# Patient Record
Sex: MALE | Race: WHITE | NOT HISPANIC OR LATINO | Employment: UNEMPLOYED | URBAN - METROPOLITAN AREA
[De-identification: names, ages, dates, MRNs, and addresses within clinical notes are randomized per-mention and may not be internally consistent; named-entity substitution may affect disease eponyms.]

---

## 2016-03-25 LAB — POTASSIUM (EXTERNAL): 4.8 MMOL/L (ref 3.5–5)

## 2017-05-23 ENCOUNTER — RECORDS - HEALTHEAST (OUTPATIENT)
Dept: LAB | Facility: CLINIC | Age: 47
End: 2017-05-23

## 2017-05-24 LAB — HBA1C MFR BLD: 6.9 % (ref 4.2–6.1)

## 2017-08-23 ENCOUNTER — RECORDS - HEALTHEAST (OUTPATIENT)
Dept: ADMINISTRATIVE | Facility: OTHER | Age: 47
End: 2017-08-23

## 2017-09-21 ENCOUNTER — AMBULATORY - HEALTHEAST (OUTPATIENT)
Dept: VASCULAR SURGERY | Facility: CLINIC | Age: 47
End: 2017-09-21

## 2017-09-21 DIAGNOSIS — L89.90 DECUBITAL ULCER: ICD-10-CM

## 2017-09-28 ENCOUNTER — OFFICE VISIT - HEALTHEAST (OUTPATIENT)
Dept: VASCULAR SURGERY | Facility: CLINIC | Age: 47
End: 2017-09-28

## 2017-09-28 ENCOUNTER — HOSPITAL ENCOUNTER (OUTPATIENT)
Dept: RADIOLOGY | Facility: HOSPITAL | Age: 47
Discharge: HOME | End: 2017-09-28
Attending: PODIATRIST

## 2017-09-28 ENCOUNTER — AMBULATORY - HEALTHEAST (OUTPATIENT)
Dept: LAB | Facility: HOSPITAL | Age: 47
End: 2017-09-28

## 2017-09-28 DIAGNOSIS — L97.519 DIABETIC ULCER OF RIGHT FOOT ASSOCIATED WITH TYPE 2 DIABETES MELLITUS (H): ICD-10-CM

## 2017-09-28 DIAGNOSIS — E11.621 DIABETIC ULCER OF RIGHT FOOT ASSOCIATED WITH TYPE 2 DIABETES MELLITUS (H): ICD-10-CM

## 2017-10-03 ENCOUNTER — AMBULATORY - HEALTHEAST (OUTPATIENT)
Dept: VASCULAR SURGERY | Facility: CLINIC | Age: 47
End: 2017-10-03

## 2017-10-03 DIAGNOSIS — M86.9 OSTEOMYELITIS OF RIGHT FOOT, UNSPECIFIED TYPE (H): ICD-10-CM

## 2017-10-12 ENCOUNTER — OFFICE VISIT - HEALTHEAST (OUTPATIENT)
Dept: VASCULAR SURGERY | Facility: CLINIC | Age: 47
End: 2017-10-12

## 2017-10-12 DIAGNOSIS — E11.621 DIABETIC ULCER OF RIGHT FOOT ASSOCIATED WITH TYPE 2 DIABETES MELLITUS, WITH OTHER ULCER SEVERITY, UNSPECIFIED PART OF FOOT (H): ICD-10-CM

## 2017-10-12 DIAGNOSIS — L97.518 DIABETIC ULCER OF RIGHT FOOT ASSOCIATED WITH TYPE 2 DIABETES MELLITUS, WITH OTHER ULCER SEVERITY, UNSPECIFIED PART OF FOOT (H): ICD-10-CM

## 2017-10-13 ENCOUNTER — COMMUNICATION - HEALTHEAST (OUTPATIENT)
Dept: VASCULAR SURGERY | Facility: CLINIC | Age: 47
End: 2017-10-13

## 2017-10-24 ENCOUNTER — HOSPITAL ENCOUNTER (OUTPATIENT)
Dept: MRI IMAGING | Facility: HOSPITAL | Age: 47
Discharge: HOME | End: 2017-10-24
Attending: PODIATRIST

## 2017-10-24 DIAGNOSIS — M86.9 OSTEOMYELITIS OF RIGHT FOOT, UNSPECIFIED TYPE (H): ICD-10-CM

## 2017-10-25 ENCOUNTER — RECORDS - HEALTHEAST (OUTPATIENT)
Dept: ADMINISTRATIVE | Facility: OTHER | Age: 47
End: 2017-10-25

## 2017-11-09 ENCOUNTER — OFFICE VISIT - HEALTHEAST (OUTPATIENT)
Dept: VASCULAR SURGERY | Facility: CLINIC | Age: 47
End: 2017-11-09

## 2017-11-09 DIAGNOSIS — S92.919A FRACTURE, TOE: ICD-10-CM

## 2017-12-19 ENCOUNTER — RECORDS - HEALTHEAST (OUTPATIENT)
Dept: LAB | Facility: CLINIC | Age: 47
End: 2017-12-19

## 2017-12-19 LAB — HBA1C MFR BLD: 8.9 % (ref 4.2–6.1)

## 2018-01-04 ENCOUNTER — RECORDS - HEALTHEAST (OUTPATIENT)
Dept: LAB | Facility: CLINIC | Age: 48
End: 2018-01-04

## 2018-01-04 LAB — MAGNESIUM SERPL-MCNC: 1.3 MG/DL (ref 1.8–2.6)

## 2018-02-19 ENCOUNTER — RECORDS - HEALTHEAST (OUTPATIENT)
Dept: LAB | Facility: CLINIC | Age: 48
End: 2018-02-19

## 2018-02-20 LAB — MAGNESIUM SERPL-MCNC: 1.5 MG/DL (ref 1.8–2.6)

## 2018-04-05 ENCOUNTER — RECORDS - HEALTHEAST (OUTPATIENT)
Dept: LAB | Facility: CLINIC | Age: 48
End: 2018-04-05

## 2018-04-05 LAB — HBA1C MFR BLD: 7.4 % (ref 4.2–6.1)

## 2018-04-11 LAB
CREATININE (EXTERNAL): 1.07 MG/DL (ref 0.72–1.25)
GFR ESTIMATED (EXTERNAL): >60 ML/MIN/1.73M2
GFR ESTIMATED (IF AFRICAN AMERICAN) (EXTERNAL): >60 ML/MIN/1.73M2
GLUCOSE (EXTERNAL): 269 MG/DL (ref 65–100)
POTASSIUM (EXTERNAL): 4.2 MMOL/L (ref 3.5–5)

## 2018-07-19 LAB
CREATININE (EXTERNAL): 1.25 MG/DL (ref 0.72–1.25)
GFR ESTIMATED (EXTERNAL): >60 ML/MIN/1.73M2
GFR ESTIMATED (IF AFRICAN AMERICAN) (EXTERNAL): >60 ML/MIN/1.73M2
GLUCOSE (EXTERNAL): 127 MG/DL (ref 65–100)
POTASSIUM (EXTERNAL): 5 MMOL/L (ref 3.5–5)

## 2018-08-07 ENCOUNTER — RECORDS - HEALTHEAST (OUTPATIENT)
Dept: LAB | Facility: CLINIC | Age: 48
End: 2018-08-07

## 2018-08-07 LAB
ERYTHROCYTE [DISTWIDTH] IN BLOOD BY AUTOMATED COUNT: 14.6 % (ref 11–14.5)
FERRITIN SERPL-MCNC: 221 NG/ML (ref 27–300)
HCT VFR BLD AUTO: 32.5 % (ref 40–54)
HGB BLD-MCNC: 10.7 G/DL (ref 14–18)
MCH RBC QN AUTO: 29 PG (ref 27–34)
MCHC RBC AUTO-ENTMCNC: 32.9 G/DL (ref 32–36)
MCV RBC AUTO: 88 FL (ref 80–100)
PLATELET # BLD AUTO: 155 THOU/UL (ref 140–440)
PMV BLD AUTO: 11 FL (ref 8.5–12.5)
RBC # BLD AUTO: 3.69 MILL/UL (ref 4.4–6.2)
WBC: 4.7 THOU/UL (ref 4–11)

## 2018-10-12 ENCOUNTER — RECORDS - HEALTHEAST (OUTPATIENT)
Dept: LAB | Facility: CLINIC | Age: 48
End: 2018-10-12

## 2018-10-12 LAB
ALBUMIN SERPL-MCNC: 3.3 G/DL (ref 3.5–5)
ALP SERPL-CCNC: 44 U/L (ref 45–120)
ALT SERPL W P-5'-P-CCNC: 58 U/L (ref 0–45)
ANION GAP SERPL CALCULATED.3IONS-SCNC: 10 MMOL/L (ref 5–18)
AST SERPL W P-5'-P-CCNC: 91 U/L (ref 0–40)
BILIRUB DIRECT SERPL-MCNC: 0.2 MG/DL
BILIRUB SERPL-MCNC: 0.5 MG/DL (ref 0–1)
BUN SERPL-MCNC: 11 MG/DL (ref 8–22)
CALCIUM SERPL-MCNC: 9.5 MG/DL (ref 8.5–10.5)
CHLORIDE BLD-SCNC: 104 MMOL/L (ref 98–107)
CO2 SERPL-SCNC: 21 MMOL/L (ref 22–31)
CREAT SERPL-MCNC: 0.98 MG/DL (ref 0.7–1.3)
ERYTHROCYTE [DISTWIDTH] IN BLOOD BY AUTOMATED COUNT: 15 % (ref 11–14.5)
GFR SERPL CREATININE-BSD FRML MDRD: >60 ML/MIN/1.73M2
GLUCOSE BLD-MCNC: 122 MG/DL (ref 70–125)
HBA1C MFR BLD: 5 % (ref 4.2–6.1)
HCT VFR BLD AUTO: 32.8 % (ref 40–54)
HGB BLD-MCNC: 10.2 G/DL (ref 14–18)
MAGNESIUM SERPL-MCNC: 1.6 MG/DL (ref 1.8–2.6)
MCH RBC QN AUTO: 28.9 PG (ref 27–34)
MCHC RBC AUTO-ENTMCNC: 31.1 G/DL (ref 32–36)
MCV RBC AUTO: 93 FL (ref 80–100)
PLATELET # BLD AUTO: 175 THOU/UL (ref 140–440)
PMV BLD AUTO: 11.6 FL (ref 8.5–12.5)
POTASSIUM BLD-SCNC: 4.4 MMOL/L (ref 3.5–5)
PROT SERPL-MCNC: 6.5 G/DL (ref 6–8)
RBC # BLD AUTO: 3.53 MILL/UL (ref 4.4–6.2)
SODIUM SERPL-SCNC: 135 MMOL/L (ref 136–145)
WBC: 3.9 THOU/UL (ref 4–11)

## 2018-11-12 ENCOUNTER — RECORDS - HEALTHEAST (OUTPATIENT)
Dept: LAB | Facility: CLINIC | Age: 48
End: 2018-11-12

## 2018-11-12 LAB
ALBUMIN SERPL-MCNC: 2.7 G/DL (ref 3.5–5)
ALP SERPL-CCNC: 55 U/L (ref 45–120)
ALT SERPL W P-5'-P-CCNC: 15 U/L (ref 0–45)
ANION GAP SERPL CALCULATED.3IONS-SCNC: 6 MMOL/L (ref 5–18)
AST SERPL W P-5'-P-CCNC: 29 U/L (ref 0–40)
BASOPHILS # BLD AUTO: 0 THOU/UL (ref 0–0.2)
BASOPHILS NFR BLD AUTO: 1 % (ref 0–2)
BILIRUB DIRECT SERPL-MCNC: 0.1 MG/DL
BILIRUB SERPL-MCNC: 0.3 MG/DL (ref 0–1)
BUN SERPL-MCNC: 5 MG/DL (ref 8–22)
CALCIUM SERPL-MCNC: 9.4 MG/DL (ref 8.5–10.5)
CHLORIDE BLD-SCNC: 99 MMOL/L (ref 98–107)
CO2 SERPL-SCNC: 27 MMOL/L (ref 22–31)
CREAT SERPL-MCNC: 0.91 MG/DL (ref 0.7–1.3)
EOSINOPHIL # BLD AUTO: 0.2 THOU/UL (ref 0–0.4)
EOSINOPHIL NFR BLD AUTO: 6 % (ref 0–6)
ERYTHROCYTE [DISTWIDTH] IN BLOOD BY AUTOMATED COUNT: 14.7 % (ref 11–14.5)
GFR SERPL CREATININE-BSD FRML MDRD: >60 ML/MIN/1.73M2
GLUCOSE BLD-MCNC: 167 MG/DL (ref 70–125)
HCT VFR BLD AUTO: 29.7 % (ref 40–54)
HGB BLD-MCNC: 9.1 G/DL (ref 14–18)
LYMPHOCYTES # BLD AUTO: 1 THOU/UL (ref 0.8–4.4)
LYMPHOCYTES NFR BLD AUTO: 27 % (ref 20–40)
MCH RBC QN AUTO: 29.3 PG (ref 27–34)
MCHC RBC AUTO-ENTMCNC: 30.6 G/DL (ref 32–36)
MCV RBC AUTO: 96 FL (ref 80–100)
MONOCYTES # BLD AUTO: 0.3 THOU/UL (ref 0–0.9)
MONOCYTES NFR BLD AUTO: 8 % (ref 2–10)
NEUTROPHILS # BLD AUTO: 2.1 THOU/UL (ref 2–7.7)
NEUTROPHILS NFR BLD AUTO: 58 % (ref 50–70)
PLATELET # BLD AUTO: 146 THOU/UL (ref 140–440)
PMV BLD AUTO: 10.9 FL (ref 8.5–12.5)
POTASSIUM BLD-SCNC: 5 MMOL/L (ref 3.5–5)
PROT SERPL-MCNC: 5.4 G/DL (ref 6–8)
RBC # BLD AUTO: 3.11 MILL/UL (ref 4.4–6.2)
SODIUM SERPL-SCNC: 132 MMOL/L (ref 136–145)
WBC: 3.8 THOU/UL (ref 4–11)

## 2018-11-21 ENCOUNTER — RECORDS - HEALTHEAST (OUTPATIENT)
Dept: LAB | Facility: CLINIC | Age: 48
End: 2018-11-21

## 2018-11-21 LAB
BASOPHILS # BLD AUTO: 0.1 THOU/UL (ref 0–0.2)
BASOPHILS NFR BLD AUTO: 1 % (ref 0–2)
EOSINOPHIL # BLD AUTO: 0.2 THOU/UL (ref 0–0.4)
EOSINOPHIL NFR BLD AUTO: 5 % (ref 0–6)
ERYTHROCYTE [DISTWIDTH] IN BLOOD BY AUTOMATED COUNT: 14.6 % (ref 11–14.5)
FERRITIN SERPL-MCNC: 233 NG/ML (ref 27–300)
HCT VFR BLD AUTO: 31.4 % (ref 40–54)
HGB BLD-MCNC: 9.9 G/DL (ref 14–18)
LYMPHOCYTES # BLD AUTO: 1 THOU/UL (ref 0.8–4.4)
LYMPHOCYTES NFR BLD AUTO: 27 % (ref 20–40)
MCH RBC QN AUTO: 29.6 PG (ref 27–34)
MCHC RBC AUTO-ENTMCNC: 31.5 G/DL (ref 32–36)
MCV RBC AUTO: 94 FL (ref 80–100)
MONOCYTES # BLD AUTO: 0.4 THOU/UL (ref 0–0.9)
MONOCYTES NFR BLD AUTO: 10 % (ref 2–10)
NEUTROPHILS # BLD AUTO: 2.1 THOU/UL (ref 2–7.7)
NEUTROPHILS NFR BLD AUTO: 56 % (ref 50–70)
PLATELET # BLD AUTO: 143 THOU/UL (ref 140–440)
PMV BLD AUTO: 11.1 FL (ref 8.5–12.5)
RBC # BLD AUTO: 3.35 MILL/UL (ref 4.4–6.2)
VIT B12 SERPL-MCNC: 825 PG/ML (ref 213–816)
WBC: 3.7 THOU/UL (ref 4–11)

## 2019-03-20 LAB
CREATININE (EXTERNAL): 1.12 MG/DL (ref 0.72–1.25)
GFR ESTIMATED (EXTERNAL): >60 ML/MIN/1.73M2
GFR ESTIMATED (IF AFRICAN AMERICAN) (EXTERNAL): >60 ML/MIN/1.73M2
GLUCOSE (EXTERNAL): 188 MG/DL (ref 65–100)
POTASSIUM (EXTERNAL): 4.4 MMOL/L (ref 3.5–5)

## 2019-04-27 ENCOUNTER — RECORDS - HEALTHEAST (OUTPATIENT)
Dept: LAB | Facility: CLINIC | Age: 49
End: 2019-04-27

## 2019-04-29 LAB
ALBUMIN SERPL-MCNC: 4 G/DL (ref 3.5–5)
ALP SERPL-CCNC: 103 U/L (ref 45–120)
ALT SERPL W P-5'-P-CCNC: 20 U/L (ref 0–45)
ANION GAP SERPL CALCULATED.3IONS-SCNC: 9 MMOL/L (ref 5–18)
AST SERPL W P-5'-P-CCNC: 21 U/L (ref 0–40)
BILIRUB SERPL-MCNC: 0.4 MG/DL (ref 0–1)
BUN SERPL-MCNC: 17 MG/DL (ref 8–22)
CALCIUM SERPL-MCNC: 10.1 MG/DL (ref 8.5–10.5)
CHLORIDE BLD-SCNC: 104 MMOL/L (ref 98–107)
CO2 SERPL-SCNC: 23 MMOL/L (ref 22–31)
CREAT SERPL-MCNC: 1.09 MG/DL (ref 0.7–1.3)
ERYTHROCYTE [DISTWIDTH] IN BLOOD BY AUTOMATED COUNT: 13.2 % (ref 11–14.5)
FERRITIN SERPL-MCNC: 91 NG/ML (ref 27–300)
GFR SERPL CREATININE-BSD FRML MDRD: >60 ML/MIN/1.73M2
GLUCOSE BLD-MCNC: 239 MG/DL (ref 70–125)
HBA1C MFR BLD: 9.2 % (ref 4.2–6.1)
HCT VFR BLD AUTO: 36.1 % (ref 40–54)
HGB BLD-MCNC: 11.5 G/DL (ref 14–18)
MAGNESIUM SERPL-MCNC: 1.2 MG/DL (ref 1.8–2.6)
MCH RBC QN AUTO: 27.3 PG (ref 27–34)
MCHC RBC AUTO-ENTMCNC: 31.9 G/DL (ref 32–36)
MCV RBC AUTO: 86 FL (ref 80–100)
PLATELET # BLD AUTO: 210 THOU/UL (ref 140–440)
PMV BLD AUTO: 10.1 FL (ref 8.5–12.5)
POTASSIUM BLD-SCNC: 4.2 MMOL/L (ref 3.5–5)
PROT SERPL-MCNC: 6.9 G/DL (ref 6–8)
RBC # BLD AUTO: 4.21 MILL/UL (ref 4.4–6.2)
SODIUM SERPL-SCNC: 136 MMOL/L (ref 136–145)
VIT B12 SERPL-MCNC: 840 PG/ML (ref 213–816)
WBC: 4.8 THOU/UL (ref 4–11)

## 2019-07-30 ENCOUNTER — RECORDS - HEALTHEAST (OUTPATIENT)
Dept: LAB | Facility: CLINIC | Age: 49
End: 2019-07-30

## 2019-07-31 LAB — HBA1C MFR BLD: 9.8 % (ref 4.2–6.1)

## 2019-11-15 ENCOUNTER — RECORDS - HEALTHEAST (OUTPATIENT)
Dept: LAB | Facility: CLINIC | Age: 49
End: 2019-11-15

## 2019-11-19 LAB — HBA1C MFR BLD: 5.9 % (ref 4.2–6.1)

## 2021-04-09 ENCOUNTER — AMBULATORY - HEALTHEAST (OUTPATIENT)
Dept: VASCULAR SURGERY | Facility: CLINIC | Age: 51
End: 2021-04-09

## 2021-04-09 DIAGNOSIS — S91.301A OPEN WOUND OF RIGHT FOOT: ICD-10-CM

## 2021-04-14 ENCOUNTER — OFFICE VISIT - HEALTHEAST (OUTPATIENT)
Dept: VASCULAR SURGERY | Facility: CLINIC | Age: 51
End: 2021-04-14

## 2021-04-14 ENCOUNTER — COMMUNICATION - HEALTHEAST (OUTPATIENT)
Dept: VASCULAR SURGERY | Facility: CLINIC | Age: 51
End: 2021-04-14

## 2021-04-14 DIAGNOSIS — E11.621 DIABETIC ULCER OF RIGHT MIDFOOT ASSOCIATED WITH TYPE 2 DIABETES MELLITUS, LIMITED TO BREAKDOWN OF SKIN (H): ICD-10-CM

## 2021-04-14 DIAGNOSIS — L97.411 DIABETIC ULCER OF RIGHT MIDFOOT ASSOCIATED WITH TYPE 2 DIABETES MELLITUS, LIMITED TO BREAKDOWN OF SKIN (H): ICD-10-CM

## 2021-04-14 DIAGNOSIS — M21.541 EQUINOVARUS ACQUIRED DEFORMITY, RIGHT: ICD-10-CM

## 2021-04-14 DIAGNOSIS — I73.9 PAD (PERIPHERAL ARTERY DISEASE) (H): ICD-10-CM

## 2021-05-05 ENCOUNTER — RECORDS - HEALTHEAST (OUTPATIENT)
Dept: VASCULAR ULTRASOUND | Facility: CLINIC | Age: 51
End: 2021-05-05

## 2021-05-05 ENCOUNTER — OFFICE VISIT - HEALTHEAST (OUTPATIENT)
Dept: VASCULAR SURGERY | Facility: CLINIC | Age: 51
End: 2021-05-05

## 2021-05-05 DIAGNOSIS — M21.541 ACQUIRED CLUBFOOT, RIGHT FOOT: ICD-10-CM

## 2021-05-05 DIAGNOSIS — L97.411 DIABETIC ULCER OF RIGHT MIDFOOT ASSOCIATED WITH TYPE 2 DIABETES MELLITUS, LIMITED TO BREAKDOWN OF SKIN (H): ICD-10-CM

## 2021-05-05 DIAGNOSIS — E11.621 TYPE 2 DIABETES MELLITUS WITH FOOT ULCER (CODE) (H): ICD-10-CM

## 2021-05-05 DIAGNOSIS — I73.9 PERIPHERAL VASCULAR DISEASE, UNSPECIFIED (H): ICD-10-CM

## 2021-05-05 DIAGNOSIS — L97.411 NON-PRESSURE CHRONIC ULCER OF RIGHT HEEL AND MIDFOOT LIMITED TO BREAKDOWN OF SKIN (H): ICD-10-CM

## 2021-05-05 DIAGNOSIS — E11.621 DIABETIC ULCER OF RIGHT MIDFOOT ASSOCIATED WITH TYPE 2 DIABETES MELLITUS, LIMITED TO BREAKDOWN OF SKIN (H): ICD-10-CM

## 2021-05-05 ASSESSMENT — MIFFLIN-ST. JEOR: SCORE: 1534.39

## 2021-05-26 ENCOUNTER — RECORDS - HEALTHEAST (OUTPATIENT)
Dept: ADMINISTRATIVE | Facility: CLINIC | Age: 51
End: 2021-05-26

## 2021-05-27 ENCOUNTER — RECORDS - HEALTHEAST (OUTPATIENT)
Dept: ADMINISTRATIVE | Facility: CLINIC | Age: 51
End: 2021-05-27

## 2021-05-27 VITALS
HEART RATE: 74 BPM | WEIGHT: 160 LBS | TEMPERATURE: 97.6 F | DIASTOLIC BLOOD PRESSURE: 64 MMHG | SYSTOLIC BLOOD PRESSURE: 122 MMHG | BODY MASS INDEX: 25.11 KG/M2 | HEIGHT: 67 IN | RESPIRATION RATE: 17 BRPM

## 2021-05-27 VITALS
TEMPERATURE: 96.1 F | RESPIRATION RATE: 17 BRPM | SYSTOLIC BLOOD PRESSURE: 122 MMHG | DIASTOLIC BLOOD PRESSURE: 70 MMHG | HEART RATE: 17 BPM

## 2021-05-28 ENCOUNTER — RECORDS - HEALTHEAST (OUTPATIENT)
Dept: ADMINISTRATIVE | Facility: CLINIC | Age: 51
End: 2021-05-28

## 2021-05-30 ENCOUNTER — RECORDS - HEALTHEAST (OUTPATIENT)
Dept: ADMINISTRATIVE | Facility: CLINIC | Age: 51
End: 2021-05-30

## 2021-06-09 ENCOUNTER — OFFICE VISIT - HEALTHEAST (OUTPATIENT)
Dept: VASCULAR SURGERY | Facility: CLINIC | Age: 51
End: 2021-06-09

## 2021-06-09 ENCOUNTER — RECORDS - HEALTHEAST (OUTPATIENT)
Dept: ADMINISTRATIVE | Facility: CLINIC | Age: 51
End: 2021-06-09

## 2021-06-09 DIAGNOSIS — E11.621 DIABETIC ULCER OF RIGHT MIDFOOT ASSOCIATED WITH TYPE 2 DIABETES MELLITUS, LIMITED TO BREAKDOWN OF SKIN (H): ICD-10-CM

## 2021-06-09 DIAGNOSIS — M21.541 EQUINOVARUS ACQUIRED DEFORMITY, RIGHT: ICD-10-CM

## 2021-06-09 DIAGNOSIS — L97.411 DIABETIC ULCER OF RIGHT MIDFOOT ASSOCIATED WITH TYPE 2 DIABETES MELLITUS, LIMITED TO BREAKDOWN OF SKIN (H): ICD-10-CM

## 2021-06-09 ASSESSMENT — MIFFLIN-ST. JEOR: SCORE: 1534.39

## 2021-06-11 ENCOUNTER — COMMUNICATION - HEALTHEAST (OUTPATIENT)
Dept: VASCULAR SURGERY | Facility: CLINIC | Age: 51
End: 2021-06-11

## 2021-06-13 NOTE — PROGRESS NOTES
FOOT AND ANKLE SURGERY/PODIATRY Progress Note        ASSESSMENT:   Ulceration right foot  Equinovarus right  DM      TREATMENT:  -The right foot ulceration is stable. The patient has had the sore for a very long time, 10 years. Because it is longstanding, I would like to evaluate for osteomyelitis and referred him for a right foot x-ray, and CRP. If either test is abnormal, will consider an MRI for further evaluation.   -The patient has a flexible, reducible equinovarus deformity on his right foot. +4/5 muscle strength to dorsiflexion and plantarflexion. We discussed treatment options today to include offloading of the ulcerative site. He will avoid use of the AFO at this time as this may be contributing to skin breakdown. I recommend he begin use of an egg crate boot to offload the lateral foot. We also discussed tendon transfer options such as a STATT vs tibialis posterior tendon transfer to create a rectus foot.   -Sharp, excisional debridement of the wound into subcutaneous tissue was performed using #15 blade for a total of 0.3 square centimeters. Debridement was done to reduce pressure, remove non-viable, devitalized tissue and promote wound healing. Patient tolerated this well. Collagen with a gauze dresssing was applied. He will apply endoform as directed. Will prior auth patient for Epifix.  -The patient will follow-up in two weeks.      Alex Kim, [facility]      HPI: Cm Lundberg was seen today at the request of his wound care nurse for a right foot ulceration. His brother is present for today's exam. The patient states he has had the sore for the past 10 years after suffering a CVA and subsequent foot deformity. He has treated the sore himself until home health nurse started in January, 2017. He has used an AFO for several years which he believes has caused lateral skin irritation. A new AFO was obtained from [facility] earlier this summer. He resides at an assisted living  facility. Former smoker, quit 2-3 years ago.     No past medical history on file.    Allergies   Allergen Reactions     Isosorbide Headache     Codeine Rash     Morphine Rash         Current Outpatient Prescriptions:      acetaminophen (TYLENOL) 500 MG tablet, Take 1,000 mg by mouth., Disp: , Rfl:      amitriptyline (ELAVIL) 10 MG tablet, Take 10 mg by mouth., Disp: , Rfl:      ascorbic acid, vitamin C, (VITAMIN C) 500 MG tablet, Take 1 tablet by mouth., Disp: , Rfl:      aspirin 81 mg chewable tablet, Chew 81 mg., Disp: , Rfl:      atorvastatin (LIPITOR) 80 MG tablet, Take 80 mg by mouth., Disp: , Rfl:      buPROPion (WELLBUTRIN XL) 300 MG 24 hr tablet, Take 300 mg by mouth., Disp: , Rfl:      calcium carbonate (OS-TO) 500 mg calcium (1,250 mg) chewable tablet, Chew 1 tablet., Disp: , Rfl:      cholecalciferol, vitamin D3, 1,000 unit capsule, Take 1,000 Units by mouth., Disp: , Rfl:      coenzyme Q10 100 mg capsule, Take 100 mg by mouth., Disp: , Rfl:      cyanocobalamin 1000 MCG tablet, Take 1,000 mcg by mouth., Disp: , Rfl:      DULoxetine (CYMBALTA) 30 MG capsule, Take 30 mg by mouth., Disp: , Rfl:      famotidine (PEPCID) 20 MG tablet, Take 20 mg by mouth., Disp: , Rfl:      fenofibrate (LIPOFEN) 50 mg cap, Take 4 capsules by mouth., Disp: , Rfl:      loperamide (IMODIUM A-D) 2 mg tablet, Take by mouth., Disp: , Rfl:      metFORMIN (GLUCOPHAGE) 1000 MG tablet, Take 1,000 mg by mouth., Disp: , Rfl:      metoprolol succinate (TOPROL-XL) 25 MG, Take 37.5 mg by mouth., Disp: , Rfl:      multivitamin (ONE A DAY) per tablet, Take 1 tablet by mouth., Disp: , Rfl:      nitroglycerin (NITROSTAT) 0.4 MG SL tablet, Place 0.4 mg under the tongue., Disp: , Rfl:      OMEGA-3 FATTY ACIDS-VITAMIN E ORAL, Take 2 capsules by mouth., Disp: , Rfl:      oxyCODONE (ROXICODONE) 5 MG immediate release tablet, Take 2.5 mg by mouth., Disp: , Rfl:      rivaroxaban 20 mg Tab, Take 20 mg by mouth., Disp: , Rfl:      traZODone (DESYREL)  100 MG tablet, Take 100 mg by mouth., Disp: , Rfl:     Past Surgical History:   Procedure Laterality Date     [place] CABG, VEIN, SINGLE      Description: CABG (CABG);  Recorded: 04/22/2009;       Social History     Social History Narrative       Family History   Problem Relation Age of Onset     Adopted: Yes       Review of Systems - Negative       OBJECTIVE:  Appearance: alert, well appearing, and in no distress.    Vitals:    09/28/17 1500   BP: 124/72   Pulse: 76   Resp: 16   Temp: 99.2  F (37.3  C)       Vascular: Dorsalis pedis palpableRight.  Dermatologic:   Wound 09/28/17 right lateral foot (Active)   Pre Size Length 0.6 9/28/2017  3:00 PM   Pre Size Width 0.5 9/28/2017  3:00 PM   Pre Total Sq cm 0.3 9/28/2017  3:00 PM   Granular base, does not probe to deep tissues. No erythema noted right foot.   Neurologic: Diminished to light touch Right.  Musculoskeletal: Limited ankle dorsiflexion right ankle with knee extended and flexed. Reducible equinovarus deformity right foot. +4/5 dorsiflexion and plantarflexion right.     Imaging: pending    Picture: None

## 2021-06-14 NOTE — PROGRESS NOTES
FOOT AND ANKLE SURGERY/PODIATRY Progress Note        ASSESSMENT:   Fracture 5th digit right foot   Equinovarus right  DM      TREATMENT:  -MRI indicates a non-displaced subacute fracture of the proximal phalanx 5th digit right foot. No osteomyelitis. I reviewed these findings with the patient and his brother today. He is having no pain with walking or on exam today.  -Based on this information I recommend he continue to ambulate in regular shoes. Monitor for discomfort or edema within the 5th digit. All questions invited and answered today.   -Cm Lundberg is discharged from the [facility] [place] at this time, but encouraged to return with concerns as they develop.      Alex Kim, [facility] [place]      HPI: Cm Lundberg was seen again today for a fracture 5th digit right foot. Doing well. He is walking on the right foot in regular shoes without pain. The patient did obtain an MRI as requested and would like to review the report today.       No past medical history on file.    Allergies   Allergen Reactions     Isosorbide Headache     Codeine Rash     Morphine Rash         Current Outpatient Prescriptions:      acetaminophen (TYLENOL) 500 MG tablet, Take 1,000 mg by mouth., Disp: , Rfl:      amitriptyline (ELAVIL) 10 MG tablet, Take 10 mg by mouth., Disp: , Rfl:      ascorbic acid, vitamin C, (VITAMIN C) 500 MG tablet, Take 1 tablet by mouth., Disp: , Rfl:      aspirin 81 mg chewable tablet, Chew 81 mg., Disp: , Rfl:      atorvastatin (LIPITOR) 80 MG tablet, Take 80 mg by mouth., Disp: , Rfl:      buPROPion (WELLBUTRIN XL) 300 MG 24 hr tablet, Take 300 mg by mouth., Disp: , Rfl:      calcium carbonate (OS-TO) 500 mg calcium (1,250 mg) chewable tablet, Chew 1 tablet., Disp: , Rfl:      cholecalciferol, vitamin D3, 1,000 unit capsule, Take 1,000 Units by mouth., Disp: , Rfl:      coenzyme Q10 100 mg capsule, Take 100 mg by mouth., Disp: , Rfl:      cyanocobalamin 1000 MCG  tablet, Take 1,000 mcg by mouth., Disp: , Rfl:      DULoxetine (CYMBALTA) 30 MG capsule, Take 30 mg by mouth., Disp: , Rfl:      famotidine (PEPCID) 20 MG tablet, Take 20 mg by mouth., Disp: , Rfl:      fenofibrate (LIPOFEN) 50 mg cap, Take 4 capsules by mouth., Disp: , Rfl:      loperamide (IMODIUM A-D) 2 mg tablet, Take by mouth., Disp: , Rfl:      metFORMIN (GLUCOPHAGE) 1000 MG tablet, Take 1,000 mg by mouth., Disp: , Rfl:      metoprolol succinate (TOPROL-XL) 25 MG, Take 37.5 mg by mouth., Disp: , Rfl:      multivitamin (ONE A DAY) per tablet, Take 1 tablet by mouth., Disp: , Rfl:      nitroglycerin (NITROSTAT) 0.4 MG SL tablet, Place 0.4 mg under the tongue., Disp: , Rfl:      OMEGA-3 FATTY ACIDS-VITAMIN E ORAL, Take 2 capsules by mouth., Disp: , Rfl:      oxyCODONE (ROXICODONE) 5 MG immediate release tablet, Take 2.5 mg by mouth., Disp: , Rfl:      rivaroxaban 20 mg Tab, Take 20 mg by mouth., Disp: , Rfl:      traZODone (DESYREL) 100 MG tablet, Take 100 mg by mouth., Disp: , Rfl:     Current Facility-Administered Medications:      lidocaine 2 % jelly (XYLOCAINE), , Topical, PRN, Alex Kim, DPM, 1 application at 10/12/17 1020    Review of Systems - Negative       OBJECTIVE:  Appearance: alert, well appearing, and in no distress.    Vitals:    11/09/17 1022   BP: 110/72   Pulse: 84   Resp: 16   Temp: 98.7  F (37.1  C)   SpO2: 97%       Vascular: Dorsalis pedis palpableRight.  Dermatologic:    Wound 09/28/17 right lateral foot (Active)   Pre Size Length 0.6 10/12/2017 10:00 AM   Pre Size Width 0.5 10/12/2017 10:00 AM   Pre Total Sq cm 0.3 10/12/2017 10:00 AM   Post Size Length 0 10/12/2017 10:00 AM   Post Size Width 0 10/12/2017 10:00 AM   Post Total Sq cm 0 10/12/2017 10:00 AM   Prodcut Used Collagen 10/12/2017 10:00 AM     Neurologic: Diminished to light touch Right.  Musculoskeletal: Contracted digits noted Right. No pain along 5th digit right foot.    Imaging: MRI RIGHT FOREFOOT, MIDFOOT  10/24/2017  8:08 PM     INDICATION: Concern for osteomyelitis, foot ulcer, history of diabetes.  TECHNIQUE: Routine. Postcontrast images were obtained after 6 mL of Gadavist.  COMPARISON: XR 9/28/2017     FINDINGS: No specific evidence for osteomyelitis, with attention to the fifth toe near the skin marker. While there is enhancement of the underlying bone and surrounding soft tissues, this is felt to be posttraumatic given the known subacute appearing   fracture involving the proximal phalanx of the fifth toe with associated edema and inflammation. There is no substantial displacement. There is flexion deformity involving all of the distal toes. No other fractures identified. There is mild to moderate   diffuse loss of muscle bulk with reactive intramuscular edema. Visualized flexor and extensor tendons are intact. Scattered degenerative changes are most pronounced at the first MTP articulation where there is mild to moderate degenerative change,   including chronic appearing fragmentation of the medial sesamoid.     IMPRESSION:   CONCLUSION:  1.  Edema and inflammation around the proximal phalanx of the fifth toe is most likely related to subacute fracture. No specific evidence for signs to suggest concomitant osteomyelitis.    Picture: none

## 2021-06-15 ENCOUNTER — TRANSCRIBE ORDERS (OUTPATIENT)
Dept: PODIATRY | Facility: CLINIC | Age: 51
End: 2021-06-15

## 2021-06-15 DIAGNOSIS — E11.621 DIABETIC ULCER OF RIGHT MIDFOOT ASSOCIATED WITH TYPE 2 DIABETES MELLITUS, LIMITED TO BREAKDOWN OF SKIN (H): Primary | ICD-10-CM

## 2021-06-15 DIAGNOSIS — M21.541 EQUINOVARUS ACQUIRED DEFORMITY, RIGHT: ICD-10-CM

## 2021-06-15 DIAGNOSIS — L97.411 DIABETIC ULCER OF RIGHT MIDFOOT ASSOCIATED WITH TYPE 2 DIABETES MELLITUS, LIMITED TO BREAKDOWN OF SKIN (H): Primary | ICD-10-CM

## 2021-06-16 NOTE — TELEPHONE ENCOUNTER
Shakira is calling back regarding Cm.  She is stating that the frequencies for the wound care that is currently ordered they are unable to due because of Medicare regulations she can be reached at [phone]

## 2021-06-16 NOTE — PATIENT INSTRUCTIONS - HE
"    Important Instructions       *Order offloading boot [egg-crate boot]                      How to care for your wound    Cleanse wound with saline sent to you with your supplies or a wound wash found at the pharmacy.      Pat dry the wound with 4\"x4\" gauze      Cut to fit the Medihoney and place in or on the wound.        Cover the wound with [id number] dry gauze.      Wrap the affected area with 4 inch roll gauze.      Secure dressings in place with paper tape.      Change dressing: every other day      Do not get your ulcer wet when you shower.  You can cover it with a cast protector. Cast protectors are available for purchase at [facility]. You may also purchase a cast protector at your local pharmacy.      Good nutrition is important for wound healing.  I recommend increasing your protein.  You can do this through your diet, nutritional supplements, and/or protein powder.    It is ok to continue current wound care treatment/products for the next 2-3 days until new wound care supplies are ordered and arrive. If longer than this please contact our office.    Please call the [facility] before substituting any product    Call our clinic nurse at [phone] if there is an increase in drainage, pain, redness, or the wound size increases.  This maybe a sign of infection and require attention prior to the next appointment        "

## 2021-06-16 NOTE — TELEPHONE ENCOUNTER
Writer left message for Shakira (home care nurse) to call back regarding patient. Patient reported today in clinic that Shakira wanted us to call her after seeing the patient.

## 2021-06-16 NOTE — TELEPHONE ENCOUNTER
Spoke to nurse and she states that Medicare won't allow medihoney dressing change every other day due to the fact that this is a simple dressing change. They were using it previously to consulting with Dr. Kim. She asked if he was debrided and writer answered yes. Nurse explained that patient and his assisted living staff both cannot do the dressing changes as well. Homecare is allowed to do 2x per week dressing changes. Writer approved that his dressing can be changed to 2x per week plus PRN visits for increased drainage or other concerns. Homecare is agreeable to this. Will update the order.

## 2021-06-17 ENCOUNTER — COMMUNICATION - HEALTHEAST (OUTPATIENT)
Dept: ADMINISTRATIVE | Facility: CLINIC | Age: 51
End: 2021-06-17

## 2021-06-17 NOTE — PATIENT INSTRUCTIONS - HE
"Diabetic ulcer of right midfoot associated with type 2 diabetes mellitus, limited to breakdown of skin (H) [E11.621, L98.274]   Order comments: How to care for your wound Cleanse wound with saline sent to you with your supplies or a wound wash found at the pharmacy. Pat dry the wound with 4\"x4\" gauze Cut to fit the Medihoney and place in or on the wound.   Cover the wound with [id number] dry gauze. Wrap the affected area with 4 inch roll gauze. Secure dressings in place with paper tape. Change dressin times per week plus PRN for increased drainage and/or concerns. Do not get your ulcer wet when you shower.  You can cover it with a cast protector. Cast protectors are available for purchase at [facility]. You may also purchase a cast protector at your local pharmacy. Good nutrition is important for wound healing.  I recommend increasing your protein.  You can do this through your diet, nutritional supplements, and/or protein powder. It is ok to continue current wound care treatment/products for the next 2-3 days until new wound care supplies are ordered and arrive. If longer than this please contact our office. Please call the [facility] before substituting any product Call our clinic nurse at [phone] if there is an increase in drainage, pain, redness, or the wound size increases.  This maybe a sign of infection and require attention prior to the next appointment       Please obtain a case protector from our home medical store.     Please call one of the [place] locations below to schedule an appointment. If you received a prescription please bring it with you to your appointment. Some locations are limited to what they carry.    Office Locations    [facility]  [address] [place] [address]  Home Medical Equipment, [address]   Phone: [phone]   Orthotics and Prosthetics, [address]   Phone: " [phone]

## 2021-06-25 NOTE — PROGRESS NOTES
Progress Notes by Alex Kim DPM at 10/12/2017 10:00 AM     Author: Alex Kim DPM Service: -- Author Type: Physician    Filed: 10/12/2017 12:52 PM Encounter Date: 10/12/2017 Status: Signed    : Alex Kim DPM (Physician)       FOOT AND ANKLE SURGERY/PODIATRY Progress Note        ASSESSMENT:   Resolved Ulceration right foot  Equinovarus right  DM      TREATMENT:  -The right foot ulceration has resolved. Recent right foot x-rays indicate a non-displaced fracture of the proximal phalanx 5th digit. Because the patient is unsure if he has had a sore at this location in the past, I have referred him for an MRI to evaluate for osteomyelitis.   -The patient will see Peter to have the AFO adjusted. In the interim, he will continue to use the egg crate boot for offloading and will follow-up after using the modified AFO x3-4 weeks. I will contact him with the MRI report if abnormal.       Alex Kim DPM  [facility] [place]      HPI: Cm Lundberg was seen again today for a right foot ulceration. The patient's brother is present for today's exam. He was able to obtain the CRP and x-rays of the right foot.      No past medical history on file.    Allergies   Allergen Reactions   ? Isosorbide Headache   ? Codeine Rash   ? Morphine Rash         Current Outpatient Prescriptions:   ?  acetaminophen (TYLENOL) 500 MG tablet, Take 1,000 mg by mouth., Disp: , Rfl:   ?  amitriptyline (ELAVIL) 10 MG tablet, Take 10 mg by mouth., Disp: , Rfl:   ?  ascorbic acid, vitamin C, (VITAMIN C) 500 MG tablet, Take 1 tablet by mouth., Disp: , Rfl:   ?  aspirin 81 mg chewable tablet, Chew 81 mg., Disp: , Rfl:   ?  atorvastatin (LIPITOR) 80 MG tablet, Take 80 mg by mouth., Disp: , Rfl:   ?  buPROPion (WELLBUTRIN XL) 300 MG 24 hr tablet, Take 300 mg by mouth., Disp: , Rfl:   ?  calcium carbonate (OS-TO) 500 mg calcium (1,250 mg) chewable tablet, Chew 1 tablet., Disp: , Rfl:   ?  cholecalciferol,  vitamin D3, 1,000 unit capsule, Take 1,000 Units by mouth., Disp: , Rfl:   ?  coenzyme Q10 100 mg capsule, Take 100 mg by mouth., Disp: , Rfl:   ?  cyanocobalamin 1000 MCG tablet, Take 1,000 mcg by mouth., Disp: , Rfl:   ?  DULoxetine (CYMBALTA) 30 MG capsule, Take 30 mg by mouth., Disp: , Rfl:   ?  famotidine (PEPCID) 20 MG tablet, Take 20 mg by mouth., Disp: , Rfl:   ?  fenofibrate (LIPOFEN) 50 mg cap, Take 4 capsules by mouth., Disp: , Rfl:   ?  loperamide (IMODIUM A-D) 2 mg tablet, Take by mouth., Disp: , Rfl:   ?  metFORMIN (GLUCOPHAGE) 1000 MG tablet, Take 1,000 mg by mouth., Disp: , Rfl:   ?  metoprolol succinate (TOPROL-XL) 25 MG, Take 37.5 mg by mouth., Disp: , Rfl:   ?  multivitamin (ONE A DAY) per tablet, Take 1 tablet by mouth., Disp: , Rfl:   ?  nitroglycerin (NITROSTAT) 0.4 MG SL tablet, Place 0.4 mg under the tongue., Disp: , Rfl:   ?  OMEGA-3 FATTY ACIDS-VITAMIN E ORAL, Take 2 capsules by mouth., Disp: , Rfl:   ?  oxyCODONE (ROXICODONE) 5 MG immediate release tablet, Take 2.5 mg by mouth., Disp: , Rfl:   ?  rivaroxaban 20 mg Tab, Take 20 mg by mouth., Disp: , Rfl:   ?  traZODone (DESYREL) 100 MG tablet, Take 100 mg by mouth., Disp: , Rfl:     Current Facility-Administered Medications:   ?  lidocaine 2 % jelly (XYLOCAINE), , Topical, PRN, Alex Kim DPM, 1 application at 10/12/17 1020    Review of Systems - Negative       OBJECTIVE:  Appearance: alert, well appearing, and in no distress.    Vitals:    10/12/17 1016   BP: 108/70   Pulse: 88   Resp: 16   Temp: 98.5  F (36.9  C)       Vascular: Dorsalis pedis palpableRight.  Dermatologic:    Wound 09/28/17 right lateral foot (Active)   Pre Size Length 0.6 10/12/2017 10:00 AM   Pre Size Width 0.5 10/12/2017 10:00 AM   Pre Total Sq cm 0.3 10/12/2017 10:00 AM   Post Size Length 0 10/12/2017 10:00 AM   Post Size Width 0 10/12/2017 10:00 AM   Post Total Sq cm 0 10/12/2017 10:00 AM   Prodcut Used Collagen 10/12/2017 10:00 AM     Neurologic: Diminished  to light touch Right.  Musculoskeletal: Contracted digits noted Right.    Imaging: None    Picture:

## 2021-06-25 NOTE — TELEPHONE ENCOUNTER
Pt was seen by Dr. Kim on 6/9. Pts consult was April 2021. HC nurse is concerned because pt started using medi honey last summer for his R foot ulceration which his PCP ordered. She stated that this has been used for almost a year with no change. The wound becomes calloused and doesn't heal the wound. She is wondering if Dr. Kim would like to try any other treatment. Next f/u 6/30/21. Please advise.

## 2021-06-26 NOTE — PATIENT INSTRUCTIONS - HE
Continue protein supplement drinks twice daily.    Limited weightbearing on right foot with non-weightbearing as much as possible.    Wear egg crate boot on right foot when non-weightbearing.  Please call to get egg-crate boot:  [facility] (Please call to make an appointment)    [facility]  [address], [address]  [place], [place].  Phone: [phone]        Continue medihoney on right lateral foot ulcer:  1. Remove old Medihoney packing.  2. Cleanse with normal saline, pat dry.  3. Apply Medihoney alginate into the wound. Try to avoid medihoney on the intact skin.   4. Cover with a gauze dressing and secure with Roll Gauze and paper tape.  5. Change every other day.      It is not ok to get your wound wet     Call the clinic for any questions regarding your wound or cares and if you experience signs or symptoms of infection including: fevers, chills, increased redness, increased drainage, foul odor, increased pain at [phone].

## 2021-06-26 NOTE — TELEPHONE ENCOUNTER
Caller: Mother Jewels    Provider: MD Alex Kim    Detailed reason for call: Mother and siblings are very concerned that Cm requested an electric wheelchair and an order was placed. His area of residence is very dangerous with no sidewalks and she states Cm would be inclined to take off further than he should. She fears he does not have the mental restraint to use it safely.    She claims he is very clever and will try to go behind their backs to meet this request. She prefers that he not know that she intervened.     Best phone number to contact: [phone]    Best time to contact: Any time    Ok to leave a detailed message: Yes

## 2021-06-30 NOTE — PROGRESS NOTES
Progress Notes by Barbi Abdi RN at 4/14/2021 10:00 AM     Author: Barbi Abdi RN Service: -- Author Type: Registered Nurse    Filed: 4/14/2021 11:41 AM Encounter Date: 4/14/2021 Status: Signed    : Barbi Abdi RN (Registered Nurse)       R lateral foot wound. He stated that it started about a year ago. He thinks its from rubbing from his foot brace. He is aware to make an appt to have it adjusted.         R lateral foot

## 2021-06-30 NOTE — PROGRESS NOTES
Progress Notes by Alex Kim DPM at 4/14/2021 10:00 AM     Author: Alex Kim DPM Service: -- Author Type: Physician    Filed: 4/14/2021 11:41 AM Encounter Date: 4/14/2021 Status: Signed    : Alex Kim DPM (Physician)       FOOT AND ANKLE SURGERY/PODIATRY CONSULT NOTE        ASSESSMENT:   Diabetic Ulceration right foot  Equinovarus deformity right   DM2        TREATMENT:  -Fibrotic and granular tissue lateral right foot ulceration, does not probe to deep tissues. Discussed that equinovarus deformity is likely increasing skin pressure along the 5th ray leading to skin breakdown. We will likely need to modify his AFO. May need to consider TTC fusion if ulcer re-occur.     -I discussed the principles of wound healing today including the importance of limited walking on the involved limb, good vascular perfusion, good glycemic control and the absence of infection.     -After discussion of risk factors and consent obtained 2% Lidocaine HCL jelly was applied, under clean conditions, the right and foot ulceration(s) were debrided using #15 blade scalpel.  Devitalized and nonviable tissue, along with any fibrin and slough, was removed to improve granulation tissue formation, stimulate wound healing, decrease overall bacteria load, disrupt biofilm formation and decrease edge senescence.  Total excisional debridement was 0.35 sq cm into the subcutaneous tissue with a depth of 0.2 cm.   Ulcers were improved afterwards and .  Measures were as noted on the flow sheet. Medi-honey with a gauze dressing was applied. He will continue to apply Medi-honey with a gauze dressing qoday.    -Recommend non-weight bearing right foot with wheelchair. Offloading lateral foot with egg crate boot.    -He will follow-up with me in 3 weeks.    Alex Kim DPM  [facility] [place]      HPI: Cm Lundberg was seen today for a lateral right foot ulceration. He is known to me having treated  him in the past for a toe fracture. He first noticed the sore on the lateral right foot about one year ago, and has been seeing a foot doctor for wound management. PMH significant for DM2.    No past medical history on file.    Past Surgical History:   Procedure Laterality Date   ? [place] CABG, VEIN, SINGLE      Description: CABG (CABG);  Recorded: 04/22/2009;       Allergies   Allergen Reactions   ? Isosorbide Headache   ? Codeine Rash   ? Morphine Rash         Current Outpatient Medications:   ?  acetaminophen (TYLENOL) 500 MG tablet, Take 1,000 mg by mouth., Disp: , Rfl:   ?  amitriptyline (ELAVIL) 10 MG tablet, Take 10 mg by mouth., Disp: , Rfl:   ?  ascorbic acid, vitamin C, (VITAMIN C) 500 MG tablet, Take 1 tablet by mouth., Disp: , Rfl:   ?  aspirin 81 mg chewable tablet, Chew 81 mg., Disp: , Rfl:   ?  atorvastatin (LIPITOR) 80 MG tablet, Take 80 mg by mouth., Disp: , Rfl:   ?  buPROPion (WELLBUTRIN XL) 300 MG 24 hr tablet, Take 300 mg by mouth., Disp: , Rfl:   ?  calcium carbonate (OS-TO) 500 mg calcium (1,250 mg) chewable tablet, Chew 1 tablet., Disp: , Rfl:   ?  cholecalciferol, vitamin D3, 1,000 unit capsule, Take 1,000 Units by mouth., Disp: , Rfl:   ?  coenzyme Q10 100 mg capsule, Take 100 mg by mouth., Disp: , Rfl:   ?  cyanocobalamin 1000 MCG tablet, Take 1,000 mcg by mouth., Disp: , Rfl:   ?  DULoxetine (CYMBALTA) 30 MG capsule, Take 30 mg by mouth., Disp: , Rfl:   ?  famotidine (PEPCID) 20 MG tablet, Take 20 mg by mouth., Disp: , Rfl:   ?  fenofibrate (LIPOFEN) 50 mg cap, Take 4 capsules by mouth., Disp: , Rfl:   ?  loperamide (IMODIUM A-D) 2 mg tablet, Take by mouth., Disp: , Rfl:   ?  metFORMIN (GLUCOPHAGE) 1000 MG tablet, Take 1,000 mg by mouth., Disp: , Rfl:   ?  metoprolol succinate (TOPROL-XL) 25 MG, Take 37.5 mg by mouth., Disp: , Rfl:   ?  multivitamin (ONE A DAY) per tablet, Take 1 tablet by mouth., Disp: , Rfl:   ?  nitroglycerin (NITROSTAT) 0.4 MG SL tablet, Place 0.4 mg under the tongue.,  Disp: , Rfl:   ?  OMEGA-3 FATTY ACIDS-VITAMIN E ORAL, Take 2 capsules by mouth., Disp: , Rfl:   ?  oxyCODONE (ROXICODONE) 5 MG immediate release tablet, Take 2.5 mg by mouth., Disp: , Rfl:   ?  rivaroxaban 20 mg Tab, Take 20 mg by mouth., Disp: , Rfl:   ?  traZODone (DESYREL) 100 MG tablet, Take 100 mg by mouth., Disp: , Rfl:     Current Facility-Administered Medications:   ?  lidocaine 2 % jelly (XYLOCAINE), , Topical, PRN, Alex Kim, DPSTACIE, 1 application at 10/12/17 1020    Past Surgical History:   Procedure Laterality Date   ? [place] CABG, VEIN, SINGLE      Description: CABG (CABG);  Recorded: 04/22/2009;       Social History     Social History Narrative   ? Not on file       Family History   Adopted: Yes       Review of Systems - 10 point Review of Systems is negative except for right foot ulcer which is noted in HPI.      OBJECTIVE:  Appearance: alert, well appearing, and in no distress.    Vitals:    04/14/21 1003   BP: 122/70   Pulse: (!) 17   Resp: 17   Temp: (!) 96.1  F (35.6  C)       BMI= There is no height or weight on file to calculate BMI.    General appearance: Patient is alert and fully cooperative with history & exam.  No sign of distress is noted during the visit.     Psychiatric: Affect is pleasant & appropriate.  Patient appears motivated to improve health.     Respiratory: Breathing is regular & unlabored while sitting.     HEENT: Hearing is intact to spoken word.  Speech is clear.  No gross evidence of visual impairment that would impact ambulation.        Vascular: Dorsalis pedis non-palpableRight.  Dermatologic:   Wound 09/28/17 right lateral foot (Active)       VASC Wound 04/14/21 (Active)   Pre Size Length 0.5 04/14/21 1000   Pre Size Width 0.7 04/14/21 1000   Pre Size Depth 0.2 04/14/21 1000   Pre Total Sq cm 0.35 04/14/21 1000   Description callous 04/14/21 1000   Fibrotic and granular tissue lateral right foot ulceration, does not probe to deep tissues. No erythema right foot.    Neurologic: Diminished to light touch Right.  Musculoskeletal: Rigid equinovarus deformity right foot. Contracted digits noted Right.          Picture:

## 2021-06-30 NOTE — PROGRESS NOTES
Progress Notes by Alex Kim DPM at 5/5/2021 10:20 AM     Author: Alex Kim DPM Service: -- Author Type: Physician    Filed: 5/5/2021 11:32 AM Encounter Date: 5/5/2021 Status: Signed    : Alex Kim DPM (Physician)       FOOT AND ANKLE SURGERY/PODIATRY Progress Note      ASSESSMENT:   Diabetic Ulceration right foot  Equinovarus deformity right   DM2      TREATMENT:  -The right foot ulceration is stable, no signs of infection. ABBY's indicate blood flow adequate for wound healing. Discussed importance of non-weight bearing and protein supplements.     -After discussion of risk factors and consent obtained 2% Lidocaine HCL jelly was applied, under clean conditions, the right and foot ulceration(s) were debrided using #15 blade scalpel.  Devitalized and nonviable tissue, along with any fibrin and slough, was removed to improve granulation tissue formation, stimulate wound healing, decrease overall bacteria load, disrupt biofilm formation and decrease edge senescence.  Total excisional debridement was 0.36 sq cm into the subcutaneous tissue with a depth of 0.2 cm.   Ulcers were improved afterwards and .  Measures were as noted on the flow sheet. Patient tolerated this well. Medi-honey with a gauze dressing was applied. He will continue to apply Medi-honey with a gauze dressing as directed.    -He will follow-up in 3 weeks.    Alex Kim DPM  [facility] [place]      HPI: Cm Lundberg was seen again today for a right foot ulcer. He has remained mostly non-weight bearing in a wheelchair and applying medihoney as directed. He admits applying some pressure to the right foot.       No past medical history on file.    Past Surgical History:   Procedure Laterality Date   ? IN CABG, VEIN, SINGLE      Description: CABG (CABG);  Recorded: 04/22/2009;       Allergies   Allergen Reactions   ? Isosorbide Headache   ? Codeine Rash   ? Morphine Rash         Current  Outpatient Medications:   ?  acetaminophen (TYLENOL) 500 MG tablet, Take 1,000 mg by mouth., Disp: , Rfl:   ?  AIMOVIG AUTOINJECTOR 140 mg/mL atIn, , Disp: , Rfl:   ?  amitriptyline (ELAVIL) 10 MG tablet, Take 10 mg by mouth., Disp: , Rfl:   ?  ARIPiprazole (ABILIFY) 2 MG tablet, , Disp: , Rfl:   ?  ascorbic acid, vitamin C, (VITAMIN C) 500 MG tablet, Take 1 tablet by mouth., Disp: , Rfl:   ?  aspirin 81 mg chewable tablet, Chew 81 mg., Disp: , Rfl:   ?  atorvastatin (LIPITOR) 80 MG tablet, Take 80 mg by mouth., Disp: , Rfl:   ?  buPROPion (WELLBUTRIN XL) 300 MG 24 hr tablet, Take 300 mg by mouth., Disp: , Rfl:   ?  calcium carbonate (OS-TO) 500 mg calcium (1,250 mg) chewable tablet, Chew 1 tablet., Disp: , Rfl:   ?  cholecalciferol, vitamin D3, 1,000 unit capsule, Take 1,000 Units by mouth., Disp: , Rfl:   ?  coenzyme Q10 100 mg capsule, Take 100 mg by mouth., Disp: , Rfl:   ?  cyanocobalamin 1000 MCG tablet, Take 1,000 mcg by mouth., Disp: , Rfl:   ?  DULoxetine (CYMBALTA) 30 MG capsule, Take 30 mg by mouth., Disp: , Rfl:   ?  famotidine (PEPCID) 20 MG tablet, Take 20 mg by mouth., Disp: , Rfl:   ?  fenofibrate (LIPOFEN) 50 mg cap, Take 4 capsules by mouth., Disp: , Rfl:   ?  FEROSUL 325 mg (65 mg iron) tablet, , Disp: , Rfl:   ?  gabapentin (NEURONTIN) 100 MG capsule, , Disp: , Rfl:   ?  gabapentin (NEURONTIN) 300 MG capsule, , Disp: , Rfl:   ?  glipiZIDE (GLUCOTROL) 5 MG tablet, , Disp: , Rfl:   ?  loperamide (IMODIUM A-D) 2 mg tablet, Take by mouth., Disp: , Rfl:   ?  LORazepam (ATIVAN) 0.5 MG tablet, , Disp: , Rfl:   ?  metFORMIN (GLUCOPHAGE) 1000 MG tablet, Take 1,000 mg by mouth., Disp: , Rfl:   ?  metoprolol succinate (TOPROL-XL) 25 MG, Take 37.5 mg by mouth., Disp: , Rfl:   ?  mirtazapine (REMERON) 15 MG tablet, , Disp: , Rfl:   ?  multivitamin (ONE A DAY) per tablet, Take 1 tablet by mouth., Disp: , Rfl:   ?  nitroglycerin (NITROSTAT) 0.4 MG SL tablet, Place 0.4 mg under the tongue., Disp: , Rfl:   ?   OMEGA-3 FATTY ACIDS-VITAMIN E ORAL, Take 2 capsules by mouth., Disp: , Rfl:   ?  omeprazole (PRILOSEC) 20 MG capsule, , Disp: , Rfl:   ?  oxyCODONE (ROXICODONE) 5 MG immediate release tablet, Take 2.5 mg by mouth., Disp: , Rfl:   ?  rivaroxaban 20 mg Tab, Take 20 mg by mouth., Disp: , Rfl:   ?  traZODone (DESYREL) 100 MG tablet, Take 100 mg by mouth., Disp: , Rfl:     Current Facility-Administered Medications:   ?  lidocaine 2 % jelly (XYLOCAINE), , Topical, PRN, Alex Kim, DPSTACIE, 2 application at 05/05/21 1010    Review of Systems - 10 point Review of Systems is negative except for right foot ulcer which is noted in HPI.      OBJECTIVE:  Vitals:    05/05/21 0959   BP: 122/64   Pulse: 74   Resp: 17   Temp: 97.6  F (36.4  C)     General appearance: Patient is alert and fully cooperative with history & exam.  No sign of distress is noted during the visit.   Vascular: Dorsalis pedis non-palpableRight.  Dermatologic:    Wound 09/28/17 right lateral foot (Active)       VASC Wound 04/14/21 (Active)   Pre Size Length 0.6 05/05/21 1000   Pre Size Width 0.6 05/05/21 1000   Pre Size Depth 0.2 05/05/21 1000   Pre Total Sq cm 0.36 05/05/21 1000   Description callous 04/14/21 1000   Mixed granular/fibrotic tissue right foot ulcer. No erythema right foot.   Neurologic: Diminished to light touch Right.  Musculoskeletal: Contracted digits noted Right.    Imaging:     No results found.       Picture:

## 2021-07-01 ENCOUNTER — RECORDS - HEALTHEAST (OUTPATIENT)
Dept: ADMINISTRATIVE | Facility: OTHER | Age: 51
End: 2021-07-01

## 2021-07-04 NOTE — ADDENDUM NOTE
Addendum Note by Hilda Fuentes, RN at 4/15/2021  9:24 AM     Author: Hilda Fuentes RN Service: -- Author Type: Registered Nurse    Filed: 4/15/2021  9:24 AM Encounter Date: 4/14/2021 Status: Signed    : Hilda Fuentes RN (Registered Nurse)    Addended by: HILDA FUENTES on: 4/15/2021 09:24 AM        Modules accepted: Orders

## 2021-07-04 NOTE — ADDENDUM NOTE
Addendum Note by Angie Vergara RN at 6/9/2021  9:20 AM     Author: Angie Vergara RN Service: -- Author Type: Registered Nurse    Filed: 6/18/2021 12:57 PM Encounter Date: 6/9/2021 Status: Signed    : Angie Vergara RN (Registered Nurse)    Addended by: ANGIE VERGARA on: 6/18/2021 12:57 PM        Modules accepted: Orders

## 2021-07-04 NOTE — PROGRESS NOTES
Progress Notes by Alex Kim DPM at 6/9/2021  9:20 AM     Author: Alex Kim DPM Service: -- Author Type: Physician    Filed: 6/9/2021 10:43 AM Encounter Date: 6/9/2021 Status: Signed    : Alex Kim DPM (Physician)       FOOT AND ANKLE SURGERY/PODIATRY Progress Note      ASSESSMENT:   Diabetic Ulceration right foot  Equinovarus deformity right   DM2      TREATMENT:  -The right foot ulceration is measuring smaller today. I recommend he continue with medihoney. Referral given for an electric wheelchair to remain non-weight bearing.     -After discussion of risk factors and consent obtained 2% Lidocaine HCL jelly was applied, under clean conditions, the right and foot ulceration(s) were debrided using #15 blade scalpel.  Devitalized and nonviable tissue, along with any fibrin and slough, was removed to improve granulation tissue formation, stimulate wound healing, decrease overall bacteria load, disrupt biofilm formation and decrease edge senescence.  Total excisional debridement was 0.36 sq cm into the subcutaneous tissue with a depth of 0.2 cm.   Ulcers were improved afterwards and .  Measures were as noted on the flow sheet. Patient tolerated this well. Medi-honey with a gauze dressing was applied. He will continue to apply Medi-honey with a gauze dressing as directed.    -He will follow-up in 3 weeks.    Alex Kim DPM  [facility]      HPI: Cm Lundberg was seen again today for a right foot ulcer. He has remained mostly non-weight bearing in a wheelchair and applying medihoney as directed. He admits applying some pressure to the right foot.  He requests an order for an electric wheelchair because he is having trouble staying off of his right foot.     No past medical history on file.    Past Surgical History:   Procedure Laterality Date   ? [place] CABG, VEIN, SINGLE      Description: CABG (CABG);  Recorded: 04/22/2009;       Allergies   Allergen  Reactions   ? Isosorbide Headache   ? Codeine Rash   ? Morphine Rash         Current Outpatient Medications:   ?  acetaminophen (TYLENOL) 500 MG tablet, Take 1,000 mg by mouth., Disp: , Rfl:   ?  AIMOVIG AUTOINJECTOR 140 mg/mL atIn, , Disp: , Rfl:   ?  amitriptyline (ELAVIL) 10 MG tablet, Take 10 mg by mouth., Disp: , Rfl:   ?  ARIPiprazole (ABILIFY) 2 MG tablet, , Disp: , Rfl:   ?  ascorbic acid, vitamin C, (VITAMIN C) 500 MG tablet, Take 1 tablet by mouth., Disp: , Rfl:   ?  aspirin 81 mg chewable tablet, Chew 81 mg., Disp: , Rfl:   ?  atorvastatin (LIPITOR) 80 MG tablet, Take 80 mg by mouth., Disp: , Rfl:   ?  buPROPion (WELLBUTRIN XL) 300 MG 24 hr tablet, Take 300 mg by mouth., Disp: , Rfl:   ?  calcium carbonate (OS-TO) 500 mg calcium (1,250 mg) chewable tablet, Chew 1 tablet., Disp: , Rfl:   ?  cholecalciferol, vitamin D3, 1,000 unit capsule, Take 1,000 Units by mouth., Disp: , Rfl:   ?  coenzyme Q10 100 mg capsule, Take 100 mg by mouth., Disp: , Rfl:   ?  cyanocobalamin 1000 MCG tablet, Take 1,000 mcg by mouth., Disp: , Rfl:   ?  DULoxetine (CYMBALTA) 30 MG capsule, Take 30 mg by mouth., Disp: , Rfl:   ?  famotidine (PEPCID) 20 MG tablet, Take 20 mg by mouth., Disp: , Rfl:   ?  fenofibrate (LIPOFEN) 50 mg cap, Take 4 capsules by mouth., Disp: , Rfl:   ?  fenofibrate micronized (LOFIBRA) 200 MG capsule, , Disp: , Rfl:   ?  FEROSUL 325 mg (65 mg iron) tablet, , Disp: , Rfl:   ?  gabapentin (NEURONTIN) 100 MG capsule, , Disp: , Rfl:   ?  gabapentin (NEURONTIN) 300 MG capsule, , Disp: , Rfl:   ?  glipiZIDE (GLUCOTROL) 5 MG tablet, , Disp: , Rfl:   ?  loperamide (IMODIUM A-D) 2 mg tablet, Take by mouth., Disp: , Rfl:   ?  LORazepam (ATIVAN) 0.5 MG tablet, , Disp: , Rfl:   ?  metFORMIN (GLUCOPHAGE) 1000 MG tablet, Take 1,000 mg by mouth., Disp: , Rfl:   ?  metoprolol succinate (TOPROL-XL) 25 MG, Take 37.5 mg by mouth., Disp: , Rfl:   ?  mirtazapine (REMERON) 15 MG tablet, , Disp: , Rfl:   ?  multivitamin (ONE A  DAY) per tablet, Take 1 tablet by mouth., Disp: , Rfl:   ?  nitroglycerin (NITROSTAT) 0.4 MG SL tablet, Place 0.4 mg under the tongue., Disp: , Rfl:   ?  OMEGA-3 FATTY ACIDS-VITAMIN E ORAL, Take 2 capsules by mouth., Disp: , Rfl:   ?  omeprazole (PRILOSEC) 20 MG capsule, , Disp: , Rfl:   ?  oxyCODONE (ROXICODONE) 5 MG immediate release tablet, Take 2.5 mg by mouth., Disp: , Rfl:   ?  rivaroxaban 20 mg Tab, Take 20 mg by mouth., Disp: , Rfl:   ?  traZODone (DESYREL) 100 MG tablet, Take 100 mg by mouth., Disp: , Rfl:     Current Facility-Administered Medications:   ?  lidocaine 2 % jelly (XYLOCAINE), , Topical, PRN, Alex Kim, LAKHWINDER, 2 application at 05/05/21 1010    Review of Systems - 10 point Review of Systems is negative except for right foot ulcer which is noted in HPI.      OBJECTIVE:  Vitals:    06/09/21 0928   BP: 132/78   Pulse: 68   Resp: 16   Temp: 98.8  F (37.1  C)     General appearance: Patient is alert and fully cooperative with history & exam.  No sign of distress is noted during the visit.   Vascular: Dorsalis pedis non-palpableRight.  Dermatologic:    Wound 09/28/17 right lateral foot (Active)       VASC Wound 04/14/21 (Active)   Pre Size Length 0.6 05/05/21 1000   Pre Size Width 0.6 05/05/21 1000   Pre Size Depth 0.2 05/05/21 1000   Pre Total Sq cm 0.36 05/05/21 1000   Description callous 04/14/21 1000   Mixed granular/fibrotic tissue right foot ulcer. No erythema right foot.   Neurologic: Diminished to light touch Right.  Musculoskeletal: Contracted digits noted Right.    Imaging:     No results found.       Picture:

## 2021-07-06 ENCOUNTER — COMMUNICATION - HEALTHEAST (OUTPATIENT)
Dept: VASCULAR SURGERY | Facility: CLINIC | Age: 51
End: 2021-07-06

## 2021-07-06 VITALS
DIASTOLIC BLOOD PRESSURE: 78 MMHG | TEMPERATURE: 98.8 F | BODY MASS INDEX: 25.11 KG/M2 | SYSTOLIC BLOOD PRESSURE: 132 MMHG | WEIGHT: 160 LBS | RESPIRATION RATE: 16 BRPM | HEART RATE: 68 BPM | HEIGHT: 67 IN

## 2021-07-06 NOTE — TELEPHONE ENCOUNTER
Telephone Encounter by Amanda Odonnell Aide at 7/6/2021 11:14 AM     Author: Amanda Odonnell Aide Service: -- Author Type: Aide    Filed: 7/6/2021 11:17 AM Encounter Date: 7/6/2021 Status: Signed    : Amanda Odonnell Aide (Aide)       Patient's sister is calling (POA)  Sherri is needing wound care instructions. Patient comes alone to the apt, she is wondering about the weight bearing status.  She can be reached at [phone]

## 2021-07-06 NOTE — TELEPHONE ENCOUNTER
Telephone Encounter by Barbara Lyon LPN at 7/6/2021 11:31 AM     Author: Barbara Lyon LPN Service: -- Author Type: Licensed Nurse    Filed: 7/6/2021 11:32 AM Encounter Date: 7/6/2021 Status: Signed    : Barbara Lyon LPN (Licensed Nurse)       Called and spoke to patients sister. They were wondering about orders that were placed to [facility]. Number was given to call and make an appointment.

## 2021-07-19 LAB
CREATININE (EXTERNAL): 1.15 MG/DL (ref 0.72–1.25)
GFR ESTIMATED (EXTERNAL): >60 ML/MIN/1.73M2
GFR ESTIMATED (IF AFRICAN AMERICAN) (EXTERNAL): >60 ML/MIN/1.73M2
GLUCOSE (EXTERNAL): 128 MG/DL (ref 65–100)
POTASSIUM (EXTERNAL): 4.1 MMOL/L (ref 3.5–5)

## 2021-07-20 ENCOUNTER — TELEPHONE (OUTPATIENT)
Dept: VASCULAR SURGERY | Facility: CLINIC | Age: 51
End: 2021-07-20

## 2021-07-20 LAB
CREATININE (EXTERNAL): 1.13 MG/DL (ref 0.72–1.25)
GFR ESTIMATED (EXTERNAL): >60 ML/MIN/1.73M2
GFR ESTIMATED (IF AFRICAN AMERICAN) (EXTERNAL): >60 ML/MIN/1.73M2
GLUCOSE (EXTERNAL): 138 MG/DL (ref 65–100)
POTASSIUM (EXTERNAL): 3.9 MMOL/L (ref 3.5–5)

## 2021-07-20 NOTE — TELEPHONE ENCOUNTER
Spoke to Valentina aguirre [place].   left messages for family to increase their presence and coordinate with assisted living facility to help patient stay off off of his foot.  Home care to continue medihoney dressing changes.      Home care will re-certify patient for another 60 days but plan on discharging patient from care after that time if he is non-compliant with weightbearing and no improvement in wound healing.

## 2021-07-20 NOTE — TELEPHONE ENCOUNTER
Valentina with [facility] requesting a call back to discuss patient's wound treatment.   She states that home care has been seeing patient for about one year without changes to patients wound or wound care orders. Valentina states that home care cannot continue to see patients with chronic wound and would like to discuss other options for wound healing/orders.  Ph: [phone]

## 2021-07-29 PROBLEM — E11.39 TYPE II OR UNSPECIFIED TYPE DIABETES MELLITUS WITH OPHTHALMIC MANIFESTATIONS, NOT STATED AS UNCONTROLLED(250.50) (H): Status: ACTIVE | Noted: 2021-07-29

## 2021-07-29 PROBLEM — E11.9 TYPE 2 DIABETES MELLITUS (H): Status: ACTIVE | Noted: 2021-07-29

## 2021-07-29 PROBLEM — E11.621 DIABETIC ULCER OF RIGHT MIDFOOT ASSOCIATED WITH TYPE 2 DIABETES MELLITUS, LIMITED TO BREAKDOWN OF SKIN (H): Status: ACTIVE | Noted: 2017-09-28

## 2021-07-29 PROBLEM — F32.A DEPRESSION: Status: ACTIVE | Noted: 2021-07-29

## 2021-07-29 PROBLEM — F41.1 ANXIETY STATE: Status: ACTIVE | Noted: 2021-07-29

## 2021-07-29 PROBLEM — I25.10 CORONARY ATHEROSCLEROSIS: Status: ACTIVE | Noted: 2021-07-29

## 2021-07-29 PROBLEM — L97.411 DIABETIC ULCER OF RIGHT MIDFOOT ASSOCIATED WITH TYPE 2 DIABETES MELLITUS, LIMITED TO BREAKDOWN OF SKIN (H): Status: ACTIVE | Noted: 2017-09-28

## 2021-07-29 PROBLEM — F81.9 PROBLEMS WITH LEARNING: Status: ACTIVE | Noted: 2021-07-29

## 2021-07-29 PROBLEM — R80.9 MICROALBUMINURIA: Status: ACTIVE | Noted: 2021-07-29

## 2021-07-29 PROBLEM — M21.541: Status: ACTIVE | Noted: 2021-04-14

## 2021-07-30 ENCOUNTER — OFFICE VISIT (OUTPATIENT)
Dept: VASCULAR SURGERY | Facility: CLINIC | Age: 51
End: 2021-07-30
Attending: PODIATRIST
Payer: MEDICARE

## 2021-07-30 VITALS — SYSTOLIC BLOOD PRESSURE: 138 MMHG | HEART RATE: 88 BPM | TEMPERATURE: 98.9 F | DIASTOLIC BLOOD PRESSURE: 84 MMHG

## 2021-07-30 DIAGNOSIS — L97.411 DIABETIC ULCER OF RIGHT MIDFOOT ASSOCIATED WITH TYPE 2 DIABETES MELLITUS, LIMITED TO BREAKDOWN OF SKIN (H): Primary | ICD-10-CM

## 2021-07-30 DIAGNOSIS — E11.621 DIABETIC ULCER OF RIGHT MIDFOOT ASSOCIATED WITH TYPE 2 DIABETES MELLITUS, LIMITED TO BREAKDOWN OF SKIN (H): Primary | ICD-10-CM

## 2021-07-30 PROCEDURE — 250N000009 HC RX 250: Performed by: PODIATRIST

## 2021-07-30 PROCEDURE — 11042 DBRDMT SUBQ TIS 1ST 20SQCM/<: CPT | Performed by: PODIATRIST

## 2021-07-30 RX ORDER — AMOXICILLIN 250 MG
1 CAPSULE ORAL 2 TIMES DAILY PRN
COMMUNITY
End: 2022-12-26

## 2021-07-30 RX ORDER — MENTHOL 40 MG/ML
1 GEL TOPICAL 2 TIMES DAILY
Status: ON HOLD | COMMUNITY
End: 2022-08-05

## 2021-07-30 RX ORDER — IBUPROFEN 400 MG/1
400 TABLET, FILM COATED ORAL EVERY 6 HOURS PRN
Status: ON HOLD | COMMUNITY
End: 2021-12-22

## 2021-07-30 RX ORDER — ERENUMAB-AOOE 140 MG/ML
140 INJECTION, SOLUTION SUBCUTANEOUS
COMMUNITY
Start: 2021-05-04 | End: 2022-04-05

## 2021-07-30 RX ORDER — GABAPENTIN 100 MG/1
100 CAPSULE ORAL
Status: ON HOLD | COMMUNITY
Start: 2021-05-01 | End: 2022-04-04

## 2021-07-30 RX ORDER — ARIPIPRAZOLE 2 MG/1
2 TABLET ORAL DAILY
COMMUNITY
Start: 2021-05-01

## 2021-07-30 RX ORDER — FENOFIBRATE 200 MG/1
200 CAPSULE ORAL
Status: ON HOLD | COMMUNITY
Start: 2021-05-27 | End: 2022-08-29

## 2021-07-30 RX ORDER — LIDOCAINE HYDROCHLORIDE 20 MG/ML
JELLY TOPICAL DAILY PRN
Status: DISCONTINUED | OUTPATIENT
Start: 2021-07-30 | End: 2021-12-22

## 2021-07-30 RX ORDER — POLYETHYLENE GLYCOL 3350 17 G/17G
1 POWDER, FOR SOLUTION ORAL DAILY PRN
COMMUNITY

## 2021-07-30 RX ORDER — FERROUS SULFATE 325(65) MG
325 TABLET ORAL
COMMUNITY
Start: 2021-05-01

## 2021-07-30 RX ORDER — LORAZEPAM 0.5 MG/1
0.5 TABLET ORAL
Status: ON HOLD | COMMUNITY
Start: 2021-04-29 | End: 2021-12-22

## 2021-07-30 RX ORDER — MIRTAZAPINE 15 MG/1
15 TABLET, FILM COATED ORAL
COMMUNITY
Start: 2021-05-01 | End: 2021-10-29

## 2021-07-30 RX ORDER — GLIPIZIDE 5 MG/1
2.5 TABLET ORAL
COMMUNITY
Start: 2021-05-01 | End: 2021-12-22

## 2021-07-30 RX ORDER — GABAPENTIN 100 MG/1
100 CAPSULE ORAL
COMMUNITY
Start: 2021-05-01 | End: 2021-10-29

## 2021-07-30 RX ADMIN — LIDOCAINE HYDROCHLORIDE: 20 JELLY TOPICAL at 09:13

## 2021-07-30 ASSESSMENT — PAIN SCALES - GENERAL: PAINLEVEL: NO PAIN (0)

## 2021-07-30 NOTE — PROGRESS NOTES
FOOT AND ANKLE SURGERY/PODIATRY Progress Note      ASSESSMENT:   Diabetic Ulceration right foot  Equinovarus deformity right   DM2      TREATMENT:  -The right foot ulceration is measuring smaller today. I recommend he continue with non-weight bearing and use of medihoney. Also briefly discussed TTC fusion to correct for equinovarus deformity and prevent chronic skin breakdown along the lateral right foot. We will continue to discuss.     -After discussion of risk factors and consent obtained 2% Lidocaine HCL jelly was applied, under clean conditions, the right and foot ulceration(s) were debrided using #15 blade. Devitalized and nonviable tissue, along with any fibrin and slough, was removed to improve granulation tissue formation, stimulate wound healing, decrease overall bacteria load, disrupt biofilm formation and decrease edge senescence. Wound drainage was scant No. Total excisional debridement was 0.24 sq cm into the subcutaneous tissue with a depth of 0.2 cm.   Ulcers were improved afterwards and .  Measures were as noted on the flow sheet. Medi-honey with a gauze dressing was applied. He will continue to apply Medi-honey with a gauze dressing as directed.    -He will follow-up in 3 weeks.    Alex Kim DPM  [facility] [place]      HPI: Cm Lundberg was seen again today for a right foot ulceration. Since his last visit, his family has obtained an in-house medical assistant to assist with non-weight bearing and going to the bathroom.       No past medical history on file.    Past Surgical History:   Procedure Laterality Date     C CABG, VEIN, SINGLE      Description: CABG (CABG);  Recorded: 04/22/2009;     IR MISCELLANEOUS PROCEDURE  2/13/2004       Allergies   Allergen Reactions     Aspirin Other (See Comments)     Reacts with migraine medicine     Isosorbide Headache     Codeine Rash     Morphine Rash         Current Outpatient Medications:      Acetaminophen (TYLENOL PO),  Take 1,000 mg by mouth every 8 hours as needed for mild pain or fever, Disp: , Rfl:      AMITRIPTYLINE HCL PO, Take 10 mg by mouth At Bedtime, Disp: , Rfl:      ARIPiprazole (ABILIFY) 2 MG tablet, , Disp: , Rfl:      Atorvastatin Calcium (LIPITOR PO), Take 80 mg by mouth daily, Disp: , Rfl:      DULoxetine HCl (CYMBALTA PO), Take 30 mg by mouth daily, Disp: , Rfl:      erenumab-aooe (AIMOVIG) 140 MG/ML injection, , Disp: , Rfl:      fenofibrate micronized (LOFIBRA) 200 MG capsule, , Disp: , Rfl:      FENOFIBRATE PO, Take 200 mg by mouth daily, Disp: , Rfl:      ferrous sulfate (FEROSUL) 325 (65 Fe) MG tablet, Take 325 mg by mouth, Disp: , Rfl:      gabapentin (NEURONTIN) 100 MG capsule, Take 100 mg by mouth, Disp: , Rfl:      gabapentin (NEURONTIN) 300 MG capsule, Take 600 mg by mouth, Disp: , Rfl:      glipiZIDE (GLUCOTROL) 5 MG tablet, Take 2.5 mg by mouth, Disp: , Rfl:      guaiFENesin (ROBITUSSIN) 100 MG/5ML SYRP, Take 10 mLs by mouth every 4 hours as needed for cough, Disp: , Rfl:      ibuprofen (ADVIL/MOTRIN) 400 MG tablet, Take 400 mg by mouth every 6 hours as needed for moderate pain, Disp: , Rfl:      LORazepam (ATIVAN) 0.5 MG tablet, Take 0.5 mg by mouth, Disp: , Rfl:      Menthol, Topical Analgesic, (BIOFREEZE ROLL-ON) 4 % GEL, , Disp: , Rfl:      METFORMIN HCL PO, Take 1,000 mg by mouth 2 times daily, Disp: , Rfl:      METOPROLOL SUCCINATE ER PO, Take 37.5 mg by mouth daily , Disp: , Rfl:      mirtazapine (REMERON) 15 MG tablet, Take 15 mg by mouth, Disp: , Rfl:      Multiple Vitamin (TAB-A-JOHN) TABS, Take 1 tablet by mouth daily, Disp: , Rfl:      omeprazole (PRILOSEC) 20 MG DR capsule, Take 20 mg by mouth, Disp: , Rfl:      senna-docusate (SENOKOT-S/PERICOLACE) 8.6-50 MG tablet, Take 1 tablet by mouth, Disp: , Rfl:      Ascorbic Acid 500 MG CAPS, Take 1 tablet by mouth daily (Patient not taking: Reported on 7/30/2021), Disp: , Rfl:      aspirin 81 MG chewable tablet, Take 81 mg by mouth daily (Patient  not taking: Reported on 7/30/2021), Disp: , Rfl:      buPROPion (WELLBUTRIN XL) 300 MG 24 hr tablet, Take 300 mg by mouth every morning (Patient not taking: Reported on 7/30/2021), Disp: , Rfl:      calcium carbonate 500 MG CHEW, Take 1 tablet by mouth 4 times daily as needed (Patient not taking: Reported on 7/30/2021), Disp: , Rfl:      Cholecalciferol (VITAMIN D3 PO), Take 1,000 Units by mouth daily (Patient not taking: Reported on 7/30/2021), Disp: , Rfl:      cyanocobalamin (VITAMIN  B-12) 1000 MCG tablet, Take 1,000 mcg by mouth daily (Patient not taking: Reported on 7/30/2021), Disp: , Rfl:      FAMOTIDINE PO, Take 20 mg by mouth daily (Patient not taking: Reported on 7/30/2021), Disp: , Rfl:      loperamide (IMODIUM) 2 MG capsule, Take 4 mg by mouth as needed for diarrhea (Patient not taking: Reported on 7/30/2021), Disp: , Rfl:      Nitroglycerin (NITROSTAT SL), Place 0.4 mg under the tongue every 5 minutes as needed for chest pain (Patient not taking: Reported on 7/30/2021), Disp: , Rfl:      Omega-3 Fatty Acids (OMEGA-3 FISH OIL PO), Take 2 g by mouth 2 times daily (Patient not taking: Reported on 7/30/2021), Disp: , Rfl:      OXYCODONE HCL PO, Take 2.5 mg by mouth every 6 hours as needed (Patient not taking: Reported on 7/30/2021), Disp: , Rfl:      polyethylene glycol (MIRALAX) 17 GM/Dose powder, Take 1 packet by mouth (Patient not taking: Reported on 7/30/2021), Disp: , Rfl:      Rivaroxaban (XARELTO PO), Take 20 mg by mouth daily (Patient not taking: Reported on 7/30/2021), Disp: , Rfl:      TRAZODONE HCL PO, Take 100 mg by mouth At Bedtime (Patient not taking: Reported on 7/30/2021), Disp: , Rfl:      triamcinolone (KENALOG) 0.1 % cream, Apply topically 4 times daily Apply to right lower leg rash until healed. (Patient not taking: Reported on 7/30/2021), Disp: , Rfl:     Current Facility-Administered Medications:      lidocaine (XYLOCAINE) 2 % external gel, , Topical, Daily PRN, Alex Kim,  DPM, Given at 07/30/21 0913    Review of Systems - 10 point Review of Systems is negative except for right foot ulcer which is noted in HPI.      OBJECTIVE:  /84   Pulse 88   Temp 98.9  F (37.2  C)   General appearance: Patient is alert and fully cooperative with history & exam.  No sign of distress is noted during the visit.    Vascular: Dorsalis pedis palpableRight.  Dermatologic:    VASC Wound right lateral foot (Active)       VASC Wound left hallux  (Active)       VASC Wound right lateral foot (Active)   Pre Size Length 0.6 07/30/21 0900   Pre Size Width 0.6 07/30/21 0900   Pre Size Depth 0.3 07/30/21 0900   Pre Total Sq cm 0.24 07/30/21 0900     Neurologic: Diminished to light touch Right.  Musculoskeletal: Semi-rigid equinovarus deformity right.     Imaging:     No results found.       Picture:

## 2021-07-30 NOTE — PATIENT INSTRUCTIONS
"            non weight bearing on the left foot                   How to care for your wound     Cleanse wound with saline sent to you with your supplies or a wound wash found at the pharmacy.     Pat dry the wound with 4\"x4\" gauze     Cut to fit the Medihoney and place in or on the wound.       Cover the wound with dry gauze.     Wrap the affected area with roll gauze     Secure dressings in place with paper tape.     Change dressing: every other day     Do not get your ulcer wet when you shower.  You can cover it with a cast protector. Cast protectors are available for purchase at [facility]. You may also purchase a cast protector at your local pharmacy.     Good nutrition is important for wound healing.  I recommend increasing your protein.  You can do this through your diet, nutritional supplements, and/or protein powder.     It is ok to continue current wound care treatment/products for the next 2-3 days until new wound care supplies are ordered and arrive. If longer than this please contact our office.     Please call the [facility] before substituting any product     Call our clinic nurse at [phone] if there is an increase in drainage, pain, redness, or the wound size increases.  This maybe a sign of infection and require attention prior to the next appointment    "

## 2021-08-12 ENCOUNTER — TELEPHONE (OUTPATIENT)
Dept: VASCULAR SURGERY | Facility: CLINIC | Age: 51
End: 2021-08-12

## 2021-08-12 DIAGNOSIS — S31.809A BUTTOCK WOUND: Primary | ICD-10-CM

## 2021-08-12 NOTE — TELEPHONE ENCOUNTER
Caller: Jewels mom    Provider: MD Alex Kim    Detailed reason for call: He has been using his wheelchair to remain non weight bearing but he is starting to develop sores on his bottom.    Could Dr Kim order a cushion for his wheelchair? She stated they have used [facility] [phone] for other supplies and they will deliver to his facility.    Best phone number to contact: [phone]    Best time to contact: any    Ok to leave a detailed message: Yes

## 2021-08-20 ENCOUNTER — OFFICE VISIT (OUTPATIENT)
Dept: VASCULAR SURGERY | Facility: CLINIC | Age: 51
End: 2021-08-20
Attending: PODIATRIST
Payer: MEDICARE

## 2021-08-20 VITALS
DIASTOLIC BLOOD PRESSURE: 70 MMHG | TEMPERATURE: 99.3 F | RESPIRATION RATE: 18 BRPM | HEART RATE: 96 BPM | SYSTOLIC BLOOD PRESSURE: 118 MMHG

## 2021-08-20 DIAGNOSIS — M21.541 EQUINOVARUS ACQUIRED DEFORMITY, RIGHT: ICD-10-CM

## 2021-08-20 DIAGNOSIS — L97.411 DIABETIC ULCER OF RIGHT MIDFOOT ASSOCIATED WITH TYPE 2 DIABETES MELLITUS, LIMITED TO BREAKDOWN OF SKIN (H): Primary | ICD-10-CM

## 2021-08-20 DIAGNOSIS — E11.621 DIABETIC ULCER OF RIGHT MIDFOOT ASSOCIATED WITH TYPE 2 DIABETES MELLITUS, LIMITED TO BREAKDOWN OF SKIN (H): Primary | ICD-10-CM

## 2021-08-20 PROCEDURE — 11042 DBRDMT SUBQ TIS 1ST 20SQCM/<: CPT | Performed by: PODIATRIST

## 2021-08-20 ASSESSMENT — PAIN SCALES - GENERAL: PAINLEVEL: NO PAIN (0)

## 2021-08-20 NOTE — PATIENT INSTRUCTIONS
"Float the right foot at all times                    How to care for your wound     Cleanse wound with saline sent to you with your supplies or a wound wash found at the pharmacy.     Pat dry the wound with 4\"x4\" gauze     Cut to fit the Medihoney and place in or on the wound.       Cover the wound with dry gauze.     Wrap the affected area with roll gauze     Secure dressings in place with paper tape.     Change dressing: every other day     Do not get your ulcer wet when you shower.  You can cover it with a cast protector. Cast protectors are available for purchase at [facility]. You may also purchase a cast protector at your local pharmacy.     Good nutrition is important for wound healing.  I recommend increasing your protein.  You can do this through your diet, nutritional supplements, and/or protein powder.        Call our clinic nurse at [phone] if there is an increase in drainage, pain, redness, or the wound size increases.  This maybe a sign of infection and require attention prior to the next appointment  "

## 2021-08-20 NOTE — PROGRESS NOTES
FOOT AND ANKLE SURGERY/PODIATRY Progress Note      ASSESSMENT:   Diabetic Ulceration right foot  Equinovarus deformity right   DM2      TREATMENT:  -The right foot ulceration is measuring similar to the previous visit. We discussed options to reduce lateral foot pressure including floating his foot at all times including overnight, and a TCC. Due to the varus deformity of his foot, there is a risk of skin irritation or skin breakdown with the TCC.     I recommend he continue with non-weight bearing and use of medihoney. Will begin floating the right foot at all times and consider TCC at his next visit if the wound fails to improve.     -After discussion of risk factors and consent obtained 2% Lidocaine HCL jelly was applied, under clean conditions, the right and foot ulceration(s) were debrided using #15 blade. Devitalized and nonviable tissue, along with any fibrin and slough, was removed to improve granulation tissue formation, stimulate wound healing, decrease overall bacteria load, disrupt biofilm formation and decrease edge senescence. Wound drainage was scant No. Total excisional debridement was 0.24 sq cm into the subcutaneous tissue with a depth of 0.2 cm.   Ulcers were improved afterwards and .  Measures were as noted on the flow sheet. Medi-honey with a gauze dressing was applied. He will continue to apply Medi-honey with a gauze dressing as directed.    -He will follow-up in 3 weeks.    Alex Kim DPM  [facility] [place]      HPI: Cm Lundberg was seen again today for a right foot ulceration. Since his last visit, his family has obtained an in-house medical assistant to assist with non-weight bearing and going to the bathroom.       No past medical history on file.    Past Surgical History:   Procedure Laterality Date     C CABG, VEIN, SINGLE      Description: CABG (CABG);  Recorded: 04/22/2009;     IR MISCELLANEOUS PROCEDURE  2/13/2004       Allergies   Allergen Reactions      Aspirin Other (See Comments)     Reacts with migraine medicine     Isosorbide Headache     Codeine Rash     Morphine Rash         Current Outpatient Medications:      Acetaminophen (TYLENOL PO), Take 1,000 mg by mouth every 8 hours as needed for mild pain or fever, Disp: , Rfl:      AMITRIPTYLINE HCL PO, Take 10 mg by mouth At Bedtime, Disp: , Rfl:      ARIPiprazole (ABILIFY) 2 MG tablet, , Disp: , Rfl:      Ascorbic Acid 500 MG CAPS, Take 1 tablet by mouth daily (Patient not taking: Reported on 7/30/2021), Disp: , Rfl:      aspirin 81 MG chewable tablet, Take 81 mg by mouth daily (Patient not taking: Reported on 7/30/2021), Disp: , Rfl:      Atorvastatin Calcium (LIPITOR PO), Take 80 mg by mouth daily, Disp: , Rfl:      buPROPion (WELLBUTRIN XL) 300 MG 24 hr tablet, Take 300 mg by mouth every morning (Patient not taking: Reported on 7/30/2021), Disp: , Rfl:      calcium carbonate 500 MG CHEW, Take 1 tablet by mouth 4 times daily as needed (Patient not taking: Reported on 7/30/2021), Disp: , Rfl:      Cholecalciferol (VITAMIN D3 PO), Take 1,000 Units by mouth daily (Patient not taking: Reported on 7/30/2021), Disp: , Rfl:      cyanocobalamin (VITAMIN  B-12) 1000 MCG tablet, Take 1,000 mcg by mouth daily (Patient not taking: Reported on 7/30/2021), Disp: , Rfl:      DULoxetine HCl (CYMBALTA PO), Take 30 mg by mouth daily, Disp: , Rfl:      erenumab-aooe (AIMOVIG) 140 MG/ML injection, , Disp: , Rfl:      FAMOTIDINE PO, Take 20 mg by mouth daily (Patient not taking: Reported on 7/30/2021), Disp: , Rfl:      fenofibrate micronized (LOFIBRA) 200 MG capsule, , Disp: , Rfl:      FENOFIBRATE PO, Take 200 mg by mouth daily, Disp: , Rfl:      ferrous sulfate (FEROSUL) 325 (65 Fe) MG tablet, Take 325 mg by mouth, Disp: , Rfl:      gabapentin (NEURONTIN) 100 MG capsule, Take 100 mg by mouth, Disp: , Rfl:      gabapentin (NEURONTIN) 300 MG capsule, Take 600 mg by mouth, Disp: , Rfl:      glipiZIDE (GLUCOTROL) 5 MG tablet,  Take 2.5 mg by mouth, Disp: , Rfl:      guaiFENesin (ROBITUSSIN) 100 MG/5ML SYRP, Take 10 mLs by mouth every 4 hours as needed for cough, Disp: , Rfl:      ibuprofen (ADVIL/MOTRIN) 400 MG tablet, Take 400 mg by mouth every 6 hours as needed for moderate pain, Disp: , Rfl:      loperamide (IMODIUM) 2 MG capsule, Take 4 mg by mouth as needed for diarrhea (Patient not taking: Reported on 7/30/2021), Disp: , Rfl:      LORazepam (ATIVAN) 0.5 MG tablet, Take 0.5 mg by mouth, Disp: , Rfl:      Menthol, Topical Analgesic, (BIOFREEZE ROLL-ON) 4 % GEL, , Disp: , Rfl:      METFORMIN HCL PO, Take 1,000 mg by mouth 2 times daily, Disp: , Rfl:      METOPROLOL SUCCINATE ER PO, Take 37.5 mg by mouth daily , Disp: , Rfl:      mirtazapine (REMERON) 15 MG tablet, Take 15 mg by mouth, Disp: , Rfl:      Multiple Vitamin (TAB-A-JOHN) TABS, Take 1 tablet by mouth daily, Disp: , Rfl:      Nitroglycerin (NITROSTAT SL), Place 0.4 mg under the tongue every 5 minutes as needed for chest pain (Patient not taking: Reported on 7/30/2021), Disp: , Rfl:      Omega-3 Fatty Acids (OMEGA-3 FISH OIL PO), Take 2 g by mouth 2 times daily (Patient not taking: Reported on 7/30/2021), Disp: , Rfl:      omeprazole (PRILOSEC) 20 MG DR capsule, Take 20 mg by mouth, Disp: , Rfl:      OXYCODONE HCL PO, Take 2.5 mg by mouth every 6 hours as needed (Patient not taking: Reported on 7/30/2021), Disp: , Rfl:      polyethylene glycol (MIRALAX) 17 GM/Dose powder, Take 1 packet by mouth (Patient not taking: Reported on 7/30/2021), Disp: , Rfl:      Rivaroxaban (XARELTO PO), Take 20 mg by mouth daily (Patient not taking: Reported on 7/30/2021), Disp: , Rfl:      senna-docusate (SENOKOT-S/PERICOLACE) 8.6-50 MG tablet, Take 1 tablet by mouth, Disp: , Rfl:      TRAZODONE HCL PO, Take 100 mg by mouth At Bedtime (Patient not taking: Reported on 7/30/2021), Disp: , Rfl:      triamcinolone (KENALOG) 0.1 % cream, Apply topically 4 times daily Apply to right lower leg rash until  healed. (Patient not taking: Reported on 7/30/2021), Disp: , Rfl:     Current Facility-Administered Medications:      lidocaine (XYLOCAINE) 2 % external gel, , Topical, Daily PRN, Alex Kim DPM, Given at 07/30/21 0913    Review of Systems - 10 point Review of Systems is negative except for right foot ulcer which is noted in HPI.      OBJECTIVE:  /70   Pulse 96   Temp 99.3  F (37.4  C)   Resp 18   General appearance: Patient is alert and fully cooperative with history & exam.  No sign of distress is noted during the visit.    Vascular: Dorsalis pedis palpableRight.  Dermatologic:    VASC Wound right lateral foot (Active)       VASC Wound left hallux  (Active)       VASC Wound right lateral foot (Active)   Pre Size Length 0.6 07/30/21 0900   Pre Size Width 0.6 07/30/21 0900   Pre Size Depth 0.3 07/30/21 0900   Pre Total Sq cm 0.24 07/30/21 0900   Granular/fibrotic base right foot. No erythema right foot.   Neurologic: Diminished to light touch Right.  Musculoskeletal: Semi-rigid equinovarus deformity right.     Imaging:     No results found.       Picture: None

## 2021-08-23 ENCOUNTER — TELEPHONE (OUTPATIENT)
Dept: PODIATRY | Facility: CLINIC | Age: 51
End: 2021-08-23

## 2021-08-23 NOTE — TELEPHONE ENCOUNTER
Patient's sister has been educated on what to purchase for the patient. She will purchase a cast protector to wear in the shower over the affected foot/leg.

## 2021-08-23 NOTE — TELEPHONE ENCOUNTER
Patients sister Sherri ([email or web address]) is calling, because Matias mentioned at his last visit that Dr Kim wanted him to use a cast protector, she would like to know if this is the same as a egg create? She would like more information about the cast to help Matias understand what this is. She can be reached at  [phone]

## 2021-09-09 NOTE — PATIENT INSTRUCTIONS
Non-weightbearing on right foot.    Cutimed Off- Patient Instructions    This total contact cast was prescribed by your physician because it is considered to be an effective way to heal a diabetic foot wound.    It is VERY important to follow the following guidelines.  1.   DO NOT GET THE CAST WET - If the cast gets wet it will need to be changed as soon as possible.  2.   If you experience any rubbing or discomfort under the cast, call the clinic as soon as possible.    3.   Do not insert anything under the cast.  Injury to your skin can be very dangerous.  4.   ALWAYS WEAR YOUR CAST SHOE WHEN WALKING to prevent slipping/falling if you received one.  5.   Your cast will be changed typically once a week as ordered by the provider.  6.   If you develop a fever, chills, nausea, or vomiting, contact your healthcare provider immediately because the cast must be removed and your wound visualized.  If the Clinic is closed proceed to the nearest emergency room or urgent care  7.   [facility] is open M-F, 8am-4pm, and is closed on holidays.  Our phone number is [phone].  After hours please phone your PCP or go to the nearest ER.

## 2021-09-10 ENCOUNTER — OFFICE VISIT (OUTPATIENT)
Dept: VASCULAR SURGERY | Facility: CLINIC | Age: 51
End: 2021-09-10
Attending: PODIATRIST
Payer: MEDICARE

## 2021-09-10 VITALS
DIASTOLIC BLOOD PRESSURE: 82 MMHG | BODY MASS INDEX: 23.54 KG/M2 | WEIGHT: 150 LBS | OXYGEN SATURATION: 99 % | HEART RATE: 84 BPM | HEIGHT: 67 IN | SYSTOLIC BLOOD PRESSURE: 146 MMHG

## 2021-09-10 DIAGNOSIS — M21.541 EQUINOVARUS ACQUIRED DEFORMITY, RIGHT: ICD-10-CM

## 2021-09-10 DIAGNOSIS — L97.411 DIABETIC ULCER OF RIGHT MIDFOOT ASSOCIATED WITH TYPE 2 DIABETES MELLITUS, LIMITED TO BREAKDOWN OF SKIN (H): Primary | ICD-10-CM

## 2021-09-10 DIAGNOSIS — E11.621 DIABETIC ULCER OF RIGHT MIDFOOT ASSOCIATED WITH TYPE 2 DIABETES MELLITUS, LIMITED TO BREAKDOWN OF SKIN (H): Primary | ICD-10-CM

## 2021-09-10 PROCEDURE — 29445 APPL RIGID TOT CNTC LEG CAST: CPT | Mod: XU | Performed by: PODIATRIST

## 2021-09-10 PROCEDURE — 11042 DBRDMT SUBQ TIS 1ST 20SQCM/<: CPT | Performed by: PODIATRIST

## 2021-09-10 RX ORDER — MIRTAZAPINE 7.5 MG/1
7.5 TABLET, FILM COATED ORAL AT BEDTIME
COMMUNITY
Start: 2021-09-07 | End: 2022-12-26

## 2021-09-10 RX ORDER — METOPROLOL SUCCINATE 50 MG/1
TABLET, EXTENDED RELEASE ORAL
COMMUNITY
Start: 2021-09-03 | End: 2021-10-29

## 2021-09-10 RX ORDER — LOSARTAN POTASSIUM 25 MG/1
25 TABLET ORAL AT BEDTIME
COMMUNITY
Start: 2021-09-03

## 2021-09-10 RX ORDER — DULOXETIN HYDROCHLORIDE 60 MG/1
120 CAPSULE, DELAYED RELEASE ORAL DAILY
COMMUNITY
Start: 2021-09-03

## 2021-09-10 RX ORDER — PSEUDOEPHED/ACETAMINOPH/DIPHEN 30MG-500MG
TABLET ORAL
COMMUNITY
Start: 2021-09-03 | End: 2021-10-29

## 2021-09-10 RX ORDER — ATORVASTATIN CALCIUM 80 MG/1
80 TABLET, FILM COATED ORAL DAILY
COMMUNITY
Start: 2021-09-03

## 2021-09-10 ASSESSMENT — MIFFLIN-ST. JEOR: SCORE: 1484.03

## 2021-09-10 ASSESSMENT — PAIN SCALES - GENERAL: PAINLEVEL: NO PAIN (0)

## 2021-09-10 NOTE — PROGRESS NOTES
FOOT AND ANKLE SURGERY/PODIATRY Progress Note      ASSESSMENT:   Diabetic Ulceration right foot  Equinovarus deformity right   DM2      TREATMENT:  -The right foot ulceration is measuring similar to the previous visit. We discussed options to reduce lateral foot pressure including floating his foot at all times including overnight, and a TCC. Due to the varus deformity of his foot, there is a risk of skin irritation or skin breakdown with the TCC. However, he does have assistance at his home to remain mostly non-weight bearing. He would like to try the TCC at this time.     -After discussion of risk factors and consent obtained 2% Lidocaine HCL jelly was applied, under clean conditions, the right and foot ulceration(s) were debrided using #15 blade. Devitalized and nonviable tissue, along with any fibrin and slough, was removed to improve granulation tissue formation, stimulate wound healing, decrease overall bacteria load, disrupt biofilm formation and decrease edge senescence. Wound drainage was scant No. Total excisional debridement was 0.24 sq cm into the subcutaneous tissue with a depth of 0.2 cm.   Ulcers were improved afterwards and .  Measures were as noted on the flow sheet. Medi-honey with a gauze dressing was applied. TCC applied to the right LE today.     -He will follow-up in one week.    Alex Kim DPM  [facility] [place]      HPI: Cm Lundberg was seen again today for a right foot ulceration. He has tried to remain non-weight bearing on his right foot.      History reviewed. No pertinent past medical history.    Past Surgical History:   Procedure Laterality Date     C CABG, VEIN, SINGLE      Description: CABG (CABG);  Recorded: 04/22/2009;     IR MISCELLANEOUS PROCEDURE  2/13/2004       Allergies   Allergen Reactions     Aspirin Other (See Comments)     Reacts with migraine medicine     Isosorbide Headache     Codeine Rash     Morphine Rash         Current Outpatient  Medications:      Acetaminophen (TYLENOL PO), Take 1,000 mg by mouth every 8 hours as needed for mild pain or fever, Disp: , Rfl:      ACETAMINOPHEN EXTRA STRENGTH 500 MG tablet, , Disp: , Rfl:      ARIPiprazole (ABILIFY) 2 MG tablet, , Disp: , Rfl:      Ascorbic Acid 500 MG CAPS, Take 1 tablet by mouth daily , Disp: , Rfl:      aspirin 81 MG chewable tablet, Take 81 mg by mouth daily , Disp: , Rfl:      atorvastatin (LIPITOR) 80 MG tablet, , Disp: , Rfl:      Atorvastatin Calcium (LIPITOR PO), Take 80 mg by mouth daily, Disp: , Rfl:      buPROPion (WELLBUTRIN XL) 300 MG 24 hr tablet, Take 300 mg by mouth every morning , Disp: , Rfl:      calcium carbonate 500 MG CHEW, Take 1 tablet by mouth 4 times daily as needed , Disp: , Rfl:      Cholecalciferol (VITAMIN D3 PO), Take 1,000 Units by mouth daily , Disp: , Rfl:      cyanocobalamin (VITAMIN  B-12) 1000 MCG tablet, Take 1,000 mcg by mouth daily , Disp: , Rfl:      DULoxetine (CYMBALTA) 60 MG capsule, , Disp: , Rfl:      DULoxetine HCl (CYMBALTA PO), Take 30 mg by mouth daily, Disp: , Rfl:      erenumab-aooe (AIMOVIG) 140 MG/ML injection, , Disp: , Rfl:      FAMOTIDINE PO, Take 20 mg by mouth daily , Disp: , Rfl:      fenofibrate micronized (LOFIBRA) 200 MG capsule, , Disp: , Rfl:      FENOFIBRATE PO, Take 200 mg by mouth daily, Disp: , Rfl:      ferrous sulfate (FEROSUL) 325 (65 Fe) MG tablet, Take 325 mg by mouth, Disp: , Rfl:      gabapentin (NEURONTIN) 100 MG capsule, Take 100 mg by mouth, Disp: , Rfl:      gabapentin (NEURONTIN) 300 MG capsule, Take 600 mg by mouth, Disp: , Rfl:      glipiZIDE (GLUCOTROL) 5 MG tablet, Take 2.5 mg by mouth, Disp: , Rfl:      guaiFENesin (ROBITUSSIN) 100 MG/5ML SYRP, Take 10 mLs by mouth every 4 hours as needed for cough, Disp: , Rfl:      ibuprofen (ADVIL/MOTRIN) 400 MG tablet, Take 400 mg by mouth every 6 hours as needed for moderate pain, Disp: , Rfl:      loperamide (IMODIUM) 2 MG capsule, Take 4 mg by mouth as needed for  "diarrhea , Disp: , Rfl:      LORazepam (ATIVAN) 0.5 MG tablet, Take 0.5 mg by mouth, Disp: , Rfl:      losartan (COZAAR) 25 MG tablet, , Disp: , Rfl:      Menthol, Topical Analgesic, (BIOFREEZE ROLL-ON) 4 % GEL, , Disp: , Rfl:      metFORMIN (GLUCOPHAGE) 1000 MG tablet, , Disp: , Rfl:      METFORMIN HCL PO, Take 1,000 mg by mouth 2 times daily, Disp: , Rfl:      metoprolol succinate ER (TOPROL-XL) 50 MG 24 hr tablet, , Disp: , Rfl:      METOPROLOL SUCCINATE ER PO, Take 37.5 mg by mouth daily , Disp: , Rfl:      mirtazapine (REMERON) 15 MG tablet, Take 15 mg by mouth, Disp: , Rfl:      mirtazapine (REMERON) 7.5 MG tablet, , Disp: , Rfl:      Multiple Vitamin (TAB-A-JOHN) TABS, Take 1 tablet by mouth daily , Disp: , Rfl:      Nitroglycerin (NITROSTAT SL), Place 0.4 mg under the tongue every 5 minutes as needed for chest pain , Disp: , Rfl:      Omega-3 Fatty Acids (OMEGA-3 FISH OIL PO), Take 2 g by mouth 2 times daily , Disp: , Rfl:      omeprazole (PRILOSEC) 20 MG DR capsule, Take 20 mg by mouth, Disp: , Rfl:      OXYCODONE HCL PO, Take 2.5 mg by mouth every 6 hours as needed , Disp: , Rfl:      polyethylene glycol (MIRALAX) 17 GM/Dose powder, Take 1 packet by mouth , Disp: , Rfl:      Rivaroxaban (XARELTO PO), Take 20 mg by mouth daily , Disp: , Rfl:      senna-docusate (SENOKOT-S/PERICOLACE) 8.6-50 MG tablet, Take 1 tablet by mouth, Disp: , Rfl:      TRAZODONE HCL PO, Take 100 mg by mouth At Bedtime , Disp: , Rfl:      triamcinolone (KENALOG) 0.1 % cream, Apply topically 4 times daily Apply to right lower leg rash until healed. , Disp: , Rfl:     Current Facility-Administered Medications:      lidocaine (XYLOCAINE) 2 % external gel, , Topical, Daily PRN, Alex Kim DPM, Given at 07/30/21 0913    Review of Systems - 10 point Review of Systems is negative except for right foot ulcer which is noted in HPI.      OBJECTIVE:  BP (!) 146/82   Pulse 84   Ht 5' 7\" (1.702 m)   Wt 150 lb (68 kg)   SpO2 99%   " BMI 23.49 kg/m    General appearance: Patient is alert and fully cooperative with history & exam.  No sign of distress is noted during the visit.    Vascular: Dorsalis pedis palpableRight.  Dermatologic:    VASC Wound right lateral foot (Active)       VASC Wound left hallux  (Active)       VASC Wound right lateral foot (Active)   Pre Size Length 0.6 07/30/21 0900   Pre Size Width 0.6 07/30/21 0900   Pre Size Depth 0.3 07/30/21 0900   Pre Total Sq cm 0.24 07/30/21 0900   Granular/fibrotic base right foot. No erythema right foot.   Neurologic: Diminished to light touch Right.  Musculoskeletal: Semi-rigid equinovarus deformity right.     Imaging:     No results found.       Picture:

## 2021-09-17 ENCOUNTER — OFFICE VISIT (OUTPATIENT)
Dept: VASCULAR SURGERY | Facility: CLINIC | Age: 51
End: 2021-09-17
Attending: PODIATRIST
Payer: MEDICARE

## 2021-09-17 VITALS
DIASTOLIC BLOOD PRESSURE: 70 MMHG | OXYGEN SATURATION: 97 % | SYSTOLIC BLOOD PRESSURE: 120 MMHG | RESPIRATION RATE: 20 BRPM | HEART RATE: 90 BPM | TEMPERATURE: 98.2 F

## 2021-09-17 DIAGNOSIS — L97.411 DIABETIC ULCER OF RIGHT MIDFOOT ASSOCIATED WITH TYPE 2 DIABETES MELLITUS, LIMITED TO BREAKDOWN OF SKIN (H): Primary | ICD-10-CM

## 2021-09-17 DIAGNOSIS — M21.541 EQUINOVARUS ACQUIRED DEFORMITY, RIGHT: ICD-10-CM

## 2021-09-17 DIAGNOSIS — E11.621 DIABETIC ULCER OF RIGHT MIDFOOT ASSOCIATED WITH TYPE 2 DIABETES MELLITUS, LIMITED TO BREAKDOWN OF SKIN (H): Primary | ICD-10-CM

## 2021-09-17 PROCEDURE — 11042 DBRDMT SUBQ TIS 1ST 20SQCM/<: CPT | Performed by: PODIATRIST

## 2021-09-17 ASSESSMENT — PAIN SCALES - GENERAL: PAINLEVEL: SEVERE PAIN (6)

## 2021-09-17 NOTE — PATIENT INSTRUCTIONS
Non-weightbearing on right foot.    Cutimed Off- Patient Instructions    This total contact cast was prescribed by your physician because it is considered to be an effective way to heal a diabetic foot wound.    It is VERY important to follow the following guidelines.  1.   DO NOT GET THE CAST WET - If the cast gets wet it will need to be changed as soon as possible.  2.   If you experience any rubbing or discomfort under the cast, call the clinic as soon as possible.    3.   Do not insert anything under the cast.  Injury to your skin can be very dangerous.  4.   ALWAYS WEAR YOUR CAST SHOE WHEN WALKING to prevent slipping/falling if you received one.  5.   Your cast will be changed typically once a week as ordered by the provider.  6.   If you develop a fever, chills, nausea, or vomiting, contact your healthcare provider immediately because the cast must be removed and your wound visualized.  If the Clinic is closed proceed to the nearest emergency room or urgent care  7.   [facility] is open M-F, 8am-4pm, and is closed on holidays.  Our phone number is [phone].  After hours please phone your PCP or go to the nearest ER.

## 2021-09-17 NOTE — PROGRESS NOTES
FOOT AND ANKLE SURGERY/PODIATRY Progress Note      ASSESSMENT:   Diabetic Ulceration right foot  Equinovarus deformity right   DM2      TREATMENT:  -The right foot ulceration is stable. He consents to additional TCC application today.     -After discussion of risk factors and consent obtained 2% Lidocaine HCL jelly was applied, under clean conditions, the right and foot ulceration(s) were debrided using #15 blade. Devitalized and nonviable tissue, along with any fibrin and slough, was removed to improve granulation tissue formation, stimulate wound healing, decrease overall bacteria load, disrupt biofilm formation and decrease edge senescence. Wound drainage was scant No. Total excisional debridement was 0.24 sq cm into the subcutaneous tissue with a depth of 0.2 cm.   Ulcers were improved afterwards and .  Measures were as noted on the flow sheet. Silvercel with gauze dressing was applied. TCC applied to the right LE today.     -He will follow-up in two weeks.    Alex Kim DPM  [facility] [place]      HPI: Cm Lundberg was seen again today for a right foot ulceration. He tolerated the TCC well.     No past medical history on file.    Past Surgical History:   Procedure Laterality Date     C CABG, VEIN, SINGLE      Description: CABG (CABG);  Recorded: 04/22/2009;     IR MISCELLANEOUS PROCEDURE  2/13/2004       Allergies   Allergen Reactions     Aspirin Other (See Comments)     Reacts with migraine medicine     Isosorbide Headache     Codeine Rash     Morphine Rash         Current Outpatient Medications:      Acetaminophen (TYLENOL PO), Take 1,000 mg by mouth every 8 hours as needed for mild pain or fever, Disp: , Rfl:      ACETAMINOPHEN EXTRA STRENGTH 500 MG tablet, , Disp: , Rfl:      ARIPiprazole (ABILIFY) 2 MG tablet, , Disp: , Rfl:      Ascorbic Acid 500 MG CAPS, Take 1 tablet by mouth daily , Disp: , Rfl:      aspirin 81 MG chewable tablet, Take 81 mg by mouth daily , Disp: ,  Rfl:      atorvastatin (LIPITOR) 80 MG tablet, , Disp: , Rfl:      Atorvastatin Calcium (LIPITOR PO), Take 80 mg by mouth daily, Disp: , Rfl:      buPROPion (WELLBUTRIN XL) 300 MG 24 hr tablet, Take 300 mg by mouth every morning , Disp: , Rfl:      calcium carbonate 500 MG CHEW, Take 1 tablet by mouth 4 times daily as needed , Disp: , Rfl:      Cholecalciferol (VITAMIN D3 PO), Take 1,000 Units by mouth daily , Disp: , Rfl:      cyanocobalamin (VITAMIN  B-12) 1000 MCG tablet, Take 1,000 mcg by mouth daily , Disp: , Rfl:      DULoxetine (CYMBALTA) 60 MG capsule, , Disp: , Rfl:      DULoxetine HCl (CYMBALTA PO), Take 30 mg by mouth daily, Disp: , Rfl:      erenumab-aooe (AIMOVIG) 140 MG/ML injection, , Disp: , Rfl:      FAMOTIDINE PO, Take 20 mg by mouth daily , Disp: , Rfl:      fenofibrate micronized (LOFIBRA) 200 MG capsule, , Disp: , Rfl:      FENOFIBRATE PO, Take 200 mg by mouth daily, Disp: , Rfl:      ferrous sulfate (FEROSUL) 325 (65 Fe) MG tablet, Take 325 mg by mouth, Disp: , Rfl:      gabapentin (NEURONTIN) 100 MG capsule, Take 100 mg by mouth, Disp: , Rfl:      gabapentin (NEURONTIN) 300 MG capsule, Take 600 mg by mouth, Disp: , Rfl:      glipiZIDE (GLUCOTROL) 5 MG tablet, Take 2.5 mg by mouth, Disp: , Rfl:      guaiFENesin (ROBITUSSIN) 100 MG/5ML SYRP, Take 10 mLs by mouth every 4 hours as needed for cough, Disp: , Rfl:      ibuprofen (ADVIL/MOTRIN) 400 MG tablet, Take 400 mg by mouth every 6 hours as needed for moderate pain, Disp: , Rfl:      loperamide (IMODIUM) 2 MG capsule, Take 4 mg by mouth as needed for diarrhea , Disp: , Rfl:      LORazepam (ATIVAN) 0.5 MG tablet, Take 0.5 mg by mouth, Disp: , Rfl:      losartan (COZAAR) 25 MG tablet, , Disp: , Rfl:      Menthol, Topical Analgesic, (BIOFREEZE ROLL-ON) 4 % GEL, , Disp: , Rfl:      metFORMIN (GLUCOPHAGE) 1000 MG tablet, , Disp: , Rfl:      METFORMIN HCL PO, Take 1,000 mg by mouth 2 times daily, Disp: , Rfl:      metoprolol succinate ER (TOPROL-XL)  50 MG 24 hr tablet, , Disp: , Rfl:      METOPROLOL SUCCINATE ER PO, Take 37.5 mg by mouth daily , Disp: , Rfl:      mirtazapine (REMERON) 15 MG tablet, Take 15 mg by mouth, Disp: , Rfl:      mirtazapine (REMERON) 7.5 MG tablet, , Disp: , Rfl:      Multiple Vitamin (TAB-A-JOHN) TABS, Take 1 tablet by mouth daily , Disp: , Rfl:      Nitroglycerin (NITROSTAT SL), Place 0.4 mg under the tongue every 5 minutes as needed for chest pain , Disp: , Rfl:      Omega-3 Fatty Acids (OMEGA-3 FISH OIL PO), Take 2 g by mouth 2 times daily , Disp: , Rfl:      omeprazole (PRILOSEC) 20 MG DR capsule, Take 20 mg by mouth, Disp: , Rfl:      OXYCODONE HCL PO, Take 2.5 mg by mouth every 6 hours as needed , Disp: , Rfl:      polyethylene glycol (MIRALAX) 17 GM/Dose powder, Take 1 packet by mouth , Disp: , Rfl:      Rivaroxaban (XARELTO PO), Take 20 mg by mouth daily , Disp: , Rfl:      senna-docusate (SENOKOT-S/PERICOLACE) 8.6-50 MG tablet, Take 1 tablet by mouth, Disp: , Rfl:      TRAZODONE HCL PO, Take 100 mg by mouth At Bedtime , Disp: , Rfl:      triamcinolone (KENALOG) 0.1 % cream, Apply topically 4 times daily Apply to right lower leg rash until healed. , Disp: , Rfl:     Current Facility-Administered Medications:      lidocaine (XYLOCAINE) 2 % external gel, , Topical, Daily PRN, Alex Kim DPM, Given at 07/30/21 0913    Review of Systems - 10 point Review of Systems is negative except for right foot ulcer which is noted in HPI.      OBJECTIVE:  /70 (BP Location: Left arm)   Pulse 90   Temp 98.2  F (36.8  C) (Oral)   Resp 20   SpO2 97%   General appearance: Patient is alert and fully cooperative with history & exam.  No sign of distress is noted during the visit.    Vascular: Dorsalis pedis palpableRight.  Dermatologic:    VASC Wound right lateral foot (Active)       VASC Wound left hallux  (Active)       VASC Wound right lateral foot (Active)   Pre Size Length 0.6 07/30/21 0900   Pre Size Width 0.6 07/30/21  0900   Pre Size Depth 0.3 07/30/21 0900   Pre Total Sq cm 0.24 07/30/21 0900   Granular/fibrotic base right foot. No erythema right foot.   Neurologic: Diminished to light touch Right.  Musculoskeletal: Semi-rigid equinovarus deformity right.     Imaging:     No results found.       Picture:

## 2021-09-29 ENCOUNTER — TELEPHONE (OUTPATIENT)
Dept: VASCULAR SURGERY | Facility: CLINIC | Age: 51
End: 2021-09-29

## 2021-09-29 NOTE — TELEPHONE ENCOUNTER
Pts mom called stating the appt for 10/1/21 will need to be changed as pt has another appt for his retina at 0930. Please call [facility] back to further discuss the 10/1/21 appt w/Dr. Kim. Writer did not cancel the appt as pt has a cast and unsure of how to proceed with rescheduling.

## 2021-10-01 ENCOUNTER — OFFICE VISIT (OUTPATIENT)
Dept: VASCULAR SURGERY | Facility: CLINIC | Age: 51
End: 2021-10-01
Attending: PODIATRIST
Payer: MEDICARE

## 2021-10-01 VITALS
DIASTOLIC BLOOD PRESSURE: 80 MMHG | HEART RATE: 85 BPM | TEMPERATURE: 98.5 F | RESPIRATION RATE: 16 BRPM | OXYGEN SATURATION: 100 % | SYSTOLIC BLOOD PRESSURE: 110 MMHG

## 2021-10-01 DIAGNOSIS — E11.621 DIABETIC ULCER OF RIGHT MIDFOOT ASSOCIATED WITH TYPE 2 DIABETES MELLITUS, LIMITED TO BREAKDOWN OF SKIN (H): Primary | ICD-10-CM

## 2021-10-01 DIAGNOSIS — L97.411 DIABETIC ULCER OF RIGHT MIDFOOT ASSOCIATED WITH TYPE 2 DIABETES MELLITUS, LIMITED TO BREAKDOWN OF SKIN (H): Primary | ICD-10-CM

## 2021-10-01 DIAGNOSIS — M21.541 EQUINOVARUS ACQUIRED DEFORMITY, RIGHT: ICD-10-CM

## 2021-10-01 PROCEDURE — 29445 APPL RIGID TOT CNTC LEG CAST: CPT | Mod: RT | Performed by: PODIATRIST

## 2021-10-01 PROCEDURE — 11042 DBRDMT SUBQ TIS 1ST 20SQCM/<: CPT | Performed by: PODIATRIST

## 2021-10-01 ASSESSMENT — PAIN SCALES - GENERAL: PAINLEVEL: NO PAIN (0)

## 2021-10-01 NOTE — PROGRESS NOTES
FOOT AND ANKLE SURGERY/PODIATRY Progress Note      ASSESSMENT:   Diabetic Ulceration right foot  Equinovarus deformity right   DM2      TREATMENT:  -The right foot ulceration is stable. He consents to additional TCC application today.     -After discussion of risk factors and consent obtained 2% Lidocaine HCL jelly was applied, under clean conditions, the right and foot ulceration(s) were debrided using #15 blade. Devitalized and nonviable tissue, along with any fibrin and slough, was removed to improve granulation tissue formation, stimulate wound healing, decrease overall bacteria load, disrupt biofilm formation and decrease edge senescence. Wound drainage was scant No. Total excisional debridement was 0.24 sq cm into the subcutaneous tissue with a depth of 0.2 cm.   Ulcers were improved afterwards and .  Measures were as noted on the flow sheet. Silvercel with gauze dressing was applied. TCC applied to the right LE today.     -He will follow-up in two weeks.    Alex Kim DPM  [facility] [place]      HPI: Cm Lundberg was seen again today for a right foot ulceration. He tolerated the TCC well.     No past medical history on file.    Past Surgical History:   Procedure Laterality Date     C CABG, VEIN, SINGLE      Description: CABG (CABG);  Recorded: 04/22/2009;     IR MISCELLANEOUS PROCEDURE  2/13/2004       Allergies   Allergen Reactions     Aspirin Other (See Comments)     Reacts with migraine medicine     Isosorbide Headache     Codeine Rash     Morphine Rash         Current Outpatient Medications:      Acetaminophen (TYLENOL PO), Take 1,000 mg by mouth every 8 hours as needed for mild pain or fever, Disp: , Rfl:      ACETAMINOPHEN EXTRA STRENGTH 500 MG tablet, , Disp: , Rfl:      ARIPiprazole (ABILIFY) 2 MG tablet, , Disp: , Rfl:      Ascorbic Acid 500 MG CAPS, Take 1 tablet by mouth daily , Disp: , Rfl:      aspirin 81 MG chewable tablet, Take 81 mg by mouth daily , Disp: ,  Rfl:      atorvastatin (LIPITOR) 80 MG tablet, , Disp: , Rfl:      Atorvastatin Calcium (LIPITOR PO), Take 80 mg by mouth daily, Disp: , Rfl:      buPROPion (WELLBUTRIN XL) 300 MG 24 hr tablet, Take 300 mg by mouth every morning , Disp: , Rfl:      calcium carbonate 500 MG CHEW, Take 1 tablet by mouth 4 times daily as needed , Disp: , Rfl:      Cholecalciferol (VITAMIN D3 PO), Take 1,000 Units by mouth daily , Disp: , Rfl:      cyanocobalamin (VITAMIN  B-12) 1000 MCG tablet, Take 1,000 mcg by mouth daily , Disp: , Rfl:      DULoxetine (CYMBALTA) 60 MG capsule, , Disp: , Rfl:      DULoxetine HCl (CYMBALTA PO), Take 30 mg by mouth daily, Disp: , Rfl:      erenumab-aooe (AIMOVIG) 140 MG/ML injection, , Disp: , Rfl:      FAMOTIDINE PO, Take 20 mg by mouth daily , Disp: , Rfl:      fenofibrate micronized (LOFIBRA) 200 MG capsule, , Disp: , Rfl:      FENOFIBRATE PO, Take 200 mg by mouth daily, Disp: , Rfl:      ferrous sulfate (FEROSUL) 325 (65 Fe) MG tablet, Take 325 mg by mouth, Disp: , Rfl:      gabapentin (NEURONTIN) 100 MG capsule, Take 100 mg by mouth, Disp: , Rfl:      gabapentin (NEURONTIN) 300 MG capsule, Take 600 mg by mouth, Disp: , Rfl:      glipiZIDE (GLUCOTROL) 5 MG tablet, Take 2.5 mg by mouth, Disp: , Rfl:      guaiFENesin (ROBITUSSIN) 100 MG/5ML SYRP, Take 10 mLs by mouth every 4 hours as needed for cough, Disp: , Rfl:      ibuprofen (ADVIL/MOTRIN) 400 MG tablet, Take 400 mg by mouth every 6 hours as needed for moderate pain, Disp: , Rfl:      loperamide (IMODIUM) 2 MG capsule, Take 4 mg by mouth as needed for diarrhea , Disp: , Rfl:      LORazepam (ATIVAN) 0.5 MG tablet, Take 0.5 mg by mouth, Disp: , Rfl:      losartan (COZAAR) 25 MG tablet, , Disp: , Rfl:      Menthol, Topical Analgesic, (BIOFREEZE ROLL-ON) 4 % GEL, , Disp: , Rfl:      metFORMIN (GLUCOPHAGE) 1000 MG tablet, , Disp: , Rfl:      METFORMIN HCL PO, Take 1,000 mg by mouth 2 times daily, Disp: , Rfl:      metoprolol succinate ER (TOPROL-XL)  50 MG 24 hr tablet, , Disp: , Rfl:      METOPROLOL SUCCINATE ER PO, Take 37.5 mg by mouth daily , Disp: , Rfl:      mirtazapine (REMERON) 15 MG tablet, Take 15 mg by mouth, Disp: , Rfl:      mirtazapine (REMERON) 7.5 MG tablet, , Disp: , Rfl:      Multiple Vitamin (TAB-A-JOHN) TABS, Take 1 tablet by mouth daily , Disp: , Rfl:      Nitroglycerin (NITROSTAT SL), Place 0.4 mg under the tongue every 5 minutes as needed for chest pain , Disp: , Rfl:      Omega-3 Fatty Acids (OMEGA-3 FISH OIL PO), Take 2 g by mouth 2 times daily , Disp: , Rfl:      omeprazole (PRILOSEC) 20 MG DR capsule, Take 20 mg by mouth, Disp: , Rfl:      OXYCODONE HCL PO, Take 2.5 mg by mouth every 6 hours as needed , Disp: , Rfl:      polyethylene glycol (MIRALAX) 17 GM/Dose powder, Take 1 packet by mouth , Disp: , Rfl:      Rivaroxaban (XARELTO PO), Take 20 mg by mouth daily , Disp: , Rfl:      senna-docusate (SENOKOT-S/PERICOLACE) 8.6-50 MG tablet, Take 1 tablet by mouth, Disp: , Rfl:      TRAZODONE HCL PO, Take 100 mg by mouth At Bedtime , Disp: , Rfl:      triamcinolone (KENALOG) 0.1 % cream, Apply topically 4 times daily Apply to right lower leg rash until healed. , Disp: , Rfl:     Current Facility-Administered Medications:      lidocaine (XYLOCAINE) 2 % external gel, , Topical, Daily PRN, Alex Kim DPM, Given at 07/30/21 0913    Review of Systems - 10 point Review of Systems is negative except for right foot ulcer which is noted in HPI.      OBJECTIVE:  /80 (BP Location: Right arm)   Pulse 85   Temp 98.5  F (36.9  C) (Oral)   Resp 16   SpO2 100%   General appearance: Patient is alert and fully cooperative with history & exam.  No sign of distress is noted during the visit.    Vascular: Dorsalis pedis palpableRight.  Dermatologic:    VASC Wound right lateral foot (Active)       VASC Wound left hallux  (Active)       VASC Wound right lateral foot (Active)   Pre Size Length 0.6 07/30/21 0900   Pre Size Width 0.6 07/30/21  0900   Pre Size Depth 0.3 07/30/21 0900   Pre Total Sq cm 0.24 07/30/21 0900   Granular/fibrotic base right foot. No erythema right foot.   Neurologic: Diminished to light touch Right.  Musculoskeletal: Semi-rigid equinovarus deformity right.     Imaging:     No results found.       Picture: None

## 2021-10-01 NOTE — PATIENT INSTRUCTIONS
Change gauze dressing on the thigh every other day     Cutimed Off- Patient Instructions    This total contact cast was prescribed by your physician because it is considered to be an effective way to heal a diabetic foot wound.    It is VERY important to follow the following guidelines.  1.   DO NOT GET THE CAST WET - If the cast gets wet it will need to be changed as soon as possible.  2.   If you experience any rubbing or discomfort under the cast, call the clinic as soon as possible.    3.   Do not insert anything under the cast.  Injury to your skin can be very dangerous.  4.   ALWAYS WEAR YOUR CAST SHOE  to prevent slipping/falling if you received one.  5.   Your cast will be changed typically once a week as ordered by the provider.  6.   If you develop a fever, chills, nausea, or vomiting, contact your healthcare provider immediately because the cast must be removed and your wound visualized.  If the Clinic is closed proceed to the nearest emergency room or urgent care  7.   [facility] is open M-F, 8am-4pm, and is closed on holidays.  Our phone number is [phone].  After hours please phone your PCP or go to the nearest ER.

## 2021-10-15 ENCOUNTER — OFFICE VISIT (OUTPATIENT)
Dept: VASCULAR SURGERY | Facility: CLINIC | Age: 51
End: 2021-10-15
Attending: PODIATRIST
Payer: MEDICARE

## 2021-10-15 VITALS — SYSTOLIC BLOOD PRESSURE: 122 MMHG | HEART RATE: 76 BPM | DIASTOLIC BLOOD PRESSURE: 76 MMHG | RESPIRATION RATE: 18 BRPM

## 2021-10-15 DIAGNOSIS — E11.621 DIABETIC ULCER OF RIGHT MIDFOOT ASSOCIATED WITH TYPE 2 DIABETES MELLITUS, LIMITED TO BREAKDOWN OF SKIN (H): Primary | ICD-10-CM

## 2021-10-15 DIAGNOSIS — L97.411 DIABETIC ULCER OF RIGHT MIDFOOT ASSOCIATED WITH TYPE 2 DIABETES MELLITUS, LIMITED TO BREAKDOWN OF SKIN (H): Primary | ICD-10-CM

## 2021-10-15 DIAGNOSIS — M21.541 EQUINOVARUS ACQUIRED DEFORMITY, RIGHT: ICD-10-CM

## 2021-10-15 PROCEDURE — 29445 APPL RIGID TOT CNTC LEG CAST: CPT | Mod: RT | Performed by: PODIATRIST

## 2021-10-15 PROCEDURE — 11042 DBRDMT SUBQ TIS 1ST 20SQCM/<: CPT | Performed by: PODIATRIST

## 2021-10-15 ASSESSMENT — PAIN SCALES - GENERAL: PAINLEVEL: NO PAIN (0)

## 2021-10-15 NOTE — PATIENT INSTRUCTIONS
Change gauze dressing on the thigh every other day      Cutimed Off- Patient Instructions     This total contact cast was prescribed by your physician because it is considered to be an effective way to heal a diabetic foot wound.    It is VERY important to follow the following guidelines.  1.   DO NOT GET THE CAST WET - If the cast gets wet it will need to be changed as soon as possible.  2.   If you experience any rubbing or discomfort under the cast, call the clinic as soon as possible.    3.   Do not insert anything under the cast.  Injury to your skin can be very dangerous.  4.   ALWAYS WEAR YOUR CAST SHOE  to prevent slipping/falling if you received one.  5.   Your cast will be changed typically once a week as ordered by the provider.  6.   If you develop a fever, chills, nausea, or vomiting, contact your healthcare provider immediately because the cast must be removed and your wound visualized.  If the Clinic is closed proceed to the nearest emergency room or urgent care  7.   [facility] is open M-F, 8am-4pm, and is closed on holidays.  Our phone number is [phone].  After hours please phone your PCP or go to the nearest ER.

## 2021-10-15 NOTE — PROGRESS NOTES
FOOT AND ANKLE SURGERY/PODIATRY Progress Note      ASSESSMENT:   Diabetic Ulceration right foot  Equinovarus deformity right   DM2      TREATMENT:  -The right foot ulceration is stable, measuring smaller today. He consents to additional TCC application today.     -After discussion of risk factors and consent obtained 2% Lidocaine HCL jelly was applied, under clean conditions, the right and foot ulceration(s) were debrided using #15 blade. Devitalized and nonviable tissue, along with any fibrin and slough, was removed to improve granulation tissue formation, stimulate wound healing, decrease overall bacteria load, disrupt biofilm formation and decrease edge senescence. Wound drainage was scant No. Total excisional debridement was 0.24 sq cm into the subcutaneous tissue with a depth of 0.2 cm.   Ulcers were improved afterwards and .  Measures were as noted on the flow sheet. Silvercel with gauze dressing was applied. TCC applied to the right LE today.     -He will follow-up in two weeks.    Alex Kim DPM  [facility] [place]      HPI: Cm DAYLIN Lundberg was seen again today for a right foot ulceration. He tolerated the TCC well.     No past medical history on file.    Past Surgical History:   Procedure Laterality Date     C CABG, VEIN, SINGLE      Description: CABG (CABG);  Recorded: 04/22/2009;     IR MISCELLANEOUS PROCEDURE  2/13/2004       Allergies   Allergen Reactions     Aspirin Other (See Comments)     Reacts with migraine medicine     Isosorbide Headache     Codeine Rash     Morphine Rash         Current Outpatient Medications:      Acetaminophen (TYLENOL PO), Take 1,000 mg by mouth every 8 hours as needed for mild pain or fever, Disp: , Rfl:      ACETAMINOPHEN EXTRA STRENGTH 500 MG tablet, , Disp: , Rfl:      ARIPiprazole (ABILIFY) 2 MG tablet, , Disp: , Rfl:      Ascorbic Acid 500 MG CAPS, Take 1 tablet by mouth daily , Disp: , Rfl:      aspirin 81 MG chewable tablet, Take 81 mg by  mouth daily , Disp: , Rfl:      atorvastatin (LIPITOR) 80 MG tablet, , Disp: , Rfl:      Atorvastatin Calcium (LIPITOR PO), Take 80 mg by mouth daily, Disp: , Rfl:      buPROPion (WELLBUTRIN XL) 300 MG 24 hr tablet, Take 300 mg by mouth every morning , Disp: , Rfl:      calcium carbonate 500 MG CHEW, Take 1 tablet by mouth 4 times daily as needed , Disp: , Rfl:      Cholecalciferol (VITAMIN D3 PO), Take 1,000 Units by mouth daily , Disp: , Rfl:      cyanocobalamin (VITAMIN  B-12) 1000 MCG tablet, Take 1,000 mcg by mouth daily , Disp: , Rfl:      DULoxetine (CYMBALTA) 60 MG capsule, , Disp: , Rfl:      DULoxetine HCl (CYMBALTA PO), Take 30 mg by mouth daily, Disp: , Rfl:      erenumab-aooe (AIMOVIG) 140 MG/ML injection, , Disp: , Rfl:      FAMOTIDINE PO, Take 20 mg by mouth daily , Disp: , Rfl:      fenofibrate micronized (LOFIBRA) 200 MG capsule, , Disp: , Rfl:      FENOFIBRATE PO, Take 200 mg by mouth daily, Disp: , Rfl:      ferrous sulfate (FEROSUL) 325 (65 Fe) MG tablet, Take 325 mg by mouth, Disp: , Rfl:      gabapentin (NEURONTIN) 100 MG capsule, Take 100 mg by mouth, Disp: , Rfl:      gabapentin (NEURONTIN) 300 MG capsule, Take 600 mg by mouth, Disp: , Rfl:      glipiZIDE (GLUCOTROL) 5 MG tablet, Take 2.5 mg by mouth, Disp: , Rfl:      guaiFENesin (ROBITUSSIN) 100 MG/5ML SYRP, Take 10 mLs by mouth every 4 hours as needed for cough, Disp: , Rfl:      ibuprofen (ADVIL/MOTRIN) 400 MG tablet, Take 400 mg by mouth every 6 hours as needed for moderate pain, Disp: , Rfl:      loperamide (IMODIUM) 2 MG capsule, Take 4 mg by mouth as needed for diarrhea , Disp: , Rfl:      LORazepam (ATIVAN) 0.5 MG tablet, Take 0.5 mg by mouth, Disp: , Rfl:      losartan (COZAAR) 25 MG tablet, , Disp: , Rfl:      Menthol, Topical Analgesic, (BIOFREEZE ROLL-ON) 4 % GEL, , Disp: , Rfl:      metFORMIN (GLUCOPHAGE) 1000 MG tablet, , Disp: , Rfl:      METFORMIN HCL PO, Take 1,000 mg by mouth 2 times daily, Disp: , Rfl:      metoprolol  succinate ER (TOPROL-XL) 50 MG 24 hr tablet, , Disp: , Rfl:      METOPROLOL SUCCINATE ER PO, Take 37.5 mg by mouth daily , Disp: , Rfl:      mirtazapine (REMERON) 15 MG tablet, Take 15 mg by mouth, Disp: , Rfl:      mirtazapine (REMERON) 7.5 MG tablet, , Disp: , Rfl:      Multiple Vitamin (TAB-A-JOHN) TABS, Take 1 tablet by mouth daily , Disp: , Rfl:      Nitroglycerin (NITROSTAT SL), Place 0.4 mg under the tongue every 5 minutes as needed for chest pain , Disp: , Rfl:      Omega-3 Fatty Acids (OMEGA-3 FISH OIL PO), Take 2 g by mouth 2 times daily , Disp: , Rfl:      omeprazole (PRILOSEC) 20 MG DR capsule, Take 20 mg by mouth, Disp: , Rfl:      OXYCODONE HCL PO, Take 2.5 mg by mouth every 6 hours as needed , Disp: , Rfl:      polyethylene glycol (MIRALAX) 17 GM/Dose powder, Take 1 packet by mouth , Disp: , Rfl:      Rivaroxaban (XARELTO PO), Take 20 mg by mouth daily , Disp: , Rfl:      senna-docusate (SENOKOT-S/PERICOLACE) 8.6-50 MG tablet, Take 1 tablet by mouth, Disp: , Rfl:      TRAZODONE HCL PO, Take 100 mg by mouth At Bedtime , Disp: , Rfl:      triamcinolone (KENALOG) 0.1 % cream, Apply topically 4 times daily Apply to right lower leg rash until healed. , Disp: , Rfl:     Current Facility-Administered Medications:      lidocaine (XYLOCAINE) 2 % external gel, , Topical, Daily PRN, Alex Kim DPM, Given at 07/30/21 0913    Review of Systems - 10 point Review of Systems is negative except for right foot ulcer which is noted in HPI.      OBJECTIVE:  /76   Pulse 76   Resp 18   General appearance: Patient is alert and fully cooperative with history & exam.  No sign of distress is noted during the visit.    Vascular: Dorsalis pedis palpableRight.  Dermatologic:    VASC Wound right lateral foot (Active)       VASC Wound left hallux  (Active)       VASC Wound right lateral foot (Active)   Pre Size Length 0.6 07/30/21 0900   Pre Size Width 0.6 07/30/21 0900   Pre Size Depth 0.3 07/30/21 0900   Pre  Total Sq cm 0.24 07/30/21 0900   Granular/fibrotic base right foot. No erythema right foot.   Neurologic: Diminished to light touch Right.  Musculoskeletal: Semi-rigid equinovarus deformity right.     Imaging:     No results found.       Picture:

## 2021-10-29 ENCOUNTER — OFFICE VISIT (OUTPATIENT)
Dept: VASCULAR SURGERY | Facility: CLINIC | Age: 51
End: 2021-10-29
Attending: PODIATRIST
Payer: MEDICARE

## 2021-10-29 VITALS
WEIGHT: 150 LBS | DIASTOLIC BLOOD PRESSURE: 64 MMHG | HEIGHT: 67 IN | SYSTOLIC BLOOD PRESSURE: 90 MMHG | BODY MASS INDEX: 23.54 KG/M2 | OXYGEN SATURATION: 100 % | RESPIRATION RATE: 16 BRPM | HEART RATE: 82 BPM | TEMPERATURE: 98.4 F

## 2021-10-29 DIAGNOSIS — E11.621 DIABETIC ULCER OF RIGHT MIDFOOT ASSOCIATED WITH TYPE 2 DIABETES MELLITUS, LIMITED TO BREAKDOWN OF SKIN (H): Primary | ICD-10-CM

## 2021-10-29 DIAGNOSIS — M21.541 EQUINOVARUS ACQUIRED DEFORMITY, RIGHT: ICD-10-CM

## 2021-10-29 DIAGNOSIS — L97.411 DIABETIC ULCER OF RIGHT MIDFOOT ASSOCIATED WITH TYPE 2 DIABETES MELLITUS, LIMITED TO BREAKDOWN OF SKIN (H): Primary | ICD-10-CM

## 2021-10-29 PROCEDURE — 11042 DBRDMT SUBQ TIS 1ST 20SQCM/<: CPT | Performed by: PODIATRIST

## 2021-10-29 RX ORDER — IBUPROFEN 200 MG
CAPSULE ORAL 3 TIMES DAILY PRN
COMMUNITY
Start: 2021-10-26 | End: 2022-01-07

## 2021-10-29 ASSESSMENT — MIFFLIN-ST. JEOR: SCORE: 1484.03

## 2021-10-29 ASSESSMENT — PAIN SCALES - GENERAL: PAINLEVEL: NO PAIN (0)

## 2021-10-29 NOTE — PROGRESS NOTES
FOOT AND ANKLE SURGERY/PODIATRY Progress Note      ASSESSMENT:   Diabetic Ulceration right foot  Equinovarus deformity right   DM2      TREATMENT:  -The right foot ulceration is stable, measuring similar to the previous visit. He declines a TCC today.     -After discussion of risk factors and consent obtained 2% Lidocaine HCL jelly was applied, under clean conditions, the right and foot ulceration(s) were debrided using #15 blade. Devitalized and nonviable tissue, along with any fibrin and slough, was removed to improve granulation tissue formation, stimulate wound healing, decrease overall bacteria load, disrupt biofilm formation and decrease edge senescence. Wound drainage was scant No. Total excisional debridement was 0.24 sq cm into the subcutaneous tissue with a depth of 0.2 cm.   Ulcers were improved afterwards and .  Measures were as noted on the flow sheet. Medihoney applied today which he will continue to apply qoday.     -He will follow-up in 3 weeks.    Alex Kim DPM  [facility] [place]      HPI: Cm Lundberg was seen again today for a right foot ulceration. He tolerated the TCC well.     No past medical history on file.    Past Surgical History:   Procedure Laterality Date     C CABG, VEIN, SINGLE      Description: CABG (CABG);  Recorded: 04/22/2009;     IR MISCELLANEOUS PROCEDURE  2/13/2004       Allergies   Allergen Reactions     Aspirin Other (See Comments)     Reacts with migraine medicine     Isosorbide Headache     Codeine Rash     Morphine Rash         Current Outpatient Medications:      Acetaminophen (TYLENOL PO), Take 1,000 mg by mouth every 8 hours as needed for mild pain or fever, Disp: , Rfl:      ARIPiprazole (ABILIFY) 2 MG tablet, , Disp: , Rfl:      Ascorbic Acid 500 MG CAPS, Take 1 tablet by mouth daily , Disp: , Rfl:      aspirin 81 MG chewable tablet, Take 81 mg by mouth daily , Disp: , Rfl:      atorvastatin (LIPITOR) 80 MG tablet, , Disp: , Rfl:       buPROPion (WELLBUTRIN XL) 300 MG 24 hr tablet, Take 300 mg by mouth every morning , Disp: , Rfl:      calcium carbonate 500 MG CHEW, Take 1 tablet by mouth 4 times daily as needed , Disp: , Rfl:      Cholecalciferol (VITAMIN D3 PO), Take 1,000 Units by mouth daily , Disp: , Rfl:      cyanocobalamin (VITAMIN  B-12) 1000 MCG tablet, Take 1,000 mcg by mouth daily , Disp: , Rfl:      DULoxetine (CYMBALTA) 60 MG capsule, , Disp: , Rfl:      erenumab-aooe (AIMOVIG) 140 MG/ML injection, , Disp: , Rfl:      FAMOTIDINE PO, Take 20 mg by mouth daily , Disp: , Rfl:      fenofibrate micronized (LOFIBRA) 200 MG capsule, , Disp: , Rfl:      FENOFIBRATE PO, Take 200 mg by mouth daily, Disp: , Rfl:      ferrous sulfate (FEROSUL) 325 (65 Fe) MG tablet, Take 325 mg by mouth, Disp: , Rfl:      gabapentin (NEURONTIN) 300 MG capsule, Take 600 mg by mouth, Disp: , Rfl:      glipiZIDE (GLUCOTROL) 5 MG tablet, Take 2.5 mg by mouth, Disp: , Rfl:      guaiFENesin (ROBITUSSIN) 100 MG/5ML SYRP, Take 10 mLs by mouth every 4 hours as needed for cough, Disp: , Rfl:      ibuprofen (ADVIL/MOTRIN) 400 MG tablet, Take 400 mg by mouth every 6 hours as needed for moderate pain, Disp: , Rfl:      loperamide (IMODIUM) 2 MG capsule, Take 4 mg by mouth as needed for diarrhea , Disp: , Rfl:      LORazepam (ATIVAN) 0.5 MG tablet, Take 0.5 mg by mouth, Disp: , Rfl:      losartan (COZAAR) 25 MG tablet, , Disp: , Rfl:      Menthol, Topical Analgesic, (BIOFREEZE ROLL-ON) 4 % GEL, , Disp: , Rfl:      METFORMIN HCL PO, Take 1,000 mg by mouth 2 times daily, Disp: , Rfl:      METOPROLOL SUCCINATE ER PO, Take 37.5 mg by mouth daily , Disp: , Rfl:      mirtazapine (REMERON) 7.5 MG tablet, , Disp: , Rfl:      Multiple Vitamin (TAB-A-JOHN) TABS, Take 1 tablet by mouth daily , Disp: , Rfl:      Neomycin-Bacitracin-Polymyxin (TRIPLE ANTIBIOTIC) 3.5-400-5000 OINT ointment, , Disp: , Rfl:      Nitroglycerin (NITROSTAT SL), Place 0.4 mg under the tongue every 5 minutes as  "needed for chest pain , Disp: , Rfl:      Omega-3 Fatty Acids (OMEGA-3 FISH OIL PO), Take 2 g by mouth 2 times daily , Disp: , Rfl:      omeprazole (PRILOSEC) 20 MG DR capsule, Take 20 mg by mouth, Disp: , Rfl:      OXYCODONE HCL PO, Take 2.5 mg by mouth every 6 hours as needed , Disp: , Rfl:      polyethylene glycol (MIRALAX) 17 GM/Dose powder, Take 1 packet by mouth , Disp: , Rfl:      Rivaroxaban (XARELTO PO), Take 20 mg by mouth daily , Disp: , Rfl:      senna-docusate (SENOKOT-S/PERICOLACE) 8.6-50 MG tablet, Take 1 tablet by mouth, Disp: , Rfl:      TRAZODONE HCL PO, Take 100 mg by mouth At Bedtime , Disp: , Rfl:      triamcinolone (KENALOG) 0.1 % cream, Apply topically 4 times daily Apply to right lower leg rash until healed. , Disp: , Rfl:     Current Facility-Administered Medications:      lidocaine (XYLOCAINE) 2 % external gel, , Topical, Daily PRN, Alex Kim, LAKHW[facility]ER, Given at 07/30/21 0913    Review of Systems - 10 point Review of Systems is negative except for right foot ulcer which is noted in HPI.      OBJECTIVE:  BP 90/64   Pulse 82   Temp 98.4  F (36.9  C)   Resp 16   Ht 5' 7\" (1.702 m)   Wt 150 lb (68 kg)   SpO2 100%   BMI 23.49 kg/m    General appearance: Patient is alert and fully cooperative with history & exam.  No sign of distress is noted during the visit.    Vascular: Dorsalis pedis palpableRight.  Dermatologic:    VASC Wound right lateral foot (Active)       VASC Wound left hallux  (Active)       VASC Wound right lateral foot (Active)   Pre Size Length 0.6 07/30/21 0900   Pre Size Width 0.6 07/30/21 0900   Pre Size Depth 0.3 07/30/21 0900   Pre Total Sq cm 0.24 07/30/21 0900   Granular/fibrotic base right foot. No erythema right foot.   Neurologic: Diminished to light touch Right.  Musculoskeletal: Semi-rigid equinovarus deformity right.     Imaging:     No results found.       Picture:            "

## 2021-10-29 NOTE — PATIENT INSTRUCTIONS
"Important lnstructions    1. WEIGHT BEARING STATUS: You are to remain NON WEIGHT BEARING on your right foot. NON WEIGHT BEARING MEANS NO PRESSURE ON YOUR FOOT OR HEEL AT ANY TIME FOR ANY REASON!.    2. OFFLOADING DEVICE: Must use a A WHEELCHAIR at all times! (do not use affected foot to push wheelchair)    3. STABILIZATION DEVICE: Use a EGG CRATE BOOT . You will need to WEAR THIS AT ALL TIMES EVEN WHILE IN BED.     4. PROTEIN SUPPLEMENTS: Drink protein shakes 2x per day, morning and night (Ensure, Boost, Glucerna)                                                                 *This is in addition to your normal diet*    5. ELEVATE: Elevating your leg means laying with your head on a pillow and your foot ABOVE YOUR WAIST.     6. DO NOT MOVE YOUR FOOT    Dressing Change lnstructions                 EVERY OTHER DAY and as needed, Cleanse your foot wound(s) with Normal saline.    Pat Dry with non-sterile gauze    Apply Lotion to the intact skin surrounding your wound and other dry skin locations. Some good lotions include: Remedy Skin Repair Cream, Sarna, Vanicream or Cetaphil    Primary Dressing: Apply Medihoney or [place]uk[place]oney into/onto the wounds    Secondary dressing: Cover with dry gauze    Secure with non-sterile roll gauze (4\" x 75\" roll) and tape (1\" roll tape) as needed; avoid adhesive directly on the skin    It is not ok to get your wound wet in the bath or shower    SEEK MEDICAL CARE IF:    You have an increase in swelling, pain, or redness around the wound.    You have an increase in the amount of pus coming from the wound.    There is a bad smell coming from the wound.    The wound appears to be worsening/enlarging    You have a fever greater than 101.5 F      It is ok to continue current wound care treatment/products for the next 2-3 days until new wound care supplies are ordered and arrive. If longer than this please contact our office at [phone].    "

## 2021-11-01 ENCOUNTER — TELEPHONE (OUTPATIENT)
Dept: VASCULAR SURGERY | Facility: CLINIC | Age: 51
End: 2021-11-01

## 2021-11-01 NOTE — TELEPHONE ENCOUNTER
Reviewed 10/29/21 instructions with Amanda SEARS, non weight bearing. Faxed ASV instructions to [phone].

## 2021-11-01 NOTE — TELEPHONE ENCOUNTER
"Amanda called to inquire if the pt \"can weight bear on his foot?\" Amanda stated the family didn't know the answer to this question.  "

## 2021-11-04 ENCOUNTER — TELEPHONE (OUTPATIENT)
Dept: VASCULAR SURGERY | Facility: CLINIC | Age: 51
End: 2021-11-04

## 2021-11-04 NOTE — TELEPHONE ENCOUNTER
Caller: Sammie     Provider: MD Alex Kim    Detailed reason for call: They would like to change frequency of home care visits as drainage is minimal.    Please call with orders for Home wound care twice weekly. Verbal orders ok.    Best phone number to contact: [phone]    Best time to contact: any    Ok to leave a detailed message: Yes

## 2021-11-19 ENCOUNTER — TELEPHONE (OUTPATIENT)
Dept: VASCULAR SURGERY | Facility: CLINIC | Age: 51
End: 2021-11-19

## 2021-11-19 ENCOUNTER — OFFICE VISIT (OUTPATIENT)
Dept: VASCULAR SURGERY | Facility: CLINIC | Age: 51
End: 2021-11-19
Attending: PODIATRIST
Payer: MEDICARE

## 2021-11-19 VITALS
DIASTOLIC BLOOD PRESSURE: 64 MMHG | TEMPERATURE: 99.5 F | HEART RATE: 88 BPM | SYSTOLIC BLOOD PRESSURE: 118 MMHG | RESPIRATION RATE: 18 BRPM

## 2021-11-19 DIAGNOSIS — L03.119 CELLULITIS AND ABSCESS OF FOOT EXCLUDING TOE: ICD-10-CM

## 2021-11-19 DIAGNOSIS — L97.411 DIABETIC ULCER OF RIGHT MIDFOOT ASSOCIATED WITH TYPE 2 DIABETES MELLITUS, LIMITED TO BREAKDOWN OF SKIN (H): Primary | ICD-10-CM

## 2021-11-19 DIAGNOSIS — L02.619 CELLULITIS AND ABSCESS OF FOOT EXCLUDING TOE: ICD-10-CM

## 2021-11-19 DIAGNOSIS — E11.621 DIABETIC ULCER OF RIGHT MIDFOOT ASSOCIATED WITH TYPE 2 DIABETES MELLITUS, LIMITED TO BREAKDOWN OF SKIN (H): Primary | ICD-10-CM

## 2021-11-19 PROCEDURE — 87186 SC STD MICRODIL/AGAR DIL: CPT | Performed by: PODIATRIST

## 2021-11-19 PROCEDURE — 99213 OFFICE O/P EST LOW 20 MIN: CPT | Mod: 25 | Performed by: PODIATRIST

## 2021-11-19 PROCEDURE — 11043 DBRDMT MUSC&/FSCA 1ST 20/<: CPT | Performed by: PODIATRIST

## 2021-11-19 RX ORDER — DOXYCYCLINE 100 MG/1
100 CAPSULE ORAL 2 TIMES DAILY
Qty: 20 CAPSULE | Refills: 0 | Status: ON HOLD | OUTPATIENT
Start: 2021-11-19 | End: 2021-12-17

## 2021-11-19 ASSESSMENT — PAIN SCALES - GENERAL: PAINLEVEL: WORST PAIN (10)

## 2021-11-19 NOTE — TELEPHONE ENCOUNTER
Emerita with [facility] requests orders for abx be sent to them instead of [facility].   FAX: [phone]  PH: [phone]

## 2021-11-19 NOTE — PATIENT INSTRUCTIONS
[facility] [address] [facility] 11/19/21 at 4 pm.  If you need reschedule please call [phone]         Patient Education     Magnetic Resonance Imaging (MRI)   Magnetic resonance imaging (MRI) is an imaging test. It lets your doctor see pictures of the inside of your body. MRI uses strong magnets and radio waves to form an image.   How do I get ready for an MRI?     Complete a screening questionnaire about any electronic or metal devices or foreign metal objects in your body.    Follow any directions you are given for not eating or drinking before the test.    Ask your provider if you should stop taking any medicine before the test.    Follow your normal daily routine unless your provider tells you otherwise.    Remove your watch, jewelry, and hearing aids. You will also need to take any credit cards, pens, pocket knives, glasses, and metal objects out of your pockets.    Remove your makeup. Makeup may contain some metal.  Most MRI tests take a half hour to an hour. But the test may take longer depending on the type of MRI you are having. Give yourself extra time to check in.   What happens during an MRI?     You may be asked to wear a hospital gown.    You may be given earplugs to wear if you need them.    You may be injected with a special dye (contrast). This makes the MRI image better.     You ll lie down on a platform that slides into the magnet.     You will be asked to hold very still during the scan.     What to tell your healthcare provider  Tell your healthcare provider and the technologist if you:     Have ever had an imaging test such as MRI or CT with contrast dye    Are allergic to contrast dye, iodine, shellfish, or any medicines    Have a serious health problem. This includes diabetes or kidney disease, or a liver transplant.    Are pregnant or could be, or are breastfeeding    Have any implanted device or metal clips or pins in your body  What happens after an MRI?     You can get back to  "normal activities right away. Any contrast used will pass naturally through your body within a day. You may be told to drink more water or other fluids during this time.     Your doctor will discuss the test results with you during a follow-up appointment or over the phone.    Your next appointment is: __________________     Emi last reviewed this educational content on 5/1/2020 2000-2021 [employer]. All rights reserved. This information is not intended as a substitute for professional medical care. Always follow your healthcare professional's instructions.    Important lnstructions    Stop downstairs & schedule your MRI. Today at 4 pm    Start taking your antibiotic that were prescribed today..      1. WEIGHT BEARING STATUS: You are to remain NON WEIGHT BEARING on your left foot. NON WEIGHT BEARING MEANS NO PRESSURE ON YOUR FOOT OR HEEL AT ANY TIME FOR ANY REASON!.    2. OFFLOADING DEVICE: Must use a Egg crate boot at all times! (do not use affected foot to push wheelchair)    3. STABILIZATION DEVICE: NA    4. PROTEIN SUPPLEMENTS: Drink protein shakes 2x per day, morning and night (Ensure, Boost, Glucerna)                                                                 *This is in addition to your normal diet*    5. ELEVATE: Elevating your leg means laying with your head on a pillow and your foot ABOVE YOUR WAIST.     6. DO NOT MOVE YOUR FOOT        Dressing Change lnstructions                 DAILY and as needed, Cleanse your right foot wound(s) with Normal saline.    Pat Dry with non-sterile gauze    Primary Dressing: Apply dry gauze into/onto the wounds    Secondary dressing: Cover with dry gauze    Secure with non-sterile roll gauze (4\" x 75\" roll) and tape (1\" roll tape) as needed; avoid adhesive directly on the skin    It is not ok to get your wound wet in the bath or shower    SEEK MEDICAL CARE IF:    You have an increase in swelling, pain, or redness around the wound.    You have an " increase in the amount of pus coming from the wound.    There is a bad smell coming from the wound.    The wound appears to be worsening/enlarging    You have a fever greater than 101.5 F      It is ok to continue current wound care treatment/products for the next 2-3 days until new wound care supplies are ordered and arrive. If longer than this please contact our office at [phone].      Patient Education     Doxycycline Monohydrate Oral Tablet 100 mg  Uses  For treating bacterial infection.  Instructions  Take the medicine with 250 mL (1 cup) of water.  Take on empty stomach - 1 hour before or 2 hours after eating.  Sit or stand upright for 30 minutes after taking the medicine. Do not lie down.  Keep the medicine at room temperature. Avoid heat and direct light.  Do not take any antacid or vitamins with magnesium, calcium, aluminum, or iron for 2 hours before and 2 hours after taking this medicine.  This medicine may cause you to become more sensitive to the sun. Use sunscreen or wear protective clothing when you are exposed to the sun.  It is important that you keep taking each dose of this medicine on time even if you are feeling well.  If you forget to take a dose on time, take it as soon as you remember. If it is almost time for the next dose, do not take the missed dose. Return to your normal dosing schedule. Do not take 2 doses of this medicine at one time.  Please tell your doctor and pharmacist about all the medicines you take. Include both prescription and over-the-counter medicines. Also tell them about any vitamins, herbal medicines, or anything else you take for your health.  It is very important that you continue using this medicine for the full number of days that it is prescribed. Please do not stop the medicine even if you start to feel better after the first few days.  This medicine can cause permanent change in teeth color in children.  Cautions  Tell your doctor and pharmacist if you ever  had an allergic reaction to a medicine. Symptoms of an allergic reaction can include trouble breathing, skin rash, itching, swelling, or severe dizziness.  Do not use the medication any more than instructed.  Contact your doctor if you notice a change in the amount or darkening of your urine.  Please tell your doctor if you have moderate to severe diarrhea while on this medicine. Do not treat the diarrhea with over-the-counter diarrhea medicine.  Tell the doctor or pharmacist if you are pregnant, planning to be pregnant, or breastfeeding.  Ask your pharmacist if this medicine can interact with any of your other medicines. Be sure to tell them about all the medicines you take.  Please tell all your doctors and dentists that you are on this medicine before they provide care.  Do not start or stop any other medicines without first speaking to your doctor or pharmacist.  Do not share this medicine with anyone who has not been prescribed this medicine.  Side Effects  The following is a list of some common side effects from this medicine. Please speak with your doctor about what you should do if you experience these or other side effects.    diarrhea    nausea    stomach upset or abdominal pain    increased risk of sunburn    yeast infection of mouth    vaginal itching or yeast infection    vomiting  Call your doctor or get medical help right away if you notice any of these more serious side effects:    swelling in the neck or throat    difficulty swallowing    blurring or changes of vision  A few people may have an allergic reactions to this medicine. Symptoms can include difficulty breathing, skin rash, itching, swelling, or severe dizziness. If you notice any of these symptoms, seek medical help quickly.  Extra  Please speak with your doctor, nurse, or pharmacist if you have any questions about this medicine.  [email or web address]  IMPORTANT NOTE: This document tells you briefly how to take  your medicine, but it does not tell you all there is to know about it.Your doctor or pharmacist may give you other documents about your medicine. Please talk to them if you have any questions.Always follow their advice. There is a more complete description of this medicine available in English.Scan this code on your smartphone or tablet or use the web address below. You can also ask your pharmacist for a printout. If you have any questions, please ask your pharmacist.     2021 [employer].

## 2021-11-19 NOTE — PROGRESS NOTES
FOOT AND ANKLE SURGERY/PODIATRY Progress Note      ASSESSMENT:   Cellulitis right foot   Diabetic Ulceration right foot  Equinovarus deformity right   DM2      TREATMENT:  -The ulceration along the plantar lateral right foot has non-viable tissue with increased depth, does not probe to bone, localized erythema along the plantar ulceration.     -Wound culture obtained today. I will start him on doxycycline.    -Discussed that due to the increased depth of the wound, I recommend an MRI to evaluate for osteomyelitis. Due to the amount of non-viable tissue, he will likely require surgical I&D with use of a wound vac post-op. Patient consents to surgery.     -After discussion of risk factors and consent obtained 2% Lidocaine HCL jelly was applied, under clean conditions, the right and foot ulceration(s) were debrided using currette or #15 blade scalpel.  Devitalized and nonviable tissue, along with any fibrin and slough, was removed to improve granulation tissue formation, stimulate wound healing, decrease overall bacteria load, disrupt biofilm formation and decrease edge senescence. Wound drainage was scant No. Total excisional debridement was 0.2 sq cm into the muscle/fascia with a depth of 0.4 cm.   Ulcers were improved afterwards and .  Measures were as noted on the flow sheet. A gauze dressing was applied. He will continue to apply a gauze dressing qday.    -He will follow-up with me in 7-10 days. I will contact the patient with the MRI report when available and we will be guided by the results.     Alex Kim DPM  [facility]      HPI: Cm Lundberg was seen again today for a right foot ulceration. He has noticed increasing redness and pain in the right foot over the past few days. Denies N/V/F/C.       No past medical history on file.    Past Surgical History:   Procedure Laterality Date     C CABG, VEIN, SINGLE      Description: CABG (CABG);  Recorded: 04/22/2009;     IR  MISCELLANEOUS PROCEDURE  2/13/2004       Allergies   Allergen Reactions     Aspirin Other (See Comments)     Reacts with migraine medicine     Isosorbide Headache     Codeine Rash     Morphine Rash         Current Outpatient Medications:      Acetaminophen (TYLENOL PO), Take 1,000 mg by mouth every 8 hours as needed for mild pain or fever, Disp: , Rfl:      ARIPiprazole (ABILIFY) 2 MG tablet, , Disp: , Rfl:      Ascorbic Acid 500 MG CAPS, Take 1 tablet by mouth daily , Disp: , Rfl:      aspirin 81 MG chewable tablet, Take 81 mg by mouth daily , Disp: , Rfl:      atorvastatin (LIPITOR) 80 MG tablet, , Disp: , Rfl:      buPROPion (WELLBUTRIN XL) 300 MG 24 hr tablet, Take 300 mg by mouth every morning , Disp: , Rfl:      calcium carbonate 500 MG CHEW, Take 1 tablet by mouth 4 times daily as needed , Disp: , Rfl:      Cholecalciferol (VITAMIN D3 PO), Take 1,000 Units by mouth daily , Disp: , Rfl:      cyanocobalamin (VITAMIN  B-12) 1000 MCG tablet, Take 1,000 mcg by mouth daily , Disp: , Rfl:      doxycycline monohydrate (MONODOX) 100 MG capsule, Take 1 capsule (100 mg) by mouth 2 times daily, Disp: 20 capsule, Rfl: 0     DULoxetine (CYMBALTA) 60 MG capsule, , Disp: , Rfl:      erenumab-aooe (AIMOVIG) 140 MG/ML injection, , Disp: , Rfl:      FAMOTIDINE PO, Take 20 mg by mouth daily , Disp: , Rfl:      fenofibrate micronized (LOFIBRA) 200 MG capsule, , Disp: , Rfl:      FENOFIBRATE PO, Take 200 mg by mouth daily, Disp: , Rfl:      ferrous sulfate (FEROSUL) 325 (65 Fe) MG tablet, Take 325 mg by mouth, Disp: , Rfl:      gabapentin (NEURONTIN) 300 MG capsule, Take 600 mg by mouth, Disp: , Rfl:      glipiZIDE (GLUCOTROL) 5 MG tablet, Take 2.5 mg by mouth, Disp: , Rfl:      guaiFENesin (ROBITUSSIN) 100 MG/5ML SYRP, Take 10 mLs by mouth every 4 hours as needed for cough, Disp: , Rfl:      ibuprofen (ADVIL/MOTRIN) 400 MG tablet, Take 400 mg by mouth every 6 hours as needed for moderate pain, Disp: , Rfl:      loperamide  (IMODIUM) 2 MG capsule, Take 4 mg by mouth as needed for diarrhea , Disp: , Rfl:      LORazepam (ATIVAN) 0.5 MG tablet, Take 0.5 mg by mouth, Disp: , Rfl:      losartan (COZAAR) 25 MG tablet, , Disp: , Rfl:      Menthol, Topical Analgesic, (BIOFREEZE ROLL-ON) 4 % GEL, , Disp: , Rfl:      METFORMIN HCL PO, Take 1,000 mg by mouth 2 times daily, Disp: , Rfl:      METOPROLOL SUCCINATE ER PO, Take 37.5 mg by mouth daily , Disp: , Rfl:      mirtazapine (REMERON) 7.5 MG tablet, , Disp: , Rfl:      Multiple Vitamin (TAB-A-JOHN) TABS, Take 1 tablet by mouth daily , Disp: , Rfl:      Neomycin-Bacitracin-Polymyxin (TRIPLE ANTIBIOTIC) 3.5-400-5000 OINT ointment, , Disp: , Rfl:      Nitroglycerin (NITROSTAT SL), Place 0.4 mg under the tongue every 5 minutes as needed for chest pain , Disp: , Rfl:      Omega-3 Fatty Acids (OMEGA-3 FISH OIL PO), Take 2 g by mouth 2 times daily , Disp: , Rfl:      omeprazole (PRILOSEC) 20 MG DR capsule, Take 20 mg by mouth, Disp: , Rfl:      OXYCODONE HCL PO, Take 2.5 mg by mouth every 6 hours as needed , Disp: , Rfl:      polyethylene glycol (MIRALAX) 17 GM/Dose powder, Take 1 packet by mouth , Disp: , Rfl:      Rivaroxaban (XARELTO PO), Take 20 mg by mouth daily , Disp: , Rfl:      senna-docusate (SENOKOT-S/PERICOLACE) 8.6-50 MG tablet, Take 1 tablet by mouth, Disp: , Rfl:      TRAZODONE HCL PO, Take 100 mg by mouth At Bedtime , Disp: , Rfl:      triamcinolone (KENALOG) 0.1 % cream, Apply topically 4 times daily Apply to right lower leg rash until healed. , Disp: , Rfl:     Current Facility-Administered Medications:      lidocaine (XYLOCAINE) 2 % external gel, , Topical, Daily BRANDONN, Alex Kim DPM, Given at 07/30/21 4272    Review of Systems - 10 point Review of Systems is negative except for right foot infection which is noted in HPI.      OBJECTIVE:  /64   Pulse 88   Temp 99.5  F (37.5  C)   Resp 18   General appearance: Patient is alert and fully cooperative with history &  exam.  No sign of distress is noted during the visit.    Vascular: Dorsalis pedis palpableRight.  Dermatologic:    VASC Wound right lateral foot (Active)       VASC Wound left hallux  (Active)       VASC Wound right lateral foot (Active)   Pre Size Length 0.5 11/19/21 1108   Pre Size Width 0.4 11/19/21 1108   Pre Size Depth 0.4 11/19/21 1108   Pre Total Sq cm 0.2 11/19/21 1108   Post Size Length 0.5 10/29/21 1100   Post Size Width 0.5 10/29/21 1100   Post Size Depth 0.1 10/29/21 1100   Post Total Sq cm 0.25 10/29/21 1100   Ulceration plantar lateral right foot has non-viable tissue with increased depth, does not probe to bone, localized erythema along the plantar ulceration.   Neurologic: Diminished to light touch Right.  Musculoskeletal: Rigid equinovarus right foot.     Imaging:     No results found.       Picture:

## 2021-11-19 NOTE — TELEPHONE ENCOUNTER
Spoke with Emerita and gave verbal okay for doxycyline 100 mg by mouth 2 times daily for 10 days.

## 2021-11-22 ENCOUNTER — TELEPHONE (OUTPATIENT)
Dept: PODIATRY | Facility: CLINIC | Age: 51
End: 2021-11-22
Payer: MEDICARE

## 2021-11-22 ENCOUNTER — PREP FOR PROCEDURE (OUTPATIENT)
Dept: PODIATRY | Facility: CLINIC | Age: 51
End: 2021-11-22
Payer: MEDICARE

## 2021-11-22 DIAGNOSIS — L97.513 ULCER OF RIGHT FOOT WITH NECROSIS OF MUSCLE (H): Primary | ICD-10-CM

## 2021-11-22 LAB
BACTERIA SPEC CULT: ABNORMAL
BACTERIA SPEC CULT: ABNORMAL

## 2021-11-22 RX ORDER — CEFAZOLIN SODIUM 2 G/100ML
2 INJECTION, SOLUTION INTRAVENOUS
Status: CANCELLED | OUTPATIENT
Start: 2021-12-17

## 2021-11-22 RX ORDER — CEFAZOLIN SODIUM 2 G/100ML
2 INJECTION, SOLUTION INTRAVENOUS SEE ADMIN INSTRUCTIONS
Status: CANCELLED | OUTPATIENT
Start: 2021-12-17

## 2021-11-22 NOTE — TELEPHONE ENCOUNTER
I reviewed the MRI report with the patient's mother: 1.  No abscess or osteomyelitis.     2.  No fracture or contusion.     3.  Moderate dorsal subcutaneous edema or cellulitis throughout the forefoot.    I reviewed the above with the patient's mother, Jewels, who is his POA. Based the the above and my previous visit Matias, I recommend surgical I&D of the non-viable tissue on the right foot with use of a wound vac post-op. She consents to surgery.       Jewels stated that her son Macho is also Matias's POA, and she would like all correspondence to go through Macho.     Macho Lundberg ([phone])  Jewels Lundberg ([phone])    SPEECH PATHOLOGY NOTE:    Orders received for bedside swallowing assessment. Per RN, patient held his oral medications in his mouth this am.  Patient would attempt to reposition himself in the bed but did not open his eyes or offer a verbal response. No response to tactile stimulation of an ice chip on his lip with inability to follow directions to safely accept po trials at this time due to decreased level of alertness. Discussed with RN and NP who reports medications have been changed to non-oral.  Patient could benefit from temporary tube feedings; consistent with RD recommendations. NP requested for SLP to sign off with re-consult if/when patient may be able to participate. Of note, patient is known to SLP from previous admission 10/2016 with modified barium swallow study (MBS) completed at that time and honey thick liquids recommended. Recommend NPO with alternative nutrition. Please re-consult as indicated. Thank you.     Da Mojica MS, CCC-SLP

## 2021-11-23 ENCOUNTER — TELEPHONE (OUTPATIENT)
Dept: VASCULAR SURGERY | Facility: CLINIC | Age: 51
End: 2021-11-23
Payer: MEDICARE

## 2021-11-24 DIAGNOSIS — Z11.59 ENCOUNTER FOR SCREENING FOR OTHER VIRAL DISEASES: ICD-10-CM

## 2021-11-24 NOTE — TELEPHONE ENCOUNTER
Emerita from [facility] called requesting the surgery information be faxed to [phone] as she stated they have their own process to follow 3-4 days before procedure. If you have any questions please call her back at [phone].  
Letter faxed  
Patient's wife would like a call back to verify if/when patient will be scheduled for surgery with Dr. Kim. She states that patient's care facility, [facility], will be able to perform pre op if needed.   Jewels: [phone]  
Reviewed information with Jewels and also called [facility] but had to leave a message for the nurse.     Surgery Scheduled      Surgery/Procedure: Incision and Drainage right foot    Special Equipment: pulse lavage    Location: [facility]    Date: 12/2    Time: 12 pm    Surgeon: Dr. Kim    OR Confirmed/ :  Yes with Abby on 11/24    Orders In:  Yes    Entered on [facility] / [facility] Calendar:  Yes    Post Op: 12/10    Covid Scheduled: facility    Blood Thinners Addressed:  Yes- aspirin              
1.91

## 2021-11-26 ENCOUNTER — TELEPHONE (OUTPATIENT)
Dept: VASCULAR SURGERY | Facility: CLINIC | Age: 51
End: 2021-11-26
Payer: MEDICARE

## 2021-11-26 ENCOUNTER — TELEPHONE (OUTPATIENT)
Dept: PODIATRY | Facility: CLINIC | Age: 51
End: 2021-11-26
Payer: MEDICARE

## 2021-11-26 DIAGNOSIS — Z20.822 COVID-19 RULED OUT: Primary | ICD-10-CM

## 2021-11-26 NOTE — TELEPHONE ENCOUNTER
ESVIN. Wound vac was sent back to . Arrangements need to be made to re-deliver wound vac. Family is willing to give an address for deliver and get it to patient.

## 2021-11-26 NOTE — TELEPHONE ENCOUNTER
Called and left a message with Emerita at [facility] about patients wound vac ERIN called the clinic stating they refused the delivery of the wound vac. According to the chart [facility] has [facility] so they should be able to do wound vac changes and they facility only needs to accept the delivery to get it to the patient. ERIN stated that if they do not hear back by Monday for a delivery plan they will have to cancel the order the number to call back for ERIN is [phone]

## 2021-11-26 NOTE — TELEPHONE ENCOUNTER
Patient has been accepted into homecare through [facility], they are ready to start VAC changes twice per week post operatively.

## 2021-11-26 NOTE — TELEPHONE ENCOUNTER
Siva confirmed that they don't have a  and won't be able to facilitate with a transfer. They won't be able to COVID testing. Informed him that they will be updated about plan. Message sent to charge nurse for protocol.

## 2021-11-26 NOTE — TELEPHONE ENCOUNTER
Caller: Siva Mills    Provider: MD Alex Kim    Detailed reason for call: [facility] received a wound vac for the patient. He states they are not allowed to have/use wound vac in the facility. Please call to advise.    Best phone number to contact: [phone]    Best time to contact: any    Ok to leave a detailed message: Yes

## 2021-11-26 NOTE — TELEPHONE ENCOUNTER
Caller: Sherri, sister, LEONIE    Provider: MD Alex Kim    Detailed reason for call: Cm will need to be transferred to a skilled nursing facility for post op care as [facility] is not licensed to allow wound vac. She asked if Dr. Kim's office is aware and if this will be managed by the clinic or if they need to seek out care. They would prefer [facility] in [place] as he has been there in the past.    She also asked about a pre-procedure COVID test and where to get it. Caller scheduled COVID test for 11/29/21 at [facility].    Best phone number to contact: [phone]    Best time to contact: any    Ok to leave a detailed message: Yes

## 2021-11-29 ENCOUNTER — LAB (OUTPATIENT)
Dept: LAB | Facility: CLINIC | Age: 51
End: 2021-11-29
Payer: MEDICARE

## 2021-11-29 DIAGNOSIS — Z11.59 ENCOUNTER FOR SCREENING FOR OTHER VIRAL DISEASES: ICD-10-CM

## 2021-11-29 PROCEDURE — U0005 INFEC AGEN DETEC AMPLI PROBE: HCPCS

## 2021-11-29 PROCEDURE — U0003 INFECTIOUS AGENT DETECTION BY NUCLEIC ACID (DNA OR RNA); SEVERE ACUTE RESPIRATORY SYNDROME CORONAVIRUS 2 (SARS-COV-2) (CORONAVIRUS DISEASE [COVID-19]), AMPLIFIED PROBE TECHNIQUE, MAKING USE OF HIGH THROUGHPUT TECHNOLOGIES AS DESCRIBED BY CMS-2020-01-R: HCPCS

## 2021-11-29 NOTE — TELEPHONE ENCOUNTER
Patient's sister Sherri is willing to accept delivery of the VAC today. Her address is:  [address] [place] [id number]  Ph: [phone]    Please reach out to  and update shipping address.

## 2021-11-29 NOTE — TELEPHONE ENCOUNTER
had called Barbara on Friday in regards to this. Sounds like they are willing to try delivery again today if we can get this figured out. I will reach out to the patient's family and obtain a new address for delivery and then have Barbara reach back out to her point of contact at .

## 2021-11-29 NOTE — TELEPHONE ENCOUNTER
[facility] has been updated with the sister's address and phone number. They are going to call her and schedule delivery for tomorrow. No further action needed.

## 2021-11-30 LAB — SARS-COV-2 RNA RESP QL NAA+PROBE: NEGATIVE

## 2021-11-30 NOTE — TELEPHONE ENCOUNTER
Surgery Scheduled    CPT: 60509    Surgery/Procedure: INCISION AND DRAINAGE, right foot    Special Equipment: pulse lavage    Location: [facility]    Date: 12/17    Time: 4:30 PM    Surgeon: Dr. Kim    OR Confirmed/ :  Yes with Annabella on 11/30/21    Orders In:  Yes    Post Op: 12/22    Covid Scheduled: 12/13    Blood Thinners Addressed: patient on xarelto, phone message sent to PCP's assistant Lisseth on 12/2 with request to hold for 3 days prior to procedure.  Awaiting reply.     Updated surgery time, location, and plan given to patient's sister Sherri [phone]. LMTCB to Mary at [facility] [phone] to give surgery information.

## 2021-11-30 NOTE — TELEPHONE ENCOUNTER
Spoke with Mary the Head of Wellness at [facility].  She states that per their policy, patient cannot have a wound vac at their assisted living facility.  Explained to her that home care is set up to manage wound vac care, Mary continues to state the facility will not allow patient to have a wound vac there even with outside care to manage the wound vac use.  Unable to admit patient after surgery for TCU placement due to hospital capacity.  Provider updated.

## 2021-11-30 NOTE — TELEPHONE ENCOUNTER
Checked with bed control and surgery and surgery will need to be rescheduled as they cannot accommodate an admission on 12/2 due to bed availability. Dates available provided.     [facility]: 12/21,12/22, 12/23, 12/24,     [facility]: 12/17,      Forwarding to Dr Kim's team to work on the rescheduling of surgery.

## 2021-12-03 NOTE — TELEPHONE ENCOUNTER
Mary is returning a call from our office regarding pre surgery steps, she can be  Reached [phone]

## 2021-12-07 NOTE — TELEPHONE ENCOUNTER
No message received from Dr. Strong regarding holding xarelto. Called [facility] and spoke with Mary and she stated that his new PCP is Dr. Esperanza Bullock from [facility] and that she will be there today. Dr. Bullock did the pre op last week. Mary stated she will discuss with Dr. Bullock holding the xarelto and see what her recommendations are for how many days. Told to fax pre op to [phone].

## 2021-12-07 NOTE — TELEPHONE ENCOUNTER
Called [facility] and spoke with Mary and she stated that his new PCP is Dr. Esperanza Bullock from [facility] and that she will be there today. Dr. Bullock did the pre op last week. Mary stated she will discuss with Dr. Bullock holding the xarelto and see what her recommendations are for how many days. Told to fax pre op to [phone].

## 2021-12-08 NOTE — TELEPHONE ENCOUNTER
LMTCB for Mary with [facility].  Inquiring if she was able to reach physician regarding holding xarelto.  Awaiting return call.

## 2021-12-13 ENCOUNTER — LAB (OUTPATIENT)
Dept: FAMILY MEDICINE | Facility: CLINIC | Age: 51
End: 2021-12-13
Attending: PODIATRIST
Payer: MEDICARE

## 2021-12-13 DIAGNOSIS — Z20.822 COVID-19 RULED OUT: ICD-10-CM

## 2021-12-13 PROCEDURE — U0003 INFECTIOUS AGENT DETECTION BY NUCLEIC ACID (DNA OR RNA); SEVERE ACUTE RESPIRATORY SYNDROME CORONAVIRUS 2 (SARS-COV-2) (CORONAVIRUS DISEASE [COVID-19]), AMPLIFIED PROBE TECHNIQUE, MAKING USE OF HIGH THROUGHPUT TECHNOLOGIES AS DESCRIBED BY CMS-2020-01-R: HCPCS

## 2021-12-13 PROCEDURE — U0005 INFEC AGEN DETEC AMPLI PROBE: HCPCS

## 2021-12-14 ENCOUNTER — DOCUMENTATION ONLY (OUTPATIENT)
Dept: OTHER | Facility: CLINIC | Age: 51
End: 2021-12-14
Payer: MEDICARE

## 2021-12-14 ENCOUNTER — DOCUMENTATION ONLY (OUTPATIENT)
Dept: PODIATRY | Facility: CLINIC | Age: 51
End: 2021-12-14
Payer: MEDICARE

## 2021-12-14 LAB — SARS-COV-2 RNA RESP QL NAA+PROBE: NEGATIVE

## 2021-12-14 NOTE — PROGRESS NOTES
Surgery was r/s from 12/2 to 12/17 due to pt needing to be kept after surgery and sent to TCU due to facility unable to manage offloading.     Melia at surgery scheduling provided dates that will work.    Lizy ADLER r/s surgery to 12/17 due to this reason.

## 2021-12-16 ENCOUNTER — ANESTHESIA EVENT (OUTPATIENT)
Dept: SURGERY | Facility: CLINIC | Age: 51
End: 2021-12-16

## 2021-12-16 ENCOUNTER — TELEPHONE (OUTPATIENT)
Dept: VASCULAR SURGERY | Facility: CLINIC | Age: 51
End: 2021-12-16
Payer: MEDICARE

## 2021-12-16 RX ORDER — CALCIUM CARBONATE/VITAMIN D3 500-10/5ML
400 LIQUID (ML) ORAL 4 TIMES DAILY
COMMUNITY
End: 2022-09-28 | Stop reason: ALTCHOICE

## 2021-12-16 RX ORDER — CHOLESTYRAMINE 4 G/9G
1 POWDER, FOR SUSPENSION ORAL EVERY OTHER DAY
COMMUNITY

## 2021-12-16 NOTE — TELEPHONE ENCOUNTER
Amanda from [facility] pre-op surgery is calling needing to know the status of the TCU prior to surgery on 12/17, she can be reached at [phone]

## 2021-12-16 NOTE — TELEPHONE ENCOUNTER
TCB.  Surgery was approved for scheduling.  Clinic is not currently directly involved in arranging TCU.  Asked for a call back to discuss if needed.

## 2021-12-17 ENCOUNTER — ANESTHESIA (OUTPATIENT)
Dept: SURGERY | Facility: CLINIC | Age: 51
End: 2021-12-17
Payer: MEDICARE

## 2021-12-17 ENCOUNTER — HOSPITAL ENCOUNTER (INPATIENT)
Facility: CLINIC | Age: 51
LOS: 5 days | Discharge: HOME | DRG: 623 | End: 2021-12-22
Attending: PODIATRIST | Admitting: PODIATRIST
Payer: MEDICARE

## 2021-12-17 DIAGNOSIS — E11.621 DIABETIC ULCER OF RIGHT MIDFOOT ASSOCIATED WITH TYPE 2 DIABETES MELLITUS, LIMITED TO BREAKDOWN OF SKIN (H): Primary | ICD-10-CM

## 2021-12-17 DIAGNOSIS — E11.9 TYPE 2 DIABETES MELLITUS TREATED WITHOUT INSULIN (H): ICD-10-CM

## 2021-12-17 DIAGNOSIS — F70 MILD INTELLECTUAL DISABILITIES: ICD-10-CM

## 2021-12-17 DIAGNOSIS — L97.411 DIABETIC ULCER OF RIGHT MIDFOOT ASSOCIATED WITH TYPE 2 DIABETES MELLITUS, LIMITED TO BREAKDOWN OF SKIN (H): Primary | ICD-10-CM

## 2021-12-17 LAB
GLUCOSE BLDC GLUCOMTR-MCNC: 150 MG/DL (ref 70–99)
GLUCOSE BLDC GLUCOMTR-MCNC: 94 MG/DL (ref 70–99)

## 2021-12-17 PROCEDURE — 360N000075 HC SURGERY LEVEL 2, PER MIN: Performed by: PODIATRIST

## 2021-12-17 PROCEDURE — 0KBV0ZZ EXCISION OF RIGHT FOOT MUSCLE, OPEN APPROACH: ICD-10-PCS | Performed by: PODIATRIST

## 2021-12-17 PROCEDURE — 250N000011 HC RX IP 250 OP 636: Performed by: ANESTHESIOLOGY

## 2021-12-17 PROCEDURE — 258N000001 HC RX 258: Performed by: PODIATRIST

## 2021-12-17 PROCEDURE — 250N000013 HC RX MED GY IP 250 OP 250 PS 637: Performed by: ANESTHESIOLOGY

## 2021-12-17 PROCEDURE — 87205 SMEAR GRAM STAIN: CPT | Performed by: PODIATRIST

## 2021-12-17 PROCEDURE — 28002 TREATMENT OF FOOT INFECTION: CPT | Mod: RT | Performed by: PODIATRIST

## 2021-12-17 PROCEDURE — 370N000017 HC ANESTHESIA TECHNICAL FEE, PER MIN: Performed by: PODIATRIST

## 2021-12-17 PROCEDURE — 272N000001 HC OR GENERAL SUPPLY STERILE: Performed by: PODIATRIST

## 2021-12-17 PROCEDURE — 999N000141 HC STATISTIC PRE-PROCEDURE NURSING ASSESSMENT: Performed by: PODIATRIST

## 2021-12-17 PROCEDURE — 250N000013 HC RX MED GY IP 250 OP 250 PS 637: Performed by: INTERNAL MEDICINE

## 2021-12-17 PROCEDURE — 11043 DBRDMT MUSC&/FSCA 1ST 20/<: CPT | Mod: 59 | Performed by: PODIATRIST

## 2021-12-17 PROCEDURE — 120N000001 HC R&B MED SURG/OB

## 2021-12-17 PROCEDURE — 250N000011 HC RX IP 250 OP 636: Performed by: NURSE ANESTHETIST, CERTIFIED REGISTERED

## 2021-12-17 PROCEDURE — 82962 GLUCOSE BLOOD TEST: CPT

## 2021-12-17 PROCEDURE — 99233 SBSQ HOSP IP/OBS HIGH 50: CPT | Performed by: INTERNAL MEDICINE

## 2021-12-17 PROCEDURE — 258N000003 HC RX IP 258 OP 636: Performed by: ANESTHESIOLOGY

## 2021-12-17 PROCEDURE — 250N000011 HC RX IP 250 OP 636: Performed by: PODIATRIST

## 2021-12-17 PROCEDURE — 87077 CULTURE AEROBIC IDENTIFY: CPT | Performed by: PODIATRIST

## 2021-12-17 PROCEDURE — 250N000013 HC RX MED GY IP 250 OP 250 PS 637: Performed by: PODIATRIST

## 2021-12-17 PROCEDURE — 250N000009 HC RX 250: Performed by: ANESTHESIOLOGY

## 2021-12-17 PROCEDURE — 87075 CULTR BACTERIA EXCEPT BLOOD: CPT | Performed by: PODIATRIST

## 2021-12-17 PROCEDURE — 250N000009 HC RX 250: Performed by: PODIATRIST

## 2021-12-17 RX ORDER — GABAPENTIN 100 MG/1
100 CAPSULE ORAL
Status: DISCONTINUED | OUTPATIENT
Start: 2021-12-18 | End: 2021-12-22 | Stop reason: HOSPADM

## 2021-12-17 RX ORDER — TRAMADOL HYDROCHLORIDE 50 MG/1
100 TABLET ORAL EVERY 6 HOURS PRN
Status: DISCONTINUED | OUTPATIENT
Start: 2021-12-17 | End: 2021-12-22 | Stop reason: HOSPADM

## 2021-12-17 RX ORDER — ARIPIPRAZOLE 2 MG/1
2 TABLET ORAL DAILY
Status: DISCONTINUED | OUTPATIENT
Start: 2021-12-18 | End: 2021-12-22 | Stop reason: HOSPADM

## 2021-12-17 RX ORDER — FENTANYL CITRATE 50 UG/ML
25 INJECTION, SOLUTION INTRAMUSCULAR; INTRAVENOUS EVERY 5 MIN PRN
Status: DISCONTINUED | OUTPATIENT
Start: 2021-12-17 | End: 2021-12-17

## 2021-12-17 RX ORDER — NALOXONE HYDROCHLORIDE 0.4 MG/ML
0.4 INJECTION, SOLUTION INTRAMUSCULAR; INTRAVENOUS; SUBCUTANEOUS
Status: DISCONTINUED | OUTPATIENT
Start: 2021-12-17 | End: 2021-12-22 | Stop reason: HOSPADM

## 2021-12-17 RX ORDER — ATORVASTATIN CALCIUM 40 MG/1
80 TABLET, FILM COATED ORAL DAILY
Status: DISCONTINUED | OUTPATIENT
Start: 2021-12-18 | End: 2021-12-22 | Stop reason: HOSPADM

## 2021-12-17 RX ORDER — CHOLESTYRAMINE 4 G/9G
1 POWDER, FOR SUSPENSION ORAL EVERY OTHER DAY
Status: DISCONTINUED | OUTPATIENT
Start: 2021-12-18 | End: 2021-12-22 | Stop reason: HOSPADM

## 2021-12-17 RX ORDER — ONDANSETRON 4 MG/1
4 TABLET, ORALLY DISINTEGRATING ORAL EVERY 6 HOURS PRN
Status: DISCONTINUED | OUTPATIENT
Start: 2021-12-17 | End: 2021-12-22 | Stop reason: HOSPADM

## 2021-12-17 RX ORDER — PROPOFOL 10 MG/ML
INJECTION, EMULSION INTRAVENOUS CONTINUOUS PRN
Status: DISCONTINUED | OUTPATIENT
Start: 2021-12-17 | End: 2021-12-17

## 2021-12-17 RX ORDER — SODIUM CHLORIDE, SODIUM LACTATE, POTASSIUM CHLORIDE, CALCIUM CHLORIDE 600; 310; 30; 20 MG/100ML; MG/100ML; MG/100ML; MG/100ML
INJECTION, SOLUTION INTRAVENOUS CONTINUOUS
Status: DISCONTINUED | OUTPATIENT
Start: 2021-12-17 | End: 2021-12-19

## 2021-12-17 RX ORDER — BISACODYL 10 MG
10 SUPPOSITORY, RECTAL RECTAL DAILY PRN
Status: DISCONTINUED | OUTPATIENT
Start: 2021-12-17 | End: 2021-12-22 | Stop reason: HOSPADM

## 2021-12-17 RX ORDER — GABAPENTIN 300 MG/1
600 CAPSULE ORAL AT BEDTIME
COMMUNITY

## 2021-12-17 RX ORDER — ASCORBIC ACID 500 MG
500 TABLET ORAL DAILY
Status: DISCONTINUED | OUTPATIENT
Start: 2021-12-18 | End: 2021-12-22 | Stop reason: HOSPADM

## 2021-12-17 RX ORDER — MAGNESIUM OXIDE 400 MG/1
400 TABLET ORAL 4 TIMES DAILY
Status: DISCONTINUED | OUTPATIENT
Start: 2021-12-17 | End: 2021-12-22 | Stop reason: HOSPADM

## 2021-12-17 RX ORDER — LORAZEPAM 0.5 MG/1
0.5 TABLET ORAL
Status: DISCONTINUED | OUTPATIENT
Start: 2021-12-18 | End: 2021-12-22 | Stop reason: HOSPADM

## 2021-12-17 RX ORDER — LIDOCAINE 40 MG/G
CREAM TOPICAL
Status: DISCONTINUED | OUTPATIENT
Start: 2021-12-17 | End: 2021-12-22 | Stop reason: HOSPADM

## 2021-12-17 RX ORDER — CEFAZOLIN SODIUM 2 G/100ML
2 INJECTION, SOLUTION INTRAVENOUS
Status: COMPLETED | OUTPATIENT
Start: 2021-12-17 | End: 2021-12-17

## 2021-12-17 RX ORDER — CEFAZOLIN SODIUM 2 G/100ML
2 INJECTION, SOLUTION INTRAVENOUS SEE ADMIN INSTRUCTIONS
Status: DISCONTINUED | OUTPATIENT
Start: 2021-12-17 | End: 2021-12-19

## 2021-12-17 RX ORDER — FENOFIBRATE 67 MG/1
200 CAPSULE ORAL
Status: DISCONTINUED | OUTPATIENT
Start: 2021-12-18 | End: 2021-12-17

## 2021-12-17 RX ORDER — MEPERIDINE HYDROCHLORIDE 25 MG/ML
12.5 INJECTION INTRAMUSCULAR; INTRAVENOUS; SUBCUTANEOUS
Status: DISCONTINUED | OUTPATIENT
Start: 2021-12-17 | End: 2021-12-17 | Stop reason: HOSPADM

## 2021-12-17 RX ORDER — ACETAMINOPHEN 500 MG
1000 TABLET ORAL 3 TIMES DAILY
COMMUNITY
End: 2022-09-28

## 2021-12-17 RX ORDER — PROCHLORPERAZINE MALEATE 10 MG
10 TABLET ORAL EVERY 6 HOURS PRN
Status: DISCONTINUED | OUTPATIENT
Start: 2021-12-17 | End: 2021-12-22 | Stop reason: HOSPADM

## 2021-12-17 RX ORDER — AMOXICILLIN 250 MG
1 CAPSULE ORAL 2 TIMES DAILY
Status: DISCONTINUED | OUTPATIENT
Start: 2021-12-17 | End: 2021-12-22 | Stop reason: HOSPADM

## 2021-12-17 RX ORDER — BUPIVACAINE HYDROCHLORIDE 5 MG/ML
INJECTION, SOLUTION PERINEURAL PRN
Status: DISCONTINUED | OUTPATIENT
Start: 2021-12-17 | End: 2021-12-17 | Stop reason: HOSPADM

## 2021-12-17 RX ORDER — ONDANSETRON 4 MG/1
4 TABLET, ORALLY DISINTEGRATING ORAL EVERY 30 MIN PRN
Status: DISCONTINUED | OUTPATIENT
Start: 2021-12-17 | End: 2021-12-17 | Stop reason: HOSPADM

## 2021-12-17 RX ORDER — MIRTAZAPINE 7.5 MG/1
7.5 TABLET, FILM COATED ORAL AT BEDTIME
Status: DISCONTINUED | OUTPATIENT
Start: 2021-12-17 | End: 2021-12-22 | Stop reason: HOSPADM

## 2021-12-17 RX ORDER — ACETAMINOPHEN 325 MG/1
975 TABLET ORAL EVERY 8 HOURS
Status: COMPLETED | OUTPATIENT
Start: 2021-12-17 | End: 2021-12-20

## 2021-12-17 RX ORDER — NALOXONE HYDROCHLORIDE 0.4 MG/ML
0.2 INJECTION, SOLUTION INTRAMUSCULAR; INTRAVENOUS; SUBCUTANEOUS
Status: DISCONTINUED | OUTPATIENT
Start: 2021-12-17 | End: 2021-12-22 | Stop reason: HOSPADM

## 2021-12-17 RX ORDER — LOSARTAN POTASSIUM 25 MG/1
25 TABLET ORAL AT BEDTIME
Status: DISCONTINUED | OUTPATIENT
Start: 2021-12-17 | End: 2021-12-22 | Stop reason: HOSPADM

## 2021-12-17 RX ORDER — OXYCODONE HYDROCHLORIDE 5 MG/1
5 TABLET ORAL ONCE
Status: COMPLETED | OUTPATIENT
Start: 2021-12-17 | End: 2021-12-17

## 2021-12-17 RX ORDER — ONDANSETRON 2 MG/ML
4 INJECTION INTRAMUSCULAR; INTRAVENOUS EVERY 30 MIN PRN
Status: DISCONTINUED | OUTPATIENT
Start: 2021-12-17 | End: 2021-12-17 | Stop reason: HOSPADM

## 2021-12-17 RX ORDER — ACETAMINOPHEN 325 MG/1
650 TABLET ORAL EVERY 4 HOURS PRN
Status: DISCONTINUED | OUTPATIENT
Start: 2021-12-20 | End: 2021-12-22 | Stop reason: HOSPADM

## 2021-12-17 RX ORDER — LIDOCAINE 40 MG/G
CREAM TOPICAL
Status: DISCONTINUED | OUTPATIENT
Start: 2021-12-17 | End: 2021-12-17

## 2021-12-17 RX ORDER — FENOFIBRATE 67 MG/1
201 CAPSULE ORAL
Status: DISCONTINUED | OUTPATIENT
Start: 2021-12-18 | End: 2021-12-22 | Stop reason: HOSPADM

## 2021-12-17 RX ORDER — DEXAMETHASONE SODIUM PHOSPHATE 4 MG/ML
INJECTION, SOLUTION INTRA-ARTICULAR; INTRALESIONAL; INTRAMUSCULAR; INTRAVENOUS; SOFT TISSUE PRN
Status: DISCONTINUED | OUTPATIENT
Start: 2021-12-17 | End: 2021-12-17

## 2021-12-17 RX ORDER — SODIUM CHLORIDE, SODIUM LACTATE, POTASSIUM CHLORIDE, CALCIUM CHLORIDE 600; 310; 30; 20 MG/100ML; MG/100ML; MG/100ML; MG/100ML
INJECTION, SOLUTION INTRAVENOUS CONTINUOUS
Status: DISCONTINUED | OUTPATIENT
Start: 2021-12-17 | End: 2021-12-17 | Stop reason: HOSPADM

## 2021-12-17 RX ORDER — DULOXETIN HYDROCHLORIDE 60 MG/1
120 CAPSULE, DELAYED RELEASE ORAL DAILY
Status: DISCONTINUED | OUTPATIENT
Start: 2021-12-18 | End: 2021-12-22 | Stop reason: HOSPADM

## 2021-12-17 RX ORDER — ONDANSETRON 2 MG/ML
4 INJECTION INTRAMUSCULAR; INTRAVENOUS EVERY 6 HOURS PRN
Status: DISCONTINUED | OUTPATIENT
Start: 2021-12-17 | End: 2021-12-22 | Stop reason: HOSPADM

## 2021-12-17 RX ORDER — AMOXICILLIN 250 MG
1 CAPSULE ORAL 2 TIMES DAILY PRN
Status: DISCONTINUED | OUTPATIENT
Start: 2021-12-17 | End: 2021-12-22 | Stop reason: HOSPADM

## 2021-12-17 RX ORDER — GABAPENTIN 300 MG/1
600 CAPSULE ORAL AT BEDTIME
Status: DISCONTINUED | OUTPATIENT
Start: 2021-12-17 | End: 2021-12-22 | Stop reason: HOSPADM

## 2021-12-17 RX ORDER — GLIPIZIDE 5 MG/1
5 TABLET ORAL
Status: DISCONTINUED | OUTPATIENT
Start: 2021-12-18 | End: 2021-12-20

## 2021-12-17 RX ORDER — TRAZODONE HYDROCHLORIDE 50 MG/1
50 TABLET, FILM COATED ORAL AT BEDTIME
Status: DISCONTINUED | OUTPATIENT
Start: 2021-12-17 | End: 2021-12-22 | Stop reason: HOSPADM

## 2021-12-17 RX ORDER — FENTANYL CITRATE 50 UG/ML
25 INJECTION, SOLUTION INTRAMUSCULAR; INTRAVENOUS
Status: DISCONTINUED | OUTPATIENT
Start: 2021-12-17 | End: 2021-12-17

## 2021-12-17 RX ORDER — PROPOFOL 10 MG/ML
INJECTION, EMULSION INTRAVENOUS PRN
Status: DISCONTINUED | OUTPATIENT
Start: 2021-12-17 | End: 2021-12-17

## 2021-12-17 RX ORDER — POLYETHYLENE GLYCOL 3350 17 G/17G
17 POWDER, FOR SOLUTION ORAL DAILY
Status: DISCONTINUED | OUTPATIENT
Start: 2021-12-18 | End: 2021-12-22 | Stop reason: HOSPADM

## 2021-12-17 RX ORDER — FENTANYL CITRATE 50 UG/ML
50 INJECTION, SOLUTION INTRAMUSCULAR; INTRAVENOUS
Status: COMPLETED | OUTPATIENT
Start: 2021-12-17 | End: 2021-12-17

## 2021-12-17 RX ORDER — PANTOPRAZOLE SODIUM 20 MG/1
40 TABLET, DELAYED RELEASE ORAL DAILY
Status: DISCONTINUED | OUTPATIENT
Start: 2021-12-18 | End: 2021-12-22 | Stop reason: HOSPADM

## 2021-12-17 RX ADMIN — FENTANYL CITRATE 50 MCG: 50 INJECTION, SOLUTION INTRAMUSCULAR; INTRAVENOUS at 17:58

## 2021-12-17 RX ADMIN — MIRTAZAPINE 7.5 MG: 7.5 TABLET, FILM COATED ORAL at 20:23

## 2021-12-17 RX ADMIN — MIDAZOLAM 1 MG: 1 INJECTION INTRAMUSCULAR; INTRAVENOUS at 18:03

## 2021-12-17 RX ADMIN — PROPOFOL 50 MCG/KG/MIN: 10 INJECTION, EMULSION INTRAVENOUS at 18:05

## 2021-12-17 RX ADMIN — SENNOSIDES AND DOCUSATE SODIUM 1 TABLET: 50; 8.6 TABLET ORAL at 20:24

## 2021-12-17 RX ADMIN — LOSARTAN POTASSIUM 25 MG: 25 TABLET, FILM COATED ORAL at 20:24

## 2021-12-17 RX ADMIN — MIDAZOLAM 1 MG: 1 INJECTION INTRAMUSCULAR; INTRAVENOUS at 17:58

## 2021-12-17 RX ADMIN — GABAPENTIN 600 MG: 300 CAPSULE ORAL at 20:24

## 2021-12-17 RX ADMIN — ONDANSETRON 4 MG: 2 INJECTION INTRAMUSCULAR; INTRAVENOUS at 18:16

## 2021-12-17 RX ADMIN — TRAZODONE HYDROCHLORIDE 50 MG: 50 TABLET ORAL at 20:24

## 2021-12-17 RX ADMIN — ACETAMINOPHEN 975 MG: 325 TABLET ORAL at 20:24

## 2021-12-17 RX ADMIN — MAGNESIUM OXIDE TAB 400 MG (241.3 MG ELEMENTAL MG) 400 MG: 400 (241.3 MG) TAB at 20:24

## 2021-12-17 RX ADMIN — CEFAZOLIN SODIUM 2 G: 2 INJECTION, SOLUTION INTRAVENOUS at 18:14

## 2021-12-17 RX ADMIN — LIDOCAINE HYDROCHLORIDE 30 MG: 10 INJECTION, SOLUTION INFILTRATION; PERINEURAL at 18:05

## 2021-12-17 RX ADMIN — TRAMADOL HYDROCHLORIDE 100 MG: 50 TABLET, FILM COATED ORAL at 23:47

## 2021-12-17 RX ADMIN — FENTANYL CITRATE 50 MCG: 50 INJECTION, SOLUTION INTRAMUSCULAR; INTRAVENOUS at 18:03

## 2021-12-17 RX ADMIN — PROPOFOL 30 MG: 10 INJECTION, EMULSION INTRAVENOUS at 18:05

## 2021-12-17 RX ADMIN — SODIUM CHLORIDE, POTASSIUM CHLORIDE, SODIUM LACTATE AND CALCIUM CHLORIDE: 600; 310; 30; 20 INJECTION, SOLUTION INTRAVENOUS at 15:34

## 2021-12-17 RX ADMIN — OXYCODONE HYDROCHLORIDE 5 MG: 5 TABLET ORAL at 16:33

## 2021-12-17 RX ADMIN — DEXAMETHASONE SODIUM PHOSPHATE 4 MG: 4 INJECTION, SOLUTION INTRA-ARTICULAR; INTRALESIONAL; INTRAMUSCULAR; INTRAVENOUS; SOFT TISSUE at 18:12

## 2021-12-17 ASSESSMENT — ACTIVITIES OF DAILY LIVING (ADL)
ADLS_ACUITY_SCORE: 18

## 2021-12-17 ASSESSMENT — MIFFLIN-ST. JEOR
SCORE: 1484.03
SCORE: 1465.88

## 2021-12-17 NOTE — PHARMACY-ADMISSION MEDICATION HISTORY
Pharmacy Note - Admission Medication History    Pertinent Provider Information: None   ______________________________________________________________________    Prior To Admission (PTA) med list completed and updated in EMR.       PTA Med List   Medication Sig Note Last Dose     Acetaminophen (TYLENOL PO) Take 1,000 mg by mouth 3 times daily   12/17/2021 at 0800     acetaminophen (TYLENOL) 325 MG tablet Take 650 mg by mouth every 4 hours as needed for mild pain  Unknown at Unknown time     ARIPiprazole (ABILIFY) 2 MG tablet 2 mg daily   12/17/2021 at Unknown time     atorvastatin (LIPITOR) 80 MG tablet   12/17/2021 at Unknown time     cholestyramine (QUESTRAN) 4 g packet Take 1 packet by mouth every other day   12/16/2021 at 0800     DULoxetine (CYMBALTA) 60 MG capsule Take 120 mg by mouth daily takle 120mg  12/17/2021 at Unknown time     erenumab-aooe (AIMOVIG) 140 MG/ML injection Inject 140 mg Subcutaneous every 30 days   12/15/2021 at Unknown time     fenofibrate micronized (LOFIBRA) 200 MG capsule Take 200 mg by mouth every morning (before breakfast)   12/17/2021 at Unknown time     ferrous sulfate (FEROSUL) 325 (65 Fe) MG tablet Take 325 mg by mouth daily (with breakfast)   12/17/2021 at Unknown time     gabapentin (NEURONTIN) 100 MG capsule Take 100 mg by mouth 2 times daily (before meals)   12/17/2021 at 0800     gabapentin (NEURONTIN) 300 MG capsule Take 600 mg by mouth At Bedtime  12/16/2021 at Unknown time     glipiZIDE (GLUCOTROL) 5 MG tablet Take 2.5 mg by mouth  12/16/2021 at Unknown time     guaiFENesin (ROBITUSSIN) 100 MG/5ML SYRP Take 10 mLs by mouth every 4 hours as needed for cough  Unknown at Unknown time     ibuprofen (ADVIL/MOTRIN) 400 MG tablet Take 400 mg by mouth every 6 hours as needed for moderate pain  Unknown at Unknown time     loperamide (IMODIUM) 2 MG capsule Take 4 mg by mouth as needed for diarrhea   12/16/2021 at Unknown time     LORazepam (ATIVAN) 0.5 MG tablet Take 0.5 mg by mouth  daily before breakfast   12/17/2021 at Unknown time     losartan (COZAAR) 25 MG tablet Take 25 mg by mouth At Bedtime   12/16/2021 at Unknown time     magnesium oxide 400 MG CAPS Take 400 mg by mouth 4 times daily  12/16/2021 at Unknown time     Menthol, Topical Analgesic, (BIOFREEZE ROLL-ON) 4 % GEL Apply 1 Application topically 2 times daily Apply to lower back for pain  12/17/2021 at Unknown time     metFORMIN (GLUCOPHAGE) 1000 MG tablet Take 1,000 mg by mouth 2 times daily (with meals)   12/17/2021 at Unknown time     metoprolol succinate ER (TOPROL-XL) 25 MG 24 hr tablet Take 75 mg by mouth daily   12/17/2021 at 0800     mirtazapine (REMERON) 7.5 MG tablet Take 7.5 mg by mouth At Bedtime   12/16/2021 at Unknown time     Neomycin-Bacitracin-Polymyxin (TRIPLE ANTIBIOTIC) 3.5-400-5000 OINT ointment Apply topically 3 times daily as needed (As needed for abrasions)   Unknown at Unknown time     Nitroglycerin (NITROSTAT SL) Place 0.4 mg under the tongue every 5 minutes as needed for chest pain   Unknown at Unknown time     omeprazole (PRILOSEC) 20 MG DR capsule Take 20 mg by mouth daily   12/17/2021 at Unknown time     oxyCODONE (ROXICODONE) 5 MG tablet Take 5 mg by mouth every 6 hours as needed (Pain)   Unknown at Unknown time     polyethylene glycol (MIRALAX) 17 GM/Dose powder Take 1 packet by mouth daily as needed for constipation   Unknown at Unknown time     Rivaroxaban (XARELTO PO) Take 20 mg by mouth daily  12/16/2021: LD 12/15 12/15/2021 at Unknown time     senna-docusate (SENOKOT-S/PERICOLACE) 8.6-50 MG tablet Take 1 tablet by mouth 2 times daily as needed for constipation   Unknown at Unknown time     traZODone (DESYREL) 50 MG tablet Take 50 mg by mouth At Bedtime   12/16/2021 at Unknown time     vitamin C (ASCORBIC ACID) 500 MG tablet Take 500 mg by mouth daily   12/17/2021 at Unknown time       Information source(s): Facility ([facility]/) medication list/MAR    Method of interview communication:  in-person    Patient was asked about OTC/herbal products specifically.  PTA med list reflects this.    Based on the pharmacist's assessment, the PTA med list information appears reliable    Allergies were reviewed, assessed, and updated with the patient.      Patient does not use any multi-dose medications prior to admission.     Thank you for the opportunity to participate in the care of this patient.      Cm Esquivel RPH     12/17/2021     3:17 PM

## 2021-12-17 NOTE — ANESTHESIA PREPROCEDURE EVALUATION
Anesthesia Pre-Procedure Evaluation    Patient: Cm Lundberg   MRN: [id number] : 1970        Preoperative Diagnosis: Ulcer of right foot with necrosis of muscle (H) [L97.513]    Procedure : Procedure(s):  INCISION AND DRAINAGE, right foot          Past Medical History:   Diagnosis Date     Anxiety      Arthritis      Cerebral infarction (H)      Coronary artery disease     CABG with multiple stents     Diabetes (H)      Heart attack (H)      Hypertension      Migraines      Stented coronary artery       Past Surgical History:   Procedure Laterality Date     C CABG, VEIN, SINGLE      Description: CABG (CABG);  Recorded: 2009;     IR MISCELLANEOUS PROCEDURE  2004      Allergies   Allergen Reactions     Aspirin Other (See Comments)     Reacts with migraine medicine     Isosorbide Headache     Codeine Rash     Morphine Rash      Social History     Tobacco Use     Smoking status: Former Smoker     Quit date: 2015     Years since quittin.8     Smokeless tobacco: Never Used   Substance Use Topics     Alcohol use: Not Currently      Wt Readings from Last 1 Encounters:   10/29/21 68 kg (150 lb)        Anesthesia Evaluation   Pt has had prior anesthetic. Type: General, MAC and Regional.        ROS/MED HX  ENT/Pulmonary:  - neg pulmonary ROS     Neurologic:     (+) CVA, with deficits, Developmental delay,     Cardiovascular:     (+) hypertension--CAD -CABG-stent-Taking blood thinners Pt has received instructions: [facility].     METS/Exercise Tolerance:     Hematologic:       Musculoskeletal:       GI/Hepatic:     (+) GERD, Asymptomatic on medication,     Renal/Genitourinary:       Endo:     (+) type II DM, Using insulin,     Psychiatric/Substance Use:     (+) psychiatric history depression and anxiety     Infectious Disease:       Malignancy:       Other:            Physical Exam    Airway  airway exam normal      Mallampati: I   TM distance: > 3 FB   Neck ROM: full   Mouth opening: > 3  cm    Respiratory Devices and Support         Dental       (+) upper dentures and lower dentures      Cardiovascular   cardiovascular exam normal       Rhythm and rate: regular and normal     Pulmonary   pulmonary exam normal        breath sounds clear to auscultation           OUTSIDE LABS:  CBC:   Lab Results   Component Value Date    WBC 4.8 04/29/2019    WBC 5.2 01/25/2019    HGB 11.5 (L) 04/29/2019    HGB 10.0 (L) 01/25/2019    HCT 36.1 (L) 04/29/2019    HCT 32.0 (L) 01/25/2019     04/29/2019     01/25/2019     BMP:   Lab Results   Component Value Date     04/29/2019     01/25/2019    POTASSIUM 4.2 04/29/2019    POTASSIUM 4.6 01/25/2019    CHLORIDE 104 04/29/2019    CHLORIDE 105 01/25/2019    CO2 23 04/29/2019    CO2 27 01/25/2019    BUN 17 04/29/2019    BUN 7 (L) 01/25/2019    CR 1.09 04/29/2019    CR 1.15 01/25/2019     (H) 04/29/2019     (H) 01/25/2019     COAGS: No results found for: PTT, INR, FIBR  POC: No results found for: BGM, HCG, HCGS  HEPATIC:   Lab Results   Component Value Date    ALBUMIN 4.0 04/29/2019    PROTTOTAL 6.9 04/29/2019    ALT 20 04/29/2019    AST 21 04/29/2019    ALKPHOS 103 04/29/2019    BILITOTAL 0.4 04/29/2019     OTHER:   Lab Results   Component Value Date    A1C 5.9 11/18/2019    TO 10.1 04/29/2019    MAG 1.2 (L) 04/29/2019       Anesthesia Plan    ASA Status:  3   NPO Status:  NPO Appropriate    Anesthesia Type: MAC.              Consents            Postoperative Care       PONV prophylaxis: Ondansetron (or other 5HT-3)     Comments:                Donell Laws MD

## 2021-12-18 ENCOUNTER — APPOINTMENT (OUTPATIENT)
Dept: OCCUPATIONAL THERAPY | Facility: CLINIC | Age: 51
End: 2021-12-18
Attending: PODIATRIST
Payer: MEDICARE

## 2021-12-18 ENCOUNTER — APPOINTMENT (OUTPATIENT)
Dept: PHYSICAL THERAPY | Facility: CLINIC | Age: 51
End: 2021-12-18
Attending: PODIATRIST
Payer: MEDICARE

## 2021-12-18 LAB
ANION GAP SERPL CALCULATED.3IONS-SCNC: 11 MMOL/L (ref 5–18)
BUN SERPL-MCNC: 10 MG/DL (ref 8–22)
CALCIUM SERPL-MCNC: 9.3 MG/DL (ref 8.5–10.5)
CHLORIDE BLD-SCNC: 106 MMOL/L (ref 98–107)
CO2 SERPL-SCNC: 21 MMOL/L (ref 22–31)
CREAT SERPL-MCNC: 0.86 MG/DL (ref 0.7–1.3)
GFR SERPL CREATININE-BSD FRML MDRD: >90 ML/MIN/1.73M2
GLUCOSE BLD-MCNC: 119 MG/DL (ref 70–125)
GLUCOSE BLDC GLUCOMTR-MCNC: 102 MG/DL (ref 70–99)
GLUCOSE BLDC GLUCOMTR-MCNC: 128 MG/DL (ref 70–99)
GLUCOSE BLDC GLUCOMTR-MCNC: 36 MG/DL (ref 70–99)
GLUCOSE BLDC GLUCOMTR-MCNC: 60 MG/DL (ref 70–99)
GLUCOSE BLDC GLUCOMTR-MCNC: 67 MG/DL (ref 70–99)
GLUCOSE BLDC GLUCOMTR-MCNC: 98 MG/DL (ref 70–99)
GRAM STAIN RESULT: ABNORMAL
POTASSIUM BLD-SCNC: 4.5 MMOL/L (ref 3.5–5)
SODIUM SERPL-SCNC: 138 MMOL/L (ref 136–145)

## 2021-12-18 PROCEDURE — 250N000013 HC RX MED GY IP 250 OP 250 PS 637: Performed by: PODIATRIST

## 2021-12-18 PROCEDURE — 36415 COLL VENOUS BLD VENIPUNCTURE: CPT | Performed by: INTERNAL MEDICINE

## 2021-12-18 PROCEDURE — 80048 BASIC METABOLIC PNL TOTAL CA: CPT | Performed by: INTERNAL MEDICINE

## 2021-12-18 PROCEDURE — 97166 OT EVAL MOD COMPLEX 45 MIN: CPT | Mod: GO

## 2021-12-18 PROCEDURE — 99232 SBSQ HOSP IP/OBS MODERATE 35: CPT | Performed by: INTERNAL MEDICINE

## 2021-12-18 PROCEDURE — 250N000013 HC RX MED GY IP 250 OP 250 PS 637: Performed by: INTERNAL MEDICINE

## 2021-12-18 PROCEDURE — 120N000001 HC R&B MED SURG/OB

## 2021-12-18 PROCEDURE — 97535 SELF CARE MNGMENT TRAINING: CPT | Mod: GO

## 2021-12-18 PROCEDURE — 97161 PT EVAL LOW COMPLEX 20 MIN: CPT | Mod: GP

## 2021-12-18 PROCEDURE — 97530 THERAPEUTIC ACTIVITIES: CPT | Mod: GP

## 2021-12-18 RX ORDER — PIPERACILLIN SODIUM, TAZOBACTAM SODIUM 3; .375 G/15ML; G/15ML
3.38 INJECTION, POWDER, LYOPHILIZED, FOR SOLUTION INTRAVENOUS EVERY 8 HOURS
Status: DISCONTINUED | OUTPATIENT
Start: 2021-12-18 | End: 2021-12-18 | Stop reason: CLARIF

## 2021-12-18 RX ORDER — PIPERACILLIN SODIUM, TAZOBACTAM SODIUM 3; .375 G/15ML; G/15ML
3.38 INJECTION, POWDER, LYOPHILIZED, FOR SOLUTION INTRAVENOUS ONCE
Status: DISCONTINUED | OUTPATIENT
Start: 2021-12-18 | End: 2021-12-18 | Stop reason: CLARIF

## 2021-12-18 RX ADMIN — GABAPENTIN 100 MG: 100 CAPSULE ORAL at 16:25

## 2021-12-18 RX ADMIN — ACETAMINOPHEN 975 MG: 325 TABLET ORAL at 14:32

## 2021-12-18 RX ADMIN — ARIPIPRAZOLE 2 MG: 2 TABLET ORAL at 08:09

## 2021-12-18 RX ADMIN — METOPROLOL SUCCINATE 75 MG: 50 TABLET, EXTENDED RELEASE ORAL at 08:09

## 2021-12-18 RX ADMIN — SENNOSIDES AND DOCUSATE SODIUM 1 TABLET: 50; 8.6 TABLET ORAL at 08:08

## 2021-12-18 RX ADMIN — TRAMADOL HYDROCHLORIDE 100 MG: 50 TABLET, FILM COATED ORAL at 12:04

## 2021-12-18 RX ADMIN — MAGNESIUM OXIDE TAB 400 MG (241.3 MG ELEMENTAL MG) 400 MG: 400 (241.3 MG) TAB at 16:26

## 2021-12-18 RX ADMIN — ACETAMINOPHEN 975 MG: 325 TABLET ORAL at 05:09

## 2021-12-18 RX ADMIN — TRAMADOL HYDROCHLORIDE 100 MG: 50 TABLET, FILM COATED ORAL at 05:09

## 2021-12-18 RX ADMIN — GLIPIZIDE 5 MG: 5 TABLET ORAL at 06:58

## 2021-12-18 RX ADMIN — POLYETHYLENE GLYCOL 3350 17 G: 17 POWDER, FOR SOLUTION ORAL at 08:10

## 2021-12-18 RX ADMIN — LORAZEPAM 0.5 MG: 0.5 TABLET ORAL at 06:58

## 2021-12-18 RX ADMIN — METFORMIN HYDROCHLORIDE 1000 MG: 500 TABLET, FILM COATED ORAL at 08:09

## 2021-12-18 RX ADMIN — GABAPENTIN 600 MG: 300 CAPSULE ORAL at 21:21

## 2021-12-18 RX ADMIN — PANTOPRAZOLE SODIUM 40 MG: 20 TABLET, DELAYED RELEASE ORAL at 08:09

## 2021-12-18 RX ADMIN — CHOLESTYRAMINE 4 G: 4 POWDER, FOR SUSPENSION ORAL at 09:40

## 2021-12-18 RX ADMIN — TRAMADOL HYDROCHLORIDE 100 MG: 50 TABLET, FILM COATED ORAL at 18:00

## 2021-12-18 RX ADMIN — OXYCODONE HYDROCHLORIDE AND ACETAMINOPHEN 500 MG: 500 TABLET ORAL at 08:08

## 2021-12-18 RX ADMIN — DULOXETINE HYDROCHLORIDE 120 MG: 60 CAPSULE, DELAYED RELEASE PELLETS ORAL at 08:09

## 2021-12-18 RX ADMIN — METFORMIN HYDROCHLORIDE 1000 MG: 500 TABLET, FILM COATED ORAL at 16:26

## 2021-12-18 RX ADMIN — TRAZODONE HYDROCHLORIDE 50 MG: 50 TABLET ORAL at 21:21

## 2021-12-18 RX ADMIN — GABAPENTIN 100 MG: 100 CAPSULE ORAL at 06:58

## 2021-12-18 RX ADMIN — MAGNESIUM OXIDE TAB 400 MG (241.3 MG ELEMENTAL MG) 400 MG: 400 (241.3 MG) TAB at 21:22

## 2021-12-18 RX ADMIN — MAGNESIUM OXIDE TAB 400 MG (241.3 MG ELEMENTAL MG) 400 MG: 400 (241.3 MG) TAB at 14:32

## 2021-12-18 RX ADMIN — MAGNESIUM OXIDE TAB 400 MG (241.3 MG ELEMENTAL MG) 400 MG: 400 (241.3 MG) TAB at 08:08

## 2021-12-18 RX ADMIN — ATORVASTATIN CALCIUM 80 MG: 40 TABLET, FILM COATED ORAL at 08:09

## 2021-12-18 RX ADMIN — ACETAMINOPHEN 975 MG: 325 TABLET ORAL at 21:21

## 2021-12-18 RX ADMIN — SENNOSIDES AND DOCUSATE SODIUM 1 TABLET: 50; 8.6 TABLET ORAL at 21:22

## 2021-12-18 RX ADMIN — MIRTAZAPINE 7.5 MG: 7.5 TABLET, FILM COATED ORAL at 21:21

## 2021-12-18 RX ADMIN — LOSARTAN POTASSIUM 25 MG: 25 TABLET, FILM COATED ORAL at 21:22

## 2021-12-18 RX ADMIN — FENOFIBRATE 201 MG: 67 CAPSULE ORAL at 06:58

## 2021-12-18 ASSESSMENT — ACTIVITIES OF DAILY LIVING (ADL)
ADLS_ACUITY_SCORE: 19
ADLS_ACUITY_SCORE: 24
ADLS_ACUITY_SCORE: 23
ADLS_ACUITY_SCORE: 28
ADLS_ACUITY_SCORE: 27
ADLS_ACUITY_SCORE: 27
ADLS_ACUITY_SCORE: 22
ADLS_ACUITY_SCORE: 22
ADLS_ACUITY_SCORE: 23
ADLS_ACUITY_SCORE: 28
ADLS_ACUITY_SCORE: 28
ADLS_ACUITY_SCORE: 19
ADLS_ACUITY_SCORE: 19
ADLS_ACUITY_SCORE: 28
ADLS_ACUITY_SCORE: 24
ADLS_ACUITY_SCORE: 28
ADLS_ACUITY_SCORE: 19
ADLS_ACUITY_SCORE: 28
ADLS_ACUITY_SCORE: 22
ADLS_ACUITY_SCORE: 22
ADLS_ACUITY_SCORE: 19

## 2021-12-18 NOTE — CONSULTS
[facility] MEDICINE PROGRESS NOTE      Length of stay: Day # 0    Identification/Summary: Cm Lundberg is a 51 year old male with a past medical history of see below.  Patient is anticoagulation for atrial fibrillation flutter, type 2 diabetes, ischemic cardiomyopathy, coronary artery disease, hypertension  who was admitted for incision and drainage of the right foot, excisional debridement of the ulceration of the right foot into the level of the muscle, under MAC with popliteal block anesthesia on 12/17/2021.    Assessment and Plan:    Status post incision and drainage of the right foot, excisional debridement of the ulceration of the right foot into the level of the muscle on 12/17/2020  Postoperative care with orthopedics  Pain management, DVT prophylaxis and PT and OT.  Vital signs are stable postoperatively.  Need to resume AC soon when ok with podiatry/ortho.    Fibrillation/flutter, chronically anticoagulated  We will resume DOAC as soon as okay with surgery.  With the benefits versus risk of this medication postop.  Resumed medication for rate control-on metoprolol.    History of stroke  Patient has atrophy of extremities  Patient is on blood thinner.  Decide to resume it when bleeding risk is low.  Will decide that with orthopedics postoperatively.  Patient is a wheelchair.  Treat underlying risk factors including coronary artery disease, diabetes, hypertension  Patient lives in a nursing home    Ischemic cardiomyopathy, status post CABG  Dyslipidemia  Coronary disease  Resume home heart medications.  Reassess fluid and electrolytes.    Type 2 diabetes with complications  Diabetic foot ulcer as above  Monitor blood sugar.  Resumed home medications of any  Patient is on multiple oral hypoglycemic medications which will be resumed if patient is eating well    Essential hypertension  Reviewed and resume home medications and optimize as needed  Hold parameters postop (if SBP  less than 115 or heart rate less than 60 for metoprolol.)    Mood disorder  Resume multiple psych medications    --------------------------------------------------------------------  Past medical history:    Problem Noted Date   Diabetic foot ulcer 07/29/2021   Overview:     Formatting of this note might be different from the original.  -- Longstanding, follows with vascular surgeon in [place].     Ischemic cardiomyopathy 04/11/2018   Overview:     Formatting of this note might be different from the original.  -- EF 45-50% by echo 2012, 47% by nuclear stress test 2014     Probable atrial flutter 03/30/2016   Overview:     Formatting of this note might be different from the original.  -- Tachycardia noted on ACT monitor 2/2016. Unclear if it was sinus tachycardia or flutter (had some explanation for sinus tach at the time -- physical exercise and/or emotional distress)  -- After stroke 3/2016, presumptively treated with anticoagulation.     Cerebrovascular accident (CVA) due to embolism: Right frontal lobe & Posterior limb of the internal capsule 03/20/2016   Overview:     Formatting of this note might be different from the original.  3/2016. Thought to be due to subclinical paroxysmal atrial flutter     Vasculopathy 03/20/2016   Thrombocytopenia 06/26/2014   HLD (hyperlipidemia) 06/25/2014   Chest pain 06/24/2014   Cobalamin deficiency 04/03/2014   Severe episode of recurrent major depressive disorder 08/27/2013   Syncope 12/13/2012   Overview:     Formatting of this note might be different from the original.  Probably vasovagal, 12/2012.  No arrhythmia on telemetry during ~18 hour admission 12/2012.  Recurrent episodes after discharge -- thought due to orthostatic hypotension     Insomnia, unspecified 06/07/2012   Hypertension 06/07/2012   Depression 06/07/2012   Palpitations 10/05/2011   Overview:     Formatting of this note might be different from the original.  -- Event monitor 9/2009 showed no rhythm  change corresponding to symptoms     Anemia, unspecified 09/07/2011   Chronic coronary artery disease 07/07/2011   Overview:     Formatting of this note might be different from the original.  Overview:   6/14/2011. Angiogram findings 70% stenosis Ramus, 80% stenosis in 1st Obtuse Marginal. PCI and 2.5mmx 28mm PROMUS POLI to Proximal Ramus. PTCA to 1st Obtuse Marginal  3/09 - acute inferior MI secondary to RCA occlusion, recannanlized by   wire, thrombectomy and PTCA  8/07-2 stents in rca system  10/07-ejection fraction 58%-some ischemia in inf wall on thal gxt  2003- acute MI -inferior-stents to rca ,lad, circumflex , d1, patent   ductus arteriosus and pl     ACP (advance care planning) 06/14/2011   Overview:     Formatting of this note is different from the original.  Patient has identified Health Care Agent(s): Yes  Add Health Care Agents: Yes   Health Care Agent(s):  Primary Health Care Agent: Maru Lundberg Relationship: mother Phone: [phone]   Secondary Health Care Agent: Sherri Jones Relationship: sister Phone: [phone]   Next Agent: Mayra Vieyra Per verbal request by patient Relationship: kane Phone: [phone]   Guardian: Relationship: Phone:     Patient has Advance Care Plan Documents (Health Care Directive, POLST): Yes   Advance Care Plan Documents:  Health Care Directive    Patient has identified Specific Treatment Preferences: No   Specific limits to treatment preferences NOT identified: ASSUME FULL TREATMENT. The patient would want a trial of treatment unless there is no medical probability of recovery, then the patient directs that life-prolonging procedures be withheld or withdrawn with the exception of hydration and pain management.      CAD (coronary artery disease) 08/22/2007   Overview:     Formatting of this note might be different from the original.  2003- acute MI -inferior-stents to rca ,lad, circumflex , d1, patent ductus arteriosus and pl  10/07-ejection fraction 58%-some  ischemia in inf wall on thal gxt  8/07-2 stents in rca system  3/09 - acute inferior MI secondary to RCA occlusion, recannanlized by wire, thrombectomy and PTCA  4/2009 - CABG (LIMA to LAD; SVG to diag; SVG to PDA)  5/11-POLI proximal circumflex overlapping previous stent  6/11-2.5mmx 28mm PROMUS POLI to Proximal Ramus  9/11 - Unstable angina. Stent to OM. Some restenosis in prior stents noted.  1/12 - Unstable angina. Stent to diagonal (severe in-stent restenosis of stent placed 6/11)  3/12 - admission for chest pain. No EKG changes, no troponin elevation. No stress test or angio done.  -- EF 45-50% by echo 12/2012.  -- Nuclear stress test at [facility] 2/2012: small inferolateral infarct with ischemia, small anterior ischemia. EF 47%.  -- Admission 3/2014 for chest pain: Nuclear stress test unchanged, no angiogram done.  -- Unstable angina 7/2014: Angiogram showed new occlusion of SVG to diag, occlusion of ostial LAD, and restenosis of the ramus stent. Presented at cath conference, and felt there were no good PCI targets, and no good bypass targets for repeat CABG. Medical management recommended.  -- EF 70% by LVgram 7/2014  -- Lifelong plavix previously recommended, but d/c'd after stroke 3/2016 possibly due to atrial flutter, subsequently treated with rivaroxaban.  -- OBS admission 7/2021 for CP. Nuclear stress showed anteroseptal infarct and inferior infarct, no ischemia.      Migraine, unspecified, without mention of intractable migraine without mention of status migrainosus 08/22/2007   Mild intellectual disabilities 08/22/2007   Hyperlipidemia 08/22/2007   Overview:     Formatting of this note might be different from the original.  Overview:   ICD 10     Headache 05/30/2003   Overview:     Formatting of this note might be different from the original.  Overview:   possible medication withdrawal?     Muscle pain 05/30/2003   Overview:     Formatting of this note might be different from the original.  Overview:    possible fibromyalgia  Epic      Academic skill disorder 05/30/2003   Overview:     Formatting of this note might be different from the original.  Overview:   dyslexia, unable to read, writes at 1st grade level     Type 2 diabetes mellitus        Discharge disposition: Defer to orthopedics  Diet: Advance Diet as Tolerated: Fully Advanced to diet(s) per Provider order; 4923-2746 Calories: Moderate Consistent CHO (4-6 CHO units/meal)  DVT Prophylaxis: Defer to orthopedics.  Patient has been on DOAC.  Will need to resume when okay with orthopedics.  Weigh the risk for bleeding  Code Status: No Order  Living situation: --    ----------------------------------------------------------------------    Cumulative essential/pertinent data reviewed:  Labs:  No current labs other than glucose of 94.     Imaging:  Defer postop imaging studies to orthopedics if any.    EKG: --    ----------------------------------------------------------------------    Interval History/Subjective:  Personally interviewed and examined patient. Patient is currently doing well postoperatively.  Denies any chest pain or shortness of breath with abdominal pain nausea or vomiting. Went over his medical  Diagnoses. Says he lives in a NH. Had stroke in the past and has been on a wheelchair since. Also had CBAG in the past.  Rest of review of systems unremarkable.    Physical Exam/Objective:  Temp:  [98.9  F (37.2  C)] 98.9  F (37.2  C)  Pulse:  [82] 82  Resp:  [20] 20  BP: (133)/(66) 133/66  SpO2:  [99 %] 99 %    Body mass index is 22.87 kg/m .    GENERAL:   Noted to have chronic slight slurred speech from previous stroke..  Alert and coherent and conversant.;  appears comfortable, in no acute distress,   HEAD:  Normocephalic, without obvious abnormality   EYES:  Grossly normal;    NOSE: Grossly normal   THROAT: Lips and mouth external: grossly normal,    NECK: No mass noted; no stiffness   BACK:      LUNGS:   Auscultation: Negative for wheezing; No  crackles, symmetric chest rise on inhalation, respirations unlabored    CHEST WALL:  No tenderness or deformity; sternal scar from CBAG   HEART:  Regular rate and rhythm, significant murmurs: Negative   ABDOMEN:   Soft, non-tender, no masses, no peritoneal signs   EXTREMITIES: Defer examination of the operative site to surgery; status post right foot surgery  No tenderness of the calves.  No significant ankle edema   SKIN:  see extremities; he has atrophy of his extremities.   NEURO: no signs of acute stroke or change from prior state   PSYCH: Behavior is appropriate      Medications:   Personally Reviewed.    acetaminophen  975 mg Oral Q8H     ceFAZolin  2 g Intravenous See Admin Instructions     [START ON 12/18/2021] polyethylene glycol  17 g Oral Daily     senna-docusate  1 tablet Oral BID     sodium chloride (PF)  3 mL Intracatheter Q8H     sodium chloride (PF)  3 mL Intracatheter Q8H     Current Facility-Administered Medications   Medication Dose Route Frequency     [START ON 12/20/2021] acetaminophen  650 mg Oral Q4H PRN     bisacodyl  10 mg Rectal Daily PRN     fentaNYL  25 mcg Intravenous Q5 Min PRN     fentaNYL  25 mcg Intravenous Q15 Min PRN     lidocaine 4%   Topical Q1H PRN     lidocaine 4%   Topical Q1H PRN     lidocaine (buffered or not buffered)  0.1-1 mL Other Q1H PRN     lidocaine (buffered or not buffered)  0.1-1 mL Other Q1H PRN     magnesium hydroxide  30 mL Oral Daily PRN     ondansetron  4 mg Oral Q6H PRN    Or     ondansetron  4 mg Intravenous Q6H PRN     prochlorperazine  10 mg Intravenous Q6H PRN    Or     prochlorperazine  10 mg Oral Q6H PRN     sodium chloride (PF)  3 mL Intracatheter q1 min prn     sodium chloride (PF)  3 mL Intracatheter q1 min prn     traMADol  100 mg Oral Q6H PRN       Adal Bailey MD  [facility]  Phone: #[phone]

## 2021-12-18 NOTE — PLAN OF CARE
Problem: Infection  Goal: Absence of Infection Signs and Symptoms  Outcome: Improving     Problem: Activity Intolerance  Goal: Enhanced Capacity and Energy  Outcome: Improving     Pt tolerating tylenol and tramadol for pain management.  ACE wrap to RLE intact and extremity elevated.  Transferred to chair with OT today without difficulty, however pt states he usually does much better.      Plan: encourage OOB.  Wound Vac placement probably Monday.  Then discharge when TCU available.

## 2021-12-18 NOTE — PLAN OF CARE
Problem: Discharge Planning  Goal: Discharge Planning (Adult, OB, Behavioral, Peds)  Flowsheets (Taken 12/18/2021 1137)  Patient/Family in Agreement with Plan: yes  Transportation Anticipated:   family or friend will provide   health plan transportation  Patient/Family Anticipates Transition to: inpatient rehabilitation facility  Anticipated Discharge Disposition (CM/SW): Transitional Care  Anticipated Discharge DME (CM/SW):   Wheelchair   Other (see comment)

## 2021-12-18 NOTE — CONSULTS
Care Management Initial Consult    General Information  Assessment completed with: Patient,Parents, Mother  Type of CM/SW Visit: Initial Assessment    Primary Care Provider verified and updated as needed:     Readmission within the last 30 days:           Advance Care Planning:            Communication Assessment  Patient's communication style: spoken language (English or Bilingual)    Hearing Difficulty or Deaf: no   Wear Glasses or Blind: yes    Cognitive  Cognitive/Neuro/Behavioral: WDL                      Living Environment:   People in home: alone     Current living Arrangements: assisted living  Name of Facility: [facility]    Able to return to prior arrangements:  (after TCU stay, Unable to take pt back if wound vac need)       Family/Social Support:  Care provided by: other (see comments) (Staff at [facility], Care Manager with [facility] )  Provides care for:       Sibling(s),Guardian,Facility resident(s)/Staff          Description of Support System:           Current Resources:   Patient receiving home care services:       Community Resources:    Equipment currently used at home: shower chair,hospital bed,grab bar, toilet,grab bar, tub/shower,wheelchair, manual (4WW)  Supplies currently used at home:      Employment/Financial:  Employment Status:          Financial Concerns:             Lifestyle & Psychosocial Needs:  Social Determinants of Health     Tobacco Use: Medium Risk     Smoking Tobacco Use: Former Smoker     Smokeless Tobacco Use: Never Used   Alcohol Use: Not on file   Financial Resource Strain: Not on file   Food Insecurity: Not on file   Transportation Needs: Not on file   Physical Activity: Not on file   Stress: Not on file   Social Connections: Not on file   Intimate Partner Violence: Not on file   Depression: Not on file   Housing Stability: Not on file       Functional Status:  Prior to admission patient needed assistance:              Mental Health Status:          Chemical  Dependency Status:                Values/Beliefs:  Spiritual, Cultural Beliefs, Confucianism Practices, Values that affect care:                 Additional Information:  CM met with the Pt and introduced role. Pt was very kind and understanding about going to another placement before returning home. Pt stated for CM to call mother, guardian, after 11AM as she usually sleeps in late.     CM called Pt's Mother at 11:15 as requested. CM stated goals for discharge about either going to a TCU or Acute Rehab as requested by OT this morning. Mother states that currently the Pt's apartment with [facility] is not able to take the Pt back with a wound vac or with home care supports for this item.  Mother open to going to a TCU at this time and requests referral [facility] as they had a good experience. Mother does not want a referral to [facility] due to bad experience. Mother states that she will speak with the Pt's siblings before giving more TCU choices and stated that she would give more once [facility] follows up with the CM referral. Mother also gave CM consent to call Tomi Tovar with [facility] the Pt's CM in the community ( [phone]) asking if there is anything that can be done to help with the Pt's wheelchair. Mother states that family lives in the [facility].       1:46 PM  CM called [facility] TCU and spoke with Josie who was over seeing admissions today. Currently they are full at this time but asked the CM to call on Monday when they have more staff.   SHELDON Urrutia

## 2021-12-18 NOTE — PROGRESS NOTES
[facility] MEDICINE PROGRESS NOTE      Length of stay: Day #1    Identification/Summary: Cm Lundberg is a 51 year old male with a past medical history of see below.  Patient is anticoagulation for atrial fibrillation flutter, type 2 diabetes, ischemic cardiomyopathy, coronary artery disease, hypertension  who was admitted for incision and drainage of the right foot, excisional debridement of the ulceration of the right foot into the level of the muscle, under MAC with popliteal block anesthesia on 12/17/2021.     Assessment and Plan:     Status post incision and drainage of the right foot, excisional debridement of the ulceration of the right foot into the level of the muscle on 12/17/2020  Postoperative care with orthopedics  Pain management, DVT prophylaxis and PT and OT.  Vital signs are stable postoperatively.  Need to resume AC soon when ok with podiatry/ortho.-->  Podiatry note indicated is okay to resume the DOAC.  Patient is on cefazolin.  Wound culture see below.  Defer further antibiotics to podiatry.  They would not like to put him on antibiotics at this time per their note.    Fibrillation/flutter, chronically anticoagulated  We will resume DOAC per note of surgery surgery.  With the benefits versus risk of this medication postop.  Resumed medication for rate control-on metoprolol.     History of stroke  Patient has atrophy of extremities  Patient is on blood thinner.  Decide to resume it when bleeding risk is low.  Will decide that with orthopedics postoperatively.  Patient is a wheelchair.  Treat underlying risk factors including coronary artery disease, diabetes, hypertension  Patient lives in a nursing home     Ischemic cardiomyopathy, status post CABG  Dyslipidemia  Coronary disease  Resume home heart medications.  Reassess fluid and electrolytes.     Type 2 diabetes with complications  Diabetic foot ulcer as above  Monitor blood sugar.  Resumed home medications of  any  Patient is on multiple oral hypoglycemic medications which will be resumed if patient is eating well     Essential hypertension  Reviewed and resume home medications and optimize as needed  Hold parameters postop (if SBP less than 115 or heart rate less than 60 for metoprolol.)     Mood disorder  Resume multiple psych medications    --------------------------------------------------------------------    Discharge disposition: Defer to orthopedics.  Diet: Moderate Consistent Carb (60 g CHO per Meal) Diet  DVT Prophylaxis: Already on rivaroxaban  Code Status: No Order  Living situation: --    ----------------------------------------------------------------------    Cumulative essential/pertinent data reviewed:  Labs:  Glucose 119  Metabolic panel unremarkable  Wound culture shows the following:    Abnormal   3+ Gram positive cocci      2+ Gram positive bacilli resembling diphtheroids      3+ WBC seen             Imaging:  Defer to podiatry    EKG: --    ----------------------------------------------------------------------    Interval History/Subjective:  Vital signs all stable.  No fever.  Doing well overall.  Denies any major pain.  No chest pain or shortness of breath.  He seems to be in a happy mood.  Was tolerating food.  Rest of review of systems unremarkable.    Physical Exam/Objective:  Temp:  [97.2  F (36.2  C)-98.9  F (37.2  C)] 98.1  F (36.7  C)  Pulse:  [74-97] 74  Resp:  [14-20] 18  BP: (104-154)/(59-78) 140/71  SpO2:  [92 %-100 %] 100 %    Body mass index is 22.87 kg/m .    GENERAL:   Alert and coherent, verbally converse;  appears comfortable, in no acute distress,   HEAD:  Normocephalic, without obvious abnormality   EYES:  Grossly normal;    NOSE: Grossly normal   THROAT: Lips and mouth external: grossly normal,    NECK: No mass noted; no stiffness   BACK:      LUNGS:   Auscultation: Negative for wheezing; No  crackles, symmetric chest rise on inhalation, respirations unlabored    CHEST WALL:  No  tenderness or deformity   HEART:  Regular rate and rhythm, significant murmurs: Negative,    ABDOMEN:   Soft, non-tender, no masses, no peritoneal signs   EXTREMITIES: Defer examination of the operative site to surgery; status post right foot surgery  No tenderness of the calves.  No significant ankle edema   SKIN:  see extremities   NEURO: no signs of acute stroke or change from prior state   PSYCH: Behavior is appropriate      Medications:   Personally Reviewed.    acetaminophen  975 mg Oral Q8H     ARIPiprazole  2 mg Oral Daily     atorvastatin  80 mg Oral Daily     ceFAZolin  2 g Intravenous See Admin Instructions     cholestyramine  1 packet Oral Every Other Day     DULoxetine  120 mg Oral Daily     fenofibrate micronized  201 mg Oral QAM AC     gabapentin  100 mg Oral BID AC     gabapentin  600 mg Oral At Bedtime     glipiZIDE  5 mg Oral QAM AC     LORazepam  0.5 mg Oral QAM AC     losartan  25 mg Oral At Bedtime     magnesium oxide  400 mg Oral 4x Daily     metFORMIN  1,000 mg Oral BID w/meals     metoprolol succinate ER  75 mg Oral Daily     mirtazapine  7.5 mg Oral At Bedtime     pantoprazole  40 mg Oral Daily     polyethylene glycol  17 g Oral Daily     senna-docusate  1 tablet Oral BID     sodium chloride (PF)  3 mL Intracatheter Q8H     sodium chloride (PF)  3 mL Intracatheter Q8H     traZODone  50 mg Oral At Bedtime     vitamin C  500 mg Oral Daily     Current Facility-Administered Medications   Medication Dose Route Frequency     [START ON 12/20/2021] acetaminophen  650 mg Oral Q4H PRN     bisacodyl  10 mg Rectal Daily PRN     lidocaine 4%   Topical Q1H PRN     lidocaine (buffered or not buffered)  0.1-1 mL Other Q1H PRN     magnesium hydroxide  30 mL Oral Daily PRN     naloxone  0.2 mg Intravenous Q2 Min PRN    Or     naloxone  0.4 mg Intravenous Q2 Min PRN    Or     naloxone  0.2 mg Intramuscular Q2 Min PRN    Or     naloxone  0.4 mg Intramuscular Q2 Min PRN     ondansetron  4 mg Oral Q6H PRN    Or      ondansetron  4 mg Intravenous Q6H PRN     prochlorperazine  10 mg Intravenous Q6H PRN    Or     prochlorperazine  10 mg Oral Q6H PRN     senna-docusate  1 tablet Oral BID PRN     sodium chloride (PF)  3 mL Intracatheter q1 min prn     sodium chloride (PF)  3 mL Intracatheter q1 min prn     traMADol  100 mg Oral Q6H PRN           Adal Bailey MD  [facility]  [facility]  Phone: #[phone]

## 2021-12-18 NOTE — PROGRESS NOTES
Pt arrived to the floor from [facility] at 645pm. Pt is alert and oriented x 4. Pt has no complaints of pain post operatively.     Vitals signs are stable. Belongings have not yet been transferred up to the floor. Pt is able to move all extremities, right foot is still numb.     Pt admitted to room 358

## 2021-12-18 NOTE — PLAN OF CARE
Problem: PT General Care Plan  Goal: PT Frequency  12/18/2021 1142 by Malaika Barton, PT  Outcome: Completed  12/18/2021 1141 by Malaika Barton, PT  Flowsheets (Taken 12/18/2021 1141)  PT Frequency: x 1  Goal: Bed Mobility (PT)  Description: PT Bed Mobility  12/18/2021 1142 by Malaika Barton, PT  Outcome: Completed  12/18/2021 1141 by Malaika Barton, PT  Flowsheets (Taken 12/18/2021 1141)  PT goal: bed mobility:   Independent   Supine to/from sit  Goal: Transfer (PT)  Description: PT Transfer  12/18/2021 1142 by Malaika Barton, PT  Outcome: Completed  12/18/2021 1141 by Malaika Barton, PT  Flowsheets (Taken 12/18/2021 1141)  PT goal: transfers:   Supervision/stand-by assist   Bed to/from chair   Within precautions   Physical Therapy Discharge Summary    Reason for therapy discharge:    All goals and outcomes met, no further needs identified.    Progress towards therapy goal(s). See goals on Care Plan in [facility] electronic health record for goal details.  Goals met    Therapy recommendation(s):    Patient to be OOB with nursing staff.

## 2021-12-18 NOTE — ANESTHESIA POSTPROCEDURE EVALUATION
"Patient: Cm Lundberg    Procedure: Procedure(s):  INCISION AND DRAINAGE, right foot       Diagnosis:Ulcer of right foot with necrosis of muscle (H) [L97.513]  Diagnosis Additional Information: No value filed.    Anesthesia Type:  MAC    Note:     Postop Pain Control: Uneventful            Sign Out: Well controlled pain   PONV: No   Neuro/Psych: Uneventful            Sign Out: Acceptable/Baseline neuro status   Airway/Respiratory: Uneventful            Sign Out: Acceptable/Baseline resp. status   CV/Hemodynamics: Uneventful            Sign Out: Acceptable CV status; No obvious hypovolemia; No obvious fluid overload   Other NRE: NONE   DID A NON-ROUTINE EVENT OCCUR? No           Last vitals:  /66   Pulse 82   Temp 37.2  C (98.9  F) (Temporal)   Resp 20   Ht 1.702 m (5' 7\")   Wt 66.2 kg (146 lb)   SpO2 99%   BMI 22.87 kg/m        Electronically Signed By: Donell Murray MD  December 17, 2021  7:38 PM  "

## 2021-12-18 NOTE — PROGRESS NOTES
FOOT AND ANKLE SURGERY/PODIATRY Progress Note        ASSESSMENT:   S/p I&D right foot  DM2      TREATMENT:  -Doing well today. Denies foot pain. Cultures pending. Afebrile.    -I reviewed yesterday's surgery with the patient to include removal of non-viable tissue. No purulence noted intra-op. No antibiotics at this time.     -Recommend continued non-weight bearing on the right foot. Surgical dressing to remain intact until the wound vac is applied on Monday.     -Medical management per hospitalist. Okay to resume anticoagulation at this time. Appreciate TCU placement assistance by care management.     Alex Kim DPM  [facility]/Foot & Ankle Surgery      HPI: Cm Lundberg was seen again today s/p I&D right foot. Doing well today. Denies foot pain. Awaiting wound application on Monday.     Past Medical History:   Diagnosis Date     Anxiety      Arthritis      Cerebral infarction (H)      Congestive heart failure (H)      Coronary artery disease     CABG with multiple stents     Diabetes (H)      Heart attack (H)      Hypertension      Migraines      Stented coronary artery        Past Surgical History:   Procedure Laterality Date     C CABG, VEIN, SINGLE      Description: CABG (CABG);  Recorded: 04/22/2009;     IR MISCELLANEOUS PROCEDURE  2/13/2004       Allergies   Allergen Reactions     Aspirin Other (See Comments)     Reacts with migraine medicine     Isosorbide Headache     Codeine Rash     Morphine Rash         Current Facility-Administered Medications:      [START ON 12/20/2021] acetaminophen (TYLENOL) tablet 650 mg, 650 mg, Oral, Q4H PRN, Alex Kim DPM     acetaminophen (TYLENOL) tablet 975 mg, 975 mg, Oral, Q8H, Alex Kim DPM, 975 mg at 12/18/21 0509     ARIPiprazole (ABILIFY) tablet 2 mg, 2 mg, Oral, Daily, Adal Bailey MD, 2 mg at 12/18/21 0809     atorvastatin (LIPITOR) tablet 80 mg, 80 mg, Oral, Daily, Adal Bailey MD, 80 mg at 12/18/21  0809     bisacodyl (DULCOLAX) Suppository 10 mg, 10 mg, Rectal, Daily PRN, Alex Kim DPM     ceFAZolin (ANCEF) intermittent infusion 2 g in 100 mL dextrose PRE-MIX, 2 g, Intravenous, See Admin Instructions, Alex Kim DPM     cholestyramine (QUESTRAN) Packet 4 g, 1 packet, Oral, Every Other Day, Adal Bailey MD     DULoxetine (CYMBALTA) DR capsule 120 mg, 120 mg, Oral, Daily, Adal Bailey MD, 120 mg at 12/18/21 0809     fenofibrate micronized (LOFIBRA) capsule 201 mg, 201 mg, Oral, QAM Julio KAUR Vincent Allen, DPM, 201 mg at 12/18/21 0658     gabapentin (NEURONTIN) capsule 100 mg, 100 mg, Oral, BID AC, Adal Bailey MD, 100 mg at 12/18/21 0658     gabapentin (NEURONTIN) capsule 600 mg, 600 mg, Oral, At Bedtime, Adal Bailey MD, 600 mg at 12/17/21 2024     glipiZIDE (GLUCOTROL) tablet 5 mg, 5 mg, Oral, QAM Leo KAUR Virgilio M, MD, 5 mg at 12/18/21 0658     lactated ringers infusion, , Intravenous, Continuous, Alex Kim DPM, Last Rate: 100 mL/hr at 12/17/21 2348, Rate Verify at 12/17/21 2348     lidocaine (LMX4) cream, , Topical, Q1H PRN, Alex Kim DPM     lidocaine 1 % 0.1-1 mL, 0.1-1 mL, Other, Q1H PRN, Alex Kim DPM, 30 mg at 12/17/21 1805     LORazepam (ATIVAN) half-tab 0.5 mg, 0.5 mg, Oral, QAM Leo KAUR Virgilio M, MD, 0.5 mg at 12/18/21 0658     losartan (COZAAR) tablet 25 mg, 25 mg, Oral, At Bedtime, Adal Bailey MD, 25 mg at 12/17/21 2024     magnesium hydroxide (MILK OF MAGNESIA) suspension 30 mL, 30 mL, Oral, Daily PRN, Alex Kim, LAKHWINDER     magnesium oxide (MAG-OX) tablet 400 mg, 400 mg, Oral, 4x Daily, Adal Bailey MD, 400 mg at 12/18/21 0808     metFORMIN (GLUCOPHAGE) tablet 1,000 mg, 1,000 mg, Oral, BID w/meals, Aadl Bailey MD, 1,000 mg at 12/18/21 0809     metoprolol succinate ER (TOPROL-XL) 24 hr tablet 75 mg, 75 mg, Oral, Daily, Adal Bailey MD, 75 mg at  12/18/21 0809     mirtazapine (REMERON) tablet TABS 7.5 mg, 7.5 mg, Oral, At Bedtime, Adal Bailey MD, 7.5 mg at 12/17/21 2023     naloxone (NARCAN) injection 0.2 mg, 0.2 mg, Intravenous, Q2 Min PRN **OR** naloxone (NARCAN) injection 0.4 mg, 0.4 mg, Intravenous, Q2 Min PRN **OR** naloxone (NARCAN) injection 0.2 mg, 0.2 mg, Intramuscular, Q2 Min PRN **OR** naloxone (NARCAN) injection 0.4 mg, 0.4 mg, Intramuscular, Q2 Min PRN, Alex Kim DPM     ondansetron (ZOFRAN-ODT) ODT tab 4 mg, 4 mg, Oral, Q6H PRN **OR** ondansetron (ZOFRAN) injection 4 mg, 4 mg, Intravenous, Q6H PRN, Alex Kim DPM     pantoprazole (PROTONIX) EC tablet 40 mg, 40 mg, Oral, Daily, Adal Bailey MD, 40 mg at 12/18/21 0809     polyethylene glycol (MIRALAX) Packet 17 g, 17 g, Oral, Daily, Alex Kim DPM, 17 g at 12/18/21 0810     prochlorperazine (COMPAZINE) injection 10 mg, 10 mg, Intravenous, Q6H PRN **OR** prochlorperazine (COMPAZINE) tablet 10 mg, 10 mg, Oral, Q6H PRN, Alex Kim DPM     senna-docusate (SENOKOT-S/PERICOLACE) 8.6-50 MG per tablet 1 tablet, 1 tablet, Oral, BID, Alex Kim DPM, 1 tablet at 12/18/21 0808     senna-docusate (SENOKOT-S/PERICOLACE) 8.6-50 MG per tablet 1 tablet, 1 tablet, Oral, BID PRN, Adal Bailey MD     sodium chloride (PF) 0.9% PF flush 3 mL, 3 mL, Intracatheter, Q8H, Alex Kim DPM     sodium chloride (PF) 0.9% PF flush 3 mL, 3 mL, Intracatheter, q1 min prn, Alex Kim DPM     sodium chloride (PF) 0.9% PF flush 3 mL, 3 mL, Intracatheter, Q8H, Alex Kim, LAKHWINDER     sodium chloride (PF) 0.9% PF flush 3 mL, 3 mL, Intracatheter, q1 min prn, Alex Kim DPM     traMADol (ULTRAM) tablet 100 mg, 100 mg, Oral, Q6H PRN, Alex Kim DPM, 100 mg at 12/18/21 0509     traZODone (DESYREL) tablet 50 mg, 50 mg, Oral, At Bedtime, Adal Bailey MD, 50 mg at 12/17/21 2024     vitamin C  "(ASCORBIC ACID) tablet 500 mg, 500 mg, Oral, Daily, Adal Bailey MD, 500 mg at 21 0808    Family History   Adopted: Yes       Social History     Socioeconomic History     Marital status:      Spouse name: Not on file     Number of children: Not on file     Years of education: Not on file     Highest education level: Not on file   Occupational History     Not on file   Tobacco Use     Smoking status: Former Smoker     Quit date: 2015     Years since quittin.8     Smokeless tobacco: Never Used   Vaping Use     Vaping Use: Never used   Substance and Sexual Activity     Alcohol use: Not Currently     Drug use: Not Currently     Sexual activity: Not on file   Other Topics Concern     Parent/sibling w/ CABG, MI or angioplasty before 65F 55M? Not Asked   Social History Narrative     Not on file     Social Determinants of Health     Financial Resource Strain: Not on file   Food Insecurity: Not on file   Transportation Needs: Not on file   Physical Activity: Not on file   Stress: Not on file   Social Connections: Not on file   Intimate Partner Violence: Not on file   Housing Stability: Not on file       10 point Review of Systems is negative except for right foot ulcer which is noted in HPI.     BP (!) 140/71 (BP Location: Left arm)   Pulse 74   Temp 98.1  F (36.7  C) (Oral)   Resp 18   Ht 1.702 m (5' 7\")   Wt 66.2 kg (146 lb)   SpO2 100%   BMI 22.87 kg/m      BMI= Body mass index is 22.87 kg/m .    OBJECTIVE:  General appearance: Patient is alert and fully cooperative with history & exam.  No sign of distress is noted during the visit.  Surgical dressing c/d/I right foot.     Imaging:     MR Foot Right w/o & w Contrast    Result Date: 2021  EXAM DATE:         2021 EXAM: MRI FOOT RIGHT W/O and WITH CONTRAST LOCATION: [facility] DATE/TIME: 2021 4:00 PM INDICATION: Osteomyelitis 5th metatarsal right foot, foot pain. COMPARISON: 10/24/2017 " "MRI. TECHNIQUE: Routine. Additional postgadolinium T1 sequences were obtained. IV CONTRAST: 14 ML OF DOTAREM WAS ADMINISTERED FROM A SINGLE USE VIAL WITH 1 ML DISCARDED.  MWR iSTAT Cr = 1.1 mg/dl; eGFR = 70 FINDINGS: JOINTS AND BONES: Mild degenerative changes at the first MTP joint and at the IP joint of the great toe. -No fracture or contusion. No osteomyelitis. Tibial and fibular sesamoids are intact. TENDONS: -No tendon tear, tendinopathy, or tenosynovitis. LIGAMENTS: -Lisfranc ligament: Intact. No subluxation. MUSCLES AND SOFT TISSUES: -Mild global atrophy of the intrinsic musculature of the foot. Moderate dorsal subcutaneous edema or cellulitis throughout the forefoot. IMPRESSION: 1.  No abscess or osteomyelitis. 2.  No fracture or contusion. 3.  Moderate dorsal subcutaneous edema or cellulitis throughout the forefoot.     POC US Guidance Needle Placement    Result Date: 12/17/2021  Ultrasound was performed as guidance to an anesthesia procedure.  Click \"PACS images\" hyperlink below to view any stored images.  For specific procedure details, view procedure note authored by anesthesia.       "

## 2021-12-18 NOTE — PLAN OF CARE
Problem: Activity Intolerance  Goal: Enhanced Capacity and Energy  Outcome: Improving  Intervention: Optimize Activity Tolerance  Recent Flowsheet Documentation  Taken 12/17/2021 3115 by Romy Danielson, RN  Activity Management: activity adjusted per tolerance     VSS, c/o pain in R foot- managed with prn tramadol. POD#1 R foot I+D Right foot- dressing intact, elevated on pillows- Wound vac to be placed Monday. Wound cultures positive, Continue IV abx.  Incontinent of bowel and bladder- external male cath in place. Q2 turns- encourage repositioning. Safety checks completed.

## 2021-12-18 NOTE — PLAN OF CARE
Problem: Infection  Goal: Absence of Infection Signs and Symptoms  Outcome: Improving     Patient is afebrile. Dressing is dry and intact. Patient denies pain. External catheter was placed and patent and draining. Plan is to place a wound vac on Monday and plan for discharge to a TCU.

## 2021-12-18 NOTE — ANESTHESIA CARE TRANSFER NOTE
Patient: mC Lundberg    Procedure: Procedure(s):  INCISION AND DRAINAGE, right foot       Diagnosis: Ulcer of right foot with necrosis of muscle (H) [L97.513]  Diagnosis Additional Information: No value filed.    Anesthesia Type:   MAC     Note:    Oropharynx: oropharynx clear of all foreign objects and spontaneously breathing  Level of Consciousness: awake  Oxygen Supplementation: room air    Independent Airway: airway patency satisfactory and stable  Dentition: dentition unchanged  Vital Signs Stable: post-procedure vital signs reviewed and stable  Report to RN Given: handoff report given  Patient transferred to: Medical/Surgical Unit    Handoff Report: Set expectations for post-procedure period      Vitals:  Vitals Value Taken Time   /78 1840   Temp 98.3    Pulse 86    Resp 16    SpO2 97        Electronically Signed By: SHABNAM Parks [facility]  December 17, 2021  6:40 PM

## 2021-12-18 NOTE — PROGRESS NOTES
12/18/21 1000   Quick Adds   Type of Visit Initial Occupational Therapy Evaluation   Living Environment   People in home alone   Current Living Arrangements assisted living   Self-Care   Equipment Currently Used at Home shower chair;hospital bed;grab bar, toilet;grab bar, tub/shower;wheelchair, manual  (4WW)   Activity/Exercise/Self-Care Comment for last yr pt has needed assist with all ADLs d/t R foot, prior to that pt was ambulatory w/ 4WW and indep with ADLs. pt has been NWB for 1-2 months per pt report. pt has been in w/c for last yr and pivot transfers with assist   Disability/Function   Vision Management glasses   Fall history within last six months yes  (d/t standing w/o walker, legs gave out)   Number of times patient has fallen within last six months 2   General Information   Onset of Illness/Injury or Date of Surgery 12/17/21   Referring Physician Alex Kim DPM   Patient/Family Therapy Goal Statement (OT) get dressed more easily   Additional Occupational Profile Info/Pertinent History of Current Problem diabetic foot ulcer, I&D POD 1, PMH CVA 5 yrs ago with R sided weakness   Right Lower Extremity (Weight-bearing Status) non weight-bearing (NWB)  (has been for 1-2 months)   Cognitive Status Examination   Orientation Status orientation to person, place and time   Cognitive Status Comments reports having trouble with memory sometimes   Sensory   Sensory Quick Adds No deficits were identified   Pain Assessment   Patient Currently in Pain No   Range of Motion Comprehensive   Comment, General Range of Motion LUE WFL, R full PROM but limited AROM d/t tone   Strength Comprehensive (MMT)   Comment, General Manual Muscle Testing (MMT) Assessment LUE WFL   Muscle Tone Assessment   Muscle Tone Quick Adds Right side extremities   Muscle Tone Assessment - Right-side Extremities moderately increased tone   Coordination   Coordination Comments needed assist to open containers d/t incr tone in R hand   Bed  Mobility   Comment (Bed Mobility) sitting EOB on arrival   Transfers   Transfers bed-chair transfer;sit-stand transfer   Transfer Comments CGA, Mod A for transfer for balance on L foot   Balance   Balance Comments Min/Mod A for transfer, CGA static standing   Activities of Daily Living   BADL Assessment lower body dressing;feeding   Lower Body Dressing Assessment   Thayer Level (Lower Body Dressing) moderate assist (50% patient effort)   Comment (Lower Body Dressing) Max A for brief change in standing   Eating/Self Feeding   Thayer Level (Feeding) set up   Clinical Impression   Criteria for Skilled Therapeutic Interventions Met (OT) yes   OT Diagnosis decr ADL indep   OT Problem List-Impairments impacting ADL post-surgical precautions;muscle tone;balance   Assessment of Occupational Performance 3-5 Performance Deficits   Identified Performance Deficits dressing, transfers, toileting   Planned Therapy Interventions (OT) ADL retraining   Clinical Decision Making Complexity (OT) moderate complexity   Therapy Frequency (OT) Daily   Predicted Duration of Therapy 3-5 days   Risk & Benefits of therapy have been explained evaluation/treatment results reviewed;patient   OT Discharge Planning    OT Discharge Recommendation (DC Rec) Acute Rehab Center-Motivated patient will benefit from intensive, interdisciplinary therapy.  Anticipate will be able to tolerate 3 hours of therapy per day   OT Rationale for DC Rec pt very motivated and determined to be indep, was indep with 4WW prior to foot injury. currently requiring A of 1 for close transfers/ADLs, able to maintain NWB on RLE fairly well   Total Evaluation Time (Minutes)   Total Evaluation Time (Minutes) 10   Lexie Tejeda, OTR/L

## 2021-12-18 NOTE — OP NOTE
Date: 12/17/2021     Surgeon: SYMONE Kim DPM    Preoperative diagnosis: Ulceration right foot    Postoperative diagnosis: Same    Procedure:   1. Incision and drainage right foot  2. Excisional Debridement ulceration right foot into level of muscle     Anesthesia: Combined MAC with Popliteal Block     Hemostasis: Pneumatic ankle tourniquet 250 mmHg    Pathology:   ID Type Source Tests Collected by Time Destination   A :  Swab Foot, Right ANAEROBIC BACTERIAL CULTURE ROUTINE, GRAM STAIN, AEROBIC BACTERIAL CULTURE ROUTINE Alex Kim DPM 12/17/2021  6:12 PM         Injectables: None    Materials: None    Complications: None    Blood loss: 1 ml     Findings: Patient presents for operative intervention for a chronic wound on the lateral right foot. We discussed today's procedure to include removal of all non-viable tissue with use of wound vac post-op. We will plan to admit him to the hospital post-op to attempt transfer to TCU. Consent has been obtained through his mother. Conservative measures were attempted and exhausted. Patient questions invited and answered, including appropriate risk, benefits and complications. No guarantees given or implied. Patient has been NPO.    Description: Patient was brought to the operating room and placed on the table in supine position. IV-sedation was administered by the anesthesia department.  Injection of 0.5% Marcaine plain was administered to surgical site right foot. The foot was then prepped and draped in usual aseptic manner. The extremity was elevated and exsanguinated. Well-padded ankle pneumatic tourniquet was inflated to 250mmHg and the following procedure was then performed: Attention was directed to the area of non-viable tissue along the lateral 5th metatarsal base of the right foot where a #15 blade was used to make an incision into subcutaneus tissue. The non-viable tissue was sharply excisionally debrided with a #15 blade and removed from the surgical site.  "The wound did track to the periosteum of the 5th metatarsal but did not offend the bone. Tracking to other foot compartments was not noted. Deep aerobic and anaerobic cultures taken. Next, a 1000cc pulse lavage was used to irrigate the surgical site. Following irrigation and debridement, no remaining non-viable tissue was noted.    The resulting ulceration measured 1x1x0.5cm. Sharp, excisional debridement was performed with a #15 blade into muscle debriding 1 sq cm.     The wound was packed with 1/4\" gauze, and dressings consistent of 4x4's, ABD, kerlix/johanna roll and an ace wrap. The pneumatic tourniquet was released and a hyperemic response was noted to the digits on the right foot.     The patient appeared to tolerate all the procedures and anesthesia well without apparent complications. Patient was transported from the operating room to the recovery room with vital signs stable and neurovascular status as it was pre-operatively to the right foot. Patient to be admitted to [facility] for medical management per hospitalist. He will remain non-weight bearing on the right foot. Surgical dressing to remain intact until the wound vac is applied.     Alex Kim DPM          "

## 2021-12-19 ENCOUNTER — APPOINTMENT (OUTPATIENT)
Dept: OCCUPATIONAL THERAPY | Facility: CLINIC | Age: 51
End: 2021-12-19
Payer: MEDICARE

## 2021-12-19 LAB
GLUCOSE BLDC GLUCOMTR-MCNC: 104 MG/DL (ref 70–99)
GLUCOSE BLDC GLUCOMTR-MCNC: 80 MG/DL (ref 70–99)
GLUCOSE BLDC GLUCOMTR-MCNC: 91 MG/DL (ref 70–99)

## 2021-12-19 PROCEDURE — 97535 SELF CARE MNGMENT TRAINING: CPT | Mod: GO

## 2021-12-19 PROCEDURE — 250N000013 HC RX MED GY IP 250 OP 250 PS 637: Performed by: PODIATRIST

## 2021-12-19 PROCEDURE — 99232 SBSQ HOSP IP/OBS MODERATE 35: CPT | Performed by: INTERNAL MEDICINE

## 2021-12-19 PROCEDURE — 250N000013 HC RX MED GY IP 250 OP 250 PS 637: Performed by: INTERNAL MEDICINE

## 2021-12-19 PROCEDURE — 120N000001 HC R&B MED SURG/OB

## 2021-12-19 RX ORDER — NITROGLYCERIN 0.4 MG/1
0.4 TABLET SUBLINGUAL EVERY 5 MIN PRN
Status: DISCONTINUED | OUTPATIENT
Start: 2021-12-19 | End: 2021-12-22 | Stop reason: HOSPADM

## 2021-12-19 RX ORDER — IBUPROFEN 200 MG
CAPSULE ORAL 3 TIMES DAILY PRN
Status: DISCONTINUED | OUTPATIENT
Start: 2021-12-19 | End: 2021-12-22 | Stop reason: HOSPADM

## 2021-12-19 RX ADMIN — MAGNESIUM OXIDE TAB 400 MG (241.3 MG ELEMENTAL MG) 400 MG: 400 (241.3 MG) TAB at 18:40

## 2021-12-19 RX ADMIN — ATORVASTATIN CALCIUM 80 MG: 40 TABLET, FILM COATED ORAL at 08:33

## 2021-12-19 RX ADMIN — ARIPIPRAZOLE 2 MG: 2 TABLET ORAL at 08:33

## 2021-12-19 RX ADMIN — TRAMADOL HYDROCHLORIDE 100 MG: 50 TABLET, FILM COATED ORAL at 00:09

## 2021-12-19 RX ADMIN — GABAPENTIN 100 MG: 100 CAPSULE ORAL at 08:33

## 2021-12-19 RX ADMIN — ACETAMINOPHEN 975 MG: 325 TABLET ORAL at 06:04

## 2021-12-19 RX ADMIN — OXYCODONE HYDROCHLORIDE AND ACETAMINOPHEN 500 MG: 500 TABLET ORAL at 08:32

## 2021-12-19 RX ADMIN — SENNOSIDES AND DOCUSATE SODIUM 1 TABLET: 50; 8.6 TABLET ORAL at 22:15

## 2021-12-19 RX ADMIN — SENNOSIDES AND DOCUSATE SODIUM 1 TABLET: 50; 8.6 TABLET ORAL at 08:32

## 2021-12-19 RX ADMIN — TRAMADOL HYDROCHLORIDE 100 MG: 50 TABLET, FILM COATED ORAL at 22:16

## 2021-12-19 RX ADMIN — GABAPENTIN 100 MG: 100 CAPSULE ORAL at 18:40

## 2021-12-19 RX ADMIN — MIRTAZAPINE 7.5 MG: 7.5 TABLET, FILM COATED ORAL at 22:22

## 2021-12-19 RX ADMIN — MAGNESIUM OXIDE TAB 400 MG (241.3 MG ELEMENTAL MG) 400 MG: 400 (241.3 MG) TAB at 22:15

## 2021-12-19 RX ADMIN — FENOFIBRATE 201 MG: 67 CAPSULE ORAL at 08:33

## 2021-12-19 RX ADMIN — LOSARTAN POTASSIUM 25 MG: 25 TABLET, FILM COATED ORAL at 22:15

## 2021-12-19 RX ADMIN — POLYETHYLENE GLYCOL 3350 17 G: 17 POWDER, FOR SOLUTION ORAL at 08:34

## 2021-12-19 RX ADMIN — DULOXETINE HYDROCHLORIDE 120 MG: 60 CAPSULE, DELAYED RELEASE PELLETS ORAL at 08:34

## 2021-12-19 RX ADMIN — GLIPIZIDE 5 MG: 5 TABLET ORAL at 08:33

## 2021-12-19 RX ADMIN — PANTOPRAZOLE SODIUM 40 MG: 20 TABLET, DELAYED RELEASE ORAL at 08:33

## 2021-12-19 RX ADMIN — LORAZEPAM 0.5 MG: 0.5 TABLET ORAL at 08:32

## 2021-12-19 RX ADMIN — MAGNESIUM OXIDE TAB 400 MG (241.3 MG ELEMENTAL MG) 400 MG: 400 (241.3 MG) TAB at 13:10

## 2021-12-19 RX ADMIN — METOPROLOL SUCCINATE 75 MG: 50 TABLET, EXTENDED RELEASE ORAL at 08:33

## 2021-12-19 RX ADMIN — ACETAMINOPHEN 975 MG: 325 TABLET ORAL at 13:10

## 2021-12-19 RX ADMIN — RIVAROXABAN 20 MG: 10 TABLET, FILM COATED ORAL at 08:33

## 2021-12-19 RX ADMIN — MAGNESIUM OXIDE TAB 400 MG (241.3 MG ELEMENTAL MG) 400 MG: 400 (241.3 MG) TAB at 08:32

## 2021-12-19 RX ADMIN — TRAMADOL HYDROCHLORIDE 100 MG: 50 TABLET, FILM COATED ORAL at 15:35

## 2021-12-19 RX ADMIN — TRAZODONE HYDROCHLORIDE 50 MG: 50 TABLET ORAL at 22:15

## 2021-12-19 RX ADMIN — GABAPENTIN 600 MG: 300 CAPSULE ORAL at 22:15

## 2021-12-19 RX ADMIN — METFORMIN HYDROCHLORIDE 1000 MG: 500 TABLET, FILM COATED ORAL at 08:34

## 2021-12-19 RX ADMIN — ACETAMINOPHEN 975 MG: 325 TABLET ORAL at 22:15

## 2021-12-19 RX ADMIN — TRAMADOL HYDROCHLORIDE 100 MG: 50 TABLET, FILM COATED ORAL at 08:32

## 2021-12-19 ASSESSMENT — ACTIVITIES OF DAILY LIVING (ADL)
ADLS_ACUITY_SCORE: 23
ADLS_ACUITY_SCORE: 19
ADLS_ACUITY_SCORE: 23
ADLS_ACUITY_SCORE: 19
ADLS_ACUITY_SCORE: 23
ADLS_ACUITY_SCORE: 19
ADLS_ACUITY_SCORE: 23
ADLS_ACUITY_SCORE: 19
ADLS_ACUITY_SCORE: 19
ADLS_ACUITY_SCORE: 23
ADLS_ACUITY_SCORE: 19
ADLS_ACUITY_SCORE: 23
ADLS_ACUITY_SCORE: 19
ADLS_ACUITY_SCORE: 23
ADLS_ACUITY_SCORE: 19
ADLS_ACUITY_SCORE: 23
ADLS_ACUITY_SCORE: 19

## 2021-12-19 NOTE — PROGRESS NOTES
[facility] MEDICINE PROGRESS NOTE      Length of stay: Day # 2    Identification/Summary: Cm Lundberg is a 51 year old male with a past medical history of see below.  Patient is anticoagulation for atrial fibrillation flutter, type 2 diabetes, ischemic cardiomyopathy, coronary artery disease, hypertension  who was admitted for incision and drainage of the right foot, excisional debridement of the ulceration of the right foot into the level of the muscle, under MAC with popliteal block anesthesia on 12/17/2021.     Assessment and Plan:     Status post incision and drainage of the right foot, excisional debridement of the ulceration of the right foot into the level of the muscle on 12/17/2020  Postoperative care with orthopedics  Surgical dressing remain intact until the wound VAC is applied on 12/20/2021.  Waiting for place where wound VAC can be maintained  Pain management, DVT prophylaxis and PT and OT.  Vital signs are stable postoperatively.  Need to resume AC soon when ok with podiatry/ortho.-->  Podiatry note indicated is okay to resume the DOAC.  Patient is on cefazolin.  Wound culture see below.  Defer further antibiotics to podiatry.  They would not like to put him on antibiotics at this time per their note.    Fibrillation/flutter, chronically anticoagulated  We will resume DOAC per note of surgery surgery.  With the benefits versus risk of this medication postop.  Resumed medication for rate control-on metoprolol.     History of stroke, clinically stable  Patient has atrophy of extremities  Patient is on blood thinner.  Decide to resume it when bleeding risk is low.  Will decide that with orthopedics postoperatively.  Patient is a wheelchair.  Treat underlying risk factors including coronary artery disease, diabetes, hypertension  Patient lives in a nursing home     Ischemic cardiomyopathy, status post CABG, clinically stable  Dyslipidemia  Coronary disease, no chest  pain  Resume home heart medications.  Reassess fluid and electrolytes.     Type 2 diabetes with complications  Diabetic foot ulcer as above  Monitor blood sugar.  Resumed home medications of any  Patient is on multiple oral hypoglycemic medications which will be resumed if patient is eating well     Essential hypertension  Reviewed and resume home medications and optimize as needed  Hold parameters postop (if SBP less than 115 or heart rate less than 60 for metoprolol.)     Mood disorder  Resume multiple psych medications     --------------------------------------------------------------------     Discharge disposition: Defer to orthopedics.  Hoping to put a wound VAC on 12/20/2021 and discharged on skilled nursing facility that can manage it.  Diet: Moderate Consistent Carb (60 g CHO per Meal) Diet  DVT Prophylaxis: Already on rivaroxaban  Code Status: No Order  Living situation: --     ----------------------------------------------------------------------     Cumulative essential/pertinent data reviewed:  Labs:  Glucose 119  Metabolic panel unremarkable  Wound culture shows the following:     Abnormal   3+ Gram positive cocci       2+ Gram positive bacilli resembling diphtheroids       3+ WBC seen            Imaging:  Defer to podiatry     EKG: --    ----------------------------------------------------------------------    Interval History/Subjective:  Vital signs stable so far.  Rest of review of systems unremarkable.    Physical Exam/Objective:  Temp:  [98  F (36.7  C)-98.5  F (36.9  C)] 98  F (36.7  C)  Pulse:  [71-82] 71  Resp:  [15-18] 18  BP: (114-140)/(69-74) 140/74  SpO2:  [93 %-100 %] 95 %    Body mass index is 22.87 kg/m .    GENERAL:   Alert and current not in distress;  appears comfortable, in no acute distress,   HEAD:  Normocephalic, without obvious abnormality   EYES:  Grossly normal;    NOSE: Grossly normal   THROAT: Lips and mouth external: grossly normal,    NECK: No mass noted; no stiffness    BACK:      LUNGS:   Auscultation: Negative for wheezing; No crackles, symmetric chest rise on inhalation, respirations unlabored    CHEST WALL:  No tenderness or deformity   HEART:  Regular rate and rhythm, significant murmurs: Negative,    ABDOMEN:   Soft, non-tender, no masses, no peritoneal signs   EXTREMITIES: Defer examination of the operative site to surgery; status post right foot surgery  No tenderness of the calves.  No significant ankle edema   SKIN:  see extremities   NEURO: no signs of acute stroke or change from prior state   PSYCH: Behavior is appropriate      Medications:   Personally Reviewed.    acetaminophen  975 mg Oral Q8H     ARIPiprazole  2 mg Oral Daily     atorvastatin  80 mg Oral Daily     ceFAZolin  2 g Intravenous See Admin Instructions     cholestyramine  1 packet Oral Every Other Day     DULoxetine  120 mg Oral Daily     fenofibrate micronized  201 mg Oral QAM AC     gabapentin  100 mg Oral BID AC     gabapentin  600 mg Oral At Bedtime     glipiZIDE  5 mg Oral QAM AC     LORazepam  0.5 mg Oral QAM AC     losartan  25 mg Oral At Bedtime     magnesium oxide  400 mg Oral 4x Daily     metFORMIN  1,000 mg Oral BID w/meals     metoprolol succinate ER  75 mg Oral Daily     mirtazapine  7.5 mg Oral At Bedtime     pantoprazole  40 mg Oral Daily     polyethylene glycol  17 g Oral Daily     rivaroxaban ANTICOAGULANT  20 mg Oral Daily     senna-docusate  1 tablet Oral BID     sodium chloride (PF)  3 mL Intracatheter Q8H     sodium chloride (PF)  3 mL Intracatheter Q8H     traZODone  50 mg Oral At Bedtime     vitamin C  500 mg Oral Daily     Current Facility-Administered Medications   Medication Dose Route Frequency     [START ON 12/20/2021] acetaminophen  650 mg Oral Q4H PRN     bisacodyl  10 mg Rectal Daily PRN     lidocaine 4%   Topical Q1H PRN     lidocaine (buffered or not buffered)  0.1-1 mL Other Q1H PRN     magnesium hydroxide  30 mL Oral Daily PRN     naloxone  0.2 mg Intravenous Q2 Min  PRN    Or     naloxone  0.4 mg Intravenous Q2 Min PRN    Or     naloxone  0.2 mg Intramuscular Q2 Min PRN    Or     naloxone  0.4 mg Intramuscular Q2 Min PRN     nitroGLYcerin  0.4 mg Sublingual Q5 Min PRN     ondansetron  4 mg Oral Q6H PRN    Or     ondansetron  4 mg Intravenous Q6H PRN     prochlorperazine  10 mg Intravenous Q6H PRN    Or     prochlorperazine  10 mg Oral Q6H PRN     senna-docusate  1 tablet Oral BID PRN     sodium chloride (PF)  3 mL Intracatheter q1 min prn     sodium chloride (PF)  3 mL Intracatheter q1 min prn     traMADol  100 mg Oral Q6H PRN     Triple Antibiotic   Topical TID PRN           Adal Bailey MD  Hospitalist  [facility] Medicine  [facility]  Phone: #[phone]

## 2021-12-19 NOTE — PLAN OF CARE
Problem: Infection  Goal: Absence of Infection Signs and Symptoms  12/18/2021 2202 by Mariela Schaefer RN  Outcome: Improving  12/18/2021 2058 by Mariela Schaefer RN  Outcome: Improving     Problem: Activity Intolerance  Goal: Enhanced Capacity and Energy  12/18/2021 2202 by Mariela Schaefer RN  Outcome: Improving  12/18/2021 2058 by Mariela Schaefer RN  Outcome: Improving  Intervention: Optimize Activity Tolerance  Recent Flowsheet Documentation  Taken 12/18/2021 1594 by Mariela Schaefer RN  Activity Management: standing at bedside     Patient vital signs are at baseline: Yes Blood sugars were low this evening. Patient was given juice and crackers and ate dinner and sugars stabilized.   Patient able to ambulate as they were prior to admission or with assist devices provided by therapies during their stay:  Yes Patient was able to dangle and stand at bedside and maintain NWB with a minimal assist.   Patient MUST void prior to discharge:  Yes Patient is incontinent at baseline and we are using the external catheter.   Patient able to tolerate oral intake:  Yes  Pain has adequate pain control using Oral analgesics:  Yes Patient does continue to rate his pain high but appears comfortable and denies needing anything additional than what is ordered. Patient has been taking scheduled Tylenol and PRN Tramadol.

## 2021-12-19 NOTE — PROGRESS NOTES
Care Management Follow Up    Length of Stay (days): 2    Expected Discharge Date: 12/21/2021     Concerns to be Addressed:       Patient plan of care discussed at interdisciplinary rounds: Yes    Anticipated Discharge Disposition: Transitional Care     Anticipated Discharge Services:  Wound VAC  Anticipated Discharge DME: Wheelchair,Other (see comment)    Education Provided on the Discharge Plan:    Patient/Family in Agreement with the Plan: yes    Additional Information:  Pt to have WOC VAC placement when ready. CM currently waiting for [facility] TCU admissions to open on Monday for referral. DAMEON called and confirmed with Sindhu with TCU that admissions will be back on Monday at 8AM. CM reviewed chart and did not see diagnosis for possible PAS level II.       SHELDON Urrutia

## 2021-12-19 NOTE — PLAN OF CARE
Problem: Diabetes Comorbidity  Goal: Blood Glucose Level Within Targeted Range  Outcome: Improving   Pt is doing well.  His blood sugar levels have been low.  Spot check will be done during the night.  He is alert and oriented, though his speech is slow. Pt has right foot in an ace wrap which is clean, dry and intact.  Pain is being managed with tylenol and tramadol.  We will continue to assess and monitor.

## 2021-12-19 NOTE — PLAN OF CARE
Problem: Adult Inpatient Plan of Care  Goal: Plan of Care Review  Outcome: Improving  Flowsheets (Taken 12/19/2021 1018)  Plan of Care Reviewed With: patient  Progress: improving     Pt states is doing well.  Tramadol and scheduled tylenol working very well for pain management.  ACE dressing to RLE intact.  Extremity elevated.  Good appetite.  Transferring to chair with assistance of one.      Plan: wound vac placement probably tomorrow.

## 2021-12-20 ENCOUNTER — APPOINTMENT (OUTPATIENT)
Dept: OCCUPATIONAL THERAPY | Facility: CLINIC | Age: 51
End: 2021-12-20
Payer: MEDICARE

## 2021-12-20 LAB
GLUCOSE BLDC GLUCOMTR-MCNC: 108 MG/DL (ref 70–99)
GLUCOSE BLDC GLUCOMTR-MCNC: 66 MG/DL (ref 70–99)
GLUCOSE BLDC GLUCOMTR-MCNC: 71 MG/DL (ref 70–99)
GLUCOSE BLDC GLUCOMTR-MCNC: 85 MG/DL (ref 70–99)
GLUCOSE BLDC GLUCOMTR-MCNC: 90 MG/DL (ref 70–99)

## 2021-12-20 PROCEDURE — 99207 PR CDG-MDM COMPONENT: MEETS MODERATE - DOWN CODED: CPT | Performed by: INTERNAL MEDICINE

## 2021-12-20 PROCEDURE — 250N000013 HC RX MED GY IP 250 OP 250 PS 637: Performed by: INTERNAL MEDICINE

## 2021-12-20 PROCEDURE — 120N000001 HC R&B MED SURG/OB

## 2021-12-20 PROCEDURE — 97605 NEG PRS WND THER DME<=50SQCM: CPT

## 2021-12-20 PROCEDURE — F08G5ZZ WOUND MANAGEMENT TREATMENT OF INTEGUMENTARY SYSTEM - LOWER BACK / LOWER EXTREMITY: ICD-10-PCS | Performed by: PODIATRIST

## 2021-12-20 PROCEDURE — 250N000013 HC RX MED GY IP 250 OP 250 PS 637: Performed by: PODIATRIST

## 2021-12-20 PROCEDURE — 97535 SELF CARE MNGMENT TRAINING: CPT | Mod: GO

## 2021-12-20 PROCEDURE — G0463 HOSPITAL OUTPT CLINIC VISIT: HCPCS | Mod: 25

## 2021-12-20 PROCEDURE — 99232 SBSQ HOSP IP/OBS MODERATE 35: CPT | Performed by: INTERNAL MEDICINE

## 2021-12-20 PROCEDURE — 99221 1ST HOSP IP/OBS SF/LOW 40: CPT | Performed by: INTERNAL MEDICINE

## 2021-12-20 RX ADMIN — GLIPIZIDE 5 MG: 5 TABLET ORAL at 06:39

## 2021-12-20 RX ADMIN — METFORMIN HYDROCHLORIDE 1000 MG: 500 TABLET, FILM COATED ORAL at 18:16

## 2021-12-20 RX ADMIN — LORAZEPAM 0.5 MG: 0.5 TABLET ORAL at 06:40

## 2021-12-20 RX ADMIN — TRAMADOL HYDROCHLORIDE 100 MG: 50 TABLET, FILM COATED ORAL at 11:05

## 2021-12-20 RX ADMIN — GABAPENTIN 100 MG: 100 CAPSULE ORAL at 18:16

## 2021-12-20 RX ADMIN — AMOXICILLIN AND CLAVULANATE POTASSIUM 1 TABLET: 875; 125 TABLET, FILM COATED ORAL at 21:38

## 2021-12-20 RX ADMIN — OXYCODONE HYDROCHLORIDE AND ACETAMINOPHEN 500 MG: 500 TABLET ORAL at 09:42

## 2021-12-20 RX ADMIN — ARIPIPRAZOLE 2 MG: 2 TABLET ORAL at 09:39

## 2021-12-20 RX ADMIN — PANTOPRAZOLE SODIUM 40 MG: 20 TABLET, DELAYED RELEASE ORAL at 09:39

## 2021-12-20 RX ADMIN — TRAMADOL HYDROCHLORIDE 100 MG: 50 TABLET, FILM COATED ORAL at 18:16

## 2021-12-20 RX ADMIN — MAGNESIUM OXIDE TAB 400 MG (241.3 MG ELEMENTAL MG) 400 MG: 400 (241.3 MG) TAB at 09:42

## 2021-12-20 RX ADMIN — ATORVASTATIN CALCIUM 80 MG: 40 TABLET, FILM COATED ORAL at 09:41

## 2021-12-20 RX ADMIN — FENOFIBRATE 201 MG: 67 CAPSULE ORAL at 06:39

## 2021-12-20 RX ADMIN — TRAZODONE HYDROCHLORIDE 50 MG: 50 TABLET ORAL at 21:38

## 2021-12-20 RX ADMIN — METOPROLOL SUCCINATE 75 MG: 50 TABLET, EXTENDED RELEASE ORAL at 09:40

## 2021-12-20 RX ADMIN — ACETAMINOPHEN 975 MG: 325 TABLET ORAL at 06:39

## 2021-12-20 RX ADMIN — CHOLESTYRAMINE 4 G: 4 POWDER, FOR SUSPENSION ORAL at 09:37

## 2021-12-20 RX ADMIN — GABAPENTIN 100 MG: 100 CAPSULE ORAL at 06:44

## 2021-12-20 RX ADMIN — DULOXETINE HYDROCHLORIDE 120 MG: 60 CAPSULE, DELAYED RELEASE PELLETS ORAL at 09:41

## 2021-12-20 RX ADMIN — METFORMIN HYDROCHLORIDE 1000 MG: 500 TABLET, FILM COATED ORAL at 09:41

## 2021-12-20 RX ADMIN — TRAMADOL HYDROCHLORIDE 100 MG: 50 TABLET, FILM COATED ORAL at 05:19

## 2021-12-20 RX ADMIN — MAGNESIUM OXIDE TAB 400 MG (241.3 MG ELEMENTAL MG) 400 MG: 400 (241.3 MG) TAB at 14:29

## 2021-12-20 RX ADMIN — GABAPENTIN 600 MG: 300 CAPSULE ORAL at 21:38

## 2021-12-20 RX ADMIN — LOSARTAN POTASSIUM 25 MG: 25 TABLET, FILM COATED ORAL at 21:38

## 2021-12-20 RX ADMIN — MIRTAZAPINE 7.5 MG: 7.5 TABLET, FILM COATED ORAL at 21:44

## 2021-12-20 RX ADMIN — ACETAMINOPHEN 975 MG: 325 TABLET ORAL at 14:26

## 2021-12-20 RX ADMIN — RIVAROXABAN 20 MG: 10 TABLET, FILM COATED ORAL at 09:39

## 2021-12-20 ASSESSMENT — ACTIVITIES OF DAILY LIVING (ADL)
ADLS_ACUITY_SCORE: 31
ADLS_ACUITY_SCORE: 27
ADLS_ACUITY_SCORE: 23
ADLS_ACUITY_SCORE: 27
ADLS_ACUITY_SCORE: 23
ADLS_ACUITY_SCORE: 27
ADLS_ACUITY_SCORE: 31
ADLS_ACUITY_SCORE: 23
ADLS_ACUITY_SCORE: 27
ADLS_ACUITY_SCORE: 31
ADLS_ACUITY_SCORE: 23
ADLS_ACUITY_SCORE: 27
ADLS_ACUITY_SCORE: 31
ADLS_ACUITY_SCORE: 27
ADLS_ACUITY_SCORE: 31
ADLS_ACUITY_SCORE: 27
ADLS_ACUITY_SCORE: 23
ADLS_ACUITY_SCORE: 27

## 2021-12-20 NOTE — PROGRESS NOTES
Care Management Follow Up    Length of Stay (days): 3    Expected Discharge Date: 12/21/2021     Concerns to be Addressed: WOC to place Wound VAC, ID consult- final ID plan    Patient plan of care discussed at interdisciplinary rounds: Yes    Anticipated Discharge Disposition: Transitional Care- referrals pending      Anticipated Discharge Services:  TCU- referrals pending  Anticipated Discharge DME: TBD    Patient/family educated on Medicare website which has current facility and service quality ratings:  Yes, CM reviewed with mother/legal guardian Jewels and sister Sherri   Education Provided on the Discharge Plan:  CM reviewed TCU with mother/legal guardian. AVS will be per bedside RN.   Patient/Family in Agreement with the Plan: yes    Referrals Placed by CM/SW: TCU and Acute Rehab    Private pay costs discussed: Not applicable    Additional Information:  Chart reviewed. PT recommendations for TCU. OT recommendations for Acute Rehab vs TCU. Awaiting Wound VAC placement.     CM spoke with legal guardian (Jewels [phone])- family preference #1 is [facility] (CM resent referral, CM also left secure VM's with admissions and with Nursing Supervisor phone [phone] requesting return call to CM). CM also sent additional referrals per discussion with legal guardian.     11:33 AM   Legal guardian returns call to CM and indicates OK for CM to also speak with her daughter/patient sister Sherri ([phone]) regarding TCU choices. CM spoke with sister Sherri who again reinforces family preference is for placement at [facility] in [place] (CM left another VM requesting return call to ). Additional referrals sent per family request, would consider [facility]. Would not accept placement at [facility] but would consider placement at [facility] [place].     [facility]-  left VM requesting return call to CM  [facility]-  left VM  requesting return call to CM. 5:07 PM Per Nikki, no beds this week. Can call back next Monday if still looking for a bed.   [facility]-  left VM requesting return call to [facility]- Lauren states patient does not meet criteria  [facility]- CM left VM with admissions ([phone]) requesting return call to [facility]- no answer or ability to leave VM  [facility]-  left VM requesting return call to   [facility]-  left VM with admissions for the [facility]- Precious declines due to acuity  [facility]-  left VM requesting return call to CM   [facility]-  left VM requesting return call to [facility]-  left VM requesting return call to     Will need PAS.   Lisa Ng, RN

## 2021-12-20 NOTE — CONSULTS
Consultation - Infectious Disease  [facility]  Cm Lundberg,  1970, MRN [id number]    Admitting Dx: Ulcer of right foot with necrosis of muscle (H) [L97.513]  Diabetes mellitus (H) [E11.9]    PCP: Provider, Generic External Data, None       ASSESSMENT   51-year-old male with a history of diabetes, hypertension, coronary disease, previous CVA who is admitted for debridement of the right foot wound.    1. Nonhealing right foot wound.  Admitted for debridement on .  Per op note, no gross signs of infection.  Wound did not tunnel to bone.  Wound cultures with normal rex, staph aureus, and Enterococcus.  No previous history of MRSA infection.    Active Problems:    Diabetes mellitus (H)       PLAN   -Augmentin x5 days  -Would add doxycycline if MRSA grows  -Wound care per podiatry      Thank you for this consult. I will sign-off at this time. Please do not hesitate to call if there are any further questions or if there is a change in the patient's clinical status.     Denton Acevedo MD  [facility]  Direct messaging: [employer]  On-Call ID provider: [phone], option: 9      ===========================================      Chief Complaint   <principal problem not specified>       HPI     We have been requested by Alex Kim D* to evaluate Cm Lundberg for the above.    History obtained by patient    Cm Lundberg is a 51 year old male with a history of diabetes, hypertension, ischemic cardiomyopathy, previous CVA who is admitted for debridement of her nonhealing right foot wound.  He was taken to the OR by Dr. Kim on  with debridement of a right lateral foot wound.  Cultures are now positive for staph aureus and Enterococcus.  Wound VAC was placed today.  Plan is for discharge to TCU.  Patient is without complaints today.  No antibiotics prior to admission.          Review of Systems   Ten systems reviewed and negative except  "for what is noted in the HPI       Medical History  Past Medical History:   Diagnosis Date     Anxiety      Arthritis      Cerebral infarction (H)      Congestive heart failure (H)      Coronary artery disease     CABG with multiple stents     Diabetes (H)      Heart attack (H)      Hypertension      Migraines      Stented coronary artery     Surgical History  He  has a past surgical history that includes IR Miscellaneous Procedure (2/13/2004) and CABG, VEIN, SINGLE.     Social History  Reviewed, and he  reports that he quit smoking about 6 years ago. He has never used smokeless tobacco. He reports previous alcohol use. He reports previous drug use.  Social History     Social History Narrative     Not on file     Family History  family history is not on file. He was adopted.  family history reviewed and is not pertinent to the presenting problem.            Allergies     Allergies   Allergen Reactions     Aspirin Other (See Comments)     Reacts with migraine medicine     Isosorbide Headache     Codeine Rash     Morphine Rash         Antibiotics   None    Previous:  Perioperative cefazolin      Physical Exam     Temp:  [97.6  F (36.4  C)-97.9  F (36.6  C)] 97.6  F (36.4  C)  Pulse:  [72-74] 72  Resp:  [16-18] 16  BP: (124-142)/(65-74) 125/65  SpO2:  [95 %-99 %] 95 %    /65 (BP Location: Left arm)   Pulse 72   Temp 97.6  F (36.4  C) (Oral)   Resp 16   Ht 1.702 m (5' 7\")   Wt 66.2 kg (146 lb)   SpO2 95%   BMI 22.87 kg/m      GENERAL:  well-developed, well-nourished, lying in bed in no acute distress.   HENT:  Head is normocephalic, atraumatic.   EYES:  Eyes have anicteric sclerae without conjunctival injection   LUNGS:  Clear to auscultation.  CARDIOVASCULAR:  Regular rate and rhythm with no murmurs, gallops or rubs.  ABDOMEN:  Normal bowel sounds, soft, nontender. No appreciable hepatosplenomegaly  EXT: Extremities warm and without edema.  Right foot wound covered with wound VAC.  No other lesions.  SKIN: " " No acute rashes. No stigmata of endocarditis.  NEUROLOGIC:  Grossly nonfocal.      Cultures   11/19 right foot: MSSA and corynebacterium    12/17 right foot: Gram-positive cocci gram-positive rods on Gram stain; culture with staph aureus and Enterococcus      Laboratory results   No results for input(s): WBC, HGB, PLT in the last 168 hours.    Invalid input(s): NEUTROPHILPCT, MONOPCT,  EOSPCT    Recent Labs   Lab 12/18/21  0827      CO2 21*   BUN 10       No results for input(s): CRP in the last 168 hours.    Invalid input(s): SEDRATE        Imaging   Radiology results reviewed    POC US Guidance Needle Placement    Result Date: 12/17/2021  Ultrasound was performed as guidance to an anesthesia procedure.  Click \"PACS images\" hyperlink below to view any stored images.  For specific procedure details, view procedure note authored by anesthesia.      Data reviewed today: I reviewed all medications, new labs and imaging results over the last 24 hours. I personally reviewed no images or EKG's today.  The patient's care was discussed with the Patient.    "

## 2021-12-20 NOTE — CONSULTS
Wound Ostomy  WOC Assessment       Allergies:  Aspirin  Isosorbide  Codeine  Morphine    Diagnosis:   Patient Active Problem List    Diagnosis Date Noted     Diabetes mellitus (H) 12/17/2021     Priority: Medium     Anxiety state 07/29/2021     Priority: Medium     Formatting of this note might be different from the original.  Created by Conversion    Replacement Utility updated for latest IMO load       Coronary atherosclerosis 07/29/2021     Priority: Medium     Formatting of this note might be different from the original.  Created by Conversion    Replacement Utility updated for latest IMO load       Depression 07/29/2021     Priority: Medium     Formatting of this note might be different from the original.  Created by Conversion       Microalbuminuria 07/29/2021     Priority: Medium     Formatting of this note might be different from the original.  Created by Conversion       Problems with learning 07/29/2021     Priority: Medium     Formatting of this note might be different from the original.  Created by Conversion  [email or web address] [facility] Annotation: Feb 11 2010  1:41PM - Leonid Cabral: CANNOT READ OR   WRITE       Type 2 diabetes mellitus (H) 07/29/2021     Priority: Medium     Formatting of this note might be different from the original.  Created by Conversion       Type II or unspecified type diabetes mellitus with ophthalmic manifestations, not stated as uncontrolled(250.50) (H) 07/29/2021     Priority: Medium     Formatting of this note might be different from the original.  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     Priority: Medium     Essential hypertension 08/19/2016     Priority: Medium     Major depressive disorder with single episode, remission status unspecified 08/19/2016     Priority: Medium     Diarrhea 08/19/2016     Priority: Medium     Constipation, unspecified constipation type 08/19/2016     Priority: Medium     Anemia, unspecified type 08/19/2016     Priority: Medium     Abdominal pain, generalized 08/19/2016     Priority: Medium     Pain 08/19/2016     Priority: Medium     Gastroesophageal reflux disease without esophagitis 08/19/2016     Priority: Medium     Migraine 08/19/2016     Priority: Medium     Weakness 08/19/2016     Priority: Medium     Myalgia 08/19/2016     Priority: Medium     Nausea 08/19/2016     Priority: Medium     Chest pain, unspecified type 08/19/2016     Priority: Medium     Insomnia, unspecified type 08/19/2016     Priority: Medium     Hyperlipidemia, unspecified hyperlipidemia type 08/19/2016     Priority: Medium       Height:  [unfilled]    Weight:  [unfilled]    Labs:  Recent Labs   Lab Test 04/29/19  1110   HGB 11.5*   ALBUMIN 4.0       En:  En Score: 17    Specialty Bed:       Wound culture obtained: Yes, Site: left foot, Date:12/20, Results: see below       Edema:  No    Date of most recent photo: 12/20    Anatomic Site/Laterality: Right lateral foot    Reason for ongoing care:   Wound assessment and plan of care     Encounter Type:  Initial Wound Type:   Denudement:  Foreign body present? No    Tissue Damage:   Muscle exposed     Related trauma: Status post incision and drainage of the right foot, excisional debridement of the ulceration of the right foot into the level of the muscle on 12/17/2020 with Dr. Kim    Assessment:    Length: 1.2cm    Width: 0.9cm    Depth: 0.9cm    TSA: 1.08  sqcm    Tunneling/Undermining: Yes 7-9 o'clock 0.3cm    Wound Bed: 90% Granular and 10% fascia    Exudate: Yes Serosanguineous Small    Periwound Skin: Erythema    Treatment Plan: NPWT: 0 # pieces of foam removed, including bridge to dorsal foot 1 # pieces of foam inserted                   Nursing care provided was NA.    Discussed plan of care with nurse and patient.    Outcomes and treatment recommendations are to promote skin integrity, contain exudate and promote wound healing.    Actions taken by WOC RN: 10 minutes of education and WOC Discharge recommendations entered.    Planned Follow Up: Wednesday.    Plan for next visit: Reassess wound(s) and Reassess skin integrity

## 2021-12-20 NOTE — PLAN OF CARE
Problem: Adult Inpatient Plan of Care  Goal: Plan of Care Review  Outcome: Improving  Goal: Absence of Hospital-Acquired Illness or Injury  Intervention: Identify and Manage Fall Risk  Recent Flowsheet Documentation  Taken 12/20/2021 0000 by Sonia Craig RN  Safety Promotion/Fall Prevention:   activity supervised   nonskid shoes/slippers when out of bed   bed alarm on   Pt is doing well.  He is alert and oriented, verbal responses are a bit slow.  Pts pain is being managed with tylenol and tramadol.  Plan is for a wound vac to be placed tomorrow. We will continue to assess and monitor.

## 2021-12-20 NOTE — PLAN OF CARE
Pt received wound vac to right foot today.  Rates his pain high but appears calm and comfortable.  Getting tylenol and tramadol. Sat up in chair with therapy.  Uses call light appropriately. Alarms on for safety.

## 2021-12-20 NOTE — PROGRESS NOTES
[facility] MEDICINE PROGRESS NOTE      Length of stay: Day # 3    Identification/Summary: Cm Lundberg is a 51 year old male with a past medical history of see below.  Patient is anticoagulation for atrial fibrillation flutter, type 2 diabetes, ischemic cardiomyopathy, coronary artery disease, hypertension  who was admitted for incision and drainage of the right foot, excisional debridement of the ulceration of the right foot into the level of the muscle, under MAC with popliteal block anesthesia on 12/17/2021.     Assessment and Plan:     Status post incision and drainage of the right foot, excisional debridement of the ulceration of the right foot into the level of the muscle on 12/17/2020  Postoperative care with orthopedics  Surgical dressing remain intact until the wound VAC is applied on 12/20/2021.  Waiting for place where wound VAC can be maintained  Pain management, DVT prophylaxis and PT and OT.  Vital signs are stable postoperatively.  Need to resume AC soon when ok with podiatry/ortho.-->  Podiatry note indicated is okay to resume the DOAC.  Patient was given cefazolin before and after the surgery.  Cultures as below.  Defer further antibiotics to podiatry.  They would not like to put him on antibiotics at this time per their note.  Requested for infectious disease consult..    Fibrillation/flutter, chronically anticoagulated, stable  We will resume DOAC per note of surgery surgery.  With the benefits versus risk of this medication postop.  Resumed medication for rate control-on metoprolol.     History of stroke, clinically stable  Patient has atrophy of extremities  Patient is on blood thinner.  Decide to resume it when bleeding risk is low.  Will decide that with orthopedics postoperatively.  Patient is a wheelchair.  Treat underlying risk factors including coronary artery disease, diabetes, hypertension  Patient lives in a nursing home     Ischemic cardiomyopathy,  status post CABG, clinically stable  Dyslipidemia  Coronary disease, no chest pain  Resume home heart medications.  Reassess fluid and electrolytes.     Type 2 diabetes with complications, controlled  Diabetic foot ulcer as above  Monitor blood sugar.  Resumed home medications of any  Patient is on multiple oral hypoglycemic medications which will be resumed if patient is eating well     Essential hypertension  Reviewed and resume home medications and optimize as needed  Hold parameters postop (if SBP less than 115 or heart rate less than 60 for metoprolol.)     Mood disorder  Resume multiple psych medications     --------------------------------------------------------------------     Discharge disposition: Defer to orthopedics.  Hoping to put a wound VAC on 12/20/2021 and discharged on skilled nursing facility that can manage it.  Diet: Moderate Consistent Carb (60 g CHO per Meal) Diet  DVT Prophylaxis: Already on rivaroxaban  Code Status: No Order  Living situation: --     ----------------------------------------------------------------------     Cumulative essential/pertinent data reviewed:  Labs:  Glucose 119  Metabolic panel unremarkable  Wound culture shows the following:     Abnormal   3+ Gram positive cocci       2+ Gram positive bacilli resembling diphtheroids       3+ WBC seen      Metabolic panel unremarkable  Glucose one oh eight stable.    ----------------------------------------------------------------------    Interval History/Subjective:  Patient doing well.  Completely comfortable at this time.  Watching his iPad.  Denies any chest pain or shortness of breath.  Ate breakfast.  He knows that he is going for wound VAC today.  No nausea or vomiting.  Rest of review of systems unremarkable.    Physical Exam/Objective:  Temp:  [97.6  F (36.4  C)-97.9  F (36.6  C)] 97.6  F (36.4  C)  Pulse:  [72-74] 72  Resp:  [16-18] 16  BP: (124-142)/(65-74) 125/65  SpO2:  [95 %-99 %] 95 %    Body mass index is 22.87  kg/m .    GENERAL:   Alert and coherent;  appears comfortable, in no acute distress,   HEAD:  Normocephalic, without obvious abnormality   EYES:  Grossly normal;    NOSE: Grossly normal   THROAT: Lips and mouth external: grossly normal,    NECK: No mass noted; no stiffness   BACK:      LUNGS:   Auscultation: Negative for wheezing; No  crackles, symmetric chest rise on inhalation, respirations unlabored    CHEST WALL:  No tenderness or deformity   HEART:  Regular rate and rhythm, significant murmurs: Negative,    ABDOMEN:   Soft, non-tender, no masses, no peritoneal signs   EXTREMITIES: Defer examination of the operative site to surgery; status post incision and drainage of the right foot  No tenderness of the calves.  No significant ankle edema   SKIN:  see extremities   NEURO: no signs of acute stroke or change from prior state   PSYCH: Behavior is appropriate     Medications:   Personally Reviewed.    acetaminophen  975 mg Oral Q8H     ARIPiprazole  2 mg Oral Daily     atorvastatin  80 mg Oral Daily     cholestyramine  1 packet Oral Every Other Day     DULoxetine  120 mg Oral Daily     fenofibrate micronized  201 mg Oral QAM AC     gabapentin  100 mg Oral BID AC     gabapentin  600 mg Oral At Bedtime     glipiZIDE  5 mg Oral QAM AC     LORazepam  0.5 mg Oral QAM AC     losartan  25 mg Oral At Bedtime     magnesium oxide  400 mg Oral 4x Daily     metFORMIN  1,000 mg Oral BID w/meals     metoprolol succinate ER  75 mg Oral Daily     mirtazapine  7.5 mg Oral At Bedtime     pantoprazole  40 mg Oral Daily     polyethylene glycol  17 g Oral Daily     rivaroxaban ANTICOAGULANT  20 mg Oral Daily     senna-docusate  1 tablet Oral BID     sodium chloride (PF)  3 mL Intracatheter Q8H     sodium chloride (PF)  3 mL Intracatheter Q8H     traZODone  50 mg Oral At Bedtime     vitamin C  500 mg Oral Daily     Current Facility-Administered Medications   Medication Dose Route Frequency     acetaminophen  650 mg Oral Q4H PRN      bisacodyl  10 mg Rectal Daily PRN     lidocaine 4%   Topical Q1H PRN     lidocaine (buffered or not buffered)  0.1-1 mL Other Q1H PRN     magnesium hydroxide  30 mL Oral Daily PRN     naloxone  0.2 mg Intravenous Q2 Min PRN    Or     naloxone  0.4 mg Intravenous Q2 Min PRN    Or     naloxone  0.2 mg Intramuscular Q2 Min PRN    Or     naloxone  0.4 mg Intramuscular Q2 Min PRN     nitroGLYcerin  0.4 mg Sublingual Q5 Min PRN     ondansetron  4 mg Oral Q6H PRN    Or     ondansetron  4 mg Intravenous Q6H PRN     prochlorperazine  10 mg Intravenous Q6H PRN    Or     prochlorperazine  10 mg Oral Q6H PRN     senna-docusate  1 tablet Oral BID PRN     sodium chloride (PF)  3 mL Intracatheter q1 min prn     sodium chloride (PF)  3 mL Intracatheter q1 min prn     traMADol  100 mg Oral Q6H PRN     Triple Antibiotic   Topical TID PRN           Adal Bailey MD  [facility]  [facility]  Phone: #[phone]

## 2021-12-20 NOTE — PLAN OF CARE
Problem: Adult Inpatient Plan of Care  Goal: Plan of Care Review  Outcome: Improving     Problem: Infection  Goal: Absence of Infection Signs and Symptoms  Outcome: Improving   VSS on room air and voiding adequately using the male external urinary catheter.     Surgical dressing is CDI. Managing pain with ice therapy, scheduled medications and PRN tramadol. Bed alarm active for safety.

## 2021-12-21 ENCOUNTER — APPOINTMENT (OUTPATIENT)
Dept: OCCUPATIONAL THERAPY | Facility: CLINIC | Age: 51
End: 2021-12-21
Payer: MEDICARE

## 2021-12-21 LAB
GLUCOSE BLDC GLUCOMTR-MCNC: 132 MG/DL (ref 70–99)
GLUCOSE BLDC GLUCOMTR-MCNC: 71 MG/DL (ref 70–99)
GLUCOSE BLDC GLUCOMTR-MCNC: 86 MG/DL (ref 70–99)
GLUCOSE BLDC GLUCOMTR-MCNC: 87 MG/DL (ref 70–99)
GLUCOSE BLDC GLUCOMTR-MCNC: 92 MG/DL (ref 70–99)
HBA1C MFR BLD: 5.1 %
SARS-COV-2 RNA RESP QL NAA+PROBE: NEGATIVE

## 2021-12-21 PROCEDURE — 87635 SARS-COV-2 COVID-19 AMP PRB: CPT | Performed by: INTERNAL MEDICINE

## 2021-12-21 PROCEDURE — 97535 SELF CARE MNGMENT TRAINING: CPT | Mod: GO

## 2021-12-21 PROCEDURE — 99232 SBSQ HOSP IP/OBS MODERATE 35: CPT | Performed by: INTERNAL MEDICINE

## 2021-12-21 PROCEDURE — 120N000001 HC R&B MED SURG/OB

## 2021-12-21 PROCEDURE — 83036 HEMOGLOBIN GLYCOSYLATED A1C: CPT | Performed by: INTERNAL MEDICINE

## 2021-12-21 PROCEDURE — 250N000013 HC RX MED GY IP 250 OP 250 PS 637: Performed by: PODIATRIST

## 2021-12-21 PROCEDURE — 36415 COLL VENOUS BLD VENIPUNCTURE: CPT | Performed by: INTERNAL MEDICINE

## 2021-12-21 PROCEDURE — 250N000013 HC RX MED GY IP 250 OP 250 PS 637: Performed by: INTERNAL MEDICINE

## 2021-12-21 RX ADMIN — GABAPENTIN 600 MG: 300 CAPSULE ORAL at 20:49

## 2021-12-21 RX ADMIN — AMOXICILLIN AND CLAVULANATE POTASSIUM 1 TABLET: 875; 125 TABLET, FILM COATED ORAL at 20:49

## 2021-12-21 RX ADMIN — METFORMIN HYDROCHLORIDE 1000 MG: 500 TABLET, FILM COATED ORAL at 08:54

## 2021-12-21 RX ADMIN — TRAMADOL HYDROCHLORIDE 100 MG: 50 TABLET, FILM COATED ORAL at 18:31

## 2021-12-21 RX ADMIN — ARIPIPRAZOLE 2 MG: 2 TABLET ORAL at 08:45

## 2021-12-21 RX ADMIN — GABAPENTIN 100 MG: 100 CAPSULE ORAL at 07:04

## 2021-12-21 RX ADMIN — TRAMADOL HYDROCHLORIDE 100 MG: 50 TABLET, FILM COATED ORAL at 13:35

## 2021-12-21 RX ADMIN — PANTOPRAZOLE SODIUM 40 MG: 20 TABLET, DELAYED RELEASE ORAL at 08:47

## 2021-12-21 RX ADMIN — GABAPENTIN 100 MG: 100 CAPSULE ORAL at 18:30

## 2021-12-21 RX ADMIN — ATORVASTATIN CALCIUM 80 MG: 40 TABLET, FILM COATED ORAL at 08:45

## 2021-12-21 RX ADMIN — TRAZODONE HYDROCHLORIDE 50 MG: 50 TABLET ORAL at 20:49

## 2021-12-21 RX ADMIN — GLIPIZIDE 2.5 MG: 5 TABLET ORAL at 07:41

## 2021-12-21 RX ADMIN — LOSARTAN POTASSIUM 25 MG: 25 TABLET, FILM COATED ORAL at 20:49

## 2021-12-21 RX ADMIN — DULOXETINE HYDROCHLORIDE 120 MG: 60 CAPSULE, DELAYED RELEASE PELLETS ORAL at 08:46

## 2021-12-21 RX ADMIN — RIVAROXABAN 20 MG: 10 TABLET, FILM COATED ORAL at 08:46

## 2021-12-21 RX ADMIN — AMOXICILLIN AND CLAVULANATE POTASSIUM 1 TABLET: 875; 125 TABLET, FILM COATED ORAL at 07:41

## 2021-12-21 RX ADMIN — MIRTAZAPINE 7.5 MG: 7.5 TABLET, FILM COATED ORAL at 20:49

## 2021-12-21 RX ADMIN — LORAZEPAM 0.5 MG: 0.5 TABLET ORAL at 07:04

## 2021-12-21 RX ADMIN — TRAMADOL HYDROCHLORIDE 100 MG: 50 TABLET, FILM COATED ORAL at 00:57

## 2021-12-21 RX ADMIN — OXYCODONE HYDROCHLORIDE AND ACETAMINOPHEN 500 MG: 500 TABLET ORAL at 08:44

## 2021-12-21 RX ADMIN — METOPROLOL SUCCINATE 75 MG: 50 TABLET, EXTENDED RELEASE ORAL at 08:44

## 2021-12-21 RX ADMIN — FENOFIBRATE 201 MG: 67 CAPSULE ORAL at 07:03

## 2021-12-21 ASSESSMENT — ACTIVITIES OF DAILY LIVING (ADL)
ADLS_ACUITY_SCORE: 25
ADLS_ACUITY_SCORE: 27
ADLS_ACUITY_SCORE: 25
ADLS_ACUITY_SCORE: 25
ADLS_ACUITY_SCORE: 27
ADLS_ACUITY_SCORE: 27
ADLS_ACUITY_SCORE: 25
ADLS_ACUITY_SCORE: 27
ADLS_ACUITY_SCORE: 25
ADLS_ACUITY_SCORE: 27

## 2021-12-21 NOTE — PROGRESS NOTES
[facility] MEDICINE PROGRESS NOTE      Length of stay: Day #4    Identification/Summary: Cm Lundberg is a 51 year old male with a past medical history of see below.  Patient is anticoagulation for atrial fibrillation flutter, type 2 diabetes, ischemic cardiomyopathy, coronary artery disease, hypertension  who was admitted for incision and drainage of the right foot, excisional debridement of the ulceration of the right foot into the level of the muscle, under MAC with popliteal block anesthesia on 12/17/2021.    [facility] consulting.     Assessment and Plan:     Status post incision and drainage of the right foot, excisional debridement of the ulceration of the right foot into the level of the muscle on 12/17/2020  Postoperative care with orthopedics  Surgical dressing remain intact until the wound VAC is applied on 12/20/2021.  Waiting for place where wound VAC can be maintained  Pain management, DVT prophylaxis and PT and OT.  Vital signs are stable postoperatively.  Need to resume AC soon when ok with podiatry/ortho.-->  Podiatry note indicated is okay to resume the DOAC.  Patient was given cefazolin before and after the surgery.  Cultures as below.  Defer further antibiotics to podiatry.  They would not like to put him on antibiotics at this time per their note.  Infectious disease recommended Augmentin for 5 days.  Would add doxycycline if MRSA grows.  Wound care per podiatry.    Fibrillation/flutter, chronically anticoagulated, stable  Resumed DOAC per note of surgery surgery.  With the benefits versus risk of this medication postop.  Resumed medication for rate control-on metoprolol.     History of stroke, clinically stable  Patient has atrophy of extremities  Patient is on blood thinner.  Decide to resume it when bleeding risk is low.  Will decide that with orthopedics postoperatively.  Patient is a wheelchair.  Treat underlying risk factors including coronary artery disease,  diabetes, hypertension  Patient lives in a nursing home     Ischemic cardiomyopathy, status post CABG, clinically stable  Dyslipidemia  Coronary disease, no chest pain  Resume home heart medications.  Reassess fluid and electrolytes.     Type 2 diabetes with complications, controlled  Diabetic foot ulcer as above  Monitor blood sugar.  Resumed home medications of any  Patient is on multiple oral hypoglycemic medications which will be resumed if patient is eating well     Essential hypertension, controlled  Reviewed and resume home medications and optimize as needed  Hold parameters postop (if SBP less than 115 or heart rate less than 60 for metoprolol.)     Mood disorder, stable  Resume multiple psych medications     --------------------------------------------------------------------     Discharge disposition: Defer to orthopedics.  Hoping to put a wound VAC on 12/20/2021 and discharged on skilled nursing facility that can manage it.  Diet: Moderate Consistent Carb (60 g CHO per Meal) Diet  DVT Prophylaxis: Already on rivaroxaban  Code Status: No Order  Living situation: --     ----------------------------------------------------------------------     Cumulative essential/pertinent data reviewed:  Labs:  Glucose 119  Metabolic panel unremarkable  Wound culture shows the following:     Abnormal   3+ Gram positive cocci       2+ Gram positive bacilli resembling diphtheroids       3+ WBC seen      Metabolic panel unremarkable  Glucose one oh eight stable.        ----------------------------------------------------------------------    Interval History/Subjective:  Patient doing well overall postoperatively.  Denies any chest pain or shortness of breath.  No abdominal pain nausea or vomiting.  Eating adequately.  He seems to be in a good mood.  Rest of review of systems unremarkable.    Physical Exam/Objective:  Temp:  [97.7  F (36.5  C)-98.4  F (36.9  C)] 97.7  F (36.5  C)  Pulse:  [78-82] 78  Resp:  [16-18] 16  BP:  (115-140)/(70-82) 115/70  SpO2:  [95 %-96 %] 96 %    Body mass index is 22.87 kg/m .    GENERAL:   Alert and coherent not in distress.;  appears comfortable, in no acute distress,   HEAD:  Normocephalic, without obvious abnormality   EYES:  Grossly normal;    NOSE: Grossly normal   THROAT: Lips and mouth external: grossly normal,    NECK: No mass noted; no stiffness   BACK:      LUNGS:   Auscultation: Negative for wheezing; No crackles, symmetric chest rise on inhalation, respirations unlabored   CHEST WALL:  No tenderness or deformity   HEART:  Regular rate and rhythm, significant murmurs: Negative   ABDOMEN:   Soft, non-tender, no masses, no peritoneal signs   EXTREMITIES: Defer examination of the operative site to surgery; status post right foot surgery.  No tenderness of the calves.  No significant ankle edema   SKIN:  see extremities   NEURO: no signs of acute stroke or change from prior state   PSYCH: Behavior is appropriate     Medications:   Personally Reviewed.    amoxicillin-clavulanate  1 tablet Oral Q12H CARLYLE     ARIPiprazole  2 mg Oral Daily     atorvastatin  80 mg Oral Daily     cholestyramine  1 packet Oral Every Other Day     DULoxetine  120 mg Oral Daily     fenofibrate micronized  201 mg Oral QAM AC     gabapentin  100 mg Oral BID AC     gabapentin  600 mg Oral At Bedtime     glipiZIDE  2.5 mg Oral QAM AC     LORazepam  0.5 mg Oral QAM AC     losartan  25 mg Oral At Bedtime     magnesium oxide  400 mg Oral 4x Daily     metFORMIN  1,000 mg Oral BID w/meals     metoprolol succinate ER  75 mg Oral Daily     mirtazapine  7.5 mg Oral At Bedtime     pantoprazole  40 mg Oral Daily     polyethylene glycol  17 g Oral Daily     rivaroxaban ANTICOAGULANT  20 mg Oral Daily     senna-docusate  1 tablet Oral BID     sodium chloride (PF)  3 mL Intracatheter Q8H     traZODone  50 mg Oral At Bedtime     vitamin C  500 mg Oral Daily     Current Facility-Administered Medications   Medication Dose Route Frequency      acetaminophen  650 mg Oral Q4H PRN     bisacodyl  10 mg Rectal Daily PRN     lidocaine 4%   Topical Q1H PRN     lidocaine (buffered or not buffered)  0.1-1 mL Other Q1H PRN     magnesium hydroxide  30 mL Oral Daily PRN     naloxone  0.2 mg Intravenous Q2 Min PRN    Or     naloxone  0.4 mg Intravenous Q2 Min PRN    Or     naloxone  0.2 mg Intramuscular Q2 Min PRN    Or     naloxone  0.4 mg Intramuscular Q2 Min PRN     nitroGLYcerin  0.4 mg Sublingual Q5 Min PRN     ondansetron  4 mg Oral Q6H PRN    Or     ondansetron  4 mg Intravenous Q6H PRN     prochlorperazine  10 mg Intravenous Q6H PRN    Or     prochlorperazine  10 mg Oral Q6H PRN     senna-docusate  1 tablet Oral BID PRN     sodium chloride (PF)  3 mL Intracatheter q1 min prn     sodium chloride (PF)  3 mL Intracatheter q1 min prn     traMADol  100 mg Oral Q6H PRN     Triple Antibiotic   Topical TID PRN         Adal Bailey MD  Hospitalist  [facility] Medicine  [facility]  Phone: #[phone]

## 2021-12-21 NOTE — PLAN OF CARE
"Problem: Adult Inpatient Plan of Care  Goal: Plan of Care Review  Outcome: Improving     Problem: Adult Inpatient Plan of Care  Goal: Optimal Comfort and Wellbeing  Outcome: Improving     Pt is alert, appears comfortable despite rating pain high from 6-9/10 consistently during shift; states that tylenol and tramadol help keep his pain lower. Has wound vac in place to RLE dressing CDI to top of right foot, vac continuous and powered on. Plan is to discharge to TCU for management of WOC/wound vac. Held scheduled PO magnesium, PO senna d/t loose stools on shift. Using external male catheter for voiding. Pt utilizes call light appropriately. Blood sugars WDL at AC, HS: 90, 85, respectively. VSS. Started PO augmentin this evening for ABX regimen.    BP (!) 140/82 (BP Location: Left arm, Patient Position: Semi-Ramirez's)   Pulse 81   Temp 98  F (36.7  C) (Oral)   Resp 18   Ht 1.702 m (5' 7\")   Wt 66.2 kg (146 lb)   SpO2 96%   BMI 22.87 kg/m        "

## 2021-12-21 NOTE — PROGRESS NOTES
Care Management Follow Up    Length of Stay (days): 4    Expected Discharge Date: 12/22/2021     Concerns to be Addressed:       Patient plan of care discussed at interdisciplinary rounds: Yes    Anticipated Discharge Disposition: TCU- [facility]      Anticipated Discharge Services:U- [facility]     Anticipated Discharge DME: None  Patient/family educated on Medicare website which has current facility and service quality ratings: yes, CM reviewed with mother/legal guardian Jewels ([phone])   Education Provided on the Discharge Plan:  CM reviewed with mother/legal guardian. AVS will be per bedside RN.   Patient/Family in Agreement with the Plan: yes    Referrals Placed by CM/SW:  TCU  Private pay costs discussed: WC transport    Additional Information:  Lauren Barber in admissions will review and return call to .   [facility]-  left VM with admissions requesting return call to CM.   Roge in admissions for [facility] states no bed availability today but requested referral be sent again for review. CM faxed per request.   [facility] is reviewing and will return call to .   [facility]- Trisha requests referral be faxed to [phone]-  faxed per request. Trisha returned call to  and facility declines.   [facility]-  left VM with admissions requesting return call to .   Maya/Cecil Chavez in admissions states she can review but does not anticipate bed availability for today or tomorrow. 3:54 PM Returned call to  and stated she could possibly accept into a shared LTC bed. CM spoke to mother/legal guardian and she confirms preference is to keep private room at [facility].         2:28 PM  Jennifer confirms [facility] can accept into shared room- still need to confirm if able to take yet today or tomorrow and will return call to CM. DAMEON  updated mother/legal guardian who plans to discuss further with her daughters and then return call to  to confirm bed.    Counts include 234 beds at the [facility]- Jill in admissions will review and return call to .     2:35 PM  CM spoke to Podiatry (Dr. Kim) and Hospitalist (Dr. Bailey)- both confirm patient is medically ready for discharge once TCU bed is available.   Mother/legal guardian (Jewels [phone]) confirms family is agreeable with placement at [facility]. Family requesting WC transportation- CM reviewed potential out of pocket expense, discussed has MA and mother reports MA has covered WC transportation in the past. CM left VM with [facility] admissions requesting return call to  to confirm family accepts bed and to determine if facility can still take patient this evening or if this needs to wait until tomorrow.   [facility]- admissions returned call to  and reports facility declines placement due to acuity.     2:45 PM   Jennifer with [facility] returns call to  and states facility has to do some room changes and request transport tomorrow, need orders two hours prior to transport, OK to plan for 10:30am discharge from hospital.   CM arranged for WC transport for tomorrow morning (12/22) at 10:30am. CM updated MD, Charge RN and bedside RN. CM updated patient and mother/legal guardian (Jewels).     2:52 PM  Hardy in admissions at [facility] returns call to  and states facility can accept tomorrow morning pending repeat COVID, into a private room.   CM spoke with legal guardian/mother, she confirms family preference is for this placement as they prefer patient to be in a private room. CM left VM with admissions confirming family preference is for this placement over [facility].    CM updated MD, bedside RN and Charge RN. CM changed WC transport to [facility]. CM updated Jennifer with [facility] that family chose alternate facility due to option for  private room.     Needs PAS.         Lisa Ng RN

## 2021-12-21 NOTE — PLAN OF CARE
Soft spoken. Weakness on R side baseline. Tramadol given at 0057 for pain of RLE. Wound vac running on continuous to LRE. No further complaints of pain during shift. External male catheter, patent with yellow urine from suction. VSS. Blood glucose of 85 @ 10pm and 94 @ 0400.

## 2021-12-21 NOTE — PLAN OF CARE
Patient vital signs are at baseline: Yes  Patient able to ambulate as they were prior to admission or with assist devices provided by therapies during their stay:  No,  Reason:  Pt NWB to RLE  Patient MUST void prior to discharge:  Yes  Patient able to tolerate oral intake:  Yes  Pain has adequate pain control using Oral analgesics:  Yes    Wound vac in place and running.  Alarms on for safety.

## 2021-12-22 ENCOUNTER — APPOINTMENT (OUTPATIENT)
Dept: OCCUPATIONAL THERAPY | Facility: CLINIC | Age: 51
End: 2021-12-22
Payer: MEDICARE

## 2021-12-22 VITALS
TEMPERATURE: 97.7 F | SYSTOLIC BLOOD PRESSURE: 114 MMHG | HEART RATE: 80 BPM | BODY MASS INDEX: 22.91 KG/M2 | WEIGHT: 146 LBS | HEIGHT: 67 IN | RESPIRATION RATE: 16 BRPM | DIASTOLIC BLOOD PRESSURE: 64 MMHG | OXYGEN SATURATION: 92 %

## 2021-12-22 LAB
BACTERIA SPEC CULT: ABNORMAL
GLUCOSE BLDC GLUCOMTR-MCNC: 111 MG/DL (ref 70–99)

## 2021-12-22 PROCEDURE — 99207 PR CDG-MDM COMPONENT: MEETS MODERATE - DOWN CODED: CPT | Performed by: INTERNAL MEDICINE

## 2021-12-22 PROCEDURE — 97535 SELF CARE MNGMENT TRAINING: CPT | Mod: GO

## 2021-12-22 PROCEDURE — 999N000198 HC STATISTIC WOC PT EDUCATION, 16-30 MIN

## 2021-12-22 PROCEDURE — 250N000013 HC RX MED GY IP 250 OP 250 PS 637: Performed by: PODIATRIST

## 2021-12-22 PROCEDURE — 99232 SBSQ HOSP IP/OBS MODERATE 35: CPT | Performed by: INTERNAL MEDICINE

## 2021-12-22 PROCEDURE — 250N000013 HC RX MED GY IP 250 OP 250 PS 637: Performed by: INTERNAL MEDICINE

## 2021-12-22 RX ORDER — LORAZEPAM 0.5 MG/1
0.5 TABLET ORAL
Qty: 10 TABLET | Refills: 0 | Status: SHIPPED | OUTPATIENT
Start: 2021-12-22 | End: 2022-03-02

## 2021-12-22 RX ORDER — OXYCODONE HYDROCHLORIDE 5 MG/1
5 TABLET ORAL EVERY 6 HOURS PRN
Qty: 12 TABLET | Refills: 0 | Status: SHIPPED | OUTPATIENT
Start: 2021-12-22 | End: 2022-01-25

## 2021-12-22 RX ADMIN — LORAZEPAM 0.5 MG: 0.5 TABLET ORAL at 06:34

## 2021-12-22 RX ADMIN — AMOXICILLIN AND CLAVULANATE POTASSIUM 1 TABLET: 875; 125 TABLET, FILM COATED ORAL at 08:28

## 2021-12-22 RX ADMIN — ACETAMINOPHEN 650 MG: 325 TABLET ORAL at 08:36

## 2021-12-22 RX ADMIN — ATORVASTATIN CALCIUM 80 MG: 40 TABLET, FILM COATED ORAL at 08:27

## 2021-12-22 RX ADMIN — ARIPIPRAZOLE 2 MG: 2 TABLET ORAL at 08:27

## 2021-12-22 RX ADMIN — DULOXETINE HYDROCHLORIDE 120 MG: 60 CAPSULE, DELAYED RELEASE PELLETS ORAL at 08:28

## 2021-12-22 RX ADMIN — OXYCODONE HYDROCHLORIDE AND ACETAMINOPHEN 500 MG: 500 TABLET ORAL at 08:27

## 2021-12-22 RX ADMIN — GABAPENTIN 100 MG: 100 CAPSULE ORAL at 06:34

## 2021-12-22 RX ADMIN — FENOFIBRATE 201 MG: 67 CAPSULE ORAL at 08:28

## 2021-12-22 RX ADMIN — PANTOPRAZOLE SODIUM 40 MG: 20 TABLET, DELAYED RELEASE ORAL at 08:28

## 2021-12-22 RX ADMIN — METOPROLOL SUCCINATE 75 MG: 50 TABLET, EXTENDED RELEASE ORAL at 08:26

## 2021-12-22 RX ADMIN — TRAMADOL HYDROCHLORIDE 100 MG: 50 TABLET, FILM COATED ORAL at 04:49

## 2021-12-22 RX ADMIN — GLIPIZIDE 2.5 MG: 5 TABLET ORAL at 08:27

## 2021-12-22 RX ADMIN — RIVAROXABAN 20 MG: 10 TABLET, FILM COATED ORAL at 08:28

## 2021-12-22 ASSESSMENT — ACTIVITIES OF DAILY LIVING (ADL)
ADLS_ACUITY_SCORE: 27
ADLS_ACUITY_SCORE: 31

## 2021-12-22 NOTE — PLAN OF CARE
"Pt is alert, utilizes call light appropriately for assist. Demonstrates good bed mobility, is pivot transfer and NWB to RLE when getting up to commode for BM. Wound vac/NWPT intact to R foot on continuous setting. Rates right foot pain moderate-severe, states tylenol and tramadol help with the pain. Held scheduled PO magnesium, PO senna this shift as pt had been having loose stools. Using external male catheter for voiding. Blood sugars consistently < 100, contacted hospitalist who held scheduled metformin; A1C result 5.1. Continues PO augmentin. VSS.    /71 (BP Location: Left arm, Patient Position: Semi-Ramirez's)   Pulse 83   Temp 97.9  F (36.6  C) (Oral)   Resp 16   Ht 1.702 m (5' 7\")   Wt 66.2 kg (146 lb)   SpO2 96%   BMI 22.87 kg/m      Pt is transferring to TCU 12/22, ride time 1030, for management of WOC/wound vac.   "

## 2021-12-22 NOTE — PROGRESS NOTES
[facility] MEDICINE PROGRESS NOTE      Length of stay: Day #5    Identification/Summary: Cm Lundberg is a 51 year old male with a past medical history of see below.  Patient is anticoagulation for atrial fibrillation flutter, type 2 diabetes, ischemic cardiomyopathy, coronary artery disease, hypertension  who was admitted for incision and drainage of the right foot, excisional debridement of the ulceration of the right foot into the level of the muscle, under MAC with popliteal block anesthesia on 12/17/2021.     [facility] consulting.     Assessment and Plan:     Status post incision and drainage of the right foot, excisional debridement of the ulceration of the right foot into the level of the muscle on 12/17/2020  Postoperative care with orthopedics  Surgical dressing remain intact until the wound VAC is applied on 12/20/2021.  Waiting for place where wound VAC can be maintained  Pain management, DVT prophylaxis and PT and OT.  Vital signs are stable postoperatively.  Need to resume AC soon when ok with podiatry/ortho.-->  Podiatry note indicated is okay to resume the DOAC.  Patient was given cefazolin before and after the surgery.  Cultures as below.  Defer further antibiotics to podiatry.  They would not like to put him on antibiotics at this time per their note.  Infectious disease recommended Augmentin for 5 days.  Would add doxycycline if MRSA grows.  Wound care per podiatry.    Fibrillation/flutter, chronically anticoagulated, stable  Resumed DOAC per note of surgery surgery.  With the benefits versus risk of this medication postop.  Resumed medication for rate control-on metoprolol.     History of stroke, clinically stable  Patient has atrophy of extremities  Patient is on blood thinner.  Decide to resume it when bleeding risk is low.  Will decide that with orthopedics postoperatively.  Patient is a wheelchair.  Treat underlying risk factors including coronary artery disease,  diabetes, hypertension  Patient lives in a nursing home  Guardian is his mother: he is full code     Ischemic cardiomyopathy, status post CABG, clinically stable  Dyslipidemia  Coronary disease, no chest pain  Resume home heart medications.  Reassess fluid and electrolytes.     Type 2 diabetes with complications, with tendency for hypoglycemia  Diabetic foot ulcer as above  His blood sugar has been running on the very low side.  Under 100 sometimes  Has been on Metformin and glipizide.  Patient was discharged and I called the TCU to discontinue the glipizide.  Metformin will be reduced to 500 mg twice a day.  We will continue the Accu-Cheks before every meal and at bedtime to determine if he is still trending towards hypoglycemia or hyperglycemia in which case his medications have to be dose adjusted and optimized.     Essential hypertension, controlled  Reviewed and resume home medications and optimize as needed  Hold parameters postop (if SBP less than 115 or heart rate less than 60 for metoprolol.)     Mood disorder, stable  Resume multiple psych medications     --------------------------------------------------------------------     Discharge disposition: Defer to orthopedics.  Hoping to put a wound VAC on 12/20/2021 and discharged on skilled nursing facility that can manage it.  Diet: Moderate Consistent Carb (60 g CHO per Meal) Diet  DVT Prophylaxis: Already on rivaroxaban  Code Status: Full code; discussed with mother  Living situation: --     ----------------------------------------------------------------------     Cumulative essential/pertinent data reviewed:  Labs:  Glucose 119--> mostly under 100  Metabolic panel unremarkable  Wound culture shows the following:     Abnormal   3+ Gram positive cocci       2+ Gram positive bacilli resembling diphtheroids       3+ WBC seen      Metabolic panel unremarkable  Glucose one oh eight  stable.       ----------------------------------------------------------------------    Interval History/Subjective:  Patient is doing well.  Denies any major pain.  No chest pain or shortness of breath.  Abdominal pain.  He knows that he is going to TCU.  I was able to speak with his mother Zulay.  He is a full code.  Rest of review of systems unremarkable.    Physical Exam/Objective:  Temp:  [97.6  F (36.4  C)-97.9  F (36.6  C)] 97.7  F (36.5  C)  Pulse:  [77-83] 80  Resp:  [16-18] 16  BP: (114-117)/(64-76) 114/64  SpO2:  [92 %-96 %] 92 %    Body mass index is 22.87 kg/m .    GENERAL:   Alert and coherent;  appears comfortable, in no acute distress,   HEAD:  Normocephalic, without obvious abnormality   EYES:  Grossly normal;    NOSE: Grossly normal   THROAT: Lips and mouth external: grossly normal,    NECK: No mass noted; no stiffness   BACK:      LUNGS:   Auscultation: Negative for wheezing; No crackles, symmetric chest rise on inhalation, respirations unlabored   CHEST WALL:  No tenderness or deformity   HEART:  Regular rate and rhythm, significant murmurs: Negative,    ABDOMEN:   Soft, non-tender, no masses, no peritoneal signs   EXTREMITIES: Defer examination of the operative site to surgery; status post right foot surgery with wound VAC in place.  No tenderness of the calves.  No significant ankle edema   SKIN:  see extremities   NEURO: no signs of acute stroke or change from prior state   PSYCH: Behavior is appropriate     Medications:   Personally Reviewed.    amoxicillin-clavulanate  1 tablet Oral Q12H CARLYLE     ARIPiprazole  2 mg Oral Daily     atorvastatin  80 mg Oral Daily     cholestyramine  1 packet Oral Every Other Day     DULoxetine  120 mg Oral Daily     fenofibrate micronized  201 mg Oral QAM AC     gabapentin  100 mg Oral BID AC     gabapentin  600 mg Oral At Bedtime     glipiZIDE  2.5 mg Oral QAM AC     LORazepam  0.5 mg Oral QAM AC     losartan  25 mg Oral At Bedtime     magnesium oxide  400  mg Oral 4x Daily     [Held by provider] metFORMIN  1,000 mg Oral BID w/meals     metoprolol succinate ER  75 mg Oral Daily     mirtazapine  7.5 mg Oral At Bedtime     pantoprazole  40 mg Oral Daily     polyethylene glycol  17 g Oral Daily     rivaroxaban ANTICOAGULANT  20 mg Oral Daily     senna-docusate  1 tablet Oral BID     sodium chloride (PF)  3 mL Intracatheter Q8H     traZODone  50 mg Oral At Bedtime     vitamin C  500 mg Oral Daily     Current Facility-Administered Medications   Medication Dose Route Frequency     acetaminophen  650 mg Oral Q4H PRN     bisacodyl  10 mg Rectal Daily PRN     lidocaine 4%   Topical Q1H PRN     lidocaine (buffered or not buffered)  0.1-1 mL Other Q1H PRN     magnesium hydroxide  30 mL Oral Daily PRN     naloxone  0.2 mg Intravenous Q2 Min PRN    Or     naloxone  0.4 mg Intravenous Q2 Min PRN    Or     naloxone  0.2 mg Intramuscular Q2 Min PRN    Or     naloxone  0.4 mg Intramuscular Q2 Min PRN     nitroGLYcerin  0.4 mg Sublingual Q5 Min PRN     ondansetron  4 mg Oral Q6H PRN    Or     ondansetron  4 mg Intravenous Q6H PRN     prochlorperazine  10 mg Intravenous Q6H PRN    Or     prochlorperazine  10 mg Oral Q6H PRN     senna-docusate  1 tablet Oral BID PRN     sodium chloride (PF)  3 mL Intracatheter q1 min prn     sodium chloride (PF)  3 mL Intracatheter q1 min prn     traMADol  100 mg Oral Q6H PRN     Triple Antibiotic   Topical TID PRN           Adal Bailey MD  [facility]  Phone: #[phone]

## 2021-12-22 NOTE — PLAN OF CARE
Nurse teaching given on 12/22/2021 and the patient expresses understanding and acceptance of instructions, care management notified legal guardian of discharge plan. David Engle RN 12/22/2021 10:37 AM    Plan: discharge to TCU via wheelchair transportation this morning.

## 2021-12-22 NOTE — PLAN OF CARE
Pain controlled with PRN Tramadol. Pain reported to the R foot. Wound Vac in place running on continuous. Foot dressing is clean dry and intact. External male catheter in place with good urinary output. Changes position independently in bed. Calls appropriately.

## 2021-12-22 NOTE — PROGRESS NOTES
"Care Management Discharge Note    Discharge Date: 12/22/2021       Discharge Disposition: Transitional Care- [facility]     Discharge Services:  ([facility] TCU)    Discharge DME: Other (see comment) (Wound VAC per TCU)    Discharge Transportation: agency ([facility])    Private pay costs discussed: discussed potential for HE  transport- has MA    PAS Confirmation Code:  ([id number])  Patient/family educated on Medicare website which has current facility and service quality ratings:  Yes, CM reviewed with mother/legal guardian and     Education Provided on the Discharge Plan:  CM reviewed plan for TCU discharge with patient and mother/legal guardian Jewels. AVS will be per bedside RN.   Persons Notified of Discharge Plans: patient and mother  Patient/Family in Agreement with the Plan: yes    Handoff Referral Completed: CM coordinated with MD, bedside RN, Charge RN and HUC. CM coordinated with Hardy in admissions at TCU.     Additional Information:  Chart reviewed.   Patient and mother/legal guardian confirm plan for discharge to [facility] TCU via [facility] transport at 10:30am. CM updated MD, bedside RN, Charge RN and HUC. CM updated Hardy in admissions at TCU (mother and sister planning to be at facility to assist with paperwork upon patient arrival). CM left VM with IRMA Hernandez at [facility] providing update on discharge disposition.   Both mother and Aamnda Arias ([phone])- contact information provided by mother, with [facility] (said she is not the assigned cadi worker but does the yearly assessment, no indication that he should trigger a level II assessment) both told CM that patient did not have a DD . Mother stated patient \"was in special education classes during school\" but denied developmental delay, states guardianship was due to \"making poor medical and financial decisions\" as an adult.      CM completed PAS.     10:05 AM  CM received call " from transport that they are running about 30 minutes late.   CM updated HUC (she confirms she will update the bedside RN and Charge RN).   CM left VM for mother/legal guardian requesting return call to . CM updated sister Sherri (she will let her mom know).   [facility].     1:55PM  DAMEON received call from [employer] with [facility] [phone] indicating patient requires OBRA Level II assessment and requesting for H&P to be faxed to [phone]   DAMEON supervisor is following up.       Lisa Ng, RN

## 2021-12-22 NOTE — PROGRESS NOTES
Tried calling guardian yesterday for code status as attending surgeon has not addressed it.    No phone response or return call from guardian yresterday regarding his code status.    Update: today, finally talked to his mother Jewels who is his legal guardian. His latest will  is FULL CODE, but family reserves the right to discontinue any life sustaining measures if he does not have quality of life.     Patient going to TCU.    Talked to podiatry - Dr Sg Palomino about this discharge. F/u with Dr Kim in one week.    Adal Bailey MD

## 2021-12-22 NOTE — DISCHARGE INSTRUCTIONS
[facility] DISCHARGE INSTRUCTIONS: (may use NPWT system available at receiving facility if VAC product not available)  Wound location: Right lateral foot  Change Days: MWF  Supplies: small black foam  Cleanse with: Saline, pat dry.  Suction: Continuous at -125 mmHg pressure.  Bridge to top of foot  Back up plan: If VAC malfunctions or unable to maintain seal: VAC dressing must be removed and reapplied within 2 hours of the incident. If floor staff is not able to reapply, place NS moistened gauze in wound bed and cover with appropriate dressing to keep wound bed moist.   Change wet-to-dry dressing BID   Date canister. Chart canister output every shift.    The hospital VAC pump is not to be discharged with the patient.  Please do one of the following prior to discharge:  If a home VAC pump has been delivered, please apply the home pump to the dressing prior to patient discharge.    If the patient is discharging to LTAC or a TCU/SNF and there is a VAC pump waiting for the patient, close clamp and then disconnect the tubing for transfer, place tubing inside a glove.   If the patient is discharging to home or other facility and there is no VAC pump available for immediate placement, remove the VAC dressing and apply a wet-to-moist gauze dressing for transfer.    Alex Kim DPM    Attending    Since 12/17/2021    [phone]     Follow up in 2 weeks    Provider, Generic External Data    General     Follow up with primary care physician in 7 days

## 2021-12-22 NOTE — PROGRESS NOTES
CM had a call from [facility] asking for a call back. CM called and spoke with [facility] at [phone]. CM called [facility] back and learned that the pt would need a Level 2 assessment PAS. Information sent as needed and [facility] stated that they will follow up for Level 2 Assessment with TCU.     Stephy Oliveira, [facility]

## 2021-12-22 NOTE — PLAN OF CARE
Occupational Therapy Discharge Summary    Reason for therapy discharge:    Discharged to transitional care facility.    Progress towards therapy goal(s). See goals on Care Plan in [facility] electronic health record for goal details.  Goals partially met.  Barriers to achieving goals:   discharge from facility.    Therapy recommendation(s):    Continued therapy is recommended.  Rationale/Recommendations:  To increase indep with .ADLs and trsfs keeping his Non-WB to the R L/E.

## 2021-12-22 NOTE — PROGRESS NOTES
Wound Ostomy  WOC Assessment       Allergies:  Aspirin  Isosorbide  Codeine  Morphine    Diagnosis:   Patient Active Problem List    Diagnosis Date Noted     Diabetes mellitus (H) 12/17/2021     Priority: Medium     Anxiety state 07/29/2021     Priority: Medium     Formatting of this note might be different from the original.  Created by Conversion    Replacement Utility updated for latest IMO load       Coronary atherosclerosis 07/29/2021     Priority: Medium     Formatting of this note might be different from the original.  Created by Conversion    Replacement Utility updated for latest IMO load       Depression 07/29/2021     Priority: Medium     Formatting of this note might be different from the original.  Created by Conversion       Microalbuminuria 07/29/2021     Priority: Medium     Formatting of this note might be different from the original.  Created by Conversion       Problems with learning 07/29/2021     Priority: Medium     Formatting of this note might be different from the original.  Created by Conversion  [employer] [facility] Annotation: Feb 11 2010  1:41PM - Leonid Cabral: CANNOT READ OR   WRITE       Type 2 diabetes mellitus (H) 07/29/2021     Priority: Medium     Formatting of this note might be different from the original.  Created by Conversion       Type II or unspecified type diabetes mellitus with ophthalmic manifestations, not stated as uncontrolled(250.50) (H) 07/29/2021     Priority: Medium     Formatting of this note might be different from the original.  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     Priority: Medium     Essential hypertension 08/19/2016     Priority: Medium     Major depressive disorder with single episode, remission status unspecified 08/19/2016     Priority: Medium     Diarrhea 08/19/2016     Priority: Medium     Constipation, unspecified constipation type 08/19/2016     Priority: Medium     Anemia, unspecified type 08/19/2016     Priority: Medium     Abdominal pain, generalized 08/19/2016     Priority: Medium     Pain 08/19/2016     Priority: Medium     Gastroesophageal reflux disease without esophagitis 08/19/2016     Priority: Medium     Migraine 08/19/2016     Priority: Medium     Weakness 08/19/2016     Priority: Medium     Myalgia 08/19/2016     Priority: Medium     Nausea 08/19/2016     Priority: Medium     Chest pain, unspecified type 08/19/2016     Priority: Medium     Insomnia, unspecified type 08/19/2016     Priority: Medium     Hyperlipidemia, unspecified hyperlipidemia type 08/19/2016     Priority: Medium     Labs:  Recent Labs   Lab Test 04/29/19  1110   HGB 11.5*   ALBUMIN 4.0       En:  En Score: 17    Specialty Bed:       Wound culture obtained: Yes, Site: left foot, Date:12/20, Results: see below       Edema:  No    Date of most recent photo: 12/20    Chart review only - patient discharging today at 1030 to TCU. Nursing to remove VAC dressing and place wet to moist for transfer per orders. TCU to begin their own NPWT upon arrival. Discharge orders updated.     See below for last WOC assessment on 12/20/21.    Anatomic Site/Laterality: Right lateral foot    Reason for ongoing care:   Wound assessment and plan of care     Encounter Type:  Initial Wound Type:   Denudement:  Foreign body present? No    Tissue Damage:   Muscle exposed     Related trauma: Status post incision and drainage of the right  foot, excisional debridement of the ulceration of the right foot into the level of the muscle on 12/17/2020 with Dr. Kim    Assessment:    Length: 1.2cm    Width: 0.9cm    Depth: 0.9cm    TSA: 1.08 sqcm    Tunneling/Undermining: Yes 7-9 o'clock 0.3cm    Wound Bed: 90% Granular and 10% fascia    Exudate: Yes Serosanguineous Small    Periwound Skin: Erythema    Treatment Plan: NPWT: 0 # pieces of foam removed, including bridge to dorsal foot 1 # pieces of foam inserted                   Nursing care provided was NA.    Discussed plan of care with nurse and patient.    Outcomes and treatment recommendations are to promote skin integrity, contain exudate and promote wound healing.    Actions taken by WOC RN: 10 minutes of education and WOC Discharge recommendations entered.    Planned Follow Up: Wednesday.    Plan for next visit: Reassess wound(s) and Reassess skin integrity

## 2021-12-24 ENCOUNTER — LAB REQUISITION (OUTPATIENT)
Dept: LAB | Facility: CLINIC | Age: 51
End: 2021-12-24
Payer: MEDICARE

## 2021-12-24 DIAGNOSIS — I10 ESSENTIAL (PRIMARY) HYPERTENSION: ICD-10-CM

## 2021-12-24 DIAGNOSIS — E83.42 HYPOMAGNESEMIA: ICD-10-CM

## 2021-12-24 DIAGNOSIS — L97.411 NON-PRESSURE CHRONIC ULCER OF RIGHT HEEL AND MIDFOOT LIMITED TO BREAKDOWN OF SKIN (H): ICD-10-CM

## 2021-12-25 LAB — BACTERIA SPEC CULT: NORMAL

## 2021-12-26 ENCOUNTER — HEALTH MAINTENANCE LETTER (OUTPATIENT)
Age: 51
End: 2021-12-26

## 2021-12-28 LAB
ANION GAP SERPL CALCULATED.3IONS-SCNC: 8 MMOL/L (ref 5–18)
BUN SERPL-MCNC: 17 MG/DL (ref 8–22)
CALCIUM SERPL-MCNC: 10.3 MG/DL (ref 8.5–10.5)
CHLORIDE BLD-SCNC: 107 MMOL/L (ref 98–107)
CO2 SERPL-SCNC: 25 MMOL/L (ref 22–31)
CREAT SERPL-MCNC: 0.99 MG/DL (ref 0.7–1.3)
ERYTHROCYTE [DISTWIDTH] IN BLOOD BY AUTOMATED COUNT: 13.4 % (ref 10–15)
GFR SERPL CREATININE-BSD FRML MDRD: >90 ML/MIN/1.73M2
GLUCOSE BLD-MCNC: 141 MG/DL (ref 70–125)
HCT VFR BLD AUTO: 36.9 % (ref 40–53)
HGB BLD-MCNC: 11.1 G/DL (ref 13.3–17.7)
MAGNESIUM SERPL-MCNC: 1.6 MG/DL (ref 1.8–2.6)
MCH RBC QN AUTO: 27.7 PG (ref 26.5–33)
MCHC RBC AUTO-ENTMCNC: 30.1 G/DL (ref 31.5–36.5)
MCV RBC AUTO: 92 FL (ref 78–100)
PLATELET # BLD AUTO: 246 10E3/UL (ref 150–450)
POTASSIUM BLD-SCNC: 4.5 MMOL/L (ref 3.5–5)
RBC # BLD AUTO: 4.01 10E6/UL (ref 4.4–5.9)
SODIUM SERPL-SCNC: 140 MMOL/L (ref 136–145)
WBC # BLD AUTO: 5.4 10E3/UL (ref 4–11)

## 2021-12-28 PROCEDURE — 80048 BASIC METABOLIC PNL TOTAL CA: CPT | Performed by: FAMILY MEDICINE

## 2021-12-28 PROCEDURE — P9604 ONE-WAY ALLOW PRORATED TRIP: HCPCS | Performed by: FAMILY MEDICINE

## 2021-12-28 PROCEDURE — 85027 COMPLETE CBC AUTOMATED: CPT | Performed by: FAMILY MEDICINE

## 2021-12-28 PROCEDURE — 83735 ASSAY OF MAGNESIUM: CPT | Performed by: FAMILY MEDICINE

## 2021-12-28 PROCEDURE — 36415 COLL VENOUS BLD VENIPUNCTURE: CPT | Performed by: FAMILY MEDICINE

## 2021-12-30 ENCOUNTER — TRANSITIONAL CARE UNIT VISIT (OUTPATIENT)
Dept: GERIATRICS | Facility: CLINIC | Age: 51
End: 2021-12-30
Payer: MEDICARE

## 2021-12-30 VITALS
HEART RATE: 64 BPM | RESPIRATION RATE: 18 BRPM | SYSTOLIC BLOOD PRESSURE: 114 MMHG | HEIGHT: 67 IN | DIASTOLIC BLOOD PRESSURE: 64 MMHG | OXYGEN SATURATION: 97 % | TEMPERATURE: 98.2 F | BODY MASS INDEX: 22.73 KG/M2 | WEIGHT: 144.8 LBS

## 2021-12-30 DIAGNOSIS — Z53.9 ERRONEOUS ENCOUNTER--DISREGARD: Primary | ICD-10-CM

## 2021-12-30 PROCEDURE — U0003 INFECTIOUS AGENT DETECTION BY NUCLEIC ACID (DNA OR RNA); SEVERE ACUTE RESPIRATORY SYNDROME CORONAVIRUS 2 (SARS-COV-2) (CORONAVIRUS DISEASE [COVID-19]), AMPLIFIED PROBE TECHNIQUE, MAKING USE OF HIGH THROUGHPUT TECHNOLOGIES AS DESCRIBED BY CMS-2020-01-R: HCPCS | Performed by: FAMILY MEDICINE

## 2021-12-30 ASSESSMENT — MIFFLIN-ST. JEOR: SCORE: 1460.44

## 2021-12-30 NOTE — LETTER
12/30/2021        RE: Cm Lundberg  [address]  [place] [address]        No notes on file      Sincerely,        Heidi Xie MD

## 2021-12-31 ENCOUNTER — LAB REQUISITION (OUTPATIENT)
Dept: LAB | Facility: CLINIC | Age: 51
End: 2021-12-31

## 2021-12-31 DIAGNOSIS — Z11.59 ENCOUNTER FOR SCREENING FOR OTHER VIRAL DISEASES: ICD-10-CM

## 2022-01-01 LAB — SARS-COV-2 RNA RESP QL NAA+PROBE: NEGATIVE

## 2022-01-03 ENCOUNTER — LAB REQUISITION (OUTPATIENT)
Dept: LAB | Facility: CLINIC | Age: 52
End: 2022-01-03
Payer: MEDICARE

## 2022-01-03 DIAGNOSIS — Z11.59 ENCOUNTER FOR SCREENING FOR OTHER VIRAL DISEASES: ICD-10-CM

## 2022-01-04 ENCOUNTER — TRANSITIONAL CARE UNIT VISIT (OUTPATIENT)
Dept: GERIATRICS | Facility: CLINIC | Age: 52
End: 2022-01-04
Payer: MEDICARE

## 2022-01-04 VITALS
BODY MASS INDEX: 22.73 KG/M2 | SYSTOLIC BLOOD PRESSURE: 128 MMHG | HEART RATE: 78 BPM | RESPIRATION RATE: 18 BRPM | HEIGHT: 67 IN | OXYGEN SATURATION: 95 % | DIASTOLIC BLOOD PRESSURE: 70 MMHG | WEIGHT: 144.8 LBS | TEMPERATURE: 98.7 F

## 2022-01-04 DIAGNOSIS — L97.511 FOOT ULCERATION, RIGHT, LIMITED TO BREAKDOWN OF SKIN (H): Primary | ICD-10-CM

## 2022-01-04 DIAGNOSIS — I10 ESSENTIAL HYPERTENSION: ICD-10-CM

## 2022-01-04 DIAGNOSIS — I48.91 ATRIAL FIBRILLATION, UNSPECIFIED TYPE (H): ICD-10-CM

## 2022-01-04 DIAGNOSIS — I25.10 CORONARY ARTERY DISEASE INVOLVING NATIVE CORONARY ARTERY OF NATIVE HEART WITHOUT ANGINA PECTORIS: ICD-10-CM

## 2022-01-04 PROCEDURE — 99305 1ST NF CARE MODERATE MDM 35: CPT | Performed by: FAMILY MEDICINE

## 2022-01-04 PROCEDURE — U0005 INFEC AGEN DETEC AMPLI PROBE: HCPCS | Performed by: FAMILY MEDICINE

## 2022-01-04 ASSESSMENT — MIFFLIN-ST. JEOR: SCORE: 1460.44

## 2022-01-04 NOTE — LETTER
1/4/2022        RE: Cm Lundberg  [address]  [place] [address]          [facility] SERVICES    [facility] Record Number:  [id number]  Place of Service where encounter took place: [facility] ([facility]) [[id number]]   CODE STATUS:   CPR/Full code     Chief Complaint/Reason for Visit:  Chief Complaint   Patient presents with     Hospital F/U     Admit note to TCU after right foot ulcer I and D with wound vac placed.        HPI:    Cm Lundberg is a 51 year old male with hx of CAD, s/p stents and CABG, HTN, DM, prior CVA, afib/flutter on xarelto, admitted to the hospital on 12/17/2021 for elective procedure for right foot ulceration. He underwent incision and drainage of the right foot, excisional debridement of the ulceration of the right foot into the level of the muscle, under MAC with popliteal block anesthesia on 12/17/2021. Wound vac was subsequently placed. He had no hospital complications. Seen by ID with recommendation for 5 days Augmentin at hospital discharge based on culture results. Chronic anticoagulation was restarted. He was felt to need TCU and discharged to TCU on 12/22/0221 for PT, OT, nursing cares, medical management and monitoring.     Today:  He is tolerating wound vac with changes. Reports very little discomfort. He is NWB, will have follow up with podiatry on Fri 1/7/2022. States he has had this wound on right foot for 2 years. Completed PO Augmentin after TCU admission. Appetite is good. No diarrhea or constipation though tends to be constipated. No abdominal pain. No fever, cough or congestion. No urinary sx. No new vision or hearing concerns. He lives at [facility].       PAST MEDICAL HISTORY:  Past Medical History:   Diagnosis Date     Anxiety      Arthritis      Cerebral infarction (H)      Congestive heart failure (H)      Coronary artery disease     CABG with multiple stents     Diabetes (H)      Heart attack (H)       Hypertension      Migraines      Stented coronary artery        PAST SURGICAL HISTORY:  Past Surgical History:   Procedure Laterality Date     INCISION AND DRAINAGE LOWER EXTREMITY, COMBINED Right 2021    Procedure: INCISION AND DRAINAGE, right foot;  Surgeon: Alex Kim DPM;  Location: [facility] Main OR     IR MISCELLANEOUS PROCEDURE  2004     [facility] CABG, VEIN, SINGLE      Description: CABG (CABG);  Recorded: 2009;       FAMILY HISTORY:  Family History   Adopted: Yes       SOCIAL HISTORY:  Social History     Socioeconomic History     Marital status:      Spouse name: Not on file     Number of children: Not on file     Years of education: Not on file     Highest education level: Not on file   Occupational History     Not on file   Tobacco Use     Smoking status: Former Smoker     Quit date: 2015     Years since quittin.9     Smokeless tobacco: Never Used   Vaping Use     Vaping Use: Never used   Substance and Sexual Activity     Alcohol use: Not Currently     Drug use: Not Currently     Sexual activity: Not on file   Other Topics Concern     Parent/sibling w/ CABG, MI or angioplasty before 65F 55M? Not Asked   Social History Narrative     Not on file     Social Determinants of Health     Financial Resource Strain: Not on file   Food Insecurity: Not on file   Transportation Needs: Not on file   Physical Activity: Not on file   Stress: Not on file   Social Connections: Not on file   Intimate Partner Violence: Not on file   Housing Stability: Not on file       MEDICATIONS:  Current Outpatient Medications   Medication Sig Dispense Refill     acetaminophen (TYLENOL) 325 MG tablet Take 650 mg by mouth every 4 hours as needed for mild pain        ARIPiprazole (ABILIFY) 2 MG tablet 2 mg daily        atorvastatin (LIPITOR) 80 MG tablet Take 80 mg by mouth daily        cholestyramine (QUESTRAN) 4 g packet Take 1 packet by mouth every other day        DULoxetine (CYMBALTA) 60 MG  capsule Take 120 mg by mouth daily takle 120mg       erenumab-aooe (AIMOVIG) 140 MG/ML injection Inject 140 mg Subcutaneous every 30 days        fenofibrate micronized (LOFIBRA) 200 MG capsule Take 200 mg by mouth every morning (before breakfast)        ferrous sulfate (FEROSUL) 325 (65 Fe) MG tablet Take 325 mg by mouth daily (with breakfast)        gabapentin (NEURONTIN) 100 MG capsule Take 100 mg by mouth 2 times daily (before meals)        gabapentin (NEURONTIN) 300 MG capsule Take 600 mg by mouth At Bedtime       loperamide (IMODIUM) 2 MG capsule Take 4 mg by mouth as needed for diarrhea        LORazepam (ATIVAN) 0.5 MG tablet Take 1 tablet (0.5 mg) by mouth daily before breakfast 10 tablet 0     losartan (COZAAR) 25 MG tablet Take 25 mg by mouth At Bedtime        magnesium oxide 400 MG CAPS Take 400 mg by mouth 4 times daily       Menthol, Topical Analgesic, (BIOFREEZE ROLL-ON) 4 % GEL Apply 1 Application topically 2 times daily Apply to lower back for pain       metFORMIN (GLUCOPHAGE) 1000 MG tablet Take 0.5 tablets (500 mg) by mouth 2 times daily (with meals) (Patient taking differently: Take 1,000 mg by mouth daily 1000 mg in the AM and 500 Mg in the PM)  0     metoprolol succinate ER (TOPROL-XL) 25 MG 24 hr tablet Take 75 mg by mouth daily        mirtazapine (REMERON) 7.5 MG tablet Take 7.5 mg by mouth At Bedtime        Neomycin-Bacitracin-Polymyxin (TRIPLE ANTIBIOTIC) 3.5-400-5000 OINT ointment Externally apply 1 applicator topically 3 times daily as needed       Nitroglycerin (NITROSTAT SL) Place 0.4 mg under the tongue every 5 minutes as needed for chest pain        omeprazole (PRILOSEC) 20 MG DR capsule Take 20 mg by mouth daily        oxyCODONE (ROXICODONE) 5 MG tablet Take 1 tablet (5 mg) by mouth every 6 hours as needed (Pain) 12 tablet 0     polyethylene glycol (MIRALAX) 17 GM/Dose powder Take 1 packet by mouth daily as needed for constipation        Rivaroxaban (XARELTO PO) Take 20 mg by mouth  "daily        senna-docusate (SENOKOT-S/PERICOLACE) 8.6-50 MG tablet Take 1 tablet by mouth 2 times daily as needed for constipation        traZODone (DESYREL) 50 MG tablet Take 50 mg by mouth At Bedtime        vitamin C (ASCORBIC ACID) 500 MG tablet Take 500 mg by mouth daily          ALLERGIES:  Allergies   Allergen Reactions     Aspirin Other (See Comments)     Reacts with migraine medicine     Isosorbide Headache     Codeine Rash     Morphine Rash       REVIEW OF SYSTEMS:  Pertinent items as noted in HPI.      PHYSICAL EXAM:  General: Patient is alert male, no distress.   Vitals: /70   Pulse 78   Temp 98.7  F (37.1  C)   Resp 18   Ht 1.702 m (5' 7\")   Wt 65.7 kg (144 lb 12.8 oz)   SpO2 95%   BMI 22.68 kg/m    HEENT: Head is NCAT. Eyes show no injection or icterus. Nares negative. Oropharynx well hydrated.  Neck: Supple. No tenderness or adenopathy. No JVD.  Lungs: Clear bilaterally. No wheezes.  Cardiovascular: Regular rate and rhythm, normal S1. S2.  Back: No spinal or CVA tenderness.  Abdomen: Soft, no tenderness on exam. Bowel sounds present. No guarding rebound or rigidity.  : Deferred.  Extremities: No edema is noted.  Musculoskeletal: Atrophy of extremities.   Skin: Wound vac right foot.   Psych: Mood appears good.      LABS/DIAGNOSTIC DATA:  Component      Latest Ref Rng & Units 12/21/2021   Hemoglobin A1C      <=5.6 % 5.1     Component      Latest Ref Rng & Units 12/18/2021   Sodium      136 - 145 mmol/L 138   Potassium      3.5 - 5.0 mmol/L 4.5   Chloride      98 - 107 mmol/L 106   Carbon Dioxide      22 - 31 mmol/L 21 (L)   Anion Gap      5 - 18 mmol/L 11   Glucose      70 - 125 mg/dL 119   Calcium      8.5 - 10.5 mg/dL 9.3   Urea Nitrogen      8 - 22 mg/dL 10   Creatinine      0.70 - 1.30 mg/dL 0.86   GFR Estimate      >60 mL/min/1.73m2 >90         ASSESSMENT/PLAN:  1. Right foot ulceration. Long standing wound per patient report. S/p incision and drainage per Dr. Kim on 12/17/2022. " Cultures reviewed by ID, he completed 5 days of PO Augmentin after hospital discharge, no further abx. Wound vac in place. Follow up with podiatry on Fri 1/7/2022.   2. HTN. On metoprolol and losartan. BPs satisfactory, continue to monitor in TCU.  3. Afib/flutter. He is anticoagulated with xarelto.  4. CAD. Hx of many MIs per his report. Hx of stenting and prior CABG. On statin, xarelto, beta blocker.  5. DM. On metformin. Home glipizide stopped per hospital due to low blood sugars. Last A1c was good at 5.1%.  6. Hx of stroke. Continue statin, xarelto.  7. Anemia. He is on iron.  8. Mood disorder, mixed depression and anxiety. Cont PTA duloxetine, Abilify.   9. Mild intellectual disabilities. He resides in an [facility].  10. Code status is full code.         Electronically signed by: Heidi Xie MD           Sincerely,        Heidi Xie MD

## 2022-01-04 NOTE — DISCHARGE SUMMARY
[facility] Discharge Summary    Cm Lundberg MRN# [id number]   Age: 51 year old YOB: 1970     Date of Admission:  12/17/2021  Date of Discharge::  12/22/2021 11:15 AM  Admitting Physician:  Alex Kim DPM  Discharge Physician:  Alex Kim DPM             Admission Diagnoses:   Ulceration right foot           Discharge Diagnosis:   Ulceration right foot           Procedures:   Procedure(s): I&D right foot        No other procedures performed during this admission           Medications Prior to Admission:   See chart          Discharge Medications:     Discharge Medication List as of 12/22/2021 10:57 AM      START taking these medications    Details   amoxicillin-clavulanate (AUGMENTIN) 875-125 MG tablet Take 1 tablet by mouth every 12 hours for 5 days, Disp-10 tablet, R-0, E-Prescribe         CONTINUE these medications which have CHANGED    Details   LORazepam (ATIVAN) 0.5 MG tablet Take 1 tablet (0.5 mg) by mouth daily before breakfast, Disp-10 tablet, R-0, Local Print      oxyCODONE (ROXICODONE) 5 MG tablet Take 1 tablet (5 mg) by mouth every 6 hours as needed (Pain), Disp-12 tablet, R-0, Local Print         CONTINUE these medications which have NOT CHANGED    Details   acetaminophen (TYLENOL) 325 MG tablet Take 650 mg by mouth every 4 hours as needed for mild pain, Historical      ARIPiprazole (ABILIFY) 2 MG tablet 2 mg daily , Historical      atorvastatin (LIPITOR) 80 MG tablet Take 80 mg by mouth daily , Historical      cholestyramine (QUESTRAN) 4 g packet Take 1 packet by mouth every other day , Historical      DULoxetine (CYMBALTA) 60 MG capsule Take 120 mg by mouth daily takle 120mg, Historical      erenumab-aooe (AIMOVIG) 140 MG/ML injection Inject 140 mg Subcutaneous every 30 days , Historical      fenofibrate micronized (LOFIBRA) 200 MG capsule Take 200 mg by mouth every morning (before breakfast) , Historical      ferrous sulfate (FEROSUL) 325 (65  Fe) MG tablet Take 325 mg by mouth daily (with breakfast) , Historical      !! gabapentin (NEURONTIN) 100 MG capsule Take 100 mg by mouth 2 times daily (before meals) , Historical      !! gabapentin (NEURONTIN) 300 MG capsule Take 600 mg by mouth At Bedtime, Historical      loperamide (IMODIUM) 2 MG capsule Take 4 mg by mouth as needed for diarrhea , Historical      losartan (COZAAR) 25 MG tablet Take 25 mg by mouth At Bedtime , Historical      magnesium oxide 400 MG CAPS Take 400 mg by mouth 4 times daily, Historical      Menthol, Topical Analgesic, (BIOFREEZE ROLL-ON) 4 % GEL Apply 1 Application topically 2 times daily Apply to lower back for painHistorical      metoprolol succinate ER (TOPROL-XL) 25 MG 24 hr tablet Take 75 mg by mouth daily , Historical      mirtazapine (REMERON) 7.5 MG tablet Take 7.5 mg by mouth At Bedtime , Historical      Neomycin-Bacitracin-Polymyxin (TRIPLE ANTIBIOTIC) 3.5-400-5000 OINT ointment Apply topically 3 times daily as needed (As needed for abrasions) Historical      Nitroglycerin (NITROSTAT SL) Place 0.4 mg under the tongue every 5 minutes as needed for chest pain , Historical      omeprazole (PRILOSEC) 20 MG DR capsule Take 20 mg by mouth daily , Historical      polyethylene glycol (MIRALAX) 17 GM/Dose powder Take 1 packet by mouth daily as needed for constipation , Historical      Rivaroxaban (XARELTO PO) Take 20 mg by mouth daily , Historical      senna-docusate (SENOKOT-S/PERICOLACE) 8.6-50 MG tablet Take 1 tablet by mouth 2 times daily as needed for constipation , Historical      traZODone (DESYREL) 50 MG tablet Take 50 mg by mouth At Bedtime , Historical      vitamin C (ASCORBIC ACID) 500 MG tablet Take 500 mg by mouth daily , Historical      glipiZIDE (GLUCOTROL) 5 MG tablet Take 2.5 mg by mouth, Historical      metFORMIN (GLUCOPHAGE) 1000 MG tablet Take 1,000 mg by mouth 2 times daily (with meals) , Historical       !! - Potential duplicate medications found. Please  discuss with provider.      STOP taking these medications       guaiFENesin (ROBITUSSIN) 100 MG/5ML SYRP Comments:   Reason for Stopping:         ibuprofen (ADVIL/MOTRIN) 400 MG tablet Comments:   Reason for Stopping:                     Consultations:   ID, Hospitalist           Brief History of Illness:   Right foot ulceration requiring surgical debridement            Hospital Course:   The patient's hospital course was unremarkable.  He recovered as anticipated and experienced no post-operative complications.           Discharge Instructions and Follow-Up:   Discharge diet: Diabetic (1800 ADA)   Discharge activity: Non-weight bearing   Discharge follow-up: Follow up with me in 14 days   Wound care: Wound vac           Discharge Disposition:   Discharged to rehabilitation facility       Alex Kim DPM

## 2022-01-05 LAB — SARS-COV-2 RNA RESP QL NAA+PROBE: NORMAL

## 2022-01-06 ENCOUNTER — LAB REQUISITION (OUTPATIENT)
Dept: LAB | Facility: CLINIC | Age: 52
End: 2022-01-06
Payer: MEDICARE

## 2022-01-06 ENCOUNTER — TRANSFERRED RECORDS (OUTPATIENT)
Dept: MULTI SPECIALTY CLINIC | Facility: CLINIC | Age: 52
End: 2022-01-06
Payer: MEDICARE

## 2022-01-06 DIAGNOSIS — Z11.59 ENCOUNTER FOR SCREENING FOR OTHER VIRAL DISEASES: ICD-10-CM

## 2022-01-06 LAB
CREATININE (EXTERNAL): 1.06 MG/DL (ref 0.72–1.25)
GFR ESTIMATED (EXTERNAL): >60 ML/MIN/1.73M2
GFR ESTIMATED (IF AFRICAN AMERICAN) (EXTERNAL): >60 ML/MIN/1.73M2
GLUCOSE (EXTERNAL): 176 MG/DL (ref 65–100)
POTASSIUM (EXTERNAL): 4.5 MMOL/L (ref 3.5–5)
SARS-COV-2 RNA RESP QL NAA+PROBE: NOT DETECTED

## 2022-01-07 ENCOUNTER — OFFICE VISIT (OUTPATIENT)
Dept: VASCULAR SURGERY | Facility: CLINIC | Age: 52
End: 2022-01-07
Attending: PODIATRIST
Payer: MEDICARE

## 2022-01-07 VITALS
HEART RATE: 86 BPM | OXYGEN SATURATION: 98 % | DIASTOLIC BLOOD PRESSURE: 70 MMHG | TEMPERATURE: 98.1 F | SYSTOLIC BLOOD PRESSURE: 130 MMHG

## 2022-01-07 DIAGNOSIS — L97.413 DIABETIC ULCER OF RIGHT MIDFOOT ASSOCIATED WITH TYPE 2 DIABETES MELLITUS, WITH NECROSIS OF MUSCLE (H): Primary | ICD-10-CM

## 2022-01-07 DIAGNOSIS — E11.621 DIABETIC ULCER OF RIGHT MIDFOOT ASSOCIATED WITH TYPE 2 DIABETES MELLITUS, WITH NECROSIS OF MUSCLE (H): Primary | ICD-10-CM

## 2022-01-07 PROCEDURE — U0003 INFECTIOUS AGENT DETECTION BY NUCLEIC ACID (DNA OR RNA); SEVERE ACUTE RESPIRATORY SYNDROME CORONAVIRUS 2 (SARS-COV-2) (CORONAVIRUS DISEASE [COVID-19]), AMPLIFIED PROBE TECHNIQUE, MAKING USE OF HIGH THROUGHPUT TECHNOLOGIES AS DESCRIBED BY CMS-2020-01-R: HCPCS | Performed by: FAMILY MEDICINE

## 2022-01-07 PROCEDURE — 11043 DBRDMT MUSC&/FSCA 1ST 20/<: CPT | Performed by: PODIATRIST

## 2022-01-07 PROCEDURE — G0463 HOSPITAL OUTPT CLINIC VISIT: HCPCS

## 2022-01-07 ASSESSMENT — PAIN SCALES - GENERAL: PAINLEVEL: MILD PAIN (3)

## 2022-01-07 NOTE — PROGRESS NOTES
FOOT AND ANKLE SURGERY/PODIATRY Progress Note      ASSESSMENT: Diabetic Ulceration right foot       TREATMENT:  -The right foot ulceration is stable. We will continue with the wound vac, but will consider theraskin graft at his next visit if no significant improvement is noted.     -Continue non-weight bearing right foot with offloading.     -After discussion of risk factors and consent obtained 2% Lidocaine HCL jelly was applied, under clean conditions, the right and foot ulceration(s) were debrided using #15 blade scalpel.  Devitalized and nonviable tissue, along with any fibrin and slough, was removed to improve granulation tissue formation, stimulate wound healing, decrease overall bacteria load, disrupt biofilm formation and decrease edge senescence. Wound drainage was scant No. Total excisional debridement was 3.6 sq cm into the muscle/fascia with a depth of 0.5 cm.   Ulcers were improved afterwards and .  Measures were unchanged after debridement. A gauze dressing was applied. Continue with the wound vac.     -He will follow-up in 2 weeks.    Alex Kim DPM  [facility]      HPI: Cm Lundberg was seen again today for a right foot ulceration. He is currently residing at [facility] and has remained non-weight bearing on his right foot.       Past Medical History:   Diagnosis Date     Anxiety      Arthritis      Cerebral infarction (H)      Congestive heart failure (H)      Coronary artery disease     CABG with multiple stents     Diabetes (H)      Heart attack (H)      Hypertension      Migraines      Stented coronary artery        Past Surgical History:   Procedure Laterality Date     INCISION AND DRAINAGE LOWER EXTREMITY, COMBINED Right 12/17/2021    Procedure: INCISION AND DRAINAGE, right foot;  Surgeon: Alex Kim DPM;  Location: [facility] OR     IR MISCELLANEOUS PROCEDURE  2/13/2004     [facility] CABG, VEIN, SINGLE      Description: CABG (CABG);  Recorded:  04/22/2009;       Allergies   Allergen Reactions     Aspirin Other (See Comments)     Reacts with migraine medicine     Isosorbide Headache     Codeine Rash     Morphine Rash         Current Outpatient Medications:      LORazepam (ATIVAN) 0.5 MG tablet, Take 1 tablet (0.5 mg) by mouth daily before breakfast, Disp: 10 tablet, Rfl: 0     acetaminophen (TYLENOL) 325 MG tablet, Take 650 mg by mouth every 4 hours as needed for mild pain, Disp: , Rfl:      ARIPiprazole (ABILIFY) 2 MG tablet, 2 mg daily , Disp: , Rfl:      atorvastatin (LIPITOR) 80 MG tablet, Take 80 mg by mouth daily , Disp: , Rfl:      cholestyramine (QUESTRAN) 4 g packet, Take 1 packet by mouth every other day , Disp: , Rfl:      DULoxetine (CYMBALTA) 60 MG capsule, Take 120 mg by mouth daily takle 120mg, Disp: , Rfl:      erenumab-aooe (AIMOVIG) 140 MG/ML injection, Inject 140 mg Subcutaneous every 30 days , Disp: , Rfl:      fenofibrate micronized (LOFIBRA) 200 MG capsule, Take 200 mg by mouth every morning (before breakfast) , Disp: , Rfl:      ferrous sulfate (FEROSUL) 325 (65 Fe) MG tablet, Take 325 mg by mouth daily (with breakfast) , Disp: , Rfl:      gabapentin (NEURONTIN) 100 MG capsule, Take 100 mg by mouth 2 times daily (before meals) , Disp: , Rfl:      gabapentin (NEURONTIN) 300 MG capsule, Take 600 mg by mouth At Bedtime, Disp: , Rfl:      loperamide (IMODIUM) 2 MG capsule, Take 4 mg by mouth as needed for diarrhea , Disp: , Rfl:      losartan (COZAAR) 25 MG tablet, Take 25 mg by mouth At Bedtime , Disp: , Rfl:      magnesium oxide 400 MG CAPS, Take 400 mg by mouth 4 times daily, Disp: , Rfl:      Menthol, Topical Analgesic, (BIOFREEZE ROLL-ON) 4 % GEL, Apply 1 Application topically 2 times daily Apply to lower back for pain, Disp: , Rfl:      metFORMIN (GLUCOPHAGE) 1000 MG tablet, Take 0.5 tablets (500 mg) by mouth 2 times daily (with meals), Disp: , Rfl: 0     metoprolol succinate ER (TOPROL-XL) 25 MG 24 hr tablet, Take 75 mg by mouth  daily , Disp: , Rfl:      mirtazapine (REMERON) 7.5 MG tablet, Take 7.5 mg by mouth At Bedtime , Disp: , Rfl:      Neomycin-Bacitracin-Polymyxin (TRIPLE ANTIBIOTIC) 3.5-400-5000 OINT ointment, Apply topically 3 times daily as needed (As needed for abrasions) , Disp: , Rfl:      Nitroglycerin (NITROSTAT SL), Place 0.4 mg under the tongue every 5 minutes as needed for chest pain , Disp: , Rfl:      omeprazole (PRILOSEC) 20 MG DR capsule, Take 20 mg by mouth daily , Disp: , Rfl:      oxyCODONE (ROXICODONE) 5 MG tablet, Take 1 tablet (5 mg) by mouth every 6 hours as needed (Pain), Disp: 12 tablet, Rfl: 0     polyethylene glycol (MIRALAX) 17 GM/Dose powder, Take 1 packet by mouth daily as needed for constipation , Disp: , Rfl:      Rivaroxaban (XARELTO PO), Take 20 mg by mouth daily , Disp: , Rfl:      senna-docusate (SENOKOT-S/PERICOLACE) 8.6-50 MG tablet, Take 1 tablet by mouth 2 times daily as needed for constipation , Disp: , Rfl:      traZODone (DESYREL) 50 MG tablet, Take 50 mg by mouth At Bedtime , Disp: , Rfl:      vitamin C (ASCORBIC ACID) 500 MG tablet, Take 500 mg by mouth daily , Disp: , Rfl:     Review of Systems - 10 point Review of Systems is negative except for right foot ulcer which is noted in HPI.      OBJECTIVE:  /70   Pulse 86   Temp 98.1  F (36.7  C)   SpO2 98%   General appearance: Patient is alert and fully cooperative with history & exam.  No sign of distress is noted during the visit.    Vascular: Dorsalis pedis palpableRight.  Dermatologic:    Negative Pressure Wound Therapy Foot Anterior;Right (Active)   Wound Type Diabetic foot ulcer 12/22/21 0115   Cycle Continuous;On 12/22/21 0800   (Retired) Dressing Status Clean, dry, intact 12/22/21 0800       VASC Wound right lateral foot (Active)       VASC Wound left hallux  (Active)       VASC Wound right lateral foot (Active)       VASC Wound right lateral foot (Active)   Pre Size Length 1.5 01/07/22 1000   Pre Size Width 2.4 01/07/22 1000  "  Pre Size Depth 0.5 01/07/22 1000   Pre Total Sq cm 3.6 01/07/22 1000       VASC Wound top right foot (Active)       Incision/Surgical Site 12/17/21 Right Foot (Active)   Incision Assessment UTV 12/22/21 0800   Giovana-Incision Assessment UTV 12/22/21 0800   Closure YA 12/21/21 1515   Incision Drainage Amount None 12/21/21 0057   Incision Care Medication applied - see MAR 12/21/21 0057   Dressing Intervention Clean, dry, intact 12/22/21 0800   Granular tissue with minimal fibrotic tissue right lateral foot ulcer, no erythema.   Neurologic: Diminished to light touch right.   Musculoskeletal: Contracted digits noted Right.    Imaging:     POC US Guidance Needle Placement    Result Date: 12/17/2021  Ultrasound was performed as guidance to an anesthesia procedure.  Click \"PACS images\" hyperlink below to view any stored images.  For specific procedure details, view procedure note authored by anesthesia.         Picture: None    "

## 2022-01-07 NOTE — PATIENT INSTRUCTIONS
Important lnstructions    1. WEIGHT BEARING STATUS: You are to remain NON WEIGHT BEARING on your right foot. NON WEIGHT BEARING MEANS NO PRESSURE ON YOUR FOOT OR HEEL AT ANY TIME FOR ANY REASON!.    2. OFFLOADING DEVICE: Must use a A WHEELCHAIR at all times! (do not use affected foot to push wheelchair)    3. STABILIZATION DEVICE: Use a EGG CRATE BOOT . You will need to WEAR THIS AT ALL TIMES EVEN WHILE IN BED.     4. PROTEIN SUPPLEMENTS: Drink protein shakes 2x per day, morning and night (Ensure, Boost, Glucerna)                                                                 *This is in addition to your normal diet*    5. ELEVATE: Elevating your leg means laying with your head on a pillow and your foot ABOVE YOUR WAIST.     6. DO NOT MOVE YOUR FOOT    Dressing Change lnstructions          - Wound Vac Instructions    1. 3x weekly and as needed cleanse the area with NS    2. Pat dry    3. Apply Cavilon no sting barrier wipe to the skin surrounding the wound to protect from drainage/maceration    4. Apply drape around incision. Cut strip of drape and apply to skin if bridging is needed; plan this area in advance; should not be over bony prominence     5. Cut the foam to fit the area of the incision    6. Cut narrow strip of foam if bridging    7. Cover foam with drape to obtain air tight seal    8. Cut opening the size of a quarter for where the suction pad will be applied    9. Apply Suction pad    10. 125mmHG suction continuous    [employer] [facility] can be reached at [phone], 24 hours a day 7 days a week     - Back up plan in place:     If the negative pressure wound therapy malfunctions or unable to maintain seal: dressing must be removed and reapplied within 2 hours of the incident. If unable to reapply negative pressure wound dressing, place Normal Saline moistened gauze in wound bed and cover with appropriate dressing to keep wound bed moist.  Change wet-to-dry dressing two times a day until healthcare  staff can re-implement negative pressure therapy. Change canister at least weekly.  [facility] can be reached at [phone], 24 hours a day 7 days a week    Information on Vacuum-Assisted Closure of a Wound  Vacuum-assisted closure (VAC) of a wound is a type of treatment to help wounds heal. It s also known as negative pressure wound therapy. During the treatment, a device lowers air pressure on the wound. This can help the wound heal more quickly.  Understanding the wound VAC system  A wound VAC system has several parts. A foam or gauze dressing is put directly on the wound. The dressing is changed every 24 to 72 hours. An adhesive film covers and seals the dressing and wound. A drainage tube leads from under the adhesive film and connects to a portable vacuum pump. This pump removes air pressure over the wound. It may do this constantly. Or it may do it in cycles. During the treatment, you ll need to carry the portable pump everywhere you go.  Why wound VAC is used  You might need this therapy for a recent traumatic wound. Or you may need it for a chronic wound. This is a wound that does not heal the way it should over time. This can happen with wounds in people who have diabetes. You may need a wound VAC if you ve had a recent skin graft. And you may need a wound VAC for a large wound. Large wounds can take a longer time to heal.  A wound vacuum system may help your wound heal more quickly by:    Draining extra fluid from the wound    Reducing swelling    Reducing bacteria in the wound    Keeping your wound moist and warm    Helping draw together wound edges    Increasing blood flow to your wound    Decreasing inflammation  Wound VAC offers some other advantages over other types of wound care. It may decrease your overall discomfort. The dressings usually need to be changed less often. And they may be easier to keep in position.  Risks of wound VAC  Wound VAC has some rare risks, such as:    Bleeding  (which may be severe)    Wound infection    An abnormal connection between the intestinal tract and the skin (enteric fistula)  Proper training in dressing changes can help reduce the risk for these complications. Also, your doctor will carefully evaluate you to make sure you are a good candidate for the therapy. Certain problems can increase your risk for complications. These include:    Exposed organs or blood vessels    High risk of bleeding from another medical problem    Wound infection    Nearby bone infection    Dead wound tissue    Cancer tissue    Fragile skin, such as from aging or longtime use of topical steroids    Allergy to adhesive    Very poor blood flow to your wound    Wounds close to joints that may reopen because of movement  Your doctor will discuss the risks that apply to you. Make sure to talk with him or her about all of your questions and concerns.  Getting ready for wound VAC  You likely won t need to do much to get ready for wound VAC. In some cases, you may need to wait a while before having this therapy. For example, your doctor may first need to treat an infection in your wound. Dead or damaged tissue may also need to be removed from your wound.  You or a caregiver may need training on how to use the wound VAC device. This is done if you will be able to have your wound vacuum therapy at home. In other cases, you may need to have your wound vacuum therapy in a health care facility.  On the day of your procedure  A health care provider will cover your wound with foam or gauze wound dressing. An adhesive film will be put over the dressing and wound. This seals the wound. The foam connects to a drainage tube, which leads to a vacuum pump. This pump is portable. When the pump is turned on, it draws fluid through the foam and out the drainage tubing. The pump may run constantly, or it may cycle off and on. Your exact setup will depend on the specific type of wound vacuum system that you  use.  Managing your wound  You may need the dressing changed about once a day. You may need it changed more or less often, depending on your wound. You or your caregiver may be trained to do this at home. Or it may be done by a visiting health care provider. Your doctor may prescribe a pain medicine. This is to prevent or reduce pain during the dressing change.  You will likely need to use the wound VAC system for several weeks or months. During this time, you ll carry the portable pump everywhere you go.  Nutrition for wound healing  During this time, make sure you follow a healthy diet. This is needed so the wound can heal and to prevent infection. Your doctor can tell you more about what to include in your diet during this time.  follow up with your doctor if you have a medical condition that led to your wound, such as diabetes. He or she can help you prevent future wounds.  Follow-up care  Your doctor will carefully keep track of your healing. Make sure to keep all follow-up appointments.  When to call your health care provider  Call your health care provider right away if you have any of these:    Fever of 100.4 F (38.0 C) or higher    Increased redness, swelling, or warmth around wound    Increased pain    Bright red blood or blood clots in tubing or the collection chamber of the vacuum         It is not ok to get your wound wet in the bath or shower    SEEK MEDICAL CARE IF:    You have an increase in swelling, pain, or redness around the wound.    You have an increase in the amount of pus coming from the wound.    There is a bad smell coming from the wound.    The wound appears to be worsening/enlarging    You have a fever greater than 101.5 F      It is ok to continue current wound care treatment/products for the next 2-3 days until new wound care supplies are ordered and arrive. If longer than this please contact our office at [phone].

## 2022-01-09 LAB — SARS-COV-2 RNA RESP QL NAA+PROBE: NEGATIVE

## 2022-01-10 ENCOUNTER — LAB REQUISITION (OUTPATIENT)
Dept: LAB | Facility: CLINIC | Age: 52
End: 2022-01-10
Payer: MEDICARE

## 2022-01-10 DIAGNOSIS — Z11.59 ENCOUNTER FOR SCREENING FOR OTHER VIRAL DISEASES: ICD-10-CM

## 2022-01-11 ENCOUNTER — TRANSITIONAL CARE UNIT VISIT (OUTPATIENT)
Dept: GERIATRICS | Facility: CLINIC | Age: 52
End: 2022-01-11
Payer: MEDICARE

## 2022-01-11 VITALS
RESPIRATION RATE: 18 BRPM | WEIGHT: 145.8 LBS | TEMPERATURE: 98.7 F | HEIGHT: 67 IN | DIASTOLIC BLOOD PRESSURE: 76 MMHG | SYSTOLIC BLOOD PRESSURE: 128 MMHG | HEART RATE: 66 BPM | BODY MASS INDEX: 22.88 KG/M2 | OXYGEN SATURATION: 100 %

## 2022-01-11 DIAGNOSIS — L97.511 FOOT ULCERATION, RIGHT, LIMITED TO BREAKDOWN OF SKIN (H): Primary | ICD-10-CM

## 2022-01-11 DIAGNOSIS — I10 ESSENTIAL HYPERTENSION: ICD-10-CM

## 2022-01-11 DIAGNOSIS — I25.10 CORONARY ARTERY DISEASE INVOLVING NATIVE CORONARY ARTERY OF NATIVE HEART WITHOUT ANGINA PECTORIS: ICD-10-CM

## 2022-01-11 DIAGNOSIS — I48.91 ATRIAL FIBRILLATION, UNSPECIFIED TYPE (H): ICD-10-CM

## 2022-01-11 PROCEDURE — 99309 SBSQ NF CARE MODERATE MDM 30: CPT | Performed by: FAMILY MEDICINE

## 2022-01-11 PROCEDURE — U0003 INFECTIOUS AGENT DETECTION BY NUCLEIC ACID (DNA OR RNA); SEVERE ACUTE RESPIRATORY SYNDROME CORONAVIRUS 2 (SARS-COV-2) (CORONAVIRUS DISEASE [COVID-19]), AMPLIFIED PROBE TECHNIQUE, MAKING USE OF HIGH THROUGHPUT TECHNOLOGIES AS DESCRIBED BY CMS-2020-01-R: HCPCS | Performed by: FAMILY MEDICINE

## 2022-01-11 ASSESSMENT — MIFFLIN-ST. JEOR: SCORE: 1464.97

## 2022-01-11 NOTE — LETTER
1/11/2022        RE: Cm Lundberg  [address]  [place] [address]          [facility] SERVICES    [place] Medical Record Number:  [id number]  Place of Service where encounter took place: [facility] ([facility]) [[id number]]   CODE STATUS:   CPR/Full code     Chief Complaint/Reason for Visit:  Chief Complaint   Patient presents with     RECHECK     TCU 1/11/2022. Right foot ulcer I and D with wound vac.       TCU HPI:    Cm Lundberg is a 51 year old male with hx of CAD, s/p stents and CABG, HTN, DM, prior CVA, afib/flutter on xarelto, admitted to the hospital on 12/17/2021 for elective procedure for right foot ulceration. He underwent incision and drainage of the right foot, excisional debridement of the ulceration of the right foot into the level of the muscle, under MAC with popliteal block anesthesia on 12/17/2021. Wound vac was subsequently placed. He had no hospital complications. Seen by ID with recommendation for 5 days Augmentin at hospital discharge based on culture results. Chronic anticoagulation was restarted. He was felt to need TCU and discharged to TCU on 12/22/0221 for PT, OT, nursing cares, medical management and monitoring.     Today:  He denies any chest pain, had been seen in ED on 1/6/2021 for chest pain, did not need hospital admission, returned to TCU. Tolerating wound vac changes, saw Dr. Kim on 1/7/2021, follow up again in 2 weeks. He is NWB. Completed PO Augmentin after TCU admission. Appetite is good. No diarrhea or constipation though tends to be constipated. No abdominal pain. No fever, cough or congestion. No urinary sx.       PAST MEDICAL HISTORY:  Past Medical History:   Diagnosis Date     Anxiety      Arthritis      Cerebral infarction (H)      Congestive heart failure (H)      Coronary artery disease     CABG with multiple stents     Diabetes (H)      Heart attack (H)      Hypertension      Migraines      Stented coronary  artery        MEDICATIONS:  Current Outpatient Medications   Medication Sig Dispense Refill     acetaminophen (TYLENOL) 325 MG tablet Take 650 mg by mouth every 4 hours as needed for mild pain        ARIPiprazole (ABILIFY) 2 MG tablet 2 mg daily        atorvastatin (LIPITOR) 80 MG tablet Take 80 mg by mouth daily        cholestyramine (QUESTRAN) 4 g packet Take 1 packet by mouth every other day        DULoxetine (CYMBALTA) 60 MG capsule Take 120 mg by mouth daily takle 120mg       erenumab-aooe (AIMOVIG) 140 MG/ML injection Inject 140 mg Subcutaneous every 30 days        fenofibrate micronized (LOFIBRA) 200 MG capsule Take 200 mg by mouth every morning (before breakfast)        ferrous sulfate (FEROSUL) 325 (65 Fe) MG tablet Take 325 mg by mouth daily (with breakfast)        gabapentin (NEURONTIN) 100 MG capsule Take 100 mg by mouth 2 times daily (before meals)        gabapentin (NEURONTIN) 300 MG capsule Take 600 mg by mouth At Bedtime       loperamide (IMODIUM) 2 MG capsule Take 4 mg by mouth as needed for diarrhea        LORazepam (ATIVAN) 0.5 MG tablet Take 1 tablet (0.5 mg) by mouth daily before breakfast 10 tablet 0     losartan (COZAAR) 25 MG tablet Take 25 mg by mouth At Bedtime        magnesium oxide 400 MG CAPS Take 400 mg by mouth 4 times daily       Menthol, Topical Analgesic, (BIOFREEZE ROLL-ON) 4 % GEL Apply 1 Application topically 2 times daily Apply to lower back for pain       metFORMIN (GLUCOPHAGE) 1000 MG tablet Take 0.5 tablets (500 mg) by mouth 2 times daily (with meals) (Patient taking differently: Take 1,000 mg by mouth daily 1000 mg in the AM and 500 Mg in the PM)  0     metoprolol succinate ER (TOPROL-XL) 25 MG 24 hr tablet Take 75 mg by mouth daily        mirtazapine (REMERON) 7.5 MG tablet Take 7.5 mg by mouth At Bedtime        Neomycin-Bacitracin-Polymyxin (TRIPLE ANTIBIOTIC) 3.5-400-5000 OINT ointment Externally apply 1 applicator topically 3 times daily as needed       Nitroglycerin  "(NITROSTAT SL) Place 0.4 mg under the tongue every 5 minutes as needed for chest pain        omeprazole (PRILOSEC) 20 MG DR capsule Take 20 mg by mouth daily        oxyCODONE (ROXICODONE) 5 MG tablet Take 1 tablet (5 mg) by mouth every 6 hours as needed (Pain) 12 tablet 0     polyethylene glycol (MIRALAX) 17 GM/Dose powder Take 1 packet by mouth daily as needed for constipation        Rivaroxaban (XARELTO PO) Take 20 mg by mouth daily        senna-docusate (SENOKOT-S/PERICOLACE) 8.6-50 MG tablet Take 1 tablet by mouth 2 times daily as needed for constipation        traZODone (DESYREL) 50 MG tablet Take 50 mg by mouth At Bedtime        vitamin C (ASCORBIC ACID) 500 MG tablet Take 500 mg by mouth daily          PHYSICAL EXAM:  General: Patient is alert male, no distress.   Vitals: /76   Pulse 66   Temp 98.7  F (37.1  C)   Resp 18   Ht 1.702 m (5' 7\")   Wt 66.1 kg (145 lb 12.8 oz)   SpO2 100%   BMI 22.84 kg/m    HEENT: Head is NCAT. Eyes show no injection or icterus. Nares negative. Oropharynx well hydrated.  Neck: Supple. No tenderness or adenopathy. No JVD.  Lungs: Non labored respirations.  Abdomen: Soft.  : Deferred.  Extremities: No edema is noted.  Musculoskeletal: Atrophy of extremities.   Skin: Wound vac right foot.   Psych: Mood appears good.      LABS/DIAGNOSTIC DATA:  Component      Latest Ref Rng & Units 12/21/2021   Hemoglobin A1C      <=5.6 % 5.1     Component      Latest Ref Rng & Units 12/18/2021   Sodium      136 - 145 mmol/L 138   Potassium      3.5 - 5.0 mmol/L 4.5   Chloride      98 - 107 mmol/L 106   Carbon Dioxide      22 - 31 mmol/L 21 (L)   Anion Gap      5 - 18 mmol/L 11   Glucose      70 - 125 mg/dL 119   Calcium      8.5 - 10.5 mg/dL 9.3   Urea Nitrogen      8 - 22 mg/dL 10   Creatinine      0.70 - 1.30 mg/dL 0.86   GFR Estimate      >60 mL/min/1.73m2 >90         ASSESSMENT/PLAN:  1. Right foot ulceration. Long standing wound per patient report. S/p incision and drainage per " Dr. Kim on 12/17/2022. Cultures reviewed by ID, he completed 5 days of PO Augmentin after hospital discharge, no further abx. Wound vac in place. Saw podiatry on Fri 1/7/2022, RTC 2 weeks.   2. HTN. On metoprolol and losartan. BPs satisfactory, continue to monitor in TCU.  3. Afib/flutter. He is anticoagulated with xarelto.  4. CAD. Hx of many MIs per his report. Hx of stenting and prior CABG. On statin, xarelto, beta blocker. ED visit 1/6/2021 for chest pain, ACS ruled out.  5. DM. On metformin. Home glipizide stopped per hospital due to low blood sugars. Last A1c was good at 5.1%.  6. Hx of stroke. Continue statin, xarelto.  7. Anemia. He is on iron.  8. Mood disorder, mixed depression and anxiety. Cont PTA duloxetine, Abilify.   9. Mild intellectual disabilities. He resides in an [facility].        Electronically signed by: Heidi Xie MD           Sincerely,        Heidi Xie MD

## 2022-01-12 LAB — SARS-COV-2 RNA RESP QL NAA+PROBE: NEGATIVE

## 2022-01-13 ENCOUNTER — LAB REQUISITION (OUTPATIENT)
Dept: LAB | Facility: CLINIC | Age: 52
End: 2022-01-13
Payer: MEDICARE

## 2022-01-13 ENCOUNTER — TRANSITIONAL CARE UNIT VISIT (OUTPATIENT)
Dept: GERIATRICS | Facility: CLINIC | Age: 52
End: 2022-01-13
Payer: MEDICARE

## 2022-01-13 VITALS
WEIGHT: 145.8 LBS | OXYGEN SATURATION: 93 % | TEMPERATURE: 98.7 F | HEART RATE: 86 BPM | HEIGHT: 67 IN | RESPIRATION RATE: 18 BRPM | DIASTOLIC BLOOD PRESSURE: 80 MMHG | BODY MASS INDEX: 22.88 KG/M2 | SYSTOLIC BLOOD PRESSURE: 135 MMHG

## 2022-01-13 DIAGNOSIS — K59.00 CONSTIPATION, UNSPECIFIED CONSTIPATION TYPE: Primary | ICD-10-CM

## 2022-01-13 DIAGNOSIS — Z11.59 ENCOUNTER FOR SCREENING FOR OTHER VIRAL DISEASES: ICD-10-CM

## 2022-01-13 DIAGNOSIS — I10 ESSENTIAL HYPERTENSION: ICD-10-CM

## 2022-01-13 DIAGNOSIS — L97.511 FOOT ULCERATION, RIGHT, LIMITED TO BREAKDOWN OF SKIN (H): ICD-10-CM

## 2022-01-13 DIAGNOSIS — I48.91 ATRIAL FIBRILLATION, UNSPECIFIED TYPE (H): ICD-10-CM

## 2022-01-13 PROCEDURE — 99309 SBSQ NF CARE MODERATE MDM 30: CPT | Performed by: FAMILY MEDICINE

## 2022-01-13 ASSESSMENT — MIFFLIN-ST. JEOR: SCORE: 1464.97

## 2022-01-13 NOTE — LETTER
1/13/2022        RE: Cm Lundberg  [address]  [place] [address]          [facility] SERVICES    [place] Medical Record Number:  [id number]  Place of Service where encounter took place: [facility] ([facility]) [[id number]]   CODE STATUS:   CPR/Full code     Chief Complaint/Reason for Visit:  Chief Complaint   Patient presents with     RECHECK     TCU 1/13/2022. Right foot ulcer I and D with wound vac.       TCU HPI:    Cm Lundberg is a 51 year old male with hx of CAD, s/p stents and CABG, HTN, DM, prior CVA, afib/flutter on xarelto, admitted to the hospital on 12/17/2021 for elective procedure for right foot ulceration. He underwent incision and drainage of the right foot, excisional debridement of the ulceration of the right foot into the level of the muscle, under MAC with popliteal block anesthesia on 12/17/2021. Wound vac was subsequently placed. He had no hospital complications. Seen by ID with recommendation for 5 days Augmentin at hospital discharge based on culture results. Chronic anticoagulation was restarted. He was felt to need TCU and discharged to TCU on 12/22/0221 for PT, OT, nursing cares, medical management and monitoring.       Today:  He denies any chest pain, had been seen in ED on 1/6/2021 for chest pain, did not need hospital admission, troponin neg, ACS ruled out, returned to TCU. Tolerating wound vac changes, saw Dr. Kim on 1/7/2021, follow up again in 2 weeks. Possibly will have further intervention. He is NWB, compliant. Completed PO Augmentin after TCU admission, no further abx. Appetite is good. He has been constipated, bowel meds increased, seems improved. No abdominal pain. No fever or urinary sx.       PAST MEDICAL HISTORY:  Past Medical History:   Diagnosis Date     Anxiety      Arthritis      Cerebral infarction (H)      Congestive heart failure (H)      Coronary artery disease     CABG with multiple stents     Diabetes (H)       Heart attack (H)      Hypertension      Migraines      Stented coronary artery        MEDICATIONS:  Current Outpatient Medications   Medication Sig Dispense Refill     acetaminophen (TYLENOL) 325 MG tablet Take 650 mg by mouth every 4 hours as needed for mild pain        ARIPiprazole (ABILIFY) 2 MG tablet 2 mg daily        atorvastatin (LIPITOR) 80 MG tablet Take 80 mg by mouth daily        cholestyramine (QUESTRAN) 4 g packet Take 1 packet by mouth every other day        DULoxetine (CYMBALTA) 60 MG capsule Take 120 mg by mouth daily takle 120mg       erenumab-aooe (AIMOVIG) 140 MG/ML injection Inject 140 mg Subcutaneous every 30 days        fenofibrate micronized (LOFIBRA) 200 MG capsule Take 200 mg by mouth every morning (before breakfast)        ferrous sulfate (FEROSUL) 325 (65 Fe) MG tablet Take 325 mg by mouth daily (with breakfast)        gabapentin (NEURONTIN) 100 MG capsule Take 100 mg by mouth 2 times daily (before meals)        gabapentin (NEURONTIN) 300 MG capsule Take 600 mg by mouth At Bedtime       loperamide (IMODIUM) 2 MG capsule Take 4 mg by mouth as needed for diarrhea        LORazepam (ATIVAN) 0.5 MG tablet Take 1 tablet (0.5 mg) by mouth daily before breakfast 10 tablet 0     losartan (COZAAR) 25 MG tablet Take 25 mg by mouth At Bedtime        magnesium oxide 400 MG CAPS Take 400 mg by mouth 4 times daily       Menthol, Topical Analgesic, (BIOFREEZE ROLL-ON) 4 % GEL Apply 1 Application topically 2 times daily Apply to lower back for pain       metFORMIN (GLUCOPHAGE) 1000 MG tablet Take 0.5 tablets (500 mg) by mouth 2 times daily (with meals) (Patient taking differently: Take 1,000 mg by mouth daily 1000 mg in the AM and 500 Mg in the PM)  0     metoprolol succinate ER (TOPROL-XL) 25 MG 24 hr tablet Take 75 mg by mouth daily        mirtazapine (REMERON) 7.5 MG tablet Take 7.5 mg by mouth At Bedtime        Neomycin-Bacitracin-Polymyxin (TRIPLE ANTIBIOTIC) 3.5-400-5000 OINT ointment Externally  "apply 1 applicator topically 3 times daily as needed       Nitroglycerin (NITROSTAT SL) Place 0.4 mg under the tongue every 5 minutes as needed for chest pain        omeprazole (PRILOSEC) 20 MG DR capsule Take 20 mg by mouth daily        oxyCODONE (ROXICODONE) 5 MG tablet Take 1 tablet (5 mg) by mouth every 6 hours as needed (Pain) 12 tablet 0     polyethylene glycol (MIRALAX) 17 GM/Dose powder Take 1 packet by mouth daily as needed for constipation        Rivaroxaban (XARELTO PO) Take 20 mg by mouth daily        senna-docusate (SENOKOT-S/PERICOLACE) 8.6-50 MG tablet Take 1 tablet by mouth 2 times daily as needed for constipation        traZODone (DESYREL) 50 MG tablet Take 50 mg by mouth At Bedtime        vitamin C (ASCORBIC ACID) 500 MG tablet Take 500 mg by mouth daily          PHYSICAL EXAM:  General: Patient is alert male, no distress.   Vitals: /80   Pulse 86   Temp 98.7  F (37.1  C)   Resp 18   Ht 1.702 m (5' 7\")   Wt 66.1 kg (145 lb 12.8 oz)   SpO2 93%   BMI 22.84 kg/m    HEENT: Head is NCAT. Eyes show no injection or icterus. Nares negative. Oropharynx well hydrated.  Neck: Supple. No tenderness or adenopathy. No JVD.  Lungs: Non labored respirations.  Abdomen: Soft.  : Deferred.  Extremities: No edema is noted.  Musculoskeletal: Atrophy of extremities.   Skin: Wound vac right foot.   Psych: Mood appears good.      LABS/DIAGNOSTIC DATA:  Component      Latest Ref Rng & Units 12/21/2021   Hemoglobin A1C      <=5.6 % 5.1     Component      Latest Ref Rng & Units 12/18/2021   Sodium      136 - 145 mmol/L 138   Potassium      3.5 - 5.0 mmol/L 4.5   Chloride      98 - 107 mmol/L 106   Carbon Dioxide      22 - 31 mmol/L 21 (L)   Anion Gap      5 - 18 mmol/L 11   Glucose      70 - 125 mg/dL 119   Calcium      8.5 - 10.5 mg/dL 9.3   Urea Nitrogen      8 - 22 mg/dL 10   Creatinine      0.70 - 1.30 mg/dL 0.86   GFR Estimate      >60 mL/min/1.73m2 >90         ASSESSMENT/PLAN:  1. Constipation. Bowel " meds adjusted. Improved. Chronic problem.   2. Right foot ulceration. S/p incision and drainage per Dr. Kim on 12/17/2022. Cultures reviewed by ID, he completed 5 days of PO Augmentin after hospital discharge, no further abx. Wound vac in place. Saw podiatry on Fri 1/7/2022, RTC 2 weeks. Possible later further intervention.  3. HTN. On metoprolol and losartan. BPs satisfactory, continue to monitor in TCU.  4. Afib/flutter. He is anticoagulated with xarelto.  5. CAD. Hx of many MIs per his report. Hx of stenting and prior CABG. On statin, xarelto, beta blocker. ED visit 1/6/2021 for chest pain, ACS ruled out.  6. DM. On metformin. Home glipizide stopped per hospital due to low blood sugars. Last A1c was good at 5.1%.  7. Hx of stroke. Continue statin, xarelto.  8. Anemia. He is on iron.  9. Mood disorder, mixed depression and anxiety. Cont PTA duloxetine, Abilify.   10. Mild intellectual disabilities. He resides in an [facility].        Electronically signed by: Heidi Xie MD           Sincerely,        Heidi Xie MD

## 2022-01-14 PROCEDURE — U0005 INFEC AGEN DETEC AMPLI PROBE: HCPCS | Performed by: FAMILY MEDICINE

## 2022-01-16 LAB — SARS-COV-2 RNA RESP QL NAA+PROBE: NEGATIVE

## 2022-01-18 ENCOUNTER — LAB REQUISITION (OUTPATIENT)
Dept: LAB | Facility: CLINIC | Age: 52
End: 2022-01-18
Payer: MEDICARE

## 2022-01-18 ENCOUNTER — TRANSITIONAL CARE UNIT VISIT (OUTPATIENT)
Dept: GERIATRICS | Facility: CLINIC | Age: 52
End: 2022-01-18
Payer: MEDICARE

## 2022-01-18 ENCOUNTER — LAB REQUISITION (OUTPATIENT)
Dept: LAB | Facility: CLINIC | Age: 52
End: 2022-01-18

## 2022-01-18 VITALS
WEIGHT: 147 LBS | HEART RATE: 83 BPM | RESPIRATION RATE: 18 BRPM | TEMPERATURE: 98.7 F | DIASTOLIC BLOOD PRESSURE: 78 MMHG | BODY MASS INDEX: 23.02 KG/M2 | SYSTOLIC BLOOD PRESSURE: 141 MMHG | OXYGEN SATURATION: 94 %

## 2022-01-18 DIAGNOSIS — R53.81 PHYSICAL DECONDITIONING: ICD-10-CM

## 2022-01-18 DIAGNOSIS — Z11.59 ENCOUNTER FOR SCREENING FOR OTHER VIRAL DISEASES: ICD-10-CM

## 2022-01-18 DIAGNOSIS — R52 PAIN MANAGEMENT: ICD-10-CM

## 2022-01-18 DIAGNOSIS — E11.621 DIABETIC ULCER OF RIGHT MIDFOOT ASSOCIATED WITH TYPE 2 DIABETES MELLITUS, WITH NECROSIS OF MUSCLE (H): Primary | ICD-10-CM

## 2022-01-18 DIAGNOSIS — K59.00 CONSTIPATION, UNSPECIFIED CONSTIPATION TYPE: ICD-10-CM

## 2022-01-18 DIAGNOSIS — L97.413 DIABETIC ULCER OF RIGHT MIDFOOT ASSOCIATED WITH TYPE 2 DIABETES MELLITUS, WITH NECROSIS OF MUSCLE (H): Primary | ICD-10-CM

## 2022-01-18 PROCEDURE — U0003 INFECTIOUS AGENT DETECTION BY NUCLEIC ACID (DNA OR RNA); SEVERE ACUTE RESPIRATORY SYNDROME CORONAVIRUS 2 (SARS-COV-2) (CORONAVIRUS DISEASE [COVID-19]), AMPLIFIED PROBE TECHNIQUE, MAKING USE OF HIGH THROUGHPUT TECHNOLOGIES AS DESCRIBED BY CMS-2020-01-R: HCPCS | Performed by: FAMILY MEDICINE

## 2022-01-18 PROCEDURE — 99310 SBSQ NF CARE HIGH MDM 45: CPT | Performed by: NURSE PRACTITIONER

## 2022-01-18 RX ORDER — IBUPROFEN 200 MG
1 CAPSULE ORAL 3 TIMES DAILY PRN
COMMUNITY
End: 2022-08-23

## 2022-01-18 NOTE — PROGRESS NOTES
[facility] Medical Record Number:  [id number]  Place of Service where encounter took place: [facility] ([facility]) [[id number]]   CODE STATUS:   CPR/Full code     Chief Complaint/Reason for Visit:  Chief Complaint   Patient presents with     Hospital F/U     Admit note to TCU after right foot ulcer I and D with wound vac placed.        HPI:    Cm Lundberg is a 51 year old male with hx of CAD, s/p stents and CABG, HTN, DM, prior CVA, afib/flutter on xarelto, admitted to the hospital on 12/17/2021 for elective procedure for right foot ulceration. He underwent incision and drainage of the right foot, excisional debridement of the ulceration of the right foot into the level of the muscle, under MAC with popliteal block anesthesia on 12/17/2021. Wound vac was subsequently placed. He had no hospital complications. Seen by ID with recommendation for 5 days Augmentin at hospital discharge based on culture results. Chronic anticoagulation was restarted. He was felt to need TCU and discharged to TCU on 12/22/0221 for PT, OT, nursing cares, medical management and monitoring.     Today:  He is tolerating wound vac with changes. Reports very little discomfort. He is NWB, will have follow up with podiatry on Fri 1/7/2022. States he has had this wound on right foot for 2 years. Completed PO Augmentin after TCU admission. Appetite is good. No diarrhea or constipation though tends to be constipated. No abdominal pain. No fever, cough or congestion. No urinary sx. No new vision or hearing concerns. He lives at [facility].       PAST MEDICAL HISTORY:  Past Medical History:   Diagnosis Date     Anxiety      Arthritis      Cerebral infarction (H)      Congestive heart failure (H)      Coronary artery disease     CABG with multiple stents     Diabetes (H)      Heart attack (H)      Hypertension      Migraines      Stented coronary artery        PAST SURGICAL HISTORY:  Past  Surgical History:   Procedure Laterality Date     INCISION AND DRAINAGE LOWER EXTREMITY, COMBINED Right 2021    Procedure: INCISION AND DRAINAGE, right foot;  Surgeon: Alex Kim DPM;  Location: [facility] Main OR     IR MISCELLANEOUS PROCEDURE  2004     [facility] CABG, VEIN, SINGLE      Description: CABG (CABG);  Recorded: 2009;       FAMILY HISTORY:  Family History   Adopted: Yes       SOCIAL HISTORY:  Social History     Socioeconomic History     Marital status:      Spouse name: Not on file     Number of children: Not on file     Years of education: Not on file     Highest education level: Not on file   Occupational History     Not on file   Tobacco Use     Smoking status: Former Smoker     Quit date: 2015     Years since quittin.9     Smokeless tobacco: Never Used   Vaping Use     Vaping Use: Never used   Substance and Sexual Activity     Alcohol use: Not Currently     Drug use: Not Currently     Sexual activity: Not on file   Other Topics Concern     Parent/sibling w/ CABG, MI or angioplasty before 65F 55M? Not Asked   Social History Narrative     Not on file     Social Determinants of Health     Financial Resource Strain: Not on file   Food Insecurity: Not on file   Transportation Needs: Not on file   Physical Activity: Not on file   Stress: Not on file   Social Connections: Not on file   Intimate Partner Violence: Not on file   Housing Stability: Not on file       MEDICATIONS:  Current Outpatient Medications   Medication Sig Dispense Refill     acetaminophen (TYLENOL) 325 MG tablet Take 650 mg by mouth every 4 hours as needed for mild pain        ARIPiprazole (ABILIFY) 2 MG tablet 2 mg daily        atorvastatin (LIPITOR) 80 MG tablet Take 80 mg by mouth daily        cholestyramine (QUESTRAN) 4 g packet Take 1 packet by mouth every other day        DULoxetine (CYMBALTA) 60 MG capsule Take 120 mg by mouth daily takle 120mg       erenumab-aooe (AIMOVIG) 140 MG/ML injection  Inject 140 mg Subcutaneous every 30 days        fenofibrate micronized (LOFIBRA) 200 MG capsule Take 200 mg by mouth every morning (before breakfast)        ferrous sulfate (FEROSUL) 325 (65 Fe) MG tablet Take 325 mg by mouth daily (with breakfast)        gabapentin (NEURONTIN) 100 MG capsule Take 100 mg by mouth 2 times daily (before meals)        gabapentin (NEURONTIN) 300 MG capsule Take 600 mg by mouth At Bedtime       loperamide (IMODIUM) 2 MG capsule Take 4 mg by mouth as needed for diarrhea        LORazepam (ATIVAN) 0.5 MG tablet Take 1 tablet (0.5 mg) by mouth daily before breakfast 10 tablet 0     losartan (COZAAR) 25 MG tablet Take 25 mg by mouth At Bedtime        magnesium oxide 400 MG CAPS Take 400 mg by mouth 4 times daily       Menthol, Topical Analgesic, (BIOFREEZE ROLL-ON) 4 % GEL Apply 1 Application topically 2 times daily Apply to lower back for pain       metFORMIN (GLUCOPHAGE) 1000 MG tablet Take 0.5 tablets (500 mg) by mouth 2 times daily (with meals) (Patient taking differently: Take 1,000 mg by mouth daily 1000 mg in the AM and 500 Mg in the PM)  0     metoprolol succinate ER (TOPROL-XL) 25 MG 24 hr tablet Take 75 mg by mouth daily        mirtazapine (REMERON) 7.5 MG tablet Take 7.5 mg by mouth At Bedtime        Neomycin-Bacitracin-Polymyxin (TRIPLE ANTIBIOTIC) 3.5-400-5000 OINT ointment Externally apply 1 applicator topically 3 times daily as needed       Nitroglycerin (NITROSTAT SL) Place 0.4 mg under the tongue every 5 minutes as needed for chest pain        omeprazole (PRILOSEC) 20 MG DR capsule Take 20 mg by mouth daily        oxyCODONE (ROXICODONE) 5 MG tablet Take 1 tablet (5 mg) by mouth every 6 hours as needed (Pain) 12 tablet 0     polyethylene glycol (MIRALAX) 17 GM/Dose powder Take 1 packet by mouth daily as needed for constipation        Rivaroxaban (XARELTO PO) Take 20 mg by mouth daily        senna-docusate (SENOKOT-S/PERICOLACE) 8.6-50 MG tablet Take 1 tablet by mouth 2 times  "daily as needed for constipation        traZODone (DESYREL) 50 MG tablet Take 50 mg by mouth At Bedtime        vitamin C (ASCORBIC ACID) 500 MG tablet Take 500 mg by mouth daily          ALLERGIES:  Allergies   Allergen Reactions     Aspirin Other (See Comments)     Reacts with migraine medicine     Isosorbide Headache     Codeine Rash     Morphine Rash       REVIEW OF SYSTEMS:  Pertinent items as noted in HPI.      PHYSICAL EXAM:  General: Patient is alert male, no distress.   Vitals: /70   Pulse 78   Temp 98.7  F (37.1  C)   Resp 18   Ht 1.702 m (5' 7\")   Wt 65.7 kg (144 lb 12.8 oz)   SpO2 95%   BMI 22.68 kg/m    HEENT: Head is NCAT. Eyes show no injection or icterus. Nares negative. Oropharynx well hydrated.  Neck: Supple. No tenderness or adenopathy. No JVD.  Lungs: Clear bilaterally. No wheezes.  Cardiovascular: Regular rate and rhythm, normal S1. S2.  Back: No spinal or CVA tenderness.  Abdomen: Soft, no tenderness on exam. Bowel sounds present. No guarding rebound or rigidity.  : Deferred.  Extremities: No edema is noted.  Musculoskeletal: Atrophy of extremities.   Skin: Wound vac right foot.   Psych: Mood appears good.      LABS/DIAGNOSTIC DATA:  Component      Latest Ref Rng & Units 12/21/2021   Hemoglobin A1C      <=5.6 % 5.1     Component      Latest Ref Rng & Units 12/18/2021   Sodium      136 - 145 mmol/L 138   Potassium      3.5 - 5.0 mmol/L 4.5   Chloride      98 - 107 mmol/L 106   Carbon Dioxide      22 - 31 mmol/L 21 (L)   Anion Gap      5 - 18 mmol/L 11   Glucose      70 - 125 mg/dL 119   Calcium      8.5 - 10.5 mg/dL 9.3   Urea Nitrogen      8 - 22 mg/dL 10   Creatinine      0.70 - 1.30 mg/dL 0.86   GFR Estimate      >60 mL/min/1.73m2 >90         ASSESSMENT/PLAN:  1. Right foot ulceration. Long standing wound per patient report. S/p incision and drainage per Dr. Kim on 12/17/2022. Cultures reviewed by ID, he completed 5 days of PO Augmentin after hospital discharge, no further " abx. Wound vac in place. Follow up with podiatry on Fri 1/7/2022.   2. HTN. On metoprolol and losartan. BPs satisfactory, continue to monitor in TCU.  3. Afib/flutter. He is anticoagulated with xarelto.  4. CAD. Hx of many MIs per his report. Hx of stenting and prior CABG. On statin, xarelto, beta blocker.  5. DM. On metformin. Home glipizide stopped per hospital due to low blood sugars. Last A1c was good at 5.1%.  6. Hx of stroke. Continue statin, xarelto.  7. Anemia. He is on iron.  8. Mood disorder, mixed depression and anxiety. Cont PTA duloxetine, Abilify.   9. Mild intellectual disabilities. He resides in an [facility].  10. Code status is full code.         Electronically signed by: Heidi Xie MD

## 2022-01-18 NOTE — LETTER
1/18/2022        RE: Cm Lundberg  [address]  [place] [address]        [facility]  Chief Complaint   Patient presents with     RECHECK     [facility] Medical Record Number:  [id number]  Place of Service where encounter took place:  [facility] ([facility]) [[id number]]  Code Status:  Full Code     HISTORY:      HPI:  Cm Lundberg  is 51 year old (1970) undergoing physical and occupational therapy. He is  with a medical history of CVA due to embolism, CAD, ischemic cardiomyopathy, type II diabetes mellitus, and hypertension,admitted to the hospital on 12/17/2021 for elective procedure for right foot ulceration. He underwent incision and drainage of the right foot, excisional debridement of the ulceration of the right foot into the level of the muscle, under MAC with popliteal block anesthesia on 12/17/2021. Wound vac was subsequently placed.    He was in the transitional care unit and returned to the ER on 1/6/2022 with reports of chest pain. Per hospital records.Patient presents with left sided chest pain that began at 0700 this morning. He describes this pain as stabbing, and reports that it radiates up into his shoulder and neck. He endorses associated lower leg edema. He denies any palliative or provoking factors. The patient states that he has had symptoms similar to these six months ago. He also mentions that he has had 28 heart attacks in the past. En route to the hospital, he was given nitro by EMS which provided some relief. The patient states that his pain is mostly gone now. His pain resolved in the ED without recurrence & EKG normal X2 & troponin negative. I was asked to follow up a repeat troponin which was negative as well. No recurrence of chest pain & denies new complaints.    Today is seen in his room to review vital signs, labs, follow-up right foot wound. He denied chest pain shortness of breath cough or congestion. He reports no BM  x3 days. Abdomen was soft and nontender he is passing flatus. MiraLAX was changed to scheduled and he is also on senna S1 tablet twice daily and as needed suppositories. He has a wound VAC in place right foot and will follow-up with vascular on 1/21. In review of his blood sugars they have been elevated in the 200s his metformin was increased to 1000 mg in the a.m. and he will continue 500 mg in the p.m. He denies pain          ALLERGIES:Aspirin, Isosorbide, Codeine, and Morphine    PAST MEDICAL HISTORY:   Past Medical History:   Diagnosis Date     Anxiety      Arthritis      Cerebral infarction (H)      Congestive heart failure (H)      Coronary artery disease     CABG with multiple stents     Diabetes (H)      Heart attack (H)      Hypertension      Migraines      Stented coronary artery        PAST SURGICAL HISTORY:   has a past surgical history that includes IR Miscellaneous Procedure (2/13/2004); CABG, VEIN, SINGLE; and Incision and drainage lower extremity, combined (Right, 12/17/2021).    FAMILY HISTORY: family history is not on file. He was adopted.    SOCIAL HISTORY:  reports that he quit smoking about 6 years ago. He has never used smokeless tobacco. He reports previous alcohol use. He reports previous drug use.    ROS:  Constitutional:  appetite change, fatigue and fever.   HENT: Negative for congestion.    Respiratory: Negative for cough, shortness of breath and wheezing.    Cardiovascular: Negative for chest pain and leg swelling.   Gastrointestinal: Negative for abdominal distention, abdominal pain,  positive constipation, diarrhea and nausea.   Genitourinary: Negative for dysuria.   Musculoskeletal: Negative for arthralgia. Negative for back pain. Extremities with atrophy, decreased range of motion right upper extremity  Skin: Negative for color change and wound.   Neurological: Negative for dizziness.   Psychiatric/Behavioral: Negative for agitation, behavioral problems and confusion.     Physical  Exam:  Constitutional:       Appearance: Patient is well-developed.   HENT:      Head: Normocephalic.   Eyes:      Conjunctiva/sclera: Conjunctivae normal.   Neck:      Musculoskeletal: Normal range of motion.   Cardiovascular:      Rate and Rhythm: Normal rate and regular rhythm.      Heart sounds: Normal heart sounds. No murmur.   Pulmonary:      Effort: No respiratory distress.      Breath sounds: Normal breath sounds. No wheezing or rales.   Abdominal:      General: Bowel sounds are normal. There is no distension.      Palpations: Abdomen is soft.      Tenderness: There is no abdominal tenderness.   Musculoskeletal:       Normal range of motion.   Decreased range of motion right upper extremity, wound VAC right foot   Skin:General:        Skin is warm.   Neurological:         Mental Status: Patient is alert and oriented to person, place, and time.   Psychiatric:         Behavior: Behavior normal.     Vitals:BP (!) 141/78   Pulse 83   Temp 98.7  F (37.1  C)   Resp 18   Wt 66.7 kg (147 lb)   SpO2 94%   BMI 23.02 kg/m   and Body mass index is 23.02 kg/m .    Lab/Diagnostic data:   Recent Results (from the past 240 hour(s))   COVID-19 Virus (Coronavirus) by PCR Nasopharyngeal    Collection Time: 01/11/22  6:00 AM    Specimen: Nasopharyngeal; Swab   Result Value Ref Range    SARS CoV2 PCR Negative Negative, Testing sent to reference lab. Results will be returned via unsolicited result   COVID-19 Virus (Coronavirus) by PCR Nasopharyngeal    Collection Time: 01/14/22  6:00 AM    Specimen: Nasopharyngeal; Swab   Result Value Ref Range    SARS CoV2 PCR Negative Negative, Testing sent to reference lab. Results will be returned via unsolicited result     MEDICATIONS:     Review of your medicines          Accurate as of January 18, 2022  1:16 PM. If you have any questions, ask your nurse or doctor.            CONTINUE these medicines which may have CHANGED, or have new prescriptions. If we are uncertain of the size of  tablets/capsules you have at home, strength may be listed as something that might have changed.      Dose / Directions   metFORMIN 1000 MG tablet  Commonly known as: GLUCOPHAGE  This may have changed:     how much to take    when to take this    additional instructions  Used for: Type 2 diabetes mellitus treated without insulin (H)      Dose: 500 mg  Take 0.5 tablets (500 mg) by mouth 2 times daily (with meals)  Refills: 0        CONTINUE these medicines which have NOT CHANGED      Dose / Directions   acetaminophen 325 MG tablet  Commonly known as: TYLENOL      Dose: 650 mg  Take 650 mg by mouth every 4 hours as needed for mild pain  Refills: 0     Aimovig 140 MG/ML injection  Generic drug: erenumab-aooe      Dose: 140 mg  Inject 140 mg Subcutaneous every 30 days  Refills: 0     ARIPiprazole 2 MG tablet  Commonly known as: ABILIFY      Dose: 2 mg  2 mg daily  Refills: 0     atorvastatin 80 MG tablet  Commonly known as: LIPITOR      Dose: 80 mg  Take 80 mg by mouth daily  Refills: 0     Biofreeze Roll-On 4 % Gel  Generic drug: Menthol (Topical Analgesic)      Dose: 1 Application  Apply 1 Application topically 2 times daily Apply to lower back for pain  Refills: 0     cholestyramine 4 g packet  Commonly known as: QUESTRAN      Dose: 1 packet  Take 1 packet by mouth every other day  Refills: 0     DULoxetine 60 MG capsule  Commonly known as: CYMBALTA      Dose: 120 mg  Take 120 mg by mouth daily takle 120mg  Refills: 0     fenofibrate micronized 200 MG capsule  Commonly known as: LOFIBRA      Dose: 200 mg  Take 200 mg by mouth every morning (before breakfast)  Refills: 0     ferrous sulfate 325 (65 Fe) MG tablet  Commonly known as: FEROSUL      Dose: 325 mg  Take 325 mg by mouth daily (with breakfast)  Refills: 0     * gabapentin 100 MG capsule  Commonly known as: NEURONTIN      Dose: 100 mg  Take 100 mg by mouth 2 times daily (before meals)  Refills: 0     * gabapentin 300 MG capsule  Commonly known as: NEURONTIN       Dose: 600 mg  Take 600 mg by mouth At Bedtime  Refills: 0     loperamide 2 MG capsule  Commonly known as: IMODIUM      Dose: 4 mg  Take 4 mg by mouth as needed for diarrhea  Refills: 0     LORazepam 0.5 MG tablet  Commonly known as: ATIVAN  Used for: Mild intellectual disabilities      Dose: 0.5 mg  Take 1 tablet (0.5 mg) by mouth daily before breakfast  Quantity: 10 tablet  Refills: 0     losartan 25 MG tablet  Commonly known as: COZAAR      Dose: 25 mg  Take 25 mg by mouth At Bedtime  Refills: 0     magnesium oxide 400 MG Caps      Dose: 400 mg  Take 400 mg by mouth 4 times daily  Refills: 0     metoprolol succinate ER 25 MG 24 hr tablet  Commonly known as: TOPROL-XL      Dose: 75 mg  Take 75 mg by mouth daily  Refills: 0     mirtazapine 7.5 MG tablet  Commonly known as: REMERON      Dose: 7.5 mg  Take 7.5 mg by mouth At Bedtime  Refills: 0     NITROSTAT SL      Dose: 0.4 mg  Place 0.4 mg under the tongue every 5 minutes as needed for chest pain  Refills: 0     omeprazole 20 MG DR capsule  Commonly known as: priLOSEC      Dose: 20 mg  Take 20 mg by mouth daily  Refills: 0     oxyCODONE 5 MG tablet  Commonly known as: ROXICODONE  Used for: Diabetic ulcer of right midfoot associated with type 2 diabetes mellitus, limited to breakdown of skin (H)      Dose: 5 mg  Take 1 tablet (5 mg) by mouth every 6 hours as needed (Pain)  Quantity: 12 tablet  Refills: 0     polyethylene glycol 17 GM/Dose powder  Commonly known as: MIRALAX      Dose: 1 packet  Take 1 packet by mouth daily as needed for constipation  Refills: 0     senna-docusate 8.6-50 MG tablet  Commonly known as: SENOKOT-S/PERICOLACE      Dose: 1 tablet  Take 1 tablet by mouth 2 times daily as needed for constipation  Refills: 0     traZODone 50 MG tablet  Commonly known as: DESYREL      Dose: 50 mg  Take 50 mg by mouth At Bedtime  Refills: 0     Triple Antibiotic 3.5-400-5000 Oint ointment      Dose: 1 applicator  Externally apply 1 applicator topically 3 times  daily as needed  Refills: 0     vitamin C 500 MG tablet  Commonly known as: ASCORBIC ACID      Dose: 500 mg  Take 500 mg by mouth daily  Refills: 0     XARELTO PO      Dose: 20 mg  Take 20 mg by mouth daily  Refills: 0         * This list has 2 medication(s) that are the same as other medications prescribed for you. Read the directions carefully, and ask your doctor or other care provider to review them with you.                ASSESSMENT/PLAN  Encounter Diagnoses   Name Primary?     Diabetic ulcer of right midfoot associated with type 2 diabetes mellitus, with necrosis of muscle (H) Yes     Physical deconditioning      Pain management      Diabetic ulcer right foot follow-up with vascular as scheduled, pain control, wound VAC     Physical deconditioning PT OT    Pain management as needed Tylenol and oxycodone    Diabetes type 2 increase metformin to 1000 mg in the a.m. and keep at 500 mg p.m. fingersticks are scheduled A1c in 3 months AIC 12/21/21 was 5.1    Hypertension on losartan and metoprolol succinate  .  GERD continue omeprazole as    Hx of stroke. Continue statin, xarelto.    Anemia. On ferrous sulfate     Mood disorder, mixed depression and anxiety. Continue to duloxetine, Abilify.     Constipation continue senna S1 tablet twice daily, scheduled MiraLAX 17 g p.o. daily and continue physical suppositories as scheduled as needed    Electronically signed by: Gladys Baca CNP        Sincerely,        Gladys Baca CNP

## 2022-01-18 NOTE — PROGRESS NOTES
[facility] GERIATRIC SERVICES  Chief Complaint   Patient presents with     JB     [facility] Record Number:  [id number]  Place of Service where encounter took place:  [facility] ([facility]) [[id number]]  Code Status:  Full Code     HISTORY:      HPI:  Cm Lundberg  is 51 year old (1970) undergoing physical and occupational therapy. He is  with a medical history of CVA due to embolism, CAD, ischemic cardiomyopathy, type II diabetes mellitus, and hypertension,admitted to the hospital on 12/17/2021 for elective procedure for right foot ulceration. He underwent incision and drainage of the right foot, excisional debridement of the ulceration of the right foot into the level of the muscle, under MAC with popliteal block anesthesia on 12/17/2021. Wound vac was subsequently placed.    He was in the transitional care unit and returned to the ER on 1/6/2022 with reports of chest pain. Per hospital records.Patient presents with left sided chest pain that began at 0700 this morning. He describes this pain as stabbing, and reports that it radiates up into his shoulder and neck. He endorses associated lower leg edema. He denies any palliative or provoking factors. The patient states that he has had symptoms similar to these six months ago. He also mentions that he has had 28 heart attacks in the past. En route to the hospital, he was given nitro by EMS which provided some relief. The patient states that his pain is mostly gone now. His pain resolved in the ED without recurrence & EKG normal X2 & troponin negative. I was asked to follow up a repeat troponin which was negative as well. No recurrence of chest pain & denies new complaints.    Today is seen in his room to review vital signs, labs, follow-up right foot wound. He denied chest pain shortness of breath cough or congestion. He reports no BM x3 days. Abdomen was soft and nontender he is passing flatus. MiraLAX was changed to scheduled and he  is also on senna S1 tablet twice daily and as needed suppositories. He has a wound VAC in place right foot and will follow-up with vascular on 1/21. In review of his blood sugars they have been elevated in the 200s his metformin was increased to 1000 mg in the a.m. and he will continue 500 mg in the p.m. He denies pain          ALLERGIES:Aspirin, Isosorbide, Codeine, and Morphine    PAST MEDICAL HISTORY:   Past Medical History:   Diagnosis Date     Anxiety      Arthritis      Cerebral infarction (H)      Congestive heart failure (H)      Coronary artery disease     CABG with multiple stents     Diabetes (H)      Heart attack (H)      Hypertension      Migraines      Stented coronary artery        PAST SURGICAL HISTORY:   has a past surgical history that includes IR Miscellaneous Procedure (2/13/2004); CABG, VEIN, SINGLE; and Incision and drainage lower extremity, combined (Right, 12/17/2021).    FAMILY HISTORY: family history is not on file. He was adopted.    SOCIAL HISTORY:  reports that he quit smoking about 6 years ago. He has never used smokeless tobacco. He reports previous alcohol use. He reports previous drug use.    ROS:  Constitutional:  appetite change, fatigue and fever.   HENT: Negative for congestion.    Respiratory: Negative for cough, shortness of breath and wheezing.    Cardiovascular: Negative for chest pain and leg swelling.   Gastrointestinal: Negative for abdominal distention, abdominal pain,  positive constipation, diarrhea and nausea.   Genitourinary: Negative for dysuria.   Musculoskeletal: Negative for arthralgia. Negative for back pain. Extremities with atrophy, decreased range of motion right upper extremity  Skin: Negative for color change and wound.   Neurological: Negative for dizziness.   Psychiatric/Behavioral: Negative for agitation, behavioral problems and confusion.     Physical Exam:  Constitutional:       Appearance: Patient is well-developed.   HENT:      Head: Normocephalic.    Eyes:      Conjunctiva/sclera: Conjunctivae normal.   Neck:      Musculoskeletal: Normal range of motion.   Cardiovascular:      Rate and Rhythm: Normal rate and regular rhythm.      Heart sounds: Normal heart sounds. No murmur.   Pulmonary:      Effort: No respiratory distress.      Breath sounds: Normal breath sounds. No wheezing or rales.   Abdominal:      General: Bowel sounds are normal. There is no distension.      Palpations: Abdomen is soft.      Tenderness: There is no abdominal tenderness.   Musculoskeletal:       Normal range of motion.   Decreased range of motion right upper extremity, wound VAC right foot   Skin:General:        Skin is warm.   Neurological:         Mental Status: Patient is alert and oriented to person, place, and time.   Psychiatric:         Behavior: Behavior normal.     Vitals:BP (!) 141/78   Pulse 83   Temp 98.7  F (37.1  C)   Resp 18   Wt 66.7 kg (147 lb)   SpO2 94%   BMI 23.02 kg/m   and Body mass index is 23.02 kg/m .    Lab/Diagnostic data:   Recent Results (from the past 240 hour(s))   COVID-19 Virus (Coronavirus) by PCR Nasopharyngeal    Collection Time: 01/11/22  6:00 AM    Specimen: Nasopharyngeal; Swab   Result Value Ref Range    SARS CoV2 PCR Negative Negative, Testing sent to reference lab. Results will be returned via unsolicited result   COVID-19 Virus (Coronavirus) by PCR Nasopharyngeal    Collection Time: 01/14/22  6:00 AM    Specimen: Nasopharyngeal; Swab   Result Value Ref Range    SARS CoV2 PCR Negative Negative, Testing sent to reference lab. Results will be returned via unsolicited result     MEDICATIONS:     Review of your medicines          Accurate as of January 18, 2022  1:16 PM. If you have any questions, ask your nurse or doctor.            CONTINUE these medicines which may have CHANGED, or have new prescriptions. If we are uncertain of the size of tablets/capsules you have at home, strength may be listed as something that might have changed.       Dose / Directions   metFORMIN 1000 MG tablet  Commonly known as: GLUCOPHAGE  This may have changed:     how much to take    when to take this    additional instructions  Used for: Type 2 diabetes mellitus treated without insulin (H)      Dose: 500 mg  Take 0.5 tablets (500 mg) by mouth 2 times daily (with meals)  Refills: 0        CONTINUE these medicines which have NOT CHANGED      Dose / Directions   acetaminophen 325 MG tablet  Commonly known as: TYLENOL      Dose: 650 mg  Take 650 mg by mouth every 4 hours as needed for mild pain  Refills: 0     Aimovig 140 MG/ML injection  Generic drug: erenumab-aooe      Dose: 140 mg  Inject 140 mg Subcutaneous every 30 days  Refills: 0     ARIPiprazole 2 MG tablet  Commonly known as: ABILIFY      Dose: 2 mg  2 mg daily  Refills: 0     atorvastatin 80 MG tablet  Commonly known as: LIPITOR      Dose: 80 mg  Take 80 mg by mouth daily  Refills: 0     Biofreeze Roll-On 4 % Gel  Generic drug: Menthol (Topical Analgesic)      Dose: 1 Application  Apply 1 Application topically 2 times daily Apply to lower back for pain  Refills: 0     cholestyramine 4 g packet  Commonly known as: QUESTRAN      Dose: 1 packet  Take 1 packet by mouth every other day  Refills: 0     DULoxetine 60 MG capsule  Commonly known as: CYMBALTA      Dose: 120 mg  Take 120 mg by mouth daily takle 120mg  Refills: 0     fenofibrate micronized 200 MG capsule  Commonly known as: LOFIBRA      Dose: 200 mg  Take 200 mg by mouth every morning (before breakfast)  Refills: 0     ferrous sulfate 325 (65 Fe) MG tablet  Commonly known as: FEROSUL      Dose: 325 mg  Take 325 mg by mouth daily (with breakfast)  Refills: 0     * gabapentin 100 MG capsule  Commonly known as: NEURONTIN      Dose: 100 mg  Take 100 mg by mouth 2 times daily (before meals)  Refills: 0     * gabapentin 300 MG capsule  Commonly known as: NEURONTIN      Dose: 600 mg  Take 600 mg by mouth At Bedtime  Refills: 0     loperamide 2 MG capsule  Commonly  known as: IMODIUM      Dose: 4 mg  Take 4 mg by mouth as needed for diarrhea  Refills: 0     LORazepam 0.5 MG tablet  Commonly known as: ATIVAN  Used for: Mild intellectual disabilities      Dose: 0.5 mg  Take 1 tablet (0.5 mg) by mouth daily before breakfast  Quantity: 10 tablet  Refills: 0     losartan 25 MG tablet  Commonly known as: COZAAR      Dose: 25 mg  Take 25 mg by mouth At Bedtime  Refills: 0     magnesium oxide 400 MG Caps      Dose: 400 mg  Take 400 mg by mouth 4 times daily  Refills: 0     metoprolol succinate ER 25 MG 24 hr tablet  Commonly known as: TOPROL-XL      Dose: 75 mg  Take 75 mg by mouth daily  Refills: 0     mirtazapine 7.5 MG tablet  Commonly known as: REMERON      Dose: 7.5 mg  Take 7.5 mg by mouth At Bedtime  Refills: 0     NITROSTAT SL      Dose: 0.4 mg  Place 0.4 mg under the tongue every 5 minutes as needed for chest pain  Refills: 0     omeprazole 20 MG DR capsule  Commonly known as: priLOSEC      Dose: 20 mg  Take 20 mg by mouth daily  Refills: 0     oxyCODONE 5 MG tablet  Commonly known as: ROXICODONE  Used for: Diabetic ulcer of right midfoot associated with type 2 diabetes mellitus, limited to breakdown of skin (H)      Dose: 5 mg  Take 1 tablet (5 mg) by mouth every 6 hours as needed (Pain)  Quantity: 12 tablet  Refills: 0     polyethylene glycol 17 GM/Dose powder  Commonly known as: MIRALAX      Dose: 1 packet  Take 1 packet by mouth daily as needed for constipation  Refills: 0     senna-docusate 8.6-50 MG tablet  Commonly known as: SENOKOT-S/PERICOLACE      Dose: 1 tablet  Take 1 tablet by mouth 2 times daily as needed for constipation  Refills: 0     traZODone 50 MG tablet  Commonly known as: DESYREL      Dose: 50 mg  Take 50 mg by mouth At Bedtime  Refills: 0     Triple Antibiotic 3.5-400-5000 Oint ointment      Dose: 1 applicator  Externally apply 1 applicator topically 3 times daily as needed  Refills: 0     vitamin C 500 MG tablet  Commonly known as: ASCORBIC ACID       Dose: 500 mg  Take 500 mg by mouth daily  Refills: 0     XARELTO PO      Dose: 20 mg  Take 20 mg by mouth daily  Refills: 0         * This list has 2 medication(s) that are the same as other medications prescribed for you. Read the directions carefully, and ask your doctor or other care provider to review them with you.                ASSESSMENT/PLAN  Encounter Diagnoses   Name Primary?     Diabetic ulcer of right midfoot associated with type 2 diabetes mellitus, with necrosis of muscle (H) Yes     Physical deconditioning      Pain management      Diabetic ulcer right foot follow-up with vascular as scheduled, pain control, wound VAC     Physical deconditioning PT OT    Pain management as needed Tylenol and oxycodone    Diabetes type 2 increase metformin to 1000 mg in the a.m. and keep at 500 mg p.m. fingersticks are scheduled A1c in 3 months AIC 12/21/21 was 5.1    Hypertension on losartan and metoprolol succinate  .  GERD continue omeprazole as    Hx of stroke. Continue statin, xarelto.    Anemia. On ferrous sulfate     Mood disorder, mixed depression and anxiety. Continue to duloxetine, Abilify.     Constipation continue senna S1 tablet twice daily, scheduled MiraLAX 17 g p.o. daily and continue physical suppositories as scheduled as needed    Electronically signed by: Gladys Baca CNP

## 2022-01-19 LAB — SARS-COV-2 RNA RESP QL NAA+PROBE: NORMAL

## 2022-01-20 ENCOUNTER — LAB REQUISITION (OUTPATIENT)
Dept: LAB | Facility: CLINIC | Age: 52
End: 2022-01-20
Payer: MEDICARE

## 2022-01-20 DIAGNOSIS — Z11.59 ENCOUNTER FOR SCREENING FOR OTHER VIRAL DISEASES: ICD-10-CM

## 2022-01-20 LAB — SARS-COV-2 RNA RESP QL NAA+PROBE: NOT DETECTED

## 2022-01-21 ENCOUNTER — OFFICE VISIT (OUTPATIENT)
Dept: VASCULAR SURGERY | Facility: CLINIC | Age: 52
End: 2022-01-21
Attending: PODIATRIST
Payer: MEDICARE

## 2022-01-21 ENCOUNTER — DOCUMENTATION ONLY (OUTPATIENT)
Dept: VASCULAR SURGERY | Facility: CLINIC | Age: 52
End: 2022-01-21
Payer: MEDICARE

## 2022-01-21 ENCOUNTER — PREP FOR PROCEDURE (OUTPATIENT)
Dept: PODIATRY | Facility: CLINIC | Age: 52
End: 2022-01-21
Payer: MEDICARE

## 2022-01-21 ENCOUNTER — TELEPHONE (OUTPATIENT)
Dept: SCHEDULING | Facility: CLINIC | Age: 52
End: 2022-01-21

## 2022-01-21 VITALS
HEIGHT: 67 IN | BODY MASS INDEX: 23.07 KG/M2 | HEART RATE: 79 BPM | SYSTOLIC BLOOD PRESSURE: 130 MMHG | DIASTOLIC BLOOD PRESSURE: 78 MMHG | OXYGEN SATURATION: 97 % | WEIGHT: 147 LBS

## 2022-01-21 DIAGNOSIS — E11.621 DIABETIC ULCER OF RIGHT MIDFOOT ASSOCIATED WITH TYPE 2 DIABETES MELLITUS, WITH NECROSIS OF MUSCLE (H): Primary | ICD-10-CM

## 2022-01-21 DIAGNOSIS — L97.413 DIABETIC ULCER OF RIGHT MIDFOOT ASSOCIATED WITH TYPE 2 DIABETES MELLITUS, WITH NECROSIS OF MUSCLE (H): Primary | ICD-10-CM

## 2022-01-21 DIAGNOSIS — L97.411 DIABETIC ULCER OF RIGHT MIDFOOT ASSOCIATED WITH TYPE 2 DIABETES MELLITUS, LIMITED TO BREAKDOWN OF SKIN (H): ICD-10-CM

## 2022-01-21 DIAGNOSIS — Z11.59 ENCOUNTER FOR SCREENING FOR OTHER VIRAL DISEASES: Primary | ICD-10-CM

## 2022-01-21 DIAGNOSIS — E11.621 DIABETIC ULCER OF RIGHT MIDFOOT ASSOCIATED WITH TYPE 2 DIABETES MELLITUS, LIMITED TO BREAKDOWN OF SKIN (H): ICD-10-CM

## 2022-01-21 DIAGNOSIS — L97.513 ULCER OF RIGHT FOOT WITH NECROSIS OF MUSCLE (H): Primary | ICD-10-CM

## 2022-01-21 PROCEDURE — U0005 INFEC AGEN DETEC AMPLI PROBE: HCPCS | Performed by: FAMILY MEDICINE

## 2022-01-21 PROCEDURE — 11043 DBRDMT MUSC&/FSCA 1ST 20/<: CPT | Performed by: PODIATRIST

## 2022-01-21 RX ORDER — CEFAZOLIN SODIUM 2 G/100ML
2 INJECTION, SOLUTION INTRAVENOUS SEE ADMIN INSTRUCTIONS
Status: CANCELLED | OUTPATIENT
Start: 2022-01-27

## 2022-01-21 RX ORDER — CEFAZOLIN SODIUM 2 G/100ML
2 INJECTION, SOLUTION INTRAVENOUS
Status: CANCELLED | OUTPATIENT
Start: 2022-01-27

## 2022-01-21 ASSESSMENT — PAIN SCALES - GENERAL: PAINLEVEL: NO PAIN (0)

## 2022-01-21 ASSESSMENT — MIFFLIN-ST. JEOR: SCORE: 1470.42

## 2022-01-21 NOTE — PATIENT INSTRUCTIONS
Prior to surgery, gauze dressing on right foot wound, change every other day.  Post surgery, wound vac will be needed.  See instructions for wound vac at bottom of order.          Cm,    Your surgery with [facility] has been scheduled. Please read thoroughly and follow instructions.     Procedure:   INCISION AND DRAINAGE, right foot with application of theraskin (Right)  Procedure Date :   1/27/21  *A surgery nurse will call you 1-2 days before surgery to inform you of the time of arrival for surgery. Tentatively surgery is scheduled for 7:30 AM with a 5:30 AM arrival.  Surgeon:   Dr. Alex Kim  Admission Type:   Outpatient  Procedure Location:   [facility]:  [address] [place] [address] (phone: [phone], Fax: [phone])    Covid Test: SEE APPOINTMENTS  Post Operative Appointment: SEE APPOINTMENTS       Preparation Instructions to complete:    []  You will need a Pre-op Physical within 30 days before surgery with your primary care provider. This exam is required by the anesthesiologist to ensure a safe surgical experience.      Failure  to obtain your pre-op physical will lead to cancellation of your surgery    Call them right away to schedule this. Please ensure your Preoperative Physical is faxed to the Hospital (numbers listed above)    [] Preoperative Medication Instructions    We would like you to stop your anticoagulation medications 3-5 days before surgery HOWEVER contact your prescribing provider for instructions before discontinuing any medications. (Examples: Coumadin, Plavix, Xarelto, Eliquis, Pradaxa, Effient, Brilinta) If you are on Coumadin, we would like the goal INR ? 1.2.  HOLD XARELTO FOR 3 DAYS PRIOR TO SURGERY    IT IS OK TO STAY ON ASPIRIN PRIOR TO SURGERY.     Hold Ibuprofen, Herbal Supplements and Vitamin E 7 days before    Stop Cialis, Levitra and Viagra 2-3 days before surgery    [] Fasting Requirements    Nothing  "to eat for 8 hours before surgery unless instructed differently by the surgery nurse.    You may have clear liquids up to two hours before your arrival time (coffee, tea, water, or Gatorade. No cream or milk)    If your primary care provider has instructed you to continue oral medications, you may take them on the morning of surgery with a small sip of water.      No alcohol or smoking after 12:00am the day of surgery    [] PCR COVID-19 test is required within 4 days of surgery    If your test is positive or you fail to get tested, your surgery will be postponed.     [] Contact your insurance regarding coverage    If you would like a [facility] Mer Estimate for your upcoming procedure at [facility] Location, contact Cost of Care Estimates     Advocates are available Monday through Friday 8am - 5pm   [phone]    You may also submit a request online at [email or web address]  - Complete the secure online form found under \"Services and Procedure Pricing\"     If your procedure is at [facility] please contact the numbers below for Cost of Care Estimates.   - Facility Charge: [phone]    Anesthesiology charge:  [phone]      [] DO NOT BRING FMLA WITH TO SURGERY.  These should be sent to the provider's office by fax to [phone].     [] Day of Surgery    Medications - Take as indicated with sips of water.     Wear comfortable loose fitting clothes. Wear your glasses-Not contacts. Do not wear jewelry and remove body piercing's. Surgery may be cancelled if they are not removed.     You may have 1 family member wait in the lobby during your surgery. Visitor restrictions are subject to change. Please verify with the surgery nurse when they call.     If same day surgery-Have a someone come with you to surgery that can help you understand the surgeon's instructions, drive you home and stay with you overnight the first night.    [] If the community sees a new COVID-19 surge, your " procedure may need to be postponed. We will contact you if this happens.    [] You will receive a call from a surgery nurse 1-3 days prior to surgery. They will go over more details with you.                                ** AFTER SURGERY INSTRUCTIONS **                          [] You are to remain NON WEIGHT BEARING on your right foot Non-weight bearing means NO PRESSURE on your foot or heel at any time for any reason.    [] Prior to surgery you should already have a Egg Crate boot which you will need to WEAR THIS AT ALL TIMES EVEN WHILE IN BED Please bring this with you on the day of surgery.    [] You should already have a A WHEELCHAIR to help you ambulate after surgery. Please also bring this with you on the day of surgery.    [] During surgery Dr. Kim will apply a gauze dressing to your foot. This will remain intact until the wound vac is applied. The wound vac should be applied within 24 hours after surgery.    [] It is NOT OKAY to get your surgical site wet while bathing, you will need to purchase a cast cover to protect your foot from getting wet. You can purchase this at [facility] or your local pharmacy.    [] It is important that you elevate your affected foot after surgery. Proper elevation is raising your foot above your waist. The fluid in your lower extremities needs to get back to your heart so it can get pumped to your kidneys and expelled through urination. So if you notice you have to go to the bathroom more frequently when you are elevating your leg this is a good sign that it is working.     [] It is important that you increase your protein intake after surgery. Protein is essential for wound healing. We recommend you take a protein supplement twice per day. This is in addition to your normal diet. Examples of protein supplements are Ensure, Boost, Glucerna (if you are diabetic), or protein powder. You can purchase these at your local retailer or grocery store.       Notify our  office right away, if you have any changes in your health status, or if you develop a cold, flu, diarrhea, infection, fever or sore throat before your scheduled surgery date. We can be reached at [phone] Monday-Friday 8 am-4:30 pm if you have any questions.     Thank you for trusting us with your care!        Before Your Procedure or Hospital Admission  Testing for COVID-19 (Coronavirus)    Thank you for choosing [facility] for your health care needs. The COVID-19 pandemic is a very challenging time for everyone. Our goal is to keep you and our team here at [facility] safe and healthy.       Before you come in, All patients must get tested for COVID-19. Your test needs to happen 2 to 4 days before you check in to the hospital or surgery site.    A clinic scheduler will call about a week in advance to set up a testing time at one of our labs. We ll take a swab of your nose or throat.    Note: If you go to a clinic or pharmacy like [facility] or [facility] for your test, make sure you get a test inside the nose. Tests inside the nose are:  - A naso-pharyngeal (NP) RT-PCR test  - An anterior nares RT-PCR test    Do NOT get a  rapid  test or a saliva (spit) test.    After the test, please stay at home and stay out of contact with other people. It will be harder for you to recover if you get COVID-19 before your treatment.    Please follow all current safety guidelines, including:    Limit trips outside your home.    Limit the number of people you see.    Always wear a mask outside your home.    Use social distancing. Stay 6 feet away from others whenever you can.    Wash your hands often.    If your test shows you have COVID-19  If your test is positive, we ll let you know. A positive test means that you have the virus.  We ll probably have to postpone your admission, surgery or procedure. Your doctor will discuss this with you. After that, we ll let you know what to do and when you can re-schedule.  We may  need to cancel your treatment on short notice for other reasons, too.    If your test shows you DON T have COVID-19  Even if your test is negative, you can still get COVID-19. It s rare but, sometimes, the test result is wrong. You could also catch the virus after taking the test.  There s a very small chance that you could catch COVID-19 in the hospital or surgery center.    Day of your surgery or procedure    Please come wearing a face covering that covers both your nose and mouth.    When you arrive, we ll ask you some questions to find out if you have any signs or symptoms of COVID-19.    Ask your care team if you can have visitors. All visitors must wear face coverings and will be screened for signs of COVID-19.    Even if no visitors are allowed, you can still have with you:  - Your legal guardian or legal decision maker  - A parent and one other visitor, if you are  younger than 18 years old  - A partner and a , if you are in labor    We might need to teach you about taking care of yourself after surgery. If so, a visitor can come into the hospital to learn about it, too.    The rules for visitors change often, depending on how much the virus is spreading. To learn more, see Visiting a Loved One in the Hospital during the COVID-19 Outbreak. Please call your care team, hospital or surgery center if you have any questions. We thank you for your understanding and for choosing [facility] for your care.    Questions and Answers  Does it matter where I get tested for COVID-19?  Yes. We urge you to get tested at one of our [facility] COVID-19 testing sites. We process these tests in our lab and can get the results quickly. Your [facility] care team needs to get your results before you check in.    What should I do if I can t get tested at [facility]?  You can get tested somewhere else, but you ll need to take these extra steps:  1. Contact your family doctor or clinic to arrange your  test.  2. Take the test within 4 days of your surgery or procedure. We can t accept tests older than 4 days.  3. Make sure you re getting a test inside the nose.  Tests inside the nose are:  - A naso-pharyngeal (NP) RT-PCR test  - An anterior nares RT-PCR test  -Many pharmacies use  rapid  tests or saliva (spit) tests. We do NOT accept those tests before surgery or procedures. Tests from inside the nose are the most accurate tests.  4. Make sure your doctor or clinic faxes your results to [facility] at [phone].    If we don t get your results in time, we may have to delay or cancel your treatment.      For informational purposes only. Not to replace the advice of your health care provider. Copyright   2020 [employer]. All rights reserved. Clinically reviewed by Infection Prevention and the [facility] COVID-19 Clinical Team.  [employer] 011284 - Rev 09/09/21.         - Wound Vac Instructions post Theraskin application:     1. Apply wound vac to the right foot within 24 hours from time of surgery. Wound vac must be set at 100 mmHg, continuous suction.     2. LEAVE WOUND VAC IN PLACE UNTIL AFTER 1 WEEK POST OP FOLLOW UP APPOINTMENT.  Home care nurse to re-apply vac after the follow upappointment.     3. Apply Cavilon no sting barrier wipe to the skin surrounding the wound to protect from drainage/maceration     4. Apply drape to natalia wound. Cut strip of drape and apply to skin if bridging isneeded; plan this area in advance; should not be over bony prominence      5. Cut the foam to fit the size of the wound.     6. Apply foam over the ADAPTIC TOUCH (or similar non-adherent dressing) THATIS STAPLED/SUTURED IN PLACE OVER the WOUND/GRAFT SITE. DO NOT REMOVE NON-ADHERENT.      7. Cut narrow strip of foam if bridging if needed     8. Cover foam with drape to obtain air tight seal     9.Cut opening the size of a quarter for where the suction pad will be applied     10. Apply Suction pad      11. 100mmHG suction continuous      [employer] [facility] can be reached at [phone], 24 hours aday 7 days a week      - If you do not have a back up plan in place:      If the negative pressure wound therapy malfunctions or unable to maintain seal: dressing must be removed and reapplied within 2 hours ofthe incident. If unable to reapply negative pressure wound dressing, place Normal Saline moistened gauze in wound bed and cover with appropriate dressing to keep wound bed moist.  Change wet-to-dry dressing two times aday until healthcare staff can re-implement negative pressure therapy. Change canister at least weekly.  [employer] can be reached at [phone], 24 hours a day 7 days a week     - Information onVacuum-Assisted Closure of a Wound  Vacuum-assisted closure (VAC) of a wound is a type of treatment to help wounds heal. It's also known as negative pressure wound therapy. During the treatment, a device lowers airpressure on the wound. This can help the wound heal more quickly.  - Understanding the wound VAC system  A wound VAC system has several parts. A foam or gauze dressing is put directly on the wound. The dressing ischanged every 24 to 72 hours. An adhesive film covers and seals the dressing and wound. A drainage tube leads from under the adhesive film and connects to a portable vacuum pump. This pump removes air pressure over thewound. It may do this constantly. Or it may do it in cycles. During the treatment, you'll need to carry the portable pump everywhere you go.  - Why wound VAC is used  You might need this therapy for a recent traumaticwound. Or you may need it for a chronic wound. This is a wound that does not heal the way it should over time. This can happen with wounds in people who have diabetes. You may need a wound VAC if you've had a recent skingraft. And you may need a wound VAC for a large wound. Large wounds can take a longer time to heal.  A wound vacuum system may help  your wound heal more quickly by:  Draining extra fluid from the wound  Reducingswelling  Reducing bacteria in the wound  Keeping your wound moist and warm  Helping draw together wound edges  Increasing blood flow to your wound  Decreasing inflammation  Wound VAC offers some otheradvantages over other types of wound care. It may decrease your overall discomfort. The dressings usually need to be changed less often. And they may be easier to keep in position.  - Risks of wound VAC  Wound VAC hassome rare risks, such as:  Bleeding (which may be severe)  Wound infection  An abnormal connection between the intestinal tract and the skin (enteric fistula)  Proper training in dressing changes can help reducethe risk for these complications. Also, your doctor will carefully evaluate you to make sure you are a good candidate for the therapy. Certain problems can increase your risk for complications. These include:  Exposedorgans or blood vessels  High risk of bleeding from another medical problem  Wound infection  Nearby bone infection  Dead wound tissue  Cancer tissue  Fragile skin, such as from aging or longtime use oftopical steroids  Allergy to adhesive  Very poor blood flow to your wound  Wounds close to joints that may reopen because of movement  Your doctor will discuss the risks that apply to you. Make sure to talk withhim or her about all of your questions and concerns.  - Getting ready for wound VAC  You likely won't need to do much to get ready for wound VAC. In some cases, you may need to wait a while before having this therapy.For example, your doctor may first need to treat an infection in your wound. Dead or damaged tissue may also need to be removed from your wound.  You or a caregiver may need training on how to use the wound VAC device.This is done if you will be able to have your wound vacuum therapy at home. In other cases, you may need to have your wound vacuum therapy in a health care facility.  - On the  day of your procedure  A health careprovider will cover your wound with foam or gauze wound dressing. An adhesive film will be put over the dressing and wound. This seals the wound. The foam connects to a drainage tube, which leads to a vacuum pump. This pumpis portable. When the pump is turned on, it draws fluid through the foam and out the drainage tubing. The pump may run constantly, or it may cycle off and on. Your exact setup will depend on the specific type of wound vacuumsystem that you use.  - Managing your wound  You may need the dressing changed about once a day. You may need it changed more or less often, depending on your wound. You or your caregiver may be trained to do this athome. Or it may be done by a visiting health care provider. Your doctor may prescribe a pain medicine. This is to prevent or reduce pain during the dressing change.  You will likely need to use the wound VAC system forseveral weeks or months. During this time, you'll carry the portable pump everywhere you go.  - Nutrition for wound healing  During this time, make sure you follow a healthy diet. This is needed so the wound can healand to prevent infection. Your doctor can tell you more about what to include in your diet during this time.  follow up with your doctor if you have a medical condition that led to your wound, such as diabetes. He or shecan help you prevent future wounds.  - Follow-up care  Your doctor will carefully keep track of your healing. Make sure to keep all follow-up appointments.  - When to call your health care provider  Call yourhealth care provider right away if you have any of these:  Fever of 100.4 F (38.0 C) or higher  Increased redness, swelling, or warmth around wound  Increased pain  Bright red blood or blood clots in tubingor the collection chamber of the vacuum

## 2022-01-21 NOTE — TELEPHONE ENCOUNTER
Patient currently resides at [facility] in [place] and will be having all pre procedure prep done there. [facility] is requesting that all pre procedure prep and testing information be faxed to them.    [facility]  Fax number : [phone]

## 2022-01-21 NOTE — TELEPHONE ENCOUNTER
Spoke to [facility], they have all the surgery and post op information and instructions that were given to patient at today's visit.  They will call back if they have any other questions.

## 2022-01-21 NOTE — PROGRESS NOTES
Surgery Scheduled    CPT: 14756    Surgery/Procedure: Incision and Drainage, right foot with application of theraskin    Special Equipment: theraskin misonix    Location: [facility]:  [address] [place] [address] (phone: [phone], Fax: [phone])    Date: 1/27/22    Time: 7:30 AM    Admission Type: Outpatient    Surgeon: Dr. Kim    OR Confirmed/ :  Yes with Colette on 1/21    Orders In:  Yes      Post Op: 2/4/22    Covid Scheduled: [facility] to do    Wound Vac Needed: yes, patient at TCU    Home Care Needed: No, patient at TCU    Blood Thinners Addressed:  Yes, ok to hold xarelto for 3 days prior to surgery                 0

## 2022-01-21 NOTE — PROGRESS NOTES
FOOT AND ANKLE SURGERY/PODIATRY Progress Note      ASSESSMENT: Diabetic Ulceration right foot       TREATMENT:  -The right foot ulceration has increased fibrotic tissue, now with slough and increased depth. Based on this presentation, I recommend surgical I&D with application of  theraskin and use of wound vac/non-weight bearing post-op. He consents to this procedure.     -Continue non-weight bearing right foot with offloading.     -After discussion of risk factors and consent obtained 2% Lidocaine HCL jelly was applied, under clean conditions, the right and foot ulceration(s) were debrided using #15 blade scalpel.  Devitalized and nonviable tissue, along with any fibrin and slough, was removed to improve granulation tissue formation, stimulate wound healing, decrease overall bacteria load, disrupt biofilm formation and decrease edge senescence. Wound drainage was scant No. Total excisional debridement was 3.6 sq cm into the muscle/fascia with a depth of 0.5 cm.   Ulcers were improved afterwards and .  Measures were unchanged after debridement. A gauze dressing was applied, re-apply qoday.     -I will ask my office to coordinate surgery at [facility].     Alex Kim DPM  [facility]      HPI: Cm Lundberg was seen again today for a right foot ulceration. He is currently residing at [facility] and has remained non-weight bearing on his right foot.     Past Medical History:   Diagnosis Date     Anxiety      Arthritis      Cerebral infarction (H)      Congestive heart failure (H)      Coronary artery disease     CABG with multiple stents     Diabetes (H)      Heart attack (H)      Hypertension      Migraines      Stented coronary artery        Past Surgical History:   Procedure Laterality Date     INCISION AND DRAINAGE LOWER EXTREMITY, COMBINED Right 12/17/2021    Procedure: INCISION AND DRAINAGE, right foot;  Surgeon: Alex Kim DPM;  Location: [facility]      IR MISCELLANEOUS PROCEDURE  2/13/2004     [facility] CABG, VEIN, SINGLE      Description: CABG (CABG);  Recorded: 04/22/2009;       Allergies   Allergen Reactions     Aspirin Other (See Comments)     Reacts with migraine medicine     Isosorbide Headache     Codeine Rash     Morphine Rash         Current Outpatient Medications:      acetaminophen (TYLENOL) 325 MG tablet, Take 650 mg by mouth every 4 hours as needed for mild pain , Disp: , Rfl:      ARIPiprazole (ABILIFY) 2 MG tablet, 2 mg daily , Disp: , Rfl:      atorvastatin (LIPITOR) 80 MG tablet, Take 80 mg by mouth daily , Disp: , Rfl:      cholestyramine (QUESTRAN) 4 g packet, Take 1 packet by mouth every other day , Disp: , Rfl:      DULoxetine (CYMBALTA) 60 MG capsule, Take 120 mg by mouth daily takle 120mg, Disp: , Rfl:      erenumab-aooe (AIMOVIG) 140 MG/ML injection, Inject 140 mg Subcutaneous every 30 days , Disp: , Rfl:      fenofibrate micronized (LOFIBRA) 200 MG capsule, Take 200 mg by mouth every morning (before breakfast) , Disp: , Rfl:      ferrous sulfate (FEROSUL) 325 (65 Fe) MG tablet, Take 325 mg by mouth daily (with breakfast) , Disp: , Rfl:      gabapentin (NEURONTIN) 100 MG capsule, Take 100 mg by mouth 2 times daily (before meals) , Disp: , Rfl:      gabapentin (NEURONTIN) 300 MG capsule, Take 600 mg by mouth At Bedtime, Disp: , Rfl:      loperamide (IMODIUM) 2 MG capsule, Take 4 mg by mouth as needed for diarrhea , Disp: , Rfl:      LORazepam (ATIVAN) 0.5 MG tablet, Take 1 tablet (0.5 mg) by mouth daily before breakfast, Disp: 10 tablet, Rfl: 0     losartan (COZAAR) 25 MG tablet, Take 25 mg by mouth At Bedtime , Disp: , Rfl:      magnesium oxide 400 MG CAPS, Take 400 mg by mouth 4 times daily, Disp: , Rfl:      Menthol, Topical Analgesic, (BIOFREEZE ROLL-ON) 4 % GEL, Apply 1 Application topically 2 times daily Apply to lower back for pain, Disp: , Rfl:      metFORMIN (GLUCOPHAGE) 1000 MG tablet, Take 0.5 tablets (500 mg) by mouth 2 times daily  "(with meals) (Patient taking differently: Take 1,000 mg by mouth daily 1000 mg in the AM and 500 Mg in the PM), Disp: , Rfl: 0     metoprolol succinate ER (TOPROL-XL) 25 MG 24 hr tablet, Take 75 mg by mouth daily , Disp: , Rfl:      mirtazapine (REMERON) 7.5 MG tablet, Take 7.5 mg by mouth At Bedtime , Disp: , Rfl:      Neomycin-Bacitracin-Polymyxin (TRIPLE ANTIBIOTIC) 3.5-400-5000 OINT ointment, Externally apply 1 applicator topically 3 times daily as needed, Disp: , Rfl:      Nitroglycerin (NITROSTAT SL), Place 0.4 mg under the tongue every 5 minutes as needed for chest pain , Disp: , Rfl:      omeprazole (PRILOSEC) 20 MG DR capsule, Take 20 mg by mouth daily , Disp: , Rfl:      oxyCODONE (ROXICODONE) 5 MG tablet, Take 1 tablet (5 mg) by mouth every 6 hours as needed (Pain), Disp: 12 tablet, Rfl: 0     polyethylene glycol (MIRALAX) 17 GM/Dose powder, Take 1 packet by mouth daily as needed for constipation , Disp: , Rfl:      Rivaroxaban (XARELTO PO), Take 20 mg by mouth daily , Disp: , Rfl:      senna-docusate (SENOKOT-S/PERICOLACE) 8.6-50 MG tablet, Take 1 tablet by mouth 2 times daily as needed for constipation , Disp: , Rfl:      traZODone (DESYREL) 50 MG tablet, Take 50 mg by mouth At Bedtime , Disp: , Rfl:      vitamin C (ASCORBIC ACID) 500 MG tablet, Take 500 mg by mouth daily , Disp: , Rfl:     Review of Systems - 10 point Review of Systems is negative except for right foot ulcer which is noted in HPI.      OBJECTIVE:  /78   Pulse 79   Ht 5' 7\" (1.702 m)   Wt 147 lb (66.7 kg)   SpO2 97%   BMI 23.02 kg/m    General appearance: Patient is alert and fully cooperative with history & exam.  No sign of distress is noted during the visit.    Vascular: Dorsalis pedis palpableRight.  Dermatologic:    Negative Pressure Wound Therapy Foot Anterior;Right (Active)   Wound Type Diabetic foot ulcer 12/22/21 0115   Cycle Continuous;On 12/22/21 0800   (Retired) Dressing Status Clean, dry, intact 12/22/21 0800     " "  VASC Wound right lateral foot (Active)       VASC Wound left hallux  (Active)       VASC Wound right lateral foot (Active)       VASC Wound right lateral foot (Active)   Pre Size Length 1.5 01/07/22 1000   Pre Size Width 2.4 01/07/22 1000   Pre Size Depth 0.5 01/07/22 1000   Pre Total Sq cm 3.6 01/07/22 1000       VASC Wound top right foot (Active)       Incision/Surgical Site 12/17/21 Right Foot (Active)   Incision Assessment UTV 12/22/21 0800   Giovana-Incision Assessment UTV 12/22/21 0800   Closure YA 12/21/21 1515   Incision Drainage Amount None 12/21/21 0057   Incision Care Medication applied - see MAR 12/21/21 0057   Dressing Intervention Clean, dry, intact 12/22/21 0800   Slough with fibrotic tissue right lateral foot ulcer with increased depth, no erythema.   Neurologic: Diminished to light touch right.   Musculoskeletal: Contracted digits noted Right.    Imaging:     POC US Guidance Needle Placement    Result Date: 12/17/2021  Ultrasound was performed as guidance to an anesthesia procedure.  Click \"PACS images\" hyperlink below to view any stored images.  For specific procedure details, view procedure note authored by anesthesia.         Picture:       "

## 2022-01-23 LAB — SARS-COV-2 RNA RESP QL NAA+PROBE: NEGATIVE

## 2022-01-24 ENCOUNTER — TELEPHONE (OUTPATIENT)
Dept: GERIATRICS | Facility: CLINIC | Age: 52
End: 2022-01-24
Payer: MEDICARE

## 2022-01-24 ENCOUNTER — LAB REQUISITION (OUTPATIENT)
Dept: LAB | Facility: CLINIC | Age: 52
End: 2022-01-24
Payer: MEDICARE

## 2022-01-24 DIAGNOSIS — Z11.59 ENCOUNTER FOR SCREENING FOR OTHER VIRAL DISEASES: ICD-10-CM

## 2022-01-24 NOTE — TELEPHONE ENCOUNTER
[facility] Triage Nurse Telephone Encounter    Provider: Heidi Xie MD  Facility: Located within [facility] Type:  TCU    Caller: Janeen  Call Back Number: [phone]    Allergies:    Allergies   Allergen Reactions     Aspirin Other (See Comments)     Reacts with migraine medicine     Isosorbide Headache     Codeine Rash     Morphine Rash        Reason for call: Nurse calling to update regarding patient's BG's.  BG this AM was taken about 30-40 minutes after breakfast, where patient had oatmeal with sugar and two different juices, and it was 342.  Recent BG's:  AM-213, 211, 192, 204, 239, 206, 234.  Noon BG's:  213, 189, 193, 206, 177, 288, 250.  Dinner BG's:  277, 246, 197, 174, 268, 165, 244.  HS BG's:  249, 225, 254, 205, 227, 276, 285.  Notable meds:  Metformin 1000mg Q AM and 500mg Q PM.      Verbal Order/Direction given by Provider: MD to assess BG's tomorrow.      Provider giving Order:  Heidi Xie MD    Verbal Order given to: Sol Ennis, RN

## 2022-01-24 NOTE — PROGRESS NOTES
[facility] Medical Record Number:  [id number]  Place of Service where encounter took place: [facility] ([facility]) [[id number]]   CODE STATUS:   CPR/Full code     Chief Complaint/Reason for Visit:  Chief Complaint   Patient presents with     RECHECK     TCU 1/11/2022. Right foot ulcer I and D with wound vac.       TCU HPI:    Cm Lundberg is a 51 year old male with hx of CAD, s/p stents and CABG, HTN, DM, prior CVA, afib/flutter on xarelto, admitted to the hospital on 12/17/2021 for elective procedure for right foot ulceration. He underwent incision and drainage of the right foot, excisional debridement of the ulceration of the right foot into the level of the muscle, under MAC with popliteal block anesthesia on 12/17/2021. Wound vac was subsequently placed. He had no hospital complications. Seen by ID with recommendation for 5 days Augmentin at hospital discharge based on culture results. Chronic anticoagulation was restarted. He was felt to need TCU and discharged to TCU on 12/22/0221 for PT, OT, nursing cares, medical management and monitoring.     Today:  He denies any chest pain, had been seen in ED on 1/6/2021 for chest pain, did not need hospital admission, returned to TCU. Tolerating wound vac changes, saw Dr. Kim on 1/7/2021, follow up again in 2 weeks. He is NWB. Completed PO Augmentin after TCU admission. Appetite is good. No diarrhea or constipation though tends to be constipated. No abdominal pain. No fever, cough or congestion. No urinary sx.       PAST MEDICAL HISTORY:  Past Medical History:   Diagnosis Date     Anxiety      Arthritis      Cerebral infarction (H)      Congestive heart failure (H)      Coronary artery disease     CABG with multiple stents     Diabetes (H)      Heart attack (H)      Hypertension      Migraines      Stented coronary artery        MEDICATIONS:  Current Outpatient Medications   Medication Sig Dispense Refill      acetaminophen (TYLENOL) 325 MG tablet Take 650 mg by mouth every 4 hours as needed for mild pain        ARIPiprazole (ABILIFY) 2 MG tablet 2 mg daily        atorvastatin (LIPITOR) 80 MG tablet Take 80 mg by mouth daily        cholestyramine (QUESTRAN) 4 g packet Take 1 packet by mouth every other day        DULoxetine (CYMBALTA) 60 MG capsule Take 120 mg by mouth daily takle 120mg       erenumab-aooe (AIMOVIG) 140 MG/ML injection Inject 140 mg Subcutaneous every 30 days        fenofibrate micronized (LOFIBRA) 200 MG capsule Take 200 mg by mouth every morning (before breakfast)        ferrous sulfate (FEROSUL) 325 (65 Fe) MG tablet Take 325 mg by mouth daily (with breakfast)        gabapentin (NEURONTIN) 100 MG capsule Take 100 mg by mouth 2 times daily (before meals)        gabapentin (NEURONTIN) 300 MG capsule Take 600 mg by mouth At Bedtime       loperamide (IMODIUM) 2 MG capsule Take 4 mg by mouth as needed for diarrhea        LORazepam (ATIVAN) 0.5 MG tablet Take 1 tablet (0.5 mg) by mouth daily before breakfast 10 tablet 0     losartan (COZAAR) 25 MG tablet Take 25 mg by mouth At Bedtime        magnesium oxide 400 MG CAPS Take 400 mg by mouth 4 times daily       Menthol, Topical Analgesic, (BIOFREEZE ROLL-ON) 4 % GEL Apply 1 Application topically 2 times daily Apply to lower back for pain       metFORMIN (GLUCOPHAGE) 1000 MG tablet Take 0.5 tablets (500 mg) by mouth 2 times daily (with meals) (Patient taking differently: Take 1,000 mg by mouth daily 1000 mg in the AM and 500 Mg in the PM)  0     metoprolol succinate ER (TOPROL-XL) 25 MG 24 hr tablet Take 75 mg by mouth daily        mirtazapine (REMERON) 7.5 MG tablet Take 7.5 mg by mouth At Bedtime        Neomycin-Bacitracin-Polymyxin (TRIPLE ANTIBIOTIC) 3.5-400-5000 OINT ointment Externally apply 1 applicator topically 3 times daily as needed       Nitroglycerin (NITROSTAT SL) Place 0.4 mg under the tongue every 5 minutes as needed for chest pain         "omeprazole (PRILOSEC) 20 MG DR capsule Take 20 mg by mouth daily        oxyCODONE (ROXICODONE) 5 MG tablet Take 1 tablet (5 mg) by mouth every 6 hours as needed (Pain) 12 tablet 0     polyethylene glycol (MIRALAX) 17 GM/Dose powder Take 1 packet by mouth daily as needed for constipation        Rivaroxaban (XARELTO PO) Take 20 mg by mouth daily        senna-docusate (SENOKOT-S/PERICOLACE) 8.6-50 MG tablet Take 1 tablet by mouth 2 times daily as needed for constipation        traZODone (DESYREL) 50 MG tablet Take 50 mg by mouth At Bedtime        vitamin C (ASCORBIC ACID) 500 MG tablet Take 500 mg by mouth daily          PHYSICAL EXAM:  General: Patient is alert male, no distress.   Vitals: /76   Pulse 66   Temp 98.7  F (37.1  C)   Resp 18   Ht 1.702 m (5' 7\")   Wt 66.1 kg (145 lb 12.8 oz)   SpO2 100%   BMI 22.84 kg/m    HEENT: Head is NCAT. Eyes show no injection or icterus. Nares negative. Oropharynx well hydrated.  Neck: Supple. No tenderness or adenopathy. No JVD.  Lungs: Non labored respirations.  Abdomen: Soft.  : Deferred.  Extremities: No edema is noted.  Musculoskeletal: Atrophy of extremities.   Skin: Wound vac right foot.   Psych: Mood appears good.      LABS/DIAGNOSTIC DATA:  Component      Latest Ref Rng & Units 12/21/2021   Hemoglobin A1C      <=5.6 % 5.1     Component      Latest Ref Rng & Units 12/18/2021   Sodium      136 - 145 mmol/L 138   Potassium      3.5 - 5.0 mmol/L 4.5   Chloride      98 - 107 mmol/L 106   Carbon Dioxide      22 - 31 mmol/L 21 (L)   Anion Gap      5 - 18 mmol/L 11   Glucose      70 - 125 mg/dL 119   Calcium      8.5 - 10.5 mg/dL 9.3   Urea Nitrogen      8 - 22 mg/dL 10   Creatinine      0.70 - 1.30 mg/dL 0.86   GFR Estimate      >60 mL/min/1.73m2 >90         ASSESSMENT/PLAN:  1. Right foot ulceration. Long standing wound per patient report. S/p incision and drainage per Dr. Kim on 12/17/2022. Cultures reviewed by ID, he completed 5 days of PO Augmentin after " hospital discharge, no further abx. Wound vac in place. Saw podiatry on Fri 1/7/2022, RTC 2 weeks.   2. HTN. On metoprolol and losartan. BPs satisfactory, continue to monitor in TCU.  3. Afib/flutter. He is anticoagulated with xarelto.  4. CAD. Hx of many MIs per his report. Hx of stenting and prior CABG. On statin, xarelto, beta blocker. ED visit 1/6/2021 for chest pain, ACS ruled out.  5. DM. On metformin. Home glipizide stopped per hospital due to low blood sugars. Last A1c was good at 5.1%.  6. Hx of stroke. Continue statin, xarelto.  7. Anemia. He is on iron.  8. Mood disorder, mixed depression and anxiety. Cont PTA duloxetine, Abilify.   9. Mild intellectual disabilities. He resides in an [facility].        Electronically signed by: Heidi Xie MD

## 2022-01-24 NOTE — PROGRESS NOTES
[facility] Medical Record Number:  [id number]  Place of Service where encounter took place: [facility] ([facility]) [[id number]]   CODE STATUS:   CPR/Full code     Chief Complaint/Reason for Visit:  Chief Complaint   Patient presents with     RECHECK     TCU 1/13/2022. Right foot ulcer I and D with wound vac.       TCU HPI:    Cm Lundberg is a 51 year old male with hx of CAD, s/p stents and CABG, HTN, DM, prior CVA, afib/flutter on xarelto, admitted to the hospital on 12/17/2021 for elective procedure for right foot ulceration. He underwent incision and drainage of the right foot, excisional debridement of the ulceration of the right foot into the level of the muscle, under MAC with popliteal block anesthesia on 12/17/2021. Wound vac was subsequently placed. He had no hospital complications. Seen by ID with recommendation for 5 days Augmentin at hospital discharge based on culture results. Chronic anticoagulation was restarted. He was felt to need TCU and discharged to TCU on 12/22/0221 for PT, OT, nursing cares, medical management and monitoring.       Today:  He denies any chest pain, had been seen in ED on 1/6/2021 for chest pain, did not need hospital admission, troponin neg, ACS ruled out, returned to TCU. Tolerating wound vac changes, saw Dr. Kim on 1/7/2021, follow up again in 2 weeks. Possibly will have further intervention. He is NWB, compliant. Completed PO Augmentin after TCU admission, no further abx. Appetite is good. He has been constipated, bowel meds increased, seems improved. No abdominal pain. No fever or urinary sx.       PAST MEDICAL HISTORY:  Past Medical History:   Diagnosis Date     Anxiety      Arthritis      Cerebral infarction (H)      Congestive heart failure (H)      Coronary artery disease     CABG with multiple stents     Diabetes (H)      Heart attack (H)      Hypertension      Migraines      Stented coronary artery         MEDICATIONS:  Current Outpatient Medications   Medication Sig Dispense Refill     acetaminophen (TYLENOL) 325 MG tablet Take 650 mg by mouth every 4 hours as needed for mild pain        ARIPiprazole (ABILIFY) 2 MG tablet 2 mg daily        atorvastatin (LIPITOR) 80 MG tablet Take 80 mg by mouth daily        cholestyramine (QUESTRAN) 4 g packet Take 1 packet by mouth every other day        DULoxetine (CYMBALTA) 60 MG capsule Take 120 mg by mouth daily takle 120mg       erenumab-aooe (AIMOVIG) 140 MG/ML injection Inject 140 mg Subcutaneous every 30 days        fenofibrate micronized (LOFIBRA) 200 MG capsule Take 200 mg by mouth every morning (before breakfast)        ferrous sulfate (FEROSUL) 325 (65 Fe) MG tablet Take 325 mg by mouth daily (with breakfast)        gabapentin (NEURONTIN) 100 MG capsule Take 100 mg by mouth 2 times daily (before meals)        gabapentin (NEURONTIN) 300 MG capsule Take 600 mg by mouth At Bedtime       loperamide (IMODIUM) 2 MG capsule Take 4 mg by mouth as needed for diarrhea        LORazepam (ATIVAN) 0.5 MG tablet Take 1 tablet (0.5 mg) by mouth daily before breakfast 10 tablet 0     losartan (COZAAR) 25 MG tablet Take 25 mg by mouth At Bedtime        magnesium oxide 400 MG CAPS Take 400 mg by mouth 4 times daily       Menthol, Topical Analgesic, (BIOFREEZE ROLL-ON) 4 % GEL Apply 1 Application topically 2 times daily Apply to lower back for pain       metFORMIN (GLUCOPHAGE) 1000 MG tablet Take 0.5 tablets (500 mg) by mouth 2 times daily (with meals) (Patient taking differently: Take 1,000 mg by mouth daily 1000 mg in the AM and 500 Mg in the PM)  0     metoprolol succinate ER (TOPROL-XL) 25 MG 24 hr tablet Take 75 mg by mouth daily        mirtazapine (REMERON) 7.5 MG tablet Take 7.5 mg by mouth At Bedtime        Neomycin-Bacitracin-Polymyxin (TRIPLE ANTIBIOTIC) 3.5-400-5000 OINT ointment Externally apply 1 applicator topically 3 times daily as needed       Nitroglycerin (NITROSTAT  "SL) Place 0.4 mg under the tongue every 5 minutes as needed for chest pain        omeprazole (PRILOSEC) 20 MG DR capsule Take 20 mg by mouth daily        oxyCODONE (ROXICODONE) 5 MG tablet Take 1 tablet (5 mg) by mouth every 6 hours as needed (Pain) 12 tablet 0     polyethylene glycol (MIRALAX) 17 GM/Dose powder Take 1 packet by mouth daily as needed for constipation        Rivaroxaban (XARELTO PO) Take 20 mg by mouth daily        senna-docusate (SENOKOT-S/PERICOLACE) 8.6-50 MG tablet Take 1 tablet by mouth 2 times daily as needed for constipation        traZODone (DESYREL) 50 MG tablet Take 50 mg by mouth At Bedtime        vitamin C (ASCORBIC ACID) 500 MG tablet Take 500 mg by mouth daily          PHYSICAL EXAM:  General: Patient is alert male, no distress.   Vitals: /80   Pulse 86   Temp 98.7  F (37.1  C)   Resp 18   Ht 1.702 m (5' 7\")   Wt 66.1 kg (145 lb 12.8 oz)   SpO2 93%   BMI 22.84 kg/m    HEENT: Head is NCAT. Eyes show no injection or icterus. Nares negative. Oropharynx well hydrated.  Neck: Supple. No tenderness or adenopathy. No JVD.  Lungs: Non labored respirations.  Abdomen: Soft.  : Deferred.  Extremities: No edema is noted.  Musculoskeletal: Atrophy of extremities.   Skin: Wound vac right foot.   Psych: Mood appears good.      LABS/DIAGNOSTIC DATA:  Component      Latest Ref Rng & Units 12/21/2021   Hemoglobin A1C      <=5.6 % 5.1     Component      Latest Ref Rng & Units 12/18/2021   Sodium      136 - 145 mmol/L 138   Potassium      3.5 - 5.0 mmol/L 4.5   Chloride      98 - 107 mmol/L 106   Carbon Dioxide      22 - 31 mmol/L 21 (L)   Anion Gap      5 - 18 mmol/L 11   Glucose      70 - 125 mg/dL 119   Calcium      8.5 - 10.5 mg/dL 9.3   Urea Nitrogen      8 - 22 mg/dL 10   Creatinine      0.70 - 1.30 mg/dL 0.86   GFR Estimate      >60 mL/min/1.73m2 >90         ASSESSMENT/PLAN:  1. Constipation. Bowel meds adjusted. Improved. Chronic problem.   2. Right foot ulceration. S/p incision and " drainage per Dr. Kim on 12/17/2022. Cultures reviewed by ID, he completed 5 days of PO Augmentin after hospital discharge, no further abx. Wound vac in place. Saw podiatry on Fri 1/7/2022, RTC 2 weeks. Possible later further intervention.  3. HTN. On metoprolol and losartan. BPs satisfactory, continue to monitor in TCU.  4. Afib/flutter. He is anticoagulated with xarelto.  5. CAD. Hx of many MIs per his report. Hx of stenting and prior CABG. On statin, xarelto, beta blocker. ED visit 1/6/2021 for chest pain, ACS ruled out.  6. DM. On metformin. Home glipizide stopped per hospital due to low blood sugars. Last A1c was good at 5.1%.  7. Hx of stroke. Continue statin, xarelto.  8. Anemia. He is on iron.  9. Mood disorder, mixed depression and anxiety. Cont PTA duloxetine, Abilify.   10. Mild intellectual disabilities. He resides in an [facility].        Electronically signed by: Heidi Xie MD

## 2022-01-25 ENCOUNTER — TRANSITIONAL CARE UNIT VISIT (OUTPATIENT)
Dept: GERIATRICS | Facility: CLINIC | Age: 52
End: 2022-01-25
Payer: MEDICARE

## 2022-01-25 VITALS
HEIGHT: 67 IN | OXYGEN SATURATION: 99 % | HEART RATE: 70 BPM | DIASTOLIC BLOOD PRESSURE: 75 MMHG | BODY MASS INDEX: 23.07 KG/M2 | RESPIRATION RATE: 18 BRPM | SYSTOLIC BLOOD PRESSURE: 134 MMHG | WEIGHT: 147 LBS | TEMPERATURE: 98.2 F

## 2022-01-25 DIAGNOSIS — I25.10 CORONARY ARTERY DISEASE INVOLVING NATIVE CORONARY ARTERY OF NATIVE HEART WITHOUT ANGINA PECTORIS: ICD-10-CM

## 2022-01-25 DIAGNOSIS — L97.413 DIABETIC ULCER OF RIGHT MIDFOOT ASSOCIATED WITH TYPE 2 DIABETES MELLITUS, WITH NECROSIS OF MUSCLE (H): Primary | ICD-10-CM

## 2022-01-25 DIAGNOSIS — I48.91 ATRIAL FIBRILLATION, UNSPECIFIED TYPE (H): ICD-10-CM

## 2022-01-25 DIAGNOSIS — I10 ESSENTIAL HYPERTENSION: ICD-10-CM

## 2022-01-25 DIAGNOSIS — E11.621 DIABETIC ULCER OF RIGHT MIDFOOT ASSOCIATED WITH TYPE 2 DIABETES MELLITUS, WITH NECROSIS OF MUSCLE (H): Primary | ICD-10-CM

## 2022-01-25 PROCEDURE — U0003 INFECTIOUS AGENT DETECTION BY NUCLEIC ACID (DNA OR RNA); SEVERE ACUTE RESPIRATORY SYNDROME CORONAVIRUS 2 (SARS-COV-2) (CORONAVIRUS DISEASE [COVID-19]), AMPLIFIED PROBE TECHNIQUE, MAKING USE OF HIGH THROUGHPUT TECHNOLOGIES AS DESCRIBED BY CMS-2020-01-R: HCPCS | Performed by: FAMILY MEDICINE

## 2022-01-25 PROCEDURE — 99310 SBSQ NF CARE HIGH MDM 45: CPT | Performed by: FAMILY MEDICINE

## 2022-01-25 RX ORDER — OXYCODONE HYDROCHLORIDE 5 MG/1
TABLET ORAL
Qty: 28 TABLET | Refills: 0 | Status: ON HOLD | OUTPATIENT
Start: 2022-01-25 | End: 2022-08-30

## 2022-01-25 ASSESSMENT — MIFFLIN-ST. JEOR: SCORE: 1470.42

## 2022-01-25 NOTE — LETTER
1/25/2022        RE: Cm Lundberg  [address]  [facility] [address]        [facility] GER[facility]ATR[facility]  [address] [place] [address]  Phone: [phone]  Fax: [phone]  Primary Provider: Provider, Generic External Data      PREOPERATIVE EVALUATION:  Today's date: 1/25/2022    Cm Lundberg is a 51 year old male who presents for a preoperative evaluation.    Surgical Information:  Surgery/Procedure: Incision and drainage, right foot with application of theraskin  Surgery Location: [facility]  Surgeon: Dr. Alex Kim  Surgery Date: 1/27/2022  Time of Surgery: 5:30 am arrival, 7:30 am surgery  Where patient plans to recover: At a TCU (Transitional Care Unit)  Fax number for surgical facility: Note does not need to be faxed, will be available electronically in Epic.    Type of Anesthesia Anticipated: to be determined    Subjective     HPI related to upcoming procedure:   Cm Lundberg is a 51 year old male with hx of CAD, s/p stents and CABG, HTN, DM, prior CVA, afib/flutter on xarelto, admitted to the hospital on 12/17/2021 for elective procedure for chronic long standing right foot ulceration. He underwent incision and drainage of the right foot, excisional debridement of the ulceration of the right foot into the level of the muscle, under MAC with popliteal block anesthesia on 12/17/2021. Wound vac was subsequently placed. He had no hospital complications. Seen by ID with recommendation for 5 days Augmentin at hospital discharge based on culture results. Chronic anticoagulation was restarted. He was discharged to TCU on 12/22/0221 for PT, OT, nursing cares, medical management and monitoring. He has had wound vac on, follow up appts with podiatry Dr. Kim. At appt 1/21/2022 it was determined he would benefit from further intervention and he is scheduled for the above procedure. Currently does not have the vac on. Reports  no pain. He has cardiac hx as noted, was seen in the ED on 1/6/2022 for chest pain, ACS ruled out. He states he has not had any chest pain at all since the ED visit. Denies fever, cough, shortness of breath, dizziness. No abdominal pain, nausea, vomiting or diarrhea. No urinary sx. Chronic stable back pain. He is medically optimized, does not need further cardiac testing or evaluation prior to surgery, his cardiologist had been updated. Otherwise has no concerns, Anticoagulation on hold per orders from podiatry.        Health Care Directive:  CODE STATUS:   CPR/Full code       Patient Active Problem List  Patient Active Problem List    Diagnosis Date Noted     Diabetic ulcer of right midfoot associated with type 2 diabetes mellitus, with necrosis of muscle (H) 01/07/2022     Priority: Medium     Diabetes mellitus (H) 12/17/2021     Priority: Medium     Anxiety state 07/29/2021     Priority: Medium     Formatting of this note might be different from the original.  Created by Conversion    Replacement Utility updated for latest IMO load       Coronary atherosclerosis 07/29/2021     Priority: Medium     Formatting of this note might be different from the original.  Created by Conversion    Replacement Utility updated for latest IMO load       Depression 07/29/2021     Priority: Medium     Formatting of this note might be different from the original.  Created by Conversion       Microalbuminuria 07/29/2021     Priority: Medium     Formatting of this note might be different from the original.  Created by Conversion       Problems with learning 07/29/2021     Priority: Medium     Formatting of this note might be different from the original.  Created by Conversion  [facility] Annotation: Feb 11 2010  1:41PM - Leonid Cabral: CANNOT READ OR   WRITE       Type 2 diabetes mellitus (H) 07/29/2021     Priority: Medium     Formatting of this note might be different from the original.  Created by Conversion       Type II or  unspecified type diabetes mellitus with ophthalmic manifestations, not stated as uncontrolled(250.50) (H) 07/29/2021     Priority: Medium     Formatting of this note might be different from the original.  Created by Conversion       Equinovarus acquired deformity, right 04/14/2021     Priority: Medium     Diabetic ulcer of right midfoot associated with type 2 diabetes mellitus, limited to breakdown of skin (H) 09/28/2017     Priority: Medium     Late effects of cerebrovascular accident 08/19/2016     Priority: Medium     Coronary artery disease involving native coronary artery without angina pectoris, unspecified whether native or transplanted heart 08/19/2016     Priority: Medium     Type 2 diabetes mellitus with hyperglycemia (H) 08/19/2016     Priority: Medium     Mild intellectual disabilities 08/19/2016     Priority: Medium     Essential hypertension 08/19/2016     Priority: Medium     Major depressive disorder with single episode, remission status unspecified 08/19/2016     Priority: Medium     Diarrhea 08/19/2016     Priority: Medium     Constipation, unspecified constipation type 08/19/2016     Priority: Medium     Anemia, unspecified type 08/19/2016     Priority: Medium     Abdominal pain, generalized 08/19/2016     Priority: Medium     Pain 08/19/2016     Priority: Medium     Gastroesophageal reflux disease without esophagitis 08/19/2016     Priority: Medium     Migraine 08/19/2016     Priority: Medium     Weakness 08/19/2016     Priority: Medium     Myalgia 08/19/2016     Priority: Medium     Nausea 08/19/2016     Priority: Medium     Chest pain, unspecified type 08/19/2016     Priority: Medium     Insomnia, unspecified type 08/19/2016     Priority: Medium     Hyperlipidemia, unspecified hyperlipidemia type 08/19/2016     Priority: Medium      Past Medical History:   Diagnosis Date     Anxiety      Arthritis      Cerebral infarction (H)      Congestive heart failure (H)      Coronary artery disease      CABG with multiple stents     Depression      Diabetes (H)      Equinovarus deformity, acquired, right      GERD (gastroesophageal reflux disease)      Heart attack (H)      Hyperlipidemia      Hypertension      Migraine      Migraines      Myalgia      Stented coronary artery      Past Surgical History:   Procedure Laterality Date     INCISION AND DRAINAGE LOWER EXTREMITY, COMBINED Right 12/17/2021    Procedure: INCISION AND DRAINAGE, right foot;  Surgeon: Alex Kim DPM;  Location: [facility] Main OR     IR MISCELLANEOUS PROCEDURE  2/13/2004     Z CABG, VEIN, SINGLE      Description: CABG (CABG);  Recorded: 04/22/2009;     Current Outpatient Medications   Medication Sig Dispense Refill     acetaminophen (TYLENOL) 325 MG tablet Take 650 mg by mouth every 4 hours as needed for mild pain        ARIPiprazole (ABILIFY) 2 MG tablet 2 mg daily        atorvastatin (LIPITOR) 80 MG tablet Take 80 mg by mouth daily        cholestyramine (QUESTRAN) 4 g packet Take 1 packet by mouth every other day        DULoxetine (CYMBALTA) 60 MG capsule Take 120 mg by mouth daily takle 120mg       erenumab-aooe (AIMOVIG) 140 MG/ML injection Inject 140 mg Subcutaneous every 30 days        fenofibrate micronized (LOFIBRA) 200 MG capsule Take 200 mg by mouth every morning (before breakfast)        ferrous sulfate (FEROSUL) 325 (65 Fe) MG tablet Take 325 mg by mouth daily (with breakfast)        gabapentin (NEURONTIN) 100 MG capsule Take 100 mg by mouth 2 times daily (before meals)        gabapentin (NEURONTIN) 300 MG capsule Take 600 mg by mouth At Bedtime       loperamide (IMODIUM) 2 MG capsule Take 4 mg by mouth as needed for diarrhea        LORazepam (ATIVAN) 0.5 MG tablet Take 1 tablet (0.5 mg) by mouth daily before breakfast 10 tablet 0     losartan (COZAAR) 25 MG tablet Take 25 mg by mouth At Bedtime        magnesium oxide 400 MG CAPS Take 400 mg by mouth 4 times daily       Menthol, Topical Analgesic, (BIOFREEZE ROLL-ON) 4  "% GEL Apply 1 Application topically 2 times daily Apply to lower back for pain       metFORMIN (GLUCOPHAGE) 1000 MG tablet Take 0.5 tablets (500 mg) by mouth 2 times daily (with meals) (Patient taking differently: Take 1,000 mg by mouth daily 1000 mg in the AM and 500 Mg in the PM)  0     metoprolol succinate ER (TOPROL-XL) 25 MG 24 hr tablet Take 75 mg by mouth daily        mirtazapine (REMERON) 7.5 MG tablet Take 7.5 mg by mouth At Bedtime        Neomycin-Bacitracin-Polymyxin (TRIPLE ANTIBIOTIC) 3.5-400-5000 OINT ointment Externally apply 1 applicator topically 3 times daily as needed       Nitroglycerin (NITROSTAT SL) Place 0.4 mg under the tongue every 5 minutes as needed for chest pain        omeprazole (PRILOSEC) 20 MG DR capsule Take 20 mg by mouth daily        oxyCODONE (ROXICODONE) 5 MG tablet TAKE 1 TAB BY MOUTH EVERY 6HOURS AS NEEDED FOR PAIN 28 tablet 0     polyethylene glycol (MIRALAX) 17 GM/Dose powder Take 1 packet by mouth daily as needed for constipation        Rivaroxaban (XARELTO PO) Take 20 mg by mouth daily        senna-docusate (SENOKOT-S/PERICOLACE) 8.6-50 MG tablet Take 1 tablet by mouth 2 times daily as needed for constipation        traZODone (DESYREL) 50 MG tablet Take 50 mg by mouth At Bedtime        vitamin C (ASCORBIC ACID) 500 MG tablet Take 500 mg by mouth daily          Allergies   Allergen Reactions     Aspirin Other (See Comments)     Reacts with migraine medicine     Isosorbide Headache     Codeine Rash     Morphine Rash        Social History     Tobacco Use     Smoking status: Former Smoker     Quit date: 2015     Years since quittin.0     Smokeless tobacco: Never Used   Substance Use Topics     Alcohol use: Not Currently           Objective     /75   Pulse 70   Temp 98.2  F (36.8  C)   Resp 18   Ht 1.702 m (5' 7\")   Wt 66.7 kg (147 lb)   SpO2 99%   BMI 23.02 kg/m      Physical Exam   General: Patient is alert male, no distress.   HEENT: Head is NCAT. Eyes " show no injection or icterus. Nares negative. Oropharynx well hydrated.  Neck: Supple. No tenderness or adenopathy. No JVD.  Lungs: Clear bilaterally. No wheezes.  Cardiovascular: Regular rate and rhythm, normal S1, S2.  Back: No spinal or CVA tenderness.  Abdomen: Soft, no tenderness on exam. Bowel sounds present. No guarding rebound or rigidity.  : Deferred.  Extremities: No LE edema is noted.  Musculoskeletal: Atrophy of extremities.   Skin: Wounds right foot, bandaged.   Psych: Mood appears good.      Recent Labs   Lab Test 12/28/21  0548 12/21/21  1804 12/18/21  0827   HGB 11.1*  --   --      --   --      --  138   POTASSIUM 4.5  --  4.5   CR 0.99  --  0.86   A1C  --  5.1  --         Diagnostics:  No labs were ordered during this visit.   No EKG this visit, completed in the last 90 days.      ASSESSMENT/PLAN:   1. Right foot ulceration. S/p incision and drainage per Dr. Kim on 12/17/2022. Cultures reviewed by ID, he completed 5 days of PO Augmentin after hospital discharge, no further abx. Saw podiatry on Fri 1/21/2022, plans for Incision and drainage, right foot with application of theraskin on 1/27/2022. Off wound vac now pending procedure.  2. HTN. On metoprolol and losartan.   3. Afib/flutter. He is anticoagulated with xarelto, on hold for surgery.  4. CAD. Hx of many MIs per his report. Hx of stenting and prior CABG. On statin, xarelto, beta blocker. ED visit 1/6/2021 for chest pain, ACS ruled out.  5. DM. On metformin. Home glipizide stopped per hospital due to low blood sugars. Last A1c was good at 5.1%.  6. Hx of stroke. On statin, xarelto.  7. Anemia. He is on iron.  8. Mood disorder, mixed depression and anxiety. Cont PTA duloxetine, Abilify.   9. Mild intellectual disabilities. He resides in an [facility].        Signed Electronically by: :Heidi Xie MD           Sincerely,        Heidi Xie MD

## 2022-01-26 ENCOUNTER — ANESTHESIA EVENT (OUTPATIENT)
Dept: SURGERY | Facility: HOSPITAL | Age: 52
End: 2022-01-26
Payer: MEDICARE

## 2022-01-26 NOTE — PROGRESS NOTES
[facility] GERIATRICS  [address] [place] [address]  Phone: [phone]  Fax: [phone]  Primary Provider: Provider, Generic External Data      PREOPERATIVE EVALUATION:  Today's date: 1/25/2022    Cm Lundberg is a 51 year old male who presents for a preoperative evaluation.    Surgical Information:  Surgery/Procedure: Incision and drainage, right foot with application of theraskin  Surgery Location: [facility]  Surgeon: Dr. Alex Kim  Surgery Date: 1/27/2022  Time of Surgery: 5:30 am arrival, 7:30 am surgery  Where patient plans to recover: At a TCU (Transitional Care Unit)  Fax number for surgical facility: Note does not need to be faxed, will be available electronically in Epic.    Type of Anesthesia Anticipated: to be determined    Subjective     HPI related to upcoming procedure:   Cm Lundberg is a 51 year old male with hx of CAD, s/p stents and CABG, HTN, DM, prior CVA, afib/flutter on xarelto, admitted to the hospital on 12/17/2021 for elective procedure for chronic long standing right foot ulceration. He underwent incision and drainage of the right foot, excisional debridement of the ulceration of the right foot into the level of the muscle, under MAC with popliteal block anesthesia on 12/17/2021. Wound vac was subsequently placed. He had no hospital complications. Seen by ID with recommendation for 5 days Augmentin at hospital discharge based on culture results. Chronic anticoagulation was restarted. He was discharged to TCU on 12/22/0221 for PT, OT, nursing cares, medical management and monitoring. He has had wound vac on, follow up appts with podiatry Dr. Kim. At appt 1/21/2022 it was determined he would benefit from further intervention and he is scheduled for the above procedure. Currently does not have the vac on. Reports no pain. He has cardiac hx as noted, was seen in the ED on 1/6/2022 for chest pain, ACS ruled out. He  states he has not had any chest pain at all since the ED visit. Denies fever, cough, shortness of breath, dizziness. No abdominal pain, nausea, vomiting or diarrhea. No urinary sx. Chronic stable back pain. He is medically optimized, does not need further cardiac testing or evaluation prior to surgery, his cardiologist had been updated. Otherwise has no concerns, Anticoagulation on hold per orders from podiatry.        Health Care Directive:  CODE STATUS:   CPR/Full code       Patient Active Problem List  Patient Active Problem List    Diagnosis Date Noted     Diabetic ulcer of right midfoot associated with type 2 diabetes mellitus, with necrosis of muscle (H) 01/07/2022     Priority: Medium     Diabetes mellitus (H) 12/17/2021     Priority: Medium     Anxiety state 07/29/2021     Priority: Medium     Formatting of this note might be different from the original.  Created by Conversion    Replacement Utility updated for latest IMO load       Coronary atherosclerosis 07/29/2021     Priority: Medium     Formatting of this note might be different from the original.  Created by Conversion    Replacement Utility updated for latest IMO load       Depression 07/29/2021     Priority: Medium     Formatting of this note might be different from the original.  Created by Conversion       Microalbuminuria 07/29/2021     Priority: Medium     Formatting of this note might be different from the original.  Created by Conversion       Problems with learning 07/29/2021     Priority: Medium     Formatting of this note might be different from the original.  Created by Conversion  [facility] Annotation: Feb 11 2010  1:41PM - Leonid Cabral: CANNOT READ OR   WRITE       Type 2 diabetes mellitus (H) 07/29/2021     Priority: Medium     Formatting of this note might be different from the original.  Created by Conversion       Type II or unspecified type diabetes mellitus with ophthalmic manifestations, not stated as uncontrolled(250.50) (H)  07/29/2021     Priority: Medium     Formatting of this note might be different from the original.  Created by Conversion       Equinovarus acquired deformity, right 04/14/2021     Priority: Medium     Diabetic ulcer of right midfoot associated with type 2 diabetes mellitus, limited to breakdown of skin (H) 09/28/2017     Priority: Medium     Late effects of cerebrovascular accident 08/19/2016     Priority: Medium     Coronary artery disease involving native coronary artery without angina pectoris, unspecified whether native or transplanted heart 08/19/2016     Priority: Medium     Type 2 diabetes mellitus with hyperglycemia (H) 08/19/2016     Priority: Medium     Mild intellectual disabilities 08/19/2016     Priority: Medium     Essential hypertension 08/19/2016     Priority: Medium     Major depressive disorder with single episode, remission status unspecified 08/19/2016     Priority: Medium     Diarrhea 08/19/2016     Priority: Medium     Constipation, unspecified constipation type 08/19/2016     Priority: Medium     Anemia, unspecified type 08/19/2016     Priority: Medium     Abdominal pain, generalized 08/19/2016     Priority: Medium     Pain 08/19/2016     Priority: Medium     Gastroesophageal reflux disease without esophagitis 08/19/2016     Priority: Medium     Migraine 08/19/2016     Priority: Medium     Weakness 08/19/2016     Priority: Medium     Myalgia 08/19/2016     Priority: Medium     Nausea 08/19/2016     Priority: Medium     Chest pain, unspecified type 08/19/2016     Priority: Medium     Insomnia, unspecified type 08/19/2016     Priority: Medium     Hyperlipidemia, unspecified hyperlipidemia type 08/19/2016     Priority: Medium      Past Medical History:   Diagnosis Date     Anxiety      Arthritis      Cerebral infarction (H)      Congestive heart failure (H)      Coronary artery disease     CABG with multiple stents     Depression      Diabetes (H)      Equinovarus deformity, acquired, right       GERD (gastroesophageal reflux disease)      Heart attack (H)      Hyperlipidemia      Hypertension      Migraine      Migraines      Myalgia      Stented coronary artery      Past Surgical History:   Procedure Laterality Date     INCISION AND DRAINAGE LOWER EXTREMITY, COMBINED Right 12/17/2021    Procedure: INCISION AND DRAINAGE, right foot;  Surgeon: Alex Kim DPM;  Location: [facility] Main OR     IR MISCELLANEOUS PROCEDURE  2/13/2004     [facility] CABG, VEIN, SINGLE      Description: CABG (CABG);  Recorded: 04/22/2009;     Current Outpatient Medications   Medication Sig Dispense Refill     acetaminophen (TYLENOL) 325 MG tablet Take 650 mg by mouth every 4 hours as needed for mild pain        ARIPiprazole (ABILIFY) 2 MG tablet 2 mg daily        atorvastatin (LIPITOR) 80 MG tablet Take 80 mg by mouth daily        cholestyramine (QUESTRAN) 4 g packet Take 1 packet by mouth every other day        DULoxetine (CYMBALTA) 60 MG capsule Take 120 mg by mouth daily takle 120mg       erenumab-aooe (AIMOVIG) 140 MG/ML injection Inject 140 mg Subcutaneous every 30 days        fenofibrate micronized (LOFIBRA) 200 MG capsule Take 200 mg by mouth every morning (before breakfast)        ferrous sulfate (FEROSUL) 325 (65 Fe) MG tablet Take 325 mg by mouth daily (with breakfast)        gabapentin (NEURONTIN) 100 MG capsule Take 100 mg by mouth 2 times daily (before meals)        gabapentin (NEURONTIN) 300 MG capsule Take 600 mg by mouth At Bedtime       loperamide (IMODIUM) 2 MG capsule Take 4 mg by mouth as needed for diarrhea        LORazepam (ATIVAN) 0.5 MG tablet Take 1 tablet (0.5 mg) by mouth daily before breakfast 10 tablet 0     losartan (COZAAR) 25 MG tablet Take 25 mg by mouth At Bedtime        magnesium oxide 400 MG CAPS Take 400 mg by mouth 4 times daily       Menthol, Topical Analgesic, (BIOFREEZE ROLL-ON) 4 % GEL Apply 1 Application topically 2 times daily Apply to lower back for pain       metFORMIN (GLUCOPHAGE)  "1000 MG tablet Take 0.5 tablets (500 mg) by mouth 2 times daily (with meals) (Patient taking differently: Take 1,000 mg by mouth daily 1000 mg in the AM and 500 Mg in the PM)  0     metoprolol succinate ER (TOPROL-XL) 25 MG 24 hr tablet Take 75 mg by mouth daily        mirtazapine (REMERON) 7.5 MG tablet Take 7.5 mg by mouth At Bedtime        Neomycin-Bacitracin-Polymyxin (TRIPLE ANTIBIOTIC) 3.5-400-5000 OINT ointment Externally apply 1 applicator topically 3 times daily as needed       Nitroglycerin (NITROSTAT SL) Place 0.4 mg under the tongue every 5 minutes as needed for chest pain        omeprazole (PRILOSEC) 20 MG DR capsule Take 20 mg by mouth daily        oxyCODONE (ROXICODONE) 5 MG tablet TAKE 1 TAB BY MOUTH EVERY 6HOURS AS NEEDED FOR PAIN 28 tablet 0     polyethylene glycol (MIRALAX) 17 GM/Dose powder Take 1 packet by mouth daily as needed for constipation        Rivaroxaban (XARELTO PO) Take 20 mg by mouth daily        senna-docusate (SENOKOT-S/PERICOLACE) 8.6-50 MG tablet Take 1 tablet by mouth 2 times daily as needed for constipation        traZODone (DESYREL) 50 MG tablet Take 50 mg by mouth At Bedtime        vitamin C (ASCORBIC ACID) 500 MG tablet Take 500 mg by mouth daily          Allergies   Allergen Reactions     Aspirin Other (See Comments)     Reacts with migraine medicine     Isosorbide Headache     Codeine Rash     Morphine Rash        Social History     Tobacco Use     Smoking status: Former Smoker     Quit date: 2015     Years since quittin.0     Smokeless tobacco: Never Used   Substance Use Topics     Alcohol use: Not Currently           Objective     /75   Pulse 70   Temp 98.2  F (36.8  C)   Resp 18   Ht 1.702 m (5' 7\")   Wt 66.7 kg (147 lb)   SpO2 99%   BMI 23.02 kg/m      Physical Exam   General: Patient is alert male, no distress.   HEENT: Head is NCAT. Eyes show no injection or icterus. Nares negative. Oropharynx well hydrated.  Neck: Supple. No tenderness or " adenopathy. No JVD.  Lungs: Clear bilaterally. No wheezes.  Cardiovascular: Regular rate and rhythm, normal S1, S2.  Back: No spinal or CVA tenderness.  Abdomen: Soft, no tenderness on exam. Bowel sounds present. No guarding rebound or rigidity.  : Deferred.  Extremities: No LE edema is noted.  Musculoskeletal: Atrophy of extremities.   Skin: Wounds right foot, bandaged.   Psych: Mood appears good.      Recent Labs   Lab Test 12/28/21  0548 12/21/21  1804 12/18/21  0827   HGB 11.1*  --   --      --   --      --  138   POTASSIUM 4.5  --  4.5   CR 0.99  --  0.86   A1C  --  5.1  --         Diagnostics:  No labs were ordered during this visit.   No EKG this visit, completed in the last 90 days.      ASSESSMENT/PLAN:   1. Right foot ulceration. S/p incision and drainage per Dr. Kim on 12/17/2022. Cultures reviewed by ID, he completed 5 days of PO Augmentin after hospital discharge, no further abx. Saw podiatry on Fri 1/21/2022, plans for Incision and drainage, right foot with application of theraskin on 1/27/2022. Off wound vac now pending procedure.  2. HTN. On metoprolol and losartan.   3. Afib/flutter. He is anticoagulated with xarelto, on hold for surgery.  4. CAD. Hx of many MIs per his report. Hx of stenting and prior CABG. On statin, xarelto, beta blocker. ED visit 1/6/2021 for chest pain, ACS ruled out.  5. DM. On metformin. Home glipizide stopped per hospital due to low blood sugars. Last A1c was good at 5.1%.  6. Hx of stroke. On statin, xarelto.  7. Anemia. He is on iron.  8. Mood disorder, mixed depression and anxiety. Cont PTA duloxetine, Abilify.   9. Mild intellectual disabilities. He resides in an [facility].        Signed Electronically by: :Heidi Xie MD

## 2022-01-26 NOTE — H&P (VIEW-ONLY)
[facility]  [address] [place] [address]  Phone: [phone]  Fax: [phone]  Primary Provider: Provider, Generic External Data      PREOPERATIVE EVALUATION:  Today's date: 1/25/2022    Cm Lundberg is a 51 year old male who presents for a preoperative evaluation.    Surgical Information:  Surgery/Procedure: Incision and drainage, right foot with application of theraskin  Surgery Location: [facility]  Surgeon: Dr. Alex Kim  Surgery Date: 1/27/2022  Time of Surgery: 5:30 am arrival, 7:30 am surgery  Where patient plans to recover: At a TCU (Transitional Care Unit)  Fax number for surgical facility: Note does not need to be faxed, will be available electronically in Epic.    Type of Anesthesia Anticipated: to be determined    Subjective     HPI related to upcoming procedure:   Cm Lundberg is a 51 year old male with hx of CAD, s/p stents and CABG, HTN, DM, prior CVA, afib/flutter on xarelto, admitted to the hospital on 12/17/2021 for elective procedure for chronic long standing right foot ulceration. He underwent incision and drainage of the right foot, excisional debridement of the ulceration of the right foot into the level of the muscle, under MAC with popliteal block anesthesia on 12/17/2021. Wound vac was subsequently placed. He had no hospital complications. Seen by ID with recommendation for 5 days Augmentin at hospital discharge based on culture results. Chronic anticoagulation was restarted. He was discharged to TCU on 12/22/0221 for PT, OT, nursing cares, medical management and monitoring. He has had wound vac on, follow up appts with podiatry Dr. Kim. At appt 1/21/2022 it was determined he would benefit from further intervention and he is scheduled for the above procedure. Currently does not have the vac on. Reports no pain. He has cardiac hx as noted, was seen in the ED on 1/6/2022 for chest pain, ACS ruled out. He  states he has not had any chest pain at all since the ED visit. Denies fever, cough, shortness of breath, dizziness. No abdominal pain, nausea, vomiting or diarrhea. No urinary sx. Chronic stable back pain. He is medically optimized, does not need further cardiac testing or evaluation prior to surgery, his cardiologist had been updated. Otherwise has no concerns, Anticoagulation on hold per orders from podiatry.        Health Care Directive:  CODE STATUS:   CPR/Full code       Patient Active Problem List  Patient Active Problem List    Diagnosis Date Noted     Diabetic ulcer of right midfoot associated with type 2 diabetes mellitus, with necrosis of muscle (H) 01/07/2022     Priority: Medium     Diabetes mellitus (H) 12/17/2021     Priority: Medium     Anxiety state 07/29/2021     Priority: Medium     Formatting of this note might be different from the original.  Created by Conversion    Replacement Utility updated for latest IMO load       Coronary atherosclerosis 07/29/2021     Priority: Medium     Formatting of this note might be different from the original.  Created by Conversion    Replacement Utility updated for latest IMO load       Depression 07/29/2021     Priority: Medium     Formatting of this note might be different from the original.  Created by Conversion       Microalbuminuria 07/29/2021     Priority: Medium     Formatting of this note might be different from the original.  Created by Conversion       Problems with learning 07/29/2021     Priority: Medium     Formatting of this note might be different from the original.  Created by Conversion  [facility] Annotation: Feb 11 2010  1:41PM - Leonid Cabral: CANNOT READ OR   WRITE       Type 2 diabetes mellitus (H) 07/29/2021     Priority: Medium     Formatting of this note might be different from the original.  Created by Conversion       Type II or unspecified type diabetes mellitus with ophthalmic manifestations, not stated as uncontrolled(250.50) (H)  07/29/2021     Priority: Medium     Formatting of this note might be different from the original.  Created by Conversion       Equinovarus acquired deformity, right 04/14/2021     Priority: Medium     Diabetic ulcer of right midfoot associated with type 2 diabetes mellitus, limited to breakdown of skin (H) 09/28/2017     Priority: Medium     Late effects of cerebrovascular accident 08/19/2016     Priority: Medium     Coronary artery disease involving native coronary artery without angina pectoris, unspecified whether native or transplanted heart 08/19/2016     Priority: Medium     Type 2 diabetes mellitus with hyperglycemia (H) 08/19/2016     Priority: Medium     Mild intellectual disabilities 08/19/2016     Priority: Medium     Essential hypertension 08/19/2016     Priority: Medium     Major depressive disorder with single episode, remission status unspecified 08/19/2016     Priority: Medium     Diarrhea 08/19/2016     Priority: Medium     Constipation, unspecified constipation type 08/19/2016     Priority: Medium     Anemia, unspecified type 08/19/2016     Priority: Medium     Abdominal pain, generalized 08/19/2016     Priority: Medium     Pain 08/19/2016     Priority: Medium     Gastroesophageal reflux disease without esophagitis 08/19/2016     Priority: Medium     Migraine 08/19/2016     Priority: Medium     Weakness 08/19/2016     Priority: Medium     Myalgia 08/19/2016     Priority: Medium     Nausea 08/19/2016     Priority: Medium     Chest pain, unspecified type 08/19/2016     Priority: Medium     Insomnia, unspecified type 08/19/2016     Priority: Medium     Hyperlipidemia, unspecified hyperlipidemia type 08/19/2016     Priority: Medium      Past Medical History:   Diagnosis Date     Anxiety      Arthritis      Cerebral infarction (H)      Congestive heart failure (H)      Coronary artery disease     CABG with multiple stents     Depression      Diabetes (H)      Equinovarus deformity, acquired, right       GERD (gastroesophageal reflux disease)      Heart attack (H)      Hyperlipidemia      Hypertension      Migraine      Migraines      Myalgia      Stented coronary artery      Past Surgical History:   Procedure Laterality Date     INCISION AND DRAINAGE LOWER EXTREMITY, COMBINED Right 12/17/2021    Procedure: INCISION AND DRAINAGE, right foot;  Surgeon: Alex Kim DPM;  Location: [facility] Main OR     IR MISCELLANEOUS PROCEDURE  2/13/2004     [facility] CABG, VEIN, SINGLE      Description: CABG (CABG);  Recorded: 04/22/2009;     Current Outpatient Medications   Medication Sig Dispense Refill     acetaminophen (TYLENOL) 325 MG tablet Take 650 mg by mouth every 4 hours as needed for mild pain        ARIPiprazole (ABILIFY) 2 MG tablet 2 mg daily        atorvastatin (LIPITOR) 80 MG tablet Take 80 mg by mouth daily        cholestyramine (QUESTRAN) 4 g packet Take 1 packet by mouth every other day        DULoxetine (CYMBALTA) 60 MG capsule Take 120 mg by mouth daily takle 120mg       erenumab-aooe (AIMOVIG) 140 MG/ML injection Inject 140 mg Subcutaneous every 30 days        fenofibrate micronized (LOFIBRA) 200 MG capsule Take 200 mg by mouth every morning (before breakfast)        ferrous sulfate (FEROSUL) 325 (65 Fe) MG tablet Take 325 mg by mouth daily (with breakfast)        gabapentin (NEURONTIN) 100 MG capsule Take 100 mg by mouth 2 times daily (before meals)        gabapentin (NEURONTIN) 300 MG capsule Take 600 mg by mouth At Bedtime       loperamide (IMODIUM) 2 MG capsule Take 4 mg by mouth as needed for diarrhea        LORazepam (ATIVAN) 0.5 MG tablet Take 1 tablet (0.5 mg) by mouth daily before breakfast 10 tablet 0     losartan (COZAAR) 25 MG tablet Take 25 mg by mouth At Bedtime        magnesium oxide 400 MG CAPS Take 400 mg by mouth 4 times daily       Menthol, Topical Analgesic, (BIOFREEZE ROLL-ON) 4 % GEL Apply 1 Application topically 2 times daily Apply to lower back for pain       metFORMIN (GLUCOPHAGE)  "1000 MG tablet Take 0.5 tablets (500 mg) by mouth 2 times daily (with meals) (Patient taking differently: Take 1,000 mg by mouth daily 1000 mg in the AM and 500 Mg in the PM)  0     metoprolol succinate ER (TOPROL-XL) 25 MG 24 hr tablet Take 75 mg by mouth daily        mirtazapine (REMERON) 7.5 MG tablet Take 7.5 mg by mouth At Bedtime        Neomycin-Bacitracin-Polymyxin (TRIPLE ANTIBIOTIC) 3.5-400-5000 OINT ointment Externally apply 1 applicator topically 3 times daily as needed       Nitroglycerin (NITROSTAT SL) Place 0.4 mg under the tongue every 5 minutes as needed for chest pain        omeprazole (PRILOSEC) 20 MG DR capsule Take 20 mg by mouth daily        oxyCODONE (ROXICODONE) 5 MG tablet TAKE 1 TAB BY MOUTH EVERY 6HOURS AS NEEDED FOR PAIN 28 tablet 0     polyethylene glycol (MIRALAX) 17 GM/Dose powder Take 1 packet by mouth daily as needed for constipation        Rivaroxaban (XARELTO PO) Take 20 mg by mouth daily        senna-docusate (SENOKOT-S/PERICOLACE) 8.6-50 MG tablet Take 1 tablet by mouth 2 times daily as needed for constipation        traZODone (DESYREL) 50 MG tablet Take 50 mg by mouth At Bedtime        vitamin C (ASCORBIC ACID) 500 MG tablet Take 500 mg by mouth daily          Allergies   Allergen Reactions     Aspirin Other (See Comments)     Reacts with migraine medicine     Isosorbide Headache     Codeine Rash     Morphine Rash        Social History     Tobacco Use     Smoking status: Former Smoker     Quit date: 2015     Years since quittin.0     Smokeless tobacco: Never Used   Substance Use Topics     Alcohol use: Not Currently           Objective     /75   Pulse 70   Temp 98.2  F (36.8  C)   Resp 18   Ht 1.702 m (5' 7\")   Wt 66.7 kg (147 lb)   SpO2 99%   BMI 23.02 kg/m      Physical Exam   General: Patient is alert male, no distress.   HEENT: Head is NCAT. Eyes show no injection or icterus. Nares negative. Oropharynx well hydrated.  Neck: Supple. No tenderness or " adenopathy. No JVD.  Lungs: Clear bilaterally. No wheezes.  Cardiovascular: Regular rate and rhythm, normal S1, S2.  Back: No spinal or CVA tenderness.  Abdomen: Soft, no tenderness on exam. Bowel sounds present. No guarding rebound or rigidity.  : Deferred.  Extremities: No LE edema is noted.  Musculoskeletal: Atrophy of extremities.   Skin: Wounds right foot, bandaged.   Psych: Mood appears good.      Recent Labs   Lab Test 12/28/21  0548 12/21/21  1804 12/18/21  0827   HGB 11.1*  --   --      --   --      --  138   POTASSIUM 4.5  --  4.5   CR 0.99  --  0.86   A1C  --  5.1  --         Diagnostics:  No labs were ordered during this visit.   No EKG this visit, completed in the last 90 days.      ASSESSMENT/PLAN:   1. Right foot ulceration. S/p incision and drainage per Dr. Kim on 12/17/2022. Cultures reviewed by ID, he completed 5 days of PO Augmentin after hospital discharge, no further abx. Saw podiatry on Fri 1/21/2022, plans for Incision and drainage, right foot with application of theraskin on 1/27/2022. Off wound vac now pending procedure.  2. HTN. On metoprolol and losartan.   3. Afib/flutter. He is anticoagulated with xarelto, on hold for surgery.  4. CAD. Hx of many MIs per his report. Hx of stenting and prior CABG. On statin, xarelto, beta blocker. ED visit 1/6/2021 for chest pain, ACS ruled out.  5. DM. On metformin. Home glipizide stopped per hospital due to low blood sugars. Last A1c was good at 5.1%.  6. Hx of stroke. On statin, xarelto.  7. Anemia. He is on iron.  8. Mood disorder, mixed depression and anxiety. Cont PTA duloxetine, Abilify.   9. Mild intellectual disabilities. He resides in an [facility].        Signed Electronically by: :Heidi Xei MD

## 2022-01-27 ENCOUNTER — HOSPITAL ENCOUNTER (OUTPATIENT)
Facility: HOSPITAL | Age: 52
Discharge: HOME HEALTH | End: 2022-01-27
Attending: PODIATRIST | Admitting: PODIATRIST
Payer: MEDICARE

## 2022-01-27 ENCOUNTER — ANESTHESIA (OUTPATIENT)
Dept: SURGERY | Facility: HOSPITAL | Age: 52
End: 2022-01-27
Payer: MEDICARE

## 2022-01-27 ENCOUNTER — LAB REQUISITION (OUTPATIENT)
Dept: LAB | Facility: CLINIC | Age: 52
End: 2022-01-27
Payer: MEDICARE

## 2022-01-27 ENCOUNTER — TRANSITIONAL CARE UNIT VISIT (OUTPATIENT)
Dept: GERIATRICS | Facility: CLINIC | Age: 52
End: 2022-01-27
Payer: MEDICARE

## 2022-01-27 VITALS
SYSTOLIC BLOOD PRESSURE: 124 MMHG | HEIGHT: 67 IN | RESPIRATION RATE: 16 BRPM | DIASTOLIC BLOOD PRESSURE: 68 MMHG | HEART RATE: 66 BPM | WEIGHT: 147 LBS | BODY MASS INDEX: 23.07 KG/M2 | TEMPERATURE: 97.3 F | OXYGEN SATURATION: 99 %

## 2022-01-27 VITALS
HEIGHT: 67 IN | WEIGHT: 147 LBS | RESPIRATION RATE: 18 BRPM | BODY MASS INDEX: 23.07 KG/M2 | HEART RATE: 67 BPM | TEMPERATURE: 98.2 F | OXYGEN SATURATION: 99 % | DIASTOLIC BLOOD PRESSURE: 78 MMHG | SYSTOLIC BLOOD PRESSURE: 150 MMHG

## 2022-01-27 DIAGNOSIS — I48.91 ATRIAL FIBRILLATION, UNSPECIFIED TYPE (H): ICD-10-CM

## 2022-01-27 DIAGNOSIS — L97.513 ULCER OF RIGHT FOOT WITH NECROSIS OF MUSCLE (H): ICD-10-CM

## 2022-01-27 DIAGNOSIS — I25.10 CORONARY ARTERY DISEASE INVOLVING NATIVE CORONARY ARTERY OF NATIVE HEART WITHOUT ANGINA PECTORIS: ICD-10-CM

## 2022-01-27 DIAGNOSIS — I10 ESSENTIAL HYPERTENSION: ICD-10-CM

## 2022-01-27 DIAGNOSIS — Z11.59 ENCOUNTER FOR SCREENING FOR OTHER VIRAL DISEASES: ICD-10-CM

## 2022-01-27 DIAGNOSIS — E11.621 DIABETIC ULCER OF RIGHT MIDFOOT ASSOCIATED WITH TYPE 2 DIABETES MELLITUS, WITH NECROSIS OF MUSCLE (H): Primary | ICD-10-CM

## 2022-01-27 DIAGNOSIS — L97.413 DIABETIC ULCER OF RIGHT MIDFOOT ASSOCIATED WITH TYPE 2 DIABETES MELLITUS, WITH NECROSIS OF MUSCLE (H): Primary | ICD-10-CM

## 2022-01-27 LAB
SARS-COV-2 RNA RESP QL NAA+PROBE: NEGATIVE
SARS-COV-2 RNA RESP QL NAA+PROBE: NOT DETECTED

## 2022-01-27 PROCEDURE — 250N000011 HC RX IP 250 OP 636: Performed by: NURSE ANESTHETIST, CERTIFIED REGISTERED

## 2022-01-27 PROCEDURE — 258N000003 HC RX IP 258 OP 636: Performed by: ANESTHESIOLOGY

## 2022-01-27 PROCEDURE — 15002 WOUND PREP TRK/ARM/LEG: CPT | Mod: 59 | Performed by: PODIATRIST

## 2022-01-27 PROCEDURE — 250N000013 HC RX MED GY IP 250 OP 250 PS 637: Performed by: ANESTHESIOLOGY

## 2022-01-27 PROCEDURE — 28002 TREATMENT OF FOOT INFECTION: CPT | Mod: RT | Performed by: PODIATRIST

## 2022-01-27 PROCEDURE — 250N000011 HC RX IP 250 OP 636: Performed by: ANESTHESIOLOGY

## 2022-01-27 PROCEDURE — 250N000011 HC RX IP 250 OP 636: Performed by: PODIATRIST

## 2022-01-27 PROCEDURE — 250N000009 HC RX 250: Performed by: ANESTHESIOLOGY

## 2022-01-27 PROCEDURE — 999N000141 HC STATISTIC PRE-PROCEDURE NURSING ASSESSMENT: Performed by: PODIATRIST

## 2022-01-27 PROCEDURE — 99308 SBSQ NF CARE LOW MDM 20: CPT | Performed by: FAMILY MEDICINE

## 2022-01-27 PROCEDURE — 11043 DBRDMT MUSC&/FSCA 1ST 20/<: CPT | Mod: 59 | Performed by: PODIATRIST

## 2022-01-27 PROCEDURE — 370N000017 HC ANESTHESIA TECHNICAL FEE, PER MIN: Performed by: PODIATRIST

## 2022-01-27 PROCEDURE — 999N000067 HC STATISTIC FAILED PERIPHERAL IV START

## 2022-01-27 PROCEDURE — 710N000012 HC RECOVERY PHASE 2, PER MINUTE: Performed by: PODIATRIST

## 2022-01-27 PROCEDURE — 15275 SKIN SUB GRAFT FACE/NK/HF/G: CPT | Mod: 59 | Performed by: PODIATRIST

## 2022-01-27 PROCEDURE — 87635 SARS-COV-2 COVID-19 AMP PRB: CPT | Performed by: PODIATRIST

## 2022-01-27 PROCEDURE — 272N000001 HC OR GENERAL SUPPLY STERILE: Performed by: PODIATRIST

## 2022-01-27 PROCEDURE — 360N000075 HC SURGERY LEVEL 2, PER MIN: Performed by: PODIATRIST

## 2022-01-27 PROCEDURE — 250N000009 HC RX 250: Performed by: NURSE ANESTHETIST, CERTIFIED REGISTERED

## 2022-01-27 DEVICE — GRAFT EPIDERMIS MESHED THERASKIN CRYO 1X2" PER SQ CM- 13: Type: IMPLANTABLE DEVICE | Site: LEG | Status: FUNCTIONAL

## 2022-01-27 RX ORDER — HYDROMORPHONE HCL IN WATER/PF 6 MG/30 ML
0.2 PATIENT CONTROLLED ANALGESIA SYRINGE INTRAVENOUS EVERY 5 MIN PRN
Status: DISCONTINUED | OUTPATIENT
Start: 2022-01-27 | End: 2022-01-27 | Stop reason: HOSPADM

## 2022-01-27 RX ORDER — OXYCODONE HYDROCHLORIDE 5 MG/1
5 TABLET ORAL EVERY 4 HOURS PRN
Status: DISCONTINUED | OUTPATIENT
Start: 2022-01-27 | End: 2022-01-27 | Stop reason: HOSPADM

## 2022-01-27 RX ORDER — BUPIVACAINE HYDROCHLORIDE 5 MG/ML
INJECTION, SOLUTION EPIDURAL; INTRACAUDAL
Status: COMPLETED | OUTPATIENT
Start: 2022-01-27 | End: 2022-01-27

## 2022-01-27 RX ORDER — NALOXONE HYDROCHLORIDE 0.4 MG/ML
0.2 INJECTION, SOLUTION INTRAMUSCULAR; INTRAVENOUS; SUBCUTANEOUS
Status: DISCONTINUED | OUTPATIENT
Start: 2022-01-27 | End: 2022-01-27 | Stop reason: HOSPADM

## 2022-01-27 RX ORDER — FENTANYL CITRATE 50 UG/ML
25 INJECTION, SOLUTION INTRAMUSCULAR; INTRAVENOUS
Status: DISCONTINUED | OUTPATIENT
Start: 2022-01-27 | End: 2022-01-27 | Stop reason: HOSPADM

## 2022-01-27 RX ORDER — ONDANSETRON 4 MG/1
4 TABLET, ORALLY DISINTEGRATING ORAL EVERY 30 MIN PRN
Status: DISCONTINUED | OUTPATIENT
Start: 2022-01-27 | End: 2022-01-27 | Stop reason: HOSPADM

## 2022-01-27 RX ORDER — SODIUM CHLORIDE, SODIUM LACTATE, POTASSIUM CHLORIDE, CALCIUM CHLORIDE 600; 310; 30; 20 MG/100ML; MG/100ML; MG/100ML; MG/100ML
INJECTION, SOLUTION INTRAVENOUS CONTINUOUS
Status: DISCONTINUED | OUTPATIENT
Start: 2022-01-27 | End: 2022-01-27 | Stop reason: HOSPADM

## 2022-01-27 RX ORDER — LIDOCAINE 40 MG/G
CREAM TOPICAL
Status: DISCONTINUED | OUTPATIENT
Start: 2022-01-27 | End: 2022-01-27 | Stop reason: HOSPADM

## 2022-01-27 RX ORDER — ACETAMINOPHEN 325 MG/1
975 TABLET ORAL ONCE
Status: COMPLETED | OUTPATIENT
Start: 2022-01-27 | End: 2022-01-27

## 2022-01-27 RX ORDER — FENTANYL CITRATE 50 UG/ML
25 INJECTION, SOLUTION INTRAMUSCULAR; INTRAVENOUS EVERY 5 MIN PRN
Status: DISCONTINUED | OUTPATIENT
Start: 2022-01-27 | End: 2022-01-27 | Stop reason: HOSPADM

## 2022-01-27 RX ORDER — ONDANSETRON 2 MG/ML
INJECTION INTRAMUSCULAR; INTRAVENOUS PRN
Status: DISCONTINUED | OUTPATIENT
Start: 2022-01-27 | End: 2022-01-27

## 2022-01-27 RX ORDER — NALOXONE HYDROCHLORIDE 0.4 MG/ML
0.4 INJECTION, SOLUTION INTRAMUSCULAR; INTRAVENOUS; SUBCUTANEOUS
Status: DISCONTINUED | OUTPATIENT
Start: 2022-01-27 | End: 2022-01-27 | Stop reason: HOSPADM

## 2022-01-27 RX ORDER — FENTANYL CITRATE 50 UG/ML
50 INJECTION, SOLUTION INTRAMUSCULAR; INTRAVENOUS
Status: DISCONTINUED | OUTPATIENT
Start: 2022-01-27 | End: 2022-01-27 | Stop reason: HOSPADM

## 2022-01-27 RX ORDER — ONDANSETRON 2 MG/ML
4 INJECTION INTRAMUSCULAR; INTRAVENOUS EVERY 30 MIN PRN
Status: DISCONTINUED | OUTPATIENT
Start: 2022-01-27 | End: 2022-01-27 | Stop reason: HOSPADM

## 2022-01-27 RX ORDER — CEFAZOLIN SODIUM 2 G/100ML
2 INJECTION, SOLUTION INTRAVENOUS SEE ADMIN INSTRUCTIONS
Status: DISCONTINUED | OUTPATIENT
Start: 2022-01-27 | End: 2022-01-27 | Stop reason: HOSPADM

## 2022-01-27 RX ORDER — PROPOFOL 10 MG/ML
INJECTION, EMULSION INTRAVENOUS PRN
Status: DISCONTINUED | OUTPATIENT
Start: 2022-01-27 | End: 2022-01-27

## 2022-01-27 RX ORDER — CEFAZOLIN SODIUM 2 G/100ML
2 INJECTION, SOLUTION INTRAVENOUS
Status: COMPLETED | OUTPATIENT
Start: 2022-01-27 | End: 2022-01-27

## 2022-01-27 RX ORDER — MEPERIDINE HYDROCHLORIDE 25 MG/ML
12.5 INJECTION INTRAMUSCULAR; INTRAVENOUS; SUBCUTANEOUS
Status: DISCONTINUED | OUTPATIENT
Start: 2022-01-27 | End: 2022-01-27 | Stop reason: HOSPADM

## 2022-01-27 RX ORDER — LIDOCAINE HYDROCHLORIDE 20 MG/ML
INJECTION, SOLUTION INFILTRATION; PERINEURAL PRN
Status: DISCONTINUED | OUTPATIENT
Start: 2022-01-27 | End: 2022-01-27

## 2022-01-27 RX ORDER — PROPOFOL 10 MG/ML
INJECTION, EMULSION INTRAVENOUS CONTINUOUS PRN
Status: DISCONTINUED | OUTPATIENT
Start: 2022-01-27 | End: 2022-01-27

## 2022-01-27 RX ADMIN — SODIUM CHLORIDE, POTASSIUM CHLORIDE, SODIUM LACTATE AND CALCIUM CHLORIDE: 600; 310; 30; 20 INJECTION, SOLUTION INTRAVENOUS at 07:24

## 2022-01-27 RX ADMIN — BUPIVACAINE HYDROCHLORIDE 15 ML: 5 INJECTION, SOLUTION EPIDURAL; INTRACAUDAL at 07:25

## 2022-01-27 RX ADMIN — ACETAMINOPHEN 975 MG: 325 TABLET, FILM COATED ORAL at 07:10

## 2022-01-27 RX ADMIN — OXYCODONE HYDROCHLORIDE 5 MG: 5 TABLET ORAL at 09:03

## 2022-01-27 RX ADMIN — CEFAZOLIN SODIUM 2 G: 2 INJECTION, SOLUTION INTRAVENOUS at 07:36

## 2022-01-27 RX ADMIN — MIDAZOLAM 1 MG: 1 INJECTION INTRAMUSCULAR; INTRAVENOUS at 07:23

## 2022-01-27 RX ADMIN — FENTANYL CITRATE 50 MCG: 50 INJECTION, SOLUTION INTRAMUSCULAR; INTRAVENOUS at 07:23

## 2022-01-27 RX ADMIN — BUPIVACAINE HYDROCHLORIDE 20 ML: 5 INJECTION, SOLUTION EPIDURAL; INTRACAUDAL; PERINEURAL at 07:24

## 2022-01-27 RX ADMIN — LIDOCAINE HYDROCHLORIDE 60 MG: 20 INJECTION, SOLUTION INFILTRATION; PERINEURAL at 07:32

## 2022-01-27 RX ADMIN — PROPOFOL 150 MCG/KG/MIN: 10 INJECTION, EMULSION INTRAVENOUS at 07:32

## 2022-01-27 RX ADMIN — PROPOFOL 20 MG: 10 INJECTION, EMULSION INTRAVENOUS at 07:32

## 2022-01-27 RX ADMIN — ONDANSETRON 4 MG: 2 INJECTION INTRAMUSCULAR; INTRAVENOUS at 07:34

## 2022-01-27 ASSESSMENT — MIFFLIN-ST. JEOR
SCORE: 1470.42
SCORE: 1470.42

## 2022-01-27 ASSESSMENT — ENCOUNTER SYMPTOMS: DYSRHYTHMIAS: 1

## 2022-01-27 NOTE — ANESTHESIA PREPROCEDURE EVALUATION
Anesthesia Pre-Procedure Evaluation    Patient: Cm Lundberg   MRN: [id number] : 1970        Preoperative Diagnosis: Ulcer of right foot with necrosis of muscle (H) [L97.513]    Procedure : Procedure(s):  INCISION AND DRAINAGE, right foot with application of theraskin          Past Medical History:   Diagnosis Date     Anxiety      Arthritis      Cerebral infarction (H)      Congestive heart failure (H)      Coronary artery disease     CABG with multiple stents     Depression      Diabetes (H)      Equinovarus deformity, acquired, right      GERD (gastroesophageal reflux disease)      Heart attack (H)      Hyperlipidemia      Hypertension      Migraine      Migraines      Myalgia      Stented coronary artery       Past Surgical History:   Procedure Laterality Date     INCISION AND DRAINAGE LOWER EXTREMITY, COMBINED Right 2021    Procedure: INCISION AND DRAINAGE, right foot;  Surgeon: Alex Kim DPM;  Location: [facility] Main OR     IR MISCELLANEOUS PROCEDURE  2004     [facility] CABG, VEIN, SINGLE      Description: CABG (CABG);  Recorded: 2009;      Allergies   Allergen Reactions     Aspirin Other (See Comments)     Reacts with migraine medicine     Isosorbide Headache     Codeine Rash     Morphine Rash      Social History     Tobacco Use     Smoking status: Former Smoker     Quit date: 2015     Years since quittin.0     Smokeless tobacco: Never Used   Substance Use Topics     Alcohol use: Not Currently      Wt Readings from Last 1 Encounters:   22 66.7 kg (147 lb)        Anesthesia Evaluation   Pt has had prior anesthetic.     No history of anesthetic complications       ROS/MED HX  ENT/Pulmonary:       Neurologic:     (+) CVA,     Cardiovascular: Comment: 2021 TTE   1.  Normal left ventricular chamber size.Mildly decreased left ventricular ejection fraction.    Calculated left ventricular ejection fraction (modified Barragan technique) is 45 %.    2.  Abnormal  septal motion.  Basal inferior hypokinesis.    3.  Grade 1 left ventricular diastolic dysfunction consistent with abnormal myocardial   relaxation and normal left ventricular filling    pressure.    4.  Normal right ventricular size and systolic function.    5.  No significant valvular heart disease.    6.  When compared to the previous echocardiographic images of 3/21/2019, there has been no   significant change.       (+) hypertension--CAD -[id number] dysrhythmias, a-flutter,     METS/Exercise Tolerance:     Hematologic:       Musculoskeletal:       GI/Hepatic:     (+) GERD,     Renal/Genitourinary:       Endo:     (+) type II DM, Not using insulin,     Psychiatric/Substance Use:     (+) psychiatric history     Infectious Disease:       Malignancy:       Other:            Physical Exam    Airway  airway exam normal      Mallampati: II   TM distance: > 3 FB   Neck ROM: full   Mouth opening: > 3 cm    Respiratory Devices and Support         Dental       (+) upper dentures and lower dentures      Cardiovascular   cardiovascular exam normal       Rhythm and rate: regular and normal     Pulmonary   pulmonary exam normal        breath sounds clear to auscultation           OUTSIDE LABS:  CBC:   Lab Results   Component Value Date    WBC 5.4 12/28/2021    WBC 4.8 04/29/2019    HGB 11.1 (L) 12/28/2021    HGB 11.5 (L) 04/29/2019    HCT 36.9 (L) 12/28/2021    HCT 36.1 (L) 04/29/2019     12/28/2021     04/29/2019     BMP:   Lab Results   Component Value Date     12/28/2021     12/18/2021    POTASSIUM 4.5 12/28/2021    POTASSIUM 4.5 12/18/2021    CHLORIDE 107 12/28/2021    CHLORIDE 106 12/18/2021    CO2 25 12/28/2021    CO2 21 (L) 12/18/2021    BUN 17 12/28/2021    BUN 10 12/18/2021    CR 0.99 12/28/2021    CR 0.86 12/18/2021     (H) 12/28/2021     (H) 12/22/2021     COAGS: No results found for: PTT, INR, FIBR  POC: No results found for: BGM, HCG, HCGS  HEPATIC:   Lab Results    Component Value Date    ALBUMIN 4.0 04/29/2019    PROTTOTAL 6.9 04/29/2019    ALT 20 04/29/2019    AST 21 04/29/2019    ALKPHOS 103 04/29/2019    BILITOTAL 0.4 04/29/2019     OTHER:   Lab Results   Component Value Date    A1C 5.1 12/21/2021    TO 10.3 12/28/2021    MAG 1.6 (L) 12/28/2021       Anesthesia Plan    ASA Status:  3      Anesthesia Type: General.     - Airway: Mask Only      Maintenance: TIVA.        Consents    Anesthesia Plan(s) and associated risks, benefits, and realistic alternatives discussed. Questions answered and patient/representative(s) expressed understanding.    - Discussed:     - Discussed with:  Patient, Healthcare Power of       - Extended Intubation/Ventilatory Support Discussed: Yes.         Postoperative Care    Pain management: Peripheral nerve block (Single Shot), IV analgesics, Multi-modal analgesia, Oral pain medications.   PONV prophylaxis: Ondansetron (or other 5HT-3)     Comments:                Doron Bland MD

## 2022-01-27 NOTE — ANESTHESIA PROCEDURE NOTES
Popliteal Procedure Note    Pre-Procedure   Staff -        Anesthesiologist:  Doron Bland MD       Performed By: anesthesiologist       Location: pre-op       Procedure Start/Stop Times: 1/27/2022 7:24 AM and 1/27/2022 7:24 AM       Pre-Anesthestic Checklist: patient identified, IV checked, site marked, risks and benefits discussed, informed consent, monitors and equipment checked, pre-op evaluation, at physician/surgeon's request and post-op pain management  Timeout:       Correct Patient: Yes        Correct Procedure: Yes        Correct Site: Yes        Correct Position: Yes        Correct Laterality: Yes        Site Marked: Yes  Procedure Documentation  Procedure: Popliteal       Laterality: right       Patient Position: supine       Skin prep: Chloraprep      Local skin infiltrated with mL of 1% lidocaine.        Needle Type: short bevel       Needle Gauge: 21.        Needle Length (Inches): 4        Ultrasound guided       1. Ultrasound was used to identify targeted nerve, plexus, vascular marker, or fascial plane and place a needle adjacent to it in real-time.       2. Ultrasound was used to visualize the spread of anesthetic in close proximity to the above referenced structure.       3. A permanent image is entered into the patient's record.    Assessment/Narrative         The placement was negative for: blood aspirated, painful injection and site bleeding       Paresthesias: No.     Bolus given via needle..        Secured via.        Insertion/Infusion Method: Single Shot       Complications: none    Medication(s) Administered   Bupivacaine 0.5% PF (Infiltration), 20 mL  Medication Administration Time: 1/27/2022 7:24 AM

## 2022-01-27 NOTE — ANESTHESIA POSTPROCEDURE EVALUATION
Patient: Cm Lundberg    Procedure: Procedure(s):  INCISION AND DRAINAGE, right foot with application of theraskin       Diagnosis:Ulcer of right foot with necrosis of muscle (H) [L97.513]  Diagnosis Additional Information: No value filed.    Anesthesia Type:  General    Note:  Disposition: Outpatient   Postop Pain Control: Uneventful            Sign Out: Well controlled pain   PONV: No   Neuro/Psych: Uneventful            Sign Out: Acceptable/Baseline neuro status   Airway/Respiratory: Uneventful            Sign Out: Acceptable/Baseline resp. status   CV/Hemodynamics: Uneventful            Sign Out: Acceptable CV status; No obvious hypovolemia; No obvious fluid overload   Other NRE: NONE   DID A NON-ROUTINE EVENT OCCUR? No           Last vitals:  Vitals Value Taken Time   /68 01/27/22 0900   Temp 36.3  C (97.3  F) 01/27/22 0802   Pulse 66 01/27/22 0910   Resp 16 01/27/22 0802   SpO2 99 % 01/27/22 0910   Vitals shown include unvalidated device data.    Electronically Signed By: Doron Bland MD  January 27, 2022  10:08 AM

## 2022-01-27 NOTE — OR NURSING
Siter Sherri at bedside, discharge instructions reviewed with patient and sister. Both verbalized understanding of instructions. Future follow up appointment scheduled. Wound vac instructions provided.

## 2022-01-27 NOTE — ANESTHESIA PROCEDURE NOTES
Adductor canal Procedure Note    Pre-Procedure   Staff -        Anesthesiologist:  Doron Bland MD       Performed By: anesthesiologist       Location: pre-op       Procedure Start/Stop Times: 1/27/2022 7:25 AM and 1/27/2022 7:25 AM       Pre-Anesthestic Checklist: patient identified, IV checked, site marked, risks and benefits discussed, informed consent, monitors and equipment checked, pre-op evaluation, at physician/surgeon's request and post-op pain management  Timeout:       Correct Patient: Yes        Correct Procedure: Yes        Correct Site: Yes        Correct Position: Yes        Correct Laterality: Yes        Site Marked: Yes  Procedure Documentation  Procedure: Adductor canal       Laterality: right       Patient Position: supine       Skin prep: Chloraprep      Local skin infiltrated with mL of 1% lidocaine.        Needle Type: short bevel       Needle Gauge: 21.        Needle Length (Inches): 4        Ultrasound guided       1. Ultrasound was used to identify targeted nerve, plexus, vascular marker, or fascial plane and place a needle adjacent to it in real-time.       2. Ultrasound was used to visualize the spread of anesthetic in close proximity to the above referenced structure.       3. A permanent image is entered into the patient's record.    Assessment/Narrative         The placement was negative for: blood aspirated, painful injection and site bleeding       Paresthesias: No.     Bolus given via needle..        Secured via.        Insertion/Infusion Method: Single Shot       Complications: none    Medication(s) Administered   Bupivacaine 0.5% PF (Infiltration), 15 mL  Medication Administration Time: 1/27/2022 7:25 AM

## 2022-01-27 NOTE — ANESTHESIA CARE TRANSFER NOTE
Patient: Cm Lundberg    Procedure: Procedure(s):  INCISION AND DRAINAGE, right foot with application of theraskin       Diagnosis: Ulcer of right foot with necrosis of muscle (H) [L97.513]  Diagnosis Additional Information: No value filed.    Anesthesia Type:   General     Note:    Oropharynx: oropharynx clear of all foreign objects and spontaneously breathing  Level of Consciousness: awake  Oxygen Supplementation: room air    Independent Airway: airway patency satisfactory and stable  Dentition: dentition unchanged  Vital Signs Stable: post-procedure vital signs reviewed and stable  Report to RN Given: handoff report given  Patient transferred to: Phase II    Handoff Report: Identifed the Patient, Identified the Reponsible Provider, Reviewed the pertinent medical history, Discussed the surgical course, Reviewed Intra-OP anesthesia mangement and issues during anesthesia, Set expectations for post-procedure period and Allowed opportunity for questions and acknowledgement of understanding      Vitals:  Vitals Value Taken Time   /60 01/27/22 0803   Temp 36.3  C (97.3  F) 01/27/22 0802   Pulse 70 01/27/22 0804   Resp 16 01/27/22 0802   SpO2 97 % 01/27/22 0804   Vitals shown include unvalidated device data.    Electronically Signed By: SHABNAM Venegas CRNA  January 27, 2022  8:05 AM

## 2022-01-27 NOTE — OP NOTE
Date: 1/27/22    Surgeon: SYMONE Kim DPM    Preoperative diagnosis: Ulceration right foot    Postoperativediagnosis: Same    Procedure:   1. Incision and drainage right foot  2. Excisional debridement ulceration right foot with application of Theraskin (47117)  3. Wound prep right foot     Anesthesia: Popliteal block with IV-sedation    Hemostasis: None    Pathology:None    Injectables: None    Materials: Theraskin, Staples     Complications: None    Blood loss: 1 ml       Findings: Patient presents for operative intervention for a chronic wound on the lateral right foot. We discussed today's procedure to include sharp debridement of the wound and removal of all non-viable tissue with application of Theraskin. Conservative measures were attempted and exhausted. Patient questionsinvited and answered, including appropriate risk, benefits and complications. No guarantees given or implied. Patient has been NPO.    Description: Patient was brought to the operating room and placed on the table insupine position. IV-sedation and popliteal block was administered by the anesthesia department. The foot was then prepped and draped in usual aseptic manner. Theextremity was elevated and exsanguinated. Well-padded ankle pneumatic tourniquet was inflated to 250mmHg and the following procedure was then performed: Attention was directed to the ulceration along the lateral aspect of the right foot where non-viable tissue was noted. A #15 blade was used to sharply resect all non-viable tissue into muscle. The resulting wound probed to the level of muscle, and measured 2.5x2.5x0.4cm. Next, a LifeVantageonix ultrasonic debridement device was used to debride the remaining non-viable tissue.     Theraskin size 13 units ( [id number], 1/28/2025) was prepared per  specifications. The graft was placed on the wound bed per  instructions and secured with staples along the peripheral graftand center portion of the graft to the  wound bed. Next, adaptic was placed over the graft and secured with staples.     Dressings consistent of moistened 4x4, 4x4's, ABD, kerlix/johanna roll and an ace wrap. The pneumatictourniquet was released and a hyperemic response was noted to the digits on the right foot.     The patient appeared to tolerate all the procedures and anesthesia well without apparent complications. Patient wastransported from the operating room to the recovery room with vital signs stable and neurovascular status as it was pre-operatively to the right foot. Patient to be discharged home per anesthesia. Written and verbal home care instructions given to remain non-weight bearing, keep the surgical dressing intact until the wound vac is applied either later today or tomorrow. Patient to follow up in office for post-operative management in one week.

## 2022-01-27 NOTE — LETTER
1/27/2022        RE: Cm Lundberg  [address]  [place] [address]          [facility] SERVICES    [place] Medical Record Number:  [id number]  Place of Service where encounter took place: [facility] ([facility]) [[id number]]   CODE STATUS:   CPR/Full code     Chief Complaint/Reason for Visit:  Chief Complaint   Patient presents with     RECHECK     TCU 1/27/2022. S/p Incision and drainage, right foot with application of theraskin.       TCU HPI:    Cm Lundberg is a 51 year old male with hx of CAD, s/p stents and CABG, HTN, DM, prior CVA, afib/flutter on xarelto, admitted to the hospital on 12/17/2021 for elective procedure for right foot ulceration. He underwent incision and drainage of the right foot, excisional debridement of the ulceration of the right foot into the level of the muscle, under MAC with popliteal block anesthesia on 12/17/2021. Wound vac was subsequently placed. He had no hospital complications. Seen by ID with recommendation for 5 days Augmentin at hospital discharge based on culture results. Chronic anticoagulation was restarted. He was felt to need TCU and discharged to TCU on 12/22/0221 for PT, OT, nursing cares, medical management and monitoring.       Today:  He was out this morning for surgical procedure per Dr. Kim-S/p Incision and drainage, right foot with application of theraskin. Had wound vac applied post procedure, will follow up with Dr. Kim on 2/4/2022. No complications. Pain is controlled. He is NWB, compliant. Appetite is good. Tends towards constipation, no concerns today. No abdominal pain. No fever or urinary sx. He denies fever, cough, congestion, fever.       PAST MEDICAL HISTORY:  Past Medical History:   Diagnosis Date     Anxiety      Arthritis      Cerebral infarction (H)      Congestive heart failure (H)      Coronary artery disease     CABG with multiple stents     Depression      Diabetes (H)      Equinovarus  deformity, acquired, right      GERD (gastroesophageal reflux disease)      Heart attack (H)      Hyperlipidemia      Hypertension      Migraine      Migraines      Myalgia      PONV (postoperative nausea and vomiting)     mild nausea     Stented coronary artery        MEDICATIONS:  Current Outpatient Medications   Medication Sig Dispense Refill     acetaminophen (TYLENOL) 325 MG tablet Take 650 mg by mouth every 4 hours as needed for mild pain        ARIPiprazole (ABILIFY) 2 MG tablet 2 mg daily        atorvastatin (LIPITOR) 80 MG tablet Take 80 mg by mouth daily        cholestyramine (QUESTRAN) 4 g packet Take 1 packet by mouth every other day        DULoxetine (CYMBALTA) 60 MG capsule Take 120 mg by mouth daily takle 120mg       erenumab-aooe (AIMOVIG) 140 MG/ML injection Inject 140 mg Subcutaneous every 30 days        fenofibrate micronized (LOFIBRA) 200 MG capsule Take 200 mg by mouth every morning (before breakfast)        ferrous sulfate (FEROSUL) 325 (65 Fe) MG tablet Take 325 mg by mouth daily (with breakfast)        gabapentin (NEURONTIN) 100 MG capsule Take 100 mg by mouth 2 times daily (before meals)        gabapentin (NEURONTIN) 300 MG capsule Take 600 mg by mouth At Bedtime       loperamide (IMODIUM) 2 MG capsule Take 4 mg by mouth as needed for diarrhea        LORazepam (ATIVAN) 0.5 MG tablet Take 1 tablet (0.5 mg) by mouth daily before breakfast 10 tablet 0     losartan (COZAAR) 25 MG tablet Take 25 mg by mouth At Bedtime        magnesium oxide 400 MG CAPS Take 400 mg by mouth 4 times daily       Menthol, Topical Analgesic, (BIOFREEZE ROLL-ON) 4 % GEL Apply 1 Application topically 2 times daily Apply to lower back for pain       metFORMIN (GLUCOPHAGE) 1000 MG tablet Take 0.5 tablets (500 mg) by mouth 2 times daily (with meals) (Patient taking differently: Take 1,000 mg by mouth daily 1000 mg in the AM and 500 Mg in the PM)  0     metoprolol succinate ER (TOPROL-XL) 25 MG 24 hr tablet Take 75 mg by  "mouth daily        mirtazapine (REMERON) 7.5 MG tablet Take 7.5 mg by mouth At Bedtime        Neomycin-Bacitracin-Polymyxin (TRIPLE ANTIBIOTIC) 3.5-400-5000 OINT ointment Externally apply 1 applicator topically 3 times daily as needed       Nitroglycerin (NITROSTAT SL) Place 0.4 mg under the tongue every 5 minutes as needed for chest pain        omeprazole (PRILOSEC) 20 MG DR capsule Take 20 mg by mouth daily        oxyCODONE (ROXICODONE) 5 MG tablet TAKE 1 TAB BY MOUTH EVERY 6HOURS AS NEEDED FOR PAIN 28 tablet 0     polyethylene glycol (MIRALAX) 17 GM/Dose powder Take 1 packet by mouth daily as needed for constipation        Rivaroxaban (XARELTO PO) Take 20 mg by mouth daily        senna-docusate (SENOKOT-S/PERICOLACE) 8.6-50 MG tablet Take 1 tablet by mouth 2 times daily as needed for constipation        traZODone (DESYREL) 50 MG tablet Take 50 mg by mouth At Bedtime        vitamin C (ASCORBIC ACID) 500 MG tablet Take 500 mg by mouth daily          PHYSICAL EXAM:  General: Patient is alert male, no distress.   Vitals: BP (!) 150/78   Pulse 67   Temp 98.2  F (36.8  C)   Resp 18   Ht 1.702 m (5' 7\")   Wt 66.7 kg (147 lb)   SpO2 99%   BMI 23.02 kg/m    HEENT: Head is NCAT. Eyes show no injection or icterus. Nares negative. Oropharynx well hydrated.  Neck: Supple. No tenderness or adenopathy. No JVD.  Lungs: Non labored respirations.  Abdomen: Soft.  : Deferred.  Extremities: No edema is noted.  Musculoskeletal: Atrophy of extremities.   Skin: Wound vac right foot.   Psych: Mood appears good.      LABS/DIAGNOSTIC DATA:  Component      Latest Ref Rng & Units 12/21/2021   Hemoglobin A1C      <=5.6 % 5.1     Component      Latest Ref Rng & Units 12/18/2021   Sodium      136 - 145 mmol/L 138   Potassium      3.5 - 5.0 mmol/L 4.5   Chloride      98 - 107 mmol/L 106   Carbon Dioxide      22 - 31 mmol/L 21 (L)   Anion Gap      5 - 18 mmol/L 11   Glucose      70 - 125 mg/dL 119   Calcium      8.5 - 10.5 mg/dL 9.3 "   Urea Nitrogen      8 - 22 mg/dL 10   Creatinine      0.70 - 1.30 mg/dL 0.86   GFR Estimate      >60 mL/min/1.73m2 >90         ASSESSMENT/PLAN:  1. Right foot ulceration. S/p incision and drainage 12/17/2022. Completed abx. Now s/p Incision and drainage, right foot with application of theraskin today. Wound vac again in place, follow up appt on 2/4/2022.   2. HTN. On metoprolol and losartan. BPs satisfactory, continue to monitor in TCU.  3. Afib/flutter. He is anticoagulated with xarelto.  4. CAD. Hx of many MIs per his report. Hx of stenting and prior CABG. On statin, xarelto, beta blocker. ED visit 1/6/2021 for chest pain, ACS ruled out. No chest pain since.  5. DM. On metformin. Home glipizide stopped per hospital due to low blood sugars. Last A1c was good at 5.1%.  6. Hx of stroke. Continue statin, xarelto.  7. Anemia. He is on iron.  8. Mood disorder, mixed depression and anxiety. Cont PTA duloxetine, Abilify.           Electronically signed by: Heidi Xie MD           Sincerely,        Heidi Xie MD

## 2022-01-28 PROCEDURE — U0005 INFEC AGEN DETEC AMPLI PROBE: HCPCS | Performed by: FAMILY MEDICINE

## 2022-01-30 LAB — SARS-COV-2 RNA RESP QL NAA+PROBE: NEGATIVE

## 2022-01-31 ENCOUNTER — TRANSITIONAL CARE UNIT VISIT (OUTPATIENT)
Dept: GERIATRICS | Facility: CLINIC | Age: 52
End: 2022-01-31
Payer: MEDICARE

## 2022-01-31 VITALS
WEIGHT: 147 LBS | RESPIRATION RATE: 18 BRPM | HEIGHT: 67 IN | HEART RATE: 74 BPM | BODY MASS INDEX: 23.07 KG/M2 | OXYGEN SATURATION: 98 % | DIASTOLIC BLOOD PRESSURE: 75 MMHG | SYSTOLIC BLOOD PRESSURE: 124 MMHG | TEMPERATURE: 98.5 F

## 2022-01-31 DIAGNOSIS — L97.413 DIABETIC ULCER OF RIGHT MIDFOOT ASSOCIATED WITH TYPE 2 DIABETES MELLITUS, WITH NECROSIS OF MUSCLE (H): Primary | ICD-10-CM

## 2022-01-31 DIAGNOSIS — I25.10 CORONARY ARTERY DISEASE INVOLVING NATIVE CORONARY ARTERY OF NATIVE HEART WITHOUT ANGINA PECTORIS: ICD-10-CM

## 2022-01-31 DIAGNOSIS — I48.91 ATRIAL FIBRILLATION, UNSPECIFIED TYPE (H): ICD-10-CM

## 2022-01-31 DIAGNOSIS — E11.621 DIABETIC ULCER OF RIGHT MIDFOOT ASSOCIATED WITH TYPE 2 DIABETES MELLITUS, WITH NECROSIS OF MUSCLE (H): Primary | ICD-10-CM

## 2022-01-31 DIAGNOSIS — I10 ESSENTIAL HYPERTENSION: ICD-10-CM

## 2022-01-31 PROCEDURE — 99309 SBSQ NF CARE MODERATE MDM 30: CPT | Performed by: FAMILY MEDICINE

## 2022-01-31 ASSESSMENT — MIFFLIN-ST. JEOR: SCORE: 1470.42

## 2022-01-31 NOTE — LETTER
1/31/2022        RE: Cm Lundberg  [address]  [place] [address]          [facility] SERVICES    [place] Medical Record Number:  [id number]  Place of Service where encounter took place: [facility] ([facility]) [[id number]]   CODE STATUS:   CPR/Full code     Chief Complaint/Reason for Visit:  Chief Complaint   Patient presents with     RECHECK     TCU 1/31/2022. Right foot ulceration. S/p Incision and drainage with application of theraskin 1/27/2022.       TCU HPI:    Cm Lundberg is a 51 year old male with hx of CAD, s/p stents and CABG, HTN, DM, prior CVA, afib/flutter on xarelto, admitted to the hospital on 12/17/2021 for elective procedure for right foot ulceration. He underwent incision and drainage of the right foot, excisional debridement of the ulceration of the right foot into the level of the muscle, under MAC with popliteal block anesthesia on 12/17/2021. Wound vac was subsequently placed. He had no hospital complications. Seen by ID with recommendation for 5 days Augmentin at hospital discharge based on culture results. Chronic anticoagulation was restarted. He was felt to need TCU and discharged to TCU on 12/22/0221 for PT, OT, nursing cares, medical management and monitoring.       Today:  He had incision and drainage, right foot with application of theraskin on 1/27/2022. Continues with wound vac, will follow up with Dr. Kim on 2/4/2022. Pain is controlled. He is NWB. Appetite is good. Tends towards constipation, no concerns today. No abdominal pain. No fever or urinary sx. He denies fever, cough, congestion, fever. He lives at [facility] and unable to return home until no longer requiring wound vac.       PAST MEDICAL HISTORY:  Past Medical History:   Diagnosis Date     Anxiety      Arthritis      Cerebral infarction (H)      Congestive heart failure (H)      Coronary artery disease     CABG with multiple stents     Depression       Diabetes (H)      Equinovarus deformity, acquired, right      GERD (gastroesophageal reflux disease)      Heart attack (H)      Hyperlipidemia      Hypertension      Migraine      Migraines      Myalgia      PONV (postoperative nausea and vomiting)     mild nausea     Stented coronary artery        MEDICATIONS:  Current Outpatient Medications   Medication Sig Dispense Refill     acetaminophen (TYLENOL) 325 MG tablet Take 650 mg by mouth every 4 hours as needed for mild pain        ARIPiprazole (ABILIFY) 2 MG tablet 2 mg daily        atorvastatin (LIPITOR) 80 MG tablet Take 80 mg by mouth daily        cholestyramine (QUESTRAN) 4 g packet Take 1 packet by mouth every other day        DULoxetine (CYMBALTA) 60 MG capsule Take 120 mg by mouth daily takle 120mg       erenumab-aooe (AIMOVIG) 140 MG/ML injection Inject 140 mg Subcutaneous every 30 days        fenofibrate micronized (LOFIBRA) 200 MG capsule Take 200 mg by mouth every morning (before breakfast)        ferrous sulfate (FEROSUL) 325 (65 Fe) MG tablet Take 325 mg by mouth daily (with breakfast)        gabapentin (NEURONTIN) 100 MG capsule Take 100 mg by mouth 2 times daily (before meals)        gabapentin (NEURONTIN) 300 MG capsule Take 600 mg by mouth At Bedtime       loperamide (IMODIUM) 2 MG capsule Take 4 mg by mouth as needed for diarrhea        LORazepam (ATIVAN) 0.5 MG tablet Take 1 tablet (0.5 mg) by mouth daily before breakfast 10 tablet 0     losartan (COZAAR) 25 MG tablet Take 25 mg by mouth At Bedtime        magnesium oxide 400 MG CAPS Take 400 mg by mouth 4 times daily       Menthol, Topical Analgesic, (BIOFREEZE ROLL-ON) 4 % GEL Apply 1 Application topically 2 times daily Apply to lower back for pain       metFORMIN (GLUCOPHAGE) 1000 MG tablet Take 0.5 tablets (500 mg) by mouth 2 times daily (with meals) (Patient taking differently: Take 1,000 mg by mouth daily 1000 mg in the AM and 500 Mg in the PM)  0     metoprolol succinate ER (TOPROL-XL) 25 MG  "24 hr tablet Take 75 mg by mouth daily        mirtazapine (REMERON) 7.5 MG tablet Take 7.5 mg by mouth At Bedtime        Neomycin-Bacitracin-Polymyxin (TRIPLE ANTIBIOTIC) 3.5-400-5000 OINT ointment Externally apply 1 applicator topically 3 times daily as needed       Nitroglycerin (NITROSTAT SL) Place 0.4 mg under the tongue every 5 minutes as needed for chest pain        omeprazole (PRILOSEC) 20 MG DR capsule Take 20 mg by mouth daily        oxyCODONE (ROXICODONE) 5 MG tablet TAKE 1 TAB BY MOUTH EVERY 6HOURS AS NEEDED FOR PAIN 28 tablet 0     polyethylene glycol (MIRALAX) 17 GM/Dose powder Take 1 packet by mouth daily as needed for constipation        Rivaroxaban (XARELTO PO) Take 20 mg by mouth daily        senna-docusate (SENOKOT-S/PERICOLACE) 8.6-50 MG tablet Take 1 tablet by mouth 2 times daily as needed for constipation        traZODone (DESYREL) 50 MG tablet Take 50 mg by mouth At Bedtime        vitamin C (ASCORBIC ACID) 500 MG tablet Take 500 mg by mouth daily          PHYSICAL EXAM:  General: Patient is alert male, no distress.   Vitals: /75   Pulse 74   Temp 98.5  F (36.9  C)   Resp 18   Ht 1.702 m (5' 7\")   Wt 66.7 kg (147 lb)   SpO2 98%   BMI 23.02 kg/m    HEENT: Head is NCAT. Eyes show no injection or icterus. Nares negative. Oropharynx well hydrated.  Neck: No JVD.  Lungs: Non labored respirations.  Abdomen: Soft.  : Deferred.  Extremities: No edema is noted.  Musculoskeletal: Atrophy of extremities.   Skin: Wound vac right foot.   Psych: Mood appears good.      LABS/DIAGNOSTIC DATA:  Component      Latest Ref Rng & Units 12/21/2021   Hemoglobin A1C      <=5.6 % 5.1     Component      Latest Ref Rng & Units 12/18/2021   Sodium      136 - 145 mmol/L 138   Potassium      3.5 - 5.0 mmol/L 4.5   Chloride      98 - 107 mmol/L 106   Carbon Dioxide      22 - 31 mmol/L 21 (L)   Anion Gap      5 - 18 mmol/L 11   Glucose      70 - 125 mg/dL 119   Calcium      8.5 - 10.5 mg/dL 9.3   Urea Nitrogen   "    8 - 22 mg/dL 10   Creatinine      0.70 - 1.30 mg/dL 0.86   GFR Estimate      >60 mL/min/1.73m2 >90         ASSESSMENT/PLAN:  1. Right foot ulceration. S/p incision and drainage 12/17/2022; S/p Incision and drainage, right foot with application of theraskin 1/27/2022. Wound vac in place, follow up appt on 2/4/2022. NWB.  2. HTN. On metoprolol and losartan. BPs satisfactory, continue to monitor in TCU.  3. Afib/flutter. He is anticoagulated with xarelto.  4. CAD. Hx of MIs. Hx of stenting and prior CABG. On statin, xarelto, beta blocker. No chest pain.  5. DM. On metformin. Home glipizide stopped per hospital due to low blood sugars. Last A1c was good at 5.1%.  6. Hx of stroke. Continue statin, xarelto.  7. Anemia. He is on iron.  8. Mood disorder, mixed depression and anxiety. Cont PTA duloxetine, Abilify.           Electronically signed by: Heidi Xie MD           Sincerely,        Heidi Xie MD

## 2022-02-01 ENCOUNTER — LAB REQUISITION (OUTPATIENT)
Dept: LAB | Facility: CLINIC | Age: 52
End: 2022-02-01
Payer: MEDICARE

## 2022-02-01 ENCOUNTER — LAB REQUISITION (OUTPATIENT)
Dept: LAB | Facility: CLINIC | Age: 52
End: 2022-02-01

## 2022-02-01 DIAGNOSIS — U07.1 COVID-19: ICD-10-CM

## 2022-02-01 DIAGNOSIS — Z11.59 ENCOUNTER FOR SCREENING FOR OTHER VIRAL DISEASES: ICD-10-CM

## 2022-02-01 PROCEDURE — U0003 INFECTIOUS AGENT DETECTION BY NUCLEIC ACID (DNA OR RNA); SEVERE ACUTE RESPIRATORY SYNDROME CORONAVIRUS 2 (SARS-COV-2) (CORONAVIRUS DISEASE [COVID-19]), AMPLIFIED PROBE TECHNIQUE, MAKING USE OF HIGH THROUGHPUT TECHNOLOGIES AS DESCRIBED BY CMS-2020-01-R: HCPCS | Performed by: FAMILY MEDICINE

## 2022-02-02 LAB — SARS-COV-2 RNA RESP QL NAA+PROBE: NOT DETECTED

## 2022-02-03 ENCOUNTER — TRANSITIONAL CARE UNIT VISIT (OUTPATIENT)
Dept: GERIATRICS | Facility: CLINIC | Age: 52
End: 2022-02-03
Payer: MEDICARE

## 2022-02-03 ENCOUNTER — LAB REQUISITION (OUTPATIENT)
Dept: LAB | Facility: CLINIC | Age: 52
End: 2022-02-03
Payer: MEDICARE

## 2022-02-03 VITALS
TEMPERATURE: 98.2 F | HEIGHT: 67 IN | BODY MASS INDEX: 23.07 KG/M2 | DIASTOLIC BLOOD PRESSURE: 64 MMHG | HEART RATE: 77 BPM | WEIGHT: 147 LBS | SYSTOLIC BLOOD PRESSURE: 126 MMHG | OXYGEN SATURATION: 99 % | RESPIRATION RATE: 16 BRPM

## 2022-02-03 DIAGNOSIS — I48.91 ATRIAL FIBRILLATION, UNSPECIFIED TYPE (H): ICD-10-CM

## 2022-02-03 DIAGNOSIS — L97.413 DIABETIC ULCER OF RIGHT MIDFOOT ASSOCIATED WITH TYPE 2 DIABETES MELLITUS, WITH NECROSIS OF MUSCLE (H): Primary | ICD-10-CM

## 2022-02-03 DIAGNOSIS — I10 ESSENTIAL HYPERTENSION: ICD-10-CM

## 2022-02-03 DIAGNOSIS — Z11.59 ENCOUNTER FOR SCREENING FOR OTHER VIRAL DISEASES: ICD-10-CM

## 2022-02-03 DIAGNOSIS — E11.621 DIABETIC ULCER OF RIGHT MIDFOOT ASSOCIATED WITH TYPE 2 DIABETES MELLITUS, WITH NECROSIS OF MUSCLE (H): Primary | ICD-10-CM

## 2022-02-03 DIAGNOSIS — I25.10 CORONARY ARTERY DISEASE INVOLVING NATIVE CORONARY ARTERY OF NATIVE HEART WITHOUT ANGINA PECTORIS: ICD-10-CM

## 2022-02-03 PROCEDURE — 99308 SBSQ NF CARE LOW MDM 20: CPT | Performed by: FAMILY MEDICINE

## 2022-02-03 ASSESSMENT — MIFFLIN-ST. JEOR: SCORE: 1470.42

## 2022-02-03 NOTE — LETTER
2/3/2022        RE: Cm Lundberg  [address]  [place] [address]          [facility] SERVICES    [place] Medical Record Number:  [id number]  Place of Service where encounter took place: [facility] ([facility]) [[id number]]   CODE STATUS:   CPR/Full code     Chief Complaint/Reason for Visit:  Chief Complaint   Patient presents with     RECHECK     TCU 2/3/2022.  Right foot ulceration. S/p Incision and drainage with application of theraskin 1/27/2022.       TCU HPI:    Cm Lundberg is a 51 year old male with hx of CAD, s/p stents and CABG, HTN, DM, prior CVA, afib/flutter on xarelto, admitted to the hospital on 12/17/2021 for elective procedure for right foot ulceration. He underwent incision and drainage of the right foot, excisional debridement of the ulceration of the right foot into the level of the muscle, under MAC with popliteal block anesthesia on 12/17/2021. Wound vac was subsequently placed. He had no hospital complications. Seen by ID with recommendation for 5 days Augmentin at hospital discharge based on culture results. Chronic anticoagulation was restarted. He was felt to need TCU and discharged to TCU on 12/22/0221 for PT, OT, nursing cares, medical management and monitoring.       Today:  He had incision and drainage, right foot with application of theraskin on 1/27/2022. Continues with wound vac, will follow up with Dr. Kim tomorrow 2/4/2022. Pain is controlled. He remains NWB, compliant. Appetite is good. No abdominal pain. Tends to be constipated, has bowel meds available, no concerns today. No fever or urinary sx. He denies fever, cough, congestion, fever. He lives at [facility] and unable to return home until no longer requiring wound vac.       PAST MEDICAL HISTORY:  Past Medical History:   Diagnosis Date     Anxiety      Arthritis      Cerebral infarction (H)      Congestive heart failure (H)      Coronary artery disease     CABG  with multiple stents     Depression      Diabetes (H)      Equinovarus deformity, acquired, right      GERD (gastroesophageal reflux disease)      Heart attack (H)      Hyperlipidemia      Hypertension      Migraine      Migraines      Myalgia      PONV (postoperative nausea and vomiting)     mild nausea     Stented coronary artery        MEDICATIONS:  Current Outpatient Medications   Medication Sig Dispense Refill     acetaminophen (TYLENOL) 325 MG tablet Take 650 mg by mouth every 4 hours as needed for mild pain        ARIPiprazole (ABILIFY) 2 MG tablet 2 mg daily        atorvastatin (LIPITOR) 80 MG tablet Take 80 mg by mouth daily        cholestyramine (QUESTRAN) 4 g packet Take 1 packet by mouth every other day        DULoxetine (CYMBALTA) 60 MG capsule Take 120 mg by mouth daily takle 120mg       erenumab-aooe (AIMOVIG) 140 MG/ML injection Inject 140 mg Subcutaneous every 30 days        fenofibrate micronized (LOFIBRA) 200 MG capsule Take 200 mg by mouth every morning (before breakfast)        ferrous sulfate (FEROSUL) 325 (65 Fe) MG tablet Take 325 mg by mouth daily (with breakfast)        gabapentin (NEURONTIN) 100 MG capsule Take 100 mg by mouth 2 times daily (before meals)        gabapentin (NEURONTIN) 300 MG capsule Take 600 mg by mouth At Bedtime       loperamide (IMODIUM) 2 MG capsule Take 4 mg by mouth as needed for diarrhea        LORazepam (ATIVAN) 0.5 MG tablet Take 1 tablet (0.5 mg) by mouth daily before breakfast 10 tablet 0     losartan (COZAAR) 25 MG tablet Take 25 mg by mouth At Bedtime        magnesium oxide 400 MG CAPS Take 400 mg by mouth 4 times daily       Menthol, Topical Analgesic, (BIOFREEZE ROLL-ON) 4 % GEL Apply 1 Application topically 2 times daily Apply to lower back for pain       metFORMIN (GLUCOPHAGE) 1000 MG tablet Take 0.5 tablets (500 mg) by mouth 2 times daily (with meals) (Patient taking differently: Take 1,000 mg by mouth daily 1000 mg in the AM and 500 Mg in the PM)  0      "metoprolol succinate ER (TOPROL-XL) 25 MG 24 hr tablet Take 75 mg by mouth daily        mirtazapine (REMERON) 7.5 MG tablet Take 7.5 mg by mouth At Bedtime        Neomycin-Bacitracin-Polymyxin (TRIPLE ANTIBIOTIC) 3.5-400-5000 OINT ointment Externally apply 1 applicator topically 3 times daily as needed       Nitroglycerin (NITROSTAT SL) Place 0.4 mg under the tongue every 5 minutes as needed for chest pain        omeprazole (PRILOSEC) 20 MG DR capsule Take 20 mg by mouth daily        oxyCODONE (ROXICODONE) 5 MG tablet TAKE 1 TAB BY MOUTH EVERY 6HOURS AS NEEDED FOR PAIN 28 tablet 0     polyethylene glycol (MIRALAX) 17 GM/Dose powder Take 1 packet by mouth daily as needed for constipation        Rivaroxaban (XARELTO PO) Take 20 mg by mouth daily        senna-docusate (SENOKOT-S/PERICOLACE) 8.6-50 MG tablet Take 1 tablet by mouth 2 times daily as needed for constipation        traZODone (DESYREL) 50 MG tablet Take 50 mg by mouth At Bedtime        vitamin C (ASCORBIC ACID) 500 MG tablet Take 500 mg by mouth daily          PHYSICAL EXAM:  General: Patient is alert male, no distress.   Vitals: /64   Pulse 77   Temp 98.2  F (36.8  C)   Resp 16   Ht 1.702 m (5' 7\")   Wt 66.7 kg (147 lb)   SpO2 99%   BMI 23.02 kg/m    HEENT: Head is NCAT. Eyes show no injection or icterus. Nares negative. Oropharynx well hydrated.  Neck: No JVD.  Lungs: Non labored respirations.  Abdomen: Soft.  : Deferred.  Extremities: No edema is noted.  Musculoskeletal: Wound vac right foot.   Psych: Mood appears good.      LABS/DIAGNOSTIC DATA:  Component      Latest Ref Rng & Units 12/21/2021   Hemoglobin A1C      <=5.6 % 5.1     Component      Latest Ref Rng & Units 12/18/2021   Sodium      136 - 145 mmol/L 138   Potassium      3.5 - 5.0 mmol/L 4.5   Chloride      98 - 107 mmol/L 106   Carbon Dioxide      22 - 31 mmol/L 21 (L)   Anion Gap      5 - 18 mmol/L 11   Glucose      70 - 125 mg/dL 119   Calcium      8.5 - 10.5 mg/dL 9.3   Urea " Nitrogen      8 - 22 mg/dL 10   Creatinine      0.70 - 1.30 mg/dL 0.86   GFR Estimate      >60 mL/min/1.73m2 >90         ASSESSMENT/PLAN:  1. Right foot ulceration. S/p incision and drainage 12/17/2022; S/p Incision and drainage, right foot with application of theraskin 1/27/2022. Wound vac in place, follow up appt with podiatry tomorrow 2/4/2022. Continue NWB.  2. HTN. On metoprolol and losartan. BPs satisfactory, continue to monitor in TCU.  3. Afib/flutter. Anticoagulated with xarelto.  4. CAD. Hx of MIs. Hx of stenting and prior CABG. On statin, xarelto, beta blocker. No chest pain.  5. DM. On metformin. Home glipizide stopped per hospital due to low blood sugars. Last A1c was good at 5.1%. Follow accuchecks in TCU.   6. Hx of stroke. Continue statin, xarelto.  7. Anemia. He is on iron.  8. Mood disorder, mixed depression and anxiety. Cont PTA duloxetine, Abilify.           Electronically signed by: Heidi Xie MD           Sincerely,        Heidi Xie MD

## 2022-02-04 ENCOUNTER — OFFICE VISIT (OUTPATIENT)
Dept: VASCULAR SURGERY | Facility: CLINIC | Age: 52
End: 2022-02-04
Attending: PODIATRIST
Payer: MEDICARE

## 2022-02-04 VITALS — HEIGHT: 67 IN | WEIGHT: 147 LBS | HEART RATE: 83 BPM | OXYGEN SATURATION: 96 % | BODY MASS INDEX: 23.07 KG/M2

## 2022-02-04 DIAGNOSIS — L97.413 DIABETIC ULCER OF RIGHT MIDFOOT ASSOCIATED WITH TYPE 2 DIABETES MELLITUS, WITH NECROSIS OF MUSCLE (H): Primary | ICD-10-CM

## 2022-02-04 DIAGNOSIS — E11.621 DIABETIC ULCER OF RIGHT MIDFOOT ASSOCIATED WITH TYPE 2 DIABETES MELLITUS, WITH NECROSIS OF MUSCLE (H): Primary | ICD-10-CM

## 2022-02-04 PROCEDURE — 99212 OFFICE O/P EST SF 10 MIN: CPT | Performed by: PODIATRIST

## 2022-02-04 PROCEDURE — G0463 HOSPITAL OUTPT CLINIC VISIT: HCPCS

## 2022-02-04 PROCEDURE — U0003 INFECTIOUS AGENT DETECTION BY NUCLEIC ACID (DNA OR RNA); SEVERE ACUTE RESPIRATORY SYNDROME CORONAVIRUS 2 (SARS-COV-2) (CORONAVIRUS DISEASE [COVID-19]), AMPLIFIED PROBE TECHNIQUE, MAKING USE OF HIGH THROUGHPUT TECHNOLOGIES AS DESCRIBED BY CMS-2020-01-R: HCPCS | Performed by: FAMILY MEDICINE

## 2022-02-04 ASSESSMENT — PAIN SCALES - GENERAL: PAINLEVEL: MODERATE PAIN (5)

## 2022-02-04 ASSESSMENT — MIFFLIN-ST. JEOR: SCORE: 1470.42

## 2022-02-04 NOTE — PROGRESS NOTES
Podiatry Progress Note        ASSESSMENT:   Diabetic Ulceration right foot   Equinovarus right foot       TREATMENT:  -Ulceration on the right foot is progressing well. Theraskin graft is intact.     -Recommend he continue with the wound vac at 100 mmHg, changed 2x per week with application of adaptic with each wound vac change. Continue non-weight bearing on the right foot.     -He will follow-up with me in 2 weeks.     Alex Kim DPM  [facility] Podiatry/Foot & Ankle Surgery      HPI: Cm MAO Toño was seen again today for a right foot ulceration s/p application of Theraskin. He has remained non-weight bearing with no new concerns.     Past Medical History:   Diagnosis Date     Anxiety      Arthritis      Cerebral infarction (H)      Congestive heart failure (H)      Coronary artery disease     CABG with multiple stents     Depression      Diabetes (H)      Equinovarus deformity, acquired, right      GERD (gastroesophageal reflux disease)      Heart attack (H)      Hyperlipidemia      Hypertension      Migraine      Migraines      Myalgia      PONV (postoperative nausea and vomiting)     mild nausea     Stented coronary artery        Allergies   Allergen Reactions     Aspirin Other (See Comments)     Reacts with migraine medicine     Isosorbide Headache     Codeine Rash     Morphine Rash         Current Outpatient Medications:      acetaminophen (TYLENOL) 325 MG tablet, Take 650 mg by mouth every 4 hours as needed for mild pain , Disp: , Rfl:      ARIPiprazole (ABILIFY) 2 MG tablet, 2 mg daily , Disp: , Rfl:      atorvastatin (LIPITOR) 80 MG tablet, Take 80 mg by mouth daily , Disp: , Rfl:      cholestyramine (QUESTRAN) 4 g packet, Take 1 packet by mouth every other day , Disp: , Rfl:      DULoxetine (CYMBALTA) 60 MG capsule, Take 120 mg by mouth daily takle 120mg, Disp: , Rfl:      erenumab-aooe (AIMOVIG) 140 MG/ML injection, Inject 140 mg Subcutaneous every 30 days , Disp: , Rfl:       fenofibrate micronized (LOFIBRA) 200 MG capsule, Take 200 mg by mouth every morning (before breakfast) , Disp: , Rfl:      ferrous sulfate (FEROSUL) 325 (65 Fe) MG tablet, Take 325 mg by mouth daily (with breakfast) , Disp: , Rfl:      gabapentin (NEURONTIN) 100 MG capsule, Take 100 mg by mouth 2 times daily (before meals) , Disp: , Rfl:      gabapentin (NEURONTIN) 300 MG capsule, Take 600 mg by mouth At Bedtime, Disp: , Rfl:      loperamide (IMODIUM) 2 MG capsule, Take 4 mg by mouth as needed for diarrhea , Disp: , Rfl:      LORazepam (ATIVAN) 0.5 MG tablet, Take 1 tablet (0.5 mg) by mouth daily before breakfast, Disp: 10 tablet, Rfl: 0     losartan (COZAAR) 25 MG tablet, Take 25 mg by mouth At Bedtime , Disp: , Rfl:      magnesium oxide 400 MG CAPS, Take 400 mg by mouth 4 times daily, Disp: , Rfl:      Menthol, Topical Analgesic, (BIOFREEZE ROLL-ON) 4 % GEL, Apply 1 Application topically 2 times daily Apply to lower back for pain, Disp: , Rfl:      metFORMIN (GLUCOPHAGE) 1000 MG tablet, Take 0.5 tablets (500 mg) by mouth 2 times daily (with meals) (Patient taking differently: Take 1,000 mg by mouth daily 1000 mg in the AM and 500 Mg in the PM), Disp: , Rfl: 0     metoprolol succinate ER (TOPROL-XL) 25 MG 24 hr tablet, Take 75 mg by mouth daily , Disp: , Rfl:      mirtazapine (REMERON) 7.5 MG tablet, Take 7.5 mg by mouth At Bedtime , Disp: , Rfl:      Neomycin-Bacitracin-Polymyxin (TRIPLE ANTIBIOTIC) 3.5-400-5000 OINT ointment, Externally apply 1 applicator topically 3 times daily as needed, Disp: , Rfl:      Nitroglycerin (NITROSTAT SL), Place 0.4 mg under the tongue every 5 minutes as needed for chest pain , Disp: , Rfl:      omeprazole (PRILOSEC) 20 MG DR capsule, Take 20 mg by mouth daily , Disp: , Rfl:      oxyCODONE (ROXICODONE) 5 MG tablet, TAKE 1 TAB BY MOUTH EVERY 6HOURS AS NEEDED FOR PAIN, Disp: 28 tablet, Rfl: 0     polyethylene glycol (MIRALAX) 17 GM/Dose powder, Take 1 packet by mouth daily as needed  "for constipation , Disp: , Rfl:      Rivaroxaban (XARELTO PO), Take 20 mg by mouth daily , Disp: , Rfl:      senna-docusate (SENOKOT-S/PERICOLACE) 8.6-50 MG tablet, Take 1 tablet by mouth 2 times daily as needed for constipation , Disp: , Rfl:      traZODone (DESYREL) 50 MG tablet, Take 50 mg by mouth At Bedtime , Disp: , Rfl:      vitamin C (ASCORBIC ACID) 500 MG tablet, Take 500 mg by mouth daily , Disp: , Rfl:     Review of Systems - Negative       OBJECTIVE:  Appearance: alert, well appearing, and in no distress.    Pulse 83   Ht 5' 7\" (1.702 m)   Wt 147 lb (66.7 kg)   SpO2 96%   BMI 23.02 kg/m      @LDAVASC(10,16,17)@           Ulceration on the lateral right foot has intact Theraskin graft. No erythema right foot. Neurovascular status unchanged right foot.     "

## 2022-02-04 NOTE — PATIENT INSTRUCTIONS
Important lnstructions      1. WEIGHT BEARING STATUS: You are to remain NON WEIGHT BEARING on your right foot. NON WEIGHT BEARING MEANS NO PRESSURE ON YOUR FOOT OR HEEL AT ANY TIME FOR ANY REASON!    2. OFFLOADING DEVICE: Must use a A WHEELCHAIR at all times! (do not use affected foot to push wheelchair)    3. STABILIZATION DEVICE: Use a EGG CRATE BOOT . You will need to WEAR THIS AT ALL TIMES EVEN WHILE IN BED.     4. PROTEIN SUPPLEMENTS: Drink protein shakes 2x per day, morning and night (Ensure, Boost, Glucerna)     This is in addition to your normal diet    If you are on a renal diet, make sure to get a protein shake that is best suited for this diet. Please ask if you would like a referral to see a  Registered Dietician.     5. ELEVATE: Elevating your leg means laying with your head on a pillow and your foot ABOVE YOUR WAIST.     6. DO NOT MOVE YOUR FOOT.    There is a risk of worsening the wound or incision. To give yourself a higher chance of healing, please DO NOT swing foot back and forth and wiggle foot/toes especially when inside a stabilization device.        Dressing Change lnstructions                 - Wound Vac Instructions     1. 2x weekly and as needed cleanse the wound with NS     2. Pat dry     3. Apply Cavilon no sting barrier wipe to the skin surrounding the wound to protect from drainage/maceration     4. Apply drape to natalia wound. Cut strip of drape and apply to skin if bridging is needed; plan this area in advance; should not be over bony prominence      5. Cut the foam to fit the size of the wound. APPLY ADAPTIC TOUCH (or other similar nonadherent) OVER WOUND/GRAFT SITE. Then     6. Apply foam to wound     7. Cut narrow strip of foam if bridging if needed     8. Cover foam with drape to obtain air tight seal     9. Cut opening the size of a quarter for where the suction pad will be applied     10. Apply Suction pad     11. 100mmHG suction continuous      [employer] [facility] can be reached at  [phone], 24 hours a day 7 days a week      - If you do not have a back up plan in place:      If the negative pressure wound therapy malfunctions or unable to maintain seal: dressing must be removed and reapplied within 2 hours of the incident. If unable to reapply negative pressure wound dressing, place Normal Saline moistened gauze in wound bed and cover with appropriate dressing to keep wound bed moist.  Change wet-to-dry dressing two times a day until healthcare staff can re-implement negative pressure therapy. Change canister at least weekly.  [facility] Contact Center can be reached at [phone], 24 hours a day 7 days a week     - Information on Vacuum-Assisted Closure of a Wound  Vacuum-assisted closure (VAC) of a wound is a type of treatment to help wounds heal. It's also known as negative pressure wound therapy. During the treatment, a device lowers air pressure on the wound. This can help the wound heal more quickly.  - Understanding the wound VAC system  A wound VAC system has several parts. A foam or gauze dressing is put directly on the wound. The dressing is changed every 24 to 72 hours. An adhesive film covers and seals the dressing and wound. A drainage tube leads from under the adhesive film and connects to a portable vacuum pump. This pump removes air pressure over the wound. It may do this constantly. Or it may do it in cycles. During the treatment, you'll need to carry the portable pump everywhere you go.  - Why wound VAC is used  You might need this therapy for a recent traumatic wound. Or you may need it for a chronic wound. This is a wound that does not heal the way it should over time. This can happen with wounds in people who have diabetes. You may need a wound VAC if you've had a recent skin graft. And you may need a wound VAC for a large wound. Large wounds can take a longer time to heal.  A wound vacuum system may help your wound heal more quickly by:  Draining extra fluid from the  wound  Reducing swelling  Reducing bacteria in the wound  Keeping your wound moist and warm  Helping draw together wound edges  Increasing blood flow to your wound  Decreasing inflammation  Wound VAC offers some other advantages over other types of wound care. It may decrease your overall discomfort. The dressings usually need to be changed less often. And they may be easier to keep in position.  - Risks of wound VAC  Wound VAC has some rare risks, such as:  Bleeding (which may be severe)  Wound infection  An abnormal connection between the intestinal tract and the skin (enteric fistula)  Proper training in dressing changes can help reduce the risk for these complications. Also, your doctor will carefully evaluate you to make sure you are a good candidate for the therapy. Certain problems can increase your risk for complications. These include:  Exposed organs or blood vessels  High risk of bleeding from another medical problem  Wound infection  Nearby bone infection  Dead wound tissue  Cancer tissue  Fragile skin, such as from aging or longtime use of topical steroids  Allergy to adhesive  Very poor blood flow to your wound  Wounds close to joints that may reopen because of movement  Your doctor will discuss the risks that apply to you. Make sure to talk with him or her about all of your questions and concerns.  - Getting ready for wound VAC  You likely won't need to do much to get ready for wound VAC. In some cases, you may need to wait a while before having this therapy. For example, your doctor may first need to treat an infection in your wound. Dead or damaged tissue may also need to be removed from your wound.  You or a caregiver may need training on how to use the wound VAC device. This is done if you will be able to have your wound vacuum therapy at home. In other cases, you may need to have your wound vacuum therapy in a health care facility.  - On the day of your procedure  A health care provider will  cover your wound with foam or gauze wound dressing. An adhesive film will be put over the dressing and wound. This seals the wound. The foam connects to a drainage tube, which leads to a vacuum pump. This pump is portable. When the pump is turned on, it draws fluid through the foam and out the drainage tubing. The pump may run constantly, or it may cycle off and on. Your exact setup will depend on the specific type of wound vacuum system that you use.  - Managing your wound  You may need the dressing changed about once a day. You may need it changed more or less often, depending on your wound. You or your caregiver may be trained to do this at home. Or it may be done by a visiting health care provider. Your doctor may prescribe a pain medicine. This is to prevent or reduce pain during the dressing change.  You will likely need to use the wound VAC system for several weeks or months. During this time, you'll carry the portable pump everywhere you go.  - Nutrition for wound healing  During this time, make sure you follow a healthy diet. This is needed so the wound can heal and to prevent infection. Your doctor can tell you more about what to include in your diet during this time.  follow up with your doctor if you have a medical condition that led to your wound, such as diabetes. He or she can help you prevent future wounds.  - Follow-up care  Your doctor will carefully keep track of your healing. Make sure to keep all follow-up appointments.  - When to call your health care provider  Call your health care provider right away if you have any of these:  Fever of 100.4 F (38.0 C) or higher  Increased redness, swelling, or warmth around wound  Increased pain  Bright red blood or blood clots in tubing or the collection chamber of the vacuum      It IS NOT ok to get your wound wet in the bath or shower    SEEK MEDICAL CARE IF:    You have an increase in swelling, pain, or redness around the wound.    You have an increase  in the amount of pus coming from the wound.    There is a bad smell coming from the wound.    The wound appears to be worsening/enlarging    You have a fever greater than 101.5 F      It is ok to continue current wound care treatment/products for the next 2-3 days until new wound care supplies are ordered and arrive. If longer than this please contact our office at [phone].

## 2022-02-05 LAB — SARS-COV-2 RNA RESP QL NAA+PROBE: NEGATIVE

## 2022-02-07 ENCOUNTER — LAB REQUISITION (OUTPATIENT)
Dept: LAB | Facility: CLINIC | Age: 52
End: 2022-02-07
Payer: MEDICARE

## 2022-02-07 DIAGNOSIS — Z11.59 ENCOUNTER FOR SCREENING FOR OTHER VIRAL DISEASES: ICD-10-CM

## 2022-02-07 NOTE — PROGRESS NOTES
[facility] Medical Record Number:  [id number]  Place of Service where encounter took place: [facility] ([facility]) [[id number]]   CODE STATUS:   CPR/Full code     Chief Complaint/Reason for Visit:  Chief Complaint   Patient presents with     RECHECK     TCU 1/27/2022. S/p Incision and drainage, right foot with application of theraskin.       TCU HPI:    Cm Lundberg is a 51 year old male with hx of CAD, s/p stents and CABG, HTN, DM, prior CVA, afib/flutter on xarelto, admitted to the hospital on 12/17/2021 for elective procedure for right foot ulceration. He underwent incision and drainage of the right foot, excisional debridement of the ulceration of the right foot into the level of the muscle, under MAC with popliteal block anesthesia on 12/17/2021. Wound vac was subsequently placed. He had no hospital complications. Seen by ID with recommendation for 5 days Augmentin at hospital discharge based on culture results. Chronic anticoagulation was restarted. He was felt to need TCU and discharged to TCU on 12/22/0221 for PT, OT, nursing cares, medical management and monitoring.       Today:  He was out this morning for surgical procedure per Dr. Kim-S/p Incision and drainage, right foot with application of theraskin. Had wound vac applied post procedure, will follow up with Dr. Kim on 2/4/2022. No complications. Pain is controlled. He is NWB, compliant. Appetite is good. Tends towards constipation, no concerns today. No abdominal pain. No fever or urinary sx. He denies fever, cough, congestion, fever.       PAST MEDICAL HISTORY:  Past Medical History:   Diagnosis Date     Anxiety      Arthritis      Cerebral infarction (H)      Congestive heart failure (H)      Coronary artery disease     CABG with multiple stents     Depression      Diabetes (H)      Equinovarus deformity, acquired, right      GERD (gastroesophageal reflux disease)      Heart attack (H)       Hyperlipidemia      Hypertension      Migraine      Migraines      Myalgia      PONV (postoperative nausea and vomiting)     mild nausea     Stented coronary artery        MEDICATIONS:  Current Outpatient Medications   Medication Sig Dispense Refill     acetaminophen (TYLENOL) 325 MG tablet Take 650 mg by mouth every 4 hours as needed for mild pain        ARIPiprazole (ABILIFY) 2 MG tablet 2 mg daily        atorvastatin (LIPITOR) 80 MG tablet Take 80 mg by mouth daily        cholestyramine (QUESTRAN) 4 g packet Take 1 packet by mouth every other day        DULoxetine (CYMBALTA) 60 MG capsule Take 120 mg by mouth daily takle 120mg       erenumab-aooe (AIMOVIG) 140 MG/ML injection Inject 140 mg Subcutaneous every 30 days        fenofibrate micronized (LOFIBRA) 200 MG capsule Take 200 mg by mouth every morning (before breakfast)        ferrous sulfate (FEROSUL) 325 (65 Fe) MG tablet Take 325 mg by mouth daily (with breakfast)        gabapentin (NEURONTIN) 100 MG capsule Take 100 mg by mouth 2 times daily (before meals)        gabapentin (NEURONTIN) 300 MG capsule Take 600 mg by mouth At Bedtime       loperamide (IMODIUM) 2 MG capsule Take 4 mg by mouth as needed for diarrhea        LORazepam (ATIVAN) 0.5 MG tablet Take 1 tablet (0.5 mg) by mouth daily before breakfast 10 tablet 0     losartan (COZAAR) 25 MG tablet Take 25 mg by mouth At Bedtime        magnesium oxide 400 MG CAPS Take 400 mg by mouth 4 times daily       Menthol, Topical Analgesic, (BIOFREEZE ROLL-ON) 4 % GEL Apply 1 Application topically 2 times daily Apply to lower back for pain       metFORMIN (GLUCOPHAGE) 1000 MG tablet Take 0.5 tablets (500 mg) by mouth 2 times daily (with meals) (Patient taking differently: Take 1,000 mg by mouth daily 1000 mg in the AM and 500 Mg in the PM)  0     metoprolol succinate ER (TOPROL-XL) 25 MG 24 hr tablet Take 75 mg by mouth daily        mirtazapine (REMERON) 7.5 MG tablet Take 7.5 mg by mouth At Bedtime         "Neomycin-Bacitracin-Polymyxin (TRIPLE ANTIBIOTIC) 3.5-400-5000 OINT ointment Externally apply 1 applicator topically 3 times daily as needed       Nitroglycerin (NITROSTAT SL) Place 0.4 mg under the tongue every 5 minutes as needed for chest pain        omeprazole (PRILOSEC) 20 MG DR capsule Take 20 mg by mouth daily        oxyCODONE (ROXICODONE) 5 MG tablet TAKE 1 TAB BY MOUTH EVERY 6HOURS AS NEEDED FOR PAIN 28 tablet 0     polyethylene glycol (MIRALAX) 17 GM/Dose powder Take 1 packet by mouth daily as needed for constipation        Rivaroxaban (XARELTO PO) Take 20 mg by mouth daily        senna-docusate (SENOKOT-S/PERICOLACE) 8.6-50 MG tablet Take 1 tablet by mouth 2 times daily as needed for constipation        traZODone (DESYREL) 50 MG tablet Take 50 mg by mouth At Bedtime        vitamin C (ASCORBIC ACID) 500 MG tablet Take 500 mg by mouth daily          PHYSICAL EXAM:  General: Patient is alert male, no distress.   Vitals: BP (!) 150/78   Pulse 67   Temp 98.2  F (36.8  C)   Resp 18   Ht 1.702 m (5' 7\")   Wt 66.7 kg (147 lb)   SpO2 99%   BMI 23.02 kg/m    HEENT: Head is NCAT. Eyes show no injection or icterus. Nares negative. Oropharynx well hydrated.  Neck: Supple. No tenderness or adenopathy. No JVD.  Lungs: Non labored respirations.  Abdomen: Soft.  : Deferred.  Extremities: No edema is noted.  Musculoskeletal: Atrophy of extremities.   Skin: Wound vac right foot.   Psych: Mood appears good.      LABS/DIAGNOSTIC DATA:  Component      Latest Ref Rng & Units 12/21/2021   Hemoglobin A1C      <=5.6 % 5.1     Component      Latest Ref Rng & Units 12/18/2021   Sodium      136 - 145 mmol/L 138   Potassium      3.5 - 5.0 mmol/L 4.5   Chloride      98 - 107 mmol/L 106   Carbon Dioxide      22 - 31 mmol/L 21 (L)   Anion Gap      5 - 18 mmol/L 11   Glucose      70 - 125 mg/dL 119   Calcium      8.5 - 10.5 mg/dL 9.3   Urea Nitrogen      8 - 22 mg/dL 10   Creatinine      0.70 - 1.30 mg/dL 0.86   GFR Estimate      " >60 mL/min/1.73m2 >90         ASSESSMENT/PLAN:  1. Right foot ulceration. S/p incision and drainage 12/17/2022. Completed abx. Now s/p Incision and drainage, right foot with application of theraskin today. Wound vac again in place, follow up appt on 2/4/2022.   2. HTN. On metoprolol and losartan. BPs satisfactory, continue to monitor in TCU.  3. Afib/flutter. He is anticoagulated with xarelto.  4. CAD. Hx of many MIs per his report. Hx of stenting and prior CABG. On statin, xarelto, beta blocker. ED visit 1/6/2021 for chest pain, ACS ruled out. No chest pain since.  5. DM. On metformin. Home glipizide stopped per hospital due to low blood sugars. Last A1c was good at 5.1%.  6. Hx of stroke. Continue statin, xarelto.  7. Anemia. He is on iron.  8. Mood disorder, mixed depression and anxiety. Cont PTA duloxetine, Abilify.           Electronically signed by: Heidi Xie MD

## 2022-02-07 NOTE — PROGRESS NOTES
[facility] Medical Record Number:  [id number]  Place of Service where encounter took place: [facility] ([facility]) [[id number]]   CODE STATUS:   CPR/Full code     Chief Complaint/Reason for Visit:  Chief Complaint   Patient presents with     RECHECK     TCU 1/31/2022. Right foot ulceration. S/p Incision and drainage with application of theraskin 1/27/2022.       TCU HPI:    Cm Lundberg is a 51 year old male with hx of CAD, s/p stents and CABG, HTN, DM, prior CVA, afib/flutter on xarelto, admitted to the hospital on 12/17/2021 for elective procedure for right foot ulceration. He underwent incision and drainage of the right foot, excisional debridement of the ulceration of the right foot into the level of the muscle, under MAC with popliteal block anesthesia on 12/17/2021. Wound vac was subsequently placed. He had no hospital complications. Seen by ID with recommendation for 5 days Augmentin at hospital discharge based on culture results. Chronic anticoagulation was restarted. He was felt to need TCU and discharged to TCU on 12/22/0221 for PT, OT, nursing cares, medical management and monitoring.       Today:  He had incision and drainage, right foot with application of theraskin on 1/27/2022. Continues with wound vac, will follow up with Dr. Kim on 2/4/2022. Pain is controlled. He is NWB. Appetite is good. Tends towards constipation, no concerns today. No abdominal pain. No fever or urinary sx. He denies fever, cough, congestion, fever. He lives at [facility] and unable to return home until no longer requiring wound vac.       PAST MEDICAL HISTORY:  Past Medical History:   Diagnosis Date     Anxiety      Arthritis      Cerebral infarction (H)      Congestive heart failure (H)      Coronary artery disease     CABG with multiple stents     Depression      Diabetes (H)      Equinovarus deformity, acquired, right      GERD (gastroesophageal reflux disease)       Heart attack (H)      Hyperlipidemia      Hypertension      Migraine      Migraines      Myalgia      PONV (postoperative nausea and vomiting)     mild nausea     Stented coronary artery        MEDICATIONS:  Current Outpatient Medications   Medication Sig Dispense Refill     acetaminophen (TYLENOL) 325 MG tablet Take 650 mg by mouth every 4 hours as needed for mild pain        ARIPiprazole (ABILIFY) 2 MG tablet 2 mg daily        atorvastatin (LIPITOR) 80 MG tablet Take 80 mg by mouth daily        cholestyramine (QUESTRAN) 4 g packet Take 1 packet by mouth every other day        DULoxetine (CYMBALTA) 60 MG capsule Take 120 mg by mouth daily takle 120mg       erenumab-aooe (AIMOVIG) 140 MG/ML injection Inject 140 mg Subcutaneous every 30 days        fenofibrate micronized (LOFIBRA) 200 MG capsule Take 200 mg by mouth every morning (before breakfast)        ferrous sulfate (FEROSUL) 325 (65 Fe) MG tablet Take 325 mg by mouth daily (with breakfast)        gabapentin (NEURONTIN) 100 MG capsule Take 100 mg by mouth 2 times daily (before meals)        gabapentin (NEURONTIN) 300 MG capsule Take 600 mg by mouth At Bedtime       loperamide (IMODIUM) 2 MG capsule Take 4 mg by mouth as needed for diarrhea        LORazepam (ATIVAN) 0.5 MG tablet Take 1 tablet (0.5 mg) by mouth daily before breakfast 10 tablet 0     losartan (COZAAR) 25 MG tablet Take 25 mg by mouth At Bedtime        magnesium oxide 400 MG CAPS Take 400 mg by mouth 4 times daily       Menthol, Topical Analgesic, (BIOFREEZE ROLL-ON) 4 % GEL Apply 1 Application topically 2 times daily Apply to lower back for pain       metFORMIN (GLUCOPHAGE) 1000 MG tablet Take 0.5 tablets (500 mg) by mouth 2 times daily (with meals) (Patient taking differently: Take 1,000 mg by mouth daily 1000 mg in the AM and 500 Mg in the PM)  0     metoprolol succinate ER (TOPROL-XL) 25 MG 24 hr tablet Take 75 mg by mouth daily        mirtazapine (REMERON) 7.5 MG tablet Take 7.5 mg by mouth At  "Bedtime        Neomycin-Bacitracin-Polymyxin (TRIPLE ANTIBIOTIC) 3.5-400-5000 OINT ointment Externally apply 1 applicator topically 3 times daily as needed       Nitroglycerin (NITROSTAT SL) Place 0.4 mg under the tongue every 5 minutes as needed for chest pain        omeprazole (PRILOSEC) 20 MG DR capsule Take 20 mg by mouth daily        oxyCODONE (ROXICODONE) 5 MG tablet TAKE 1 TAB BY MOUTH EVERY 6HOURS AS NEEDED FOR PAIN 28 tablet 0     polyethylene glycol (MIRALAX) 17 GM/Dose powder Take 1 packet by mouth daily as needed for constipation        Rivaroxaban (XARELTO PO) Take 20 mg by mouth daily        senna-docusate (SENOKOT-S/PERICOLACE) 8.6-50 MG tablet Take 1 tablet by mouth 2 times daily as needed for constipation        traZODone (DESYREL) 50 MG tablet Take 50 mg by mouth At Bedtime        vitamin C (ASCORBIC ACID) 500 MG tablet Take 500 mg by mouth daily          PHYSICAL EXAM:  General: Patient is alert male, no distress.   Vitals: /75   Pulse 74   Temp 98.5  F (36.9  C)   Resp 18   Ht 1.702 m (5' 7\")   Wt 66.7 kg (147 lb)   SpO2 98%   BMI 23.02 kg/m    HEENT: Head is NCAT. Eyes show no injection or icterus. Nares negative. Oropharynx well hydrated.  Neck: No JVD.  Lungs: Non labored respirations.  Abdomen: Soft.  : Deferred.  Extremities: No edema is noted.  Musculoskeletal: Atrophy of extremities.   Skin: Wound vac right foot.   Psych: Mood appears good.      LABS/DIAGNOSTIC DATA:  Component      Latest Ref Rng & Units 12/21/2021   Hemoglobin A1C      <=5.6 % 5.1     Component      Latest Ref Rng & Units 12/18/2021   Sodium      136 - 145 mmol/L 138   Potassium      3.5 - 5.0 mmol/L 4.5   Chloride      98 - 107 mmol/L 106   Carbon Dioxide      22 - 31 mmol/L 21 (L)   Anion Gap      5 - 18 mmol/L 11   Glucose      70 - 125 mg/dL 119   Calcium      8.5 - 10.5 mg/dL 9.3   Urea Nitrogen      8 - 22 mg/dL 10   Creatinine      0.70 - 1.30 mg/dL 0.86   GFR Estimate      >60 mL/min/1.73m2 >90 "         ASSESSMENT/PLAN:  1. Right foot ulceration. S/p incision and drainage 12/17/2022; S/p Incision and drainage, right foot with application of theraskin 1/27/2022. Wound vac in place, follow up appt on 2/4/2022. NWB.  2. HTN. On metoprolol and losartan. BPs satisfactory, continue to monitor in TCU.  3. Afib/flutter. He is anticoagulated with xarelto.  4. CAD. Hx of MIs. Hx of stenting and prior CABG. On statin, xarelto, beta blocker. No chest pain.  5. DM. On metformin. Home glipizide stopped per hospital due to low blood sugars. Last A1c was good at 5.1%.  6. Hx of stroke. Continue statin, xarelto.  7. Anemia. He is on iron.  8. Mood disorder, mixed depression and anxiety. Cont PTA duloxetine, Abilify.           Electronically signed by: Heidi Xie MD

## 2022-02-08 ENCOUNTER — TRANSITIONAL CARE UNIT VISIT (OUTPATIENT)
Dept: GERIATRICS | Facility: CLINIC | Age: 52
End: 2022-02-08
Payer: MEDICARE

## 2022-02-08 VITALS
RESPIRATION RATE: 18 BRPM | DIASTOLIC BLOOD PRESSURE: 65 MMHG | HEART RATE: 72 BPM | OXYGEN SATURATION: 97 % | SYSTOLIC BLOOD PRESSURE: 113 MMHG | TEMPERATURE: 97.8 F | BODY MASS INDEX: 23.37 KG/M2 | HEIGHT: 67 IN | WEIGHT: 148.9 LBS

## 2022-02-08 DIAGNOSIS — E11.621 DIABETIC ULCER OF RIGHT MIDFOOT ASSOCIATED WITH TYPE 2 DIABETES MELLITUS, WITH NECROSIS OF MUSCLE (H): Primary | ICD-10-CM

## 2022-02-08 DIAGNOSIS — I10 ESSENTIAL HYPERTENSION: ICD-10-CM

## 2022-02-08 DIAGNOSIS — L97.413 DIABETIC ULCER OF RIGHT MIDFOOT ASSOCIATED WITH TYPE 2 DIABETES MELLITUS, WITH NECROSIS OF MUSCLE (H): Primary | ICD-10-CM

## 2022-02-08 DIAGNOSIS — I25.10 CORONARY ARTERY DISEASE INVOLVING NATIVE CORONARY ARTERY OF NATIVE HEART WITHOUT ANGINA PECTORIS: ICD-10-CM

## 2022-02-08 DIAGNOSIS — I48.91 ATRIAL FIBRILLATION, UNSPECIFIED TYPE (H): ICD-10-CM

## 2022-02-08 PROCEDURE — U0003 INFECTIOUS AGENT DETECTION BY NUCLEIC ACID (DNA OR RNA); SEVERE ACUTE RESPIRATORY SYNDROME CORONAVIRUS 2 (SARS-COV-2) (CORONAVIRUS DISEASE [COVID-19]), AMPLIFIED PROBE TECHNIQUE, MAKING USE OF HIGH THROUGHPUT TECHNOLOGIES AS DESCRIBED BY CMS-2020-01-R: HCPCS | Mod: ORL | Performed by: FAMILY MEDICINE

## 2022-02-08 PROCEDURE — 99309 SBSQ NF CARE MODERATE MDM 30: CPT | Performed by: FAMILY MEDICINE

## 2022-02-08 ASSESSMENT — MIFFLIN-ST. JEOR: SCORE: 1479.04

## 2022-02-08 NOTE — LETTER
2/8/2022        RE: Cm Lundberg  [address]  [place] [address]          [facility]    [facility] Record Number:  [id number]  Place of Service where encounter took place: [facility] ([facility]) [[id number]]   CODE STATUS:   CPR/Full code     Chief Complaint/Reason for Visit:  Chief Complaint   Patient presents with     RECHECK     TCU 2/8/2022. Right foot ulceration. S/p Incision and drainage with application of theraskin 1/27/2022.       TCU HPI:    Cm Lundberg is a 51 year old male with hx of CAD, s/p stents and CABG, HTN, DM, prior CVA, afib/flutter on xarelto, admitted to the hospital on 12/17/2021 for elective procedure for right foot ulceration. He underwent incision and drainage of the right foot, excisional debridement of the ulceration of the right foot into the level of the muscle, under MAC with popliteal block anesthesia on 12/17/2021. Wound vac was subsequently placed. He had no hospital complications. Seen by ID with recommendation for 5 days Augmentin at hospital discharge based on culture results. Chronic anticoagulation was restarted. He was felt to need TCU and discharged to TCU on 12/22/0221 for PT, OT, nursing cares, medical management and monitoring.       Today:  No interim concerns. He had incision and drainage, right foot with application of theraskin on 1/27/2022. Continues with wound vac, saw Dr. Kim on 2/4/2022, NNO, continue wound vac. He will follow up again on Fri 2/18/2022. Continues NWB, compliant with restrictions. He has a typical migraine headache today, no unusual features, received monthly Aimovig injection. Appetite is good. No abdominal pain. Tends to be constipated, has bowel meds available. No urinary sx. He denies fever, cough, congestion, fever. He lives at [facility] and unable to return home until no longer requiring wound vac.       PAST MEDICAL HISTORY:  Past Medical History:   Diagnosis  Date     Anxiety      Arthritis      Cerebral infarction (H)      Congestive heart failure (H)      Coronary artery disease     CABG with multiple stents     Depression      Diabetes (H)      Equinovarus deformity, acquired, right      GERD (gastroesophageal reflux disease)      Heart attack (H)      Hyperlipidemia      Hypertension      Migraine      Migraines      Myalgia      PONV (postoperative nausea and vomiting)     mild nausea     Stented coronary artery        MEDICATIONS:  Current Outpatient Medications   Medication Sig Dispense Refill     acetaminophen (TYLENOL) 325 MG tablet Take 650 mg by mouth every 4 hours as needed for mild pain        ARIPiprazole (ABILIFY) 2 MG tablet 2 mg daily        atorvastatin (LIPITOR) 80 MG tablet Take 80 mg by mouth daily        cholestyramine (QUESTRAN) 4 g packet Take 1 packet by mouth every other day        DULoxetine (CYMBALTA) 60 MG capsule Take 120 mg by mouth daily takle 120mg       erenumab-aooe (AIMOVIG) 140 MG/ML injection Inject 140 mg Subcutaneous every 30 days        fenofibrate micronized (LOFIBRA) 200 MG capsule Take 200 mg by mouth every morning (before breakfast)        ferrous sulfate (FEROSUL) 325 (65 Fe) MG tablet Take 325 mg by mouth daily (with breakfast)        gabapentin (NEURONTIN) 100 MG capsule Take 100 mg by mouth 2 times daily (before meals)        gabapentin (NEURONTIN) 300 MG capsule Take 600 mg by mouth At Bedtime       loperamide (IMODIUM) 2 MG capsule Take 4 mg by mouth as needed for diarrhea        LORazepam (ATIVAN) 0.5 MG tablet Take 1 tablet (0.5 mg) by mouth daily before breakfast 10 tablet 0     losartan (COZAAR) 25 MG tablet Take 25 mg by mouth At Bedtime        magnesium oxide 400 MG CAPS Take 400 mg by mouth 4 times daily       Menthol, Topical Analgesic, (BIOFREEZE ROLL-ON) 4 % GEL Apply 1 Application topically 2 times daily Apply to lower back for pain       metFORMIN (GLUCOPHAGE) 1000 MG tablet Take 0.5 tablets (500 mg) by mouth  "2 times daily (with meals) (Patient taking differently: Take 1,000 mg by mouth daily 1000 mg in the AM and 500 Mg in the PM)  0     metoprolol succinate ER (TOPROL-XL) 25 MG 24 hr tablet Take 75 mg by mouth daily        mirtazapine (REMERON) 7.5 MG tablet Take 7.5 mg by mouth At Bedtime        Neomycin-Bacitracin-Polymyxin (TRIPLE ANTIBIOTIC) 3.5-400-5000 OINT ointment Externally apply 1 applicator topically 3 times daily as needed       Nitroglycerin (NITROSTAT SL) Place 0.4 mg under the tongue every 5 minutes as needed for chest pain        omeprazole (PRILOSEC) 20 MG DR capsule Take 20 mg by mouth daily        oxyCODONE (ROXICODONE) 5 MG tablet TAKE 1 TAB BY MOUTH EVERY 6HOURS AS NEEDED FOR PAIN 28 tablet 0     polyethylene glycol (MIRALAX) 17 GM/Dose powder Take 1 packet by mouth daily as needed for constipation        Rivaroxaban (XARELTO PO) Take 20 mg by mouth daily        senna-docusate (SENOKOT-S/PERICOLACE) 8.6-50 MG tablet Take 1 tablet by mouth 2 times daily as needed for constipation        traZODone (DESYREL) 50 MG tablet Take 50 mg by mouth At Bedtime        vitamin C (ASCORBIC ACID) 500 MG tablet Take 500 mg by mouth daily          PHYSICAL EXAM:  General: Patient is alert male, no distress.   Vitals: /65   Pulse 72   Temp 97.8  F (36.6  C)   Resp 18   Ht 1.702 m (5' 7\")   Wt 67.5 kg (148 lb 14.4 oz)   SpO2 97%   BMI 23.32 kg/m    HEENT: Head is NCAT. Eyes show no injection or icterus. Nares negative. Oropharynx well hydrated.  Neck: No JVD.  Lungs: Non labored respirations.  Abdomen: Soft.  : Deferred.  Extremities: No edema is noted.  Musculoskeletal: Wound vac right foot.   Psych: Mood appears good.      LABS/DIAGNOSTIC DATA:  Component      Latest Ref Rng & Units 12/21/2021   Hemoglobin A1C      <=5.6 % 5.1     Component      Latest Ref Rng & Units 12/18/2021   Sodium      136 - 145 mmol/L 138   Potassium      3.5 - 5.0 mmol/L 4.5   Chloride      98 - 107 mmol/L 106   Carbon " Dioxide      22 - 31 mmol/L 21 (L)   Anion Gap      5 - 18 mmol/L 11   Glucose      70 - 125 mg/dL 119   Calcium      8.5 - 10.5 mg/dL 9.3   Urea Nitrogen      8 - 22 mg/dL 10   Creatinine      0.70 - 1.30 mg/dL 0.86   GFR Estimate      >60 mL/min/1.73m2 >90         ASSESSMENT/PLAN:  1. Right foot ulceration. S/p incision and drainage 12/17/2022; S/p Incision and drainage, right foot with application of theraskin 1/27/2022. Wound vac in place, follow up appt with podiatry 2/4/2022, NNO, continue NWB, continue wound vac, RTC Fri 2/18/2022.   2. HTN. On metoprolol and losartan. BPs satisfactory, continue to monitor in TCU.  3. Afib/flutter. Anticoagulated with xarelto.  4. CAD. Hx of MIs. Hx of stenting and prior CABG. On statin, xarelto, beta blocker. No chest pain.  5. DM. On metformin. Home glipizide stopped per hospital due to low blood sugars. Last A1c was good at 5.1%. Follow accuchecks in TCU.   6. Hx of stroke. Continue statin, xarelto.  7. Anemia. He is on iron.  8. Mood disorder, mixed depression and anxiety. Cont PTA duloxetine, Abilify.   9. Migraines. Received monthly Aimovig today.           Electronically signed by: Heidi Xie MD           Sincerely,        Heidi Xie MD

## 2022-02-09 LAB — SARS-COV-2 RNA RESP QL NAA+PROBE: NOT DETECTED

## 2022-02-10 ENCOUNTER — LAB REQUISITION (OUTPATIENT)
Dept: LAB | Facility: CLINIC | Age: 52
End: 2022-02-10
Payer: MEDICARE

## 2022-02-10 DIAGNOSIS — Z11.59 ENCOUNTER FOR SCREENING FOR OTHER VIRAL DISEASES: ICD-10-CM

## 2022-02-11 PROCEDURE — U0005 INFEC AGEN DETEC AMPLI PROBE: HCPCS | Mod: ORL | Performed by: FAMILY MEDICINE

## 2022-02-12 LAB — SARS-COV-2 RNA RESP QL NAA+PROBE: NEGATIVE

## 2022-02-13 NOTE — PROGRESS NOTES
[facility] Medical Record Number:  [id number]  Place of Service where encounter took place: [facility] ([facility]) [[id number]]   CODE STATUS:   CPR/Full code     Chief Complaint/Reason for Visit:  Chief Complaint   Patient presents with     RECHECK     TCU 2/3/2022.  Right foot ulceration. S/p Incision and drainage with application of theraskin 1/27/2022.       TCU HPI:    Cm Lundberg is a 51 year old male with hx of CAD, s/p stents and CABG, HTN, DM, prior CVA, afib/flutter on xarelto, admitted to the hospital on 12/17/2021 for elective procedure for right foot ulceration. He underwent incision and drainage of the right foot, excisional debridement of the ulceration of the right foot into the level of the muscle, under MAC with popliteal block anesthesia on 12/17/2021. Wound vac was subsequently placed. He had no hospital complications. Seen by ID with recommendation for 5 days Augmentin at hospital discharge based on culture results. Chronic anticoagulation was restarted. He was felt to need TCU and discharged to TCU on 12/22/0221 for PT, OT, nursing cares, medical management and monitoring.       Today:  He had incision and drainage, right foot with application of theraskin on 1/27/2022. Continues with wound vac, will follow up with Dr. Kim tomorrow 2/4/2022. Pain is controlled. He remains NWB, compliant. Appetite is good. No abdominal pain. Tends to be constipated, has bowel meds available, no concerns today. No fever or urinary sx. He denies fever, cough, congestion, fever. He lives at [facility] and unable to return home until no longer requiring wound vac.       PAST MEDICAL HISTORY:  Past Medical History:   Diagnosis Date     Anxiety      Arthritis      Cerebral infarction (H)      Congestive heart failure (H)      Coronary artery disease     CABG with multiple stents     Depression      Diabetes (H)      Equinovarus deformity, acquired, right       GERD (gastroesophageal reflux disease)      Heart attack (H)      Hyperlipidemia      Hypertension      Migraine      Migraines      Myalgia      PONV (postoperative nausea and vomiting)     mild nausea     Stented coronary artery        MEDICATIONS:  Current Outpatient Medications   Medication Sig Dispense Refill     acetaminophen (TYLENOL) 325 MG tablet Take 650 mg by mouth every 4 hours as needed for mild pain        ARIPiprazole (ABILIFY) 2 MG tablet 2 mg daily        atorvastatin (LIPITOR) 80 MG tablet Take 80 mg by mouth daily        cholestyramine (QUESTRAN) 4 g packet Take 1 packet by mouth every other day        DULoxetine (CYMBALTA) 60 MG capsule Take 120 mg by mouth daily takle 120mg       erenumab-aooe (AIMOVIG) 140 MG/ML injection Inject 140 mg Subcutaneous every 30 days        fenofibrate micronized (LOFIBRA) 200 MG capsule Take 200 mg by mouth every morning (before breakfast)        ferrous sulfate (FEROSUL) 325 (65 Fe) MG tablet Take 325 mg by mouth daily (with breakfast)        gabapentin (NEURONTIN) 100 MG capsule Take 100 mg by mouth 2 times daily (before meals)        gabapentin (NEURONTIN) 300 MG capsule Take 600 mg by mouth At Bedtime       loperamide (IMODIUM) 2 MG capsule Take 4 mg by mouth as needed for diarrhea        LORazepam (ATIVAN) 0.5 MG tablet Take 1 tablet (0.5 mg) by mouth daily before breakfast 10 tablet 0     losartan (COZAAR) 25 MG tablet Take 25 mg by mouth At Bedtime        magnesium oxide 400 MG CAPS Take 400 mg by mouth 4 times daily       Menthol, Topical Analgesic, (BIOFREEZE ROLL-ON) 4 % GEL Apply 1 Application topically 2 times daily Apply to lower back for pain       metFORMIN (GLUCOPHAGE) 1000 MG tablet Take 0.5 tablets (500 mg) by mouth 2 times daily (with meals) (Patient taking differently: Take 1,000 mg by mouth daily 1000 mg in the AM and 500 Mg in the PM)  0     metoprolol succinate ER (TOPROL-XL) 25 MG 24 hr tablet Take 75 mg by mouth daily        mirtazapine  "(REMERON) 7.5 MG tablet Take 7.5 mg by mouth At Bedtime        Neomycin-Bacitracin-Polymyxin (TRIPLE ANTIBIOTIC) 3.5-400-5000 OINT ointment Externally apply 1 applicator topically 3 times daily as needed       Nitroglycerin (NITROSTAT SL) Place 0.4 mg under the tongue every 5 minutes as needed for chest pain        omeprazole (PRILOSEC) 20 MG DR capsule Take 20 mg by mouth daily        oxyCODONE (ROXICODONE) 5 MG tablet TAKE 1 TAB BY MOUTH EVERY 6HOURS AS NEEDED FOR PAIN 28 tablet 0     polyethylene glycol (MIRALAX) 17 GM/Dose powder Take 1 packet by mouth daily as needed for constipation        Rivaroxaban (XARELTO PO) Take 20 mg by mouth daily        senna-docusate (SENOKOT-S/PERICOLACE) 8.6-50 MG tablet Take 1 tablet by mouth 2 times daily as needed for constipation        traZODone (DESYREL) 50 MG tablet Take 50 mg by mouth At Bedtime        vitamin C (ASCORBIC ACID) 500 MG tablet Take 500 mg by mouth daily          PHYSICAL EXAM:  General: Patient is alert male, no distress.   Vitals: /64   Pulse 77   Temp 98.2  F (36.8  C)   Resp 16   Ht 1.702 m (5' 7\")   Wt 66.7 kg (147 lb)   SpO2 99%   BMI 23.02 kg/m    HEENT: Head is NCAT. Eyes show no injection or icterus. Nares negative. Oropharynx well hydrated.  Neck: No JVD.  Lungs: Non labored respirations.  Abdomen: Soft.  : Deferred.  Extremities: No edema is noted.  Musculoskeletal: Wound vac right foot.   Psych: Mood appears good.      LABS/DIAGNOSTIC DATA:  Component      Latest Ref Rng & Units 12/21/2021   Hemoglobin A1C      <=5.6 % 5.1     Component      Latest Ref Rng & Units 12/18/2021   Sodium      136 - 145 mmol/L 138   Potassium      3.5 - 5.0 mmol/L 4.5   Chloride      98 - 107 mmol/L 106   Carbon Dioxide      22 - 31 mmol/L 21 (L)   Anion Gap      5 - 18 mmol/L 11   Glucose      70 - 125 mg/dL 119   Calcium      8.5 - 10.5 mg/dL 9.3   Urea Nitrogen      8 - 22 mg/dL 10   Creatinine      0.70 - 1.30 mg/dL 0.86   GFR Estimate      >60 " mL/min/1.73m2 >90         ASSESSMENT/PLAN:  1. Right foot ulceration. S/p incision and drainage 12/17/2022; S/p Incision and drainage, right foot with application of theraskin 1/27/2022. Wound vac in place, follow up appt with podiatry tomorrow 2/4/2022. Continue NWB.  2. HTN. On metoprolol and losartan. BPs satisfactory, continue to monitor in TCU.  3. Afib/flutter. Anticoagulated with xarelto.  4. CAD. Hx of MIs. Hx of stenting and prior CABG. On statin, xarelto, beta blocker. No chest pain.  5. DM. On metformin. Home glipizide stopped per hospital due to low blood sugars. Last A1c was good at 5.1%. Follow accuchecks in TCU.   6. Hx of stroke. Continue statin, xarelto.  7. Anemia. He is on iron.  8. Mood disorder, mixed depression and anxiety. Cont PTA duloxetine, Abilify.           Electronically signed by: Heidi Xie MD

## 2022-02-13 NOTE — PROGRESS NOTES
[facility] Medical Record Number:  [id number]  Place of Service where encounter took place: [facility] ([facility]) [[id number]]   CODE STATUS:   CPR/Full code     Chief Complaint/Reason for Visit:  Chief Complaint   Patient presents with     RECHECK     TCU 2/8/2022. Right foot ulceration. S/p Incision and drainage with application of theraskin 1/27/2022.       TCU HPI:    Cm Lundberg is a 51 year old male with hx of CAD, s/p stents and CABG, HTN, DM, prior CVA, afib/flutter on xarelto, admitted to the hospital on 12/17/2021 for elective procedure for right foot ulceration. He underwent incision and drainage of the right foot, excisional debridement of the ulceration of the right foot into the level of the muscle, under MAC with popliteal block anesthesia on 12/17/2021. Wound vac was subsequently placed. He had no hospital complications. Seen by ID with recommendation for 5 days Augmentin at hospital discharge based on culture results. Chronic anticoagulation was restarted. He was felt to need TCU and discharged to TCU on 12/22/0221 for PT, OT, nursing cares, medical management and monitoring.       Today:  No interim concerns. He had incision and drainage, right foot with application of theraskin on 1/27/2022. Continues with wound vac, saw Dr. Kim on 2/4/2022, NNO, continue wound vac. He will follow up again on Fri 2/18/2022. Continues NWB, compliant with restrictions. He has a typical migraine headache today, no unusual features, received monthly Aimovig injection. Appetite is good. No abdominal pain. Tends to be constipated, has bowel meds available. No urinary sx. He denies fever, cough, congestion, fever. He lives at [facility] and unable to return home until no longer requiring wound vac.       PAST MEDICAL HISTORY:  Past Medical History:   Diagnosis Date     Anxiety      Arthritis      Cerebral infarction (H)      Congestive heart failure (H)       Coronary artery disease     CABG with multiple stents     Depression      Diabetes (H)      Equinovarus deformity, acquired, right      GERD (gastroesophageal reflux disease)      Heart attack (H)      Hyperlipidemia      Hypertension      Migraine      Migraines      Myalgia      PONV (postoperative nausea and vomiting)     mild nausea     Stented coronary artery        MEDICATIONS:  Current Outpatient Medications   Medication Sig Dispense Refill     acetaminophen (TYLENOL) 325 MG tablet Take 650 mg by mouth every 4 hours as needed for mild pain        ARIPiprazole (ABILIFY) 2 MG tablet 2 mg daily        atorvastatin (LIPITOR) 80 MG tablet Take 80 mg by mouth daily        cholestyramine (QUESTRAN) 4 g packet Take 1 packet by mouth every other day        DULoxetine (CYMBALTA) 60 MG capsule Take 120 mg by mouth daily takle 120mg       erenumab-aooe (AIMOVIG) 140 MG/ML injection Inject 140 mg Subcutaneous every 30 days        fenofibrate micronized (LOFIBRA) 200 MG capsule Take 200 mg by mouth every morning (before breakfast)        ferrous sulfate (FEROSUL) 325 (65 Fe) MG tablet Take 325 mg by mouth daily (with breakfast)        gabapentin (NEURONTIN) 100 MG capsule Take 100 mg by mouth 2 times daily (before meals)        gabapentin (NEURONTIN) 300 MG capsule Take 600 mg by mouth At Bedtime       loperamide (IMODIUM) 2 MG capsule Take 4 mg by mouth as needed for diarrhea        LORazepam (ATIVAN) 0.5 MG tablet Take 1 tablet (0.5 mg) by mouth daily before breakfast 10 tablet 0     losartan (COZAAR) 25 MG tablet Take 25 mg by mouth At Bedtime        magnesium oxide 400 MG CAPS Take 400 mg by mouth 4 times daily       Menthol, Topical Analgesic, (BIOFREEZE ROLL-ON) 4 % GEL Apply 1 Application topically 2 times daily Apply to lower back for pain       metFORMIN (GLUCOPHAGE) 1000 MG tablet Take 0.5 tablets (500 mg) by mouth 2 times daily (with meals) (Patient taking differently: Take 1,000 mg by mouth daily 1000 mg in the  "AM and 500 Mg in the PM)  0     metoprolol succinate ER (TOPROL-XL) 25 MG 24 hr tablet Take 75 mg by mouth daily        mirtazapine (REMERON) 7.5 MG tablet Take 7.5 mg by mouth At Bedtime        Neomycin-Bacitracin-Polymyxin (TRIPLE ANTIBIOTIC) 3.5-400-5000 OINT ointment Externally apply 1 applicator topically 3 times daily as needed       Nitroglycerin (NITROSTAT SL) Place 0.4 mg under the tongue every 5 minutes as needed for chest pain        omeprazole (PRILOSEC) 20 MG DR capsule Take 20 mg by mouth daily        oxyCODONE (ROXICODONE) 5 MG tablet TAKE 1 TAB BY MOUTH EVERY 6HOURS AS NEEDED FOR PAIN 28 tablet 0     polyethylene glycol (MIRALAX) 17 GM/Dose powder Take 1 packet by mouth daily as needed for constipation        Rivaroxaban (XARELTO PO) Take 20 mg by mouth daily        senna-docusate (SENOKOT-S/PERICOLACE) 8.6-50 MG tablet Take 1 tablet by mouth 2 times daily as needed for constipation        traZODone (DESYREL) 50 MG tablet Take 50 mg by mouth At Bedtime        vitamin C (ASCORBIC ACID) 500 MG tablet Take 500 mg by mouth daily          PHYSICAL EXAM:  General: Patient is alert male, no distress.   Vitals: /65   Pulse 72   Temp 97.8  F (36.6  C)   Resp 18   Ht 1.702 m (5' 7\")   Wt 67.5 kg (148 lb 14.4 oz)   SpO2 97%   BMI 23.32 kg/m    HEENT: Head is NCAT. Eyes show no injection or icterus. Nares negative. Oropharynx well hydrated.  Neck: No JVD.  Lungs: Non labored respirations.  Abdomen: Soft.  : Deferred.  Extremities: No edema is noted.  Musculoskeletal: Wound vac right foot.   Psych: Mood appears good.      LABS/DIAGNOSTIC DATA:  Component      Latest Ref Rng & Units 12/21/2021   Hemoglobin A1C      <=5.6 % 5.1     Component      Latest Ref Rng & Units 12/18/2021   Sodium      136 - 145 mmol/L 138   Potassium      3.5 - 5.0 mmol/L 4.5   Chloride      98 - 107 mmol/L 106   Carbon Dioxide      22 - 31 mmol/L 21 (L)   Anion Gap      5 - 18 mmol/L 11   Glucose      70 - 125 mg/dL 119 "   Calcium      8.5 - 10.5 mg/dL 9.3   Urea Nitrogen      8 - 22 mg/dL 10   Creatinine      0.70 - 1.30 mg/dL 0.86   GFR Estimate      >60 mL/min/1.73m2 >90         ASSESSMENT/PLAN:  1. Right foot ulceration. S/p incision and drainage 12/17/2022; S/p Incision and drainage, right foot with application of theraskin 1/27/2022. Wound vac in place, follow up appt with podiatry 2/4/2022, NNO, continue NWB, continue wound vac, RTC Fri 2/18/2022.   2. HTN. On metoprolol and losartan. BPs satisfactory, continue to monitor in TCU.  3. Afib/flutter. Anticoagulated with xarelto.  4. CAD. Hx of MIs. Hx of stenting and prior CABG. On statin, xarelto, beta blocker. No chest pain.  5. DM. On metformin. Home glipizide stopped per hospital due to low blood sugars. Last A1c was good at 5.1%. Follow accuchecks in TCU.   6. Hx of stroke. Continue statin, xarelto.  7. Anemia. He is on iron.  8. Mood disorder, mixed depression and anxiety. Cont PTA duloxetine, Abilify.   9. Migraines. Received monthly Aimovig today.           Electronically signed by: Heidi Xie MD

## 2022-02-14 ENCOUNTER — LAB REQUISITION (OUTPATIENT)
Dept: LAB | Facility: CLINIC | Age: 52
End: 2022-02-14
Payer: MEDICARE

## 2022-02-14 DIAGNOSIS — Z11.59 ENCOUNTER FOR SCREENING FOR OTHER VIRAL DISEASES: ICD-10-CM

## 2022-02-15 ENCOUNTER — TRANSITIONAL CARE UNIT VISIT (OUTPATIENT)
Dept: GERIATRICS | Facility: CLINIC | Age: 52
End: 2022-02-15
Payer: MEDICARE

## 2022-02-15 DIAGNOSIS — L97.413 DIABETIC ULCER OF RIGHT MIDFOOT ASSOCIATED WITH TYPE 2 DIABETES MELLITUS, WITH NECROSIS OF MUSCLE (H): Primary | ICD-10-CM

## 2022-02-15 DIAGNOSIS — E11.621 DIABETIC ULCER OF RIGHT MIDFOOT ASSOCIATED WITH TYPE 2 DIABETES MELLITUS, WITH NECROSIS OF MUSCLE (H): Primary | ICD-10-CM

## 2022-02-15 DIAGNOSIS — E11.69 TYPE 2 DIABETES MELLITUS WITH OTHER SPECIFIED COMPLICATION, WITHOUT LONG-TERM CURRENT USE OF INSULIN (H): ICD-10-CM

## 2022-02-15 DIAGNOSIS — I10 ESSENTIAL HYPERTENSION: ICD-10-CM

## 2022-02-15 DIAGNOSIS — I48.91 ATRIAL FIBRILLATION, UNSPECIFIED TYPE (H): ICD-10-CM

## 2022-02-15 PROCEDURE — U0005 INFEC AGEN DETEC AMPLI PROBE: HCPCS | Mod: ORL | Performed by: FAMILY MEDICINE

## 2022-02-15 PROCEDURE — 99309 SBSQ NF CARE MODERATE MDM 30: CPT | Performed by: FAMILY MEDICINE

## 2022-02-15 NOTE — LETTER
2/15/2022        RE: Cm Lundberg  [address]  [place] [address]          [facility]    [facility] Record Number:  [id number]  Place of Service where encounter took place: [facility] [[id number]]   CODE STATUS:   CPR/Full code     Chief Complaint/Reason for Visit:  Chief Complaint   Patient presents with     RECHECK     TCU 2/15/2022. Right foot ulceration. S/p Incision and drainage with application of theraskin 1/27/2022.       TCU HPI:    Cm Lundberg is a 51 year old male with hx of CAD, s/p stents and CABG, HTN, DM, prior CVA, afib/flutter on xarelto, admitted to the hospital on 12/17/2021 for elective procedure for right foot ulceration. He underwent incision and drainage of the right foot, excisional debridement of the ulceration of the right foot into the level of the muscle, under MAC with popliteal block anesthesia on 12/17/2021. Wound vac was subsequently placed. He had no hospital complications. Seen by ID with recommendation for 5 days Augmentin at hospital discharge based on culture results. Chronic anticoagulation was restarted. He was felt to need TCU and discharged to TCU on 12/22/0221 for PT, OT, nursing cares, medical management and monitoring.       Today:  He continues NWB with wound vac in place, next follow up with podiatry Dr. Kim is Fri. 2/18/2022. His blood sugars have been somewhat elevated, it is noted his home glipizide was discontinued when he was in the hospital so at this time it is added back in, glipizide 2.5 mg every am, continue accuchecks and further adjustments if needed. Otherwise, he is doing well. He would like to go home but his [facility] is unable to take him back until he is off the wound vac. Appetite is good. No abdominal pain. Tends to be constipated, has bowel meds available. No urinary sx. He denies fever, cough, congestion, fever.       PAST MEDICAL HISTORY:  Past Medical History:    Diagnosis Date     Anxiety      Arthritis      Cerebral infarction (H)      Congestive heart failure (H)      Coronary artery disease     CABG with multiple stents     Depression      Diabetes (H)      Equinovarus deformity, acquired, right      GERD (gastroesophageal reflux disease)      Heart attack (H)      Hyperlipidemia      Hypertension      Migraine      Migraines      Myalgia      PONV (postoperative nausea and vomiting)     mild nausea     Stented coronary artery        MEDICATIONS:  Current Outpatient Medications   Medication Sig Dispense Refill     acetaminophen (TYLENOL) 325 MG tablet Take 650 mg by mouth every 4 hours as needed for mild pain        ARIPiprazole (ABILIFY) 2 MG tablet 2 mg daily        atorvastatin (LIPITOR) 80 MG tablet Take 80 mg by mouth daily        cholestyramine (QUESTRAN) 4 g packet Take 1 packet by mouth every other day        DULoxetine (CYMBALTA) 60 MG capsule Take 120 mg by mouth daily takle 120mg       erenumab-aooe (AIMOVIG) 140 MG/ML injection Inject 140 mg Subcutaneous every 30 days        fenofibrate micronized (LOFIBRA) 200 MG capsule Take 200 mg by mouth every morning (before breakfast)        ferrous sulfate (FEROSUL) 325 (65 Fe) MG tablet Take 325 mg by mouth daily (with breakfast)        gabapentin (NEURONTIN) 100 MG capsule Take 100 mg by mouth 2 times daily (before meals)        gabapentin (NEURONTIN) 300 MG capsule Take 600 mg by mouth At Bedtime       glipiZIDE (GLUCOTROL) 5 MG tablet TAKE 1/2 TABLET (2.5MG) BY MOUTH DAILY DX DIABETES       loperamide (IMODIUM) 2 MG capsule Take 4 mg by mouth as needed for diarrhea        LORazepam (ATIVAN) 0.5 MG tablet Take 1 tablet (0.5 mg) by mouth daily before breakfast 10 tablet 0     losartan (COZAAR) 25 MG tablet Take 25 mg by mouth At Bedtime        magnesium oxide 400 MG CAPS Take 400 mg by mouth 4 times daily       Menthol, Topical Analgesic, (BIOFREEZE ROLL-ON) 4 % GEL Apply 1 Application topically 2 times daily  "Apply to lower back for pain       metFORMIN (GLUCOPHAGE) 1000 MG tablet Take 0.5 tablets (500 mg) by mouth 2 times daily (with meals) (Patient taking differently: Take 1,000 mg by mouth daily 1000 mg in the AM and 500 Mg in the PM)  0     metFORMIN (GLUCOPHAGE) 500 MG tablet TAKE 2 TABS (1000MG) BY MOUTH EVERY MORNING;TAKE 1 TAB BY MOUTH EVERY EVENING       metoprolol succinate ER (TOPROL-XL) 25 MG 24 hr tablet Take 75 mg by mouth daily        mirtazapine (REMERON) 7.5 MG tablet Take 7.5 mg by mouth At Bedtime        Neomycin-Bacitracin-Polymyxin (TRIPLE ANTIBIOTIC) 3.5-400-5000 OINT ointment Externally apply 1 applicator topically 3 times daily as needed       Nitroglycerin (NITROSTAT SL) Place 0.4 mg under the tongue every 5 minutes as needed for chest pain        omeprazole (PRILOSEC) 20 MG DR capsule Take 20 mg by mouth daily        oxyCODONE (ROXICODONE) 5 MG tablet TAKE 1 TAB BY MOUTH EVERY 6HOURS AS NEEDED FOR PAIN 28 tablet 0     polyethylene glycol (MIRALAX) 17 GM/Dose powder Take 1 packet by mouth daily as needed for constipation        Rivaroxaban (XARELTO PO) Take 20 mg by mouth daily        senna-docusate (SENOKOT-S/PERICOLACE) 8.6-50 MG tablet Take 1 tablet by mouth 2 times daily as needed for constipation        traZODone (DESYREL) 50 MG tablet Take 50 mg by mouth At Bedtime        vitamin C (ASCORBIC ACID) 500 MG tablet Take 500 mg by mouth daily          PHYSICAL EXAM:  General: Patient is alert male, no distress.   Vitals: /74   Pulse 72   Temp 98.3  F (36.8  C)   Resp 18   Ht 1.702 m (5' 7\")   Wt 65.5 kg (144 lb 6.4 oz)   SpO2 99%   BMI 22.62 kg/m    HEENT: Head is NCAT. Eyes show no injection or icterus. Nares negative. Oropharynx well hydrated.  Neck: No JVD.  Lungs: Non labored respirations.  : Deferred.  Extremities: No edema is noted.  Musculoskeletal: Wound vac right foot.   Psych: Mood appears good.      LABS/DIAGNOSTIC DATA:  Component      Latest Ref Rng & Units 12/21/2021 "   Hemoglobin A1C      <=5.6 % 5.1     Component      Latest Ref Rng & Units 12/18/2021   Sodium      136 - 145 mmol/L 138   Potassium      3.5 - 5.0 mmol/L 4.5   Chloride      98 - 107 mmol/L 106   Carbon Dioxide      22 - 31 mmol/L 21 (L)   Anion Gap      5 - 18 mmol/L 11   Glucose      70 - 125 mg/dL 119   Calcium      8.5 - 10.5 mg/dL 9.3   Urea Nitrogen      8 - 22 mg/dL 10   Creatinine      0.70 - 1.30 mg/dL 0.86   GFR Estimate      >60 mL/min/1.73m2 >90         ASSESSMENT/PLAN:  1. Right foot ulceration. S/p incision and drainage 12/17/2022; S/p Incision and drainage, right foot with application of theraskin 1/27/2022. Wound vac in place, next follow up appt with podiatry is Fri 2/18/2022. Continue NWB.   2. HTN. On metoprolol and losartan. BPs satisfactory, continue to monitor in TCU.  3. Afib/flutter. Anticoagulated with xarelto.  4. DM. On metformin. Home glipizide was stopped per hospital due to low blood sugars. Restarted today due to elevated blood sugars, continue accuchecks in TCU with further adjustments if needed.   5. Stable medical conditions: CAD. Hx of MIs. Hx of stenting and prior CABG. On statin, xarelto, beta blocker; Hx of stroke. Continue statin, xarelto; Anemia, on iron; Mood disorder, mixed depression and anxiety on duloxetine, Abilify; Migraines. Receives monthly Aimovig.           Electronically signed by: Heidi Xie MD           Sincerely,        Heidi Xie MD       Closure 2 Information: This tab is for additional flaps and grafts, including complex repair and grafts and complex repair and flaps. You can also specify a different location for the additional defect, if the location is the same you do not need to select a new one. We will insert the automated text for the repair you select below just as we do for solitary flaps and grafts. Please note that at this time if you select a location with a different insurance zone you will need to override the ICD10 and CPT if appropriate.

## 2022-02-16 VITALS
SYSTOLIC BLOOD PRESSURE: 120 MMHG | BODY MASS INDEX: 22.66 KG/M2 | WEIGHT: 144.4 LBS | OXYGEN SATURATION: 99 % | DIASTOLIC BLOOD PRESSURE: 74 MMHG | TEMPERATURE: 98.3 F | RESPIRATION RATE: 18 BRPM | HEIGHT: 67 IN | HEART RATE: 72 BPM

## 2022-02-16 LAB — SARS-COV-2 RNA RESP QL NAA+PROBE: NEGATIVE

## 2022-02-17 ENCOUNTER — OFFICE VISIT (OUTPATIENT)
Dept: VASCULAR SURGERY | Facility: CLINIC | Age: 52
End: 2022-02-17
Attending: PODIATRIST
Payer: MEDICARE

## 2022-02-17 ENCOUNTER — TRANSITIONAL CARE UNIT VISIT (OUTPATIENT)
Dept: GERIATRICS | Facility: CLINIC | Age: 52
End: 2022-02-17
Payer: MEDICARE

## 2022-02-17 VITALS
HEART RATE: 60 BPM | OXYGEN SATURATION: 95 % | HEIGHT: 67 IN | WEIGHT: 144.4 LBS | DIASTOLIC BLOOD PRESSURE: 76 MMHG | TEMPERATURE: 97.3 F | RESPIRATION RATE: 16 BRPM | SYSTOLIC BLOOD PRESSURE: 142 MMHG | BODY MASS INDEX: 22.66 KG/M2

## 2022-02-17 VITALS — HEART RATE: 80 BPM | TEMPERATURE: 99 F | SYSTOLIC BLOOD PRESSURE: 122 MMHG | DIASTOLIC BLOOD PRESSURE: 74 MMHG

## 2022-02-17 DIAGNOSIS — E11.621 DIABETIC ULCER OF RIGHT MIDFOOT ASSOCIATED WITH TYPE 2 DIABETES MELLITUS, WITH NECROSIS OF MUSCLE (H): Primary | ICD-10-CM

## 2022-02-17 DIAGNOSIS — L97.413 DIABETIC ULCER OF RIGHT MIDFOOT ASSOCIATED WITH TYPE 2 DIABETES MELLITUS, WITH NECROSIS OF MUSCLE (H): Primary | ICD-10-CM

## 2022-02-17 DIAGNOSIS — I10 ESSENTIAL HYPERTENSION: ICD-10-CM

## 2022-02-17 DIAGNOSIS — E11.69 TYPE 2 DIABETES MELLITUS WITH OTHER SPECIFIED COMPLICATION, WITHOUT LONG-TERM CURRENT USE OF INSULIN (H): ICD-10-CM

## 2022-02-17 DIAGNOSIS — I48.91 ATRIAL FIBRILLATION, UNSPECIFIED TYPE (H): ICD-10-CM

## 2022-02-17 PROCEDURE — 11042 DBRDMT SUBQ TIS 1ST 20SQCM/<: CPT | Performed by: PODIATRIST

## 2022-02-17 PROCEDURE — G0463 HOSPITAL OUTPT CLINIC VISIT: HCPCS | Mod: 25

## 2022-02-17 PROCEDURE — 99309 SBSQ NF CARE MODERATE MDM 30: CPT | Performed by: FAMILY MEDICINE

## 2022-02-17 RX ORDER — GLIPIZIDE 5 MG/1
2.5 TABLET ORAL
COMMUNITY
Start: 2022-02-16 | End: 2022-09-28

## 2022-02-17 ASSESSMENT — PAIN SCALES - GENERAL: PAINLEVEL: NO PAIN (0)

## 2022-02-17 NOTE — LETTER
2/17/2022        RE: Cm Lundberg  [address]  [facility] [address]          [facility] SERVICES    [facility] Medical Record Number:  [id number]  Place of Service where encounter took place: [facility] ([facility]) [[id number]]   CODE STATUS:   CPR/Full code     Chief Complaint/Reason for Visit:  Chief Complaint   Patient presents with     RECHECK     TCU 2/17/2022. Right foot ulceration. S/p Incision and drainage with application of theraskin 1/27/2022. NWB with wound vac.        TCU HPI:    Cm Lundberg is a 51 year old male with hx of CAD, s/p stents and CABG, HTN, DM, prior CVA, afib/flutter on xarelto, admitted to the hospital on 12/17/2021 for elective procedure for right foot ulceration. He underwent incision and drainage of the right foot, excisional debridement of the ulceration of the right foot into the level of the muscle, under MAC with popliteal block anesthesia on 12/17/2021. Wound vac was subsequently placed. He had no hospital complications. Seen by ID with recommendation for 5 days Augmentin at hospital discharge based on culture results. Chronic anticoagulation was restarted. He was felt to need TCU and discharged to TCU on 12/22/0221 for PT, OT, nursing cares, medical management and monitoring.       Today:  He continues NWB with wound vac in place, next follow up with podiatry Dr. Kim changed from Fri. 2/18/2022 to today 2/17/2022. His blood sugars were somewhat elevated, so last visit, home glipizide was restarted, blood sugars better. Continuing to monitor in TCU. Appetite is good. No abdominal pain. Tends to be constipated, has bowel meds available. No urinary sx. He denies fever, cough, congestion, fever. Unable to return to prior residence [facility] until he is off the wound vac.       PAST MEDICAL HISTORY:  Past Medical History:   Diagnosis Date     Anxiety      Arthritis      Cerebral infarction (H)      Congestive heart failure (H)       Visit Information Date & Time Provider Department Dept. Phone Encounter #  
 12/1/2017 10:30 AM Zarina Vernon, [address] [facility] Internal Medicine [phone] [id number] Follow-up Instructions Return in about 6 months (around 6/1/2018). Upcoming Health Maintenance Date Due OSTEOPOROSIS SCREENING (DEXA) 11/14/1996 Influenza Age 5 to Adult 8/1/2017 EYE EXAM RETINAL OR DILATED Q1 8/11/2017 HEMOGLOBIN A1C Q6M 5/24/2018 MICROALBUMIN Q1 6/9/2018 MEDICARE YEARLY EXAM 6/17/2018 GLAUCOMA SCREENING Q2Y 8/11/2018 LIPID PANEL Q1 11/24/2018 FOOT EXAM Q1 12/1/2018 DTaP/Tdap/Td series (2 - Td) 6/16/2027 Allergies as of 12/1/2017  Review Complete On: 12/1/2017 By: Zarina Vernon, DO No Known Allergies Current Immunizations  Reviewed on 6/16/2017 Name Date Influenza High Dose Vaccine PF  Incomplete, 12/16/2016, 11/27/2015 12:09 PM  
 Influenza Vaccine 11/14/2014 Pneumococcal Conjugate (PCV-13) 11/27/2015 12:10 PM  
 Pneumococcal Polysaccharide (PPSV-23) 6/16/2017 Not reviewed this visit You Were Diagnosed With   
  
 Codes Comments Type 2 diabetes mellitus without complication, without long-term current use of insulin (HCC)    -  Primary ICD-10-CM: E11.9 ICD-9-CM: 250.00 Essential hypertension     ICD-10-CM: I10 
ICD-9-CM: 401.9 Hypercholesterolemia     ICD-10-CM: E78.00 ICD-9-CM: 272.0 Chronic anemia     ICD-10-CM: D64.9 ICD-9-CM: 285.9   
 CVA, old, cognitive deficits     ICD-10-CM: D39.439 ICD-9-CM: 438.0 Encounter for immunization     ICD-10-CM: F79 ICD-9-CM: V03.89 Vitals BP Pulse Resp Height(growth percentile) Weight(growth percentile) SpO2  
 132/68 (BP 1 Location: Right arm, BP Patient Position: Sitting) 83 18 5' 2\" (1.575 m) 113 lb 6.4 oz (51.4 kg) 97% BMI OB Status Smoking Status 20.74 kg/m2 Hysterectomy Former Smoker Vitals History BMI and BSA Data Body Mass Index Body Surface Area 20.74 kg/m 2 1.5 m 2 Preferred Pharmacy Pharmacy Name Phone [address] [place], [address] [place] [address] [phone] Your Updated Medication List  
  
   
This list is accurate as of: 12/1/17 10:56 AM.  Always use your most recent med list.  
  
  
  
  
 acetaminophen 650 mg Tab Take 650 mg by mouth every six (6) hours as needed. amLODIPine 10 mg tablet Commonly known as:  Kalani Fraction TAKE ONE TABLET BY MOUTH EVERY DAY  
  
 aspirin 325 mg tablet Commonly known as:  ASPIRIN Take 1 Tab by mouth daily. atorvastatin 40 mg tablet Commonly known as:  LIPITOR  
TAKE ONE TABLET BY MOUTH EVERY DAY  
  
 cloNIDine HCl 0.1 mg tablet Commonly known as:  CATAPRES  
TAKE ONE TABLET BY MOUTH AT BEDTIME  
  
 losartan-hydroCHLOROthiazide 100-25 mg per tablet Commonly known as:  HYZAAR  
TAKE ONE TABLET BY MOUTH EVERY DAY  
  
 metFORMIN 500 mg tablet Commonly known as:  GLUCOPHAGE  
TAKE ONE TABLET BY MOUTH TWO TIMES A DAY WITH MEALS  
  
 potassium chloride 10 mEq tablet Commonly known as:  KLOR-CON  
TAKE TWO TABLETS BY MOUTH EVERY DAY We Performed the Following CBC W/O DIFF [88825 CPT(R)] INFLUENZA VIRUS VACCINE, HIGH DOSE SEASONAL, PRESERVATIVE FREE [40219 CPT(R)] Follow-up Instructions Return in about 6 months (around 6/1/2018). To-Do List   
 05/01/2018 Lab:  HEMOGLOBIN A1C WITH EAG   
  
 05/30/2018 Lab:  ALT   
  
 05/30/2018 Lab:  AST   
  
 05/30/2018 Lab:  CBC WITH AUTOMATED DIFF   
  
 05/30/2018 Lab:  LIPID PANEL   
  
 05/30/2018 Lab:  METABOLIC PANEL, BASIC Introducing [facility]! [facility] introduces [employer] patient portal. Now you can access parts of your medical record, email your doctor's office, and request medication refills online. 1. In your internet browser, go to [email or web address] Coronary artery disease     CABG with multiple stents     Depression      Diabetes (H)      Equinovarus deformity, acquired, right      GERD (gastroesophageal reflux disease)      Heart attack (H)      Hyperlipidemia      Hypertension      Migraine      Migraines      Myalgia      PONV (postoperative nausea and vomiting)     mild nausea     Stented coronary artery        MEDICATIONS:  Current Outpatient Medications   Medication Sig Dispense Refill     acetaminophen (TYLENOL) 325 MG tablet Take 650 mg by mouth every 4 hours as needed for mild pain        ARIPiprazole (ABILIFY) 2 MG tablet 2 mg daily        atorvastatin (LIPITOR) 80 MG tablet Take 80 mg by mouth daily        cholestyramine (QUESTRAN) 4 g packet Take 1 packet by mouth every other day        DULoxetine (CYMBALTA) 60 MG capsule Take 120 mg by mouth daily takle 120mg       erenumab-aooe (AIMOVIG) 140 MG/ML injection Inject 140 mg Subcutaneous every 30 days        fenofibrate micronized (LOFIBRA) 200 MG capsule Take 200 mg by mouth every morning (before breakfast)        ferrous sulfate (FEROSUL) 325 (65 Fe) MG tablet Take 325 mg by mouth daily (with breakfast)        gabapentin (NEURONTIN) 100 MG capsule Take 100 mg by mouth 2 times daily (before meals)        gabapentin (NEURONTIN) 300 MG capsule Take 600 mg by mouth At Bedtime       glipiZIDE (GLUCOTROL) 5 MG tablet TAKE 1/2 TABLET (2.5MG) BY MOUTH DAILY DX DIABETES       loperamide (IMODIUM) 2 MG capsule Take 4 mg by mouth as needed for diarrhea        LORazepam (ATIVAN) 0.5 MG tablet Take 1 tablet (0.5 mg) by mouth daily before breakfast 10 tablet 0     losartan (COZAAR) 25 MG tablet Take 25 mg by mouth At Bedtime        magnesium oxide 400 MG CAPS Take 400 mg by mouth 4 times daily       Menthol, Topical Analgesic, (BIOFREEZE ROLL-ON) 4 % GEL Apply 1 Application topically 2 times daily Apply to lower back for pain       metFORMIN (GLUCOPHAGE) 1000 MG tablet Take 0.5 tablets (500 mg) by mouth 2 times  "daily (with meals) (Patient taking differently: Take 1,000 mg by mouth daily 1000 mg in the AM and 500 Mg in the PM)  0     metFORMIN (GLUCOPHAGE) 500 MG tablet TAKE 2 TABS (1000MG) BY MOUTH EVERY MORNING;TAKE 1 TAB BY MOUTH EVERY EVENING       metoprolol succinate ER (TOPROL-XL) 25 MG 24 hr tablet Take 75 mg by mouth daily        mirtazapine (REMERON) 7.5 MG tablet Take 7.5 mg by mouth At Bedtime        Neomycin-Bacitracin-Polymyxin (TRIPLE ANTIBIOTIC) 3.5-400-5000 OINT ointment Externally apply 1 applicator topically 3 times daily as needed       Nitroglycerin (NITROSTAT SL) Place 0.4 mg under the tongue every 5 minutes as needed for chest pain        omeprazole (PRILOSEC) 20 MG DR capsule Take 20 mg by mouth daily        oxyCODONE (ROXICODONE) 5 MG tablet TAKE 1 TAB BY MOUTH EVERY 6HOURS AS NEEDED FOR PAIN 28 tablet 0     polyethylene glycol (MIRALAX) 17 GM/Dose powder Take 1 packet by mouth daily as needed for constipation        Rivaroxaban (XARELTO PO) Take 20 mg by mouth daily        senna-docusate (SENOKOT-S/PERICOLACE) 8.6-50 MG tablet Take 1 tablet by mouth 2 times daily as needed for constipation        traZODone (DESYREL) 50 MG tablet Take 50 mg by mouth At Bedtime        vitamin C (ASCORBIC ACID) 500 MG tablet Take 500 mg by mouth daily          PHYSICAL EXAM:  General: Patient is alert male, no distress.   Vitals: BP (!) 142/76   Pulse 60   Temp 97.3  F (36.3  C)   Resp 16   Ht 1.702 m (5' 7\")   Wt 65.5 kg (144 lb 6.4 oz)   SpO2 95%   BMI 22.62 kg/m    HEENT: Head is NCAT. Eyes show no injection or icterus. Nares negative. Oropharynx well hydrated.  Neck: No JVD.  Lungs: Non labored respirations.  : Deferred.  Extremities: No edema is noted.  Musculoskeletal: Wound vac right foot.   Psych: Mood appears good.      LABS/DIAGNOSTIC DATA:  Component      Latest Ref Rng & Units 12/21/2021   Hemoglobin A1C      <=5.6 % 5.1     Component      Latest Ref Rng & Units 12/18/2021   Sodium      136 - 145 " 2. Click on the First Time User? Click Here link in the Sign In box. You will see the New Member Sign Up page. 3. Enter your [employer] Access Code exactly as it appears below. You will not need to use this code after youve completed the sign-up process. If you do not sign up before the expiration date, you must request a new code. · [employer] Access Code: [email or web address] Expires: 3/1/2018 10:14 AM 
 
4. Enter the last four digits of your Social Security Number (xxxx) and Date of Birth (mm/dd/yyyy) as indicated and click Submit. You will be taken to the next sign-up page. 5. Create a [employer] ID. This will be your [employer] login ID and cannot be changed, so think of one that is secure and easy to remember. 6. Create a [employer] password. You can change your password at any time. 7. Enter your Password Reset Question and Answer. This can be used at a later time if you forget your password. 8. Enter your e-mail address. You will receive e-mail notification when new information is available in [address] 9. Click Sign Up. You can now view and download portions of your medical record. 10. Click the Download Summary menu link to download a portable copy of your medical information. If you have questions, please visit the Frequently Asked Questions section of the [employer] website. Remember, [employer] is NOT to be used for urgent needs. For medical emergencies, dial 911. Now available from your iPhone and Android! Please provide this summary of care documentation to your next provider. Your primary care clinician is listed as Magdalena Mcbride. If you have any questions after today's visit, please call [phone]. mmol/L 138   Potassium      3.5 - 5.0 mmol/L 4.5   Chloride      98 - 107 mmol/L 106   Carbon Dioxide      22 - 31 mmol/L 21 (L)   Anion Gap      5 - 18 mmol/L 11   Glucose      70 - 125 mg/dL 119   Calcium      8.5 - 10.5 mg/dL 9.3   Urea Nitrogen      8 - 22 mg/dL 10   Creatinine      0.70 - 1.30 mg/dL 0.86   GFR Estimate      >60 mL/min/1.73m2 >90         ASSESSMENT/PLAN:  1. Right foot ulceration. S/p incision and drainage 12/17/2022; S/p Incision and drainage, right foot with application of theraskin 1/27/2022. Wound vac in place, has follow up appt with podiatry today 2/17/2022, scheduled appt changed from Fri 2/18/2022 to today. Continue NWB.   2. HTN. On metoprolol and losartan. BPs satisfactory, continue to monitor in TCU.  3. Afib/flutter. Anticoagulated with xarelto.  4. DM. On metformin. Home glipizide was restarted yesterday after being stopped per hospital. Continue accuchecks in TCU with further adjustments if needed.   5. Stable medical conditions: CAD. Hx of MIs. Hx of stenting and prior CABG. On statin, xarelto, beta blocker; Hx of stroke. Continue statin, xarelto; Anemia, on iron; Mood disorder, mixed depression and anxiety on duloxetine, Abilify; Migraines. Receives monthly Aimovig.           Electronically signed by: Heidi Xie MD           Sincerely,        Heidi Xie MD

## 2022-02-17 NOTE — PATIENT INSTRUCTIONS
Important lnstructions      1. WEIGHT BEARING STATUS: You are to remain NON WEIGHT BEARING on your right foot. NON WEIGHT BEARING MEANS NO PRESSURE ON YOUR FOOT OR HEEL AT ANY TIME FOR ANY REASON!    2. OFFLOADING DEVICE: Must use a A WHEELCHAIR at all times! (do not use affected foot to push wheelchair)    3. STABILIZATION DEVICE: Use a EGG CRATE BOOT . You will need to WEAR THIS AT ALL TIMES EVEN WHILE IN BED.       4. ELEVATE: Elevating your leg means laying with your head on a pillow and your foot ABOVE YOUR WAIST.     5. DO NOT MOVE YOUR FOOT.  There is a risk of worsening the wound or incision. To give yourself a higher chance of healing, please DO NOT swing foot back and forth and wiggle foot/toes especially when inside a stabilization device.      6. PROTEIN SUPPLEMENTS: Drink protein shakes 2x per day, morning and night (Ensure, Boost, Glucerna)     This is in addition to your normal diet    If you are on a renal diet, make sure to get a protein shake that is best suited for this diet. Please ask if you would like a referral to see a  Registered Dietician.     Dressing Change lnstructions    FOR THE TOP OF THE FOOT:    Apply non-adherent dressing, cover with vac draping and apply vac as instructed below                 - Wound Vac Instructions     1. 2x weekly and as needed cleanse the wound with NS     2. Pat dry     3. Apply Cavilon no sting barrier wipe to the skin surrounding the wound to protect from drainage/maceration     4. Apply drape to natalia wound. Cut strip of drape and apply to skin if bridging is needed; plan this area in advance; should not be over bony prominence      5. Cut the foam to fit the size of the wound. APPLY ADAPTIC TOUCH (or other similar nonadherent) OVER WOUND/GRAFT SITE. Then     6. Apply foam to wound     7. Cut narrow strip of foam if bridging if needed     8. Cover foam with drape to obtain air tight seal     9. Cut opening the size of a quarter for where the suction pad  will be applied     10. Apply Suction pad     11. 100mmHG suction continuous      [employer] can be reached at [phone], 24 hours a day 7 days a week      - If you do not have a back up plan in place:      If the negative pressure wound therapy malfunctions or unable to maintain seal: dressing must be removed and reapplied within 2 hours of the incident. If unable to reapply negative pressure wound dressing, place Normal Saline moistened gauze in wound bed and cover with appropriate dressing to keep wound bed moist.  Change wet-to-dry dressing two times a day until healthcare staff can re-implement negative pressure therapy. Change canister at least weekly.  [employer] can be reached at [phone], 24 hours a day 7 days a week     - Information on Vacuum-Assisted Closure of a Wound  Vacuum-assisted closure (VAC) of a wound is a type of treatment to help wounds heal. It's also known as negative pressure wound therapy. During the treatment, a device lowers air pressure on the wound. This can help the wound heal more quickly.  - Understanding the wound VAC system  A wound VAC system has several parts. A foam or gauze dressing is put directly on the wound. The dressing is changed every 24 to 72 hours. An adhesive film covers and seals the dressing and wound. A drainage tube leads from under the adhesive film and connects to a portable vacuum pump. This pump removes air pressure over the wound. It may do this constantly. Or it may do it in cycles. During the treatment, you'll need to carry the portable pump everywhere you go.  - Why wound VAC is used  You might need this therapy for a recent traumatic wound. Or you may need it for a chronic wound. This is a wound that does not heal the way it should over time. This can happen with wounds in people who have diabetes. You may need a wound VAC if you've had a recent skin graft. And you may need a wound VAC for a large wound. Large wounds can take a  longer time to heal.  A wound vacuum system may help your wound heal more quickly by:  Draining extra fluid from the wound  Reducing swelling  Reducing bacteria in the wound  Keeping your wound moist and warm  Helping draw together wound edges  Increasing blood flow to your wound  Decreasing inflammation  Wound VAC offers some other advantages over other types of wound care. It may decrease your overall discomfort. The dressings usually need to be changed less often. And they may be easier to keep in position.  - Risks of wound VAC  Wound VAC has some rare risks, such as:  Bleeding (which may be severe)  Wound infection  An abnormal connection between the intestinal tract and the skin (enteric fistula)  Proper training in dressing changes can help reduce the risk for these complications. Also, your doctor will carefully evaluate you to make sure you are a good candidate for the therapy. Certain problems can increase your risk for complications. These include:  Exposed organs or blood vessels  High risk of bleeding from another medical problem  Wound infection  Nearby bone infection  Dead wound tissue  Cancer tissue  Fragile skin, such as from aging or longtime use of topical steroids  Allergy to adhesive  Very poor blood flow to your wound  Wounds close to joints that may reopen because of movement  Your doctor will discuss the risks that apply to you. Make sure to talk with him or her about all of your questions and concerns.  - Getting ready for wound VAC  You likely won't need to do much to get ready for wound VAC. In some cases, you may need to wait a while before having this therapy. For example, your doctor may first need to treat an infection in your wound. Dead or damaged tissue may also need to be removed from your wound.  You or a caregiver may need training on how to use the wound VAC device. This is done if you will be able to have your wound vacuum therapy at home. In other cases, you may need to have  your wound vacuum therapy in a health care facility.  - On the day of your procedure  A health care provider will cover your wound with foam or gauze wound dressing. An adhesive film will be put over the dressing and wound. This seals the wound. The foam connects to a drainage tube, which leads to a vacuum pump. This pump is portable. When the pump is turned on, it draws fluid through the foam and out the drainage tubing. The pump may run constantly, or it may cycle off and on. Your exact setup will depend on the specific type of wound vacuum system that you use.  - Managing your wound  You may need the dressing changed about once a day. You may need it changed more or less often, depending on your wound. You or your caregiver may be trained to do this at home. Or it may be done by a visiting health care provider. Your doctor may prescribe a pain medicine. This is to prevent or reduce pain during the dressing change.  You will likely need to use the wound VAC system for several weeks or months. During this time, you'll carry the portable pump everywhere you go.  - Nutrition for wound healing  During this time, make sure you follow a healthy diet. This is needed so the wound can heal and to prevent infection. Your doctor can tell you more about what to include in your diet during this time.  follow up with your doctor if you have a medical condition that led to your wound, such as diabetes. He or she can help you prevent future wounds.  - Follow-up care  Your doctor will carefully keep track of your healing. Make sure to keep all follow-up appointments.  - When to call your health care provider  Call your health care provider right away if you have any of these:  Fever of 100.4 F (38.0 C) or higher  Increased redness, swelling, or warmth around wound  Increased pain  Bright red blood or blood clots in tubing or the collection chamber of the vacuum          It IS NOT ok to get your wound wet in the bath or  shower    SEEK MEDICAL CARE IF:    You have an increase in swelling, pain, or redness around the wound.    You have an increase in the amount of pus coming from the wound.    There is a bad smell coming from the wound.    The wound appears to be worsening/enlarging    You have a fever greater than 101.5 F      It is ok to continue current wound care treatment/products for the next 2-3 days until new wound care supplies are ordered and arrive. If longer than this please contact our office at [phone].        We want to hear from you!   In the next few weeks, you should receive a call or email to complete a survey about your visit at [facility]. Please help us improve your appointment experience by letting us know how we did today. We strive to make your experience good and value any ways in which we could do better.      We value your input and suggestions.    Thank you for choosing the [facility]!

## 2022-02-17 NOTE — PROGRESS NOTES
FOOT AND ANKLE SURGERY/PODIATRY Progress Note      ASSESSMENT:   Ulceration right midfoot  Diabetic Ulceration right foot   Equinovarus right foot       A new wound was identified today: yes,  it is located dorsal right midfoot.    TREATMENT:  -Theraskin graft intact lateral right foot with staples. No erythema right foot. Small superficial wound dorsal right midfoot.     -I recommend he continue with the wound vac at 100 mmHg with adaptic at the lateral right foot ulcer, awaiting incorporation of the graft.     -New dorsal right midfoot wound is stable, no signs of infection. Will begin non-adherent dressing at this location.     -After discussion of risk factors and consent obtained 2% Lidocaine HCL jelly was applied, under clean conditions, the right and foot ulceration(s) were debrided using #15 blade scalpel.  Devitalized and nonviable tissue, along with any fibrin and slough, was removed to improve granulation tissue formation, stimulate wound healing, decrease overall bacteria load, disrupt biofilm formation and decrease edge senescence. Wound drainage was scant No. Total excisional debridement was 0.35 sq cm into the subcutaneous tissue with a depth of 0.2 cm.   Ulcers were improved afterwards and .  Measures were as noted on the flow sheet. A gauze dressing was applied. He will continue to apply a gauze dressing qoday.    -He will follow-up in 2 weeks.    Alex Kim DPM  [facility] [place]      HPI: Cm Lundberg was seen again today for a lateral right foot ulceration with theraskin graft. He has been non-weight bearing and recently noticed a new sore on the dorsal right midfoot which he believes was caused by the tubing on the wound vac.     Past Medical History:   Diagnosis Date     Anxiety      Arthritis      Cerebral infarction (H)      Congestive heart failure (H)      Coronary artery disease     CABG with multiple stents     Depression      Diabetes (H)       Equinovarus deformity, acquired, right      GERD (gastroesophageal reflux disease)      Heart attack (H)      Hyperlipidemia      Hypertension      Migraine      Migraines      Myalgia      PONV (postoperative nausea and vomiting)     mild nausea     Stented coronary artery        Past Surgical History:   Procedure Laterality Date     INCISION AND DRAINAGE LOWER EXTREMITY, COMBINED Right 12/17/2021    Procedure: INCISION AND DRAINAGE, right foot;  Surgeon: Alex Kim DPM;  Location: [facility] Main OR     INCISION AND DRAINAGE LOWER EXTREMITY, COMBINED Right 1/27/2022    Procedure: INCISION AND DRAINAGE, right foot with application of theraskin;  Surgeon: Alex Kim DPM;  Location: [facility] Main OR     IR MISCELLANEOUS PROCEDURE  2/13/2004     [facility] CABG, VEIN, SINGLE      Description: CABG (CABG);  Recorded: 04/22/2009;       Allergies   Allergen Reactions     Aspirin Other (See Comments)     Reacts with migraine medicine     Isosorbide Headache     Codeine Rash     Morphine Rash         Current Outpatient Medications:      acetaminophen (TYLENOL) 325 MG tablet, Take 650 mg by mouth every 4 hours as needed for mild pain , Disp: , Rfl:      ARIPiprazole (ABILIFY) 2 MG tablet, 2 mg daily , Disp: , Rfl:      atorvastatin (LIPITOR) 80 MG tablet, Take 80 mg by mouth daily , Disp: , Rfl:      cholestyramine (QUESTRAN) 4 g packet, Take 1 packet by mouth every other day , Disp: , Rfl:      DULoxetine (CYMBALTA) 60 MG capsule, Take 120 mg by mouth daily takle 120mg, Disp: , Rfl:      erenumab-aooe (AIMOVIG) 140 MG/ML injection, Inject 140 mg Subcutaneous every 30 days , Disp: , Rfl:      fenofibrate micronized (LOFIBRA) 200 MG capsule, Take 200 mg by mouth every morning (before breakfast) , Disp: , Rfl:      ferrous sulfate (FEROSUL) 325 (65 Fe) MG tablet, Take 325 mg by mouth daily (with breakfast) , Disp: , Rfl:      gabapentin (NEURONTIN) 100 MG capsule, Take 100 mg by mouth 2 times daily (before  meals) , Disp: , Rfl:      gabapentin (NEURONTIN) 300 MG capsule, Take 600 mg by mouth At Bedtime, Disp: , Rfl:      loperamide (IMODIUM) 2 MG capsule, Take 4 mg by mouth as needed for diarrhea , Disp: , Rfl:      LORazepam (ATIVAN) 0.5 MG tablet, Take 1 tablet (0.5 mg) by mouth daily before breakfast, Disp: 10 tablet, Rfl: 0     losartan (COZAAR) 25 MG tablet, Take 25 mg by mouth At Bedtime , Disp: , Rfl:      magnesium oxide 400 MG CAPS, Take 400 mg by mouth 4 times daily, Disp: , Rfl:      Menthol, Topical Analgesic, (BIOFREEZE ROLL-ON) 4 % GEL, Apply 1 Application topically 2 times daily Apply to lower back for pain, Disp: , Rfl:      metFORMIN (GLUCOPHAGE) 1000 MG tablet, Take 0.5 tablets (500 mg) by mouth 2 times daily (with meals) (Patient taking differently: Take 1,000 mg by mouth daily 1000 mg in the AM and 500 Mg in the PM), Disp: , Rfl: 0     metoprolol succinate ER (TOPROL-XL) 25 MG 24 hr tablet, Take 75 mg by mouth daily , Disp: , Rfl:      mirtazapine (REMERON) 7.5 MG tablet, Take 7.5 mg by mouth At Bedtime , Disp: , Rfl:      Neomycin-Bacitracin-Polymyxin (TRIPLE ANTIBIOTIC) 3.5-400-5000 OINT ointment, Externally apply 1 applicator topically 3 times daily as needed, Disp: , Rfl:      Nitroglycerin (NITROSTAT SL), Place 0.4 mg under the tongue every 5 minutes as needed for chest pain , Disp: , Rfl:      omeprazole (PRILOSEC) 20 MG DR capsule, Take 20 mg by mouth daily , Disp: , Rfl:      oxyCODONE (ROXICODONE) 5 MG tablet, TAKE 1 TAB BY MOUTH EVERY 6HOURS AS NEEDED FOR PAIN, Disp: 28 tablet, Rfl: 0     polyethylene glycol (MIRALAX) 17 GM/Dose powder, Take 1 packet by mouth daily as needed for constipation , Disp: , Rfl:      Rivaroxaban (XARELTO PO), Take 20 mg by mouth daily , Disp: , Rfl:      senna-docusate (SENOKOT-S/PERICOLACE) 8.6-50 MG tablet, Take 1 tablet by mouth 2 times daily as needed for constipation , Disp: , Rfl:      traZODone (DESYREL) 50 MG tablet, Take 50 mg by mouth At Bedtime ,  "Disp: , Rfl:      vitamin C (ASCORBIC ACID) 500 MG tablet, Take 500 mg by mouth daily , Disp: , Rfl:      glipiZIDE (GLUCOTROL) 5 MG tablet, TAKE 1/2 TABLET (2.5MG) BY MOUTH DAILY DX DIABETES, Disp: , Rfl:      metFORMIN (GLUCOPHAGE) 500 MG tablet, TAKE 2 TABS (1000MG) BY MOUTH EVERY MORNING;TAKE 1 TAB BY MOUTH EVERY EVENING, Disp: , Rfl:     Review of Systems - 10 point Review of Systems is negative except for right foot ulcer which is noted in HPI.      OBJECTIVE:  /74   Pulse 80   Temp 99  F (37.2  C)   General appearance: Patient is alert and fully cooperative with history & exam.  No sign of distress is noted during the visit.    Vascular: Dorsalis pedis palpableRight.  Dermatologic:    Negative Pressure Wound Therapy Foot Anterior;Right (Active)       VASC Wound right lateral foot (Active)       VASC Wound left hallux  (Active)       VASC Wound right lateral foot (Active)       VASC Wound right lateral foot (Active)   Pre Size Length 2.5 02/17/22 1300   Pre Size Width 2.6 02/17/22 1300   Pre Size Depth 0.3 02/17/22 1300   Pre Total Sq cm 3.4 01/21/22 0900       VASC Wound top right foot (Active)       VASC Wound right dorsal foot  (Active)   Pre Size Length 0.7 02/17/22 1300   Pre Size Width 0.5 02/17/22 1300   Pre Size Depth 0.1 02/17/22 1300       Incision/Surgical Site 12/17/21 Right Foot (Active)   Theraskin graft intact lateral right foot with staples. No erythema right foot. Small superficial wound dorsal right midfoot.   Neurologic: Diminished to light touch Right.  Musculoskeletal: Contracted digits noted Right.    Imaging:     POC US Guidance Needle Placement    Result Date: 1/27/2022  Ultrasound was performed as guidance to an anesthesia procedure.  Click \"PACS images\" hyperlink below to view any stored images.  For specific procedure details, view procedure note authored by anesthesia.         Picture:             "

## 2022-02-21 ENCOUNTER — TELEPHONE (OUTPATIENT)
Dept: VASCULAR SURGERY | Facility: CLINIC | Age: 52
End: 2022-02-21
Payer: MEDICARE

## 2022-02-21 ENCOUNTER — TELEPHONE (OUTPATIENT)
Dept: GERIATRICS | Facility: CLINIC | Age: 52
End: 2022-02-21
Payer: MEDICARE

## 2022-02-21 NOTE — TELEPHONE ENCOUNTER
Sol from [facility] in [place] called to clarify orders.  Patient is stating he was told not to remove the wound vac until his next appointment but the facility has orders to change it.  She would like a call back to clarify the orders:  [phone]

## 2022-02-21 NOTE — TELEPHONE ENCOUNTER
Clarified orders with Dr. Kim's team. Theraskin was placed on 1/27 so wound vac should be changed twice weekly with adaptic touch applied on wound prior to black foam. Wound vac should be running at 100 mmHg continuous. Spoke with Sol and clarified.

## 2022-02-21 NOTE — TELEPHONE ENCOUNTER
[facility] Triage Nurse Telephone Encounter    Provider: Heidi Xie MD  Facility: [facility] Type:  TCU    Caller: Sol  Call Back Number: [phone]    Allergies:    Allergies   Allergen Reactions     Aspirin Other (See Comments)     Reacts with migraine medicine     Isosorbide Headache     Codeine Rash     Morphine Rash        Reason for call: Pt on Miralax every day & has been refusing it, request change to every day PRN.    Verbal Order/Direction given by Provider: OK    Provider giving Order:  SHABNAM Browning CNP    Verbal Order given to: Sol Bello RN

## 2022-02-24 ENCOUNTER — TRANSITIONAL CARE UNIT VISIT (OUTPATIENT)
Dept: GERIATRICS | Facility: CLINIC | Age: 52
End: 2022-02-24
Payer: MEDICARE

## 2022-02-24 VITALS
SYSTOLIC BLOOD PRESSURE: 130 MMHG | RESPIRATION RATE: 18 BRPM | HEIGHT: 67 IN | BODY MASS INDEX: 22.66 KG/M2 | HEART RATE: 68 BPM | WEIGHT: 144.4 LBS | OXYGEN SATURATION: 99 % | TEMPERATURE: 98.5 F | DIASTOLIC BLOOD PRESSURE: 74 MMHG

## 2022-02-24 DIAGNOSIS — L97.411 DIABETIC ULCER OF RIGHT MIDFOOT ASSOCIATED WITH TYPE 2 DIABETES MELLITUS, LIMITED TO BREAKDOWN OF SKIN (H): Primary | ICD-10-CM

## 2022-02-24 DIAGNOSIS — R53.1 WEAKNESS: ICD-10-CM

## 2022-02-24 DIAGNOSIS — E11.621 DIABETIC ULCER OF RIGHT MIDFOOT ASSOCIATED WITH TYPE 2 DIABETES MELLITUS, LIMITED TO BREAKDOWN OF SKIN (H): Primary | ICD-10-CM

## 2022-02-24 DIAGNOSIS — I10 ESSENTIAL HYPERTENSION: ICD-10-CM

## 2022-02-24 DIAGNOSIS — E11.65 TYPE 2 DIABETES MELLITUS WITH HYPERGLYCEMIA, UNSPECIFIED WHETHER LONG TERM INSULIN USE (H): ICD-10-CM

## 2022-02-24 PROCEDURE — 99309 SBSQ NF CARE MODERATE MDM 30: CPT | Performed by: FAMILY MEDICINE

## 2022-02-24 NOTE — PROGRESS NOTES
[facility] Medical Record Number:  [id number]  Place of Service where encounter took place: [facility] ([facility]) [[id number]]   CODE STATUS:   CPR/Full code     Chief Complaint/Reason for Visit:  Chief Complaint   Patient presents with     RECHECK     TCU 2/15/2022. Right foot ulceration. S/p Incision and drainage with application of theraskin 1/27/2022.       TCU HPI:    Cm Lundberg is a 51 year old male with hx of CAD, s/p stents and CABG, HTN, DM, prior CVA, afib/flutter on xarelto, admitted to the hospital on 12/17/2021 for elective procedure for right foot ulceration. He underwent incision and drainage of the right foot, excisional debridement of the ulceration of the right foot into the level of the muscle, under MAC with popliteal block anesthesia on 12/17/2021. Wound vac was subsequently placed. He had no hospital complications. Seen by ID with recommendation for 5 days Augmentin at hospital discharge based on culture results. Chronic anticoagulation was restarted. He was felt to need TCU and discharged to TCU on 12/22/0221 for PT, OT, nursing cares, medical management and monitoring.       Today:  He continues NWB with wound vac in place, next follow up with podiatry Dr. Kim is Fri. 2/18/2022. His blood sugars have been somewhat elevated, it is noted his home glipizide was discontinued when he was in the hospital so at this time it is added back in, glipizide 2.5 mg every am, continue accuchecks and further adjustments if needed. Otherwise, he is doing well. He would like to go home but his care home is unable to take him back until he is off the wound vac. Appetite is good. No abdominal pain. Tends to be constipated, has bowel meds available. No urinary sx. He denies fever, cough, congestion, fever.       PAST MEDICAL HISTORY:  Past Medical History:   Diagnosis Date     Anxiety      Arthritis      Cerebral infarction (H)      Congestive heart failure (H)       Coronary artery disease     CABG with multiple stents     Depression      Diabetes (H)      Equinovarus deformity, acquired, right      GERD (gastroesophageal reflux disease)      Heart attack (H)      Hyperlipidemia      Hypertension      Migraine      Migraines      Myalgia      PONV (postoperative nausea and vomiting)     mild nausea     Stented coronary artery        MEDICATIONS:  Current Outpatient Medications   Medication Sig Dispense Refill     acetaminophen (TYLENOL) 325 MG tablet Take 650 mg by mouth every 4 hours as needed for mild pain        ARIPiprazole (ABILIFY) 2 MG tablet 2 mg daily        atorvastatin (LIPITOR) 80 MG tablet Take 80 mg by mouth daily        cholestyramine (QUESTRAN) 4 g packet Take 1 packet by mouth every other day        DULoxetine (CYMBALTA) 60 MG capsule Take 120 mg by mouth daily takle 120mg       erenumab-aooe (AIMOVIG) 140 MG/ML injection Inject 140 mg Subcutaneous every 30 days        fenofibrate micronized (LOFIBRA) 200 MG capsule Take 200 mg by mouth every morning (before breakfast)        ferrous sulfate (FEROSUL) 325 (65 Fe) MG tablet Take 325 mg by mouth daily (with breakfast)        gabapentin (NEURONTIN) 100 MG capsule Take 100 mg by mouth 2 times daily (before meals)        gabapentin (NEURONTIN) 300 MG capsule Take 600 mg by mouth At Bedtime       glipiZIDE (GLUCOTROL) 5 MG tablet TAKE 1/2 TABLET (2.5MG) BY MOUTH DAILY DX DIABETES       loperamide (IMODIUM) 2 MG capsule Take 4 mg by mouth as needed for diarrhea        LORazepam (ATIVAN) 0.5 MG tablet Take 1 tablet (0.5 mg) by mouth daily before breakfast 10 tablet 0     losartan (COZAAR) 25 MG tablet Take 25 mg by mouth At Bedtime        magnesium oxide 400 MG CAPS Take 400 mg by mouth 4 times daily       Menthol, Topical Analgesic, (BIOFREEZE ROLL-ON) 4 % GEL Apply 1 Application topically 2 times daily Apply to lower back for pain       metFORMIN (GLUCOPHAGE) 1000 MG tablet Take 0.5 tablets (500 mg) by mouth 2 times  "daily (with meals) (Patient taking differently: Take 1,000 mg by mouth daily 1000 mg in the AM and 500 Mg in the PM)  0     metFORMIN (GLUCOPHAGE) 500 MG tablet TAKE 2 TABS (1000MG) BY MOUTH EVERY MORNING;TAKE 1 TAB BY MOUTH EVERY EVENING       metoprolol succinate ER (TOPROL-XL) 25 MG 24 hr tablet Take 75 mg by mouth daily        mirtazapine (REMERON) 7.5 MG tablet Take 7.5 mg by mouth At Bedtime        Neomycin-Bacitracin-Polymyxin (TRIPLE ANTIBIOTIC) 3.5-400-5000 OINT ointment Externally apply 1 applicator topically 3 times daily as needed       Nitroglycerin (NITROSTAT SL) Place 0.4 mg under the tongue every 5 minutes as needed for chest pain        omeprazole (PRILOSEC) 20 MG DR capsule Take 20 mg by mouth daily        oxyCODONE (ROXICODONE) 5 MG tablet TAKE 1 TAB BY MOUTH EVERY 6HOURS AS NEEDED FOR PAIN 28 tablet 0     polyethylene glycol (MIRALAX) 17 GM/Dose powder Take 1 packet by mouth daily as needed for constipation        Rivaroxaban (XARELTO PO) Take 20 mg by mouth daily        senna-docusate (SENOKOT-S/PERICOLACE) 8.6-50 MG tablet Take 1 tablet by mouth 2 times daily as needed for constipation        traZODone (DESYREL) 50 MG tablet Take 50 mg by mouth At Bedtime        vitamin C (ASCORBIC ACID) 500 MG tablet Take 500 mg by mouth daily          PHYSICAL EXAM:  General: Patient is alert male, no distress.   Vitals: /74   Pulse 72   Temp 98.3  F (36.8  C)   Resp 18   Ht 1.702 m (5' 7\")   Wt 65.5 kg (144 lb 6.4 oz)   SpO2 99%   BMI 22.62 kg/m    HEENT: Head is NCAT. Eyes show no injection or icterus. Nares negative. Oropharynx well hydrated.  Neck: No JVD.  Lungs: Non labored respirations.  : Deferred.  Extremities: No edema is noted.  Musculoskeletal: Wound vac right foot.   Psych: Mood appears good.      LABS/DIAGNOSTIC DATA:  Component      Latest Ref Rng & Units 12/21/2021   Hemoglobin A1C      <=5.6 % 5.1     Component      Latest Ref Rng & Units 12/18/2021   Sodium      136 - 145 " mmol/L 138   Potassium      3.5 - 5.0 mmol/L 4.5   Chloride      98 - 107 mmol/L 106   Carbon Dioxide      22 - 31 mmol/L 21 (L)   Anion Gap      5 - 18 mmol/L 11   Glucose      70 - 125 mg/dL 119   Calcium      8.5 - 10.5 mg/dL 9.3   Urea Nitrogen      8 - 22 mg/dL 10   Creatinine      0.70 - 1.30 mg/dL 0.86   GFR Estimate      >60 mL/min/1.73m2 >90         ASSESSMENT/PLAN:  1. Right foot ulceration. S/p incision and drainage 12/17/2022; S/p Incision and drainage, right foot with application of theraskin 1/27/2022. Wound vac in place, next follow up appt with podiatry is Fri 2/18/2022. Continue NWB.   2. HTN. On metoprolol and losartan. BPs satisfactory, continue to monitor in TCU.  3. Afib/flutter. Anticoagulated with xarelto.  4. DM. On metformin. Home glipizide was stopped per hospital due to low blood sugars. Restarted today due to elevated blood sugars, continue accuchecks in TCU with further adjustments if needed.   5. Stable medical conditions: CAD. Hx of MIs. Hx of stenting and prior CABG. On statin, xarelto, beta blocker; Hx of stroke. Continue statin, xarelto; Anemia, on iron; Mood disorder, mixed depression and anxiety on duloxetine, Abilify; Migraines. Receives monthly Aimovig.           Electronically signed by: Heidi Xie MD

## 2022-02-24 NOTE — PROGRESS NOTES
[facility] Medical Record Number:  [id number]  Place of Service where encounter took place: [facility] ([facility]) [[id number]]   CODE STATUS:   CPR/Full code     Chief Complaint/Reason for Visit:  Chief Complaint   Patient presents with     RECHECK     TCU 2/17/2022. Right foot ulceration. S/p Incision and drainage with application of theraskin 1/27/2022. NWB with wound vac.        TCU HPI:    Cm Lundberg is a 51 year old male with hx of CAD, s/p stents and CABG, HTN, DM, prior CVA, afib/flutter on xarelto, admitted to the hospital on 12/17/2021 for elective procedure for right foot ulceration. He underwent incision and drainage of the right foot, excisional debridement of the ulceration of the right foot into the level of the muscle, under MAC with popliteal block anesthesia on 12/17/2021. Wound vac was subsequently placed. He had no hospital complications. Seen by ID with recommendation for 5 days Augmentin at hospital discharge based on culture results. Chronic anticoagulation was restarted. He was felt to need TCU and discharged to TCU on 12/22/0221 for PT, OT, nursing cares, medical management and monitoring.       Today:  He continues NWB with wound vac in place, next follow up with podiatry Dr. Kim changed from Fri. 2/18/2022 to today 2/17/2022. His blood sugars were somewhat elevated, so last visit, home glipizide was restarted, blood sugars better. Continuing to monitor in TCU. Appetite is good. No abdominal pain. Tends to be constipated, has bowel meds available. No urinary sx. He denies fever, cough, congestion, fever. Unable to return to prior residence [facility] until he is off the wound vac.       PAST MEDICAL HISTORY:  Past Medical History:   Diagnosis Date     Anxiety      Arthritis      Cerebral infarction (H)      Congestive heart failure (H)      Coronary artery disease     CABG with multiple stents     Depression      Diabetes (H)      Equinovarus  deformity, acquired, right      GERD (gastroesophageal reflux disease)      Heart attack (H)      Hyperlipidemia      Hypertension      Migraine      Migraines      Myalgia      PONV (postoperative nausea and vomiting)     mild nausea     Stented coronary artery        MEDICATIONS:  Current Outpatient Medications   Medication Sig Dispense Refill     acetaminophen (TYLENOL) 325 MG tablet Take 650 mg by mouth every 4 hours as needed for mild pain        ARIPiprazole (ABILIFY) 2 MG tablet 2 mg daily        atorvastatin (LIPITOR) 80 MG tablet Take 80 mg by mouth daily        cholestyramine (QUESTRAN) 4 g packet Take 1 packet by mouth every other day        DULoxetine (CYMBALTA) 60 MG capsule Take 120 mg by mouth daily takle 120mg       erenumab-aooe (AIMOVIG) 140 MG/ML injection Inject 140 mg Subcutaneous every 30 days        fenofibrate micronized (LOFIBRA) 200 MG capsule Take 200 mg by mouth every morning (before breakfast)        ferrous sulfate (FEROSUL) 325 (65 Fe) MG tablet Take 325 mg by mouth daily (with breakfast)        gabapentin (NEURONTIN) 100 MG capsule Take 100 mg by mouth 2 times daily (before meals)        gabapentin (NEURONTIN) 300 MG capsule Take 600 mg by mouth At Bedtime       glipiZIDE (GLUCOTROL) 5 MG tablet TAKE 1/2 TABLET (2.5MG) BY MOUTH DAILY DX DIABETES       loperamide (IMODIUM) 2 MG capsule Take 4 mg by mouth as needed for diarrhea        LORazepam (ATIVAN) 0.5 MG tablet Take 1 tablet (0.5 mg) by mouth daily before breakfast 10 tablet 0     losartan (COZAAR) 25 MG tablet Take 25 mg by mouth At Bedtime        magnesium oxide 400 MG CAPS Take 400 mg by mouth 4 times daily       Menthol, Topical Analgesic, (BIOFREEZE ROLL-ON) 4 % GEL Apply 1 Application topically 2 times daily Apply to lower back for pain       metFORMIN (GLUCOPHAGE) 1000 MG tablet Take 0.5 tablets (500 mg) by mouth 2 times daily (with meals) (Patient taking differently: Take 1,000 mg by mouth daily 1000 mg in the AM and 500  "Mg in the PM)  0     metFORMIN (GLUCOPHAGE) 500 MG tablet TAKE 2 TABS (1000MG) BY MOUTH EVERY MORNING;TAKE 1 TAB BY MOUTH EVERY EVENING       metoprolol succinate ER (TOPROL-XL) 25 MG 24 hr tablet Take 75 mg by mouth daily        mirtazapine (REMERON) 7.5 MG tablet Take 7.5 mg by mouth At Bedtime        Neomycin-Bacitracin-Polymyxin (TRIPLE ANTIBIOTIC) 3.5-400-5000 OINT ointment Externally apply 1 applicator topically 3 times daily as needed       Nitroglycerin (NITROSTAT SL) Place 0.4 mg under the tongue every 5 minutes as needed for chest pain        omeprazole (PRILOSEC) 20 MG DR capsule Take 20 mg by mouth daily        oxyCODONE (ROXICODONE) 5 MG tablet TAKE 1 TAB BY MOUTH EVERY 6HOURS AS NEEDED FOR PAIN 28 tablet 0     polyethylene glycol (MIRALAX) 17 GM/Dose powder Take 1 packet by mouth daily as needed for constipation        Rivaroxaban (XARELTO PO) Take 20 mg by mouth daily        senna-docusate (SENOKOT-S/PERICOLACE) 8.6-50 MG tablet Take 1 tablet by mouth 2 times daily as needed for constipation        traZODone (DESYREL) 50 MG tablet Take 50 mg by mouth At Bedtime        vitamin C (ASCORBIC ACID) 500 MG tablet Take 500 mg by mouth daily          PHYSICAL EXAM:  General: Patient is alert male, no distress.   Vitals: BP (!) 142/76   Pulse 60   Temp 97.3  F (36.3  C)   Resp 16   Ht 1.702 m (5' 7\")   Wt 65.5 kg (144 lb 6.4 oz)   SpO2 95%   BMI 22.62 kg/m    HEENT: Head is NCAT. Eyes show no injection or icterus. Nares negative. Oropharynx well hydrated.  Neck: No JVD.  Lungs: Non labored respirations.  : Deferred.  Extremities: No edema is noted.  Musculoskeletal: Wound vac right foot.   Psych: Mood appears good.      LABS/DIAGNOSTIC DATA:  Component      Latest Ref Rng & Units 12/21/2021   Hemoglobin A1C      <=5.6 % 5.1     Component      Latest Ref Rng & Units 12/18/2021   Sodium      136 - 145 mmol/L 138   Potassium      3.5 - 5.0 mmol/L 4.5   Chloride      98 - 107 mmol/L 106   Carbon Dioxide   "    22 - 31 mmol/L 21 (L)   Anion Gap      5 - 18 mmol/L 11   Glucose      70 - 125 mg/dL 119   Calcium      8.5 - 10.5 mg/dL 9.3   Urea Nitrogen      8 - 22 mg/dL 10   Creatinine      0.70 - 1.30 mg/dL 0.86   GFR Estimate      >60 mL/min/1.73m2 >90         ASSESSMENT/PLAN:  1. Right foot ulceration. S/p incision and drainage 12/17/2022; S/p Incision and drainage, right foot with application of theraskin 1/27/2022. Wound vac in place, has follow up appt with podiatry today 2/17/2022, scheduled appt changed from Fri 2/18/2022 to today. Continue NWB.   2. HTN. On metoprolol and losartan. BPs satisfactory, continue to monitor in TCU.  3. Afib/flutter. Anticoagulated with xarelto.  4. DM. On metformin. Home glipizide was restarted yesterday after being stopped per hospital. Continue accuchecks in TCU with further adjustments if needed.   5. Stable medical conditions: CAD. Hx of MIs. Hx of stenting and prior CABG. On statin, xarelto, beta blocker; Hx of stroke. Continue statin, xarelto; Anemia, on iron; Mood disorder, mixed depression and anxiety on duloxetine, Abilify; Migraines. Receives monthly Aimovig.           Electronically signed by: Heidi Xie MD

## 2022-02-24 NOTE — PROGRESS NOTES
[facility] GERIATRIC SERVICES    Facility:   [facility] ([facility]) [[id number]]   Code Status: POLST AVAILABLE      CHIEF COMPLAINT/REASON FOR VISIT:  Chief Complaint   Patient presents with     RECHECK     asked to see       HISTORY:      HPI: Cm is a 51 year old male who initially was hospitalized December 17, 2021 through December 22, 2021 secondary to ulceration of the right foot status post incision and drainage/surgical debridement who I had the opportunity to visit with and was asked to see secondary to going over his medications, blood sugars, and his transitional care stay.  We did have a wonderful visit today.  He is from the [facility] and went to [email or web address]  Very nice gentleman.  History of CVA with right hemiparesis.  Has performed in therapy and now no longer having any occupational or physical therapy but we did talk about him doing his various exercises that they taught him and that he can do the exercises on his own and continue to keep strong.  In the meantime he has been normotensive and afebrile and also on room air.  Systolic blood pressures ranging 120-140.  His blood sugars over the past couple of weeks have been ranging  and he states his appetite to be good and does not have any obvious hypoglycemic episodes.  For pain he does have Tylenol as needed he is also on Cymbalta and gabapentin and has oxycodone as needed.  For anticoagulation he is on Xarelto.  He states his pain to be well managed.  His appetite is good.  Sleeping well at night.  His weight is stable at 144 pounds.  He does have a wound VAC which was just changed this morning before I got there so I did not rechange it.  But I did look at the wound itself as well as the wound VAC.  He does have a follow-up next week with podiatry.    Past Medical History:   Diagnosis Date     Anxiety      Arthritis      Cerebral infarction (H)      Congestive heart failure (H)      Coronary artery disease      CABG with multiple stents     Depression      Diabetes (H)      Equinovarus deformity, acquired, right      GERD (gastroesophageal reflux disease)      Heart attack (H)      Hyperlipidemia      Hypertension      Migraine      Migraines      Myalgia      PONV (postoperative nausea and vomiting)     mild nausea     Stented coronary artery             Family History   Adopted: Yes      Social History     Socioeconomic History     Marital status:      Spouse name: Not on file     Number of children: Not on file     Years of education: Not on file     Highest education level: Not on file   Occupational History     Not on file   Tobacco Use     Smoking status: Former Smoker     Quit date: 2015     Years since quittin.0     Smokeless tobacco: Never Used   Vaping Use     Vaping Use: Never used   Substance and Sexual Activity     Alcohol use: Not Currently     Drug use: Not Currently     Sexual activity: Not on file   Other Topics Concern     Parent/sibling w/ CABG, MI or angioplasty before 65F 55M? Not Asked   Social History Narrative     Not on file     Social Determinants of Health     Financial Resource Strain: Not on file   Food Insecurity: Not on file   Transportation Needs: Not on file   Physical Activity: Not on file   Stress: Not on file   Social Connections: Not on file   Intimate Partner Violence: Not on file   Housing Stability: Not on file        REVIEW OF SYSTEM:  Currently denies any new symptoms of fever cough cold flulike symptoms nausea vomiting diarrhea dysuria frequency urgency sore throat postnasal drip headache or rashes.    PHYSICAL EXAM:   Pleasant gentleman in no acute distress.  Head is normocephalic.  Conjunctiva is pink and sclerae clear.  Neck is supple without adenopathy.  Lung sounds are clear throughout.  Cardiovascular S1-S2 regular rate no lower extremity edema.  Gastrointestinal soft and nontender.  Musculoskeletal history of CVA with right hemiplegia.  He does have a wound  "VAC to his right midfoot.  Psychiatric: Pleasant affect.     /74   Pulse 68   Temp 98.5  F (36.9  C)   Resp 18   Ht 1.702 m (5' 7\")   Wt 65.5 kg (144 lb 6.4 oz)   SpO2 99%   BMI 22.62 kg/m      LABS:   Lab Results   Component Value Date    A1C 5.1 12/21/2021    A1C 5.9 11/18/2019    A1C 9.8 07/30/2019    A1C 9.2 04/29/2019    A1C 5.0 10/12/2018    A1C 5.3 08/25/2016     CBC RESULTS: Recent Labs   Lab Test 12/28/21  0548   WBC 5.4   RBC 4.01*   HGB 11.1*   HCT 36.9*   MCV 92   MCH 27.7   MCHC 30.1*   RDW 13.4              ASSESSMENT:    Encounter Diagnoses   Name Primary?     Diabetic ulcer of right midfoot associated with type 2 diabetes mellitus, limited to breakdown of skin (H) Yes     Type 2 diabetes mellitus with hyperglycemia, unspecified whether long term insulin use (H)      Essential hypertension      Weakness        PLAN:    No medication changes with this visit.  Blood sugars overall look pretty good.  He is no longer enrolled in therapy but he enjoyed the conversation and was encouraged to continue to work on the exercises that they taught him to continue to keep himself strong.  He will follow-up with podiatry as previously arranged.  He appreciated also the conversation regarding his nutrition and blood pressures.  He did not have any other questions.        Electronically signed by: Beto Murray NP    "

## 2022-02-24 NOTE — LETTER
2/24/2022        RE: Cm Lundberg  [address]  [place] [address]        [facility] GERIATRIC SERVICES    Facility:   [facility] ([facility]) [[id number]]   Code Status: POLST AVAILABLE      CHIEF COMPLAINT/REASON FOR VISIT:  Chief Complaint   Patient presents with     RECHECK     asked to see       HISTORY:      HPI: Cm is a 51 year old male who initially was hospitalized December 17, 2021 through December 22, 2021 secondary to ulceration of the right foot status post incision and drainage/surgical debridement who I had the opportunity to visit with and was asked to see secondary to going over his medications, blood sugars, and his transitional care stay.  We did have a wonderful visit today.  He is from the [facility] and went to [employer].  Very nice gentleman.  History of CVA with right hemiparesis.  Has performed in therapy and now no longer having any occupational or physical therapy but we did talk about him doing his various exercises that they taught him and that he can do the exercises on his own and continue to keep strong.  In the meantime he has been normotensive and afebrile and also on room air.  Systolic blood pressures ranging 120-140.  His blood sugars over the past couple of weeks have been ranging  and he states his appetite to be good and does not have any obvious hypoglycemic episodes.  For pain he does have Tylenol as needed he is also on Cymbalta and gabapentin and has oxycodone as needed.  For anticoagulation he is on Xarelto.  He states his pain to be well managed.  His appetite is good.  Sleeping well at night.  His weight is stable at 144 pounds.  He does have a wound VAC which was just changed this morning before I got there so I did not rechange it.  But I did look at the wound itself as well as the wound VAC.  He does have a follow-up next week with podiatry.    Past Medical History:   Diagnosis Date     Anxiety       Arthritis      Cerebral infarction (H)      Congestive heart failure (H)      Coronary artery disease     CABG with multiple stents     Depression      Diabetes (H)      Equinovarus deformity, acquired, right      GERD (gastroesophageal reflux disease)      Heart attack (H)      Hyperlipidemia      Hypertension      Migraine      Migraines      Myalgia      PONV (postoperative nausea and vomiting)     mild nausea     Stented coronary artery             Family History   Adopted: Yes      Social History     Socioeconomic History     Marital status:      Spouse name: Not on file     Number of children: Not on file     Years of education: Not on file     Highest education level: Not on file   Occupational History     Not on file   Tobacco Use     Smoking status: Former Smoker     Quit date: 2015     Years since quittin.0     Smokeless tobacco: Never Used   Vaping Use     Vaping Use: Never used   Substance and Sexual Activity     Alcohol use: Not Currently     Drug use: Not Currently     Sexual activity: Not on file   Other Topics Concern     Parent/sibling w/ CABG, MI or angioplasty before 65F 55M? Not Asked   Social History Narrative     Not on file     Social Determinants of Health     Financial Resource Strain: Not on file   Food Insecurity: Not on file   Transportation Needs: Not on file   Physical Activity: Not on file   Stress: Not on file   Social Connections: Not on file   Intimate Partner Violence: Not on file   Housing Stability: Not on file        REVIEW OF SYSTEM:  Currently denies any new symptoms of fever cough cold flulike symptoms nausea vomiting diarrhea dysuria frequency urgency sore throat postnasal drip headache or rashes.    PHYSICAL EXAM:   Pleasant gentleman in no acute distress.  Head is normocephalic.  Conjunctiva is pink and sclerae clear.  Neck is supple without adenopathy.  Lung sounds are clear throughout.  Cardiovascular S1-S2 regular rate no lower extremity edema.   "Gastrointestinal soft and nontender.  Musculoskeletal history of CVA with right hemiplegia.  He does have a wound VAC to his right midfoot.  Psychiatric: Pleasant affect.     /74   Pulse 68   Temp 98.5  F (36.9  C)   Resp 18   Ht 1.702 m (5' 7\")   Wt 65.5 kg (144 lb 6.4 oz)   SpO2 99%   BMI 22.62 kg/m      LABS:   Lab Results   Component Value Date    A1C 5.1 12/21/2021    A1C 5.9 11/18/2019    A1C 9.8 07/30/2019    A1C 9.2 04/29/2019    A1C 5.0 10/12/2018    A1C 5.3 08/25/2016     CBC RESULTS: Recent Labs   Lab Test 12/28/21  0548   WBC 5.4   RBC 4.01*   HGB 11.1*   HCT 36.9*   MCV 92   MCH 27.7   MCHC 30.1*   RDW 13.4              ASSESSMENT:    Encounter Diagnoses   Name Primary?     Diabetic ulcer of right midfoot associated with type 2 diabetes mellitus, limited to breakdown of skin (H) Yes     Type 2 diabetes mellitus with hyperglycemia, unspecified whether long term insulin use (H)      Essential hypertension      Weakness        PLAN:    No medication changes with this visit.  Blood sugars overall look pretty good.  He is no longer enrolled in therapy but he enjoyed the conversation and was encouraged to continue to work on the exercises that they taught him to continue to keep himself strong.  He will follow-up with podiatry as previously arranged.  He appreciated also the conversation regarding his nutrition and blood pressures.  He did not have any other questions.        Electronically signed by: Beto Murray NP          Sincerely,        Beto Murray NP      "

## 2022-03-01 ENCOUNTER — TRANSITIONAL CARE UNIT VISIT (OUTPATIENT)
Dept: GERIATRICS | Facility: CLINIC | Age: 52
End: 2022-03-01
Payer: MEDICARE

## 2022-03-01 VITALS
OXYGEN SATURATION: 99 % | TEMPERATURE: 98.9 F | RESPIRATION RATE: 18 BRPM | SYSTOLIC BLOOD PRESSURE: 130 MMHG | DIASTOLIC BLOOD PRESSURE: 85 MMHG | WEIGHT: 143 LBS | BODY MASS INDEX: 22.44 KG/M2 | HEIGHT: 67 IN | HEART RATE: 79 BPM

## 2022-03-01 DIAGNOSIS — L97.411 DIABETIC ULCER OF RIGHT MIDFOOT ASSOCIATED WITH TYPE 2 DIABETES MELLITUS, LIMITED TO BREAKDOWN OF SKIN (H): ICD-10-CM

## 2022-03-01 DIAGNOSIS — R53.81 PHYSICAL DECONDITIONING: Primary | ICD-10-CM

## 2022-03-01 DIAGNOSIS — R52 PAIN MANAGEMENT: ICD-10-CM

## 2022-03-01 DIAGNOSIS — E11.621 DIABETIC ULCER OF RIGHT MIDFOOT ASSOCIATED WITH TYPE 2 DIABETES MELLITUS, LIMITED TO BREAKDOWN OF SKIN (H): ICD-10-CM

## 2022-03-01 PROCEDURE — 99310 SBSQ NF CARE HIGH MDM 45: CPT | Performed by: NURSE PRACTITIONER

## 2022-03-01 NOTE — LETTER
3/1/2022        RE: Cm Lundberg  [address]  [place] [address]        [facility] GERIATRIC SERVICES  Chief Complaint   Patient presents with     RECHECK     [facility] Record Number:  [id number]  Place of Service where encounter took place:  [facility] ([facility]) [[id number]]  Code Status:  Prior     HISTORY:      HPI:  Cm Lundberg  is 51 year old (1970) undergoing physical and occupational therapy.  He is  with a medical history of CVA due to embolism, CAD, ischemic cardiomyopathy, type II diabetes mellitus, and hypertension,admitted to the hospital on 12/17/2021 for elective procedure for right foot ulceration. He underwent incision and drainage of the right foot, excisional debridement of the ulceration of the right foot into the level of the muscle, under MAC with popliteal block anesthesia on 12/17/2021. Wound vac was subsequently placed.     He was in the transitional care unit and returned to the ER on 1/6/2022 with reports of chest pain. Per hospital records.Patient presents with left sided chest pain that began at 0700 this morning. He describes this pain as stabbing, and reports that it radiates up into his shoulder and neck. He endorses associated lower leg edema. He denies any palliative or provoking factors. The patient states that he has had symptoms similar to these six months ago. He also mentions that he has had 28 heart attacks in the past. En route to the hospital, he was given nitro by EMS which provided some relief. The patient states that his pain is mostly gone now. His pain resolved in the ED without recurrence & EKG normal X2 & troponin negative. I was asked to follow up a repeat troponin which was negative as well. No recurrence of chest pain & denies new complaints.     Today is seen in his room to review vital signs, labs, follow-up right foot wound. He denied chest pain shortness of breath cough or congestion. . He has a wound  VAC in place right foot  and will return to his facility when the wound VAC is discontinued.  Blood sugars have been reviewed and stable at this time.  He rates his pain 8-9 but comes down to 2 out of 3 after pain medications.  He continues nonweightbearing right lower extremity    ALLERGIES:Aspirin, Isosorbide, Codeine, and Morphine    PAST MEDICAL HISTORY:   Past Medical History:   Diagnosis Date     Anxiety      Arthritis      Cerebral infarction (H)      Congestive heart failure (H)      Coronary artery disease     CABG with multiple stents     Depression      Diabetes (H)      Equinovarus deformity, acquired, right      GERD (gastroesophageal reflux disease)      Heart attack (H)      Hyperlipidemia      Hypertension      Migraine      Migraines      Myalgia      PONV (postoperative nausea and vomiting)     mild nausea     Stented coronary artery        PAST SURGICAL HISTORY:   has a past surgical history that includes IR Miscellaneous Procedure (2/13/2004); CABG, VEIN, SINGLE; Incision and drainage lower extremity, combined (Right, 12/17/2021); and Incision and drainage lower extremity, combined (Right, 1/27/2022).    FAMILY HISTORY: family history is not on file. He was adopted.    SOCIAL HISTORY:  reports that he quit smoking about 7 years ago. He has never used smokeless tobacco. He reports previous alcohol use. He reports previous drug use.    Constitutional:  appetite change, fatigue and fever.   HENT: Negative for congestion.    Respiratory: Negative for cough, shortness of breath and wheezing.    Cardiovascular: Negative for chest pain and leg swelling.   Gastrointestinal: Negative for abdominal distention, abdominal pain,   negative constipation diarrhea and nausea.   Genitourinary: Negative for dysuria.   Musculoskeletal: Negative for arthralgia. Negative for back pain. Extremities with atrophy, decreased range of motion right upper extremity, wound VAC right foot  Skin: Negative for color change and  "wound.   Neurological: Negative for dizziness.   Psychiatric/Behavioral: Negative for agitation, behavioral problems and confusion.      Physical Exam:  Constitutional:       Appearance: Patient is well-developed.   HENT:      Head: Normocephalic.   Eyes:      Conjunctiva/sclera: Conjunctivae normal.   Neck:      Musculoskeletal: Normal range of motion.   Cardiovascular:      Rate and Rhythm: Normal rate and regular rhythm.      Heart sounds: Normal heart sounds. No murmur.   Pulmonary:      Effort: No respiratory distress.      Breath sounds: Normal breath sounds. No wheezing or rales.   Abdominal:      General: Bowel sounds are normal. There is no distension.      Palpations: Abdomen is soft.      Tenderness: There is no abdominal tenderness.   Musculoskeletal:       Normal range of motion.   Decreased range of motion right upper extremity, wound VAC right foot   Skin:General:        Skin is warm.   Neurological:         Mental Status: Patient is alert and oriented to person, place, and time.   Psychiatric:         Behavior: Behavior normal.   Vitals:/85   Pulse 79   Temp 98.9  F (37.2  C)   Resp 18   Ht 1.702 m (5' 7\")   Wt 64.9 kg (143 lb)   SpO2 99%   BMI 22.40 kg/m   and Body mass index is 22.4 kg/m .    Lab/Diagnostic data:   No results found for this or any previous visit (from the past 240 hour(s)).    MEDICATIONS:     Review of your medicines          Accurate as of March 1, 2022 11:59 PM. If you have any questions, ask your nurse or doctor.            CONTINUE these medicines which may have CHANGED, or have new prescriptions. If we are uncertain of the size of tablets/capsules you have at home, strength may be listed as something that might have changed.      Dose / Directions   metFORMIN 500 MG tablet  Commonly known as: GLUCOPHAGE  This may have changed: Another medication with the same name was removed. Continue taking this medication, and follow the directions you see here.  Changed by: " Gladys Baca CNP      TAKE 2 TABS (1000MG) BY MOUTH EVERY MORNING;TAKE 1 TAB BY MOUTH EVERY EVENING  Refills: 0        CONTINUE these medicines which have NOT CHANGED      Dose / Directions   acetaminophen 325 MG tablet  Commonly known as: TYLENOL      Dose: 650 mg  Take 650 mg by mouth every 4 hours as needed for mild pain  Refills: 0     Aimovig 140 MG/ML injection  Generic drug: erenumab-aooe      Dose: 140 mg  Inject 140 mg Subcutaneous every 30 days  Refills: 0     ARIPiprazole 2 MG tablet  Commonly known as: ABILIFY      Dose: 2 mg  2 mg daily  Refills: 0     atorvastatin 80 MG tablet  Commonly known as: LIPITOR      Dose: 80 mg  Take 80 mg by mouth daily  Refills: 0     Biofreeze Roll-On 4 % Gel  Generic drug: Menthol (Topical Analgesic)      Dose: 1 Application  Apply 1 Application topically 2 times daily Apply to lower back for pain  Refills: 0     cholestyramine 4 g packet  Commonly known as: QUESTRAN      Dose: 1 packet  Take 1 packet by mouth every other day  Refills: 0     DULoxetine 60 MG capsule  Commonly known as: CYMBALTA      Dose: 120 mg  Take 120 mg by mouth daily takle 120mg  Refills: 0     fenofibrate micronized 200 MG capsule  Commonly known as: LOFIBRA      Dose: 200 mg  Take 200 mg by mouth every morning (before breakfast)  Refills: 0     ferrous sulfate 325 (65 Fe) MG tablet  Commonly known as: FEROSUL      Dose: 325 mg  Take 325 mg by mouth daily (with breakfast)  Refills: 0     * gabapentin 100 MG capsule  Commonly known as: NEURONTIN      Dose: 100 mg  Take 100 mg by mouth 2 times daily (before meals)  Refills: 0     * gabapentin 300 MG capsule  Commonly known as: NEURONTIN      Dose: 600 mg  Take 600 mg by mouth At Bedtime  Refills: 0     glipiZIDE 5 MG tablet  Commonly known as: GLUCOTROL      TAKE 1/2 TABLET (2.5MG) BY MOUTH DAILY DX DIABETES  Refills: 0     loperamide 2 MG capsule  Commonly known as: IMODIUM      Dose: 4 mg  Take 4 mg by mouth as needed for diarrhea  Refills: 0      losartan 25 MG tablet  Commonly known as: COZAAR      Dose: 25 mg  Take 25 mg by mouth At Bedtime  Refills: 0     magnesium oxide 400 MG Caps      Dose: 400 mg  Take 400 mg by mouth 4 times daily  Refills: 0     metoprolol succinate ER 25 MG 24 hr tablet  Commonly known as: TOPROL-XL      Dose: 75 mg  Take 75 mg by mouth daily  Refills: 0     mirtazapine 7.5 MG tablet  Commonly known as: REMERON      Dose: 7.5 mg  Take 7.5 mg by mouth At Bedtime  Refills: 0     NITROSTAT SL      Dose: 0.4 mg  Place 0.4 mg under the tongue every 5 minutes as needed for chest pain  Refills: 0     omeprazole 20 MG DR capsule  Commonly known as: priLOSEC      Dose: 20 mg  Take 20 mg by mouth daily  Refills: 0     oxyCODONE 5 MG tablet  Commonly known as: ROXICODONE  Used for: Diabetic ulcer of right midfoot associated with type 2 diabetes mellitus, limited to breakdown of skin (H)      TAKE 1 TAB BY MOUTH EVERY 6HOURS AS NEEDED FOR PAIN  Quantity: 28 tablet  Refills: 0     polyethylene glycol 17 GM/Dose powder  Commonly known as: MIRALAX      Dose: 1 packet  Take 1 packet by mouth daily as needed for constipation  Refills: 0     senna-docusate 8.6-50 MG tablet  Commonly known as: SENOKOT-S/PERICOLACE      Dose: 1 tablet  Take 1 tablet by mouth 2 times daily as needed for constipation  Refills: 0     traZODone 50 MG tablet  Commonly known as: DESYREL      Dose: 50 mg  Take 50 mg by mouth At Bedtime  Refills: 0     Triple Antibiotic 3.5-400-5000 Oint ointment      Dose: 1 applicator  Externally apply 1 applicator topically 3 times daily as needed  Refills: 0     vitamin C 500 MG tablet  Commonly known as: ASCORBIC ACID      Dose: 500 mg  Take 500 mg by mouth daily  Refills: 0     XARELTO PO      Dose: 20 mg  Take 20 mg by mouth daily  Refills: 0         * This list has 2 medication(s) that are the same as other medications prescribed for you. Read the directions carefully, and ask your doctor or other care provider to review them with  you.            STOP taking    LORazepam 0.5 MG tablet  Commonly known as: ATIVAN  Stopped by: Gladys Baca CNP               ASSESSMENT/PLAN  Encounter Diagnoses   Name Primary?     Physical deconditioning Yes     Diabetic ulcer of right midfoot associated with type 2 diabetes mellitus, limited to breakdown of skin (H)      Pain management      Diabetic ulcer right foot follow-up with vascular as scheduled, pain control, wound VAC      Physical deconditioning PT OT     Pain management as needed Tylenol and oxycodone     Diabetes type 2  metformin to 1000 mg in the a.m. and keep at 500 mg p.m. fingersticks as scheduled A1c in 3 months AIC 12/21/21 was 5.1     Hypertension on losartan and metoprolol succinate  .  GERD continue omeprazole      Hx of stroke. Continue statin, xarelto.     Anemia. On ferrous sulfate      Mood disorder, mixed depression and anxiety. Continue to duloxetine, Abilify.      Anticoagulation management on Xarelto         Electronically signed by: Gladys Baca CNP        Sincerely,        Gladys Baca CNP

## 2022-03-02 NOTE — PROGRESS NOTES
[facility] GERIATRIC SERVICES  Chief Complaint   Patient presents with     JB     [facility] Record Number:  [id number]  Place of Service where encounter took place:  [facility] ([facility]) [[id number]]  Code Status:  Prior     HISTORY:      HPI:  Cm Lundberg  is 51 year old (1970) undergoing physical and occupational therapy.  He is  with a medical history of CVA due to embolism, CAD, ischemic cardiomyopathy, type II diabetes mellitus, and hypertension,admitted to the hospital on 12/17/2021 for elective procedure for right foot ulceration. He underwent incision and drainage of the right foot, excisional debridement of the ulceration of the right foot into the level of the muscle, under MAC with popliteal block anesthesia on 12/17/2021. Wound vac was subsequently placed.     He was in the transitional care unit and returned to the ER on 1/6/2022 with reports of chest pain. Per hospital records.Patient presents with left sided chest pain that began at 0700 this morning. He describes this pain as stabbing, and reports that it radiates up into his shoulder and neck. He endorses associated lower leg edema. He denies any palliative or provoking factors. The patient states that he has had symptoms similar to these six months ago. He also mentions that he has had 28 heart attacks in the past. En route to the hospital, he was given nitro by EMS which provided some relief. The patient states that his pain is mostly gone now. His pain resolved in the ED without recurrence & EKG normal X2 & troponin negative. I was asked to follow up a repeat troponin which was negative as well. No recurrence of chest pain & denies new complaints.     Today is seen in his room to review vital signs, labs, follow-up right foot wound. He denied chest pain shortness of breath cough or congestion. . He has a wound VAC in place right foot  and will return to his facility when the wound VAC is discontinued.  Blood  sugars have been reviewed and stable at this time.  He rates his pain 8-9 but comes down to 2 out of 3 after pain medications.  He continues nonweightbearing right lower extremity    ALLERGIES:Aspirin, Isosorbide, Codeine, and Morphine    PAST MEDICAL HISTORY:   Past Medical History:   Diagnosis Date     Anxiety      Arthritis      Cerebral infarction (H)      Congestive heart failure (H)      Coronary artery disease     CABG with multiple stents     Depression      Diabetes (H)      Equinovarus deformity, acquired, right      GERD (gastroesophageal reflux disease)      Heart attack (H)      Hyperlipidemia      Hypertension      Migraine      Migraines      Myalgia      PONV (postoperative nausea and vomiting)     mild nausea     Stented coronary artery        PAST SURGICAL HISTORY:   has a past surgical history that includes IR Miscellaneous Procedure (2/13/2004); CABG, VEIN, SINGLE; Incision and drainage lower extremity, combined (Right, 12/17/2021); and Incision and drainage lower extremity, combined (Right, 1/27/2022).    FAMILY HISTORY: family history is not on file. He was adopted.    SOCIAL HISTORY:  reports that he quit smoking about 7 years ago. He has never used smokeless tobacco. He reports previous alcohol use. He reports previous drug use.    Constitutional:  appetite change, fatigue and fever.   HENT: Negative for congestion.    Respiratory: Negative for cough, shortness of breath and wheezing.    Cardiovascular: Negative for chest pain and leg swelling.   Gastrointestinal: Negative for abdominal distention, abdominal pain,   negative constipation diarrhea and nausea.   Genitourinary: Negative for dysuria.   Musculoskeletal: Negative for arthralgia. Negative for back pain. Extremities with atrophy, decreased range of motion right upper extremity, wound VAC right foot  Skin: Negative for color change and wound.   Neurological: Negative for dizziness.   Psychiatric/Behavioral: Negative  "for agitation, behavioral problems and confusion.      Physical Exam:  Constitutional:       Appearance: Patient is well-developed.   HENT:      Head: Normocephalic.   Eyes:      Conjunctiva/sclera: Conjunctivae normal.   Neck:      Musculoskeletal: Normal range of motion.   Cardiovascular:      Rate and Rhythm: Normal rate and regular rhythm.      Heart sounds: Normal heart sounds. No murmur.   Pulmonary:      Effort: No respiratory distress.      Breath sounds: Normal breath sounds. No wheezing or rales.   Abdominal:      General: Bowel sounds are normal. There is no distension.      Palpations: Abdomen is soft.      Tenderness: There is no abdominal tenderness.   Musculoskeletal:       Normal range of motion.   Decreased range of motion right upper extremity, wound VAC right foot   Skin:General:        Skin is warm.   Neurological:         Mental Status: Patient is alert and oriented to person, place, and time.   Psychiatric:         Behavior: Behavior normal.   Vitals:/85   Pulse 79   Temp 98.9  F (37.2  C)   Resp 18   Ht 1.702 m (5' 7\")   Wt 64.9 kg (143 lb)   SpO2 99%   BMI 22.40 kg/m   and Body mass index is 22.4 kg/m .    Lab/Diagnostic data:   No results found for this or any previous visit (from the past 240 hour(s)).    MEDICATIONS:     Review of your medicines          Accurate as of March 1, 2022 11:59 PM. If you have any questions, ask your nurse or doctor.            CONTINUE these medicines which may have CHANGED, or have new prescriptions. If we are uncertain of the size of tablets/capsules you have at home, strength may be listed as something that might have changed.      Dose / Directions   metFORMIN 500 MG tablet  Commonly known as: GLUCOPHAGE  This may have changed: Another medication with the same name was removed. Continue taking this medication, and follow the directions you see here.  Changed by: Gladys Baca CNP      TAKE 2 TABS (1000MG) BY MOUTH EVERY MORNING;TAKE 1 TAB BY " MOUTH EVERY EVENING  Refills: 0        CONTINUE these medicines which have NOT CHANGED      Dose / Directions   acetaminophen 325 MG tablet  Commonly known as: TYLENOL      Dose: 650 mg  Take 650 mg by mouth every 4 hours as needed for mild pain  Refills: 0     Aimovig 140 MG/ML injection  Generic drug: erenumab-aooe      Dose: 140 mg  Inject 140 mg Subcutaneous every 30 days  Refills: 0     ARIPiprazole 2 MG tablet  Commonly known as: ABILIFY      Dose: 2 mg  2 mg daily  Refills: 0     atorvastatin 80 MG tablet  Commonly known as: LIPITOR      Dose: 80 mg  Take 80 mg by mouth daily  Refills: 0     Biofreeze Roll-On 4 % Gel  Generic drug: Menthol (Topical Analgesic)      Dose: 1 Application  Apply 1 Application topically 2 times daily Apply to lower back for pain  Refills: 0     cholestyramine 4 g packet  Commonly known as: QUESTRAN      Dose: 1 packet  Take 1 packet by mouth every other day  Refills: 0     DULoxetine 60 MG capsule  Commonly known as: CYMBALTA      Dose: 120 mg  Take 120 mg by mouth daily takle 120mg  Refills: 0     fenofibrate micronized 200 MG capsule  Commonly known as: LOFIBRA      Dose: 200 mg  Take 200 mg by mouth every morning (before breakfast)  Refills: 0     ferrous sulfate 325 (65 Fe) MG tablet  Commonly known as: FEROSUL      Dose: 325 mg  Take 325 mg by mouth daily (with breakfast)  Refills: 0     * gabapentin 100 MG capsule  Commonly known as: NEURONTIN      Dose: 100 mg  Take 100 mg by mouth 2 times daily (before meals)  Refills: 0     * gabapentin 300 MG capsule  Commonly known as: NEURONTIN      Dose: 600 mg  Take 600 mg by mouth At Bedtime  Refills: 0     glipiZIDE 5 MG tablet  Commonly known as: GLUCOTROL      TAKE 1/2 TABLET (2.5MG) BY MOUTH DAILY DX DIABETES  Refills: 0     loperamide 2 MG capsule  Commonly known as: IMODIUM      Dose: 4 mg  Take 4 mg by mouth as needed for diarrhea  Refills: 0     losartan 25 MG tablet  Commonly known as: COZAAR      Dose: 25 mg  Take 25 mg by  mouth At Bedtime  Refills: 0     magnesium oxide 400 MG Caps      Dose: 400 mg  Take 400 mg by mouth 4 times daily  Refills: 0     metoprolol succinate ER 25 MG 24 hr tablet  Commonly known as: TOPROL-XL      Dose: 75 mg  Take 75 mg by mouth daily  Refills: 0     mirtazapine 7.5 MG tablet  Commonly known as: REMERON      Dose: 7.5 mg  Take 7.5 mg by mouth At Bedtime  Refills: 0     NITROSTAT SL      Dose: 0.4 mg  Place 0.4 mg under the tongue every 5 minutes as needed for chest pain  Refills: 0     omeprazole 20 MG DR capsule  Commonly known as: priLOSEC      Dose: 20 mg  Take 20 mg by mouth daily  Refills: 0     oxyCODONE 5 MG tablet  Commonly known as: ROXICODONE  Used for: Diabetic ulcer of right midfoot associated with type 2 diabetes mellitus, limited to breakdown of skin (H)      TAKE 1 TAB BY MOUTH EVERY 6HOURS AS NEEDED FOR PAIN  Quantity: 28 tablet  Refills: 0     polyethylene glycol 17 GM/Dose powder  Commonly known as: MIRALAX      Dose: 1 packet  Take 1 packet by mouth daily as needed for constipation  Refills: 0     senna-docusate 8.6-50 MG tablet  Commonly known as: SENOKOT-S/PERICOLACE      Dose: 1 tablet  Take 1 tablet by mouth 2 times daily as needed for constipation  Refills: 0     traZODone 50 MG tablet  Commonly known as: DESYREL      Dose: 50 mg  Take 50 mg by mouth At Bedtime  Refills: 0     Triple Antibiotic 3.5-400-5000 Oint ointment      Dose: 1 applicator  Externally apply 1 applicator topically 3 times daily as needed  Refills: 0     vitamin C 500 MG tablet  Commonly known as: ASCORBIC ACID      Dose: 500 mg  Take 500 mg by mouth daily  Refills: 0     XARELTO PO      Dose: 20 mg  Take 20 mg by mouth daily  Refills: 0         * This list has 2 medication(s) that are the same as other medications prescribed for you. Read the directions carefully, and ask your doctor or other care provider to review them with you.            STOP taking    LORazepam 0.5 MG tablet  Commonly known as:  ATIVAN  Stopped by: Gladys Baca CNP               ASSESSMENT/PLAN  Encounter Diagnoses   Name Primary?     Physical deconditioning Yes     Diabetic ulcer of right midfoot associated with type 2 diabetes mellitus, limited to breakdown of skin (H)      Pain management      Diabetic ulcer right foot follow-up with vascular as scheduled, pain control, wound VAC      Physical deconditioning PT OT     Pain management as needed Tylenol and oxycodone     Diabetes type 2  metformin to 1000 mg in the a.m. and keep at 500 mg p.m. fingersticks as scheduled A1c in 3 months AIC 12/21/21 was 5.1     Hypertension on losartan and metoprolol succinate  .  GERD continue omeprazole      Hx of stroke. Continue statin, xarelto.     Anemia. On ferrous sulfate      Mood disorder, mixed depression and anxiety. Continue to duloxetine, Abilify.      Anticoagulation management on Xarelto         Electronically signed by: Gladys Baca CNP

## 2022-03-04 ENCOUNTER — OFFICE VISIT (OUTPATIENT)
Dept: VASCULAR SURGERY | Facility: CLINIC | Age: 52
End: 2022-03-04
Attending: PODIATRIST
Payer: MEDICARE

## 2022-03-04 VITALS — DIASTOLIC BLOOD PRESSURE: 78 MMHG | HEART RATE: 81 BPM | SYSTOLIC BLOOD PRESSURE: 120 MMHG | OXYGEN SATURATION: 98 %

## 2022-03-04 DIAGNOSIS — L97.413 DIABETIC ULCER OF RIGHT MIDFOOT ASSOCIATED WITH TYPE 2 DIABETES MELLITUS, WITH NECROSIS OF MUSCLE (H): Primary | ICD-10-CM

## 2022-03-04 DIAGNOSIS — E11.621 DIABETIC ULCER OF RIGHT MIDFOOT ASSOCIATED WITH TYPE 2 DIABETES MELLITUS, WITH NECROSIS OF MUSCLE (H): Primary | ICD-10-CM

## 2022-03-04 PROCEDURE — 11043 DBRDMT MUSC&/FSCA 1ST 20/<: CPT | Performed by: PODIATRIST

## 2022-03-04 ASSESSMENT — PAIN SCALES - GENERAL: PAINLEVEL: SEVERE PAIN (6)

## 2022-03-04 NOTE — PATIENT INSTRUCTIONS
Important lnstructions      1. WEIGHT BEARING STATUS: You are to remain NON WEIGHT BEARING on your right foot. NON WEIGHT BEARING MEANS NO PRESSURE ON YOUR FOOT OR HEEL AT ANY TIME FOR ANY REASON!    2. OFFLOADING DEVICE: Must use a A WHEELCHAIR at all times! (do not use affected foot to push wheelchair)    3. STABILIZATION DEVICE: Use a EGG CRATE BOOT . You will need to WEAR THIS AT ALL TIMES EVEN WHILE IN BED.       4. ELEVATE: Elevating your leg means laying with your head on a pillow and your foot ABOVE YOUR WAIST.     5. DO NOT MOVE YOUR FOOT.  There is a risk of worsening the wound or incision. To give yourself a higher chance of healing, please DO NOT swing foot back and forth and wiggle foot/toes especially when inside a stabilization device.     6. PROTEIN SUPPLEMENTS: Drink protein shakes 2x per day, morning and night (Ensure, Boost, Glucerna)     This is in addition to your normal diet    If you are on a renal diet, make sure to get a protein shake that is best suited for this diet. Please ask if you would like a referral to see a  Registered Dietician.       Dressing Change lnstructions          - Wound Vac Instructions    1. 3x weekly and as needed cleanse the area with NS    2. Pat dry    3. Apply Cavilon no sting barrier wipe to the skin surrounding the wound to protect from drainage/maceration    4. Apply drape around wound. Cut strip of drape and apply to skin if bridging is needed; plan this area in advance; should not be over bony prominence     5. Cut the foam to fit the area of the wound    6. Cover the wound with a non adherent dressing    7. Apply foam over the non adherent dressing    8. Cut narrow strip of foam if bridging    9. Cover foam with drape to obtain air tight seal    10. Cut opening the size of a quarter for where the suction pad will be applied    11. Apply Suction pad    12. 125mmHG suction continuous    [facility] Contact Center can be reached at [phone], 24 hours a day 7 days  a week     - Back up plan in place:     If the negative pressure wound therapy malfunctions or unable to maintain seal: dressing must be removed and reapplied within 2 hours of the incident. If unable to reapply negative pressure wound dressing, place Normal Saline moistened gauze in wound bed and cover with appropriate dressing to keep wound bed moist.  Change wet-to-dry dressing two times a day until healthcare staff can re-implement negative pressure therapy. Change canister at least weekly.  Critical access hospital Contact Center can be reached at [phone], 24 hours a day 7 days a week    Information on Vacuum-Assisted Closure of a Wound  Vacuum-assisted closure (VAC) of a wound is a type of treatment to help wounds heal. It s also known as negative pressure wound therapy. During the treatment, a device lowers air pressure on the wound. This can help the wound heal more quickly.  Understanding the wound VAC system  A wound VAC system has several parts. A foam or gauze dressing is put directly on the wound. The dressing is changed every 24 to 72 hours. An adhesive film covers and seals the dressing and wound. A drainage tube leads from under the adhesive film and connects to a portable vacuum pump. This pump removes air pressure over the wound. It may do this constantly. Or it may do it in cycles. During the treatment, you ll need to carry the portable pump everywhere you go.  Why wound VAC is used  You might need this therapy for a recent traumatic wound. Or you may need it for a chronic wound. This is a wound that does not heal the way it should over time. This can happen with wounds in people who have diabetes. You may need a wound VAC if you ve had a recent skin graft. And you may need a wound VAC for a large wound. Large wounds can take a longer time to heal.  A wound vacuum system may help your wound heal more quickly by:    Draining extra fluid from the wound    Reducing swelling    Reducing bacteria in the  wound    Keeping your wound moist and warm    Helping draw together wound edges    Increasing blood flow to your wound    Decreasing inflammation  Wound VAC offers some other advantages over other types of wound care. It may decrease your overall discomfort. The dressings usually need to be changed less often. And they may be easier to keep in position.  Risks of wound VAC  Wound VAC has some rare risks, such as:    Bleeding (which may be severe)    Wound infection    An abnormal connection between the intestinal tract and the skin (enteric fistula)  Proper training in dressing changes can help reduce the risk for these complications. Also, your doctor will carefully evaluate you to make sure you are a good candidate for the therapy. Certain problems can increase your risk for complications. These include:    Exposed organs or blood vessels    High risk of bleeding from another medical problem    Wound infection    Nearby bone infection    Dead wound tissue    Cancer tissue    Fragile skin, such as from aging or longtime use of topical steroids    Allergy to adhesive    Very poor blood flow to your wound    Wounds close to joints that may reopen because of movement  Your doctor will discuss the risks that apply to you. Make sure to talk with him or her about all of your questions and concerns.  Getting ready for wound VAC  You likely won t need to do much to get ready for wound VAC. In some cases, you may need to wait a while before having this therapy. For example, your doctor may first need to treat an infection in your wound. Dead or damaged tissue may also need to be removed from your wound.  You or a caregiver may need training on how to use the wound VAC device. This is done if you will be able to have your wound vacuum therapy at home. In other cases, you may need to have your wound vacuum therapy in a health care facility.  On the day of your procedure  A health care provider will cover your wound with foam  or gauze wound dressing. An adhesive film will be put over the dressing and wound. This seals the wound. The foam connects to a drainage tube, which leads to a vacuum pump. This pump is portable. When the pump is turned on, it draws fluid through the foam and out the drainage tubing. The pump may run constantly, or it may cycle off and on. Your exact setup will depend on the specific type of wound vacuum system that you use.  Managing your wound  You may need the dressing changed about once a day. You may need it changed more or less often, depending on your wound. You or your caregiver may be trained to do this at home. Or it may be done by a visiting health care provider. Your doctor may prescribe a pain medicine. This is to prevent or reduce pain during the dressing change.  You will likely need to use the wound VAC system for several weeks or months. During this time, you ll carry the portable pump everywhere you go.  Nutrition for wound healing  During this time, make sure you follow a healthy diet. This is needed so the wound can heal and to prevent infection. Your doctor can tell you more about what to include in your diet during this time.  follow up with your doctor if you have a medical condition that led to your wound, such as diabetes. He or she can help you prevent future wounds.  Follow-up care  Your doctor will carefully keep track of your healing. Make sure to keep all follow-up appointments.  When to call your health care provider  Call your health care provider right away if you have any of these:    Fever of 100.4 F (38.0 C) or higher    Increased redness, swelling, or warmth around wound    Increased pain    Bright red blood or blood clots in tubing or the collection chamber of the vacuum         It IS NOT ok to get your wound wet in the bath or shower    SEEK MEDICAL CARE IF:    You have an increase in swelling, pain, or redness around the wound.    You have an increase in the amount of pus  coming from the wound.    There is a bad smell coming from the wound.    The wound appears to be worsening/enlarging    You have a fever greater than 101.5 F      It is ok to continue current wound care treatment/products for the next 2-3 days until new wound care supplies are ordered and arrive. If longer than this please contact our office at [phone].        We want to hear from you!   In the next few weeks, you should receive a call or email to complete a survey about your visit at [facility]. Please help us improve your appointment experience by letting us know how we did today. We strive to make your experience good and value any ways in which we could do better.      We value your input and suggestions.    Thank you for choosing the [facility]!

## 2022-03-04 NOTE — PROGRESS NOTES
FOOT AND ANKLE SURGERY/PODIATRY Progress Note      ASSESSMENT:   Diabetic Ulceration right foot   Equinovarus right foot       TREATMENT:  -I discussed with the patient that the theraskin graft did not incorporate along the lateral right foot ulcer. I recommend he continue with the wound vac at 125 mmHg with non-weight bearing. Will consider additional surgical debridement with different graft if the wound does not improve at his next visit.     -The patient would like to return to his house. Discussed risks of increased weight bearing including delayed wound healing. He will discuss further with his family.     -Continue with protein supplements bid.     -Staples removed today. After discussion of risk factors and consent obtained 2% Lidocaine HCL jelly was applied, under clean conditions, the right and foot ulceration(s) were debrided using #15 blade scalpel.  Devitalized and nonviable tissue, along with any fibrin and slough, was removed to improve granulation tissue formation, stimulate wound healing, decrease overall bacteria load, disrupt biofilm formation and decrease edge senescence. Wound drainage was scant No. Total excisional debridement was 4 sq cm into the muscle/fascia with a depth of 0.4 cm.   Ulcers were improved afterwards and .  Measures were as noted on the flow sheet. A gauze dressing was applied.     -He will follow-up in 2-3 weeks.    Alex Kim DPM  [facility] [place]      HPI: Cm Lundberg was seen again today for a lateral right foot ulceration. Since his last visit, he has remained non-weight bearing with use of the wound vac. He would like to return home.      Past Medical History:   Diagnosis Date     Anxiety      Arthritis      Cerebral infarction (H)      Congestive heart failure (H)      Coronary artery disease     CABG with multiple stents     Depression      Diabetes (H)      Equinovarus deformity, acquired, right      GERD (gastroesophageal reflux  disease)      Heart attack (H)      Hyperlipidemia      Hypertension      Migraine      Migraines      Myalgia      PONV (postoperative nausea and vomiting)     mild nausea     Stented coronary artery        Past Surgical History:   Procedure Laterality Date     INCISION AND DRAINAGE LOWER EXTREMITY, COMBINED Right 12/17/2021    Procedure: INCISION AND DRAINAGE, right foot;  Surgeon: Alex Kim DPM;  Location: [facility] Main OR     INCISION AND DRAINAGE LOWER EXTREMITY, COMBINED Right 1/27/2022    Procedure: INCISION AND DRAINAGE, right foot with application of theraskin;  Surgeon: Alex Kmi DPM;  Location: [facility] Main OR     IR MISCELLANEOUS PROCEDURE  2/13/2004     [facility] CABG, VEIN, SINGLE      Description: CABG (CABG);  Recorded: 04/22/2009;       Allergies   Allergen Reactions     Aspirin Other (See Comments)     Reacts with migraine medicine     Isosorbide Headache     Codeine Rash     Morphine Rash         Current Outpatient Medications:      acetaminophen (TYLENOL) 325 MG tablet, Take 650 mg by mouth every 4 hours as needed for mild pain , Disp: , Rfl:      ARIPiprazole (ABILIFY) 2 MG tablet, 2 mg daily , Disp: , Rfl:      atorvastatin (LIPITOR) 80 MG tablet, Take 80 mg by mouth daily , Disp: , Rfl:      cholestyramine (QUESTRAN) 4 g packet, Take 1 packet by mouth every other day , Disp: , Rfl:      DULoxetine (CYMBALTA) 60 MG capsule, Take 120 mg by mouth daily takle 120mg, Disp: , Rfl:      erenumab-aooe (AIMOVIG) 140 MG/ML injection, Inject 140 mg Subcutaneous every 30 days , Disp: , Rfl:      fenofibrate micronized (LOFIBRA) 200 MG capsule, Take 200 mg by mouth every morning (before breakfast) , Disp: , Rfl:      ferrous sulfate (FEROSUL) 325 (65 Fe) MG tablet, Take 325 mg by mouth daily (with breakfast) , Disp: , Rfl:      gabapentin (NEURONTIN) 100 MG capsule, Take 100 mg by mouth 2 times daily (before meals) , Disp: , Rfl:      gabapentin (NEURONTIN) 300 MG capsule, Take 600 mg  by mouth At Bedtime, Disp: , Rfl:      glipiZIDE (GLUCOTROL) 5 MG tablet, TAKE 1/2 TABLET (2.5MG) BY MOUTH DAILY DX DIABETES, Disp: , Rfl:      loperamide (IMODIUM) 2 MG capsule, Take 4 mg by mouth as needed for diarrhea , Disp: , Rfl:      losartan (COZAAR) 25 MG tablet, Take 25 mg by mouth At Bedtime , Disp: , Rfl:      magnesium oxide 400 MG CAPS, Take 400 mg by mouth 4 times daily, Disp: , Rfl:      Menthol, Topical Analgesic, (BIOFREEZE ROLL-ON) 4 % GEL, Apply 1 Application topically 2 times daily Apply to lower back for pain, Disp: , Rfl:      metFORMIN (GLUCOPHAGE) 500 MG tablet, TAKE 2 TABS (1000MG) BY MOUTH EVERY MORNING;TAKE 1 TAB BY MOUTH EVERY EVENING, Disp: , Rfl:      metoprolol succinate ER (TOPROL-XL) 25 MG 24 hr tablet, Take 75 mg by mouth daily , Disp: , Rfl:      mirtazapine (REMERON) 7.5 MG tablet, Take 7.5 mg by mouth At Bedtime , Disp: , Rfl:      Neomycin-Bacitracin-Polymyxin (TRIPLE ANTIBIOTIC) 3.5-400-5000 OINT ointment, Externally apply 1 applicator topically 3 times daily as needed, Disp: , Rfl:      Nitroglycerin (NITROSTAT SL), Place 0.4 mg under the tongue every 5 minutes as needed for chest pain , Disp: , Rfl:      omeprazole (PRILOSEC) 20 MG DR capsule, Take 20 mg by mouth daily , Disp: , Rfl:      oxyCODONE (ROXICODONE) 5 MG tablet, TAKE 1 TAB BY MOUTH EVERY 6HOURS AS NEEDED FOR PAIN, Disp: 28 tablet, Rfl: 0     polyethylene glycol (MIRALAX) 17 GM/Dose powder, Take 1 packet by mouth daily as needed for constipation , Disp: , Rfl:      Rivaroxaban (XARELTO PO), Take 20 mg by mouth daily , Disp: , Rfl:      senna-docusate (SENOKOT-S/PERICOLACE) 8.6-50 MG tablet, Take 1 tablet by mouth 2 times daily as needed for constipation , Disp: , Rfl:      traZODone (DESYREL) 50 MG tablet, Take 50 mg by mouth At Bedtime , Disp: , Rfl:      vitamin C (ASCORBIC ACID) 500 MG tablet, Take 500 mg by mouth daily , Disp: , Rfl:     Review of Systems - 10 point Review of Systems is negative except for  right foot ulcer which is noted in HPI.      OBJECTIVE:  /78   Pulse 81   SpO2 98%   General appearance: Patient is alert and fully cooperative with history & exam.  No sign of distress is noted during the visit.    Vascular: Dorsalis pedis palpableRight.  Dermatologic:    Negative Pressure Wound Therapy Foot Anterior;Right (Active)       VASC Wound right lateral foot (Active)       VASC Wound left hallux  (Active)       VASC Wound right lateral foot (Active)       VASC Wound right lateral foot (Active)   Pre Size Length 2 03/04/22 1300   Pre Size Width 2 03/04/22 1300   Pre Size Depth 0.3 03/04/22 1300   Pre Total Sq cm 2 03/04/22 1300       VASC Wound top right foot (Active)       VASC Wound right dorsal foot  (Active)   Pre Size Length 0.7 02/17/22 1300   Pre Size Width 0.5 02/17/22 1300   Pre Size Depth 0.2 02/17/22 1300   Pre Total Sq cm 0.35 02/17/22 1300       Incision/Surgical Site 12/17/21 Right Foot (Active)   Staples intact with unincorporated theraskin graft lateral right foot ulcer with exposed peroneal brevis tendon. No erythema right foot.   Neurologic: Diminished to light touch Right.  Musculoskeletal: Contracted digits noted Right.    Imaging:     No results found.       Picture:

## 2022-03-07 ENCOUNTER — TELEPHONE (OUTPATIENT)
Dept: VASCULAR SURGERY | Facility: CLINIC | Age: 52
End: 2022-03-07
Payer: MEDICARE

## 2022-03-07 NOTE — TELEPHONE ENCOUNTER
I talked with patient's sister Sherri today. She is concerned about the patient loosing his apartment. Matias has agreed to remain in TCU until his next apartment, but will likely return to his apartment after this upcoming apartment. We discussed that he will not be able to use the wound vac and will return for monthly visits to monitor for any complications including osteomyelitis.

## 2022-03-10 ENCOUNTER — TRANSITIONAL CARE UNIT VISIT (OUTPATIENT)
Dept: GERIATRICS | Facility: CLINIC | Age: 52
End: 2022-03-10
Payer: MEDICARE

## 2022-03-10 VITALS
HEART RATE: 79 BPM | HEIGHT: 67 IN | SYSTOLIC BLOOD PRESSURE: 123 MMHG | BODY MASS INDEX: 23.54 KG/M2 | WEIGHT: 150 LBS | RESPIRATION RATE: 18 BRPM | TEMPERATURE: 98.7 F | DIASTOLIC BLOOD PRESSURE: 73 MMHG | OXYGEN SATURATION: 96 %

## 2022-03-10 DIAGNOSIS — I48.91 ATRIAL FIBRILLATION, UNSPECIFIED TYPE (H): ICD-10-CM

## 2022-03-10 DIAGNOSIS — I10 ESSENTIAL HYPERTENSION: ICD-10-CM

## 2022-03-10 DIAGNOSIS — L97.411 DIABETIC ULCER OF RIGHT MIDFOOT ASSOCIATED WITH TYPE 2 DIABETES MELLITUS, LIMITED TO BREAKDOWN OF SKIN (H): Primary | ICD-10-CM

## 2022-03-10 DIAGNOSIS — E11.621 DIABETIC ULCER OF RIGHT MIDFOOT ASSOCIATED WITH TYPE 2 DIABETES MELLITUS, LIMITED TO BREAKDOWN OF SKIN (H): Primary | ICD-10-CM

## 2022-03-10 DIAGNOSIS — E11.69 TYPE 2 DIABETES MELLITUS WITH OTHER SPECIFIED COMPLICATION, WITHOUT LONG-TERM CURRENT USE OF INSULIN (H): ICD-10-CM

## 2022-03-10 PROCEDURE — 99309 SBSQ NF CARE MODERATE MDM 30: CPT | Performed by: FAMILY MEDICINE

## 2022-03-10 NOTE — LETTER
3/10/2022        RE: Cm Lundberg  [address]  [place] [id number]          [facility] SERVICES    [place] Medical Record Number:  [id number]  Place of Service where encounter took place: [facility] ([facility]) [[id number]]   CODE STATUS:   CPR/Full code     Chief Complaint/Reason for Visit:  Chief Complaint   Patient presents with     RECHECK     TCU 3/10/2022. Right foot ulceration. S/p Incision and drainage with application of theraskin 1/27/2022. NWB with wound vac.        TCU HPI:    Cm Lundberg is a 51 year old male with hx of CAD, s/p stents and CABG, HTN, DM, prior CVA, afib/flutter on xarelto, admitted to the hospital on 12/17/2021 for elective procedure for right foot ulceration. He underwent incision and drainage of the right foot, excisional debridement of the ulceration of the right foot into the level of the muscle, under MAC with popliteal block anesthesia on 12/17/2021. Wound vac was subsequently placed. He had no hospital complications. Seen by ID with recommendation for 5 days Augmentin at hospital discharge based on culture results. Chronic anticoagulation was restarted. He was felt to need TCU and discharged to TCU on 12/22/0221 for PT, OT, nursing cares, medical management and monitoring.       Today:  He has wound vac, continues NWB, last visit 3/4/2022, wound was debrided. Next appt 3/25/2022. He has diabetes, on metformin with recent restart of glipizide in TCU, had been held/stopped per hospital. Blood sugars better. He has no new concerns. Compliant with NWB, cares and medications in TCU. Likes to watch TV or videos. Looking forward to returning to group home eventually when wound vac is discontinued. Appetite is good. No abdominal pain. No urinary sx. He denies fever, cough, congestion, fever.       PAST MEDICAL HISTORY:  Past Medical History:   Diagnosis Date     Anxiety      Arthritis      Cerebral infarction (H)      Congestive  heart failure (H)      Coronary artery disease     CABG with multiple stents     Depression      Diabetes (H)      Equinovarus deformity, acquired, right      GERD (gastroesophageal reflux disease)      Heart attack (H)      Hyperlipidemia      Hypertension      Migraine      Migraines      Myalgia      PONV (postoperative nausea and vomiting)     mild nausea     Stented coronary artery        MEDICATIONS:  Current Outpatient Medications   Medication Sig Dispense Refill     acetaminophen (TYLENOL) 325 MG tablet Take 650 mg by mouth every 4 hours as needed for mild pain        ARIPiprazole (ABILIFY) 2 MG tablet 2 mg daily        atorvastatin (LIPITOR) 80 MG tablet Take 80 mg by mouth daily        cholestyramine (QUESTRAN) 4 g packet Take 1 packet by mouth every other day        DULoxetine (CYMBALTA) 60 MG capsule Take 120 mg by mouth daily takle 120mg       erenumab-aooe (AIMOVIG) 140 MG/ML injection Inject 140 mg Subcutaneous every 30 days        fenofibrate micronized (LOFIBRA) 200 MG capsule Take 200 mg by mouth every morning (before breakfast)        ferrous sulfate (FEROSUL) 325 (65 Fe) MG tablet Take 325 mg by mouth daily (with breakfast)        gabapentin (NEURONTIN) 100 MG capsule Take 100 mg by mouth 2 times daily (before meals)        gabapentin (NEURONTIN) 300 MG capsule Take 600 mg by mouth At Bedtime       glipiZIDE (GLUCOTROL) 5 MG tablet TAKE 1/2 TABLET (2.5MG) BY MOUTH DAILY DX DIABETES       loperamide (IMODIUM) 2 MG capsule Take 4 mg by mouth as needed for diarrhea        losartan (COZAAR) 25 MG tablet Take 25 mg by mouth At Bedtime        magnesium oxide 400 MG CAPS Take 400 mg by mouth 4 times daily       Menthol, Topical Analgesic, (BIOFREEZE ROLL-ON) 4 % GEL Apply 1 Application topically 2 times daily Apply to lower back for pain       metFORMIN (GLUCOPHAGE) 500 MG tablet TAKE 2 TABS (1000MG) BY MOUTH EVERY MORNING;TAKE 1 TAB BY MOUTH EVERY EVENING       metoprolol succinate ER (TOPROL-XL) 25 MG  "24 hr tablet Take 75 mg by mouth daily        mirtazapine (REMERON) 7.5 MG tablet Take 7.5 mg by mouth At Bedtime        Neomycin-Bacitracin-Polymyxin (TRIPLE ANTIBIOTIC) 3.5-400-5000 OINT ointment Externally apply 1 applicator topically 3 times daily as needed       Nitroglycerin (NITROSTAT SL) Place 0.4 mg under the tongue every 5 minutes as needed for chest pain        omeprazole (PRILOSEC) 20 MG DR capsule Take 20 mg by mouth daily        oxyCODONE (ROXICODONE) 5 MG tablet TAKE 1 TAB BY MOUTH EVERY 6HOURS AS NEEDED FOR PAIN 28 tablet 0     polyethylene glycol (MIRALAX) 17 GM/Dose powder Take 1 packet by mouth daily as needed for constipation        Rivaroxaban (XARELTO PO) Take 20 mg by mouth daily        senna-docusate (SENOKOT-S/PERICOLACE) 8.6-50 MG tablet Take 1 tablet by mouth 2 times daily as needed for constipation        traZODone (DESYREL) 50 MG tablet Take 50 mg by mouth At Bedtime        vitamin C (ASCORBIC ACID) 500 MG tablet Take 500 mg by mouth daily          PHYSICAL EXAM:  General: Patient is alert male, no distress.   Vitals: /73   Pulse 79   Temp 98.7  F (37.1  C)   Resp 18   Ht 1.702 m (5' 7\")   Wt 68 kg (150 lb)   SpO2 96%   BMI 23.49 kg/m    HEENT: Head is NCAT. Eyes show no injection or icterus. Nares negative. Oropharynx well hydrated.  Neck: No JVD.  Lungs: Non labored respirations.  : Deferred.  Extremities: No edema is noted.  Musculoskeletal: Wound vac right foot.   Psych: Mood appears good.      LABS/DIAGNOSTIC DATA:  Component      Latest Ref Rng & Units 12/21/2021   Hemoglobin A1C      <=5.6 % 5.1     Component      Latest Ref Rng & Units 12/18/2021   Sodium      136 - 145 mmol/L 138   Potassium      3.5 - 5.0 mmol/L 4.5   Chloride      98 - 107 mmol/L 106   Carbon Dioxide      22 - 31 mmol/L 21 (L)   Anion Gap      5 - 18 mmol/L 11   Glucose      70 - 125 mg/dL 119   Calcium      8.5 - 10.5 mg/dL 9.3   Urea Nitrogen      8 - 22 mg/dL 10   Creatinine      0.70 - 1.30 " mg/dL 0.86   GFR Estimate      >60 mL/min/1.73m2 >90         ASSESSMENT/PLAN:  1. Right foot ulceration. S/p incision and drainage 12/17/2022; S/p Incision and drainage, right foot with application of theraskin 1/27/2022. Wound vac in place, continue NWB, next podiatry follow up is 3/25/2022.   2. HTN. On metoprolol and losartan. BPs satisfactory, continue to monitor in TCU.  3. Afib/flutter. Anticoagulated with xarelto.  4. DM. On metformin and glipizide. Blood sugars reviewed.    5. Stable medical conditions: CAD. Hx of MIs. Hx of stenting and prior CABG. On statin, xarelto, beta blocker; Hx of stroke. Continue statin, xarelto; Anemia, on iron; Mood disorder, mixed depression and anxiety on duloxetine, Abilify; Migraines. Receives monthly Aimovig.         Electronically signed by: Heidi Xie MD           Sincerely,        Heidi Xie MD

## 2022-03-17 ENCOUNTER — OFFICE VISIT (OUTPATIENT)
Dept: GERIATRICS | Facility: CLINIC | Age: 52
End: 2022-03-17
Payer: MEDICARE

## 2022-03-17 DIAGNOSIS — I48.91 ATRIAL FIBRILLATION, UNSPECIFIED TYPE (H): ICD-10-CM

## 2022-03-17 DIAGNOSIS — E11.69 TYPE 2 DIABETES MELLITUS WITH OTHER SPECIFIED COMPLICATION, WITHOUT LONG-TERM CURRENT USE OF INSULIN (H): ICD-10-CM

## 2022-03-17 DIAGNOSIS — M54.50 CHRONIC BILATERAL LOW BACK PAIN WITHOUT SCIATICA: ICD-10-CM

## 2022-03-17 DIAGNOSIS — E11.621 DIABETIC ULCER OF RIGHT MIDFOOT ASSOCIATED WITH TYPE 2 DIABETES MELLITUS, WITH NECROSIS OF MUSCLE (H): Primary | ICD-10-CM

## 2022-03-17 DIAGNOSIS — G89.29 CHRONIC BILATERAL LOW BACK PAIN WITHOUT SCIATICA: ICD-10-CM

## 2022-03-17 DIAGNOSIS — I10 ESSENTIAL HYPERTENSION: ICD-10-CM

## 2022-03-17 DIAGNOSIS — L97.413 DIABETIC ULCER OF RIGHT MIDFOOT ASSOCIATED WITH TYPE 2 DIABETES MELLITUS, WITH NECROSIS OF MUSCLE (H): Primary | ICD-10-CM

## 2022-03-17 PROCEDURE — 99309 SBSQ NF CARE MODERATE MDM 30: CPT | Performed by: FAMILY MEDICINE

## 2022-03-21 NOTE — PROGRESS NOTES
[facility] Medical Record Number:  [id number]  Place of Service where encounter took place: [facility] ([facility]) [[id number]]   CODE STATUS:   CPR/Full code     Chief Complaint/Reason for Visit:  Chief Complaint   Patient presents with     RECHECK     TCU 3/10/2022. Right foot ulceration. S/p Incision and drainage with application of theraskin 1/27/2022. NWB with wound vac.        TCU HPI:    Cm Lundberg is a 51 year old male with hx of CAD, s/p stents and CABG, HTN, DM, prior CVA, afib/flutter on xarelto, admitted to the hospital on 12/17/2021 for elective procedure for right foot ulceration. He underwent incision and drainage of the right foot, excisional debridement of the ulceration of the right foot into the level of the muscle, under MAC with popliteal block anesthesia on 12/17/2021. Wound vac was subsequently placed. He had no hospital complications. Seen by ID with recommendation for 5 days Augmentin at hospital discharge based on culture results. Chronic anticoagulation was restarted. He was felt to need TCU and discharged to TCU on 12/22/0221 for PT, OT, nursing cares, medical management and monitoring.       Today:  He has wound vac, continues NWB, last visit 3/4/2022, wound was debrided. Next appt 3/25/2022. He has diabetes, on metformin with recent restart of glipizide in TCU, had been held/stopped per hospital. Blood sugars better. He has no new concerns. Compliant with NWB, cares and medications in TCU. Likes to watch TV or videos. Looking forward to returning to group home eventually when wound vac is discontinued. Appetite is good. No abdominal pain. No urinary sx. He denies fever, cough, congestion, fever.       PAST MEDICAL HISTORY:  Past Medical History:   Diagnosis Date     Anxiety      Arthritis      Cerebral infarction (H)      Congestive heart failure (H)      Coronary artery disease     CABG with multiple stents     Depression      Diabetes  (H)      Equinovarus deformity, acquired, right      GERD (gastroesophageal reflux disease)      Heart attack (H)      Hyperlipidemia      Hypertension      Migraine      Migraines      Myalgia      PONV (postoperative nausea and vomiting)     mild nausea     Stented coronary artery        MEDICATIONS:  Current Outpatient Medications   Medication Sig Dispense Refill     acetaminophen (TYLENOL) 325 MG tablet Take 650 mg by mouth every 4 hours as needed for mild pain        ARIPiprazole (ABILIFY) 2 MG tablet 2 mg daily        atorvastatin (LIPITOR) 80 MG tablet Take 80 mg by mouth daily        cholestyramine (QUESTRAN) 4 g packet Take 1 packet by mouth every other day        DULoxetine (CYMBALTA) 60 MG capsule Take 120 mg by mouth daily takle 120mg       erenumab-aooe (AIMOVIG) 140 MG/ML injection Inject 140 mg Subcutaneous every 30 days        fenofibrate micronized (LOFIBRA) 200 MG capsule Take 200 mg by mouth every morning (before breakfast)        ferrous sulfate (FEROSUL) 325 (65 Fe) MG tablet Take 325 mg by mouth daily (with breakfast)        gabapentin (NEURONTIN) 100 MG capsule Take 100 mg by mouth 2 times daily (before meals)        gabapentin (NEURONTIN) 300 MG capsule Take 600 mg by mouth At Bedtime       glipiZIDE (GLUCOTROL) 5 MG tablet TAKE 1/2 TABLET (2.5MG) BY MOUTH DAILY DX DIABETES       loperamide (IMODIUM) 2 MG capsule Take 4 mg by mouth as needed for diarrhea        losartan (COZAAR) 25 MG tablet Take 25 mg by mouth At Bedtime        magnesium oxide 400 MG CAPS Take 400 mg by mouth 4 times daily       Menthol, Topical Analgesic, (BIOFREEZE ROLL-ON) 4 % GEL Apply 1 Application topically 2 times daily Apply to lower back for pain       metFORMIN (GLUCOPHAGE) 500 MG tablet TAKE 2 TABS (1000MG) BY MOUTH EVERY MORNING;TAKE 1 TAB BY MOUTH EVERY EVENING       metoprolol succinate ER (TOPROL-XL) 25 MG 24 hr tablet Take 75 mg by mouth daily        mirtazapine (REMERON) 7.5 MG tablet Take 7.5 mg by mouth At  "Bedtime        Neomycin-Bacitracin-Polymyxin (TRIPLE ANTIBIOTIC) 3.5-400-5000 OINT ointment Externally apply 1 applicator topically 3 times daily as needed       Nitroglycerin (NITROSTAT SL) Place 0.4 mg under the tongue every 5 minutes as needed for chest pain        omeprazole (PRILOSEC) 20 MG DR capsule Take 20 mg by mouth daily        oxyCODONE (ROXICODONE) 5 MG tablet TAKE 1 TAB BY MOUTH EVERY 6HOURS AS NEEDED FOR PAIN 28 tablet 0     polyethylene glycol (MIRALAX) 17 GM/Dose powder Take 1 packet by mouth daily as needed for constipation        Rivaroxaban (XARELTO PO) Take 20 mg by mouth daily        senna-docusate (SENOKOT-S/PERICOLACE) 8.6-50 MG tablet Take 1 tablet by mouth 2 times daily as needed for constipation        traZODone (DESYREL) 50 MG tablet Take 50 mg by mouth At Bedtime        vitamin C (ASCORBIC ACID) 500 MG tablet Take 500 mg by mouth daily          PHYSICAL EXAM:  General: Patient is alert male, no distress.   Vitals: /73   Pulse 79   Temp 98.7  F (37.1  C)   Resp 18   Ht 1.702 m (5' 7\")   Wt 68 kg (150 lb)   SpO2 96%   BMI 23.49 kg/m    HEENT: Head is NCAT. Eyes show no injection or icterus. Nares negative. Oropharynx well hydrated.  Neck: No JVD.  Lungs: Non labored respirations.  : Deferred.  Extremities: No edema is noted.  Musculoskeletal: Wound vac right foot.   Psych: Mood appears good.      LABS/DIAGNOSTIC DATA:  Component      Latest Ref Rng & Units 12/21/2021   Hemoglobin A1C      <=5.6 % 5.1     Component      Latest Ref Rng & Units 12/18/2021   Sodium      136 - 145 mmol/L 138   Potassium      3.5 - 5.0 mmol/L 4.5   Chloride      98 - 107 mmol/L 106   Carbon Dioxide      22 - 31 mmol/L 21 (L)   Anion Gap      5 - 18 mmol/L 11   Glucose      70 - 125 mg/dL 119   Calcium      8.5 - 10.5 mg/dL 9.3   Urea Nitrogen      8 - 22 mg/dL 10   Creatinine      0.70 - 1.30 mg/dL 0.86   GFR Estimate      >60 mL/min/1.73m2 >90         ASSESSMENT/PLAN:  1. Right foot ulceration. " S/p incision and drainage 12/17/2022; S/p Incision and drainage, right foot with application of theraskin 1/27/2022. Wound vac in place, continue NWB, next podiatry follow up is 3/25/2022.   2. HTN. On metoprolol and losartan. BPs satisfactory, continue to monitor in TCU.  3. Afib/flutter. Anticoagulated with xarelto.  4. DM. On metformin and glipizide. Blood sugars reviewed.    5. Stable medical conditions: CAD. Hx of MIs. Hx of stenting and prior CABG. On statin, xarelto, beta blocker; Hx of stroke. Continue statin, xarelto; Anemia, on iron; Mood disorder, mixed depression and anxiety on duloxetine, Abilify; Migraines. Receives monthly Aimovig.         Electronically signed by: Heidi Xie MD

## 2022-03-25 ENCOUNTER — PREP FOR PROCEDURE (OUTPATIENT)
Dept: VASCULAR SURGERY | Facility: CLINIC | Age: 52
End: 2022-03-25

## 2022-03-25 ENCOUNTER — OFFICE VISIT (OUTPATIENT)
Dept: VASCULAR SURGERY | Facility: CLINIC | Age: 52
End: 2022-03-25
Attending: PODIATRIST
Payer: MEDICARE

## 2022-03-25 VITALS
HEIGHT: 67 IN | WEIGHT: 150 LBS | HEART RATE: 94 BPM | SYSTOLIC BLOOD PRESSURE: 128 MMHG | OXYGEN SATURATION: 95 % | BODY MASS INDEX: 23.54 KG/M2 | DIASTOLIC BLOOD PRESSURE: 78 MMHG

## 2022-03-25 DIAGNOSIS — L97.413 DIABETIC ULCER OF RIGHT MIDFOOT ASSOCIATED WITH TYPE 2 DIABETES MELLITUS, WITH NECROSIS OF MUSCLE (H): Primary | ICD-10-CM

## 2022-03-25 DIAGNOSIS — L97.513 ULCER OF RIGHT FOOT WITH NECROSIS OF MUSCLE (H): Primary | ICD-10-CM

## 2022-03-25 DIAGNOSIS — E11.621 DIABETIC ULCER OF RIGHT MIDFOOT ASSOCIATED WITH TYPE 2 DIABETES MELLITUS, WITH NECROSIS OF MUSCLE (H): Primary | ICD-10-CM

## 2022-03-25 PROCEDURE — 11043 DBRDMT MUSC&/FSCA 1ST 20/<: CPT | Performed by: PODIATRIST

## 2022-03-25 RX ORDER — CEFAZOLIN SODIUM/WATER 2 G/20 ML
2 SYRINGE (ML) INTRAVENOUS
Status: CANCELLED | OUTPATIENT
Start: 2022-04-04

## 2022-03-25 RX ORDER — TROLAMINE SALICYLATE 10 %
CREAM (GRAM) TOPICAL 2 TIMES DAILY PRN
COMMUNITY
Start: 2022-03-19

## 2022-03-25 RX ORDER — RIVAROXABAN 20 MG/1
20 TABLET, FILM COATED ORAL
COMMUNITY

## 2022-03-25 RX ORDER — CEFAZOLIN SODIUM/WATER 2 G/20 ML
2 SYRINGE (ML) INTRAVENOUS SEE ADMIN INSTRUCTIONS
Status: CANCELLED | OUTPATIENT
Start: 2022-04-04

## 2022-03-25 ASSESSMENT — PAIN SCALES - GENERAL: PAINLEVEL: NO PAIN (0)

## 2022-03-25 NOTE — TELEPHONE ENCOUNTER
Spoke with Sherri.  Patient still at [facility] in [place].  Provider notified that patient would like to go forward with grafting.

## 2022-03-25 NOTE — PATIENT INSTRUCTIONS
Important lnstructions      1. WEIGHT BEARING STATUS: You are to remain NON WEIGHT BEARING on your right foot. NON WEIGHT BEARING MEANS NO PRESSURE ON YOUR FOOT OR HEEL AT ANY TIME FOR ANY REASON!    2. OFFLOADING DEVICE: Must use a A WHEELCHAIR at all times! (do not use affected foot to push wheelchair)    3. STABILIZATION DEVICE: Use a EGG CRATE BOOT . You will need to WEAR THIS AT ALL TIMES EVEN WHILE IN BED.       4. ELEVATE: Elevating your leg means laying with your head on a pillow and your foot ABOVE YOUR WAIST.     5. DO NOT MOVE YOUR FOOT.  There is a risk of worsening the wound or incision. To give yourself a higher chance of healing, please DO NOT swing foot back and forth and wiggle foot/toes especially when inside a stabilization device.     6. PROTEIN SUPPLEMENTS: Drink protein shakes 2x per day, morning and night (Ensure, Boost, Glucerna)     This is in addition to your normal diet    If you are on a renal diet, make sure to get a protein shake that is best suited for this diet. Please ask if you would like a referral to see a  Registered Dietician.       Dressing Change lnstructions          - Wound Vac Instructions    1. 3x weekly and as needed cleanse the area with NS    2. Pat dry    3. Apply Cavilon no sting barrier wipe to the skin surrounding the wound to protect from drainage/maceration    4. Apply drape around wound. Cut strip of drape and apply to skin if bridging is needed; plan this area in advance; should not be over bony prominence     5. Cut the foam to fit the area of the wound    6. Cover the wound with a non adherent dressing    7. Apply foam over the non adherent dressing    8. Cut narrow strip of foam if bridging    9. Cover foam with drape to obtain air tight seal    10. Cut opening the size of a quarter for where the suction pad will be applied    11. Apply Suction pad    12. 125mmHG suction continuous    [employer] Contact Center can be reached at [phone], 24 hours a day 7 days  a week     - Back up plan in place:     If the negative pressure wound therapy malfunctions or unable to maintain seal: dressing must be removed and reapplied within 2 hours of the incident. If unable to reapply negative pressure wound dressing, place Normal Saline moistened gauze in wound bed and cover with appropriate dressing to keep wound bed moist.  Change wet-to-dry dressing two times a day until healthcare staff can re-implement negative pressure therapy. Change canister at least weekly.  [facility] Contact Center can be reached at [phone], 24 hours a day 7 days a week    Information on Vacuum-Assisted Closure of a Wound  Vacuum-assisted closure (VAC) of a wound is a type of treatment to help wounds heal. It s also known as negative pressure wound therapy. During the treatment, a device lowers air pressure on the wound. This can help the wound heal more quickly.  Understanding the wound VAC system  A wound VAC system has several parts. A foam or gauze dressing is put directly on the wound. The dressing is changed every 24 to 72 hours. An adhesive film covers and seals the dressing and wound. A drainage tube leads from under the adhesive film and connects to a portable vacuum pump. This pump removes air pressure over the wound. It may do this constantly. Or it may do it in cycles. During the treatment, you ll need to carry the portable pump everywhere you go.  Why wound VAC is used  You might need this therapy for a recent traumatic wound. Or you may need it for a chronic wound. This is a wound that does not heal the way it should over time. This can happen with wounds in people who have diabetes. You may need a wound VAC if you ve had a recent skin graft. And you may need a wound VAC for a large wound. Large wounds can take a longer time to heal.  A wound vacuum system may help your wound heal more quickly by:    Draining extra fluid from the wound    Reducing swelling    Reducing bacteria in the  wound    Keeping your wound moist and warm    Helping draw together wound edges    Increasing blood flow to your wound    Decreasing inflammation  Wound VAC offers some other advantages over other types of wound care. It may decrease your overall discomfort. The dressings usually need to be changed less often. And they may be easier to keep in position.  Risks of wound VAC  Wound VAC has some rare risks, such as:    Bleeding (which may be severe)    Wound infection    An abnormal connection between the intestinal tract and the skin (enteric fistula)  Proper training in dressing changes can help reduce the risk for these complications. Also, your doctor will carefully evaluate you to make sure you are a good candidate for the therapy. Certain problems can increase your risk for complications. These include:    Exposed organs or blood vessels    High risk of bleeding from another medical problem    Wound infection    Nearby bone infection    Dead wound tissue    Cancer tissue    Fragile skin, such as from aging or longtime use of topical steroids    Allergy to adhesive    Very poor blood flow to your wound    Wounds close to joints that may reopen because of movement  Your doctor will discuss the risks that apply to you. Make sure to talk with him or her about all of your questions and concerns.  Getting ready for wound VAC  You likely won t need to do much to get ready for wound VAC. In some cases, you may need to wait a while before having this therapy. For example, your doctor may first need to treat an infection in your wound. Dead or damaged tissue may also need to be removed from your wound.  You or a caregiver may need training on how to use the wound VAC device. This is done if you will be able to have your wound vacuum therapy at home. In other cases, you may need to have your wound vacuum therapy in a health care facility.  On the day of your procedure  A health care provider will cover your wound with foam  or gauze wound dressing. An adhesive film will be put over the dressing and wound. This seals the wound. The foam connects to a drainage tube, which leads to a vacuum pump. This pump is portable. When the pump is turned on, it draws fluid through the foam and out the drainage tubing. The pump may run constantly, or it may cycle off and on. Your exact setup will depend on the specific type of wound vacuum system that you use.  Managing your wound  You may need the dressing changed about once a day. You may need it changed more or less often, depending on your wound. You or your caregiver may be trained to do this at home. Or it may be done by a visiting health care provider. Your doctor may prescribe a pain medicine. This is to prevent or reduce pain during the dressing change.  You will likely need to use the wound VAC system for several weeks or months. During this time, you ll carry the portable pump everywhere you go.  Nutrition for wound healing  During this time, make sure you follow a healthy diet. This is needed so the wound can heal and to prevent infection. Your doctor can tell you more about what to include in your diet during this time.  follow up with your doctor if you have a medical condition that led to your wound, such as diabetes. He or she can help you prevent future wounds.  Follow-up care  Your doctor will carefully keep track of your healing. Make sure to keep all follow-up appointments.  When to call your health care provider  Call your health care provider right away if you have any of these:    Fever of 100.4 F (38.0 C) or higher    Increased redness, swelling, or warmth around wound    Increased pain    Bright red blood or blood clots in tubing or the collection chamber of the vacuum         It IS NOT ok to get your wound wet in the bath or shower    SEEK MEDICAL CARE IF:    You have an increase in swelling, pain, or redness around the wound.    You have an increase in the amount of pus  coming from the wound.    There is a bad smell coming from the wound.    The wound appears to be worsening/enlarging    You have a fever greater than 101.5 F      It is ok to continue current wound care treatment/products for the next 2-3 days until new wound care supplies are ordered and arrive. If longer than this please contact our office at [phone].        We want to hear from you!   In the next few weeks, you should receive a call or email to complete a survey about your visit at [facility]. Please help us improve your appointment experience by letting us know how we did today. We strive to make your experience good and value any ways in which we could do better.      We value your input and suggestions.    Thank you for choosing the [facility]!

## 2022-03-25 NOTE — TELEPHONE ENCOUNTER
Sherri is calling stating that a family decision has been made for Matias (skin graft, back to [place], home)  They are interested in pursuing the skin graft option.  Please call her back when able [phone]

## 2022-03-25 NOTE — LETTER
[facility]  [address] [place], [place]  Phone: [phone]  Fax: [phone]      March 25, 2022      Cm Lundberg  [address] [id number]          To whom it may concern,    Matias was seen in clinic today 3/25/2022 and was accompanied by his sister Marylin Mensah.     Sincerely,      Alex Kim DPM

## 2022-03-25 NOTE — PROGRESS NOTES
FOOT AND ANKLE SURGERY/PODIATRY Progress Note      ASSESSMENT:   Diabetic Ulceration right foot   Equinovarus right foot       TREATMENT:  -The lateral right foot ulceration continues to have exposed tendon of the peroneal brevis. Based on this presentation, I recommend we again try application of theraskin with continued use of the wound vac. Will consider additional types of grafting.     -Patient's sister would like more information on HBO and tenex. I have no experience with tenex but will look into this product. Referral given for HBO, awaiting input from [facility].     -After discussion of risk factors and consent obtained 2% Lidocaine HCL jelly was applied, under clean conditions, the right and foot ulceration(s) were debrided using #15 blade scalpel.  Devitalized and nonviable tissue, along with any fibrin and slough, was removed to improve granulation tissue formation, stimulate wound healing, decrease overall bacteria load, disrupt biofilm formation and decrease edge senescence. Wound drainage was scant No. Total excisional debridement was 0.2 sq cm into the muscle/fascia with a depth of 0.3 cm.   Ulcers were improved afterwards and .  Measures were as noted on the flow sheet. A gauze dressing was applied.    -Patient and his family have indicated they would like to go ahead with theraskin graft. I will ask my office to coordinate surgery at [facility] and wound vac post-op.     Alex Kim DPM  [facility]      HPI: Cm Lundberg was seen again today for a right foot ulcer. His sister is present for today's exam. he has used the wound vac as directed but would like to return to his apartment which may not allow him to continue to use this device. They are requesting information on tenex and HBO.       Past Medical History:   Diagnosis Date     Anxiety      Arthritis      Cerebral infarction (H)      Congestive heart failure (H)      Coronary artery  disease     CABG with multiple stents     Depression      Diabetes (H)      Equinovarus deformity, acquired, right      GERD (gastroesophageal reflux disease)      Heart attack (H)      Hyperlipidemia      Hypertension      Migraine      Migraines      Myalgia      PONV (postoperative nausea and vomiting)     mild nausea     Stented coronary artery        Past Surgical History:   Procedure Laterality Date     INCISION AND DRAINAGE LOWER EXTREMITY, COMBINED Right 12/17/2021    Procedure: INCISION AND DRAINAGE, right foot;  Surgeon: Alex Kim DPM;  Location: [facility] Main OR     INCISION AND DRAINAGE LOWER EXTREMITY, COMBINED Right 1/27/2022    Procedure: INCISION AND DRAINAGE, right foot with application of theraskin;  Surgeon: Alex Kim DPM;  Location: [facility] Main OR     IR MISCELLANEOUS PROCEDURE  2/13/2004     ZC CABG, VEIN, SINGLE      Description: CABG (CABG);  Recorded: 04/22/2009;       Allergies   Allergen Reactions     Aspirin Other (See Comments)     Reacts with migraine medicine     Isosorbide Headache     Codeine Rash     Morphine Rash         Current Outpatient Medications:      acetaminophen (TYLENOL) 325 MG tablet, Take 650 mg by mouth every 4 hours as needed for mild pain , Disp: , Rfl:      ARIPiprazole (ABILIFY) 2 MG tablet, 2 mg daily , Disp: , Rfl:      ASPERCREME ORIGINAL 10 % external cream, APPLY TO LOW BACK TWICE DAILY FOR LOW BACK PAIN, Disp: , Rfl:      atorvastatin (LIPITOR) 80 MG tablet, Take 80 mg by mouth daily , Disp: , Rfl:      cholestyramine (QUESTRAN) 4 g packet, Take 1 packet by mouth every other day , Disp: , Rfl:      DULoxetine (CYMBALTA) 60 MG capsule, Take 120 mg by mouth daily takle 120mg, Disp: , Rfl:      erenumab-aooe (AIMOVIG) 140 MG/ML injection, Inject 140 mg Subcutaneous every 30 days , Disp: , Rfl:      fenofibrate micronized (LOFIBRA) 200 MG capsule, Take 200 mg by mouth every morning (before breakfast) , Disp: , Rfl:      ferrous  sulfate (FEROSUL) 325 (65 Fe) MG tablet, Take 325 mg by mouth daily (with breakfast) , Disp: , Rfl:      gabapentin (NEURONTIN) 100 MG capsule, Take 100 mg by mouth 2 times daily (before meals) , Disp: , Rfl:      gabapentin (NEURONTIN) 300 MG capsule, Take 600 mg by mouth At Bedtime, Disp: , Rfl:      glipiZIDE (GLUCOTROL) 5 MG tablet, TAKE 1/2 TABLET (2.5MG) BY MOUTH DAILY DX DIABETES, Disp: , Rfl:      loperamide (IMODIUM) 2 MG capsule, Take 4 mg by mouth as needed for diarrhea , Disp: , Rfl:      losartan (COZAAR) 25 MG tablet, Take 25 mg by mouth At Bedtime , Disp: , Rfl:      magnesium oxide 400 MG CAPS, Take 400 mg by mouth 4 times daily, Disp: , Rfl:      Menthol, Topical Analgesic, (BIOFREEZE ROLL-ON) 4 % GEL, Apply 1 Application topically 2 times daily Apply to lower back for pain, Disp: , Rfl:      metFORMIN (GLUCOPHAGE) 500 MG tablet, TAKE 2 TABS (1000MG) BY MOUTH EVERY MORNING;TAKE 1 TAB BY MOUTH EVERY EVENING, Disp: , Rfl:      metoprolol succinate ER (TOPROL-XL) 25 MG 24 hr tablet, Take 75 mg by mouth daily , Disp: , Rfl:      mirtazapine (REMERON) 7.5 MG tablet, Take 7.5 mg by mouth At Bedtime , Disp: , Rfl:      Neomycin-Bacitracin-Polymyxin (TRIPLE ANTIBIOTIC) 3.5-400-5000 OINT ointment, Externally apply 1 applicator topically 3 times daily as needed, Disp: , Rfl:      Nitroglycerin (NITROSTAT SL), Place 0.4 mg under the tongue every 5 minutes as needed for chest pain , Disp: , Rfl:      omeprazole (PRILOSEC) 20 MG DR capsule, Take 20 mg by mouth daily , Disp: , Rfl:      oxyCODONE (ROXICODONE) 5 MG tablet, TAKE 1 TAB BY MOUTH EVERY 6HOURS AS NEEDED FOR PAIN, Disp: 28 tablet, Rfl: 0     polyethylene glycol (MIRALAX) 17 GM/Dose powder, Take 1 packet by mouth daily as needed for constipation , Disp: , Rfl:      Rivaroxaban (XARELTO PO), Take 20 mg by mouth daily , Disp: , Rfl:      senna-docusate (SENOKOT-S/PERICOLACE) 8.6-50 MG tablet, Take 1 tablet by mouth 2 times daily as needed for constipation  ", Disp: , Rfl:      traZODone (DESYREL) 50 MG tablet, Take 50 mg by mouth At Bedtime , Disp: , Rfl:      vitamin C (ASCORBIC ACID) 500 MG tablet, Take 500 mg by mouth daily , Disp: , Rfl:      XARELTO ANTICOAGULANT 20 MG TABS tablet, TAKE 1 TAB BY MOUTH ONCE DAILY, Disp: , Rfl:     Review of Systems - 10 point Review of Systems is negative except for right foot ulcer which is noted in HPI.      OBJECTIVE:  /78   Pulse 94   Ht 5' 7\" (1.702 m)   Wt 150 lb (68 kg)   SpO2 95%   BMI 23.49 kg/m    General appearance: Patient is alert and fully cooperative with history & exam.  No sign of distress is noted during the visit.    Vascular: Dorsalis pedis palpableRight.  Dermatologic:    Negative Pressure Wound Therapy Foot Anterior;Right (Active)       VASC Wound right lateral foot (Active)       VASC Wound left hallux  (Active)       VASC Wound right lateral foot (Active)   Pre Size Length 0.5 11/19/21 1108   Pre Size Width 0.4 11/19/21 1108   Pre Size Depth 0.2 11/19/21 1108   Pre Total Sq cm 0.2 11/19/21 1108   Post Size Length 0.8 11/19/21 1108   Post Size Width 0.8 11/19/21 1108   Post Size Depth 0.2 11/19/21 1108   Post Total Sq cm 0.64 11/19/21 1108       VASC Wound right lateral foot (Active)   Pre Size Length 1.7 03/25/22 1300   Pre Size Width 2 03/25/22 1300   Pre Size Depth 0.3 03/25/22 1300   Pre Total Sq cm 3.4 03/25/22 1300       VASC Wound top right foot (Active)       VASC Wound right dorsal foot  (Active)   Pre Size Length 0.7 02/17/22 1300   Pre Size Width 0.5 02/17/22 1300   Pre Size Depth 0.2 02/17/22 1300   Pre Total Sq cm 0.35 02/17/22 1300       Incision/Surgical Site 12/17/21 Right Foot (Active)   Granular tissue lateral right foot with exposed peroneal brevis tendon. No erythema right foot.   Neurologic: Diminished to light touch Right.  Musculoskeletal: Contracted digits noted Right.    Imaging:     No results found.       Picture:         "

## 2022-03-28 ENCOUNTER — TELEPHONE (OUTPATIENT)
Dept: VASCULAR SURGERY | Facility: CLINIC | Age: 52
End: 2022-03-28
Payer: MEDICARE

## 2022-03-28 ENCOUNTER — DOCUMENTATION ONLY (OUTPATIENT)
Dept: VASCULAR SURGERY | Facility: CLINIC | Age: 52
End: 2022-03-28
Payer: MEDICARE

## 2022-03-28 DIAGNOSIS — Z11.59 ENCOUNTER FOR SCREENING FOR OTHER VIRAL DISEASES: Primary | ICD-10-CM

## 2022-03-28 NOTE — TELEPHONE ENCOUNTER
Patient's sister, Sherri will be his transportation for surgery. She asked to be included in any surgery scheduling calls.

## 2022-03-28 NOTE — LETTER
Cm,    Your surgery with [facility] has been scheduled. Please read thoroughly and follow instructions.     Procedure:   INCISION AND DRAINAGE, right foot with application of theraskin  Procedure Date :   3/4/22  *A surgery nurse will call you 1-2 days before surgery to inform you of the time of arrival for surgery.  Tentatively surgery is scheduled for 1:20 PM with an ARRIVAL TIME of 11:20AM.  Surgeon:   Dr. Alex Kim  Admission Type:   Outpatient  Procedure Location:   [facility]:  [address] [place] [id number] (phone: [phone], Fax: [phone])    Covid Test: SEE APPOINTMENTS  Post Operative Appointment: SEE APPOINTMENTS       Preparation Instructions to complete:    []  You will need a Pre-op Physical within 30 days before surgery with your primary care provider. TCU PROVIDER TO COMPLETE. please fax pre-op note to [phone]. This exam is required by the anesthesiologist to ensure a safe surgical experience.      Failure  to obtain your pre-op physical will lead to cancellation of your surgery    Call them right away to schedule this. Please ensure your Preoperative Physical is faxed to the Hospital (numbers listed above)    [] Preoperative Medication Instructions    We would like you to stop your anticoagulation medications 3-5 days before surgery HOWEVER contact your prescribing provider for instructions before discontinuing any medications. (Examples: Coumadin, Plavix, Xarelto, Eliquis, Pradaxa, Effient, Brilinta) If you are on Coumadin, we would like the goal INR ? 1.2.  OK TO STAY ON XARELTO    IT IS OK TO STAY ON ASPIRIN PRIOR TO SURGERY.     Hold Ibuprofen, Herbal Supplements and Vitamin E 7 days before    Stop Cialis, Levitra and Viagra 2-3 days before surgery    [] Fasting Requirements    Nothing to eat for 8 hours before surgery unless instructed differently by the surgery nurse.    You may have clear liquids up to two  "hours before your arrival time (coffee, tea, water, or Gatorade. No cream or milk)    If your primary care provider has instructed you to continue oral medications, you may take them on the morning of surgery with a small sip of water.      No alcohol or smoking after 12:00am the day of surgery    [] PCR COVID-19 test is required within 4 days of surgery - [facility] TCU TO COMPLETE ON 3/31/22, please fax results to [phone]    If your test is positive or you fail to get tested, your surgery will be postponed.     [] Contact your insurance regarding coverage    If you would like a [employer] Estimate for your upcoming procedure at [facility] Location, contact Cost of Care Estimates     Advocates are available Monday through Friday 8am - 5pm   [phone]    You may also submit a request online at [email or web address]  - Complete the secure online form found under \"Services and Procedure Pricing\"     If your procedure is at [facility] please contact the numbers below for Cost of Care Estimates.   - Facility Charge: [phone]    Anesthesiology charge:  [phone]      [] DO NOT BRING FMLA WITH TO SURGERY.  These should be sent to the provider's office by fax to [phone].     [] Day of Surgery    Medications - Take as indicated with sips of water.     Wear comfortable loose fitting clothes. Wear your glasses-Not contacts. Do not wear jewelry and remove body piercing's. Surgery may be cancelled if they are not removed.     You may have 1 family member wait in the lobby during your surgery. Visitor restrictions are subject to change. Please verify with the surgery nurse when they call.     If same day surgery-Have a someone come with you to surgery that can help you understand the surgeon's instructions, drive you home and stay with you overnight the first night.    [] If the community sees a new COVID-19 surge, your procedure may need to be postponed. We will contact " you if this happens.    [] You will receive a call from a surgery nurse 1-3 days prior to surgery. They will go over more details with you.             ** AFTER SURGERY INSTRUCTIONS **    [] You are to remain non-weight bearing on your right foot Non-weight bearing means NO PRESSURE on your foot or heel at any time for any reason.    [] Prior to surgery you should already have a Egg Crate boot which you will need to WEAR THIS AT ALL TIMES EVEN WHILE IN BED Please bring this with you on the day of surgery.    [] You should already have a A WHEELCHAIR to help you ambulate after surgery. Please also bring this with you on the day of surgery.    [] During surgery Dr. Kim will apply a gauze dressing to your foot. This will remain intact until the wound vac is applied. The wound vac should be applied within 24 hours after surgery.  Please note changes to wound vac dressing instructions listed at bottom of order.    [] It is NOT OKAY to get your surgical site wet while bathing, you will need to purchase a cast cover to protect your foot from getting wet. You can purchase this at [facility] or your local pharmacy.    [] It is important that you elevate your affected foot after surgery. Proper elevation is raising your foot above your waist. The fluid in your lower extremities needs to get back to your heart so it can get pumped to your kidneys and expelled through urination. So if you notice you have to go to the bathroom more frequently when you are elevating your leg this is a good sign that it is working.     [] It is important that you increase your protein intake after surgery. Protein is essential for wound healing. We recommend you take a protein supplement twice per day. This is in addition to your normal diet. Examples of protein supplements are Ensure, Boost, Glucerna (if you are diabetic), or protein powder. You can purchase these at your local retailer or grocery store.       Notify our office  right away, if you have any changes in your health status, or if you develop a cold, flu, diarrhea, infection, fever or sore throat before your scheduled surgery date. We can be reached at [phone] Monday-Friday 8 am-4:30 pm if you have any questions.     Thank you for trusting us with your care!        .  - Wound Vac Instructions post Theraskin application:     1. Apply wound vac to the right foot within 24 hours fromtime of surgery. Wound vac must be set at 100 mmHg, continuous suction.     2. LEAVE WOUND VAC IN PLACE UNTIL AFTER 1 WEEK POST OP FOLLOW UP APPOINTMENT.  TCU nurse to re-apply vac after the follow up appointment.     3. Apply Cavilon no sting barrier wipe to the skin surrounding the wound to protect from drainage/maceration     4. Apply drape to natalia wound. Cut strip of drape and apply to skin if bridging isneeded; plan this area in advance; should not be over bony prominence      5. Cut the foam to fit the size of the wound.     6. Apply foam over the ADAPTIC TOUCH (or similar non-adherent dressing) THATIS STAPLED/SUTURED IN PLACE OVER the WOUND/GRAFT SITE. DO NOT REMOVE NON-ADHERENT.      7. Cut narrow strip of foam if bridging if needed     8. Cover foam with drape to obtain air tight seal     9.Cut opening the size of a quarter for where the suction pad will be applied     10. Apply Suction pad     11. 100mmHG suction continuous      [employer] Contact Center can be reached at [phone], 24 hours aday 7 days a week      - If you do not have a back up plan in place:      If the negative pressure wound therapy malfunctions or unable to maintain seal: dressing must be removed and reapplied within 2 hours ofthe incident. If unable to reapply negative pressure wound dressing, place Normal Saline moistened gauze in wound bed and cover with appropriate dressing to keep wound bed moist.  Change wet-to-dry dressing two times aday until healthcare staff can re-implement negative pressure therapy. Change  canister at least weekly.  [facility] Contact Center can be reached at [phone], 24 hours a day 7 days a week     - Information onVacuum-Assisted Closure of a Wound  Vacuum-assisted closure (VAC) of a wound is a type of treatment to help wounds heal. It's also known as negative pressure wound therapy. During the treatment, a device lowers airpressure on the wound. This can help the wound heal more quickly.  - Understanding the wound VAC system  A wound VAC system has several parts. A foam or gauze dressing is put directly on the wound. The dressing ischanged every 24 to 72 hours. An adhesive film covers and seals the dressing and wound. A drainage tube leads from under the adhesive film and connects to a portable vacuum pump. This pump removes air pressure over thewound. It may do this constantly. Or it may do it in cycles. During the treatment, you'll need to carry the portable pump everywhere you go.  - Why wound VAC is used  You might need this therapy for a recent traumaticwound. Or you may need it for a chronic wound. This is a wound that does not heal the way it should over time. This can happen with wounds in people who have diabetes. You may need a wound VAC if you've had a recent skingraft. And you may need a wound VAC for a large wound. Large wounds can take a longer time to heal.  A wound vacuum system may help your wound heal more quickly by:  Draining extra fluid from the wound  Reducingswelling  Reducing bacteria in the wound  Keeping your wound moist and warm  Helping draw together wound edges  Increasing blood flow to your wound  Decreasing inflammation  Wound VAC offers some otheradvantages over other types of wound care. It may decrease your overall discomfort. The dressings usually need to be changed less often. And they may be easier to keep in position.  - Risks of wound VAC  Wound VAC hassome rare risks, such as:  Bleeding (which may be severe)  Wound infection  An abnormal connection between  the intestinal tract and the skin (enteric fistula)  Proper training in dressing changes can help reducethe risk for these complications. Also, your doctor will carefully evaluate you to make sure you are a good candidate for the therapy. Certain problems can increase your risk for complications. These include:  Exposedorgans or blood vessels  High risk of bleeding from another medical problem  Wound infection  Nearby bone infection  Dead wound tissue  Cancer tissue  Fragile skin, such as from aging or longtime use oftopical steroids  Allergy to adhesive  Very poor blood flow to your wound  Wounds close to joints that may reopen because of movement  Your doctor will discuss the risks that apply to you. Make sure to talk withhim or her about all of your questions and concerns.  - Getting ready for wound VAC  You likely won't need to do much to get ready for wound VAC. In some cases, you may need to wait a while before having this therapy.For example, your doctor may first need to treat an infection in your wound. Dead or damaged tissue may also need to be removed from your wound.  You or a caregiver may need training on how to use the wound VAC device.This is done if you will be able to have your wound vacuum therapy at home. In other cases, you may need to have your wound vacuum therapy in a health care facility.  - On the day of your procedure  A health careprovider will cover your wound with foam or gauze wound dressing. An adhesive film will be put over the dressing and wound. This seals the wound. The foam connects to a drainage tube, which leads to a vacuum pump. This pumpis portable. When the pump is turned on, it draws fluid through the foam and out the drainage tubing. The pump may run constantly, or it may cycle off and on. Your exact setup will depend on the specific type of wound vacuumsystem that you use.  - Managing your wound  You may need the dressing changed about once a day. You may need it changed  more or less often, depending on your wound. You or your caregiver may be trained to do this athome. Or it may be done by a visiting health care provider. Your doctor may prescribe a pain medicine. This is to prevent or reduce pain during the dressing change.  You will likely need to use the wound VAC system forseveral weeks or months. During this time, you'll carry the portable pump everywhere you go.  - Nutrition for wound healing  During this time, make sure you follow a healthy diet. This is needed so the wound can healand to prevent infection. Your doctor can tell you more about what to include in your diet during this time.  follow up with your doctor if you have a medical condition that led to your wound, such as diabetes. He or shecan help you prevent future wounds.  - Follow-up care  Your doctor will carefully keep track of your healing. Make sure to keep all follow-up appointments.  - When to call your health care provider  Call yourhealth care provider right away if you have any of these:  Fever of 100.4 F (38.0 C) or higher  Increased redness, swelling, or warmth around wound  Increased pain  Bright red blood or blood clots in tubingor the collection chamber of the vacuum

## 2022-03-28 NOTE — PROGRESS NOTES
Surgery Scheduled      Surgery/Procedure: INCISION AND DRAINAGE, right foot with application of theraskin    CPT: 28391     Equipment: theraskin. [employer].    Location: [facility]:  [address] [place] [address] (phone: [phone], Fax: [phone])    Date: 4/4/22    Time: 1:20PM     Admission Type: Outpatient    Surgeon: Dr. Kim    OR Confirmed & :  Yes with Annabella on 3/28/2022    Covid Scheduled: TCU will complete    Post Op: 4/12 1:20PM [facility]                4/27 1:20PM [facility]    Wound Vac Needed:  Yes    Home Care Needed: No    Blood Thinners Addressed:  Yes, reviewed by CESARIO Medel.

## 2022-03-28 NOTE — TELEPHONE ENCOUNTER
Do not need to hold xarelto prior to surgery.    Alex Kim, DPSTACIE  You 3 days ago     VS    Surgery order entered. He will need a wound vac post-op. Xarelto is okay. Thank you!    Message text

## 2022-03-28 NOTE — PROGRESS NOTES
Ok to stay on xarelto per Dr. Kim.    Surgery instructions given over the phone to patient's sister Sherri.  Also sending via [employer].      Attempted to contact [facility] to give instructions by phone, no answer and no option to leave message. Surgery orders/instructions faxed and will attempt to call later today.

## 2022-03-30 NOTE — TELEPHONE ENCOUNTER
Attempted to call TCU, phone rang and rang for several minutes and then was disconnected, unable to leave message.

## 2022-03-30 NOTE — TELEPHONE ENCOUNTER
Patient's sister Sherri is calling stating she spoke with [facility] and they aren't aware of the surgery on 4/4. Because of this they didn't schedule a COVID test or pre-op. She is requesting we fax the orders to [phone].

## 2022-03-30 NOTE — TELEPHONE ENCOUNTER
Called Sherri and let her know that it was refaxed to [facility] and writer will call later today to make sure they received it. Original fax sent 3/28/22 by Lizy NASSAR

## 2022-03-31 ENCOUNTER — TRANSITIONAL CARE UNIT VISIT (OUTPATIENT)
Dept: GERIATRICS | Facility: CLINIC | Age: 52
End: 2022-03-31
Payer: MEDICARE

## 2022-03-31 ENCOUNTER — LAB REQUISITION (OUTPATIENT)
Dept: LAB | Facility: CLINIC | Age: 52
End: 2022-03-31
Payer: MEDICARE

## 2022-03-31 VITALS
HEIGHT: 67 IN | DIASTOLIC BLOOD PRESSURE: 62 MMHG | OXYGEN SATURATION: 96 % | SYSTOLIC BLOOD PRESSURE: 105 MMHG | TEMPERATURE: 98.2 F | RESPIRATION RATE: 18 BRPM | BODY MASS INDEX: 23.61 KG/M2 | HEART RATE: 77 BPM | WEIGHT: 150.4 LBS

## 2022-03-31 DIAGNOSIS — L97.413 DIABETIC ULCER OF RIGHT MIDFOOT ASSOCIATED WITH TYPE 2 DIABETES MELLITUS, WITH NECROSIS OF MUSCLE (H): ICD-10-CM

## 2022-03-31 DIAGNOSIS — I48.91 ATRIAL FIBRILLATION, UNSPECIFIED TYPE (H): ICD-10-CM

## 2022-03-31 DIAGNOSIS — D64.9 ANEMIA, UNSPECIFIED: ICD-10-CM

## 2022-03-31 DIAGNOSIS — E11.9 TYPE 2 DIABETES MELLITUS WITHOUT COMPLICATIONS (H): ICD-10-CM

## 2022-03-31 DIAGNOSIS — E11.69 TYPE 2 DIABETES MELLITUS WITH OTHER SPECIFIED COMPLICATION, WITHOUT LONG-TERM CURRENT USE OF INSULIN (H): ICD-10-CM

## 2022-03-31 DIAGNOSIS — Z01.818 PRE-OP EXAM: Primary | ICD-10-CM

## 2022-03-31 DIAGNOSIS — I10 ESSENTIAL HYPERTENSION: ICD-10-CM

## 2022-03-31 DIAGNOSIS — I10 ESSENTIAL (PRIMARY) HYPERTENSION: ICD-10-CM

## 2022-03-31 DIAGNOSIS — E11.621 DIABETIC ULCER OF RIGHT MIDFOOT ASSOCIATED WITH TYPE 2 DIABETES MELLITUS, WITH NECROSIS OF MUSCLE (H): ICD-10-CM

## 2022-03-31 DIAGNOSIS — E83.42 HYPOMAGNESEMIA: ICD-10-CM

## 2022-03-31 LAB — SARS-COV-2 RNA RESP QL NAA+PROBE: NEGATIVE

## 2022-03-31 PROCEDURE — 99310 SBSQ NF CARE HIGH MDM 45: CPT | Performed by: FAMILY MEDICINE

## 2022-03-31 PROCEDURE — U0003 INFECTIOUS AGENT DETECTION BY NUCLEIC ACID (DNA OR RNA); SEVERE ACUTE RESPIRATORY SYNDROME CORONAVIRUS 2 (SARS-COV-2) (CORONAVIRUS DISEASE [COVID-19]), AMPLIFIED PROBE TECHNIQUE, MAKING USE OF HIGH THROUGHPUT TECHNOLOGIES AS DESCRIBED BY CMS-2020-01-R: HCPCS | Mod: ORL | Performed by: FAMILY MEDICINE

## 2022-03-31 NOTE — LETTER
"    3/31/2022        RE: Cm Lundberg  [address]  [facility] [address]        [facility]  [address] [place] [address]  Phone: [phone]  Fax: [phone]  Primary Provider: Provider, Generic External Data      PREOPERATIVE EVALUATION:  Today's date: 3/31/2022    Cm Lundberg is a 51 year old male who presents for a preoperative evaluation.    Surgical Information:  Surgery/Procedure: Incision and drainage, right foot with application of theraskin  Surgery Location: [facility]  Surgeon: Dr. Alex Kmi  Surgery Date: 4/4/2022  Time of Surgery: TBD  Where patient plans to recover: At a TCU (Transitional Care Unit)  Fax number for surgical facility: Note does not need to be faxed, will be available electronically in Epic.    Type of Anesthesia Anticipated: to be determined    Subjective     HPI related to upcoming procedure:   Cm Lundberg is a 51 year old male with hx of CAD, s/p stents and CABG, HTN, DM, prior CVA, afib/flutter on xarelto, who has been residing in TCU at [facility] [place] since Dec 2021 surgery due to elective procedure for chronic long standing right foot ulceration. He underwent incision and drainage of the right foot, excisional debridement of the ulceration of the right foot into the level of the muscle, under MAC with popliteal block anesthesia on 12/17/2021. He completed abx per ID recommendations. Since that time has had close follow up with podiatry, remained NWB, treated with wound vac. He underwent procedure on 1/27/2022 with incision and drainage with application of theraskin. Continued NWB with wound vac, frequent follow up with podiatry. At last visit 3/25/2022 recommendation was made for another procedure anticipating incision and drainage with application of theraskin on Mon 4/4/2022. Notes indicated per Dr. Kim: \"TREATMENT:-The lateral right foot ulceration " "continues to have exposed tendon of the peroneal brevis. Based on this presentation, I recommend we again try application of theraskin with continued use of the wound vac.\". Will have wound vac applied after procedure. Seen today for pre op review. Dr. Kim has said okay to continue on xarelto anticoagulation. He is a diabetic, meds metformin and glipizide will be held the morning of surgery. He denies any chest pain recently. Has been medically stable. No fever, cough, shortness of breath or dizziness. No new bowel or urinary concerns. Covid test was obtained in TCU. Labs ordered for tomorrow. He has chronic stable back pain. He is medically optimized and okay to proceed with procedure without further cardiac testing or evaluation.     Okay to proceed with proposed surgery, pending pre-op labs.     Health Care Directive:  CODE STATUS:   CPR/Full code       Patient Active Problem List  Patient Active Problem List    Diagnosis Date Noted     Diabetic ulcer of right midfoot associated with type 2 diabetes mellitus, with necrosis of muscle (H) 01/07/2022     Priority: Medium     Diabetes mellitus (H) 12/17/2021     Priority: Medium     Anxiety state 07/29/2021     Priority: Medium     Formatting of this note might be different from the original.  Created by Conversion    Replacement Utility updated for latest IMO load       Coronary atherosclerosis 07/29/2021     Priority: Medium     Formatting of this note might be different from the original.  Created by Conversion    Replacement Utility updated for latest IMO load       Depression 07/29/2021     Priority: Medium     Formatting of this note might be different from the original.  Created by Conversion       Microalbuminuria 07/29/2021     Priority: Medium     Formatting of this note might be different from the original.  Created by Conversion       Problems with learning 07/29/2021     Priority: Medium     Formatting of this note might be different from the " original.  Created by Conversion  [facility] Annotation: Feb 11 2010  1:41PM - Leonid Cabral: CANNOT READ OR   WRITE       Type 2 diabetes mellitus (H) 07/29/2021     Priority: Medium     Formatting of this note might be different from the original.  Created by Conversion       Type II or unspecified type diabetes mellitus with ophthalmic manifestations, not stated as uncontrolled(250.50) (H) 07/29/2021     Priority: Medium     Formatting of this note might be different from the original.  Created by Conversion       Equinovarus acquired deformity, right 04/14/2021     Priority: Medium     Diabetic ulcer of right midfoot associated with type 2 diabetes mellitus, limited to breakdown of skin (H) 09/28/2017     Priority: Medium     Late effects of cerebrovascular accident 08/19/2016     Priority: Medium     Coronary artery disease involving native coronary artery without angina pectoris, unspecified whether native or transplanted heart 08/19/2016     Priority: Medium     Type 2 diabetes mellitus with hyperglycemia (H) 08/19/2016     Priority: Medium     Mild intellectual disabilities 08/19/2016     Priority: Medium     Essential hypertension 08/19/2016     Priority: Medium     Major depressive disorder with single episode, remission status unspecified 08/19/2016     Priority: Medium     Diarrhea 08/19/2016     Priority: Medium     Constipation, unspecified constipation type 08/19/2016     Priority: Medium     Anemia, unspecified type 08/19/2016     Priority: Medium     Abdominal pain, generalized 08/19/2016     Priority: Medium     Pain 08/19/2016     Priority: Medium     Gastroesophageal reflux disease without esophagitis 08/19/2016     Priority: Medium     Migraine 08/19/2016     Priority: Medium     Weakness 08/19/2016     Priority: Medium     Myalgia 08/19/2016     Priority: Medium     Nausea 08/19/2016     Priority: Medium     Chest pain, unspecified type 08/19/2016     Priority: Medium     Insomnia, unspecified  type 08/19/2016     Priority: Medium     Hyperlipidemia, unspecified hyperlipidemia type 08/19/2016     Priority: Medium      Past Medical History:   Diagnosis Date     Anxiety      Arthritis      Cerebral infarction (H)      Congestive heart failure (H)      Coronary artery disease     CABG with multiple stents     Depression      Diabetes (H)      Equinovarus deformity, acquired, right      GERD (gastroesophageal reflux disease)      Heart attack (H)      History of blood transfusion      Hyperlipidemia      Hypertension      Migraine      Migraines      Myalgia      PONV (postoperative nausea and vomiting)     mild nausea     Stented coronary artery      Past Surgical History:   Procedure Laterality Date     INCISION AND DRAINAGE LOWER EXTREMITY, COMBINED Right 12/17/2021    Procedure: INCISION AND DRAINAGE, right foot;  Surgeon: Alex Kim DPM;  Location: [facility] Main OR     INCISION AND DRAINAGE LOWER EXTREMITY, COMBINED Right 1/27/2022    Procedure: INCISION AND DRAINAGE, right foot with application of theraskin;  Surgeon: Alex Kim DPM;  Location: [facility] Main OR     IR MISCELLANEOUS PROCEDURE  2/13/2004     [facility] CABG, VEIN, SINGLE      Description: CABG (CABG);  Recorded: 04/22/2009;     Current Outpatient Medications   Medication Sig Dispense Refill     acetaminophen (TYLENOL) 325 MG tablet Take 650 mg by mouth every 4 hours as needed for mild pain        ARIPiprazole (ABILIFY) 2 MG tablet 2 mg daily        ASPERCREME ORIGINAL 10 % external cream APPLY TO LOW BACK TWICE DAILY FOR LOW BACK PAIN       atorvastatin (LIPITOR) 80 MG tablet Take 80 mg by mouth daily        cholestyramine (QUESTRAN) 4 g packet Take 1 packet by mouth every other day        DULoxetine (CYMBALTA) 60 MG capsule Take 120 mg by mouth daily takle 120mg       erenumab-aooe (AIMOVIG) 140 MG/ML injection Inject 140 mg Subcutaneous every 30 days        fenofibrate micronized (LOFIBRA) 200 MG capsule Take 200 mg by  mouth every morning (before breakfast)        ferrous sulfate (FEROSUL) 325 (65 Fe) MG tablet Take 325 mg by mouth daily (with breakfast)        gabapentin (NEURONTIN) 100 MG capsule Take 100 mg by mouth 2 times daily (before meals)        gabapentin (NEURONTIN) 300 MG capsule Take 600 mg by mouth At Bedtime       glipiZIDE (GLUCOTROL) 5 MG tablet TAKE 1/2 TABLET (2.5MG) BY MOUTH DAILY DX DIABETES       loperamide (IMODIUM) 2 MG capsule Take 4 mg by mouth as needed for diarrhea        losartan (COZAAR) 25 MG tablet Take 25 mg by mouth At Bedtime        magnesium oxide 400 MG CAPS Take 400 mg by mouth 4 times daily       Menthol, Topical Analgesic, (BIOFREEZE ROLL-ON) 4 % GEL Apply 1 Application topically 2 times daily Apply to lower back for pain       metFORMIN (GLUCOPHAGE) 500 MG tablet TAKE 2 TABS (1000MG) BY MOUTH EVERY MORNING;TAKE 1 TAB BY MOUTH EVERY EVENING       metoprolol succinate ER (TOPROL-XL) 25 MG 24 hr tablet Take 75 mg by mouth daily        mirtazapine (REMERON) 7.5 MG tablet Take 7.5 mg by mouth At Bedtime        Neomycin-Bacitracin-Polymyxin (TRIPLE ANTIBIOTIC) 3.5-400-5000 OINT ointment Externally apply 1 applicator topically 3 times daily as needed       Nitroglycerin (NITROSTAT SL) Place 0.4 mg under the tongue every 5 minutes as needed for chest pain        omeprazole (PRILOSEC) 20 MG DR capsule Take 20 mg by mouth daily        oxyCODONE (ROXICODONE) 5 MG tablet TAKE 1 TAB BY MOUTH EVERY 6HOURS AS NEEDED FOR PAIN 28 tablet 0     polyethylene glycol (MIRALAX) 17 GM/Dose powder Take 1 packet by mouth daily as needed for constipation        Rivaroxaban (XARELTO PO) Take 20 mg by mouth daily        senna-docusate (SENOKOT-S/PERICOLACE) 8.6-50 MG tablet Take 1 tablet by mouth 2 times daily as needed for constipation        traZODone (DESYREL) 50 MG tablet Take 50 mg by mouth At Bedtime        vitamin C (ASCORBIC ACID) 500 MG tablet Take 500 mg by mouth daily        XARELTO ANTICOAGULANT 20 MG TABS  "tablet TAKE 1 TAB BY MOUTH ONCE DAILY         Allergies   Allergen Reactions     Aspirin Other (See Comments)     Reacts with migraine medicine     Isosorbide Headache     Codeine Rash     Morphine Rash        Social History     Tobacco Use     Smoking status: Former Smoker     Quit date: 2015     Years since quittin.1     Smokeless tobacco: Never Used   Substance Use Topics     Alcohol use: Not Currently           Objective     /62   Pulse 77   Temp 98.2  F (36.8  C)   Resp 18   Ht 1.702 m (5' 7\")   Wt 68.2 kg (150 lb 6.4 oz)   SpO2 96%   BMI 23.56 kg/m      Physical Exam   General: Patient is alert male, no distress.   HEENT: Head is NCAT. Eyes show no injection or icterus. Nares negative. Oropharynx well hydrated.  Neck: Supple. No tenderness or adenopathy. No JVD.  Lungs: Clear bilaterally. No wheezes.  Cardiovascular: Regular rate and rhythm, normal S1, S2.  Abdomen: Soft, no tenderness on exam. Bowel sounds present. No guarding rebound or rigidity.  : Deferred.  Extremities: No LE edema is noted.  Musculoskeletal: Atrophy of extremities.   Skin: Wound vac right foot.   Psych: Mood appears good.      Recent Labs   Lab Test 21  0548 21  1804 21  0827   HGB 11.1*  --   --      --   --      --  138   POTASSIUM 4.5  --  4.5   CR 0.99  --  0.86   A1C  --  5.1  --         Diagnostics:  Pre-op labs ordered for 2022.      ASSESSMENT/PLAN:   1. Right foot ulceration. Scheduled for repeat incision and drainage with application of theraskin per Dr. Kim on 2022. He is s/p prior procedures, S/p incision and drainage on 2021, s/p Incision and drainage, right foot with application of theraskin on 2022. Will remain NWB and have wound vac reapplied post procedure.   2. HTN. BPs controlled on losartan and metoprolol.   3. Diabetes. He is on metformin and glipizide, hold doses am of surgery.  4. Afib/flutter. He is anticoagulated with xarelto, per Dr." Julio, no need to hold for surgery.   5. CAD. Hx of many MIs per his report. Hx of stenting and prior CABG. On statin, xarelto, beta blocker. No chest pain or current cardiac concerns. EKG pre-op.  6. Hx of stroke. On statin, xarelto.  7. Anemia. He is on iron. Labs to be checked tomorrow 4/1/2022.   8. Mood disorder, mixed depression and anxiety. Cont PTA duloxetine, Abilify.   9. Mild intellectual disabilities. He resides in an senior living, unable to return until no longer has wound vac.        Signed Electronically by: :Heidi Xie MD           Sincerely,        Heidi Xie MD

## 2022-03-31 NOTE — TELEPHONE ENCOUNTER
Attempted to call TCU again, phone rang for several minutes with no answer.     Called back and asked for nurse manager.  No answer and reached a voicemail for Janie Gill MDS coordinator.  Asked her to call back to confirm they have received surgery orders for patient and to confirm they will do pre-op and covid testing.

## 2022-03-31 NOTE — TELEPHONE ENCOUNTER
Spoke with Janie, she confirmed all the orders/instructions.  Covid PCR done today and pre-op to be done tomorrow 4/1/22.

## 2022-04-01 ENCOUNTER — TRANSFERRED RECORDS (OUTPATIENT)
Dept: HEALTH INFORMATION MANAGEMENT | Facility: CLINIC | Age: 52
End: 2022-04-01

## 2022-04-01 ENCOUNTER — TELEPHONE (OUTPATIENT)
Dept: GERIATRICS | Facility: CLINIC | Age: 52
End: 2022-04-01

## 2022-04-01 LAB
ANION GAP SERPL CALCULATED.3IONS-SCNC: 9 MMOL/L (ref 5–18)
BUN SERPL-MCNC: 17 MG/DL (ref 8–22)
CALCIUM SERPL-MCNC: 9.9 MG/DL (ref 8.5–10.5)
CHLORIDE BLD-SCNC: 104 MMOL/L (ref 98–107)
CO2 SERPL-SCNC: 26 MMOL/L (ref 22–31)
CREAT SERPL-MCNC: 0.97 MG/DL (ref 0.7–1.3)
ERYTHROCYTE [DISTWIDTH] IN BLOOD BY AUTOMATED COUNT: 13.2 % (ref 10–15)
GFR SERPL CREATININE-BSD FRML MDRD: >90 ML/MIN/1.73M2
GLUCOSE BLD-MCNC: 129 MG/DL (ref 70–125)
HCT VFR BLD AUTO: 34 % (ref 40–53)
HGB BLD-MCNC: 10.5 G/DL (ref 13.3–17.7)
MAGNESIUM SERPL-MCNC: 1.6 MG/DL (ref 1.8–2.6)
MCH RBC QN AUTO: 25.8 PG (ref 26.5–33)
MCHC RBC AUTO-ENTMCNC: 30.9 G/DL (ref 31.5–36.5)
MCV RBC AUTO: 84 FL (ref 78–100)
PLATELET # BLD AUTO: 238 10E3/UL (ref 150–450)
POTASSIUM BLD-SCNC: 4.6 MMOL/L (ref 3.5–5)
RBC # BLD AUTO: 4.07 10E6/UL (ref 4.4–5.9)
SODIUM SERPL-SCNC: 139 MMOL/L (ref 136–145)
WBC # BLD AUTO: 6.3 10E3/UL (ref 4–11)

## 2022-04-01 PROCEDURE — 80048 BASIC METABOLIC PNL TOTAL CA: CPT | Mod: ORL | Performed by: FAMILY MEDICINE

## 2022-04-01 PROCEDURE — 85027 COMPLETE CBC AUTOMATED: CPT | Mod: ORL | Performed by: FAMILY MEDICINE

## 2022-04-01 PROCEDURE — P9604 ONE-WAY ALLOW PRORATED TRIP: HCPCS | Mod: ORL | Performed by: FAMILY MEDICINE

## 2022-04-01 PROCEDURE — 83735 ASSAY OF MAGNESIUM: CPT | Mod: ORL | Performed by: FAMILY MEDICINE

## 2022-04-01 PROCEDURE — 36415 COLL VENOUS BLD VENIPUNCTURE: CPT | Mod: ORL | Performed by: FAMILY MEDICINE

## 2022-04-01 NOTE — H&P (VIEW-ONLY)
"[facility]  [address] [place] [address]  Phone: [phone]  Fax: [phone]  Primary Provider: Provider, Generic External Data      PREOPERATIVE EVALUATION:  Today's date: 3/31/2022    Cm Lundberg is a 51 year old male who presents for a preoperative evaluation.    Surgical Information:  Surgery/Procedure: Incision and drainage, right foot with application of theraskin  Surgery Location: [facility]  Surgeon: Dr. Alex Kim  Surgery Date: 4/4/2022  Time of Surgery: TBD  Where patient plans to recover: At a TCU (Transitional Care Unit)  Fax number for surgical facility: Note does not need to be faxed, will be available electronically in Epic.    Type of Anesthesia Anticipated: to be determined    Subjective     HPI related to upcoming procedure:   Cm Lundberg is a 51 year old male with hx of CAD, s/p stents and CABG, HTN, DM, prior CVA, afib/flutter on xarelto, who has been residing in TCU at [facility] since Dec 2021 surgery due to elective procedure for chronic long standing right foot ulceration. He underwent incision and drainage of the right foot, excisional debridement of the ulceration of the right foot into the level of the muscle, under MAC with popliteal block anesthesia on 12/17/2021. He completed abx per ID recommendations. Since that time has had close follow up with podiatry, remained NWB, treated with wound vac. He underwent procedure on 1/27/2022 with incision and drainage with application of theraskin. Continued NWB with wound vac, frequent follow up with podiatry. At last visit 3/25/2022 recommendation was made for another procedure anticipating incision and drainage with application of theraskin on Mon 4/4/2022. Notes indicated per Dr. Kim: \"TREATMENT:-The lateral right foot ulceration continues to have exposed tendon of the peroneal brevis. Based on this presentation, I recommend we again try " "application of theraskin with continued use of the wound vac.\". Will have wound vac applied after procedure. Seen today for pre op review. Dr. Kim has said okay to continue on xarelto anticoagulation. He is a diabetic, meds metformin and glipizide will be held the morning of surgery. He denies any chest pain recently. Has been medically stable. No fever, cough, shortness of breath or dizziness. No new bowel or urinary concerns. Covid test was obtained in TCU. Labs ordered for tomorrow. He has chronic stable back pain. He is medically optimized and okay to proceed with procedure without further cardiac testing or evaluation.     Okay to proceed with proposed surgery, pending pre-op labs.     Health Care Directive:  CODE STATUS:   CPR/Full code       Patient Active Problem List  Patient Active Problem List    Diagnosis Date Noted     Diabetic ulcer of right midfoot associated with type 2 diabetes mellitus, with necrosis of muscle (H) 01/07/2022     Priority: Medium     Diabetes mellitus (H) 12/17/2021     Priority: Medium     Anxiety state 07/29/2021     Priority: Medium     Formatting of this note might be different from the original.  Created by Conversion    Replacement Utility updated for latest IMO load       Coronary atherosclerosis 07/29/2021     Priority: Medium     Formatting of this note might be different from the original.  Created by Conversion    Replacement Utility updated for latest IMO load       Depression 07/29/2021     Priority: Medium     Formatting of this note might be different from the original.  Created by Conversion       Microalbuminuria 07/29/2021     Priority: Medium     Formatting of this note might be different from the original.  Created by Conversion       Problems with learning 07/29/2021     Priority: Medium     Formatting of this note might be different from the original.  Created by [employer] [facility] Annotation: Feb 11 2010  1:41PM - Leonid Cabral: CANNOT READ OR   WRITE   "     Type 2 diabetes mellitus (H) 07/29/2021     Priority: Medium     Formatting of this note might be different from the original.  Created by Conversion       Type II or unspecified type diabetes mellitus with ophthalmic manifestations, not stated as uncontrolled(250.50) (H) 07/29/2021     Priority: Medium     Formatting of this note might be different from the original.  Created by Conversion       Equinovarus acquired deformity, right 04/14/2021     Priority: Medium     Diabetic ulcer of right midfoot associated with type 2 diabetes mellitus, limited to breakdown of skin (H) 09/28/2017     Priority: Medium     Late effects of cerebrovascular accident 08/19/2016     Priority: Medium     Coronary artery disease involving native coronary artery without angina pectoris, unspecified whether native or transplanted heart 08/19/2016     Priority: Medium     Type 2 diabetes mellitus with hyperglycemia (H) 08/19/2016     Priority: Medium     Mild intellectual disabilities 08/19/2016     Priority: Medium     Essential hypertension 08/19/2016     Priority: Medium     Major depressive disorder with single episode, remission status unspecified 08/19/2016     Priority: Medium     Diarrhea 08/19/2016     Priority: Medium     Constipation, unspecified constipation type 08/19/2016     Priority: Medium     Anemia, unspecified type 08/19/2016     Priority: Medium     Abdominal pain, generalized 08/19/2016     Priority: Medium     Pain 08/19/2016     Priority: Medium     Gastroesophageal reflux disease without esophagitis 08/19/2016     Priority: Medium     Migraine 08/19/2016     Priority: Medium     Weakness 08/19/2016     Priority: Medium     Myalgia 08/19/2016     Priority: Medium     Nausea 08/19/2016     Priority: Medium     Chest pain, unspecified type 08/19/2016     Priority: Medium     Insomnia, unspecified type 08/19/2016     Priority: Medium     Hyperlipidemia, unspecified hyperlipidemia type 08/19/2016     Priority:  Medium      Past Medical History:   Diagnosis Date     Anxiety      Arthritis      Cerebral infarction (H)      Congestive heart failure (H)      Coronary artery disease     CABG with multiple stents     Depression      Diabetes (H)      Equinovarus deformity, acquired, right      GERD (gastroesophageal reflux disease)      Heart attack (H)      History of blood transfusion      Hyperlipidemia      Hypertension      Migraine      Migraines      Myalgia      PONV (postoperative nausea and vomiting)     mild nausea     Stented coronary artery      Past Surgical History:   Procedure Laterality Date     INCISION AND DRAINAGE LOWER EXTREMITY, COMBINED Right 12/17/2021    Procedure: INCISION AND DRAINAGE, right foot;  Surgeon: Alex Kim DPM;  Location: [facility] Main OR     INCISION AND DRAINAGE LOWER EXTREMITY, COMBINED Right 1/27/2022    Procedure: INCISION AND DRAINAGE, right foot with application of theraskin;  Surgeon: Alex Kim DPM;  Location: [facility] Main OR     IR MISCELLANEOUS PROCEDURE  2/13/2004     [facility] CABG, VEIN, SINGLE      Description: CABG (CABG);  Recorded: 04/22/2009;     Current Outpatient Medications   Medication Sig Dispense Refill     acetaminophen (TYLENOL) 325 MG tablet Take 650 mg by mouth every 4 hours as needed for mild pain        ARIPiprazole (ABILIFY) 2 MG tablet 2 mg daily        ASPERCREME ORIGINAL 10 % external cream APPLY TO LOW BACK TWICE DAILY FOR LOW BACK PAIN       atorvastatin (LIPITOR) 80 MG tablet Take 80 mg by mouth daily        cholestyramine (QUESTRAN) 4 g packet Take 1 packet by mouth every other day        DULoxetine (CYMBALTA) 60 MG capsule Take 120 mg by mouth daily takle 120mg       erenumab-aooe (AIMOVIG) 140 MG/ML injection Inject 140 mg Subcutaneous every 30 days        fenofibrate micronized (LOFIBRA) 200 MG capsule Take 200 mg by mouth every morning (before breakfast)        ferrous sulfate (FEROSUL) 325 (65 Fe) MG tablet Take 325 mg by mouth  daily (with breakfast)        gabapentin (NEURONTIN) 100 MG capsule Take 100 mg by mouth 2 times daily (before meals)        gabapentin (NEURONTIN) 300 MG capsule Take 600 mg by mouth At Bedtime       glipiZIDE (GLUCOTROL) 5 MG tablet TAKE 1/2 TABLET (2.5MG) BY MOUTH DAILY DX DIABETES       loperamide (IMODIUM) 2 MG capsule Take 4 mg by mouth as needed for diarrhea        losartan (COZAAR) 25 MG tablet Take 25 mg by mouth At Bedtime        magnesium oxide 400 MG CAPS Take 400 mg by mouth 4 times daily       Menthol, Topical Analgesic, (BIOFREEZE ROLL-ON) 4 % GEL Apply 1 Application topically 2 times daily Apply to lower back for pain       metFORMIN (GLUCOPHAGE) 500 MG tablet TAKE 2 TABS (1000MG) BY MOUTH EVERY MORNING;TAKE 1 TAB BY MOUTH EVERY EVENING       metoprolol succinate ER (TOPROL-XL) 25 MG 24 hr tablet Take 75 mg by mouth daily        mirtazapine (REMERON) 7.5 MG tablet Take 7.5 mg by mouth At Bedtime        Neomycin-Bacitracin-Polymyxin (TRIPLE ANTIBIOTIC) 3.5-400-5000 OINT ointment Externally apply 1 applicator topically 3 times daily as needed       Nitroglycerin (NITROSTAT SL) Place 0.4 mg under the tongue every 5 minutes as needed for chest pain        omeprazole (PRILOSEC) 20 MG DR capsule Take 20 mg by mouth daily        oxyCODONE (ROXICODONE) 5 MG tablet TAKE 1 TAB BY MOUTH EVERY 6HOURS AS NEEDED FOR PAIN 28 tablet 0     polyethylene glycol (MIRALAX) 17 GM/Dose powder Take 1 packet by mouth daily as needed for constipation        Rivaroxaban (XARELTO PO) Take 20 mg by mouth daily        senna-docusate (SENOKOT-S/PERICOLACE) 8.6-50 MG tablet Take 1 tablet by mouth 2 times daily as needed for constipation        traZODone (DESYREL) 50 MG tablet Take 50 mg by mouth At Bedtime        vitamin C (ASCORBIC ACID) 500 MG tablet Take 500 mg by mouth daily        XARELTO ANTICOAGULANT 20 MG TABS tablet TAKE 1 TAB BY MOUTH ONCE DAILY         Allergies   Allergen Reactions     Aspirin Other (See Comments)      "Reacts with migraine medicine     Isosorbide Headache     Codeine Rash     Morphine Rash        Social History     Tobacco Use     Smoking status: Former Smoker     Quit date: 2015     Years since quittin.1     Smokeless tobacco: Never Used   Substance Use Topics     Alcohol use: Not Currently           Objective     /62   Pulse 77   Temp 98.2  F (36.8  C)   Resp 18   Ht 1.702 m (5' 7\")   Wt 68.2 kg (150 lb 6.4 oz)   SpO2 96%   BMI 23.56 kg/m      Physical Exam   General: Patient is alert male, no distress.   HEENT: Head is NCAT. Eyes show no injection or icterus. Nares negative. Oropharynx well hydrated.  Neck: Supple. No tenderness or adenopathy. No JVD.  Lungs: Clear bilaterally. No wheezes.  Cardiovascular: Regular rate and rhythm, normal S1, S2.  Abdomen: Soft, no tenderness on exam. Bowel sounds present. No guarding rebound or rigidity.  : Deferred.  Extremities: No LE edema is noted.  Musculoskeletal: Atrophy of extremities.   Skin: Wound vac right foot.   Psych: Mood appears good.      Recent Labs   Lab Test 21  0548 21  1804 21  0827   HGB 11.1*  --   --      --   --      --  138   POTASSIUM 4.5  --  4.5   CR 0.99  --  0.86   A1C  --  5.1  --         Diagnostics:  Pre-op labs ordered for 2022.      ASSESSMENT/PLAN:   1. Right foot ulceration. Scheduled for repeat incision and drainage with application of theraskin per Dr. Kim on 2022. He is s/p prior procedures, S/p incision and drainage on 2021, s/p Incision and drainage, right foot with application of theraskin on 2022. Will remain NWB and have wound vac reapplied post procedure.   2. HTN. BPs controlled on losartan and metoprolol.   3. Diabetes. He is on metformin and glipizide, hold doses am of surgery.  4. Afib/flutter. He is anticoagulated with xarelto, per Dr. Kim, no need to hold for surgery.   5. CAD. Hx of many MIs per his report. Hx of stenting and prior CABG. On " statin, xarelto, beta blocker. No chest pain or current cardiac concerns. EKG pre-op.  6. Hx of stroke. On statin, xarelto.  7. Anemia. He is on iron. Labs to be checked tomorrow 4/1/2022.   8. Mood disorder, mixed depression and anxiety. Cont PTA duloxetine, Abilify.   9. Mild intellectual disabilities. He resides in an [facility], unable to return until no longer has wound vac.        Signed Electronically by: :Heidi Xie MD

## 2022-04-01 NOTE — PROGRESS NOTES
"[facility] GERIATRICS  [address] [place] [address]  Phone: [phone]  Fax: [phone]  Primary Provider: Provider, Generic External Data      PREOPERATIVE EVALUATION:  Today's date: 3/31/2022    Cm Lundberg is a 51 year old male who presents for a preoperative evaluation.    Surgical Information:  Surgery/Procedure: Incision and drainage, right foot with application of theraskin  Surgery Location: [facility]  Surgeon: Dr. Alex Kim  Surgery Date: 4/4/2022  Time of Surgery: TBD  Where patient plans to recover: At a TCU (Transitional Care Unit)  Fax number for surgical facility: Note does not need to be faxed, will be available electronically in Epic.    Type of Anesthesia Anticipated: to be determined    Subjective     HPI related to upcoming procedure:   Cm Lundberg is a 51 year old male with hx of CAD, s/p stents and CABG, HTN, DM, prior CVA, afib/flutter on xarelto, who has been residing in TCU at [facility] since Dec 2021 surgery due to elective procedure for chronic long standing right foot ulceration. He underwent incision and drainage of the right foot, excisional debridement of the ulceration of the right foot into the level of the muscle, under MAC with popliteal block anesthesia on 12/17/2021. He completed abx per ID recommendations. Since that time has had close follow up with podiatry, remained NWB, treated with wound vac. He underwent procedure on 1/27/2022 with incision and drainage with application of theraskin. Continued NWB with wound vac, frequent follow up with podiatry. At last visit 3/25/2022 recommendation was made for another procedure anticipating incision and drainage with application of theraskin on Mon 4/4/2022. Notes indicated per Dr. Kim: \"TREATMENT:-The lateral right foot ulceration continues to have exposed tendon of the peroneal brevis. Based on this presentation, I recommend we again try " "application of theraskin with continued use of the wound vac.\". Will have wound vac applied after procedure. Seen today for pre op review. Dr. Kim has said okay to continue on xarelto anticoagulation. He is a diabetic, meds metformin and glipizide will be held the morning of surgery. He denies any chest pain recently. Has been medically stable. No fever, cough, shortness of breath or dizziness. No new bowel or urinary concerns. Covid test was obtained in TCU. Labs ordered for tomorrow. He has chronic stable back pain. He is medically optimized and okay to proceed with procedure without further cardiac testing or evaluation.     Okay to proceed with proposed surgery, pending pre-op labs.     Health Care Directive:  CODE STATUS:   CPR/Full code       Patient Active Problem List  Patient Active Problem List    Diagnosis Date Noted     Diabetic ulcer of right midfoot associated with type 2 diabetes mellitus, with necrosis of muscle (H) 01/07/2022     Priority: Medium     Diabetes mellitus (H) 12/17/2021     Priority: Medium     Anxiety state 07/29/2021     Priority: Medium     Formatting of this note might be different from the original.  Created by Conversion    Replacement Utility updated for latest IMO load       Coronary atherosclerosis 07/29/2021     Priority: Medium     Formatting of this note might be different from the original.  Created by Conversion    Replacement Utility updated for latest IMO load       Depression 07/29/2021     Priority: Medium     Formatting of this note might be different from the original.  Created by Conversion       Microalbuminuria 07/29/2021     Priority: Medium     Formatting of this note might be different from the original.  Created by Conversion       Problems with learning 07/29/2021     Priority: Medium     Formatting of this note might be different from the original.  Created by [employer] [facility] Annotation: Feb 11 2010  1:41PM - Leonid Cabral: CANNOT READ OR   WRITE   "     Type 2 diabetes mellitus (H) 07/29/2021     Priority: Medium     Formatting of this note might be different from the original.  Created by Conversion       Type II or unspecified type diabetes mellitus with ophthalmic manifestations, not stated as uncontrolled(250.50) (H) 07/29/2021     Priority: Medium     Formatting of this note might be different from the original.  Created by Conversion       Equinovarus acquired deformity, right 04/14/2021     Priority: Medium     Diabetic ulcer of right midfoot associated with type 2 diabetes mellitus, limited to breakdown of skin (H) 09/28/2017     Priority: Medium     Late effects of cerebrovascular accident 08/19/2016     Priority: Medium     Coronary artery disease involving native coronary artery without angina pectoris, unspecified whether native or transplanted heart 08/19/2016     Priority: Medium     Type 2 diabetes mellitus with hyperglycemia (H) 08/19/2016     Priority: Medium     Mild intellectual disabilities 08/19/2016     Priority: Medium     Essential hypertension 08/19/2016     Priority: Medium     Major depressive disorder with single episode, remission status unspecified 08/19/2016     Priority: Medium     Diarrhea 08/19/2016     Priority: Medium     Constipation, unspecified constipation type 08/19/2016     Priority: Medium     Anemia, unspecified type 08/19/2016     Priority: Medium     Abdominal pain, generalized 08/19/2016     Priority: Medium     Pain 08/19/2016     Priority: Medium     Gastroesophageal reflux disease without esophagitis 08/19/2016     Priority: Medium     Migraine 08/19/2016     Priority: Medium     Weakness 08/19/2016     Priority: Medium     Myalgia 08/19/2016     Priority: Medium     Nausea 08/19/2016     Priority: Medium     Chest pain, unspecified type 08/19/2016     Priority: Medium     Insomnia, unspecified type 08/19/2016     Priority: Medium     Hyperlipidemia, unspecified hyperlipidemia type 08/19/2016     Priority:  Medium      Past Medical History:   Diagnosis Date     Anxiety      Arthritis      Cerebral infarction (H)      Congestive heart failure (H)      Coronary artery disease     CABG with multiple stents     Depression      Diabetes (H)      Equinovarus deformity, acquired, right      GERD (gastroesophageal reflux disease)      Heart attack (H)      History of blood transfusion      Hyperlipidemia      Hypertension      Migraine      Migraines      Myalgia      PONV (postoperative nausea and vomiting)     mild nausea     Stented coronary artery      Past Surgical History:   Procedure Laterality Date     INCISION AND DRAINAGE LOWER EXTREMITY, COMBINED Right 12/17/2021    Procedure: INCISION AND DRAINAGE, right foot;  Surgeon: Alex Kim DPM;  Location: [facility] Main OR     INCISION AND DRAINAGE LOWER EXTREMITY, COMBINED Right 1/27/2022    Procedure: INCISION AND DRAINAGE, right foot with application of theraskin;  Surgeon: Alex Kim DPM;  Location: [facility] Main OR     IR MISCELLANEOUS PROCEDURE  2/13/2004     [facility] CABG, VEIN, SINGLE      Description: CABG (CABG);  Recorded: 04/22/2009;     Current Outpatient Medications   Medication Sig Dispense Refill     acetaminophen (TYLENOL) 325 MG tablet Take 650 mg by mouth every 4 hours as needed for mild pain        ARIPiprazole (ABILIFY) 2 MG tablet 2 mg daily        ASPERCREME ORIGINAL 10 % external cream APPLY TO LOW BACK TWICE DAILY FOR LOW BACK PAIN       atorvastatin (LIPITOR) 80 MG tablet Take 80 mg by mouth daily        cholestyramine (QUESTRAN) 4 g packet Take 1 packet by mouth every other day        DULoxetine (CYMBALTA) 60 MG capsule Take 120 mg by mouth daily takle 120mg       erenumab-aooe (AIMOVIG) 140 MG/ML injection Inject 140 mg Subcutaneous every 30 days        fenofibrate micronized (LOFIBRA) 200 MG capsule Take 200 mg by mouth every morning (before breakfast)        ferrous sulfate (FEROSUL) 325 (65 Fe) MG tablet Take 325 mg by mouth  daily (with breakfast)        gabapentin (NEURONTIN) 100 MG capsule Take 100 mg by mouth 2 times daily (before meals)        gabapentin (NEURONTIN) 300 MG capsule Take 600 mg by mouth At Bedtime       glipiZIDE (GLUCOTROL) 5 MG tablet TAKE 1/2 TABLET (2.5MG) BY MOUTH DAILY DX DIABETES       loperamide (IMODIUM) 2 MG capsule Take 4 mg by mouth as needed for diarrhea        losartan (COZAAR) 25 MG tablet Take 25 mg by mouth At Bedtime        magnesium oxide 400 MG CAPS Take 400 mg by mouth 4 times daily       Menthol, Topical Analgesic, (BIOFREEZE ROLL-ON) 4 % GEL Apply 1 Application topically 2 times daily Apply to lower back for pain       metFORMIN (GLUCOPHAGE) 500 MG tablet TAKE 2 TABS (1000MG) BY MOUTH EVERY MORNING;TAKE 1 TAB BY MOUTH EVERY EVENING       metoprolol succinate ER (TOPROL-XL) 25 MG 24 hr tablet Take 75 mg by mouth daily        mirtazapine (REMERON) 7.5 MG tablet Take 7.5 mg by mouth At Bedtime        Neomycin-Bacitracin-Polymyxin (TRIPLE ANTIBIOTIC) 3.5-400-5000 OINT ointment Externally apply 1 applicator topically 3 times daily as needed       Nitroglycerin (NITROSTAT SL) Place 0.4 mg under the tongue every 5 minutes as needed for chest pain        omeprazole (PRILOSEC) 20 MG DR capsule Take 20 mg by mouth daily        oxyCODONE (ROXICODONE) 5 MG tablet TAKE 1 TAB BY MOUTH EVERY 6HOURS AS NEEDED FOR PAIN 28 tablet 0     polyethylene glycol (MIRALAX) 17 GM/Dose powder Take 1 packet by mouth daily as needed for constipation        Rivaroxaban (XARELTO PO) Take 20 mg by mouth daily        senna-docusate (SENOKOT-S/PERICOLACE) 8.6-50 MG tablet Take 1 tablet by mouth 2 times daily as needed for constipation        traZODone (DESYREL) 50 MG tablet Take 50 mg by mouth At Bedtime        vitamin C (ASCORBIC ACID) 500 MG tablet Take 500 mg by mouth daily        XARELTO ANTICOAGULANT 20 MG TABS tablet TAKE 1 TAB BY MOUTH ONCE DAILY         Allergies   Allergen Reactions     Aspirin Other (See Comments)      "Reacts with migraine medicine     Isosorbide Headache     Codeine Rash     Morphine Rash        Social History     Tobacco Use     Smoking status: Former Smoker     Quit date: 2015     Years since quittin.1     Smokeless tobacco: Never Used   Substance Use Topics     Alcohol use: Not Currently           Objective     /62   Pulse 77   Temp 98.2  F (36.8  C)   Resp 18   Ht 1.702 m (5' 7\")   Wt 68.2 kg (150 lb 6.4 oz)   SpO2 96%   BMI 23.56 kg/m      Physical Exam   General: Patient is alert male, no distress.   HEENT: Head is NCAT. Eyes show no injection or icterus. Nares negative. Oropharynx well hydrated.  Neck: Supple. No tenderness or adenopathy. No JVD.  Lungs: Clear bilaterally. No wheezes.  Cardiovascular: Regular rate and rhythm, normal S1, S2.  Abdomen: Soft, no tenderness on exam. Bowel sounds present. No guarding rebound or rigidity.  : Deferred.  Extremities: No LE edema is noted.  Musculoskeletal: Atrophy of extremities.   Skin: Wound vac right foot.   Psych: Mood appears good.      Recent Labs   Lab Test 21  0548 21  1804 21  0827   HGB 11.1*  --   --      --   --      --  138   POTASSIUM 4.5  --  4.5   CR 0.99  --  0.86   A1C  --  5.1  --         Diagnostics:  Pre-op labs ordered for 2022.      ASSESSMENT/PLAN:   1. Right foot ulceration. Scheduled for repeat incision and drainage with application of theraskin per Dr. Kim on 2022. He is s/p prior procedures, S/p incision and drainage on 2021, s/p Incision and drainage, right foot with application of theraskin on 2022. Will remain NWB and have wound vac reapplied post procedure.   2. HTN. BPs controlled on losartan and metoprolol.   3. Diabetes. He is on metformin and glipizide, hold doses am of surgery.  4. Afib/flutter. He is anticoagulated with xarelto, per Dr. Kim, no need to hold for surgery.   5. CAD. Hx of many MIs per his report. Hx of stenting and prior CABG. On " statin, xarelto, beta blocker. No chest pain or current cardiac concerns. EKG pre-op.  6. Hx of stroke. On statin, xarelto.  7. Anemia. He is on iron. Labs to be checked tomorrow 4/1/2022.   8. Mood disorder, mixed depression and anxiety. Cont PTA duloxetine, Abilify.   9. Mild intellectual disabilities. He resides in an [facility], unable to return until no longer has wound vac.        Signed Electronically by: :Heidi Xie MD

## 2022-04-01 NOTE — TELEPHONE ENCOUNTER
[facility] Triage Nurse Telephone Encounter    Provider: Heidi Xie MD  Facility: [facility] Type:  TCU    Caller: Mario   Call Back Number: [phone]    Allergies:    Allergies   Allergen Reactions     Aspirin Other (See Comments)     Reacts with migraine medicine     Isosorbide Headache     Codeine Rash     Morphine Rash        Reason for call: Pt has surgery on Monday and nursing is calling to report that the pre-op is required to have an EKG and Chest xray prior.     CBC, BMP and Mag reviewed     Verbal Order/Direction given by Provider: Check a EKG and Chest Xray d/t pre-op.     Provider giving Order:  Heidi Xie MD    Verbal Order given to: Mario Sanchez RN

## 2022-04-04 ENCOUNTER — ANESTHESIA EVENT (OUTPATIENT)
Dept: SURGERY | Facility: HOSPITAL | Age: 52
End: 2022-04-04
Payer: MEDICARE

## 2022-04-04 ENCOUNTER — ANESTHESIA (OUTPATIENT)
Dept: SURGERY | Facility: HOSPITAL | Age: 52
End: 2022-04-04
Payer: MEDICARE

## 2022-04-04 ENCOUNTER — HOSPITAL ENCOUNTER (OUTPATIENT)
Facility: HOSPITAL | Age: 52
Discharge: NURSING, REHAB OR LONG-TERM CARE | End: 2022-04-04
Attending: PODIATRIST | Admitting: PODIATRIST
Payer: MEDICARE

## 2022-04-04 VITALS
DIASTOLIC BLOOD PRESSURE: 71 MMHG | BODY MASS INDEX: 23.96 KG/M2 | RESPIRATION RATE: 16 BRPM | HEART RATE: 80 BPM | OXYGEN SATURATION: 99 % | TEMPERATURE: 98.1 F | SYSTOLIC BLOOD PRESSURE: 120 MMHG | WEIGHT: 153 LBS

## 2022-04-04 DIAGNOSIS — L97.513 ULCER OF RIGHT FOOT WITH NECROSIS OF MUSCLE (H): ICD-10-CM

## 2022-04-04 LAB
GLUCOSE BLDC GLUCOMTR-MCNC: 112 MG/DL (ref 70–99)
GLUCOSE BLDC GLUCOMTR-MCNC: 112 MG/DL (ref 70–99)

## 2022-04-04 PROCEDURE — 250N000011 HC RX IP 250 OP 636: Performed by: ANESTHESIOLOGY

## 2022-04-04 PROCEDURE — 28002 TREATMENT OF FOOT INFECTION: CPT | Mod: RT | Performed by: PODIATRIST

## 2022-04-04 PROCEDURE — 250N000009 HC RX 250: Performed by: ANESTHESIOLOGY

## 2022-04-04 PROCEDURE — 272N000001 HC OR GENERAL SUPPLY STERILE: Performed by: PODIATRIST

## 2022-04-04 PROCEDURE — 15275 SKIN SUB GRAFT FACE/NK/HF/G: CPT | Mod: 59 | Performed by: PODIATRIST

## 2022-04-04 PROCEDURE — 11043 DBRDMT MUSC&/FSCA 1ST 20/<: CPT | Mod: 59 | Performed by: PODIATRIST

## 2022-04-04 PROCEDURE — 360N000075 HC SURGERY LEVEL 2, PER MIN: Performed by: PODIATRIST

## 2022-04-04 PROCEDURE — 15002 WOUND PREP TRK/ARM/LEG: CPT | Mod: 59 | Performed by: PODIATRIST

## 2022-04-04 PROCEDURE — 370N000017 HC ANESTHESIA TECHNICAL FEE, PER MIN: Performed by: PODIATRIST

## 2022-04-04 PROCEDURE — 999N000141 HC STATISTIC PRE-PROCEDURE NURSING ASSESSMENT: Performed by: PODIATRIST

## 2022-04-04 PROCEDURE — 250N000013 HC RX MED GY IP 250 OP 250 PS 637: Performed by: ANESTHESIOLOGY

## 2022-04-04 PROCEDURE — 258N000003 HC RX IP 258 OP 636: Performed by: ANESTHESIOLOGY

## 2022-04-04 PROCEDURE — 250N000011 HC RX IP 250 OP 636: Performed by: PODIATRIST

## 2022-04-04 PROCEDURE — 250N000011 HC RX IP 250 OP 636: Performed by: NURSE ANESTHETIST, CERTIFIED REGISTERED

## 2022-04-04 PROCEDURE — 710N000012 HC RECOVERY PHASE 2, PER MINUTE: Performed by: PODIATRIST

## 2022-04-04 PROCEDURE — 250N000009 HC RX 250: Performed by: NURSE ANESTHETIST, CERTIFIED REGISTERED

## 2022-04-04 PROCEDURE — 82962 GLUCOSE BLOOD TEST: CPT

## 2022-04-04 DEVICE — GRAFT EPIDERMIS MESHED THERASKIN CRYO 1X2" PER SQ CM- 13: Type: IMPLANTABLE DEVICE | Site: FOOT | Status: FUNCTIONAL

## 2022-04-04 RX ORDER — OXYCODONE HYDROCHLORIDE 5 MG/1
5 TABLET ORAL EVERY 4 HOURS PRN
Status: DISCONTINUED | OUTPATIENT
Start: 2022-04-04 | End: 2022-04-04 | Stop reason: HOSPADM

## 2022-04-04 RX ORDER — NALOXONE HYDROCHLORIDE 1 MG/ML
0.2 INJECTION INTRAMUSCULAR; INTRAVENOUS; SUBCUTANEOUS
Status: DISCONTINUED | OUTPATIENT
Start: 2022-04-04 | End: 2022-04-04 | Stop reason: HOSPADM

## 2022-04-04 RX ORDER — FENTANYL CITRATE 50 UG/ML
25 INJECTION, SOLUTION INTRAMUSCULAR; INTRAVENOUS EVERY 5 MIN PRN
Status: DISCONTINUED | OUTPATIENT
Start: 2022-04-04 | End: 2022-04-04 | Stop reason: HOSPADM

## 2022-04-04 RX ORDER — PROPOFOL 10 MG/ML
INJECTION, EMULSION INTRAVENOUS CONTINUOUS PRN
Status: DISCONTINUED | OUTPATIENT
Start: 2022-04-04 | End: 2022-04-04

## 2022-04-04 RX ORDER — SODIUM CHLORIDE, SODIUM LACTATE, POTASSIUM CHLORIDE, CALCIUM CHLORIDE 600; 310; 30; 20 MG/100ML; MG/100ML; MG/100ML; MG/100ML
INJECTION, SOLUTION INTRAVENOUS CONTINUOUS
Status: DISCONTINUED | OUTPATIENT
Start: 2022-04-04 | End: 2022-04-04 | Stop reason: HOSPADM

## 2022-04-04 RX ORDER — ONDANSETRON 2 MG/ML
INJECTION INTRAMUSCULAR; INTRAVENOUS PRN
Status: DISCONTINUED | OUTPATIENT
Start: 2022-04-04 | End: 2022-04-04

## 2022-04-04 RX ORDER — BUPIVACAINE HYDROCHLORIDE AND EPINEPHRINE 2.5; 5 MG/ML; UG/ML
INJECTION, SOLUTION INFILTRATION; PERINEURAL
Status: COMPLETED | OUTPATIENT
Start: 2022-04-04 | End: 2022-04-04

## 2022-04-04 RX ORDER — NALOXONE HYDROCHLORIDE 1 MG/ML
0.4 INJECTION INTRAMUSCULAR; INTRAVENOUS; SUBCUTANEOUS
Status: DISCONTINUED | OUTPATIENT
Start: 2022-04-04 | End: 2022-04-04 | Stop reason: HOSPADM

## 2022-04-04 RX ORDER — LIDOCAINE HYDROCHLORIDE 20 MG/ML
INJECTION, SOLUTION INFILTRATION; PERINEURAL PRN
Status: DISCONTINUED | OUTPATIENT
Start: 2022-04-04 | End: 2022-04-04

## 2022-04-04 RX ORDER — HYDROMORPHONE HYDROCHLORIDE 1 MG/ML
0.2 INJECTION, SOLUTION INTRAMUSCULAR; INTRAVENOUS; SUBCUTANEOUS EVERY 5 MIN PRN
Status: DISCONTINUED | OUTPATIENT
Start: 2022-04-04 | End: 2022-04-04 | Stop reason: HOSPADM

## 2022-04-04 RX ORDER — LIDOCAINE 40 MG/G
CREAM TOPICAL
Status: DISCONTINUED | OUTPATIENT
Start: 2022-04-04 | End: 2022-04-04 | Stop reason: HOSPADM

## 2022-04-04 RX ORDER — DEXAMETHASONE SODIUM PHOSPHATE 4 MG/ML
INJECTION, SOLUTION INTRA-ARTICULAR; INTRALESIONAL; INTRAMUSCULAR; INTRAVENOUS; SOFT TISSUE PRN
Status: DISCONTINUED | OUTPATIENT
Start: 2022-04-04 | End: 2022-04-04

## 2022-04-04 RX ORDER — ONDANSETRON 4 MG/1
4 TABLET, ORALLY DISINTEGRATING ORAL EVERY 30 MIN PRN
Status: DISCONTINUED | OUTPATIENT
Start: 2022-04-04 | End: 2022-04-04 | Stop reason: HOSPADM

## 2022-04-04 RX ORDER — FENTANYL CITRATE 50 UG/ML
50 INJECTION, SOLUTION INTRAMUSCULAR; INTRAVENOUS
Status: DISCONTINUED | OUTPATIENT
Start: 2022-04-04 | End: 2022-04-04 | Stop reason: HOSPADM

## 2022-04-04 RX ORDER — CEFAZOLIN SODIUM/WATER 2 G/20 ML
2 SYRINGE (ML) INTRAVENOUS
Status: DISCONTINUED | OUTPATIENT
Start: 2022-04-04 | End: 2022-04-04 | Stop reason: HOSPADM

## 2022-04-04 RX ORDER — ACETAMINOPHEN 325 MG/1
650 TABLET ORAL ONCE
Status: COMPLETED | OUTPATIENT
Start: 2022-04-04 | End: 2022-04-04

## 2022-04-04 RX ORDER — CEFAZOLIN SODIUM/WATER 2 G/20 ML
2 SYRINGE (ML) INTRAVENOUS SEE ADMIN INSTRUCTIONS
Status: DISCONTINUED | OUTPATIENT
Start: 2022-04-04 | End: 2022-04-04 | Stop reason: HOSPADM

## 2022-04-04 RX ORDER — ONDANSETRON 2 MG/ML
4 INJECTION INTRAMUSCULAR; INTRAVENOUS EVERY 30 MIN PRN
Status: DISCONTINUED | OUTPATIENT
Start: 2022-04-04 | End: 2022-04-04 | Stop reason: HOSPADM

## 2022-04-04 RX ADMIN — OXYCODONE HYDROCHLORIDE 5 MG: 5 TABLET ORAL at 13:41

## 2022-04-04 RX ADMIN — FENTANYL CITRATE 25 MCG: 50 INJECTION INTRAMUSCULAR; INTRAVENOUS at 13:25

## 2022-04-04 RX ADMIN — PROPOFOL 200 MCG/KG/MIN: 10 INJECTION, EMULSION INTRAVENOUS at 12:47

## 2022-04-04 RX ADMIN — MEPIVACAINE HYDROCHLORIDE 15 ML: 15 INJECTION, SOLUTION EPIDURAL; INFILTRATION at 11:55

## 2022-04-04 RX ADMIN — BUPIVACAINE HYDROCHLORIDE AND EPINEPHRINE BITARTRATE 15 ML: 2.5; .005 INJECTION, SOLUTION INFILTRATION; PERINEURAL at 11:55

## 2022-04-04 RX ADMIN — SODIUM CHLORIDE, POTASSIUM CHLORIDE, SODIUM LACTATE AND CALCIUM CHLORIDE: 600; 310; 30; 20 INJECTION, SOLUTION INTRAVENOUS at 11:26

## 2022-04-04 RX ADMIN — BUPIVACAINE HYDROCHLORIDE AND EPINEPHRINE 5 ML: 2.5; 5 INJECTION, SOLUTION INFILTRATION; PERINEURAL at 11:59

## 2022-04-04 RX ADMIN — Medication 2 G: at 12:47

## 2022-04-04 RX ADMIN — MIDAZOLAM HYDROCHLORIDE 1 MG: 1 INJECTION, SOLUTION INTRAMUSCULAR; INTRAVENOUS at 12:05

## 2022-04-04 RX ADMIN — ONDANSETRON 4 MG: 2 INJECTION INTRAMUSCULAR; INTRAVENOUS at 12:54

## 2022-04-04 RX ADMIN — FENTANYL CITRATE 50 MCG: 50 INJECTION INTRAMUSCULAR; INTRAVENOUS at 11:58

## 2022-04-04 RX ADMIN — LIDOCAINE HYDROCHLORIDE 40 MG: 20 INJECTION, SOLUTION INFILTRATION; PERINEURAL at 12:50

## 2022-04-04 RX ADMIN — DEXAMETHASONE SODIUM PHOSPHATE 4 MG: 4 INJECTION, SOLUTION INTRA-ARTICULAR; INTRALESIONAL; INTRAMUSCULAR; INTRAVENOUS; SOFT TISSUE at 12:54

## 2022-04-04 RX ADMIN — ACETAMINOPHEN 650 MG: 325 TABLET ORAL at 13:40

## 2022-04-04 RX ADMIN — FENTANYL CITRATE 25 MCG: 50 INJECTION INTRAMUSCULAR; INTRAVENOUS at 13:35

## 2022-04-04 ASSESSMENT — ENCOUNTER SYMPTOMS: DYSRHYTHMIAS: 1

## 2022-04-04 NOTE — ANESTHESIA PROCEDURE NOTES
Adductor canal Procedure Note    Pre-Procedure   Staff -        Anesthesiologist:  Brad Jaramillo MD       Performed By: anesthesiologist       Location: pre-op       Procedure Start/Stop Times: 4/4/2022 11:56 AM and 4/4/2022 11:59 AM       Pre-Anesthestic Checklist: patient identified, IV checked, site marked, risks and benefits discussed, informed consent, monitors and equipment checked, pre-op evaluation, at physician/surgeon's request and post-op pain management  Timeout:       Correct Patient: Yes        Correct Procedure: Yes        Correct Site: Yes        Correct Position: Yes        Correct Laterality: Yes        Site Marked: Yes  Procedure Documentation  Procedure: Adductor canal       Laterality: right       Patient Position: supine       Skin prep: Chloraprep       Needle Type: insulated       Needle Gauge: 20.        Needle Length (Inches): 6        1. Ultrasound was used to identify targeted nerve, plexus, vascular marker, or fascial plane and place a needle adjacent to it in real-time.       2. Ultrasound was used to visualize the spread of anesthetic in close proximity to the above referenced structure.       3. A permanent image is entered into the patient's record.       4. The visualized anatomic structures appeared normal.       5. There were no apparent abnormal pathologic findings.    Assessment/Narrative         The placement was negative for: blood aspirated, painful injection and site bleeding       Paresthesias: No.       Injection made incrementally with aspirations every 5 mL.    Medication(s) Administered   Bupivacaine 0.25% w/ 1:200K Epi (Injection), 5 mL  Mepivacaine 1.5% PF (Perineural), 5 mL  Medication Administration Time: 4/4/2022 11:59 AM

## 2022-04-04 NOTE — ANESTHESIA CARE TRANSFER NOTE
Patient: Cm Lundberg    Procedure: Procedure(s):  INCISION AND DRAINAGE, right foot with application of theraskin       Diagnosis: Ulcer of right foot with necrosis of muscle (H) [L97.513]  Diagnosis Additional Information: No value filed.    Anesthesia Type:   General     Note:    Oropharynx: oropharynx clear of all foreign objects  Level of Consciousness: drowsy  Oxygen Supplementation: face mask and room air    Independent Airway: airway patency satisfactory and stable  Dentition: dentition unchanged  Vital Signs Stable: post-procedure vital signs reviewed and stable  Report to RN Given: handoff report given  Patient transferred to: Phase II    Handoff Report: Identifed the Patient, Identified the Reponsible Provider, Reviewed the pertinent medical history, Discussed the surgical course, Reviewed Intra-OP anesthesia mangement and issues during anesthesia, Set expectations for post-procedure period and Allowed opportunity for questions and acknowledgement of understanding      Vitals:  Vitals Value Taken Time   /58    Temp 98.4    Pulse 81    Resp 14    SpO2 99        Electronically Signed By: SHABNAM Tafoya CRNA  April 4, 2022  1:17 PM

## 2022-04-04 NOTE — OP NOTE
Date: 4/4/22    Surgeon: SYMONE Kim DPM    Preoperative diagnosis: Ulceration right foot    Postoperativediagnosis: Same    Procedure:   1. Incision and drainage right foot  2. Excisional debridement ulceration right foot with application of Theraskin ([id number])  3. Wound prep right foot     Anesthesia: Popliteal block with IV-sedation    Hemostasis: None    Pathology: None    Injectables: None    Materials: Theraskin, Staples     Complications: None    Blood loss: 1 ml       Findings: Patient presents for operative intervention for a chronic wound on the lateral right foot. We discussed today's procedure to include sharp debridement of the wound and removal of all non-viable tissue with application of Theraskin. Conservative measures were attempted and exhausted. Patient questionsinvited and answered, including appropriate risk, benefits and complications. No guarantees given or implied. Patient has been NPO.    Description: Patient was brought to the operating room and placed on the table insupine position. IV-sedation and popliteal block was administered by the anesthesia department. The foot was then prepped and draped in usual aseptic manner and the following procedure was then performed: Attention was directed to the ulceration along the lateral aspect of the right foot where non-viable tissue was noted. A #15 blade was used to sharply resect all non-viable tissue into muscle. The resulting wound probed to the level of muscle, and measured 2.5x2.5x0.4cm. Next, a [employer] ultrasonic debridement device was used to debride the remaining non-viable tissue.     Theraskin size 13 units ( [id number], 12/21/24) was prepared per  specifications. The graft was placed on the wound bed per  instructions and secured with staples along the peripheral graftand center portion of the graft to the wound bed. Next, adaptic was placed over the graft and secured with staples.     Dressings consistent of  moistened 4x4, 4x4's, ABD, kerlix/johanna roll and an ace wrap.     The patient appeared to tolerate all the procedures and anesthesia well without apparent complications. Patient wastransported from the operating room to the recovery room with vital signs stable and neurovascular status as it was pre-operatively to the right foot. Patient to be discharged home per anesthesia. Written and verbal homecare instructions given to remain non-weight bearing, keep the surgical dressing intact until the wound vac is applied either later today or tomorrow. Patient to follow up in office for post-operative management in one week.

## 2022-04-04 NOTE — ANESTHESIA PREPROCEDURE EVALUATION
Anesthesia Pre-Procedure Evaluation    Patient: Cm Lundberg   MRN: [id number] : 1970        Preoperative Diagnosis: Ulcer of right foot with necrosis of muscle (H) [L97.513]    Procedure : Procedure(s):  INCISION AND DRAINAGE, right foot with application of theraskin          Past Medical History:   Diagnosis Date     Anxiety      Arthritis      Cerebral infarction (H)      Congestive heart failure (H)      Coronary artery disease     CABG with multiple stents     Depression      Diabetes (H)      Equinovarus deformity, acquired, right      GERD (gastroesophageal reflux disease)      Heart attack (H)      History of blood transfusion      Hyperlipidemia      Hypertension      Migraine      Migraines      Myalgia      PONV (postoperative nausea and vomiting)     mild nausea     Stented coronary artery       Past Surgical History:   Procedure Laterality Date     INCISION AND DRAINAGE LOWER EXTREMITY, COMBINED Right 2021    Procedure: INCISION AND DRAINAGE, right foot;  Surgeon: Alex Kim DPM;  Location: [facility] Main OR     INCISION AND DRAINAGE LOWER EXTREMITY, COMBINED Right 2022    Procedure: INCISION AND DRAINAGE, right foot with application of theraskin;  Surgeon: Alex Kim DPM;  Location: [facility] Main OR     IR MISCELLANEOUS PROCEDURE  2004     [facility] CABG, VEIN, SINGLE      Description: CABG (CABG);  Recorded: 2009;      Allergies   Allergen Reactions     Aspirin Other (See Comments)     Reacts with migraine medicine     Isosorbide Headache     Codeine Rash     Morphine Rash      Social History     Tobacco Use     Smoking status: Former Smoker     Quit date: 2015     Years since quittin.1     Smokeless tobacco: Never Used   Substance Use Topics     Alcohol use: Not Currently      Wt Readings from Last 1 Encounters:   22 69.4 kg (153 lb)        Anesthesia Evaluation   Pt has had prior anesthetic.     No history of anesthetic  complications       ROS/MED HX  ENT/Pulmonary:  - neg pulmonary ROS     Neurologic:  - neg neurologic ROS   (+) CVA,     Cardiovascular: Comment: 8/2021 TTE   1.  Normal left ventricular chamber size.Mildly decreased left ventricular ejection fraction.    Calculated left ventricular ejection fraction (modified Barragan technique) is 45 %.    2.  Abnormal septal motion.  Basal inferior hypokinesis.    3.  Grade 1 left ventricular diastolic dysfunction consistent with abnormal myocardial   relaxation and normal left ventricular filling    pressure.    4.  Normal right ventricular size and systolic function.    5.  No significant valvular heart disease.    6.  When compared to the previous echocardiographic images of 3/21/2019, there has been no   significant change.    - neg cardiovascular ROS   (+) hypertension--CAD -[id number]-[id number]-MEE dysrhythmias, a-flutter,     METS/Exercise Tolerance:     Hematologic:  - neg hematologic  ROS     Musculoskeletal:  - neg musculoskeletal ROS     GI/Hepatic:  - neg GI/hepatic ROS   (+) GERD,     Renal/Genitourinary:  - neg Renal ROS     Endo:  - neg endo ROS   (+) type I DM, type II DM, Not using insulin,     Psychiatric/Substance Use:  - neg psychiatric ROS   (+) psychiatric history     Infectious Disease:  - neg infectious disease ROS     Malignancy:  - neg malignancy ROS     Other:  - neg other ROS          Physical Exam    Airway  airway exam normal      Mallampati: II   TM distance: > 3 FB   Neck ROM: full   Mouth opening: > 3 cm    Respiratory Devices and Support         Dental       (+) upper dentures and lower dentures      Cardiovascular   cardiovascular exam normal       Rhythm and rate: regular and normal     Pulmonary   pulmonary exam normal        breath sounds clear to auscultation           OUTSIDE LABS:  CBC:   Lab Results   Component Value Date    WBC 6.3 04/01/2022    WBC 5.4 12/28/2021    HGB 10.5 (L) 04/01/2022    HGB 11.1 (L) 12/28/2021    HCT 34.0 (L) 04/01/2022     HCT 36.9 (L) 12/28/2021     04/01/2022     12/28/2021     BMP:   Lab Results   Component Value Date     04/01/2022     12/28/2021    POTASSIUM 4.6 04/01/2022    POTASSIUM 4.5 12/28/2021    CHLORIDE 104 04/01/2022    CHLORIDE 107 12/28/2021    CO2 26 04/01/2022    CO2 25 12/28/2021    BUN 17 04/01/2022    BUN 17 12/28/2021    CR 0.97 04/01/2022    CR 0.99 12/28/2021     (H) 04/04/2022     (H) 04/01/2022     COAGS: No results found for: PTT, INR, FIBR  POC: No results found for: BGM, HCG, HCGS  HEPATIC:   Lab Results   Component Value Date    ALBUMIN 4.0 04/29/2019    PROTTOTAL 6.9 04/29/2019    ALT 20 04/29/2019    AST 21 04/29/2019    ALKPHOS 103 04/29/2019    BILITOTAL 0.4 04/29/2019     OTHER:   Lab Results   Component Value Date    A1C 5.1 12/21/2021    TO 9.9 04/01/2022    MAG 1.6 (L) 04/01/2022       Anesthesia Plan    ASA Status:  3      Anesthesia Type: General.     - Airway: Mask Only      Maintenance: TIVA.        Consents    Anesthesia Plan(s) and associated risks, benefits, and realistic alternatives discussed. Questions answered and patient/representative(s) expressed understanding.    - Discussed:     - Discussed with:  Patient, Healthcare Power of       - Extended Intubation/Ventilatory Support Discussed: Yes.         Postoperative Care    Pain management: Peripheral nerve block (Single Shot), IV analgesics, Multi-modal analgesia, Oral pain medications.   PONV prophylaxis: Ondansetron (or other 5HT-3)     Comments:                    Brad Jaramillo MD

## 2022-04-04 NOTE — ANESTHESIA POSTPROCEDURE EVALUATION
Patient: Cm Lundberg    Procedure: Procedure(s):  INCISION AND DRAINAGE, right foot with application of theraskin       Anesthesia Type:  General    Note:  Disposition: Outpatient   Postop Pain Control: Uneventful            Sign Out: Well controlled pain   PONV: No   Neuro/Psych: Uneventful            Sign Out: Acceptable/Baseline neuro status   Airway/Respiratory: Uneventful            Sign Out: Acceptable/Baseline resp. status   CV/Hemodynamics: Uneventful            Sign Out: Acceptable CV status; No obvious hypovolemia; No obvious fluid overload   Other NRE: NONE   DID A NON-ROUTINE EVENT OCCUR? No           Last vitals:  Vitals Value Taken Time   /65 04/04/22 1346   Temp 36.9  C (98.4  F) 04/04/22 1321   Pulse 81 04/04/22 1346   Resp 16 04/04/22 1346   SpO2 96 % 04/04/22 1346       Electronically Signed By: Brad Jaramillo MD  April 4, 2022  1:49 PM

## 2022-04-04 NOTE — ANESTHESIA PROCEDURE NOTES
Sciatic Procedure Note    Pre-Procedure   Staff -        Anesthesiologist:  Brad Jaramillo MD       Performed By: anesthesiologist       Location: pre-op       Procedure Start/Stop Times: 4/4/2022 11:50 AM and 4/4/2022 11:55 AM       Pre-Anesthestic Checklist: patient identified, IV checked, site marked, risks and benefits discussed, informed consent, monitors and equipment checked, pre-op evaluation, at physician/surgeon's request and post-op pain management  Timeout:       Correct Patient: Yes        Correct Procedure: Yes        Correct Site: Yes        Correct Position: Yes        Correct Laterality: Yes        Site Marked: Yes  Procedure Documentation  Procedure: Sciatic       Laterality: right       Patient Position: supine       Patient Prep/Sterile Barriers: sterile gloves, mask       Skin prep: Chloraprep (popliteal approach).       Needle Type: insulated       Needle Gauge: 20.        Needle Length (Inches): 6        1. Ultrasound was used to identify targeted nerve, plexus, vascular marker, or fascial plane and place a needle adjacent to it in real-time.       2. Ultrasound was used to visualize the spread of anesthetic in close proximity to the above referenced structure.       3. A permanent image is entered into the patient's record.       4. The visualized anatomic structures appeared normal.       5. There were no apparent abnormal pathologic findings.  Medication(s) Administered   Bupivacaine 0.25% w/ 1:200K Epi (Injection), 15 mL  Mepivacaine 1.5% PF (Perineural), 15 mL  Medication Administration Time: 4/4/2022 11:55 AM

## 2022-04-05 ENCOUNTER — OFFICE VISIT (OUTPATIENT)
Dept: GERIATRICS | Facility: CLINIC | Age: 52
End: 2022-04-05
Payer: MEDICARE

## 2022-04-05 DIAGNOSIS — G43.909 MIGRAINE: Primary | ICD-10-CM

## 2022-04-05 DIAGNOSIS — E11.69 TYPE 2 DIABETES MELLITUS WITH OTHER SPECIFIED COMPLICATION, WITHOUT LONG-TERM CURRENT USE OF INSULIN (H): ICD-10-CM

## 2022-04-05 DIAGNOSIS — I10 ESSENTIAL HYPERTENSION: ICD-10-CM

## 2022-04-05 DIAGNOSIS — E11.621 DIABETIC ULCER OF RIGHT MIDFOOT ASSOCIATED WITH TYPE 2 DIABETES MELLITUS, WITH NECROSIS OF MUSCLE (H): Primary | ICD-10-CM

## 2022-04-05 DIAGNOSIS — L97.413 DIABETIC ULCER OF RIGHT MIDFOOT ASSOCIATED WITH TYPE 2 DIABETES MELLITUS, WITH NECROSIS OF MUSCLE (H): Primary | ICD-10-CM

## 2022-04-05 DIAGNOSIS — I48.91 ATRIAL FIBRILLATION, UNSPECIFIED TYPE (H): ICD-10-CM

## 2022-04-05 PROCEDURE — 99309 SBSQ NF CARE MODERATE MDM 30: CPT | Performed by: FAMILY MEDICINE

## 2022-04-06 RX ORDER — ERENUMAB-AOOE 140 MG/ML
140 INJECTION, SOLUTION SUBCUTANEOUS
Qty: 1 ML | Refills: 0 | Status: SHIPPED | OUTPATIENT
Start: 2022-04-06 | End: 2022-08-23

## 2022-04-12 ENCOUNTER — OFFICE VISIT (OUTPATIENT)
Dept: VASCULAR SURGERY | Facility: CLINIC | Age: 52
End: 2022-04-12
Attending: PODIATRIST
Payer: MEDICARE

## 2022-04-12 VITALS — SYSTOLIC BLOOD PRESSURE: 110 MMHG | HEART RATE: 66 BPM | TEMPERATURE: 98.5 F | DIASTOLIC BLOOD PRESSURE: 64 MMHG

## 2022-04-12 DIAGNOSIS — L97.513 ULCER OF RIGHT FOOT WITH NECROSIS OF MUSCLE (H): Primary | ICD-10-CM

## 2022-04-12 PROCEDURE — G0463 HOSPITAL OUTPT CLINIC VISIT: HCPCS

## 2022-04-12 PROCEDURE — 99212 OFFICE O/P EST SF 10 MIN: CPT | Performed by: PODIATRIST

## 2022-04-12 ASSESSMENT — PAIN SCALES - GENERAL: PAINLEVEL: SEVERE PAIN (7)

## 2022-04-12 NOTE — PATIENT INSTRUCTIONS
Important lnstructions      WEIGHT BEARING STATUS: You are to remain NON WEIGHT BEARING on your right foot. NON WEIGHT BEARING MEANS NO PRESSURE ON YOUR FOOT OR HEEL AT ANY TIME FOR ANY REASON!    2. OFFLOADING DEVICE: Must use a A WHEELCHAIR at all times! (do not use affected foot to push wheelchair)    3. STABILIZATION DEVICE: Use a EGG CRATE BOOT . You will need to WEAR THIS AT ALL TIMES EVEN WHILE IN BED.       4. ELEVATE: Elevating your leg means laying with your head on a pillow and your foot ABOVE YOUR WAIST.     5. DO NOT MOVE YOUR FOOT.  There is a risk of worsening the wound or incision. To give yourself a higher chance of healing, please DO NOT swing foot back and forth and wiggle foot/toes especially when inside a stabilization device.        Dressing Change lnstructions          - Wound Vac Instructions     1. 2x weekly and as needed cleanse the wound with NS     2. Pat dry     3. Apply Cavilon no sting barrier wipe to the skin surrounding the wound to protect from drainage/maceration     4. Apply drape to natalia wound. Cut strip of drape and apply to skin if bridging is needed; plan this area in advance; should not be over bony prominence      5. Cut the foam to fit the size of the wound. APPLY ADAPTIC TOUCH (or other similar nonadherent) OVER WOUND/GRAFT SITE. Then     6. Apply foam to wound     7. Cut narrow strip of foam if bridging if needed     8. Cover foam with drape to obtain air tight seal     9. Cut opening the size of a quarter for where the suction pad will be applied     10. Apply Suction pad     11. 100mmHG suction continuous      [employer] [facility] can be reached at [phone], 24 hours a day 7 days a week      - If you do not have a back up plan in place:      If the negative pressure wound therapy malfunctions or unable to maintain seal: dressing must be removed and reapplied within 2 hours of the incident. If unable to reapply negative pressure wound dressing, place Normal Saline  moistened gauze in wound bed and cover with appropriate dressing to keep wound bed moist.  Change wet-to-dry dressing two times a day until healthcare staff can re-implement negative pressure therapy. Change canister at least weekly.  [facility] Contact Center can be reached at [phone], 24 hours a day 7 days a week     - Information on Vacuum-Assisted Closure of a Wound  Vacuum-assisted closure (VAC) of a wound is a type of treatment to help wounds heal. It's also known as negative pressure wound therapy. During the treatment, a device lowers air pressure on the wound. This can help the wound heal more quickly.  - Understanding the wound VAC system  A wound VAC system has several parts. A foam or gauze dressing is put directly on the wound. The dressing is changed every 24 to 72 hours. An adhesive film covers and seals the dressing and wound. A drainage tube leads from under the adhesive film and connects to a portable vacuum pump. This pump removes air pressure over the wound. It may do this constantly. Or it may do it in cycles. During the treatment, you'll need to carry the portable pump everywhere you go.  - Why wound VAC is used  You might need this therapy for a recent traumatic wound. Or you may need it for a chronic wound. This is a wound that does not heal the way it should over time. This can happen with wounds in people who have diabetes. You may need a wound VAC if you've had a recent skin graft. And you may need a wound VAC for a large wound. Large wounds can take a longer time to heal.  A wound vacuum system may help your wound heal more quickly by:  Draining extra fluid from the wound  Reducing swelling  Reducing bacteria in the wound  Keeping your wound moist and warm  Helping draw together wound edges  Increasing blood flow to your wound  Decreasing inflammation  Wound VAC offers some other advantages over other types of wound care. It may decrease your overall discomfort. The dressings usually need  to be changed less often. And they may be easier to keep in position.  - Risks of wound VAC  Wound VAC has some rare risks, such as:  Bleeding (which may be severe)  Wound infection  An abnormal connection between the intestinal tract and the skin (enteric fistula)  Proper training in dressing changes can help reduce the risk for these complications. Also, your doctor will carefully evaluate you to make sure you are a good candidate for the therapy. Certain problems can increase your risk for complications. These include:  Exposed organs or blood vessels  High risk of bleeding from another medical problem  Wound infection  Nearby bone infection  Dead wound tissue  Cancer tissue  Fragile skin, such as from aging or longtime use of topical steroids  Allergy to adhesive  Very poor blood flow to your wound  Wounds close to joints that may reopen because of movement  Your doctor will discuss the risks that apply to you. Make sure to talk with him or her about all of your questions and concerns.  - Getting ready for wound VAC  You likely won't need to do much to get ready for wound VAC. In some cases, you may need to wait a while before having this therapy. For example, your doctor may first need to treat an infection in your wound. Dead or damaged tissue may also need to be removed from your wound.  You or a caregiver may need training on how to use the wound VAC device. This is done if you will be able to have your wound vacuum therapy at home. In other cases, you may need to have your wound vacuum therapy in a health care facility.  - On the day of your procedure  A health care provider will cover your wound with foam or gauze wound dressing. An adhesive film will be put over the dressing and wound. This seals the wound. The foam connects to a drainage tube, which leads to a vacuum pump. This pump is portable. When the pump is turned on, it draws fluid through the foam and out the drainage tubing. The pump may run  constantly, or it may cycle off and on. Your exact setup will depend on the specific type of wound vacuum system that you use.  - Managing your wound  You may need the dressing changed about once a day. You may need it changed more or less often, depending on your wound. You or your caregiver may be trained to do this at home. Or it may be done by a visiting health care provider. Your doctor may prescribe a pain medicine. This is to prevent or reduce pain during the dressing change.  You will likely need to use the wound VAC system for several weeks or months. During this time, you'll carry the portable pump everywhere you go.  - Nutrition for wound healing  During this time, make sure you follow a healthy diet. This is needed so the wound can heal and to prevent infection. Your doctor can tell you more about what to include in your diet during this time.  follow up with your doctor if you have a medical condition that led to your wound, such as diabetes. He or she can help you prevent future wounds.  - Follow-up care  Your doctor will carefully keep track of your healing. Make sure to keep all follow-up appointments.  - When to call your health care provider  Call your health care provider right away if you have any of these:  Fever of 100.4 F (38.0 C) or higher  Increased redness, swelling, or warmth around wound  Increased pain  Bright red blood or blood clots in tubing or the collection chamber of the vacuum      It is ok to continue current wound care treatment/products for the next 2-3 days until new wound care supplies are ordered and arrive. If longer than this please contact our office at [phone].        We want to hear from you!   In the next few weeks, you should receive a call or email to complete a survey about your visit at [facility]. Please help us improve your appointment experience by letting us know how we did today. We strive to make your experience good and value any ways in  which we could do better.      We value your input and suggestions.    Thank you for choosing the [facility]!

## 2022-04-12 NOTE — PROGRESS NOTES
Podiatry Progress Note        ASSESSMENT:   Ulceration right foot      TREATMENT:  -Ulceration on the lateral right foot is progressing well. Theraskin graft is intact.     -Recommend he continue with the wound vac at 100 mmHg, changed 2x per week with application of adaptic with each wound vac change. Continue non-weight bearing on the right foot.     -He will follow-up with me in 2 weeks.     Alex Kim DPM  [facility]/Foot & Ankle Surgery      HPI: Cm DAYLIN Lundberg was seen again today for a right foot ulceration s/p application of Theraskin. He has remained non-weight bearing and using the wound vac as directed.     Past Medical History:   Diagnosis Date     Anxiety      Arthritis      Cerebral infarction (H)      Congestive heart failure (H)      Coronary artery disease     CABG with multiple stents     Depression      Diabetes (H)      Equinovarus deformity, acquired, right      GERD (gastroesophageal reflux disease)      Heart attack (H)      History of blood transfusion      Hyperlipidemia      Hypertension      Migraine      Migraines      Myalgia      PONV (postoperative nausea and vomiting)     mild nausea     Stented coronary artery        Allergies   Allergen Reactions     Aspirin Other (See Comments)     Reacts with migraine medicine     Isosorbide Headache     Codeine Rash     Morphine Rash         Current Outpatient Medications:      acetaminophen (TYLENOL) 325 MG tablet, Take 650 mg by mouth every 4 hours as needed for mild pain , Disp: , Rfl:      ARIPiprazole (ABILIFY) 2 MG tablet, 2 mg daily , Disp: , Rfl:      ASPERCREME ORIGINAL 10 % external cream, APPLY TO LOW BACK TWICE DAILY FOR LOW BACK PAIN, Disp: , Rfl:      atorvastatin (LIPITOR) 80 MG tablet, Take 80 mg by mouth daily , Disp: , Rfl:      cholestyramine (QUESTRAN) 4 g packet, Take 1 packet by mouth every other day , Disp: , Rfl:      DULoxetine (CYMBALTA) 60 MG capsule, Take 120 mg by mouth daily takle 120mg,  Disp: , Rfl:      erenumab-aooe (AIMOVIG) 140 MG/ML injection, Inject 1 mL (140 mg) Subcutaneous every 30 days, Disp: 1 mL, Rfl: 0     fenofibrate micronized (LOFIBRA) 200 MG capsule, Take 200 mg by mouth every morning (before breakfast) , Disp: , Rfl:      ferrous sulfate (FEROSUL) 325 (65 Fe) MG tablet, Take 325 mg by mouth daily (with breakfast) , Disp: , Rfl:      gabapentin (NEURONTIN) 300 MG capsule, Take 600 mg by mouth At Bedtime, Disp: , Rfl:      glipiZIDE (GLUCOTROL) 5 MG tablet, TAKE 1/2 TABLET (2.5MG) BY MOUTH DAILY DX DIABETES, Disp: , Rfl:      loperamide (IMODIUM) 2 MG capsule, Take 4 mg by mouth as needed for diarrhea , Disp: , Rfl:      losartan (COZAAR) 25 MG tablet, Take 25 mg by mouth At Bedtime , Disp: , Rfl:      magnesium oxide 400 MG CAPS, Take 400 mg by mouth 4 times daily, Disp: , Rfl:      Menthol, Topical Analgesic, (BIOFREEZE ROLL-ON) 4 % GEL, Apply 1 Application topically 2 times daily Apply to lower back for pain, Disp: , Rfl:      metFORMIN (GLUCOPHAGE) 500 MG tablet, TAKE 2 TABS (1000MG) BY MOUTH EVERY MORNING;TAKE 1 TAB BY MOUTH EVERY EVENING, Disp: , Rfl:      metoprolol succinate ER (TOPROL-XL) 25 MG 24 hr tablet, Take 75 mg by mouth daily , Disp: , Rfl:      mirtazapine (REMERON) 7.5 MG tablet, Take 7.5 mg by mouth At Bedtime , Disp: , Rfl:      Neomycin-Bacitracin-Polymyxin (TRIPLE ANTIBIOTIC) 3.5-400-5000 OINT ointment, Externally apply 1 applicator topically 3 times daily as needed, Disp: , Rfl:      Nitroglycerin (NITROSTAT SL), Place 0.4 mg under the tongue every 5 minutes as needed for chest pain , Disp: , Rfl:      omeprazole (PRILOSEC) 20 MG DR capsule, Take 20 mg by mouth daily , Disp: , Rfl:      oxyCODONE (ROXICODONE) 5 MG tablet, TAKE 1 TAB BY MOUTH EVERY 6HOURS AS NEEDED FOR PAIN, Disp: 28 tablet, Rfl: 0     polyethylene glycol (MIRALAX) 17 GM/Dose powder, Take 1 packet by mouth daily as needed for constipation , Disp: , Rfl:      senna-docusate  (SENOKOT-S/PERICOLACE) 8.6-50 MG tablet, Take 1 tablet by mouth 2 times daily as needed for constipation , Disp: , Rfl:      traZODone (DESYREL) 50 MG tablet, Take 50 mg by mouth At Bedtime , Disp: , Rfl:      vitamin C (ASCORBIC ACID) 500 MG tablet, Take 500 mg by mouth daily , Disp: , Rfl:      XARELTO ANTICOAGULANT 20 MG TABS tablet, TAKE 1 TAB BY MOUTH ONCE DAILY, Disp: , Rfl:     Review of Systems - Negative       OBJECTIVE:  Appearance: alert, well appearing, and in no distress.    /64   Pulse 66   Temp 98.5  F (36.9  C)     @LDAVASC(10,16,17)@           Ulceration on the lateral right foot has intact Theraskin graft. No erythema right foot. Neurovascular status unchanged right foot.

## 2022-04-14 ENCOUNTER — TRANSITIONAL CARE UNIT VISIT (OUTPATIENT)
Dept: GERIATRICS | Facility: CLINIC | Age: 52
End: 2022-04-14
Payer: MEDICARE

## 2022-04-14 VITALS
BODY MASS INDEX: 23.64 KG/M2 | HEIGHT: 67 IN | RESPIRATION RATE: 18 BRPM | TEMPERATURE: 98.9 F | OXYGEN SATURATION: 97 % | SYSTOLIC BLOOD PRESSURE: 108 MMHG | DIASTOLIC BLOOD PRESSURE: 73 MMHG | HEART RATE: 71 BPM | WEIGHT: 150.6 LBS

## 2022-04-14 DIAGNOSIS — E11.69 TYPE 2 DIABETES MELLITUS WITH OTHER SPECIFIED COMPLICATION, WITHOUT LONG-TERM CURRENT USE OF INSULIN (H): ICD-10-CM

## 2022-04-14 DIAGNOSIS — L97.413 DIABETIC ULCER OF RIGHT MIDFOOT ASSOCIATED WITH TYPE 2 DIABETES MELLITUS, WITH NECROSIS OF MUSCLE (H): Primary | ICD-10-CM

## 2022-04-14 DIAGNOSIS — I48.91 ATRIAL FIBRILLATION, UNSPECIFIED TYPE (H): ICD-10-CM

## 2022-04-14 DIAGNOSIS — E11.621 DIABETIC ULCER OF RIGHT MIDFOOT ASSOCIATED WITH TYPE 2 DIABETES MELLITUS, WITH NECROSIS OF MUSCLE (H): Primary | ICD-10-CM

## 2022-04-14 DIAGNOSIS — I10 ESSENTIAL HYPERTENSION: ICD-10-CM

## 2022-04-14 PROCEDURE — 99309 SBSQ NF CARE MODERATE MDM 30: CPT | Performed by: FAMILY MEDICINE

## 2022-04-18 NOTE — PROGRESS NOTES
"[facility] Medical Record Number:  [id number]  Place of Service where encounter took place: [facility] ([facility]) [[id number]]   CODE STATUS:   CPR/Full code     Chief Complaint/Reason for Visit:  Chief Complaint   Patient presents with     RECHECK     TCU 4/5/2022. S/p incision and drainage, right foot with application of theraskin, 4/4/2022.        TCU HPI:    Cm Lundberg is a 51 year old male with hx of CAD, s/p stents and CABG, HTN, DM, prior CVA, afib/flutter on xarelto, admitted to the hospital on 12/17/2021 for elective procedure for right foot ulceration. He underwent incision and drainage of the right foot, excisional debridement of the ulceration of the right foot into the level of the muscle, under MAC with popliteal block anesthesia on 12/17/2021. Wound vac was subsequently placed. He had no hospital complications. Seen by ID with recommendation for 5 days Augmentin at hospital discharge based on culture results. Chronic anticoagulation was restarted. He was felt to need TCU and discharged to TCU on 12/22/0221 for PT, OT, nursing cares, medical management and monitoring.       Today:  He has had close follow up with podiatry, remained NWB, treated with wound vac. He underwent procedure on 1/27/2022 with incision and drainage with application of theraskin. Continued NWB with wound vac, frequent follow up with podiatry. At visit 3/25/2022 recommendation was made for another procedure, incision and drainage with application of theraskin on Mon 4/4/2022. Notes indicated per Dr. Kim: \"TREATMENT:-The lateral right foot ulceration continues to have exposed tendon of the peroneal brevis. Based on this presentation, I recommend we again try application of theraskin with continued use of the wound vac.\". Will have wound vac applied after procedure. Dr. Kim said okay to continue on xarelto anticoagulation pre op.     He underwent procedure yesterday without " apparent complications. Has wound vac again, to say on until his first post op appt with Dr. Kim on 4/12/2022. Has some mild swelling in LE. No significant pain. He has been dealing with NWB status since December, he is compliant. Resides in group home though unable to return until he is off the wound vac. Otherwise stable. Diabetes managed with metformin and glipizide. Hx CAD and stents, HTN, afib on xarelto. He has chronic stable back pain.  Appetite is good. No abdominal pain. No urinary sx. He denies fever, cough, congestion, fever.       PAST MEDICAL HISTORY:  Past Medical History:   Diagnosis Date     Anxiety      Arthritis      Cerebral infarction (H)      Congestive heart failure (H)      Coronary artery disease     CABG with multiple stents     Depression      Diabetes (H)      Equinovarus deformity, acquired, right      GERD (gastroesophageal reflux disease)      Heart attack (H)      History of blood transfusion      Hyperlipidemia      Hypertension      Migraine      Migraines      Myalgia      PONV (postoperative nausea and vomiting)     mild nausea     Stented coronary artery        MEDICATIONS:  Current Outpatient Medications   Medication Sig Dispense Refill     acetaminophen (TYLENOL) 325 MG tablet Take 650 mg by mouth every 4 hours as needed for mild pain        ARIPiprazole (ABILIFY) 2 MG tablet 2 mg daily        ASPERCREME ORIGINAL 10 % external cream APPLY TO LOW BACK TWICE DAILY FOR LOW BACK PAIN       atorvastatin (LIPITOR) 80 MG tablet Take 80 mg by mouth daily        cholestyramine (QUESTRAN) 4 g packet Take 1 packet by mouth every other day        DULoxetine (CYMBALTA) 60 MG capsule Take 120 mg by mouth daily takle 120mg       erenumab-aooe (AIMOVIG) 140 MG/ML injection Inject 1 mL (140 mg) Subcutaneous every 30 days 1 mL 0     fenofibrate micronized (LOFIBRA) 200 MG capsule Take 200 mg by mouth every morning (before breakfast)        ferrous sulfate (FEROSUL) 325 (65 Fe) MG tablet Take  325 mg by mouth daily (with breakfast)        gabapentin (NEURONTIN) 300 MG capsule Take 600 mg by mouth At Bedtime       glipiZIDE (GLUCOTROL) 5 MG tablet TAKE 1/2 TABLET (2.5MG) BY MOUTH DAILY DX DIABETES       loperamide (IMODIUM) 2 MG capsule Take 4 mg by mouth as needed for diarrhea        losartan (COZAAR) 25 MG tablet Take 25 mg by mouth At Bedtime        magnesium oxide 400 MG CAPS Take 400 mg by mouth 4 times daily       Menthol, Topical Analgesic, (BIOFREEZE ROLL-ON) 4 % GEL Apply 1 Application topically 2 times daily Apply to lower back for pain       metFORMIN (GLUCOPHAGE) 500 MG tablet TAKE 2 TABS (1000MG) BY MOUTH EVERY MORNING;TAKE 1 TAB BY MOUTH EVERY EVENING       metoprolol succinate ER (TOPROL-XL) 25 MG 24 hr tablet Take 75 mg by mouth daily        mirtazapine (REMERON) 7.5 MG tablet Take 7.5 mg by mouth At Bedtime        Neomycin-Bacitracin-Polymyxin (TRIPLE ANTIBIOTIC) 3.5-400-5000 OINT ointment Externally apply 1 applicator topically 3 times daily as needed       Nitroglycerin (NITROSTAT SL) Place 0.4 mg under the tongue every 5 minutes as needed for chest pain        omeprazole (PRILOSEC) 20 MG DR capsule Take 20 mg by mouth daily        oxyCODONE (ROXICODONE) 5 MG tablet TAKE 1 TAB BY MOUTH EVERY 6HOURS AS NEEDED FOR PAIN 28 tablet 0     polyethylene glycol (MIRALAX) 17 GM/Dose powder Take 1 packet by mouth daily as needed for constipation        senna-docusate (SENOKOT-S/PERICOLACE) 8.6-50 MG tablet Take 1 tablet by mouth 2 times daily as needed for constipation        traZODone (DESYREL) 50 MG tablet Take 50 mg by mouth At Bedtime        vitamin C (ASCORBIC ACID) 500 MG tablet Take 500 mg by mouth daily        XARELTO ANTICOAGULANT 20 MG TABS tablet TAKE 1 TAB BY MOUTH ONCE DAILY         PHYSICAL EXAM:  General: Patient is alert male, no distress.   Vitals: /68, Temp 98.4, Pulse 82, RR 18, O2 sat 100 % RA.  HEENT: Head is NCAT. Eyes show no injection or icterus. Nares negative.  Oropharynx well hydrated.  Neck: No JVD.  Lungs: Non labored respirations.  : Deferred.  Extremities: Mild RLE swelling, puffy right foot, post op.   Musculoskeletal: Wound vac right foot.   Psych: Mood appears good.      LABS/DIAGNOSTIC DATA:  Component      Latest Ref Rng & Units 12/21/2021   Hemoglobin A1C      <=5.6 % 5.1     Component      Latest Ref Rng & Units 12/28/2021 4/1/2022   Sodium      136 - 145 mmol/L 140 139   Potassium      3.5 - 5.0 mmol/L 4.5 4.6   Chloride      98 - 107 mmol/L 107 104   Carbon Dioxide      22 - 31 mmol/L 25 26   Anion Gap      5 - 18 mmol/L 8 9   Glucose      70 - 125 mg/dL 141 (H) 129 (H)   Calcium      8.5 - 10.5 mg/dL 10.3 9.9   Urea Nitrogen      8 - 22 mg/dL 17 17   Creatinine      0.70 - 1.30 mg/dL 0.99 0.97   GFR Estimate      >60 mL/min/1.73m2 >90 >90     Component      Latest Ref Rng & Units 12/28/2021 4/1/2022   WBC      4.0 - 11.0 10e3/uL 5.4 6.3   RBC Count      4.40 - 5.90 10e6/uL 4.01 (L) 4.07 (L)   Hemoglobin      13.3 - 17.7 g/dL 11.1 (L) 10.5 (L)   Hematocrit      40.0 - 53.0 % 36.9 (L) 34.0 (L)   MCV      78 - 100 fL 92 84   MCH      26.5 - 33.0 pg 27.7 25.8 (L)   MCHC      31.5 - 36.5 g/dL 30.1 (L) 30.9 (L)   RDW      10.0 - 15.0 % 13.4 13.2   Platelet Count      150 - 450 10e3/uL 246 238         ASSESSMENT/PLAN:  1. Right foot ulceration. S/p procedures on 12/17/2021, 1/27/2022 and now yesterday 4/4/2022 incision and drainage with application of theraskin per Dr. Kim. Wound vac, NWB, follow up on 4/12/2022.   2. HTN. Continue metoprolol and losartan. BPs satisfactory.  3. Afib/flutter. Anticoagulated with xarelto.  4. DM. On metformin and glipizide. Accuchecks followed in TCU.    5. Stable medical conditions: CAD. Hx of MIs. Hx of stenting and prior CABG. On statin, xarelto, beta blocker; Hx of stroke. Continue statin, xarelto; Anemia, on iron; Mood disorder, mixed depression and anxiety on duloxetine, Abilify; Migraines. Receives monthly Aimovig.          Electronically signed by: Heidi Xie MD

## 2022-04-22 ENCOUNTER — TELEPHONE (OUTPATIENT)
Dept: VASCULAR SURGERY | Facility: CLINIC | Age: 52
End: 2022-04-22
Payer: MEDICARE

## 2022-04-22 NOTE — TELEPHONE ENCOUNTER
LMTCB-     Per orders from 4/12- Continue non-weight bearing on the right foot.    Pt should elevate right foot whenever possible.

## 2022-04-22 NOTE — TELEPHONE ENCOUNTER
Leonila from PT at [facility] called requesting a verbal clarification of orders.  They have been working on a standing program while NWB on right to maintain strength in the Left LE.  Patient is telling them that Dr. Kim does not want the pt to be dangling his R LE for any length of time.  They would like clarification if they can continue to work on standing with NWB on right, or if they are limited to seated/supine activities with R LE elevated per the pt's instruction.      Leonila can be reached at:  [phone]

## 2022-04-25 NOTE — PROGRESS NOTES
"[facility] Medical Record Number:  [id number]  Place of Service where encounter took place: [facility] ([facility]) [[id number]]   CODE STATUS:   CPR/Full code     Chief Complaint/Reason for Visit:  Chief Complaint   Patient presents with     RECHECK     TCU 4/14/2022. S/p incision and drainage, right foot with application of theraskin, 4/4/2022.       TCU HPI:    Cm Lundberg is a 51 year old male with hx of CAD, s/p stents and CABG, HTN, DM, prior CVA, afib/flutter on xarelto, admitted to the hospital on 12/17/2021 for elective procedure for right foot ulceration. He underwent incision and drainage of the right foot, excisional debridement of the ulceration of the right foot into the level of the muscle, under MAC with popliteal block anesthesia on 12/17/2021. Wound vac was subsequently placed. He had no hospital complications. Seen by ID with recommendation for 5 days Augmentin at hospital discharge based on culture results. Chronic anticoagulation was restarted. He was felt to need TCU and discharged to TCU on 12/22/0221. He has had close follow up with podiatry, remained NWB, treated with wound vac. He underwent procedure on 1/27/2022 with incision and drainage with application of theraskin. Continued NWB with wound vac, frequent follow up with podiatry. At visit 3/25/2022 recommendation was made for another procedure, incision and drainage with application of theraskin on Mon 4/4/2022. Notes indicated per Dr. Kim: \"TREATMENT:-The lateral right foot ulceration continues to have exposed tendon of the peroneal brevis. Based on this presentation, I recommend we again try application of theraskin with continued use of the wound vac.\". Will have wound vac applied after procedure. Dr. Kim said okay to continue on xarelto anticoagulation pre op.       Today:  He underwent procedure 4/4/2022 with no apparent complications. He continues with wound vac, NWB. Had follow " up with podiatry 4/12/2022, continue NWB, next appt 4/27/2022. Has some mild swelling in LE. No significant pain. Compliant with cares and NWB status. Diabetes managed with metformin and glipizide. Hx CAD and stents, HTN, afib on xarelto. He has chronic stable back pain.  Appetite is good. No abdominal pain. No urinary sx. He denies fever, cough, congestion, fever.       PAST MEDICAL HISTORY:  Past Medical History:   Diagnosis Date     Anxiety      Arthritis      Cerebral infarction (H)      Congestive heart failure (H)      Coronary artery disease     CABG with multiple stents     Depression      Diabetes (H)      Equinovarus deformity, acquired, right      GERD (gastroesophageal reflux disease)      Heart attack (H)      History of blood transfusion      Hyperlipidemia      Hypertension      Migraine      Migraines      Myalgia      PONV (postoperative nausea and vomiting)     mild nausea     Stented coronary artery        MEDICATIONS:  Current Outpatient Medications   Medication Sig Dispense Refill     acetaminophen (TYLENOL) 325 MG tablet Take 650 mg by mouth every 4 hours as needed for mild pain        ARIPiprazole (ABILIFY) 2 MG tablet 2 mg daily        ASPERCREME ORIGINAL 10 % external cream APPLY TO LOW BACK TWICE DAILY FOR LOW BACK PAIN       atorvastatin (LIPITOR) 80 MG tablet Take 80 mg by mouth daily        cholestyramine (QUESTRAN) 4 g packet Take 1 packet by mouth every other day        DULoxetine (CYMBALTA) 60 MG capsule Take 120 mg by mouth daily takle 120mg       erenumab-aooe (AIMOVIG) 140 MG/ML injection Inject 1 mL (140 mg) Subcutaneous every 30 days 1 mL 0     fenofibrate micronized (LOFIBRA) 200 MG capsule Take 200 mg by mouth every morning (before breakfast)        ferrous sulfate (FEROSUL) 325 (65 Fe) MG tablet Take 325 mg by mouth daily (with breakfast)        gabapentin (NEURONTIN) 300 MG capsule Take 600 mg by mouth At Bedtime       glipiZIDE (GLUCOTROL) 5 MG tablet TAKE 1/2 TABLET (2.5MG)  "BY MOUTH DAILY DX DIABETES       loperamide (IMODIUM) 2 MG capsule Take 4 mg by mouth as needed for diarrhea        losartan (COZAAR) 25 MG tablet Take 25 mg by mouth At Bedtime        magnesium oxide 400 MG CAPS Take 400 mg by mouth 4 times daily       Menthol, Topical Analgesic, (BIOFREEZE ROLL-ON) 4 % GEL Apply 1 Application topically 2 times daily Apply to lower back for pain       metFORMIN (GLUCOPHAGE) 500 MG tablet TAKE 2 TABS (1000MG) BY MOUTH EVERY MORNING;TAKE 1 TAB BY MOUTH EVERY EVENING       metoprolol succinate ER (TOPROL-XL) 25 MG 24 hr tablet Take 75 mg by mouth daily        mirtazapine (REMERON) 7.5 MG tablet Take 7.5 mg by mouth At Bedtime        Neomycin-Bacitracin-Polymyxin (TRIPLE ANTIBIOTIC) 3.5-400-5000 OINT ointment Externally apply 1 applicator topically 3 times daily as needed       Nitroglycerin (NITROSTAT SL) Place 0.4 mg under the tongue every 5 minutes as needed for chest pain        omeprazole (PRILOSEC) 20 MG DR capsule Take 20 mg by mouth daily        oxyCODONE (ROXICODONE) 5 MG tablet TAKE 1 TAB BY MOUTH EVERY 6HOURS AS NEEDED FOR PAIN 28 tablet 0     polyethylene glycol (MIRALAX) 17 GM/Dose powder Take 1 packet by mouth daily as needed for constipation        senna-docusate (SENOKOT-S/PERICOLACE) 8.6-50 MG tablet Take 1 tablet by mouth 2 times daily as needed for constipation        traZODone (DESYREL) 50 MG tablet Take 50 mg by mouth At Bedtime        vitamin C (ASCORBIC ACID) 500 MG tablet Take 500 mg by mouth daily        XARELTO ANTICOAGULANT 20 MG TABS tablet TAKE 1 TAB BY MOUTH ONCE DAILY         PHYSICAL EXAM:  General: Patient is alert male, no distress.   Vitals: /73   Pulse 71   Temp 98.9  F (37.2  C)   Resp 18   Ht 1.702 m (5' 7\")   Wt 68.3 kg (150 lb 9.6 oz)   SpO2 97%   BMI 23.59 kg/m    HEENT: Head is NCAT. Eyes show no injection or icterus. Nares negative. Oropharynx well hydrated.  Neck: No JVD.  Lungs: Non labored respirations.  : " Deferred.  Extremities: Mild RLE swelling, puffy right foot. Calf nontender.   Musculoskeletal: Wound vac right foot.   Psych: Mood appears good.      LABS/DIAGNOSTIC DATA:  Component      Latest Ref Rng & Units 12/21/2021   Hemoglobin A1C      <=5.6 % 5.1     Component      Latest Ref Rng & Units 12/28/2021 4/1/2022   Sodium      136 - 145 mmol/L 140 139   Potassium      3.5 - 5.0 mmol/L 4.5 4.6   Chloride      98 - 107 mmol/L 107 104   Carbon Dioxide      22 - 31 mmol/L 25 26   Anion Gap      5 - 18 mmol/L 8 9   Glucose      70 - 125 mg/dL 141 (H) 129 (H)   Calcium      8.5 - 10.5 mg/dL 10.3 9.9   Urea Nitrogen      8 - 22 mg/dL 17 17   Creatinine      0.70 - 1.30 mg/dL 0.99 0.97   GFR Estimate      >60 mL/min/1.73m2 >90 >90     Component      Latest Ref Rng & Units 12/28/2021 4/1/2022   WBC      4.0 - 11.0 10e3/uL 5.4 6.3   RBC Count      4.40 - 5.90 10e6/uL 4.01 (L) 4.07 (L)   Hemoglobin      13.3 - 17.7 g/dL 11.1 (L) 10.5 (L)   Hematocrit      40.0 - 53.0 % 36.9 (L) 34.0 (L)   MCV      78 - 100 fL 92 84   MCH      26.5 - 33.0 pg 27.7 25.8 (L)   MCHC      31.5 - 36.5 g/dL 30.1 (L) 30.9 (L)   RDW      10.0 - 15.0 % 13.4 13.2   Platelet Count      150 - 450 10e3/uL 246 238         ASSESSMENT/PLAN:  1. Right foot ulceration. S/p procedures on 12/17/2021, 1/27/2022 and 4/4/2022 incision and drainage with application of theraskin per Dr. Kim. Wound vac, NWB, had follow up on 4/12/2022, NNO, next appt on 4/27/2022. Compliant with cares and restrictions.   2. HTN. Continue metoprolol and losartan. BPs are acceptable.  3. Afib/flutter. Anticoagulated with xarelto.  4. DM. On metformin and glipizide. Accuchecks followed in TCU.    5. Stable medical conditions: CAD. Hx of MIs. Hx of stenting and prior CABG. On statin, xarelto, beta blocker; Hx of stroke. Continue statin, xarelto; Anemia, on iron; Mood disorder, mixed depression and anxiety on duloxetine, Abilify; Migraines. Receives monthly Aimovig.          Electronically signed by: Heidi Xie MD

## 2022-04-26 ENCOUNTER — TRANSITIONAL CARE UNIT VISIT (OUTPATIENT)
Dept: GERIATRICS | Facility: CLINIC | Age: 52
End: 2022-04-26
Payer: MEDICARE

## 2022-04-26 DIAGNOSIS — L97.413 DIABETIC ULCER OF RIGHT MIDFOOT ASSOCIATED WITH TYPE 2 DIABETES MELLITUS, WITH NECROSIS OF MUSCLE (H): Primary | ICD-10-CM

## 2022-04-26 DIAGNOSIS — I48.91 ATRIAL FIBRILLATION, UNSPECIFIED TYPE (H): ICD-10-CM

## 2022-04-26 DIAGNOSIS — I10 ESSENTIAL HYPERTENSION: ICD-10-CM

## 2022-04-26 DIAGNOSIS — E11.69 TYPE 2 DIABETES MELLITUS WITH OTHER SPECIFIED COMPLICATION, WITHOUT LONG-TERM CURRENT USE OF INSULIN (H): ICD-10-CM

## 2022-04-26 DIAGNOSIS — E11.621 DIABETIC ULCER OF RIGHT MIDFOOT ASSOCIATED WITH TYPE 2 DIABETES MELLITUS, WITH NECROSIS OF MUSCLE (H): Primary | ICD-10-CM

## 2022-04-26 PROCEDURE — 99309 SBSQ NF CARE MODERATE MDM 30: CPT | Performed by: FAMILY MEDICINE

## 2022-04-26 NOTE — LETTER
"    4/26/2022        RE: Cm Lundberg  [address]  [place] [address]          [facility] SERVICES    [place] Medical Record Number:  [id number]  Place of Service where encounter took place: [facility] ([facility]) [[id number]]   CODE STATUS:   CPR/Full code     Chief Complaint/Reason for Visit:  Chief Complaint   Patient presents with     RECHECK     TCU 4/26/2022. S/p incision and drainage, right foot with application of theraskin, 4/4/2022.       TCU HPI:    Cm Lundberg is a 51 year old male with hx of CAD, s/p stents and CABG, HTN, DM, prior CVA, afib/flutter on xarelto, admitted to the hospital on 12/17/2021 for elective procedure for right foot ulceration. He underwent incision and drainage of the right foot, excisional debridement of the ulceration of the right foot into the level of the muscle, under MAC with popliteal block anesthesia on 12/17/2021. Wound vac was subsequently placed. He had no hospital complications. Seen by ID with recommendation for 5 days Augmentin at hospital discharge based on culture results. Chronic anticoagulation was restarted. He was felt to need TCU and discharged to TCU on 12/22/0221. He has had close follow up with podiatry, remained NWB, treated with wound vac. He underwent procedure on 1/27/2022 with incision and drainage with application of theraskin. Continued NWB with wound vac, frequent follow up with podiatry. At visit 3/25/2022 recommendation was made for another procedure, incision and drainage with application of theraskin on Mon 4/4/2022. Notes indicated per Dr. Kim: \"TREATMENT:-The lateral right foot ulceration continues to have exposed tendon of the peroneal brevis. Based on this presentation, I recommend we again try application of theraskin with continued use of the wound vac\". Will have wound vac applied after procedure. Dr. Kim said okay to continue on xarelto anticoagulation pre op.       Today:  He has " been doing pretty well. Compliant with NWB and the wound vac. However, recommended to elevate RLE as much an possible and he often prefers to sit on the side of his bed to watch videos and the RLE hangs down. He is redirected. Wears boot for protection. Has some RLE swelling. Next podiatry follow up is tomorrow 4/27/2022. Denies pain. States occ twinges of pain but overall manageable. He has diabetes, on metformin and glipizide. Hx of cardiac disease, CAD, stenting. HTN on meds and afib on xarelto. No complaints of back or abdominal pain today. No fever, cough or congestion. Appetite is good.       PAST MEDICAL HISTORY:  Past Medical History:   Diagnosis Date     Anxiety      Arthritis      Cerebral infarction (H)      Congestive heart failure (H)      Coronary artery disease     CABG with multiple stents     Depression      Diabetes (H)      Equinovarus deformity, acquired, right      GERD (gastroesophageal reflux disease)      Heart attack (H)      History of blood transfusion      Hyperlipidemia      Hypertension      Migraine      Migraines      Myalgia      PONV (postoperative nausea and vomiting)     mild nausea     Stented coronary artery        MEDICATIONS:  Current Outpatient Medications   Medication Sig Dispense Refill     acetaminophen (TYLENOL) 325 MG tablet Take 650 mg by mouth every 4 hours as needed for mild pain        ARIPiprazole (ABILIFY) 2 MG tablet 2 mg daily        ASPERCREME ORIGINAL 10 % external cream APPLY TO LOW BACK TWICE DAILY FOR LOW BACK PAIN       atorvastatin (LIPITOR) 80 MG tablet Take 80 mg by mouth daily        cholestyramine (QUESTRAN) 4 g packet Take 1 packet by mouth every other day        DULoxetine (CYMBALTA) 60 MG capsule Take 120 mg by mouth daily takle 120mg       erenumab-aooe (AIMOVIG) 140 MG/ML injection Inject 1 mL (140 mg) Subcutaneous every 30 days 1 mL 0     fenofibrate micronized (LOFIBRA) 200 MG capsule Take 200 mg by mouth every morning (before breakfast)         "ferrous sulfate (FEROSUL) 325 (65 Fe) MG tablet Take 325 mg by mouth daily (with breakfast)        gabapentin (NEURONTIN) 300 MG capsule Take 600 mg by mouth At Bedtime       glipiZIDE (GLUCOTROL) 5 MG tablet TAKE 1/2 TABLET (2.5MG) BY MOUTH DAILY DX DIABETES       loperamide (IMODIUM) 2 MG capsule Take 4 mg by mouth as needed for diarrhea        losartan (COZAAR) 25 MG tablet Take 25 mg by mouth At Bedtime        magnesium oxide 400 MG CAPS Take 400 mg by mouth 4 times daily       Menthol, Topical Analgesic, (BIOFREEZE ROLL-ON) 4 % GEL Apply 1 Application topically 2 times daily Apply to lower back for pain       metFORMIN (GLUCOPHAGE) 500 MG tablet TAKE 2 TABS (1000MG) BY MOUTH EVERY MORNING;TAKE 1 TAB BY MOUTH EVERY EVENING       metoprolol succinate ER (TOPROL-XL) 25 MG 24 hr tablet Take 75 mg by mouth daily        mirtazapine (REMERON) 7.5 MG tablet Take 7.5 mg by mouth At Bedtime        Neomycin-Bacitracin-Polymyxin (TRIPLE ANTIBIOTIC) 3.5-400-5000 OINT ointment Externally apply 1 applicator topically 3 times daily as needed       Nitroglycerin (NITROSTAT SL) Place 0.4 mg under the tongue every 5 minutes as needed for chest pain        omeprazole (PRILOSEC) 20 MG DR capsule Take 20 mg by mouth daily        oxyCODONE (ROXICODONE) 5 MG tablet TAKE 1 TAB BY MOUTH EVERY 6HOURS AS NEEDED FOR PAIN 28 tablet 0     polyethylene glycol (MIRALAX) 17 GM/Dose powder Take 1 packet by mouth daily as needed for constipation        senna-docusate (SENOKOT-S/PERICOLACE) 8.6-50 MG tablet Take 1 tablet by mouth 2 times daily as needed for constipation        traZODone (DESYREL) 50 MG tablet Take 50 mg by mouth At Bedtime        vitamin C (ASCORBIC ACID) 500 MG tablet Take 500 mg by mouth daily        XARELTO ANTICOAGULANT 20 MG TABS tablet TAKE 1 TAB BY MOUTH ONCE DAILY         PHYSICAL EXAM:  General: Patient is alert male, no distress.   Vitals: /65   Pulse 76   Temp 98.7  F (37.1  C)   Resp 18   Ht 1.702 m (5' 7\")  "  Wt 69.8 kg (153 lb 12.8 oz)   SpO2 98%   BMI 24.09 kg/m    HEENT: Head is NCAT. Eyes show no injection or icterus. Nares negative. Oropharynx well hydrated.  Neck: No JVD.  Lungs: Non labored respirations.  : Deferred.  Extremities: Mild RLE swelling. Calf non tender.   Musculoskeletal: Wound vac right foot.   Psych: Mood appears good.      LABS/DIAGNOSTIC DATA:  Component      Latest Ref Rng & Units 12/21/2021   Hemoglobin A1C      <=5.6 % 5.1     Component      Latest Ref Rng & Units 12/28/2021 4/1/2022   Sodium      136 - 145 mmol/L 140 139   Potassium      3.5 - 5.0 mmol/L 4.5 4.6   Chloride      98 - 107 mmol/L 107 104   Carbon Dioxide      22 - 31 mmol/L 25 26   Anion Gap      5 - 18 mmol/L 8 9   Glucose      70 - 125 mg/dL 141 (H) 129 (H)   Calcium      8.5 - 10.5 mg/dL 10.3 9.9   Urea Nitrogen      8 - 22 mg/dL 17 17   Creatinine      0.70 - 1.30 mg/dL 0.99 0.97   GFR Estimate      >60 mL/min/1.73m2 >90 >90     Component      Latest Ref Rng & Units 12/28/2021 4/1/2022   WBC      4.0 - 11.0 10e3/uL 5.4 6.3   RBC Count      4.40 - 5.90 10e6/uL 4.01 (L) 4.07 (L)   Hemoglobin      13.3 - 17.7 g/dL 11.1 (L) 10.5 (L)   Hematocrit      40.0 - 53.0 % 36.9 (L) 34.0 (L)   MCV      78 - 100 fL 92 84   MCH      26.5 - 33.0 pg 27.7 25.8 (L)   MCHC      31.5 - 36.5 g/dL 30.1 (L) 30.9 (L)   RDW      10.0 - 15.0 % 13.4 13.2   Platelet Count      150 - 450 10e3/uL 246 238         ASSESSMENT/PLAN:  1. Right foot ulceration. S/p procedures on 12/17/2021, 1/27/2022 and 4/4/2022 incision and drainage with application of theraskin per Dr. Julio. Wound vac, NWB, had follow up on 4/12/2022, NNO, next appt on 4/27/2022, tomorrow. Continue current cares.    2. HTN. Continue metoprolol and losartan. Bps monitored in TCU, overall satisfactory.   3. Afib/flutter. Anticoagulated with xarelto. This was continued uninterrupted prior to procedure 4/4/2022.   4. DM. On metformin and glipizide. Accuchecks followed in TCU. No changes in  meds today.    5. Stable medical conditions: CAD. Hx of MIs. Hx of stenting and prior CABG. On statin, xarelto, beta blocker; Hx of stroke. Continue statin, xarelto; Anemia, on iron; Mood disorder, mixed depression and anxiety on duloxetine, Abilify; Migraines. Receives monthly Aimovig.         Electronically signed by: Heidi Xie MD           Sincerely,        Heidi Xie MD

## 2022-04-27 ENCOUNTER — OFFICE VISIT (OUTPATIENT)
Dept: VASCULAR SURGERY | Facility: CLINIC | Age: 52
End: 2022-04-27
Attending: PODIATRIST
Payer: MEDICARE

## 2022-04-27 VITALS
TEMPERATURE: 98.7 F | HEIGHT: 67 IN | BODY MASS INDEX: 24.14 KG/M2 | RESPIRATION RATE: 18 BRPM | HEART RATE: 76 BPM | OXYGEN SATURATION: 98 % | DIASTOLIC BLOOD PRESSURE: 65 MMHG | WEIGHT: 153.8 LBS | SYSTOLIC BLOOD PRESSURE: 102 MMHG

## 2022-04-27 VITALS — SYSTOLIC BLOOD PRESSURE: 118 MMHG | DIASTOLIC BLOOD PRESSURE: 70 MMHG | HEART RATE: 78 BPM

## 2022-04-27 DIAGNOSIS — L97.513 ULCER OF RIGHT FOOT WITH NECROSIS OF MUSCLE (H): Primary | ICD-10-CM

## 2022-04-27 PROCEDURE — 11042 DBRDMT SUBQ TIS 1ST 20SQCM/<: CPT | Performed by: PODIATRIST

## 2022-04-27 ASSESSMENT — PAIN SCALES - GENERAL: PAINLEVEL: NO PAIN (0)

## 2022-04-27 NOTE — PROGRESS NOTES
FOOT AND ANKLE SURGERY/PODIATRY Progress Note      ASSESSMENT:   Ulceration right foot      TREATMENT:  -The remaining staples were removed today. There is intact theraskin graft well adhered to the wound bed along the majority of the wound with distal wound bed exposed. I recommend he continue to use the wound vac at 100 mmHg to allow the remaining graft to incorporate.     -After discussion of risk factors and consent obtained 2% Lidocaine HCL jelly was applied, under clean conditions, the right and foot ulceration(s) were debrided using #15 blade scalpel.  Devitalized and nonviable tissue, along with any fibrin and slough, was removed to improve granulation tissue formation, stimulate wound healing, decrease overall bacteria load, disrupt biofilm formation and decrease edge senescence. Wound drainage was scant No. Total excisional debridement was 3.6 sq cm into the subcutaneous tissue with a depth of 0.2 cm.   Ulcers were improved afterwards and .  Measures were as noted on the flow sheet. A gauze dressing was applied.      -He will follow-up in 2 weeks.    Alex Kim DPM  [facility] [place]      HPI: Cm Lundberg was seen again today for a right foot ulceration. He continues to use the wound vac as directed.       Past Medical History:   Diagnosis Date     Anxiety      Arthritis      Cerebral infarction (H)      Congestive heart failure (H)      Coronary artery disease     CABG with multiple stents     Depression      Diabetes (H)      Equinovarus deformity, acquired, right      GERD (gastroesophageal reflux disease)      Heart attack (H)      History of blood transfusion      Hyperlipidemia      Hypertension      Migraine      Migraines      Myalgia      PONV (postoperative nausea and vomiting)     mild nausea     Stented coronary artery        Past Surgical History:   Procedure Laterality Date     INCISION AND DRAINAGE LOWER EXTREMITY, COMBINED Right 12/17/2021    Procedure:  INCISION AND DRAINAGE, right foot;  Surgeon: Alex Kim DPM;  Location: [facility] Main OR     INCISION AND DRAINAGE LOWER EXTREMITY, COMBINED Right 1/27/2022    Procedure: INCISION AND DRAINAGE, right foot with application of theraskin;  Surgeon: Alex Kim DPM;  Location: [facility] Main OR     INCISION AND DRAINAGE LOWER EXTREMITY, COMBINED Right 4/4/2022    Procedure: INCISION AND DRAINAGE, right foot with application of theraskin;  Surgeon: Alex Kim DPM;  Location: [facility] OR     IR MISCELLANEOUS PROCEDURE  2/13/2004     [facility] CABG, VEIN, SINGLE      Description: CABG (CABG);  Recorded: 04/22/2009;       Allergies   Allergen Reactions     Aspirin Other (See Comments)     Reacts with migraine medicine     Isosorbide Headache     Codeine Rash     Morphine Rash         Current Outpatient Medications:      acetaminophen (TYLENOL) 325 MG tablet, Take 650 mg by mouth every 4 hours as needed for mild pain , Disp: , Rfl:      ARIPiprazole (ABILIFY) 2 MG tablet, 2 mg daily , Disp: , Rfl:      ASPERCREME ORIGINAL 10 % external cream, APPLY TO LOW BACK TWICE DAILY FOR LOW BACK PAIN, Disp: , Rfl:      atorvastatin (LIPITOR) 80 MG tablet, Take 80 mg by mouth daily , Disp: , Rfl:      cholestyramine (QUESTRAN) 4 g packet, Take 1 packet by mouth every other day , Disp: , Rfl:      DULoxetine (CYMBALTA) 60 MG capsule, Take 120 mg by mouth daily takle 120mg, Disp: , Rfl:      erenumab-aooe (AIMOVIG) 140 MG/ML injection, Inject 1 mL (140 mg) Subcutaneous every 30 days, Disp: 1 mL, Rfl: 0     fenofibrate micronized (LOFIBRA) 200 MG capsule, Take 200 mg by mouth every morning (before breakfast) , Disp: , Rfl:      ferrous sulfate (FEROSUL) 325 (65 Fe) MG tablet, Take 325 mg by mouth daily (with breakfast) , Disp: , Rfl:      gabapentin (NEURONTIN) 300 MG capsule, Take 600 mg by mouth At Bedtime, Disp: , Rfl:      glipiZIDE (GLUCOTROL) 5 MG tablet, TAKE 1/2 TABLET (2.5MG) BY MOUTH DAILY DX  DIABETES, Disp: , Rfl:      loperamide (IMODIUM) 2 MG capsule, Take 4 mg by mouth as needed for diarrhea , Disp: , Rfl:      losartan (COZAAR) 25 MG tablet, Take 25 mg by mouth At Bedtime , Disp: , Rfl:      magnesium oxide 400 MG CAPS, Take 400 mg by mouth 4 times daily, Disp: , Rfl:      Menthol, Topical Analgesic, (BIOFREEZE ROLL-ON) 4 % GEL, Apply 1 Application topically 2 times daily Apply to lower back for pain, Disp: , Rfl:      metFORMIN (GLUCOPHAGE) 500 MG tablet, TAKE 2 TABS (1000MG) BY MOUTH EVERY MORNING;TAKE 1 TAB BY MOUTH EVERY EVENING, Disp: , Rfl:      metoprolol succinate ER (TOPROL-XL) 25 MG 24 hr tablet, Take 75 mg by mouth daily , Disp: , Rfl:      mirtazapine (REMERON) 7.5 MG tablet, Take 7.5 mg by mouth At Bedtime , Disp: , Rfl:      Neomycin-Bacitracin-Polymyxin (TRIPLE ANTIBIOTIC) 3.5-400-5000 OINT ointment, Externally apply 1 applicator topically 3 times daily as needed, Disp: , Rfl:      Nitroglycerin (NITROSTAT SL), Place 0.4 mg under the tongue every 5 minutes as needed for chest pain , Disp: , Rfl:      omeprazole (PRILOSEC) 20 MG DR capsule, Take 20 mg by mouth daily , Disp: , Rfl:      oxyCODONE (ROXICODONE) 5 MG tablet, TAKE 1 TAB BY MOUTH EVERY 6HOURS AS NEEDED FOR PAIN, Disp: 28 tablet, Rfl: 0     polyethylene glycol (MIRALAX) 17 GM/Dose powder, Take 1 packet by mouth daily as needed for constipation , Disp: , Rfl:      senna-docusate (SENOKOT-S/PERICOLACE) 8.6-50 MG tablet, Take 1 tablet by mouth 2 times daily as needed for constipation , Disp: , Rfl:      traZODone (DESYREL) 50 MG tablet, Take 50 mg by mouth At Bedtime , Disp: , Rfl:      vitamin C (ASCORBIC ACID) 500 MG tablet, Take 500 mg by mouth daily , Disp: , Rfl:      XARELTO ANTICOAGULANT 20 MG TABS tablet, TAKE 1 TAB BY MOUTH ONCE DAILY, Disp: , Rfl:     Review of Systems - 10 point Review of Systems is negative except for right foot ulcer which is noted in HPI.      OBJECTIVE:  /70   Pulse 78   General appearance:  "Patient is alert and fully cooperative with history & exam.  No sign of distress is noted during the visit.    Vascular: Dorsalis pedis palpableRight.  Dermatologic:    Negative Pressure Wound Therapy Foot Anterior;Right (Active)       VASC Wound right lateral foot (Active)       VASC Wound left hallux  (Active)       VASC Wound right lateral foot (Active)   Pre Size Length 1.7 03/25/22 1300   Pre Size Width 2 03/25/22 1300   Pre Size Depth 0.3 03/25/22 1300   Pre Total Sq cm 3.4 03/25/22 1300       Incision/Surgical Site 12/17/21 Right Foot (Active)   Theraskin graft intact along the proximal wound, with mixed granular/fibrotic tissue at the distal wound. No erythema right foot.  Neurologic: Diminished to light touch Right.  Musculoskeletal: Contracted digits noted Right.    Imaging:     POC US Guidance Needle Placement    Result Date: 4/4/2022  Ultrasound was performed as guidance to an anesthesia procedure.  Click \"PACS images\" hyperlink below to view any stored images.  For specific procedure details, view procedure note authored by anesthesia.    POC US Guidance Needle Placement    Result Date: 4/4/2022  Ultrasound was performed as guidance to an anesthesia procedure.  Click \"PACS images\" hyperlink below to view any stored images.  For specific procedure details, view procedure note authored by anesthesia.         Picture:         "

## 2022-04-27 NOTE — PATIENT INSTRUCTIONS
Important lnstructions      WEIGHT BEARING STATUS: You are to remain NON WEIGHT BEARING on your right foot. NON WEIGHT BEARING MEANS NO PRESSURE ON YOUR FOOT OR HEEL AT ANY TIME FOR ANY REASON!    2. OFFLOADING DEVICE: Must use a A WHEELCHAIR at all times! (do not use affected foot to push wheelchair)    3. STABILIZATION DEVICE: Use a EGG CRATE BOOT . You will need to WEAR THIS AT ALL TIMES EVEN WHILE IN BED.       4. ELEVATE: Elevating your leg means laying with your head on a pillow and your foot ABOVE YOUR WAIST.     5. DO NOT MOVE YOUR FOOT.  There is a risk of worsening the wound or incision. To give yourself a higher chance of healing, please DO NOT swing foot back and forth and wiggle foot/toes especially when inside a stabilization device.        Dressing Change lnstructions          - Wound Vac Instructions     1. 2x weekly and as needed cleanse the wound with NS     2. Pat dry     3. Apply Cavilon no sting barrier wipe to the skin surrounding the wound to protect from drainage/maceration     4. Apply drape to natalia wound. Cut strip of drape and apply to skin if bridging is needed; plan this area in advance; should not be over bony prominence      5. Cut the foam to fit the size of the wound. APPLY ADAPTIC TOUCH (or other similar nonadherent) OVER WOUND/GRAFT SITE. Then     6. Apply foam to wound     7. Cut narrow strip of foam if bridging if needed     8. Cover foam with drape to obtain air tight seal     9. Cut opening the size of a quarter for where the suction pad will be applied     10. Apply Suction pad     11. 100mmHG suction continuous      [employer] [facility] can be reached at [phone], 24 hours a day 7 days a week      - If you do not have a back up plan in place:      If the negative pressure wound therapy malfunctions or unable to maintain seal: dressing must be removed and reapplied within 2 hours of the incident. If unable to reapply negative pressure wound dressing, place Normal Saline  moistened gauze in wound bed and cover with appropriate dressing to keep wound bed moist.  Change wet-to-dry dressing two times a day until healthcare staff can re-implement negative pressure therapy. Change canister at least weekly.  [facility] Contact Center can be reached at [phone], 24 hours a day 7 days a week     - Information on Vacuum-Assisted Closure of a Wound  Vacuum-assisted closure (VAC) of a wound is a type of treatment to help wounds heal. It's also known as negative pressure wound therapy. During the treatment, a device lowers air pressure on the wound. This can help the wound heal more quickly.  - Understanding the wound VAC system  A wound VAC system has several parts. A foam or gauze dressing is put directly on the wound. The dressing is changed every 24 to 72 hours. An adhesive film covers and seals the dressing and wound. A drainage tube leads from under the adhesive film and connects to a portable vacuum pump. This pump removes air pressure over the wound. It may do this constantly. Or it may do it in cycles. During the treatment, you'll need to carry the portable pump everywhere you go.  - Why wound VAC is used  You might need this therapy for a recent traumatic wound. Or you may need it for a chronic wound. This is a wound that does not heal the way it should over time. This can happen with wounds in people who have diabetes. You may need a wound VAC if you've had a recent skin graft. And you may need a wound VAC for a large wound. Large wounds can take a longer time to heal.  A wound vacuum system may help your wound heal more quickly by:  Draining extra fluid from the wound  Reducing swelling  Reducing bacteria in the wound  Keeping your wound moist and warm  Helping draw together wound edges  Increasing blood flow to your wound  Decreasing inflammation  Wound VAC offers some other advantages over other types of wound care. It may decrease your overall discomfort. The dressings usually need  to be changed less often. And they may be easier to keep in position.  - Risks of wound VAC  Wound VAC has some rare risks, such as:  Bleeding (which may be severe)  Wound infection  An abnormal connection between the intestinal tract and the skin (enteric fistula)  Proper training in dressing changes can help reduce the risk for these complications. Also, your doctor will carefully evaluate you to make sure you are a good candidate for the therapy. Certain problems can increase your risk for complications. These include:  Exposed organs or blood vessels  High risk of bleeding from another medical problem  Wound infection  Nearby bone infection  Dead wound tissue  Cancer tissue  Fragile skin, such as from aging or longtime use of topical steroids  Allergy to adhesive  Very poor blood flow to your wound  Wounds close to joints that may reopen because of movement  Your doctor will discuss the risks that apply to you. Make sure to talk with him or her about all of your questions and concerns.  - Getting ready for wound VAC  You likely won't need to do much to get ready for wound VAC. In some cases, you may need to wait a while before having this therapy. For example, your doctor may first need to treat an infection in your wound. Dead or damaged tissue may also need to be removed from your wound.  You or a caregiver may need training on how to use the wound VAC device. This is done if you will be able to have your wound vacuum therapy at home. In other cases, you may need to have your wound vacuum therapy in a health care facility.  - On the day of your procedure  A health care provider will cover your wound with foam or gauze wound dressing. An adhesive film will be put over the dressing and wound. This seals the wound. The foam connects to a drainage tube, which leads to a vacuum pump. This pump is portable. When the pump is turned on, it draws fluid through the foam and out the drainage tubing. The pump may run  constantly, or it may cycle off and on. Your exact setup will depend on the specific type of wound vacuum system that you use.  - Managing your wound  You may need the dressing changed about once a day. You may need it changed more or less often, depending on your wound. You or your caregiver may be trained to do this at home. Or it may be done by a visiting health care provider. Your doctor may prescribe a pain medicine. This is to prevent or reduce pain during the dressing change.  You will likely need to use the wound VAC system for several weeks or months. During this time, you'll carry the portable pump everywhere you go.  - Nutrition for wound healing  During this time, make sure you follow a healthy diet. This is needed so the wound can heal and to prevent infection. Your doctor can tell you more about what to include in your diet during this time.  follow up with your doctor if you have a medical condition that led to your wound, such as diabetes. He or she can help you prevent future wounds.  - Follow-up care  Your doctor will carefully keep track of your healing. Make sure to keep all follow-up appointments.  - When to call your health care provider  Call your health care provider right away if you have any of these:  Fever of 100.4 F (38.0 C) or higher  Increased redness, swelling, or warmth around wound  Increased pain  Bright red blood or blood clots in tubing or the collection chamber of the vacuum      It is ok to continue current wound care treatment/products for the next 2-3 days until new wound care supplies are ordered and arrive. If longer than this please contact our office at [phone].        We want to hear from you!   In the next few weeks, you should receive a call or email to complete a survey about your visit at [facility]. Please help us improve your appointment experience by letting us know how we did today. We strive to make your experience good and value any ways in  which we could do better.      We value your input and suggestions.    Thank you for choosing the [facility]!

## 2022-05-02 NOTE — PROGRESS NOTES
"[facility] Medical Record Number:  [id number]  Place of Service where encounter took place: [facility] () [[id number]]   CODE STATUS:   CPR/Full code     Chief Complaint/Reason for Visit:  Chief Complaint   Patient presents with     RECHECK     TCU 4/26/2022. S/p incision and drainage, right foot with application of theraskin, 4/4/2022.       TCU HPI:    Cm Lundberg is a 51 year old male with hx of CAD, s/p stents and CABG, HTN, DM, prior CVA, afib/flutter on xarelto, admitted to the hospital on 12/17/2021 for elective procedure for right foot ulceration. He underwent incision and drainage of the right foot, excisional debridement of the ulceration of the right foot into the level of the muscle, under MAC with popliteal block anesthesia on 12/17/2021. Wound vac was subsequently placed. He had no hospital complications. Seen by ID with recommendation for 5 days Augmentin at hospital discharge based on culture results. Chronic anticoagulation was restarted. He was felt to need TCU and discharged to TCU on 12/22/0221. He has had close follow up with podiatry, remained NWB, treated with wound vac. He underwent procedure on 1/27/2022 with incision and drainage with application of theraskin. Continued NWB with wound vac, frequent follow up with podiatry. At visit 3/25/2022 recommendation was made for another procedure, incision and drainage with application of theraskin on Mon 4/4/2022. Notes indicated per Dr. Kim: \"TREATMENT:-The lateral right foot ulceration continues to have exposed tendon of the peroneal brevis. Based on this presentation, I recommend we again try application of theraskin with continued use of the wound vac\". Will have wound vac applied after procedure. Dr. Kim said okay to continue on xarelto anticoagulation pre op.       Today:  He has been doing pretty well. Compliant with NWB and the wound vac. However, recommended to elevate RLE as " much an possible and he often prefers to sit on the side of his bed to watch videos and the RLE hangs down. He is redirected. Wears boot for protection. Has some RLE swelling. Next podiatry follow up is tomorrow 4/27/2022. Denies pain. States occ twinges of pain but overall manageable. He has diabetes, on metformin and glipizide. Hx of cardiac disease, CAD, stenting. HTN on meds and afib on xarelto. No complaints of back or abdominal pain today. No fever, cough or congestion. Appetite is good.       PAST MEDICAL HISTORY:  Past Medical History:   Diagnosis Date     Anxiety      Arthritis      Cerebral infarction (H)      Congestive heart failure (H)      Coronary artery disease     CABG with multiple stents     Depression      Diabetes (H)      Equinovarus deformity, acquired, right      GERD (gastroesophageal reflux disease)      Heart attack (H)      History of blood transfusion      Hyperlipidemia      Hypertension      Migraine      Migraines      Myalgia      PONV (postoperative nausea and vomiting)     mild nausea     Stented coronary artery        MEDICATIONS:  Current Outpatient Medications   Medication Sig Dispense Refill     acetaminophen (TYLENOL) 325 MG tablet Take 650 mg by mouth every 4 hours as needed for mild pain        ARIPiprazole (ABILIFY) 2 MG tablet 2 mg daily        ASPERCREME ORIGINAL 10 % external cream APPLY TO LOW BACK TWICE DAILY FOR LOW BACK PAIN       atorvastatin (LIPITOR) 80 MG tablet Take 80 mg by mouth daily        cholestyramine (QUESTRAN) 4 g packet Take 1 packet by mouth every other day        DULoxetine (CYMBALTA) 60 MG capsule Take 120 mg by mouth daily takle 120mg       erenumab-aooe (AIMOVIG) 140 MG/ML injection Inject 1 mL (140 mg) Subcutaneous every 30 days 1 mL 0     fenofibrate micronized (LOFIBRA) 200 MG capsule Take 200 mg by mouth every morning (before breakfast)        ferrous sulfate (FEROSUL) 325 (65 Fe) MG tablet Take 325 mg by mouth daily (with breakfast)         "gabapentin (NEURONTIN) 300 MG capsule Take 600 mg by mouth At Bedtime       glipiZIDE (GLUCOTROL) 5 MG tablet TAKE 1/2 TABLET (2.5MG) BY MOUTH DAILY DX DIABETES       loperamide (IMODIUM) 2 MG capsule Take 4 mg by mouth as needed for diarrhea        losartan (COZAAR) 25 MG tablet Take 25 mg by mouth At Bedtime        magnesium oxide 400 MG CAPS Take 400 mg by mouth 4 times daily       Menthol, Topical Analgesic, (BIOFREEZE ROLL-ON) 4 % GEL Apply 1 Application topically 2 times daily Apply to lower back for pain       metFORMIN (GLUCOPHAGE) 500 MG tablet TAKE 2 TABS (1000MG) BY MOUTH EVERY MORNING;TAKE 1 TAB BY MOUTH EVERY EVENING       metoprolol succinate ER (TOPROL-XL) 25 MG 24 hr tablet Take 75 mg by mouth daily        mirtazapine (REMERON) 7.5 MG tablet Take 7.5 mg by mouth At Bedtime        Neomycin-Bacitracin-Polymyxin (TRIPLE ANTIBIOTIC) 3.5-400-5000 OINT ointment Externally apply 1 applicator topically 3 times daily as needed       Nitroglycerin (NITROSTAT SL) Place 0.4 mg under the tongue every 5 minutes as needed for chest pain        omeprazole (PRILOSEC) 20 MG DR capsule Take 20 mg by mouth daily        oxyCODONE (ROXICODONE) 5 MG tablet TAKE 1 TAB BY MOUTH EVERY 6HOURS AS NEEDED FOR PAIN 28 tablet 0     polyethylene glycol (MIRALAX) 17 GM/Dose powder Take 1 packet by mouth daily as needed for constipation        senna-docusate (SENOKOT-S/PERICOLACE) 8.6-50 MG tablet Take 1 tablet by mouth 2 times daily as needed for constipation        traZODone (DESYREL) 50 MG tablet Take 50 mg by mouth At Bedtime        vitamin C (ASCORBIC ACID) 500 MG tablet Take 500 mg by mouth daily        XARELTO ANTICOAGULANT 20 MG TABS tablet TAKE 1 TAB BY MOUTH ONCE DAILY         PHYSICAL EXAM:  General: Patient is alert male, no distress.   Vitals: /65   Pulse 76   Temp 98.7  F (37.1  C)   Resp 18   Ht 1.702 m (5' 7\")   Wt 69.8 kg (153 lb 12.8 oz)   SpO2 98%   BMI 24.09 kg/m    HEENT: Head is NCAT. Eyes show no " injection or icterus. Nares negative. Oropharynx well hydrated.  Neck: No JVD.  Lungs: Non labored respirations.  : Deferred.  Extremities: Mild RLE swelling. Calf non tender.   Musculoskeletal: Wound vac right foot.   Psych: Mood appears good.      LABS/DIAGNOSTIC DATA:  Component      Latest Ref Rng & Units 12/21/2021   Hemoglobin A1C      <=5.6 % 5.1     Component      Latest Ref Rng & Units 12/28/2021 4/1/2022   Sodium      136 - 145 mmol/L 140 139   Potassium      3.5 - 5.0 mmol/L 4.5 4.6   Chloride      98 - 107 mmol/L 107 104   Carbon Dioxide      22 - 31 mmol/L 25 26   Anion Gap      5 - 18 mmol/L 8 9   Glucose      70 - 125 mg/dL 141 (H) 129 (H)   Calcium      8.5 - 10.5 mg/dL 10.3 9.9   Urea Nitrogen      8 - 22 mg/dL 17 17   Creatinine      0.70 - 1.30 mg/dL 0.99 0.97   GFR Estimate      >60 mL/min/1.73m2 >90 >90     Component      Latest Ref Rng & Units 12/28/2021 4/1/2022   WBC      4.0 - 11.0 10e3/uL 5.4 6.3   RBC Count      4.40 - 5.90 10e6/uL 4.01 (L) 4.07 (L)   Hemoglobin      13.3 - 17.7 g/dL 11.1 (L) 10.5 (L)   Hematocrit      40.0 - 53.0 % 36.9 (L) 34.0 (L)   MCV      78 - 100 fL 92 84   MCH      26.5 - 33.0 pg 27.7 25.8 (L)   MCHC      31.5 - 36.5 g/dL 30.1 (L) 30.9 (L)   RDW      10.0 - 15.0 % 13.4 13.2   Platelet Count      150 - 450 10e3/uL 246 238         ASSESSMENT/PLAN:  1. Right foot ulceration. S/p procedures on 12/17/2021, 1/27/2022 and 4/4/2022 incision and drainage with application of theraskin per Dr. Kim. Wound vac, NWB, had follow up on 4/12/2022, NNO, next appt on 4/27/2022, tomorrow. Continue current cares.    2. HTN. Continue metoprolol and losartan. Bps monitored in TCU, overall satisfactory.   3. Afib/flutter. Anticoagulated with xarelto. This was continued uninterrupted prior to procedure 4/4/2022.   4. DM. On metformin and glipizide. Accuchecks followed in TCU. No changes in meds today.    5. Stable medical conditions: CAD. Hx of MIs. Hx of stenting and prior CABG. On  statin, xarelto, beta blocker; Hx of stroke. Continue statin, xarelto; Anemia, on iron; Mood disorder, mixed depression and anxiety on duloxetine, Abilify; Migraines. Receives monthly Aimovig.         Electronically signed by: Heidi Xie MD

## 2022-05-09 NOTE — PROGRESS NOTES
[facility] Medical Record Number:  [id number]  Place of Service where encounter took place: [facility] ([facility]) [[id number]]   CODE STATUS:   CPR/Full code     Chief Complaint/Reason for Visit:  Chief Complaint   Patient presents with     RECHECK     TCU 3/17/2022. Right foot ulceration. S/p Incision and drainage with application of theraskin 1/27/2022. NWB with wound vac.        TCU HPI:    Cm Lundberg is a 51 year old male with hx of CAD, s/p stents and CABG, HTN, DM, prior CVA, afib/flutter on xarelto, admitted to the hospital on 12/17/2021 for elective procedure for right foot ulceration. He underwent incision and drainage of the right foot, excisional debridement of the ulceration of the right foot into the level of the muscle, under MAC with popliteal block anesthesia on 12/17/2021. Wound vac was subsequently placed. He had no hospital complications. Seen by ID with recommendation for 5 days Augmentin at hospital discharge based on culture results. Chronic anticoagulation was restarted. He was felt to need TCU and discharged to TCU on 12/22/0221 for PT, OT, nursing cares, medical management and monitoring.       Today:  He remains NWB, wound vac R foot. He will see Dr. Kim from podiatry on 3/25/2022. Today, stating Biofreeze which he uses for chronic back pain doesn't seem to be helping and wondering if there is something else he can try. He is also on gabapentin. Will discontinue Biofreeze and trial topical Aspercreme. He is in agreement. Patient has diabetes, on metformin and glipizide, accuchecks followed. No other concerns today. He denies fever, cough, congestion,chest pain or shortness of breath. Limited in therapy due to NWB restriction. Sleeping well. Appetite is good.       PAST MEDICAL HISTORY:  Past Medical History:   Diagnosis Date     Anxiety      Arthritis      Cerebral infarction (H)      Congestive heart failure (H)      Coronary artery  disease     CABG with multiple stents     Depression      Diabetes (H)      Equinovarus deformity, acquired, right      GERD (gastroesophageal reflux disease)      Heart attack (H)      History of blood transfusion      Hyperlipidemia      Hypertension      Migraine      Migraines      Myalgia      PONV (postoperative nausea and vomiting)     mild nausea     Stented coronary artery        MEDICATIONS:  Current Outpatient Medications   Medication Sig Dispense Refill     acetaminophen (TYLENOL) 325 MG tablet Take 650 mg by mouth every 4 hours as needed for mild pain        ARIPiprazole (ABILIFY) 2 MG tablet 2 mg daily        ASPERCREME ORIGINAL 10 % external cream APPLY TO LOW BACK TWICE DAILY FOR LOW BACK PAIN       atorvastatin (LIPITOR) 80 MG tablet Take 80 mg by mouth daily        cholestyramine (QUESTRAN) 4 g packet Take 1 packet by mouth every other day        DULoxetine (CYMBALTA) 60 MG capsule Take 120 mg by mouth daily takle 120mg       erenumab-aooe (AIMOVIG) 140 MG/ML injection Inject 1 mL (140 mg) Subcutaneous every 30 days 1 mL 0     fenofibrate micronized (LOFIBRA) 200 MG capsule Take 200 mg by mouth every morning (before breakfast)        ferrous sulfate (FEROSUL) 325 (65 Fe) MG tablet Take 325 mg by mouth daily (with breakfast)        gabapentin (NEURONTIN) 300 MG capsule Take 600 mg by mouth At Bedtime       glipiZIDE (GLUCOTROL) 5 MG tablet TAKE 1/2 TABLET (2.5MG) BY MOUTH DAILY DX DIABETES       loperamide (IMODIUM) 2 MG capsule Take 4 mg by mouth as needed for diarrhea        losartan (COZAAR) 25 MG tablet Take 25 mg by mouth At Bedtime        magnesium oxide 400 MG CAPS Take 400 mg by mouth 4 times daily       Menthol, Topical Analgesic, (BIOFREEZE ROLL-ON) 4 % GEL Apply 1 Application topically 2 times daily Apply to lower back for pain       metFORMIN (GLUCOPHAGE) 500 MG tablet TAKE 2 TABS (1000MG) BY MOUTH EVERY MORNING;TAKE 1 TAB BY MOUTH EVERY EVENING       metoprolol succinate ER (TOPROL-XL)  25 MG 24 hr tablet Take 75 mg by mouth daily        mirtazapine (REMERON) 7.5 MG tablet Take 7.5 mg by mouth At Bedtime        Neomycin-Bacitracin-Polymyxin (TRIPLE ANTIBIOTIC) 3.5-400-5000 OINT ointment Externally apply 1 applicator topically 3 times daily as needed       Nitroglycerin (NITROSTAT SL) Place 0.4 mg under the tongue every 5 minutes as needed for chest pain        omeprazole (PRILOSEC) 20 MG DR capsule Take 20 mg by mouth daily        oxyCODONE (ROXICODONE) 5 MG tablet TAKE 1 TAB BY MOUTH EVERY 6HOURS AS NEEDED FOR PAIN 28 tablet 0     polyethylene glycol (MIRALAX) 17 GM/Dose powder Take 1 packet by mouth daily as needed for constipation        senna-docusate (SENOKOT-S/PERICOLACE) 8.6-50 MG tablet Take 1 tablet by mouth 2 times daily as needed for constipation        traZODone (DESYREL) 50 MG tablet Take 50 mg by mouth At Bedtime        vitamin C (ASCORBIC ACID) 500 MG tablet Take 500 mg by mouth daily        XARELTO ANTICOAGULANT 20 MG TABS tablet TAKE 1 TAB BY MOUTH ONCE DAILY         PHYSICAL EXAM:  General: Patient is alert male, no distress.   Vitals: /63   Pulse 73   Temp 98.7 F   Resp 18  SpO2 99% RA  HEENT: Head is NCAT. Eyes show no injection or icterus. Nares negative. Oropharynx well hydrated.  Neck: No JVD.  Lungs: Non labored respirations.  : Deferred.  Extremities: No edema is noted.  Musculoskeletal: Wound vac right foot.   Psych: Mood appears good.      LABS/DIAGNOSTIC DATA:  Component      Latest Ref Rng & Units 12/21/2021   Hemoglobin A1C      <=5.6 % 5.1     Component      Latest Ref Rng & Units 12/18/2021   Sodium      136 - 145 mmol/L 138   Potassium      3.5 - 5.0 mmol/L 4.5   Chloride      98 - 107 mmol/L 106   Carbon Dioxide      22 - 31 mmol/L 21 (L)   Anion Gap      5 - 18 mmol/L 11   Glucose      70 - 125 mg/dL 119   Calcium      8.5 - 10.5 mg/dL 9.3   Urea Nitrogen      8 - 22 mg/dL 10   Creatinine      0.70 - 1.30 mg/dL 0.86   GFR Estimate      >60  mL/min/1.73m2 >90         ASSESSMENT/PLAN:  1. Right foot ulceration. S/p incision and drainage 12/17/2021; S/p Incision and drainage, right foot with application of theraskin 1/27/2022. Wound vac in place, continue NWB, next podiatry follow up 3/25/2022.   2. HTN. On metoprolol and losartan. BPs satisfactory, continue to monitor in TCU.  3. Afib/flutter. Anticoagulated with xarelto.  4. DM. On metformin and glipizide. Accuchecks followed.  5. Back pain, chronic. Change biofreeze to Aspercreme to see if more effective.    6. Other chronic stable medical conditions: CAD. Hx of MIs. Hx of stenting and prior CABG. On statin, xarelto, beta blocker; Hx of stroke. Continue statin, xarelto; Anemia, on iron; Mood disorder, mixed depression and anxiety on duloxetine, Abilify; Migraines. Receives monthly Aimovig.         Electronically signed by: Heidi Xie MD

## 2022-05-11 ENCOUNTER — OFFICE VISIT (OUTPATIENT)
Dept: VASCULAR SURGERY | Facility: CLINIC | Age: 52
End: 2022-05-11
Attending: PODIATRIST
Payer: MEDICARE

## 2022-05-11 VITALS — DIASTOLIC BLOOD PRESSURE: 70 MMHG | SYSTOLIC BLOOD PRESSURE: 122 MMHG | RESPIRATION RATE: 22 BRPM | HEART RATE: 80 BPM

## 2022-05-11 DIAGNOSIS — L97.513 ULCER OF RIGHT FOOT WITH NECROSIS OF MUSCLE (H): Primary | ICD-10-CM

## 2022-05-11 PROCEDURE — 11043 DBRDMT MUSC&/FSCA 1ST 20/<: CPT | Performed by: PODIATRIST

## 2022-05-11 RX ORDER — GABAPENTIN 100 MG/1
100 CAPSULE ORAL
COMMUNITY
Start: 2022-04-27

## 2022-05-11 ASSESSMENT — PAIN SCALES - GENERAL: PAINLEVEL: NO PAIN (0)

## 2022-05-11 NOTE — PROGRESS NOTES
FOOT AND ANKLE SURGERY/PODIATRY Progress Note      ASSESSMENT:   Ulceration right foot      TREATMENT:  -The right foot ulceration has improved since his last visit despite the theraskin graft not incorporating.     -I recommend he increase the wound vac to 125 mmHg, non-weight bearing.     -After discussion of risk factors and consent obtained 2% Lidocaine HCL jelly was applied, under clean conditions, the right and foot ulceration(s) were debrided using currette or #15 blade scalpel.  Devitalized and nonviable tissue, along with any fibrin and slough, was removed to improve granulation tissue formation, stimulate wound healing, decrease overall bacteria load, disrupt biofilm formation and decrease edge senescence. Wound drainage was scant No. Total excisional debridement was 3 sq cm into the muscle/fascia with a depth of 0.4 cm.   Ulcers were improved afterwards and .  Measures were as noted on the flow sheet. A gauze dressing was applied.      -He will follow-up in 2 weeks.    Alex Kim DPM  [facility] [place]      HPI: Cm DAYLIN Lundberg was seen again today for a right foot ulcer. He has remained non-weight bearing on the right foot and using the wound vac.       Past Medical History:   Diagnosis Date     Anxiety      Arthritis      Cerebral infarction (H)      Congestive heart failure (H)      Coronary artery disease     CABG with multiple stents     Depression      Diabetes (H)      Equinovarus deformity, acquired, right      GERD (gastroesophageal reflux disease)      Heart attack (H)      History of blood transfusion      Hyperlipidemia      Hypertension      Migraine      Migraines      Myalgia      PONV (postoperative nausea and vomiting)     mild nausea     Stented coronary artery        Past Surgical History:   Procedure Laterality Date     INCISION AND DRAINAGE LOWER EXTREMITY, COMBINED Right 12/17/2021    Procedure: INCISION AND DRAINAGE, right foot;  Surgeon: Julio  Alex Rodríguez DPM;  Location: [facility] OR     INCISION AND DRAINAGE LOWER EXTREMITY, COMBINED Right 1/27/2022    Procedure: INCISION AND DRAINAGE, right foot with application of theraskin;  Surgeon: Alex Kim DPM;  Location: [facility] OR     INCISION AND DRAINAGE LOWER EXTREMITY, COMBINED Right 4/4/2022    Procedure: INCISION AND DRAINAGE, right foot with application of theraskin;  Surgeon: Alex Kim DPM;  Location: [facility] OR     IR MISCELLANEOUS PROCEDURE  2/13/2004     ZZC CABG, VEIN, SINGLE      Description: CABG (CABG);  Recorded: 04/22/2009;       Allergies   Allergen Reactions     Aspirin Other (See Comments)     Reacts with migraine medicine     Isosorbide Headache     Codeine Rash     Morphine Rash         Current Outpatient Medications:      acetaminophen (TYLENOL) 325 MG tablet, Take 650 mg by mouth every 4 hours as needed for mild pain , Disp: , Rfl:      ARIPiprazole (ABILIFY) 2 MG tablet, 2 mg daily , Disp: , Rfl:      ASPERCREME ORIGINAL 10 % external cream, APPLY TO LOW BACK TWICE DAILY FOR LOW BACK PAIN, Disp: , Rfl:      atorvastatin (LIPITOR) 80 MG tablet, Take 80 mg by mouth daily , Disp: , Rfl:      cholestyramine (QUESTRAN) 4 g packet, Take 1 packet by mouth every other day , Disp: , Rfl:      DULoxetine (CYMBALTA) 60 MG capsule, Take 120 mg by mouth daily takle 120mg, Disp: , Rfl:      erenumab-aooe (AIMOVIG) 140 MG/ML injection, Inject 1 mL (140 mg) Subcutaneous every 30 days, Disp: 1 mL, Rfl: 0     fenofibrate micronized (LOFIBRA) 200 MG capsule, Take 200 mg by mouth every morning (before breakfast) , Disp: , Rfl:      ferrous sulfate (FEROSUL) 325 (65 Fe) MG tablet, Take 325 mg by mouth daily (with breakfast) , Disp: , Rfl:      gabapentin (NEURONTIN) 100 MG capsule, TAKE 1 CAP BY MOUTH TWICE A DAY BEFORE MEALS, Disp: , Rfl:      gabapentin (NEURONTIN) 300 MG capsule, Take 600 mg by mouth At Bedtime, Disp: , Rfl:      glipiZIDE (GLUCOTROL) 5 MG  tablet, TAKE 1/2 TABLET (2.5MG) BY MOUTH DAILY DX DIABETES, Disp: , Rfl:      loperamide (IMODIUM) 2 MG capsule, Take 4 mg by mouth as needed for diarrhea , Disp: , Rfl:      losartan (COZAAR) 25 MG tablet, Take 25 mg by mouth At Bedtime , Disp: , Rfl:      magnesium oxide 400 MG CAPS, Take 400 mg by mouth 4 times daily, Disp: , Rfl:      Menthol, Topical Analgesic, (BIOFREEZE ROLL-ON) 4 % GEL, Apply 1 Application topically 2 times daily Apply to lower back for pain, Disp: , Rfl:      metFORMIN (GLUCOPHAGE) 500 MG tablet, TAKE 2 TABS (1000MG) BY MOUTH EVERY MORNING;TAKE 1 TAB BY MOUTH EVERY EVENING, Disp: , Rfl:      metoprolol succinate ER (TOPROL-XL) 25 MG 24 hr tablet, Take 75 mg by mouth daily , Disp: , Rfl:      mirtazapine (REMERON) 7.5 MG tablet, Take 7.5 mg by mouth At Bedtime , Disp: , Rfl:      Neomycin-Bacitracin-Polymyxin (TRIPLE ANTIBIOTIC) 3.5-400-5000 OINT ointment, Externally apply 1 applicator topically 3 times daily as needed, Disp: , Rfl:      Nitroglycerin (NITROSTAT SL), Place 0.4 mg under the tongue every 5 minutes as needed for chest pain , Disp: , Rfl:      omeprazole (PRILOSEC) 20 MG DR capsule, Take 20 mg by mouth daily , Disp: , Rfl:      oxyCODONE (ROXICODONE) 5 MG tablet, TAKE 1 TAB BY MOUTH EVERY 6HOURS AS NEEDED FOR PAIN, Disp: 28 tablet, Rfl: 0     polyethylene glycol (MIRALAX) 17 GM/Dose powder, Take 1 packet by mouth daily as needed for constipation , Disp: , Rfl:      senna-docusate (SENOKOT-S/PERICOLACE) 8.6-50 MG tablet, Take 1 tablet by mouth 2 times daily as needed for constipation , Disp: , Rfl:      traZODone (DESYREL) 50 MG tablet, Take 50 mg by mouth At Bedtime , Disp: , Rfl:      vitamin C (ASCORBIC ACID) 500 MG tablet, Take 500 mg by mouth daily , Disp: , Rfl:      XARELTO ANTICOAGULANT 20 MG TABS tablet, TAKE 1 TAB BY MOUTH ONCE DAILY, Disp: , Rfl:     Review of Systems - 10 point Review of Systems is negative except for right foot ulcer which is noted in  HPI.      OBJECTIVE:  /70   Pulse 80   Resp 22   General appearance: Patient is alert and fully cooperative with history & exam.  No sign of distress is noted during the visit.    Vascular: Dorsalis pedis non-palpableRight.  Dermatologic:    Negative Pressure Wound Therapy Foot Anterior;Right (Active)       VASC Wound right lateral foot (Active)       VASC Wound left hallux  (Active)       VASC Wound right lateral foot (Active)   Pre Size Length 1.5 05/11/22 1300   Pre Size Width 2 05/11/22 1300   Pre Size Depth 0.4 05/11/22 1300   Pre Total Sq cm 3 05/11/22 1300       Incision/Surgical Site 12/17/21 Right Foot (Active)   No exposed peroneal tendon or bone lateral right foot ulceration, no erythema.   Neurologic: Diminished to light touch Right.  Musculoskeletal: Contracted digits noted Right.    Imaging:     No results found.       Picture:

## 2022-05-11 NOTE — PATIENT INSTRUCTIONS
Important lnstructions  Changes to vac instructions       WEIGHT BEARING STATUS: You are to remain NON WEIGHT BEARING on your right foot. NON WEIGHT BEARING MEANS NO PRESSURE ON YOUR FOOT OR HEEL AT ANY TIME FOR ANY REASON!    2. OFFLOADING DEVICE: Must use a A WHEELCHAIR at all times! (do not use affected foot to push wheelchair)    3. STABILIZATION DEVICE: Use a EGG CRATE BOOT . You will need to WEAR THIS AT ALL TIMES EVEN WHILE IN BED.       4. ELEVATE: Elevating your leg means laying with your head on a pillow and your foot ABOVE YOUR WAIST.     5. DO NOT MOVE YOUR FOOT.  There is a risk of worsening the wound or incision. To give yourself a higher chance of healing, please DO NOT swing foot back and forth and wiggle foot/toes especially when inside a stabilization device.        Dressing Change lnstructions  A wet to dry dressing was placed until vac is reapplied         - Wound Vac Instructions    1. 3x weekly and as needed cleanse the area with NS    2. Pat dry    3. Apply Cavilon no sting barrier wipe to the skin surrounding the wound to protect from drainage/maceration    4. Apply drape around incision. Cut strip of drape and apply to skin if bridging is needed; plan this area in advance; should not be over bony prominence     5. Cut the foam to fit the area of the incision    6. Cut narrow strip of foam if bridging    7. Cover foam with drape to obtain air tight seal    8. Cut opening the size of a quarter for where the suction pad will be applied    9. Apply Suction pad    10. 125mmHG suction continuous    [employer] [facility] can be reached at [phone], 24 hours a day 7 days a week     - Back up plan in place:     If the negative pressure wound therapy malfunctions or unable to maintain seal: dressing must be removed and reapplied within 2 hours of the incident. If unable to reapply negative pressure wound dressing, place Normal Saline moistened gauze in wound bed and cover with appropriate dressing  to keep wound bed moist.  Change wet-to-dry dressing two times a day until healthcare staff can re-implement negative pressure therapy. Change canister at least weekly.  [facility] Contact Center can be reached at [phone], 24 hours a day 7 days a week    Information on Vacuum-Assisted Closure of a Wound  Vacuum-assisted closure (VAC) of a wound is a type of treatment to help wounds heal. It s also known as negative pressure wound therapy. During the treatment, a device lowers air pressure on the wound. This can help the wound heal more quickly.  Understanding the wound VAC system  A wound VAC system has several parts. A foam or gauze dressing is put directly on the wound. The dressing is changed every 24 to 72 hours. An adhesive film covers and seals the dressing and wound. A drainage tube leads from under the adhesive film and connects to a portable vacuum pump. This pump removes air pressure over the wound. It may do this constantly. Or it may do it in cycles. During the treatment, you ll need to carry the portable pump everywhere you go.  Why wound VAC is used  You might need this therapy for a recent traumatic wound. Or you may need it for a chronic wound. This is a wound that does not heal the way it should over time. This can happen with wounds in people who have diabetes. You may need a wound VAC if you ve had a recent skin graft. And you may need a wound VAC for a large wound. Large wounds can take a longer time to heal.  A wound vacuum system may help your wound heal more quickly by:  Draining extra fluid from the wound  Reducing swelling  Reducing bacteria in the wound  Keeping your wound moist and warm  Helping draw together wound edges  Increasing blood flow to your wound  Decreasing inflammation  Wound VAC offers some other advantages over other types of wound care. It may decrease your overall discomfort. The dressings usually need to be changed less often. And they may be easier to keep in  position.  Risks of wound VAC  Wound VAC has some rare risks, such as:  Bleeding (which may be severe)  Wound infection  An abnormal connection between the intestinal tract and the skin (enteric fistula)  Proper training in dressing changes can help reduce the risk for these complications. Also, your doctor will carefully evaluate you to make sure you are a good candidate for the therapy. Certain problems can increase your risk for complications. These include:  Exposed organs or blood vessels  High risk of bleeding from another medical problem  Wound infection  Nearby bone infection  Dead wound tissue  Cancer tissue  Fragile skin, such as from aging or longtime use of topical steroids  Allergy to adhesive  Very poor blood flow to your wound  Wounds close to joints that may reopen because of movement  Your doctor will discuss the risks that apply to you. Make sure to talk with him or her about all of your questions and concerns.  Getting ready for wound VAC  You likely won t need to do much to get ready for wound VAC. In some cases, you may need to wait a while before having this therapy. For example, your doctor may first need to treat an infection in your wound. Dead or damaged tissue may also need to be removed from your wound.  You or a caregiver may need training on how to use the wound VAC device. This is done if you will be able to have your wound vacuum therapy at home. In other cases, you may need to have your wound vacuum therapy in a health care facility.  On the day of your procedure  A health care provider will cover your wound with foam or gauze wound dressing. An adhesive film will be put over the dressing and wound. This seals the wound. The foam connects to a drainage tube, which leads to a vacuum pump. This pump is portable. When the pump is turned on, it draws fluid through the foam and out the drainage tubing. The pump may run constantly, or it may cycle off and on. Your exact setup will depend  on the specific type of wound vacuum system that you use.  Managing your wound  You may need the dressing changed about once a day. You may need it changed more or less often, depending on your wound. You or your caregiver may be trained to do this at home. Or it may be done by a visiting health care provider. Your doctor may prescribe a pain medicine. This is to prevent or reduce pain during the dressing change.  You will likely need to use the wound VAC system for several weeks or months. During this time, you ll carry the portable pump everywhere you go.  Nutrition for wound healing  During this time, make sure you follow a healthy diet. This is needed so the wound can heal and to prevent infection. Your doctor can tell you more about what to include in your diet during this time.  follow up with your doctor if you have a medical condition that led to your wound, such as diabetes. He or she can help you prevent future wounds.  Follow-up care  Your doctor will carefully keep track of your healing. Make sure to keep all follow-up appointments.  When to call your health care provider  Call your health care provider right away if you have any of these:  Fever of 100.4 F (38.0 C) or higher  Increased redness, swelling, or warmth around wound  Increased pain  Bright red blood or blood clots in tubing or the collection chamber of the vacuum        We want to hear from you!   In the next few weeks, you should receive a call or email to complete a survey about your visit at [facility]. Please help us improve your appointment experience by letting us know how we did today. We strive to make your experience good and value any ways in which we could do better.      We value your input and suggestions.    Thank you for choosing the [facility]!

## 2022-05-14 ENCOUNTER — LAB REQUISITION (OUTPATIENT)
Dept: LAB | Facility: CLINIC | Age: 52
End: 2022-05-14
Payer: MEDICARE

## 2022-05-14 DIAGNOSIS — E11.621 TYPE 2 DIABETES MELLITUS WITH FOOT ULCER (CODE) (H): ICD-10-CM

## 2022-05-16 LAB — HBA1C MFR BLD: 5.8 %

## 2022-05-16 PROCEDURE — 36415 COLL VENOUS BLD VENIPUNCTURE: CPT | Mod: ORL | Performed by: NURSE PRACTITIONER

## 2022-05-16 PROCEDURE — 83036 HEMOGLOBIN GLYCOSYLATED A1C: CPT | Mod: ORL | Performed by: NURSE PRACTITIONER

## 2022-05-16 PROCEDURE — P9604 ONE-WAY ALLOW PRORATED TRIP: HCPCS | Mod: ORL | Performed by: NURSE PRACTITIONER

## 2022-05-25 ENCOUNTER — TELEPHONE (OUTPATIENT)
Dept: VASCULAR SURGERY | Facility: CLINIC | Age: 52
End: 2022-05-25

## 2022-05-25 ENCOUNTER — OFFICE VISIT (OUTPATIENT)
Dept: VASCULAR SURGERY | Facility: CLINIC | Age: 52
End: 2022-05-25
Attending: PODIATRIST
Payer: MEDICARE

## 2022-05-25 ENCOUNTER — HOSPITAL ENCOUNTER (OUTPATIENT)
Dept: GENERAL RADIOLOGY | Facility: HOSPITAL | Age: 52
Discharge: HOME | End: 2022-05-25
Attending: PODIATRIST
Payer: MEDICARE

## 2022-05-25 VITALS
RESPIRATION RATE: 16 BRPM | DIASTOLIC BLOOD PRESSURE: 74 MMHG | TEMPERATURE: 98.2 F | SYSTOLIC BLOOD PRESSURE: 140 MMHG | HEART RATE: 86 BPM

## 2022-05-25 DIAGNOSIS — L97.513 ULCER OF RIGHT FOOT WITH NECROSIS OF MUSCLE (H): Primary | ICD-10-CM

## 2022-05-25 DIAGNOSIS — L97.513 ULCER OF RIGHT FOOT WITH NECROSIS OF MUSCLE (H): ICD-10-CM

## 2022-05-25 PROCEDURE — 71046 X-RAY EXAM CHEST 2 VIEWS: CPT | Mod: FY

## 2022-05-25 PROCEDURE — 11043 DBRDMT MUSC&/FSCA 1ST 20/<: CPT | Performed by: PODIATRIST

## 2022-05-25 ASSESSMENT — PAIN SCALES - GENERAL: PAINLEVEL: NO PAIN (0)

## 2022-05-25 NOTE — TELEPHONE ENCOUNTER
Martínez Durán is calling stating the patient returned from the apt and said the wound vac doesn't need to be put back on.  Per the paperwork sent by Dr. Kim the wound vac needs to be reapplied. Sharyn can be reached at [phone]

## 2022-05-25 NOTE — PROGRESS NOTES
FOOT AND ANKLE SURGERY/PODIATRY Progress Note      ASSESSMENT: Ulceration right foot      TREATMENT:  -The right foot ulceration continues to have increased depth along the proximal margin. I discussed with the patient that this is a frustrating result after two theraskin graft applications.     -[place] [facility] [place]     -Also referred to Dr. Rosa for a second opinion.     -He will continue with the wound vac at 125 mmHg, non-weight bearing.     -After discussion of risk factors and consent obtained 2% Lidocaine HCL jelly was applied, under clean conditions, the right and foot ulceration(s) were debrided using #15 blade scalpel.  Devitalized and nonviable tissue, along with any fibrin and slough, was removed to improve granulation tissue formation, stimulate wound healing, decrease overall bacteria load, disrupt biofilm formation and decrease edge senescence. Wound drainage was scant No. Total excisional debridement was 3.24 sq cm into the muscle/fascia with a depth of 0.4 cm.   Ulcers were improved afterwards and .  Measures were as noted on the flow sheet. A gauze dressing was applied.      -He will follow-up with Dr. Rosa at her next available appointment.     Alex Kim DPM  [facility]      HPI: Cm Lundberg was seen again today for a lateral right foot ulcer. He continues to use the wound vac as directed with non-weight bearing.       Past Medical History:   Diagnosis Date     Anxiety      Arthritis      Cerebral infarction (H)      Congestive heart failure (H)      Coronary artery disease     CABG with multiple stents     Depression      Diabetes (H)      Equinovarus deformity, acquired, right      GERD (gastroesophageal reflux disease)      Heart attack (H)      History of blood transfusion      Hyperlipidemia      Hypertension      Migraine      Migraines      Myalgia      PONV (postoperative nausea and vomiting)     mild nausea      Stented coronary artery        Past Surgical History:   Procedure Laterality Date     INCISION AND DRAINAGE LOWER EXTREMITY, COMBINED Right 12/17/2021    Procedure: INCISION AND DRAINAGE, right foot;  Surgeon: Alex Kim DPM;  Location: [facility] OR     INCISION AND DRAINAGE LOWER EXTREMITY, COMBINED Right 1/27/2022    Procedure: INCISION AND DRAINAGE, right foot with application of theraskin;  Surgeon: Alex Kim DPM;  Location: [facility] OR     INCISION AND DRAINAGE LOWER EXTREMITY, COMBINED Right 4/4/2022    Procedure: INCISION AND DRAINAGE, right foot with application of theraskin;  Surgeon: Alex Kim DPM;  Location: [facility] OR     IR MISCELLANEOUS PROCEDURE  2/13/2004     ZZC CABG, VEIN, SINGLE      Description: CABG (CABG);  Recorded: 04/22/2009;       Allergies   Allergen Reactions     Aspirin Other (See Comments)     Reacts with migraine medicine     Isosorbide Headache     Codeine Rash     Morphine Rash         Current Outpatient Medications:      acetaminophen (TYLENOL) 325 MG tablet, Take 650 mg by mouth every 4 hours as needed for mild pain , Disp: , Rfl:      ARIPiprazole (ABILIFY) 2 MG tablet, 2 mg daily , Disp: , Rfl:      ASPERCREME ORIGINAL 10 % external cream, APPLY TO LOW BACK TWICE DAILY FOR LOW BACK PAIN, Disp: , Rfl:      atorvastatin (LIPITOR) 80 MG tablet, Take 80 mg by mouth daily , Disp: , Rfl:      cholestyramine (QUESTRAN) 4 g packet, Take 1 packet by mouth every other day , Disp: , Rfl:      DULoxetine (CYMBALTA) 60 MG capsule, Take 120 mg by mouth daily takle 120mg, Disp: , Rfl:      erenumab-aooe (AIMOVIG) 140 MG/ML injection, Inject 1 mL (140 mg) Subcutaneous every 30 days, Disp: 1 mL, Rfl: 0     fenofibrate micronized (LOFIBRA) 200 MG capsule, Take 200 mg by mouth every morning (before breakfast) , Disp: , Rfl:      ferrous sulfate (FEROSUL) 325 (65 Fe) MG tablet, Take 325 mg by mouth daily (with breakfast) , Disp: , Rfl:       gabapentin (NEURONTIN) 100 MG capsule, TAKE 1 CAP BY MOUTH TWICE A DAY BEFORE MEALS, Disp: , Rfl:      gabapentin (NEURONTIN) 300 MG capsule, Take 600 mg by mouth At Bedtime, Disp: , Rfl:      glipiZIDE (GLUCOTROL) 5 MG tablet, TAKE 1/2 TABLET (2.5MG) BY MOUTH DAILY DX DIABETES, Disp: , Rfl:      loperamide (IMODIUM) 2 MG capsule, Take 4 mg by mouth as needed for diarrhea , Disp: , Rfl:      losartan (COZAAR) 25 MG tablet, Take 25 mg by mouth At Bedtime , Disp: , Rfl:      magnesium oxide 400 MG CAPS, Take 400 mg by mouth 4 times daily, Disp: , Rfl:      Menthol, Topical Analgesic, (BIOFREEZE ROLL-ON) 4 % GEL, Apply 1 Application topically 2 times daily Apply to lower back for pain, Disp: , Rfl:      metFORMIN (GLUCOPHAGE) 500 MG tablet, TAKE 2 TABS (1000MG) BY MOUTH EVERY MORNING;TAKE 1 TAB BY MOUTH EVERY EVENING, Disp: , Rfl:      metoprolol succinate ER (TOPROL-XL) 25 MG 24 hr tablet, Take 75 mg by mouth daily , Disp: , Rfl:      mirtazapine (REMERON) 7.5 MG tablet, Take 7.5 mg by mouth At Bedtime , Disp: , Rfl:      Neomycin-Bacitracin-Polymyxin (TRIPLE ANTIBIOTIC) 3.5-400-5000 OINT ointment, Externally apply 1 applicator topically 3 times daily as needed, Disp: , Rfl:      Nitroglycerin (NITROSTAT SL), Place 0.4 mg under the tongue every 5 minutes as needed for chest pain , Disp: , Rfl:      omeprazole (PRILOSEC) 20 MG DR capsule, Take 20 mg by mouth daily , Disp: , Rfl:      oxyCODONE (ROXICODONE) 5 MG tablet, TAKE 1 TAB BY MOUTH EVERY 6HOURS AS NEEDED FOR PAIN, Disp: 28 tablet, Rfl: 0     polyethylene glycol (MIRALAX) 17 GM/Dose powder, Take 1 packet by mouth daily as needed for constipation , Disp: , Rfl:      senna-docusate (SENOKOT-S/PERICOLACE) 8.6-50 MG tablet, Take 1 tablet by mouth 2 times daily as needed for constipation , Disp: , Rfl:      traZODone (DESYREL) 50 MG tablet, Take 50 mg by mouth At Bedtime , Disp: , Rfl:      vitamin C (ASCORBIC ACID) 500 MG tablet, Take 500 mg by mouth daily , Disp: ,  Rfl:      XARELTO ANTICOAGULANT 20 MG TABS tablet, TAKE 1 TAB BY MOUTH ONCE DAILY, Disp: , Rfl:     Review of Systems - 10 point Review of Systems is negative except for right foot ulcer which is noted in HPI.      OBJECTIVE:  BP (!) 140/74   Pulse 86   Temp 98.2  F (36.8  C)   Resp 16   General appearance: Patient is alert and fully cooperative with history & exam.  No sign of distress is noted during the visit.    Vascular: Dorsalis pedis non-palpableRight.  Dermatologic:    Negative Pressure Wound Therapy Foot Anterior;Right (Active)       VASC Wound right lateral foot (Active)       VASC Wound left hallux  (Active)       VASC Wound right lateral foot (Active)   Pre Size Length 1.8 05/25/22 1300   Pre Size Width 1.8 05/25/22 1300   Pre Size Depth 0.3 05/25/22 1300   Pre Total Sq cm 3.24 05/25/22 1300       Incision/Surgical Site 12/17/21 Right Foot (Active)   Majority of the lateral right foot ulcer has a granular base with small area of increased depth with exposed tendon at the proximal margin. No erythema right foot.   Neurologic: Diminished to light touch Right.  Musculoskeletal: Contracted digits noted Right.    Imaging:     No results found.       Picture:

## 2022-05-25 NOTE — PATIENT INSTRUCTIONS
Important lnstructions      WEIGHT BEARING STATUS: You are to remain NON WEIGHT BEARING on your right foot. NON WEIGHT BEARING MEANS NO PRESSURE ON YOUR FOOT OR HEEL AT ANY TIME FOR ANY REASON!    2. OFFLOADING DEVICE: Must use a A WHEELCHAIR at all times! (do not use affected foot to push wheelchair)    3. STABILIZATION DEVICE: Use a EGG CRATE BOOT . You will need to WEAR THIS AT ALL TIMES EVEN WHILE IN BED.       4. ELEVATE: Elevating your leg means laying with your head on a pillow and your foot ABOVE YOUR WAIST.     5. DO NOT MOVE YOUR FOOT.  There is a risk of worsening the wound or incision. To give yourself a higher chance of healing, please DO NOT swing foot back and forth and wiggle foot/toes especially when inside a stabilization device.        Dressing Change lnstructions  A wet to dry dressing was placed until vac is reapplied         - Wound Vac Instructions    1. 3x weekly and as needed cleanse the area with NS    2. Pat dry    3. Apply Cavilon no sting barrier wipe to the skin surrounding the wound to protect from drainage/maceration    4. Apply drape around incision. Cut strip of drape and apply to skin if bridging is needed; plan this area in advance; should not be over bony prominence     5. Cut the foam to fit the area of the incision    6. Cut narrow strip of foam if bridging    7. Cover foam with drape to obtain air tight seal    8. Cut opening the size of a quarter for where the suction pad will be applied    9. Apply Suction pad    10. 125mmHG suction continuous    [employer] [facility] can be reached at [phone], 24 hours a day 7 days a week     - Back up plan in place:     If the negative pressure wound therapy malfunctions or unable to maintain seal: dressing must be removed and reapplied within 2 hours of the incident. If unable to reapply negative pressure wound dressing, place Normal Saline moistened gauze in wound bed and cover with appropriate dressing to keep wound bed  moist.  Change wet-to-dry dressing two times a day until healthcare staff can re-implement negative pressure therapy. Change canister at least weekly.  [facility] Contact Center can be reached at [phone], 24 hours a day 7 days a week    Information on Vacuum-Assisted Closure of a Wound  Vacuum-assisted closure (VAC) of a wound is a type of treatment to help wounds heal. It s also known as negative pressure wound therapy. During the treatment, a device lowers air pressure on the wound. This can help the wound heal more quickly.  Understanding the wound VAC system  A wound VAC system has several parts. A foam or gauze dressing is put directly on the wound. The dressing is changed every 24 to 72 hours. An adhesive film covers and seals the dressing and wound. A drainage tube leads from under the adhesive film and connects to a portable vacuum pump. This pump removes air pressure over the wound. It may do this constantly. Or it may do it in cycles. During the treatment, you ll need to carry the portable pump everywhere you go.  Why wound VAC is used  You might need this therapy for a recent traumatic wound. Or you may need it for a chronic wound. This is a wound that does not heal the way it should over time. This can happen with wounds in people who have diabetes. You may need a wound VAC if you ve had a recent skin graft. And you may need a wound VAC for a large wound. Large wounds can take a longer time to heal.  A wound vacuum system may help your wound heal more quickly by:  Draining extra fluid from the wound  Reducing swelling  Reducing bacteria in the wound  Keeping your wound moist and warm  Helping draw together wound edges  Increasing blood flow to your wound  Decreasing inflammation  Wound VAC offers some other advantages over other types of wound care. It may decrease your overall discomfort. The dressings usually need to be changed less often. And they may be easier to keep in position.  Risks of wound  VAC  Wound VAC has some rare risks, such as:  Bleeding (which may be severe)  Wound infection  An abnormal connection between the intestinal tract and the skin (enteric fistula)  Proper training in dressing changes can help reduce the risk for these complications. Also, your doctor will carefully evaluate you to make sure you are a good candidate for the therapy. Certain problems can increase your risk for complications. These include:  Exposed organs or blood vessels  High risk of bleeding from another medical problem  Wound infection  Nearby bone infection  Dead wound tissue  Cancer tissue  Fragile skin, such as from aging or longtime use of topical steroids  Allergy to adhesive  Very poor blood flow to your wound  Wounds close to joints that may reopen because of movement  Your doctor will discuss the risks that apply to you. Make sure to talk with him or her about all of your questions and concerns.  Getting ready for wound VAC  You likely won t need to do much to get ready for wound VAC. In some cases, you may need to wait a while before having this therapy. For example, your doctor may first need to treat an infection in your wound. Dead or damaged tissue may also need to be removed from your wound.  You or a caregiver may need training on how to use the wound VAC device. This is done if you will be able to have your wound vacuum therapy at home. In other cases, you may need to have your wound vacuum therapy in a health care facility.  On the day of your procedure  A health care provider will cover your wound with foam or gauze wound dressing. An adhesive film will be put over the dressing and wound. This seals the wound. The foam connects to a drainage tube, which leads to a vacuum pump. This pump is portable. When the pump is turned on, it draws fluid through the foam and out the drainage tubing. The pump may run constantly, or it may cycle off and on. Your exact setup will depend on the specific type of  wound vacuum system that you use.  Managing your wound  You may need the dressing changed about once a day. You may need it changed more or less often, depending on your wound. You or your caregiver may be trained to do this at home. Or it may be done by a visiting health care provider. Your doctor may prescribe a pain medicine. This is to prevent or reduce pain during the dressing change.  You will likely need to use the wound VAC system for several weeks or months. During this time, you ll carry the portable pump everywhere you go.  Nutrition for wound healing  During this time, make sure you follow a healthy diet. This is needed so the wound can heal and to prevent infection. Your doctor can tell you more about what to include in your diet during this time.  follow up with your doctor if you have a medical condition that led to your wound, such as diabetes. He or she can help you prevent future wounds.  Follow-up care  Your doctor will carefully keep track of your healing. Make sure to keep all follow-up appointments.  When to call your health care provider  Call your health care provider right away if you have any of these:  Fever of 100.4 F (38.0 C) or higher  Increased redness, swelling, or warmth around wound  Increased pain  Bright red blood or blood clots in tubing or the collection chamber of the vacuum        We want to hear from you!   In the next few weeks, you should receive a call or email to complete a survey about your visit at [facility]. Please help us improve your appointment experience by letting us know how we did today. We strive to make your experience good and value any ways in which we could do better.      We value your input and suggestions.        A referral was sent for hyperbaric oxygen therapy. Please call and set up an appointment if you do not receive a call within 2 business days.  Appointments   P: [phone]  F: [phone]  [facility]  [address]  [facility]  [facility] [address]       Hyperbaric oxygen is a specialized medical treatment in which the patient breathes 100 percent oxygen while inside a chamber at increased atmospheric pressure.    Hyperbaric oxygen (HBO) is used for specific medical conditions. HBO has long been recognized as vital in the resolution of critical medical conditions such as gas gangrene, carbon monoxide poisoning, cerebral arterial gas embolism, and decompression sickness. It is also an important adjunctive therapy for other emergent conditions, such as necrotizing fasciitis, sudden sensorineural hearing loss, and compromised skin flaps or grafts, and for chronic medical conditions such as refractory osteomyelitis, osteoradionecrosis, delayed soft tissue radiation injury, and Kee 3 or greater foot ulcers in diabetic patients.    Thank you for choosing the [facility]!

## 2022-05-25 NOTE — TELEPHONE ENCOUNTER
Hardin Rad would like to confirm that Dr. Kim did in fact want a chest XR done rather than foot; [facility] states patient did complete chest XR and then realized it was Dr. Kim ordering so she wanted to double check.  [phone] & ask for X-ray.

## 2022-05-31 ENCOUNTER — TRANSITIONAL CARE UNIT VISIT (OUTPATIENT)
Dept: GERIATRICS | Facility: CLINIC | Age: 52
End: 2022-05-31
Payer: MEDICARE

## 2022-05-31 DIAGNOSIS — E11.69 TYPE 2 DIABETES MELLITUS WITH OTHER SPECIFIED COMPLICATION, WITHOUT LONG-TERM CURRENT USE OF INSULIN (H): ICD-10-CM

## 2022-05-31 DIAGNOSIS — E11.621 DIABETIC ULCER OF RIGHT MIDFOOT ASSOCIATED WITH TYPE 2 DIABETES MELLITUS, WITH NECROSIS OF MUSCLE (H): Primary | ICD-10-CM

## 2022-05-31 DIAGNOSIS — I10 ESSENTIAL HYPERTENSION: ICD-10-CM

## 2022-05-31 DIAGNOSIS — I48.91 ATRIAL FIBRILLATION, UNSPECIFIED TYPE (H): ICD-10-CM

## 2022-05-31 DIAGNOSIS — L97.413 DIABETIC ULCER OF RIGHT MIDFOOT ASSOCIATED WITH TYPE 2 DIABETES MELLITUS, WITH NECROSIS OF MUSCLE (H): Primary | ICD-10-CM

## 2022-05-31 PROCEDURE — 99309 SBSQ NF CARE MODERATE MDM 30: CPT | Performed by: FAMILY MEDICINE

## 2022-05-31 NOTE — LETTER
"    5/31/2022        RE: Cm Lundberg  [address]  [place] [address]          [facility] SERVICES    [place] Medical Record Number:  [id number]  Place of Service where encounter took place: [facility] ([facility]) [[id number]]   CODE STATUS:   CPR/Full code     Chief Complaint/Reason for Visit:  Chief Complaint   Patient presents with     RECHECK     TCU 5/31/2022. Right foot ulceration. S/p incision and drainage, right foot with application of theraskin, 4/4/2022.       TCU HPI:    Cm Lundberg is a 51 year old male with hx of CAD, s/p stents and CABG, HTN, DM, prior CVA, afib/flutter on xarelto, admitted to the hospital on 12/17/2021 for elective procedure for right foot ulceration. He underwent incision and drainage of the right foot, excisional debridement of the ulceration of the right foot into the level of the muscle, under MAC with popliteal block anesthesia on 12/17/2021. Wound vac was subsequently placed. He had no hospital complications. Seen by ID with recommendation for 5 days Augmentin at hospital discharge based on culture results. Chronic anticoagulation was restarted. He was felt to need TCU and discharged to TCU on 12/22/0221. He has had close follow up with podiatry, remained NWB, treated with wound vac. He underwent procedure on 1/27/2022 with incision and drainage with application of theraskin. Continued NWB with wound vac, frequent follow up with podiatry. At visit 3/25/2022 recommendation was made for another procedure, incision and drainage with application of theraskin on Mon 4/4/2022. Notes indicated per Dr. Kim: \"TREATMENT:-The lateral right foot ulceration continues to have exposed tendon of the peroneal brevis. Based on this presentation, I recommend we again try application of theraskin with continued use of the wound vac\". Will have wound vac applied after procedure. Dr. Kim said okay to continue on xarelto anticoagulation pre " "op.       Today:  Matias continues to follow closely with Dr. Kim from podiatry. He is still NWB, on wound vac. Some swelling on right foot and leg. Last podiatry appt on 5/25/2022 as follows:    \"ASSESSMENT: Ulceration right foot  TREATMENT:  -The right foot ulceration continues to have increased depth along the proximal margin. I discussed with the patient that this is a frustrating result after two theraskin graft applications.      -I have referred him for Hyperbaric Oxygen at [facility].      -Also referred to Dr. Rosa for a second opinion.      -He will continue with the wound vac at 125 mmHg, non-weight bearing. \"    He has appt with SAMINA Rosa on 6/7/2022. He has good appetite, no new concerns regarding his other chronic conditions. DM under good control. Pain is minimal. Appetite is good. Bowels are per normal and no urinary sx. He has not had cough, shortness of breath, chest pain or hypoxia. He has diabetes, on metformin and glipizide. Hx of cardiac disease, CAD, stenting. HTN on meds and afib on xarelto. No complaints of back or abdominal pain.       PAST MEDICAL HISTORY:  Past Medical History:   Diagnosis Date     Abdominal pain, generalized 8/19/2016     Anemia, unspecified type 8/19/2016     Anxiety      Anxiety state 7/29/2021    Formatting of this note might be different from the original. Created by Conversion  Replacement Utility updated for latest IMO load     Arthritis      Cerebral infarction (H)      Chest pain, unspecified type 8/19/2016     Congestive heart failure (H)      Constipation, unspecified constipation type 8/19/2016     Coronary artery disease     CABG with multiple stents     Coronary artery disease involving native coronary artery without angina pectoris, unspecified whether native or transplanted heart 8/19/2016     Coronary atherosclerosis 7/29/2021    Formatting of this note might be different from the original. Created by Conversion  Replacement Utility updated for latest IMO " load     Depression      Diabetes (H)      Diabetes mellitus (H) 12/17/2021     Diabetic ulcer of right midfoot associated with type 2 diabetes mellitus, limited to breakdown of skin (H) 9/28/2017     Diabetic ulcer of right midfoot associated with type 2 diabetes mellitus, with necrosis of muscle (H) 1/7/2022     Diarrhea 8/19/2016     Equinovarus acquired deformity, right 4/14/2021     Equinovarus deformity, acquired, right      Essential hypertension 8/19/2016     Gastroesophageal reflux disease without esophagitis 8/19/2016     GERD (gastroesophageal reflux disease)      Heart attack (H)      History of blood transfusion      Hyperlipidemia      Hyperlipidemia, unspecified hyperlipidemia type 8/19/2016     Hypertension      Insomnia, unspecified type 8/19/2016     Late effects of cerebrovascular accident 8/19/2016     Major depressive disorder with single episode, remission status unspecified 8/19/2016     Microalbuminuria 7/29/2021    Formatting of this note might be different from the original. Created by Conversion     Migraine      Migraines      Mild intellectual disabilities 8/19/2016     Myalgia      Nausea 8/19/2016     Pain 8/19/2016     PONV (postoperative nausea and vomiting)     mild nausea     Problems with learning 7/29/2021    Formatting of this note might be different from the original. Created by Conversion [facility] Annotation: Feb 11 2010  1:41PM - Leonid Cabral: CANNOT READ OR  WRITE     Stented coronary artery      Type 2 diabetes mellitus (H) 7/29/2021    Formatting of this note might be different from the original. Created by Conversion     Type 2 diabetes mellitus with hyperglycemia (H) 8/19/2016     Type II or unspecified type diabetes mellitus with ophthalmic manifestations, not stated as uncontrolled(250.50) (H) 7/29/2021    Formatting of this note might be different from the original. Created by Conversion     Weakness 8/19/2016       MEDICATIONS:  Current Outpatient Medications    Medication Sig Dispense Refill     acetaminophen (TYLENOL) 325 MG tablet Take 650 mg by mouth every 4 hours as needed for mild pain        ARIPiprazole (ABILIFY) 2 MG tablet 2 mg daily        ASPERCREME ORIGINAL 10 % external cream APPLY TO LOW BACK TWICE DAILY FOR LOW BACK PAIN       atorvastatin (LIPITOR) 80 MG tablet Take 80 mg by mouth daily        cholestyramine (QUESTRAN) 4 g packet Take 1 packet by mouth every other day        DULoxetine (CYMBALTA) 60 MG capsule Take 120 mg by mouth daily takle 120mg       erenumab-aooe (AIMOVIG) 140 MG/ML injection Inject 1 mL (140 mg) Subcutaneous every 30 days 1 mL 0     fenofibrate micronized (LOFIBRA) 200 MG capsule Take 200 mg by mouth every morning (before breakfast)        ferrous sulfate (FEROSUL) 325 (65 Fe) MG tablet Take 325 mg by mouth daily (with breakfast)        gabapentin (NEURONTIN) 100 MG capsule TAKE 1 CAP BY MOUTH TWICE A DAY BEFORE MEALS       gabapentin (NEURONTIN) 300 MG capsule Take 600 mg by mouth At Bedtime       glipiZIDE (GLUCOTROL) 5 MG tablet TAKE 1/2 TABLET (2.5MG) BY MOUTH DAILY DX DIABETES       loperamide (IMODIUM) 2 MG capsule Take 4 mg by mouth as needed for diarrhea        losartan (COZAAR) 25 MG tablet Take 25 mg by mouth At Bedtime        magnesium oxide 400 MG CAPS Take 400 mg by mouth 4 times daily       Menthol, Topical Analgesic, (BIOFREEZE ROLL-ON) 4 % GEL Apply 1 Application topically 2 times daily Apply to lower back for pain       metFORMIN (GLUCOPHAGE) 1000 MG tablet Take 1,000 mg by mouth       metFORMIN (GLUCOPHAGE) 500 MG tablet TAKE 2 TABS (1000MG) BY MOUTH EVERY MORNING;TAKE 1 TAB BY MOUTH EVERY EVENING       metoprolol succinate ER (TOPROL-XL) 25 MG 24 hr tablet Take 75 mg by mouth daily        mirtazapine (REMERON) 7.5 MG tablet Take 7.5 mg by mouth At Bedtime        Neomycin-Bacitracin-Polymyxin (TRIPLE ANTIBIOTIC) 3.5-400-5000 OINT ointment Externally apply 1 applicator topically 3 times daily as needed        "Nitroglycerin (NITROSTAT SL) Place 0.4 mg under the tongue every 5 minutes as needed for chest pain        omeprazole (PRILOSEC) 20 MG DR capsule Take 20 mg by mouth daily        oxyCODONE (ROXICODONE) 5 MG tablet TAKE 1 TAB BY MOUTH EVERY 6HOURS AS NEEDED FOR PAIN 28 tablet 0     polyethylene glycol (MIRALAX) 17 GM/Dose powder Take 1 packet by mouth daily as needed for constipation        senna-docusate (SENOKOT-S/PERICOLACE) 8.6-50 MG tablet Take 1 tablet by mouth 2 times daily as needed for constipation        traZODone (DESYREL) 50 MG tablet Take 50 mg by mouth At Bedtime        vitamin C (ASCORBIC ACID) 500 MG tablet Take 500 mg by mouth daily        XARELTO ANTICOAGULANT 20 MG TABS tablet TAKE 1 TAB BY MOUTH ONCE DAILY         PHYSICAL EXAM:  General: Patient is alert male, no distress.   Vitals: /81   Pulse 83   Temp 98.2  F (36.8  C)   Resp 18   Ht 1.702 m (5' 7\")   Wt 69.9 kg (154 lb)   SpO2 94%   BMI 24.12 kg/m    HEENT: Head is NCAT. Eyes show no injection or icterus. Nares negative. Oropharynx well hydrated.  Neck: No JVD.  Lungs: Non labored respirations.  : Deferred.  Extremities: Mild RLE swelling. Calf non tender.   Musculoskeletal: Wound vac right foot.   Psych: Mood appears good.      LABS/DIAGNOSTIC DATA:  Component      Latest Ref Rng & Units 12/21/2021   Hemoglobin A1C      <=5.6 % 5.1     Component      Latest Ref Rng & Units 12/28/2021 4/1/2022   Sodium      136 - 145 mmol/L 140 139   Potassium      3.5 - 5.0 mmol/L 4.5 4.6   Chloride      98 - 107 mmol/L 107 104   Carbon Dioxide      22 - 31 mmol/L 25 26   Anion Gap      5 - 18 mmol/L 8 9   Glucose      70 - 125 mg/dL 141 (H) 129 (H)   Calcium      8.5 - 10.5 mg/dL 10.3 9.9   Urea Nitrogen      8 - 22 mg/dL 17 17   Creatinine      0.70 - 1.30 mg/dL 0.99 0.97   GFR Estimate      >60 mL/min/1.73m2 >90 >90     Component      Latest Ref Rng & Units 12/28/2021 4/1/2022   WBC      4.0 - 11.0 10e3/uL 5.4 6.3   RBC Count      4.40 - " 5.90 10e6/uL 4.01 (L) 4.07 (L)   Hemoglobin      13.3 - 17.7 g/dL 11.1 (L) 10.5 (L)   Hematocrit      40.0 - 53.0 % 36.9 (L) 34.0 (L)   MCV      78 - 100 fL 92 84   MCH      26.5 - 33.0 pg 27.7 25.8 (L)   MCHC      31.5 - 36.5 g/dL 30.1 (L) 30.9 (L)   RDW      10.0 - 15.0 % 13.4 13.2   Platelet Count      150 - 450 10e3/uL 246 238         ASSESSMENT/PLAN:  1. Right foot ulceration. S/p procedures on 12/17/2021, 1/27/2022 and 4/4/2022 incision and drainage with application of theraskin per Dr. Kim. Remains NWB, using wound vac. Debridements in office at follow up appts. Last appt 5/25/2022 referred to [facility] for hyperbaric oxygen and referred to Marisel Rosa for further assessment, appt 6/7/2022.   2. HTN. Continue metoprolol and losartan. Bps satisfactory.    3. Afib/flutter. Anticoagulated with xarelto.   4. DM. On metformin and glipizide, continue. Accuchecks followed in TCU.   5. Stable medical conditions: CAD. Hx of MIs. Hx of stenting and prior CABG. On statin, xarelto, beta blocker; Hx of stroke. Continue statin, xarelto; Anemia, on iron; Mood disorder, mixed depression and anxiety on duloxetine, Abilify; Migraines. Receives monthly Aimovig.         Electronically signed by: Heidi Xie MD           Sincerely,        Heidi Xie MD

## 2022-06-02 VITALS
TEMPERATURE: 98.2 F | SYSTOLIC BLOOD PRESSURE: 134 MMHG | DIASTOLIC BLOOD PRESSURE: 81 MMHG | BODY MASS INDEX: 24.17 KG/M2 | WEIGHT: 154 LBS | HEART RATE: 83 BPM | RESPIRATION RATE: 18 BRPM | OXYGEN SATURATION: 94 % | HEIGHT: 67 IN

## 2022-06-06 ENCOUNTER — TELEPHONE (OUTPATIENT)
Dept: VASCULAR SURGERY | Facility: CLINIC | Age: 52
End: 2022-06-06
Payer: MEDICARE

## 2022-06-06 NOTE — PROGRESS NOTES
"[facility] Medical Record Number:  [id number]  Place of Service where encounter took place[place] [facility] ([facility]) [[id number]]   CODE STATUS:   CPR/Full code     Chief Complaint/Reason for Visit:  Chief Complaint   Patient presents with     RECHECK     TCU 5/31/2022. Right foot ulceration. S/p incision and drainage, right foot with application of theraskin, 4/4/2022.       TCU HPI:    Cm Lundberg is a 51 year old male with hx of CAD, s/p stents and CABG, HTN, DM, prior CVA, afib/flutter on xarelto, admitted to the hospital on 12/17/2021 for elective procedure for right foot ulceration. He underwent incision and drainage of the right foot, excisional debridement of the ulceration of the right foot into the level of the muscle, under MAC with popliteal block anesthesia on 12/17/2021. Wound vac was subsequently placed. He had no hospital complications. Seen by ID with recommendation for 5 days Augmentin at hospital discharge based on culture results. Chronic anticoagulation was restarted. He was felt to need TCU and discharged to TCU on 12/22/0221. He has had close follow up with podiatry, remained NWB, treated with wound vac. He underwent procedure on 1/27/2022 with incision and drainage with application of theraskin. Continued NWB with wound vac, frequent follow up with podiatry. At visit 3/25/2022 recommendation was made for another procedure, incision and drainage with application of theraskin on Mon 4/4/2022. Notes indicated per Dr. Kim: \"TREATMENT:-The lateral right foot ulceration continues to have exposed tendon of the peroneal brevis. Based on this presentation, I recommend we again try application of theraskin with continued use of the wound vac\". Will have wound vac applied after procedure. Dr. Kim said okay to continue on xarelto anticoagulation pre op.       Today:  Matias continues to follow closely with Dr. Kim from podiatry. He is still NWB, on " "wound vac. Some swelling on right foot and leg. Last podiatry appt on 5/25/2022 as follows:    \"ASSESSMENT: Ulceration right foot  TREATMENT:  -The right foot ulceration continues to have increased depth along the proximal margin. I discussed with the patient that this is a frustrating result after two theraskin graft applications.      -I have [place] [facility] [place]      -Also referred to Dr. Rosa for a second opinion.      -He will continue with the wound vac at 125 mmHg, non-weight bearing. \"    He has appt with SAMINA Rosa on 6/7/2022. He has good appetite, no new concerns regarding his other chronic conditions. DM under good control. Pain is minimal. Appetite is good. Bowels are per normal and no urinary sx. He has not had cough, shortness of breath, chest pain or hypoxia. He has diabetes, on metformin and glipizide. Hx of cardiac disease, CAD, stenting. HTN on meds and afib on xarelto. No complaints of back or abdominal pain.       PAST MEDICAL HISTORY:  Past Medical History:   Diagnosis Date     Abdominal pain, generalized 8/19/2016     Anemia, unspecified type 8/19/2016     Anxiety      Anxiety state 7/29/2021    Formatting of this note might be different from the original. Created by Conversion  Replacement Utility updated for latest IMO load     Arthritis      Cerebral infarction (H)      Chest pain, unspecified type 8/19/2016     Congestive heart failure (H)      Constipation, unspecified constipation type 8/19/2016     Coronary artery disease     CABG with multiple stents     Coronary artery disease involving native coronary artery without angina pectoris, unspecified whether native or transplanted heart 8/19/2016     Coronary atherosclerosis 7/29/2021    Formatting of this note might be different from the original. Created by Conversion  Replacement Utility updated for latest IMO load     Depression      Diabetes (H)      Diabetes mellitus (H) 12/17/2021     Diabetic ulcer of right " midfoot associated with type 2 diabetes mellitus, limited to breakdown of skin (H) 9/28/2017     Diabetic ulcer of right midfoot associated with type 2 diabetes mellitus, with necrosis of muscle (H) 1/7/2022     Diarrhea 8/19/2016     Equinovarus acquired deformity, right 4/14/2021     Equinovarus deformity, acquired, right      Essential hypertension 8/19/2016     Gastroesophageal reflux disease without esophagitis 8/19/2016     GERD (gastroesophageal reflux disease)      Heart attack (H)      History of blood transfusion      Hyperlipidemia      Hyperlipidemia, unspecified hyperlipidemia type 8/19/2016     Hypertension      Insomnia, unspecified type 8/19/2016     Late effects of cerebrovascular accident 8/19/2016     Major depressive disorder with single episode, remission status unspecified 8/19/2016     Microalbuminuria 7/29/2021    Formatting of this note might be different from the original. Created by Conversion     Migraine      Migraines      Mild intellectual disabilities 8/19/2016     Myalgia      Nausea 8/19/2016     Pain 8/19/2016     PONV (postoperative nausea and vomiting)     mild nausea     Problems with learning 7/29/2021    Formatting of this note might be different from the original. Created by [facility] Annotation: Feb 11 2010  1:41PM - Leonid Cabral: CANNOT READ OR  WRITE     Stented coronary artery      Type 2 diabetes mellitus (H) 7/29/2021    Formatting of this note might be different from the original. Created by Conversion     Type 2 diabetes mellitus with hyperglycemia (H) 8/19/2016     Type II or unspecified type diabetes mellitus with ophthalmic manifestations, not stated as uncontrolled(250.50) (H) 7/29/2021    Formatting of this note might be different from the original. Created by Conversion     Weakness 8/19/2016       MEDICATIONS:  Current Outpatient Medications   Medication Sig Dispense Refill     acetaminophen (TYLENOL) 325 MG tablet Take 650 mg by mouth every 4  hours as needed for mild pain        ARIPiprazole (ABILIFY) 2 MG tablet 2 mg daily        ASPERCREME ORIGINAL 10 % external cream APPLY TO LOW BACK TWICE DAILY FOR LOW BACK PAIN       atorvastatin (LIPITOR) 80 MG tablet Take 80 mg by mouth daily        cholestyramine (QUESTRAN) 4 g packet Take 1 packet by mouth every other day        DULoxetine (CYMBALTA) 60 MG capsule Take 120 mg by mouth daily takle 120mg       erenumab-aooe (AIMOVIG) 140 MG/ML injection Inject 1 mL (140 mg) Subcutaneous every 30 days 1 mL 0     fenofibrate micronized (LOFIBRA) 200 MG capsule Take 200 mg by mouth every morning (before breakfast)        ferrous sulfate (FEROSUL) 325 (65 Fe) MG tablet Take 325 mg by mouth daily (with breakfast)        gabapentin (NEURONTIN) 100 MG capsule TAKE 1 CAP BY MOUTH TWICE A DAY BEFORE MEALS       gabapentin (NEURONTIN) 300 MG capsule Take 600 mg by mouth At Bedtime       glipiZIDE (GLUCOTROL) 5 MG tablet TAKE 1/2 TABLET (2.5MG) BY MOUTH DAILY DX DIABETES       loperamide (IMODIUM) 2 MG capsule Take 4 mg by mouth as needed for diarrhea        losartan (COZAAR) 25 MG tablet Take 25 mg by mouth At Bedtime        magnesium oxide 400 MG CAPS Take 400 mg by mouth 4 times daily       Menthol, Topical Analgesic, (BIOFREEZE ROLL-ON) 4 % GEL Apply 1 Application topically 2 times daily Apply to lower back for pain       metFORMIN (GLUCOPHAGE) 1000 MG tablet Take 1,000 mg by mouth       metFORMIN (GLUCOPHAGE) 500 MG tablet TAKE 2 TABS (1000MG) BY MOUTH EVERY MORNING;TAKE 1 TAB BY MOUTH EVERY EVENING       metoprolol succinate ER (TOPROL-XL) 25 MG 24 hr tablet Take 75 mg by mouth daily        mirtazapine (REMERON) 7.5 MG tablet Take 7.5 mg by mouth At Bedtime        Neomycin-Bacitracin-Polymyxin (TRIPLE ANTIBIOTIC) 3.5-400-5000 OINT ointment Externally apply 1 applicator topically 3 times daily as needed       Nitroglycerin (NITROSTAT SL) Place 0.4 mg under the tongue every 5 minutes as needed for chest pain         "omeprazole (PRILOSEC) 20 MG DR capsule Take 20 mg by mouth daily        oxyCODONE (ROXICODONE) 5 MG tablet TAKE 1 TAB BY MOUTH EVERY 6HOURS AS NEEDED FOR PAIN 28 tablet 0     polyethylene glycol (MIRALAX) 17 GM/Dose powder Take 1 packet by mouth daily as needed for constipation        senna-docusate (SENOKOT-S/PERICOLACE) 8.6-50 MG tablet Take 1 tablet by mouth 2 times daily as needed for constipation        traZODone (DESYREL) 50 MG tablet Take 50 mg by mouth At Bedtime        vitamin C (ASCORBIC ACID) 500 MG tablet Take 500 mg by mouth daily        XARELTO ANTICOAGULANT 20 MG TABS tablet TAKE 1 TAB BY MOUTH ONCE DAILY         PHYSICAL EXAM:  General: Patient is alert male, no distress.   Vitals: /81   Pulse 83   Temp 98.2  F (36.8  C)   Resp 18   Ht 1.702 m (5' 7\")   Wt 69.9 kg (154 lb)   SpO2 94%   BMI 24.12 kg/m    HEENT: Head is NCAT. Eyes show no injection or icterus. Nares negative. Oropharynx well hydrated.  Neck: No JVD.  Lungs: Non labored respirations.  : Deferred.  Extremities: Mild RLE swelling. Calf non tender.   Musculoskeletal: Wound vac right foot.   Psych: Mood appears good.      LABS/DIAGNOSTIC DATA:  Component      Latest Ref Rng & Units 12/21/2021   Hemoglobin A1C      <=5.6 % 5.1     Component      Latest Ref Rng & Units 12/28/2021 4/1/2022   Sodium      136 - 145 mmol/L 140 139   Potassium      3.5 - 5.0 mmol/L 4.5 4.6   Chloride      98 - 107 mmol/L 107 104   Carbon Dioxide      22 - 31 mmol/L 25 26   Anion Gap      5 - 18 mmol/L 8 9   Glucose      70 - 125 mg/dL 141 (H) 129 (H)   Calcium      8.5 - 10.5 mg/dL 10.3 9.9   Urea Nitrogen      8 - 22 mg/dL 17 17   Creatinine      0.70 - 1.30 mg/dL 0.99 0.97   GFR Estimate      >60 mL/min/1.73m2 >90 >90     Component      Latest Ref Rng & Units 12/28/2021 4/1/2022   WBC      4.0 - 11.0 10e3/uL 5.4 6.3   RBC Count      4.40 - 5.90 10e6/uL 4.01 (L) 4.07 (L)   Hemoglobin      13.3 - 17.7 g/dL 11.1 (L) 10.5 (L)   Hematocrit      40.0 - " 53.0 % 36.9 (L) 34.0 (L)   MCV      78 - 100 fL 92 84   MCH      26.5 - 33.0 pg 27.7 25.8 (L)   MCHC      31.5 - 36.5 g/dL 30.1 (L) 30.9 (L)   RDW      10.0 - 15.0 % 13.4 13.2   Platelet Count      150 - 450 10e3/uL 246 238         ASSESSMENT/PLAN:  1. Right foot ulceration. S/p procedures on 12/17/2021, 1/27/2022 and 4/4/2022 incision and drainage with application of theraskin per Dr. Kim. Remains NWB, using wound vac. Debridements in office at follow up appts. Last appt 5/25/2022 referred to [facility] [place] and referred to Marisel Rosa for further assessment, appt 6/7/2022.   2. HTN. Continue metoprolol and losartan. Bps satisfactory.    3. Afib/flutter. Anticoagulated with xarelto.   4. DM. On metformin and glipizide, continue. Accuchecks followed in TCU.   5. Stable medical conditions: CAD. Hx of MIs. Hx of stenting and prior CABG. On statin, xarelto, beta blocker; Hx of stroke. Continue statin, xarelto; Anemia, on iron; Mood disorder, mixed depression and anxiety on duloxetine, Abilify; Migraines. Receives monthly Aimovig.         Electronically signed by: Heidi Xie MD

## 2022-06-06 NOTE — TELEPHONE ENCOUNTER
[place] [facility] [place]: [phone]

## 2022-06-07 ENCOUNTER — OFFICE VISIT (OUTPATIENT)
Dept: VASCULAR SURGERY | Facility: CLINIC | Age: 52
End: 2022-06-07
Attending: NURSE PRACTITIONER
Payer: MEDICARE

## 2022-06-07 ENCOUNTER — ANCILLARY PROCEDURE (OUTPATIENT)
Dept: VASCULAR ULTRASOUND | Facility: CLINIC | Age: 52
End: 2022-06-07
Attending: NURSE PRACTITIONER
Payer: MEDICARE

## 2022-06-07 ENCOUNTER — HOSPITAL ENCOUNTER (OUTPATIENT)
Dept: MRI IMAGING | Facility: HOSPITAL | Age: 52
Discharge: HOME | End: 2022-06-07
Attending: NURSE PRACTITIONER
Payer: MEDICARE

## 2022-06-07 VITALS
RESPIRATION RATE: 16 BRPM | HEART RATE: 68 BPM | DIASTOLIC BLOOD PRESSURE: 70 MMHG | WEIGHT: 153 LBS | BODY MASS INDEX: 23.96 KG/M2 | TEMPERATURE: 98.3 F | SYSTOLIC BLOOD PRESSURE: 128 MMHG

## 2022-06-07 DIAGNOSIS — L97.413 DIABETIC ULCER OF RIGHT MIDFOOT ASSOCIATED WITH TYPE 2 DIABETES MELLITUS, WITH NECROSIS OF MUSCLE (H): ICD-10-CM

## 2022-06-07 DIAGNOSIS — L97.513 ULCER OF RIGHT FOOT WITH NECROSIS OF MUSCLE (H): Primary | ICD-10-CM

## 2022-06-07 DIAGNOSIS — E11.621 DIABETIC ULCER OF RIGHT MIDFOOT ASSOCIATED WITH TYPE 2 DIABETES MELLITUS, WITH NECROSIS OF MUSCLE (H): ICD-10-CM

## 2022-06-07 DIAGNOSIS — L97.513 ULCER OF RIGHT FOOT WITH NECROSIS OF MUSCLE (H): ICD-10-CM

## 2022-06-07 DIAGNOSIS — I73.9 PAD (PERIPHERAL ARTERY DISEASE) (H): ICD-10-CM

## 2022-06-07 PROCEDURE — 93923 UPR/LXTR ART STDY 3+ LVLS: CPT

## 2022-06-07 PROCEDURE — 11042 DBRDMT SUBQ TIS 1ST 20SQCM/<: CPT | Performed by: NURSE PRACTITIONER

## 2022-06-07 PROCEDURE — 255N000002 HC RX 255 OP 636: Performed by: NURSE PRACTITIONER

## 2022-06-07 PROCEDURE — A9585 GADOBUTROL INJECTION: HCPCS | Performed by: NURSE PRACTITIONER

## 2022-06-07 PROCEDURE — G1004 CDSM NDSC: HCPCS

## 2022-06-07 PROCEDURE — 93926 LOWER EXTREMITY STUDY: CPT | Mod: RT

## 2022-06-07 RX ORDER — GADOBUTROL 604.72 MG/ML
0.1 INJECTION INTRAVENOUS ONCE
Status: COMPLETED | OUTPATIENT
Start: 2022-06-07 | End: 2022-06-07

## 2022-06-07 RX ADMIN — GADOBUTROL 7 ML: 604.72 INJECTION INTRAVENOUS at 16:01

## 2022-06-07 ASSESSMENT — PAIN SCALES - GENERAL: PAINLEVEL: NO PAIN (0)

## 2022-06-07 NOTE — PROGRESS NOTES
[facility] Consult Note    Date of Service: June 7, 2022     Requesting Provider: Dr. Kim    Chief Complaint: right diabetic foot ulcer    History of Present Illness: Cm Lundberg is being seen at [facility] today regarding right diabetic foot ulcer. Was previously seeing Dr. Kim for this; obtaining second opinion due to failure of the wound to progress. Pt is frustrated that he has to continue on the wound vac; this has been for about 6 months and he cannot return to his place residence at [facility] due to the wound vac and he is now at [facility]. The wound started 1 year ago; he required I&D 6 months ago with theraskin application and wound vac; he has had a total of x2 theraskin grafts. They arrive to the clinic today with brother Macho; he also has a sister who helps bring him to appt.  Was currently/previously using wound vac; this was disconnected from the machine. Reports pain of 0/10; currently using nothing for pain. Has used tubular as compression in the past, is currently using tubular compression for compression. Denies any fevers, chills, or generalized ill feeling. Denies history of cancer. Sleeps in a bed/recliner with legs elevated.  I personally reviewed outside imaging, lab work, and progress noted through Care Everywhere and outside records. Arrives in a w/c today. His last MRI was done 6 months ago and was negative for osteomyelitis. His last vascular studies were done 1 year ago and were normal. He has diabetes they are checking his fs 3 times a day these are running 110-120; last a1c was 5.8% done 5/2022. Former smoker; quit in 2015; smoked for 1 year. He is on xarelto for CAD; stenting and bypass surgery in the past. He has been referred to HBO therapy at [facility] and has appt next Monday.       Review of Systems:   Constitutional:  Negative   EENTM: positive for glasses;  negative [place]  GI:  negative for nausea/vomiting;   negative for  constipation   negative diarrhea;   negative for fecal incontinence  negative weight loss  :   negative dysuria,  negative incontinence  MS: patient is not ambulatory;  does use assistive devices  Cardiac: positive cad; stenting; and CABG  Respiratory:  negative SOB  Endocrine:  positive  diabetes  Psych: positive  depression/anxiety    Past Medical History:   Past Medical History:   Diagnosis Date     Abdominal pain, generalized 8/19/2016     Anemia, unspecified type 8/19/2016     Anxiety      Anxiety state 7/29/2021    Formatting of this note might be different from the original. Created by Conversion  Replacement Utility updated for latest IMO load     Arthritis      Cerebral infarction (H)      Chest pain, unspecified type 8/19/2016     Congestive heart failure (H)      Constipation, unspecified constipation type 8/19/2016     Coronary artery disease     CABG with multiple stents     Coronary artery disease involving native coronary artery without angina pectoris, unspecified whether native or transplanted heart 8/19/2016     Coronary atherosclerosis 7/29/2021    Formatting of this note might be different from the original. Created by Conversion  Replacement Utility updated for latest IMO load     Depression      Diabetes (H)      Diabetes mellitus (H) 12/17/2021     Diabetic ulcer of right midfoot associated with type 2 diabetes mellitus, limited to breakdown of skin (H) 9/28/2017     Diabetic ulcer of right midfoot associated with type 2 diabetes mellitus, with necrosis of muscle (H) 1/7/2022     Diarrhea 8/19/2016     Equinovarus acquired deformity, right 4/14/2021     Equinovarus deformity, acquired, right      Essential hypertension 8/19/2016     Gastroesophageal reflux disease without esophagitis 8/19/2016     GERD (gastroesophageal reflux disease)      Heart attack (H)      History of blood transfusion      Hyperlipidemia      Hyperlipidemia, unspecified hyperlipidemia type 8/19/2016     Hypertension       Insomnia, unspecified type 8/19/2016     Late effects of cerebrovascular accident 8/19/2016     Major depressive disorder with single episode, remission status unspecified 8/19/2016     Microalbuminuria 7/29/2021    Formatting of this note might be different from the original. Created by Conversion     Migraine      Migraines      Mild intellectual disabilities 8/19/2016     Myalgia      Nausea 8/19/2016     Pain 8/19/2016     PONV (postoperative nausea and vomiting)     mild nausea     Problems with learning 7/29/2021    Formatting of this note might be different from the original. Created by Conversion [facility] Annotation: Feb 11 2010  1:41PM - Leonid Cabral: CANNOT READ OR  WRITE     Stented coronary artery      Type 2 diabetes mellitus (H) 7/29/2021    Formatting of this note might be different from the original. Created by Conversion     Type 2 diabetes mellitus with hyperglycemia (H) 8/19/2016     Type II or unspecified type diabetes mellitus with ophthalmic manifestations, not stated as uncontrolled(250.50) (H) 7/29/2021    Formatting of this note might be different from the original. Created by Conversion     Weakness 8/19/2016        Surgical History:   Past Surgical History:   Procedure Laterality Date     INCISION AND DRAINAGE LOWER EXTREMITY, COMBINED Right 12/17/2021    Procedure: INCISION AND DRAINAGE, right foot;  Surgeon: Alex Kim DPM;  Location: [facility] OR     INCISION AND DRAINAGE LOWER EXTREMITY, COMBINED Right 1/27/2022    Procedure: INCISION AND DRAINAGE, right foot with application of theraskin;  Surgeon: Alex Kim DPM;  Location: [facility] OR     INCISION AND DRAINAGE LOWER EXTREMITY, COMBINED Right 4/4/2022    Procedure: INCISION AND DRAINAGE, right foot with application of theraskin;  Surgeon: Alex Kim DPM;  Location: [facility] OR     IR MISCELLANEOUS PROCEDURE  2/13/2004     [facility] CABG, VEIN, SINGLE      Description: CABG (CABG);   Recorded: 04/22/2009;        Medications:   Current Outpatient Medications   Medication Sig     acetaminophen (TYLENOL) 325 MG tablet Take 650 mg by mouth every 4 hours as needed for mild pain      ARIPiprazole (ABILIFY) 2 MG tablet 2 mg daily      ASPERCREME ORIGINAL 10 % external cream APPLY TO LOW BACK TWICE DAILY FOR LOW BACK PAIN     atorvastatin (LIPITOR) 80 MG tablet Take 80 mg by mouth daily      cholestyramine (QUESTRAN) 4 g packet Take 1 packet by mouth every other day      DULoxetine (CYMBALTA) 60 MG capsule Take 120 mg by mouth daily takle 120mg     erenumab-aooe (AIMOVIG) 140 MG/ML injection Inject 1 mL (140 mg) Subcutaneous every 30 days     fenofibrate micronized (LOFIBRA) 200 MG capsule Take 200 mg by mouth every morning (before breakfast)      ferrous sulfate (FEROSUL) 325 (65 Fe) MG tablet Take 325 mg by mouth daily (with breakfast)      gabapentin (NEURONTIN) 100 MG capsule TAKE 1 CAP BY MOUTH TWICE A DAY BEFORE MEALS     gabapentin (NEURONTIN) 300 MG capsule Take 600 mg by mouth At Bedtime     glipiZIDE (GLUCOTROL) 5 MG tablet TAKE 1/2 TABLET (2.5MG) BY MOUTH DAILY DX DIABETES     loperamide (IMODIUM) 2 MG capsule Take 4 mg by mouth as needed for diarrhea      losartan (COZAAR) 25 MG tablet Take 25 mg by mouth At Bedtime      magnesium oxide 400 MG CAPS Take 400 mg by mouth 4 times daily     Menthol, Topical Analgesic, (BIOFREEZE ROLL-ON) 4 % GEL Apply 1 Application topically 2 times daily Apply to lower back for pain     metFORMIN (GLUCOPHAGE) 1000 MG tablet Take 1,000 mg by mouth     metFORMIN (GLUCOPHAGE) 500 MG tablet TAKE 2 TABS (1000MG) BY MOUTH EVERY MORNING;TAKE 1 TAB BY MOUTH EVERY EVENING     metoprolol succinate ER (TOPROL-XL) 25 MG 24 hr tablet Take 75 mg by mouth daily      mirtazapine (REMERON) 7.5 MG tablet Take 7.5 mg by mouth At Bedtime      Neomycin-Bacitracin-Polymyxin (TRIPLE ANTIBIOTIC) 3.5-400-5000 OINT ointment Externally apply 1 applicator topically 3 times daily as needed      Nitroglycerin (NITROSTAT SL) Place 0.4 mg under the tongue every 5 minutes as needed for chest pain      omeprazole (PRILOSEC) 20 MG DR capsule Take 20 mg by mouth daily      oxyCODONE (ROXICODONE) 5 MG tablet TAKE 1 TAB BY MOUTH EVERY 6HOURS AS NEEDED FOR PAIN     polyethylene glycol (MIRALAX) 17 GM/Dose powder Take 1 packet by mouth daily as needed for constipation      senna-docusate (SENOKOT-S/PERICOLACE) 8.6-50 MG tablet Take 1 tablet by mouth 2 times daily as needed for constipation      traZODone (DESYREL) 50 MG tablet Take 50 mg by mouth At Bedtime      vitamin C (ASCORBIC ACID) 500 MG tablet Take 500 mg by mouth daily      XARELTO ANTICOAGULANT 20 MG TABS tablet TAKE 1 TAB BY MOUTH ONCE DAILY     No current facility-administered medications for this visit.       Allergies:   Allergies   Allergen Reactions     Aspirin Other (See Comments)     Reacts with migraine medicine     Isosorbide Headache     Codeine Rash     Morphine Rash       Family history:   Family History   Adopted: Yes        Social History:   Social History     Socioeconomic History     Marital status:      Spouse name: Not on file     Number of children: Not on file     Years of education: Not on file     Highest education level: Not on file   Occupational History     Not on file   Tobacco Use     Smoking status: Former Smoker     Quit date: 2015     Years since quittin.3     Smokeless tobacco: Never Used   Vaping Use     Vaping Use: Never used   Substance and Sexual Activity     Alcohol use: Not Currently     Drug use: Not Currently     Sexual activity: Not on file   Other Topics Concern     Parent/sibling w/ CABG, MI or angioplasty before 65F 55M? Not Asked   Social History Narrative     Not on file     Social Determinants of Health     Financial Resource Strain: Not on file   Food Insecurity: Not on file   Transportation Needs: Not on file   Physical Activity: Not on file   Stress: Not on file   Social Connections: Not  on file   Intimate Partner Violence: Not on file   Housing Stability: Not on file        Physical Exam  Vitals: /70   Pulse 68   Temp 98.3  F (36.8  C)   Resp 16   Wt 153 lb (69.4 kg)   BMI 23.96 kg/m    Weight is 153 lbs 0 oz          Body mass index is 23.96 kg/m .  General: This is a 51 year old male who appears their reported age, not in acute distress  Head: normocephalic, Atraumatic; wearing glasses; non-icteric sclera; no exudate; mild hearing loss   Respiratory: Clear throughout all lung fields; unlabored breathing; no cough   Cardiac: Regular, Rate and Rhythm, no murmurs appreciated   Skin: Uniformly warm and dry  Psych: Alert and oriented x4; calm and cooperative throughout exam  Extremities: right lateral foot with full thickness ulcer; slough; moderate drainage; +epibole; no erythema; no odor; +3 pitting edema in the foot; inversion at the ankle; wound measures 1.2x1.5x0.3cm; see photo below    Sensation: Decreased to pinprick and light touch in a stocking distribution bilaterally       Peripheral Vascular: abnormal dorsalis pedis, posterior tibial pulses to right foot , using a handheld doppler these were muted over the dp and unable to find the PT pulse.  Good capillary refill. No unusual venous distention. Positive for fibrosis or scarring  Negative for spider veins, telangiectasias and hemosiderin deposition or hyperpigmentation      Circumferential volume measures:        No flowsheet data found.    Ulceration(s)/Wound(s):          Laboratory studies:     I personally reviewed the following lab results today and those on care everywhere, if indicated     No results found for: CRP   No results found for: SED   Last Renal Panel:  Sodium   Date Value Ref Range Status   04/01/2022 139 136 - 145 mmol/L Final   08/25/2016 139 mmol/L Final     Potassium   Date Value Ref Range Status   04/01/2022 4.6 3.5 - 5.0 mmol/L Final   08/25/2016 4.3 mmol/L Final     Chloride   Date Value Ref Range Status    04/01/2022 104 98 - 107 mmol/L Final   08/25/2016 107 mmol/L Final     Carbon Dioxide (CO2)   Date Value Ref Range Status   04/01/2022 26 22 - 31 mmol/L Final     Anion Gap   Date Value Ref Range Status   04/01/2022 9 5 - 18 mmol/L Final   08/25/2016 8 mmol/L Final     Glucose   Date Value Ref Range Status   04/01/2022 129 (H) 70 - 125 mg/dL Final   08/25/2016 84 70 - 99 mg/dL Final     GLUCOSE BY METER POCT   Date Value Ref Range Status   04/04/2022 112 (H) 70 - 99 mg/dL Final     Urea Nitrogen   Date Value Ref Range Status   04/01/2022 17 8 - 22 mg/dL Final   08/25/2016 21 mg/dL Final     Creatinine   Date Value Ref Range Status   04/01/2022 0.97 0.70 - 1.30 mg/dL Final   08/25/2016 0.85 mg/dL Final     GFR Estimate   Date Value Ref Range Status   04/01/2022 >90 >60 mL/min/1.73m2 Final     Comment:     Effective December 21, 2021 eGFRcr in adults is calculated using the 2021 CKD-EPI creatinine equation which includes age and gender (Stephen et al., NEJ, DOI: 10.1056/HSVNao2938544)   04/29/2019 >60 >60 mL/min/1.73m2 Final     Calcium   Date Value Ref Range Status   04/01/2022 9.9 8.5 - 10.5 mg/dL Final   08/25/2016 9.9 mg/dL Final     Albumin   Date Value Ref Range Status   04/29/2019 4.0 3.5 - 5.0 g/dL Final      Lab Results   Component Value Date    WBC 6.3 04/01/2022    WBC 5.0 08/25/2016     Lab Results   Component Value Date    RBC 4.07 04/01/2022    RBC 3.49 08/25/2016     Lab Results   Component Value Date    HGB 10.5 04/01/2022    HGB 10.1 08/25/2016     Lab Results   Component Value Date    HCT 34.0 04/01/2022    HCT 31.4 08/25/2016     No components found for: MCT  Lab Results   Component Value Date    MCV 84 04/01/2022    MCV 90 08/25/2016     Lab Results   Component Value Date    MCH 25.8 04/01/2022    MCH 28.7 08/25/2016     Lab Results   Component Value Date    MCHC 30.9 04/01/2022    MCHC 31.8 08/25/2016     Lab Results   Component Value Date    RDW 13.2 04/01/2022    RDW 13.6 08/25/2016     Lab  Results   Component Value Date     04/01/2022     08/25/2016      Lab Results   Component Value Date    A1C 5.8 05/16/2022    A1C 5.1 12/21/2021    A1C 5.9 11/18/2019    A1C 9.8 07/30/2019    A1C 9.2 04/29/2019    A1C 5.3 08/25/2016      No results found for: TSH   No results found for: VITDT               Impression:    Encounter Diagnoses   Name Primary?     Ulcer of right foot with necrosis of muscle (H) Yes     Diabetic ulcer of right midfoot associated with type 2 diabetes mellitus, with necrosis of muscle (H)      PAD (peripheral artery disease) (H)        Assessment/Plan:  1. Debridement: After discussion of risk factors and verbal consent was obtained 2% Lidocaine HCL jelly was applied, under clean conditions, the right foot  ulceration(s) were debrided using currette. Devitalized and nonviable tissue, along with any fibrin and slough, was removed to improve granulation tissue formation, stimulate wound healing, decrease overall bacteria load, disrupt biofilm formation and decrease edge senescence.  Total excisional debridement was 1.8 sq cm from the epidermis/dermis area or into the subcutaneous tissue with a depth of 0.3 cm.   Ulcers were improved afterwards and .  Measures were unchanged after debridement.    2. Treatment: wound treatment will include irrigation and dressings to promote autolytic debridement which will include: will update MRI to evaluate for osteomyelitis; will refer to surgeon and ID if positive; will update ABBY to ensure adequate arterial perfusion; will continue the wound vac; will have him seen by HBO team on Monday for evaluation; they will take over the wound care if he is an appropriate candidate. ADDENDUM: MRI + for suspected osteomyelitis; note sent to Dr. Kim; ABBY was abnormal; will get him scheudled with vascular team to see if any angiointervention is needed.     If for some reason the patient is not able to get your dressing(s) changed as outlined  above (due to illness, lack of supplies, lack of help) please do the following: remove old, soiled dressings; wash the wounds with saline; pat dry; apply ABD pad or other absorbant pad and secure with rolled gauze; avoid tape directly on your skin; Patient instructed to call the clinic as soon as possible to let us know what the current issues are in receiving wound care.    3. Edema. Due to abnormal pulses today in clinic will just use tubular compression; otherwise he would benefit from more aggressive compresison with short stretch added; encouraged elevation of the limbs    If a 2 layer or 4 layer compression wrap is being used; these are safe to have on for ONLY 7 days. If for some reason the patient is not able to get the wrap(s) changed (due to illness; lack of supplies, lack of help, lack of transportation) please do the following: unwrap the old 2 or 4 layer compression wrap; avoid using scissors as you could cut your skin and cause wounds; use tubular compression when available. Call to reschedule your home care or clinic visit appointment as soon as possible.    4. Offloading: needs to stay off the right foot; using prevalon boot as well    5. Nutrition: focus on diabetic diet and protein    Patient to return to clinic PRN week(s) for re-evaluation. They were instructed to call the clinic sooner with any further questions or concerns. Answered all questions.          Marisel Rosa NP   [facility]  [phone]      This note was electronically signed by Marisel Rosa NP

## 2022-06-07 NOTE — PATIENT INSTRUCTIONS
You have an appt with [facility] for hyperbaric oxygen; if you are a candidate and have insurance coverage those providers will take over your care    If you are not a candidate and no coverage for services then come back to the Vascular Clinic for follow up    We will get you scheduled for MRI of the foot to look for bone infection    We will obtain ABBY to check the arterial perfusion of the legs and feet    We will continue the wound vac until you are seen by HBO therapy doctors    Today we will apply endoform collagen and gauze; rolled gauze  This will be changed by the staff at your facility on Thrusday and resume the wound vac    Keep the pressure off your foot  No walking or weight bearing on the right foot    You need to elevate the foot more throughout the day and night

## 2022-06-09 ENCOUNTER — TELEPHONE (OUTPATIENT)
Dept: VASCULAR SURGERY | Facility: CLINIC | Age: 52
End: 2022-06-09
Payer: MEDICARE

## 2022-06-09 NOTE — RESULT ENCOUNTER NOTE
Marisel Rosa NP   6/8/2022 10:04 AM CDT       Can you contact the patient and his brother Macho if he is on the list of people to talk to; and let them know that his ABBY was abnormal and he needs to be seen by Dr. Montes or Afsaneh to see if any intervention is warranted to help aide in healing of this foot wound; can you help them get scheduled?     LK        Writer called and left vm to call back.     Also sending via [employer].

## 2022-06-09 NOTE — TELEPHONE ENCOUNTER
----- Message from Fabricio Bravo RN sent at 6/9/2022  8:31 AM CDT -----   Marisel Rosa NP   6/8/2022 10:04 AM CDT       Can you contact the patient and his brother Macho if he is on the list of people to talk to; and let them know that his ABBY was abnormal and he needs to be seen by Dr. Montes or Afsaneh to see if any intervention is warranted to help aide in healing of this foot wound; can you help them get scheduled?     JW        Writer called and left vm to call back.     Also sending via [employer].

## 2022-06-09 NOTE — TELEPHONE ENCOUNTER
Spoke to patient's mother, Jewels, who states she will need to contact other family members to see if they can take patient to an appointment as he no longer can get medical transportation. Jewels will have one of their family members call to arrange an appointment with ET/RF. Studies already done.

## 2022-06-14 ENCOUNTER — TRANSITIONAL CARE UNIT VISIT (OUTPATIENT)
Dept: GERIATRICS | Facility: CLINIC | Age: 52
End: 2022-06-14
Payer: MEDICARE

## 2022-06-14 VITALS
BODY MASS INDEX: 24.11 KG/M2 | HEIGHT: 67 IN | SYSTOLIC BLOOD PRESSURE: 130 MMHG | HEART RATE: 80 BPM | OXYGEN SATURATION: 96 % | TEMPERATURE: 98 F | RESPIRATION RATE: 18 BRPM | WEIGHT: 153.6 LBS | DIASTOLIC BLOOD PRESSURE: 71 MMHG

## 2022-06-14 DIAGNOSIS — L97.413 DIABETIC ULCER OF RIGHT MIDFOOT ASSOCIATED WITH TYPE 2 DIABETES MELLITUS, WITH NECROSIS OF MUSCLE (H): Primary | ICD-10-CM

## 2022-06-14 DIAGNOSIS — I10 ESSENTIAL HYPERTENSION: ICD-10-CM

## 2022-06-14 DIAGNOSIS — I48.91 ATRIAL FIBRILLATION, UNSPECIFIED TYPE (H): ICD-10-CM

## 2022-06-14 DIAGNOSIS — E11.621 DIABETIC ULCER OF RIGHT MIDFOOT ASSOCIATED WITH TYPE 2 DIABETES MELLITUS, WITH NECROSIS OF MUSCLE (H): Primary | ICD-10-CM

## 2022-06-14 DIAGNOSIS — E11.69 TYPE 2 DIABETES MELLITUS WITH OTHER SPECIFIED COMPLICATION, WITHOUT LONG-TERM CURRENT USE OF INSULIN (H): ICD-10-CM

## 2022-06-14 PROCEDURE — 99309 SBSQ NF CARE MODERATE MDM 30: CPT | Performed by: FAMILY MEDICINE

## 2022-06-14 NOTE — LETTER
"    6/14/2022        RE: Cm Lundberg  [address]  [place] [address]          [facility] SERVICES    [place] Medical Record Number:  [id number]  Place of Service where encounter took place: [facility] ([facility]) [[id number]]   CODE STATUS:   CPR/Full code     Chief Complaint/Reason for Visit:  Chief Complaint   Patient presents with     RECHECK     TCU 6/14/2022. Right foot diabetic ulceration. S/p intervention 12/17/2021; application of theraskin, 1/27/2022 and 4/4/2022.         TCU HPI:    Cm Lundberg is a 51 year old male with hx of CAD, s/p stents and CABG, HTN, DM, prior CVA, afib/flutter on xarelto, admitted to the hospital on 12/17/2021 for elective procedure for right foot ulceration. He underwent incision and drainage of the right foot, excisional debridement of the ulceration of the right foot into the level of the muscle, under MAC with popliteal block anesthesia on 12/17/2021. Wound vac was subsequently placed. He had no hospital complications. Seen by ID with recommendation for 5 days Augmentin at hospital discharge based on culture results. Chronic anticoagulation was restarted. He was felt to need TCU and discharged to TCU on 12/22/0221. He has had close follow up with podiatry, remained NWB, treated with wound vac. He underwent procedure on 1/27/2022 with incision and drainage with application of theraskin. Continued NWB with wound vac, frequent follow up with podiatry. At visit 3/25/2022 recommendation was made for another procedure, incision and drainage with application of theraskin on Mon 4/4/2022. Notes indicated per Dr. Kim: \"TREATMENT:-The lateral right foot ulceration continues to have exposed tendon of the peroneal brevis. Based on this presentation, I recommend we again try application of theraskin with continued use of the wound vac\". Will have wound vac applied after procedure. Dr. Kim said okay to continue on xarelto " anticoagulation pre op.       Today:  He continues with wound vac, right foot, NWB, following with podiatry. At last visit 5/25/2022, referred to vascular and also for hyperbaric oxygen therapy at [facility]. He saw Marisel Rosa on 6/7/2022, wound was debrided, concern for vascular status with abnormal ABBY. Referred then to Dr. Montes for possible vascular intervention, appt on 6/23/2022. On 6/13/2022 he had a consult at [facility] for hyperbaric oxygen therapy but no actual treatments yet started, referral form states they will follow up after patient sees vascular due to potential for IR procedure.     Reports no pain. Appetite is good. No diarrhea or constipation. No shortness of breath, chest pain, dizziness. Denies fever, cough or congestion. No urinary sx. Continues on usual meds for DM, CHF, CAD, depression, no recent med changes. He is on xarelto for afib. No complaints of back or abdominal pain.       PAST MEDICAL HISTORY:  Past Medical History:   Diagnosis Date     Abdominal pain, generalized 8/19/2016     Anemia, unspecified type 8/19/2016     Anxiety      Anxiety state 7/29/2021    Formatting of this note might be different from the original. Created by Conversion  Replacement Utility updated for latest IMO load     Arthritis      Cerebral infarction (H)      Chest pain, unspecified type 8/19/2016     Congestive heart failure (H)      Constipation, unspecified constipation type 8/19/2016     Coronary artery disease     CABG with multiple stents     Coronary artery disease involving native coronary artery without angina pectoris, unspecified whether native or transplanted heart 8/19/2016     Coronary atherosclerosis 7/29/2021    Formatting of this note might be different from the original. Created by Conversion  Replacement Utility updated for latest IMO load     Depression      Diabetes (H)      Diabetes mellitus (H) 12/17/2021     Diabetic ulcer of right midfoot associated with type 2 diabetes mellitus, limited to  breakdown of skin (H) 9/28/2017     Diabetic ulcer of right midfoot associated with type 2 diabetes mellitus, with necrosis of muscle (H) 1/7/2022     Diarrhea 8/19/2016     Equinovarus acquired deformity, right 4/14/2021     Equinovarus deformity, acquired, right      Essential hypertension 8/19/2016     Gastroesophageal reflux disease without esophagitis 8/19/2016     GERD (gastroesophageal reflux disease)      Heart attack (H)      History of blood transfusion      Hyperlipidemia      Hyperlipidemia, unspecified hyperlipidemia type 8/19/2016     Hypertension      Insomnia, unspecified type 8/19/2016     Late effects of cerebrovascular accident 8/19/2016     Major depressive disorder with single episode, remission status unspecified 8/19/2016     Microalbuminuria 7/29/2021    Formatting of this note might be different from the original. Created by Conversion     Migraine      Migraines      Mild intellectual disabilities 8/19/2016     Myalgia      Nausea 8/19/2016     Pain 8/19/2016     PONV (postoperative nausea and vomiting)     mild nausea     Problems with learning 7/29/2021    Formatting of this note might be different from the original. Created by Conversion [facility] Annotation: Feb 11 2010  1:41PM - Leonid Cabral: CANNOT READ OR  WRITE     Stented coronary artery      Type 2 diabetes mellitus (H) 7/29/2021    Formatting of this note might be different from the original. Created by Conversion     Type 2 diabetes mellitus with hyperglycemia (H) 8/19/2016     Type II or unspecified type diabetes mellitus with ophthalmic manifestations, not stated as uncontrolled(250.50) (H) 7/29/2021    Formatting of this note might be different from the original. Created by Conversion     Weakness 8/19/2016       MEDICATIONS:  Current Outpatient Medications   Medication Sig Dispense Refill     acetaminophen (TYLENOL) 325 MG tablet Take 650 mg by mouth every 4 hours as needed for mild pain        ARIPiprazole (ABILIFY) 2 MG  tablet 2 mg daily        ASPERCREME ORIGINAL 10 % external cream APPLY TO LOW BACK TWICE DAILY FOR LOW BACK PAIN       atorvastatin (LIPITOR) 80 MG tablet Take 80 mg by mouth daily        cholestyramine (QUESTRAN) 4 g packet Take 1 packet by mouth every other day               DULoxetine (CYMBALTA) 60 MG capsule Take 120 mg by mouth daily takle 120mg       erenumab-aooe (AIMOVIG) 140 MG/ML injection Inject 1 mL (140 mg) Subcutaneous every 30 days 1 mL 0     fenofibrate micronized (LOFIBRA) 200 MG capsule Take 200 mg by mouth every morning (before breakfast)        ferrous sulfate (FEROSUL) 325 (65 Fe) MG tablet Take 325 mg by mouth daily (with breakfast)        gabapentin (NEURONTIN) 100 MG capsule TAKE 1 CAP BY MOUTH TWICE A DAY BEFORE MEALS       gabapentin (NEURONTIN) 300 MG capsule Take 600 mg by mouth At Bedtime       glipiZIDE (GLUCOTROL) 5 MG tablet TAKE 1/2 TABLET (2.5MG) BY MOUTH DAILY DX DIABETES       loperamide (IMODIUM) 2 MG capsule Take 4 mg by mouth as needed for diarrhea        losartan (COZAAR) 25 MG tablet Take 25 mg by mouth At Bedtime        magnesium oxide 400 MG CAPS Take 400 mg by mouth 4 times daily       Menthol, Topical Analgesic, (BIOFREEZE ROLL-ON) 4 % GEL Apply 1 Application topically 2 times daily Apply to lower back for pain       metFORMIN (GLUCOPHAGE) 1000 MG tablet Take 1,000 mg by mouth       metFORMIN (GLUCOPHAGE) 500 MG tablet TAKE 2 TABS (1000MG) BY MOUTH EVERY MORNING;TAKE 1 TAB BY MOUTH EVERY EVENING       metoprolol succinate ER (TOPROL-XL) 25 MG 24 hr tablet Take 75 mg by mouth daily        mirtazapine (REMERON) 7.5 MG tablet Take 7.5 mg by mouth At Bedtime        Neomycin-Bacitracin-Polymyxin (TRIPLE ANTIBIOTIC) 3.5-400-5000 OINT ointment Externally apply 1 applicator topically 3 times daily as needed       Nitroglycerin (NITROSTAT SL) Place 0.4 mg under the tongue every 5 minutes as needed for chest pain        omeprazole (PRILOSEC) 20 MG DR capsule Take 20 mg by mouth  "daily        oxyCODONE (ROXICODONE) 5 MG tablet TAKE 1 TAB BY MOUTH EVERY 6HOURS AS NEEDED FOR PAIN 28 tablet 0     polyethylene glycol (MIRALAX) 17 GM/Dose powder Take 1 packet by mouth daily as needed for constipation        senna-docusate (SENOKOT-S/PERICOLACE) 8.6-50 MG tablet Take 1 tablet by mouth 2 times daily as needed for constipation        traZODone (DESYREL) 50 MG tablet Take 50 mg by mouth At Bedtime        vitamin C (ASCORBIC ACID) 500 MG tablet Take 500 mg by mouth daily        XARELTO ANTICOAGULANT 20 MG TABS tablet TAKE 1 TAB BY MOUTH ONCE DAILY         PHYSICAL EXAM:  General: Patient is alert male, no distress.   Vitals: /71   Pulse 80   Temp 98  F (36.7  C)   Resp 18   Ht 1.702 m (5' 7\")   Wt 69.7 kg (153 lb 9.6 oz)   SpO2 96%   BMI 24.06 kg/m    HEENT: Head is NCAT. Eyes show no injection or icterus. Nares negative. Oropharynx well hydrated.  Neck: No JVD.  Lungs: Non labored respirations.   Abdomen: Soft.  : Deferred.  Extremities: Mild RLE swelling. Calf non tender.   Musculoskeletal: Mild degen changes.   Skin: Wound vac right foot.   Psych: Mood appears good.      LABS/DIAGNOSTIC DATA:  Component      Latest Ref Rng & Units 12/28/2021 4/1/2022   Sodium      136 - 145 mmol/L 140 139   Potassium      3.5 - 5.0 mmol/L 4.5 4.6   Chloride      98 - 107 mmol/L 107 104   Carbon Dioxide      22 - 31 mmol/L 25 26   Anion Gap      5 - 18 mmol/L 8 9   Glucose      70 - 125 mg/dL 141 (H) 129 (H)   Calcium      8.5 - 10.5 mg/dL 10.3 9.9   Urea Nitrogen      8 - 22 mg/dL 17 17   Creatinine      0.70 - 1.30 mg/dL 0.99 0.97   GFR Estimate      >60 mL/min/1.73m2 >90 >90     Component      Latest Ref Rng & Units 12/28/2021 4/1/2022   WBC      4.0 - 11.0 10e3/uL 5.4 6.3   RBC Count      4.40 - 5.90 10e6/uL 4.01 (L) 4.07 (L)   Hemoglobin      13.3 - 17.7 g/dL 11.1 (L) 10.5 (L)   Hematocrit      40.0 - 53.0 % 36.9 (L) 34.0 (L)   MCV      78 - 100 fL 92 84   MCH      26.5 - 33.0 pg 27.7 25.8 (L) "   MCHC      31.5 - 36.5 g/dL 30.1 (L) 30.9 (L)   RDW      10.0 - 15.0 % 13.4 13.2   Platelet Count      150 - 450 10e3/uL 246 238     Component      Latest Ref Rng & Units 12/21/2021 5/16/2022   Hemoglobin A1C      <=5.6 % 5.1 5.8 (H)         ASSESSMENT/PLAN:  1. Right foot diabetic ulcer. S/p procedure 12/27/2021 and I and D with theraskin on 1/27/2022 and 4/4/2022. Non healing wound. Has continued NWB with wound vac. Referred for vascular eval on 6/7/2022 with follow up with Dr. Montes to consider intervention on 6/23/2022. Also referred for hyperbaric oxygen therapy.  2. HTN. On metoprolol and losartan. Bps acceptable.   3. Afib. On xarelto for anticoagulation. Adequate rate control.  4. Diabetes. He is on metformin and glipizide, continue without change. Last A1c was good at 5.8%. Continue accuchecks in TCU.  5. Stable medical conditions: CAD. Hx of MIs. Hx of stenting and prior CABG. On statin, xarelto, beta blocker; Hx of stroke. Continue statin, xarelto; Anemia, on iron; Mood disorder, mixed depression and anxiety on duloxetine, Abilify; Migraines. Receives monthly Aimovig.         Electronically signed by: Heidi Xie MD           Sincerely,        Heidi Xie MD

## 2022-06-23 ENCOUNTER — OFFICE VISIT (OUTPATIENT)
Dept: VASCULAR SURGERY | Facility: CLINIC | Age: 52
End: 2022-06-23
Attending: SURGERY
Payer: MEDICARE

## 2022-06-23 VITALS
HEART RATE: 80 BPM | TEMPERATURE: 98.9 F | DIASTOLIC BLOOD PRESSURE: 70 MMHG | RESPIRATION RATE: 16 BRPM | SYSTOLIC BLOOD PRESSURE: 134 MMHG

## 2022-06-23 DIAGNOSIS — I73.9 PAD (PERIPHERAL ARTERY DISEASE) (H): Primary | ICD-10-CM

## 2022-06-23 DIAGNOSIS — I70.221 ATHEROSCLEROSIS OF NATIVE ARTERIES OF EXTREMITIES WITH REST PAIN, RIGHT LEG (H): ICD-10-CM

## 2022-06-23 PROCEDURE — G0463 HOSPITAL OUTPT CLINIC VISIT: HCPCS

## 2022-06-23 PROCEDURE — 99204 OFFICE O/P NEW MOD 45 MIN: CPT | Performed by: SURGERY

## 2022-06-23 ASSESSMENT — PAIN SCALES - GENERAL: PAINLEVEL: NO PAIN (0)

## 2022-06-23 NOTE — PATIENT INSTRUCTIONS
Cm Lundberg,    Your visit to [facility] for your procedure is coming soon and we look forward to seeing you! This friendly reminder and pre-procedure checklist will help to ensure your procedure goes smoothly and meets your expectations. At [facility], our goal is to provide you with a great patient experience and to deliver genuine, professional care to every patient.     Please complete all the steps in advance of your visit. If you have any questions about the items listed below, please give our office a call. We can be reached at [phone] or visit our website at [email or web address] for more information.     Procedure: Right Leg Angiogram    Procedure Date :  6/29/22    Procedure Time :  1000    Arrival Time: 0900    Admission Type: Outpatient    Procedure Location: [facility]:  [address] [place] [address] (phone: [phone], Fax: [phone])    Surgeon: MD Kim PATEL PCR Test: FACILITY TO BEFORE 4DAYS PRIOR    2 week Post Procedure Appointment with KHUSHBU Jolley and also ultrasound: 7/15 AND 7/18    6 weeks appt with Dr Kim oMntes and repeat ultrasound: 8/25    If you take blood thinners: SEE SPECIFIC INSTRUCTIONS BELOW    PLEASE DO NOT STOP YOUR ASPIRIN OR PLAVIX UNLESS SPECIFICALLY DIRECTED BY THE VASCULAR SURGEON TO STOP!  In most cases Vascular providers want you to continue these. This is different from most NON vascular surgeries and may not be well known by your Primary Care Provider    STOP XARELTO 24 HOURS BEFORE PROCEDURE    Prepare for the peripheral angiography as follows:  Do not eat 8 hours before your procedure. You may have clear liquids up to 2 hours before surgery.  If your provider says to take your normal medicines, swallow them with only small sips of water.  Tell your healthcare provider about all medicines you take and any allergies you may  have.  Arrange for a family member or friend to drive you home.  If you are on insulin, please take only 1/2 the dose the night before and HOLD the day of the procedure.   If you are on Metformin, please HOLD for 48 hours after the procedure.     Peripheral Angiography    Peripheral angiography is an outpatient procedure that makes a  map  of the vessels (arteries) in your lower body, legs, and arms, using X-ray and dye.This map can show where blood flow may be blocked.    An angiogram is commonly performed under sedation with the use of local anesthesia.    The procedure usually starts with a needle put into the femoral (groin) artery. From one treatment site, areas all over the body can be treated.  After access is established, catheters (thin tubes) and wires are threaded through the arterial system to a specific area of interest or throughout the entire body.  As a contrast agent (iodine dye) is injected, X-ray images are taken to let your provider view the flow of the dye and identify blockages. The surgeon can then choose the best mode of therapy for you - whether during or following the angiogram. This decision depends on your symptoms and the severity and characteristics of the blockages.  Two common therapies that can be provided during the angiogram are balloon angioplasty and stent placement.                   Angioplasty can be used to open arterial blockages. Guided by X-ray, your provider navigates through the blockage with a wire and introduces a special device equipped with an inflatable balloon. After positioning the balloon device across the blocked portion of the artery, the provider inflates the balloon to expand the artery and compress the blockage. The balloon is then deflated and removed while keeping the wire in place across the area that has been treated. Next, contrast dye is injected to assess the result. Treatment is considered a success if blood flow is improved and less than 30% of the  blockage remains. If the vessel is still considerably narrowed, placing a stent may be the next step.    Stents are used to prop open an artery at the site of a narrowing. Stents are generally placed after balloon angioplasty when there is residual narrowing or insufficient blood flow in a treated vessel. Stents are considered a permanent implant and cannot be used if you have a metal allergy. Stents that are used in the leg are constructed of a nickel-titanium alloy (Nitinol), a memory-shaped metal. This alloy has a predetermined size and shape at body temperature and expands to this size and shape after being introduced through a catheter. These stents resist kinking and are flexible so that damage from activities that involve your legs is minimized.  Your procedure may require other techniques to address the problem or plaque.     If surgery is felt to be a better option, your vascular provider will obtain any additional X-ray images needed to plan a surgical bypass of the blocked vessel/s and will then conclude the angiogram.      During the procedure  Here is what to expect:  You may get medicine through an IV (intravenous) line to relax you. You re given an injection to numb the insertion site. Then, a tiny skin cut (incision) is made near an artery in your groin.  Your provider inserts a thin tube (catheter) through the incision. He or she then threads the catheter into an artery while looking at a video monitor.  Contrast  dye  is injected into the catheter to confirm position. You may feel warmth or pressure in your legs and back. You lie still as X-rays are taken. The catheter is then taken out.  After the procedure  You ll be taken to a recovery area. A healthcare provider will apply pressure to the site for about 10 minutes. Your healthcare provider will tell you how long to lie down and keep the insertion site still. Your healthcare provider will discuss the results with you soon after the  procedure.      Angiogram Procedure Discharge Instructions:     1. If you received sedation for your procedure: Do not drive or operate heavy machinery for the rest of the day.  2. Avoid strenuous activity for 72 hours (3 days):                        - Do not lift greater than 10 pounds.                        - Excessive exercise                        - Straining                        - Return to your normal activities as you tolerate after the 3 days restriction  3. Avoid tub baths, Jacuzzis, hot tubs and pools for 72 hours (3 days) or until puncture site is will healed.  4. You may shower beginning tomorrow. Do not scrub puncture site(s) until well healed, pat dry.  5. You can expect to return to work 1-2 days after your procedure - depending on the nature of your profession.  6. It is normal to have some tenderness and minimal swelling at puncture site. A small area of discoloration may be present. Tenderness typically subsides in 24-48 hours. A small knot may also be present at puncture site for 6-8 weeks, this can be a normal part of the healing process.       After the angiogram If you:      1. Experience any bleeding or active swelling from puncture site: Lie down, firmly apply pressure to puncture site and CALL 9-1-1  2. Fever greater than 101 degrees Fahrenheit.  3. Redness, swelling, warmth to touch, or purulent (yellow/green/foul smelling) drainage from the puncture site.  4. Increasing pain, tenderness or swelling at puncture site OR of arm/leg near puncture site.  5. Feeling weak or faint.  6. Change in color, temperature, or sensation of arm/leg where puncture was made.  7. You can t feel your thrill (pulse at your dialysis fistula site) or it feels weak (If you had fistulogram done).     Call us with any other questions or concerns after your procedure: [phone]      All invasive procedures can have complications. While the risk of an angiogram is low it is not zero. The most common  "complications are related to the arterial access site.       Risks/ Complications   Bruising is Common  You will likely have bruising (ecchymosis) where the artery was entered.    Pain and Bleeding  Less commonly, patients experience pain and bleeding that may include blood collecting under the skin (hematoma).    Blockage and Leakage   In rare cases, the access artery can become blocked. Infrequently, patients experience persistent leakage of blood where the artery was entered, which can result in the formation of a pseudoaneurysm--a blood-filled sac--that may require further treatment.  Other complications related to an angiogram include:   Allergic reaction to the iodine contrast dye, which can lead to the development of kidney failure.  Very rarely during balloon angioplasty and/or stent placement, part of the arterial blockage can break off (embolism) and travel to more distant arteries. This can worsen blood flow.        Notify our office right away, if you have any changes in your health status, or if you develop a cold, flu, diarrhea, infection, fever or sore throat before your scheduled surgery date. We can be reached at [phone] Monday-Friday 8 am-4:30 pm if you have any questions.   Thank you for choosing [facility]      [] PCR COVID-19 test is required within 4 days of surgery  If your test is positive or you fail to get tested, your surgery will be postponed.     [] Contact your insurance regarding coverage  If you would like a Good Mer Estimate for your upcoming procedure at [facility], contact Cost of Care Estimates   Advocates are available Monday through Friday 8am - 5pm   [phone]  You may also submit a request online at [email or web address]  - Complete the secure online form found under \"Services and Procedure Pricing\"   If your procedure is at [facility] please contact the numbers below for Cost of Care Estimates.   - Facility " Charge: [phone]    Anesthesiology charge:  [phone]      [] DO NOT BRING FMLA WITH TO SURGERY.  These should be sent to the provider's office by fax to [phone].     [] Day of Surgery  Medications - Take as indicated with sips of water.   Wear comfortable loose fitting clothes. Wear your glasses-Not contacts. Do not wear jewelry and remove body piercing's. Surgery may be cancelled if they are not removed.   You may have 1 family member wait in the lobby during your surgery. Visitor restrictions are subject to change. Please verify with the surgery nurse when they call.   If same day surgery-Have a someone come with you to surgery that can help you understand the surgeon's instructions, drive you home and stay with you overnight the first night.    [] If the community sees a new COVID-19 surge, your procedure may need to be postponed. We will contact you if this happens.    [] You will receive a call from a surgery nurse 1-3 days prior to surgery. They will go over more details with you.       Before Your Procedure or Hospital Admission Testing for COVID-19  Thank you for choosing [facility] for your health care needs. The COVID-19 pandemic is a very challenging time for everyone.   Our goal is to keep you and our team here at [facility] safe and healthy. We ve taken many steps to make this happen.   For example:   We test and screen our staff, care teams and patients for COVID-19.   Everyone at [facility] must wear a mask and stay 6 feet apart.   We are limiting hospital and clinic visitors.  Before you come in  You must get tested for COVID-19, even if you ve been vaccinated.   If you re going home on the same day as your procedure (surgery):  1 to 2 days before your procedure, take an at-home, rapid antigen test. You can buy these at many pharmacy stores. Or you can order free, at-home tests at covid.gov/tests. If you   can t find an at-home, rapid antigen test, please schedule  a PCR test with  [facility] by calling [phone], or visiting [email or web address]. We   can t accept tests that are more than 4 days old.   If your test is negative, please take a photo of the test. Show the photo to the nurse when you come in for your procedure.  If your test is positive, please see the  If your test shows you have COVID-19  section on this page.    If you re staying overnight in the hospital:  4 days before your procedure, please get a PCR test from a lab.   To schedule a PCR test with  [facility], call [phone]. Or, visit [email or web address].    If your test is negative, please ask the testing location to fax your results to us at [phone].  If your test is positive, please see the  If your test shows you have COVID-19  section below.    After your test and before your procedure, please follow these safety guidelines:   Limit trips outside your home.   Limit the number of people you see.   Always wear a face covering outside your home.   Use social distancing. Stay 6 feet away from others whenever you can.   Wash your hands often.    If your test shows you have COVID-19  If your test is positive, please tell your surgeon s office right away. A positive test means that you have COVID-19.  We ll probably have to postpone your admission, surgery or procedure. Your care team will discuss this with you. After that, we ll let you know what to   do and when you can re-schedule.      If your test shows you DON T have COVID-19.   Even if your test is negative, you can still get COVID-19. It s rare, but sometimes the test result is wrong. You could also catch the virus after taking   the test. There s a very small chance that you could catch COVID-19 in the hospital or surgery center. [facility] has taken many steps to prevent this from happening.   Possible surgery delay like you, we want your surgery to happen when it s scheduled. But  sometimes the hospital is so full that it s not safe for you to have your surgery. This is   especially true during the pandemic. Your surgery may need to be re-scheduled at a later date. If this happens, we will call and tell you.   Day of your surgery or procedure   Please come wearing a face covering that covers both your nose and mouth.   When you arrive, we ll ask you some questions to find out if you ve had any exposures to COVID-19, or have any signs of COVID-19.   No matter where you took a test, we ll need to have the results. You can ask your testing location to fax the results to us at [phone]. If you   took a rapid antigen at-home test, you can bring a photo of the results.    Ask your care team if you can have visitors. All visitors must wear face coverings and will be screened for exposure to, or signs of, COVID-19.    The rules for visitors change often, depending on how much the virus is spreading. To learn more, see Visiting a Loved One in the Hospital during   the COVID-19 Outbreak.Please call your care team, hospital or surgery center if you have any questions. We thank you for your understanding and for choosing [facility]   for your care.   Questions and answers  Does it matter how I get tested for COVID-19?   Yes. If the plan is for you to go home on the same day as your procedure, you can take a rapid antigen, at-home test 1 to 2 days before you come in. If your test is negative, please take a photo of your test. Show it to the nurse when you come to the hospital. If you can t find a rapid antigen, at-home test, you can take a PCR test at a lab instead. If the plan is for you to stay in the hospital after your surgery, you ll need to get a PCR test from a lab 4 days before your procedure. Ask the testing location to fax your results to [phone]. If we don t get your results, we may have to delay or cancel your treatment.  Do I need to get tested at [facility]?  No.  However, if you need a PCR test from a lab, we recommend using an [facility] lab. We ll get the results more quickly and easily that way. To schedule a test, please call [phone]. Or, visit [email or web address]      For informational purposes only. Not to replace the advice of your health care provider. Copyright   2020 [facility]. All rights reserved. Clinically reviewed by Infection Prevention and the [facility] COVID-19 Clinical Team.   [employer] [id number] - Rev 05/26/22.

## 2022-06-23 NOTE — PROGRESS NOTES
VASCULAR SURGERY OUTPATIENT CONSULT OR VISIT    VASCULAR SURGEON: Kim oMntes MD    LOCATION:  [facility]    Cm Lundberg  Medical Record #: [id number]   YOB: 1970   Age:  51 year old     Date of Service: June 23, 2022     PRIMARY CARE PROVIDER: Ziggy, Generic External Data       Reason for consultation: Evaluation of nonhealing foot wound    IMPRESSION: Patient with some degree of developmental delay and advanced diabetes who has had a nonhealing wound of the right sole of his foot for more than a year and a half.  While his toe pressures and arterial duplex seemed quite reasonable a year ago his most recent study suggests worsening disease at the level of the popliteal artery and likely significant infrapopliteal disease with toe pressures that are concerning for wound healing at 37 mmHg pressure toe pressure.    RECOMMENDATION: Tentative plan for right leg angiogram with possible intervention assuming infrapopliteal and popliteal disease.  Risks of procedure discussed at length.  Tentative plan for this coming Wednesday.  Patient has already been seen for hyperbaric therapy but will likely not be able to start till after next week as there are issues with transportation strike.          HPI:  Cm Lundberg  is a 51 year old  male who was seen today in evaluation   for his right foot wounds.  He started more than 6 months ago.  New wounds also formed after he was put in an AFO splint.  Not too much pain but obviously suffering from not being able to weight-bear and having to do dressing changes.    No prior limb interventions.  He is diabetic.  Former smoker who quit in 2015.    Past medical history significant for coronary artery disease with coronary artery bypass 10 years ago.  No history of strokes.    On review of systems patient has no fevers or chills.  No chest pain.  No shortness of breath.  No new cough.  No new urinary or GI complaints.  No new  neurologic symptoms.  No new rashes.    PHH:    Past Medical History:   Diagnosis Date     Abdominal pain, generalized 8/19/2016     Anemia, unspecified type 8/19/2016     Anxiety      Anxiety state 7/29/2021    Formatting of this note might be different from the original. Created by Conversion  Replacement Utility updated for latest IMO load     Arthritis      Cerebral infarction (H)      Chest pain, unspecified type 8/19/2016     Congestive heart failure (H)      Constipation, unspecified constipation type 8/19/2016     Coronary artery disease     CABG with multiple stents     Coronary artery disease involving native coronary artery without angina pectoris, unspecified whether native or transplanted heart 8/19/2016     Coronary atherosclerosis 7/29/2021    Formatting of this note might be different from the original. Created by Conversion  Replacement Utility updated for latest IMO load     Depression      Diabetes (H)      Diabetes mellitus (H) 12/17/2021     Diabetic ulcer of right midfoot associated with type 2 diabetes mellitus, limited to breakdown of skin (H) 9/28/2017     Diabetic ulcer of right midfoot associated with type 2 diabetes mellitus, with necrosis of muscle (H) 1/7/2022     Diarrhea 8/19/2016     Equinovarus acquired deformity, right 4/14/2021     Equinovarus deformity, acquired, right      Essential hypertension 8/19/2016     Gastroesophageal reflux disease without esophagitis 8/19/2016     GERD (gastroesophageal reflux disease)      Heart attack (H)      History of blood transfusion      Hyperlipidemia      Hyperlipidemia, unspecified hyperlipidemia type 8/19/2016     Hypertension      Insomnia, unspecified type 8/19/2016     Late effects of cerebrovascular accident 8/19/2016     Major depressive disorder with single episode, remission status unspecified 8/19/2016     Microalbuminuria 7/29/2021    Formatting of this note might be different from the original. Created by Conversion     Migraine       Migraines      Mild intellectual disabilities 8/19/2016     Myalgia      Nausea 8/19/2016     Pain 8/19/2016     PONV (postoperative nausea and vomiting)     mild nausea     Problems with learning 7/29/2021    Formatting of this note might be different from the original. Created by Conversion [facility] Annotation: Feb 11 2010  1:41PM - Leonid Cabral: CANNOT READ OR  WRITE     Stented coronary artery      Type 2 diabetes mellitus (H) 7/29/2021    Formatting of this note might be different from the original. Created by Conversion     Type 2 diabetes mellitus with hyperglycemia (H) 8/19/2016     Type II or unspecified type diabetes mellitus with ophthalmic manifestations, not stated as uncontrolled(250.50) (H) 7/29/2021    Formatting of this note might be different from the original. Created by Conversion     [facility] 8/19/2016         Past Surgical History:   Procedure Laterality Date     INCISION AND DRAINAGE LOWER EXTREMITY, COMBINED Right 12/17/2021    Procedure: INCISION AND DRAINAGE, right foot;  Surgeon: Alex Kim DPM;  Location: [facility] OR     INCISION AND DRAINAGE LOWER EXTREMITY, COMBINED Right 1/27/2022    Procedure: INCISION AND DRAINAGE, right foot with application of theraskin;  Surgeon: Alex Kim DPM;  Location: [facility] OR     INCISION AND DRAINAGE LOWER EXTREMITY, COMBINED Right 4/4/2022    Procedure: INCISION AND DRAINAGE, right foot with application of theraskin;  Surgeon: Alex Kim DPM;  Location: [facility] OR     IR MISCELLANEOUS PROCEDURE  2/13/2004     [facility] CABG, VEIN, SINGLE      Description: CABG (CABG);  Recorded: 04/22/2009;        ALLERGIES:     Allergies   Allergen Reactions     Aspirin Other (See Comments)     Reacts with migraine medicine     Isosorbide Headache     Codeine Rash     Morphine Rash        MEDS:    Current Outpatient Medications   Medication     acetaminophen (TYLENOL) 325 MG tablet     ARIPiprazole (ABILIFY) 2 MG  tablet     ASPERCREME ORIGINAL 10 % external cream     atorvastatin (LIPITOR) 80 MG tablet     cholestyramine (QUESTRAN) 4 g packet     DULoxetine (CYMBALTA) 60 MG capsule     erenumab-aooe (AIMOVIG) 140 MG/ML injection     fenofibrate micronized (LOFIBRA) 200 MG capsule     ferrous sulfate (FEROSUL) 325 (65 Fe) MG tablet     gabapentin (NEURONTIN) 100 MG capsule     gabapentin (NEURONTIN) 300 MG capsule     glipiZIDE (GLUCOTROL) 5 MG tablet     loperamide (IMODIUM) 2 MG capsule     losartan (COZAAR) 25 MG tablet     magnesium oxide 400 MG CAPS     metFORMIN (GLUCOPHAGE) 1000 MG tablet     metFORMIN (GLUCOPHAGE) 500 MG tablet     metoprolol succinate ER (TOPROL-XL) 25 MG 24 hr tablet     mirtazapine (REMERON) 7.5 MG tablet     Neomycin-Bacitracin-Polymyxin (TRIPLE ANTIBIOTIC) 3.5-400-5000 OINT ointment     Nitroglycerin (NITROSTAT SL)     omeprazole (PRILOSEC) 20 MG DR capsule     oxyCODONE (ROXICODONE) 5 MG tablet     polyethylene glycol (MIRALAX) 17 GM/Dose powder     senna-docusate (SENOKOT-S/PERICOLACE) 8.6-50 MG tablet     traZODone (DESYREL) 50 MG tablet     vitamin C (ASCORBIC ACID) 500 MG tablet     XARELTO ANTICOAGULANT 20 MG TABS tablet     Menthol, Topical Analgesic, (BIOFREEZE ROLL-ON) 4 % GEL     No current facility-administered medications for this visit.        SOCIAL HABITS:    Tobacco Use      Smoking status: Former Smoker        Quit date: 2015        Years since quittin.4      Smokeless tobacco: Never Used       Alcohol Use: Not on file       History   Drug Use Unknown        FAMILY HISTORY:    No significant family history, including no history of: PAD       REVIEW OF SYSTEMS:    A 12 point ROS was reviewed and except for what is listed in the HPI above, all others are negative    PE:  /70   Pulse 80   Temp 98.9  F (37.2  C) (Oral)   Resp 16    0 lbs 0 oz   There is no height or weight on file to calculate BMI.     EXAM:  GENERAL: This is a well-developed.51 year old male who  appears his  stated age  EYES: Grossly normal.  MOUTH: Buccal mucosa normal   CARDIAC:  Normal S1 and S2, no Murmur  CHEST/LUNG:  Clear lung fields bilaterally  GASTROINTESINAL (ABDOMEN):Not Assessed   MUSCULOSKELETAL: Grossly normal and both lower extremities are intact.  HEME/LYMPH: No lymphedema  NEUROLOGIC: Focally intact, Alert and oriented x 3.   PSYCH: appropriate affect  INTEGUMENT: No open lesions or ulcers  Pulse Exam:   Nonpalpable pedal pulses          DIAGNOSTIC STUDIES:     Images:  XR Chest 2 Views    Result Date: 5/25/2022  EXAM: XR CHEST 2 VW LOCATION: [facility] DATE/TIME: 5/25/2022 2:40 PM INDICATION:  Ulcer of right foot with necrosis of muscle (H) COMPARISON: 4/1/2022.     IMPRESSION: The lungs are clear. The heart size and pulmonary vascularity are normal. There are postsurgical changes from median sternotomy and coronary artery bypass grafting. There are coronary artery stents. There is no radiographic evidence for congestive heart failure. No bony fracture.    US Low Ext Arterial Doppler  wo Ex    Result Date: 6/7/2022  BILATERAL RESTING ANKLE-BRACHIAL INDICES (ABBY'S) (Date: 06/07/22) Indication: non healing right leg ulcer, decreased lower extremity pulses, lower extremity pain Previous: none History: Previous Smoker, Hypertension, Diabetic, TIA/Stroke, CAD, Skin Color Change and Vascular Ulcers  Resting ABBY's          Right: mmHg Index     Brachial: NA  Ankle-(PT): NPD NC Ankle-(DP): 79 0.59          Digit: 37 0.28               Left: mmHg Index     Brachial: 133  Ankle-(PT): 150 1.13 Ankle-(DP): 154 1.16          Digit: 100 0.75 Resting ankle-brachial index of 0.59 on the right. Toe Pressures of 37 mmHg and TBI of 0.28  Resting ankle-brachial index of 1.16 on the left. Toe Pressures of 100 mmHg and TBI of 0.75  VPR WAVEFORMS: The right volume plethysmography waveforms are mildly abnormal. The left volume plethysmography waveforms are normal.  Impression:  1. RIGHT  LOWER EXTREMITY: ABBY is Abnormal with an ABBY of 0.59. and Mildly abnormal, but adequate for healing toe pressures of 37 mmHg. Markedly dampened great toe waveform. 2. LEFT LOWER EXTREMITY: ABBY is Normal with an ABBY of 1.16. and Normal toe pressures of 100 mmHg. Reference: Wound classification Grade ABBY Ankle Systolic Pressure Toe Pressures 0 > 0.80 > 100 mmHg > 60 mmHg 1 0.6 - 0.79 70 - 100 mmHg 40 - 59 mmHg 2 0.4 - 0.59 50-70 mmHg 30 - 39 mmHg 3 < 0.39 < 50 mmHg < 30 mmHg Digit Pressures DBI Disease Category > 0.70 Normal < 0.70 Abnormal > 30 mmHg Potential wound healing < 30 mmHg Impaired wound healing Ankle Brachial Pressures ABBY Disease Category > 1.3  Likely vessel calcification with monophasic waveforms, non-diagnostic 0.95-1.30 Normal with multiphasic waveforms 0.50-0.95 Single level disease 0.30-0.50 Multilevel disease < 0.30 Critical limb ischema Volume Plethysmography Recording (VPR) at all levels Normal Sharp systolic peak, fast upstroke, prominent dicrotic notch in wave Mild Sharp systolic peak, fast upstroke, absent dicrotic notch in wave Moderate Flattened systolic peak, slowed upstroke, absent dicrotic notch inwave Severe amplitude wave with = upslope and down slope Occluded Flat Line     MR Foot Right w/o & w Contrast    Result Date: 6/8/2022  EXAM: MR FOOT RIGHT W/O and W CONTRAST LOCATION: [facility] DATE/TIME: 6/7/2022 3:03 PM INDICATION: Osteomyelitis, foot. Foot pain. COMPARISON: 11/19/2021 MRI. TECHNIQUE: Routine. Additional postgadolinium T1 sequences were obtained. IV CONTRAST: Gadavist 7 mL. FINDINGS: JOINTS AND BONES: -Moderate-sized ulcer along the dorsolateral aspect of the base of the fifth metatarsal with deep extension to the bone where there are enhancing low T1 high T2 signal changes, as seen on series 9 image 14 and series 10 image 5. Findings are worrisome for early changes of osteomyelitis. Mild reactive osteitis in the dorsolateral portion of the distal  cuboid. No fracture or contusion. Mild degenerative changes at the first MTP joint. Bipartite tibial sesamoid. No joint effusion. TENDONS: -No tendon tear, tendinopathy, or tenosynovitis. LIGAMENTS: -Lisfranc ligament: Intact. No subluxation. MUSCLES AND SOFT TISSUES: -No soft tissue mass or fluid collection. Mild global atrophy of the intrinsic musculature of the foot. Moderate subcutaneous cellulitis along the dorsal and medial portions of the forefoot.     IMPRESSION: 1.  Moderate soft tissue ulcer along the dorsolateral aspect at the base of the fifth metatarsal with deep extension to the bone where there are findings worrisome for early changes of osteomyelitis. There is also mild reactive osteitis in the adjacent cuboid. 2.  Mild degenerative changes at the first MTP joint. 3.  Subcutaneous cellulitis along the dorsal and medial portions of the forefoot.    US Lower Extremity Arterial Duplex Right    Result Date: 6/7/2022  Arterial Duplex Ultrasound (Date: 06/07/22) Lower Extremity Artery Evaluation Indication: non healing right leg ulcer, decreased lower extremity pulses, lower extremity pain Previous: none History: Previous Smoker, Hypertension, Diabetic, TIA/Stroke, CAD, Skin Color Change and Vascular Ulcers Technique: Duplex imaging is performed utilizing gray-scale, two-dimensional images, and color-flow imaging. Doppler waveform analysis and spectral Doppler imaging is also performed. LOWER EXTREMITY ARTERIAL DUPLEX EXAM WITH WAVEFORMS Right Leg:(cm/s) Location: Velocities Waveforms EIA:   96  B CFA:   65  B PFA:   74  T SFA Proximal:   49  T SFA Mid:   72  B SFA Distal:   37  M Popliteal Artery:   45  M PTA:   11   M DIGNA:   48  M DPA:   60  M Waveforms: T=Triphasic, M=Monophasic, B=Biphasic Left Leg:(cm/s) Location: Velocities Waveforms PTA:   9   M DIGNA:   82  B DPA:   109  B Waveforms: T=Triphasic, M=Monophasic, B=Biphasic  Impression: Right Lower Extremity: Patent vasculature to the mid superficial  femoral artery with multiphasic flow.  Transition to monophasic flow in the distal superficial femoral artery, new since prior study dated 5/5/2021.  The infrapopliteal vasculature is patent with monophasic flow.  Trickle flow in the posterior tibial artery.  No focal stenosis by velocity criteria. Left Lower Extremity: Patent anterior tibial and dorsalis pedis arteries with biphasic flow.  Trickle flow in the posterior tibial artery. Reference: Category Normal 1-19% 20-49% 50-99% Occluded PSV <160 cm/sec without spectral broadening <160 cm/sec with spectral broadening Increased Increased Absent Flow Ratio N/A N/A < 2.0 >2.0 N/A Post-Stenotic Turbulence No No No Yes N/A        I personally reviewed the images and my interpretation is that his toe pressures at 37 mmHg suggest poor wound healing capability.  ABIs are also diminished on the side.  On arterial duplex there is suggestion of popliteal disease with a change in waveforms here..    LABS:      Sodium   Date Value Ref Range Status   04/01/2022 139 136 - 145 mmol/L Final   12/28/2021 140 136 - 145 mmol/L Final   12/18/2021 138 136 - 145 mmol/L Final   08/25/2016 139 mmol/L Final     Urea Nitrogen   Date Value Ref Range Status   04/01/2022 17 8 - 22 mg/dL Final   12/28/2021 17 8 - 22 mg/dL Final   12/18/2021 10 8 - 22 mg/dL Final   08/25/2016 21 mg/dL Final     Hemoglobin   Date Value Ref Range Status   04/01/2022 10.5 (L) 13.3 - 17.7 g/dL Final   12/28/2021 11.1 (L) 13.3 - 17.7 g/dL Final   04/29/2019 11.5 (L) 14.0 - 18.0 g/dL Final   08/25/2016 10.1 (A) 13.3 - 17.7 g/dL Final     Platelet Count   Date Value Ref Range Status   04/01/2022 238 150 - 450 10e3/uL Final   12/28/2021 246 150 - 450 10e3/uL Final   04/29/2019 210 140 - 440 thou/uL Final   08/25/2016 189 150 - 450 10^9/L Final       Kim Montes MD, MD

## 2022-06-23 NOTE — PROGRESS NOTES
[facility]        Patient is here for a evaluation to discuss Wound(s) on right foot.  Sees Marisel Rosa and Dr. Kim. Studies done 6/7. Planning to go to hyperbaric at [facility], needs to know if ok to go. Still using wound vac but not on currently, they will put on tonight.     Pt is currently taking Statin and Xarelto.      The provider has been notified that the patient see above.     Questions patient would like addressed today are: N/A.    Refills are needed: N/A    Has homecare services and agency name:  Polly Chowdhury RN

## 2022-06-26 ENCOUNTER — LAB REQUISITION (OUTPATIENT)
Dept: LAB | Facility: CLINIC | Age: 52
End: 2022-06-26
Payer: MEDICARE

## 2022-06-26 DIAGNOSIS — Z11.59 ENCOUNTER FOR SCREENING FOR OTHER VIRAL DISEASES: ICD-10-CM

## 2022-06-26 PROCEDURE — U0005 INFEC AGEN DETEC AMPLI PROBE: HCPCS | Mod: ORL

## 2022-06-26 PROCEDURE — U0003 INFECTIOUS AGENT DETECTION BY NUCLEIC ACID (DNA OR RNA); SEVERE ACUTE RESPIRATORY SYNDROME CORONAVIRUS 2 (SARS-COV-2) (CORONAVIRUS DISEASE [COVID-19]), AMPLIFIED PROBE TECHNIQUE, MAKING USE OF HIGH THROUGHPUT TECHNOLOGIES AS DESCRIBED BY CMS-2020-01-R: HCPCS | Mod: ORL | Performed by: FAMILY MEDICINE

## 2022-06-26 PROCEDURE — U0005 INFEC AGEN DETEC AMPLI PROBE: HCPCS | Mod: ORL | Performed by: FAMILY MEDICINE

## 2022-06-27 LAB — SARS-COV-2 RNA RESP QL NAA+PROBE: NEGATIVE

## 2022-06-27 NOTE — IR NOTE
IR Procedure Pre-call    Spoke with: Mother Jewels ([phone]) and Brother, Macho ([phone])   Arrival and procedure time: 06/29 0900  Patient has : Yes Macho will drop off, he can sign consent.   Patient has someone to stay overnight:Yes If angiogram must have responsible adult for 24 hours. Joey Soliz [facility] [phone] confirmed that they will be doing groin checks over night on the patient.   Patient is taking blood thinners:No   Patient has H&P within 30 days:Yes   Patient has covid test:Yes negative   Patient NPO 8 hours prior: Yes    Pre-call completed.   June 27, 2022 3:18 PM  Bobby Cantor RN

## 2022-06-28 RX ORDER — HEPARIN SODIUM 200 [USP'U]/100ML
1 INJECTION, SOLUTION INTRAVENOUS CONTINUOUS PRN
Status: DISCONTINUED | OUTPATIENT
Start: 2022-06-29 | End: 2022-06-30 | Stop reason: HOSPADM

## 2022-06-29 ENCOUNTER — HOSPITAL ENCOUNTER (OUTPATIENT)
Dept: INTERVENTIONAL RADIOLOGY/VASCULAR | Facility: HOSPITAL | Age: 52
Discharge: HOME | End: 2022-06-29
Attending: SURGERY | Admitting: SURGERY
Payer: MEDICARE

## 2022-06-29 VITALS
RESPIRATION RATE: 18 BRPM | DIASTOLIC BLOOD PRESSURE: 74 MMHG | HEART RATE: 70 BPM | TEMPERATURE: 97.7 F | SYSTOLIC BLOOD PRESSURE: 120 MMHG | OXYGEN SATURATION: 98 %

## 2022-06-29 DIAGNOSIS — I70.221 ATHEROSCLEROSIS OF NATIVE ARTERIES OF EXTREMITIES WITH REST PAIN, RIGHT LEG (H): ICD-10-CM

## 2022-06-29 DIAGNOSIS — I73.9 PAD (PERIPHERAL ARTERY DISEASE) (H): ICD-10-CM

## 2022-06-29 DIAGNOSIS — E11.621 DIABETIC ULCER OF RIGHT MIDFOOT ASSOCIATED WITH TYPE 2 DIABETES MELLITUS, WITH NECROSIS OF MUSCLE (H): Primary | ICD-10-CM

## 2022-06-29 DIAGNOSIS — L97.413 DIABETIC ULCER OF RIGHT MIDFOOT ASSOCIATED WITH TYPE 2 DIABETES MELLITUS, WITH NECROSIS OF MUSCLE (H): Primary | ICD-10-CM

## 2022-06-29 LAB
ACT BLD: 314 SECONDS (ref 74–150)
ANION GAP SERPL CALCULATED.3IONS-SCNC: 6 MMOL/L (ref 5–18)
BUN SERPL-MCNC: 24 MG/DL (ref 8–22)
CALCIUM SERPL-MCNC: 10 MG/DL (ref 8.5–10.5)
CHLORIDE BLD-SCNC: 103 MMOL/L (ref 98–107)
CO2 SERPL-SCNC: 27 MMOL/L (ref 22–31)
CREAT SERPL-MCNC: 1.02 MG/DL (ref 0.7–1.3)
ERYTHROCYTE [DISTWIDTH] IN BLOOD BY AUTOMATED COUNT: 14.6 % (ref 10–15)
GFR SERPL CREATININE-BSD FRML MDRD: 88 ML/MIN/1.73M2
GLUCOSE BLD-MCNC: 116 MG/DL (ref 70–125)
HCT VFR BLD AUTO: 37.6 % (ref 40–53)
HGB BLD-MCNC: 11.7 G/DL (ref 13.3–17.7)
MCH RBC QN AUTO: 25.6 PG (ref 26.5–33)
MCHC RBC AUTO-ENTMCNC: 31.1 G/DL (ref 31.5–36.5)
MCV RBC AUTO: 82 FL (ref 78–100)
PLATELET # BLD AUTO: 206 10E3/UL (ref 150–450)
POTASSIUM BLD-SCNC: 4.3 MMOL/L (ref 3.5–5)
RBC # BLD AUTO: 4.57 10E6/UL (ref 4.4–5.9)
SODIUM SERPL-SCNC: 136 MMOL/L (ref 136–145)
WBC # BLD AUTO: 6.3 10E3/UL (ref 4–11)

## 2022-06-29 PROCEDURE — 80048 BASIC METABOLIC PNL TOTAL CA: CPT | Performed by: SURGERY

## 2022-06-29 PROCEDURE — 85027 COMPLETE CBC AUTOMATED: CPT | Performed by: SURGERY

## 2022-06-29 PROCEDURE — C1769 GUIDE WIRE: HCPCS

## 2022-06-29 PROCEDURE — 250N000011 HC RX IP 250 OP 636: Performed by: SURGERY

## 2022-06-29 PROCEDURE — 76937 US GUIDE VASCULAR ACCESS: CPT | Mod: 26 | Performed by: SURGERY

## 2022-06-29 PROCEDURE — 85347 COAGULATION TIME ACTIVATED: CPT

## 2022-06-29 PROCEDURE — 272N000302 HC DEVICE INFLATION CR5

## 2022-06-29 PROCEDURE — C1887 CATHETER, GUIDING: HCPCS

## 2022-06-29 PROCEDURE — C1725 CATH, TRANSLUMIN NON-LASER: HCPCS

## 2022-06-29 PROCEDURE — 99152 MOD SED SAME PHYS/QHP 5/>YRS: CPT | Performed by: SURGERY

## 2022-06-29 PROCEDURE — 75710 ARTERY X-RAYS ARM/LEG: CPT | Mod: 26 | Performed by: SURGERY

## 2022-06-29 PROCEDURE — 272N000500 HC NEEDLE CR2

## 2022-06-29 PROCEDURE — 272N000570 HC SHEATH CR7

## 2022-06-29 PROCEDURE — 37228 PR REVASC TIB/PERON ART, ANGIOPLASTY INIT VESSEL: CPT | Mod: RT | Performed by: SURGERY

## 2022-06-29 PROCEDURE — 250N000013 HC RX MED GY IP 250 OP 250 PS 637: Performed by: PHYSICIAN ASSISTANT

## 2022-06-29 PROCEDURE — 36415 COLL VENOUS BLD VENIPUNCTURE: CPT | Performed by: SURGERY

## 2022-06-29 PROCEDURE — 37224 HC REVASCULARIZE FEM-POP ARTERY ANGIOPLASTY: CPT

## 2022-06-29 PROCEDURE — C2623 CATH, TRANSLUMIN, DRUG-COAT: HCPCS

## 2022-06-29 PROCEDURE — 75710 ARTERY X-RAYS ARM/LEG: CPT | Mod: XU

## 2022-06-29 PROCEDURE — 76937 US GUIDE VASCULAR ACCESS: CPT

## 2022-06-29 PROCEDURE — 272N000566 HC SHEATH CR3

## 2022-06-29 PROCEDURE — 37224 PR REVASCULARIZE FEM/POP ARTERY, ANGIOPLASTY: CPT | Mod: RT | Performed by: SURGERY

## 2022-06-29 PROCEDURE — C1760 CLOSURE DEV, VASC: HCPCS

## 2022-06-29 RX ORDER — ACETAMINOPHEN 325 MG/1
650 TABLET ORAL EVERY 6 HOURS PRN
Status: DISCONTINUED | OUTPATIENT
Start: 2022-06-29 | End: 2022-06-30 | Stop reason: HOSPADM

## 2022-06-29 RX ORDER — CLOPIDOGREL BISULFATE 75 MG/1
300 TABLET ORAL ONCE
Status: COMPLETED | OUTPATIENT
Start: 2022-06-29 | End: 2022-06-29

## 2022-06-29 RX ORDER — SODIUM CHLORIDE 9 MG/ML
INJECTION, SOLUTION INTRAVENOUS CONTINUOUS
Status: DISCONTINUED | OUTPATIENT
Start: 2022-06-29 | End: 2022-06-30 | Stop reason: HOSPADM

## 2022-06-29 RX ORDER — LIDOCAINE 40 MG/G
CREAM TOPICAL
Status: DISCONTINUED | OUTPATIENT
Start: 2022-06-29 | End: 2022-06-30 | Stop reason: HOSPADM

## 2022-06-29 RX ORDER — FENTANYL CITRATE 50 UG/ML
INJECTION, SOLUTION INTRAMUSCULAR; INTRAVENOUS PRN
Status: COMPLETED | OUTPATIENT
Start: 2022-06-29 | End: 2022-06-29

## 2022-06-29 RX ORDER — NALOXONE HYDROCHLORIDE 0.4 MG/ML
0.2 INJECTION, SOLUTION INTRAMUSCULAR; INTRAVENOUS; SUBCUTANEOUS
Status: DISCONTINUED | OUTPATIENT
Start: 2022-06-29 | End: 2022-06-30 | Stop reason: HOSPADM

## 2022-06-29 RX ORDER — FLUMAZENIL 0.1 MG/ML
0.2 INJECTION, SOLUTION INTRAVENOUS
Status: DISCONTINUED | OUTPATIENT
Start: 2022-06-29 | End: 2022-06-30 | Stop reason: HOSPADM

## 2022-06-29 RX ORDER — HEPARIN SODIUM 200 [USP'U]/100ML
1 INJECTION, SOLUTION INTRAVENOUS CONTINUOUS PRN
Status: DISCONTINUED | OUTPATIENT
Start: 2022-06-29 | End: 2022-06-30 | Stop reason: HOSPADM

## 2022-06-29 RX ORDER — NALOXONE HYDROCHLORIDE 0.4 MG/ML
0.4 INJECTION, SOLUTION INTRAMUSCULAR; INTRAVENOUS; SUBCUTANEOUS
Status: DISCONTINUED | OUTPATIENT
Start: 2022-06-29 | End: 2022-06-30 | Stop reason: HOSPADM

## 2022-06-29 RX ORDER — HEPARIN SODIUM 1000 [USP'U]/ML
7000 INJECTION, SOLUTION INTRAVENOUS; SUBCUTANEOUS ONCE
Status: COMPLETED | OUTPATIENT
Start: 2022-06-29 | End: 2022-06-29

## 2022-06-29 RX ORDER — OXYCODONE HYDROCHLORIDE 5 MG/1
5 TABLET ORAL EVERY 4 HOURS PRN
Status: DISCONTINUED | OUTPATIENT
Start: 2022-06-29 | End: 2022-06-30 | Stop reason: HOSPADM

## 2022-06-29 RX ORDER — FENTANYL CITRATE 50 UG/ML
25-50 INJECTION, SOLUTION INTRAMUSCULAR; INTRAVENOUS EVERY 5 MIN PRN
Status: DISCONTINUED | OUTPATIENT
Start: 2022-06-29 | End: 2022-06-30 | Stop reason: HOSPADM

## 2022-06-29 RX ORDER — IOPAMIDOL 755 MG/ML
100 INJECTION, SOLUTION INTRAVASCULAR ONCE
Status: COMPLETED | OUTPATIENT
Start: 2022-06-29 | End: 2022-06-29

## 2022-06-29 RX ORDER — CLOPIDOGREL BISULFATE 75 MG/1
75 TABLET ORAL DAILY
Qty: 90 TABLET | Refills: 1 | Status: SHIPPED | OUTPATIENT
Start: 2022-06-29

## 2022-06-29 RX ADMIN — FENTANYL CITRATE 50 MCG: 50 INJECTION, SOLUTION INTRAMUSCULAR; INTRAVENOUS at 10:22

## 2022-06-29 RX ADMIN — FENTANYL CITRATE 25 MCG: 50 INJECTION, SOLUTION INTRAMUSCULAR; INTRAVENOUS at 11:08

## 2022-06-29 RX ADMIN — FENTANYL CITRATE 25 MCG: 50 INJECTION, SOLUTION INTRAMUSCULAR; INTRAVENOUS at 10:43

## 2022-06-29 RX ADMIN — MIDAZOLAM 0.5 MG: 1 INJECTION INTRAMUSCULAR; INTRAVENOUS at 10:32

## 2022-06-29 RX ADMIN — MIDAZOLAM 0.5 MG: 1 INJECTION INTRAMUSCULAR; INTRAVENOUS at 10:24

## 2022-06-29 RX ADMIN — IOPAMIDOL 40 ML: 755 INJECTION, SOLUTION INTRAVENOUS at 11:21

## 2022-06-29 RX ADMIN — MIDAZOLAM 1 MG: 1 INJECTION INTRAMUSCULAR; INTRAVENOUS at 10:17

## 2022-06-29 RX ADMIN — CLOPIDOGREL BISULFATE 300 MG: 75 TABLET ORAL at 11:50

## 2022-06-29 RX ADMIN — FENTANYL CITRATE 25 MCG: 50 INJECTION, SOLUTION INTRAMUSCULAR; INTRAVENOUS at 10:28

## 2022-06-29 RX ADMIN — MIDAZOLAM 1 MG: 1 INJECTION INTRAMUSCULAR; INTRAVENOUS at 11:00

## 2022-06-29 RX ADMIN — HEPARIN SODIUM 7000 UNITS: 1000 INJECTION INTRAVENOUS; SUBCUTANEOUS at 11:17

## 2022-06-29 NOTE — DISCHARGE INSTRUCTIONS
Angiogram Discharge Instructions:    You had an angiogram procedure. An angiogram is a procedure thatuses x-rays to take pictures of your blood vessels. A long, flexible tube or catheter is inserted through the blood stream (through the procedure site) to help deliver contrast (dye) into the arteries so they can be visibleon the x-ray. Angiograms are used to evaluate and treat possible blockages or other disease in the arterial system. Please follow the below instructions after your angiogram, including monitoring of your procedure site.    Care instructions after angiogram procedure:    - If you received sedation for your procedure, do not drive or operate heavy machinery for the rest of the day.    - Do not lift objects greater than 10 poundsfor 2 days following angiogram procedure.    - Avoid excessive exercise and straining for 2 days.    - Avoid tub baths, pools, hot tubs and Jacuzzis for 3 days or until procedure site is well healed.    - Youmay shower beginning tomorrow. Do not scrub procedure site until well healed; pat dry.    - Return to your normal activities as you tolerate after the 2 day restriction.    - You can expect to return to work 1-2days after your procedure - depending on the nature of your profession.    - It is normal to have some tenderness and minimal swelling at procedure puncture site. A small area of discoloration may be present.Tenderness typically subsides in 1-2 days. A small knot may also be present at puncture site for 6-8 weeks. This can be a normal part of the healing process.    Medications and other post-procedure care:    -If you had a blockage opened please make sure to fill your prescription for any new medication, such as Plavix, that you may have been prescribed and take it everyday    - If you have kidney function issues, please makesure to hydrate yourself well for the next two days by drinking lots of fluids to help clear your body of the dye used. If you have heart  problems such as heart failure, this may have to be moderated.    - If you are onMetformin, please do not resume it for 48hrs.    Follow up:    - Follow up with your vascular surgery team at the [facility] vascular center A follow up appointment should already be arranged for you.If you are unsure of your appointment or do not have a follow up appointment in the next 2-3 weeks, please call our office at [phone]. You will need to have an ultrasound 2-3 weeks after your angiogram and should bescheduled at the time of your follow up appt.    Further follow up will be based on ultrasound results. Typical follow up is every 3 months for the first year, then every 6 months to one year thereafter.    seek medical evaluation for:    - If you develop fevers (greater than 101 F (38.3C)).    - If you develop increasing pain, redness, purulent drainage, tenderness, or swelling at procedure site.    If you experience any bleeding from procedure/puncture site: lie down, firmly apply pressure to puncture site and call [phone].    - Seek emergent evaluation if you experience any new leg/arm pain, discoloration ornumbness.    Call the vascular center at [phone] with questions/concerns or if you have any of the above symptoms.

## 2022-06-29 NOTE — PRE-PROCEDURE
GENERAL PRE-PROCEDURE:   Procedure:  Right leg angiogram, possible intervention.  Monitoring and sedation  Date/Time:  6/29/2022 9:56 AM    Verbal consent obtained?: Yes    Written consent obtained?: Yes    Risks and benefits: Risks, benefits and alternatives were discussed    Consent given by:  Guardian  Patient states understanding of procedure being performed: Yes    Patient's understanding of procedure matches consent: Yes    Procedure consent matches procedure scheduled: Yes    Expected level of sedation:  Moderate  Appropriately NPO:  Yes  ASA Class:  2  Mallampati  :  Grade 2- soft palate, base of uvula, tonsillar pillars, and portion of posterior pharyngeal wall visible  Lungs:  Lungs clear with good breath sounds bilaterally  Heart:  Normal heart sounds and rate  History & Physical reviewed:  History and physical reviewed and no updates needed  Statement of review:  I have reviewed the lab findings, diagnostic data, medications, and the plan for sedation

## 2022-06-29 NOTE — OP NOTE
Vascular surgery interventional procedure note    Date: 06/29/22     Procedures Performed:    Ir Lower Extremity Angiogram Right  from the aorta level via left groin approach    Ultrasound-guided vascular access    Angioplasty right popliteal artery    Angioplasty right anterior tibial artery  .  Provider:  Kim Montes MD     Assistant:  KHUSHBU Worthington    Indication:  This is a 51-year-old gentleman with nonhealing wounds of his right foot.  Arterial duplex suggests popliteal and infrapopliteal disease and toe pressures now little over 30 suggesting inadequate blood supply for wound healing.  Plan for angio intervention to improve arterial flow and allow for wound healing.    Sedation:    The procedure was performed with administration of intravenous conscious sedation with appropriate preoperative, intraoperative, and postoperative evaluation.  55 minutes of supervised face to face intraservice time was provided by a radiology nurse under my direct supervision.  Versed 3 mg and fentanyl 125 mcg given.    ANTIBIOTICS: None    FLUOROSCOPIC TIME:   Minutes    RADIATION DOSE: 84 mGy ; 10.1 Minutes ; 0 mGy     CONTRAST: 40 cc Isovue      Procedure:    Ultrasound was used to evaluate the left groin.  The selected vessel was  widely patent.  It was the common femoral artery. Ultrasound was then used for real-time ultrasound guided needle entry of the common femoral artery. Permanent images were recorded and saved to the patient's medical record.    Micropuncture technique was used to deliver a 5 Japanese sheath.  Through this an Omni Flush catheter was advanced to low abdominal aorta and our initial aortogram performed    Findings: Patent lower abdominal aorta, common iliac arteries, internal iliac arteries, and external iliac arteries without disease.  Patent common femoral arteries without disease.  Patent right profunda femoris artery and SFA origin without disease.    Wire catheter advanced down to the right SFA  origin level and a selective right leg angiogram performed    Findings: Patent superficial femoral artery.  There is significant irregularity leading to complete occlusion of the distal popliteal artery.  This vessel later reconstitutes through retrograde collateral branches.  The origin of the anterotibial artery is also occluded.  The tibioperoneal trunk is proximally occluded.  The peroneal artery and anterotibial artery reconstitute does not contribute further down.  The anterior tibial artery is a dominant flow into the foot beyond its origin occlusion with no other visible disease.  In the foot however the dorsalis pedis artery is diseased and there is really only collateral flow in the foot with no pedal arches.    This point the patient was fully heparinized and a 6 Croatian Ansell sheath was exchanged down to the origin of the SFA.  Through this a 4 Croatian 90 cm sheath was advanced down to the popliteal level over wire.  We now used a stiff angled Glidewire and Diaz cross catheter to cross the popliteal occlusion in the anterotibial artery origin occlusion advanced into the anterotibial artery.  We performed an angiogram from the catheter in the intertibial artery beyond the occlusion.    Findings: True lumen location of a catheter in the anterotibial artery beyond the occlusion.    No change in 018 wire.  Over this we exchanged in a 3 mm x 4 cm angioplasty balloon and performed angioplasty of the origin disease in the anterotibial artery.  The balloon was taken to 10 arina of pressure and held up for 2 minutes.  The balloon was then taken down.  We performed an angiogram from the sheath level at the popliteal    Findings: No inline flow through the treated popliteal and anterior tibial arteries.  There is still significant disease in the distal popliteal artery.    We remove the 4 Croatian sheath and over the same wire we exchanged in a 4 mm x 4 cm angioplasty balloon treating only the popliteal artery segment.   This was taken up to 8 arina of pressure and held up for 1 minute.  Once this was completed we exchanged this balloon for a 4 mm x 4 cm drug-eluting angioplasty balloon which were treated the same segment for 3 minutes at 8 arina of pressure.  We exchanged in an 035 wire while the balloon was down and then once the balloon was removed perform an angiogram from the 6 Hungarian sheath level    Findings: Widely patent flow to the popliteal artery directly to the anterior tibial artery with no visible residual disease.  Inline flow now to the foot.  There is still no pedal arch but there is much improved flow through collaterals in the foot.    This point the sheath was pulled back to the left groin level and angiogram performed confirming that we had a good access site for attempted closure device.  A 6 Hungarian Angio-Seal was then deployed without difficulty and a completion duplex performed showing good positioning of the Angio-Seal on the Anturol the artery without active extravasation and brisk flow in the native vessel through and beyond the Angio-Seal showing no luminal issues.        Impression:  Successful recanalization of distal popliteal and origin anterior tibial artery occlusions with much improved flow to the foot.  Drug-eluting therapy use and so patient will need to be on Plavix for at least 3 months.  We will see the patient back with Lizzeth in 2 weeks time with repeat ABIs and right leg arterial duplex.  Needs to be seen by the podiatry for wound care service ASA as well.      Kim Montes MD  [facility] Vascular Surgery

## 2022-06-29 NOTE — IP AVS SNAPSHOT
[facility] Interventional Radiology  [address] [place] [id number]  Phone: [phone]  Fax: [phone]                                      After Visit Summary   6/29/2022    Cm Lundberg   MRN: [id number]           After Visit Summary Signature Page    I have received my discharge instructions, and my questions have been answered. I have discussed any challenges I see with this plan with the nurse or doctor.    ..........................................................................................................................................  Patient/Patient Representative Signature      ..........................................................................................................................................  Patient Representative Print Name and Relationship to Patient    ..................................................               ................................................  Date                                   Time    ..........................................................................................................................................  Reviewed by Signature/Title    ...................................................              ..............................................  Date                                               Time          22EPIC Rev 08/18

## 2022-06-30 ENCOUNTER — TELEPHONE (OUTPATIENT)
Dept: INTERVENTIONAL RADIOLOGY/VASCULAR | Facility: HOSPITAL | Age: 52
End: 2022-06-30

## 2022-07-03 NOTE — PROGRESS NOTES
"[facility] Medical Record Number:  [id number]  Place of Service where encounter took place: [facility] ([facility]) [[id number]]   CODE STATUS:   CPR/Full code     Chief Complaint/Reason for Visit:  Chief Complaint   Patient presents with     RECHECK     TCU 6/14/2022. Right foot diabetic ulceration. S/p intervention 12/17/2021; application of theraskin, 1/27/2022 and 4/4/2022.         TCU HPI:    Cm Lundberg is a 51 year old male with hx of CAD, s/p stents and CABG, HTN, DM, prior CVA, afib/flutter on xarelto, admitted to the hospital on 12/17/2021 for elective procedure for right foot ulceration. He underwent incision and drainage of the right foot, excisional debridement of the ulceration of the right foot into the level of the muscle, under MAC with popliteal block anesthesia on 12/17/2021. Wound vac was subsequently placed. He had no hospital complications. Seen by ID with recommendation for 5 days Augmentin at hospital discharge based on culture results. Chronic anticoagulation was restarted. He was felt to need TCU and discharged to TCU on 12/22/0221. He has had close follow up with podiatry, remained NWB, treated with wound vac. He underwent procedure on 1/27/2022 with incision and drainage with application of theraskin. Continued NWB with wound vac, frequent follow up with podiatry. At visit 3/25/2022 recommendation was made for another procedure, incision and drainage with application of theraskin on Mon 4/4/2022. Notes indicated per Dr. Kim: \"TREATMENT:-The lateral right foot ulceration continues to have exposed tendon of the peroneal brevis. Based on this presentation, I recommend we again try application of theraskin with continued use of the wound vac\". Will have wound vac applied after procedure. Dr. Kim said okay to continue on xarelto anticoagulation pre op.       Today:  He continues with wound vac, right foot, NWB, following with podiatry. At " last visit 5/25/2022, referred to vascular and also for hyperbaric oxygen therapy at [facility]. He saw Marisel Moe on 6/7/2022, wound was debrided, concern for vascular status with abnormal ABBY. Referred then to Dr. Montes for possible vascular intervention, appt on 6/23/2022. On 6/13/2022 he had a consult at [facility] for hyperbaric oxygen therapy but no actual treatments yet started, referral form states they will follow up after patient sees vascular due to potential for IR procedure.     Reports no pain. Appetite is good. No diarrhea or constipation. No shortness of breath, chest pain, dizziness. Denies fever, cough or congestion. No urinary sx. Continues on usual meds for DM, CHF, CAD, depression, no recent med changes. He is on xarelto for afib. No complaints of back or abdominal pain.       PAST MEDICAL HISTORY:  Past Medical History:   Diagnosis Date     Abdominal pain, generalized 8/19/2016     Anemia, unspecified type 8/19/2016     Anxiety      Anxiety state 7/29/2021    Formatting of this note might be different from the original. Created by Conversion  Replacement Utility updated for latest IMO load     Arthritis      Cerebral infarction (H)      Chest pain, unspecified type 8/19/2016     Congestive heart failure (H)      Constipation, unspecified constipation type 8/19/2016     Coronary artery disease     CABG with multiple stents     Coronary artery disease involving native coronary artery without angina pectoris, unspecified whether native or transplanted heart 8/19/2016     Coronary atherosclerosis 7/29/2021    Formatting of this note might be different from the original. Created by Conversion  Replacement Utility updated for latest IMO load     Depression      Diabetes (H)      Diabetes mellitus (H) 12/17/2021     Diabetic ulcer of right midfoot associated with type 2 diabetes mellitus, limited to breakdown of skin (H) 9/28/2017     Diabetic ulcer of right midfoot associated with type 2 diabetes mellitus,  with necrosis of muscle (H) 1/7/2022     Diarrhea 8/19/2016     Equinovarus acquired deformity, right 4/14/2021     Equinovarus deformity, acquired, right      Essential hypertension 8/19/2016     Gastroesophageal reflux disease without esophagitis 8/19/2016     GERD (gastroesophageal reflux disease)      Heart attack (H)      History of blood transfusion      Hyperlipidemia      Hyperlipidemia, unspecified hyperlipidemia type 8/19/2016     Hypertension      Insomnia, unspecified type 8/19/2016     Late effects of cerebrovascular accident 8/19/2016     Major depressive disorder with single episode, remission status unspecified 8/19/2016     Microalbuminuria 7/29/2021    Formatting of this note might be different from the original. Created by Conversion     Migraine      Migraines      Mild intellectual disabilities 8/19/2016     Myalgia      Nausea 8/19/2016     Pain 8/19/2016     PONV (postoperative nausea and vomiting)     mild nausea     Problems with learning 7/29/2021    Formatting of this note might be different from the original. Created by Conversion [facility] Annotation: Feb 11 2010  1:41PM - Leonid Cabral: CANNOT READ OR  WRITE     Stented coronary artery      Type 2 diabetes mellitus (H) 7/29/2021    Formatting of this note might be different from the original. Created by Conversion     Type 2 diabetes mellitus with hyperglycemia (H) 8/19/2016     Type II or unspecified type diabetes mellitus with ophthalmic manifestations, not stated as uncontrolled(250.50) (H) 7/29/2021    Formatting of this note might be different from the original. Created by Conversion     Weakness 8/19/2016       MEDICATIONS:  Current Outpatient Medications   Medication Sig Dispense Refill     acetaminophen (TYLENOL) 325 MG tablet Take 650 mg by mouth every 4 hours as needed for mild pain        ARIPiprazole (ABILIFY) 2 MG tablet 2 mg daily        ASPERCREME ORIGINAL 10 % external cream APPLY TO LOW BACK TWICE DAILY FOR LOW BACK  PAIN       atorvastatin (LIPITOR) 80 MG tablet Take 80 mg by mouth daily        cholestyramine (QUESTRAN) 4 g packet Take 1 packet by mouth every other day               DULoxetine (CYMBALTA) 60 MG capsule Take 120 mg by mouth daily takle 120mg       erenumab-aooe (AIMOVIG) 140 MG/ML injection Inject 1 mL (140 mg) Subcutaneous every 30 days 1 mL 0     fenofibrate micronized (LOFIBRA) 200 MG capsule Take 200 mg by mouth every morning (before breakfast)        ferrous sulfate (FEROSUL) 325 (65 Fe) MG tablet Take 325 mg by mouth daily (with breakfast)        gabapentin (NEURONTIN) 100 MG capsule TAKE 1 CAP BY MOUTH TWICE A DAY BEFORE MEALS       gabapentin (NEURONTIN) 300 MG capsule Take 600 mg by mouth At Bedtime       glipiZIDE (GLUCOTROL) 5 MG tablet TAKE 1/2 TABLET (2.5MG) BY MOUTH DAILY DX DIABETES       loperamide (IMODIUM) 2 MG capsule Take 4 mg by mouth as needed for diarrhea        losartan (COZAAR) 25 MG tablet Take 25 mg by mouth At Bedtime        magnesium oxide 400 MG CAPS Take 400 mg by mouth 4 times daily       Menthol, Topical Analgesic, (BIOFREEZE ROLL-ON) 4 % GEL Apply 1 Application topically 2 times daily Apply to lower back for pain       metFORMIN (GLUCOPHAGE) 1000 MG tablet Take 1,000 mg by mouth       metFORMIN (GLUCOPHAGE) 500 MG tablet TAKE 2 TABS (1000MG) BY MOUTH EVERY MORNING;TAKE 1 TAB BY MOUTH EVERY EVENING       metoprolol succinate ER (TOPROL-XL) 25 MG 24 hr tablet Take 75 mg by mouth daily        mirtazapine (REMERON) 7.5 MG tablet Take 7.5 mg by mouth At Bedtime        Neomycin-Bacitracin-Polymyxin (TRIPLE ANTIBIOTIC) 3.5-400-5000 OINT ointment Externally apply 1 applicator topically 3 times daily as needed       Nitroglycerin (NITROSTAT SL) Place 0.4 mg under the tongue every 5 minutes as needed for chest pain        omeprazole (PRILOSEC) 20 MG DR capsule Take 20 mg by mouth daily        oxyCODONE (ROXICODONE) 5 MG tablet TAKE 1 TAB BY MOUTH EVERY 6HOURS AS NEEDED FOR PAIN 28 tablet 0  "    polyethylene glycol (MIRALAX) 17 GM/Dose powder Take 1 packet by mouth daily as needed for constipation        senna-docusate (SENOKOT-S/PERICOLACE) 8.6-50 MG tablet Take 1 tablet by mouth 2 times daily as needed for constipation        traZODone (DESYREL) 50 MG tablet Take 50 mg by mouth At Bedtime        vitamin C (ASCORBIC ACID) 500 MG tablet Take 500 mg by mouth daily        XARELTO ANTICOAGULANT 20 MG TABS tablet TAKE 1 TAB BY MOUTH ONCE DAILY         PHYSICAL EXAM:  General: Patient is alert male, no distress.   Vitals: /71   Pulse 80   Temp 98  F (36.7  C)   Resp 18   Ht 1.702 m (5' 7\")   Wt 69.7 kg (153 lb 9.6 oz)   SpO2 96%   BMI 24.06 kg/m    HEENT: Head is NCAT. Eyes show no injection or icterus. Nares negative. Oropharynx well hydrated.  Neck: No JVD.  Lungs: Non labored respirations.   Abdomen: Soft.  : Deferred.  Extremities: Mild RLE swelling. Calf non tender.   Musculoskeletal: Mild degen changes.   Skin: Wound vac right foot.   Psych: Mood appears good.      LABS/DIAGNOSTIC DATA:  Component      Latest Ref Rng & Units 12/28/2021 4/1/2022   Sodium      136 - 145 mmol/L 140 139   Potassium      3.5 - 5.0 mmol/L 4.5 4.6   Chloride      98 - 107 mmol/L 107 104   Carbon Dioxide      22 - 31 mmol/L 25 26   Anion Gap      5 - 18 mmol/L 8 9   Glucose      70 - 125 mg/dL 141 (H) 129 (H)   Calcium      8.5 - 10.5 mg/dL 10.3 9.9   Urea Nitrogen      8 - 22 mg/dL 17 17   Creatinine      0.70 - 1.30 mg/dL 0.99 0.97   GFR Estimate      >60 mL/min/1.73m2 >90 >90     Component      Latest Ref Rng & Units 12/28/2021 4/1/2022   WBC      4.0 - 11.0 10e3/uL 5.4 6.3   RBC Count      4.40 - 5.90 10e6/uL 4.01 (L) 4.07 (L)   Hemoglobin      13.3 - 17.7 g/dL 11.1 (L) 10.5 (L)   Hematocrit      40.0 - 53.0 % 36.9 (L) 34.0 (L)   MCV      78 - 100 fL 92 84   MCH      26.5 - 33.0 pg 27.7 25.8 (L)   MCHC      31.5 - 36.5 g/dL 30.1 (L) 30.9 (L)   RDW      10.0 - 15.0 % 13.4 13.2   Platelet Count      150 - 450 " 10e3/uL 246 238     Component      Latest Ref Rng & Units 12/21/2021 5/16/2022   Hemoglobin A1C      <=5.6 % 5.1 5.8 (H)         ASSESSMENT/PLAN:  1. Right foot diabetic ulcer. S/p procedure 12/27/2021 and I and D with theraskin on 1/27/2022 and 4/4/2022. Non healing wound. Has continued NWB with wound vac. Referred for vascular eval on 6/7/2022 with follow up with Dr. Montes to consider intervention on 6/23/2022. Also referred for hyperbaric oxygen therapy.  2. HTN. On metoprolol and losartan. Bps acceptable.   3. Afib. On xarelto for anticoagulation. Adequate rate control.  4. Diabetes. He is on metformin and glipizide, continue without change. Last A1c was good at 5.8%. Continue accuchecks in TCU.  5. Stable medical conditions: CAD. Hx of MIs. Hx of stenting and prior CABG. On statin, xarelto, beta blocker; Hx of stroke. Continue statin, xarelto; Anemia, on iron; Mood disorder, mixed depression and anxiety on duloxetine, Abilify; Migraines. Receives monthly Aimovig.         Electronically signed by: Heidi Xie MD

## 2022-07-05 ENCOUNTER — TRANSITIONAL CARE UNIT VISIT (OUTPATIENT)
Dept: GERIATRICS | Facility: CLINIC | Age: 52
End: 2022-07-05
Payer: MEDICARE

## 2022-07-05 VITALS
WEIGHT: 153 LBS | HEIGHT: 67 IN | RESPIRATION RATE: 18 BRPM | SYSTOLIC BLOOD PRESSURE: 117 MMHG | OXYGEN SATURATION: 98 % | DIASTOLIC BLOOD PRESSURE: 64 MMHG | BODY MASS INDEX: 24.01 KG/M2 | HEART RATE: 66 BPM | TEMPERATURE: 98.7 F

## 2022-07-05 DIAGNOSIS — E11.69 TYPE 2 DIABETES MELLITUS WITH OTHER SPECIFIED COMPLICATION, WITHOUT LONG-TERM CURRENT USE OF INSULIN (H): ICD-10-CM

## 2022-07-05 DIAGNOSIS — I48.91 ATRIAL FIBRILLATION, UNSPECIFIED TYPE (H): ICD-10-CM

## 2022-07-05 DIAGNOSIS — L97.413 DIABETIC ULCER OF RIGHT MIDFOOT ASSOCIATED WITH TYPE 2 DIABETES MELLITUS, WITH NECROSIS OF MUSCLE (H): Primary | ICD-10-CM

## 2022-07-05 DIAGNOSIS — I10 ESSENTIAL HYPERTENSION: ICD-10-CM

## 2022-07-05 DIAGNOSIS — E11.621 DIABETIC ULCER OF RIGHT MIDFOOT ASSOCIATED WITH TYPE 2 DIABETES MELLITUS, WITH NECROSIS OF MUSCLE (H): Primary | ICD-10-CM

## 2022-07-05 PROCEDURE — 99309 SBSQ NF CARE MODERATE MDM 30: CPT | Performed by: FAMILY MEDICINE

## 2022-07-05 NOTE — LETTER
"    7/5/2022        RE: Cm Lundberg  [address]  [place] [address]          [facility] Medical Record Number:  [id number]  Place of Service where encounter took place: [facility] ([facility]) [[id number]]   CODE STATUS:   CPR/Full code     Chief Complaint/Reason for Visit:  Chief Complaint   Patient presents with     RECHECK     TCU 7/5/2022. Non healing right foot diabetic ulceration. S/p intervention 12/17/2021; application of theraskin, 1/27/2022 and 4/4/2022.       TCU HPI:    Cm Lundberg is a 51 year old male with hx of CAD, s/p stents and CABG, HTN, DM, prior CVA, afib/flutter on xarelto, admitted to the hospital on 12/17/2021 for elective procedure for right foot ulceration. He underwent incision and drainage of the right foot, excisional debridement of the ulceration of the right foot into the level of the muscle, under MAC with popliteal block anesthesia on 12/17/2021. Wound vac was subsequently placed. He had no hospital complications. Seen by ID with recommendation for 5 days Augmentin at hospital discharge based on culture results. Chronic anticoagulation was restarted. He was felt to need TCU and discharged to TCU on 12/22/0221. He has had close follow up with podiatry, remained NWB, treated with wound vac. He underwent procedure on 1/27/2022 with incision and drainage with application of theraskin. Continued NWB with wound vac, frequent follow up with podiatry. At visit 3/25/2022 recommendation was made for another procedure, incision and drainage with application of theraskin on Mon 4/4/2022. Notes indicated per Dr. Kim: \"TREATMENT:-The lateral right foot ulceration continues to have exposed tendon of the peroneal brevis. Based on this presentation, I recommend we again try application of theraskin with continued use of the wound vac\". Will have wound vac applied after procedure. Dr. Kim said okay to continue on xarelto " anticoagulation pre op.       Today:  He saw Dr. Montes from vascular on 6/23/2022 and subsequently had RLE angiogram on 6/29/2022 with angioplasty right popliteal and anterior tibia. He was started on Plavix at least for 3 months. He will have follow up in vascular clinic on 7/18/2022. He continues NWB on right with wound vac. He has started hyperbaric oxygen treatments which will be daily Mon-Friday, duration unclear. Matias denies pain in foot, he reports no concerns. He did go out to see eye doctor on 6/28/2022 for follow up of left eye vitreous hemorrhage s/p injection and to RTC with eye doctor on 11/9/2022. Appetite is good. No abdominal pain, nausea, vomiting, diarrhea or constipation. Denies shortness of breath, chest pain, dizziness. Denies fever, cough or congestion. No urinary sx. Continues on usual meds for DM, CHF, CAD, depression, no recent med changes, did have the addition of Plavix after RLE angioplasty. He is on xarelto for afib. No complaints of back pain.       PAST MEDICAL HISTORY:  Past Medical History:   Diagnosis Date     Abdominal pain, generalized 8/19/2016     Anemia, unspecified type 8/19/2016     Anxiety      Anxiety state 7/29/2021    Formatting of this note might be different from the original. Created by Conversion  Replacement Utility updated for latest IMO load     Arthritis      Cerebral infarction (H)      Chest pain, unspecified type 8/19/2016     Congestive heart failure (H)      Constipation, unspecified constipation type 8/19/2016     Coronary artery disease     CABG with multiple stents     Coronary artery disease involving native coronary artery without angina pectoris, unspecified whether native or transplanted heart 8/19/2016     Coronary atherosclerosis 7/29/2021    Formatting of this note might be different from the original. Created by Conversion  Replacement Utility updated for latest IMO load     Depression      Diabetes (H)      Diabetes mellitus (H) 12/17/2021      Diabetic ulcer of right midfoot associated with type 2 diabetes mellitus, limited to breakdown of skin (H) 9/28/2017     Diabetic ulcer of right midfoot associated with type 2 diabetes mellitus, with necrosis of muscle (H) 1/7/2022     Diarrhea 8/19/2016     Equinovarus acquired deformity, right 4/14/2021     Equinovarus deformity, acquired, right      Essential hypertension 8/19/2016     Gastroesophageal reflux disease without esophagitis 8/19/2016     GERD (gastroesophageal reflux disease)      Heart attack (H)      History of blood transfusion      Hyperlipidemia      Hyperlipidemia, unspecified hyperlipidemia type 8/19/2016     Hypertension      Insomnia, unspecified type 8/19/2016     Late effects of cerebrovascular accident 8/19/2016     Major depressive disorder with single episode, remission status unspecified 8/19/2016     Microalbuminuria 7/29/2021    Formatting of this note might be different from the original. Created by Conversion     Migraine      Migraines      Mild intellectual disabilities 8/19/2016     Myalgia      Nausea 8/19/2016     Pain 8/19/2016     PONV (postoperative nausea and vomiting)     mild nausea     Problems with learning 7/29/2021    Formatting of this note might be different from the original. Created by [facility] Annotation: Feb 11 2010  1:41PM - Leonid Cabral: CANNOT READ OR  WRITE     Stented coronary artery      Type 2 diabetes mellitus (H) 7/29/2021    Formatting of this note might be different from the original. Created by Conversion     Type 2 diabetes mellitus with hyperglycemia (H) 8/19/2016     Type II or unspecified type diabetes mellitus with ophthalmic manifestations, not stated as uncontrolled(250.50) (H) 7/29/2021    Formatting of this note might be different from the original. Created by Conversion     Weakness 8/19/2016       MEDICATIONS:  Current Outpatient Medications   Medication Sig Dispense Refill     acetaminophen (TYLENOL) 325 MG tablet Take 650  mg by mouth every 4 hours as needed for mild pain        ARIPiprazole (ABILIFY) 2 MG tablet 2 mg daily        ASPERCREME ORIGINAL 10 % external cream APPLY TO LOW BACK TWICE DAILY FOR LOW BACK PAIN       atorvastatin (LIPITOR) 80 MG tablet Take 80 mg by mouth daily        cholestyramine (QUESTRAN) 4 g packet Take 1 packet by mouth every other day        clopidogrel (PLAVIX) 75 MG tablet Take 1 tablet (75 mg) by mouth daily Start taking medication the day after the procedure. 90 tablet 1     DULoxetine (CYMBALTA) 60 MG capsule Take 120 mg by mouth daily takle 120mg       erenumab-aooe (AIMOVIG) 140 MG/ML injection Inject 1 mL (140 mg) Subcutaneous every 30 days 1 mL 0     fenofibrate micronized (LOFIBRA) 200 MG capsule Take 200 mg by mouth every morning (before breakfast)        ferrous sulfate (FEROSUL) 325 (65 Fe) MG tablet Take 325 mg by mouth daily (with breakfast)        gabapentin (NEURONTIN) 100 MG capsule TAKE 1 CAP BY MOUTH TWICE A DAY BEFORE MEALS       gabapentin (NEURONTIN) 300 MG capsule Take 600 mg by mouth At Bedtime       glipiZIDE (GLUCOTROL) 5 MG tablet TAKE 1/2 TABLET (2.5MG) BY MOUTH DAILY DX DIABETES       loperamide (IMODIUM) 2 MG capsule Take 4 mg by mouth as needed for diarrhea        losartan (COZAAR) 25 MG tablet Take 25 mg by mouth At Bedtime        magnesium oxide 400 MG CAPS Take 400 mg by mouth 4 times daily       Menthol, Topical Analgesic, (BIOFREEZE ROLL-ON) 4 % GEL Apply 1 Application topically 2 times daily Apply to lower back for pain       metFORMIN (GLUCOPHAGE) 1000 MG tablet Take 1,000 mg by mouth       metFORMIN (GLUCOPHAGE) 500 MG tablet TAKE 2 TABS (1000MG) BY MOUTH EVERY MORNING;TAKE 1 TAB BY MOUTH EVERY EVENING       metoprolol succinate ER (TOPROL-XL) 25 MG 24 hr tablet Take 75 mg by mouth daily        mirtazapine (REMERON) 7.5 MG tablet Take 7.5 mg by mouth At Bedtime        Neomycin-Bacitracin-Polymyxin (TRIPLE ANTIBIOTIC) 3.5-400-5000 OINT ointment Externally apply 1  "applicator topically 3 times daily as needed       Nitroglycerin (NITROSTAT SL) Place 0.4 mg under the tongue every 5 minutes as needed for chest pain        omeprazole (PRILOSEC) 20 MG DR capsule Take 20 mg by mouth daily        oxyCODONE (ROXICODONE) 5 MG tablet TAKE 1 TAB BY MOUTH EVERY 6HOURS AS NEEDED FOR PAIN 28 tablet 0     polyethylene glycol (MIRALAX) 17 GM/Dose powder Take 1 packet by mouth daily as needed for constipation        senna-docusate (SENOKOT-S/PERICOLACE) 8.6-50 MG tablet Take 1 tablet by mouth 2 times daily as needed for constipation        traZODone (DESYREL) 50 MG tablet Take 50 mg by mouth At Bedtime        vitamin C (ASCORBIC ACID) 500 MG tablet Take 500 mg by mouth daily        XARELTO ANTICOAGULANT 20 MG TABS tablet TAKE 1 TAB BY MOUTH ONCE DAILY          PHYSICAL EXAM:  General: Patient is alert male, no distress.   Vitals: /64   Pulse 66   Temp 98.7  F (37.1  C)   Resp 18   Ht 1.702 m (5' 7\")   Wt 69.4 kg (153 lb)   SpO2 98%   BMI 23.96 kg/m    HEENT: Head is NCAT. Eyes show no injection or icterus. Nares negative. Oropharynx moist.  Neck: No JVD.  Lungs: Non labored respirations.   Abdomen: Soft.  : Deferred.  Extremities: Mild edema RLE.   Musculoskeletal: Mild degen changes.   Skin: Bandage and wound vac right foot.    Psych: Mood is good.       LABS/DIAGNOSTIC DATA:  Component      Latest Ref Rng & Units 12/28/2021 4/1/2022   Sodium      136 - 145 mmol/L 140 139   Potassium      3.5 - 5.0 mmol/L 4.5 4.6   Chloride      98 - 107 mmol/L 107 104   Carbon Dioxide      22 - 31 mmol/L 25 26   Anion Gap      5 - 18 mmol/L 8 9   Glucose      70 - 125 mg/dL 141 (H) 129 (H)   Calcium      8.5 - 10.5 mg/dL 10.3 9.9   Urea Nitrogen      8 - 22 mg/dL 17 17   Creatinine      0.70 - 1.30 mg/dL 0.99 0.97   GFR Estimate      >60 mL/min/1.73m2 >90 >90     Component      Latest Ref Rng & Units 12/28/2021 4/1/2022   WBC      4.0 - 11.0 10e3/uL 5.4 6.3   RBC Count      4.40 - 5.90 " 10e6/uL 4.01 (L) 4.07 (L)   Hemoglobin      13.3 - 17.7 g/dL 11.1 (L) 10.5 (L)   Hematocrit      40.0 - 53.0 % 36.9 (L) 34.0 (L)   MCV      78 - 100 fL 92 84   MCH      26.5 - 33.0 pg 27.7 25.8 (L)   MCHC      31.5 - 36.5 g/dL 30.1 (L) 30.9 (L)   RDW      10.0 - 15.0 % 13.4 13.2   Platelet Count      150 - 450 10e3/uL 246 238     Component      Latest Ref Rng & Units 12/21/2021 5/16/2022   Hemoglobin A1C      <=5.6 % 5.1 5.8 (H)         ASSESSMENT/PLAN:  1. Non healing diabetic ulcer, right foot. S/p procedures 12/27/2021, 1/27/2022 and 4/4/2022. Has continued NWB with wound vac. Saw Dr. Montes from vascular on 6/23/2022 and subsequently had RLE angiogram on 6/29/2022 with angioplasty right popliteal and anterior tibia. He was started on Plavix at least for 3 months. He will have follow up in vascular clinic on 7/18/2022. Has started hyperbaric oxygen therapy.  2. HTN. On metoprolol and losartan. Bps well controlled.   3. Afib. On xarelto for anticoagulation. Adequate rate control.  4. Diabetes. He is on metformin and glipizide. Last A1c was good at 5.8%. Continue accuchecks in TCU.  5. Stable medical conditions: CAD. Hx of MIs. Hx of stenting and prior CABG. On statin, xarelto, beta blocker; Hx of stroke. Continue statin, xarelto; Anemia, on iron; Mood disorder, mixed depression and anxiety on duloxetine, Abilify; Migraines. Receives monthly Aimovig.         Electronically signed by: Heidi Xie MD           Sincerely,        Heidi Xie MD

## 2022-07-08 ENCOUNTER — TELEPHONE (OUTPATIENT)
Dept: VASCULAR SURGERY | Facility: CLINIC | Age: 52
End: 2022-07-08

## 2022-07-11 NOTE — PROGRESS NOTES
"[facility] Medical Record Number:  [id number]  Place of Service where encounter took place: [facility] ([facility]) [[id number]]   CODE STATUS:   CPR/Full code     Chief Complaint/Reason for Visit:  Chief Complaint   Patient presents with     RECHECK     TCU 7/5/2022. Non healing right foot diabetic ulceration. S/p intervention 12/17/2021; application of theraskin, 1/27/2022 and 4/4/2022.       TCU HPI:    Cm Lundberg is a 51 year old male with hx of CAD, s/p stents and CABG, HTN, DM, prior CVA, afib/flutter on xarelto, admitted to the hospital on 12/17/2021 for elective procedure for right foot ulceration. He underwent incision and drainage of the right foot, excisional debridement of the ulceration of the right foot into the level of the muscle, under MAC with popliteal block anesthesia on 12/17/2021. Wound vac was subsequently placed. He had no hospital complications. Seen by ID with recommendation for 5 days Augmentin at hospital discharge based on culture results. Chronic anticoagulation was restarted. He was felt to need TCU and discharged to TCU on 12/22/0221. He has had close follow up with podiatry, remained NWB, treated with wound vac. He underwent procedure on 1/27/2022 with incision and drainage with application of theraskin. Continued NWB with wound vac, frequent follow up with podiatry. At visit 3/25/2022 recommendation was made for another procedure, incision and drainage with application of theraskin on Mon 4/4/2022. Notes indicated per Dr. Kim: \"TREATMENT:-The lateral right foot ulceration continues to have exposed tendon of the peroneal brevis. Based on this presentation, I recommend we again try application of theraskin with continued use of the wound vac\". Will have wound vac applied after procedure. Dr. Kim said okay to continue on xarelto anticoagulation pre op.       Today:  He saw Dr. Montes from vascular on 6/23/2022 and subsequently had " RLE angiogram on 6/29/2022 with angioplasty right popliteal and anterior tibia. He was started on Plavix at least for 3 months. He will have follow up in vascular clinic on 7/18/2022. He continues NWB on right with wound vac. He has started hyperbaric oxygen treatments which will be daily Mon-Friday, duration unclear. Matias denies pain in foot, he reports no concerns. He did go out to see eye doctor on 6/28/2022 for follow up of left eye vitreous hemorrhage s/p injection and to RTC with eye doctor on 11/9/2022. Appetite is good. No abdominal pain, nausea, vomiting, diarrhea or constipation. Denies shortness of breath, chest pain, dizziness. Denies fever, cough or congestion. No urinary sx. Continues on usual meds for DM, CHF, CAD, depression, no recent med changes, did have the addition of Plavix after RLE angioplasty. He is on xarelto for afib. No complaints of back pain.       PAST MEDICAL HISTORY:  Past Medical History:   Diagnosis Date     Abdominal pain, generalized 8/19/2016     Anemia, unspecified type 8/19/2016     Anxiety      Anxiety state 7/29/2021    Formatting of this note might be different from the original. Created by Conversion  Replacement Utility updated for latest IMO load     Arthritis      Cerebral infarction (H)      Chest pain, unspecified type 8/19/2016     Congestive heart failure (H)      Constipation, unspecified constipation type 8/19/2016     Coronary artery disease     CABG with multiple stents     Coronary artery disease involving native coronary artery without angina pectoris, unspecified whether native or transplanted heart 8/19/2016     Coronary atherosclerosis 7/29/2021    Formatting of this note might be different from the original. Created by Conversion  Replacement Utility updated for latest IMO load     Depression      Diabetes (H)      Diabetes mellitus (H) 12/17/2021     Diabetic ulcer of right midfoot associated with type 2 diabetes mellitus, limited to breakdown of skin (H)  9/28/2017     Diabetic ulcer of right midfoot associated with type 2 diabetes mellitus, with necrosis of muscle (H) 1/7/2022     Diarrhea 8/19/2016     Equinovarus acquired deformity, right 4/14/2021     Equinovarus deformity, acquired, right      Essential hypertension 8/19/2016     Gastroesophageal reflux disease without esophagitis 8/19/2016     GERD (gastroesophageal reflux disease)      Heart attack (H)      History of blood transfusion      Hyperlipidemia      Hyperlipidemia, unspecified hyperlipidemia type 8/19/2016     Hypertension      Insomnia, unspecified type 8/19/2016     Late effects of cerebrovascular accident 8/19/2016     Major depressive disorder with single episode, remission status unspecified 8/19/2016     Microalbuminuria 7/29/2021    Formatting of this note might be different from the original. Created by Conversion     Migraine      Migraines      Mild intellectual disabilities 8/19/2016     Myalgia      Nausea 8/19/2016     Pain 8/19/2016     PONV (postoperative nausea and vomiting)     mild nausea     Problems with learning 7/29/2021    Formatting of this note might be different from the original. Created by Conversion [facility] Annotation: Feb 11 2010  1:41PM - Leonid Cabral: CANNOT READ OR  WRITE     Stented coronary artery      Type 2 diabetes mellitus (H) 7/29/2021    Formatting of this note might be different from the original. Created by Conversion     Type 2 diabetes mellitus with hyperglycemia (H) 8/19/2016     Type II or unspecified type diabetes mellitus with ophthalmic manifestations, not stated as uncontrolled(250.50) (H) 7/29/2021    Formatting of this note might be different from the original. Created by Conversion     Weakness 8/19/2016       MEDICATIONS:  Current Outpatient Medications   Medication Sig Dispense Refill     acetaminophen (TYLENOL) 325 MG tablet Take 650 mg by mouth every 4 hours as needed for mild pain        ARIPiprazole (ABILIFY) 2 MG tablet 2 mg daily         ASPERCREME ORIGINAL 10 % external cream APPLY TO LOW BACK TWICE DAILY FOR LOW BACK PAIN       atorvastatin (LIPITOR) 80 MG tablet Take 80 mg by mouth daily        cholestyramine (QUESTRAN) 4 g packet Take 1 packet by mouth every other day        clopidogrel (PLAVIX) 75 MG tablet Take 1 tablet (75 mg) by mouth daily Start taking medication the day after the procedure. 90 tablet 1     DULoxetine (CYMBALTA) 60 MG capsule Take 120 mg by mouth daily takle 120mg       erenumab-aooe (AIMOVIG) 140 MG/ML injection Inject 1 mL (140 mg) Subcutaneous every 30 days 1 mL 0     fenofibrate micronized (LOFIBRA) 200 MG capsule Take 200 mg by mouth every morning (before breakfast)        ferrous sulfate (FEROSUL) 325 (65 Fe) MG tablet Take 325 mg by mouth daily (with breakfast)        gabapentin (NEURONTIN) 100 MG capsule TAKE 1 CAP BY MOUTH TWICE A DAY BEFORE MEALS       gabapentin (NEURONTIN) 300 MG capsule Take 600 mg by mouth At Bedtime       glipiZIDE (GLUCOTROL) 5 MG tablet TAKE 1/2 TABLET (2.5MG) BY MOUTH DAILY DX DIABETES       loperamide (IMODIUM) 2 MG capsule Take 4 mg by mouth as needed for diarrhea        losartan (COZAAR) 25 MG tablet Take 25 mg by mouth At Bedtime        magnesium oxide 400 MG CAPS Take 400 mg by mouth 4 times daily       Menthol, Topical Analgesic, (BIOFREEZE ROLL-ON) 4 % GEL Apply 1 Application topically 2 times daily Apply to lower back for pain       metFORMIN (GLUCOPHAGE) 1000 MG tablet Take 1,000 mg by mouth       metFORMIN (GLUCOPHAGE) 500 MG tablet TAKE 2 TABS (1000MG) BY MOUTH EVERY MORNING;TAKE 1 TAB BY MOUTH EVERY EVENING       metoprolol succinate ER (TOPROL-XL) 25 MG 24 hr tablet Take 75 mg by mouth daily        mirtazapine (REMERON) 7.5 MG tablet Take 7.5 mg by mouth At Bedtime        Neomycin-Bacitracin-Polymyxin (TRIPLE ANTIBIOTIC) 3.5-400-5000 OINT ointment Externally apply 1 applicator topically 3 times daily as needed       Nitroglycerin (NITROSTAT SL) Place 0.4 mg under the  "tongue every 5 minutes as needed for chest pain        omeprazole (PRILOSEC) 20 MG DR capsule Take 20 mg by mouth daily        oxyCODONE (ROXICODONE) 5 MG tablet TAKE 1 TAB BY MOUTH EVERY 6HOURS AS NEEDED FOR PAIN 28 tablet 0     polyethylene glycol (MIRALAX) 17 GM/Dose powder Take 1 packet by mouth daily as needed for constipation        senna-docusate (SENOKOT-S/PERICOLACE) 8.6-50 MG tablet Take 1 tablet by mouth 2 times daily as needed for constipation        traZODone (DESYREL) 50 MG tablet Take 50 mg by mouth At Bedtime        vitamin C (ASCORBIC ACID) 500 MG tablet Take 500 mg by mouth daily        XARELTO ANTICOAGULANT 20 MG TABS tablet TAKE 1 TAB BY MOUTH ONCE DAILY          PHYSICAL EXAM:  General: Patient is alert male, no distress.   Vitals: /64   Pulse 66   Temp 98.7  F (37.1  C)   Resp 18   Ht 1.702 m (5' 7\")   Wt 69.4 kg (153 lb)   SpO2 98%   BMI 23.96 kg/m    HEENT: Head is NCAT. Eyes show no injection or icterus. Nares negative. Oropharynx moist.  Neck: No JVD.  Lungs: Non labored respirations.   Abdomen: Soft.  : Deferred.  Extremities: Mild edema RLE.   Musculoskeletal: Mild degen changes.   Skin: Bandage and wound vac right foot.    Psych: Mood is good.       LABS/DIAGNOSTIC DATA:  Component      Latest Ref Rng & Units 12/28/2021 4/1/2022   Sodium      136 - 145 mmol/L 140 139   Potassium      3.5 - 5.0 mmol/L 4.5 4.6   Chloride      98 - 107 mmol/L 107 104   Carbon Dioxide      22 - 31 mmol/L 25 26   Anion Gap      5 - 18 mmol/L 8 9   Glucose      70 - 125 mg/dL 141 (H) 129 (H)   Calcium      8.5 - 10.5 mg/dL 10.3 9.9   Urea Nitrogen      8 - 22 mg/dL 17 17   Creatinine      0.70 - 1.30 mg/dL 0.99 0.97   GFR Estimate      >60 mL/min/1.73m2 >90 >90     Component      Latest Ref Rng & Units 12/28/2021 4/1/2022   WBC      4.0 - 11.0 10e3/uL 5.4 6.3   RBC Count      4.40 - 5.90 10e6/uL 4.01 (L) 4.07 (L)   Hemoglobin      13.3 - 17.7 g/dL 11.1 (L) 10.5 (L)   Hematocrit      40.0 - 53.0 " % 36.9 (L) 34.0 (L)   MCV      78 - 100 fL 92 84   MCH      26.5 - 33.0 pg 27.7 25.8 (L)   MCHC      31.5 - 36.5 g/dL 30.1 (L) 30.9 (L)   RDW      10.0 - 15.0 % 13.4 13.2   Platelet Count      150 - 450 10e3/uL 246 238     Component      Latest Ref Rng & Units 12/21/2021 5/16/2022   Hemoglobin A1C      <=5.6 % 5.1 5.8 (H)         ASSESSMENT/PLAN:  1. Non healing diabetic ulcer, right foot. S/p procedures 12/27/2021, 1/27/2022 and 4/4/2022. Has continued NWB with wound vac. Saw Dr. Montes from vascular on 6/23/2022 and subsequently had RLE angiogram on 6/29/2022 with angioplasty right popliteal and anterior tibia. He was started on Plavix at least for 3 months. He will have follow up in vascular clinic on 7/18/2022. Has started hyperbaric oxygen therapy.  2. HTN. On metoprolol and losartan. Bps well controlled.   3. Afib. On xarelto for anticoagulation. Adequate rate control.  4. Diabetes. He is on metformin and glipizide. Last A1c was good at 5.8%. Continue accuchecks in TCU.  5. Stable medical conditions: CAD. Hx of MIs. Hx of stenting and prior CABG. On statin, xarelto, beta blocker; Hx of stroke. Continue statin, xarelto; Anemia, on iron; Mood disorder, mixed depression and anxiety on duloxetine, Abilify; Migraines. Receives monthly Aimovig.         Electronically signed by: Heidi Xie MD

## 2022-07-15 ENCOUNTER — ANCILLARY PROCEDURE (OUTPATIENT)
Dept: VASCULAR ULTRASOUND | Facility: CLINIC | Age: 52
End: 2022-07-15
Attending: SURGERY
Payer: MEDICARE

## 2022-07-15 ENCOUNTER — TELEPHONE (OUTPATIENT)
Dept: VASCULAR SURGERY | Facility: CLINIC | Age: 52
End: 2022-07-15

## 2022-07-15 DIAGNOSIS — I73.9 PAD (PERIPHERAL ARTERY DISEASE) (H): ICD-10-CM

## 2022-07-15 PROCEDURE — 93923 UPR/LXTR ART STDY 3+ LVLS: CPT | Mod: 26 | Performed by: SURGERY

## 2022-07-15 PROCEDURE — 93926 LOWER EXTREMITY STUDY: CPT | Mod: 26 | Performed by: SURGERY

## 2022-07-15 PROCEDURE — 93926 LOWER EXTREMITY STUDY: CPT | Mod: RT

## 2022-07-15 PROCEDURE — 93923 UPR/LXTR ART STDY 3+ LVLS: CPT

## 2022-07-15 NOTE — TELEPHONE ENCOUNTER
Dr. Hill with [facility] states that patient had a recent MRI that showed soft tissue ulcer and osteomyelitis, and hyperbaric treatment will not help unless patient's wound is surgically debrided.    [phone]

## 2022-07-17 ENCOUNTER — TELEPHONE (OUTPATIENT)
Dept: GERIATRICS | Facility: CLINIC | Age: 52
End: 2022-07-17

## 2022-07-17 NOTE — TELEPHONE ENCOUNTER
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

## 2022-07-18 ENCOUNTER — OFFICE VISIT (OUTPATIENT)
Dept: VASCULAR SURGERY | Facility: CLINIC | Age: 52
End: 2022-07-18
Attending: SURGERY
Payer: MEDICARE

## 2022-07-18 VITALS — DIASTOLIC BLOOD PRESSURE: 82 MMHG | HEART RATE: 68 BPM | TEMPERATURE: 98.6 F | SYSTOLIC BLOOD PRESSURE: 134 MMHG

## 2022-07-18 DIAGNOSIS — S91.301D OPEN WOUND OF RIGHT FOOT, SUBSEQUENT ENCOUNTER: ICD-10-CM

## 2022-07-18 DIAGNOSIS — I73.9 PAD (PERIPHERAL ARTERY DISEASE) (H): Primary | ICD-10-CM

## 2022-07-18 PROCEDURE — 99024 POSTOP FOLLOW-UP VISIT: CPT | Performed by: PHYSICIAN ASSISTANT

## 2022-07-18 NOTE — PROGRESS NOTES
"    VASCULAR SURGERY PROGRESS NOTE    LOCATION:  [facility]     Cm Lundberg  Medical Record #: [id number]  YOB: 1970  Age: 51 year old     Date of Service: 7/18/2022    PRIMARY CARE PROVIDER: Provider, Generic External Data    Reason for visit: Follow up PAD status post recent angiogram    IMPRESSION: 52 YO male with PAD and nonhealing wounds of the right foot status post angiogram with intervention. Imaging studies showing moderately elevated velocities within the proximal anterior tibial artery with transition to monophasic flow distally with normal ABIs and toe pressures. He has been doing hyperbaric therapy at [facility]; they have stopped due to recent MRI showing osteomyelitis.    RECOMMENDATION/RISKS: Continue best medical management with aspirin, plavix, and statin therapy. Continue local wound care and follow up with wound care/podiatry. Follow up in 4 weeks as previously scheduled with vascular surgery.    HPI:  Cm Lundberg is a 51 year old male with past medical history significant for hypertension, hyperlipidemia, coronary artery disease, depression, type 2 diabetes mellitus, and peripheral vascular disease. He is status post right lower extremity angiogram with popliteal and anterior tibial angioplasty on 6/29/2022 with Dr Montes due to nonhealing wounds of his right foot.     Mr. Lundberg was initially followed by wound care and podiatry due to his wounds and had an MRI on 6/7/2022 which showed \"moderate soft tissue ulcer along the dorsolateral aspect at the base of the fifth metatarsal with deep extension to the bone where there are findings worrisome for early changes of osteomyelitis\". He mentions having additional imaging at [facility] 2 weeks ago, unclear if this was another MRI or other imaging modality as the results are not available to us. Last week, he was called by [facility] hyperbaric stating recent MRI that showed soft tissue ulcer and osteomyelitis, and " hyperbaric treatment will not help unless patient's wound is surgically debrided. They encouraged follow up in our clinic/with podiatry.    Today, Mr. Lundberg is accompanied by his sister. He is very frustrated by all of the procedures, ongoing wound issues, etc. Mr. Lundberg denies any pain to the right foot and does mention that the wound has gotten smaller. Patient currently has a wound VAC to his right foot wound that is changed 3x weekly at his facility. He is worried that a potential debridement/bone biopsy will make his wound larger and is also concerned about needing ongoing IV antibiotics. He actually inquires about a definitive treatment with below knee amputation. No other concerns today.    REVIEW OF SYSTEMS:    A 12 point ROS was reviewed and is negative except for what is listed above in HPI.    PHH:    Past Medical History:   Diagnosis Date     Abdominal pain, generalized 8/19/2016     Anemia, unspecified type 8/19/2016     Anxiety      Anxiety state 7/29/2021    Formatting of this note might be different from the original. Created by Conversion  Replacement Utility updated for latest IMO load     Arthritis      Cerebral infarction (H)      Chest pain, unspecified type 8/19/2016     Congestive heart failure (H)      Constipation, unspecified constipation type 8/19/2016     Coronary artery disease     CABG with multiple stents     Coronary artery disease involving native coronary artery without angina pectoris, unspecified whether native or transplanted heart 8/19/2016     Coronary atherosclerosis 7/29/2021    Formatting of this note might be different from the original. Created by Conversion  Replacement Utility updated for latest IMO load     Depression      Diabetes (H)      Diabetes mellitus (H) 12/17/2021     Diabetic ulcer of right midfoot associated with type 2 diabetes mellitus, limited to breakdown of skin (H) 9/28/2017     Diabetic ulcer of right midfoot associated with type 2 diabetes  mellitus, with necrosis of muscle (H) 1/7/2022     Diarrhea 8/19/2016     Equinovarus acquired deformity, right 4/14/2021     Equinovarus deformity, acquired, right      Essential hypertension 8/19/2016     Gastroesophageal reflux disease without esophagitis 8/19/2016     GERD (gastroesophageal reflux disease)      Heart attack (H)      History of blood transfusion      Hyperlipidemia      Hyperlipidemia, unspecified hyperlipidemia type 8/19/2016     Hypertension      Insomnia, unspecified type 8/19/2016     Late effects of cerebrovascular accident 8/19/2016     Major depressive disorder with single episode, remission status unspecified 8/19/2016     Microalbuminuria 7/29/2021    Formatting of this note might be different from the original. Created by Conversion     Migraine      Migraines      Mild intellectual disabilities 8/19/2016     Myalgia      Nausea 8/19/2016     Pain 8/19/2016     PONV (postoperative nausea and vomiting)     mild nausea     Problems with learning 7/29/2021    Formatting of this note might be different from the original. Created by Conversion [facility] Annotation: Feb 11 2010  1:41PM - Leonid Cabral: CANNOT READ OR  WRITE     Stented coronary artery      Type 2 diabetes mellitus (H) 7/29/2021    Formatting of this note might be different from the original. Created by Conversion     Type 2 diabetes mellitus with hyperglycemia (H) 8/19/2016     Type II or unspecified type diabetes mellitus with ophthalmic manifestations, not stated as uncontrolled(250.50) (H) 7/29/2021    Formatting of this note might be different from the original. Created by Conversion     Weakness 8/19/2016      Past Surgical History:   Procedure Laterality Date     INCISION AND DRAINAGE LOWER EXTREMITY, COMBINED Right 12/17/2021    Procedure: INCISION AND DRAINAGE, right foot;  Surgeon: Alex Kim DPM;  Location: [facility] Main OR     INCISION AND DRAINAGE LOWER EXTREMITY, COMBINED Right 1/27/2022     Procedure: INCISION AND DRAINAGE, right foot with application of theraskin;  Surgeon: Alex Kim DPM;  Location: [facility] Main OR     INCISION AND DRAINAGE LOWER EXTREMITY, COMBINED Right 4/4/2022    Procedure: INCISION AND DRAINAGE, right foot with application of theraskin;  Surgeon: Alex Kim DPM;  Location: [facility] Main OR     IR LOWER EXTREMITY ANGIOGRAM RIGHT  6/29/2022     IR MISCELLANEOUS PROCEDURE  2/13/2004     ZZC CABG, VEIN, SINGLE      Description: CABG (CABG);  Recorded: 04/22/2009;     ALLERGIES:  Aspirin, Isosorbide, Codeine, and Morphine    MEDS:    Current Outpatient Medications:      acetaminophen (TYLENOL) 325 MG tablet, Take 650 mg by mouth every 4 hours as needed for mild pain , Disp: , Rfl:      ARIPiprazole (ABILIFY) 2 MG tablet, 2 mg daily , Disp: , Rfl:      ASPERCREME ORIGINAL 10 % external cream, APPLY TO LOW BACK TWICE DAILY FOR LOW BACK PAIN, Disp: , Rfl:      atorvastatin (LIPITOR) 80 MG tablet, Take 80 mg by mouth daily , Disp: , Rfl:      cholestyramine (QUESTRAN) 4 g packet, Take 1 packet by mouth every other day , Disp: , Rfl:      clopidogrel (PLAVIX) 75 MG tablet, Take 1 tablet (75 mg) by mouth daily Start taking medication the day after the procedure., Disp: 90 tablet, Rfl: 1     DULoxetine (CYMBALTA) 60 MG capsule, Take 120 mg by mouth daily takle 120mg, Disp: , Rfl:      erenumab-aooe (AIMOVIG) 140 MG/ML injection, Inject 1 mL (140 mg) Subcutaneous every 30 days, Disp: 1 mL, Rfl: 0     fenofibrate micronized (LOFIBRA) 200 MG capsule, Take 200 mg by mouth every morning (before breakfast) , Disp: , Rfl:      ferrous sulfate (FEROSUL) 325 (65 Fe) MG tablet, Take 325 mg by mouth daily (with breakfast) , Disp: , Rfl:      gabapentin (NEURONTIN) 100 MG capsule, TAKE 1 CAP BY MOUTH TWICE A DAY BEFORE MEALS, Disp: , Rfl:      gabapentin (NEURONTIN) 300 MG capsule, Take 600 mg by mouth At Bedtime, Disp: , Rfl:      glipiZIDE (GLUCOTROL) 5 MG tablet, TAKE 1/2  TABLET (2.5MG) BY MOUTH DAILY DX DIABETES, Disp: , Rfl:      loperamide (IMODIUM) 2 MG capsule, Take 4 mg by mouth as needed for diarrhea , Disp: , Rfl:      losartan (COZAAR) 25 MG tablet, Take 25 mg by mouth At Bedtime , Disp: , Rfl:      magnesium oxide 400 MG CAPS, Take 400 mg by mouth 4 times daily, Disp: , Rfl:      Menthol, Topical Analgesic, (BIOFREEZE ROLL-ON) 4 % GEL, Apply 1 Application topically 2 times daily Apply to lower back for pain, Disp: , Rfl:      metFORMIN (GLUCOPHAGE) 1000 MG tablet, Take 1,000 mg by mouth, Disp: , Rfl:      metFORMIN (GLUCOPHAGE) 500 MG tablet, TAKE 2 TABS (1000MG) BY MOUTH EVERY MORNING;TAKE 1 TAB BY MOUTH EVERY EVENING, Disp: , Rfl:      metoprolol succinate ER (TOPROL-XL) 25 MG 24 hr tablet, Take 75 mg by mouth daily , Disp: , Rfl:      mirtazapine (REMERON) 7.5 MG tablet, Take 7.5 mg by mouth At Bedtime , Disp: , Rfl:      Neomycin-Bacitracin-Polymyxin (TRIPLE ANTIBIOTIC) 3.5-400-5000 OINT ointment, Externally apply 1 applicator topically 3 times daily as needed, Disp: , Rfl:      Nitroglycerin (NITROSTAT SL), Place 0.4 mg under the tongue every 5 minutes as needed for chest pain , Disp: , Rfl:      omeprazole (PRILOSEC) 20 MG DR capsule, Take 20 mg by mouth daily , Disp: , Rfl:      oxyCODONE (ROXICODONE) 5 MG tablet, TAKE 1 TAB BY MOUTH EVERY 6HOURS AS NEEDED FOR PAIN, Disp: 28 tablet, Rfl: 0     polyethylene glycol (MIRALAX) 17 GM/Dose powder, Take 1 packet by mouth daily as needed for constipation , Disp: , Rfl:      senna-docusate (SENOKOT-S/PERICOLACE) 8.6-50 MG tablet, Take 1 tablet by mouth 2 times daily as needed for constipation , Disp: , Rfl:      traZODone (DESYREL) 50 MG tablet, Take 50 mg by mouth At Bedtime , Disp: , Rfl:      vitamin C (ASCORBIC ACID) 500 MG tablet, Take 500 mg by mouth daily , Disp: , Rfl:      XARELTO ANTICOAGULANT 20 MG TABS tablet, TAKE 1 TAB BY MOUTH ONCE DAILY, Disp: , Rfl:     SOCIAL HABITS:    History   Smoking Status     Former  Smoker     Quit date: 1/28/2015   Smokeless Tobacco     Never Used     Social History    Substance and Sexual Activity      Alcohol use: Not Currently      History   Drug Use Unknown     FAMILY HISTORY:    Family History   Adopted: Yes     PE:  There were no vitals taken for this visit.  Wt Readings from Last 1 Encounters:   07/05/22 69.4 kg (153 lb)     There is no height or weight on file to calculate BMI.    EXAM:  GENERAL: well-developed 51 year old male who appears his stated age  CARDIAC: normal   CHEST/LUNG: normal respiratory effort   MUSCULOSKELETAL: grossly normal and both lower extremities are intact, no lower extremity edema  NEUROLOGIC: focally intact, alert and oriented x 3  PSYCH: appropriate affect  VASCULAR: right foot covered with clean and dry dressing    DIAGNOSTIC STUDIES:     Images:    US Low Ext Arterial Dop Seg Pres w/o Exercise    Result Date: 7/15/2022  Indication: Status post angioplasty right popliteal artery and  right anterior tibial artery     Impression:    1. RIGHT LOWER EXTREMITY: ABBY is Normal with an ABBY of 1.21. and Normal toe pressures of 114 mmHg.   2. LEFT LOWER EXTREMITY: ABBY is Normal with an ABBY of 1.13. and Normal toe pressures of 89 mmHg.     US Lower Extremity Arterial Duplex Right    Result Date: 7/15/2022  Indication: Status post angioplasty right popliteal artery and right anterior tibial artery     Impression:   Right Lower Extremity: Biphasic flow, femoral artery suggesting no inflow disease.  Moderately elevated velocities within the proximal anterior tibial artery with transition to monophasic flow distally suggesting focal stenosis and underlying peripheral arterial disease.  Otherwise, no evidence of hemodynamically significant stenosis in the right lower extremity.  Left Lower Extremity: Not evaluated     LABS:      Sodium   Date Value Ref Range Status   06/29/2022 136 136 - 145 mmol/L Final   04/01/2022 139 136 - 145 mmol/L Final   12/28/2021 140 136 - 145  mmol/L Final   08/25/2016 139 mmol/L Final     Urea Nitrogen   Date Value Ref Range Status   06/29/2022 24 (H) 8 - 22 mg/dL Final   04/01/2022 17 8 - 22 mg/dL Final   12/28/2021 17 8 - 22 mg/dL Final   08/25/2016 21 mg/dL Final     Hemoglobin   Date Value Ref Range Status   06/29/2022 11.7 (L) 13.3 - 17.7 g/dL Final   04/01/2022 10.5 (L) 13.3 - 17.7 g/dL Final   12/28/2021 11.1 (L) 13.3 - 17.7 g/dL Final   08/25/2016 10.1 (A) 13.3 - 17.7 g/dL Final     Platelet Count   Date Value Ref Range Status   06/29/2022 206 150 - 450 10e3/uL Final   04/01/2022 238 150 - 450 10e3/uL Final   12/28/2021 246 150 - 450 10e3/uL Final   08/25/2016 189 150 - 450 10^9/L Final     30 minutes spent on the day of encounter doing chart review, history and exam, documentation, and further activities as noted.     Lizzeth Figueroa PA-C  VASCULAR SURGERY

## 2022-07-19 ENCOUNTER — OFFICE VISIT (OUTPATIENT)
Dept: VASCULAR SURGERY | Facility: CLINIC | Age: 52
End: 2022-07-19
Payer: MEDICARE

## 2022-07-19 VITALS — SYSTOLIC BLOOD PRESSURE: 122 MMHG | TEMPERATURE: 98.5 F | DIASTOLIC BLOOD PRESSURE: 76 MMHG

## 2022-07-19 DIAGNOSIS — M86.9 OSTEOMYELITIS OF ANKLE AND FOOT (H): ICD-10-CM

## 2022-07-19 DIAGNOSIS — E11.621 DIABETIC ULCER OF OTHER PART OF RIGHT FOOT ASSOCIATED WITH TYPE 2 DIABETES MELLITUS, WITH NECROSIS OF BONE (H): Primary | ICD-10-CM

## 2022-07-19 DIAGNOSIS — L97.514 DIABETIC ULCER OF OTHER PART OF RIGHT FOOT ASSOCIATED WITH TYPE 2 DIABETES MELLITUS, WITH NECROSIS OF BONE (H): Primary | ICD-10-CM

## 2022-07-19 PROCEDURE — 11044 DBRDMT BONE 1ST 20 SQ CM/<: CPT | Performed by: PODIATRIST

## 2022-07-19 PROCEDURE — 99213 OFFICE O/P EST LOW 20 MIN: CPT | Mod: 25 | Performed by: PODIATRIST

## 2022-07-19 NOTE — PROGRESS NOTES
FOOT AND ANKLE SURGERY/PODIATRY Progress Note      ASSESSMENT:   Osteomyelitis 5th metatarsal right foot   Ulceration right foot   Equinovarus right foot       TREATMENT:  -The right foot ulceration probes to bone. I discussed with a provider at the [facility] facility that combination therapy of HBO with antibiotic with ID is recommended course of treatment.     -I reviewed the right foot MRI report from 6/7 with the patient and his sister today: 1.  Moderate soft tissue ulcer along the dorsolateral aspect at the base of the fifth metatarsal with deep extension to the bone where there are findings worrisome for early changes of osteomyelitis. There is also mild reactive osteitis in the adjacent   cuboid.     2.  Mild degenerative changes at the first MTP joint.     3.  Subcutaneous cellulitis along the dorsal and medial portions of the forefoot.    -Due to the presence of osteomyelitis, I reviewed the treatment options to include the above and either 6 weeks IV antibiotics with Infectious Disease/surgical debridement with removal of bone/use of wound vac/non-weight bearing vs amputation of the involved bone. This is complicated by the patient's foot position which would require a TTC fusion after resolution of the lateral wound. After an in-depth discussion, the patient would like to move forward with a BKA procedure for a more definitive procedure.     -After discussion of risk factors and consent obtained 2% Lidocaine HCL jelly was applied, under clean conditions, the right and foot ulceration(s) were debrided using currette.  Devitalized and nonviable tissue, along with any fibrin and slough, was removed to improve granulation tissue formation, stimulate wound healing, decrease overall bacteria load, disrupt biofilm formation and decrease edge senescence. Wound drainage was scant No. Total excisional debridement was 1 sq cm into the bone with a depth of 1 cm.   Ulcers were improved afterwards and .  Measures  were as noted on the flow sheet. A gauze dressing was applied. He will continue to apply a gauze dressing qoday.    -He will follow-up with Dr. Montes for BKA consultation.     Alex Kim DPM  [facility] [place]      HPI: Cm Lundberg was seen again today for a right foot ulceration. His sister is present today and states that he has been trying HBO until just recently when she was notified of a bone infection in his right foot. She would like to review treatment options at this time. He recently underwent angiogram with Dr. Montes.        Past Medical History:   Diagnosis Date     Abdominal pain, generalized 8/19/2016     Anemia, unspecified type 8/19/2016     Anxiety      Anxiety state 7/29/2021    Formatting of this note might be different from the original. Created by Conversion  Replacement Utility updated for latest IMO load     Arthritis      Cerebral infarction (H)      Chest pain, unspecified type 8/19/2016     Congestive heart failure (H)      Constipation, unspecified constipation type 8/19/2016     Coronary artery disease     CABG with multiple stents     Coronary artery disease involving native coronary artery without angina pectoris, unspecified whether native or transplanted heart 8/19/2016     Coronary atherosclerosis 7/29/2021    Formatting of this note might be different from the original. Created by Conversion  Replacement Utility updated for latest IMO load     Depression      Diabetes (H)      Diabetes mellitus (H) 12/17/2021     Diabetic ulcer of right midfoot associated with type 2 diabetes mellitus, limited to breakdown of skin (H) 9/28/2017     Diabetic ulcer of right midfoot associated with type 2 diabetes mellitus, with necrosis of muscle (H) 1/7/2022     Diarrhea 8/19/2016     Equinovarus acquired deformity, right 4/14/2021     Equinovarus deformity, acquired, right      Essential hypertension 8/19/2016     Gastroesophageal reflux disease without esophagitis  8/19/2016     GERD (gastroesophageal reflux disease)      Heart attack (H)      History of blood transfusion      Hyperlipidemia      Hyperlipidemia, unspecified hyperlipidemia type 8/19/2016     Hypertension      Insomnia, unspecified type 8/19/2016     Late effects of cerebrovascular accident 8/19/2016     Major depressive disorder with single episode, remission status unspecified 8/19/2016     Microalbuminuria 7/29/2021    Formatting of this note might be different from the original. Created by Conversion     Migraine      Migraines      Mild intellectual disabilities 8/19/2016     Myalgia      Nausea 8/19/2016     Pain 8/19/2016     PONV (postoperative nausea and vomiting)     mild nausea     Problems with learning 7/29/2021    Formatting of this note might be different from the original. Created by Conversion [facility] Annotation: Feb 11 2010  1:41PM - Leonid Cabral: CANNOT READ OR  WRITE     Stented coronary artery      Type 2 diabetes mellitus (H) 7/29/2021    Formatting of this note might be different from the original. Created by Conversion     Type 2 diabetes mellitus with hyperglycemia (H) 8/19/2016     Type II or unspecified type diabetes mellitus with ophthalmic manifestations, not stated as uncontrolled(250.50) (H) 7/29/2021    Formatting of this note might be different from the original. Created by Conversion     Weakness 8/19/2016       Past Surgical History:   Procedure Laterality Date     INCISION AND DRAINAGE LOWER EXTREMITY, COMBINED Right 12/17/2021    Procedure: INCISION AND DRAINAGE, right foot;  Surgeon: Alex Kim DPM;  Location: [facility] Main OR     INCISION AND DRAINAGE LOWER EXTREMITY, COMBINED Right 1/27/2022    Procedure: INCISION AND DRAINAGE, right foot with application of theraskin;  Surgeon: Alex Kim DPM;  Location: [facility] Main OR     INCISION AND DRAINAGE LOWER EXTREMITY, COMBINED Right 4/4/2022    Procedure: INCISION AND DRAINAGE, right foot with  application of theraskin;  Surgeon: Alex Kim DPM;  Location: [facility] Main OR     IR LOWER EXTREMITY ANGIOGRAM RIGHT  6/29/2022     IR MISCELLANEOUS PROCEDURE  2/13/2004     [facility] CABG, VEIN, SINGLE      Description: CABG (CABG);  Recorded: 04/22/2009;       Allergies   Allergen Reactions     Aspirin Other (See Comments)     Reacts with migraine medicine     Isosorbide Headache     Codeine Rash     Morphine Rash         Current Outpatient Medications:      acetaminophen (TYLENOL) 325 MG tablet, Take 650 mg by mouth every 4 hours as needed for mild pain , Disp: , Rfl:      ARIPiprazole (ABILIFY) 2 MG tablet, 2 mg daily , Disp: , Rfl:      ASPERCREME ORIGINAL 10 % external cream, APPLY TO LOW BACK TWICE DAILY FOR LOW BACK PAIN, Disp: , Rfl:      atorvastatin (LIPITOR) 80 MG tablet, Take 80 mg by mouth daily , Disp: , Rfl:      cholestyramine (QUESTRAN) 4 g packet, Take 1 packet by mouth every other day , Disp: , Rfl:      clopidogrel (PLAVIX) 75 MG tablet, Take 1 tablet (75 mg) by mouth daily Start taking medication the day after the procedure., Disp: 90 tablet, Rfl: 1     DULoxetine (CYMBALTA) 60 MG capsule, Take 120 mg by mouth daily takle 120mg, Disp: , Rfl:      erenumab-aooe (AIMOVIG) 140 MG/ML injection, Inject 1 mL (140 mg) Subcutaneous every 30 days, Disp: 1 mL, Rfl: 0     fenofibrate micronized (LOFIBRA) 200 MG capsule, Take 200 mg by mouth every morning (before breakfast) , Disp: , Rfl:      ferrous sulfate (FEROSUL) 325 (65 Fe) MG tablet, Take 325 mg by mouth daily (with breakfast) , Disp: , Rfl:      gabapentin (NEURONTIN) 100 MG capsule, TAKE 1 CAP BY MOUTH TWICE A DAY BEFORE MEALS, Disp: , Rfl:      gabapentin (NEURONTIN) 300 MG capsule, Take 600 mg by mouth At Bedtime, Disp: , Rfl:      glipiZIDE (GLUCOTROL) 5 MG tablet, TAKE 1/2 TABLET (2.5MG) BY MOUTH DAILY DX DIABETES, Disp: , Rfl:      loperamide (IMODIUM) 2 MG capsule, Take 4 mg by mouth as needed for diarrhea , Disp: , Rfl:       losartan (COZAAR) 25 MG tablet, Take 25 mg by mouth At Bedtime , Disp: , Rfl:      magnesium oxide 400 MG CAPS, Take 400 mg by mouth 4 times daily, Disp: , Rfl:      Menthol, Topical Analgesic, (BIOFREEZE ROLL-ON) 4 % GEL, Apply 1 Application topically 2 times daily Apply to lower back for pain, Disp: , Rfl:      metFORMIN (GLUCOPHAGE) 1000 MG tablet, Take 1,000 mg by mouth, Disp: , Rfl:      metFORMIN (GLUCOPHAGE) 500 MG tablet, TAKE 2 TABS (1000MG) BY MOUTH EVERY MORNING;TAKE 1 TAB BY MOUTH EVERY EVENING, Disp: , Rfl:      metoprolol succinate ER (TOPROL-XL) 25 MG 24 hr tablet, Take 75 mg by mouth daily , Disp: , Rfl:      mirtazapine (REMERON) 7.5 MG tablet, Take 7.5 mg by mouth At Bedtime , Disp: , Rfl:      Neomycin-Bacitracin-Polymyxin (TRIPLE ANTIBIOTIC) 3.5-400-5000 OINT ointment, Externally apply 1 applicator topically 3 times daily as needed, Disp: , Rfl:      Nitroglycerin (NITROSTAT SL), Place 0.4 mg under the tongue every 5 minutes as needed for chest pain , Disp: , Rfl:      omeprazole (PRILOSEC) 20 MG DR capsule, Take 20 mg by mouth daily , Disp: , Rfl:      oxyCODONE (ROXICODONE) 5 MG tablet, TAKE 1 TAB BY MOUTH EVERY 6HOURS AS NEEDED FOR PAIN, Disp: 28 tablet, Rfl: 0     polyethylene glycol (MIRALAX) 17 GM/Dose powder, Take 1 packet by mouth daily as needed for constipation , Disp: , Rfl:      senna-docusate (SENOKOT-S/PERICOLACE) 8.6-50 MG tablet, Take 1 tablet by mouth 2 times daily as needed for constipation , Disp: , Rfl:      traZODone (DESYREL) 50 MG tablet, Take 50 mg by mouth At Bedtime , Disp: , Rfl:      vitamin C (ASCORBIC ACID) 500 MG tablet, Take 500 mg by mouth daily , Disp: , Rfl:      XARELTO ANTICOAGULANT 20 MG TABS tablet, TAKE 1 TAB BY MOUTH ONCE DAILY, Disp: , Rfl:     Review of Systems - 10 point Review of Systems is negative except for right foot infection which is noted in HPI.      OBJECTIVE:  /76   Temp 98.5  F (36.9  C)   General appearance: Patient is alert and  fully cooperative with history & exam.  No sign of distress is noted during the visit.    Vascular: Dorsalis pedis non-palpableRight.  Dermatologic:    Negative Pressure Wound Therapy Foot Anterior;Right (Active)       VASC Wound right lateral foot (Active)       VASC Wound left hallux  (Active)       VASC Wound right lateral foot (Active)   Pre Size Length 1 07/19/22 1400   Pre Size Width 1 07/19/22 1400   Pre Size Depth 1 07/19/22 1400   Pre Total Sq cm 1 07/19/22 1400       Incision/Surgical Site 12/17/21 Right Foot (Active)       Incision/Surgical Site 06/29/22 Anterior;Left Greater Trochanter (Active)   Incision Assessment WDL 06/29/22 1315   Closure Adhesive strip(s) 06/29/22 1232   Dressing Intervention Clean, dry, intact 06/29/22 1315   Lateral right foot ulceration probes to bone, no erythema or active drainage.   Neurologic: Diminished to light touch Right.  Musculoskeletal: Rigid equinovarus deformity right foot.     Imaging:     US Low Ext Arterial Dop Seg Pres w/o Exercise    Result Date: 7/15/2022  BILATERAL RESTING ANKLE-BRACHIAL INDICES (ABBY'S) (Date: 07/15/22) Indication: Status post angioplasty right popliteal artery and  right anterior tibial artery (06/29/2022) Previous: 06/07/22 History: Previous Smoker, Hypertension, Diabetic, Hyperlipidemia, TIA/Stroke, CAD, MI, Angioplasty and Vascular Ulcers  Resting ABBY's          Right: mmHg Index     Brachial: -  Ankle-(PT): 87 0.69 Ankle-(DP): 154 1.21          Digit: 114 0.90               Left: mmHg Index     Brachial: 127  Ankle-(PT): 136 1.07 Ankle-(DP): 143 1.13          Digit: 89 0.70 Resting ankle-brachial index of 1.21 on the right. Toe Pressures of 114 mmHg and TBI of 0.90  Resting ankle-brachial index of 1.13 on the left. Toe Pressures of 89 mmHg and TBI of 0.70  VPR WAVEFORMS: The right volume plethysmography waveforms are normal at the lower thigh level, normal at the upper calf level and normal at the ankle. The left volume plethysmography  waveforms are normal at the lower thigh level, normal at the upper calf level and normal at the ankle. Impression:  1. RIGHT LOWER EXTREMITY: ABBY is Normal with an ABBY of 1.21. and Normal toe pressures of 114 mmHg. 2. LEFT LOWER EXTREMITY: ABBY is Normal with an ABBY of 1.13. and Normal toe pressures of 89 mmHg. Reference: Wound classification Grade ABBY Ankle Systolic Pressure Toe Pressures 0 > 0.80 > 100 mmHg > 60 mmHg 1 0.6 - 0.79 70 - 100 mmHg 40 - 59 mmHg 2 0.4 - 0.59 50-70 mmHg 30 - 39 mmHg 3 < 0.39 < 50 mmHg < 30 mmHg Digit Pressures DBI Disease Category > 0.70 Normal < 0.70 Abnormal > 30 mmHg Potential wound healing < 30 mmHg Impaired wound healing Ankle Brachial Pressures ABBY Disease Category > 1.3  Likely vessel calcification with monophasic waveforms, non-diagnostic 0.95-1.30 Normal with multiphasic waveforms 0.50-0.95 Single level disease 0.30-0.50 Multilevel disease < 0.30 Critical limb ischema Volume Plethysmography Recording (VPR) at all levels Normal Sharp systolic peak, fast upstroke, prominent dicrotic notch in wave Mild Sharp systolic peak, fast upstroke, absent dicrotic notch in wave Moderate Flattened systolic peak, slowed upstroke, absent dicrotic notch inwave Severe amplitude wave with = upslope and down slope Occluded Flat Line     IR Lower Extremity Angiogram Right    Result Date: 6/29/2022  Vascular surgery interventional procedure note Date: 06/29/22 Procedures Performed: Ir Lower Extremity Angiogram Right  from the aorta level via left groin approach   Ultrasound-guided vascular access   Angioplasty right popliteal artery   Angioplasty right anterior tibial artery . Provider: Kim Montes MD Assistant: KHUSHBU Worthington Indication: This is a 51-year-old gentleman with nonhealing wounds of his right foot.  Arterial duplex suggests popliteal and infrapopliteal disease and toe pressures now little over 30 suggesting inadequate blood supply for wound healing.  Plan for angio intervention to  improve arterial flow and allow for wound healing. Sedation: The procedure was performed with administration of intravenous conscious sedation with appropriate preoperative, intraoperative, and postoperative evaluation.  55 minutes of supervised face to face intraservice time was provided by a radiology nurse under my direct supervision.  Versed 3 mg and fentanyl 125 mcg given. ANTIBIOTICS: None FLUOROSCOPIC TIME:   Minutes RADIATION DOSE: 84 mGy ; 10.1 Minutes ; 0 mGy CONTRAST: 40 cc Isovue Procedure:   Ultrasound was used to evaluate the left groin.  The selected vessel was  widely patent.  It was the common femoral artery. Ultrasound was then used for real-time ultrasound guided needle entry of the common femoral artery. Permanent images were recorded and saved to the patient's medical record.   Micropuncture technique was used to deliver a 5 Lao sheath.  Through this an Omni Flush catheter was advanced to low abdominal aorta and our initial aortogram performed   Findings: Patent lower abdominal aorta, common iliac arteries, internal iliac arteries, and external iliac arteries without disease.  Patent common femoral arteries without disease.  Patent right profunda femoris artery and SFA origin without disease.   Wire catheter advanced down to the right SFA origin level and a selective right leg angiogram performed   Findings: Patent superficial femoral artery.  There is significant irregularity leading to complete occlusion of the distal popliteal artery.  This vessel later reconstitutes through retrograde collateral branches.  The origin of the anterotibial artery is also occluded.  The tibioperoneal trunk is proximally occluded.  The peroneal artery and anterotibial artery reconstitute does not contribute further down.  The anterior tibial artery is a dominant flow into the foot beyond its origin occlusion with no other visible disease.  In the foot however the dorsalis pedis artery is diseased and there is  really only collateral flow in the foot with no pedal arches.   This point the patient was fully heparinized and a 6 Moroccan Ansell sheath was exchanged down to the origin of the SFA.  Through this a 4 Moroccan 90 cm sheath was advanced down to the popliteal level over wire.  We now used a stiff angled Glidewire and Diaz cross catheter to cross the popliteal occlusion in the anterotibial artery origin occlusion advanced into the anterotibial artery.  We performed an angiogram from the catheter in the intertibial artery beyond the occlusion.   Findings: True lumen location of a catheter in the anterotibial artery beyond the occlusion.   No change in 018 wire.  Over this we exchanged in a 3 mm x 4 cm angioplasty balloon and performed angioplasty of the origin disease in the anterotibial artery.  The balloon was taken to 10 arina of pressure and held up for 2 minutes.  The balloon was then taken down.  We performed an angiogram from the sheath level at the popliteal   Findings: No inline flow through the treated popliteal and anterior tibial arteries.  There is still significant disease in the distal popliteal artery.   We remove the 4 Moroccan sheath and over the same wire we exchanged in a 4 mm x 4 cm angioplasty balloon treating only the popliteal artery segment.  This was taken up to 8 arina of pressure and held up for 1 minute.  Once this was completed we exchanged this balloon for a 4 mm x 4 cm drug-eluting angioplasty balloon which were treated the same segment for 3 minutes at 8 arina of pressure.  We exchanged in an 035 wire while the balloon was down and then once the balloon was removed perform an angiogram from the 6 Moroccan sheath level   Findings: Widely patent flow to the popliteal artery directly to the anterior tibial artery with no visible residual disease.  Inline flow now to the foot.  There is still no pedal arch but there is much improved flow through collaterals in the foot.   This point the sheath was  pulled back to the left groin level and angiogram performed confirming that we had a good access site for attempted closure device.  A 6 Iraqi Angio-Seal was then deployed without difficulty and a completion duplex performed showing good positioning of the Angio-Seal on the Anturol the artery without active extravasation and brisk flow in the native vessel through and beyond the Angio-Seal showing no luminal issues. Impression: Successful recanalization of distal popliteal and origin anterior tibial artery occlusions with much improved flow to the foot.  Drug-eluting therapy use and so patient will need to be on Plavix for at least 3 months.  We will see the patient back with Lizzeth in 2 weeks time with repeat ABIs and right leg arterial duplex.  Needs to be seen by the podiatry for wound care service ASAP as well. Kim Montes MD [facility] Surgery     Lower Extremity Arterial Duplex Right    Result Date: 7/15/2022  Arterial Duplex Ultrasound (Date: 07/15/22) Lower Extremity Artery Evaluation Indication: Status post angioplasty right popliteal artery and  right anterior tibial artery (06/29/2022) Previous: 06/07/22 History: Previous Smoker, Hypertension, Diabetic, Hyperlipidemia, TIA/Stroke, CAD, MI, Angioplasty and Vascular Ulcers Technique: Duplex imaging is performed utilizing gray-scale, two-dimensional images, and color-flow imaging. Doppler waveform analysis and spectral Doppler imaging is also performed. LOWER EXTREMITY ARTERIAL DUPLEX EXAM WITH WAVEFORMS Right Leg:(cm/s) Location: Velocities Waveforms EIA:   138  B CFA:   85  T PFA:   90  T SFA Proximal:   59  B SFA Mid:   129  T SFA Distal:   70  T Popliteal Artery:   131  T PTA PROX:   92   T PTA DISTAL:   18 M DIGNA PROX: 159 T DIGNA DISTAL:   100  M DPA:   159  M Waveforms: T=Triphasic, M=Monophasic, B=Biphasic Impression: Right Lower Extremity: Biphasic flow, femoral artery suggesting no inflow disease.  Moderately elevated velocities  within the proximal anterior tibial artery with transition to monophasic flow distally suggesting focal stenosis and underlying peripheral arterial disease.  Otherwise, no evidence of hemodynamically significant stenosis in the right lower extremity. Left Lower Extremity: Not evaluated Reference: Category Normal 1-19% 20-49% 50-99% Occluded PSV <160 cm/sec without spectral broadening <160 cm/sec with spectral broadening Increased Increased Absent Flow Ratio N/A N/A < 2.0 >2.0 N/A Post-Stenotic Turbulence No No No Yes N/A          Picture:

## 2022-07-28 ENCOUNTER — DOCUMENTATION ONLY (OUTPATIENT)
Dept: VASCULAR SURGERY | Facility: CLINIC | Age: 52
End: 2022-07-28

## 2022-07-28 ENCOUNTER — OFFICE VISIT (OUTPATIENT)
Dept: VASCULAR SURGERY | Facility: CLINIC | Age: 52
End: 2022-07-28
Attending: SURGERY
Payer: MEDICARE

## 2022-07-28 VITALS
RESPIRATION RATE: 16 BRPM | HEART RATE: 88 BPM | DIASTOLIC BLOOD PRESSURE: 72 MMHG | TEMPERATURE: 98.9 F | SYSTOLIC BLOOD PRESSURE: 130 MMHG

## 2022-07-28 DIAGNOSIS — M14.671 CHARCOT ANKLE, RIGHT: Primary | ICD-10-CM

## 2022-07-28 PROCEDURE — 99214 OFFICE O/P EST MOD 30 MIN: CPT | Performed by: SURGERY

## 2022-07-28 PROCEDURE — G0463 HOSPITAL OUTPT CLINIC VISIT: HCPCS

## 2022-07-28 RX ORDER — CEFAZOLIN SODIUM/WATER 2 G/20 ML
2 SYRINGE (ML) INTRAVENOUS SEE ADMIN INSTRUCTIONS
Status: CANCELLED | OUTPATIENT
Start: 2022-08-05

## 2022-07-28 RX ORDER — CEFAZOLIN SODIUM/WATER 2 G/20 ML
2 SYRINGE (ML) INTRAVENOUS
Status: CANCELLED | OUTPATIENT
Start: 2022-08-05

## 2022-07-28 ASSESSMENT — PAIN SCALES - GENERAL: PAINLEVEL: NO PAIN (0)

## 2022-07-28 NOTE — PROGRESS NOTES
Surgery Scheduled      Surgery/Procedure: BKA right leg    Special Equipment: power saw    Location: [facility]:  [facility] [place] [address] (phone: [phone], Fax: [phone])    Date: 8/5/22    Time: 130PM    Admission Type: Inpatient    Surgeon: Dr. Montes    OR Confirmed & :  Yes with Gladys on 7/28/2022    Entered on provider calendar:  Yes    Covid Scheduled: need PCR -[facility] facility will complete.    Post Op: 9/8/22 11:40AM [facility]    Wound Vac Needed: tbd    Home Care Needed: No - pt admitted to facility    Blood Thinners Addressed: pt on Xarelto & Plavix - informed to hold hold xarelto 24 hrs before surgery per CESARIO Medel.

## 2022-07-28 NOTE — PROGRESS NOTES
VASCULAR SURGERY OUTPATIENT CONSULT OR VISIT   VASCULAR SURGEON: Kim Montes MD    LOCATION:  [facility]    Cm Lundberg   Medical Record #:  [id number]  YOB: 1970  Age:  51 year old     Date of Service: 7/28/2022    PRIMARY CARE PROVIDER: Provider, Generic External Data      Reason for consultation: Assessment for amputation    IMPRESSION: Patient with peripheral vascular disease and a right foot wound associated with a Charcot foot deformity.  Underwent angio intervention by me with successful improvement in blood flow but after thorough discussion and consideration of his long-term prognosis patient has decided to move forward with below-knee amputation.  This is not unreasonable given that with his foot deformity is likely to have significant future wounds and will likely walk better with a prosthetic then with his existing limb.  Looking at the wound looks quite clean and without significant bacterial contamination: I think it might be reasonable to do this is a single-stage.    RECOMMENDATION: We will plan on right leg below-knee amputation with regional block.  Patient is going to see his cardiologist this coming Tuesday given his significant cardiac history.  I did reassure him that we would like to the operation with a regional block and sedation which should lower his operative risk.  We will plan on a single-stage below-knee amputation.  We will forego seeing the prosthetics team before hand as he would like to have this done as soon as possible.  We will place a referral for the prosthetics team once he is in the hospital after the operation.    HPI:  Cm Lundberg is a 51 year old male who was seen today for decision for right leg below-knee amputation.  This is a patient for whom I performed right leg angio intervention successfully with significant improvement in toe pressures but whose wound has not completely healed and has evidence of osteomyelitis.  This  is on the basis of a severe Charcot foot deformity.  After careful discussion of the plan for limb salvage with its extensive expected time course and uncertain results, as well as the poor function of the limb itself even when healed, and lastly the high likelihood of recurrent wounds developing, the patient is strongly requesting moving forward with a below-knee amputation.  This is very reasonable.    Patient is not in any pain.  Wound, while small and open is not draining or up problem.  Currently not on IV antibiotics.    We spent some time discussing the operation and the expected postoperative course.  While I initially suggested we try to get an orthotics consult to discuss life with a below-knee amputation the patient would really like to proceed and then have that discussion afterwards as he does not want the delays necessary in scheduling and making such an appointment.  This is not unreasonable as he is quite educated about his problem.    PHH:    Past Medical History:   Diagnosis Date     Abdominal pain, generalized 8/19/2016     Anemia, unspecified type 8/19/2016     Anxiety      Anxiety state 7/29/2021    Formatting of this note might be different from the original. Created by Conversion  Replacement Utility updated for latest IMO load     Arthritis      Cerebral infarction (H)      Chest pain, unspecified type 8/19/2016     Congestive heart failure (H)      Constipation, unspecified constipation type 8/19/2016     Coronary artery disease     CABG with multiple stents     Coronary artery disease involving native coronary artery without angina pectoris, unspecified whether native or transplanted heart 8/19/2016     Coronary atherosclerosis 7/29/2021    Formatting of this note might be different from the original. Created by Conversion  Replacement Utility updated for latest IMO load     Depression      Diabetes (H)      Diabetes mellitus (H) 12/17/2021     Diabetic ulcer of right midfoot associated  with type 2 diabetes mellitus, limited to breakdown of skin (H) 9/28/2017     Diabetic ulcer of right midfoot associated with type 2 diabetes mellitus, with necrosis of muscle (H) 1/7/2022     Diarrhea 8/19/2016     Equinovarus acquired deformity, right 4/14/2021     Equinovarus deformity, acquired, right      Essential hypertension 8/19/2016     Gastroesophageal reflux disease without esophagitis 8/19/2016     GERD (gastroesophageal reflux disease)      Heart attack (H)      History of blood transfusion      Hyperlipidemia      Hyperlipidemia, unspecified hyperlipidemia type 8/19/2016     Hypertension      Insomnia, unspecified type 8/19/2016     Late effects of cerebrovascular accident 8/19/2016     Major depressive disorder with single episode, remission status unspecified 8/19/2016     Microalbuminuria 7/29/2021    Formatting of this note might be different from the original. Created by Conversion     Migraine      Migraines      Mild intellectual disabilities 8/19/2016     Myalgia      Nausea 8/19/2016     Pain 8/19/2016     PONV (postoperative nausea and vomiting)     mild nausea     Problems with learning 7/29/2021    Formatting of this note might be different from the original. Created by Conversion [facility] Annotation: Feb 11 2010  1:41PM - Leonid Cabral: CANNOT READ OR  WRITE     Stented coronary artery      Type 2 diabetes mellitus (H) 7/29/2021    Formatting of this note might be different from the original. Created by Conversion     Type 2 diabetes mellitus with hyperglycemia (H) 8/19/2016     Type II or unspecified type diabetes mellitus with ophthalmic manifestations, not stated as uncontrolled(250.50) (H) 7/29/2021    Formatting of this note might be different from the original. Created by Conversion     Weakness 8/19/2016        Past Surgical History:   Procedure Laterality Date     INCISION AND DRAINAGE LOWER EXTREMITY, COMBINED Right 12/17/2021    Procedure: INCISION AND DRAINAGE, right foot;   Surgeon: Alex Kim DPM;  Location: [facility] Main OR     INCISION AND DRAINAGE LOWER EXTREMITY, COMBINED Right 1/27/2022    Procedure: INCISION AND DRAINAGE, right foot with application of theraskin;  Surgeon: Alex Kim DPM;  Location: [facility] Main OR     INCISION AND DRAINAGE LOWER EXTREMITY, COMBINED Right 4/4/2022    Procedure: INCISION AND DRAINAGE, right foot with application of theraskin;  Surgeon: Alex Kim DPM;  Location: [facility] Main OR     IR LOWER EXTREMITY ANGIOGRAM RIGHT  6/29/2022     IR MISCELLANEOUS PROCEDURE  2/13/2004     [facility] CABG, VEIN, SINGLE      Description: CABG (CABG);  Recorded: 04/22/2009;       ALLERGIES:  Aspirin, Isosorbide, Codeine, and Morphine    MEDS:    Current Outpatient Medications:      acetaminophen (TYLENOL) 325 MG tablet, Take 650 mg by mouth every 4 hours as needed for mild pain , Disp: , Rfl:      ARIPiprazole (ABILIFY) 2 MG tablet, 2 mg daily , Disp: , Rfl:      ASPERCREME ORIGINAL 10 % external cream, APPLY TO LOW BACK TWICE DAILY FOR LOW BACK PAIN, Disp: , Rfl:      atorvastatin (LIPITOR) 80 MG tablet, Take 80 mg by mouth daily , Disp: , Rfl:      cholestyramine (QUESTRAN) 4 g packet, Take 1 packet by mouth every other day , Disp: , Rfl:      clopidogrel (PLAVIX) 75 MG tablet, Take 1 tablet (75 mg) by mouth daily Start taking medication the day after the procedure., Disp: 90 tablet, Rfl: 1     DULoxetine (CYMBALTA) 60 MG capsule, Take 120 mg by mouth daily takle 120mg, Disp: , Rfl:      erenumab-aooe (AIMOVIG) 140 MG/ML injection, Inject 1 mL (140 mg) Subcutaneous every 30 days, Disp: 1 mL, Rfl: 0     fenofibrate micronized (LOFIBRA) 200 MG capsule, Take 200 mg by mouth every morning (before breakfast) , Disp: , Rfl:      ferrous sulfate (FEROSUL) 325 (65 Fe) MG tablet, Take 325 mg by mouth daily (with breakfast) , Disp: , Rfl:      gabapentin (NEURONTIN) 100 MG capsule, TAKE 1 CAP BY MOUTH TWICE A DAY BEFORE MEALS, Disp: , Rfl:       gabapentin (NEURONTIN) 300 MG capsule, Take 600 mg by mouth At Bedtime, Disp: , Rfl:      glipiZIDE (GLUCOTROL) 5 MG tablet, TAKE 1/2 TABLET (2.5MG) BY MOUTH DAILY DX DIABETES, Disp: , Rfl:      loperamide (IMODIUM) 2 MG capsule, Take 4 mg by mouth as needed for diarrhea , Disp: , Rfl:      losartan (COZAAR) 25 MG tablet, Take 25 mg by mouth At Bedtime , Disp: , Rfl:      magnesium oxide 400 MG CAPS, Take 400 mg by mouth 4 times daily, Disp: , Rfl:      Menthol, Topical Analgesic, (BIOFREEZE ROLL-ON) 4 % GEL, Apply 1 Application topically 2 times daily Apply to lower back for pain, Disp: , Rfl:      metFORMIN (GLUCOPHAGE) 1000 MG tablet, Take 1,000 mg by mouth, Disp: , Rfl:      metFORMIN (GLUCOPHAGE) 500 MG tablet, TAKE 2 TABS (1000MG) BY MOUTH EVERY MORNING;TAKE 1 TAB BY MOUTH EVERY EVENING, Disp: , Rfl:      metoprolol succinate ER (TOPROL-XL) 25 MG 24 hr tablet, Take 75 mg by mouth daily , Disp: , Rfl:      mirtazapine (REMERON) 7.5 MG tablet, Take 7.5 mg by mouth At Bedtime , Disp: , Rfl:      Neomycin-Bacitracin-Polymyxin (TRIPLE ANTIBIOTIC) 3.5-400-5000 OINT ointment, Externally apply 1 applicator topically 3 times daily as needed, Disp: , Rfl:      Nitroglycerin (NITROSTAT SL), Place 0.4 mg under the tongue every 5 minutes as needed for chest pain , Disp: , Rfl:      omeprazole (PRILOSEC) 20 MG DR capsule, Take 20 mg by mouth daily , Disp: , Rfl:      oxyCODONE (ROXICODONE) 5 MG tablet, TAKE 1 TAB BY MOUTH EVERY 6HOURS AS NEEDED FOR PAIN, Disp: 28 tablet, Rfl: 0     polyethylene glycol (MIRALAX) 17 GM/Dose powder, Take 1 packet by mouth daily as needed for constipation , Disp: , Rfl:      senna-docusate (SENOKOT-S/PERICOLACE) 8.6-50 MG tablet, Take 1 tablet by mouth 2 times daily as needed for constipation , Disp: , Rfl:      traZODone (DESYREL) 50 MG tablet, Take 50 mg by mouth At Bedtime , Disp: , Rfl:      vitamin C (ASCORBIC ACID) 500 MG tablet, Take 500 mg by mouth daily , Disp: , Rfl:      XARELTO  ANTICOAGULANT 20 MG TABS tablet, TAKE 1 TAB BY MOUTH ONCE DAILY, Disp: , Rfl:     SOCIAL HABITS:    History   Smoking Status     Former Smoker     Quit date: 1/28/2015   Smokeless Tobacco     Never Used     Social History    Substance and Sexual Activity      Alcohol use: Not Currently      History   Drug Use Unknown       FAMILY HISTORY:    Family History   Adopted: Yes       REVIEW OF SYSTEMS:    A 12 point ROS was reviewed and except for what is listed in the HPI above, all others are negative    PE:  /72   Pulse 88   Temp 98.9  F (37.2  C)   Resp 16   Wt Readings from Last 1 Encounters:   07/05/22 153 lb (69.4 kg)     There is no height or weight on file to calculate BMI.    EXAM:  GENERAL: This is a well-developed 51 year old male who appears his stated age  EYES: Grossly normal.  MOUTH: Buccal mucosa normal   MUSCULOSKELETAL: Grossly normal and both lower extremities are intact.  HEME/LYMPH: No lymphedema  NEUROLOGIC: Focally intact, Alert and oriented x 3.   PSYCH: appropriate affect  INTEGUMENT: Open sore of the right lateral foot with a clean appearing base.        DIAGNOSTIC STUDIES:     Images:  US Low Ext Arterial Dop Seg Pres w/o Exercise    Result Date: 7/15/2022  BILATERAL RESTING ANKLE-BRACHIAL INDICES (ABBY'S) (Date: 07/15/22) Indication: Status post angioplasty right popliteal artery and  right anterior tibial artery (06/29/2022) Previous: 06/07/22 History: Previous Smoker, Hypertension, Diabetic, Hyperlipidemia, TIA/Stroke, CAD, MI, Angioplasty and Vascular Ulcers  Resting ABBY's          Right: mmHg Index     Brachial: -  Ankle-(PT): 87 0.69 Ankle-(DP): 154 1.21          Digit: 114 0.90               Left: mmHg Index     Brachial: 127  Ankle-(PT): 136 1.07 Ankle-(DP): 143 1.13          Digit: 89 0.70 Resting ankle-brachial index of 1.21 on the right. Toe Pressures of 114 mmHg and TBI of 0.90  Resting ankle-brachial index of 1.13 on the left. Toe Pressures of 89 mmHg and TBI of 0.70  VPR  WAVEFORMS: The right volume plethysmography waveforms are normal at the lower thigh level, normal at the upper calf level and normal at the ankle. The left volume plethysmography waveforms are normal at the lower thigh level, normal at the upper calf level and normal at the ankle. Impression:  1. RIGHT LOWER EXTREMITY: ABBY is Normal with an ABBY of 1.21. and Normal toe pressures of 114 mmHg. 2. LEFT LOWER EXTREMITY: ABBY is Normal with an ABBY of 1.13. and Normal toe pressures of 89 mmHg. Reference: Wound classification Grade ABBY Ankle Systolic Pressure Toe Pressures 0 > 0.80 > 100 mmHg > 60 mmHg 1 0.6 - 0.79 70 - 100 mmHg 40 - 59 mmHg 2 0.4 - 0.59 50-70 mmHg 30 - 39 mmHg 3 < 0.39 < 50 mmHg < 30 mmHg Digit Pressures DBI Disease Category > 0.70 Normal < 0.70 Abnormal > 30 mmHg Potential wound healing < 30 mmHg Impaired wound healing Ankle Brachial Pressures ABBY Disease Category > 1.3  Likely vessel calcification with monophasic waveforms, non-diagnostic 0.95-1.30 Normal with multiphasic waveforms 0.50-0.95 Single level disease 0.30-0.50 Multilevel disease < 0.30 Critical limb ischema Volume Plethysmography Recording (VPR) at all levels Normal Sharp systolic peak, fast upstroke, prominent dicrotic notch in wave Mild Sharp systolic peak, fast upstroke, absent dicrotic notch in wave Moderate Flattened systolic peak, slowed upstroke, absent dicrotic notch inwave Severe amplitude wave with = upslope and down slope Occluded Flat Line     IR Lower Extremity Angiogram Right    Result Date: 6/29/2022  Vascular surgery interventional procedure note Date: 06/29/22 Procedures Performed: Ir Lower Extremity Angiogram Right  from the aorta level via left groin approach   Ultrasound-guided vascular access   Angioplasty right popliteal artery   Angioplasty right anterior tibial artery . Provider: Kim Montes MD Assistant: KHUSHBU Worthington Indication: This is a 51-year-old gentleman with nonhealing wounds of his right foot.  Arterial  duplex suggests popliteal and infrapopliteal disease and toe pressures now little over 30 suggesting inadequate blood supply for wound healing.  Plan for angio intervention to improve arterial flow and allow for wound healing. Sedation: The procedure was performed with administration of intravenous conscious sedation with appropriate preoperative, intraoperative, and postoperative evaluation.  55 minutes of supervised face to face intraservice time was provided by a radiology nurse under my direct supervision.  Versed 3 mg and fentanyl 125 mcg given. ANTIBIOTICS: None FLUOROSCOPIC TIME:   Minutes RADIATION DOSE: 84 mGy ; 10.1 Minutes ; 0 mGy CONTRAST: 40 cc Isovue Procedure:   Ultrasound was used to evaluate the left groin.  The selected vessel was  widely patent.  It was the common femoral artery. Ultrasound was then used for real-time ultrasound guided needle entry of the common femoral artery. Permanent images were recorded and saved to the patient's medical record.   Micropuncture technique was used to deliver a 5 [place]ambian sheath.  Through this an Omni Flush catheter was advanced to low abdominal aorta and our initial aortogram performed   Findings: Patent lower abdominal aorta, common iliac arteries, internal iliac arteries, and external iliac arteries without disease.  Patent common femoral arteries without disease.  Patent right profunda femoris artery and SFA origin without disease.   Wire catheter advanced down to the right SFA origin level and a selective right leg angiogram performed   Findings: Patent superficial femoral artery.  There is significant irregularity leading to complete occlusion of the distal popliteal artery.  This vessel later reconstitutes through retrograde collateral branches.  The origin of the anterotibial artery is also occluded.  The tibioperoneal trunk is proximally occluded.  The peroneal artery and anterotibial artery reconstitute does not contribute further down.  The anterior  tibial artery is a dominant flow into the foot beyond its origin occlusion with no other visible disease.  In the foot however the dorsalis pedis artery is diseased and there is really only collateral flow in the foot with no pedal arches.   This point the patient was fully heparinized and a 6 [place]orran Ansell sheath was exchanged down to the origin of the SFA.  Through this a 4 Andorran 90 cm sheath was advanced down to the popliteal level over wire.  We now used a stiff angled Glidewire and Diaz cross catheter to cross the popliteal occlusion in the anterotibial artery origin occlusion advanced into the anterotibial artery.  We performed an angiogram from the catheter in the intertibial artery beyond the occlusion.   Findings: True lumen location of a catheter in the anterotibial artery beyond the occlusion.   No change in 018 wire.  Over this we exchanged in a 3 mm x 4 cm angioplasty balloon and performed angioplasty of the origin disease in the anterotibial artery.  The balloon was taken to 10 arina of pressure and held up for 2 minutes.  The balloon was then taken down.  We performed an angiogram from the sheath level at the popliteal   Findings: No inline flow through the treated popliteal and anterior tibial arteries.  There is still significant disease in the distal popliteal artery.   We remove the 4 Andorran sheath and over the same wire we exchanged in a 4 mm x 4 cm angioplasty balloon treating only the popliteal artery segment.  This was taken up to 8 arina of pressure and held up for 1 minute.  Once this was completed we exchanged this balloon for a 4 mm x 4 cm drug-eluting angioplasty balloon which were treated the same segment for 3 minutes at 8 arina of pressure.  We exchanged in an 035 wire while the balloon was down and then once the balloon was removed perform an angiogram from the 6 Andorran sheath level   Findings: Widely patent flow to the popliteal artery directly to the anterior tibial artery with no  visible residual disease.  Inline flow now to the foot.  There is still no pedal arch but there is much improved flow through collaterals in the foot.   This point the sheath was pulled back to the left groin level and angiogram performed confirming that we had a good access site for attempted closure device.  A 6 Yemeni Angio-Seal was then deployed without difficulty and a completion duplex performed showing good positioning of the Angio-Seal on the Anturol the artery without active extravasation and brisk flow in the native vessel through and beyond the Angio-Seal showing no luminal issues. Impression: Successful recanalization of distal popliteal and origin anterior tibial artery occlusions with much improved flow to the foot.  Drug-eluting therapy use and so patient will need to be on Plavix for at least 3 months.  We will see the patient back with Lizzeth in 2 weeks time with repeat ABIs and right leg arterial duplex.  Needs to be seen by the podiatry for wound care service ASAP as well. Kim Montes MD [facility] Vascular Surgery     Lower Extremity Arterial Duplex Right    Result Date: 7/15/2022  Arterial Duplex Ultrasound (Date: 07/15/22) Lower Extremity Artery Evaluation Indication: Status post angioplasty right popliteal artery and  right anterior tibial artery (06/29/2022) Previous: 06/07/22 History: Previous Smoker, Hypertension, Diabetic, Hyperlipidemia, TIA/Stroke, CAD, MI, Angioplasty and Vascular Ulcers Technique: Duplex imaging is performed utilizing gray-scale, two-dimensional images, and color-flow imaging. Doppler waveform analysis and spectral Doppler imaging is also performed. LOWER EXTREMITY ARTERIAL DUPLEX EXAM WITH WAVEFORMS Right Leg:(cm/s) Location: Velocities Waveforms EIA:   138  B CFA:   85  T PFA:   90  T SFA Proximal:   59  B SFA Mid:   129  T SFA Distal:   70  T Popliteal Artery:   131  T PTA PROX:   92   T PTA DISTAL:   18 M DIGNA PROX: 159 T DIGNA DISTAL:   100  M DPA:   159  M  Waveforms: T=Triphasic, M=Monophasic, B=Biphasic Impression: Right Lower Extremity: Biphasic flow, femoral artery suggesting no inflow disease.  Moderately elevated velocities within the proximal anterior tibial artery with transition to monophasic flow distally suggesting focal stenosis and underlying peripheral arterial disease.  Otherwise, no evidence of hemodynamically significant stenosis in the right lower extremity. Left Lower Extremity: Not evaluated Reference: Category Normal 1-19% 20-49% 50-99% Occluded PSV <160 cm/sec without spectral broadening <160 cm/sec with spectral broadening Increased Increased Absent Flow Ratio N/A N/A < 2.0 >2.0 N/A Post-Stenotic Turbulence No No No Yes N/A       I personally reviewed the images and my interpretation is that the patient's toe pressures after revascularization procedure, 840 mmHg, are adequate for wound healing.  ABIs are also now normal..    LABS:      Sodium   Date Value Ref Range Status   06/29/2022 136 136 - 145 mmol/L Final   04/01/2022 139 136 - 145 mmol/L Final   12/28/2021 140 136 - 145 mmol/L Final   08/25/2016 139 mmol/L Final     Urea Nitrogen   Date Value Ref Range Status   06/29/2022 24 (H) 8 - 22 mg/dL Final   04/01/2022 17 8 - 22 mg/dL Final   12/28/2021 17 8 - 22 mg/dL Final   08/25/2016 21 mg/dL Final     Hemoglobin   Date Value Ref Range Status   06/29/2022 11.7 (L) 13.3 - 17.7 g/dL Final   04/01/2022 10.5 (L) 13.3 - 17.7 g/dL Final   12/28/2021 11.1 (L) 13.3 - 17.7 g/dL Final   08/25/2016 10.1 (A) 13.3 - 17.7 g/dL Final     Platelet Count   Date Value Ref Range Status   06/29/2022 206 150 - 450 10e3/uL Final   04/01/2022 238 150 - 450 10e3/uL Final   12/28/2021 246 150 - 450 10e3/uL Final   08/25/2016 189 150 - 450 10^9/L Final       30 minutes spent on the day of encounter doing chart review, history and exam, documentation, and further activities as noted.       Kim Montes MD, MD  VASCULAR SURGERY

## 2022-07-28 NOTE — PATIENT INSTRUCTIONS
Cm    Your visit to [facility] for your surgery is coming soon and we look forward to seeing you! This friendly reminder and pre-procedure checklist will help to ensure your surgery goes smoothly and meets your expectations. At [facility], our goal is to provide you with a great patient experience and to deliver genuine, professional care to every patient.     Please complete all the steps in advance of your visit. If you have any questions about the items listed below, please give our office a call. We can be reached at [phone] or visit our website at [email or web address]  for more information.     Procedure: Right below the knee amputation    Procedure Date :  TBD    Arrival Time:  Surgery nurse will call you 1-2 days before surgery to tell you time of arrival     Procedure Location: [facility]:  [address] [place] [address] (phone: [phone], Fax: [phone])    Surgeon: MD Kim Montes    Admission Type: Inpatient    COVID PCR Test: [facility] to do PCR 3-4 days prior    Post Operative Appointment with Dr Kim Montes:  1 month post op, date TBD      If you take blood thinners:   PLEASE DO NOT STOP YOUR ASPIRIN OR PLAVIX UNLESS SPECIFICALLY DIRECTED BY THE VASCULAR SURGEON TO STOP!  In most cases Vascular surgeons want you to continue these. This is different from most NON vascular surgeries and may not be well known by your Primary Care Provider    HOLD XARELTO 24 HOURS PRIOR TO PROCEDURE    Notify our office right away, if you have any changes in your health status, or if you develop a cold, flu, diarrhea, infection, fever or sore throat before your scheduled surgery date.   We can be reached at [phone] Monday-Friday 8 am-4:30 pm if you have any questions.       Complete the following checklist before your surgery    []  You will need a Pre-op Physical within 30 days before surgery  "with your primary care provider. This exam is required by the anesthesiologist to ensure a safe surgical experience.    Failure  to obtain your pre-op physical will lead to cancellation of your surgery  Call them right away to schedule this. Please ensure your Preoperative Physical is faxed to the Hospital if done outside of [facility] system.     [] Preoperative Medication Instructions  Contact your prescribing provider and/or vascular surgeon for instructions before discontinuing any medications especially anticoagulants. (Examples: Coumadin, Plavix, Xarelto, Eliquis, Pradaxa, Effient, Brilinta)   Hold Ibuprofen, Herbal Supplements and Vitamin E 7 days before  Stop Cialis, Levitra and Viagra 2-3 days before surgery    [] Fasting Requirements  Nothing to eat for 8 hours before surgery unless instructed differently by the surgery nurse.   You may have clear liquids up to two hours before your arrival time (coffee, tea, water, or Gatorade. No cream or milk)  If your primary care provider has instructed you to continue oral medications, you may take them on the morning of surgery with a small sip of water.    No alcohol or smoking after 12:00am the day of surgery    [] PCR COVID-19 test is required within 4 days of surgery  If your test is positive or you fail to get tested, your surgery will be postponed.     [] Contact your insurance regarding coverage  If you would like a [facility] Estimate for your upcoming procedure at [facility] Location, contact Cost of Care Estimates   Advocates are available Monday through Friday 8am - 5pm   [phone]  You may also submit a request online at [email or web address]  - Complete the secure online form found under \"Services and Procedure Pricing\"   If your procedure is at [facility] please contact the numbers below for Cost of Care Estimates.   - Facility Charge: [phone]    Anesthesiology charge:  [phone]      [] DO NOT BRING " FMLA WITH TO SURGERY.  These should be sent to the provider's office by fax to [phone].     [] Day of Surgery  Medications - Take as indicated with sips of water.   Wear comfortable loose fitting clothes. Wear your glasses-Not contacts. Do not wear jewelry and remove body piercing's. Surgery may be cancelled if they are not removed.   You may have 1 family member wait in the lobby during your surgery. Visitor restrictions are subject to change. Please verify with the surgery nurse when they call.   If same day surgery-Have a someone come with you to surgery that can help you understand the surgeon's instructions, drive you home and stay with you overnight the first night.    [] If the community sees a new COVID-19 surge, your procedure may need to be postponed. We will contact you if this happens.    [] You will receive a call from a surgery nurse 1-3 days prior to surgery. They will go over more details with you.     Before Your Procedure or Hospital Admission Testing for COVID-19  Thank you for choosing [facility] for your health care needs. The COVID-19 pandemic is a very challenging time for everyone.   Our goal is to keep you and our team here at [facility] safe and healthy. We ve taken many steps to make this happen.   For example:   We test and screen our staff, care teams and patients for COVID-19.   Everyone at [facility] must wear a mask and stay 6 feet apart.   We are limiting hospital and clinic visitors.  Before you come in  You must get tested for COVID-19, even if you ve been vaccinated.   If you re going home on the same day as your procedure (surgery):  1 to 2 days before your procedure, take an at-home, rapid antigen test. You can buy these at many pharmacy stores. Or you can order free, at-home tests at covid.gov/tests. If you   can t find an at-home, rapid antigen test, please schedule a PCR test with [facility] by calling [phone], or visiting  [email or web address]. We   can t accept tests that are more than 4 days old.   If your test is negative, please take a photo of the test. Show the photo to the nurse when you come in for your procedure.  If your test is positive, please see the  If your test shows you have COVID-19  section on this page.    If you re staying overnight in the hospital:  4 days before your procedure, please get a PCR test from a lab.   To schedule a PCR test with [facility], call [phone]. Or, visit [email or web address].    If your test is negative, please ask the testing location to fax your results to us at [phone].  If your test is positive, please see the  If your test shows you have COVID-19  section below.    After your test and before your procedure, please follow these safety guidelines:   Limit trips outside your home.   Limit the number of people you see.   Always wear a face covering outside your home.   Use social distancing. Stay 6 feet away from others whenever you can.   Wash your hands often.    If your test shows you have COVID-19  If your test is positive, please tell your surgeon s office right away. A positive test means that you have COVID-19.  We ll probably have to postpone your admission, surgery or procedure. Your care team will discuss this with you. After that, we ll let you know what to   do and when you can re-schedule.      If your test shows you DON T have COVID-19.   Even if your test is negative, you can still get COVID-19. It s rare, but sometimes the test result is wrong. You could also catch the virus after taking   the test. There s a very small chance that you could catch COVID-19 in the hospital or surgery center. [facility] has taken many steps to prevent this from happening.   Possible surgery delay like you, we want your surgery to happen when it s scheduled. But sometimes the hospital is so full that it s not safe for you to have your  surgery. This is   especially true during the pandemic. Your surgery may need to be re-scheduled at a later date. If this happens, we will call and tell you.   Day of your surgery or procedure   Please come wearing a face covering that covers both your nose and mouth.   When you arrive, we ll ask you some questions to find out if you ve had any exposures to COVID-19, or have any signs of COVID-19.   No matter where you took a test, we ll need to have the results. You can ask your testing location to fax the results to us at [phone]. If you   took a rapid antigen at-home test, you can bring a photo of the results.    Ask your care team if you can have visitors. All visitors must wear face coverings and will be screened for exposure to, or signs of, COVID-19.    The rules for visitors change often, depending on how much the virus is spreading. To learn more, see Visiting a Loved One in the Hospital during   the COVID-19 Outbreak.Please call your care team, hospital or surgery center if you have any questions. We thank you for your understanding and for choosing [facility]   for your care.   Questions and answers  Does it matter how I get tested for COVID-19?   Yes. If the plan is for you to go home on the same day as your procedure, you can take a rapid antigen, at-home test 1 to 2 days before you come in. If your test is negative, please take a photo of your test. Show it to the nurse when you come to the hospital. If you can t find a rapid antigen, at-home test, you can take a PCR test at a lab instead. If the plan is for you to stay in the hospital after your surgery, you ll need to get a PCR test from a lab 4 days before your procedure. Ask the testing location to fax your results to [phone]. If we don t get your results, we may have to delay or cancel your treatment.  Do I need to get tested at [facility]?  No. However, if you need a PCR test from a lab, we recommend using an [facility]  [place] lab. We ll get the results more quickly and easily that way. To schedule a test, please call [phone]. Or, visit [email or web address]      For informational purposes only. Not to replace the advice of your health care provider. Copyright   2020 [facility]   [employer]. All rights reserved. Clinically reviewed by Infection Prevention and the [facility] COVID-19 Clinical Team.   [employer] 225482 - Rev 05/26/22.

## 2022-07-28 NOTE — PROGRESS NOTES
[facility]        Patient is here to discuss possible BKA.  Has hx of right leg angio.  Has non-healing wound and osteomyelitis.   Pt is currently taking Plavix and Xarelto.    /72   Pulse 88   Temp 98.9  F (37.2  C)   Resp 16     The provider has been notified that the patient would like to discuss BKA.     Questions patient would like addressed today are: N/A.    Refills are needed: No    Has homecare services and agency name:  No, [facility].       Lizy Vergara RN

## 2022-08-02 ENCOUNTER — OFFICE VISIT (OUTPATIENT)
Dept: GERIATRICS | Facility: CLINIC | Age: 52
End: 2022-08-02
Payer: MEDICARE

## 2022-08-02 VITALS
TEMPERATURE: 98.4 F | HEIGHT: 67 IN | WEIGHT: 157 LBS | SYSTOLIC BLOOD PRESSURE: 124 MMHG | RESPIRATION RATE: 18 BRPM | OXYGEN SATURATION: 99 % | DIASTOLIC BLOOD PRESSURE: 62 MMHG | BODY MASS INDEX: 24.64 KG/M2 | HEART RATE: 68 BPM

## 2022-08-02 DIAGNOSIS — L97.413 DIABETIC ULCER OF RIGHT MIDFOOT ASSOCIATED WITH TYPE 2 DIABETES MELLITUS, WITH NECROSIS OF MUSCLE (H): Primary | ICD-10-CM

## 2022-08-02 DIAGNOSIS — E11.621 DIABETIC ULCER OF RIGHT MIDFOOT ASSOCIATED WITH TYPE 2 DIABETES MELLITUS, WITH NECROSIS OF MUSCLE (H): Primary | ICD-10-CM

## 2022-08-02 DIAGNOSIS — E11.69 TYPE 2 DIABETES MELLITUS WITH OTHER SPECIFIED COMPLICATION, WITHOUT LONG-TERM CURRENT USE OF INSULIN (H): ICD-10-CM

## 2022-08-02 DIAGNOSIS — I10 ESSENTIAL HYPERTENSION: ICD-10-CM

## 2022-08-02 DIAGNOSIS — I48.91 ATRIAL FIBRILLATION, UNSPECIFIED TYPE (H): ICD-10-CM

## 2022-08-02 PROCEDURE — 99310 SBSQ NF CARE HIGH MDM 45: CPT | Performed by: FAMILY MEDICINE

## 2022-08-02 NOTE — LETTER
8/2/2022        RE: Cm Lundberg  [address]  [facility] [address]          [facility]  [address] [place] [address]  Phone: [phone]  Fax: [phone]  Primary Provider: Provider, Generic External Data    PREOPERATIVE EVALUATION:  Today's date: 8/2/2022.    Cm Lundberg is a 51 year old male who presents for a preoperative evaluation.    Surgical Information:  Surgery/Procedure: Right BKA.  Surgery Location: [facility].  Surgeon: Dr. Montes.  Surgery Date: 8/5/2022.   Time of Surgery: 1:30 PM.  Where patient plans to recover: Inpatient hospital post op cares then TBD.   Fax number for surgical facility: Note does not need to be faxed, will be available electronically in Epic.    Type of Anesthesia Anticipated: To be determined.      Subjective     HPI related to upcoming procedure:   Cm Lundberg is a 51 year old male with hx of CAD, s/p stents and CABG, HTN, DM, prior CVA, afib/flutter on xarelto, who has been residing in TCU at [facility] since Dec 2021 surgery due to elective procedure for chronic long standing right foot ulceration. He underwent incision and drainage of the right foot, excisional debridement of the ulceration of the right foot into the level of the muscle, under MAC with popliteal block anesthesia on 12/17/2021. He completed abx per ID recommendations. Since that time has been NWB with wound vac, had 1/27/2022 incision and drainage with application of theraskin. On 4/4/2022 again incision and drainage with theraskin. Continued following with podiatry, wound is non healing, referred to wound and vascular due to non healing diabetic ulcer, right foot. S/p angioplasty RLE and hyperbaric oxygen therapy. MRI with indications of osteomyelitis. Following with podiatry and with vascular, evaluation and recommendations now with decision for R BKA, scheduled for 8/5/2022, see note  excerpted below. Dr. Montes gave orders with respect to hold of xarelto 24 hours prior to surgery and to continue Plavix. He is not on aspirin.     Okay to proceed with proposed surgery, pending pre-op cardiac clearance 8/3/2022 and pre-op labs ordered per Dr. Montes.     Excerpted from chart:  VASCULAR SURGERY OUTPATIENT CONSULT OR VISIT  VASCULAR SURGEON: Kim Montes MD  Date of Service: 7/28/2022  Reason for consultation: Assessment for amputation     IMPRESSION: Patient with peripheral vascular disease and a right foot wound associated with a Charcot foot deformity.  Underwent angio intervention by me with successful improvement in blood flow but after thorough discussion and consideration of his long-term prognosis patient has decided to move forward with below-knee amputation.  This is not unreasonable given that with his foot deformity is likely to have significant future wounds and will likely walk better with a prosthetic then with his existing limb.  Looking at the wound looks quite clean and without significant bacterial contamination: I think it might be reasonable to do this is a single-stage.     RECOMMENDATION: We will plan on right leg below-knee amputation with regional block.  Patient is going to see his cardiologist this coming Tuesday given his significant cardiac history.  I did reassure him that we would like to the operation with a regional block and sedation which should lower his operative risk.  We will plan on a single-stage below-knee amputation.  We will forego seeing the prosthetics team before hand as he would like to have this done as soon as possible.  We will place a referral for the prosthetics team once he is in the hospital after the operation.      Health Care Directive:  CODE STATUS:   CPR/Full code       Patient Active Problem List   Patient Active Problem List    Diagnosis Date Noted     Diabetic ulcer of other part of right foot associated with type 2 diabetes mellitus,  with necrosis of bone (H) 07/19/2022     Priority: Medium     Osteomyelitis of ankle and foot (H) 07/19/2022     Priority: Medium     Diabetic ulcer of right midfoot associated with type 2 diabetes mellitus, with necrosis of muscle (H) 01/07/2022     Priority: Medium     Diabetes mellitus (H) 12/17/2021     Priority: Medium     Anxiety state 07/29/2021     Priority: Medium     Formatting of this note might be different from the original.  Created by Conversion    Replacement Utility updated for latest IMO load       Coronary atherosclerosis 07/29/2021     Priority: Medium     Formatting of this note might be different from the original.  Created by Conversion    Replacement Utility updated for latest IMO load       Depression 07/29/2021     Priority: Medium     Formatting of this note might be different from the original.  Created by Conversion       Microalbuminuria 07/29/2021     Priority: Medium     Formatting of this note might be different from the original.  Created by Conversion       Problems with learning 07/29/2021     Priority: Medium     Formatting of this note might be different from the original.  Created by Conversion  [facility] Annotation: Feb 11 2010  1:41PM - Leonid Cabral: CANNOT READ OR   WRITE       Type 2 diabetes mellitus (H) 07/29/2021     Priority: Medium     Formatting of this note might be different from the original.  Created by Conversion       Type II or unspecified type diabetes mellitus with ophthalmic manifestations, not stated as uncontrolled(250.50) (H) 07/29/2021     Priority: Medium     Formatting of this note might be different from the original.  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/19/2016     Priority: Medium     Essential hypertension 08/19/2016     Priority: Medium     Major depressive disorder with single episode, remission status unspecified 08/19/2016     Priority: Medium     Diarrhea 08/19/2016     Priority: Medium     Constipation, unspecified constipation type 08/19/2016     Priority: Medium     Anemia, unspecified type 08/19/2016     Priority: Medium     Abdominal pain, generalized 08/19/2016     Priority: Medium     Pain 08/19/2016     Priority: Medium     Gastroesophageal reflux disease without esophagitis 08/19/2016     Priority: Medium     Migraine 08/19/2016     Priority: Medium     Weakness 08/19/2016     Priority: Medium     Myalgia 08/19/2016     Priority: Medium     Nausea 08/19/2016     Priority: Medium     Chest pain, unspecified type 08/19/2016     Priority: Medium     Insomnia, unspecified type 08/19/2016     Priority: Medium     Hyperlipidemia, unspecified hyperlipidemia type 08/19/2016     Priority: Medium        Past Medical History:  Past Medical History:   Diagnosis Date     Abdominal pain, generalized 08/19/2016     Anemia, unspecified type 08/19/2016     Anxiety      Anxiety state 07/29/2021    Formatting of this note might be different from the original. Created by Conversion  Replacement Utility updated for latest IMO load     Arthritis      Cerebral artery occlusion with cerebral infarction (H)      Cerebral infarction (H)      Chest pain, unspecified type 08/19/2016     Chronic atrial fibrillation (H)      Congestive heart failure (H)      Constipation, unspecified constipation type 08/19/2016     Coronary artery disease     CABG with multiple stents     Coronary artery disease involving  native coronary artery without angina pectoris, unspecified whether native or transplanted heart 08/19/2016     Coronary atherosclerosis 07/29/2021    Formatting of this note might be different from the original. Created by Conversion  Replacement Utility updated for latest IMO load     Depression      Diabetes (H)      Diabetes mellitus (H) 12/17/2021     Diabetic ulcer of right midfoot associated with type 2 diabetes mellitus, limited to breakdown of skin (H) 09/28/2017     Diabetic ulcer of right midfoot associated with type 2 diabetes mellitus, with necrosis of muscle (H) 01/07/2022     Diarrhea 08/19/2016     Equinovarus acquired deformity, right 04/14/2021     Equinovarus deformity, acquired, right      Essential hypertension 08/19/2016     Gastroesophageal reflux disease without esophagitis 08/19/2016     GERD (gastroesophageal reflux disease)      Heart attack (H)      History of blood transfusion      Hyperlipidemia      Hyperlipidemia, unspecified hyperlipidemia type 08/19/2016     Hypertension      Insomnia, unspecified type 08/19/2016     Late effects of cerebrovascular accident 08/19/2016     Major depressive disorder with single episode, remission status unspecified 08/19/2016     Microalbuminuria 07/29/2021    Formatting of this note might be different from the original. Created by Conversion     Migraine      Migraines      Mild intellectual disabilities 08/19/2016     Myalgia      Nausea 08/19/2016     Pain 08/19/2016     PONV (postoperative nausea and vomiting)     mild nausea     Problems with learning 07/29/2021    Formatting of this note might be different from the original. Created by Conversion [facility] Annotation: Feb 11 2010  1:41PM - Leonid Cabral: CANNOT READ OR  WRITE     Stented coronary artery      Type 2 diabetes mellitus (H) 07/29/2021    Formatting of this note might be different from the original. Created by Conversion     Type 2 diabetes mellitus with hyperglycemia (H) 08/19/2016      Type II or unspecified type diabetes mellitus with ophthalmic manifestations, not stated as uncontrolled(250.50) (H) 07/29/2021    Formatting of this note might be different from the original. Created by Conversion     Weakness 08/19/2016       Past Surgical History:  Past Surgical History:   Procedure Laterality Date     INCISION AND DRAINAGE LOWER EXTREMITY, COMBINED Right 12/17/2021    Procedure: INCISION AND DRAINAGE, right foot;  Surgeon: Alex Kim DPM;  Location: [facility] Main OR     INCISION AND DRAINAGE LOWER EXTREMITY, COMBINED Right 1/27/2022    Procedure: INCISION AND DRAINAGE, right foot with application of theraskin;  Surgeon: Alex Kim DPM;  Location: [facility] Main OR     INCISION AND DRAINAGE LOWER EXTREMITY, COMBINED Right 4/4/2022    Procedure: INCISION AND DRAINAGE, right foot with application of theraskin;  Surgeon: Alex Kim DPM;  Location: [facility] Main OR     IR LOWER EXTREMITY ANGIOGRAM RIGHT  6/29/2022     IR MISCELLANEOUS PROCEDURE  2/13/2004     ZZC CABG, VEIN, SINGLE      Description: CABG (CABG);  Recorded: 04/22/2009;       Current Medications:  Current Outpatient Medications   Medication Sig Dispense Refill     pantoprazole (PROTONIX) 40 MG EC tablet Take 40 mg by mouth daily       acetaminophen (TYLENOL) 325 MG tablet Take 650 mg by mouth every 4 hours as needed for mild pain        ARIPiprazole (ABILIFY) 2 MG tablet 2 mg daily        ASPERCREME ORIGINAL 10 % external cream APPLY TO LOW BACK TWICE DAILY FOR LOW BACK PAIN       atorvastatin (LIPITOR) 80 MG tablet Take 80 mg by mouth daily        cholestyramine (QUESTRAN) 4 g packet Take 1 packet by mouth every other day        clopidogrel (PLAVIX) 75 MG tablet Take 1 tablet (75 mg) by mouth daily Start taking medication the day after the procedure. 90 tablet 1     DULoxetine (CYMBALTA) 60 MG capsule Take 120 mg by mouth daily takle 120mg       erenumab-aooe (AIMOVIG) 140 MG/ML injection Inject  1 mL (140 mg) Subcutaneous every 30 days 1 mL 0     fenofibrate micronized (LOFIBRA) 200 MG capsule Take 200 mg by mouth every morning (before breakfast)        ferrous sulfate (FEROSUL) 325 (65 Fe) MG tablet Take 325 mg by mouth daily (with breakfast)        gabapentin (NEURONTIN) 100 MG capsule TAKE 1 CAP BY MOUTH TWICE A DAY BEFORE MEALS       gabapentin (NEURONTIN) 300 MG capsule Take 600 mg by mouth At Bedtime       glipiZIDE (GLUCOTROL) 5 MG tablet TAKE 1/2 TABLET (2.5MG) BY MOUTH DAILY DX DIABETES       loperamide (IMODIUM) 2 MG capsule Take 4 mg by mouth as needed for diarrhea        losartan (COZAAR) 25 MG tablet Take 25 mg by mouth At Bedtime        magnesium oxide 400 MG CAPS Take 400 mg by mouth 4 times daily       Menthol, Topical Analgesic, (BIOFREEZE ROLL-ON) 4 % GEL Apply 1 Application topically 2 times daily Apply to lower back for pain       metFORMIN (GLUCOPHAGE) 500 MG tablet TAKE 2 TABS (1000MG) BY MOUTH EVERY MORNING;TAKE 1 TAB BY MOUTH EVERY EVENING       metoprolol succinate ER (TOPROL-XL) 25 MG 24 hr tablet Take 75 mg by mouth daily        mirtazapine (REMERON) 7.5 MG tablet Take 7.5 mg by mouth At Bedtime        Neomycin-Bacitracin-Polymyxin (TRIPLE ANTIBIOTIC) 3.5-400-5000 OINT ointment Externally apply 1 applicator topically 3 times daily as needed       Nitroglycerin (NITROSTAT SL) Place 0.4 mg under the tongue every 5 minutes as needed for chest pain        oxyCODONE (ROXICODONE) 5 MG tablet TAKE 1 TAB BY MOUTH EVERY 6HOURS AS NEEDED FOR PAIN 28 tablet 0     polyethylene glycol (MIRALAX) 17 GM/Dose powder Take 1 packet by mouth daily as needed for constipation        senna-docusate (SENOKOT-S/PERICOLACE) 8.6-50 MG tablet Take 1 tablet by mouth 2 times daily as needed for constipation        traZODone (DESYREL) 50 MG tablet Take 50 mg by mouth At Bedtime        vitamin C (ASCORBIC ACID) 500 MG tablet Take 500 mg by mouth daily        XARELTO ANTICOAGULANT 20 MG TABS tablet TAKE 1 TAB BY  "MOUTH ONCE DAILY         Allergies:  Allergies   Allergen Reactions     Aspirin Other (See Comments)     Reacts with migraine medicine     Isosorbide Headache     Codeine Rash     Morphine Rash        Social History:  Social History     Tobacco Use     Smoking status: Former Smoker     Quit date: 2015     Years since quittin.5     Smokeless tobacco: Never Used   Substance Use Topics     Alcohol use: Not Currently       Objective     /62   Pulse 68   Temp 98.4  F (36.9  C)   Resp 18   Ht 1.702 m (5' 7\")   Wt 71.2 kg (157 lb)   SpO2 99%   BMI 24.59 kg/m      Physical Exam   General: Patient is alert male, no distress.   HEENT: Head is NCAT. Eyes show no injection or icterus. Nares negative. Oropharynx well hydrated.  Neck: Supple. No tenderness or adenopathy. No JVD.  Lungs: Clear bilaterally. No wheezes.  Cardiovascular: Regular rate and rhythm, normal S1, S2.  Back: No spinal tenderness.  Abdomen: Soft, no tenderness. Bowel sounds present. No guarding rebound or rigidity.  : Deferred.  Extremities: Mild RLE edema is noted.  Musculoskeletal: Some degen changes, right foot deformity.   Skin: Non healing ulcer right foot, bandaged.  Psych: Mood appears good.      Recent Labs   Lab Test 22  0925 22  0520 22  0615 21  0548 21  1804   HGB 11.7*  --  10.5*   < >  --      --  238   < >  --      --  139   < >  --    POTASSIUM 4.3  --  4.6   < >  --    CR 1.02  --  0.97   < >  --    A1C  --  5.8*  --   --  5.1    < > = values in this interval not displayed.        Diagnostics:  Dr. Montes ordered pre op labs.       Revised Cardiac Risk Index (RCRI):  Patient has cardiology appt 8/3/2022 for cardiac pre-op clearance.       ASSESSMENT/PLAN:   1. Non healing diabetic ulcer, right foot. S/p procedures 2021, 2022 and 2022. Recently with vascular work up, s/p angioplasty RLE and hyperbaric oxygen. MRI with indications of osteomyelitis. Following with " podiatry and with vascular, evaluation and recommendations now with decision for R BKA, scheduled for 8/5/2022. Dr. Montes gave orders with respect to hold of xarelto 24 hours prior to surgery and to continue plavix. He is not on aspirin.   2. HTN. On metoprolol and losartan. Continue.   3. Afib. On xarelto anticoagulation. Adequate rate control. Hold 24 hours prior to surgery per Dr. Montes.   4. Diabetes. On metformin and glipizide, to be held am of surgery.  5. CAD. Hx of MIs. Hx of stenting and prior CABG. On statin, beta blocker, xarelto. Cardiology appt 8/3/2022.   6. Anemia. On iron. Labs to be checked pre-op per Dr. Montes.   7. Mood disorder. On duloxetine, Abilify.           Signed Electronically by: Heidi Xie MD           Sincerely,        Heidi Xie MD

## 2022-08-04 ENCOUNTER — LAB REQUISITION (OUTPATIENT)
Dept: LAB | Facility: CLINIC | Age: 52
End: 2022-08-04
Payer: MEDICARE

## 2022-08-04 DIAGNOSIS — Z11.59 ENCOUNTER FOR SCREENING FOR OTHER VIRAL DISEASES: ICD-10-CM

## 2022-08-04 LAB — SARS-COV-2 RNA RESP QL NAA+PROBE: NEGATIVE

## 2022-08-04 PROCEDURE — U0005 INFEC AGEN DETEC AMPLI PROBE: HCPCS | Mod: ORL | Performed by: FAMILY MEDICINE

## 2022-08-04 RX ORDER — PANTOPRAZOLE SODIUM 40 MG/1
40 TABLET, DELAYED RELEASE ORAL
COMMUNITY

## 2022-08-04 NOTE — PROGRESS NOTES
[facility] GERIATRICS  [address] [place] [address]  Phone: [phone]  Fax: [phone]  Primary Provider: Provider, Generic External Data    PREOPERATIVE EVALUATION:  Today's date: 8/2/2022.    Cm Lundberg is a 51 year old male who presents for a preoperative evaluation.    Surgical Information:  Surgery/Procedure: Right BKA.  Surgery Location: [facility].  Surgeon: Dr. Montes.  Surgery Date: 8/5/2022.   Time of Surgery: 1:30 PM.  Where patient plans to recover: Inpatient hospital post op cares then TBD.   Fax number for surgical facility: Note does not need to be faxed, will be available electronically in Epic.    Type of Anesthesia Anticipated: To be determined.      Subjective     HPI related to upcoming procedure:   Cm Lundberg is a 51 year old male with hx of CAD, s/p stents and CABG, HTN, DM, prior CVA, afib/flutter on xarelto, who has been residing in TCU at [facility] since Dec 2021 surgery due to elective procedure for chronic long standing right foot ulceration. He underwent incision and drainage of the right foot, excisional debridement of the ulceration of the right foot into the level of the muscle, under MAC with popliteal block anesthesia on 12/17/2021. He completed abx per ID recommendations. Since that time has been NWB with wound vac, had 1/27/2022 incision and drainage with application of theraskin. On 4/4/2022 again incision and drainage with theraskin. Continued following with podiatry, wound is non healing, referred to wound and vascular due to non healing diabetic ulcer, right foot. S/p angioplasty RLE and hyperbaric oxygen therapy. MRI with indications of osteomyelitis. Following with podiatry and with vascular, evaluation and recommendations now with decision for R BKA, scheduled for 8/5/2022, see note excerpted below. Dr. Montes gave orders with respect to hold of xarelto 24 hours prior to surgery and  to continue Plavix. He is not on aspirin.     Okay to proceed with proposed surgery, pending pre-op cardiac clearance 8/3/2022 and pre-op labs ordered per Dr. Montes.     Excerpted from chart:  VASCULAR SURGERY OUTPATIENT CONSULT OR VISIT  VASCULAR SURGEON: Kim Montes MD  Date of Service: 7/28/2022  Reason for consultation: Assessment for amputation     IMPRESSION: Patient with peripheral vascular disease and a right foot wound associated with a Charcot foot deformity.  Underwent angio intervention by me with successful improvement in blood flow but after thorough discussion and consideration of his long-term prognosis patient has decided to move forward with below-knee amputation.  This is not unreasonable given that with his foot deformity is likely to have significant future wounds and will likely walk better with a prosthetic then with his existing limb.  Looking at the wound looks quite clean and without significant bacterial contamination: I think it might be reasonable to do this is a single-stage.     RECOMMENDATION: We will plan on right leg below-knee amputation with regional block.  Patient is going to see his cardiologist this coming Tuesday given his significant cardiac history.  I did reassure him that we would like to the operation with a regional block and sedation which should lower his operative risk.  We will plan on a single-stage below-knee amputation.  We will forego seeing the prosthetics team before hand as he would like to have this done as soon as possible.  We will place a referral for the prosthetics team once he is in the hospital after the operation.      Health Care Directive:  CODE STATUS:   CPR/Full code       Patient Active Problem List   Patient Active Problem List    Diagnosis Date Noted     Diabetic ulcer of other part of right foot associated with type 2 diabetes mellitus, with necrosis of bone (H) 07/19/2022     Priority: Medium     Osteomyelitis of ankle and foot (H)  07/19/2022     Priority: Medium     Diabetic ulcer of right midfoot associated with type 2 diabetes mellitus, with necrosis of muscle (H) 01/07/2022     Priority: Medium     Diabetes mellitus (H) 12/17/2021     Priority: Medium     Anxiety state 07/29/2021     Priority: Medium     Formatting of this note might be different from the original.  Created by Conversion    Replacement Utility updated for latest IMO load       Coronary atherosclerosis 07/29/2021     Priority: Medium     Formatting of this note might be different from the original.  Created by Conversion    Replacement Utility updated for latest IMO load       Depression 07/29/2021     Priority: Medium     Formatting of this note might be different from the original.  Created by Conversion       Microalbuminuria 07/29/2021     Priority: Medium     Formatting of this note might be different from the original.  Created by Conversion       Problems with learning 07/29/2021     Priority: Medium     Formatting of this note might be different from the original.  Created by Conversion  [facility] Annotation: Feb 11 2010  1:41PM - Leonid Cabral: CANNOT READ OR   WRITE       Type 2 diabetes mellitus (H) 07/29/2021     Priority: Medium     Formatting of this note might be different from the original.  Created by Conversion       Type II or unspecified type diabetes mellitus with ophthalmic manifestations, not stated as uncontrolled(250.50) (H) 07/29/2021     Priority: Medium     Formatting of this note might be different from the original.  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/19/2016     Priority: Medium     Essential hypertension 08/19/2016     Priority: Medium     Major depressive disorder with single episode, remission status unspecified 08/19/2016     Priority: Medium     Diarrhea 08/19/2016     Priority: Medium     Constipation, unspecified constipation type 08/19/2016     Priority: Medium     Anemia, unspecified type 08/19/2016     Priority: Medium     Abdominal pain, generalized 08/19/2016     Priority: Medium     Pain 08/19/2016     Priority: Medium     Gastroesophageal reflux disease without esophagitis 08/19/2016     Priority: Medium     Migraine 08/19/2016     Priority: Medium     Weakness 08/19/2016     Priority: Medium     Myalgia 08/19/2016     Priority: Medium     Nausea 08/19/2016     Priority: Medium     Chest pain, unspecified type 08/19/2016     Priority: Medium     Insomnia, unspecified type 08/19/2016     Priority: Medium     Hyperlipidemia, unspecified hyperlipidemia type 08/19/2016     Priority: Medium        Past Medical History:  Past Medical History:   Diagnosis Date     Abdominal pain, generalized 08/19/2016     Anemia, unspecified type 08/19/2016     Anxiety      Anxiety state 07/29/2021    Formatting of this note might be different from the original. Created by Conversion  Replacement Utility updated for latest IMO load     Arthritis      Cerebral artery occlusion with cerebral infarction (H)      Cerebral infarction (H)      Chest pain, unspecified type 08/19/2016     Chronic atrial fibrillation (H)      Congestive heart failure (H)      Constipation, unspecified constipation type 08/19/2016     Coronary artery disease     CABG with multiple stents     Coronary artery disease involving native coronary artery without angina pectoris, unspecified whether native or transplanted heart  08/19/2016     Coronary atherosclerosis 07/29/2021    Formatting of this note might be different from the original. Created by Conversion  Replacement Utility updated for latest IMO load     Depression      Diabetes (H)      Diabetes mellitus (H) 12/17/2021     Diabetic ulcer of right midfoot associated with type 2 diabetes mellitus, limited to breakdown of skin (H) 09/28/2017     Diabetic ulcer of right midfoot associated with type 2 diabetes mellitus, with necrosis of muscle (H) 01/07/2022     Diarrhea 08/19/2016     Equinovarus acquired deformity, right 04/14/2021     Equinovarus deformity, acquired, right      Essential hypertension 08/19/2016     Gastroesophageal reflux disease without esophagitis 08/19/2016     GERD (gastroesophageal reflux disease)      Heart attack (H)      History of blood transfusion      Hyperlipidemia      Hyperlipidemia, unspecified hyperlipidemia type 08/19/2016     Hypertension      Insomnia, unspecified type 08/19/2016     Late effects of cerebrovascular accident 08/19/2016     Major depressive disorder with single episode, remission status unspecified 08/19/2016     Microalbuminuria 07/29/2021    Formatting of this note might be different from the original. Created by Conversion     Migraine      Migraines      Mild intellectual disabilities 08/19/2016     Myalgia      Nausea 08/19/2016     Pain 08/19/2016     PONV (postoperative nausea and vomiting)     mild nausea     Problems with learning 07/29/2021    Formatting of this note might be different from the original. Created by Conversion [facility] Annotation: Feb 11 2010  1:41PM - Leonid Cabral: CANNOT READ OR  WRITE     Stented coronary artery      Type 2 diabetes mellitus (H) 07/29/2021    Formatting of this note might be different from the original. Created by Conversion     Type 2 diabetes mellitus with hyperglycemia (H) 08/19/2016     Type II or unspecified type diabetes mellitus with ophthalmic manifestations, not stated as  uncontrolled(250.50) (H) 07/29/2021    Formatting of this note might be different from the original. Created by Conversion     Weakness 08/19/2016       Past Surgical History:  Past Surgical History:   Procedure Laterality Date     INCISION AND DRAINAGE LOWER EXTREMITY, COMBINED Right 12/17/2021    Procedure: INCISION AND DRAINAGE, right foot;  Surgeon: Alex Kim DPM;  Location: [facility] Main OR     INCISION AND DRAINAGE LOWER EXTREMITY, COMBINED Right 1/27/2022    Procedure: INCISION AND DRAINAGE, right foot with application of theraskin;  Surgeon: Alex Kim DPM;  Location: [facility] Main OR     INCISION AND DRAINAGE LOWER EXTREMITY, COMBINED Right 4/4/2022    Procedure: INCISION AND DRAINAGE, right foot with application of theraskin;  Surgeon: Alex Kim DPM;  Location: [facility] Main OR     IR LOWER EXTREMITY ANGIOGRAM RIGHT  6/29/2022     IR MISCELLANEOUS PROCEDURE  2/13/2004     ZZC CABG, VEIN, SINGLE      Description: CABG (CABG);  Recorded: 04/22/2009;       Current Medications:  Current Outpatient Medications   Medication Sig Dispense Refill     pantoprazole (PROTONIX) 40 MG EC tablet Take 40 mg by mouth daily       acetaminophen (TYLENOL) 325 MG tablet Take 650 mg by mouth every 4 hours as needed for mild pain        ARIPiprazole (ABILIFY) 2 MG tablet 2 mg daily        ASPERCREME ORIGINAL 10 % external cream APPLY TO LOW BACK TWICE DAILY FOR LOW BACK PAIN       atorvastatin (LIPITOR) 80 MG tablet Take 80 mg by mouth daily        cholestyramine (QUESTRAN) 4 g packet Take 1 packet by mouth every other day        clopidogrel (PLAVIX) 75 MG tablet Take 1 tablet (75 mg) by mouth daily Start taking medication the day after the procedure. 90 tablet 1     DULoxetine (CYMBALTA) 60 MG capsule Take 120 mg by mouth daily takle 120mg       erenumab-aooe (AIMOVIG) 140 MG/ML injection Inject 1 mL (140 mg) Subcutaneous every 30 days 1 mL 0     fenofibrate micronized (LOFIBRA) 200 MG  capsule Take 200 mg by mouth every morning (before breakfast)        ferrous sulfate (FEROSUL) 325 (65 Fe) MG tablet Take 325 mg by mouth daily (with breakfast)        gabapentin (NEURONTIN) 100 MG capsule TAKE 1 CAP BY MOUTH TWICE A DAY BEFORE MEALS       gabapentin (NEURONTIN) 300 MG capsule Take 600 mg by mouth At Bedtime       glipiZIDE (GLUCOTROL) 5 MG tablet TAKE 1/2 TABLET (2.5MG) BY MOUTH DAILY DX DIABETES       loperamide (IMODIUM) 2 MG capsule Take 4 mg by mouth as needed for diarrhea        losartan (COZAAR) 25 MG tablet Take 25 mg by mouth At Bedtime        magnesium oxide 400 MG CAPS Take 400 mg by mouth 4 times daily       Menthol, Topical Analgesic, (BIOFREEZE ROLL-ON) 4 % GEL Apply 1 Application topically 2 times daily Apply to lower back for pain       metFORMIN (GLUCOPHAGE) 500 MG tablet TAKE 2 TABS (1000MG) BY MOUTH EVERY MORNING;TAKE 1 TAB BY MOUTH EVERY EVENING       metoprolol succinate ER (TOPROL-XL) 25 MG 24 hr tablet Take 75 mg by mouth daily        mirtazapine (REMERON) 7.5 MG tablet Take 7.5 mg by mouth At Bedtime        Neomycin-Bacitracin-Polymyxin (TRIPLE ANTIBIOTIC) 3.5-400-5000 OINT ointment Externally apply 1 applicator topically 3 times daily as needed       Nitroglycerin (NITROSTAT SL) Place 0.4 mg under the tongue every 5 minutes as needed for chest pain        oxyCODONE (ROXICODONE) 5 MG tablet TAKE 1 TAB BY MOUTH EVERY 6HOURS AS NEEDED FOR PAIN 28 tablet 0     polyethylene glycol (MIRALAX) 17 GM/Dose powder Take 1 packet by mouth daily as needed for constipation        senna-docusate (SENOKOT-S/PERICOLACE) 8.6-50 MG tablet Take 1 tablet by mouth 2 times daily as needed for constipation        traZODone (DESYREL) 50 MG tablet Take 50 mg by mouth At Bedtime        vitamin C (ASCORBIC ACID) 500 MG tablet Take 500 mg by mouth daily        XARELTO ANTICOAGULANT 20 MG TABS tablet TAKE 1 TAB BY MOUTH ONCE DAILY         Allergies:  Allergies   Allergen Reactions     Aspirin Other (See  "Comments)     Reacts with migraine medicine     Isosorbide Headache     Codeine Rash     Morphine Rash        Social History:  Social History     Tobacco Use     Smoking status: Former Smoker     Quit date: 2015     Years since quittin.5     Smokeless tobacco: Never Used   Substance Use Topics     Alcohol use: Not Currently       Objective     /62   Pulse 68   Temp 98.4  F (36.9  C)   Resp 18   Ht 1.702 m (5' 7\")   Wt 71.2 kg (157 lb)   SpO2 99%   BMI 24.59 kg/m      Physical Exam   General: Patient is alert male, no distress.   HEENT: Head is NCAT. Eyes show no injection or icterus. Nares negative. Oropharynx well hydrated.  Neck: Supple. No tenderness or adenopathy. No JVD.  Lungs: Clear bilaterally. No wheezes.  Cardiovascular: Regular rate and rhythm, normal S1, S2.  Back: No spinal tenderness.  Abdomen: Soft, no tenderness. Bowel sounds present. No guarding rebound or rigidity.  : Deferred.  Extremities: Mild RLE edema is noted.  Musculoskeletal: Some degen changes, right foot deformity.   Skin: Non healing ulcer right foot, bandaged.  Psych: Mood appears good.      Recent Labs   Lab Test 22  0925 22  0520 22  0615 21  0548 21  1804   HGB 11.7*  --  10.5*   < >  --      --  238   < >  --      --  139   < >  --    POTASSIUM 4.3  --  4.6   < >  --    CR 1.02  --  0.97   < >  --    A1C  --  5.8*  --   --  5.1    < > = values in this interval not displayed.        Diagnostics:  Dr. Montes ordered pre op labs.       Revised Cardiac Risk Index (RCRI):  Patient has cardiology appt 8/3/2022 for cardiac pre-op clearance.       ASSESSMENT/PLAN:   1. Non healing diabetic ulcer, right foot. S/p procedures 2021, 2022 and 2022. Recently with vascular work up, s/p angioplasty RLE and hyperbaric oxygen. MRI with indications of osteomyelitis. Following with podiatry and with vascular, evaluation and recommendations now with decision for R BKA, " scheduled for 8/5/2022. Dr. Montes gave orders with respect to hold of xarelto 24 hours prior to surgery and to continue plavix. He is not on aspirin.   2. HTN. On metoprolol and losartan. Continue.   3. Afib. On xarelto anticoagulation. Adequate rate control. Hold 24 hours prior to surgery per Dr. Montes.   4. Diabetes. On metformin and glipizide, to be held am of surgery.  5. CAD. Hx of MIs. Hx of stenting and prior CABG. On statin, beta blocker, xarelto. Cardiology appt 8/3/2022.   6. Anemia. On iron. Labs to be checked pre-op per Dr. Montes.   7. Mood disorder. On duloxetine, Abilify.           Signed Electronically by: Heidi Xie MD

## 2022-08-05 ENCOUNTER — ANESTHESIA EVENT (OUTPATIENT)
Dept: SURGERY | Facility: HOSPITAL | Age: 52
DRG: 041 | End: 2022-08-05
Payer: MEDICARE

## 2022-08-05 ENCOUNTER — HOSPITAL ENCOUNTER (INPATIENT)
Facility: HOSPITAL | Age: 52
LOS: 5 days | Discharge: NURSING, REHAB OR LONG-TERM CARE | DRG: 041 | End: 2022-08-10
Attending: SURGERY | Admitting: SURGERY
Payer: MEDICARE

## 2022-08-05 ENCOUNTER — ANESTHESIA (OUTPATIENT)
Dept: SURGERY | Facility: HOSPITAL | Age: 52
DRG: 041 | End: 2022-08-05
Payer: MEDICARE

## 2022-08-05 DIAGNOSIS — M14.671 CHARCOT ANKLE, RIGHT: ICD-10-CM

## 2022-08-05 LAB
ABO/RH(D): ABNORMAL
ANTIBODY ID: NORMAL
ANTIBODY SCREEN: POSITIVE
BASOPHILS # BLD AUTO: 0.1 10E3/UL (ref 0–0.2)
BASOPHILS NFR BLD AUTO: 1 %
BLD PROD TYP BPU: NORMAL
BLD PROD TYP BPU: NORMAL
BLOOD COMPONENT TYPE: NORMAL
BLOOD COMPONENT TYPE: NORMAL
CODING SYSTEM: NORMAL
CODING SYSTEM: NORMAL
CREAT SERPL-MCNC: 1.11 MG/DL (ref 0.7–1.3)
CROSSMATCH: NORMAL
CROSSMATCH: NORMAL
EOSINOPHIL # BLD AUTO: 0.2 10E3/UL (ref 0–0.7)
EOSINOPHIL NFR BLD AUTO: 3 %
ERYTHROCYTE [DISTWIDTH] IN BLOOD BY AUTOMATED COUNT: 14.6 % (ref 10–15)
FY SUP(A) AG RBC QL: NEGATIVE
GFR SERPL CREATININE-BSD FRML MDRD: 79 ML/MIN/1.73M2
GLUCOSE BLDC GLUCOMTR-MCNC: 113 MG/DL (ref 70–99)
GLUCOSE BLDC GLUCOMTR-MCNC: 119 MG/DL (ref 70–99)
GLUCOSE BLDC GLUCOMTR-MCNC: 136 MG/DL (ref 70–99)
GLUCOSE BLDC GLUCOMTR-MCNC: 208 MG/DL (ref 70–99)
HCT VFR BLD AUTO: 33.7 % (ref 40–53)
HGB BLD-MCNC: 10.6 G/DL (ref 13.3–17.7)
IMM GRANULOCYTES # BLD: 0 10E3/UL
IMM GRANULOCYTES NFR BLD: 0 %
LYMPHOCYTES # BLD AUTO: 1.5 10E3/UL (ref 0.8–5.3)
LYMPHOCYTES NFR BLD AUTO: 24 %
MCH RBC QN AUTO: 25.9 PG (ref 26.5–33)
MCHC RBC AUTO-ENTMCNC: 31.5 G/DL (ref 31.5–36.5)
MCV RBC AUTO: 82 FL (ref 78–100)
MONOCYTES # BLD AUTO: 0.5 10E3/UL (ref 0–1.3)
MONOCYTES NFR BLD AUTO: 8 %
NEUTROPHILS # BLD AUTO: 4 10E3/UL (ref 1.6–8.3)
NEUTROPHILS NFR BLD AUTO: 64 %
NRBC # BLD AUTO: 0 10E3/UL
NRBC BLD AUTO-RTO: 0 /100
PLATELET # BLD AUTO: 160 10E3/UL (ref 150–450)
POTASSIUM BLD-SCNC: 4.3 MMOL/L (ref 3.5–5)
RBC # BLD AUTO: 4.09 10E6/UL (ref 4.4–5.9)
SPECIMEN EXPIRATION DATE: ABNORMAL
SPECIMEN EXPIRATION DATE: NORMAL
SPECIMEN EXPIRATION DATE: NORMAL
UNIT ABO/RH: NORMAL
UNIT ABO/RH: NORMAL
UNIT NUMBER: NORMAL
UNIT NUMBER: NORMAL
UNIT STATUS: NORMAL
UNIT STATUS: NORMAL
UNIT TYPE ISBT: 5100
UNIT TYPE ISBT: 5100
WBC # BLD AUTO: 6.3 10E3/UL (ref 4–11)

## 2022-08-05 PROCEDURE — 120N000001 HC R&B MED SURG/OB

## 2022-08-05 PROCEDURE — 36415 COLL VENOUS BLD VENIPUNCTURE: CPT | Performed by: STUDENT IN AN ORGANIZED HEALTH CARE EDUCATION/TRAINING PROGRAM

## 2022-08-05 PROCEDURE — 250N000009 HC RX 250: Performed by: ANESTHESIOLOGY

## 2022-08-05 PROCEDURE — 88311 DECALCIFY TISSUE: CPT | Mod: TC | Performed by: SURGERY

## 2022-08-05 PROCEDURE — 250N000009 HC RX 250: Performed by: SURGERY

## 2022-08-05 PROCEDURE — 84132 ASSAY OF SERUM POTASSIUM: CPT | Performed by: STUDENT IN AN ORGANIZED HEALTH CARE EDUCATION/TRAINING PROGRAM

## 2022-08-05 PROCEDURE — 86850 RBC ANTIBODY SCREEN: CPT | Performed by: STUDENT IN AN ORGANIZED HEALTH CARE EDUCATION/TRAINING PROGRAM

## 2022-08-05 PROCEDURE — 0Y6H0Z2 DETACHMENT AT RIGHT LOWER LEG, MID, OPEN APPROACH: ICD-10-PCS | Performed by: SURGERY

## 2022-08-05 PROCEDURE — 86902 BLOOD TYPE ANTIGEN DONOR EA: CPT | Performed by: STUDENT IN AN ORGANIZED HEALTH CARE EDUCATION/TRAINING PROGRAM

## 2022-08-05 PROCEDURE — 250N000011 HC RX IP 250 OP 636: Performed by: NURSE ANESTHETIST, CERTIFIED REGISTERED

## 2022-08-05 PROCEDURE — 999N000141 HC STATISTIC PRE-PROCEDURE NURSING ASSESSMENT: Performed by: SURGERY

## 2022-08-05 PROCEDURE — 250N000011 HC RX IP 250 OP 636: Performed by: ANESTHESIOLOGY

## 2022-08-05 PROCEDURE — 272N000001 HC OR GENERAL SUPPLY STERILE: Performed by: SURGERY

## 2022-08-05 PROCEDURE — 250N000013 HC RX MED GY IP 250 OP 250 PS 637: Performed by: STUDENT IN AN ORGANIZED HEALTH CARE EDUCATION/TRAINING PROGRAM

## 2022-08-05 PROCEDURE — 86905 BLOOD TYPING RBC ANTIGENS: CPT | Performed by: STUDENT IN AN ORGANIZED HEALTH CARE EDUCATION/TRAINING PROGRAM

## 2022-08-05 PROCEDURE — 370N000017 HC ANESTHESIA TECHNICAL FEE, PER MIN: Performed by: SURGERY

## 2022-08-05 PROCEDURE — 86922 COMPATIBILITY TEST ANTIGLOB: CPT | Performed by: STUDENT IN AN ORGANIZED HEALTH CARE EDUCATION/TRAINING PROGRAM

## 2022-08-05 PROCEDURE — 82565 ASSAY OF CREATININE: CPT | Performed by: STUDENT IN AN ORGANIZED HEALTH CARE EDUCATION/TRAINING PROGRAM

## 2022-08-05 PROCEDURE — 258N000003 HC RX IP 258 OP 636: Performed by: STUDENT IN AN ORGANIZED HEALTH CARE EDUCATION/TRAINING PROGRAM

## 2022-08-05 PROCEDURE — 85025 COMPLETE CBC W/AUTO DIFF WBC: CPT | Performed by: STUDENT IN AN ORGANIZED HEALTH CARE EDUCATION/TRAINING PROGRAM

## 2022-08-05 PROCEDURE — 710N000012 HC RECOVERY PHASE 2, PER MINUTE: Performed by: SURGERY

## 2022-08-05 PROCEDURE — 360N000076 HC SURGERY LEVEL 3, PER MIN: Performed by: SURGERY

## 2022-08-05 PROCEDURE — 27880 AMPUTATION OF LOWER LEG: CPT | Mod: RT | Performed by: SURGERY

## 2022-08-05 PROCEDURE — 250N000011 HC RX IP 250 OP 636: Performed by: STUDENT IN AN ORGANIZED HEALTH CARE EDUCATION/TRAINING PROGRAM

## 2022-08-05 PROCEDURE — 86870 RBC ANTIBODY IDENTIFICATION: CPT | Performed by: STUDENT IN AN ORGANIZED HEALTH CARE EDUCATION/TRAINING PROGRAM

## 2022-08-05 PROCEDURE — 250N000013 HC RX MED GY IP 250 OP 250 PS 637: Performed by: ANESTHESIOLOGY

## 2022-08-05 PROCEDURE — 258N000003 HC RX IP 258 OP 636: Performed by: ANESTHESIOLOGY

## 2022-08-05 PROCEDURE — 86901 BLOOD TYPING SEROLOGIC RH(D): CPT | Performed by: STUDENT IN AN ORGANIZED HEALTH CARE EDUCATION/TRAINING PROGRAM

## 2022-08-05 RX ORDER — MEPERIDINE HYDROCHLORIDE 25 MG/ML
12.5 INJECTION INTRAMUSCULAR; INTRAVENOUS; SUBCUTANEOUS EVERY 5 MIN PRN
Status: DISCONTINUED | OUTPATIENT
Start: 2022-08-05 | End: 2022-08-05 | Stop reason: HOSPADM

## 2022-08-05 RX ORDER — ATORVASTATIN CALCIUM 40 MG/1
80 TABLET, FILM COATED ORAL DAILY
Status: DISCONTINUED | OUTPATIENT
Start: 2022-08-06 | End: 2022-08-10 | Stop reason: HOSPADM

## 2022-08-05 RX ORDER — NALOXONE HYDROCHLORIDE 1 MG/ML
0.2 INJECTION INTRAMUSCULAR; INTRAVENOUS; SUBCUTANEOUS
Status: DISCONTINUED | OUTPATIENT
Start: 2022-08-05 | End: 2022-08-10 | Stop reason: HOSPADM

## 2022-08-05 RX ORDER — ONDANSETRON 2 MG/ML
4 INJECTION INTRAMUSCULAR; INTRAVENOUS EVERY 6 HOURS PRN
Status: DISCONTINUED | OUTPATIENT
Start: 2022-08-05 | End: 2022-08-10 | Stop reason: HOSPADM

## 2022-08-05 RX ORDER — CLOPIDOGREL BISULFATE 75 MG/1
75 TABLET ORAL DAILY
Status: DISCONTINUED | OUTPATIENT
Start: 2022-08-06 | End: 2022-08-10 | Stop reason: HOSPADM

## 2022-08-05 RX ORDER — PROPOFOL 10 MG/ML
INJECTION, EMULSION INTRAVENOUS PRN
Status: DISCONTINUED | OUTPATIENT
Start: 2022-08-05 | End: 2022-08-05

## 2022-08-05 RX ORDER — MIRTAZAPINE 7.5 MG/1
7.5 TABLET, FILM COATED ORAL AT BEDTIME
Status: DISCONTINUED | OUTPATIENT
Start: 2022-08-05 | End: 2022-08-10 | Stop reason: HOSPADM

## 2022-08-05 RX ORDER — MAGNESIUM HYDROXIDE 1200 MG/15ML
LIQUID ORAL PRN
Status: DISCONTINUED | OUTPATIENT
Start: 2022-08-05 | End: 2022-08-05 | Stop reason: HOSPADM

## 2022-08-05 RX ORDER — CHOLESTYRAMINE 4 G/9G
1 POWDER, FOR SUSPENSION ORAL EVERY OTHER DAY
Status: DISCONTINUED | OUTPATIENT
Start: 2022-08-06 | End: 2022-08-10 | Stop reason: HOSPADM

## 2022-08-05 RX ORDER — OXYCODONE HYDROCHLORIDE 5 MG/1
5 TABLET ORAL EVERY 4 HOURS PRN
Status: DISCONTINUED | OUTPATIENT
Start: 2022-08-05 | End: 2022-08-05 | Stop reason: HOSPADM

## 2022-08-05 RX ORDER — DEXTROSE MONOHYDRATE 25 G/50ML
25-50 INJECTION, SOLUTION INTRAVENOUS
Status: DISCONTINUED | OUTPATIENT
Start: 2022-08-05 | End: 2022-08-10 | Stop reason: HOSPADM

## 2022-08-05 RX ORDER — GABAPENTIN 300 MG/1
600 CAPSULE ORAL AT BEDTIME
Status: DISCONTINUED | OUTPATIENT
Start: 2022-08-05 | End: 2022-08-10 | Stop reason: HOSPADM

## 2022-08-05 RX ORDER — POLYETHYLENE GLYCOL 3350 17 G/17G
17 POWDER, FOR SOLUTION ORAL DAILY PRN
Status: DISCONTINUED | OUTPATIENT
Start: 2022-08-05 | End: 2022-08-10 | Stop reason: HOSPADM

## 2022-08-05 RX ORDER — BISACODYL 10 MG
10 SUPPOSITORY, RECTAL RECTAL DAILY PRN
Status: DISCONTINUED | OUTPATIENT
Start: 2022-08-05 | End: 2022-08-05

## 2022-08-05 RX ORDER — HEPARIN SODIUM 5000 [USP'U]/.5ML
5000 INJECTION, SOLUTION INTRAVENOUS; SUBCUTANEOUS EVERY 8 HOURS
Status: DISCONTINUED | OUTPATIENT
Start: 2022-08-06 | End: 2022-08-06

## 2022-08-05 RX ORDER — LOPERAMIDE HCL 2 MG
2 CAPSULE ORAL 4 TIMES DAILY PRN
Status: DISCONTINUED | OUTPATIENT
Start: 2022-08-05 | End: 2022-08-10 | Stop reason: HOSPADM

## 2022-08-05 RX ORDER — FENOFIBRATE 160 MG/1
160 TABLET ORAL DAILY
Status: DISCONTINUED | OUTPATIENT
Start: 2022-08-06 | End: 2022-08-10 | Stop reason: HOSPADM

## 2022-08-05 RX ORDER — BISACODYL 10 MG
10 SUPPOSITORY, RECTAL RECTAL DAILY PRN
Status: DISCONTINUED | OUTPATIENT
Start: 2022-08-05 | End: 2022-08-10 | Stop reason: HOSPADM

## 2022-08-05 RX ORDER — HYDRALAZINE HYDROCHLORIDE 10 MG/1
10 TABLET, FILM COATED ORAL 4 TIMES DAILY PRN
Status: DISCONTINUED | OUTPATIENT
Start: 2022-08-05 | End: 2022-08-10 | Stop reason: HOSPADM

## 2022-08-05 RX ORDER — SODIUM CHLORIDE, SODIUM LACTATE, POTASSIUM CHLORIDE, CALCIUM CHLORIDE 600; 310; 30; 20 MG/100ML; MG/100ML; MG/100ML; MG/100ML
INJECTION, SOLUTION INTRAVENOUS CONTINUOUS
Status: DISCONTINUED | OUTPATIENT
Start: 2022-08-05 | End: 2022-08-06

## 2022-08-05 RX ORDER — PROPOFOL 10 MG/ML
INJECTION, EMULSION INTRAVENOUS CONTINUOUS PRN
Status: DISCONTINUED | OUTPATIENT
Start: 2022-08-05 | End: 2022-08-05

## 2022-08-05 RX ORDER — CEFAZOLIN SODIUM 1 G/3ML
1 INJECTION, POWDER, FOR SOLUTION INTRAMUSCULAR; INTRAVENOUS EVERY 8 HOURS
Status: COMPLETED | OUTPATIENT
Start: 2022-08-05 | End: 2022-08-06

## 2022-08-05 RX ORDER — GABAPENTIN 100 MG/1
100 CAPSULE ORAL
Status: DISCONTINUED | OUTPATIENT
Start: 2022-08-05 | End: 2022-08-10 | Stop reason: HOSPADM

## 2022-08-05 RX ORDER — OXYCODONE HYDROCHLORIDE 5 MG/1
5 TABLET ORAL EVERY 4 HOURS PRN
Status: DISCONTINUED | OUTPATIENT
Start: 2022-08-05 | End: 2022-08-10 | Stop reason: HOSPADM

## 2022-08-05 RX ORDER — FENTANYL CITRATE 50 UG/ML
INJECTION, SOLUTION INTRAMUSCULAR; INTRAVENOUS PRN
Status: DISCONTINUED | OUTPATIENT
Start: 2022-08-05 | End: 2022-08-05

## 2022-08-05 RX ORDER — OXYCODONE HYDROCHLORIDE 5 MG/1
5 TABLET ORAL EVERY 6 HOURS PRN
Status: CANCELLED | OUTPATIENT
Start: 2022-08-05

## 2022-08-05 RX ORDER — ONDANSETRON 4 MG/1
4 TABLET, ORALLY DISINTEGRATING ORAL EVERY 6 HOURS PRN
Status: DISCONTINUED | OUTPATIENT
Start: 2022-08-05 | End: 2022-08-10 | Stop reason: HOSPADM

## 2022-08-05 RX ORDER — LIDOCAINE 40 MG/G
CREAM TOPICAL
Status: DISCONTINUED | OUTPATIENT
Start: 2022-08-05 | End: 2022-08-10 | Stop reason: HOSPADM

## 2022-08-05 RX ORDER — FERROUS SULFATE 325(65) MG
325 TABLET ORAL
Status: DISCONTINUED | OUTPATIENT
Start: 2022-08-06 | End: 2022-08-10 | Stop reason: HOSPADM

## 2022-08-05 RX ORDER — POLYETHYLENE GLYCOL 3350 17 G/17G
17 POWDER, FOR SOLUTION ORAL DAILY PRN
Status: DISCONTINUED | OUTPATIENT
Start: 2022-08-05 | End: 2022-08-05

## 2022-08-05 RX ORDER — ONDANSETRON 2 MG/ML
4 INJECTION INTRAMUSCULAR; INTRAVENOUS EVERY 30 MIN PRN
Status: DISCONTINUED | OUTPATIENT
Start: 2022-08-05 | End: 2022-08-10 | Stop reason: HOSPADM

## 2022-08-05 RX ORDER — SODIUM CHLORIDE, SODIUM LACTATE, POTASSIUM CHLORIDE, CALCIUM CHLORIDE 600; 310; 30; 20 MG/100ML; MG/100ML; MG/100ML; MG/100ML
INJECTION, SOLUTION INTRAVENOUS CONTINUOUS
Status: DISCONTINUED | OUTPATIENT
Start: 2022-08-05 | End: 2022-08-05 | Stop reason: HOSPADM

## 2022-08-05 RX ORDER — DULOXETIN HYDROCHLORIDE 60 MG/1
120 CAPSULE, DELAYED RELEASE ORAL DAILY
Status: DISCONTINUED | OUTPATIENT
Start: 2022-08-06 | End: 2022-08-10 | Stop reason: HOSPADM

## 2022-08-05 RX ORDER — BUPIVACAINE HYDROCHLORIDE AND EPINEPHRINE 5; 5 MG/ML; UG/ML
INJECTION, SOLUTION PERINEURAL
Status: COMPLETED | OUTPATIENT
Start: 2022-08-05 | End: 2022-08-05

## 2022-08-05 RX ORDER — FENTANYL CITRATE 50 UG/ML
50 INJECTION, SOLUTION INTRAMUSCULAR; INTRAVENOUS
Status: DISCONTINUED | OUTPATIENT
Start: 2022-08-05 | End: 2022-08-05 | Stop reason: HOSPADM

## 2022-08-05 RX ORDER — CEFAZOLIN SODIUM/WATER 2 G/20 ML
2 SYRINGE (ML) INTRAVENOUS
Status: COMPLETED | OUTPATIENT
Start: 2022-08-05 | End: 2022-08-05

## 2022-08-05 RX ORDER — TRAZODONE HYDROCHLORIDE 50 MG/1
50 TABLET, FILM COATED ORAL AT BEDTIME
Status: DISCONTINUED | OUTPATIENT
Start: 2022-08-05 | End: 2022-08-10 | Stop reason: HOSPADM

## 2022-08-05 RX ORDER — HALOPERIDOL 5 MG/ML
1 INJECTION INTRAMUSCULAR
Status: DISCONTINUED | OUTPATIENT
Start: 2022-08-05 | End: 2022-08-05 | Stop reason: HOSPADM

## 2022-08-05 RX ORDER — MINERAL OIL/HYDROPHIL PETROLAT
OINTMENT (GRAM) TOPICAL 3 TIMES DAILY PRN
COMMUNITY

## 2022-08-05 RX ORDER — METOPROLOL SUCCINATE 25 MG/1
75 TABLET, EXTENDED RELEASE ORAL DAILY
Status: DISCONTINUED | OUTPATIENT
Start: 2022-08-06 | End: 2022-08-10 | Stop reason: HOSPADM

## 2022-08-05 RX ORDER — BISACODYL 10 MG
10 SUPPOSITORY, RECTAL RECTAL DAILY PRN
COMMUNITY

## 2022-08-05 RX ORDER — ONDANSETRON 4 MG/1
4 TABLET, ORALLY DISINTEGRATING ORAL EVERY 30 MIN PRN
Status: DISCONTINUED | OUTPATIENT
Start: 2022-08-05 | End: 2022-08-10 | Stop reason: HOSPADM

## 2022-08-05 RX ORDER — ARIPIPRAZOLE 2 MG/1
2 TABLET ORAL DAILY
Status: DISCONTINUED | OUTPATIENT
Start: 2022-08-06 | End: 2022-08-10 | Stop reason: HOSPADM

## 2022-08-05 RX ORDER — CEFAZOLIN SODIUM/WATER 2 G/20 ML
2 SYRINGE (ML) INTRAVENOUS SEE ADMIN INSTRUCTIONS
Status: DISCONTINUED | OUTPATIENT
Start: 2022-08-05 | End: 2022-08-07

## 2022-08-05 RX ORDER — ACETAMINOPHEN 325 MG/1
975 TABLET ORAL ONCE
Status: COMPLETED | OUTPATIENT
Start: 2022-08-05 | End: 2022-08-05

## 2022-08-05 RX ORDER — ACETAMINOPHEN 325 MG/1
650 TABLET ORAL EVERY 4 HOURS PRN
Status: DISCONTINUED | OUTPATIENT
Start: 2022-08-05 | End: 2022-08-10 | Stop reason: HOSPADM

## 2022-08-05 RX ORDER — HYDROMORPHONE HCL IN WATER/PF 6 MG/30 ML
0.2 PATIENT CONTROLLED ANALGESIA SYRINGE INTRAVENOUS
Status: DISCONTINUED | OUTPATIENT
Start: 2022-08-05 | End: 2022-08-10 | Stop reason: HOSPADM

## 2022-08-05 RX ORDER — MAGNESIUM SULFATE 4 G/50ML
4 INJECTION INTRAVENOUS ONCE
Status: COMPLETED | OUTPATIENT
Start: 2022-08-05 | End: 2022-08-05

## 2022-08-05 RX ORDER — NALOXONE HYDROCHLORIDE 1 MG/ML
0.4 INJECTION INTRAMUSCULAR; INTRAVENOUS; SUBCUTANEOUS
Status: DISCONTINUED | OUTPATIENT
Start: 2022-08-05 | End: 2022-08-10 | Stop reason: HOSPADM

## 2022-08-05 RX ORDER — PANTOPRAZOLE SODIUM 40 MG/1
40 TABLET, DELAYED RELEASE ORAL DAILY
Status: DISCONTINUED | OUTPATIENT
Start: 2022-08-06 | End: 2022-08-10 | Stop reason: HOSPADM

## 2022-08-05 RX ORDER — NICOTINE POLACRILEX 4 MG
15-30 LOZENGE BUCCAL
Status: DISCONTINUED | OUTPATIENT
Start: 2022-08-05 | End: 2022-08-10 | Stop reason: HOSPADM

## 2022-08-05 RX ORDER — LORAZEPAM 0.5 MG/1
0.5 TABLET ORAL DAILY PRN
COMMUNITY
End: 2022-09-28

## 2022-08-05 RX ORDER — FENTANYL CITRATE 50 UG/ML
50 INJECTION, SOLUTION INTRAMUSCULAR; INTRAVENOUS EVERY 5 MIN PRN
Status: DISCONTINUED | OUTPATIENT
Start: 2022-08-05 | End: 2022-08-05 | Stop reason: HOSPADM

## 2022-08-05 RX ORDER — PROCHLORPERAZINE MALEATE 10 MG
10 TABLET ORAL EVERY 6 HOURS PRN
Status: DISCONTINUED | OUTPATIENT
Start: 2022-08-05 | End: 2022-08-10 | Stop reason: HOSPADM

## 2022-08-05 RX ORDER — AMOXICILLIN 250 MG
1 CAPSULE ORAL 2 TIMES DAILY PRN
Status: DISCONTINUED | OUTPATIENT
Start: 2022-08-05 | End: 2022-08-10 | Stop reason: HOSPADM

## 2022-08-05 RX ORDER — HYDROMORPHONE HYDROCHLORIDE 1 MG/ML
0.4 INJECTION, SOLUTION INTRAMUSCULAR; INTRAVENOUS; SUBCUTANEOUS
Status: CANCELLED | OUTPATIENT
Start: 2022-08-05

## 2022-08-05 RX ORDER — OXYCODONE HYDROCHLORIDE 5 MG/1
10 TABLET ORAL EVERY 4 HOURS PRN
Status: CANCELLED | OUTPATIENT
Start: 2022-08-05

## 2022-08-05 RX ORDER — AMOXICILLIN 250 MG
1 CAPSULE ORAL 2 TIMES DAILY
COMMUNITY

## 2022-08-05 RX ADMIN — GABAPENTIN 600 MG: 300 CAPSULE ORAL at 21:16

## 2022-08-05 RX ADMIN — CEFAZOLIN 1 G: 1 INJECTION, POWDER, FOR SOLUTION INTRAMUSCULAR; INTRAVENOUS at 21:17

## 2022-08-05 RX ADMIN — TRAZODONE HYDROCHLORIDE 50 MG: 50 TABLET ORAL at 21:16

## 2022-08-05 RX ADMIN — GABAPENTIN 100 MG: 100 CAPSULE ORAL at 17:52

## 2022-08-05 RX ADMIN — BUPIVACAINE HYDROCHLORIDE AND EPINEPHRINE 25 ML: 5; 5 INJECTION, SOLUTION PERINEURAL at 13:45

## 2022-08-05 RX ADMIN — PROPOFOL 30 MG: 10 INJECTION, EMULSION INTRAVENOUS at 14:11

## 2022-08-05 RX ADMIN — PROPOFOL 100 MCG/KG/MIN: 10 INJECTION, EMULSION INTRAVENOUS at 14:11

## 2022-08-05 RX ADMIN — SODIUM CHLORIDE, POTASSIUM CHLORIDE, SODIUM LACTATE AND CALCIUM CHLORIDE: 600; 310; 30; 20 INJECTION, SOLUTION INTRAVENOUS at 13:31

## 2022-08-05 RX ADMIN — OXYCODONE HYDROCHLORIDE 5 MG: 5 TABLET ORAL at 21:46

## 2022-08-05 RX ADMIN — FENTANYL CITRATE 50 MCG: 50 INJECTION, SOLUTION INTRAMUSCULAR; INTRAVENOUS at 13:47

## 2022-08-05 RX ADMIN — ACETAMINOPHEN 975 MG: 325 TABLET ORAL at 16:20

## 2022-08-05 RX ADMIN — SODIUM CHLORIDE, POTASSIUM CHLORIDE, SODIUM LACTATE AND CALCIUM CHLORIDE: 600; 310; 30; 20 INJECTION, SOLUTION INTRAVENOUS at 17:26

## 2022-08-05 RX ADMIN — MAGNESIUM SULFATE HEPTAHYDRATE 4 G: 80 INJECTION, SOLUTION INTRAVENOUS at 13:47

## 2022-08-05 RX ADMIN — MIDAZOLAM 1 MG: 1 INJECTION INTRAMUSCULAR; INTRAVENOUS at 13:47

## 2022-08-05 RX ADMIN — MIRTAZAPINE 7.5 MG: 7.5 TABLET, FILM COATED ORAL at 21:16

## 2022-08-05 RX ADMIN — Medication 2 G: at 13:58

## 2022-08-05 RX ADMIN — FENTANYL CITRATE 50 MCG: 50 INJECTION, SOLUTION INTRAMUSCULAR; INTRAVENOUS at 16:20

## 2022-08-05 RX ADMIN — BUPIVACAINE HYDROCHLORIDE AND EPINEPHRINE BITARTRATE 15 ML: 5; .005 INJECTION, SOLUTION PERINEURAL at 13:55

## 2022-08-05 ASSESSMENT — ACTIVITIES OF DAILY LIVING (ADL)
WALKING_OR_CLIMBING_STAIRS_DIFFICULTY: YES
ADLS_ACUITY_SCORE: 27
ADLS_ACUITY_SCORE: 35
EQUIPMENT_CURRENTLY_USED_AT_HOME: WHEELCHAIR, MANUAL
ADLS_ACUITY_SCORE: 39
CONCENTRATING,_REMEMBERING_OR_MAKING_DECISIONS_DIFFICULTY: NO
ADLS_ACUITY_SCORE: 39
WALKING_OR_CLIMBING_STAIRS: AMBULATION DIFFICULTY, REQUIRES EQUIPMENT
ADLS_ACUITY_SCORE: 27
TRANSFERRING: 0-->INDEPENDENT
TOILETING_ASSISTANCE: TOILETING DIFFICULTY, REQUIRES EQUIPMENT
WEAR_GLASSES_OR_BLIND: NO
TRANSFERRING: 0-->INDEPENDENT
FALL_HISTORY_WITHIN_LAST_SIX_MONTHS: YES
TOILETING_ISSUES: YES
DIFFICULTY_EATING/SWALLOWING: NO
TOILETING: 0-->INDEPENDENT
NUMBER_OF_TIMES_PATIENT_HAS_FALLEN_WITHIN_LAST_SIX_MONTHS: 1
ADLS_ACUITY_SCORE: 29
DOING_ERRANDS_INDEPENDENTLY_DIFFICULTY: YES
TOILETING: 1-->ASSISTANCE (EQUIPMENT/PERSON) NEEDED (NOT DEVELOPMENTALLY APPROPRIATE)
DRESSING/BATHING_DIFFICULTY: NO
CHANGE_IN_FUNCTIONAL_STATUS_SINCE_ONSET_OF_CURRENT_ILLNESS/INJURY: NO

## 2022-08-05 ASSESSMENT — LIFESTYLE VARIABLES: TOBACCO_USE: 1

## 2022-08-05 ASSESSMENT — ENCOUNTER SYMPTOMS: DYSRHYTHMIAS: 1

## 2022-08-05 NOTE — ANESTHESIA PROCEDURE NOTES
Sciatic Procedure Note    Pre-Procedure   Staff -        Anesthesiologist:  Alexandro Coburn MD       Performed By: anesthesiologist       Location: pre-op       Procedure Start/Stop Times: 8/5/2022 1:45 PM and 8/5/2022 1:52 PM       Pre-Anesthestic Checklist: patient identified, IV checked, site marked, risks and benefits discussed, informed consent, monitors and equipment checked, pre-op evaluation, at physician/surgeon's request and post-op pain management  Timeout:       Correct Patient: Yes        Correct Procedure: Yes        Correct Site: Yes        Correct Position: Yes        Correct Laterality: Yes        Site Marked: Yes  Procedure Documentation  Procedure: Sciatic       Laterality: right       Patient Position: supine       Patient Prep/Sterile Barriers: sterile gloves, mask       Skin prep: Chloraprep (popliteal approach).       Needle Type: insulated       Needle Gauge: 20.        Needle Length (Inches): 4        Ultrasound guided       1. Ultrasound was used to identify targeted nerve, plexus, vascular marker, or fascial plane and place a needle adjacent to it in real-time.       2. Ultrasound was used to visualize the spread of anesthetic in close proximity to the above referenced structure.       3. A permanent image is entered into the patient's record.       4. The visualized anatomic structures appeared normal.       5. There were no apparent abnormal pathologic findings.    Assessment/Narrative         The placement was negative for: blood aspirated, painful injection and site bleeding       Paresthesias: No.       Bolus given via needle..        Secured via.        Insertion/Infusion Method: Single Shot       Complications: none       Injection made incrementally with aspirations every 5 mL.    Medication(s) Administered   Bupivacaine 0.5% w/ 1:200K Epi (Other) - Other   25 mL - 8/5/2022 1:45:00 PM  Medication Administration Time: 8/5/2022 1:45 PM

## 2022-08-05 NOTE — ANESTHESIA PROCEDURE NOTES
Adductor canal Procedure Note    Pre-Procedure   Staff -        Anesthesiologist:  Alexandro Coburn MD       Performed By: anesthesiologist       Location: pre-op       Procedure Start/Stop Times: 8/5/2022 1:53 PM and 8/5/2022 1:55 PM       Pre-Anesthestic Checklist: patient identified, IV checked, site marked, risks and benefits discussed, informed consent, monitors and equipment checked, pre-op evaluation, at physician/surgeon's request and post-op pain management  Timeout:       Correct Patient: Yes        Correct Procedure: Yes        Correct Site: Yes        Correct Position: Yes        Correct Laterality: Yes        Site Marked: Yes  Procedure Documentation  Procedure: Adductor canal       Laterality: right       Patient Position: supine       Patient Prep/Sterile Barriers: sterile gloves, mask       Skin prep: Chloraprep       Needle Type: insulated       Needle Gauge: 20.        Needle Length (Inches): 4        Ultrasound guided       1. Ultrasound was used to identify targeted nerve, plexus, vascular marker, or fascial plane and place a needle adjacent to it in real-time.       2. Ultrasound was used to visualize the spread of anesthetic in close proximity to the above referenced structure.       3. A permanent image is entered into the patient's record.       4. The visualized anatomic structures appeared normal.       5. There were no apparent abnormal pathologic findings.    Assessment/Narrative         The placement was negative for: blood aspirated, painful injection and site bleeding       Paresthesias: No.       Bolus given via needle..        Secured via.        Insertion/Infusion Method: Single Shot       Complications: none       Injection made incrementally with aspirations every 5 mL.    Medication(s) Administered   Bupivacaine 0.5% w/ 1:200K Epi (Other) - Other   15 mL - 8/5/2022 1:55:00 PM  Medication Administration Time: 8/5/2022 1:53 PM

## 2022-08-05 NOTE — ANESTHESIA PREPROCEDURE EVALUATION
Anesthesia Pre-Procedure Evaluation    Patient: Cm Lundberg   MRN: [id number] : 1970        Procedure : Procedure(s):  AMPUTATION, BELOW KNEE, right leg          Past Medical History:   Diagnosis Date     Abdominal pain, generalized 2016     Anemia      Anemia, unspecified type 2016     Anxiety      Anxiety state 2021    Formatting of this note might be different from the original. Created by Conversion  Replacement Utility updated for latest IMO load     Arthritis      Atrial fibrillation and flutter (H)      Cerebral artery occlusion with cerebral infarction (H)      Cerebral infarction (H)      Chest pain, unspecified type 2016     Chronic atrial fibrillation (H)      Congestive heart failure (H)      Constipation, unspecified constipation type 2016     Coronary artery disease     CABG with multiple stents     Coronary artery disease involving native coronary artery without angina pectoris, unspecified whether native or transplanted heart 2016     Coronary atherosclerosis 2021    Formatting of this note might be different from the original. Created by Conversion  Replacement Utility updated for latest IMO load     Depression      Diabetes mellitus (H) 2021     Diabetic ulcer of right midfoot associated with type 2 diabetes mellitus, limited to breakdown of skin (H) 2017     Diabetic ulcer of right midfoot associated with type 2 diabetes mellitus, with necrosis of muscle (H) 2022     Diarrhea 2016     Equinovarus acquired deformity, right 2021     Equinovarus deformity, acquired, right      Essential hypertension 2016     Gastroesophageal reflux disease without esophagitis 2016     GERD (gastroesophageal reflux disease)      Heart attack (H)      History of blood transfusion      Hyperlipidemia      Hyperlipidemia, unspecified hyperlipidemia type 2016     Hypertension      Insomnia, unspecified type 2016      Ischemic cardiomyopathy      Late effects of cerebrovascular accident 08/19/2016     Major depressive disorder with single episode, remission status unspecified 08/19/2016     Microalbuminuria 07/29/2021    Formatting of this note might be different from the original. Created by Conversion     Migraine      Mild intellectual disabilities 08/19/2016     Myalgia      Nausea 08/19/2016     Osteomyelitis (H)      Pain 08/19/2016     PONV (postoperative nausea and vomiting)     mild nausea     Problems with learning 07/29/2021    Formatting of this note might be different from the original. Created by Conversion [facility] Annotation: Feb 11 2010  1:41PM - Leonid Cabral: CANNOT READ OR  WRITE     PVD (peripheral vascular disease) (H)      Stented coronary artery      Type 2 diabetes mellitus (H) 07/29/2021    Formatting of this note might be different from the original. Created by Conversion     Type 2 diabetes mellitus with hyperglycemia (H) 08/19/2016     Type II or unspecified type diabetes mellitus with ophthalmic manifestations, not stated as uncontrolled(250.50) (H) 07/29/2021    Formatting of this note might be different from the original. Created by Conversion     Weakness 08/19/2016      Past Surgical History:   Procedure Laterality Date     INCISION AND DRAINAGE LOWER EXTREMITY, COMBINED Right 12/17/2021    Procedure: INCISION AND DRAINAGE, right foot;  Surgeon: Alex Kim DPM;  Location: [facility] OR     INCISION AND DRAINAGE LOWER EXTREMITY, COMBINED Right 1/27/2022    Procedure: INCISION AND DRAINAGE, right foot with application of theraskin;  Surgeon: Alex Kim DPM;  Location: [facility] OR     INCISION AND DRAINAGE LOWER EXTREMITY, COMBINED Right 4/4/2022    Procedure: INCISION AND DRAINAGE, right foot with application of theraskin;  Surgeon: Alex Kim DPM;  Location: [facility] OR     IR LOWER EXTREMITY ANGIOGRAM RIGHT  6/29/2022     IR MISCELLANEOUS  PROCEDURE  2004     [facility] CABG, VEIN, SINGLE      Description: CABG (CABG);  Recorded: 2009;      Allergies   Allergen Reactions     Aspirin Other (See Comments)     Reacts with migraine medicine     Isosorbide Headache     Codeine Rash     Morphine Rash      Social History     Tobacco Use     Smoking status: Former Smoker     Quit date: 2015     Years since quittin.5     Smokeless tobacco: Never Used   Substance Use Topics     Alcohol use: Not Currently      Wt Readings from Last 1 Encounters:   22 72.3 kg (159 lb 6.4 oz)        Anesthesia Evaluation   Pt has had prior anesthetic.     History of anesthetic complications  - PONV.      ROS/MED HX  ENT/Pulmonary:     (+) tobacco use, Past use,     Neurologic: Comment: Intellectual disability     (+) CVA, with deficits, - Right sided weakness.     Cardiovascular: Comment:  2021 TTE   1.  Normal left ventricular chamber size.Mildly decreased left ventricular ejection fraction.    Calculated left ventricular ejection fraction (modified Barragan technique) is 45 %.    2.  Abnormal septal motion.  Basal inferior hypokinesis.    3.  Grade 1 left ventricular diastolic dysfunction consistent with abnormal myocardial   relaxation and normal left ventricular filling    pressure.    4.  Normal right ventricular size and systolic function.    5.  No significant valvular heart disease.    6.  When compared to the previous echocardiographic images of 3/21/2019, there has been no   significant change.    (+) Dyslipidemia hypertension-Peripheral Vascular Disease-CAD -past MI --CHF Last EF: 45-50% dysrhythmias, a-fib,     METS/Exercise Tolerance:     Hematologic:       Musculoskeletal:       GI/Hepatic:     (+) GERD, Asymptomatic on medication,     Renal/Genitourinary:       Endo: Comment: Hx of retinal bleeds    (+) type II DM,     Psychiatric/Substance Use:     (+) psychiatric history anxiety and depression     Infectious Disease:       Malignancy:        Other:  - neg other ROS          Physical Exam    Airway  airway exam normal      Mallampati: II   TM distance: > 3 FB   Neck ROM: full   Mouth opening: > 3 cm    Respiratory Devices and Support         Dental     Comment: Edentulous         Cardiovascular   cardiovascular exam normal       Rhythm and rate: regular and normal     Pulmonary   pulmonary exam normal        breath sounds clear to auscultation           OUTSIDE LABS:  CBC:   Lab Results   Component Value Date    WBC 6.3 06/29/2022    WBC 6.3 04/01/2022    HGB 11.7 (L) 06/29/2022    HGB 10.5 (L) 04/01/2022    HCT 37.6 (L) 06/29/2022    HCT 34.0 (L) 04/01/2022     06/29/2022     04/01/2022     BMP:   Lab Results   Component Value Date     06/29/2022     04/01/2022    POTASSIUM 4.3 06/29/2022    POTASSIUM 4.6 04/01/2022    CHLORIDE 103 06/29/2022    CHLORIDE 104 04/01/2022    CO2 27 06/29/2022    CO2 26 04/01/2022    BUN 24 (H) 06/29/2022    BUN 17 04/01/2022    CR 1.02 06/29/2022    CR 0.97 04/01/2022     (H) 08/05/2022     06/29/2022     COAGS: No results found for: PTT, INR, FIBR  POC: No results found for: BGM, HCG, HCGS  HEPATIC:   Lab Results   Component Value Date    ALBUMIN 4.0 04/29/2019    PROTTOTAL 6.9 04/29/2019    ALT 20 04/29/2019    AST 21 04/29/2019    ALKPHOS 103 04/29/2019    BILITOTAL 0.4 04/29/2019     OTHER:   Lab Results   Component Value Date    A1C 5.8 (H) 05/16/2022    TO 10.0 06/29/2022    MAG 1.6 (L) 04/01/2022       Anesthesia Plan    ASA Status:  3   NPO Status:  NPO Appropriate    Anesthesia Type: General.     - Airway: Mask Only   Induction: Intravenous, Propofol.   Maintenance: TIVA.        Consents    Anesthesia Plan(s) and associated risks, benefits, and realistic alternatives discussed. Questions answered and patient/representative(s) expressed understanding.    - Discussed:     - Discussed with:  Patient, Legal Guardian         Postoperative Care    Pain management: IV  analgesics, Oral pain medications, Multi-modal analgesia, Peripheral nerve block (Single Shot).   PONV prophylaxis: Ondansetron (or other 5HT-3), Dexamethasone or Solumedrol, Droperidol or Haldol     Comments:                NICHOLE CLEMENTE MD

## 2022-08-05 NOTE — ANESTHESIA CARE TRANSFER NOTE
Patient: Cm Lundberg    Procedure: Procedure(s):  AMPUTATION, BELOW KNEE, right leg       Diagnosis: Charcot ankle, right [M14.671]  Diagnosis Additional Information: No value filed.    Anesthesia Type:   General     Note:    Oropharynx: oropharynx clear of all foreign objects and spontaneously breathing  Level of Consciousness: drowsy  Oxygen Supplementation: room air  Level of Supplemental Oxygen (L/min / FiO2): 0  Independent Airway: airway patency satisfactory and stable  Dentition: dentition unchanged  Vital Signs Stable: post-procedure vital signs reviewed and stable  Report to RN Given: handoff report given  Patient transferred to: PACU    Handoff Report: Identifed the Patient, Identified the Reponsible Provider, Reviewed the pertinent medical history, Discussed the surgical course, Reviewed Intra-OP anesthesia mangement and issues during anesthesia, Set expectations for post-procedure period and Allowed opportunity for questions and acknowledgement of understanding      Vitals:  Vitals Value Taken Time   /56 1600   Temp 98.6f 1600   Pulse 78 1600   Resp 12 1600   SpO2 95 1600       Electronically Signed By: SHABNAM Lloyd CRNA  August 5, 2022  4:02 PM

## 2022-08-05 NOTE — OP NOTE
Operative Note    Name: Cm Lundberg     Location: [facility] OR    Procedure Date:  8/5/2022    PCP:  Provider, Generic External Data    Procedure(s):  AMPUTATION, BELOW KNEE, right leg     Pre-Procedure Diagnosis:    Charcot ankle, right [M14.671]     Post-Procedure Diagnosis:    Same     Surgeon(s):  iKm Montes MD   Assistant:  Dion Jaimes MD, vascular surgery fellow    Findings:    Healthy tissues with good bleeding at level of amputation.  Some degree of edema but no other signs of infection        Estimated Blood Loss:   75 cc's    Specimens:    ID Type Source Tests Collected by Time Destination   1 : Right Lower Leg  Amputation, Non-Traumatic Leg, Below Knee, Right SURGICAL PATHOLOGY EXAM Kim Montes MD 8/5/2022  3:06 PM           Drains:         Implants:  * No implants in log *     Complications:    * No complications entered in OR log *     Brief Clinical Hx:  This is a 51-year-old diabetic gentleman with a Charcot foot who at this point has a limb with advanced wounds not likely to be salvaged for long-term and even less likely to be useful as a limb for walking.  In this context patient has chosen for below-knee amputation.  Presents today for this operation.  Despite having [place] the foot the wound actually quite clean without purulence.  Planned in this context for single-stage operation recognizing at least a 10% risk of wound infection.    stable    Operative Details:  Patient was prepped and draped access to his left leg after regional block.  A posterior flap based incision wasmarked on the skin starting 10 cm from the tibial tuberosity.  The limb was elevated and an Esmarch was used to drain the limb of blood and the tourniquet was inflated to 150 mmHg pressure.  Patient tolerated this well.Incision was made through skin and subcutaneous tissue and then cautery was used transect soft tissue and muscle to circumferentially dissect out the tibia and expose the fibula.  The fibula was  transected high with a bonecutter.  The tibia was transected with a 45 degree angle on its front surface with a bone saw.  The distal limb was then resected from the patient by transecting the posterior flap with an amputation knife.  We identifiedthe tibial nerve and transecting it high allowing it to retract.  We ligated the neurovascular bundles with 2-0 silk suture ligature.  We did right and cleared excess muscle tissue such that we would have an easily closableflap.  We now took down the tourniquet and took time to ensure good hemostasis.  We now closed the fascia with interrupted 2-0 Vicryl sutures and closed the skin with staples.  The procedure was well-tolerated.      Kim Montes MD     Date: 8/5/2022  Time:3:56 PM

## 2022-08-05 NOTE — PHARMACY-ADMISSION MEDICATION HISTORY
Pharmacy Note - Admission Medication History   ______________________________________________________________________    Prior To Admission (PTA) med list completed and updated in EMR.       PTA Med List   Medication Sig Last Dose     acetaminophen (TYLENOL) 325 MG tablet Take 650 mg by mouth every 4 hours as needed for mild pain  8/5/2022 at am     ARIPiprazole (ABILIFY) 2 MG tablet 2 mg daily  8/5/2022 at am     ASPERCREME ORIGINAL 10 % external cream Apply topically 2 times daily as needed for other (back pain) Unknown     atorvastatin (LIPITOR) 80 MG tablet Take 80 mg by mouth daily  8/5/2022 at am     bisacodyl (DULCOLAX) 10 MG suppository Place 10 mg rectally daily as needed for constipation Unknown     cholestyramine (QUESTRAN) 4 g packet Take 1 packet by mouth every other day  8/4/2022 at am     clopidogrel (PLAVIX) 75 MG tablet Take 1 tablet (75 mg) by mouth daily Start taking medication the day after the procedure. 8/5/2022 at am     DULoxetine (CYMBALTA) 60 MG capsule Take 120 mg by mouth daily 8/5/2022 at am     erenumab-aooe (AIMOVIG) 140 MG/ML injection Inject 1 mL (140 mg) Subcutaneous every 30 days 7/8/2022     fenofibrate micronized (LOFIBRA) 200 MG capsule Take 200 mg by mouth every morning (before breakfast)  8/5/2022 at am     ferrous sulfate (FEROSUL) 325 (65 Fe) MG tablet Take 325 mg by mouth daily (with breakfast)  8/5/2022 at am     gabapentin (NEURONTIN) 100 MG capsule Take 100 mg by mouth 2 times daily (before meals) 8/5/2022 at am     gabapentin (NEURONTIN) 300 MG capsule Take 600 mg by mouth At Bedtime 8/4/2022 at pm     glipiZIDE (GLUCOTROL) 5 MG tablet Take 2.5 mg by mouth daily before breakfast 8/4/2022 at am     loperamide (IMODIUM) 2 MG capsule Take 2 mg by mouth 4 times daily as needed for diarrhea Unknown     LORazepam (ATIVAN) 0.5 MG tablet Take 0.5 mg by mouth daily as needed for anxiety Unknown     losartan (COZAAR) 25 MG tablet Take 25 mg by mouth At Bedtime  8/4/2022 at pm      magnesium oxide 400 MG CAPS Take 400 mg by mouth 4 times daily 8/5/2022 at am     metFORMIN (GLUCOPHAGE) 500 MG tablet Take by mouth 2 times daily (with meals) 1000 mg in the AM, 500 mg in the PM 8/4/2022 at pm     metoprolol succinate ER (TOPROL-XL) 25 MG 24 hr tablet Take 75 mg by mouth daily  8/5/2022 at am     mineral oil-hydrophilic petrolatum (AQUAPHOR) external ointment Apply topically 3 times daily as needed for irritation or dry skin Apply to forehead and other areas as needed Unknown     mirtazapine (REMERON) 7.5 MG tablet Take 7.5 mg by mouth At Bedtime  8/4/2022 at pm     Neomycin-Bacitracin-Polymyxin (TRIPLE ANTIBIOTIC) 3.5-400-5000 OINT ointment Externally apply 1 applicator topically 3 times daily as needed (wound care) Unknown     Nitroglycerin (NITROSTAT SL) Place 0.4 mg under the tongue every 5 minutes as needed for chest pain  Unknown     oxyCODONE (ROXICODONE) 5 MG tablet TAKE 1 TAB BY MOUTH EVERY 6HOURS AS NEEDED FOR PAIN (Patient taking differently: Take 5 mg by mouth every 6 hours as needed for pain) Unknown     pantoprazole (PROTONIX) 40 MG EC tablet Take 40 mg by mouth daily 8/5/2022 at am     polyethylene glycol (MIRALAX) 17 GM/Dose powder Take 1 packet by mouth daily as needed for constipation  Unknown     senna-docusate (SENOKOT-S/PERICOLACE) 8.6-50 MG tablet Take 1 tablet by mouth 2 times daily 8/5/2022 at am     senna-docusate (SENOKOT-S/PERICOLACE) 8.6-50 MG tablet Take 1 tablet by mouth 2 times daily as needed for constipation In addition to scheduled doses Unknown     traZODone (DESYREL) 50 MG tablet Take 50 mg by mouth At Bedtime  8/4/2022 at pm     vitamin C (ASCORBIC ACID) 500 MG tablet Take 500 mg by mouth daily  7/31/2022     XARELTO ANTICOAGULANT 20 MG TABS tablet Take 20 mg by mouth daily (with dinner) 8/3/2022       Information source(s): Patient and Facility (TCU/NH/) medication list/MAR    Method of interview communication: in-person    Patient was asked about OTC/herbal  products specifically.  PTA med list reflects this.    Based on the pharmacist's assessment, the PTA med list information appears reliable    Allergies were reviewed, assessed, and updated with the patient.      Patient did not bring any medications to the hospital and can't retrieve from home. No multi-dose medications are available for use during hospital stay.      Thank you for the opportunity to participate in the care of this patient.      Kaleigh Velasco RPH     8/5/2022     1:09 PM

## 2022-08-06 ENCOUNTER — APPOINTMENT (OUTPATIENT)
Dept: OCCUPATIONAL THERAPY | Facility: HOSPITAL | Age: 52
DRG: 041 | End: 2022-08-06
Attending: SURGERY
Payer: MEDICARE

## 2022-08-06 ENCOUNTER — APPOINTMENT (OUTPATIENT)
Dept: PHYSICAL THERAPY | Facility: HOSPITAL | Age: 52
DRG: 041 | End: 2022-08-06
Attending: SURGERY
Payer: MEDICARE

## 2022-08-06 LAB
BASOPHILS # BLD AUTO: 0.1 10E3/UL (ref 0–0.2)
BASOPHILS NFR BLD AUTO: 1 %
EOSINOPHIL # BLD AUTO: 0.1 10E3/UL (ref 0–0.7)
EOSINOPHIL NFR BLD AUTO: 2 %
ERYTHROCYTE [DISTWIDTH] IN BLOOD BY AUTOMATED COUNT: 14.6 % (ref 10–15)
ERYTHROCYTE [DISTWIDTH] IN BLOOD BY AUTOMATED COUNT: 14.6 % (ref 10–15)
GLUCOSE BLDC GLUCOMTR-MCNC: 115 MG/DL (ref 70–99)
GLUCOSE BLDC GLUCOMTR-MCNC: 149 MG/DL (ref 70–99)
GLUCOSE BLDC GLUCOMTR-MCNC: 186 MG/DL (ref 70–99)
GLUCOSE BLDC GLUCOMTR-MCNC: 208 MG/DL (ref 70–99)
HCT VFR BLD AUTO: 31.2 % (ref 40–53)
HCT VFR BLD AUTO: 31.9 % (ref 40–53)
HGB BLD-MCNC: 10.1 G/DL (ref 13.3–17.7)
HGB BLD-MCNC: 9.9 G/DL (ref 13.3–17.7)
HOLD SPECIMEN: NORMAL
HOLD SPECIMEN: NORMAL
IMM GRANULOCYTES # BLD: 0 10E3/UL
IMM GRANULOCYTES NFR BLD: 0 %
INR PPP: 1.12 (ref 0.85–1.15)
LYMPHOCYTES # BLD AUTO: 1.2 10E3/UL (ref 0.8–5.3)
LYMPHOCYTES NFR BLD AUTO: 20 %
MCH RBC QN AUTO: 26.4 PG (ref 26.5–33)
MCH RBC QN AUTO: 26.5 PG (ref 26.5–33)
MCHC RBC AUTO-ENTMCNC: 31.7 G/DL (ref 31.5–36.5)
MCHC RBC AUTO-ENTMCNC: 31.7 G/DL (ref 31.5–36.5)
MCV RBC AUTO: 83 FL (ref 78–100)
MCV RBC AUTO: 84 FL (ref 78–100)
MONOCYTES # BLD AUTO: 0.6 10E3/UL (ref 0–1.3)
MONOCYTES NFR BLD AUTO: 11 %
NEUTROPHILS # BLD AUTO: 3.9 10E3/UL (ref 1.6–8.3)
NEUTROPHILS NFR BLD AUTO: 66 %
NRBC # BLD AUTO: 0 10E3/UL
NRBC BLD AUTO-RTO: 0 /100
PLATELET # BLD AUTO: 135 10E3/UL (ref 150–450)
PLATELET # BLD AUTO: 137 10E3/UL (ref 150–450)
POTASSIUM BLD-SCNC: 4.5 MMOL/L (ref 3.5–5)
RBC # BLD AUTO: 3.74 10E6/UL (ref 4.4–5.9)
RBC # BLD AUTO: 3.82 10E6/UL (ref 4.4–5.9)
WBC # BLD AUTO: 5.9 10E3/UL (ref 4–11)
WBC # BLD AUTO: 7 10E3/UL (ref 4–11)

## 2022-08-06 PROCEDURE — 97530 THERAPEUTIC ACTIVITIES: CPT | Mod: GP

## 2022-08-06 PROCEDURE — 36415 COLL VENOUS BLD VENIPUNCTURE: CPT | Performed by: STUDENT IN AN ORGANIZED HEALTH CARE EDUCATION/TRAINING PROGRAM

## 2022-08-06 PROCEDURE — 85004 AUTOMATED DIFF WBC COUNT: CPT | Performed by: STUDENT IN AN ORGANIZED HEALTH CARE EDUCATION/TRAINING PROGRAM

## 2022-08-06 PROCEDURE — 120N000001 HC R&B MED SURG/OB

## 2022-08-06 PROCEDURE — 85027 COMPLETE CBC AUTOMATED: CPT | Performed by: STUDENT IN AN ORGANIZED HEALTH CARE EDUCATION/TRAINING PROGRAM

## 2022-08-06 PROCEDURE — 97166 OT EVAL MOD COMPLEX 45 MIN: CPT | Mod: GO

## 2022-08-06 PROCEDURE — 97161 PT EVAL LOW COMPLEX 20 MIN: CPT | Mod: GP

## 2022-08-06 PROCEDURE — 250N000012 HC RX MED GY IP 250 OP 636 PS 637: Performed by: INTERNAL MEDICINE

## 2022-08-06 PROCEDURE — 250N000013 HC RX MED GY IP 250 OP 250 PS 637: Performed by: STUDENT IN AN ORGANIZED HEALTH CARE EDUCATION/TRAINING PROGRAM

## 2022-08-06 PROCEDURE — 99232 SBSQ HOSP IP/OBS MODERATE 35: CPT | Performed by: INTERNAL MEDICINE

## 2022-08-06 PROCEDURE — 85610 PROTHROMBIN TIME: CPT | Performed by: STUDENT IN AN ORGANIZED HEALTH CARE EDUCATION/TRAINING PROGRAM

## 2022-08-06 PROCEDURE — 84132 ASSAY OF SERUM POTASSIUM: CPT | Performed by: STUDENT IN AN ORGANIZED HEALTH CARE EDUCATION/TRAINING PROGRAM

## 2022-08-06 PROCEDURE — 97535 SELF CARE MNGMENT TRAINING: CPT | Mod: GO

## 2022-08-06 PROCEDURE — 258N000003 HC RX IP 258 OP 636: Performed by: STUDENT IN AN ORGANIZED HEALTH CARE EDUCATION/TRAINING PROGRAM

## 2022-08-06 PROCEDURE — 250N000011 HC RX IP 250 OP 636: Performed by: STUDENT IN AN ORGANIZED HEALTH CARE EDUCATION/TRAINING PROGRAM

## 2022-08-06 RX ORDER — HEPARIN SODIUM 10000 [USP'U]/100ML
600 INJECTION, SOLUTION INTRAVENOUS CONTINUOUS
Status: DISCONTINUED | OUTPATIENT
Start: 2022-08-06 | End: 2022-08-07

## 2022-08-06 RX ADMIN — OXYCODONE HYDROCHLORIDE 5 MG: 5 TABLET ORAL at 11:20

## 2022-08-06 RX ADMIN — FERROUS SULFATE TAB 325 MG (65 MG ELEMENTAL FE) 325 MG: 325 (65 FE) TAB at 08:49

## 2022-08-06 RX ADMIN — CEFAZOLIN 1 G: 1 INJECTION, POWDER, FOR SOLUTION INTRAMUSCULAR; INTRAVENOUS at 05:41

## 2022-08-06 RX ADMIN — HYDROMORPHONE HYDROCHLORIDE 0.2 MG: 0.2 INJECTION, SOLUTION INTRAMUSCULAR; INTRAVENOUS; SUBCUTANEOUS at 08:43

## 2022-08-06 RX ADMIN — ATORVASTATIN CALCIUM 80 MG: 40 TABLET, FILM COATED ORAL at 08:48

## 2022-08-06 RX ADMIN — GABAPENTIN 100 MG: 100 CAPSULE ORAL at 16:58

## 2022-08-06 RX ADMIN — HEPARIN SODIUM 600 UNITS/HR: 10000 INJECTION, SOLUTION INTRAVENOUS at 11:04

## 2022-08-06 RX ADMIN — MIRTAZAPINE 7.5 MG: 7.5 TABLET, FILM COATED ORAL at 20:41

## 2022-08-06 RX ADMIN — HYDROMORPHONE HYDROCHLORIDE 0.2 MG: 0.2 INJECTION, SOLUTION INTRAMUSCULAR; INTRAVENOUS; SUBCUTANEOUS at 05:36

## 2022-08-06 RX ADMIN — GABAPENTIN 100 MG: 100 CAPSULE ORAL at 07:01

## 2022-08-06 RX ADMIN — FENOFIBRATE 160 MG: 160 TABLET, FILM COATED ORAL at 08:49

## 2022-08-06 RX ADMIN — INSULIN ASPART 2 UNITS: 100 INJECTION, SOLUTION INTRAVENOUS; SUBCUTANEOUS at 17:52

## 2022-08-06 RX ADMIN — Medication 2.5 MG: at 08:48

## 2022-08-06 RX ADMIN — ARIPIPRAZOLE 2 MG: 2 TABLET ORAL at 08:49

## 2022-08-06 RX ADMIN — INSULIN ASPART 1 UNITS: 100 INJECTION, SOLUTION INTRAVENOUS; SUBCUTANEOUS at 08:46

## 2022-08-06 RX ADMIN — GABAPENTIN 600 MG: 300 CAPSULE ORAL at 20:40

## 2022-08-06 RX ADMIN — HYDROMORPHONE HYDROCHLORIDE 0.2 MG: 0.2 INJECTION, SOLUTION INTRAMUSCULAR; INTRAVENOUS; SUBCUTANEOUS at 12:39

## 2022-08-06 RX ADMIN — METOPROLOL SUCCINATE 75 MG: 25 TABLET, EXTENDED RELEASE ORAL at 08:48

## 2022-08-06 RX ADMIN — PANTOPRAZOLE SODIUM 40 MG: 40 TABLET, DELAYED RELEASE ORAL at 08:48

## 2022-08-06 RX ADMIN — OXYCODONE HYDROCHLORIDE 5 MG: 5 TABLET ORAL at 16:58

## 2022-08-06 RX ADMIN — SODIUM CHLORIDE, POTASSIUM CHLORIDE, SODIUM LACTATE AND CALCIUM CHLORIDE: 600; 310; 30; 20 INJECTION, SOLUTION INTRAVENOUS at 04:11

## 2022-08-06 RX ADMIN — TRAZODONE HYDROCHLORIDE 50 MG: 50 TABLET ORAL at 20:41

## 2022-08-06 RX ADMIN — CLOPIDOGREL BISULFATE 75 MG: 75 TABLET ORAL at 08:47

## 2022-08-06 RX ADMIN — DULOXETINE HYDROCHLORIDE 120 MG: 60 CAPSULE, DELAYED RELEASE PELLETS ORAL at 08:48

## 2022-08-06 RX ADMIN — CHOLESTYRAMINE POWDER FOR SUSPENSION 4 G: 4 POWDER, FOR SUSPENSION ORAL at 09:35

## 2022-08-06 ASSESSMENT — ACTIVITIES OF DAILY LIVING (ADL)
ADLS_ACUITY_SCORE: 37
ADLS_ACUITY_SCORE: 37
ADLS_ACUITY_SCORE: 33
ADLS_ACUITY_SCORE: 29
ADLS_ACUITY_SCORE: 37
ADLS_ACUITY_SCORE: 37
ADLS_ACUITY_SCORE: 29
ADLS_ACUITY_SCORE: 33

## 2022-08-06 NOTE — CONSULTS
CONSULT NOTE      Date of  Service :                                                                                                                                                                                                                                   Patient with right charcot ankle and admitted for elective surgery    S/p AMPUTATION, BELOW KNEE, right leg : on plavix        Assessment/Plan             Charcot ankle, right             S/p AMPUTATION, BELOW KNEE, right leg : on plavix, fibrates      Stroke, h/o Right side weakness : on plavix      A fib h/o : on xarelto - currently holding      CAD s/p stents and CABG : on plavix, fibrates, bb      Anxiety/Depression : on abilify 2 mg daily, cymbalta 120 mg daily, ativan 0.5 mg daily prn, remeron 7.5 mg at bedtime, trazodone      HTN, Essential : on losartan 25 mg at bedtime, metoprolol 75 mg daily      HLD :on fibrates      Anemia : on iron tab      Neuropathy : on gabapentin 100 mg bid, 600 mg at bedtime      DM2 : on glipizide 2.5 mg before BF, metformin 1 gm in am 500 mg in pm - at home, on SSI          Diet : diabetic , IV fluids : 100  /hr  , IV Meds :  prn  DVT Prophylaxis : heparin  PT/OT eval : pending   Code Status : Full   Admit status : Inpatient       Disposition / Plan :       - Medically stable  - follow Ortho recommendations        Barriers to Discharge : post op day 0  Anticipated discharge : tbd  Disposition :  tbd          Objective :       Vital signs in last 24 hours   Temp: [97.8  F (36.6  C)-99.8  F (37.7  C)] 98.3  F (36.8  C)   Heart Rate: [] 106   Resp: [16-22] 16   BP: (128-155)/(64-83) 144/73 mmHg       Weight:   248 lb 4.8 oz (112.628 kg)       Intake/Output last 3 shifts   I/O last 3 completed shifts:   In: 1400 [I.V.:1400]   Out: 885 [Urine:750; Drains:125; Blood:10]   Intake/Output this shift:       Review  of Systems   A 12 point comprehensive review of systems was negative except as noted.       Physical Exam       HEENT : no distended veins, no lymphadenopathy, thyroid is normal   LUNGS : b/l air entry present, no significant crackles/wheezing.   ABDOMEN : soft, non tender, non-distended, BS present.   HEART : Regular rate & rhythm, S1 & S2 normal, no murmur, clicks/rubs   NEURO : conscious, oriented, responds to commands, no obvious focal deficit.   EXTREMITIES : no ankle edema, no calf tenderness.   SKIN : no rashes   BACK : wnl       Pertinent Labs   Lab Results: not reviewed.   Lab Results    Component  Value  Date     NA  135*  6/6/2014     K  3.9  6/6/2014     CL  103  6/6/2014     CO2  22  6/6/2014     BUN  14  6/6/2014     CREATININE  1.03  6/6/2014     CALCIUM  9.2  6/6/2014        Pertinent Radiology     Radiology Results: Not yet available for review   EKG Results: not done       Total time spent > 35 minutes and > 50 % time spent on coordination of care and counseling the patient and staff about plan of care.

## 2022-08-06 NOTE — PLAN OF CARE
Problem: Plan of Care - These are the overarching goals to be used throughout the patient stay.    Goal: Plan of Care Review/Shift Note  Description: The Plan of Care Review/Shift note should be completed every shift.  The Outcome Evaluation is a brief statement about your assessment that the patient is improving, declining, or no change.  This information will be displayed automatically on your shift note.  Outcome: Ongoing, Progressing   Goal Outcome Evaluation:  Plan of care reviewed with patient and his brother Macho- Patient is a/o x4-is pleasant and cooperative with cares. BP 90s-on recheck 110. Coughing and deep breathing with reminder-instructed in IS.  O2sats drop to upper 80s when falls asleep. O2 placed on at 2L with sats 92-95% .Tolerating moderate carb diet. Incontinent of large amt urine with PVR of 150. Dressing saturated with blood-changed by Vascular Team. Hgb 9.9 later am-had been 10.1 in the early am.

## 2022-08-06 NOTE — ANESTHESIA POSTPROCEDURE EVALUATION
Patient: Cm Lundberg    Procedure: Procedure(s):  AMPUTATION, BELOW KNEE, right leg       Anesthesia Type:  General    Note:  Disposition: Admission   Postop Pain Control: Uneventful            Sign Out: Well controlled pain   PONV: No   Neuro/Psych: Uneventful            Sign Out: Acceptable/Baseline neuro status   Airway/Respiratory: Uneventful            Sign Out: Acceptable/Baseline resp. status   CV/Hemodynamics: Uneventful            Sign Out: Acceptable CV status; No obvious hypovolemia; No obvious fluid overload   Other NRE: NONE   DID A NON-ROUTINE EVENT OCCUR? No           Last vitals:  Vitals Value Taken Time   /55 08/05/22 2000   Temp 36.7  C (98.1  F) 08/05/22 2000   Pulse 68 08/05/22 2000   Resp 17 08/05/22 2000   SpO2 91 % 08/05/22 2000       Electronically Signed By: Estrellita Waldron MD  August 5, 2022  9:02 PM

## 2022-08-06 NOTE — PLAN OF CARE
Problem: Pain Acute  Goal: Acceptable Pain Control and Functional Ability  Outcome: Ongoing, Progressing  Intervention: Prevent or Manage Pain  Recent Flowsheet Documentation  Taken 8/5/2022 1800 by Jin Christie RN  Medication Review/Management: medications reviewed   Goal Outcome Evaluation:      Pt alert and oriented x 4. Made his needs known. Vital signs wdl. C/o right leg pain. Prn oxycodone given. On RA. Right leg elevated. Incontinent for bowel and bladder. Insulin given for elevated blood sugar.

## 2022-08-06 NOTE — PLAN OF CARE
Problem: Pain Acute  Goal: Acceptable Pain Control and Functional Ability  Outcome: Ongoing, Progressing  Intervention: Prevent or Manage Pain  Recent Flowsheet Documentation  Taken 8/6/2022 0039 by Cinthia Sánchez RN  Medication Review/Management: medications reviewed   Goal Outcome Evaluation:      Rt AKA is with dry and intact dressing. Pt slept good. At 0530 AM, pt c/o pain 10/10 to his BKA, given PRN Hydromorphone. Did not urinate for the whole shift, bladder is distended but pt has no complain. Bladder scan reading is >670 ml. Notified on-call and got order to straight cath pt. Straight cath drainage was 600 ml straw- colored urine.

## 2022-08-06 NOTE — PROGRESS NOTES
Physical Therapy Evaluation     08/06/22 1100   Quick Adds   Type of Visit Initial PT Evaluation   Living Environment   Current Living Arrangements assisted living   Home Accessibility wheelchair accessible   Transportation Anticipated family or friend will provide;health plan transportation   Living Environment Comments See OT note   Self-Care   Usual Activity Tolerance moderate   Current Activity Tolerance fair   Equipment Currently Used at Home grab bar, tub/shower;grab bar, toilet;hospital bed;raised toilet seat;shower chair;wheelchair, manual   Fall history within last six months yes   Number of times patient has fallen within last six months 1   Activity/Exercise/Self-Care Comment At baseline patient utilizes wheelchair for mobility tasks   General Information   Onset of Illness/Injury or Date of Surgery 08/05/22   Referring Physician Kimi Jaimes MD   Patient/Family Therapy Goals Statement (PT) Return to assisted living facility   Pertinent History of Current Problem (include personal factors and/or comorbidities that impact the POC) 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, history of stroke with residual R sided weakness   Existing Precautions/Restrictions fall   Cognition   Affect/Mental Status (Cognition) WFL   Orientation Status (Cognition) oriented x 4   Follows Commands (Cognition) WFL   Pain Assessment   Patient Currently in Pain Yes, see Vital Sign flowsheet   Posture    Posture Forward head position   Range of Motion (ROM)   Range of Motion ROM is WFL   ROM Comment for florinda UEs and LEs   Strength (Manual Muscle Testing)   Strength (Manual Muscle Testing) strength is WFL   Strength Comments WFL except R sided UE strength deficits due to previous stroke   Bed Mobility   Bed Mobility supine-sit   Supine-Sit N[place] (Bed Mobility) minimum assist (75% patient effort)   Bed Mobility Limitations decreased ability to use legs for bridging/pushing   Impairments Contributing to Impaired Bed Mobility pain  (new  amputation)   Assistive Device (Bed Mobility) bed rails;draw sheet   Transfers   Transfers bed-chair transfer   Bed-Chair Transfer   Bed-Chair [place] (Transfers) moderate assist (50% patient effort)   Comment, (Bed-Chair Transfer) completed stand pivot transfer with therapist assist   Balance   Balance other (describe)   Balance Comments initially required [place] for seated balance - decreased to CGA with increased orientation   Sensory Examination   Sensory Perception WFL   Coordination   Coordination no deficits were identified   Muscle Tone   Muscle Tone no deficits were identified   Clinical Impression   Criteria for Skilled Therapeutic Intervention Yes, treatment indicated   PT Diagnosis (PT) impaired functional mobility   Influenced by the following impairments amputation, pain   Functional limitations due to impairments bed mobility, transfers   Clinical Presentation (PT Evaluation Complexity) Stable/Uncomplicated   Clinical Presentation Rationale Presents as clinically diagnosed   Clinical Decision Making (Complexity) low complexity   Planned Therapy Interventions (PT) home exercise program;strengthening;transfer training;wheelchair management/propulsion training;bed mobility training   Risk & Benefits of therapy have been explained evaluation/treatment results reviewed;care plan/treatment goals reviewed;patient;sibling   PT Discharge Planning   PT Discharge Recommendation (DC Rec) home with assist;home with home care physical therapy   PT Rationale for DC Rec Patient would like to return to assisted living facility, however he needs 24 hour assist for transfers at this time. If [facility] is able to accomadate care needs this is first recommendation. If not will need TCU for additional rehab prior to returning to [facility].   Plan of Care Review   Plan of Care Reviewed With patient   Total Evaluation Time   Total Evaluation Time (Minutes) 15   Physical Therapy Goals   PT Frequency Daily   PT Predicted  Duration/Target Date for Goal Attainment 08/13/22   PT Goals Bed Mobility;Transfers;Wheelchair Mobility   PT: Bed Mobility Modified independent;Supine to/from sit   PT: Transfers Minimal assist;Bed to/from chair   PT: Wheelchair Mobility 150 feet;manual wheelchair   SHIMON LevinT

## 2022-08-06 NOTE — PROGRESS NOTES
Progress Note    Assessment/Plan      Charcot ankle, right          S/p AMPUTATION, BELOW KNEE, right leg : on plavix, fibrates  --Postoperative day 1  --Heparin infusion started by vascular surgery  --PT OT and orthotics recs per vascular surgery       Stroke, h/o Right side weakness : on plavix       A fib h/o : on xarelto - currently holding.  Patient on IV heparin as per vascular surgery    --Hemoglobin is stable       CAD s/p stents and CABG : on plavix, fibrates, bb       Anxiety/Depression : on abilify 2 mg daily, cymbalta 120 mg daily, ativan 0.5 mg daily prn, remeron 7.5 mg at bedtime, trazodone       HTN, Essential : Blood pressure is borderline low and therefore will hold losartan.  Continue perioperative beta-blocker with holding parameters       HLD :on fibrates       Anemia : Hemoglobin is stable       Peripheral neuropathy : on gabapentin 100 mg bid, 600 mg at bedtime       Non-insulin-dependent diabetes, controlled: on glipizide 2.5 mg before BF, hold metformin in the hospital.  Continue sliding scale insulin      Barriers to discharge:    Anticipated discharge date:        Subjective  Patient new to me.  Chart reviewed.  The patient participating in occupational therapy.  Blood sugars are controlled  Objective    BP 98/54 (BP Location: Right arm)   Pulse 66   Temp 97.9  F (36.6  C) (Oral)   Resp 18   Wt 72.3 kg (159 lb 6.4 oz)   SpO2 92%   BMI 24.97 kg/m    Weight:   Wt Readings from Last 5 Encounters:   08/05/22 72.3 kg (159 lb 6.4 oz)   08/02/22 71.2 kg (157 lb)   07/05/22 69.4 kg (153 lb)   06/14/22 69.7 kg (153 lb 9.6 oz)   06/07/22 69.4 kg (153 lb)       I/O last 3 completed shifts:  In: 1040 [P.O.:440; I.V.:550; IV Piggyback:50]  Out: 75 [Blood:75]  I/O this shift:  In: 2360 [P.O.:600; I.V.:1760]  Out: -           Physical Exam  Alert, oriented*3  a     Pertinent Labs  ----------------------  Recent Labs   Lab 08/06/22  0616 08/06/22  0250 08/05/22  2038 08/05/22  1833 08/05/22  1808  "  POTASSIUM 4.5  --   --  4.3  --    CR  --   --   --  1.11  --    GLC  --  115* 208*  --  119*     Recent Labs   Lab 08/06/22  1050 08/06/22  0616 08/05/22  1833   WBC 7.0 5.9 6.3   HGB 9.9* 10.1* 10.6*   HCT 31.2* 31.9* 33.7*   * 137* 160     Recent Labs   Lab 08/06/22  0616   INR 1.12     No flowsheet data found.      Pertinent Radiology   Radiology Results: Personally reviewed impression/s  POC US Guidance Needle Placement    Result Date: 8/5/2022  Ultrasound was performed as guidance to an anesthesia procedure.  Click \"PACS images\" hyperlink below to view any stored images.  For specific procedure details, view procedure note authored by anesthesia.    EKG Results: not reviewed.             "

## 2022-08-06 NOTE — PROGRESS NOTES
VASCULAR SURGERY PROGRESS NOTE    Subjective:  No acute events overnight. Episode of pain relieved with IV medication. Required straight cath x1 overnight. SBP  this AM, Hgb stable. Tolerating liquid diet, no complaints.     Objective:  Intake/Output Summary (Last 24 hours) at 8/6/2022 1019  Last data filed at 8/5/2022 1900  Gross per 24 hour   Intake 1040 ml   Output 75 ml   Net 965 ml     PHYSICAL EXAM:  BP 98/54 (BP Location: Right arm)   Pulse 66   Temp 97.9  F (36.6  C) (Oral)   Resp 18   Wt 72.3 kg (159 lb 6.4 oz)   SpO2 92%   BMI 24.97 kg/m    General: The patient is alert and oriented. Appropriate. No acute distress  Psych: pleasant affect, answers questions appropriately  Skin: Color appropriate for race, warm, dry.  Respiratory: The patient does not require supplemental oxygen. Breathing unlabored  GI:  Abdomen soft, nontender to light palpation.  Extremities: s/p right BKA. Dressing changed due to serosanguinous drainage. No active bleeding or hematoma noted, skin and wound c/d/i              Imaging:   NA    ASSESSMENT:  52 yo male with PMH of A fib on xarelto, CAD and CABG on plavix (asa allergy), HTN, HLD, nueropathy, CVA, and charcot ankle right foot now s/p BKA on right, recovering well POD#1      PLAN:  Start heparin infusion 600 units/hr today- NO  BOLUS  Dressing changed at bedside this AM- please leave in place and contact vascular surgery if becomes saturated  Keep knee immobilizer in place  PT/OT and orthotics consult placed  OK to advance diet as tolerated, stop IV fluids  Straight cath PRN  Continue PO and IV pain medication, Multi modal pain control  Continue care, appreciate input from medical team as well    Discussed pt history, exam, assessment and plan with Dr. Byrd of the vascular surgery service, who is in agreement with the above.    Kimi Jaimes DO  VASCULAR SURGERY FELLOW

## 2022-08-06 NOTE — PROGRESS NOTES
08/06/22 1005   Quick Adds   Type of Visit Initial Occupational Therapy Evaluation   Living Environment   People in Home other (see comments)  (lives at [facility])   Current Living Arrangements assisted living   Home Accessibility wheelchair accessible   Transportation Anticipated family or friend will provide;health plan transportation   Living Environment Comments Lives at [facility]. Has been in TCU for the last 7 months for wound vac to RLE.   Self-Care   Usual Activity Tolerance moderate   Equipment Currently Used at Home grab bar, tub/shower;grab bar, toilet;hospital bed;raised toilet seat;shower chair;wheelchair, manual   Fall history within last six months no   Instrumental Activities of Daily Living (IADL)   IADL Comments Requires assistance for all IADL's.   General Information   Onset of Illness/Injury or Date of Surgery 08/05/22   Referring Physician Dr. Jaimes   Patient/Family Therapy Goal Statement (OT) To go back to [facility] and get therapy there.   Additional Occupational Profile Info/Pertinent History of Current Problem Assistance with showers, dressing, set up for grooming/hygiene, assist with toileting and transfers at baseline.   Performance Patterns (Routines, Roles, Habits) PLOF is assistance for all ADL's and IADL's, has been living at [facility]. Hx of CVA 5 years ago with right Miguel Angel, has right hand contractures. Requires assist for all transfers at baseline, w/c bound.   Existing Precautions/Restrictions weight bearing   Limitations/Impairments other (see comments)  (Hx of CVA. right Miguel Angel)   General Observations and Info motivated for therapy. Wants to get to baseline where he can return to [facility] and have home care therapy. Brother present during evaluation.   Cognitive Status Examination   Orientation Status orientation to person, place and time   Follows Commands WNL   Range of Motion Comprehensive   General Range of Motion other (see comments)  (RUE AROM limite to approx 90  degrees, right hand contracture from previous CVA. LUE WFL)   Coordination   Upper Extremity Coordination Right UE impaired   Gross Motor Coordination RUE impaired d/t previous CVA   Clinical Impression   Criteria for Skilled Therapeutic Interventions Met (OT) Yes, treatment indicated   OT Diagnosis ADL deficits   Influenced by the following impairments RBKA, Hx of right Miguel Angel   OT Problem List-Impairments impacting ADL activity tolerance impaired;balance;mobility;range of motion (ROM);strength;post-surgical precautions   ADL comments/analysis Requires moderate to max assist for ADL's at this time.   Assessment of Occupational Performance 3-5 Performance Deficits   Identified Performance Deficits transfers, standing, grooming/hygiene, showers, dressing.   Planned Therapy Interventions (OT) ADL retraining;bed mobility training;neuromuscular re-education;ROM;strengthening;transfer training   Clinical Decision Making Complexity (OT) moderate complexity   Anticipated Equipment Needs Upon Discharge (OT) other (see comments)  (has all needed AE at [facility], will continue to assess for additional needs.)   Risk & Benefits of therapy have been explained evaluation/treatment results reviewed   Clinical Impression Comments Patient will benefit from skilled OT to increase UE strength for transfers and dressing/ADL tasks.   OT Discharge Planning   OT Discharge Recommendation (DC Rec) Transitional Care Facility;home with home care occupational therapy   OT Rationale for DC Rec If [facility] is able to meet needs for transfers and total A for ADL's, he may return to [facility] with home care therapy. If [facility] is unable to meet level of assist and therapy needs he may require TCU short term.   OT Brief overview of current status Requires assistance for all transfers, dressing, mobility with w/c, and showers, and IADL's.   Total Evaluation Time (Minutes)   Total Evaluation Time (Minutes) 20   OT Goals   Therapy Frequency (OT) Daily   OT  Goals Upper Body Dressing;Lower Body Dressing;Hygiene/Grooming;Bed Mobility   OT: Hygiene/Grooming supervision/stand-by assist;from wheelchair   OT: Upper Body Dressing Minimal assist;from wheelchair   OT: Lower Body Dressing Moderate assist;from wheelchair;within precautions   OT: Bed Mobility Supervision/stand-by assist;within precautions

## 2022-08-07 ENCOUNTER — APPOINTMENT (OUTPATIENT)
Dept: OCCUPATIONAL THERAPY | Facility: HOSPITAL | Age: 52
DRG: 041 | End: 2022-08-07
Attending: SURGERY
Payer: MEDICARE

## 2022-08-07 LAB
ANION GAP SERPL CALCULATED.3IONS-SCNC: 7 MMOL/L (ref 5–18)
BUN SERPL-MCNC: 13 MG/DL (ref 8–22)
CALCIUM SERPL-MCNC: 8.8 MG/DL (ref 8.5–10.5)
CHLORIDE BLD-SCNC: 103 MMOL/L (ref 98–107)
CO2 SERPL-SCNC: 24 MMOL/L (ref 22–31)
CREAT SERPL-MCNC: 1.13 MG/DL (ref 0.7–1.3)
ERYTHROCYTE [DISTWIDTH] IN BLOOD BY AUTOMATED COUNT: 14.8 % (ref 10–15)
ERYTHROCYTE [DISTWIDTH] IN BLOOD BY AUTOMATED COUNT: 14.8 % (ref 10–15)
GFR SERPL CREATININE-BSD FRML MDRD: 78 ML/MIN/1.73M2
GLUCOSE BLD-MCNC: 167 MG/DL (ref 70–125)
GLUCOSE BLDC GLUCOMTR-MCNC: 170 MG/DL (ref 70–99)
GLUCOSE BLDC GLUCOMTR-MCNC: 179 MG/DL (ref 70–99)
GLUCOSE BLDC GLUCOMTR-MCNC: 186 MG/DL (ref 70–99)
GLUCOSE BLDC GLUCOMTR-MCNC: 194 MG/DL (ref 70–99)
GLUCOSE BLDC GLUCOMTR-MCNC: 237 MG/DL (ref 70–99)
HCT VFR BLD AUTO: 32.4 % (ref 40–53)
HCT VFR BLD AUTO: 33.2 % (ref 40–53)
HGB BLD-MCNC: 10.3 G/DL (ref 13.3–17.7)
HGB BLD-MCNC: 10.3 G/DL (ref 13.3–17.7)
MAGNESIUM SERPL-MCNC: 1.6 MG/DL (ref 1.8–2.6)
MCH RBC QN AUTO: 25.8 PG (ref 26.5–33)
MCH RBC QN AUTO: 26.3 PG (ref 26.5–33)
MCHC RBC AUTO-ENTMCNC: 31 G/DL (ref 31.5–36.5)
MCHC RBC AUTO-ENTMCNC: 31.8 G/DL (ref 31.5–36.5)
MCV RBC AUTO: 83 FL (ref 78–100)
MCV RBC AUTO: 83 FL (ref 78–100)
PHOSPHATE SERPL-MCNC: 2.9 MG/DL (ref 2.5–4.5)
PLATELET # BLD AUTO: 148 10E3/UL (ref 150–450)
PLATELET # BLD AUTO: 148 10E3/UL (ref 150–450)
POTASSIUM BLD-SCNC: 4.1 MMOL/L (ref 3.5–5)
RBC # BLD AUTO: 3.92 10E6/UL (ref 4.4–5.9)
RBC # BLD AUTO: 4 10E6/UL (ref 4.4–5.9)
SODIUM SERPL-SCNC: 134 MMOL/L (ref 136–145)
UFH PPP CHRO-ACNC: 0.25 IU/ML
UFH PPP CHRO-ACNC: <0.1 IU/ML
WBC # BLD AUTO: 7.9 10E3/UL (ref 4–11)
WBC # BLD AUTO: 8.1 10E3/UL (ref 4–11)

## 2022-08-07 PROCEDURE — 250N000013 HC RX MED GY IP 250 OP 250 PS 637: Performed by: INTERNAL MEDICINE

## 2022-08-07 PROCEDURE — 82310 ASSAY OF CALCIUM: CPT | Performed by: STUDENT IN AN ORGANIZED HEALTH CARE EDUCATION/TRAINING PROGRAM

## 2022-08-07 PROCEDURE — 84100 ASSAY OF PHOSPHORUS: CPT | Performed by: STUDENT IN AN ORGANIZED HEALTH CARE EDUCATION/TRAINING PROGRAM

## 2022-08-07 PROCEDURE — 99232 SBSQ HOSP IP/OBS MODERATE 35: CPT | Performed by: INTERNAL MEDICINE

## 2022-08-07 PROCEDURE — 97535 SELF CARE MNGMENT TRAINING: CPT | Mod: GO

## 2022-08-07 PROCEDURE — 97110 THERAPEUTIC EXERCISES: CPT | Mod: GO

## 2022-08-07 PROCEDURE — 120N000001 HC R&B MED SURG/OB

## 2022-08-07 PROCEDURE — 250N000011 HC RX IP 250 OP 636: Performed by: STUDENT IN AN ORGANIZED HEALTH CARE EDUCATION/TRAINING PROGRAM

## 2022-08-07 PROCEDURE — 83735 ASSAY OF MAGNESIUM: CPT | Performed by: STUDENT IN AN ORGANIZED HEALTH CARE EDUCATION/TRAINING PROGRAM

## 2022-08-07 PROCEDURE — 36415 COLL VENOUS BLD VENIPUNCTURE: CPT | Performed by: STUDENT IN AN ORGANIZED HEALTH CARE EDUCATION/TRAINING PROGRAM

## 2022-08-07 PROCEDURE — 250N000013 HC RX MED GY IP 250 OP 250 PS 637: Performed by: STUDENT IN AN ORGANIZED HEALTH CARE EDUCATION/TRAINING PROGRAM

## 2022-08-07 PROCEDURE — 85027 COMPLETE CBC AUTOMATED: CPT | Performed by: STUDENT IN AN ORGANIZED HEALTH CARE EDUCATION/TRAINING PROGRAM

## 2022-08-07 PROCEDURE — 85520 HEPARIN ASSAY: CPT | Performed by: STUDENT IN AN ORGANIZED HEALTH CARE EDUCATION/TRAINING PROGRAM

## 2022-08-07 RX ORDER — METHOCARBAMOL 500 MG/1
500 TABLET, FILM COATED ORAL 4 TIMES DAILY
Status: DISCONTINUED | OUTPATIENT
Start: 2022-08-07 | End: 2022-08-10 | Stop reason: HOSPADM

## 2022-08-07 RX ORDER — LOSARTAN POTASSIUM 25 MG/1
25 TABLET ORAL AT BEDTIME
Status: DISCONTINUED | OUTPATIENT
Start: 2022-08-07 | End: 2022-08-10 | Stop reason: HOSPADM

## 2022-08-07 RX ORDER — MAGNESIUM OXIDE 400 MG/1
400 TABLET ORAL 4 TIMES DAILY
Status: DISCONTINUED | OUTPATIENT
Start: 2022-08-07 | End: 2022-08-10 | Stop reason: HOSPADM

## 2022-08-07 RX ORDER — HEPARIN SODIUM 10000 [USP'U]/100ML
0-5000 INJECTION, SOLUTION INTRAVENOUS CONTINUOUS
Status: DISCONTINUED | OUTPATIENT
Start: 2022-08-07 | End: 2022-08-08

## 2022-08-07 RX ADMIN — HEPARIN SODIUM 850 UNITS/HR: 10000 INJECTION, SOLUTION INTRAVENOUS at 11:12

## 2022-08-07 RX ADMIN — TRAZODONE HYDROCHLORIDE 50 MG: 50 TABLET ORAL at 20:24

## 2022-08-07 RX ADMIN — GABAPENTIN 600 MG: 300 CAPSULE ORAL at 20:24

## 2022-08-07 RX ADMIN — FENOFIBRATE 160 MG: 160 TABLET, FILM COATED ORAL at 08:38

## 2022-08-07 RX ADMIN — HYDROMORPHONE HYDROCHLORIDE 0.2 MG: 0.2 INJECTION, SOLUTION INTRAMUSCULAR; INTRAVENOUS; SUBCUTANEOUS at 14:52

## 2022-08-07 RX ADMIN — LOSARTAN POTASSIUM 25 MG: 25 TABLET, FILM COATED ORAL at 20:24

## 2022-08-07 RX ADMIN — INSULIN ASPART 1 UNITS: 100 INJECTION, SOLUTION INTRAVENOUS; SUBCUTANEOUS at 08:36

## 2022-08-07 RX ADMIN — FERROUS SULFATE TAB 325 MG (65 MG ELEMENTAL FE) 325 MG: 325 (65 FE) TAB at 08:40

## 2022-08-07 RX ADMIN — GABAPENTIN 100 MG: 100 CAPSULE ORAL at 17:01

## 2022-08-07 RX ADMIN — METHOCARBAMOL 500 MG: 500 TABLET ORAL at 20:24

## 2022-08-07 RX ADMIN — HYDROMORPHONE HYDROCHLORIDE 0.2 MG: 0.2 INJECTION, SOLUTION INTRAMUSCULAR; INTRAVENOUS; SUBCUTANEOUS at 10:04

## 2022-08-07 RX ADMIN — METHOCARBAMOL 500 MG: 500 TABLET ORAL at 12:17

## 2022-08-07 RX ADMIN — PANTOPRAZOLE SODIUM 40 MG: 40 TABLET, DELAYED RELEASE ORAL at 08:38

## 2022-08-07 RX ADMIN — ARIPIPRAZOLE 2 MG: 2 TABLET ORAL at 08:38

## 2022-08-07 RX ADMIN — CLOPIDOGREL BISULFATE 75 MG: 75 TABLET ORAL at 08:40

## 2022-08-07 RX ADMIN — HEPARIN SODIUM 850 UNITS/HR: 10000 INJECTION, SOLUTION INTRAVENOUS at 19:02

## 2022-08-07 RX ADMIN — MIRTAZAPINE 7.5 MG: 7.5 TABLET, FILM COATED ORAL at 20:25

## 2022-08-07 RX ADMIN — DULOXETINE HYDROCHLORIDE 120 MG: 60 CAPSULE, DELAYED RELEASE PELLETS ORAL at 08:39

## 2022-08-07 RX ADMIN — Medication 400 MG: at 12:17

## 2022-08-07 RX ADMIN — Medication 400 MG: at 17:02

## 2022-08-07 RX ADMIN — OXYCODONE HYDROCHLORIDE 5 MG: 5 TABLET ORAL at 10:47

## 2022-08-07 RX ADMIN — INSULIN ASPART 3 UNITS: 100 INJECTION, SOLUTION INTRAVENOUS; SUBCUTANEOUS at 12:17

## 2022-08-07 RX ADMIN — Medication 2.5 MG: at 10:04

## 2022-08-07 RX ADMIN — METOPROLOL SUCCINATE 75 MG: 25 TABLET, EXTENDED RELEASE ORAL at 08:39

## 2022-08-07 RX ADMIN — METHOCARBAMOL 500 MG: 500 TABLET ORAL at 17:01

## 2022-08-07 RX ADMIN — ATORVASTATIN CALCIUM 80 MG: 40 TABLET, FILM COATED ORAL at 08:39

## 2022-08-07 RX ADMIN — INSULIN ASPART 1 UNITS: 100 INJECTION, SOLUTION INTRAVENOUS; SUBCUTANEOUS at 17:01

## 2022-08-07 RX ADMIN — GABAPENTIN 100 MG: 100 CAPSULE ORAL at 06:40

## 2022-08-07 RX ADMIN — HYDROMORPHONE HYDROCHLORIDE 0.2 MG: 0.2 INJECTION, SOLUTION INTRAMUSCULAR; INTRAVENOUS; SUBCUTANEOUS at 05:01

## 2022-08-07 RX ADMIN — Medication 400 MG: at 20:24

## 2022-08-07 ASSESSMENT — ACTIVITIES OF DAILY LIVING (ADL)
ADLS_ACUITY_SCORE: 37
ADLS_ACUITY_SCORE: 35
ADLS_ACUITY_SCORE: 37
ADLS_ACUITY_SCORE: 36
ADLS_ACUITY_SCORE: 37
ADLS_ACUITY_SCORE: 35

## 2022-08-07 NOTE — PLAN OF CARE
Problem: Pain Acute  Goal: Acceptable Pain Control and Functional Ability  Outcome: Ongoing, Progressing  Intervention: Prevent or Manage Pain  Recent Flowsheet Documentation  Taken 8/6/2022 1630 by Jin Christie RN  Medication Review/Management: medications reviewed     Problem: Mobility Impairment  Goal: Optimal Mobility  Outcome: Ongoing, Progressing   Goal Outcome Evaluation:        Pt alert and oriented x 4. Made his needs known. Vital signs wdl. C/o right leg pain. Prn oxycodone given. On RA. Right leg elevated.  Insulin given for elevated blood sugar. Incontinent for bowel and bladder. Had x 1 large incontinent brief. Bladder scan for 542. Straight cath for 500 ml. On low dose heparin infusion.

## 2022-08-07 NOTE — PLAN OF CARE
Goal Outcome Evaluation:  Plan of care reviewed with patient and his brother-He has very diminished breath sounds -using IS to 750cc. Tolerating diabetic diet-now covering carbs. Started scheduled robaxin-appears to be helping. Working with PT/OT-complicated by right sided weakness from previous CVA. Unable to void-straight cathed at 1400 for 625mls pratik urine.

## 2022-08-07 NOTE — PROGRESS NOTES
Progress Note    Assessment/Plan      Charcot ankle, right          S/p AMPUTATION, BELOW KNEE, right leg : on plavix, fibrates  --Postoperative day 2  --Heparin infusion started by vascular surgery  --PT OT and orthotics recs per vascular surgery  --Pain management as per vascular surgery       Stroke, h/o Right side weakness : on plavix       A fib h/o : on xarelto - currently holding.  Patient on IV heparin as per vascular surgery    --Hemoglobin is stable       CAD s/p stents and CABG : on plavix, fibrates, bb    Hypomagnesemia acute on chronic  -- Home dose of magnesium oxide were held by vascular surgery.  Magnesium   Date Value Ref Range Status   08/07/2022 1.6 (L) 1.8 - 2.6 mg/dL Final   --Restart home dose of magnesium oxide        Anxiety/Depression : on abilify 2 mg daily, cymbalta 120 mg daily, ativan 0.5 mg daily prn, remeron 7.5 mg at bedtime, trazodone       HTN, Essential : BP was initially low and therefore losartan held.  We will restart losartan on 8/7 as BP has been creeping up     continue perioperative beta-blocker with holding parameters       HLD :on fibrates       Anemia : Hemoglobin is stable       Peripheral neuropathy : on gabapentin 100 mg bid, 600 mg at bedtime       Non-insulin-dependent diabetes, suboptimally controlled: on glipizide 2.5 mg before BF, hold metformin in the hospital.  Continue sliding scale insulin.  Order NovoLog 1 unit for 15 g of carbohydrate with meals.      Barriers to discharge:    Anticipated discharge date:        Subjective  Patient with brother at bedside.  He is participating in physical therapy.  Patient's brother noted fever bleeding from the right stump.  Blood sugars are suboptimally controlled.  Hemoglobin is stable.  Magnesium is low.  We will restart patient's home dose of magnesium.  Currently on IV heparin as per vascular surgery.    Objective    BP (!) 143/67 (BP Location: Left arm)   Pulse 87   Temp 98.6  F (37  C) (Oral)   Resp 20   Wt 74.8  "kg (165 lb)   SpO2 92%   BMI 25.84 kg/m    Weight:   Wt Readings from Last 5 Encounters:   08/07/22 74.8 kg (165 lb)   08/02/22 71.2 kg (157 lb)   07/05/22 69.4 kg (153 lb)   06/14/22 69.7 kg (153 lb 9.6 oz)   06/07/22 69.4 kg (153 lb)       I/O last 3 completed shifts:  In: 2860 [P.O.:1100; I.V.:1760]  Out: 500 [Urine:500]  No intake/output data recorded.          Physical Exam  Alert, oriented*3  Right-sided inspiratory crackles  Right-sided lower extremity stump noted      Pertinent Labs  ----------------------  Recent Labs   Lab 08/07/22  0620 08/07/22  0241 08/06/22 2049 08/06/22  0834 08/06/22 0616 08/05/22 2038 08/05/22  1833   *  --   --   --   --   --   --    POTASSIUM 4.1  --   --   --  4.5  --  4.3   CO2 24  --   --   --   --   --   --    BUN 13  --   --   --   --   --   --    CR 1.13  --   --   --   --   --  1.11   MAG 1.6*  --   --   --   --   --   --    * 194* 186*   < >  --    < >  --     < > = values in this interval not displayed.     Recent Labs   Lab 08/07/22  1100 08/07/22  0620 08/06/22  1050   WBC 7.9 8.1 7.0   HGB 10.3* 10.3* 9.9*   HCT 32.4* 33.2* 31.2*   * 148* 135*     Recent Labs   Lab 08/06/22  0616   INR 1.12     Glucose Values Latest Ref Rng & Units 8/7/2022   Bedside Glucose (mg/dl )  - --   GLUCOSE 70 - 125 mg/dL 167(H)   Some recent data might be hidden         Pertinent Radiology   Radiology Results: Personally reviewed impression/s  POC US Guidance Needle Placement    Result Date: 8/5/2022  Ultrasound was performed as guidance to an anesthesia procedure.  Click \"PACS images\" hyperlink below to view any stored images.  For specific procedure details, view procedure note authored by anesthesia.    EKG Results: not reviewed.             "

## 2022-08-07 NOTE — CONSULTS
Care Management Initial Consult    General Information  Assessment completed with: VM-chart review, pt brother Macho    Primary Care Provider verified and updated as needed:     Readmission within the last 30 days:           Advance Care Planning:     In chart       Communication Assessment  Patient's communication style: spoken language (English or Bilingual)    Hearing Difficulty or Deaf: no   Wear Glasses or Blind: no    Cognitive  Cognitive/Neuro/Behavioral: WDL                      Living Environment:   People in home:     alone  Current living Arrangements:  [facility] recently, and [facility] LO is permanent home  Able to return to prior arrangements:  CM to contact DON at [facility] to determine ability to have pt return home.       Family/Social Support:  Care provided by:  staff  Provides care for:  No one                Description of Support System:  Supportive and involved       Current Resources:   Patient receiving home care services:  From [facility]     Community Resources:    Equipment currently used at home: grab bar, tub/shower, grab bar, toilet, hospital bed, raised toilet seat, shower chair, wheelchair, manual  Supplies currently used at home:      Employment/Financial:  Employment Status:   Disabled      Financial Concerns:   NA          Lifestyle & Psychosocial Needs:  Social Determinants of Health     Tobacco Use: Medium Risk     Smoking Tobacco Use: Former Smoker     Smokeless Tobacco Use: Never Used   Alcohol Use: Not on file   Financial Resource Strain: Not on file   Food Insecurity: Not on file   Transportation Needs: Not on file   Physical Activity: Not on file   Stress: Not on file   Social Connections: Not on file   Intimate Partner Violence: Not on file   Depression: Not on file   Housing Stability: Not on file       Functional Status:  Prior to admission patient needed assistance:              Mental Health Status: no current concerns          Chemical  Dependency Status: NA                Values/Beliefs:  Spiritual, Cultural Beliefs, [employer]ism Practices, Values that affect care:                 Additional Information:  SW completed assessment, [facility], NEELAM to check with [facility] to determine requirements for pt to return to his assisted living, or if needs more time at TCU. Pt mother is his guardian.    ALEXANDREA Henry

## 2022-08-07 NOTE — PROGRESS NOTES
VASCULAR SURGERY PROGRESS NOTE    Subjective:  No acute events overnight. Pain improved, tolerating diet, voiding. Worked with PT yesterday, no complaints today.     Objective:  Intake/Output Summary (Last 24 hours) at 8/6/2022 1019  Last data filed at 8/5/2022 1900  Gross per 24 hour   Intake 1040 ml   Output 75 ml   Net 965 ml     PHYSICAL EXAM:  BP (!) 143/67 (BP Location: Left arm)   Pulse 87   Temp 98.6  F (37  C) (Oral)   Resp 20   Wt 72.3 kg (159 lb 6.4 oz)   SpO2 92%   BMI 24.97 kg/m    General: The patient is alert and oriented. Appropriate. No acute distress  Psych: pleasant affect, answers questions appropriately  Skin: Color appropriate for race, warm, dry.  Respiratory: The patient does not require supplemental oxygen. Breathing unlabored  GI:  Abdomen soft, nontender to light palpation.  Extremities: s/p right BKA. Dressing changed, scant serosanguinous drainage noted on dressing. No active bleeding or hematoma noted, skin and wound c/d/i                  Imaging:   NA    ASSESSMENT:  50 yo male with PMH of A fib on xarelto, CAD and CABG on plavix (asa allergy), HTN, HLD, nueropathy, CVA, and charcot ankle right foot now s/p BKA on right, recovering well POD#1      PLAN:  Increase heparin to therapeutic dose  Dressing changed at bedside this AM- please leave in place and contact vascular surgery if becomes saturated  Keep knee immobilizer in place  PT/OT and orthotics today  Regular diet  Straight cath PRN  Continue PO and IV pain medication, Multi modal pain control  Continue care, appreciate input from medical team as well    Discussed pt history, exam, assessment and plan with Dr. Byrd of the vascular surgery service, who is in agreement with the above.    Kimi Jaimes DO  VASCULAR SURGERY FELLOW

## 2022-08-07 NOTE — PLAN OF CARE
Problem: Pain Acute  Goal: Acceptable Pain Control and Functional Ability  Intervention: Prevent or Manage Pain  Recent Flowsheet Documentation  Taken 8/7/2022 0127 by Cinthia Sánchez, RN  Medication Review/Management: medications reviewed   Goal Outcome Evaluation:  Pt had a good sleep this shift. C/o pain at the end of shift  and given PRN Dilaudid. Did not urinate until 05AM, done bladder scan and had 541 ml reading. Encouraged  to urinate in the urinal, Pt took atleast 3o mins before he urinated via urinal with 350 ml. Bladder is soft.

## 2022-08-08 ENCOUNTER — APPOINTMENT (OUTPATIENT)
Dept: PHYSICAL THERAPY | Facility: HOSPITAL | Age: 52
DRG: 041 | End: 2022-08-08
Attending: SURGERY
Payer: MEDICARE

## 2022-08-08 ENCOUNTER — APPOINTMENT (OUTPATIENT)
Dept: OCCUPATIONAL THERAPY | Facility: HOSPITAL | Age: 52
DRG: 041 | End: 2022-08-08
Attending: SURGERY
Payer: MEDICARE

## 2022-08-08 ENCOUNTER — DOCUMENTATION ONLY (OUTPATIENT)
Dept: ORTHOPEDICS | Facility: CLINIC | Age: 52
End: 2022-08-08

## 2022-08-08 LAB
GLUCOSE BLDC GLUCOMTR-MCNC: 154 MG/DL (ref 70–99)
GLUCOSE BLDC GLUCOMTR-MCNC: 166 MG/DL (ref 70–99)
GLUCOSE BLDC GLUCOMTR-MCNC: 184 MG/DL (ref 70–99)
GLUCOSE BLDC GLUCOMTR-MCNC: 212 MG/DL (ref 70–99)
GLUCOSE BLDC GLUCOMTR-MCNC: 218 MG/DL (ref 70–99)
HOLD SPECIMEN: NORMAL
PLATELET # BLD AUTO: 144 10E3/UL (ref 150–450)
UFH PPP CHRO-ACNC: <0.1 IU/ML

## 2022-08-08 PROCEDURE — 97530 THERAPEUTIC ACTIVITIES: CPT | Mod: GO

## 2022-08-08 PROCEDURE — 85049 AUTOMATED PLATELET COUNT: CPT | Performed by: STUDENT IN AN ORGANIZED HEALTH CARE EDUCATION/TRAINING PROGRAM

## 2022-08-08 PROCEDURE — 97530 THERAPEUTIC ACTIVITIES: CPT | Mod: GP

## 2022-08-08 PROCEDURE — 97542 WHEELCHAIR MNGMENT TRAINING: CPT | Mod: GP

## 2022-08-08 PROCEDURE — 85520 HEPARIN ASSAY: CPT | Performed by: SURGERY

## 2022-08-08 PROCEDURE — 250N000013 HC RX MED GY IP 250 OP 250 PS 637: Performed by: INTERNAL MEDICINE

## 2022-08-08 PROCEDURE — 120N000001 HC R&B MED SURG/OB

## 2022-08-08 PROCEDURE — 99232 SBSQ HOSP IP/OBS MODERATE 35: CPT | Performed by: HOSPITALIST

## 2022-08-08 PROCEDURE — 250N000013 HC RX MED GY IP 250 OP 250 PS 637: Performed by: STUDENT IN AN ORGANIZED HEALTH CARE EDUCATION/TRAINING PROGRAM

## 2022-08-08 PROCEDURE — 250N000011 HC RX IP 250 OP 636: Performed by: STUDENT IN AN ORGANIZED HEALTH CARE EDUCATION/TRAINING PROGRAM

## 2022-08-08 PROCEDURE — 36415 COLL VENOUS BLD VENIPUNCTURE: CPT | Performed by: STUDENT IN AN ORGANIZED HEALTH CARE EDUCATION/TRAINING PROGRAM

## 2022-08-08 PROCEDURE — 250N000013 HC RX MED GY IP 250 OP 250 PS 637: Performed by: PHYSICIAN ASSISTANT

## 2022-08-08 RX ADMIN — RIVAROXABAN 20 MG: 10 TABLET, FILM COATED ORAL at 17:37

## 2022-08-08 RX ADMIN — Medication 400 MG: at 12:15

## 2022-08-08 RX ADMIN — HYDROMORPHONE HYDROCHLORIDE 0.2 MG: 0.2 INJECTION, SOLUTION INTRAMUSCULAR; INTRAVENOUS; SUBCUTANEOUS at 08:33

## 2022-08-08 RX ADMIN — METHOCARBAMOL 500 MG: 500 TABLET ORAL at 08:37

## 2022-08-08 RX ADMIN — ARIPIPRAZOLE 2 MG: 2 TABLET ORAL at 08:38

## 2022-08-08 RX ADMIN — SENNOSIDES AND DOCUSATE SODIUM 1 TABLET: 50; 8.6 TABLET ORAL at 10:21

## 2022-08-08 RX ADMIN — GABAPENTIN 100 MG: 100 CAPSULE ORAL at 06:29

## 2022-08-08 RX ADMIN — TRAZODONE HYDROCHLORIDE 50 MG: 50 TABLET ORAL at 21:41

## 2022-08-08 RX ADMIN — CLOPIDOGREL BISULFATE 75 MG: 75 TABLET ORAL at 08:37

## 2022-08-08 RX ADMIN — METHOCARBAMOL 500 MG: 500 TABLET ORAL at 21:41

## 2022-08-08 RX ADMIN — ATORVASTATIN CALCIUM 80 MG: 40 TABLET, FILM COATED ORAL at 08:37

## 2022-08-08 RX ADMIN — GABAPENTIN 600 MG: 300 CAPSULE ORAL at 21:41

## 2022-08-08 RX ADMIN — OXYCODONE HYDROCHLORIDE 5 MG: 5 TABLET ORAL at 12:16

## 2022-08-08 RX ADMIN — METHOCARBAMOL 500 MG: 500 TABLET ORAL at 12:16

## 2022-08-08 RX ADMIN — MIRTAZAPINE 7.5 MG: 7.5 TABLET, FILM COATED ORAL at 21:41

## 2022-08-08 RX ADMIN — DULOXETINE HYDROCHLORIDE 120 MG: 60 CAPSULE, DELAYED RELEASE PELLETS ORAL at 08:39

## 2022-08-08 RX ADMIN — METOPROLOL SUCCINATE 75 MG: 25 TABLET, EXTENDED RELEASE ORAL at 08:36

## 2022-08-08 RX ADMIN — Medication 2.5 MG: at 08:38

## 2022-08-08 RX ADMIN — INSULIN ASPART 1 UNITS: 100 INJECTION, SOLUTION INTRAVENOUS; SUBCUTANEOUS at 08:32

## 2022-08-08 RX ADMIN — Medication 400 MG: at 21:41

## 2022-08-08 RX ADMIN — HYDROMORPHONE HYDROCHLORIDE 0.2 MG: 0.2 INJECTION, SOLUTION INTRAMUSCULAR; INTRAVENOUS; SUBCUTANEOUS at 01:52

## 2022-08-08 RX ADMIN — GABAPENTIN 100 MG: 100 CAPSULE ORAL at 17:37

## 2022-08-08 RX ADMIN — PANTOPRAZOLE SODIUM 40 MG: 40 TABLET, DELAYED RELEASE ORAL at 08:37

## 2022-08-08 RX ADMIN — Medication 400 MG: at 17:37

## 2022-08-08 RX ADMIN — CHOLESTYRAMINE POWDER FOR SUSPENSION 4 G: 4 POWDER, FOR SUSPENSION ORAL at 10:20

## 2022-08-08 RX ADMIN — FERROUS SULFATE TAB 325 MG (65 MG ELEMENTAL FE) 325 MG: 325 (65 FE) TAB at 08:38

## 2022-08-08 RX ADMIN — FENOFIBRATE 160 MG: 160 TABLET, FILM COATED ORAL at 08:38

## 2022-08-08 RX ADMIN — LOSARTAN POTASSIUM 25 MG: 25 TABLET, FILM COATED ORAL at 21:41

## 2022-08-08 RX ADMIN — INSULIN ASPART 1 UNITS: 100 INJECTION, SOLUTION INTRAVENOUS; SUBCUTANEOUS at 17:41

## 2022-08-08 RX ADMIN — INSULIN ASPART 2 UNITS: 100 INJECTION, SOLUTION INTRAVENOUS; SUBCUTANEOUS at 12:15

## 2022-08-08 RX ADMIN — METHOCARBAMOL 500 MG: 500 TABLET ORAL at 17:37

## 2022-08-08 RX ADMIN — Medication 400 MG: at 08:37

## 2022-08-08 ASSESSMENT — ACTIVITIES OF DAILY LIVING (ADL)
ADLS_ACUITY_SCORE: 36
ADLS_ACUITY_SCORE: 35
ADLS_ACUITY_SCORE: 36
ADLS_ACUITY_SCORE: 35
ADLS_ACUITY_SCORE: 36
ADLS_ACUITY_SCORE: 35

## 2022-08-08 NOTE — PROGRESS NOTES
VASCULAR SURGERY PROGRESS NOTE    Subjective:  Patient was seen and evaluated at the bedside for surgical follow up. Notes his limb is sore this morning. Worked with PT over the weekend. Continues on heparin gtt. No acute events overnight or new concerns.    Objective:  Intake/Output Summary (Last 24 hours) at 8/8/2022 0755  Last data filed at 8/7/2022 2231  Gross per 24 hour   Intake 1000 ml   Output 1705 ml   Net -705 ml     PHYSICAL EXAM:  /65 (BP Location: Left arm)   Pulse 86   Temp 98.4  F (36.9  C) (Oral)   Resp 18   Wt 74.8 kg (165 lb)   SpO2 92%   BMI 25.84 kg/m    General: patient is alert and oriented, no acute distress  Psych: pleasant affect, answers questions appropriately  Skin: color appropriate for race, warm, dry  Respiratory: normal respiratory effort   Extremities: BKA incision clean, dry, and intact with staples, minimal sero-sanguinous drainage, no surrounding erythema, warmth, or skin necrosis    ASSESSMENT:  52 YO male with past medical history significant for hypertension, hyperlipidemia, coronary artery diease s/p CABG, atrial fibrillation on xarelto, neuropathy, history of CVA, and charcot ankle right foot.     Presently POD #3 below knee amputation, recovering as expected    PLAN:  Dressing changed today, change daily and PRN with xeroform/petroleum gauze, ABD pad, kerlix, and ACE  Encourage use of knee immobilizer and leg elevation  Discontinue heparin gtt and resume home xarelto  Activity as tolerated, PT and OT following  Pain control PRN  Appreciate hospitalist input  Awaiting TCU/rehab placement for discharge    Discussed pt history, exam, assessment and plan with Dr. Montes of the vascular surgery service, who is in agreement with the above.    Lizzeth Figueroa PA-C  VASCULAR SURGERY

## 2022-08-08 NOTE — PLAN OF CARE
Problem: Pain Acute  Goal: Acceptable Pain Control and Functional Ability  Outcome: Ongoing, Progressing  Intervention: Develop Pain Management Plan  Recent Flowsheet Documentation  Taken 8/8/2022 0152 by Cinthia Sánchez RN  Pain Management Interventions: medication (see MAR)  Intervention: Prevent or Manage Pain  Recent Flowsheet Documentation  Taken 8/8/2022 0030 by Cinthia Sánchez, RN  Medication Review/Management: medications reviewed   Goal Outcome Evaluation:  Pt c/o pain to R BKA 10/10, that distracts his sleep. Pt was heard moaning and restless, given with PRN Hydromorphone, effective. Pt slept good after . R BKA is clean, dry and intact. Bladder scan at 05AM is 290ML, offered urinal and pt tried to urinate but he said he does not feel any fullness, no urine output until shift ends. Endorsed to AM nurse no urine output this shift.

## 2022-08-08 NOTE — PLAN OF CARE
Problem: Plan of Care - These are the overarching goals to be used throughout the patient stay.    Goal: Plan of Care Review/Shift Note  Description: The Plan of Care Review/Shift note should be completed every shift.  The Outcome Evaluation is a brief statement about your assessment that the patient is improving, declining, or no change.  This information will be displayed automatically on your shift note.  Outcome: Ongoing, Progressing   Goal Outcome Evaluation:      Reviewed plan of care with patient. His pain is a little better today with scheduled robaxin. He has voide with a PVR of 193. Paient up with PT in chair-needed assist of 1 to get up. Dressing changed by vascular and is clean and dry. Heparin stopped and will start xarelto tonight.

## 2022-08-08 NOTE — PROGRESS NOTES
Progress Note    Assessment/Plan      Charcot ankle, right          S/p AMPUTATION, BELOW KNEE, right leg : on plavix, fibrates  --Postoperative day 3  --Heparin infusion started by vascular surgery-->Transitioned to PTA Xarelto today  --PT OT and orthotics recs per vascular surgery  --Pain management as per vascular surgery       Stroke, h/o Right side weakness : On plavix       A fib h/o :   --Was started on heparin infusion -->Transitioned to PTA Xarelto today  --Hemoglobin is stable       CAD s/p stents and CABG : on Plavix, fibrates, losartan, metoprolol, Statins    Hypomagnesemia acute on chronic  --Home dose of magnesium oxide were held by vascular surgery.  --Restart home dose of magnesium oxide        Anxiety/Depression : on abilify 2 mg daily, cymbalta 120 mg daily, ativan 0.5 mg daily prn, remeron 7.5 mg at bedtime, trazodone 50mg QHS       HTN, Essential : BP was initially low and therefore losartan was held, now resumed with BP trending up.      continue perioperative beta-blocker with holding parameters       HLD :on fibrates       Anemia : Hemoglobin is stable       Peripheral neuropathy : on gabapentin 100 mg bid, 600 mg at bedtime       Non-insulin-dependent diabetes, suboptimally controlled: On glipizide 2.5 mg before BF, hold metformin in the hospital.  Continue sliding scale insulin.  Order NovoLog 1 unit for 15 g of carbohydrate with meals.      Barriers to discharge: Placement    Anticipated discharge date: 1-2 days likely, defer final disposition to primary team        Subjective  .  Patient is new to me. Chart reviewed and events noted.  Patient seen and examined.   States he is doing ok overall except for pain in RLE. States had dressing changed today. Worked with therapy earlier. Denies any chest pain, SOB, N/V. No acute events overnight.  .    Objective    /67 (BP Location: Left arm)   Pulse 90   Temp 97.9  F (36.6  C) (Oral)   Resp 18   Wt 74.8 kg (165 lb)   SpO2 97%   BMI  "25.84 kg/m    Weight:   Wt Readings from Last 5 Encounters:   08/07/22 74.8 kg (165 lb)   08/02/22 71.2 kg (157 lb)   07/05/22 69.4 kg (153 lb)   06/14/22 69.7 kg (153 lb 9.6 oz)   06/07/22 69.4 kg (153 lb)       I/O last 3 completed shifts:  In: 1000 [P.O.:1000]  Out: 1705 [Urine:1705]  No intake/output data recorded.          Physical Exam  General: Pleasant, NAD  HEENT:EOMI, AT,NC  CVS:RRR  RS:CTAB  Abd: Soft, NT,ND  Ext:R BKA, in clear dressing+  Neurology:Grossly normal  Psy:Approrpiate affect        Pertinent Labs  ----------------------  Recent Labs   Lab 08/08/22  0811 08/08/22  0144 08/07/22 2048 08/07/22 0821 08/07/22 0620 08/06/22  0834 08/06/22  0616 08/05/22 2038 08/05/22  1833   NA  --   --   --   --  134*  --   --   --   --    POTASSIUM  --   --   --   --  4.1  --  4.5  --  4.3   CO2  --   --   --   --  24  --   --   --   --    BUN  --   --   --   --  13  --   --   --   --    CR  --   --   --   --  1.13  --   --   --  1.11   MAG  --   --   --   --  1.6*  --   --   --   --    * 218* 186*   < > 167*   < >  --    < >  --     < > = values in this interval not displayed.     Recent Labs   Lab 08/08/22  0619 08/07/22  1100 08/07/22  0620 08/06/22  1050   WBC  --  7.9 8.1 7.0   HGB  --  10.3* 10.3* 9.9*   HCT  --  32.4* 33.2* 31.2*   * 148* 148* 135*     Recent Labs   Lab 08/06/22  0616   INR 1.12     Glucose Values Latest Ref Rng & Units 8/7/2022   Bedside Glucose (mg/dl )  - --   GLUCOSE 70 - 125 mg/dL 167(H)   Some recent data might be hidden         Pertinent Radiology   Radiology Results: Personally reviewed impression/s  POC US Guidance Needle Placement    Result Date: 8/5/2022  Ultrasound was performed as guidance to an anesthesia procedure.  Click \"PACS images\" hyperlink below to view any stored images.  For specific procedure details, view procedure note authored by anesthesia.    EKG Results: not reviewed.       Chacha Peace MD  Hospitalist      "

## 2022-08-08 NOTE — PLAN OF CARE
Problem: Pain Acute  Goal: Acceptable Pain Control and Functional Ability  Outcome: Ongoing, Progressing  Intervention: Prevent or Manage Pain  Recent Flowsheet Documentation  Taken 8/7/2022 1800 by Jin Chrisite RN  Medication Review/Management: medications reviewed     Problem: Mobility Impairment  Goal: Optimal Mobility  Outcome: Ongoing, Progressing   Goal Outcome Evaluation:  Pt alert and oriented x 4. Made his needs known. Vital signs wdl.C/o no pain. On RA. Right leg elevated.  Insulin given for elevated blood sugar. Incontinent for bowel and bladder. Had x 2 large incontinent brief. Bladder scan for 525 at 1030. Straight cath pt for 475. On low dose heparin infusion.

## 2022-08-08 NOTE — PROGRESS NOTES
S: Matias Lundberg was seen today at [facility], Room 420-01 for the Evaluation, Fit and Delivery of Limb Protector,  Socks & Donning Tube.     O: The patient is S/P a Right Transtibial Amputation with a distal circumference of 17.5 inches over bandages.    A: I fit and provided the patient with:  1 FloTech Right #1618 limb protector.  1 FloTech Belt - to suspend the limb protector.  4 Post-op thicker socks (cream color), again for use as a comfort interface between the patient s skin and the limb protector.    2 BK  Socks (tan color), Size 5, for use after all sutures/staples are removed.  Wear 23+ hours a day unless bathing.  One sock is to wash, the other is to wear.    1 BK  sock donning tube (clear plastic).    Please DO NOT THROW AWAY SOCKS.  Socks should be hand washed or washed in the gentle cycle, the air dried.  DO NOT PUT SOCKS IN DRYER.    P: The limb protector protects the patient s limb from mild impacts.  It also keeps the patient s knee extended to neutral, preventing a flexion contracture.  The post-op socks provide a comfortable barrier between the patient s skin and the limb protector and should be worn over bandages or the  socks.  The  socks are to be worn after all sutures/staples are removed and should be worn 23+ hours a day.  There  socks remove excess fluid in the limb and prepare the limb for prosthesis wear.  The donning tube can make the process of donning the  socks more effective and comfortable.    G: The patient will always wear the limb protector and comfort socks, when not bathing the limb, to prevent injury and a flexion contracture.    The  socks are to be worn after all sutures/staples are removed and should be worn 23+ hours a day in order to remove excess fluid in the limb and prepare the limb for prosthesis wear.    Please contact the [facility] Orthotics & Prosthetics Department ([phone]) if  you have any further questions.  Thank you.    This note was electronically signed by Elton Dimas CPO, LPO

## 2022-08-09 ENCOUNTER — APPOINTMENT (OUTPATIENT)
Dept: PHYSICAL THERAPY | Facility: HOSPITAL | Age: 52
DRG: 041 | End: 2022-08-09
Attending: SURGERY
Payer: MEDICARE

## 2022-08-09 ENCOUNTER — APPOINTMENT (OUTPATIENT)
Dept: OCCUPATIONAL THERAPY | Facility: HOSPITAL | Age: 52
DRG: 041 | End: 2022-08-09
Attending: SURGERY
Payer: MEDICARE

## 2022-08-09 LAB
GLUCOSE BLDC GLUCOMTR-MCNC: 120 MG/DL (ref 70–99)
GLUCOSE BLDC GLUCOMTR-MCNC: 144 MG/DL (ref 70–99)
GLUCOSE BLDC GLUCOMTR-MCNC: 166 MG/DL (ref 70–99)
SARS-COV-2 RNA RESP QL NAA+PROBE: NEGATIVE

## 2022-08-09 PROCEDURE — 250N000013 HC RX MED GY IP 250 OP 250 PS 637: Performed by: STUDENT IN AN ORGANIZED HEALTH CARE EDUCATION/TRAINING PROGRAM

## 2022-08-09 PROCEDURE — 99233 SBSQ HOSP IP/OBS HIGH 50: CPT | Performed by: HOSPITALIST

## 2022-08-09 PROCEDURE — 97542 WHEELCHAIR MNGMENT TRAINING: CPT | Mod: GP

## 2022-08-09 PROCEDURE — 97535 SELF CARE MNGMENT TRAINING: CPT | Mod: GO

## 2022-08-09 PROCEDURE — 250N000013 HC RX MED GY IP 250 OP 250 PS 637: Performed by: INTERNAL MEDICINE

## 2022-08-09 PROCEDURE — 120N000001 HC R&B MED SURG/OB

## 2022-08-09 PROCEDURE — 250N000013 HC RX MED GY IP 250 OP 250 PS 637: Performed by: PHYSICIAN ASSISTANT

## 2022-08-09 PROCEDURE — 97530 THERAPEUTIC ACTIVITIES: CPT | Mod: GP

## 2022-08-09 PROCEDURE — U0003 INFECTIOUS AGENT DETECTION BY NUCLEIC ACID (DNA OR RNA); SEVERE ACUTE RESPIRATORY SYNDROME CORONAVIRUS 2 (SARS-COV-2) (CORONAVIRUS DISEASE [COVID-19]), AMPLIFIED PROBE TECHNIQUE, MAKING USE OF HIGH THROUGHPUT TECHNOLOGIES AS DESCRIBED BY CMS-2020-01-R: HCPCS | Performed by: HOSPITALIST

## 2022-08-09 RX ADMIN — CLOPIDOGREL BISULFATE 75 MG: 75 TABLET ORAL at 11:33

## 2022-08-09 RX ADMIN — OXYCODONE HYDROCHLORIDE 5 MG: 5 TABLET ORAL at 11:32

## 2022-08-09 RX ADMIN — METHOCARBAMOL 500 MG: 500 TABLET ORAL at 13:30

## 2022-08-09 RX ADMIN — METOPROLOL SUCCINATE 75 MG: 25 TABLET, EXTENDED RELEASE ORAL at 08:32

## 2022-08-09 RX ADMIN — Medication 400 MG: at 13:30

## 2022-08-09 RX ADMIN — RIVAROXABAN 20 MG: 10 TABLET, FILM COATED ORAL at 16:42

## 2022-08-09 RX ADMIN — INSULIN ASPART 1 UNITS: 100 INJECTION, SOLUTION INTRAVENOUS; SUBCUTANEOUS at 11:34

## 2022-08-09 RX ADMIN — Medication 400 MG: at 16:42

## 2022-08-09 RX ADMIN — Medication 2.5 MG: at 08:33

## 2022-08-09 RX ADMIN — ARIPIPRAZOLE 2 MG: 2 TABLET ORAL at 08:33

## 2022-08-09 RX ADMIN — METHOCARBAMOL 500 MG: 500 TABLET ORAL at 16:42

## 2022-08-09 RX ADMIN — Medication 400 MG: at 08:32

## 2022-08-09 RX ADMIN — Medication 400 MG: at 20:18

## 2022-08-09 RX ADMIN — INSULIN ASPART 1 UNITS: 100 INJECTION, SOLUTION INTRAVENOUS; SUBCUTANEOUS at 08:33

## 2022-08-09 RX ADMIN — FERROUS SULFATE TAB 325 MG (65 MG ELEMENTAL FE) 325 MG: 325 (65 FE) TAB at 08:33

## 2022-08-09 RX ADMIN — OXYCODONE HYDROCHLORIDE 5 MG: 5 TABLET ORAL at 16:42

## 2022-08-09 RX ADMIN — MIRTAZAPINE 7.5 MG: 7.5 TABLET, FILM COATED ORAL at 20:18

## 2022-08-09 RX ADMIN — PANTOPRAZOLE SODIUM 40 MG: 40 TABLET, DELAYED RELEASE ORAL at 08:32

## 2022-08-09 RX ADMIN — LOSARTAN POTASSIUM 25 MG: 25 TABLET, FILM COATED ORAL at 20:18

## 2022-08-09 RX ADMIN — GABAPENTIN 100 MG: 100 CAPSULE ORAL at 06:40

## 2022-08-09 RX ADMIN — ATORVASTATIN CALCIUM 80 MG: 40 TABLET, FILM COATED ORAL at 08:33

## 2022-08-09 RX ADMIN — GABAPENTIN 600 MG: 300 CAPSULE ORAL at 20:18

## 2022-08-09 RX ADMIN — DULOXETINE HYDROCHLORIDE 120 MG: 60 CAPSULE, DELAYED RELEASE PELLETS ORAL at 08:32

## 2022-08-09 RX ADMIN — TRAZODONE HYDROCHLORIDE 50 MG: 50 TABLET ORAL at 20:18

## 2022-08-09 RX ADMIN — GABAPENTIN 100 MG: 100 CAPSULE ORAL at 16:42

## 2022-08-09 RX ADMIN — FENOFIBRATE 160 MG: 160 TABLET, FILM COATED ORAL at 08:33

## 2022-08-09 RX ADMIN — METHOCARBAMOL 500 MG: 500 TABLET ORAL at 08:32

## 2022-08-09 RX ADMIN — OXYCODONE HYDROCHLORIDE 5 MG: 5 TABLET ORAL at 06:40

## 2022-08-09 RX ADMIN — METHOCARBAMOL 500 MG: 500 TABLET ORAL at 20:18

## 2022-08-09 ASSESSMENT — ACTIVITIES OF DAILY LIVING (ADL)
ADLS_ACUITY_SCORE: 35

## 2022-08-09 NOTE — PLAN OF CARE
Goal Outcome Evaluation:    Plan of Care Reviewed With: patient, sibling     Overall Patient Progress: no change    Outcome Evaluation: Trying to figure out discharge plans.      Problem: Pain Acute  Goal: Acceptable Pain Control and Functional Ability  Outcome: Ongoing, Progressing  Intervention: Develop Pain Management Plan  Recent Flowsheet Documentation  Taken 8/9/2022 0800 by Deandra Soriano, RN  Pain Management Interventions: repositioned  Intervention: Prevent or Manage Pain  Recent Flowsheet Documentation  Taken 8/9/2022 0800 by Deandra Soriano, RN  Medication Review/Management: medications reviewed   Pain being managed well with robaxin and oxycodone.  Elevating leg on pillows and stump protector in place.        Problem: Mobility Impairment  Goal: Optimal Mobility  Outcome: Ongoing, Progressing  Intervention: Optimize Mobility  Recent Flowsheet Documentation  Taken 8/9/2022 0800 by Deandra Soriano, RN  Activity Management: activity adjusted per tolerance  Positioning/Transfer Devices: pillows   Worked well with therapy today; was able to stand and transfer.  Attempting to determine if he will end up back at TCU or his assisted living.  Assisted living was hesitant with dressing changes required.     Sister present at bedside and very supportive of patient and discharge planning.

## 2022-08-09 NOTE — PLAN OF CARE
Problem: Pain Acute  Goal: Acceptable Pain Control and Functional Ability  Outcome: Ongoing, Progressing     Problem: Mobility Impairment  Goal: Optimal Mobility  Outcome: Ongoing, Progressing  Intervention: Optimize Mobility  Recent Flowsheet Documentation  Taken 8/9/2022 0515 by Shilpa Odell RN  Positioning/Transfer Devices:   pillows   in use  Taken 8/9/2022 0115 by Shilpa Odell RN  Positioning/Transfer Devices:   pillows   in use   Goal Outcome Evaluation:      Pt slept between cares. Complain of headaches but refuse tx or medication.

## 2022-08-09 NOTE — PLAN OF CARE
Problem: Plan of Care - These are the overarching goals to be used throughout the patient stay.    Goal: Plan of Care Review/Shift Note  Outcome: Ongoing, Progressing  Goal: Patient-Specific Goal (Individualized)  Outcome: Ongoing, Progressing  Goal: Optimal Comfort and Wellbeing  Outcome: Ongoing, Progressing  Intervention: Monitor Pain and Promote Comfort  Recent Flowsheet Documentation  Taken 8/8/2022 1737 by Ana Riggs, RN  Pain Management Interventions: medication (see MAR)  Goal: Readiness for Transition of Care  Outcome: Ongoing, Progressing     Problem: Pain Acute  Goal: Acceptable Pain Control and Functional Ability  Outcome: Ongoing, Progressing  Intervention: Develop Pain Management Plan  Recent Flowsheet Documentation  Taken 8/8/2022 1737 by Ana Riggs, RN  Pain Management Interventions: medication (see MAR)  Intervention: Prevent or Manage Pain  Recent Flowsheet Documentation  Taken 8/8/2022 1737 by Ana Riggs RN  Medication Review/Management: medications reviewed   Goal Outcome Evaluation:    States scheduled Robaxin is helpful for Right leg spasms, elevated on pillows. Did need to be straight cath'd on shift after medium incontinent void, just over 400 mL out via cath. Still needs to have bowel movement.

## 2022-08-09 NOTE — PROGRESS NOTES
"Care Management Follow Up    Length of Stay (days): 4    Expected Discharge Date: 08/09/2022     Concerns to be Addressed: discharge planning       Patient plan of care discussed at interdisciplinary rounds: Yes    Anticipated Discharge Disposition: Transitional Care, Assisted Living, Home Care     Anticipated Discharge Services: Other (see comment) (therapy services)    Anticipated Discharge DME: None    Patient/family educated on Medicare website which has current facility and service quality ratings: yes    Education Provided on the Discharge Plan: yes    Patient/Family in Agreement with the Plan: no (pt wants home ([facility]) but his mother and legal guardian wants TCU)    Referrals Placed by CM/SW: Post Acute Facilities    Private pay costs discussed: Not applicable    Additional Information: NEELAM spoke with pt's mother (and legal guardian) Jewels regarding discharge planning.  She informed SW that pt has been at [facility] TCU/LT since December.  She talked about the \"long haul\" that it has been with pt's medical issues.  She stated that they thought pt had infection in his bone but he did not.  He had skin grafts.  No healing.  Pt had hyperbaric chamber treatment at [facility].  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  She stated that family members have divided up care for pt because all of this has been so complicated.  She reported that pt is on CADI waiver.  Reviewed recent therapy progress notes and she stated that pt transfers from bed to wheelchair all the time - has for months - lots of practice.  She stated that she does not feel that pt can return home to [facility] at this time as they are not equipped to meet pt's needs and [facility] is all set up for him and knows his current needs.  She prefers [facility] for pt transport at discharge.      NEELAM spoke with Mihaela from PT.  She is scheduled to see pt later today. She stated that per last PT's recommendation, " recommendation is for TCU if [facility] cannot do 24/7 care and supervision.  She will call SW back after she meets with pt.      SW received return message from Lauren at [facility] regarding pt.  Pt can return, but needs new negative Covid test.  Also, pt on unit per carb diet plan and needs to be changed to sliding scale before returning, as they do not do unit per carb plan at facility.  Hopefully, pt's pain is under control.      NEELAM received voicemail message from Catie CARBALLO in PT.  Therapy recommendations are TCU and home with home or outpatient therapy.  She stated that pt is adamant about going home.  Pt is an assist of one.  Pt is a contact guard assist for standing and parallel bars.  Pt transfers from bed to wheelchair.  Pt still gets tired pretty quick.         ALEXANDREA Toledo, QUINCY 08/09/22 8:45 AM

## 2022-08-09 NOTE — PROGRESS NOTES
Progress Note    Assessment/Plan      Charcot ankle, right          S/p AMPUTATION, BELOW KNEE, right leg : on plavix, fibrates  --Postoperative day 4  --Heparin infusion started by vascular surgery-->Transitioned to PTA Xarelto 8/8  --PT OT and orthotics recs per vascular surgery  --Pain management as per vascular surgery       Stroke, h/o Right side weakness : On plavix       A fib h/o :   --Was started on heparin infusion -->Transitioned to PTA Xarelto 8/8  --Hemoglobin is stable       CAD s/p stents and CABG : on Plavix, fibrates, losartan, metoprolol, Statins    Hypomagnesemia acute on chronic  --Resumed home magnesium oxide        Anxiety/Depression : on abilify 2 mg daily, cymbalta 120 mg daily, ativan 0.5 mg daily prn, remeron 7.5 mg at bedtime, trazodone 50mg QHS       HTN, Essential : BP was initially low and therefore losartan was held, now resumed with BP trending up.      continue perioperative beta-blocker with holding parameters       HLD :on fibrates       Anemia : Hemoglobin is stable       Peripheral neuropathy : on gabapentin 100 mg bid, 600 mg at bedtime       Non-insulin-dependent diabetes, suboptimally controlled: On glipizide 2.5 mg before BF, hold metformin in the hospital.  Continue sliding scale insulin.  Order NovoLog 1 unit for 15 g of carbohydrate with meals.      Barriers to discharge: TCU placement    Anticipated discharge date: 1-2 days likely, defer final disposition to primary team        Subjective  Chart reviewed and events noted.  Patient seen and examined.   States he is doing ok. Sitting up in bed. Family by bedside. Pain tolerable. No chest pain, SOB, N/V.  .    Objective    /58 (BP Location: Left arm)   Pulse 92   Temp 99  F (37.2  C) (Oral)   Resp 18   Wt 73.5 kg (162 lb 1.6 oz)   SpO2 97%   BMI 25.39 kg/m    Weight:   Wt Readings from Last 5 Encounters:   08/08/22 73.5 kg (162 lb 1.6 oz)   08/02/22 71.2 kg (157 lb)   07/05/22 69.4 kg (153 lb)   06/14/22 69.7 kg  "(153 lb 9.6 oz)   06/07/22 69.4 kg (153 lb)       I/O last 3 completed shifts:  In: 1220 [P.O.:1220]  Out: 650 [Urine:650]  I/O this shift:  In: 360 [P.O.:360]  Out: -           Physical Exam  General: Pleasant, NAD  HEENT:EOMI, AT,NC  CVS:RRR  RS:CTAB  Abd: Soft, NT,ND  Ext:R BKA, incision c/d/i. Please see picture attached in vascular note from today.  Neurology:Grossly normal  Psy:Approrpiate affect        Pertinent Labs  ----------------------  Recent Labs   Lab 08/09/22  1132 08/09/22  0800 08/08/22 2042 08/07/22  0821 08/07/22  0620 08/06/22  0834 08/06/22  0616 08/05/22 2038 08/05/22  1833   NA  --   --   --   --  134*  --   --   --   --    POTASSIUM  --   --   --   --  4.1  --  4.5  --  4.3   CO2  --   --   --   --  24  --   --   --   --    BUN  --   --   --   --  13  --   --   --   --    CR  --   --   --   --  1.13  --   --   --  1.11   MAG  --   --   --   --  1.6*  --   --   --   --    * 166* 184*   < > 167*   < >  --    < >  --     < > = values in this interval not displayed.     Recent Labs   Lab 08/08/22  0619 08/07/22  1100 08/07/22  0620 08/06/22  1050   WBC  --  7.9 8.1 7.0   HGB  --  10.3* 10.3* 9.9*   HCT  --  32.4* 33.2* 31.2*   * 148* 148* 135*     Recent Labs   Lab 08/06/22  0616   INR 1.12     Glucose Values Latest Ref Rng & Units 8/7/2022   Bedside Glucose (mg/dl )  - --   GLUCOSE 70 - 125 mg/dL 167(H)   Some recent data might be hidden         Pertinent Radiology   Radiology Results: Personally reviewed impression/s  POC US Guidance Needle Placement    Result Date: 8/5/2022  Ultrasound was performed as guidance to an anesthesia procedure.  Click \"PACS images\" hyperlink below to view any stored images.  For specific procedure details, view procedure note authored by anesthesia.    EKG Results: not reviewed.       Chacha Peace MD  Hospitalist      "

## 2022-08-09 NOTE — PROGRESS NOTES
VASCULAR SURGERY PROGRESS NOTE    Subjective:  No acute events overnight. Pain improving, tolerable. Started on xarelto yesterday without issue, no complaints. Working with PT, OT, also met with orthotics team yesterday. No complaints today.     Objective:  Intake/Output Summary (Last 24 hours) at 8/6/2022 1019  Last data filed at 8/5/2022 1900  Gross per 24 hour   Intake 1040 ml   Output 75 ml   Net 965 ml     PHYSICAL EXAM:  /56 (BP Location: Left arm)   Pulse 86   Temp 97.9  F (36.6  C) (Oral)   Resp 20   Wt 73.5 kg (162 lb 1.6 oz)   SpO2 94%   BMI 25.39 kg/m    General: The patient is alert and oriented. Appropriate. No acute distress  Psych: pleasant affect, answers questions appropriately  Skin: Color appropriate for race, warm, dry.  Respiratory: The patient does not require supplemental oxygen. Breathing unlabored  Extremities: s/p right BKA. Dressing changed, wound healing well. No active bleeding or hematoma noted, skin and wound c/d/i, staples in place, increased edema to stump today.                Imaging:   NA    ASSESSMENT:  50 yo male with PMH of A fib on xarelto, CAD and CABG on plavix (asa allergy), HTN, HLD, nueropathy, CVA, and charcot ankle right foot now s/p BKA on right, recovering well POD#1      PLAN:  Cont xarelto  Dressing changed at bedside this AM- will change PRN  Keep knee immobilizer in place  PT/OT   Regular diet  Straight cath PRN  Continue PO and IV pain medication, Multi modal pain control  Continue care, appreciate input from medical team as well    Kimi Jaimes DO  VASCULAR SURGERY FELLOW

## 2022-08-10 ENCOUNTER — APPOINTMENT (OUTPATIENT)
Dept: OCCUPATIONAL THERAPY | Facility: HOSPITAL | Age: 52
DRG: 041 | End: 2022-08-10
Attending: SURGERY
Payer: MEDICARE

## 2022-08-10 VITALS
HEART RATE: 87 BPM | RESPIRATION RATE: 20 BRPM | OXYGEN SATURATION: 95 % | TEMPERATURE: 98.6 F | DIASTOLIC BLOOD PRESSURE: 61 MMHG | BODY MASS INDEX: 25.39 KG/M2 | WEIGHT: 162.1 LBS | SYSTOLIC BLOOD PRESSURE: 108 MMHG

## 2022-08-10 DIAGNOSIS — Z89.511 STATUS POST BELOW-KNEE AMPUTATION OF RIGHT LOWER EXTREMITY (H): Primary | ICD-10-CM

## 2022-08-10 LAB
GLUCOSE BLDC GLUCOMTR-MCNC: 115 MG/DL (ref 70–99)
GLUCOSE BLDC GLUCOMTR-MCNC: 118 MG/DL (ref 70–99)

## 2022-08-10 PROCEDURE — 250N000013 HC RX MED GY IP 250 OP 250 PS 637: Performed by: INTERNAL MEDICINE

## 2022-08-10 PROCEDURE — 97535 SELF CARE MNGMENT TRAINING: CPT | Mod: GO

## 2022-08-10 PROCEDURE — 250N000013 HC RX MED GY IP 250 OP 250 PS 637: Performed by: STUDENT IN AN ORGANIZED HEALTH CARE EDUCATION/TRAINING PROGRAM

## 2022-08-10 RX ORDER — OXYCODONE HYDROCHLORIDE 5 MG/1
5 TABLET ORAL EVERY 6 HOURS PRN
Qty: 20 TABLET | Refills: 0 | Status: SHIPPED | OUTPATIENT
Start: 2022-08-10 | End: 2022-08-23

## 2022-08-10 RX ADMIN — FENOFIBRATE 160 MG: 160 TABLET, FILM COATED ORAL at 08:48

## 2022-08-10 RX ADMIN — FERROUS SULFATE TAB 325 MG (65 MG ELEMENTAL FE) 325 MG: 325 (65 FE) TAB at 08:49

## 2022-08-10 RX ADMIN — METHOCARBAMOL 500 MG: 500 TABLET ORAL at 08:49

## 2022-08-10 RX ADMIN — PANTOPRAZOLE SODIUM 40 MG: 40 TABLET, DELAYED RELEASE ORAL at 08:49

## 2022-08-10 RX ADMIN — ARIPIPRAZOLE 2 MG: 2 TABLET ORAL at 08:48

## 2022-08-10 RX ADMIN — CHOLESTYRAMINE POWDER FOR SUSPENSION 4 G: 4 POWDER, FOR SUSPENSION ORAL at 10:09

## 2022-08-10 RX ADMIN — GABAPENTIN 100 MG: 100 CAPSULE ORAL at 06:49

## 2022-08-10 RX ADMIN — METOPROLOL SUCCINATE 75 MG: 25 TABLET, EXTENDED RELEASE ORAL at 08:48

## 2022-08-10 RX ADMIN — DULOXETINE HYDROCHLORIDE 120 MG: 60 CAPSULE, DELAYED RELEASE PELLETS ORAL at 08:49

## 2022-08-10 RX ADMIN — ATORVASTATIN CALCIUM 80 MG: 40 TABLET, FILM COATED ORAL at 08:49

## 2022-08-10 RX ADMIN — CLOPIDOGREL BISULFATE 75 MG: 75 TABLET ORAL at 08:48

## 2022-08-10 RX ADMIN — Medication 2.5 MG: at 08:48

## 2022-08-10 RX ADMIN — OXYCODONE HYDROCHLORIDE 5 MG: 5 TABLET ORAL at 03:18

## 2022-08-10 RX ADMIN — Medication 400 MG: at 08:49

## 2022-08-10 RX ADMIN — ACETAMINOPHEN 650 MG: 325 TABLET ORAL at 06:49

## 2022-08-10 ASSESSMENT — ACTIVITIES OF DAILY LIVING (ADL)
ADLS_ACUITY_SCORE: 35

## 2022-08-10 NOTE — PLAN OF CARE
Problem: Plan of Care - These are the overarching goals to be used throughout the patient stay.    Goal: Plan of Care Review/Shift Note  Description: The Plan of Care Review/Shift note should be completed every shift.  The Outcome Evaluation is a brief statement about your assessment that the patient is improving, declining, or no change.  This information will be displayed automatically on your shift note.  Outcome: Ongoing, Progressing     Problem: Plan of Care - These are the overarching goals to be used throughout the patient stay.    Goal: Absence of Hospital-Acquired Illness or Injury  Intervention: Identify and Manage Fall Risk  Recent Flowsheet Documentation  Taken 8/9/2022 2335 by Susi Rider RN  Safety Promotion/Fall Prevention:   bed alarm on   clutter free environment maintained   fall prevention program maintained   increased rounding and observation   increase visualization of patient   nonskid shoes/slippers when out of bed   room door open   room organization consistent  Intervention: Prevent Skin Injury  Recent Flowsheet Documentation  Taken 8/9/2022 2335 by Susi Rider RN  Body Position:   supine, head elevated   supine, legs elevated  Intervention: Prevent and Manage VTE (Venous Thromboembolism) Risk  Recent Flowsheet Documentation  Taken 8/9/2022 2335 by Susi Rider RN  VTE Prevention/Management: SCDs (sequential compression devices) off  Activity Management: activity adjusted per tolerance     Problem: Pain Acute  Goal: Acceptable Pain Control and Functional Ability  Intervention: Prevent or Manage Pain  Recent Flowsheet Documentation  Taken 8/9/2022 2335 by Susi Rider RN  Medication Review/Management: medications reviewed     Problem: Mobility Impairment  Goal: Optimal Mobility  Intervention: Optimize Mobility  Recent Flowsheet Documentation  Taken 8/9/2022 2335 by Susi Rider RN  Activity Management: activity adjusted per tolerance  Positioning/Transfer  Devices: pillows   Goal Outcome Evaluation:    Pt utilizes call light appropriately and us able to verbalize needs effectively. Required PRN analgesia x1 which was effective. Cont to be inc. VSS. Free of s/sx of infection.

## 2022-08-10 NOTE — PLAN OF CARE
Problem: Plan of Care - These are the overarching goals to be used throughout the patient stay.    Goal: Plan of Care Review/Shift Note  Description: The Plan of Care Review/Shift note should be completed every shift.  The Outcome Evaluation is a brief statement about your assessment that the patient is improving, declining, or no change.  This information will be displayed automatically on your shift note.  Outcome: Ongoing, Progressing  Problem: Pain Acute  Goal: Acceptable Pain Control and Functional Ability  Outcome: Ongoing, Progressing  Intervention: Prevent or Manage Pain  Recent Flowsheet Documentation  Taken 8/10/2022 0855 by Nadira Baca RN  Medication Review/Management: medications reviewed-understands pain scale (0-10)  and medicate as per mar  Problem: Mobility Impairment  Goal: Optimal Mobility  Outcome: Ongoing, Progressing  Intervention: Optimize Mobility  Recent Flowsheet Documentation  Taken 8/10/2022 0855 by Nadira Baca RN  Activity Management: activity adjusted per tolerance-encourage to be up in chair        Flowsheets (Taken 8/10/2022 0913)  Plan of Care Reviewed With: patient   Goal Outcome Evaluation:to transfer to TCU for continued care  Problem: Plan of Care - These are the overarching goals to be used throughout the patient stay.    Goal: Absence of Hospital-Acquired Illness or Injury  Outcome: Ongoing, Progressing  Intervention: Identify and Manage Fall Risk  Recent Flowsheet Documentation  Taken 8/10/2022 0855 by Nadira Baca RN  Safety Promotion/Fall Prevention:   bed alarm on   assistive device/personal items within reach  Intervention: Prevent and Manage VTE (Venous Thromboembolism) Risk  Recent Flowsheet Documentation  Taken 8/10/2022 0855 by Nadira Baca RN  VTE Prevention/Management: SCDs (sequential compression devices) off  Activity Management: activity adjusted per tolerance       Plan of Care Reviewed With: patient

## 2022-08-10 NOTE — DISCHARGE SUMMARY
Vascular Surgery Discharge Summary    NAME: Cm Lundberg   MRN: [id number]   : 1970     DATE OF ADMISSION:   2022     PRE/POSTOPERATIVE DIAGNOSES:    Charcot ankle, right    PROCEDURES PERFORMED:  Amputation, below knee, right leg    INTRAOPERATIVE FINDINGS:   Healthy tissues with good bleeding at level of amputation.  Some degree of edema but no other signs of infection    POSTOPERATIVE COMPLICATIONS:   None     DATE OF DISCHARGE:    8/10/2022    HOSPITAL COURSE:   Cm Lundberg is a 51 year old male who on 2022 underwent the above-named procedures.  He tolerated the procedure well and postoperatively was transferred to the general post-surgical unit.  The remainder of his course was essentiallly uncomplicated.  Prior to discharge, his pain was controlled well with tylenol and oxycodone. He was able to perform ADLs, was evaluated by physical/occupational therapy, and had full return of bowel and bladder function.  On 8/10, he was discharged to skilled nursing in stable condition.    DISCHARGE INSTRUCTIONS:  Activity: Advance as tolerated with PT/OT    Diet: Moderate Consistent Carb Diet    Wound care: Daily dressing change to BKA, monitor for signs/symptoms of infection    FOLLOW UP APPOINTMENTS:    Return Visit 11:30 AM   Kim Montes MD  [facility] [address] [address] [phone]         DISCHARGE MEDICATIONS:     Review of your medicines      CONTINUE these medicines which may have CHANGED, or have new prescriptions. If we are uncertain of the size of tablets/capsules you have at home, strength may be listed as something that might have changed.      Dose / Directions   oxyCODONE 5 MG tablet  Commonly known as: ROXICODONE  This may have changed: See the new instructions.  Used for: Diabetic ulcer of right midfoot associated with type 2 diabetes mellitus, limited to breakdown of skin (H)      TAKE 1 TAB BY  MOUTH EVERY 6HOURS AS NEEDED FOR PAIN  Quantity: 28 tablet  Refills: 0        CONTINUE these medicines which have NOT CHANGED      Dose / Directions   acetaminophen 325 MG tablet  Commonly known as: TYLENOL      Dose: 650 mg  Take 650 mg by mouth every 4 hours as needed for mild pain  Refills: 0     Aimovig 140 MG/ML injection  Used for: Migraine  Generic drug: erenumab-aooe      Dose: 140 mg  Inject 1 mL (140 mg) Subcutaneous every 30 days  Quantity: 1 mL  Refills: 0     ARIPiprazole 2 MG tablet  Commonly known as: ABILIFY      Dose: 2 mg  2 mg daily  Refills: 0     Aspercreme Original 10 % external cream  Generic drug: trolamine salicylate      Apply topically 2 times daily as needed for other (back pain)  Refills: 0     atorvastatin 80 MG tablet  Commonly known as: LIPITOR      Dose: 80 mg  Take 80 mg by mouth daily  Refills: 0     bisacodyl 10 MG suppository  Commonly known as: DULCOLAX      Dose: 10 mg  Place 10 mg rectally daily as needed for constipation  Refills: 0     cholestyramine 4 g packet  Commonly known as: QUESTRAN      Dose: 1 packet  Take 1 packet by mouth every other day  Refills: 0     clopidogrel 75 MG tablet  Commonly known as: PLAVIX  Used for: PAD (peripheral artery disease) (H)      Dose: 75 mg  Take 1 tablet (75 mg) by mouth daily Start taking medication the day after the procedure.  Quantity: 90 tablet  Refills: 1     DULoxetine 60 MG capsule  Commonly known as: CYMBALTA      Dose: 120 mg  Take 120 mg by mouth daily  Refills: 0     fenofibrate micronized 200 MG capsule  Commonly known as: LOFIBRA      Dose: 200 mg  Take 200 mg by mouth every morning (before breakfast)  Refills: 0     ferrous sulfate 325 (65 Fe) MG tablet  Commonly known as: FEROSUL      Dose: 325 mg  Take 325 mg by mouth daily (with breakfast)  Refills: 0     * gabapentin 100 MG capsule  Commonly known as: NEURONTIN      Dose: 100 mg  Take 100 mg by mouth 2 times daily (before meals)  Refills: 0     * gabapentin 300 MG  capsule  Commonly known as: NEURONTIN      Dose: 600 mg  Take 600 mg by mouth At Bedtime  Refills: 0     glipiZIDE 5 MG tablet  Commonly known as: GLUCOTROL      Dose: 2.5 mg  Take 2.5 mg by mouth daily before breakfast  Refills: 0     loperamide 2 MG capsule  Commonly known as: IMODIUM      Dose: 2 mg  Take 2 mg by mouth 4 times daily as needed for diarrhea  Refills: 0     LORazepam 0.5 MG tablet  Commonly known as: ATIVAN      Dose: 0.5 mg  Take 0.5 mg by mouth daily as needed for anxiety  Refills: 0     losartan 25 MG tablet  Commonly known as: COZAAR      Dose: 25 mg  Take 25 mg by mouth At Bedtime  Refills: 0     magnesium oxide 400 MG Caps      Dose: 400 mg  Take 400 mg by mouth 4 times daily  Refills: 0     metFORMIN 500 MG tablet  Commonly known as: GLUCOPHAGE      Take by mouth 2 times daily (with meals) 1000 mg in the AM, 500 mg in the PM  Refills: 0     metoprolol succinate ER 25 MG 24 hr tablet  Commonly known as: TOPROL XL      Dose: 75 mg  Take 75 mg by mouth daily  Refills: 0     mineral oil-hydrophilic petrolatum external ointment      Apply topically 3 times daily as needed for irritation or dry skin Apply to forehead and other areas as needed  Refills: 0     mirtazapine 7.5 MG tablet  Commonly known as: REMERON      Dose: 7.5 mg  Take 7.5 mg by mouth At Bedtime  Refills: 0     NITROSTAT SL      Dose: 0.4 mg  Place 0.4 mg under the tongue every 5 minutes as needed for chest pain  Refills: 0     pantoprazole 40 MG EC tablet  Commonly known as: PROTONIX  Indication: Gastroesophageal Reflux Disease      Dose: 40 mg  Take 40 mg by mouth daily  Refills: 0     polyethylene glycol 17 GM/Dose powder  Commonly known as: MIRALAX      Dose: 1 packet  Take 1 packet by mouth daily as needed for constipation  Refills: 0     * senna-docusate 8.6-50 MG tablet  Commonly known as: SENOKOT-S/PERICOLACE      Dose: 1 tablet  Take 1 tablet by mouth 2 times daily as needed for constipation In addition to scheduled  doses  Refills: 0     * senna-docusate 8.6-50 MG tablet  Commonly known as: SENOKOT-S/PERICOLACE      Dose: 1 tablet  Take 1 tablet by mouth 2 times daily  Refills: 0     traZODone 50 MG tablet  Commonly known as: DESYREL      Dose: 50 mg  Take 50 mg by mouth At Bedtime  Refills: 0     Triple Antibiotic 3.5-400-5000 Oint ointment      Dose: 1 applicator  Externally apply 1 applicator topically 3 times daily as needed (wound care)  Refills: 0     vitamin C 500 MG tablet  Commonly known as: ASCORBIC ACID      Dose: 500 mg  Take 500 mg by mouth daily  Refills: 0     XARELTO ANTICOAGULANT 20 MG Tabs tablet  Generic drug: rivaroxaban ANTICOAGULANT      Dose: 20 mg  Take 20 mg by mouth daily (with dinner)  Refills: 0         * This list has 4 medication(s) that are the same as other medications prescribed for you. Read the directions carefully, and ask your doctor or other care provider to review them with you.              *MEDICATIONS INTENDED TO BE THE SAME AS PRIOR TO ADMISSION     Lizzeth Figueroa PA-C  VASCULAR SURGERY

## 2022-08-10 NOTE — PROGRESS NOTES
Care Management Follow Up    Length of Stay (days): 4    Expected Discharge Date: 08/10/2022     Concerns to be Addressed: discharge planning       Patient plan of care discussed at interdisciplinary rounds: Yes    Anticipated Discharge Disposition: Transitional Care     Anticipated Discharge Services: Other (see comment) (therapy services)  Anticipated Discharge DME: None    Patient/family educated on Medicare website which has current facility and service quality ratings: yes    Education Provided on the Discharge Plan: yes     Patient/Family in Agreement with the Plan: yes    Referrals Placed by CM/SW: Post Acute Facilities    Private pay costs discussed: transportation costs    Additional Information: NEELAM spoke with Lizeth RN at [facility], and pt's RN Deandra RASHID many times today regarding discharge planning.  Lizeth ([phone]) stated that she was covering for DON (who is out of office until next week).  She has never met pt.  She is a newer hire at facility.  She would like therapy notes faxed to her at [phone].  She asked about pt's wound care needs and if pt is able to do dressing changes himself.  She stated that she may have to come in person to assess pt and see if he can go back to intermediate.  Per pt's nurse Deandra, pt unable to do own dressing changes.  Lizeth also needs to know what kind of equipment needs, if any, pt will have.  Lizeth and Deandra spoke about pt needs.      NEELAM met with pt and pt's sister Marylin this afternoon.  Pt and Marylin stated that they are in agreement with pt returning to [facility] at discharge.  They would like to wait until DON at [facility] gets back next week and she can assess whether pt able to go to intermediate after a few days at TCU.  Marylin stated that Lizeth seems quite overwhelmed with trying to assess potential pt return and she doesn't know the pt.  Everything is already set up for pt at the TCU.  Marylin informed NEELAM that she would update Lizeth about  pt not coming back to snf at this time.  Discussed transportation options and costs, and pt and Marylin are okay with and request that SW set up [facility] ride for pt tomorrow.  Pt has own wheelchair and it is here at hospital with him.  Discussed potential ride time of 11 AM tomorrow morning.  Pt and Marylin in agreement with plan.      NEELAM set up ride for pt via [facility] at 11:00 AM on 8/10/22.      NEELAM requested that Dr Peace put in orders for new Covid test for pt (pt needs new negative Covid test within forty-eight hours of admission).      NEELAM later left voicemail message for Lauren at [facility] regarding pt returning tomorrow and ride time of 11 AM.  SW let her know that pt had new Covid test and it is negative.  Diet orders will change to sliding scale per her request.      NEELAM called vascular surgery and asked for the on call physician to call SW back regarding pt.  Noted that Dr Jaimes did not call SW back until 6:20 PM this evening.  Dr Jaimes stated that pt can discharge back to TCU tomorrow morning as planned for early discharge (ride time of 11 AM).        ALEXANDREA [place], QUINCY 08/10/22 5:49 PM

## 2022-08-10 NOTE — PLAN OF CARE
Physical Therapy Discharge Summary    Reason for therapy discharge:    Discharged to transitional care facility.    Progress towards therapy goal(s). See goals on Care Plan in [facility] electronic health record for goal details.  Not met.     Therapy recommendation(s):    Continued therapy is recommended.  Rationale/Recommendations:  Continued PT is recommended to improve mobility and strength .

## 2022-08-10 NOTE — PLAN OF CARE
Occupational Therapy Discharge Summary    Reason for therapy discharge:    Discharged to transitional care facility.    Progress towards therapy goal(s). See goals on Care Plan in [facility] electronic health record for goal details.  Goals partially met.  Barriers to achieving goals:   limited tolerance for therapy.    Therapy recommendation(s):    Continued therapy is recommended.  Rationale/Recommendations:  pt would benefit from TCU for further endurance and strength building..

## 2022-08-11 ENCOUNTER — TRANSITIONAL CARE UNIT VISIT (OUTPATIENT)
Dept: GERIATRICS | Facility: CLINIC | Age: 52
End: 2022-08-11
Payer: MEDICARE

## 2022-08-11 VITALS
OXYGEN SATURATION: 96 % | BODY MASS INDEX: 24.8 KG/M2 | SYSTOLIC BLOOD PRESSURE: 107 MMHG | DIASTOLIC BLOOD PRESSURE: 69 MMHG | HEIGHT: 67 IN | RESPIRATION RATE: 18 BRPM | WEIGHT: 158 LBS | HEART RATE: 82 BPM | TEMPERATURE: 98.7 F

## 2022-08-11 DIAGNOSIS — E11.69 TYPE 2 DIABETES MELLITUS WITH OTHER SPECIFIED COMPLICATION, WITHOUT LONG-TERM CURRENT USE OF INSULIN (H): ICD-10-CM

## 2022-08-11 DIAGNOSIS — I10 ESSENTIAL HYPERTENSION: ICD-10-CM

## 2022-08-11 DIAGNOSIS — R52 PAIN MANAGEMENT: ICD-10-CM

## 2022-08-11 DIAGNOSIS — Z89.511 STATUS POST BELOW-KNEE AMPUTATION OF RIGHT LOWER EXTREMITY (H): Primary | ICD-10-CM

## 2022-08-11 DIAGNOSIS — I48.91 ATRIAL FIBRILLATION, UNSPECIFIED TYPE (H): ICD-10-CM

## 2022-08-11 LAB
PATH REPORT.COMMENTS IMP SPEC: NORMAL
PATH REPORT.COMMENTS IMP SPEC: NORMAL
PATH REPORT.FINAL DX SPEC: NORMAL
PATH REPORT.GROSS SPEC: NORMAL
PATH REPORT.MICROSCOPIC SPEC OTHER STN: NORMAL
PATH REPORT.RELEVANT HX SPEC: NORMAL
PHOTO IMAGE: NORMAL

## 2022-08-11 PROCEDURE — 99305 1ST NF CARE MODERATE MDM 35: CPT | Performed by: FAMILY MEDICINE

## 2022-08-11 PROCEDURE — 88307 TISSUE EXAM BY PATHOLOGIST: CPT | Mod: 26 | Performed by: PATHOLOGY

## 2022-08-11 PROCEDURE — 88311 DECALCIFY TISSUE: CPT | Mod: 26 | Performed by: PATHOLOGY

## 2022-08-11 NOTE — LETTER
8/11/2022        RE: Cm Lundberg  [address]  [place] [address]            [facility]    [facility] Record Number:  [id number]  Place of Service where encounter took place: [facility] ([facility]) [[id number]]   CODE STATUS:   CPR/Full code      Chief Complaint/Reason for Visit:  Chief Complaint   Patient presents with     Hospital F/U     Re-admit to TCU after R BKA.        HPI:    Cm Lundberg is a 51 year old male with hx of CAD, s/p stents and CABG, HTN, DM, prior CVA, afib/flutter on xarelto, who has been residing in TCU at [facility] since Dec 2021 surgery due to elective procedure for chronic long standing right foot ulceration. He underwent incision and drainage of the right foot, excisional debridement of the ulceration of the right foot into the level of the muscle, under MAC with popliteal block anesthesia on 12/17/2021. He completed abx per ID recommendations. Since that time has been NWB with wound vac, had 1/27/2022 incision and drainage with application of theraskin. On 4/4/2022 again incision and drainage with theraskin.     Continued following with podiatry, wound is non healing, referred to wound and vascular due to non healing diabetic ulcer, right foot. S/p angioplasty RLE and hyperbaric oxygen therapy. MRI with indications of osteomyelitis. Following with podiatry and with vascular, evaluation and recommendation for R BKA, which he had on 8/5/2022. His hospital discharge summary is partially excerpted below.     Vascular Surgery Discharge Summary  DATE OF ADMISSION:   8/5/2022      PRE/POSTOPERATIVE DIAGNOSES:    Charcot ankle, right     PROCEDURES PERFORMED:  Amputation, below knee, right leg     INTRAOPERATIVE FINDINGS:   Healthy tissues with good bleeding at level of amputation.  Some degree of edema but no other signs of infection     POSTOPERATIVE COMPLICATIONS:   None      DATE OF DISCHARGE:     8/10/2022     HOSPITAL COURSE:   Cm Lundberg is a 51 year old male who on 8/5/2022 underwent the above-named procedures.  He tolerated the procedure well and postoperatively was transferred to the general post-surgical unit.  The remainder of his course was essentially uncomplicated.  Prior to discharge, his pain was controlled well with tylenol and oxycodone. He was able to perform ADLs, was evaluated by physical/occupational therapy, and had full return of bowel and bladder function.  On 8/10, he was discharged to skilled nursing in stable condition.     DISCHARGE INSTRUCTIONS:  Activity: Advance as tolerated with PT/OT     Diet: Moderate Consistent Carb Diet     Wound care: Daily dressing change to BKA, monitor for signs/symptoms of infection    Returned to TCU on 8/10/2022.       Today:  He has been experiencing a lot of pain, uncontrolled with current hospital order including prn tylenol and oxycodone 5 mg every 6 hours as needed. After discussion with nursing and with patient, Rx changed to 5-10 mg every 4 hours as needed, continue tylenol. Has a bandage on stump wound, stump protector. Some mild swelling in leg as expected. No bleeding or purulent drainage. No fever. He has otherwise been feeling okay. In pretty good sprits given the totality of his circumstances. He is anticoagulated with xarelto, takes meds for HTN, DM, CAD, has hx of prior stroke. Typically resides in [facility]. He has pretty good appetite. No nausea, vomiting, diarrhea or constipation. No urinary concerns. Has diabetes, on metformin and glipizide. Takes Aimovig once monthly for headaches, no acute concerns. Takes Abilify and duloxetine for depression, mood disorder. No new vision or hearing concerns.      REVIEW OF SYSTEMS:  All others negative other than those noted in HPI.      PAST MEDICAL HISTORY:  Past Medical History:   Diagnosis Date     Anemia      Anemia, unspecified type 08/19/2016     Anxiety      Anxiety state 07/29/2021     Formatting of this note might be different from the original. Created by Conversion  Replacement Utility updated for latest IMO load     Arthritis      Atrial fibrillation and flutter (H)      Cerebral artery occlusion with cerebral infarction (H)      Cerebral infarction (H)      Chest pain, unspecified type 08/19/2016     Chronic atrial fibrillation (H)      Congestive heart failure (H)      Coronary artery disease     CABG with multiple stents     Coronary artery disease involving native coronary artery without angina pectoris, unspecified whether native or transplanted heart 08/19/2016     Coronary atherosclerosis 07/29/2021    Formatting of this note might be different from the original. Created by Conversion  Replacement Utility updated for latest IMO load     Depression      Diabetic ulcer of right midfoot associated with type 2 diabetes mellitus, limited to breakdown of skin (H) 09/28/2017     Equinovarus acquired deformity, right 04/14/2021     Equinovarus deformity, acquired, right      Essential hypertension 08/19/2016     GERD (gastroesophageal reflux disease)      Heart attack (H)      History of blood transfusion      Hyperlipidemia      Hyperlipidemia, unspecified hyperlipidemia type 08/19/2016     Hypertension      Insomnia, unspecified type 08/19/2016     Ischemic cardiomyopathy      Late effects of cerebrovascular accident 08/19/2016     Major depressive disorder with single episode, remission status unspecified 08/19/2016     Microalbuminuria 07/29/2021    Formatting of this note might be different from the original. 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 Created by Conversion [facility] Annotation: Feb 11 2010  1:41PM - Leonid Cabral:  CANNOT READ OR  WRITE     PVD (peripheral vascular disease) (H)      Stented coronary artery      Type 2 diabetes mellitus (H) 2021    Formatting of this note might be different from the original. Created by Conversion     Type II or unspecified type diabetes mellitus with ophthalmic manifestations, not stated as uncontrolled(250.50) (H) 2021    Formatting of this note might be different from the original. Created by Conversion     Weakness 2016       PAST SURGICAL HISTORY:  Social History     Socioeconomic History     Marital status:      Spouse name: Not on file     Number of children: Not on file     Years of education: Not on file     Highest education level: Not on file   Occupational History     Not on file   Tobacco Use     Smoking status: Former Smoker     Quit date: 2015     Years since quittin.6     Smokeless tobacco: Never Used   Vaping Use     Vaping Use: Never used   Substance and Sexual Activity     Alcohol use: Not Currently     Drug use: Not Currently     Sexual activity: Not on file   Other Topics Concern     Parent/sibling w/ CABG, MI or angioplasty before 65F 55M? Not Asked   Social History Narrative     Not on file     Social Determinants of Health     Financial Resource Strain: Not on file   Food Insecurity: Not on file   Transportation Needs: Not on file   Physical Activity: Not on file   Stress: Not on file   Social Connections: Not on file   Intimate Partner Violence: Not on file   Housing Stability: Not on file       FAMILY HISTORY:  Family History   Adopted: Yes       SOCIAL HISTORY:  Social History     Socioeconomic History     Marital status:      Spouse name: Not on file     Number of children: Not on file     Years of education: Not on file     Highest education level: Not on file   Occupational History     Not on file   Tobacco Use     Smoking status: Former Smoker     Quit date: 2015     Years since quittin.6     Smokeless tobacco:  Never Used   Vaping Use     Vaping Use: Never used   Substance and Sexual Activity     Alcohol use: Not Currently     Drug use: Not Currently     Sexual activity: Not on file   Other Topics Concern     Parent/sibling w/ CABG, MI or angioplasty before 65F 55M? Not Asked   Social History Narrative     Not on file     Social Determinants of Health     Financial Resource Strain: Not on file   Food Insecurity: Not on file   Transportation Needs: Not on file   Physical Activity: Not on file   Stress: Not on file   Social Connections: Not on file   Intimate Partner Violence: Not on file   Housing Stability: Not on file       MEDICATIONS:  Current Outpatient Medications   Medication Sig Dispense Refill     pantoprazole (PROTONIX) 40 MG EC tablet Take 40 mg by mouth daily       acetaminophen (TYLENOL) 325 MG tablet Take 650 mg by mouth every 4 hours as needed for mild pain        ARIPiprazole (ABILIFY) 2 MG tablet 2 mg daily        ASPERCREME ORIGINAL 10 % external cream APPLY TO LOW BACK TWICE DAILY FOR LOW BACK PAIN       atorvastatin (LIPITOR) 80 MG tablet Take 80 mg by mouth daily        cholestyramine (QUESTRAN) 4 g packet Take 1 packet by mouth every other day        clopidogrel (PLAVIX) 75 MG tablet Take 1 tablet (75 mg) by mouth daily Start taking medication the day after the procedure. 90 tablet 1     DULoxetine (CYMBALTA) 60 MG capsule Take 120 mg by mouth daily takle 120mg       erenumab-aooe (AIMOVIG) 140 MG/ML injection Inject 1 mL (140 mg) Subcutaneous every 30 days 1 mL 0     fenofibrate micronized (LOFIBRA) 200 MG capsule Take 200 mg by mouth every morning (before breakfast)        ferrous sulfate (FEROSUL) 325 (65 Fe) MG tablet Take 325 mg by mouth daily (with breakfast)        gabapentin (NEURONTIN) 100 MG capsule TAKE 1 CAP BY MOUTH TWICE A DAY BEFORE MEALS       gabapentin (NEURONTIN) 300 MG capsule Take 600 mg by mouth At Bedtime       glipiZIDE (GLUCOTROL) 5 MG tablet TAKE 1/2 TABLET (2.5MG) BY MOUTH  DAILY DX DIABETES       loperamide (IMODIUM) 2 MG capsule Take 4 mg by mouth as needed for diarrhea        losartan (COZAAR) 25 MG tablet Take 25 mg by mouth At Bedtime        magnesium oxide 400 MG CAPS Take 400 mg by mouth 4 times daily       Menthol, Topical Analgesic, (BIOFREEZE ROLL-ON) 4 % GEL Apply 1 Application topically 2 times daily Apply to lower back for pain       metFORMIN (GLUCOPHAGE) 500 MG tablet TAKE 2 TABS (1000MG) BY MOUTH EVERY MORNING;TAKE 1 TAB BY MOUTH EVERY EVENING       metoprolol succinate ER (TOPROL-XL) 25 MG 24 hr tablet Take 75 mg by mouth daily        mirtazapine (REMERON) 7.5 MG tablet Take 7.5 mg by mouth At Bedtime        Neomycin-Bacitracin-Polymyxin (TRIPLE ANTIBIOTIC) 3.5-400-5000 OINT ointment Externally apply 1 applicator topically 3 times daily as needed       Nitroglycerin (NITROSTAT SL) Place 0.4 mg under the tongue every 5 minutes as needed for chest pain        oxyCODONE (ROXICODONE) 5 MG tablet TAKE 1 TAB BY MOUTH EVERY 6HOURS AS NEEDED FOR PAIN 28 tablet 0     polyethylene glycol (MIRALAX) 17 GM/Dose powder Take 1 packet by mouth daily as needed for constipation        senna-docusate (SENOKOT-S/PERICOLACE) 8.6-50 MG tablet Take 1 tablet by mouth 2 times daily as needed for constipation        traZODone (DESYREL) 50 MG tablet Take 50 mg by mouth At Bedtime        vitamin C (ASCORBIC ACID) 500 MG tablet Take 500 mg by mouth daily        XARELTO ANTICOAGULANT 20 MG TABS tablet TAKE 1 TAB BY MOUTH ONCE DAILY       ALLERGIES:  Allergies   Allergen Reactions     Aspirin Other (See Comments)     Reacts with migraine medicine     Blood-Group Specific Substance      Anti-Fya present.  Expect delays in blood for transfusion.  Draw 2 lavender and 1 red for all type and screen orders.     Isosorbide Headache     Codeine Rash     Morphine Rash       PHYSICAL EXAM:  General: Patient is alert male, no distress.   Vitals: /69   Pulse 82   Temp 98.7  F (37.1  C)   Resp 18   Ht  "1.702 m (5' 7\")   Wt 71.7 kg (158 lb)   SpO2 96%   BMI 24.75 kg/m    HEENT: Head is NCAT. Eyes show no injection or icterus. Nares negative. Oropharynx well hydrated.  Neck: Supple. No tenderness or adenopathy. No JVD.  Lungs: Clear bilaterally. No wheezes.  Cardiovascular: Regular rate and rhythm, normal S1, S2.  Back: No spinal or CVA tenderness.  Abdomen: Soft, no tenderness on exam. Bowel sounds present. No guarding rebound or rigidity.  : Deferred.  Extremities: Some edema distally on right leg amputation site.   Musculoskeletal:   Skin: Bandage right BKA.   Psych: Mood appears good.      LABS/DIAGNOSTIC DATA:  Component      Latest Ref Rng & Units 8/5/2022 8/7/2022              Creatinine      0.67 - 1.17 mg/dL 1.11 1.13   Sodium      136 - 145 mmol/L  134 (L)   Potassium      3.4 - 5.3 mmol/L     Urea Nitrogen      6.0 - 20.0 mg/dL     Chloride      98 - 107 mmol/L     Carbon Dioxide (CO2)      22 - 29 mmol/L     Anion Gap      7 - 15 mmol/L  7   Glucose      70 - 99 mg/dL     GFR Estimate      >60 mL/min/1.73m2 79 78   Calcium      8.6 - 10.0 mg/dL  8.8     Component      Latest Ref Rng & Units 8/5/2022 8/6/2022 8/6/2022 8/7/2022            6:16 AM 10:50 AM  6:20 AM   WBC      4.0 - 11.0 10e3/uL 6.3 5.9 7.0 8.1   RBC Count      4.40 - 5.90 10e6/uL 4.09 (L) 3.82 (L) 3.74 (L) 4.00 (L)   Hemoglobin      13.3 - 17.7 g/dL 10.6 (L) 10.1 (L) 9.9 (L) 10.3 (L)   Hematocrit      40.0 - 53.0 % 33.7 (L) 31.9 (L) 31.2 (L) 33.2 (L)   MCV      78 - 100 fL 82 84 83 83   MCH      26.5 - 33.0 pg 25.9 (L) 26.4 (L) 26.5 25.8 (L)   MCHC      31.5 - 36.5 g/dL 31.5 31.7 31.7 31.0 (L)   RDW      10.0 - 15.0 % 14.6 14.6 14.6 14.8   Platelet Count      150 - 450 10e3/uL 160 137 (L) 135 (L) 148 (L)     Component      Latest Ref Rng & Units 8/7/2022 8/8/2022          11:00 AM    WBC      4.0 - 11.0 10e3/uL 7.9    RBC Count      4.40 - 5.90 10e6/uL 3.92 (L)    Hemoglobin      13.3 - 17.7 g/dL 10.3 (L)    Hematocrit      40.0 - 53.0 " % 32.4 (L)    MCV      78 - 100 fL 83    MCH      26.5 - 33.0 pg 26.3 (L)    MCHC      31.5 - 36.5 g/dL 31.8    RDW      10.0 - 15.0 % 14.8    Platelet Count      150 - 450 10e3/uL 148 (L) 144 (L)         ASSESSMENT/PLAN:  1. S/p R BKA. Long standing nonhealing right foot diabetic ulcer and Charcot ankle. He is S/p procedures 12/27/2021, 1/27/2022 and 4/4/2022, s/p angioplasty RLE and hyperbaric oxygen treatment. MRI with indications of osteomyelitis. Ultimately R BKA on 8/5/2022. NWB. Bandage with wound cares, stump protector. Follow up with Dr. Montes on 9/8/2022.  2. Pain management. His pain is uncontrolled at this time. Changed oxycodone Rx to allow 5-10 mg every 4 hours. Continue scheduled tylenol.   3. HTN. He is on metoprolol and losartan. Monitor Bps in TCU.  4. Afib. On xarelto for anticoagulation. Adequate rate control.   5. Diabetes. On metformin and glipizide.  6. CAD. Hx of MIs. Hx of stenting and prior CABG. On statin, beta blocker, xarelto.  7. Anemia. Acute on chronic with ABLA from surgery. Last hgb 10.3 on 8/7/2022. Labs reviewed.   8. Mood disorder. Mixed depression and anxiety. On duloxetine, Abilify. Also takes Mirtazapine.   9. Hx of stroke. Continue statin, xarelto.  10. Mild intellectual disabilities. He resides in an [facility], but has been in the hospital or this TCU since Dec 2021 related to above issues.    11. Code status is full code.           Electronically signed by: Heidi Xie MD             Sincerely,        Heidi Xie MD

## 2022-08-15 ENCOUNTER — TRANSITIONAL CARE UNIT VISIT (OUTPATIENT)
Dept: GERIATRICS | Facility: CLINIC | Age: 52
End: 2022-08-15
Payer: MEDICARE

## 2022-08-15 ENCOUNTER — TELEPHONE (OUTPATIENT)
Dept: VASCULAR SURGERY | Facility: CLINIC | Age: 52
End: 2022-08-15

## 2022-08-15 DIAGNOSIS — R52 PAIN MANAGEMENT: ICD-10-CM

## 2022-08-15 DIAGNOSIS — Z89.511 STATUS POST BELOW-KNEE AMPUTATION OF RIGHT LOWER EXTREMITY (H): Primary | ICD-10-CM

## 2022-08-15 DIAGNOSIS — I48.0 PAROXYSMAL ATRIAL FIBRILLATION (H): ICD-10-CM

## 2022-08-15 DIAGNOSIS — I10 ESSENTIAL HYPERTENSION: ICD-10-CM

## 2022-08-15 PROCEDURE — 99309 SBSQ NF CARE MODERATE MDM 30: CPT | Performed by: FAMILY MEDICINE

## 2022-08-15 NOTE — TELEPHONE ENCOUNTER
Marisel with PT at [facility] would like clarification on ROM exercise and restrictions as patient had R RKA. Patient is currently wearing a hard limb protector, and she is wondering if that needs to stay on at all times or if it is OK to remove for knee ROM.   OK to : [phone]

## 2022-08-15 NOTE — LETTER
8/15/2022        RE: Cm Lundberg  [address]  [place] [address]            [facility]    [facility] Medical Record Number:  [id number]  Place of Service where encounter took place: [facility] ([facility]) [[id number]]   CODE STATUS:   CPR/Full code      Chief Complaint/Reason for Visit:  Chief Complaint   Patient presents with     RECHECK     TCU 8/15/2022. 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        TCU HPI:    Cm Lundberg is a 51 year old male with hx of CAD, s/p stents and CABG, HTN, DM, prior CVA, afib/flutter on xarelto, who has been residing in TCU at [facility] since Dec 2021 surgery due to elective procedure for chronic long standing right foot ulceration. He underwent incision and drainage of the right foot, excisional debridement of the ulceration of the right foot into the level of the muscle, under MAC with popliteal block anesthesia on 12/17/2021. He completed abx per ID recommendations. Since that time has been NWB with wound vac, had 1/27/2022 incision and drainage with application of theraskin. On 4/4/2022 again incision and drainage with theraskin.     Continued following with podiatry, wound is non healing, referred to wound and vascular due to non healing diabetic ulcer, right foot. S/p angioplasty RLE and hyperbaric oxygen therapy. MRI with indications of osteomyelitis. Following with podiatry and with vascular, evaluation and recommendation for R BKA, which he had on 8/5/2022. His hospital discharge summary is partially excerpted below.     Vascular Surgery Discharge Summary  DATE OF ADMISSION:   8/5/2022      PRE/POSTOPERATIVE DIAGNOSES:    Charcot ankle, right     PROCEDURES PERFORMED:  Amputation, below knee, right leg     INTRAOPERATIVE FINDINGS:   Healthy tissues with good bleeding at level of amputation.  Some degree of edema but no other signs of infection      POSTOPERATIVE COMPLICATIONS:   None      DATE OF DISCHARGE:    8/10/2022     HOSPITAL COURSE:   Cm Lundberg is a 51 year old male who on 8/5/2022 underwent the above-named procedures.  He tolerated the procedure well and postoperatively was transferred to the general post-surgical unit.  The remainder of his course was essentially uncomplicated.  Prior to discharge, his pain was controlled well with tylenol and oxycodone. He was able to perform ADLs, was evaluated by physical/occupational therapy, and had full return of bowel and bladder function.  On 8/10, he was discharged to skilled nursing in stable condition.     DISCHARGE INSTRUCTIONS:  Activity: Advance as tolerated with PT/OT     Diet: Moderate Consistent Carb Diet     Wound care: Daily dressing change to BKA, monitor for signs/symptoms of infection    Returned to TCU on 8/10/2022.       Today:  Last visit, pain meds increased due to uncontrolled pain. Better today. Has limb/stump protector and bandage to wound from R BKA. No bleeding or drainage. Has staples in place. No fever. He is anticoagulated with xarelto, takes meds for HTN, DM, CAD, has hx of prior stroke. He has good appetite. No nausea, vomiting, diarrhea or constipation. No urinary concerns. Has diabetes, on metformin and glipizide. Takes Aimovig once monthly for headaches, no acute concerns. Takes Abilify and duloxetine for depression, mood disorder.       PAST MEDICAL HISTORY:  Past Medical History:   Diagnosis Date     Anemia      Anemia, unspecified type 08/19/2016     Anxiety      Anxiety state 07/29/2021    Formatting of this note might be different from the original. Created by Conversion  Replacement Utility updated for latest IMO load     Arthritis      Atrial fibrillation and flutter (H)      Cerebral artery occlusion with cerebral infarction (H)      Cerebral infarction (H)      Chest pain, unspecified type 08/19/2016     Chronic atrial fibrillation (H)      Congestive heart  failure (H)      Coronary artery disease     CABG with multiple stents     Coronary artery disease involving native coronary artery without angina pectoris, unspecified whether native or transplanted heart 08/19/2016     Coronary atherosclerosis 07/29/2021    Formatting of this note might be different from the original. Created by Conversion  Replacement Utility updated for latest IMO load     Depression      Diabetic ulcer of right midfoot associated with type 2 diabetes mellitus, limited to breakdown of skin (H) 09/28/2017     Equinovarus acquired deformity, right 04/14/2021     Equinovarus deformity, acquired, right      Essential hypertension 08/19/2016     GERD (gastroesophageal reflux disease)      Heart attack (H)      History of blood transfusion      Hyperlipidemia      Hyperlipidemia, unspecified hyperlipidemia type 08/19/2016     Hypertension      Insomnia, unspecified type 08/19/2016     Ischemic cardiomyopathy      Late effects of cerebrovascular accident 08/19/2016     Major depressive disorder with single episode, remission status unspecified 08/19/2016     Microalbuminuria 07/29/2021    Formatting of this note might be different from the original. 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 Created by Conversion [facility] Annotation: Feb 11 2010  1:41PM - Leonid Cabral: CANNOT READ OR  WRITE     PVD (peripheral vascular disease) (H)      Stented coronary artery      Type 2 diabetes mellitus (H) 07/29/2021    Formatting of this note might be different from the original. Created by Conversion     Type II or unspecified type diabetes mellitus with ophthalmic manifestations, not stated as uncontrolled(250.50) (H) 07/29/2021    Formatting of this  note might be different from the original. Created by Conversion     Weakness 08/19/2016       MEDICATIONS:  Current Outpatient Medications   Medication Sig Dispense Refill     pantoprazole (PROTONIX) 40 MG EC tablet Take 40 mg by mouth daily       acetaminophen (TYLENOL) 325 MG tablet Take 650 mg by mouth every 4 hours as needed for mild pain        ARIPiprazole (ABILIFY) 2 MG tablet 2 mg daily        ASPERCREME ORIGINAL 10 % external cream APPLY TO LOW BACK TWICE DAILY FOR LOW BACK PAIN       atorvastatin (LIPITOR) 80 MG tablet Take 80 mg by mouth daily        cholestyramine (QUESTRAN) 4 g packet Take 1 packet by mouth every other day        clopidogrel (PLAVIX) 75 MG tablet Take 1 tablet (75 mg) by mouth daily Start taking medication the day after the procedure. 90 tablet 1     DULoxetine (CYMBALTA) 60 MG capsule Take 120 mg by mouth daily takle 120mg       erenumab-aooe (AIMOVIG) 140 MG/ML injection Inject 1 mL (140 mg) Subcutaneous every 30 days 1 mL 0     fenofibrate micronized (LOFIBRA) 200 MG capsule Take 200 mg by mouth every morning (before breakfast)        ferrous sulfate (FEROSUL) 325 (65 Fe) MG tablet Take 325 mg by mouth daily (with breakfast)        gabapentin (NEURONTIN) 100 MG capsule TAKE 1 CAP BY MOUTH TWICE A DAY BEFORE MEALS       gabapentin (NEURONTIN) 300 MG capsule Take 600 mg by mouth At Bedtime       glipiZIDE (GLUCOTROL) 5 MG tablet TAKE 1/2 TABLET (2.5MG) BY MOUTH DAILY DX DIABETES       loperamide (IMODIUM) 2 MG capsule Take 4 mg by mouth as needed for diarrhea        losartan (COZAAR) 25 MG tablet Take 25 mg by mouth At Bedtime        magnesium oxide 400 MG CAPS Take 400 mg by mouth 4 times daily       Menthol, Topical Analgesic, (BIOFREEZE ROLL-ON) 4 % GEL Apply 1 Application topically 2 times daily Apply to lower back for pain       metFORMIN (GLUCOPHAGE) 500 MG tablet TAKE 2 TABS (1000MG) BY MOUTH EVERY MORNING;TAKE 1 TAB BY MOUTH EVERY EVENING       metoprolol succinate ER  "(TOPROL-XL) 25 MG 24 hr tablet Take 75 mg by mouth daily        mirtazapine (REMERON) 7.5 MG tablet Take 7.5 mg by mouth At Bedtime        Neomycin-Bacitracin-Polymyxin (TRIPLE ANTIBIOTIC) 3.5-400-5000 OINT ointment Externally apply 1 applicator topically 3 times daily as needed       Nitroglycerin (NITROSTAT SL) Place 0.4 mg under the tongue every 5 minutes as needed for chest pain        oxyCODONE (ROXICODONE) 5 MG tablet TAKE 1 TAB BY MOUTH EVERY 6HOURS AS NEEDED FOR PAIN 28 tablet 0     polyethylene glycol (MIRALAX) 17 GM/Dose powder Take 1 packet by mouth daily as needed for constipation        senna-docusate (SENOKOT-S/PERICOLACE) 8.6-50 MG tablet Take 1 tablet by mouth 2 times daily as needed for constipation        traZODone (DESYREL) 50 MG tablet Take 50 mg by mouth At Bedtime        vitamin C (ASCORBIC ACID) 500 MG tablet Take 500 mg by mouth daily        XARELTO ANTICOAGULANT 20 MG TABS tablet TAKE 1 TAB BY MOUTH ONCE DAILY         PHYSICAL EXAM:  General: Patient is alert male, no distress.   Vitals: /76   Pulse 69   Temp 98.7  F (37.1  C)   Resp 18   Ht 1.702 m (5' 7\")   Wt 70.9 kg (156 lb 3.2 oz)   SpO2 98%   BMI 24.46 kg/m    HEENT: Head is NCAT. Eyes show no injection or icterus. Nares negative. Oropharynx moist.  Neck: No JVD.  Lungs: Non labored respirations.  Abdomen: Soft  : Deferred.  Extremities: Mild edema on right.  Musculoskeletal: Limb protector right.   Skin: Bandage right BKA.   Psych: Mood appears good.      LABS/DIAGNOSTIC DATA:  Component      Latest Ref Rng & Units 8/5/2022 8/7/2022              Creatinine      0.67 - 1.17 mg/dL 1.11 1.13   Sodium      136 - 145 mmol/L  134 (L)   Potassium      3.4 - 5.3 mmol/L     Urea Nitrogen      6.0 - 20.0 mg/dL     Chloride      98 - 107 mmol/L     Carbon Dioxide (CO2)      22 - 29 mmol/L     Anion Gap      7 - 15 mmol/L  7   Glucose      70 - 99 mg/dL     GFR Estimate      >60 mL/min/1.73m2 79 78   Calcium      8.6 - 10.0 mg/dL  " 8.8     Component      Latest Ref Rng & Units 8/5/2022 8/6/2022 8/6/2022 8/7/2022            6:16 AM 10:50 AM  6:20 AM   WBC      4.0 - 11.0 10e3/uL 6.3 5.9 7.0 8.1   RBC Count      4.40 - 5.90 10e6/uL 4.09 (L) 3.82 (L) 3.74 (L) 4.00 (L)   Hemoglobin      13.3 - 17.7 g/dL 10.6 (L) 10.1 (L) 9.9 (L) 10.3 (L)   Hematocrit      40.0 - 53.0 % 33.7 (L) 31.9 (L) 31.2 (L) 33.2 (L)   MCV      78 - 100 fL 82 84 83 83   MCH      26.5 - 33.0 pg 25.9 (L) 26.4 (L) 26.5 25.8 (L)   MCHC      31.5 - 36.5 g/dL 31.5 31.7 31.7 31.0 (L)   RDW      10.0 - 15.0 % 14.6 14.6 14.6 14.8   Platelet Count      150 - 450 10e3/uL 160 137 (L) 135 (L) 148 (L)     Component      Latest Ref Rng & Units 8/7/2022 8/8/2022          11:00 AM    WBC      4.0 - 11.0 10e3/uL 7.9    RBC Count      4.40 - 5.90 10e6/uL 3.92 (L)    Hemoglobin      13.3 - 17.7 g/dL 10.3 (L)    Hematocrit      40.0 - 53.0 % 32.4 (L)    MCV      78 - 100 fL 83    MCH      26.5 - 33.0 pg 26.3 (L)    MCHC      31.5 - 36.5 g/dL 31.8    RDW      10.0 - 15.0 % 14.8    Platelet Count      150 - 450 10e3/uL 148 (L) 144 (L)         ASSESSMENT/PLAN:  1. S/p R BKA. Long standing nonhealing right foot diabetic ulcer and Charcot ankle. He is S/p procedures 12/27/2021, 1/27/2022 and 4/4/2022, s/p angioplasty RLE and hyperbaric oxygen treatment. MRI with indications of osteomyelitis. Ultimately R BKA on 8/5/2022. NWB. Bandage with wound cares, stump protector. Follow up with Dr. Montes on 9/8/2022.  2. Pain management. Meds adjusted last visit. Much improved control. Continue as current for now, discussed tapering of opioids.   3. HTN. He is on metoprolol and losartan. Monitor Bps in TCU.  4. Afib. On xarelto for anticoagulation. Adequate rate control.   5. Diabetes. On metformin and glipizide.  6. CAD. Hx of MIs. Hx of stenting and prior CABG. On statin, beta blocker, xarelto. No chest pain, shortness of breath or hypoxia.   7. Anemia. Acute on chronic with ABLA from surgery. Last hgb 10.3 on  8/7/2022.    8. Mood disorder. Mixed depression and anxiety. On duloxetine, Abilify. Also takes Mirtazapine.   9. Hx of stroke. Continue statin, xarelto.          Electronically signed by: Heidi Xie MD             Sincerely,        Heidi Xie MD

## 2022-08-15 NOTE — TELEPHONE ENCOUNTER
LM for therapy department that okay to remove limb protector during ROM but needs to be on during transfers. Call back with questions.

## 2022-08-16 ENCOUNTER — TELEPHONE (OUTPATIENT)
Dept: VASCULAR SURGERY | Facility: CLINIC | Age: 52
End: 2022-08-16

## 2022-08-16 VITALS
SYSTOLIC BLOOD PRESSURE: 122 MMHG | DIASTOLIC BLOOD PRESSURE: 76 MMHG | TEMPERATURE: 98.7 F | HEIGHT: 67 IN | HEART RATE: 69 BPM | WEIGHT: 156.2 LBS | OXYGEN SATURATION: 98 % | RESPIRATION RATE: 18 BRPM | BODY MASS INDEX: 24.52 KG/M2

## 2022-08-16 NOTE — TELEPHONE ENCOUNTER
Is it okay for dressing changes to be decreased to 3x/day vs daily d/t assisted living is not able accommodate daily wound care dressings.  This is what is prohibiting him from going home.      [phone] direct to Sol, okay to leave a detailed message.

## 2022-08-18 ENCOUNTER — TRANSITIONAL CARE UNIT VISIT (OUTPATIENT)
Dept: GERIATRICS | Facility: CLINIC | Age: 52
End: 2022-08-18
Payer: MEDICARE

## 2022-08-18 VITALS
DIASTOLIC BLOOD PRESSURE: 73 MMHG | HEART RATE: 73 BPM | TEMPERATURE: 98.7 F | BODY MASS INDEX: 24.52 KG/M2 | OXYGEN SATURATION: 97 % | SYSTOLIC BLOOD PRESSURE: 123 MMHG | WEIGHT: 156.2 LBS | RESPIRATION RATE: 18 BRPM | HEIGHT: 67 IN

## 2022-08-18 DIAGNOSIS — Z89.511 STATUS POST BELOW-KNEE AMPUTATION OF RIGHT LOWER EXTREMITY (H): Primary | ICD-10-CM

## 2022-08-18 DIAGNOSIS — R52 PAIN MANAGEMENT: ICD-10-CM

## 2022-08-18 DIAGNOSIS — I10 ESSENTIAL HYPERTENSION: ICD-10-CM

## 2022-08-18 DIAGNOSIS — I48.0 PAROXYSMAL ATRIAL FIBRILLATION (H): ICD-10-CM

## 2022-08-18 PROCEDURE — 99309 SBSQ NF CARE MODERATE MDM 30: CPT | Performed by: FAMILY MEDICINE

## 2022-08-18 NOTE — LETTER
8/18/2022        RE: Cm Lundberg  [address]  [facility] [address]            [facility]    [place] Medical Record Number:  [id number]  Place of Service where encounter took place: [facility] ([facility]) [[id number]]   CODE STATUS:   CPR/Full code      Chief Complaint/Reason for Visit:  Chief Complaint   Patient presents with     RECHECK     TCU 8/18/2022. 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       TCU HPI:    Cm Lundberg is a 51 year old male with hx of CAD, s/p stents and CABG, HTN, DM, prior CVA, afib/flutter on xarelto, who has been residing in TCU at [facility] since Dec 2021 surgery due to elective procedure for chronic long standing right foot ulceration. He underwent incision and drainage of the right foot, excisional debridement of the ulceration of the right foot into the level of the muscle, under MAC with popliteal block anesthesia on 12/17/2021. He completed abx per ID recommendations. Since that time has been NWB with wound vac, had 1/27/2022 incision and drainage with application of theraskin. On 4/4/2022 again incision and drainage with theraskin.     Continued following with podiatry, wound is non healing, referred to wound and vascular due to non healing diabetic ulcer, right foot. S/p angioplasty RLE and hyperbaric oxygen therapy. MRI with indications of osteomyelitis. Following with podiatry and with vascular, evaluation and recommendation for R BKA, which he had on 8/5/2022. His hospital discharge summary is partially excerpted below.     Vascular Surgery Discharge Summary  DATE OF ADMISSION:   8/5/2022      PRE/POSTOPERATIVE DIAGNOSES:    Charcot ankle, right     PROCEDURES PERFORMED:  Amputation, below knee, right leg     INTRAOPERATIVE FINDINGS:   Healthy tissues with good bleeding at level of amputation.  Some degree of edema but no other signs of infection     POSTOPERATIVE  COMPLICATIONS:   None      DATE OF DISCHARGE:    8/10/2022     HOSPITAL COURSE:   Cm Lundberg is a 51 year old male who on 8/5/2022 underwent the above-named procedures.  He tolerated the procedure well and postoperatively was transferred to the general post-surgical unit.  The remainder of his course was essentially uncomplicated.  Prior to discharge, his pain was controlled well with tylenol and oxycodone. He was able to perform ADLs, was evaluated by physical/occupational therapy, and had full return of bowel and bladder function.  On 8/10, he was discharged to skilled nursing in stable condition.     DISCHARGE INSTRUCTIONS:  Activity: Advance as tolerated with PT/OT     Diet: Moderate Consistent Carb Diet     Wound care: Daily dressing change to BKA, monitor for signs/symptoms of infection    Returned to TCU on 8/10/2022.       Today:  He is doing better with pain management. States she hasn't taken any narcotic pain meds in a few days, does take tylenol. Has stump protector. Sees Dr. Montes on 9/8/2022. Wants to go home but still has staples in, dressing changes daily and his [facility] will not be able to take him back until dressing changes are 3 x weekly or less. No fever. He is anticoagulated with xarelto, takes meds for HTN, DM, CAD, has hx of prior stroke. He has good appetite. No nausea, vomiting, diarrhea or constipation. No urinary concerns. Has diabetes, on metformin and glipizide. Takes Abilify and duloxetine for depression, mood disorder.       PAST MEDICAL HISTORY:  Past Medical History:   Diagnosis Date     Anemia      Anemia, unspecified type 08/19/2016     Anxiety      Anxiety state 07/29/2021    Formatting of this note might be different from the original. Created by Conversion  Replacement Utility updated for latest IMO load     Arthritis      Atrial fibrillation and flutter (H)      Cerebral artery occlusion with cerebral infarction (H)      Cerebral infarction (H)      Chest pain,  unspecified type 08/19/2016     Chronic atrial fibrillation (H)      Congestive heart failure (H)      Coronary artery disease     CABG with multiple stents     Coronary artery disease involving native coronary artery without angina pectoris, unspecified whether native or transplanted heart 08/19/2016     Coronary atherosclerosis 07/29/2021    Formatting of this note might be different from the original. Created by Conversion  Replacement Utility updated for latest IMO load     Depression      Diabetic ulcer of right midfoot associated with type 2 diabetes mellitus, limited to breakdown of skin (H) 09/28/2017     Equinovarus acquired deformity, right 04/14/2021     Equinovarus deformity, acquired, right      Essential hypertension 08/19/2016     GERD (gastroesophageal reflux disease)      Heart attack (H)      History of blood transfusion      Hyperlipidemia      Hyperlipidemia, unspecified hyperlipidemia type 08/19/2016     Hypertension      Insomnia, unspecified type 08/19/2016     Ischemic cardiomyopathy      Late effects of cerebrovascular accident 08/19/2016     Major depressive disorder with single episode, remission status unspecified 08/19/2016     Microalbuminuria 07/29/2021    Formatting of this note might be different from the original. 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 Created by Conversion [facility] Annotation: Feb 11 2010  1:41PM - Leonid Cabral: CANNOT READ OR  WRITE     PVD (peripheral vascular disease) (H)      Stented coronary artery      Type 2 diabetes mellitus (H) 07/29/2021    Formatting of this note might be different from the original. Created by Conversion     Type II or unspecified type diabetes mellitus with ophthalmic  manifestations, not stated as uncontrolled(250.50) (H) 07/29/2021    Formatting of this note might be different from the original. Created by Conversion     Weakness 08/19/2016       MEDICATIONS:  Current Outpatient Medications   Medication Sig Dispense Refill     pantoprazole (PROTONIX) 40 MG EC tablet Take 40 mg by mouth daily       acetaminophen (TYLENOL) 325 MG tablet Take 650 mg by mouth every 4 hours as needed for mild pain        ARIPiprazole (ABILIFY) 2 MG tablet 2 mg daily        ASPERCREME ORIGINAL 10 % external cream APPLY TO LOW BACK TWICE DAILY FOR LOW BACK PAIN       atorvastatin (LIPITOR) 80 MG tablet Take 80 mg by mouth daily        cholestyramine (QUESTRAN) 4 g packet Take 1 packet by mouth every other day        clopidogrel (PLAVIX) 75 MG tablet Take 1 tablet (75 mg) by mouth daily Start taking medication the day after the procedure. 90 tablet 1     DULoxetine (CYMBALTA) 60 MG capsule Take 120 mg by mouth daily takle 120mg       erenumab-aooe (AIMOVIG) 140 MG/ML injection Inject 1 mL (140 mg) Subcutaneous every 30 days 1 mL 0     fenofibrate micronized (LOFIBRA) 200 MG capsule Take 200 mg by mouth every morning (before breakfast)        ferrous sulfate (FEROSUL) 325 (65 Fe) MG tablet Take 325 mg by mouth daily (with breakfast)        gabapentin (NEURONTIN) 100 MG capsule TAKE 1 CAP BY MOUTH TWICE A DAY BEFORE MEALS       gabapentin (NEURONTIN) 300 MG capsule Take 600 mg by mouth At Bedtime       glipiZIDE (GLUCOTROL) 5 MG tablet TAKE 1/2 TABLET (2.5MG) BY MOUTH DAILY DX DIABETES       loperamide (IMODIUM) 2 MG capsule Take 4 mg by mouth as needed for diarrhea        losartan (COZAAR) 25 MG tablet Take 25 mg by mouth At Bedtime        magnesium oxide 400 MG CAPS Take 400 mg by mouth 4 times daily       Menthol, Topical Analgesic, (BIOFREEZE ROLL-ON) 4 % GEL Apply 1 Application topically 2 times daily Apply to lower back for pain       metFORMIN (GLUCOPHAGE) 500 MG tablet TAKE 2 TABS (1000MG) BY  "MOUTH EVERY MORNING;TAKE 1 TAB BY MOUTH EVERY EVENING       metoprolol succinate ER (TOPROL-XL) 25 MG 24 hr tablet Take 75 mg by mouth daily        mirtazapine (REMERON) 7.5 MG tablet Take 7.5 mg by mouth At Bedtime        Neomycin-Bacitracin-Polymyxin (TRIPLE ANTIBIOTIC) 3.5-400-5000 OINT ointment Externally apply 1 applicator topically 3 times daily as needed       Nitroglycerin (NITROSTAT SL) Place 0.4 mg under the tongue every 5 minutes as needed for chest pain        oxyCODONE (ROXICODONE) 5 MG tablet TAKE 1 TAB BY MOUTH EVERY 6HOURS AS NEEDED FOR PAIN 28 tablet 0     polyethylene glycol (MIRALAX) 17 GM/Dose powder Take 1 packet by mouth daily as needed for constipation        senna-docusate (SENOKOT-S/PERICOLACE) 8.6-50 MG tablet Take 1 tablet by mouth 2 times daily as needed for constipation        traZODone (DESYREL) 50 MG tablet Take 50 mg by mouth At Bedtime        vitamin C (ASCORBIC ACID) 500 MG tablet Take 500 mg by mouth daily        XARELTO ANTICOAGULANT 20 MG TABS tablet TAKE 1 TAB BY MOUTH ONCE DAILY         PHYSICAL EXAM:  General: Patient is alert male, no distress.   Vitals: /73   Pulse 73   Temp 98.7  F (37.1  C)   Resp 18   Ht 1.702 m (5' 7\")   Wt 70.9 kg (156 lb 3.2 oz)   SpO2 97%   BMI 24.46 kg/m    HEENT: Head is NCAT. Eyes show no injection or icterus. Nares negative. Oropharynx moist.  Neck: No JVD.  Lungs: Non labored respirations.   Abdomen: Soft.   : Deferred.  Extremities: Mild swelling right BKA.  Musculoskeletal: Limb protector right.   Skin: Bandage right BKA.   Psych: Mood appears good.      LABS/DIAGNOSTIC DATA:  Component      Latest Ref Rng & Units 8/5/2022 8/7/2022              Creatinine      0.67 - 1.17 mg/dL 1.11 1.13   Sodium      136 - 145 mmol/L  134 (L)   Potassium      3.4 - 5.3 mmol/L     Urea Nitrogen      6.0 - 20.0 mg/dL     Chloride      98 - 107 mmol/L     Carbon Dioxide (CO2)      22 - 29 mmol/L     Anion Gap      7 - 15 mmol/L  7   Glucose      70 " - 99 mg/dL     GFR Estimate      >60 mL/min/1.73m2 79 78   Calcium      8.6 - 10.0 mg/dL  8.8     Component      Latest Ref Rng & Units 8/5/2022 8/6/2022 8/6/2022 8/7/2022            6:16 AM 10:50 AM  6:20 AM   WBC      4.0 - 11.0 10e3/uL 6.3 5.9 7.0 8.1   RBC Count      4.40 - 5.90 10e6/uL 4.09 (L) 3.82 (L) 3.74 (L) 4.00 (L)   Hemoglobin      13.3 - 17.7 g/dL 10.6 (L) 10.1 (L) 9.9 (L) 10.3 (L)   Hematocrit      40.0 - 53.0 % 33.7 (L) 31.9 (L) 31.2 (L) 33.2 (L)   MCV      78 - 100 fL 82 84 83 83   MCH      26.5 - 33.0 pg 25.9 (L) 26.4 (L) 26.5 25.8 (L)   MCHC      31.5 - 36.5 g/dL 31.5 31.7 31.7 31.0 (L)   RDW      10.0 - 15.0 % 14.6 14.6 14.6 14.8   Platelet Count      150 - 450 10e3/uL 160 137 (L) 135 (L) 148 (L)     Component      Latest Ref Rng & Units 8/7/2022 8/8/2022          11:00 AM    WBC      4.0 - 11.0 10e3/uL 7.9    RBC Count      4.40 - 5.90 10e6/uL 3.92 (L)    Hemoglobin      13.3 - 17.7 g/dL 10.3 (L)    Hematocrit      40.0 - 53.0 % 32.4 (L)    MCV      78 - 100 fL 83    MCH      26.5 - 33.0 pg 26.3 (L)    MCHC      31.5 - 36.5 g/dL 31.8    RDW      10.0 - 15.0 % 14.8    Platelet Count      150 - 450 10e3/uL 148 (L) 144 (L)         ASSESSMENT/PLAN:  1. S/p R BKA. Surgery on 8/5/2022 for long standing nonhealing right foot diabetic ulcer and Charcot ankle. He is S/p prior procedures on 12/27/2021, 1/27/2022 and 4/4/2022, s/p angioplasty RLE and hyperbaric oxygen treatment. MRI with indications of osteomyelitis. Ultimately R BKA on 8/5/2022. NWB. Bandage with wound cares, stump protector. Follow up with Dr. Montes on 9/8/2022.  2. Pain management. Much improved control, taper pain meds as able.    3. HTN. He is on metoprolol and losartan. Continue to monitor Bps in TCU.  4. Afib. On xarelto for anticoagulation. Adequate rate control.   5. Diabetes. On metformin and glipizide.  6. CAD. Hx of MIs. Hx of stenting and prior CABG. On statin, beta blocker, xarelto. Stable.   7. Mood disorder. Mixed  depression and anxiety. On duloxetine, Abilify. Also takes Mirtazapine.          Electronically signed by: Heidi Xie MD             Sincerely,        Heidi Xie MD

## 2022-08-22 ENCOUNTER — TELEPHONE (OUTPATIENT)
Dept: VASCULAR SURGERY | Facility: CLINIC | Age: 52
End: 2022-08-22

## 2022-08-22 ENCOUNTER — TRANSITIONAL CARE UNIT VISIT (OUTPATIENT)
Dept: GERIATRICS | Facility: CLINIC | Age: 52
End: 2022-08-22
Payer: MEDICARE

## 2022-08-22 DIAGNOSIS — R52 PAIN MANAGEMENT: ICD-10-CM

## 2022-08-22 DIAGNOSIS — I10 ESSENTIAL HYPERTENSION: ICD-10-CM

## 2022-08-22 DIAGNOSIS — I48.0 PAROXYSMAL ATRIAL FIBRILLATION (H): ICD-10-CM

## 2022-08-22 DIAGNOSIS — Z89.511 STATUS POST BELOW-KNEE AMPUTATION OF RIGHT LOWER EXTREMITY (H): Primary | ICD-10-CM

## 2022-08-22 PROCEDURE — 99309 SBSQ NF CARE MODERATE MDM 30: CPT | Performed by: FAMILY MEDICINE

## 2022-08-22 NOTE — LETTER
8/22/2022        RE: Cm Lundberg  [address]  [facility] [address]            [facility]    [place] Medical Record Number:  [id number]  Place of Service where encounter took place: [facility] ([facility]) [[id number]]   CODE STATUS:   CPR/Full code      Chief Complaint/Reason for Visit:  Chief Complaint   Patient presents with     RECHECK     TCU 8/22/2022. 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       TCU HPI:    Cm Lundberg is a 52 year old male with hx of CAD, s/p stents and CABG, HTN, DM, prior CVA, afib/flutter on xarelto, who has been residing in TCU at [facility] since Dec 2021 surgery due to elective procedure for chronic long standing right foot ulceration. He underwent incision and drainage of the right foot, excisional debridement of the ulceration of the right foot into the level of the muscle, under MAC with popliteal block anesthesia on 12/17/2021. He completed abx per ID recommendations. Since that time has been NWB with wound vac, had 1/27/2022 incision and drainage with application of theraskin. On 4/4/2022 again incision and drainage with theraskin.     Continued following with podiatry, wound is non healing, referred to wound and vascular due to non healing diabetic ulcer, right foot. S/p angioplasty RLE and hyperbaric oxygen therapy. MRI with indications of osteomyelitis. Following with podiatry and with vascular, evaluation and recommendation for R BKA, which he had on 8/5/2022. His hospital discharge summary is partially excerpted below.     Vascular Surgery Discharge Summary  DATE OF ADMISSION:   8/5/2022      PRE/POSTOPERATIVE DIAGNOSES:    Charcot ankle, right     PROCEDURES PERFORMED:  Amputation, below knee, right leg     INTRAOPERATIVE FINDINGS:   Healthy tissues with good bleeding at level of amputation.  Some degree of edema but no other signs of infection     POSTOPERATIVE  COMPLICATIONS:   None      DATE OF DISCHARGE:    8/10/2022     HOSPITAL COURSE:   Cm Lundberg is a 51 year old male who on 8/5/2022 underwent the above-named procedures.  He tolerated the procedure well and postoperatively was transferred to the general post-surgical unit.  The remainder of his course was essentially uncomplicated.  Prior to discharge, his pain was controlled well with tylenol and oxycodone. He was able to perform ADLs, was evaluated by physical/occupational therapy, and had full return of bowel and bladder function.  On 8/10, he was discharged to skilled nursing in stable condition.     DISCHARGE INSTRUCTIONS:  Activity: Advance as tolerated with PT/OT     Diet: Moderate Consistent Carb Diet     Wound care: Daily dressing change to BKA, monitor for signs/symptoms of infection    Returned to TCU on 8/10/2022.       Today:  Pain under better control. States he still has some pain but after discussion, will be able to reduce oxycodone back down to 5 mg (instead of 5 or 10 mg) every 4 hours as needed. Further weaning based on clinical response. Has tylenol as well. Sees Dr. Montes on 9/8/2022. Staples in place, daily dressing changes. No fever. He is anticoagulated with xarelto, takes meds for HTN, DM, CAD, has hx of prior stroke. He has good appetite. No nausea, vomiting, diarrhea or constipation. No urinary concerns. Has diabetes, on metformin and glipizide. Takes Abilify and duloxetine for depression, mood disorder.       PAST MEDICAL HISTORY:  Past Medical History:   Diagnosis Date     Anemia      Anemia, unspecified type 08/19/2016     Anxiety      Anxiety state 07/29/2021    Formatting of this note might be different from the original. Created by Conversion  Replacement Utility updated for latest IMO load     Arthritis      Atrial fibrillation and flutter (H)      Cerebral artery occlusion with cerebral infarction (H)      Cerebral infarction (H)      Chest pain, unspecified type  08/19/2016     Chronic atrial fibrillation (H)      Congestive heart failure (H)      Coronary artery disease     CABG with multiple stents     Coronary artery disease involving native coronary artery without angina pectoris, unspecified whether native or transplanted heart 08/19/2016     Coronary atherosclerosis 07/29/2021    Formatting of this note might be different from the original. Created by Conversion  Replacement Utility updated for latest IMO load     Depression      Diabetic ulcer of right midfoot associated with type 2 diabetes mellitus, limited to breakdown of skin (H) 09/28/2017     Equinovarus acquired deformity, right 04/14/2021     Equinovarus deformity, acquired, right      Essential hypertension 08/19/2016     GERD (gastroesophageal reflux disease)      Heart attack (H)      History of blood transfusion      Hyperlipidemia      Hyperlipidemia, unspecified hyperlipidemia type 08/19/2016     Hypertension      Insomnia, unspecified type 08/19/2016     Ischemic cardiomyopathy      Late effects of cerebrovascular accident 08/19/2016     Major depressive disorder with single episode, remission status unspecified 08/19/2016     Microalbuminuria 07/29/2021    Formatting of this note might be different from the original. 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 Created by Conversion [facility] Annotation: Feb 11 2010  1:41PM - Leonid Cabral: CANNOT READ OR  WRITE     PVD (peripheral vascular disease) (H)      Stented coronary artery      Type 2 diabetes mellitus (H) 07/29/2021    Formatting of this note might be different from the original. Created by Conversion     Type II or unspecified type diabetes mellitus with ophthalmic manifestations, not  stated as uncontrolled(250.50) (H) 07/29/2021    Formatting of this note might be different from the original. Created by Conversion     Weakness 08/19/2016       MEDICATIONS:  Current Outpatient Medications   Medication Sig Dispense Refill     pantoprazole (PROTONIX) 40 MG EC tablet Take 40 mg by mouth daily       acetaminophen (TYLENOL) 325 MG tablet Take 650 mg by mouth every 4 hours as needed for mild pain        ARIPiprazole (ABILIFY) 2 MG tablet 2 mg daily        ASPERCREME ORIGINAL 10 % external cream APPLY TO LOW BACK TWICE DAILY FOR LOW BACK PAIN       atorvastatin (LIPITOR) 80 MG tablet Take 80 mg by mouth daily        cholestyramine (QUESTRAN) 4 g packet Take 1 packet by mouth every other day        clopidogrel (PLAVIX) 75 MG tablet Take 1 tablet (75 mg) by mouth daily Start taking medication the day after the procedure. 90 tablet 1     DULoxetine (CYMBALTA) 60 MG capsule Take 120 mg by mouth daily takle 120mg       erenumab-aooe (AIMOVIG) 140 MG/ML injection Inject 1 mL (140 mg) Subcutaneous every 30 days 1 mL 0     fenofibrate micronized (LOFIBRA) 200 MG capsule Take 200 mg by mouth every morning (before breakfast)        ferrous sulfate (FEROSUL) 325 (65 Fe) MG tablet Take 325 mg by mouth daily (with breakfast)        gabapentin (NEURONTIN) 100 MG capsule TAKE 1 CAP BY MOUTH TWICE A DAY BEFORE MEALS       gabapentin (NEURONTIN) 300 MG capsule Take 600 mg by mouth At Bedtime       glipiZIDE (GLUCOTROL) 5 MG tablet TAKE 1/2 TABLET (2.5MG) BY MOUTH DAILY DX DIABETES       loperamide (IMODIUM) 2 MG capsule Take 4 mg by mouth as needed for diarrhea        losartan (COZAAR) 25 MG tablet Take 25 mg by mouth At Bedtime        magnesium oxide 400 MG CAPS Take 400 mg by mouth 4 times daily       Menthol, Topical Analgesic, (BIOFREEZE ROLL-ON) 4 % GEL Apply 1 Application topically 2 times daily Apply to lower back for pain       metFORMIN (GLUCOPHAGE) 500 MG tablet TAKE 2 TABS (1000MG) BY MOUTH EVERY MORNING;TAKE  "1 TAB BY MOUTH EVERY EVENING       metoprolol succinate ER (TOPROL-XL) 25 MG 24 hr tablet Take 75 mg by mouth daily        mirtazapine (REMERON) 7.5 MG tablet Take 7.5 mg by mouth At Bedtime        Neomycin-Bacitracin-Polymyxin (TRIPLE ANTIBIOTIC) 3.5-400-5000 OINT ointment Externally apply 1 applicator topically 3 times daily as needed       Nitroglycerin (NITROSTAT SL) Place 0.4 mg under the tongue every 5 minutes as needed for chest pain        oxyCODONE (ROXICODONE) 5 MG tablet TAKE 1 TAB BY MOUTH EVERY 6HOURS AS NEEDED FOR PAIN 28 tablet 0     polyethylene glycol (MIRALAX) 17 GM/Dose powder Take 1 packet by mouth daily as needed for constipation        senna-docusate (SENOKOT-S/PERICOLACE) 8.6-50 MG tablet Take 1 tablet by mouth 2 times daily as needed for constipation        traZODone (DESYREL) 50 MG tablet Take 50 mg by mouth At Bedtime        vitamin C (ASCORBIC ACID) 500 MG tablet Take 500 mg by mouth daily        XARELTO ANTICOAGULANT 20 MG TABS tablet TAKE 1 TAB BY MOUTH ONCE DAILY         PHYSICAL EXAM:  General: Patient is alert male, no distress.   Vitals: /70   Pulse 68   Temp 98  F (36.7  C)   Resp 18   Ht 1.702 m (5' 7\")   Wt 71.4 kg (157 lb 6.4 oz)   SpO2 100%   BMI 24.65 kg/m    HEENT: Head is NCAT. Eyes show no injection or icterus. Nares negative. Oropharynx well hydrated.  Neck: No JVD.  Lungs: Non labored respirations.   Abdomen: Soft.   : Deferred.  Extremities: Mild swelling right BKA.  Musculoskeletal: Limb protector right.   Skin: Bandage right BKA.   Psych: Mood appears good.      LABS/DIAGNOSTIC DATA:  Component      Latest Ref Rng & Units 8/5/2022 8/7/2022              Creatinine      0.67 - 1.17 mg/dL 1.11 1.13   Sodium      136 - 145 mmol/L  134 (L)   Potassium      3.4 - 5.3 mmol/L     Urea Nitrogen      6.0 - 20.0 mg/dL     Chloride      98 - 107 mmol/L     Carbon Dioxide (CO2)      22 - 29 mmol/L     Anion Gap      7 - 15 mmol/L  7   Glucose      70 - 99 mg/dL   "   GFR Estimate      >60 mL/min/1.73m2 79 78   Calcium      8.6 - 10.0 mg/dL  8.8     Component      Latest Ref Rng & Units 8/5/2022 8/6/2022 8/6/2022 8/7/2022            6:16 AM 10:50 AM  6:20 AM   WBC      4.0 - 11.0 10e3/uL 6.3 5.9 7.0 8.1   RBC Count      4.40 - 5.90 10e6/uL 4.09 (L) 3.82 (L) 3.74 (L) 4.00 (L)   Hemoglobin      13.3 - 17.7 g/dL 10.6 (L) 10.1 (L) 9.9 (L) 10.3 (L)   Hematocrit      40.0 - 53.0 % 33.7 (L) 31.9 (L) 31.2 (L) 33.2 (L)   MCV      78 - 100 fL 82 84 83 83   MCH      26.5 - 33.0 pg 25.9 (L) 26.4 (L) 26.5 25.8 (L)   MCHC      31.5 - 36.5 g/dL 31.5 31.7 31.7 31.0 (L)   RDW      10.0 - 15.0 % 14.6 14.6 14.6 14.8   Platelet Count      150 - 450 10e3/uL 160 137 (L) 135 (L) 148 (L)     Component      Latest Ref Rng & Units 8/7/2022 8/8/2022          11:00 AM    WBC      4.0 - 11.0 10e3/uL 7.9    RBC Count      4.40 - 5.90 10e6/uL 3.92 (L)    Hemoglobin      13.3 - 17.7 g/dL 10.3 (L)    Hematocrit      40.0 - 53.0 % 32.4 (L)    MCV      78 - 100 fL 83    MCH      26.5 - 33.0 pg 26.3 (L)    MCHC      31.5 - 36.5 g/dL 31.8    RDW      10.0 - 15.0 % 14.8    Platelet Count      150 - 450 10e3/uL 148 (L) 144 (L)         ASSESSMENT/PLAN:  1. S/p R BKA. Surgery on 8/5/2022 for long standing nonhealing right foot diabetic ulcer and Charcot ankle. He is S/p prior procedures on 12/27/2021, 1/27/2022 and 4/4/2022, s/p angioplasty RLE and hyperbaric oxygen treatment. MRI with indications of osteomyelitis. Ultimately R BKA on 8/5/2022. NWB. Bandage with wound cares, stump protector. Follow up with Dr. Montes on 9/8/2022.  2. Pain management. Much improved control, tapering pain meds down.   3. HTN. He is on metoprolol and losartan. Continue to monitor Bps in TCU. Satisfactory.   4. Afib. On xarelto for anticoagulation. Adequate rate control.   5. Diabetes. On metformin and glipizide.   6. CAD. Hx of MIs. Hx of stenting and prior CABG. Stable on statin, beta blocker, xarelto. No chest pain.   7. Mood disorder.  Mixed depression and anxiety. On duloxetine, Abilify. Also takes Mirtazapine.          Electronically signed by: Heidi Xie MD             Sincerely,        Heidi Xie MD

## 2022-08-22 NOTE — TELEPHONE ENCOUNTER
Spoke with Ngoc the nurse at the care center. She said she thinks that Matias should come in on Thursday to see Dr. Montes. The incision is red at the medial aspect and is more painful. Will continue with current dressings and call back or go to ED/urgent care if appearance of incision worsens prior to Thursday. Appointment made with Dr. Montes 8/25.      Per Lizzeth Figueroa PA-C:    The drainage that I can see in the pictures looks sero-sanguinous and the red area looks like an unroofed blister. Would continue daily wound care and monitoring to the area. We can offer an appointment to see him later this week. I don't have any openings on Thursday but Dr. Montes does.

## 2022-08-22 NOTE — TELEPHONE ENCOUNTER
Spoke with Ngoc from [facility] and requested a photo be sent for Dr. Montes to view. She will send photo via email in the next 30 minutes.

## 2022-08-22 NOTE — TELEPHONE ENCOUNTER
Caller: Ngoc with [facility]    Provider: MD Kim Montes    Detailed reason for call: Ngoc called with a wound update. 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  Incision line was dry and looking great when she last saw it on Friday.  Last night there was a lot of purulent drainage. Since last night both ABD pads and bandages soaked through this morning.  Left lateral side, staple #6 appears infected. Incision line red with increased pain.    Best phone number to contact: [phone]    Best time to contact: any    Ok to leave a detailed message: Yes

## 2022-08-23 ENCOUNTER — APPOINTMENT (OUTPATIENT)
Dept: CT IMAGING | Facility: HOSPITAL | Age: 52
DRG: 475 | End: 2022-08-23
Attending: STUDENT IN AN ORGANIZED HEALTH CARE EDUCATION/TRAINING PROGRAM
Payer: MEDICARE

## 2022-08-23 ENCOUNTER — HOSPITAL ENCOUNTER (INPATIENT)
Facility: HOSPITAL | Age: 52
LOS: 7 days | Discharge: NURSING, REHAB OR LONG-TERM CARE | DRG: 475 | End: 2022-08-30
Attending: STUDENT IN AN ORGANIZED HEALTH CARE EDUCATION/TRAINING PROGRAM | Admitting: INTERNAL MEDICINE
Payer: MEDICARE

## 2022-08-23 VITALS
HEIGHT: 67 IN | SYSTOLIC BLOOD PRESSURE: 136 MMHG | DIASTOLIC BLOOD PRESSURE: 70 MMHG | WEIGHT: 157.4 LBS | TEMPERATURE: 98 F | HEART RATE: 68 BPM | RESPIRATION RATE: 18 BRPM | OXYGEN SATURATION: 100 % | BODY MASS INDEX: 24.71 KG/M2

## 2022-08-23 DIAGNOSIS — B95.62 MRSA BACTEREMIA: ICD-10-CM

## 2022-08-23 DIAGNOSIS — L08.9 WOUND INFECTION: Primary | ICD-10-CM

## 2022-08-23 DIAGNOSIS — T14.8XXA WOUND INFECTION: Primary | ICD-10-CM

## 2022-08-23 DIAGNOSIS — L02.419 CELLULITIS AND ABSCESS OF LEG: ICD-10-CM

## 2022-08-23 DIAGNOSIS — E78.5 HYPERLIPIDEMIA, UNSPECIFIED HYPERLIPIDEMIA TYPE: ICD-10-CM

## 2022-08-23 DIAGNOSIS — R78.81 MRSA BACTEREMIA: ICD-10-CM

## 2022-08-23 DIAGNOSIS — M86.9 OSTEOMYELITIS OF ANKLE AND FOOT (H): ICD-10-CM

## 2022-08-23 DIAGNOSIS — L03.119 CELLULITIS AND ABSCESS OF LEG: ICD-10-CM

## 2022-08-23 LAB
ANION GAP SERPL CALCULATED.3IONS-SCNC: 14 MMOL/L (ref 5–18)
BASOPHILS # BLD AUTO: 0.1 10E3/UL (ref 0–0.2)
BASOPHILS NFR BLD AUTO: 1 %
BUN SERPL-MCNC: 15 MG/DL (ref 8–22)
CALCIUM SERPL-MCNC: 10.2 MG/DL (ref 8.5–10.5)
CHLORIDE BLD-SCNC: 95 MMOL/L (ref 98–107)
CO2 SERPL-SCNC: 25 MMOL/L (ref 22–31)
CREAT SERPL-MCNC: 1.04 MG/DL (ref 0.7–1.3)
EOSINOPHIL # BLD AUTO: 0.1 10E3/UL (ref 0–0.7)
EOSINOPHIL NFR BLD AUTO: 2 %
ERYTHROCYTE [DISTWIDTH] IN BLOOD BY AUTOMATED COUNT: 14.5 % (ref 10–15)
GFR SERPL CREATININE-BSD FRML MDRD: 85 ML/MIN/1.73M2
GLUCOSE BLD-MCNC: 142 MG/DL (ref 70–125)
GLUCOSE BLDC GLUCOMTR-MCNC: 137 MG/DL (ref 70–99)
HBA1C MFR BLD: 6.3 %
HCT VFR BLD AUTO: 32.4 % (ref 40–53)
HGB BLD-MCNC: 9.9 G/DL (ref 13.3–17.7)
HOLD SPECIMEN: NORMAL
IMM GRANULOCYTES # BLD: 0 10E3/UL
IMM GRANULOCYTES NFR BLD: 0 %
LACTATE SERPL-SCNC: 1.7 MMOL/L (ref 0.7–2)
LYMPHOCYTES # BLD AUTO: 0.8 10E3/UL (ref 0.8–5.3)
LYMPHOCYTES NFR BLD AUTO: 9 %
MCH RBC QN AUTO: 25.4 PG (ref 26.5–33)
MCHC RBC AUTO-ENTMCNC: 30.6 G/DL (ref 31.5–36.5)
MCV RBC AUTO: 83 FL (ref 78–100)
MONOCYTES # BLD AUTO: 0.8 10E3/UL (ref 0–1.3)
MONOCYTES NFR BLD AUTO: 9 %
NEUTROPHILS # BLD AUTO: 7.1 10E3/UL (ref 1.6–8.3)
NEUTROPHILS NFR BLD AUTO: 79 %
NRBC # BLD AUTO: 0 10E3/UL
NRBC BLD AUTO-RTO: 0 /100
PLATELET # BLD AUTO: 433 10E3/UL (ref 150–450)
POTASSIUM BLD-SCNC: 4.3 MMOL/L (ref 3.5–5)
RBC # BLD AUTO: 3.89 10E6/UL (ref 4.4–5.9)
SARS-COV-2 RNA RESP QL NAA+PROBE: NEGATIVE
SARS-COV-2 RNA RESP QL NAA+PROBE: NEGATIVE
SODIUM SERPL-SCNC: 134 MMOL/L (ref 136–145)
WBC # BLD AUTO: 8.9 10E3/UL (ref 4–11)

## 2022-08-23 PROCEDURE — 96375 TX/PRO/DX INJ NEW DRUG ADDON: CPT

## 2022-08-23 PROCEDURE — 250N000013 HC RX MED GY IP 250 OP 250 PS 637: Performed by: EMERGENCY MEDICINE

## 2022-08-23 PROCEDURE — 87149 DNA/RNA DIRECT PROBE: CPT | Performed by: EMERGENCY MEDICINE

## 2022-08-23 PROCEDURE — 258N000003 HC RX IP 258 OP 636: Performed by: EMERGENCY MEDICINE

## 2022-08-23 PROCEDURE — 96366 THER/PROPH/DIAG IV INF ADDON: CPT

## 2022-08-23 PROCEDURE — 96365 THER/PROPH/DIAG IV INF INIT: CPT | Mod: 59

## 2022-08-23 PROCEDURE — U0003 INFECTIOUS AGENT DETECTION BY NUCLEIC ACID (DNA OR RNA); SEVERE ACUTE RESPIRATORY SYNDROME CORONAVIRUS 2 (SARS-COV-2) (CORONAVIRUS DISEASE [COVID-19]), AMPLIFIED PROBE TECHNIQUE, MAKING USE OF HIGH THROUGHPUT TECHNOLOGIES AS DESCRIBED BY CMS-2020-01-R: HCPCS | Performed by: STUDENT IN AN ORGANIZED HEALTH CARE EDUCATION/TRAINING PROGRAM

## 2022-08-23 PROCEDURE — 120N000001 HC R&B MED SURG/OB

## 2022-08-23 PROCEDURE — C9803 HOPD COVID-19 SPEC COLLECT: HCPCS

## 2022-08-23 PROCEDURE — 85025 COMPLETE CBC W/AUTO DIFF WBC: CPT | Performed by: EMERGENCY MEDICINE

## 2022-08-23 PROCEDURE — 87077 CULTURE AEROBIC IDENTIFY: CPT | Performed by: EMERGENCY MEDICINE

## 2022-08-23 PROCEDURE — 73701 CT LOWER EXTREMITY W/DYE: CPT | Mod: RT,MG

## 2022-08-23 PROCEDURE — 250N000011 HC RX IP 250 OP 636: Performed by: EMERGENCY MEDICINE

## 2022-08-23 PROCEDURE — 99285 EMERGENCY DEPT VISIT HI MDM: CPT | Mod: 25

## 2022-08-23 PROCEDURE — 96367 TX/PROPH/DG ADDL SEQ IV INF: CPT

## 2022-08-23 PROCEDURE — 250N000011 HC RX IP 250 OP 636: Performed by: STUDENT IN AN ORGANIZED HEALTH CARE EDUCATION/TRAINING PROGRAM

## 2022-08-23 PROCEDURE — 36415 COLL VENOUS BLD VENIPUNCTURE: CPT | Performed by: EMERGENCY MEDICINE

## 2022-08-23 PROCEDURE — 80048 BASIC METABOLIC PNL TOTAL CA: CPT | Performed by: EMERGENCY MEDICINE

## 2022-08-23 PROCEDURE — 83605 ASSAY OF LACTIC ACID: CPT | Performed by: EMERGENCY MEDICINE

## 2022-08-23 PROCEDURE — 99223 1ST HOSP IP/OBS HIGH 75: CPT | Mod: AI | Performed by: INTERNAL MEDICINE

## 2022-08-23 PROCEDURE — 83036 HEMOGLOBIN GLYCOSYLATED A1C: CPT | Performed by: INTERNAL MEDICINE

## 2022-08-23 RX ORDER — ONDANSETRON 2 MG/ML
4 INJECTION INTRAMUSCULAR; INTRAVENOUS EVERY 6 HOURS PRN
Status: DISCONTINUED | OUTPATIENT
Start: 2022-08-23 | End: 2022-08-24

## 2022-08-23 RX ORDER — LOPERAMIDE HCL 2 MG
2 CAPSULE ORAL 4 TIMES DAILY PRN
Status: DISCONTINUED | OUTPATIENT
Start: 2022-08-23 | End: 2022-08-30 | Stop reason: HOSPADM

## 2022-08-23 RX ORDER — OXYCODONE HYDROCHLORIDE 5 MG/1
5 TABLET ORAL ONCE
Status: COMPLETED | OUTPATIENT
Start: 2022-08-23 | End: 2022-08-23

## 2022-08-23 RX ORDER — PANTOPRAZOLE SODIUM 40 MG/1
40 TABLET, DELAYED RELEASE ORAL DAILY
Status: DISCONTINUED | OUTPATIENT
Start: 2022-08-24 | End: 2022-08-30 | Stop reason: HOSPADM

## 2022-08-23 RX ORDER — LORAZEPAM 0.5 MG/1
0.5 TABLET ORAL DAILY PRN
Status: DISCONTINUED | OUTPATIENT
Start: 2022-08-23 | End: 2022-08-30 | Stop reason: HOSPADM

## 2022-08-23 RX ORDER — MULTIVIT WITH MINERALS/LUTEIN
500 TABLET ORAL DAILY
Status: DISCONTINUED | OUTPATIENT
Start: 2022-08-24 | End: 2022-08-30 | Stop reason: HOSPADM

## 2022-08-23 RX ORDER — ACETAMINOPHEN 500 MG
1000 TABLET ORAL 3 TIMES DAILY
Status: DISCONTINUED | OUTPATIENT
Start: 2022-08-24 | End: 2022-08-26

## 2022-08-23 RX ORDER — GABAPENTIN 300 MG/1
600 CAPSULE ORAL AT BEDTIME
Status: DISCONTINUED | OUTPATIENT
Start: 2022-08-23 | End: 2022-08-30 | Stop reason: HOSPADM

## 2022-08-23 RX ORDER — ACETAMINOPHEN 500 MG
1000 TABLET ORAL DAILY PRN
Status: ON HOLD | COMMUNITY
End: 2022-08-29

## 2022-08-23 RX ORDER — IOPAMIDOL 755 MG/ML
75 INJECTION, SOLUTION INTRAVASCULAR ONCE
Status: COMPLETED | OUTPATIENT
Start: 2022-08-23 | End: 2022-08-23

## 2022-08-23 RX ORDER — NITROGLYCERIN 0.4 MG/1
0.4 TABLET SUBLINGUAL EVERY 5 MIN PRN
Status: DISCONTINUED | OUTPATIENT
Start: 2022-08-23 | End: 2022-08-30 | Stop reason: HOSPADM

## 2022-08-23 RX ORDER — ATORVASTATIN CALCIUM 40 MG/1
80 TABLET, FILM COATED ORAL DAILY
Status: DISCONTINUED | OUTPATIENT
Start: 2022-08-24 | End: 2022-08-30 | Stop reason: HOSPADM

## 2022-08-23 RX ORDER — GABAPENTIN 100 MG/1
100 CAPSULE ORAL
Status: DISCONTINUED | OUTPATIENT
Start: 2022-08-24 | End: 2022-08-30 | Stop reason: HOSPADM

## 2022-08-23 RX ORDER — PROCHLORPERAZINE 25 MG
25 SUPPOSITORY, RECTAL RECTAL EVERY 12 HOURS PRN
Status: DISCONTINUED | OUTPATIENT
Start: 2022-08-23 | End: 2022-08-24

## 2022-08-23 RX ORDER — KETOROLAC TROMETHAMINE 30 MG/ML
30 INJECTION, SOLUTION INTRAMUSCULAR; INTRAVENOUS ONCE
Status: COMPLETED | OUTPATIENT
Start: 2022-08-23 | End: 2022-08-23

## 2022-08-23 RX ORDER — LIDOCAINE 40 MG/G
CREAM TOPICAL
Status: DISCONTINUED | OUTPATIENT
Start: 2022-08-23 | End: 2022-08-26

## 2022-08-23 RX ORDER — DEXTROSE MONOHYDRATE 25 G/50ML
25-50 INJECTION, SOLUTION INTRAVENOUS
Status: DISCONTINUED | OUTPATIENT
Start: 2022-08-23 | End: 2022-08-30 | Stop reason: HOSPADM

## 2022-08-23 RX ORDER — MAGNESIUM OXIDE 400 MG/1
800 TABLET ORAL 2 TIMES DAILY
Status: DISCONTINUED | OUTPATIENT
Start: 2022-08-23 | End: 2022-08-30 | Stop reason: HOSPADM

## 2022-08-23 RX ORDER — METOPROLOL SUCCINATE 25 MG/1
75 TABLET, EXTENDED RELEASE ORAL DAILY
Status: DISCONTINUED | OUTPATIENT
Start: 2022-08-24 | End: 2022-08-30 | Stop reason: HOSPADM

## 2022-08-23 RX ORDER — PIPERACILLIN SODIUM, TAZOBACTAM SODIUM 3; .375 G/15ML; G/15ML
3.38 INJECTION, POWDER, LYOPHILIZED, FOR SOLUTION INTRAVENOUS EVERY 8 HOURS
Status: DISCONTINUED | OUTPATIENT
Start: 2022-08-23 | End: 2022-08-23

## 2022-08-23 RX ORDER — NICOTINE POLACRILEX 4 MG
15-30 LOZENGE BUCCAL
Status: DISCONTINUED | OUTPATIENT
Start: 2022-08-23 | End: 2022-08-30 | Stop reason: HOSPADM

## 2022-08-23 RX ORDER — ARIPIPRAZOLE 2 MG/1
2 TABLET ORAL DAILY
Status: DISCONTINUED | OUTPATIENT
Start: 2022-08-24 | End: 2022-08-30 | Stop reason: HOSPADM

## 2022-08-23 RX ORDER — CLOPIDOGREL BISULFATE 75 MG/1
75 TABLET ORAL DAILY
Status: DISCONTINUED | OUTPATIENT
Start: 2022-08-24 | End: 2022-08-30 | Stop reason: HOSPADM

## 2022-08-23 RX ORDER — PIPERACILLIN SODIUM, TAZOBACTAM SODIUM 3; .375 G/15ML; G/15ML
3.38 INJECTION, POWDER, LYOPHILIZED, FOR SOLUTION INTRAVENOUS ONCE
Status: COMPLETED | OUTPATIENT
Start: 2022-08-23 | End: 2022-08-24

## 2022-08-23 RX ORDER — PIPERACILLIN SODIUM, TAZOBACTAM SODIUM 3; .375 G/15ML; G/15ML
3.38 INJECTION, POWDER, LYOPHILIZED, FOR SOLUTION INTRAVENOUS ONCE
Status: COMPLETED | OUTPATIENT
Start: 2022-08-23 | End: 2022-08-23

## 2022-08-23 RX ORDER — CEFAZOLIN SODIUM 1 G/50ML
1250 SOLUTION INTRAVENOUS ONCE
Status: COMPLETED | OUTPATIENT
Start: 2022-08-23 | End: 2022-08-23

## 2022-08-23 RX ORDER — ONDANSETRON 4 MG/1
4 TABLET, ORALLY DISINTEGRATING ORAL EVERY 6 HOURS PRN
Status: DISCONTINUED | OUTPATIENT
Start: 2022-08-23 | End: 2022-08-24

## 2022-08-23 RX ORDER — PROCHLORPERAZINE MALEATE 10 MG
10 TABLET ORAL EVERY 6 HOURS PRN
Status: DISCONTINUED | OUTPATIENT
Start: 2022-08-23 | End: 2022-08-24

## 2022-08-23 RX ORDER — BISACODYL 10 MG
10 SUPPOSITORY, RECTAL RECTAL DAILY PRN
Status: DISCONTINUED | OUTPATIENT
Start: 2022-08-23 | End: 2022-08-25

## 2022-08-23 RX ORDER — MIRTAZAPINE 7.5 MG/1
7.5 TABLET, FILM COATED ORAL AT BEDTIME
Status: DISCONTINUED | OUTPATIENT
Start: 2022-08-23 | End: 2022-08-30 | Stop reason: HOSPADM

## 2022-08-23 RX ORDER — TROLAMINE SALICYLATE 10 G/100G
CREAM TOPICAL 2 TIMES DAILY PRN
Status: DISCONTINUED | OUTPATIENT
Start: 2022-08-23 | End: 2022-08-30 | Stop reason: HOSPADM

## 2022-08-23 RX ORDER — FENOFIBRATE 200 MG/1
200 CAPSULE ORAL
Status: DISCONTINUED | OUTPATIENT
Start: 2022-08-24 | End: 2022-08-23 | Stop reason: CLARIF

## 2022-08-23 RX ORDER — DULOXETIN HYDROCHLORIDE 60 MG/1
120 CAPSULE, DELAYED RELEASE ORAL DAILY
Status: DISCONTINUED | OUTPATIENT
Start: 2022-08-24 | End: 2022-08-30 | Stop reason: HOSPADM

## 2022-08-23 RX ORDER — CHOLESTYRAMINE 4 G/9G
1 POWDER, FOR SUSPENSION ORAL EVERY OTHER DAY
Status: DISCONTINUED | OUTPATIENT
Start: 2022-08-24 | End: 2022-08-30 | Stop reason: HOSPADM

## 2022-08-23 RX ORDER — LOSARTAN POTASSIUM 25 MG/1
25 TABLET ORAL AT BEDTIME
Status: DISCONTINUED | OUTPATIENT
Start: 2022-08-23 | End: 2022-08-30 | Stop reason: HOSPADM

## 2022-08-23 RX ORDER — TRAZODONE HYDROCHLORIDE 50 MG/1
50 TABLET, FILM COATED ORAL AT BEDTIME
Status: DISCONTINUED | OUTPATIENT
Start: 2022-08-23 | End: 2022-08-30 | Stop reason: HOSPADM

## 2022-08-23 RX ORDER — OXYCODONE HYDROCHLORIDE 5 MG/1
5 TABLET ORAL EVERY 4 HOURS PRN
Status: DISCONTINUED | OUTPATIENT
Start: 2022-08-23 | End: 2022-08-29

## 2022-08-23 RX ORDER — FENOFIBRATE 160 MG/1
160 TABLET ORAL DAILY
Status: DISCONTINUED | OUTPATIENT
Start: 2022-08-24 | End: 2022-08-30 | Stop reason: HOSPADM

## 2022-08-23 RX ORDER — PIPERACILLIN SODIUM, TAZOBACTAM SODIUM 3; .375 G/15ML; G/15ML
3.38 INJECTION, POWDER, LYOPHILIZED, FOR SOLUTION INTRAVENOUS EVERY 8 HOURS
Status: DISCONTINUED | OUTPATIENT
Start: 2022-08-24 | End: 2022-08-30 | Stop reason: ALTCHOICE

## 2022-08-23 RX ORDER — AMOXICILLIN 250 MG
1 CAPSULE ORAL 2 TIMES DAILY
Status: DISCONTINUED | OUTPATIENT
Start: 2022-08-23 | End: 2022-08-25

## 2022-08-23 RX ADMIN — OXYCODONE HYDROCHLORIDE 5 MG: 5 TABLET ORAL at 16:30

## 2022-08-23 RX ADMIN — HYDROMORPHONE HYDROCHLORIDE 0.3 MG: 1 INJECTION, SOLUTION INTRAMUSCULAR; INTRAVENOUS; SUBCUTANEOUS at 18:18

## 2022-08-23 RX ADMIN — IOPAMIDOL 75 ML: 755 INJECTION, SOLUTION INTRAVENOUS at 19:56

## 2022-08-23 RX ADMIN — VANCOMYCIN HYDROCHLORIDE 1250 MG: 5 INJECTION, POWDER, LYOPHILIZED, FOR SOLUTION INTRAVENOUS at 14:19

## 2022-08-23 RX ADMIN — KETOROLAC TROMETHAMINE 30 MG: 30 INJECTION, SOLUTION INTRAMUSCULAR; INTRAVENOUS at 18:33

## 2022-08-23 RX ADMIN — PIPERACILLIN AND TAZOBACTAM 3.38 G: 3; .375 INJECTION, POWDER, LYOPHILIZED, FOR SOLUTION INTRAVENOUS at 13:32

## 2022-08-23 ASSESSMENT — ENCOUNTER SYMPTOMS
MYALGIAS: 1
SHORTNESS OF BREATH: 0
WOUND: 1
COUGH: 0
COLOR CHANGE: 1
FEVER: 0

## 2022-08-23 ASSESSMENT — ACTIVITIES OF DAILY LIVING (ADL)
ADLS_ACUITY_SCORE: 35

## 2022-08-23 NOTE — TELEPHONE ENCOUNTER
Sonia Durán is calling stating there is much more drainage today than yesterday.  She is very concerned about the integrity of the wound. Please call her at [phone].  She is considering sending him to the ER.

## 2022-08-23 NOTE — PHARMACY-ADMISSION MEDICATION HISTORY
Pharmacy Note - Admission Medication History    Pertinent Provider Information: none     ______________________________________________________________________    Prior To Admission (PTA) med list completed and updated in EMR.       PTA Med List   Medication Sig Note Last Dose     acetaminophen (TYLENOL) 500 MG tablet Take 1,000 mg by mouth daily as needed  8/23/2022 at 0056     acetaminophen (TYLENOL) 500 MG tablet Take 1,000 mg by mouth 3 times daily  8/23/2022 at 0830     ARIPiprazole (ABILIFY) 2 MG tablet 2 mg daily   8/23/2022 at 0830     ASPERCREME ORIGINAL 10 % external cream Apply topically 2 times daily as needed for other (back pain)       atorvastatin (LIPITOR) 80 MG tablet Take 80 mg by mouth daily   8/23/2022 at 0830     bisacodyl (DULCOLAX) 10 MG suppository Place 10 mg rectally daily as needed for constipation       cholestyramine (QUESTRAN) 4 g packet Take 1 packet by mouth every other day   8/22/2022 at 1000     clopidogrel (PLAVIX) 75 MG tablet Take 1 tablet (75 mg) by mouth daily Start taking medication the day after the procedure.  8/23/2022 at 0830     DULoxetine (CYMBALTA) 60 MG capsule Take 120 mg by mouth daily  8/23/2022 at 0830     fenofibrate micronized (LOFIBRA) 200 MG capsule Take 200 mg by mouth every morning (before breakfast)   8/23/2022 at 0700     ferrous sulfate (FEROSUL) 325 (65 Fe) MG tablet Take 325 mg by mouth daily (with breakfast)   8/23/2022 at 0830     gabapentin (NEURONTIN) 100 MG capsule Take 100 mg by mouth 2 times daily (before meals) 8/23/2022: 0700 & 1630 8/23/2022 at 0700     gabapentin (NEURONTIN) 300 MG capsule Take 600 mg by mouth At Bedtime  8/22/2022 at hs     glipiZIDE (GLUCOTROL) 5 MG tablet Take 2.5 mg by mouth daily before breakfast  8/23/2022 at 0830     loperamide (IMODIUM) 2 MG capsule Take 2 mg by mouth 4 times daily as needed for diarrhea       LORazepam (ATIVAN) 0.5 MG tablet Take 0.5 mg by mouth daily as needed for anxiety       losartan (COZAAR) 25  MG tablet Take 25 mg by mouth At Bedtime   8/22/2022 at hs     magnesium oxide 400 MG CAPS Take 400 mg by mouth 4 times daily  8/23/2022 at 0830     metFORMIN (GLUCOPHAGE) 500 MG tablet Take 500 mg by mouth daily (with dinner)  8/22/2022 at 1700     metFORMIN (GLUCOPHAGE) 500 MG tablet Take 1,000 mg by mouth daily (with breakfast)  8/23/2022 at 0830     metoprolol succinate ER (TOPROL-XL) 25 MG 24 hr tablet Take 75 mg by mouth daily   8/23/2022 at 0830     mineral oil-hydrophilic petrolatum (AQUAPHOR) external ointment Apply topically 3 times daily as needed for irritation or dry skin Apply to forehead and other areas as needed       mirtazapine (REMERON) 7.5 MG tablet Take 7.5 mg by mouth At Bedtime   8/22/2022 at hs     Nitroglycerin (NITROSTAT SL) Place 0.4 mg under the tongue every 5 minutes as needed for chest pain        oxyCODONE (ROXICODONE) 5 MG tablet TAKE 1 TAB BY MOUTH EVERY 6HOURS AS NEEDED FOR PAIN (Patient taking differently: Take 5 mg by mouth every 4 hours as needed for moderate to severe pain)       pantoprazole (PROTONIX) 40 MG EC tablet Take 40 mg by mouth daily  8/23/2022 at 0700     polyethylene glycol (MIRALAX) 17 GM/Dose powder Take 1 packet by mouth daily as needed for constipation        senna-docusate (SENOKOT-S/PERICOLACE) 8.6-50 MG tablet Take 1 tablet by mouth 2 times daily  8/23/2022 at 0830     senna-docusate (SENOKOT-S/PERICOLACE) 8.6-50 MG tablet Take 1 tablet by mouth 2 times daily as needed for constipation In addition to scheduled doses       traZODone (DESYREL) 50 MG tablet Take 50 mg by mouth At Bedtime   8/22/2022 at hs     vitamin C (ASCORBIC ACID) 500 MG tablet Take 500 mg by mouth daily   8/23/2022 at 0830     XARELTO ANTICOAGULANT 20 MG TABS tablet Take 20 mg by mouth daily  8/23/2022 at 0830       Information source(s): Facility ([facility]/) medication list/MAR  Method of interview communication: N/A    Summary of Changes to PTA Med List  New: none  Discontinued:  none  Changed: none    Patient was asked about OTC/herbal products specifically.  PTA med list reflects this.    In the past week, patient estimated taking medication this percent of the time:  greater than 90%.    Allergies were reviewed, assessed, and updated with the patient.      Patient did not bring any medications to the hospital and can't retrieve from home. No multi-dose medications are available for use during hospital stay.     The information provided in this note is only as accurate as the sources available at the time of the update(s).    Thank you for the opportunity to participate in the care of this patient.    Claire Roblero [facility]  8/23/2022 6:49 PM

## 2022-08-23 NOTE — ED NOTES
"    ED Provider In Triage Note  [facility]  Encounter Date: Aug 23, 2022    Chief Complaint   Patient presents with     Wound Check         Use of Intrepreter: N/A      Brief HPI:   Cm Lundberg is a 52 year old male presenting to the Emergency Department with a chief complaint of wound concerns.    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 No fevers.    [facility] comes from [facility].        Brief Physical Exam:  /67   Pulse 104   Temp 99.5  F (37.5  C)   Resp 20   Ht 1.702 m (5' 7\")   Wt 69.4 kg (153 lb)   SpO2 95%   BMI 23.96 kg/m    General: Non-toxic appearing  HEENT: Atraumatic  Resp: No respiratory distress  Abdomen: Non-peritoneal  Neuro: Alert, answers questions appropriately  Psych: Behavior appropriate  Musculoskeletal: right leg wrapped and in a support brace (will leave on in triage at this time)        Plan Initiated in Triage:  Orders Placed This Encounter     [place] Draw     Basic metabolic panel     Lactic acid whole blood     piperacillin-tazobactam (ZOSYN) 3.375 g vial to attach to  mL bag     Will initiate abx from triage. No documents abx on discharge summery and no cultures from recent hospitalization appreciated. Will start with vanc and zosyn.        PIT Dispo:   Return to lobby while awaiting workup and ED bed availability          Amanda Diaz MD on 8/23/2022 at 12:30 PM        Patient was evaluated by the Physician in Triage due to a limitation of available rooms in the Emergency Department. A plan of care was discussed based on the information obtained on the initial evaluation and patient was counseled to return back to the Emergency Department lobby after this initial evalutaiton until results were obtained or a room became available in the Emergency Department. Patient was counseled not to leave prior to receiving the results of their workup.            Amanda Diaz MD  08/23/22 " 1237     yes

## 2022-08-23 NOTE — ED TRIAGE NOTES
Patient to triage via [facility] from [facility]. Patient is 2 weeks post BKA on right. Nursing changing dressing today noted wound to be reddened, draining. Concern for infection, sent to ER. Patient has drsg and brace on stump, not removed in triage. Patient states poor appetite.     Triage Assessment     Row Name 08/23/22 1235       Triage Assessment (Adult)    Airway WDL WDL       Respiratory WDL    Respiratory WDL WDL       Skin Circulation/Temperature WDL    Skin Circulation/Temperature WDL WDL       Cardiac WDL    Cardiac WDL WDL       Peripheral/Neurovascular WDL    Peripheral Neurovascular WDL WDL       Cognitive/Neuro/Behavioral WDL    Cognitive/Neuro/Behavioral WDL WDL

## 2022-08-23 NOTE — ED PROVIDER NOTES
EMERGENCY DEPARTMENT ENCOUNTER      NAME: Cm Lundberg  AGE: 52 year old male  YOB: 1970  MRN: [id number]  EVALUATION DATE & TIME: 8/23/2022  5:11 PM    PCP: Provider, Generic External Data    ED PROVIDER: Loyd Mendoza M.D.      Chief Complaint   Patient presents with     Wound Check         FINAL IMPRESSION:  Wpound infection    ED COURSE & MEDICAL DECISION MAKING:    Pertinent Labs & Imaging studies reviewed. (See chart for details)  52 year old male presents to the Emergency Department for evaluation of wound check.  Patient developed pain and drainage from his stump today.  On examination appears to have purulent drainage.  It is tender.  No signs of sepsis.  CT scan shows a fluid collection.  Patient received vancomycin here in the ER.  I spoke with Dr. Acevedo who recommended admission and they will see the patient in the morning.    5:43 PM Initial history and physical performed. Plan of care discussed.  9:37 PM I spoke with Dr. Santoyo, Hospitalist.   10:14 PM I spoke with Dr. Montes, Vascular Surgery, who said the patient should be NPO and they will take a look at the patient in the morning.    At the conclusion of the encounter I discussed the results of all of the tests and the disposition. The questions were answered. The patient or family acknowledged understanding and was agreeable with the care plan.           MEDICATIONS GIVEN IN THE EMERGENCY:  Medications   piperacillin-tazobactam (ZOSYN) 3.375 g vial to attach to  mL bag (0 g Intravenous Stopped 8/23/22 1412)   vancomycin (VANCOCIN) 1,250 mg in sodium chloride 0.9 % 250 mL intermittent infusion (0 mg Intravenous Stopped 8/23/22 1622)   oxyCODONE (ROXICODONE) tablet 5 mg (5 mg Oral Given 8/23/22 1630)       NEW PRESCRIPTIONS STARTED AT TODAY'S ER VISIT  New Prescriptions    No medications on file          =================================================================    HPI    Patient information was obtained from:  Patient    Use of : None    Cm Lundberg is a 52 year old male with a pertinent history of CAD, DM Type II, mild intellectual disabilities, essential hypertension, anemia, GERD, weakness, coronary arthrosclerosis, diabetic ulcer of right midfoot, and s/p amputate leg knee (right), who presents for evaluation of a wound check.    Patient reports that his TCU nurse was changing his dressing today and she noticed increased drainage and reddening of the wound and was concerned for a possible infection and sent the patient to the ED. Patient endorses feeling 'achy' and increased pain at the site of the wound since last night. Patient denies cough, fever, chest pain, shortness of breath, or any other concerns at this time.    REVIEW OF SYSTEMS   Review of Systems   Constitutional: Negative for fever.   Respiratory: Negative for cough and shortness of breath.    Cardiovascular: Negative for chest pain.   Musculoskeletal: Positive for myalgias.   Skin: Positive for color change and wound (with increased drainage).   All other systems reviewed and are negative.       PAST MEDICAL HISTORY:  Past Medical History:   Diagnosis Date     Anemia      Anemia, unspecified type 08/19/2016     Anxiety      Anxiety state 07/29/2021    Formatting of this note might be different from the original. Created by Conversion  Replacement Utility updated for latest IMO load     Arthritis      Atrial fibrillation and flutter (H)      Cerebral artery occlusion with cerebral infarction (H)      Cerebral infarction (H)      Chest pain, unspecified type 08/19/2016     Chronic atrial fibrillation (H)      Congestive heart failure (H)      Coronary artery disease     CABG with multiple stents     Coronary artery disease involving native coronary artery without angina pectoris, unspecified whether native or transplanted heart 08/19/2016     Coronary atherosclerosis 07/29/2021    Formatting of this note might be different from the  original. Created by Conversion  Replacement Utility updated for latest IMO load     Depression      Diabetic ulcer of right midfoot associated with type 2 diabetes mellitus, limited to breakdown of skin (H) 09/28/2017     Equinovarus acquired deformity, right 04/14/2021     Equinovarus deformity, acquired, right      Essential hypertension 08/19/2016     GERD (gastroesophageal reflux disease)      Heart attack (H)      History of blood transfusion      Hyperlipidemia      Hyperlipidemia, unspecified hyperlipidemia type 08/19/2016     Hypertension      Insomnia, unspecified type 08/19/2016     Ischemic cardiomyopathy      Late effects of cerebrovascular accident 08/19/2016     Major depressive disorder with single episode, remission status unspecified 08/19/2016     Microalbuminuria 07/29/2021    Formatting of this note might be different from the original. 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 Created by Conversion [facility] Annotation: Feb 11 2010  1:41PM - Leonid Cabral: CANNOT READ OR  WRITE     PVD (peripheral vascular disease) (H)      Stented coronary artery      Type 2 diabetes mellitus (H) 07/29/2021    Formatting of this note might be different from the original. Created by Conversion     Type II or unspecified type diabetes mellitus with ophthalmic manifestations, not stated as uncontrolled(250.50) (H) 07/29/2021    Formatting of this note might be different from the original. Created by Conversion     Weakness 08/19/2016       PAST SURGICAL HISTORY:  Past Surgical History:   Procedure Laterality Date     AMPUTATE LEG BELOW KNEE Right 8/5/2022    Procedure: AMPUTATION, BELOW KNEE, right leg;  Surgeon: Kim Montes MD;  Location: [facility]  OR     INCISION AND DRAINAGE LOWER EXTREMITY, COMBINED Right 12/17/2021    Procedure: INCISION AND DRAINAGE, right foot;  Surgeon: Alex Kim DPM;  Location: [facility] Main OR     INCISION AND DRAINAGE LOWER EXTREMITY, COMBINED Right 1/27/2022    Procedure: INCISION AND DRAINAGE, right foot with application of theraskin;  Surgeon: Alex Kim DPM;  Location: [facility] Main OR     INCISION AND DRAINAGE LOWER EXTREMITY, COMBINED Right 4/4/2022    Procedure: INCISION AND DRAINAGE, right foot with application of theraskin;  Surgeon: Alex Kim DPM;  Location: [facility] Main OR     IR LOWER EXTREMITY ANGIOGRAM RIGHT  6/29/2022     IR MISCELLANEOUS PROCEDURE  2/13/2004     ZZC CABG, VEIN, SINGLE      Description: CABG (CABG);  Recorded: 04/22/2009;     CURRENT MEDICATIONS:    acetaminophen (TYLENOL) 325 MG tablet  ARIPiprazole (ABILIFY) 2 MG tablet  ASPERCREME ORIGINAL 10 % external cream  atorvastatin (LIPITOR) 80 MG tablet  bisacodyl (DULCOLAX) 10 MG suppository  cholestyramine (QUESTRAN) 4 g packet  clopidogrel (PLAVIX) 75 MG tablet  DULoxetine (CYMBALTA) 60 MG capsule  erenumab-aooe (AIMOVIG) 140 MG/ML injection  fenofibrate micronized (LOFIBRA) 200 MG capsule  ferrous sulfate (FEROSUL) 325 (65 Fe) MG tablet  gabapentin (NEURONTIN) 100 MG capsule  gabapentin (NEURONTIN) 300 MG capsule  glipiZIDE (GLUCOTROL) 5 MG tablet  loperamide (IMODIUM) 2 MG capsule  LORazepam (ATIVAN) 0.5 MG tablet  losartan (COZAAR) 25 MG tablet  magnesium oxide 400 MG CAPS  metFORMIN (GLUCOPHAGE) 500 MG tablet  metoprolol succinate ER (TOPROL-XL) 25 MG 24 hr tablet  mineral oil-hydrophilic petrolatum (AQUAPHOR) external ointment  mirtazapine (REMERON) 7.5 MG tablet  Neomycin-Bacitracin-Polymyxin (TRIPLE ANTIBIOTIC) 3.5-400-5000 OINT ointment  Nitroglycerin (NITROSTAT SL)  oxyCODONE (ROXICODONE) 5 MG tablet  oxyCODONE (ROXICODONE) 5 MG tablet  pantoprazole (PROTONIX) 40 MG EC tablet  polyethylene glycol (MIRALAX)  "17 GM/Dose powder  senna-docusate (SENOKOT-S/PERICOLACE) 8.6-50 MG tablet  senna-docusate (SENOKOT-S/PERICOLACE) 8.6-50 MG tablet  traZODone (DESYREL) 50 MG tablet  vitamin C (ASCORBIC ACID) 500 MG tablet  XARELTO ANTICOAGULANT 20 MG TABS tablet      ALLERGIES:  Allergies   Allergen Reactions     Aspirin Other (See Comments)     Reacts with migraine medicine     Blood-Group Specific Substance      Anti-Fya present.  Expect delays in blood for transfusion.  Draw 2 lavender and 1 red for all type and screen orders.     Isosorbide Headache     Codeine Rash     Morphine Rash       FAMILY HISTORY:  Family History   Adopted: Yes       SOCIAL HISTORY:   Social History     Socioeconomic History     Marital status:    Tobacco Use     Smoking status: Former Smoker     Quit date: 2015     Years since quittin.5     Smokeless tobacco: Never Used   Vaping Use     Vaping Use: Never used   Substance and Sexual Activity     Alcohol use: Not Currently     Drug use: Not Currently       VITALS:  /65   Pulse 98   Temp 98.4  F (36.9  C) (Oral)   Resp 16   Ht 1.702 m (5' 7\")   Wt 69.4 kg (153 lb)   SpO2 98%   BMI 23.96 kg/m      PHYSICAL EXAM    Constitutional: Well developed, Well nourished, NAD, GCS 15  HENT: Normocephalic, Atraumatic, Bilateral external ears normal, Oropharynx normal, mucous membranes moist, Nose normal. Neck-  Normal range of motion, No tenderness, Supple, No stridor.  Eyes: PERRL, EOMI, Conjunctiva normal, No discharge.   Respiratory: Normal breath sounds, No respiratory distress, No wheezing, Speaks full sentences easily. No cough.  Cardiovascular: Normal heart rate, Regular rhythm, No murmurs, No rubs, No gallops. Chest wall nontender.  GI:Soft, No tenderness, No masses, No flank tenderness. No rebound or guarding.   Musculoskeletal: 2+ DP pulses. No edema.No cyanosis, No clubbing. Good range of motion in all major joints. No tenderness to palpation or major deformities noted. "   Integument: Warm, Dry, No rash. No petechiae. Erythema and pain to the medial portion of the stump. The stump is fluctuant and draining brown purulent material and is extremely tender to the touch. No streaking.   Neurologic: Alert & oriented x 3,  CN 3-12 intact Normal motor function, Normal sensory function, No focal deficits noted. Normal gait. Normal finger to nose bilaterally  Psychiatric: Affect normal, Judgment normal, Mood normal. Cooperative.     LAB:  All pertinent labs reviewed and interpreted.  Labs Ordered and Resulted from Time of ED Arrival to Time of ED Departure   BASIC METABOLIC PANEL - Abnormal       Result Value    Sodium 134 (*)     Potassium 4.3      Chloride 95 (*)     Carbon Dioxide (CO2) 25      Anion Gap 14      Urea Nitrogen 15      Creatinine 1.04      Calcium 10.2      Glucose 142 (*)     GFR Estimate 85     CBC WITH PLATELETS AND DIFFERENTIAL - Abnormal    WBC Count 8.9      RBC Count 3.89 (*)     Hemoglobin 9.9 (*)     Hematocrit 32.4 (*)     MCV 83      MCH 25.4 (*)     MCHC 30.6 (*)     RDW 14.5      Platelet Count 433      % Neutrophils 79      % Lymphocytes 9      % Monocytes 9      % Eosinophils 2      % Basophils 1      % Immature Granulocytes 0      NRBCs per 100 WBC 0      Absolute Neutrophils 7.1      Absolute Lymphocytes 0.8      Absolute Monocytes 0.8      Absolute Eosinophils 0.1      Absolute Basophils 0.1      Absolute Immature Granulocytes 0.0      Absolute NRBCs 0.0     GLUCOSE BY METER - Abnormal    GLUCOSE BY METER POCT 137 (*)    LACTIC ACID WHOLE BLOOD - Normal    Lactic Acid 1.7     GLUCOSE MONITOR NURSING POCT   BLOOD CULTURE   BLOOD CULTURE       RADIOLOGY:  Reviewed all pertinent imaging. Please see official radiology report.  No orders to display             IDaniele, am serving as a scribe to document services personally performed by Dr. Loyd Mendoza based on my observation and the provider's statements to me. ILoyd MD attest that  Daniele Hercules is acting in a scribe capacity, has observed my performance of the services and has documented them in accordance with my direction.    Loyd Mendoza M.D.  Emergency Medicine  [facility] EMERGENCY DEPARTMENT  [address] [place] [id number]  [phone]  Dept: [phone]       Loyd Mendoza MD  08/29/22 0639

## 2022-08-23 NOTE — PHARMACY-VANCOMYCIN DOSING SERVICE
"Pharmacy Vancomycin Initial Note  Date of Service 2022  Patient's  1970  52 year old, male    Indication: Skin and Soft Tissue Infection    Current estimated CrCl = Estimated Creatinine Clearance: 73.4 mL/min (based on SCr of 1.13 mg/dL).    Creatinine for last 3 days  No results found for requested labs within last 72 hours.    Recent Vancomycin Level(s) for last 3 days  No results found for requested labs within last 72 hours.      Vancomycin IV Administrations (past 72 hours)      No vancomycin orders with administrations in past 72 hours.                Nephrotoxins and other renal medications (From now, onward)    Start     Dose/Rate Route Frequency Ordered Stop    22 1300  piperacillin-tazobactam (ZOSYN) 3.375 g vial to attach to  mL bag        Note to Pharmacy: For [facility], [facility] and [facility]: For Zosyn-naive patients, use the \"Zosyn initial dose + extended infusion\" order panel.    3.375 g  over 30 Minutes Intravenous ONCE 22 1234      22 1300  vancomycin (VANCOCIN) 1,250 mg in sodium chloride 0.9 % 250 mL intermittent infusion         1,250 mg  over 90 Minutes Intravenous ONCE 22 1239            Contrast Orders - past 72 hours (72h ago, onward)    None                Plan:  1. Start vancomycin  1250 mg IV once.   2. Please consult Pharmacy for further dosing.     Danny Sebastian [facility]    "

## 2022-08-23 NOTE — ED NOTES
Expected Patient Referral to ED  12:07 PM    Referring Clinic/Provider:  [facility]    Reason for referral/Clinical facts:  Large amount drainage  Staples  Drainage some purulence  Diabetic  Concern for infection   Concern about flap        Recommendations provided:  Send to ED for further evaluation    Caller was informed that this institution does possess the capabilities and/or resources to provide for patient and should be transferred to our facility.    Discussed that if direct admit is sought and any hurdles are encountered, this ED would be happy to see the patient and evaluate.    Informed caller that recommendations provided are recommendations based only on the facts provided and that they responsible to accept or reject the advice, or to seek a formal in person consultation as needed and that this ED will see/treat patient should they arrive.      Negra Avila MD  [facility] EMERGENCY DEPARTMENT  [address] [place] [id number] [phone]       Negra Avila MD  08/23/22 0803

## 2022-08-23 NOTE — TELEPHONE ENCOUNTER
Spoke with Sonia at [facility].  Patient already enroute to ER.  Patient was having 10/10 pain, which is not patient's baseline and drainage has significantly increased.  Will update vascular providers.

## 2022-08-24 ENCOUNTER — ANESTHESIA (OUTPATIENT)
Dept: SURGERY | Facility: HOSPITAL | Age: 52
DRG: 475 | End: 2022-08-24
Payer: MEDICARE

## 2022-08-24 ENCOUNTER — ANESTHESIA EVENT (OUTPATIENT)
Dept: SURGERY | Facility: HOSPITAL | Age: 52
DRG: 475 | End: 2022-08-24
Payer: MEDICARE

## 2022-08-24 LAB
ABO/RH(D): ABNORMAL
ALBUMIN SERPL-MCNC: 2.8 G/DL (ref 3.5–5)
ALP SERPL-CCNC: 61 U/L (ref 45–120)
ALT SERPL W P-5'-P-CCNC: 11 U/L (ref 0–45)
ANION GAP SERPL CALCULATED.3IONS-SCNC: 8 MMOL/L (ref 5–18)
ANTIBODY ID: NORMAL
ANTIBODY SCREEN: POSITIVE
AST SERPL W P-5'-P-CCNC: 13 U/L (ref 0–40)
BASOPHILS # BLD AUTO: 0.1 10E3/UL (ref 0–0.2)
BASOPHILS # BLD AUTO: 0.1 10E3/UL (ref 0–0.2)
BASOPHILS NFR BLD AUTO: 1 %
BASOPHILS NFR BLD AUTO: 1 %
BILIRUB SERPL-MCNC: 0.3 MG/DL (ref 0–1)
BLD PROD TYP BPU: NORMAL
BLD PROD TYP BPU: NORMAL
BLOOD COMPONENT TYPE: NORMAL
BLOOD COMPONENT TYPE: NORMAL
BUN SERPL-MCNC: 26 MG/DL (ref 8–22)
C REACTIVE PROTEIN LHE: 18.9 MG/DL (ref 0–?)
CALCIUM SERPL-MCNC: 10 MG/DL (ref 8.5–10.5)
CHLORIDE BLD-SCNC: 103 MMOL/L (ref 98–107)
CO2 SERPL-SCNC: 25 MMOL/L (ref 22–31)
CODING SYSTEM: NORMAL
CODING SYSTEM: NORMAL
CREAT SERPL-MCNC: 1.47 MG/DL (ref 0.7–1.3)
CREAT SERPL-MCNC: 1.5 MG/DL (ref 0.7–1.3)
CROSSMATCH: NORMAL
CROSSMATCH: NORMAL
ENTEROCOCCUS FAECALIS: NOT DETECTED
ENTEROCOCCUS FAECIUM: NOT DETECTED
EOSINOPHIL # BLD AUTO: 0.2 10E3/UL (ref 0–0.7)
EOSINOPHIL # BLD AUTO: 0.2 10E3/UL (ref 0–0.7)
EOSINOPHIL NFR BLD AUTO: 3 %
EOSINOPHIL NFR BLD AUTO: 3 %
ERYTHROCYTE [DISTWIDTH] IN BLOOD BY AUTOMATED COUNT: 14.7 % (ref 10–15)
ERYTHROCYTE [DISTWIDTH] IN BLOOD BY AUTOMATED COUNT: 14.8 % (ref 10–15)
GFR SERPL CREATININE-BSD FRML MDRD: 55 ML/MIN/1.73M2
GFR SERPL CREATININE-BSD FRML MDRD: 56 ML/MIN/1.73M2
GLUCOSE BLD-MCNC: 142 MG/DL (ref 70–125)
GLUCOSE BLDC GLUCOMTR-MCNC: 138 MG/DL (ref 70–99)
GLUCOSE BLDC GLUCOMTR-MCNC: 140 MG/DL (ref 70–99)
GLUCOSE BLDC GLUCOMTR-MCNC: 143 MG/DL (ref 70–99)
GLUCOSE BLDC GLUCOMTR-MCNC: 248 MG/DL (ref 70–99)
GLUCOSE BLDC GLUCOMTR-MCNC: 362 MG/DL (ref 70–99)
GRAM STAIN RESULT: ABNORMAL
GRAM STAIN RESULT: ABNORMAL
HCT VFR BLD AUTO: 28.3 % (ref 40–53)
HCT VFR BLD AUTO: 29 % (ref 40–53)
HGB BLD-MCNC: 8.7 G/DL (ref 13.3–17.7)
HGB BLD-MCNC: 8.9 G/DL (ref 13.3–17.7)
HOLD SPECIMEN: NORMAL
HOLD SPECIMEN: NORMAL
IMM GRANULOCYTES # BLD: 0 10E3/UL
IMM GRANULOCYTES # BLD: 0 10E3/UL
IMM GRANULOCYTES NFR BLD: 0 %
IMM GRANULOCYTES NFR BLD: 1 %
INR PPP: 1.25 (ref 0.85–1.15)
ISSUE DATE AND TIME: NORMAL
LACTATE SERPL-SCNC: 0.8 MMOL/L (ref 0.7–2)
LISTERIA SPECIES (DETECTED/NOT DETECTED): NOT DETECTED
LYMPHOCYTES # BLD AUTO: 0.8 10E3/UL (ref 0.8–5.3)
LYMPHOCYTES # BLD AUTO: 0.9 10E3/UL (ref 0.8–5.3)
LYMPHOCYTES NFR BLD AUTO: 12 %
LYMPHOCYTES NFR BLD AUTO: 14 %
MAGNESIUM SERPL-MCNC: 1.8 MG/DL (ref 1.8–2.6)
MCH RBC QN AUTO: 25.6 PG (ref 26.5–33)
MCH RBC QN AUTO: 25.6 PG (ref 26.5–33)
MCHC RBC AUTO-ENTMCNC: 30.7 G/DL (ref 31.5–36.5)
MCHC RBC AUTO-ENTMCNC: 30.7 G/DL (ref 31.5–36.5)
MCV RBC AUTO: 83 FL (ref 78–100)
MCV RBC AUTO: 83 FL (ref 78–100)
MONOCYTES # BLD AUTO: 0.6 10E3/UL (ref 0–1.3)
MONOCYTES # BLD AUTO: 0.6 10E3/UL (ref 0–1.3)
MONOCYTES NFR BLD AUTO: 9 %
MONOCYTES NFR BLD AUTO: 9 %
NEUTROPHILS # BLD AUTO: 4.7 10E3/UL (ref 1.6–8.3)
NEUTROPHILS # BLD AUTO: 4.9 10E3/UL (ref 1.6–8.3)
NEUTROPHILS NFR BLD AUTO: 72 %
NEUTROPHILS NFR BLD AUTO: 75 %
NRBC # BLD AUTO: 0 10E3/UL
NRBC # BLD AUTO: 0 10E3/UL
NRBC BLD AUTO-RTO: 0 /100
NRBC BLD AUTO-RTO: 0 /100
PLATELET # BLD AUTO: 255 10E3/UL (ref 150–450)
PLATELET # BLD AUTO: 315 10E3/UL (ref 150–450)
PLATELET # BLD AUTO: 323 10E3/UL (ref 150–450)
POTASSIUM BLD-SCNC: 4.6 MMOL/L (ref 3.5–5)
POTASSIUM BLD-SCNC: 4.7 MMOL/L (ref 3.5–5)
PROT SERPL-MCNC: 6.8 G/DL (ref 6–8)
RBC # BLD AUTO: 3.4 10E6/UL (ref 4.4–5.9)
RBC # BLD AUTO: 3.48 10E6/UL (ref 4.4–5.9)
SODIUM SERPL-SCNC: 136 MMOL/L (ref 136–145)
SPECIMEN EXPIRATION DATE: ABNORMAL
SPECIMEN EXPIRATION DATE: NORMAL
STAPHYLOCOCCUS AUREUS: DETECTED
STAPHYLOCOCCUS EPIDERMIDIS: NOT DETECTED
STAPHYLOCOCCUS LUGDUNENSIS: NOT DETECTED
STREPTOCOCCUS AGALACTIAE: NOT DETECTED
STREPTOCOCCUS ANGINOSUS GROUP: NOT DETECTED
STREPTOCOCCUS PNEUMONIAE: NOT DETECTED
STREPTOCOCCUS PYOGENES: NOT DETECTED
STREPTOCOCCUS SPECIES: NOT DETECTED
UNIT ABO/RH: NORMAL
UNIT ABO/RH: NORMAL
UNIT NUMBER: NORMAL
UNIT NUMBER: NORMAL
UNIT STATUS: NORMAL
UNIT STATUS: NORMAL
UNIT TYPE ISBT: 5100
UNIT TYPE ISBT: 5100
WBC # BLD AUTO: 6.5 10E3/UL (ref 4–11)
WBC # BLD AUTO: 6.6 10E3/UL (ref 4–11)

## 2022-08-24 PROCEDURE — 258N000003 HC RX IP 258 OP 636: Performed by: ANESTHESIOLOGY

## 2022-08-24 PROCEDURE — 250N000012 HC RX MED GY IP 250 OP 636 PS 637: Performed by: INTERNAL MEDICINE

## 2022-08-24 PROCEDURE — 250N000011 HC RX IP 250 OP 636: Performed by: NURSE ANESTHETIST, CERTIFIED REGISTERED

## 2022-08-24 PROCEDURE — 96376 TX/PRO/DX INJ SAME DRUG ADON: CPT

## 2022-08-24 PROCEDURE — 96366 THER/PROPH/DIAG IV INF ADDON: CPT

## 2022-08-24 PROCEDURE — 85025 COMPLETE CBC W/AUTO DIFF WBC: CPT | Performed by: SURGERY

## 2022-08-24 PROCEDURE — 258N000001 HC RX 258: Performed by: SURGERY

## 2022-08-24 PROCEDURE — 85049 AUTOMATED PLATELET COUNT: CPT | Performed by: PHYSICIAN ASSISTANT

## 2022-08-24 PROCEDURE — 258N000003 HC RX IP 258 OP 636: Performed by: NURSE ANESTHETIST, CERTIFIED REGISTERED

## 2022-08-24 PROCEDURE — 250N000009 HC RX 250: Performed by: NURSE ANESTHETIST, CERTIFIED REGISTERED

## 2022-08-24 PROCEDURE — 250N000013 HC RX MED GY IP 250 OP 250 PS 637: Performed by: INTERNAL MEDICINE

## 2022-08-24 PROCEDURE — 85025 COMPLETE CBC W/AUTO DIFF WBC: CPT | Performed by: INTERNAL MEDICINE

## 2022-08-24 PROCEDURE — 86140 C-REACTIVE PROTEIN: CPT | Performed by: INTERNAL MEDICINE

## 2022-08-24 PROCEDURE — 250N000009 HC RX 250: Performed by: SURGERY

## 2022-08-24 PROCEDURE — 370N000017 HC ANESTHESIA TECHNICAL FEE, PER MIN: Performed by: SURGERY

## 2022-08-24 PROCEDURE — 86922 COMPATIBILITY TEST ANTIGLOB: CPT | Performed by: SURGERY

## 2022-08-24 PROCEDURE — P9045 ALBUMIN (HUMAN), 5%, 250 ML: HCPCS | Performed by: NURSE ANESTHETIST, CERTIFIED REGISTERED

## 2022-08-24 PROCEDURE — 999N000141 HC STATISTIC PRE-PROCEDURE NURSING ASSESSMENT: Performed by: SURGERY

## 2022-08-24 PROCEDURE — 84132 ASSAY OF SERUM POTASSIUM: CPT | Performed by: SURGERY

## 2022-08-24 PROCEDURE — 360N000075 HC SURGERY LEVEL 2, PER MIN: Performed by: SURGERY

## 2022-08-24 PROCEDURE — 85610 PROTHROMBIN TIME: CPT | Performed by: INTERNAL MEDICINE

## 2022-08-24 PROCEDURE — 36415 COLL VENOUS BLD VENIPUNCTURE: CPT | Performed by: INTERNAL MEDICINE

## 2022-08-24 PROCEDURE — 250N000009 HC RX 250: Performed by: ANESTHESIOLOGY

## 2022-08-24 PROCEDURE — 87077 CULTURE AEROBIC IDENTIFY: CPT | Performed by: SURGERY

## 2022-08-24 PROCEDURE — 250N000011 HC RX IP 250 OP 636: Performed by: SURGERY

## 2022-08-24 PROCEDURE — 84145 PROCALCITONIN (PCT): CPT | Performed by: INTERNAL MEDICINE

## 2022-08-24 PROCEDURE — 83605 ASSAY OF LACTIC ACID: CPT | Performed by: INTERNAL MEDICINE

## 2022-08-24 PROCEDURE — 87075 CULTR BACTERIA EXCEPT BLOOD: CPT | Performed by: SURGERY

## 2022-08-24 PROCEDURE — 999N000197 HC STATISTIC WOC PT EDUCATION, 0-15 MIN

## 2022-08-24 PROCEDURE — 86870 RBC ANTIBODY IDENTIFICATION: CPT | Performed by: SURGERY

## 2022-08-24 PROCEDURE — 0Y6H0Z2 DETACHMENT AT RIGHT LOWER LEG, MID, OPEN APPROACH: ICD-10-PCS | Performed by: SURGERY

## 2022-08-24 PROCEDURE — 99233 SBSQ HOSP IP/OBS HIGH 50: CPT | Performed by: INTERNAL MEDICINE

## 2022-08-24 PROCEDURE — 82565 ASSAY OF CREATININE: CPT | Performed by: SURGERY

## 2022-08-24 PROCEDURE — 272N000001 HC OR GENERAL SUPPLY STERILE: Performed by: SURGERY

## 2022-08-24 PROCEDURE — 87205 SMEAR GRAM STAIN: CPT | Performed by: SURGERY

## 2022-08-24 PROCEDURE — 86850 RBC ANTIBODY SCREEN: CPT | Performed by: SURGERY

## 2022-08-24 PROCEDURE — 250N000011 HC RX IP 250 OP 636: Performed by: INTERNAL MEDICINE

## 2022-08-24 PROCEDURE — 83735 ASSAY OF MAGNESIUM: CPT | Performed by: INTERNAL MEDICINE

## 2022-08-24 PROCEDURE — 120N000001 HC R&B MED SURG/OB

## 2022-08-24 PROCEDURE — 36415 COLL VENOUS BLD VENIPUNCTURE: CPT | Performed by: SURGERY

## 2022-08-24 PROCEDURE — 87040 BLOOD CULTURE FOR BACTERIA: CPT | Performed by: INTERNAL MEDICINE

## 2022-08-24 PROCEDURE — 258N000003 HC RX IP 258 OP 636: Performed by: INTERNAL MEDICINE

## 2022-08-24 PROCEDURE — 99221 1ST HOSP IP/OBS SF/LOW 40: CPT | Mod: 24 | Performed by: SURGERY

## 2022-08-24 PROCEDURE — 36415 COLL VENOUS BLD VENIPUNCTURE: CPT | Performed by: PHYSICIAN ASSISTANT

## 2022-08-24 PROCEDURE — 27886 AMPUTATION FOLLOW-UP SURGERY: CPT | Mod: 78 | Performed by: SURGERY

## 2022-08-24 PROCEDURE — 80053 COMPREHEN METABOLIC PANEL: CPT | Performed by: INTERNAL MEDICINE

## 2022-08-24 PROCEDURE — 250N000011 HC RX IP 250 OP 636: Performed by: ANESTHESIOLOGY

## 2022-08-24 PROCEDURE — 710N000010 HC RECOVERY PHASE 1, LEVEL 2, PER MIN: Performed by: SURGERY

## 2022-08-24 RX ORDER — LIDOCAINE 40 MG/G
CREAM TOPICAL
Status: DISCONTINUED | OUTPATIENT
Start: 2022-08-24 | End: 2022-08-26

## 2022-08-24 RX ORDER — ACETAMINOPHEN 325 MG/1
975 TABLET ORAL EVERY 8 HOURS
Status: DISCONTINUED | OUTPATIENT
Start: 2022-08-24 | End: 2022-08-24

## 2022-08-24 RX ORDER — SODIUM HYPOCHLORITE 2.5 MG/ML
SOLUTION TOPICAL 2 TIMES DAILY PRN
Status: DISCONTINUED | OUTPATIENT
Start: 2022-08-24 | End: 2022-08-30 | Stop reason: HOSPADM

## 2022-08-24 RX ORDER — FENTANYL CITRATE 50 UG/ML
100 INJECTION, SOLUTION INTRAMUSCULAR; INTRAVENOUS
Status: DISCONTINUED | OUTPATIENT
Start: 2022-08-24 | End: 2022-08-24 | Stop reason: HOSPADM

## 2022-08-24 RX ORDER — SODIUM CHLORIDE, SODIUM LACTATE, POTASSIUM CHLORIDE, CALCIUM CHLORIDE 600; 310; 30; 20 MG/100ML; MG/100ML; MG/100ML; MG/100ML
INJECTION, SOLUTION INTRAVENOUS CONTINUOUS
Status: DISCONTINUED | OUTPATIENT
Start: 2022-08-24 | End: 2022-08-24 | Stop reason: HOSPADM

## 2022-08-24 RX ORDER — MAGNESIUM HYDROXIDE 1200 MG/15ML
LIQUID ORAL PRN
Status: DISCONTINUED | OUTPATIENT
Start: 2022-08-24 | End: 2022-08-24 | Stop reason: HOSPADM

## 2022-08-24 RX ORDER — LIDOCAINE HYDROCHLORIDE 20 MG/ML
INJECTION, SOLUTION INFILTRATION; PERINEURAL PRN
Status: DISCONTINUED | OUTPATIENT
Start: 2022-08-24 | End: 2022-08-24

## 2022-08-24 RX ORDER — ONDANSETRON 2 MG/ML
4 INJECTION INTRAMUSCULAR; INTRAVENOUS EVERY 30 MIN PRN
Status: DISCONTINUED | OUTPATIENT
Start: 2022-08-24 | End: 2022-08-24 | Stop reason: HOSPADM

## 2022-08-24 RX ORDER — BUPIVACAINE HYDROCHLORIDE 5 MG/ML
INJECTION, SOLUTION EPIDURAL; INTRACAUDAL
Status: COMPLETED | OUTPATIENT
Start: 2022-08-24 | End: 2022-08-24

## 2022-08-24 RX ORDER — FENTANYL CITRATE 50 UG/ML
25 INJECTION, SOLUTION INTRAMUSCULAR; INTRAVENOUS EVERY 5 MIN PRN
Status: DISCONTINUED | OUTPATIENT
Start: 2022-08-24 | End: 2022-08-24 | Stop reason: HOSPADM

## 2022-08-24 RX ORDER — ONDANSETRON 4 MG/1
4 TABLET, ORALLY DISINTEGRATING ORAL EVERY 30 MIN PRN
Status: DISCONTINUED | OUTPATIENT
Start: 2022-08-24 | End: 2022-08-24 | Stop reason: HOSPADM

## 2022-08-24 RX ORDER — HALOPERIDOL 5 MG/ML
1 INJECTION INTRAMUSCULAR
Status: DISCONTINUED | OUTPATIENT
Start: 2022-08-24 | End: 2022-08-24 | Stop reason: HOSPADM

## 2022-08-24 RX ORDER — ONDANSETRON 4 MG/1
4 TABLET, ORALLY DISINTEGRATING ORAL EVERY 6 HOURS PRN
Status: DISCONTINUED | OUTPATIENT
Start: 2022-08-24 | End: 2022-08-26

## 2022-08-24 RX ORDER — POLYETHYLENE GLYCOL 3350 17 G/17G
17 POWDER, FOR SOLUTION ORAL DAILY
Status: DISCONTINUED | OUTPATIENT
Start: 2022-08-25 | End: 2022-08-26

## 2022-08-24 RX ORDER — HEPARIN SODIUM 5000 [USP'U]/.5ML
5000 INJECTION, SOLUTION INTRAVENOUS; SUBCUTANEOUS EVERY 8 HOURS
Status: DISCONTINUED | OUTPATIENT
Start: 2022-08-25 | End: 2022-08-26

## 2022-08-24 RX ORDER — CEFAZOLIN SODIUM/WATER 2 G/20 ML
2 SYRINGE (ML) INTRAVENOUS SEE ADMIN INSTRUCTIONS
Status: DISCONTINUED | OUTPATIENT
Start: 2022-08-24 | End: 2022-08-24 | Stop reason: HOSPADM

## 2022-08-24 RX ORDER — OXYCODONE HYDROCHLORIDE 5 MG/1
5 TABLET ORAL EVERY 4 HOURS PRN
Status: DISCONTINUED | OUTPATIENT
Start: 2022-08-24 | End: 2022-08-24 | Stop reason: HOSPADM

## 2022-08-24 RX ORDER — ONDANSETRON 2 MG/ML
INJECTION INTRAMUSCULAR; INTRAVENOUS PRN
Status: DISCONTINUED | OUTPATIENT
Start: 2022-08-24 | End: 2022-08-24

## 2022-08-24 RX ORDER — LIDOCAINE 40 MG/G
CREAM TOPICAL
Status: DISCONTINUED | OUTPATIENT
Start: 2022-08-24 | End: 2022-08-24 | Stop reason: HOSPADM

## 2022-08-24 RX ORDER — NALOXONE HYDROCHLORIDE 0.4 MG/ML
0.4 INJECTION, SOLUTION INTRAMUSCULAR; INTRAVENOUS; SUBCUTANEOUS
Status: DISCONTINUED | OUTPATIENT
Start: 2022-08-24 | End: 2022-08-30 | Stop reason: HOSPADM

## 2022-08-24 RX ORDER — PROPOFOL 10 MG/ML
INJECTION, EMULSION INTRAVENOUS CONTINUOUS PRN
Status: DISCONTINUED | OUTPATIENT
Start: 2022-08-24 | End: 2022-08-24

## 2022-08-24 RX ORDER — AMOXICILLIN 250 MG
1 CAPSULE ORAL 2 TIMES DAILY
Status: DISCONTINUED | OUTPATIENT
Start: 2022-08-24 | End: 2022-08-26

## 2022-08-24 RX ORDER — BISACODYL 10 MG
10 SUPPOSITORY, RECTAL RECTAL DAILY PRN
Status: DISCONTINUED | OUTPATIENT
Start: 2022-08-24 | End: 2022-08-30 | Stop reason: HOSPADM

## 2022-08-24 RX ORDER — NALOXONE HYDROCHLORIDE 0.4 MG/ML
0.2 INJECTION, SOLUTION INTRAMUSCULAR; INTRAVENOUS; SUBCUTANEOUS
Status: DISCONTINUED | OUTPATIENT
Start: 2022-08-24 | End: 2022-08-30 | Stop reason: HOSPADM

## 2022-08-24 RX ORDER — DEXAMETHASONE SODIUM PHOSPHATE 4 MG/ML
INJECTION, SOLUTION INTRA-ARTICULAR; INTRALESIONAL; INTRAMUSCULAR; INTRAVENOUS; SOFT TISSUE PRN
Status: DISCONTINUED | OUTPATIENT
Start: 2022-08-24 | End: 2022-08-24

## 2022-08-24 RX ORDER — PROCHLORPERAZINE MALEATE 10 MG
10 TABLET ORAL EVERY 6 HOURS PRN
Status: DISCONTINUED | OUTPATIENT
Start: 2022-08-24 | End: 2022-08-26

## 2022-08-24 RX ORDER — SODIUM CHLORIDE 9 MG/ML
INJECTION, SOLUTION INTRAVENOUS CONTINUOUS
Status: DISCONTINUED | OUTPATIENT
Start: 2022-08-24 | End: 2022-08-24

## 2022-08-24 RX ORDER — HYDROMORPHONE HYDROCHLORIDE 1 MG/ML
0.2 INJECTION, SOLUTION INTRAMUSCULAR; INTRAVENOUS; SUBCUTANEOUS EVERY 5 MIN PRN
Status: DISCONTINUED | OUTPATIENT
Start: 2022-08-24 | End: 2022-08-24 | Stop reason: HOSPADM

## 2022-08-24 RX ORDER — ONDANSETRON 2 MG/ML
4 INJECTION INTRAMUSCULAR; INTRAVENOUS EVERY 6 HOURS PRN
Status: DISCONTINUED | OUTPATIENT
Start: 2022-08-24 | End: 2022-08-26

## 2022-08-24 RX ORDER — ACETAMINOPHEN 325 MG/1
650 TABLET ORAL EVERY 4 HOURS PRN
Status: DISCONTINUED | OUTPATIENT
Start: 2022-08-27 | End: 2022-08-26

## 2022-08-24 RX ORDER — SODIUM CHLORIDE 9 MG/ML
INJECTION, SOLUTION INTRAVENOUS CONTINUOUS
Status: DISCONTINUED | OUTPATIENT
Start: 2022-08-24 | End: 2022-08-26

## 2022-08-24 RX ORDER — GLYCOPYRROLATE 0.2 MG/ML
INJECTION, SOLUTION INTRAMUSCULAR; INTRAVENOUS PRN
Status: DISCONTINUED | OUTPATIENT
Start: 2022-08-24 | End: 2022-08-24

## 2022-08-24 RX ORDER — CEFAZOLIN SODIUM/WATER 2 G/20 ML
2 SYRINGE (ML) INTRAVENOUS
Status: COMPLETED | OUTPATIENT
Start: 2022-08-24 | End: 2022-08-24

## 2022-08-24 RX ORDER — PROPOFOL 10 MG/ML
INJECTION, EMULSION INTRAVENOUS PRN
Status: DISCONTINUED | OUTPATIENT
Start: 2022-08-24 | End: 2022-08-24

## 2022-08-24 RX ADMIN — PHENYLEPHRINE HYDROCHLORIDE 100 MCG: 10 INJECTION INTRAVENOUS at 10:40

## 2022-08-24 RX ADMIN — GABAPENTIN 100 MG: 100 CAPSULE ORAL at 16:46

## 2022-08-24 RX ADMIN — SENNOSIDES AND DOCUSATE SODIUM 1 TABLET: 8.6; 5 TABLET ORAL at 19:58

## 2022-08-24 RX ADMIN — PHENYLEPHRINE HYDROCHLORIDE 100 MCG: 10 INJECTION INTRAVENOUS at 10:42

## 2022-08-24 RX ADMIN — INSULIN ASPART 1 UNITS: 100 INJECTION, SOLUTION INTRAVENOUS; SUBCUTANEOUS at 08:58

## 2022-08-24 RX ADMIN — SODIUM CHLORIDE, POTASSIUM CHLORIDE, SODIUM LACTATE AND CALCIUM CHLORIDE: 600; 310; 30; 20 INJECTION, SOLUTION INTRAVENOUS at 10:11

## 2022-08-24 RX ADMIN — SODIUM CHLORIDE 1000 ML: 9 INJECTION, SOLUTION INTRAVENOUS at 14:44

## 2022-08-24 RX ADMIN — LOSARTAN POTASSIUM 25 MG: 25 TABLET, FILM COATED ORAL at 21:45

## 2022-08-24 RX ADMIN — BUPIVACAINE HYDROCHLORIDE 20 ML: 5 INJECTION, SOLUTION EPIDURAL; INTRACAUDAL at 09:48

## 2022-08-24 RX ADMIN — PIPERACILLIN AND TAZOBACTAM 3.38 G: 3; .375 INJECTION, POWDER, LYOPHILIZED, FOR SOLUTION INTRAVENOUS at 14:46

## 2022-08-24 RX ADMIN — Medication 800 MG: at 19:57

## 2022-08-24 RX ADMIN — DULOXETINE HYDROCHLORIDE 120 MG: 60 CAPSULE, DELAYED RELEASE PELLETS ORAL at 12:54

## 2022-08-24 RX ADMIN — PHENYLEPHRINE HYDROCHLORIDE 100 MCG: 10 INJECTION INTRAVENOUS at 11:12

## 2022-08-24 RX ADMIN — CLOPIDOGREL BISULFATE 75 MG: 75 TABLET ORAL at 12:54

## 2022-08-24 RX ADMIN — SODIUM CHLORIDE, POTASSIUM CHLORIDE, SODIUM LACTATE AND CALCIUM CHLORIDE: 600; 310; 30; 20 INJECTION, SOLUTION INTRAVENOUS at 11:29

## 2022-08-24 RX ADMIN — Medication 800 MG: at 00:46

## 2022-08-24 RX ADMIN — TRAZODONE HYDROCHLORIDE 50 MG: 50 TABLET ORAL at 02:40

## 2022-08-24 RX ADMIN — DEXAMETHASONE SODIUM PHOSPHATE 4 MG: 4 INJECTION, SOLUTION INTRA-ARTICULAR; INTRALESIONAL; INTRAMUSCULAR; INTRAVENOUS; SOFT TISSUE at 10:17

## 2022-08-24 RX ADMIN — GABAPENTIN 600 MG: 300 CAPSULE ORAL at 21:45

## 2022-08-24 RX ADMIN — SENNOSIDES AND DOCUSATE SODIUM 1 TABLET: 8.6; 5 TABLET ORAL at 12:57

## 2022-08-24 RX ADMIN — MIRTAZAPINE 7.5 MG: 7.5 TABLET, FILM COATED ORAL at 21:45

## 2022-08-24 RX ADMIN — GLYCOPYRROLATE 0.1 MG: 0.2 INJECTION, SOLUTION INTRAMUSCULAR; INTRAVENOUS at 10:17

## 2022-08-24 RX ADMIN — FENOFIBRATE 160 MG: 160 TABLET, FILM COATED ORAL at 12:55

## 2022-08-24 RX ADMIN — PANTOPRAZOLE SODIUM 40 MG: 40 TABLET, DELAYED RELEASE ORAL at 12:56

## 2022-08-24 RX ADMIN — PHENYLEPHRINE HYDROCHLORIDE 100 MCG: 10 INJECTION INTRAVENOUS at 10:29

## 2022-08-24 RX ADMIN — HYDROMORPHONE HYDROCHLORIDE 0.5 MG: 1 INJECTION, SOLUTION INTRAMUSCULAR; INTRAVENOUS; SUBCUTANEOUS at 08:12

## 2022-08-24 RX ADMIN — BUPIVACAINE HYDROCHLORIDE 10 ML: 5 INJECTION, SOLUTION EPIDURAL; INTRACAUDAL; PERINEURAL at 09:46

## 2022-08-24 RX ADMIN — GABAPENTIN 600 MG: 300 CAPSULE ORAL at 00:46

## 2022-08-24 RX ADMIN — ONDANSETRON 4 MG: 2 INJECTION INTRAMUSCULAR; INTRAVENOUS at 10:17

## 2022-08-24 RX ADMIN — GABAPENTIN 100 MG: 100 CAPSULE ORAL at 13:01

## 2022-08-24 RX ADMIN — PIPERACILLIN AND TAZOBACTAM 3.38 G: 3; .375 INJECTION, POWDER, LYOPHILIZED, FOR SOLUTION INTRAVENOUS at 04:47

## 2022-08-24 RX ADMIN — LOSARTAN POTASSIUM 25 MG: 25 TABLET, FILM COATED ORAL at 02:40

## 2022-08-24 RX ADMIN — LIDOCAINE HYDROCHLORIDE 60 MG: 20 INJECTION, SOLUTION INFILTRATION; PERINEURAL at 10:17

## 2022-08-24 RX ADMIN — CHOLESTYRAMINE POWDER FOR SUSPENSION 4 G: 4 POWDER, FOR SUSPENSION ORAL at 12:53

## 2022-08-24 RX ADMIN — OXYCODONE HYDROCHLORIDE 5 MG: 5 TABLET ORAL at 04:40

## 2022-08-24 RX ADMIN — Medication 1 UNITS: at 10:52

## 2022-08-24 RX ADMIN — PHENYLEPHRINE HYDROCHLORIDE 200 MCG: 10 INJECTION INTRAVENOUS at 10:46

## 2022-08-24 RX ADMIN — ATORVASTATIN CALCIUM 80 MG: 40 TABLET, FILM COATED ORAL at 12:52

## 2022-08-24 RX ADMIN — ALBUMIN HUMAN: 0.05 INJECTION, SOLUTION INTRAVENOUS at 10:42

## 2022-08-24 RX ADMIN — Medication 800 MG: at 12:56

## 2022-08-24 RX ADMIN — SODIUM CHLORIDE: 9 INJECTION, SOLUTION INTRAVENOUS at 12:39

## 2022-08-24 RX ADMIN — ARIPIPRAZOLE 2 MG: 2 TABLET ORAL at 12:51

## 2022-08-24 RX ADMIN — MIDAZOLAM HYDROCHLORIDE 1 MG: 1 INJECTION, SOLUTION INTRAMUSCULAR; INTRAVENOUS at 09:46

## 2022-08-24 RX ADMIN — VANCOMYCIN HYDROCHLORIDE 750 MG: 1 INJECTION, POWDER, LYOPHILIZED, FOR SOLUTION INTRAVENOUS at 11:29

## 2022-08-24 RX ADMIN — ACETAMINOPHEN 1000 MG: 500 TABLET ORAL at 12:47

## 2022-08-24 RX ADMIN — METOPROLOL SUCCINATE 75 MG: 25 TABLET, EXTENDED RELEASE ORAL at 08:04

## 2022-08-24 RX ADMIN — FENTANYL CITRATE 50 MCG: 50 INJECTION, SOLUTION INTRAMUSCULAR; INTRAVENOUS at 09:47

## 2022-08-24 RX ADMIN — MIRTAZAPINE 7.5 MG: 7.5 TABLET, FILM COATED ORAL at 02:40

## 2022-08-24 RX ADMIN — ACETAMINOPHEN 1000 MG: 500 TABLET ORAL at 19:58

## 2022-08-24 RX ADMIN — TRAZODONE HYDROCHLORIDE 50 MG: 50 TABLET ORAL at 21:45

## 2022-08-24 RX ADMIN — Medication 500 MG: at 12:58

## 2022-08-24 RX ADMIN — PIPERACILLIN AND TAZOBACTAM 3.38 G: 3; .375 INJECTION, POWDER, LYOPHILIZED, FOR SOLUTION INTRAVENOUS at 00:46

## 2022-08-24 RX ADMIN — SENNOSIDES AND DOCUSATE SODIUM 1 TABLET: 8.6; 5 TABLET ORAL at 00:46

## 2022-08-24 RX ADMIN — INSULIN ASPART 3 UNITS: 100 INJECTION, SOLUTION INTRAVENOUS; SUBCUTANEOUS at 17:53

## 2022-08-24 RX ADMIN — PROPOFOL 30 MG: 10 INJECTION, EMULSION INTRAVENOUS at 10:17

## 2022-08-24 RX ADMIN — VANCOMYCIN HYDROCHLORIDE 750 MG: 1 INJECTION, POWDER, LYOPHILIZED, FOR SOLUTION INTRAVENOUS at 02:33

## 2022-08-24 RX ADMIN — PROPOFOL 150 MCG/KG/MIN: 10 INJECTION, EMULSION INTRAVENOUS at 10:17

## 2022-08-24 RX ADMIN — Medication 2 G: at 10:22

## 2022-08-24 RX ADMIN — OXYCODONE HYDROCHLORIDE 5 MG: 5 TABLET ORAL at 19:58

## 2022-08-24 RX ADMIN — PIPERACILLIN AND TAZOBACTAM 3.38 G: 3; .375 INJECTION, POWDER, LYOPHILIZED, FOR SOLUTION INTRAVENOUS at 21:45

## 2022-08-24 ASSESSMENT — ACTIVITIES OF DAILY LIVING (ADL)
ADLS_ACUITY_SCORE: 44
ADLS_ACUITY_SCORE: 35
ADLS_ACUITY_SCORE: 44
WALKING_OR_CLIMBING_STAIRS_DIFFICULTY: YES
WEAR_GLASSES_OR_BLIND: YES
DOING_ERRANDS_INDEPENDENTLY_DIFFICULTY: YES
CHANGE_IN_FUNCTIONAL_STATUS_SINCE_ONSET_OF_CURRENT_ILLNESS/INJURY: YES
ADLS_ACUITY_SCORE: 35
TRANSFERRING: 1-->ASSISTANCE (EQUIPMENT/PERSON) NEEDED (NOT DEVELOPMENTALLY APPROPRIATE)
BATHING: 1-->ASSISTANCE NEEDED
ADLS_ACUITY_SCORE: 44
VISION_MANAGEMENT: GLASSES
TOILETING_ISSUES: YES
ADLS_ACUITY_SCORE: 44
HEARING_DIFFICULTY_OR_DEAF: NO
CONCENTRATING,_REMEMBERING_OR_MAKING_DECISIONS_DIFFICULTY: NO
TOILETING: 2-->COMPLETELY DEPENDENT
TRANSFERRING: 1-->ASSISTANCE (EQUIPMENT/PERSON) NEEDED
ADLS_ACUITY_SCORE: 44
DRESSING/BATHING_DIFFICULTY: YES
DIFFICULTY_COMMUNICATING: NO
TOILETING: 1-->ASSISTANCE (EQUIPMENT/PERSON) NEEDED (NOT DEVELOPMENTALLY APPROPRIATE)
DRESSING/BATHING: BATHING DIFFICULTY, ASSISTANCE 1 PERSON
ADLS_ACUITY_SCORE: 44
ADLS_ACUITY_SCORE: 44
DRESS: 1-->ASSISTANCE (EQUIPMENT/PERSON) NEEDED (NOT DEVELOPMENTALLY APPROPRIATE)
ADLS_ACUITY_SCORE: 44
DRESS: 1-->ASSISTANCE (EQUIPMENT/PERSON) NEEDED
ADLS_ACUITY_SCORE: 38
DIFFICULTY_EATING/SWALLOWING: NO
FALL_HISTORY_WITHIN_LAST_SIX_MONTHS: NO
TOILETING_ASSISTANCE: TOILETING DIFFICULTY, ASSISTANCE 1 PERSON
DEPENDENT_IADLS:: CLEANING;COOKING;LAUNDRY;SHOPPING;MEAL PREPARATION;MEDICATION MANAGEMENT;MONEY MANAGEMENT;TRANSPORTATION
ADLS_ACUITY_SCORE: 44

## 2022-08-24 ASSESSMENT — LIFESTYLE VARIABLES: TOBACCO_USE: 1

## 2022-08-24 ASSESSMENT — ENCOUNTER SYMPTOMS: DYSRHYTHMIAS: 1

## 2022-08-24 NOTE — ANESTHESIA POSTPROCEDURE EVALUATION
Patient: Cm Lundberg    Procedure: Procedure(s):  IRRIGATION AND DEBRIDEMENT, LOWER EXTREMITY, REVISION OF BELOW KNEE AMPUTATION       Anesthesia Type:  General    Note:  Disposition: Inpatient   Postop Pain Control: Uneventful            Sign Out: Well controlled pain   PONV: No   Neuro/Psych: Uneventful            Sign Out: Acceptable/Baseline neuro status   Airway/Respiratory: Uneventful            Sign Out: Acceptable/Baseline resp. status   CV/Hemodynamics: Uneventful            Sign Out: Acceptable CV status; No obvious hypovolemia; No obvious fluid overload   Other NRE: NONE   DID A NON-ROUTINE EVENT OCCUR? No    Event details/Postop Comments:  Anticipate block duration of 24 hours           Last vitals:  Vitals Value Taken Time   /57 08/24/22 1130   Temp 36.1  C (97  F) 08/24/22 1112   Pulse 87 08/24/22 1142   Resp 16 08/24/22 1142   SpO2 100 % 08/24/22 1142   Vitals shown include unvalidated device data.    Electronically Signed By: Mazin Manriquez MD  August 24, 2022  11:43 AM

## 2022-08-24 NOTE — PHARMACY-VANCOMYCIN DOSING SERVICE
"Pharmacy Vancomycin Initial Note  Date of Service 2022  Patient's  1970  52 year old, male    Indication:Abscess/ Skin and Soft Tissue Infection s/p 2 weks out from BKA at ankle  Current estimated CrCl = Estimated Creatinine Clearance: 79.7 mL/min (based on SCr of 1.04 mg/dL).  Creatinine for last 3 days  2022:  1:00 PM Creatinine 1.04 mg/dL  Recent Vancomycin Level(s) for last 3 days  No results found for requested labs within last 72 hours.      Vancomycin IV Administrations (past 72 hours)                   vancomycin (VANCOCIN) 1,250 mg in sodium chloride 0.9 % 250 mL intermittent infusion (mg) 1,250 mg New Bag 22 1419                Nephrotoxins and other renal medications (From now, onward)    Start     Dose/Rate Route Frequency Ordered Stop    22 0454  piperacillin-tazobactam (ZOSYN) 3.375 g vial to attach to  mL bag        Note to Pharmacy: Extended infusion dosing to start 6 hours after initial infusion.   \"Followed by\" Linked Group Details    3.375 g  over 240 Minutes Intravenous EVERY 8 HOURS 22 2255      22 2300  piperacillin-tazobactam (ZOSYN) 3.375 g vial to attach to  mL bag        \"Followed by\" Linked Group Details    3.375 g  over 30 Minutes Intravenous ONCE 22 2255            Contrast Orders - past 72 hours (72h ago, onward)    Start     Dose/Rate Route Frequency Stop    22  iopamidol (ISOVUE-370) solution 75 mL         75 mL Intravenous ONCE 22 195      [employer] Prediction of Planned Initial Vancomycin Regimen  Regimen: 750 mg IV every 12 hours.  Start time: 00:03 on 2022  Exposure target: AUC24 (range)400-600 mg/L.hr   AUC24,ss: 445 mg/L.hr  Probability of AUC24 > 400: 62 %  Ctrough,ss: 14.3 mg/L  Probability of Ctrough,ss > 20: 22 %  Probability of nephrotoxicity (Lodise JEANETTE ): 9 %          Plan:  1. Start vancomycin  750 mg IV q12h.   2. Vancomycin monitoring method: AUC  3. Vancomycin therapeutic " monitoring goal: 400-600 mg*h/L  4. Pharmacy will check vancomycin levels as appropriate in 1-3 Days.    5. Serum creatinine levels will be ordered daily for the first week of therapy and at least twice weekly for subsequent weeks.      Joaquina Schulz, JORDINH

## 2022-08-24 NOTE — PLAN OF CARE
Problem: Plan of Care - These are the overarching goals to be used throughout the patient stay.    Goal: Optimal Comfort and Wellbeing  Outcome: Ongoing, Progressing  Intervention: Monitor Pain and Promote Comfort  Recent Flowsheet Documentation  Taken 8/24/2022 0417 by Asiya Acosta, RN  Pain Management Interventions: medication (see MAR)     Problem: Plan of Care - These are the overarching goals to be used throughout the patient stay.    Goal: Absence of Hospital-Acquired Illness or Injury  Intervention: Identify and Manage Fall Risk  Recent Flowsheet Documentation  Taken 8/24/2022 0417 by Asiya Acosta, CESARIO  Safety Promotion/Fall Prevention:    bed alarm on    assistive device/personal items within reach  Goal: Optimal Comfort and Wellbeing  Intervention: Monitor Pain and Promote Comfort  Recent Flowsheet Documentation  Taken 8/24/2022 0417 by Asiya Acosta RN  Pain Management Interventions: medication (see MAR)  Goal: Readiness for Transition of Care  Intervention: Mutually Develop Transition Plan  Recent Flowsheet Documentation  Taken 8/24/2022 0400 by Asiya Acosta RN  Equipment Currently Used at Home:    grab bar, tub/shower    grab bar, toilet    hospital bed    raised toilet seat    shower chair    wheelchair, manual   Goal Outcome Evaluation:      Pt is alert and oriented x4 came to unit around 0400. Dressing to right BKA intact, requested primo fit for incontinence. He is incontinent of B&B. Rated pain on residual limb 7/10 had oxycodone with effective result. PT stated he [place] in [facility], his mother is his guardian and has 2 or more siblings involved in his care that help with his errands. Pt remained NPO during the night for procedure this morning.   Zosyn infused.   Significant event called to hospitalist for blood culture positive for G+ cocci. Nursing to continue to monitor.

## 2022-08-24 NOTE — OP NOTE
Operative Note    Name: Cm Lundberg     Location: [facility] OR    Procedure Date:  8/23/2022 - 8/24/2022    PCP:  Provider, Generic External Data    Procedure(s):  IRRIGATION AND DEBRIDEMENT, LOWER EXTREMITY, REVISION OF BELOW KNEE AMPUTATION     Pre-Procedure Diagnosis:    Wound infection [T14.8XXA, L08.9]     Post-Procedure Diagnosis:    Same     Surgeon(s):  Kim Montes MD Woodward, Lizzeth RAFDORD, BERNY : Note that Lizzeth was present and essential for good outcome in this case.  She was present for incision, debridement, bony resection, irrigation, and packing.    Findings:    Jaspreet pus with unincorporated distal tibia stump.  Small amount of devitalized tissue but no real soft tissue necrosis.  Culture sent.        Estimated Blood Loss:   15 cc's    Specimens:    ID Type Source Tests Collected by Time Destination   A : Right Below Knee Stump Swab Leg, Below Knee, Right ANAEROBIC BACTERIAL CULTURE ROUTINE, GRAM STAIN, AEROBIC BACTERIAL CULTURE ROUTINE Kim Montes MD 8/24/2022 10:39 AM               Implants:  * No implants in log *     Complications:    * No complications entered in OR log *     Brief Clinical Hx:  This is a 52-year-old brittle diabetic patient who has undergone right leg below-knee amputation by me on August 5.  Presents now with pus draining from the medial aspect of his stump.  No systemic signs of infection and normal white count.  stable    Operative Details:  Patient was prepped draped for access to his right lower leg after regional block.  Staples were taken down to the medial aspect of the stump and the wound partially open.  There was significant drainage and clearly a large pocket.  Further staples and sutures were then taken down to allow to open three quarters of the stump.  There was a large amount of purulence.  Cultures were sent.  Devitalized tendon material was resected.  The bone and was unincorporated although it appeared healthy.  We resected about a centimeter of  bone to allow to fall back deeper into the wound to be somewhat protected.  This bone edge bled and bone wax was required for hemostasis.  We used a Pulsavac  to irrigate for 3 L of normal saline.  We took time to ensure hemostasis.  We packed the wound with gauze soaked in providing an peroxide to cause sterilization of the wound bed.  The wound was then dressed.      Kim Montes MD     Date: 8/24/2022  Time:11:37 AM

## 2022-08-24 NOTE — ANESTHESIA PROCEDURE NOTES
Sciatic Procedure Note    Pre-Procedure   Staff -        Anesthesiologist:  Mazin Manriquez MD       Performed By: anesthesiologist       Location: pre-op       Procedure Start/Stop Times: 8/24/2022 9:48 AM and 8/24/2022 9:51 AM       Pre-Anesthestic Checklist: patient identified, IV checked, site marked, risks and benefits discussed, informed consent, monitors and equipment checked, pre-op evaluation, at physician/surgeon's request and post-op pain management  Timeout:       Correct Patient: Yes        Correct Procedure: Yes        Correct Site: Yes        Correct Position: Yes        Correct Laterality: Yes        Site Marked: Yes  Procedure Documentation  Procedure: Sciatic       Diagnosis: POSTOP PAIN CONTROL PER SURGEON REQUEST       Laterality: right       Patient Position: supine       Patient Prep/Sterile Barriers: sterile gloves, mask       Skin prep: Chloraprep (popliteal approach).       Needle Type: insulated and short bevel       Needle Gauge: 21.        Needle Length (Inches): 4        Ultrasound guided       1. Ultrasound was used to identify targeted nerve, plexus, vascular marker, or fascial plane and place a needle adjacent to it in real-time.       2. Ultrasound was used to visualize the spread of anesthetic in close proximity to the above referenced structure.       3. A permanent image is entered into the patient's record.       4. The visualized anatomic structures appeared normal.       5. There were no apparent abnormal pathologic findings.    Assessment/Narrative         The placement was negative for: blood aspirated, painful injection and site bleeding       Paresthesias: No.       Bolus given via needle..        Secured via.        Insertion/Infusion Method: Single Shot       Complications: none       Injection made incrementally with aspirations every 2 mL.    Medication(s) Administered   Bupivacaine 0.5% PF (Infiltration) - Infiltration   20 mL - 8/24/2022 9:48:00 AM  Medication  Administration Time: 8/24/2022 9:48 AM

## 2022-08-24 NOTE — ED NOTES
Rounded on patient and disconnected him to get ready for CT. Patient said that his pain is more tolerable but is still at about a 7 or 8. He ate most of his meal and would like to have a brief change when back from CT.

## 2022-08-24 NOTE — PROGRESS NOTES
Care Management Note    Length of Stay (days): 1    Expected Discharge Date:   To be determined.     Concerns to be Addressed:   Admit with infected right BKA stump post BKA on 8/5/2022.  CT right leg showed evolving abscess, blood cultures obtained. Currently receiving IV vancomycin and Zosyn.  Patient plan of care discussed at interdisciplinary rounds: Yes  Follow up from rounds/notes:   To OR for irrigation and debridement, revision of below the knee amputation 8/24/2022    Anticipated Discharge Disposition:  Possible return to TCU     Anticipated Discharge Services:  To be determined by destination, patient/family preferences, medical needs and mobility status closer to the time of discharge.  Anticipated Discharge DME:  To be determined.     Patient/family educated on Medicare website which has current facility and service quality ratings:  NA  Education Provided on the Discharge Plan:  Per team  Patient/Family in Agreement with the Plan:  Yes    Referrals Placed by CM/SW:  [facility]; [facility];  Private pay costs discussed: Not applicable at this time.    Additional Information:  Per chart review, patient is a resident of [facility] but was sent to [facility] after hospitalization for a BKA on 8/5/2022. His mother, Jewels Lundberg, is his guardian and his sister is involved also.   10:38 AM:  Writer left a message for patient's mother to call when convenient for her.   Patient is currently in the OR and writer left a message for his mother, Jewels, who is his guardian. Note patient has been receiving care at [facility] since his BKA earlier this month; left a message for them also (to determine bed hold status).  Writer then spoke with a nurse at [facility] ([facility]) who notes that patient has an apartment there but he has been hospitalized/at SNF since December 17th of 2021. She had just assessed him for a return and  completed his care plan for anticipated return but he was hospitalized instead. She notes that he needs assistance with medication administration, meals, and is assist of one with: bathing, dressing, grooming and transfers. He also received assist of one with toileting (dependent on his mobility on the day). They also provide housekeeping and laundry services. He can return to the assisted living once his wounds can be managed by a home care nurse three times a week or less. They cannot provide IV medication administration. She suspects that he will need to return to transitional care after hospital stay but would like to be kept up-to-date with the plan. Her number is [phone].   12:00 PM:  Received a call from patient's guardian (mother Jewels) who anticipates patient will return to [facility] at discharge. She is under the understanding that patient may need to stay in the hospital until next week and may be discharging with a wound V.A.C.  Update provided to admissions at [facility].   12:58 PM:  Patient has been accepted for return to [facility] when medically cleared for discharge.    Kathy Villa RN

## 2022-08-24 NOTE — BRIEF OP NOTE
[facility]    Brief Operative Note    Pre-operative diagnosis: Wound infection [T14.8XXA, L08.9]  Post-operative diagnosis Same as pre-operative diagnosis    Procedure: Procedure(s):  IRRIGATION AND DEBRIDEMENT, LOWER EXTREMITY, REVISION OF BELOW KNEE AMPUTATION  Surgeon: Surgeon(s) and Role:     * Kim Montes MD - Primary     * Lizzeth Figueroa PA-C - Assisting  Anesthesia: MAC with Block   Estimated Blood Loss: 10 mL from 8/24/2022 10:13 AM to 8/24/2022 11:08 AM      Drains: None  Specimens:   ID Type Source Tests Collected by Time Destination   A : Right Below Knee Stump Swab Leg, Below Knee, Right ANAEROBIC BACTERIAL CULTURE ROUTINE, GRAM STAIN, AEROBIC BACTERIAL CULTURE ROUTINE Kim Montes MD 8/24/2022 10:39 AM      Findings:   Pus with exposed bone.  Complications: None.  Implants: * No implants in log *

## 2022-08-24 NOTE — ANESTHESIA PREPROCEDURE EVALUATION
Anesthesia Pre-Procedure Evaluation    Patient: Cm Lundberg   MRN: [id number] : 1970        Procedure : Procedure(s):  Irrigation and debridement, right leg          Past Medical History:   Diagnosis Date     Anemia      Anemia, unspecified type 2016     Anxiety      Anxiety state 2021    Formatting of this note might be different from the original. Created by Conversion  Replacement Utility updated for latest IMO load     Arthritis      Atrial fibrillation and flutter (H)      Cerebral artery occlusion with cerebral infarction (H)      Cerebral infarction (H)      Chest pain, unspecified type 2016     Chronic atrial fibrillation (H)      Congestive heart failure (H)      Coronary artery disease     CABG with multiple stents     Coronary artery disease involving native coronary artery without angina pectoris, unspecified whether native or transplanted heart 2016     Coronary atherosclerosis 2021    Formatting of this note might be different from the original. Created by Conversion  Replacement Utility updated for latest IMO load     Depression      Diabetic ulcer of right midfoot associated with type 2 diabetes mellitus, limited to breakdown of skin (H) 2017     Equinovarus acquired deformity, right 2021     Equinovarus deformity, acquired, right      Essential hypertension 2016     GERD (gastroesophageal reflux disease)      Heart attack (H)      History of blood transfusion      Hyperlipidemia      Hyperlipidemia, unspecified hyperlipidemia type 2016     Hypertension      Insomnia, unspecified type 2016     Ischemic cardiomyopathy      Late effects of cerebrovascular accident 2016     Major depressive disorder with single episode, remission status unspecified 2016     Microalbuminuria 2021    Formatting of this note might be different from the original. Created by Conversion     Migraine      Mild intellectual disabilities  08/19/2016     Myalgia      Nausea 08/19/2016     Osteomyelitis (H)      PONV (postoperative nausea and vomiting)     mild nausea     Problems with learning 07/29/2021    Formatting of this note might be different from the original. Created by Conversion [facility] Annotation: Feb 11 2010  1:41PM - Leonid Cabral: CANNOT READ OR  WRITE     PVD (peripheral vascular disease) (H)      Stented coronary artery      Type 2 diabetes mellitus (H) 07/29/2021    Formatting of this note might be different from the original. Created by Conversion     Type II or unspecified type diabetes mellitus with ophthalmic manifestations, not stated as uncontrolled(250.50) (H) 07/29/2021    Formatting of this note might be different from the original. Created by Conversion     Weakness 08/19/2016      Past Surgical History:   Procedure Laterality Date     AMPUTATE LEG BELOW KNEE Right 8/5/2022    Procedure: AMPUTATION, BELOW KNEE, right leg;  Surgeon: Kim Montes MD;  Location: [facility] [place] OR     INCISION AND DRAINAGE LOWER EXTREMITY, COMBINED Right 12/17/2021    Procedure: INCISION AND DRAINAGE, right foot;  Surgeon: Alex Kim DPM;  Location: [facility] OR     INCISION AND DRAINAGE LOWER EXTREMITY, COMBINED Right 1/27/2022    Procedure: INCISION AND DRAINAGE, right foot with application of theraskin;  Surgeon: Alex Kim DPM;  Location: [facility] [place] OR     INCISION AND DRAINAGE LOWER EXTREMITY, COMBINED Right 4/4/2022    Procedure: INCISION AND DRAINAGE, right foot with application of theraskin;  Surgeon: Alex Kim DPM;  Location: [facility] [place] OR     IR LOWER EXTREMITY ANGIOGRAM RIGHT  6/29/2022     IR MISCELLANEOUS PROCEDURE  2/13/2004     [facility] CABG, VEIN, SINGLE      Description: CABG (CABG);  Recorded: 04/22/2009;      Allergies   Allergen Reactions     Aspirin Other (See Comments)     Reacts with migraine medicine     Blood-Group Specific Substance      Anti-Fya present.  Expect delays in  blood for transfusion.  Draw 2 lavender and 1 red for all type and screen orders.     Isosorbide Headache     Codeine Rash     Morphine Rash      Social History     Tobacco Use     Smoking status: Former Smoker     Quit date: 2015     Years since quittin.5     Smokeless tobacco: Never Used   Substance Use Topics     Alcohol use: Not Currently      Wt Readings from Last 1 Encounters:   22 69.4 kg (153 lb)        Anesthesia Evaluation   Pt has had prior anesthetic.     History of anesthetic complications  - PONV.      ROS/MED HX  ENT/Pulmonary:     (+) tobacco use, Past use,     Neurologic: Comment: Intellectual disability     (+) CVA, with deficits, - Right sided weakness.     Cardiovascular: Comment:  2021 TTE   1.  Normal left ventricular chamber size.Mildly decreased left ventricular ejection fraction.    Calculated left ventricular ejection fraction (modified Barragan technique) is 45 %.    2.  Abnormal septal motion.  Basal inferior hypokinesis.    3.  Grade 1 left ventricular diastolic dysfunction consistent with abnormal myocardial   relaxation and normal left ventricular filling    pressure.    4.  Normal right ventricular size and systolic function.    5.  No significant valvular heart disease.    6.  When compared to the previous echocardiographic images of 3/21/2019, there has been no   significant change.    (+) Dyslipidemia hypertension-Peripheral Vascular Disease-CAD -past MI --CHF Last EF: 45-50% dysrhythmias, a-fib,     METS/Exercise Tolerance:     Hematologic:       Musculoskeletal:       GI/Hepatic:     (+) GERD, Asymptomatic on medication,     Renal/Genitourinary:     (+) renal disease, type: ARF, Pt does not require dialysis,     Endo: Comment: Hx of retinal bleeds    (+) type II DM,     Psychiatric/Substance Use:     (+) psychiatric history anxiety and depression     Infectious Disease:     (+) Recent Fever,     Malignancy:       Other:  - neg other ROS          Physical  Exam    Airway  airway exam normal      Mallampati: II   TM distance: > 3 FB   Neck ROM: full   Mouth opening: > 3 cm    Respiratory Devices and Support         Dental     Comment: Edentulous     (+) lower dentures and upper dentures      Cardiovascular   cardiovascular exam normal       Rhythm and rate: regular and normal     Pulmonary   pulmonary exam normal        breath sounds clear to auscultation           OUTSIDE LABS:  CBC:   Lab Results   Component Value Date    WBC 6.6 08/24/2022    WBC 6.5 08/24/2022    HGB 8.7 (L) 08/24/2022    HGB 8.9 (L) 08/24/2022    HCT 28.3 (L) 08/24/2022    HCT 29.0 (L) 08/24/2022     08/24/2022     08/24/2022     BMP:   Lab Results   Component Value Date     08/24/2022     (L) 08/23/2022    POTASSIUM 4.6 08/24/2022    POTASSIUM 4.7 08/24/2022    CHLORIDE 103 08/24/2022    CHLORIDE 95 (L) 08/23/2022    CO2 25 08/24/2022    CO2 25 08/23/2022    BUN 26 (H) 08/24/2022    BUN 15 08/23/2022    CR 1.50 (H) 08/24/2022    CR 1.47 (H) 08/24/2022     (H) 08/24/2022     (H) 08/24/2022     COAGS:   Lab Results   Component Value Date    INR 1.25 (H) 08/24/2022     POC: No results found for: BGM, HCG, HCGS  HEPATIC:   Lab Results   Component Value Date    ALBUMIN 2.8 (L) 08/24/2022    PROTTOTAL 6.8 08/24/2022    ALT 11 08/24/2022    AST 13 08/24/2022    ALKPHOS 61 08/24/2022    BILITOTAL 0.3 08/24/2022     OTHER:   Lab Results   Component Value Date    LACT 0.8 08/24/2022    A1C 6.3 (H) 08/23/2022    TO 10.0 08/24/2022    PHOS 2.9 08/07/2022    MAG 1.6 (L) 08/07/2022    CRP 18.9 (H) 08/24/2022       Anesthesia Plan    ASA Status:  3   NPO Status:  NPO Appropriate    Anesthesia Type: General.     - Airway: Mask Only   Induction: Intravenous, Propofol.   Maintenance: TIVA.        Consents    Anesthesia Plan(s) and associated risks, benefits, and realistic alternatives discussed. Questions answered and patient/representative(s) expressed understanding.     -  Discussed: Risks, Benefits and Alternatives for BOTH SEDATION and the PROCEDURE were discussed     - Discussed with:  Patient, Legal Guardian (consent obtained via telephone conversation with sister)         Postoperative Care    Pain management: IV analgesics, Oral pain medications, Multi-modal analgesia, Peripheral nerve block (Single Shot).   PONV prophylaxis: Ondansetron (or other 5HT-3), Dexamethasone or Solumedrol, Droperidol or Haldol     Comments:    Other Comments: Anesthetic risks, benefits, and options reviewed. Patient agrees to proceed.                  Mazin Manriquez MD

## 2022-08-24 NOTE — PROGRESS NOTES
[facility]    Medicine Progress Note - Hospitalist Service    Date of Admission:  8/23/2022    Assessment & Plan          mC Lundberg is a 52 year old male admitted on 8/23/2022.     Assessment:  Infected right BKA stump.  CT right leg showed evolving abscess.  Patient not septic.  Blood cultures obtained.  Started vancomycin and Zosyn.  S/p I&D 8/24, culture sent.  Bacteremia, likely due to right BKA stump abscess.  Acute renal failure, creat 1.47 today was 1.04 on admission.  Likely due to sepsis and dehydration, given rising BUN  Charcot's ankle, s/p recent right BKA on 8/5/2022 by Dr. Valderrama.    CVD, history of stroke.  On Plavix, statin.  Atrial fibrillation.  Was on Xarelto, held preoperatively.  Resume when okay per surgery.  Coronary artery disease, status post CABG and stents.  On Plavix, beta-blockers, fibrate.  Mood disorder:general anxiety and depression, stable on Abilify, Cymbalta, Ativan, Remeron, trazodone.  Essential hypertension.  BP controlled on losartan and metoprolol.  Peripheral neuropathy, due to diabetes.  Resident of [facility] prior to recent past hospitalization TCU.    Plan:  Obtain surveillance BCs today and tomorrow.  Check lactate.  Monitor for signs of septic shock.  Follow-up on blood cultures.  Empirically vancomycin/Zosyn, continue, narrow once sensitivities/speciation is available.  IVF, 1 L bolus followed by maintenance, for RANDAL.  Obtain UA  Vascular surgery consulted.  N.p.o. after midnight for likely OR tomorrow.  Pain management with p.o. oxycodone, scheduled Tylenol, as needed IV Dilaudid  Holding oral antidiabetic meds, since patient is n.p.o.  SSI NovoLog.  Pain management.  Wound nurse consulted.  Continue PTA scheduled Tylenol, Abilify, Lipitor, Questran, Plavix, Cymbalta, fenofibrate, Neurontin, Protonix, mental health medications.  Resume Xarelto when okay per surgery.  SSI NovoLog.        Diet: NPO for Medical/Clinical  Reasons Except for: Meds    DVT Prophylaxis: VTE Prophylaxis contraindicated due to surgery today  Murray Catheter: Not present  Central Lines: None  Cardiac Monitoring: None  Code Status: Full Code      Disposition Plan      Expected Discharge Date: 08/25/2022                The patient's care was discussed with the Bedside Nurse, Patient and Patient's Family.    Gisel Santoyo MD  Hospitalist Service  [facility]  Securely message with the Vocera Web Console (learn more here)  Text page via [employer] Paging/Directory     Clinically Significant Risk Factors Present on Admission             # Hypoalbuminemia: Albumin = 2.8 g/dL (Ref range: 3.5 - 5.0 g/dL) on admission, will monitor as appropriate  # Acute Kidney Injury, unspecified: based on a >150% or 0.3 mg/dL increase in creatinine on admission compared to past 90 day average, will monitor renal function  # Coagulation Defect: home medication list includes an anticoagulant medication  # Platelet Defect: home medication list includes an antiplatelet medication  # Hypertension: home medication list includes antihypertensive(s)        ______________________________________________________________________    Interval History   Call received this morning regarding positive blood cultures, which were obtained yesterday in ED.  1/2 bottles with gram-positive cocci in clusters.  Patient afebrile.  Stable BP.  Labs reviewed, noted rising BUN/creatinine.    Data reviewed today: I reviewed all medications, new labs and imaging results over the last 24 hours. I personally reviewed    Physical Exam   Vital Signs: Temp: 98.7  F (37.1  C) Temp src: Oral BP: 113/58 Pulse: 98   Resp: 16 SpO2: 94 % O2 Device: None (Room air)    Weight: 153 lbs 0 oz  General: Alert and oriented x 3. Not in obvious distress.  HEENT: NC, AT. Neck- supple, No JVP elevation, lymphadenopathy or thyromegaly. Trachea-central.  Chest: Clear to auscultation bilaterally.  Heart: S1S2  regular. No M/R/G.  Abdomen: Soft. NT, ND. No organomegaly. Bowel sounds- active.  Back: No spine tenderness. No CVA tenderness.  Extremities: Right BKA, stump with staples, wound dehiscence with purulent drainage.    Neuro: Cranial nerves 1-12 grossly normal. No focal neurological deficit    Data   Recent Labs   Lab 08/24/22  0611 08/23/22  1625 08/23/22  1300   WBC 6.5  --  8.9   HGB 8.9*  --  9.9*   MCV 83  --  83     --  433   INR 1.25*  --   --      --  134*   POTASSIUM 4.7  --  4.3   CHLORIDE 103  --  95*   CO2 25  --  25   BUN 26*  --  15   CR 1.47*  --  1.04   ANIONGAP 8  --  14   TO 10.0  --  10.2   * 137* 142*   ALBUMIN 2.8*  --   --    PROTTOTAL 6.8  --   --    BILITOTAL 0.3  --   --    ALKPHOS 61  --   --    ALT 11  --   --    AST 13  --   --      Recent Results (from the past 24 hour(s))   CT Knee Right w Contrast    Narrative    EXAM: CT KNEE RIGHT W CONTRAST  LOCATION: [facility]  DATE/TIME: 8/23/2022 7:55 PM    INDICATION: bka with infection  COMPARISON: None.  TECHNIQUE: IV contrast. Axial, sagittal and coronal thin-section reconstruction. Dose reduction techniques were used.   CONTRAST: 75ml isovue 370    FINDINGS:   Recent below-knee amputation, with skin staples in place. There is an irregular intermittent density collection along the lateral aspect of the stump extending from the distal end of the fibular stump toward the skin surface, measuring 1 x 3 x 3 cm,   probably representing a fluid collection. There is additional irregular small well-defined intermediate density regional collection adjacent to the distal end of the tibial stump. No bone destruction to suggest osteomyelitis. There is diffuse   subcutaneous soft tissue stranding.    Small knee joint effusion.      Impression    IMPRESSION:  1.  Irregular intermediate density fluid collection adjacent to the fibular stump extending from the distal end of the distal stump to the skin could  represent abscess or subacute, potentially infected hematoma.  2.  Possible additional but less well-defined fluid collection adjacent to the tibial stump.  3.  Subcutaneous soft tissue stranding could represent cellulitis.  4.  No CT evidence of osteomyelitis. MRI would be more sensitive.       Medications     sodium chloride         sodium chloride 0.9%  1,000 mL Intravenous Once     acetaminophen  1,000 mg Oral TID     ARIPiprazole  2 mg Oral Daily     atorvastatin  80 mg Oral Daily     cholestyramine  1 packet Oral Every Other Day     clopidogrel  75 mg Oral Daily     DULoxetine  120 mg Oral Daily     fenofibrate  160 mg Oral Daily     gabapentin  100 mg Oral BID AC     gabapentin  600 mg Oral At Bedtime     insulin aspart  1-3 Units Subcutaneous TID AC     insulin aspart  1-3 Units Subcutaneous At Bedtime     losartan  25 mg Oral At Bedtime     magnesium oxide  800 mg Oral BID     metoprolol succinate ER  75 mg Oral Daily     mirtazapine  7.5 mg Oral At Bedtime     pantoprazole  40 mg Oral Daily     piperacillin-tazobactam  3.375 g Intravenous Q8H     senna-docusate  1 tablet Oral BID     sodium chloride (PF)  3 mL Intracatheter Q8H     traZODone  50 mg Oral At Bedtime     vancomycin  750 mg Intravenous Q12H     vitamin C  500 mg Oral Daily

## 2022-08-24 NOTE — ED NOTES
[facility] ED Handoff Report    ED Chief Complaint: RLE amputation 2wks ago, wound weeping    ED Diagnosis:  (L03.119,  L02.419) Cellulitis and abscess of leg  Comment:   Plan:        PMH:    Past Medical History:   Diagnosis Date     Anemia      Anemia, unspecified type 08/19/2016     Anxiety      Anxiety state 07/29/2021    Formatting of this note might be different from the original. Created by Conversion  Replacement Utility updated for latest IMO load     Arthritis      Atrial fibrillation and flutter (H)      Cerebral artery occlusion with cerebral infarction (H)      Cerebral infarction (H)      Chest pain, unspecified type 08/19/2016     Chronic atrial fibrillation (H)      Congestive heart failure (H)      Coronary artery disease     CABG with multiple stents     Coronary artery disease involving native coronary artery without angina pectoris, unspecified whether native or transplanted heart 08/19/2016     Coronary atherosclerosis 07/29/2021    Formatting of this note might be different from the original. Created by Conversion  Replacement Utility updated for latest IMO load     Depression      Diabetic ulcer of right midfoot associated with type 2 diabetes mellitus, limited to breakdown of skin (H) 09/28/2017     Equinovarus acquired deformity, right 04/14/2021     Equinovarus deformity, acquired, right      Essential hypertension 08/19/2016     GERD (gastroesophageal reflux disease)      Heart attack (H)      History of blood transfusion      Hyperlipidemia      Hyperlipidemia, unspecified hyperlipidemia type 08/19/2016     Hypertension      Insomnia, unspecified type 08/19/2016     Ischemic cardiomyopathy      Late effects of cerebrovascular accident 08/19/2016     Major depressive disorder with single episode, remission status unspecified 08/19/2016     Microalbuminuria 07/29/2021    Formatting of this note might be different from the original. Created by Conversion     Migraine      Mild  "intellectual disabilities 08/19/2016     Myalgia      Nausea 08/19/2016     Osteomyelitis (H)      PONV (postoperative nausea and vomiting)     mild nausea     Problems with learning 07/29/2021    Formatting of this note might be different from the original. Created by Conversion [facility] Annotation: Feb 11 2010  1:41PM - Leonid Cabral: CANNOT READ OR  WRITE     PVD (peripheral vascular disease) (H)      Stented coronary artery      Type 2 diabetes mellitus (H) 07/29/2021    Formatting of this note might be different from the original. Created by Conversion     Type II or unspecified type diabetes mellitus with ophthalmic manifestations, not stated as uncontrolled(250.50) (H) 07/29/2021    Formatting of this note might be different from the original. Created by Conversion     Weakness 08/19/2016        Code Status:  Full Code     Falls Risk: Yes Band: Applied    Current Living Situation/Residence: lives in a house w/ mother    Elimination Status: Continent: No     Activity Level: SBA w/ walker    Patients Preferred Language:  English     Needed: No    Vital Signs:  /75   Pulse 84   Temp 98.4  F (36.9  C) (Oral)   Resp 17   Ht 1.702 m (5' 7\")   Wt 69.4 kg (153 lb)   SpO2 96%   BMI 23.96 kg/m       Cardiac Rhythm: NSR    Pain Score: 0/10    Is the Patient Confused:  No    Last Food or Drink: 08/24/22 at     Focused Assessment:  RLE amputation, odorous wound irrigated w/ NS, wet to dry dressing applied over staples, followed by abd dressing and kerlix.     Tests Performed: Done: Labs    Treatments Provided:  Wound care, antibiotics    Family Dynamics/Concerns: No    Family Updated On Visitor Policy: Yes    Plan of Care Communicated to Family: No    Who Was Updated about Plan of Care: pt    Belongings Checklist Done and Signed by Patient: Yes    Medications sent with patient: vanco infusing    Covid: asymptomatic , negative    Additional Information:     RN: Becky Yarbrough RN 8/24/2022 2:24 " AM

## 2022-08-24 NOTE — PLAN OF CARE
Problem: Plan of Care - These are the overarching goals to be used throughout the patient stay.    Goal: Absence of Hospital-Acquired Illness or Injury  Intervention: Prevent Skin Injury  Recent Flowsheet Documentation  Taken 8/24/2022 0815 by Patricia Willett, RN  Body Position: position changed independently     Problem: Plan of Care - These are the overarching goals to be used throughout the patient stay.    Goal: Optimal Comfort and Wellbeing  Intervention: Monitor Pain and Promote Comfort  Recent Flowsheet Documentation  Taken 8/24/2022 0812 by Patricia Willett, RN  Pain Management Interventions: medication (see MAR)

## 2022-08-24 NOTE — H&P
[facility]    History and Physical - Hospitalist Service       Date of Admission:  8/23/2022    Assessment & Plan      Cm Lundberg is a 52 year old male admitted on 8/23/2022.     Assessment:  Infected right BKA stump.  CT right leg showed evolving abscess.  Patient not septic.  Blood cultures obtained.  Started vancomycin and Zosyn.  Record ankle, s/p right BKA.    CVD, history of stroke.  On Plavix, statin.  NIDDM, on glipizide and Glucophage.  Holding for now due to RANDAL.  Atrial fibrillation.  Was on Xarelto, held postoperatively.  Coronary artery disease, status post CABG and stents.  On Plavix, beta-blockers, fibrate.  Mood disorder, general anxiety and depression.  On Abilify, Cymbalta, Ativan, Remeron, trazodone.  Essential hypertension.  BP controlled on losartan and metoprolol.  Peripheral neuropathy, due to diabetes.      Plan:  Admit to medical surgical bed.  Follow-up on blood cultures.  Empirically vancomycin/Zosyn.  Vascular surgery consulted.  N.p.o. after midnight for likely OR tomorrow.  Pain management with p.o. oxycodone, scheduled Tylenol, as needed IV Dilaudid  Holding oral antidiabetic meds, since patient is n.p.o.  SSI NovoLog.  Pain management.  Wound nurse consulted.  Continue PTA scheduled Tylenol, Abilify, Lipitor, Questran, Plavix, Cymbalta, fenofibrate, Neurontin, Protonix, mental health medications.  Resume Xarelto when okay per surgery.  SSI NovoLog.        Diet: Moderate Consistent Carb (60 g CHO per Meal) Diet    DVT Prophylaxis: Anti-embolisim stockings (TEDs)  Murray Catheter: Not present  Central Lines: None  Cardiac Monitoring: None  Code Status:   Full    Clinically Significant Risk Factors Present on Admission          # Hypercalcemia: Ca = 10.2 mg/dL (Ref range: 8.5 - 10.5 mg/dL) and/or iCa = N/A on admission, will monitor as appropriate      # Coagulation Defect: home medication list includes an anticoagulant medication  # Platelet Defect: home  medication list includes an antiplatelet medication  # Hypertension: home medication list includes antihypertensive(s)      Disposition Plan Dissipate patient to return to TCU and discharge     The patient's care was discussed with the Bedside Nurse and Patient.    Gisel Santoyo MD  Hospitalist Service  [facility]  Securely message with the Vocera Web Console (learn more here)  Text page via IT[employer] Paging/Directory   ______________________________________________________________________    Chief Complaint   Purulent drainage from right BKA stump    History is obtained from the patient, electronic health record and emergency department physician    History of Present Illness   Cm Lundberg is a 52 year old male with PMH of right Charcot ankle, s/p right BKA on Wednesday 5/22/discharged to TCU, A. fib, CAD, MDD, anxiety, HTN, HLD, anemia, NIDDM, presented to ED for evaluation of purulent drainage from right leg surgical incision.  Reportedly onset of drainage yesterday, without associated fever, chills, hypertension.  Vital stable.  CT right lateral fluid collection adjacent to fibular stump.  Zosyn/vancomycin started in ED.  Patient vascular surgeon Dr. Hughes was contacted    Review of Systems    The 10 point Review of Systems is negative other than noted in the HPI or here.     Past Medical History    I have reviewed this patient's medical history and updated it with pertinent information if needed.   Past Medical History:   Diagnosis Date     Anemia      Anemia, unspecified type 08/19/2016     Anxiety      Anxiety state 07/29/2021    Formatting of this note might be different from the original. Created by Conversion  Replacement Utility updated for latest IMO load     Arthritis      Atrial fibrillation and flutter (H)      Cerebral artery occlusion with cerebral infarction (H)      Cerebral infarction (H)      Chest pain, unspecified type 08/19/2016     Chronic atrial fibrillation  (H)      Congestive heart failure (H)      Coronary artery disease     CABG with multiple stents     Coronary artery disease involving native coronary artery without angina pectoris, unspecified whether native or transplanted heart 08/19/2016     Coronary atherosclerosis 07/29/2021    Formatting of this note might be different from the original. Created by Conversion  Replacement Utility updated for latest IMO load     Depression      Diabetic ulcer of right midfoot associated with type 2 diabetes mellitus, limited to breakdown of skin (H) 09/28/2017     Equinovarus acquired deformity, right 04/14/2021     Equinovarus deformity, acquired, right      Essential hypertension 08/19/2016     GERD (gastroesophageal reflux disease)      Heart attack (H)      History of blood transfusion      Hyperlipidemia      Hyperlipidemia, unspecified hyperlipidemia type 08/19/2016     Hypertension      Insomnia, unspecified type 08/19/2016     Ischemic cardiomyopathy      Late effects of cerebrovascular accident 08/19/2016     Major depressive disorder with single episode, remission status unspecified 08/19/2016     Microalbuminuria 07/29/2021    Formatting of this note might be different from the original. 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 Created by Conversion [facility] Annotation: Feb 11 2010  1:41PM - Leonid Cabral: CANNOT READ OR  WRITE     PVD (peripheral vascular disease) (H)      Stented coronary artery      Type 2 diabetes mellitus (H) 07/29/2021    Formatting of this note might be different from the original. Created by Conversion     Type II or unspecified type diabetes mellitus with ophthalmic manifestations, not stated as uncontrolled(250.50) (H)  2021    Formatting of this note might be different from the original. Created by Conversion     Weakness 2016       Past Surgical History   I have reviewed this patient's surgical history and updated it with pertinent information if needed.  Past Surgical History:   Procedure Laterality Date     AMPUTATE LEG BELOW KNEE Right 2022    Procedure: AMPUTATION, BELOW KNEE, right leg;  Surgeon: Kim Montes MD;  Location: [facility] OR     INCISION AND DRAINAGE LOWER EXTREMITY, COMBINED Right 2021    Procedure: INCISION AND DRAINAGE, right foot;  Surgeon: Alex Kim DPM;  Location: [facility] OR     INCISION AND DRAINAGE LOWER EXTREMITY, COMBINED Right 2022    Procedure: INCISION AND DRAINAGE, right foot with application of theraskin;  Surgeon: Alex Kim DPM;  Location: [facility] OR     INCISION AND DRAINAGE LOWER EXTREMITY, COMBINED Right 2022    Procedure: INCISION AND DRAINAGE, right foot with application of theraskin;  Surgeon: Alex Kim DPM;  Location: [facility] OR     IR LOWER EXTREMITY ANGIOGRAM RIGHT  2022     IR MISCELLANEOUS PROCEDURE  2004     [facility] CABG, VEIN, SINGLE      Description: CABG (CABG);  Recorded: 2009;       Social History   I have reviewed this patient's social history and updated it with pertinent information if needed.  Social History     Tobacco Use     Smoking status: Former Smoker     Quit date: 2015     Years since quittin.5     Smokeless tobacco: Never Used   Vaping Use     Vaping Use: Never used   Substance Use Topics     Alcohol use: Not Currently     Drug use: Not Currently       Family History     Reviewed and not pertinent to chief complaint    Prior to Admission Medications   Prior to Admission Medications   Prescriptions Last Dose Informant Patient Reported? Taking?   ARIPiprazole (ABILIFY) 2 MG tablet 2022 at 0830  Yes Yes   Si mg daily    ASPERCREME ORIGINAL 10 %  external cream   Yes Yes   Sig: Apply topically 2 times daily as needed for other (back pain)   DULoxetine (CYMBALTA) 60 MG capsule 8/23/2022 at 0830  Yes Yes   Sig: Take 120 mg by mouth daily   LORazepam (ATIVAN) 0.5 MG tablet   Yes Yes   Sig: Take 0.5 mg by mouth daily as needed for anxiety   Nitroglycerin (NITROSTAT SL)   Yes Yes   Sig: Place 0.4 mg under the tongue every 5 minutes as needed for chest pain    XARELTO ANTICOAGULANT 20 MG TABS tablet 8/23/2022 at 0830  Yes Yes   Sig: Take 20 mg by mouth daily   acetaminophen (TYLENOL) 500 MG tablet 8/23/2022 at 0830  Yes Yes   Sig: Take 1,000 mg by mouth 3 times daily   acetaminophen (TYLENOL) 500 MG tablet 8/23/2022 at 0056  Yes Yes   Sig: Take 1,000 mg by mouth daily as needed   atorvastatin (LIPITOR) 80 MG tablet 8/23/2022 at 0830  Yes Yes   Sig: Take 80 mg by mouth daily    bisacodyl (DULCOLAX) 10 MG suppository   Yes Yes   Sig: Place 10 mg rectally daily as needed for constipation   cholestyramine (QUESTRAN) 4 g packet 8/22/2022 at 1000  Yes Yes   Sig: Take 1 packet by mouth every other day    clopidogrel (PLAVIX) 75 MG tablet 8/23/2022 at 0830  No Yes   Sig: Take 1 tablet (75 mg) by mouth daily Start taking medication the day after the procedure.   fenofibrate micronized (LOFIBRA) 200 MG capsule 8/23/2022 at 0700  Yes Yes   Sig: Take 200 mg by mouth every morning (before breakfast)    ferrous sulfate (FEROSUL) 325 (65 Fe) MG tablet 8/23/2022 at 0830  Yes Yes   Sig: Take 325 mg by mouth daily (with breakfast)    gabapentin (NEURONTIN) 100 MG capsule 8/23/2022 at 0700  Yes Yes   Sig: Take 100 mg by mouth 2 times daily (before meals)   gabapentin (NEURONTIN) 300 MG capsule 8/22/2022 at hs  Yes Yes   Sig: Take 600 mg by mouth At Bedtime   glipiZIDE (GLUCOTROL) 5 MG tablet 8/23/2022 at 0830  Yes Yes   Sig: Take 2.5 mg by mouth daily before breakfast   loperamide (IMODIUM) 2 MG capsule   Yes Yes   Sig: Take 2 mg by mouth 4 times daily as needed for diarrhea    losartan (COZAAR) 25 MG tablet 8/22/2022 at hs  Yes Yes   Sig: Take 25 mg by mouth At Bedtime    magnesium oxide 400 MG CAPS 8/23/2022 at 0830  Yes Yes   Sig: Take 400 mg by mouth 4 times daily   metFORMIN (GLUCOPHAGE) 500 MG tablet 8/23/2022 at 0830  Yes Yes   Sig: Take 1,000 mg by mouth daily (with breakfast)   metFORMIN (GLUCOPHAGE) 500 MG tablet 8/22/2022 at 1700  Yes Yes   Sig: Take 500 mg by mouth daily (with dinner)   metoprolol succinate ER (TOPROL-XL) 25 MG 24 hr tablet 8/23/2022 at 0830  Yes Yes   Sig: Take 75 mg by mouth daily    mineral oil-hydrophilic petrolatum (AQUAPHOR) external ointment   Yes Yes   Sig: Apply topically 3 times daily as needed for irritation or dry skin Apply to forehead and other areas as needed   mirtazapine (REMERON) 7.5 MG tablet 8/22/2022 at hs  Yes Yes   Sig: Take 7.5 mg by mouth At Bedtime    oxyCODONE (ROXICODONE) 5 MG tablet   No Yes   Sig: TAKE 1 TAB BY MOUTH EVERY 6HOURS AS NEEDED FOR PAIN   Patient taking differently: Take 5 mg by mouth every 4 hours as needed for moderate to severe pain   pantoprazole (PROTONIX) 40 MG EC tablet 8/23/2022 at 0700  Yes Yes   Sig: Take 40 mg by mouth daily   polyethylene glycol (MIRALAX) 17 GM/Dose powder   Yes Yes   Sig: Take 1 packet by mouth daily as needed for constipation    senna-docusate (SENOKOT-S/PERICOLACE) 8.6-50 MG tablet   Yes Yes   Sig: Take 1 tablet by mouth 2 times daily as needed for constipation In addition to scheduled doses   senna-docusate (SENOKOT-S/PERICOLACE) 8.6-50 MG tablet 8/23/2022 at 0830  Yes Yes   Sig: Take 1 tablet by mouth 2 times daily   traZODone (DESYREL) 50 MG tablet 8/22/2022 at hs  Yes Yes   Sig: Take 50 mg by mouth At Bedtime    vitamin C (ASCORBIC ACID) 500 MG tablet 8/23/2022 at 0830  Yes Yes   Sig: Take 500 mg by mouth daily       Facility-Administered Medications: None     Allergies   Allergies   Allergen Reactions     Aspirin Other (See Comments)     Reacts with migraine medicine      Blood-Group Specific Substance      Anti-Fya present.  Expect delays in blood for transfusion.  Draw 2 lavender and 1 red for all type and screen orders.     Isosorbide Headache     Codeine Rash     Morphine Rash       Physical Exam   Vital Signs: Temp: 98.4  F (36.9  C) Temp src: Oral BP: 123/68 Pulse: 91   Resp: 15 SpO2: 95 % O2 Device: None (Room air)    Weight: 153 lbs 0 oz    General: Alert and oriented x 3. Not in obvious distress.  HEENT: NC, AT. Neck- supple, No JVP elevation, lymphadenopathy or thyromegaly. Trachea-central.  Chest: Clear to auscultation bilaterally.  Heart: S1S2 regular. No M/R/G.  Abdomen: Soft. NT, ND. No organomegaly. Bowel sounds- active.  Back: No spine tenderness. No CVA tenderness.  Extremities: Right BKA, stump with staples, wound dehiscence with purulent drainage.    Neuro: Cranial nerves 1-12 grossly normal. No focal neurological deficit    Data   Data reviewed today: I reviewed all medications, new labs and imaging results over the last 24 hours. I personally reviewed    Recent Labs   Lab 08/23/22  1625 08/23/22  1300   WBC  --  8.9   HGB  --  9.9*   MCV  --  83   PLT  --  433   NA  --  134*   POTASSIUM  --  4.3   CHLORIDE  --  95*   CO2  --  25   BUN  --  15   CR  --  1.04   ANIONGAP  --  14   TO  --  10.2   * 142*     8.9    \    9.9 (L)    /    433   N 79    L N/A    134 (L)    95 (L)    15 /   ------------------------------------ 137 (H)   ALT N/A   AST N/A   AP N/A   ALB N/A   Ca 10.2  4.3    25    1.04 \    % RETIC N/A    LDH N/A  Troponin N/A    BNP N/A    CK N/A  INR N/A   PTT N/A    D-dimer N/A    Fibrinogen N/A    Antithrombin N/A  Ferritin N/A  CRP N/A    IL-6 N/A  Recent Results (from the past 24 hour(s))   CT Knee Right w Contrast    Narrative    EXAM: CT KNEE RIGHT W CONTRAST  LOCATION: [facility]  DATE/TIME: 8/23/2022 7:55 PM    INDICATION: bka with infection  COMPARISON: None.  TECHNIQUE: IV contrast. Axial, sagittal and  coronal thin-section reconstruction. Dose reduction techniques were used.   CONTRAST: 75ml isovue 370    FINDINGS:   Recent below-knee amputation, with skin staples in place. There is an irregular intermittent density collection along the lateral aspect of the stump extending from the distal end of the fibular stump toward the skin surface, measuring 1 x 3 x 3 cm,   probably representing a fluid collection. There is additional irregular small well-defined intermediate density regional collection adjacent to the distal end of the tibial stump. No bone destruction to suggest osteomyelitis. There is diffuse   subcutaneous soft tissue stranding.    Small knee joint effusion.      Impression    IMPRESSION:  1.  Irregular intermediate density fluid collection adjacent to the fibular stump extending from the distal end of the distal stump to the skin could represent abscess or subacute, potentially infected hematoma.  2.  Possible additional but less well-defined fluid collection adjacent to the tibial stump.  3.  Subcutaneous soft tissue stranding could represent cellulitis.  4.  No CT evidence of osteomyelitis. MRI would be more sensitive.

## 2022-08-24 NOTE — CONSULTS
Care Management Initial Consult    General Information  Assessment completed with: Care Team Member, Nurse at [facility] who had just seen patient and completed a care plan for his return (Patient currently in OR. Left a message for his guardian/mother)  Type of CM/SW Visit: Initial Assessment    Primary Care Provider verified and updated as needed: Yes   Readmission within the last 30 days:        Reason for Consult: discharge planning  Advance Care Planning: Advance Care Planning Reviewed: present on chart  Mother Jewels is legal guardian  General Information Comments: Ultimate goal is return to [facility] when wound care needs    Communication Assessment  Patient's communication style: spoken language (English or Bilingual)    Hearing Difficulty or Deaf: no   Wear Glasses or Blind: yes    Cognitive  Cognitive/Neuro/Behavioral: WDL                      Living Environment:   People in home: other (see comments)     Current living Arrangements: assisted living, extended care facility  Name of Facility: [facility] in [place]; [facility];   Able to return to prior arrangements: yes  Living Arrangement Comments: Awaiting call from Guardian to confirm discharge goals (return to [facility]?)    Family/Social Support:  Care provided by:  (Staff at the facilities)  Provides care for: no one, unable/limited ability to care for self     Parent(s), Sibling(s)          Description of Support System: Supportive       Current Resources:   Patient receiving home care services: No     Community Resources: Transitional Care  Equipment currently used at home: grab bar, toilet, grab bar, tub/shower, hospital bed, wheelchair, manual  Supplies currently used at home:      Employment/Financial:  Employment Status: disabled        Financial Concerns: No concerns identified   Referral to Financial Worker: No     Lifestyle & Psychosocial Needs:  Social Determinants of Health     Tobacco Use: Medium Risk      Smoking Tobacco Use: Former Smoker     Smokeless Tobacco Use: Never Used   Alcohol Use: Not on file   Financial Resource Strain: Not on file   Food Insecurity: Not on file   Transportation Needs: Not on file   Physical Activity: Not on file   Stress: Not on file   Social Connections: Not on file   Intimate Partner Violence: Not on file   Depression: Not on file   Housing Stability: Not on file     Functional Status:  Prior to admission patient needed assistance:   Dependent ADLs:: Bathing, Dressing, Grooming, Transfers, Wheelchair-with assist, Toileting (Toileting PRN)  Dependent IADLs:: Cleaning, Cooking, Laundry, Shopping, Meal Preparation, Medication Management, Money Management, Transportation  Assesssment of Functional Status: Not at  functional baseline    Mental Health Status:  Mental Health Status: No Current Concerns       Chemical Dependency Status:  Chemical Dependency Status: No Current Concerns           Values/Beliefs:  Spiritual, Cultural Beliefs, Alevism Practices, Values that affect care: no             Additional Information:  Patient is currently in the OR and writer left a message for his mother, Jewels, who is his guardian. Note patient has been receiving care at [facility] since his BKA earlier this month; left a message for them also (to determine bed hold status).  Writer then spoke with a nurse at [facility] ([facility]) who notes that patient has an apartment there but he has been hospitalized/at SNF since December 17th of 2021. She had just assessed him for a return and completed his care plan for anticipated return but he was hospitalized instead. She notes that he needs assistance with medication administration, meals, and is assist of one with: bathing, dressing, grooming and transfers. He also received assist of one with toileting (dependent on his mobility on the day). They also provide housekeeping and laundry services. He can return to the assisted  living once his wounds can be managed by a home care nurse three times a week or less. They cannot provide IV medication administration. She suspects that he will need to return to transitional care after hospital stay but would like to be kept up-to-date with the plan. Her number is [phone].   12:00 PM:  Received a call from patient's guardian (mother Jewels) who anticipates patient will return to [facility] at discharge. She is under the understanding that patient may need to stay in the hospital until next week and may be discharging with a wound V.A.C.  Update provided to admissions at [facility].     Kathy Villa RN

## 2022-08-24 NOTE — PROGRESS NOTES
Awaiting mg level for this morning. I just called Lab and they said it should populate around 10:45.

## 2022-08-24 NOTE — ANESTHESIA CARE TRANSFER NOTE
Patient: Cm Lundberg    Procedure: Procedure(s):  IRRIGATION AND DEBRIDEMENT, LOWER EXTREMITY, REVISION OF BELOW KNEE AMPUTATION       Diagnosis: Wound infection [T14.8XXA, L08.9]  Diagnosis Additional Information: No value filed.    Anesthesia Type:   General     Note:    Oropharynx: oropharynx clear of all foreign objects  Level of Consciousness: drowsy  Oxygen Supplementation: face mask  Level of Supplemental Oxygen (L/min / FiO2): 6  Independent Airway: airway patency satisfactory and stable  Dentition: dentition unchanged  Vital Signs Stable: post-procedure vital signs reviewed and stable  Report to RN Given: handoff report given  Patient transferred to: PACU    Handoff Report: Identifed the Patient, Identified the Reponsible Provider, Reviewed the pertinent medical history, Discussed the surgical course, Reviewed Intra-OP anesthesia mangement and issues during anesthesia, Set expectations for post-procedure period and Allowed opportunity for questions and acknowledgement of understanding      Vitals:  Vitals Value Taken Time   BP 87/51 08/24/22 1112   Temp 36.1  C (97  F) 08/24/22 1112   Pulse 89 08/24/22 1112   Resp 15 08/24/22 1112   SpO2 100 % 08/24/22 1112       Electronically Signed By: SHABNAM Frederick CRNA  August 24, 2022  11:13 AM

## 2022-08-24 NOTE — PROGRESS NOTES
Received request for WOC consult this morning. Surgical consult ordered as well.   Patient taken to OR for irrigation, debridemdent and revision of right below knee amputation  By Dr. Montes, Vascular surgery.    WOC service will wait for future request for consult

## 2022-08-24 NOTE — ANESTHESIA PROCEDURE NOTES
Adductor canal Procedure Note    Pre-Procedure   Staff -        Anesthesiologist:  Mazin Manriquez MD       Performed By: anesthesiologist       Location: pre-op       Procedure Start/Stop Times: 8/24/2022 9:46 AM and 8/24/2022 9:48 AM       Pre-Anesthestic Checklist: patient identified, IV checked, site marked, risks and benefits discussed, informed consent, monitors and equipment checked, pre-op evaluation, at physician/surgeon's request and post-op pain management  Timeout:       Correct Patient: Yes        Correct Procedure: Yes        Correct Site: Yes        Correct Position: Yes        Correct Laterality: Yes        Site Marked: Yes  Procedure Documentation  Procedure: Adductor canal       Diagnosis: POSTOP PAIN CONTROL PER SURGEON REQUEST       Laterality: right       Patient Position: supine       Patient Prep/Sterile Barriers: sterile gloves, mask       Skin prep: Chloraprep       Needle Type: short bevel       Needle Gauge: 21.        Needle Length (Inches): 4        Ultrasound guided       1. Ultrasound was used to identify targeted nerve, plexus, vascular marker, or fascial plane and place a needle adjacent to it in real-time.       2. Ultrasound was used to visualize the spread of anesthetic in close proximity to the above referenced structure.       3. A permanent image is entered into the patient's record.       4. The visualized anatomic structures appeared normal.       5. There were no apparent abnormal pathologic findings.    Assessment/Narrative         The placement was negative for: blood aspirated, painful injection and site bleeding       Paresthesias: No.       Bolus given via needle..        Secured via.        Insertion/Infusion Method: Single Shot       Complications: none       Injection made incrementally with aspirations every 2 mL.    Medication(s) Administered   Bupivacaine 0.5% PF (Infiltration) - Infiltration   10 mL - 8/24/2022 9:46:00 AM  Medication Administration Time:  8/24/2022 9:46 AM

## 2022-08-24 NOTE — ED NOTES
"Changed out patient's wet brief and applied a Primofit. Patient's BKA incision is stapled and oozing serosanguinous purulent. Did not redress the incision so that nurse getting the patient can assess appropriately. Patient's pain report: \"It's fine.\" On further prodding, he still insists that his pain level is \"fine\" at a 7/10.   Handoff report given to CESARIO Hill.   "

## 2022-08-24 NOTE — PROVIDER NOTIFICATION
Lab called at 0935 and said that pt has blood culture that was positive for MRSA . Updated Dr. Montes at 0994 regarding the results.

## 2022-08-24 NOTE — CONSULTS
VASCULAR SURGERY INPATIENT CONSULTATION / Initial In-Patient visit    VASCULAR SURGEON: Kim Montes MD    LOCATION: [facility]     Cm Lundberg  Medical Record #:  [id number]  YOB: 1970  Age:  52 year old     Date of Service: 8/23/2022    PRIMARY CARE PROVIDER: Provider, Generic External Data      Reason for consultation: Evaluation for wound infection    IMPRESSION: Abscess of medial aspect of below-knee amputation stump incision.  Not systemically ill.    RECOMMENDATION: Take to operating room for irrigation and debridement.  We will send cultures.    HPI:  Cm Lundberg is a 52 year old male who was seen today in consultation for infection of his below-knee amputation stump.  Started to develope drainage about 2 days ago.  Had surgery with me on August 5 for below-knee amputation.  This is a patient who had a Charcot foot and who we had revascularized Endo vascularly with significant improvement in his blood flow.  Despite this his wounds were slow to heal and it was clear that he would not have a functional foot.  Patient requested below-knee amputation for more definitive therapy with less likelihood of recurrent wounds and hopefully a better chance of ambulation with a prosthetic.  Operation went well.  Has been doing well after that and rehab and then at home.  Started notice drainage about 2 days ago.  Not systemically ill.    PHH:    Past Medical History:   Diagnosis Date     Anemia      Anemia, unspecified type 08/19/2016     Anxiety      Anxiety state 07/29/2021    Formatting of this note might be different from the original. Created by Conversion  Replacement Utility updated for latest IMO load     Arthritis      Atrial fibrillation and flutter (H)      Cerebral artery occlusion with cerebral infarction (H)      Cerebral infarction (H)      Chest pain, unspecified type 08/19/2016     Chronic atrial fibrillation (H)      Congestive heart failure (H)       Coronary artery disease     CABG with multiple stents     Coronary artery disease involving native coronary artery without angina pectoris, unspecified whether native or transplanted heart 08/19/2016     Coronary atherosclerosis 07/29/2021    Formatting of this note might be different from the original. Created by Conversion  Replacement Utility updated for latest IMO load     Depression      Diabetic ulcer of right midfoot associated with type 2 diabetes mellitus, limited to breakdown of skin (H) 09/28/2017     Equinovarus acquired deformity, right 04/14/2021     Equinovarus deformity, acquired, right      Essential hypertension 08/19/2016     GERD (gastroesophageal reflux disease)      Heart attack (H)      History of blood transfusion      Hyperlipidemia      Hyperlipidemia, unspecified hyperlipidemia type 08/19/2016     Hypertension      Insomnia, unspecified type 08/19/2016     Ischemic cardiomyopathy      Late effects of cerebrovascular accident 08/19/2016     Major depressive disorder with single episode, remission status unspecified 08/19/2016     Microalbuminuria 07/29/2021    Formatting of this note might be different from the original. 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 Created by Conversion [facility] Annotation: Feb 11 2010  1:41PM - Leonid Cabral: CANNOT READ OR  WRITE     PVD (peripheral vascular disease) (H)      Stented coronary artery      Type 2 diabetes mellitus (H) 07/29/2021    Formatting of this note might be different from the original. Created by Conversion     Type II or unspecified type diabetes mellitus with ophthalmic manifestations, not stated as uncontrolled(250.50) (H) 07/29/2021    Formatting of this note might be  different from the original. Created by Conversion     Weakness 08/19/2016         Past Surgical History:   Procedure Laterality Date     AMPUTATE LEG BELOW KNEE Right 8/5/2022    Procedure: AMPUTATION, BELOW KNEE, right leg;  Surgeon: Kim Montes MD;  Location: [facility] [place]     INCISION AND DRAINAGE LOWER EXTREMITY, COMBINED Right 12/17/2021    Procedure: INCISION AND DRAINAGE, right foot;  Surgeon: Alex Kim DPM;  Location: [facility] OR     INCISION AND DRAINAGE LOWER EXTREMITY, COMBINED Right 1/27/2022    Procedure: INCISION AND DRAINAGE, right foot with application of theraskin;  Surgeon: Alex Kim DPM;  Location: [facility] [place] OR     INCISION AND DRAINAGE LOWER EXTREMITY, COMBINED Right 4/4/2022    Procedure: INCISION AND DRAINAGE, right foot with application of theraskin;  Surgeon: Alex Kim DPM;  Location: [facility] [place] OR     IR LOWER EXTREMITY ANGIOGRAM RIGHT  6/29/2022     IR MISCELLANEOUS PROCEDURE  2/13/2004     ZZC CABG, VEIN, SINGLE      Description: CABG (CABG);  Recorded: 04/22/2009;        ALLERGIES:     Allergies   Allergen Reactions     Aspirin Other (See Comments)     Reacts with migraine medicine     Blood-Group Specific Substance      Anti-Fya present.  Expect delays in blood for transfusion.  Draw 2 lavender and 1 red for all type and screen orders.     Isosorbide Headache     Codeine Rash     Morphine Rash        MEDS:    Current Facility-Administered Medications:      acetaminophen (TYLENOL) tablet 1,000 mg, 1,000 mg, Oral, TID, Gisel Santoyo MD     ARIPiprazole (ABILIFY) tablet 2 mg, 2 mg, Oral, Daily, Gisel Santoyo MD     atorvastatin (LIPITOR) tablet 80 mg, 80 mg, Oral, Daily, Gisel Santoyo MD     bisacodyl (DULCOLAX) suppository 10 mg, 10 mg, Rectal, Daily PRN, Gisel Santoyo MD     cholestyramine (QUESTRAN) Packet 4 g, 1 packet, Oral, Every Other Day, Gisel Santoyo MD     clopidogrel (PLAVIX) tablet 75 mg, 75  mg, Oral, Daily, Gisel Santoyo MD     glucose gel 15-30 g, 15-30 g, Oral, Q15 Min PRN **OR** dextrose 50 % injection 25-50 mL, 25-50 mL, Intravenous, Q15 Min PRN **OR** glucagon injection 1 mg, 1 mg, Subcutaneous, Q15 Min PRN, Gisel Santoyo MD     DULoxetine (CYMBALTA) DR capsule 120 mg, 120 mg, Oral, Daily, Gisel Santoyo MD     fenofibrate (TRIGLIDE/LOFIBRA) tablet 160 mg, 160 mg, Oral, Daily, Gisle Santoyo MD     gabapentin (NEURONTIN) capsule 100 mg, 100 mg, Oral, BID AC, Gisel Santoyo MD     gabapentin (NEURONTIN) capsule 600 mg, 600 mg, Oral, At Bedtime, Gisel Santoyo MD, 600 mg at 08/24/22 0046     HYDROmorphone (DILAUDID) injection 0.5 mg, 0.5 mg, Intravenous, Q4H PRN, Gisel Santoyo MD     insulin aspart (NovoLOG) injection (RAPID ACTING), 1-3 Units, Subcutaneous, TID AC, Gisel Santoyo MD     insulin aspart (NovoLOG) injection (RAPID ACTING), 1-3 Units, Subcutaneous, At Bedtime, Gisel Santoyo MD     lidocaine (LMX4) cream, , Topical, Q1H PRN, Gisel Santoyo MD     lidocaine 1 % 0.1-1 mL, 0.1-1 mL, Other, Q1H PRN, Gisel Santoyo MD     loperamide (IMODIUM) capsule 2 mg, 2 mg, Oral, 4x Daily PRN, Gisel Santoyo MD     LORazepam (ATIVAN) tablet 0.5 mg, 0.5 mg, Oral, Daily PRN, Gisel Santoyo MD     losartan (COZAAR) tablet 25 mg, 25 mg, Oral, At Bedtime, Gisel Santoyo MD, 25 mg at 08/24/22 0240     magnesium oxide (MAG-OX) tablet 800 mg, 800 mg, Oral, BID, Gisel Santoyo MD, 800 mg at 08/24/22 0046     metoprolol succinate ER (TOPROL XL) 24 hr tablet 75 mg, 75 mg, Oral, Daily, Gisel Santoyo MD     mirtazapine (REMERON) tablet TABS 7.5 mg, 7.5 mg, Oral, At Bedtime, Gisel Santoyo MD, 7.5 mg at 08/24/22 0240     nitroGLYcerin (NITROSTAT) sublingual tablet 0.4 mg, 0.4 mg, Sublingual, Q5 Min PRN, Gisel Santoyo MD     ondansetron (ZOFRAN ODT) ODT tab 4 mg, 4 mg, Oral, Q6H PRN **OR** ondansetron (ZOFRAN) injection 4  mg, 4 mg, Intravenous, Q6H PRN, Gisel Santoyo MD     oxyCODONE (ROXICODONE) tablet 5 mg, 5 mg, Oral, Q4H PRN, Gisel Santoyo MD, 5 mg at 22 0440     pantoprazole (PROTONIX) EC tablet 40 mg, 40 mg, Oral, Daily, Gisel Santoyo MD     [COMPLETED] piperacillin-tazobactam (ZOSYN) 3.375 g vial to attach to  mL bag, 3.375 g, Intravenous, Once, Stopped at 22 0233 **FOLLOWED BY** piperacillin-tazobactam (ZOSYN) 3.375 g vial to attach to  mL bag, 3.375 g, Intravenous, Q8H, Gisel Santoyo MD, 3.375 g at 22 0447     prochlorperazine (COMPAZINE) injection 10 mg, 10 mg, Intravenous, Q6H PRN **OR** prochlorperazine (COMPAZINE) tablet 10 mg, 10 mg, Oral, Q6H PRN **OR** prochlorperazine (COMPAZINE) suppository 25 mg, 25 mg, Rectal, Q12H PRN, Gisel Santoyo MD     senna-docusate (SENOKOT-S/PERICOLACE) 8.6-50 MG per tablet 1 tablet, 1 tablet, Oral, BID, Gisel Santoyo MD, 1 tablet at 22 0046     sodium chloride (PF) 0.9% PF flush 3 mL, 3 mL, Intracatheter, Q8H, Gisel Santoyo MD, 3 mL at 22 0233     sodium chloride (PF) 0.9% PF flush 3 mL, 3 mL, Intracatheter, q1 min prn, Gisel Santoyo MD     traZODone (DESYREL) tablet 50 mg, 50 mg, Oral, At Bedtime, Gisel Santoyo MD, 50 mg at 22 0240     trolamine salicylate (ASPERCREME) 10 % cream, , Topical, BID PRN, Gisel Santoyo MD     vancomycin (VANCOCIN) 750 mg in sodium chloride 0.9 % 250 mL intermittent infusion, 750 mg, Intravenous, Q12H, Gisel Santoyo MD, 750 mg at 22 0233     vitamin C (ASCORBIC ACID) tablet 500 mg, 500 mg, Oral, Daily, Gisel Santoyo MD     SOCIAL HABITS:    Tobacco Use      Smoking status: Former Smoker        Quit date: 2015        Years since quittin.5      Smokeless tobacco: Never Used     Social History    Substance and Sexual Activity      Alcohol use: Not Currently       History   Drug Use Unknown        FAMILY HISTORY:    Family History  "  Adopted: Yes       REVIEW OF SYSTEMS:    A 12 point ROS was reviewed and except for what is listed in the HPI above, all others are negative    PE:    Vital signs:  Temp: 98.3  F (36.8  C) Temp src: Oral BP: 132/66 Pulse: 89   Resp: 16 SpO2: 96 % O2 Device: None (Room air)   Height: 170.2 cm (5' 7\") Weight: 69.4 kg (153 lb)  Estimated body mass index is 23.96 kg/m  as calculated from the following:    Height as of this encounter: 1.702 m (5' 7\").    Weight as of this encounter: 69.4 kg (153 lb).       Wt Readings from Last 1 Encounters:   08/23/22 69.4 kg (153 lb)     Body mass index is 23.96 kg/m .    EXAM:  GENERAL: This is a well-developed 52 year old male who appears his stated age  EYES: Grossly normal.  MOUTH: Buccal mucosa normal   CARDIAC:  Not assessed  CHEST/LUNG:  Not Assessed  GASTROINTESINAL (ABDOMEN):Not Assessed   MUSCULOSKELETAL: Status post right leg below-knee amputation  HEME/LYMPH: No lymphedema  NEUROLOGIC: Focally intact, Alert and oriented x 3.   PSYCH: appropriate affect  INTEGUMENT: Medial aspect of staple line of below-knee amputation stump with purulent drainage.  Only small amount of significant surrounding erythema.    DIAGNOSTIC STUDIES:     Images:  CT Knee Right w Contrast    Result Date: 8/23/2022  EXAM: CT KNEE RIGHT W CONTRAST LOCATION: [facility] DATE/TIME: 8/23/2022 7:55 PM INDICATION: bka with infection COMPARISON: None. TECHNIQUE: IV contrast. Axial, sagittal and coronal thin-section reconstruction. Dose reduction techniques were used. CONTRAST: 75ml isovue 370 FINDINGS: Recent below-knee amputation, with skin staples in place. There is an irregular intermittent density collection along the lateral aspect of the stump extending from the distal end of the fibular stump toward the skin surface, measuring 1 x 3 x 3 cm, probably representing a fluid collection. There is additional irregular small well-defined intermediate density regional collection " "adjacent to the distal end of the tibial stump. No bone destruction to suggest osteomyelitis. There is diffuse subcutaneous soft tissue stranding. Small knee joint effusion.     IMPRESSION: 1.  Irregular intermediate density fluid collection adjacent to the fibular stump extending from the distal end of the distal stump to the skin could represent abscess or subacute, potentially infected hematoma. 2.  Possible additional but less well-defined fluid collection adjacent to the tibial stump. 3.  Subcutaneous soft tissue stranding could represent cellulitis. 4.  No CT evidence of osteomyelitis. MRI would be more sensitive.     POC US Guidance Needle Placement    Result Date: 8/5/2022  Ultrasound was performed as guidance to an anesthesia procedure.  Click \"PACS images\" hyperlink below to view any stored images.  For specific procedure details, view procedure note authored by anesthesia.        LABS:      Sodium   Date Value Ref Range Status   08/24/2022 136 136 - 145 mmol/L Final   08/23/2022 134 (L) 136 - 145 mmol/L Final   08/07/2022 134 (L) 136 - 145 mmol/L Final   08/25/2016 139 mmol/L Final     Urea Nitrogen   Date Value Ref Range Status   08/24/2022 26 (H) 8 - 22 mg/dL Final   08/23/2022 15 8 - 22 mg/dL Final   08/07/2022 13 8 - 22 mg/dL Final   08/25/2016 21 mg/dL Final     Hemoglobin   Date Value Ref Range Status   08/24/2022 8.9 (L) 13.3 - 17.7 g/dL Final   08/23/2022 9.9 (L) 13.3 - 17.7 g/dL Final   08/07/2022 10.3 (L) 13.3 - 17.7 g/dL Final   08/25/2016 10.1 (A) 13.3 - 17.7 g/dL Final     Platelet Count   Date Value Ref Range Status   08/24/2022 323 150 - 450 10e3/uL Final   08/23/2022 433 150 - 450 10e3/uL Final   08/08/2022 144 (L) 150 - 450 10e3/uL Final   08/25/2016 189 150 - 450 10^9/L Final     INR   Date Value Ref Range Status   08/24/2022 1.25 (H) 0.85 - 1.15 Final   08/06/2022 1.12 0.85 - 1.15 Final       Total time spent 45  minutes face to face with patient with more than 50% time spent in " counseling and coordination of care.    Kim Montes MD, MD  [facility] Vascular surgery

## 2022-08-25 ENCOUNTER — ANESTHESIA EVENT (OUTPATIENT)
Dept: SURGERY | Facility: HOSPITAL | Age: 52
DRG: 475 | End: 2022-08-25
Payer: MEDICARE

## 2022-08-25 ENCOUNTER — APPOINTMENT (OUTPATIENT)
Dept: CARDIOLOGY | Facility: HOSPITAL | Age: 52
DRG: 475 | End: 2022-08-25
Attending: INTERNAL MEDICINE
Payer: MEDICARE

## 2022-08-25 PROBLEM — R78.81 MRSA BACTEREMIA: Status: ACTIVE | Noted: 2022-08-25

## 2022-08-25 PROBLEM — B95.62 MRSA BACTEREMIA: Status: ACTIVE | Noted: 2022-08-25

## 2022-08-25 LAB
ANION GAP SERPL CALCULATED.3IONS-SCNC: 4 MMOL/L (ref 5–18)
BASOPHILS # BLD AUTO: 0.1 10E3/UL (ref 0–0.2)
BASOPHILS NFR BLD AUTO: 1 %
BUN SERPL-MCNC: 20 MG/DL (ref 8–22)
CALCIUM SERPL-MCNC: 8.6 MG/DL (ref 8.5–10.5)
CHLORIDE BLD-SCNC: 108 MMOL/L (ref 98–107)
CO2 SERPL-SCNC: 25 MMOL/L (ref 22–31)
CREAT SERPL-MCNC: 1.09 MG/DL (ref 0.7–1.3)
EOSINOPHIL # BLD AUTO: 0.1 10E3/UL (ref 0–0.7)
EOSINOPHIL NFR BLD AUTO: 2 %
ERYTHROCYTE [DISTWIDTH] IN BLOOD BY AUTOMATED COUNT: 14.6 % (ref 10–15)
ERYTHROCYTE [DISTWIDTH] IN BLOOD BY AUTOMATED COUNT: 14.7 % (ref 10–15)
GFR SERPL CREATININE-BSD FRML MDRD: 81 ML/MIN/1.73M2
GLUCOSE BLD-MCNC: 155 MG/DL (ref 70–125)
GLUCOSE BLDC GLUCOMTR-MCNC: 102 MG/DL (ref 70–99)
GLUCOSE BLDC GLUCOMTR-MCNC: 139 MG/DL (ref 70–99)
GLUCOSE BLDC GLUCOMTR-MCNC: 157 MG/DL (ref 70–99)
GLUCOSE BLDC GLUCOMTR-MCNC: 63 MG/DL (ref 70–99)
GLUCOSE BLDC GLUCOMTR-MCNC: 78 MG/DL (ref 70–99)
HCT VFR BLD AUTO: 20.9 % (ref 40–53)
HCT VFR BLD AUTO: 26.3 % (ref 40–53)
HGB BLD-MCNC: 6.4 G/DL (ref 13.3–17.7)
HGB BLD-MCNC: 8 G/DL (ref 13.3–17.7)
IMM GRANULOCYTES # BLD: 0 10E3/UL
IMM GRANULOCYTES NFR BLD: 0 %
INR PPP: 1.22 (ref 0.85–1.15)
IRON SERPL-MCNC: 16 UG/DL (ref 61–157)
LVEF ECHO: NORMAL
LYMPHOCYTES # BLD AUTO: 1.1 10E3/UL (ref 0.8–5.3)
LYMPHOCYTES NFR BLD AUTO: 17 %
MAGNESIUM SERPL-MCNC: 1.7 MG/DL (ref 1.8–2.6)
MCH RBC QN AUTO: 25.7 PG (ref 26.5–33)
MCH RBC QN AUTO: 26.1 PG (ref 26.5–33)
MCHC RBC AUTO-ENTMCNC: 30.4 G/DL (ref 31.5–36.5)
MCHC RBC AUTO-ENTMCNC: 30.6 G/DL (ref 31.5–36.5)
MCV RBC AUTO: 84 FL (ref 78–100)
MCV RBC AUTO: 86 FL (ref 78–100)
MONOCYTES # BLD AUTO: 0.5 10E3/UL (ref 0–1.3)
MONOCYTES NFR BLD AUTO: 8 %
NEUTROPHILS # BLD AUTO: 4.4 10E3/UL (ref 1.6–8.3)
NEUTROPHILS NFR BLD AUTO: 72 %
NRBC # BLD AUTO: 0 10E3/UL
NRBC BLD AUTO-RTO: 0 /100
PLATELET # BLD AUTO: 249 10E3/UL (ref 150–450)
PLATELET # BLD AUTO: 291 10E3/UL (ref 150–450)
POTASSIUM BLD-SCNC: 4.3 MMOL/L (ref 3.5–5)
PROCALCITONIN SERPL-MCNC: 0.15 NG/ML (ref 0–0.49)
RBC # BLD AUTO: 2.49 10E6/UL (ref 4.4–5.9)
RBC # BLD AUTO: 3.07 10E6/UL (ref 4.4–5.9)
SODIUM SERPL-SCNC: 137 MMOL/L (ref 136–145)
VANCOMYCIN SERPL-MCNC: 22.9 MG/L
VIT B12 SERPL-MCNC: 425 PG/ML (ref 232–1245)
WBC # BLD AUTO: 5.3 10E3/UL (ref 4–11)
WBC # BLD AUTO: 6 10E3/UL (ref 4–11)

## 2022-08-25 PROCEDURE — 36415 COLL VENOUS BLD VENIPUNCTURE: CPT | Performed by: PHYSICIAN ASSISTANT

## 2022-08-25 PROCEDURE — 85014 HEMATOCRIT: CPT | Performed by: INTERNAL MEDICINE

## 2022-08-25 PROCEDURE — 80202 ASSAY OF VANCOMYCIN: CPT | Performed by: PHYSICIAN ASSISTANT

## 2022-08-25 PROCEDURE — 255N000002 HC RX 255 OP 636: Performed by: INTERNAL MEDICINE

## 2022-08-25 PROCEDURE — 258N000003 HC RX IP 258 OP 636: Performed by: PHYSICIAN ASSISTANT

## 2022-08-25 PROCEDURE — 250N000011 HC RX IP 250 OP 636: Performed by: INTERNAL MEDICINE

## 2022-08-25 PROCEDURE — 99233 SBSQ HOSP IP/OBS HIGH 50: CPT | Performed by: INTERNAL MEDICINE

## 2022-08-25 PROCEDURE — 250N000013 HC RX MED GY IP 250 OP 250 PS 637: Performed by: INTERNAL MEDICINE

## 2022-08-25 PROCEDURE — 87040 BLOOD CULTURE FOR BACTERIA: CPT | Performed by: PHYSICIAN ASSISTANT

## 2022-08-25 PROCEDURE — 83735 ASSAY OF MAGNESIUM: CPT | Performed by: INTERNAL MEDICINE

## 2022-08-25 PROCEDURE — 250N000011 HC RX IP 250 OP 636: Performed by: PHYSICIAN ASSISTANT

## 2022-08-25 PROCEDURE — 36415 COLL VENOUS BLD VENIPUNCTURE: CPT | Performed by: INTERNAL MEDICINE

## 2022-08-25 PROCEDURE — 87077 CULTURE AEROBIC IDENTIFY: CPT | Performed by: INTERNAL MEDICINE

## 2022-08-25 PROCEDURE — 85610 PROTHROMBIN TIME: CPT | Performed by: PHYSICIAN ASSISTANT

## 2022-08-25 PROCEDURE — 250N000013 HC RX MED GY IP 250 OP 250 PS 637: Performed by: PHYSICIAN ASSISTANT

## 2022-08-25 PROCEDURE — 87149 DNA/RNA DIRECT PROBE: CPT | Performed by: INTERNAL MEDICINE

## 2022-08-25 PROCEDURE — 93306 TTE W/DOPPLER COMPLETE: CPT | Mod: 26 | Performed by: INTERNAL MEDICINE

## 2022-08-25 PROCEDURE — 99222 1ST HOSP IP/OBS MODERATE 55: CPT | Performed by: INTERNAL MEDICINE

## 2022-08-25 PROCEDURE — 85025 COMPLETE CBC W/AUTO DIFF WBC: CPT | Performed by: PHYSICIAN ASSISTANT

## 2022-08-25 PROCEDURE — 83540 ASSAY OF IRON: CPT | Performed by: INTERNAL MEDICINE

## 2022-08-25 PROCEDURE — 80048 BASIC METABOLIC PNL TOTAL CA: CPT | Performed by: PHYSICIAN ASSISTANT

## 2022-08-25 PROCEDURE — 258N000003 HC RX IP 258 OP 636: Performed by: INTERNAL MEDICINE

## 2022-08-25 PROCEDURE — 120N000001 HC R&B MED SURG/OB

## 2022-08-25 PROCEDURE — 999N000197 HC STATISTIC WOC PT EDUCATION, 0-15 MIN

## 2022-08-25 PROCEDURE — 82607 VITAMIN B-12: CPT | Performed by: INTERNAL MEDICINE

## 2022-08-25 PROCEDURE — P9016 RBC LEUKOCYTES REDUCED: HCPCS | Performed by: SURGERY

## 2022-08-25 RX ORDER — AMOXICILLIN 250 MG
1 CAPSULE ORAL 2 TIMES DAILY PRN
Status: DISCONTINUED | OUTPATIENT
Start: 2022-08-25 | End: 2022-08-30 | Stop reason: HOSPADM

## 2022-08-25 RX ORDER — FERROUS SULFATE 325(65) MG
325 TABLET ORAL
Status: DISCONTINUED | OUTPATIENT
Start: 2022-08-25 | End: 2022-08-27

## 2022-08-25 RX ORDER — POLYETHYLENE GLYCOL 3350 17 G/17G
17 POWDER, FOR SOLUTION ORAL DAILY PRN
Status: DISCONTINUED | OUTPATIENT
Start: 2022-08-25 | End: 2022-08-30 | Stop reason: HOSPADM

## 2022-08-25 RX ADMIN — CLOPIDOGREL BISULFATE 75 MG: 75 TABLET ORAL at 07:57

## 2022-08-25 RX ADMIN — OXYCODONE HYDROCHLORIDE 5 MG: 5 TABLET ORAL at 19:17

## 2022-08-25 RX ADMIN — DULOXETINE HYDROCHLORIDE 120 MG: 60 CAPSULE, DELAYED RELEASE PELLETS ORAL at 07:43

## 2022-08-25 RX ADMIN — OXYCODONE HYDROCHLORIDE 5 MG: 5 TABLET ORAL at 06:30

## 2022-08-25 RX ADMIN — TRAZODONE HYDROCHLORIDE 50 MG: 50 TABLET ORAL at 21:06

## 2022-08-25 RX ADMIN — PIPERACILLIN AND TAZOBACTAM 3.38 G: 3; .375 INJECTION, POWDER, LYOPHILIZED, FOR SOLUTION INTRAVENOUS at 13:33

## 2022-08-25 RX ADMIN — GLIPIZIDE 2.5 MG: 5 TABLET ORAL at 12:16

## 2022-08-25 RX ADMIN — Medication 800 MG: at 19:18

## 2022-08-25 RX ADMIN — Medication 800 MG: at 07:42

## 2022-08-25 RX ADMIN — OXYCODONE HYDROCHLORIDE 5 MG: 5 TABLET ORAL at 10:13

## 2022-08-25 RX ADMIN — ACETAMINOPHEN 1000 MG: 500 TABLET ORAL at 19:18

## 2022-08-25 RX ADMIN — GABAPENTIN 100 MG: 100 CAPSULE ORAL at 16:43

## 2022-08-25 RX ADMIN — ARIPIPRAZOLE 2 MG: 2 TABLET ORAL at 07:59

## 2022-08-25 RX ADMIN — FENOFIBRATE 160 MG: 160 TABLET, FILM COATED ORAL at 07:58

## 2022-08-25 RX ADMIN — ATORVASTATIN CALCIUM 80 MG: 40 TABLET, FILM COATED ORAL at 07:48

## 2022-08-25 RX ADMIN — VANCOMYCIN HYDROCHLORIDE 1500 MG: 5 INJECTION, POWDER, LYOPHILIZED, FOR SOLUTION INTRAVENOUS at 13:40

## 2022-08-25 RX ADMIN — HEPARIN SODIUM 5000 UNITS: 10000 INJECTION, SOLUTION INTRAVENOUS; SUBCUTANEOUS at 06:30

## 2022-08-25 RX ADMIN — GABAPENTIN 600 MG: 300 CAPSULE ORAL at 21:05

## 2022-08-25 RX ADMIN — MIRTAZAPINE 7.5 MG: 7.5 TABLET, FILM COATED ORAL at 21:05

## 2022-08-25 RX ADMIN — LOSARTAN POTASSIUM 25 MG: 25 TABLET, FILM COATED ORAL at 21:05

## 2022-08-25 RX ADMIN — PERFLUTREN 2 ML: 6.52 INJECTION, SUSPENSION INTRAVENOUS at 14:27

## 2022-08-25 RX ADMIN — PIPERACILLIN AND TAZOBACTAM 3.38 G: 3; .375 INJECTION, POWDER, LYOPHILIZED, FOR SOLUTION INTRAVENOUS at 06:30

## 2022-08-25 RX ADMIN — METOPROLOL SUCCINATE 75 MG: 25 TABLET, EXTENDED RELEASE ORAL at 07:44

## 2022-08-25 RX ADMIN — FERROUS SULFATE TAB 325 MG (65 MG ELEMENTAL FE) 325 MG: 325 (65 FE) TAB at 12:16

## 2022-08-25 RX ADMIN — Medication 500 MG: at 07:47

## 2022-08-25 RX ADMIN — METFORMIN HYDROCHLORIDE 1000 MG: 500 TABLET ORAL at 12:16

## 2022-08-25 RX ADMIN — PIPERACILLIN AND TAZOBACTAM 3.38 G: 3; .375 INJECTION, POWDER, LYOPHILIZED, FOR SOLUTION INTRAVENOUS at 21:03

## 2022-08-25 RX ADMIN — MAGNESIUM HYDROXIDE 30 ML: 2400 SUSPENSION ORAL at 13:33

## 2022-08-25 RX ADMIN — GABAPENTIN 100 MG: 100 CAPSULE ORAL at 07:58

## 2022-08-25 RX ADMIN — POLYETHYLENE GLYCOL 3350 17 G: 17 POWDER, FOR SOLUTION ORAL at 08:00

## 2022-08-25 RX ADMIN — SODIUM CHLORIDE: 9 INJECTION, SOLUTION INTRAVENOUS at 20:43

## 2022-08-25 RX ADMIN — ACETAMINOPHEN 1000 MG: 500 TABLET ORAL at 13:22

## 2022-08-25 RX ADMIN — ACETAMINOPHEN 1000 MG: 500 TABLET ORAL at 07:55

## 2022-08-25 RX ADMIN — OXYCODONE HYDROCHLORIDE 5 MG: 5 TABLET ORAL at 14:26

## 2022-08-25 RX ADMIN — PANTOPRAZOLE SODIUM 40 MG: 40 TABLET, DELAYED RELEASE ORAL at 07:57

## 2022-08-25 RX ADMIN — INSULIN ASPART 1 UNITS: 100 INJECTION, SOLUTION INTRAVENOUS; SUBCUTANEOUS at 08:00

## 2022-08-25 RX ADMIN — SENNOSIDES AND DOCUSATE SODIUM 1 TABLET: 8.6; 5 TABLET ORAL at 07:54

## 2022-08-25 RX ADMIN — VANCOMYCIN HYDROCHLORIDE 750 MG: 1 INJECTION, POWDER, LYOPHILIZED, FOR SOLUTION INTRAVENOUS at 00:50

## 2022-08-25 ASSESSMENT — ACTIVITIES OF DAILY LIVING (ADL)
ADLS_ACUITY_SCORE: 44

## 2022-08-25 NOTE — PHARMACY-VANCOMYCIN DOSING SERVICE
Pharmacy Vancomycin Note  Date of Service 2022  Patient's  1970   52 year old, male    Indication: MRSA  Day of Therapy: 3  Current vancomycin regimen:  750 mg IV q12h  Current vancomycin monitoring method: AUC  Current vancomycin therapeutic monitoring goal: 400-600 mg*h/L    Insight[employer] Prediction of Current Vancomycin Regimen  Loading dose: 1250 mg IV at 14:19 on 22  Regimen: 750 mg IV every 12 hours.  Start time: 09:50 on 2022  Exposure target: AUC24 (range)400-600 mg/L.hr   AUC24,ss: 514 mg/L.hr  Probability of AUC24 > 400: 88 %  Ctrough,ss: 16.6 mg/L  Probability of Ctrough,ss > 20: 27 %  Probability of nephrotoxicity (Lodise JEANETTE ): 12 %    Current estimated CrCl = Estimated Creatinine Clearance: 75.2 mL/min (based on SCr of 1.09 mg/dL).    Creatinine for last 3 days  2022:  1:00 PM Creatinine 1.04 mg/dL  2022:  6:11 AM Creatinine 1.47 mg/dL;  8:33 AM Creatinine 1.50 mg/dL  2022:  6:05 AM Creatinine 1.09 mg/dL    Recent Vancomycin Levels (past 3 days)  2022:  6:05 AM Vancomycin 22.9 mg/L    Vancomycin IV Administrations (past 72 hours)                   vancomycin (VANCOCIN) 750 mg in sodium chloride 0.9 % 250 mL intermittent infusion (mg) 750 mg New Bag 22 0050     750 mg New Bag 22 0233    vancomycin (VANCOCIN) 750 mg in sodium chloride 0.9 % 250 mL intermittent infusion (mg) 750 mg New Bag 22 1129    vancomycin (VANCOCIN) 1,250 mg in sodium chloride 0.9 % 250 mL intermittent infusion (mg) 1,250 mg New Bag 22 1419                Nephrotoxins and other renal medications (From now, onward)    Start     Dose/Rate Route Frequency Ordered Stop    22 1400  vancomycin (VANCOCIN) 1,500 mg in sodium chloride 0.9 % 250 mL intermittent infusion         1,500 mg  over 90 Minutes Intravenous EVERY 24 HOURS 22 0853      22 0454  piperacillin-tazobactam (ZOSYN) 3.375 g vial to attach to  mL bag        Note to Pharmacy:  "Extended infusion dosing to start 6 hours after initial infusion.   \"Followed by\" Linked Group Details    3.375 g  over 240 Minutes Intravenous EVERY 8 HOURS 08/23/22 2255               Contrast Orders - past 72 hours (72h ago, onward)    Start     Dose/Rate Route Frequency Stop    08/23/22 2000  iopamidol (ISOVUE-370) solution 75 mL         75 mL Intravenous ONCE 08/23/22 1956          Interpretation of levels and current regimen:  Vancomycin level is reflective of -600    Has serum creatinine changed greater than 50% in last 72 hours: Yes    Urine output:  unable to determine    Renal Function: Improving    InsightRX Prediction of Planned New Vancomycin Regimen  Regimen: 1500 mg IV every 24 hours.  Start time: 14:00 on 08/25/2022  Exposure target: AUC24 (range)400-600 mg/L.hr   AUC24,ss: 519 mg/L.hr  Probability of AUC24 > 400: 89 %  Ctrough,ss: 13.9 mg/L  Probability of Ctrough,ss > 20: 15 %  Probability of nephrotoxicity (Lodise JEANETTE 2009): 9 %  Plan:  1. consolidating his dose to once daily significantly decreases the toxicity profile of this  vancomycin case and I will act accordingly   2. Vancomycin monitoring method: AUC  3. Vancomycin therapeutic monitoring goal: 400-600 mg*h/L  4. Pharmacy will check vancomycin levels as appropriate in 3-5 Days.  5. Serum creatinine levels will be ordered daily for the first week of therapy and at least twice weekly for subsequent weeks.    Rodrigo Singh, [facility]    "

## 2022-08-25 NOTE — CONSULTS
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  Now revised, but closure is tomorrow.  Has bacteremia along with stump abscess.  I talked to his Mom on the phone, her concerns are his protein intake and lack of ability to use dentures.  She wants him to get blood also.     Wound not examined today.         Pertinent past history, past infectious disease history:     Past Medical History       I have reviewed this patient's medical history and updated it with pertinent information if needed.        Past Medical History:   Diagnosis Date     Anemia       Anemia, unspecified type 08/19/2016     Anxiety       Anxiety state 07/29/2021     Formatting of this note might be different from the original. Created by Conversion  Replacement Utility updated for latest IMO load     Arthritis       Atrial fibrillation and flutter (H)       Cerebral artery occlusion with cerebral infarction (H)       Cerebral infarction (H)       Chest pain, unspecified type 08/19/2016     Chronic atrial fibrillation (H)       Congestive heart failure (H)       Coronary artery disease       CABG with multiple stents     Coronary artery disease involving native coronary artery without angina pectoris, unspecified whether native or transplanted heart 08/19/2016     Coronary atherosclerosis 07/29/2021     Formatting of this note might be different from the original. Created by Conversion  Replacement Utility updated for latest IMO load     Depression       Diabetic ulcer of right midfoot associated with type 2 diabetes mellitus, limited to breakdown of skin (H) 09/28/2017     Equinovarus acquired deformity, right 04/14/2021     Equinovarus deformity, acquired, right       Essential hypertension 08/19/2016     GERD (gastroesophageal reflux disease)       Heart attack (H)       History of blood transfusion       Hyperlipidemia        Hyperlipidemia, unspecified hyperlipidemia type 08/19/2016     Hypertension       Insomnia, unspecified type 08/19/2016     Ischemic cardiomyopathy       Late effects of cerebrovascular accident 08/19/2016     Major depressive disorder with single episode, remission status unspecified 08/19/2016     Microalbuminuria 07/29/2021     Formatting of this note might be different from the original. Created by Conversion     Migraine       Mild intellectual disabilities 08/19/2016     Myalgia       Nausea 08/19/2016     Osteomyelitis (H)       PONV (postoperative nausea and vomiting)       mild nausea     Problems with learning 07/29/2021     Formatting of this note might be different from the original. Created by Conversion [facility] Annotation: Feb 11 2010  1:41PM - Leonid Cabral: CANNOT READ OR  WRITE     PVD (peripheral vascular disease) (H)       Stented coronary artery       Type 2 diabetes mellitus (H) 07/29/2021     Formatting of this note might be different from the original. Created by Conversion     Type II or unspecified type diabetes mellitus with ophthalmic manifestations, not stated as uncontrolled(250.50) (H) 07/29/2021     Formatting of this note might be different from the original. Created by Conversion     Weakness 08/19/2016               Past Surgical History      I have reviewed this patient's surgical history and updated it with pertinent information if needed.        Past Surgical History:   Procedure Laterality Date     AMPUTATE LEG BELOW KNEE Right 8/5/2022     Procedure: AMPUTATION, BELOW KNEE, right leg;  Surgeon: Kim Montes MD;  Location: [facility] Main OR     INCISION AND DRAINAGE LOWER EXTREMITY, COMBINED Right 12/17/2021     Procedure: INCISION AND DRAINAGE, right foot;  Surgeon: Alex Kim DPM;  Location: [facility] Main OR     INCISION AND DRAINAGE LOWER EXTREMITY, COMBINED Right 1/27/2022     Procedure: INCISION AND DRAINAGE, right foot with application of theraskin;   "Surgeon: Alex Kim DPM;  Location: [facility] OR     INCISION AND DRAINAGE LOWER EXTREMITY, COMBINED Right 2022     Procedure: INCISION AND DRAINAGE, right foot with application of theraskin;  Surgeon: Alex Kim DPM;  Location: [facility] OR     IR LOWER EXTREMITY ANGIOGRAM RIGHT   2022     IR MISCELLANEOUS PROCEDURE   2004     [facility] CABG, VEIN, SINGLE         Description: CABG (CABG);  Recorded: 2009;               Social History      I have reviewed this patient's social history and updated it with pertinent information if needed.  Social History            Tobacco Use     Smoking status: Former Smoker       Quit date: 2015       Years since quittin.5     Smokeless tobacco: Never Used   Vaping Use     Vaping Use: Never used   Substance Use Topics     Alcohol use: Not Currently     Drug use: Not Currently               Family History         Reviewed and not pertinent to chief complaint               Medications:  Reviewed prior to admission meds as applicable in chart review.  Current meds are reviewed in the EMR listed MAR.     ANTIBIOTICS:    Current:vanco, zosyn   Prior:   Allergy to:    SH/FH and  travel history(if applicable to consult):  above  REVIEW OF SYSTEMS:  All other systems negative    EXAMINATION:  /56 (BP Location: Right arm)   Pulse 76   Temp 98.6  F (37  C) (Oral)   Resp 18   Ht 1.702 m (5' 7\")   Wt 68.6 kg (151 lb 3.2 oz)   SpO2 95%   BMI 23.68 kg/m    Alert, awake  Vitals tabulated above, reviewed  HEENT:edentulous  Neck supple without lymphadenopathy  Sclera clear  CARDIOVASCULAR regular rate and rhythm, no murmur  Lungs CLEAR TO AUSCULTATION   Abdomen soft, NT/ND, absent HEPATOSPLENOMEGALY  Skin normal  Joints normal  Neurologic exam non focal  Wound: bandaged E        CLINICAL DATABASE FOR---LAB/MICRO/CULTURES/IMAGING STUDIES:  Lab Results   Component Value Date    WBC 6.0 2022    WBC 5.0 2016     Lab " Results   Component Value Date    RBC 2.49 08/25/2022    RBC 3.49 08/25/2016     Lab Results   Component Value Date    HGB 6.4 08/25/2022    HGB 10.1 08/25/2016     Lab Results   Component Value Date    HCT 20.9 08/25/2022    HCT 31.4 08/25/2016     No components found for: MCT  Lab Results   Component Value Date    MCV 84 08/25/2022    MCV 90 08/25/2016     Lab Results   Component Value Date    MCH 25.7 08/25/2022    MCH 28.7 08/25/2016     Lab Results   Component Value Date    MCHC 30.6 08/25/2022    MCHC 31.8 08/25/2016     Lab Results   Component Value Date    RDW 14.6 08/25/2022    RDW 13.6 08/25/2016     Lab Results   Component Value Date     08/25/2022     08/25/2016       Last Comprehensive Metabolic Panel:  Sodium   Date Value Ref Range Status   08/25/2022 137 136 - 145 mmol/L Final   08/25/2016 139 mmol/L Final     Potassium   Date Value Ref Range Status   08/25/2022 4.3 3.5 - 5.0 mmol/L Final   08/25/2016 4.3 mmol/L Final     Chloride   Date Value Ref Range Status   08/25/2022 108 (H) 98 - 107 mmol/L Final   08/25/2016 107 mmol/L Final     Carbon Dioxide (CO2)   Date Value Ref Range Status   08/25/2022 25 22 - 31 mmol/L Final     Anion Gap   Date Value Ref Range Status   08/25/2022 4 (L) 5 - 18 mmol/L Final   08/25/2016 8 mmol/L Final     Glucose   Date Value Ref Range Status   08/25/2022 155 (H) 70 - 125 mg/dL Final   08/25/2016 84 70 - 99 mg/dL Final     GLUCOSE BY METER POCT   Date Value Ref Range Status   08/25/2022 157 (H) 70 - 99 mg/dL Final     Urea Nitrogen   Date Value Ref Range Status   08/25/2022 20 8 - 22 mg/dL Final   08/25/2016 21 mg/dL Final     Creatinine   Date Value Ref Range Status   08/25/2022 1.09 0.70 - 1.30 mg/dL Final   08/25/2016 0.85 mg/dL Final     GFR Estimate   Date Value Ref Range Status   08/25/2022 81 >60 mL/min/1.73m2 Final     Comment:     Effective December 21, 2021 eGFRcr in adults is calculated using the 2021 CKD-EPI creatinine equation which includes age  and gender (Stephen perry al., [place], DOI: 10.1056/ULDIvn5351103)   04/29/2019 >60 >60 mL/min/1.73m2 Final     Calcium   Date Value Ref Range Status   08/25/2022 8.6 8.5 - 10.5 mg/dL Final   08/25/2016 9.9 mg/dL Final       Liver Function Studies -   Recent Labs   Lab Test 08/24/22  0611   PROTTOTAL 6.8   ALBUMIN 2.8*   BILITOTAL 0.3   ALKPHOS 61   AST 13   ALT 11       No results found for: SED    CRP   Date Value Ref Range Status   08/24/2022 18.9 (H) 0.0 - <0.8 mg/dL Final       FINDINGS:   Recent below-knee amputation, with skin staples in place. There is an irregular intermittent density collection along the lateral aspect of the stump extending from the distal end of the fibular stump toward the skin surface, measuring 1 x 3 x 3 cm,   probably representing a fluid collection. There is additional irregular small well-defined intermediate density regional collection adjacent to the distal end of the tibial stump. No bone destruction to suggest osteomyelitis. There is diffuse   subcutaneous soft tissue stranding.     Small knee joint effusion.                                                                      IMPRESSION:  1.  Irregular intermediate density fluid collection adjacent to the fibular stump extending from the distal end of the distal stump to the skin could represent abscess or subacute, potentially infected hematoma.  2.  Possible additional but less well-defined fluid collection adjacent to the tibial stump.  3.  Subcutaneous soft tissue stranding could represent cellulitis.  4.  No CT evidence of osteomyelitis. MRI would be more sensitive.    Blood          0 Result Notes    Culture Positive on the 1st day of incubation Abnormal        Staphylococcus aureus MRSA Panic     1 of 2 bottles        Resulting Agency: [facility]       Susceptibility     Staphylococcus aureus MRSA     LIZANDRO     Clindamycin <=0.25 ug/mL Susceptible 1     Erythromycin >=8 ug/mL Resistant     Gentamicin <=0.5 ug/mL Susceptible      Linezolid 2 ug/mL Susceptible     Oxacillin >=4 ug/mL Resistant 2     Tetracycline <=1 ug/mL Susceptible     Trimethoprim/Sulfamethoxazole <=0.5/9.5 u... Susceptible     Vancomycin <=0.5 ug/mL Susceptible                   IMPRESSION:  MRSA bacteremia, with a known source  DMFI/BKA stump abscess  Pt in care center environment    PLAN:  Will need a month of vanco  We do echos on all pts with bacteremia, even when a clear source is known, so will order  SW needs to be aware of 28 days IV abx needed.  Recheck BC today, be sure clear.           ANT KNIGHT MD  [facility]  Office [phone]

## 2022-08-25 NOTE — PROGRESS NOTES
"Care Management Note    Length of Stay (days): 2    Expected Discharge Date: To be determined.     Concerns to be Addressed:   Postoperative care and recovery (OR for irrigation and debridement, revision of below the knee amputation 8/24/2022).  Currently receiving IV vancomycin and Zosyn. Anemia (6.4).  Patient plan of care discussed at interdisciplinary rounds: Yes  Follow up from rounds/notes:   Per ID note, \"bacteremia along with stump abscess.\" and planning for wound closure tomorrow.  Per MD, will likely need PICC line. Per surgeon's note, \"Will try to add on patient for partial closure and wound VAC placement in OR tomorrow.\"    Anticipated Discharge Disposition:  [facility].      Anticipated Discharge Services:  To be determined by destination, patient/family preferences, medical needs and mobility status closer to the time of discharge.  Anticipated Discharge DME:  To be determined.     Patient/family educated on Medicare website which has current facility and service quality ratings:  NA  Education Provided on the Discharge Plan:  Per team  Patient/Family in Agreement with the Plan:  Yes    Referrals Placed by CM/SW:  [facility]; [facility];  Private pay costs discussed: Not applicable at this time.    Additional Information:  Patient admitted from [facility] TCU where he was admitted after a BKA on his 8/5/2022. Patient is a resident of [facility] ([facility]) but the nurse there notes that patient has an apartment there but he has been hospitalized/at SNF since December 17th of 2021. She had just assessed him for a return and completed his care plan for anticipated return but he was hospitalized instead. She notes that he needs assistance with medication administration, meals, and is assist of one with: bathing, dressing, grooming and transfers. He also received assist of one with " toileting (dependent on his mobility on the day). They also provide housekeeping and laundry services. He can return to the assisted living once his wounds can be managed by a home care nurse three times a week or less. They cannot provide IV medication administration. She suspects that he will need to return to transitional care after hospital stay but would like to be kept up-to-date with the plan. Her number is [phone].   Writer also spoke with patient's guardian (mother Jewels) who anticipates patient will return to [facility] at discharge. She is under the understanding that patient may need to stay in the hospital until next week and may be discharging with a wound V.A.C.  Update provided to admissions at [facility].  Patient has been accepted for return to [facility] when medically cleared for discharge.    Kathy Villa RN

## 2022-08-25 NOTE — PROGRESS NOTES
Introduced myself to patient and his sister, told them that wound care team will take care about wound vac dressing changes.  Delivered to pt room Vashe solution and communicated to surgeon about it. Vashe could be used at present time for wet-to-dry dressing changes by vascular team or later with wound vac for wound cleansing

## 2022-08-25 NOTE — PROGRESS NOTES
"VASCULAR SURGERY PROGRESS NOTE    Subjective:  Patient was seen and evaluated at the bedside today for surgical follow up. Notes he was not able to get any sleep last night. Pain appears controlled this morning, tolerates dressing change well. Afebrile on IV antibiotics. No other concerns.     Objective:  Intake/Output Summary (Last 24 hours) at 8/25/2022 0858  Last data filed at 8/25/2022 0610  Gross per 24 hour   Intake 1250 ml   Output 1535 ml   Net -285 ml     PHYSICAL EXAM:  /56 (BP Location: Right arm)   Pulse 76   Temp 98.6  F (37  C) (Oral)   Resp 18   Ht 1.702 m (5' 7\")   Wt 68.6 kg (151 lb 3.2 oz)   SpO2 95%   BMI 23.68 kg/m    General: patient is alert and oriented, no acute distress  Psych: pleasant affect, answers questions appropriately  Skin: color appropriate for race, warm, dry  Respiratory: normal respiratory effort   Extremities: full thickness surgical wound with exposed bone appears healthy without purulence or malodor      ASSESSMENT:  51 YO male with past medical history significant for hypertension, hyperlipidemia, coronary artery diease s/p CABG, atrial fibrillation on xarelto, neuropathy, history of CVA, and charcot ankle right foot. He underwent below knee amputation earlier this month which unfortunately became infected with CT showing an evolving abscess.     Presently POD #1 irrigation and debridement of below knee amputation    PLAN:  Continue IV antibiotics, ID consulted  BID dressing changes to residual limb, changed this AM  Pain control PRN  Will try to add on patient for partial closure and wound VAC placement in OR jacqueline  Continue holding Xarelto for noworrow    Discussed pt history, exam, assessment and plan with Dr. Montes of the vascular surgery service, who is in agreement with the above.    Lizzeth Figueroa PA-C  VASCULAR SURGERY                   "

## 2022-08-25 NOTE — PLAN OF CARE
Problem: Plan of Care - These are the overarching goals to be used throughout the patient stay.    Goal: Plan of Care Review/Shift Note  Description: The Plan of Care Review/Shift note should be completed every shift.  The Outcome Evaluation is a brief statement about your assessment that the patient is improving, declining, or no change.  This information will be displayed automatically on your shift note.  Outcome: Ongoing, Progressing     Problem: Plan of Care - These are the overarching goals to be used throughout the patient stay.    Goal: Absence of Hospital-Acquired Illness or Injury  Intervention: Identify and Manage Fall Risk  Recent Flowsheet Documentation  Taken 8/24/2022 2356 by Susi Rider RN  Safety Promotion/Fall Prevention:   bed alarm on   assistive device/personal items within reach  Taken 8/24/2022 1830 by Susi Rider RN  Safety Promotion/Fall Prevention:   bed alarm on   assistive device/personal items within reach  Taken 8/24/2022 1530 by Susi Rider RN  Safety Promotion/Fall Prevention:   bed alarm on   assistive device/personal items within reach   Goal Outcome Evaluation:    Pt dressing changed per surgery request. Site remains free of s/sx of infection. Diet advanced without concern. C/o pain x1 effectively relieved by PRN analgesia. No adverse effects noted from IV abx and fluids.

## 2022-08-25 NOTE — PLAN OF CARE
Problem: Plan of Care - These are the overarching goals to be used throughout the patient stay.    Goal: Plan of Care Review/Shift Note  Description: The Plan of Care Review/Shift note should be completed every shift.  The Outcome Evaluation is a brief statement about your assessment that the patient is improving, declining, or no change.  This information will be displayed automatically on your shift note.  8/25/2022 0333 by Susi Rider RN  Outcome: Ongoing, Progressing  8/25/2022 0330 by Susi Rider RN  Outcome: Ongoing, Progressing   Goal Outcome Evaluation:    Pt has slept comfortably through NOC. VSS. Utilizes call light appropriately. Tolerating IV abx and fluids. Dressing to surgical site is CDI

## 2022-08-25 NOTE — PLAN OF CARE
Problem: Infection  Goal: Absence of Infection Signs and Symptoms  Outcome: Ongoing, Progressing     Problem: Constipation  Goal: Effective Bowel Elimination  Outcome: Ongoing, Progressing     Problem: Pain Chronic (Persistent)  Goal: Acceptable Pain Control and Functional Ability  Outcome: Ongoing, Progressing     Problem: Impaired Wound Healing  Goal: Optimal Wound Healing  Outcome: Ongoing, Progressing     Problem: Fall Injury Risk  Goal: Absence of Fall and Fall-Related Injury  Outcome: Ongoing, Progressing   Goal Outcome Evaluation:

## 2022-08-25 NOTE — PLAN OF CARE
Sister here today- supportive. I unit of PRBC's transfused - no problems (given slowly as he has long heart history).  Eating and drinking well States he has not had a BM x 5 days- MOM given.  IV antibiotics as ordered.  No fevers.  Very pleasant- likes to joke with staff, watching DVD's in room. Dressing change done by vascular team. Falls and pressure ulcer prevention plans in place. Marah Christie RN

## 2022-08-26 ENCOUNTER — ANESTHESIA (OUTPATIENT)
Dept: SURGERY | Facility: HOSPITAL | Age: 52
DRG: 475 | End: 2022-08-26
Payer: MEDICARE

## 2022-08-26 LAB
ALBUMIN UR-MCNC: NEGATIVE MG/DL
ANION GAP SERPL CALCULATED.3IONS-SCNC: 4 MMOL/L (ref 5–18)
APPEARANCE UR: CLEAR
BACTERIA BLD CULT: ABNORMAL
BACTERIA BLD CULT: ABNORMAL
BACTERIA SPEC CULT: ABNORMAL
BASOPHILS # BLD AUTO: 0.1 10E3/UL (ref 0–0.2)
BASOPHILS NFR BLD AUTO: 2 %
BILIRUB UR QL STRIP: NEGATIVE
BUN SERPL-MCNC: 16 MG/DL (ref 8–22)
CALCIUM SERPL-MCNC: 8.7 MG/DL (ref 8.5–10.5)
CHLORIDE BLD-SCNC: 110 MMOL/L (ref 98–107)
CHOLEST SERPL-MCNC: 82 MG/DL
CO2 SERPL-SCNC: 25 MMOL/L (ref 22–31)
COLOR UR AUTO: COLORLESS
CREAT SERPL-MCNC: 1 MG/DL (ref 0.7–1.3)
ENTEROCOCCUS FAECALIS: NOT DETECTED
ENTEROCOCCUS FAECIUM: NOT DETECTED
EOSINOPHIL # BLD AUTO: 0.3 10E3/UL (ref 0–0.7)
EOSINOPHIL NFR BLD AUTO: 6 %
ERYTHROCYTE [DISTWIDTH] IN BLOOD BY AUTOMATED COUNT: 14.6 % (ref 10–15)
FOLATE SERPL-MCNC: 5.1 NG/ML (ref 4.6–34.8)
GFR SERPL CREATININE-BSD FRML MDRD: 89 ML/MIN/1.73M2
GLUCOSE BLD-MCNC: 102 MG/DL (ref 70–125)
GLUCOSE BLDC GLUCOMTR-MCNC: 108 MG/DL (ref 70–99)
GLUCOSE BLDC GLUCOMTR-MCNC: 118 MG/DL (ref 70–99)
GLUCOSE BLDC GLUCOMTR-MCNC: 134 MG/DL (ref 70–99)
GLUCOSE BLDC GLUCOMTR-MCNC: 134 MG/DL (ref 70–99)
GLUCOSE BLDC GLUCOMTR-MCNC: 147 MG/DL (ref 70–99)
GLUCOSE BLDC GLUCOMTR-MCNC: 161 MG/DL (ref 70–99)
GLUCOSE BLDC GLUCOMTR-MCNC: 96 MG/DL (ref 70–99)
GLUCOSE UR STRIP-MCNC: NEGATIVE MG/DL
HBA1C MFR BLD: 5.9 %
HCT VFR BLD AUTO: 27.3 % (ref 40–53)
HDLC SERPL-MCNC: 22 MG/DL
HGB BLD-MCNC: 8.2 G/DL (ref 13.3–17.7)
HGB UR QL STRIP: NEGATIVE
IMM GRANULOCYTES # BLD: 0 10E3/UL
IMM GRANULOCYTES NFR BLD: 1 %
KETONES UR STRIP-MCNC: NEGATIVE MG/DL
LACTATE SERPL-SCNC: 1.5 MMOL/L (ref 0.7–2)
LDLC SERPL CALC-MCNC: 35 MG/DL
LEUKOCYTE ESTERASE UR QL STRIP: NEGATIVE
LISTERIA SPECIES (DETECTED/NOT DETECTED): NOT DETECTED
LYMPHOCYTES # BLD AUTO: 1.4 10E3/UL (ref 0.8–5.3)
LYMPHOCYTES NFR BLD AUTO: 31 %
MAGNESIUM SERPL-MCNC: 1.5 MG/DL (ref 1.8–2.6)
MAGNESIUM SERPL-MCNC: 2.1 MG/DL (ref 1.8–2.6)
MCH RBC QN AUTO: 25.8 PG (ref 26.5–33)
MCHC RBC AUTO-ENTMCNC: 30 G/DL (ref 31.5–36.5)
MCV RBC AUTO: 86 FL (ref 78–100)
MONOCYTES # BLD AUTO: 0.4 10E3/UL (ref 0–1.3)
MONOCYTES NFR BLD AUTO: 9 %
NEUTROPHILS # BLD AUTO: 2.5 10E3/UL (ref 1.6–8.3)
NEUTROPHILS NFR BLD AUTO: 51 %
NITRATE UR QL: NEGATIVE
NRBC # BLD AUTO: 0 10E3/UL
NRBC BLD AUTO-RTO: 0 /100
PH UR STRIP: 8 [PH] (ref 5–7)
PLATELET # BLD AUTO: 251 10E3/UL (ref 150–450)
POTASSIUM BLD-SCNC: 4.4 MMOL/L (ref 3.5–5)
RBC # BLD AUTO: 3.18 10E6/UL (ref 4.4–5.9)
SODIUM SERPL-SCNC: 139 MMOL/L (ref 136–145)
SP GR UR STRIP: 1.01 (ref 1–1.03)
STAPHYLOCOCCUS AUREUS: NOT DETECTED
STAPHYLOCOCCUS EPIDERMIDIS: NOT DETECTED
STAPHYLOCOCCUS LUGDUNENSIS: NOT DETECTED
STAPHYLOCOCCUS SPECIES: NOT DETECTED
STREPTOCOCCUS AGALACTIAE: NOT DETECTED
STREPTOCOCCUS ANGINOSUS GROUP: NOT DETECTED
STREPTOCOCCUS PNEUMONIAE: NOT DETECTED
STREPTOCOCCUS PYOGENES: NOT DETECTED
STREPTOCOCCUS SPECIES: DETECTED
TRIGL SERPL-MCNC: 126 MG/DL
UROBILINOGEN UR STRIP-MCNC: <2 MG/DL
WBC # BLD AUTO: 4.6 10E3/UL (ref 4–11)

## 2022-08-26 PROCEDURE — 83036 HEMOGLOBIN GLYCOSYLATED A1C: CPT | Performed by: INTERNAL MEDICINE

## 2022-08-26 PROCEDURE — 87040 BLOOD CULTURE FOR BACTERIA: CPT | Performed by: INTERNAL MEDICINE

## 2022-08-26 PROCEDURE — 272N000001 HC OR GENERAL SUPPLY STERILE: Performed by: SURGERY

## 2022-08-26 PROCEDURE — 250N000009 HC RX 250: Performed by: SURGERY

## 2022-08-26 PROCEDURE — 250N000009 HC RX 250: Performed by: ANESTHESIOLOGY

## 2022-08-26 PROCEDURE — 360N000075 HC SURGERY LEVEL 2, PER MIN: Performed by: SURGERY

## 2022-08-26 PROCEDURE — 83605 ASSAY OF LACTIC ACID: CPT | Performed by: INTERNAL MEDICINE

## 2022-08-26 PROCEDURE — 120N000001 HC R&B MED SURG/OB

## 2022-08-26 PROCEDURE — 81003 URINALYSIS AUTO W/O SCOPE: CPT | Performed by: PHYSICIAN ASSISTANT

## 2022-08-26 PROCEDURE — 27884 AMPUTATION FOLLOW-UP SURGERY: CPT | Mod: 78 | Performed by: SURGERY

## 2022-08-26 PROCEDURE — 250N000013 HC RX MED GY IP 250 OP 250 PS 637: Performed by: INTERNAL MEDICINE

## 2022-08-26 PROCEDURE — 0HDKXZZ EXTRACTION OF RIGHT LOWER LEG SKIN, EXTERNAL APPROACH: ICD-10-PCS | Performed by: SURGERY

## 2022-08-26 PROCEDURE — 250N000009 HC RX 250: Performed by: NURSE ANESTHETIST, CERTIFIED REGISTERED

## 2022-08-26 PROCEDURE — 370N000017 HC ANESTHESIA TECHNICAL FEE, PER MIN: Performed by: SURGERY

## 2022-08-26 PROCEDURE — 82746 ASSAY OF FOLIC ACID SERUM: CPT | Performed by: INTERNAL MEDICINE

## 2022-08-26 PROCEDURE — 250N000011 HC RX IP 250 OP 636: Performed by: INTERNAL MEDICINE

## 2022-08-26 PROCEDURE — 258N000003 HC RX IP 258 OP 636: Performed by: PHYSICIAN ASSISTANT

## 2022-08-26 PROCEDURE — 999N000141 HC STATISTIC PRE-PROCEDURE NURSING ASSESSMENT: Performed by: SURGERY

## 2022-08-26 PROCEDURE — 250N000013 HC RX MED GY IP 250 OP 250 PS 637: Performed by: PHYSICIAN ASSISTANT

## 2022-08-26 PROCEDURE — 99232 SBSQ HOSP IP/OBS MODERATE 35: CPT | Performed by: INTERNAL MEDICINE

## 2022-08-26 PROCEDURE — 258N000003 HC RX IP 258 OP 636: Performed by: INTERNAL MEDICINE

## 2022-08-26 PROCEDURE — 250N000011 HC RX IP 250 OP 636: Performed by: ANESTHESIOLOGY

## 2022-08-26 PROCEDURE — 258N000003 HC RX IP 258 OP 636: Performed by: ANESTHESIOLOGY

## 2022-08-26 PROCEDURE — 85025 COMPLETE CBC W/AUTO DIFF WBC: CPT | Performed by: INTERNAL MEDICINE

## 2022-08-26 PROCEDURE — 80048 BASIC METABOLIC PNL TOTAL CA: CPT | Performed by: INTERNAL MEDICINE

## 2022-08-26 PROCEDURE — 36415 COLL VENOUS BLD VENIPUNCTURE: CPT | Performed by: INTERNAL MEDICINE

## 2022-08-26 PROCEDURE — 250N000011 HC RX IP 250 OP 636: Performed by: NURSE ANESTHETIST, CERTIFIED REGISTERED

## 2022-08-26 PROCEDURE — 80061 LIPID PANEL: CPT | Performed by: INTERNAL MEDICINE

## 2022-08-26 PROCEDURE — 83735 ASSAY OF MAGNESIUM: CPT | Performed by: INTERNAL MEDICINE

## 2022-08-26 RX ORDER — CEFAZOLIN SODIUM 2 G/100ML
2 INJECTION, SOLUTION INTRAVENOUS SEE ADMIN INSTRUCTIONS
Status: DISCONTINUED | OUTPATIENT
Start: 2022-08-26 | End: 2022-08-26 | Stop reason: HOSPADM

## 2022-08-26 RX ORDER — ACETAMINOPHEN 325 MG/1
975 TABLET ORAL EVERY 8 HOURS
Status: DISPENSED | OUTPATIENT
Start: 2022-08-26 | End: 2022-08-29

## 2022-08-26 RX ORDER — MAGNESIUM SULFATE 4 G/50ML
4 INJECTION INTRAVENOUS ONCE
Status: COMPLETED | OUTPATIENT
Start: 2022-08-26 | End: 2022-08-26

## 2022-08-26 RX ORDER — POLYETHYLENE GLYCOL 3350 17 G/17G
17 POWDER, FOR SOLUTION ORAL DAILY
Status: DISCONTINUED | OUTPATIENT
Start: 2022-08-27 | End: 2022-08-30 | Stop reason: HOSPADM

## 2022-08-26 RX ORDER — LIDOCAINE 40 MG/G
CREAM TOPICAL
Status: DISCONTINUED | OUTPATIENT
Start: 2022-08-26 | End: 2022-08-26 | Stop reason: HOSPADM

## 2022-08-26 RX ORDER — MAGNESIUM HYDROXIDE 1200 MG/15ML
LIQUID ORAL PRN
Status: DISCONTINUED | OUTPATIENT
Start: 2022-08-26 | End: 2022-08-26 | Stop reason: HOSPADM

## 2022-08-26 RX ORDER — ONDANSETRON 2 MG/ML
4 INJECTION INTRAMUSCULAR; INTRAVENOUS EVERY 6 HOURS PRN
Status: DISCONTINUED | OUTPATIENT
Start: 2022-08-26 | End: 2022-08-30 | Stop reason: HOSPADM

## 2022-08-26 RX ORDER — BISACODYL 10 MG
10 SUPPOSITORY, RECTAL RECTAL DAILY PRN
Status: DISCONTINUED | OUTPATIENT
Start: 2022-08-26 | End: 2022-08-30 | Stop reason: HOSPADM

## 2022-08-26 RX ORDER — LIDOCAINE 40 MG/G
CREAM TOPICAL
Status: DISCONTINUED | OUTPATIENT
Start: 2022-08-26 | End: 2022-08-30 | Stop reason: HOSPADM

## 2022-08-26 RX ORDER — BUPIVACAINE HYDROCHLORIDE 5 MG/ML
INJECTION, SOLUTION EPIDURAL; INTRACAUDAL
Status: COMPLETED | OUTPATIENT
Start: 2022-08-26 | End: 2022-08-26

## 2022-08-26 RX ORDER — PROPOFOL 10 MG/ML
INJECTION, EMULSION INTRAVENOUS CONTINUOUS PRN
Status: DISCONTINUED | OUTPATIENT
Start: 2022-08-26 | End: 2022-08-26

## 2022-08-26 RX ORDER — AMOXICILLIN 250 MG
1 CAPSULE ORAL 2 TIMES DAILY
Status: DISCONTINUED | OUTPATIENT
Start: 2022-08-26 | End: 2022-08-30 | Stop reason: HOSPADM

## 2022-08-26 RX ORDER — CEFAZOLIN SODIUM 2 G/100ML
2 INJECTION, SOLUTION INTRAVENOUS
Status: DISCONTINUED | OUTPATIENT
Start: 2022-08-26 | End: 2022-08-26 | Stop reason: HOSPADM

## 2022-08-26 RX ORDER — PROCHLORPERAZINE MALEATE 10 MG
10 TABLET ORAL EVERY 6 HOURS PRN
Status: DISCONTINUED | OUTPATIENT
Start: 2022-08-26 | End: 2022-08-30 | Stop reason: HOSPADM

## 2022-08-26 RX ORDER — ONDANSETRON 2 MG/ML
INJECTION INTRAMUSCULAR; INTRAVENOUS PRN
Status: DISCONTINUED | OUTPATIENT
Start: 2022-08-26 | End: 2022-08-26

## 2022-08-26 RX ORDER — ACETAMINOPHEN 325 MG/1
650 TABLET ORAL EVERY 4 HOURS PRN
Status: DISCONTINUED | OUTPATIENT
Start: 2022-08-29 | End: 2022-08-30 | Stop reason: HOSPADM

## 2022-08-26 RX ORDER — FENTANYL CITRATE 50 UG/ML
100 INJECTION, SOLUTION INTRAMUSCULAR; INTRAVENOUS ONCE
Status: COMPLETED | OUTPATIENT
Start: 2022-08-26 | End: 2022-08-26

## 2022-08-26 RX ORDER — ONDANSETRON 4 MG/1
4 TABLET, ORALLY DISINTEGRATING ORAL EVERY 6 HOURS PRN
Status: DISCONTINUED | OUTPATIENT
Start: 2022-08-26 | End: 2022-08-30 | Stop reason: HOSPADM

## 2022-08-26 RX ORDER — LIDOCAINE HYDROCHLORIDE 20 MG/ML
INJECTION, SOLUTION INFILTRATION; PERINEURAL PRN
Status: DISCONTINUED | OUTPATIENT
Start: 2022-08-26 | End: 2022-08-26

## 2022-08-26 RX ORDER — SODIUM CHLORIDE, SODIUM LACTATE, POTASSIUM CHLORIDE, CALCIUM CHLORIDE 600; 310; 30; 20 MG/100ML; MG/100ML; MG/100ML; MG/100ML
INJECTION, SOLUTION INTRAVENOUS CONTINUOUS
Status: DISCONTINUED | OUTPATIENT
Start: 2022-08-26 | End: 2022-08-26 | Stop reason: HOSPADM

## 2022-08-26 RX ADMIN — SODIUM CHLORIDE, POTASSIUM CHLORIDE, SODIUM LACTATE AND CALCIUM CHLORIDE 100 ML: 600; 310; 30; 20 INJECTION, SOLUTION INTRAVENOUS at 10:41

## 2022-08-26 RX ADMIN — FERROUS SULFATE TAB 325 MG (65 MG ELEMENTAL FE) 325 MG: 325 (65 FE) TAB at 14:05

## 2022-08-26 RX ADMIN — MIRTAZAPINE 7.5 MG: 7.5 TABLET, FILM COATED ORAL at 22:56

## 2022-08-26 RX ADMIN — GABAPENTIN 100 MG: 100 CAPSULE ORAL at 16:42

## 2022-08-26 RX ADMIN — INSULIN ASPART 1 UNITS: 100 INJECTION, SOLUTION INTRAVENOUS; SUBCUTANEOUS at 17:17

## 2022-08-26 RX ADMIN — DULOXETINE HYDROCHLORIDE 120 MG: 60 CAPSULE, DELAYED RELEASE PELLETS ORAL at 14:03

## 2022-08-26 RX ADMIN — GABAPENTIN 100 MG: 100 CAPSULE ORAL at 14:05

## 2022-08-26 RX ADMIN — PIPERACILLIN AND TAZOBACTAM 3.38 G: 3; .375 INJECTION, POWDER, LYOPHILIZED, FOR SOLUTION INTRAVENOUS at 13:55

## 2022-08-26 RX ADMIN — ACETAMINOPHEN 975 MG: 325 TABLET ORAL at 14:03

## 2022-08-26 RX ADMIN — METOPROLOL SUCCINATE 75 MG: 25 TABLET, EXTENDED RELEASE ORAL at 14:01

## 2022-08-26 RX ADMIN — ACETAMINOPHEN 975 MG: 325 TABLET ORAL at 20:40

## 2022-08-26 RX ADMIN — OXYCODONE HYDROCHLORIDE 5 MG: 5 TABLET ORAL at 03:46

## 2022-08-26 RX ADMIN — FENTANYL CITRATE 50 MCG: 50 INJECTION, SOLUTION INTRAMUSCULAR; INTRAVENOUS at 10:57

## 2022-08-26 RX ADMIN — PANTOPRAZOLE SODIUM 40 MG: 40 TABLET, DELAYED RELEASE ORAL at 14:05

## 2022-08-26 RX ADMIN — PIPERACILLIN AND TAZOBACTAM 3.38 G: 3; .375 INJECTION, POWDER, LYOPHILIZED, FOR SOLUTION INTRAVENOUS at 05:56

## 2022-08-26 RX ADMIN — Medication 500 MG: at 14:01

## 2022-08-26 RX ADMIN — ARIPIPRAZOLE 2 MG: 2 TABLET ORAL at 14:01

## 2022-08-26 RX ADMIN — SENNOSIDES AND DOCUSATE SODIUM 1 TABLET: 8.6; 5 TABLET ORAL at 20:40

## 2022-08-26 RX ADMIN — CHOLESTYRAMINE POWDER FOR SUSPENSION 4 G: 4 POWDER, FOR SUSPENSION ORAL at 15:51

## 2022-08-26 RX ADMIN — VANCOMYCIN HYDROCHLORIDE 1500 MG: 5 INJECTION, POWDER, LYOPHILIZED, FOR SOLUTION INTRAVENOUS at 14:09

## 2022-08-26 RX ADMIN — MAGNESIUM SULFATE HEPTAHYDRATE 4 G: 80 INJECTION, SOLUTION INTRAVENOUS at 16:41

## 2022-08-26 RX ADMIN — TRAZODONE HYDROCHLORIDE 50 MG: 50 TABLET ORAL at 22:56

## 2022-08-26 RX ADMIN — Medication 800 MG: at 14:02

## 2022-08-26 RX ADMIN — FENOFIBRATE 160 MG: 160 TABLET, FILM COATED ORAL at 13:58

## 2022-08-26 RX ADMIN — LOSARTAN POTASSIUM 25 MG: 25 TABLET, FILM COATED ORAL at 22:56

## 2022-08-26 RX ADMIN — SODIUM CHLORIDE: 9 INJECTION, SOLUTION INTRAVENOUS at 06:51

## 2022-08-26 RX ADMIN — CLOPIDOGREL BISULFATE 75 MG: 75 TABLET ORAL at 14:04

## 2022-08-26 RX ADMIN — PROPOFOL 150 MCG/KG/MIN: 10 INJECTION, EMULSION INTRAVENOUS at 11:26

## 2022-08-26 RX ADMIN — BUPIVACAINE HYDROCHLORIDE 8 ML: 5 INJECTION, SOLUTION EPIDURAL; INTRACAUDAL at 11:06

## 2022-08-26 RX ADMIN — BUPIVACAINE HYDROCHLORIDE 18 ML: 5 INJECTION, SOLUTION EPIDURAL; INTRACAUDAL; PERINEURAL at 11:02

## 2022-08-26 RX ADMIN — LOPERAMIDE HYDROCHLORIDE 2 MG: 2 CAPSULE ORAL at 14:04

## 2022-08-26 RX ADMIN — METFORMIN HYDROCHLORIDE 1000 MG: 500 TABLET ORAL at 13:59

## 2022-08-26 RX ADMIN — LIDOCAINE HYDROCHLORIDE 60 MG: 20 INJECTION, SOLUTION INFILTRATION; PERINEURAL at 11:26

## 2022-08-26 RX ADMIN — ONDANSETRON 4 MG: 2 INJECTION INTRAMUSCULAR; INTRAVENOUS at 11:26

## 2022-08-26 RX ADMIN — MIDAZOLAM HYDROCHLORIDE 1 MG: 1 INJECTION, SOLUTION INTRAMUSCULAR; INTRAVENOUS at 10:56

## 2022-08-26 RX ADMIN — GABAPENTIN 600 MG: 300 CAPSULE ORAL at 22:55

## 2022-08-26 RX ADMIN — HYDROMORPHONE HYDROCHLORIDE 0.5 MG: 1 INJECTION, SOLUTION INTRAMUSCULAR; INTRAVENOUS; SUBCUTANEOUS at 09:48

## 2022-08-26 RX ADMIN — METFORMIN HYDROCHLORIDE 500 MG: 500 TABLET ORAL at 17:17

## 2022-08-26 RX ADMIN — ATORVASTATIN CALCIUM 80 MG: 40 TABLET, FILM COATED ORAL at 14:04

## 2022-08-26 RX ADMIN — Medication 800 MG: at 20:40

## 2022-08-26 RX ADMIN — PIPERACILLIN AND TAZOBACTAM 3.38 G: 3; .375 INJECTION, POWDER, LYOPHILIZED, FOR SOLUTION INTRAVENOUS at 22:56

## 2022-08-26 ASSESSMENT — ACTIVITIES OF DAILY LIVING (ADL)
ADLS_ACUITY_SCORE: 44
ADLS_ACUITY_SCORE: 54
ADLS_ACUITY_SCORE: 50
ADLS_ACUITY_SCORE: 50
ADLS_ACUITY_SCORE: 54
ADLS_ACUITY_SCORE: 44
ADLS_ACUITY_SCORE: 50
ADLS_ACUITY_SCORE: 54
ADLS_ACUITY_SCORE: 44
ADLS_ACUITY_SCORE: 44
ADLS_ACUITY_SCORE: 54
ADLS_ACUITY_SCORE: 50

## 2022-08-26 ASSESSMENT — ENCOUNTER SYMPTOMS: DYSRHYTHMIAS: 1

## 2022-08-26 ASSESSMENT — LIFESTYLE VARIABLES: TOBACCO_USE: 1

## 2022-08-26 NOTE — PROGRESS NOTES
"VASCULAR SURGERY PROGRESS NOTE    Subjective:  Patient was seen and evaluated at the bedside for surgical follow up. Pain to residual limb is controlled. Afebrile on IV antibiotics. In agreement to return to OR today, no new concerns.     Objective:  Intake/Output Summary (Last 24 hours) at 8/26/2022 0837  Last data filed at 8/26/2022 0600  Gross per 24 hour   Intake 2480 ml   Output 1500 ml   Net 980 ml     PHYSICAL EXAM:  /63 (BP Location: Right arm)   Pulse 70   Temp 97.7  F (36.5  C) (Oral)   Resp 16   Ht 1.702 m (5' 7\")   Wt 68.6 kg (151 lb 3.2 oz)   SpO2 98%   BMI 23.68 kg/m    General: patient is alert and oriented, no acute distress  Psych: pleasant affect, answers questions appropriately  Skin: color appropriate for race, warm, dry  Respiratory: normal respiratory effort  Extremities: BKA dressing is clean and intact       ASSESSMENT:  51 YO male with past medical history significant for hypertension, hyperlipidemia, coronary artery diease s/p CABG, atrial fibrillation on xarelto, neuropathy, history of CVA, and charcot ankle right foot. He underwent below knee amputation earlier this month which unfortunately became infected with CT showing an evolving abscess. Blood cultures obtained, MRSA bacteremia.     Presently POD #2 irrigation and debridement of below knee amputation    PLAN:  Continue IV antibiotics per ID recommendations  OR today for washout, partial closure, and wound VAC placement     Discussed pt history, exam, assessment and plan with Dr. Jaimes (fellow) of the vascular surgery service, who is in agreement with the above.    Lizzeth Figueroa PA-C  VASCULAR SURGERY                   "

## 2022-08-26 NOTE — ANESTHESIA PROCEDURE NOTES
Popliteal Procedure Note    Pre-Procedure   Staff -        Anesthesiologist:  Charli Lang MD       Performed By: anesthesiologist       Location: pre-op       Procedure Start/Stop Times: 8/26/2022 11:02 AM and 8/26/2022 11:05 AM       Pre-Anesthestic Checklist: patient identified, IV checked, site marked, risks and benefits discussed, informed consent, monitors and equipment checked, pre-op evaluation, at physician/surgeon's request and post-op pain management  Timeout:       Correct Patient: Yes        Correct Procedure: Yes        Correct Site: Yes        Correct Position: Yes        Correct Laterality: Yes        Site Marked: Yes  Procedure Documentation  Procedure: Popliteal       Diagnosis: POSTOP PAIN CONTROL PER SURGEON REQUEST       Laterality: right       Patient Position: supine       Patient Prep/Sterile Barriers: sterile gloves, mask       Skin prep: Chloraprep       Needle Type: short bevel       Needle Gauge: 20.        Needle Length (Inches): 4        Ultrasound guided       1. Ultrasound was used to identify targeted nerve, plexus, vascular marker, or fascial plane and place a needle adjacent to it in real-time.       2. Ultrasound was used to visualize the spread of anesthetic in close proximity to the above referenced structure.       3. A permanent image is entered into the patient's record.       4. The visualized anatomic structures appeared normal.       5. There were no apparent abnormal pathologic findings.    Assessment/Narrative         The placement was negative for: blood aspirated and painful injection       Paresthesias: No.       Bolus given via needle. no blood aspirated via catheter.        Secured via.        Insertion/Infusion Method: Single Shot       Complications: none    Medication(s) Administered   Bupivacaine 0.5% PF (Infiltration) - Infiltration   18 mL - 8/26/2022 11:02:00 AM  Medication Administration Time: 8/26/2022 11:02 AM

## 2022-08-26 NOTE — OP NOTE
Operative Note    Name: Cm Lundberg     Location: [facility] OR    Procedure Date:  8/23/2022 - 8/26/2022    PCP:  Provider, Generic External Data    Procedure(s):  *  WASHOUT AND PARTIAL CLOSURE OF RIGHT BELOW THE KNEE AMPUTATION,  AND WOUND VAC APPLICATION     Pre-Procedure Diagnosis:    Wound infection [T14.8XXA, L08.9]     Post-Procedure Diagnosis:    Same     Surgeon(s):  Kim Montes MD Woodward, Lizzeth RADFORD, PABitaC : Note that Lizzeth was present for the entire operation and required for good outcome.  She was present for wound debridement and partial closure as well as VAC placement.    Dion Liz DO, vascular surgery fellow    Findings:    Very healthy appearing tissue with good developing clean granulation.  No signs of active infection.      Estimated Blood Loss:   1 cc's    Specimens:    * No specimens in log *       Drains:   Wound VAC placed over residual wound defect      Implants:  * No implants in log *     Complications:    * No complications entered in OR log *     Brief Clinical Hx:  This is a 52-year-old brittle diabetic foot undergone below-knee amputation and presented with davey pus of his below-knee amputation stump.  Found to be MRSA positive.  So far blood cultures growing strep as well.  Wound debrided and packed with initially providing iodine and peroxide and then switched to mild Dakin's solutions.  Brought back to the OR for further debridement and reevaluation of the wound and possible partial closure to aid in healing.    gradually improving    Operative Details:  Patient was prepped and draped access to his right leg.  The wound was irrigated with the Pulsavac .  At this point tissues looked extremely clean.  Small amount debridement of fibrinous material was performed.  The skin corners were closed with interrupted mattress sutures.  The fascia throughout was then closed with 2-0 Vicryl sutures leaving enough space so that fluid could escape.  Over the central  portion which is approximately 6 cm across a wound VAC sponge was then placed in the usual fashion.  Procedure was well-tolerated.      Kim Montes MD     Date: 8/26/2022  Time:12:33 PM

## 2022-08-26 NOTE — PROGRESS NOTES
[facility]    Medicine Progress Note - Hospitalist Service    Date of Admission:  8/23/2022    Assessment & Plan          Cm Lundberg is a 52 year old male admitted on 8/23/2022.     Assessment:  #Infected right BKA stump.  CT right leg showed evolving abscess.  Patient not septic.  Blood cultures obtained.  On vancomycin and Zosyn.  S/p I&D 8/24, culture sent.  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 Valderrama.      #MRSA bacteremia, likely due to right BKA stump abscess.  Positive BC on 8/23, 8/25.  Surveillance BC obtained on 8/26.  We will continue daily BCs to negative for 48 hours.    #Acute renal failure, creat peaked at 1.47 on 8/24, was 1.04 on admission.  Likely due to sepsis and dehydration, given rising BUN.  With IVF renal function normalizing.    #Chronic normocytic anemia, hemoglobin baseline 8-9.  History of iron deficiency.    #Acute anemia, hemoglobin 6.4 on 8/25.  Reported intraoperative EBL 10 mL.  No postop bleeding.  S/p 1 unit PRBCs 8/25.    #CVD, history of stroke.  On Plavix, statin.    #Atrial fibrillation.  Was on Xarelto, held preoperatively.  Resume when okay per surgery.    #Coronary artery disease, status post CABG and stents.  On Plavix, beta-blockers, fibrate.    #Non-insulin-dependent diabetes mellitus.  Well-controlled with Glucophage and glipizide.  Initially held due to RANDAL.  Resume now.  A1c 5.9 on 8/26.    #Mood disorder:general anxiety and depression, stable on Abilify, Cymbalta, Ativan, Remeron, trazodone.    Essential hypertension.  BP controlled on losartan and metoprolol.  Peripheral neuropathy, due to diabetes.  Resident of [facility] prior to recent past hospitalization TCU.    Plan:  Continue Vancomycin/Zosyn.  Monitor for signs of severe sepsis.  Follow-up and surveillance blood cultures.  Follow-up on ID and vascular surgery recommendations.  Continue IVF for another day.  Pain management with p.o.  oxycodone, scheduled Tylenol, as needed IV Dilaudid  Resumed oral antidiabetic meds, since patient is n.p.o.  SSI NovoLog.  Wound treatment plan WOCN/vascular surgery.  Wound VAC applied today.  Continue PTA scheduled Tylenol, Abilify, Lipitor, Questran, Plavix, Cymbalta, fenofibrate, Neurontin, Protonix, mental health medications.  Resume Xarelto when okay per surgery.  PT/OT        Diet: Room Service  Advance Diet as Tolerated: Clear Liquid Diet    DVT Prophylaxis: VTE Prophylaxis contraindicated due to surgery today  Murray Catheter: Not present  Central Lines: None  Cardiac Monitoring: None  Code Status: Full Code      Disposition Plan      Expected Discharge Date: 08/29/2022        Discharge Comments: 8/26  R BKA tomorrow with wound vac 8/26 +BC        The patient's care was discussed with the Bedside Nurse, Patient and Patient's Family.    Gisel Santoyo MD  Hospitalist Service  [facility]  Securely message with the Vocera Web Console (learn more here)  Text page via [employer] Paging/Directory     Clinically Significant Risk Factors Present on Admission                  ______________________________________________________________________    Interval History   Admitted on 8/20 history for BKA infected wound with davey pus.  S/p I&D on 8/24.  BC from 8/23 growing MRSA.  Positive BC on 8/25.  Patient remains hemodynamically stable, afebrile.  S/p 1 unit PRBCs on 8/25 for hemoglobin of 6.4.  Hemoglobin 8 today.  Reported intraoperative EBL 10 cc.  Surveillance BC obtained 8/26.  Abs reviewed.  Renal function normalized.  Incisional pain controlled.  No abdominal pain, nausea, vomiting, cough.  Today s/p washout and partial closure of right BKA and wound VAC application.    Data reviewed today: I reviewed all medications, new labs and imaging results over the last 24 hours. I personally reviewed    Physical Exam   Vital Signs: Temp: 97.5  F (36.4  C) Temp src: Axillary BP: (!) 150/69 Pulse:  73   Resp: 16 SpO2: 99 % O2 Device: None (Room air) Oxygen Delivery: 2 LPM  Weight: 158 lbs 12.8 oz  General: Alert and oriented x 3. Not in obvious distress.  HEENT: NC, AT. Neck- supple, No JVP elevation, lymphadenopathy or thyromegaly. Trachea-central.  Chest: Clear to auscultation bilaterally.  Heart: S1S2 regular. No M/R/G.  Abdomen: Soft. NT, ND. No organomegaly. Bowel sounds- active.  Back: No spine tenderness. No CVA tenderness.  Extremities: Right BKA dressing was not removed.   Neuro: Cranial nerves 1-12 grossly normal. No focal neurological deficit    Data   Recent Labs   Lab 08/26/22  1248 08/26/22  1004 08/26/22  0827 08/26/22  0815 08/25/22  2117 08/25/22  1828 08/25/22  0652 08/25/22  0605 08/24/22  1010 08/24/22  0833 08/24/22  0825 08/24/22  0611   WBC  --   --   --  4.6  --  5.3  --  6.0  --  6.6  --  6.5   HGB  --   --   --  8.2*  --  8.0*  --  6.4*  --  8.7*  --  8.9*   MCV  --   --   --  86  --  86  --  84  --  83  --  83   PLT  --   --   --  251  --  291  --  249   < > 315  --  323   INR  --   --   --   --   --   --   --  1.22*  --   --   --  1.25*   NA  --   --   --  139  --   --   --  137  --   --   --  136   POTASSIUM  --   --   --  4.4  --   --   --  4.3  --  4.6  --  4.7   CHLORIDE  --   --   --  110*  --   --   --  108*  --   --   --  103   CO2  --   --   --  25  --   --   --  25  --   --   --  25   BUN  --   --   --  16  --   --   --  20  --   --   --  26*   CR  --   --   --  1.00  --   --   --  1.09  --  1.50*  --  1.47*   ANIONGAP  --   --   --  4*  --   --   --  4*  --   --   --  8   TO  --   --   --  8.7  --   --   --  8.6  --   --   --  10.0   * 96 108* 102   < >  --    < > 155*   < >  --    < > 142*   ALBUMIN  --   --   --   --   --   --   --   --   --   --   --  2.8*   PROTTOTAL  --   --   --   --   --   --   --   --   --   --   --  6.8   BILITOTAL  --   --   --   --   --   --   --   --   --   --   --  0.3   ALKPHOS  --   --   --   --   --   --   --   --   --   --   --  61  "  ALT  --   --   --   --   --   --   --   --   --   --   --  11   AST  --   --   --   --   --   --   --   --   --   --   --  13    < > = values in this interval not displayed.     Recent Results (from the past 24 hour(s))   POC US Guidance Needle Placement    Narrative    Ultrasound was performed as guidance to an anesthesia procedure.  Click   \"PACS images\" hyperlink below to view any stored images.  For specific   procedure details, view procedure note authored by anesthesia.     Medications       acetaminophen  975 mg Oral Q8H     ARIPiprazole  2 mg Oral Daily     atorvastatin  80 mg Oral Daily     cholestyramine  1 packet Oral Every Other Day     clopidogrel  75 mg Oral Daily     DULoxetine  120 mg Oral Daily     fenofibrate  160 mg Oral Daily     ferrous sulfate  325 mg Oral Daily with breakfast     gabapentin  100 mg Oral BID AC     gabapentin  600 mg Oral At Bedtime     glipiZIDE  2.5 mg Oral QAM AC     insulin aspart  1-3 Units Subcutaneous TID AC     insulin aspart  1-3 Units Subcutaneous At Bedtime     losartan  25 mg Oral At Bedtime     magnesium oxide  800 mg Oral BID     metFORMIN  1,000 mg Oral Daily with breakfast     metFORMIN  500 mg Oral Daily with supper     metoprolol succinate ER  75 mg Oral Daily     mirtazapine  7.5 mg Oral At Bedtime     pantoprazole  40 mg Oral Daily     piperacillin-tazobactam  3.375 g Intravenous Q8H     [START ON 8/27/2022] polyethylene glycol  17 g Oral Daily     senna-docusate  1 tablet Oral BID     sodium chloride (PF)  3 mL Intracatheter Q8H     sodium chloride (PF)  3 mL Intracatheter Q8H     sodium chloride (PF)  3 mL Intracatheter Q8H     traZODone  50 mg Oral At Bedtime     vancomycin  1,500 mg Intravenous Q24H     vitamin C  500 mg Oral Daily     Gisel Santoyo MD   Hospital Medicine Service   [phone]   Pager [phone]   [email or web address]      "

## 2022-08-26 NOTE — PROGRESS NOTES
"[facility] Progress Note    SUBJECTIVE:  Pt is watching movie and polite and stable.  His stump was clean and it was closed, with VAC.        REVIEW OF SYSTEMS:  Negative unless as listed above.  Social history, Family history, Medications: reviewed.    OBJECTIVE:  BP (!) 147/63 (BP Location: Right arm)   Pulse 74   Temp 98.2  F (36.8  C) (Oral)   Resp 16   Ht 1.702 m (5' 7\")   Wt 72 kg (158 lb 12.8 oz)   SpO2 98%   BMI 24.87 kg/m                PHYSICAL EXAM:  Alert, awake  Vitals tabulated above, reviewed  Neck supple without lymphadenopathy  Sclera normal color, not injected  CARDIOVASCULAR regular rate and rhythm, no murmur  Lungs CLEAR TO AUSCULTATION   Abdomen soft, NT/ND, absent HEPATOSPLENOMEGALY  Skin normal  Joints normal  Neurologic exam non focal  See vascular for wound exam    Antibiotics:vanco and zosyn    Pertinent labs:  Lab Results   Component Value Date    WBC 4.6 08/26/2022    WBC 5.0 08/25/2016     Lab Results   Component Value Date    RBC 3.18 08/26/2022    RBC 3.49 08/25/2016     Lab Results   Component Value Date    HGB 8.2 08/26/2022    HGB 10.1 08/25/2016     Lab Results   Component Value Date    HCT 27.3 08/26/2022    HCT 31.4 08/25/2016     No components found for: MCT  Lab Results   Component Value Date    MCV 86 08/26/2022    MCV 90 08/25/2016     Lab Results   Component Value Date    MCH 25.8 08/26/2022    MCH 28.7 08/25/2016     Lab Results   Component Value Date    MCHC 30.0 08/26/2022    MCHC 31.8 08/25/2016     Lab Results   Component Value Date    RDW 14.6 08/26/2022    RDW 13.6 08/25/2016     Lab Results   Component Value Date     08/26/2022     08/25/2016       Last Comprehensive Metabolic Panel:  Sodium   Date Value Ref Range Status   08/26/2022 139 136 - 145 mmol/L Final   08/25/2016 139 mmol/L Final     Potassium   Date Value Ref Range Status   08/26/2022 4.4 3.5 - 5.0 mmol/L Final   08/25/2016 4.3 mmol/L Final     Chloride   Date Value " Ref Range Status   08/26/2022 110 (H) 98 - 107 mmol/L Final   08/25/2016 107 mmol/L Final     Carbon Dioxide (CO2)   Date Value Ref Range Status   08/26/2022 25 22 - 31 mmol/L Final     Anion Gap   Date Value Ref Range Status   08/26/2022 4 (L) 5 - 18 mmol/L Final   08/25/2016 8 mmol/L Final     Glucose   Date Value Ref Range Status   08/26/2022 102 70 - 125 mg/dL Final   08/25/2016 84 70 - 99 mg/dL Final     GLUCOSE BY METER POCT   Date Value Ref Range Status   08/26/2022 118 (H) 70 - 99 mg/dL Final     Urea Nitrogen   Date Value Ref Range Status   08/26/2022 16 8 - 22 mg/dL Final   08/25/2016 21 mg/dL Final     Creatinine   Date Value Ref Range Status   08/26/2022 1.00 0.70 - 1.30 mg/dL Final   08/25/2016 0.85 mg/dL Final     GFR Estimate   Date Value Ref Range Status   08/26/2022 89 >60 mL/min/1.73m2 Final     Comment:     Effective December 21, 2021 eGFRcr in adults is calculated using the 2021 CKD-EPI creatinine equation which includes age and gender (Stephen et al., NEJM, DOI: 10.1056/UMIUyh9790639)   04/29/2019 >60 >60 mL/min/1.73m2 Final     Calcium   Date Value Ref Range Status   08/26/2022 8.7 8.5 - 10.5 mg/dL Final   08/25/2016 9.9 mg/dL Final       Liver Function Studies -   Recent Labs   Lab Test 08/24/22  0611   PROTTOTAL 6.8   ALBUMIN 2.8*   BILITOTAL 0.3   ALKPHOS 61   AST 13   ALT 11       No results found for: SED    CRP   Date Value Ref Range Status   08/24/2022 18.9 (H) 0.0 - <0.8 mg/dL Final               MICROBIOLOGY DATA:  One of two BC aug 23 MRSA  Knee swab GPC    One of two BC aug 25 GPC chains  BC today just sent    IMAGING/RADIOLOGY:    TTE reviewed      ASSESSMENT:  SAB source very likely amp site  I suspect aug 25th strep in blood could be contam  Post wound debride, closure doing good    RECOMMENDATION:  He's plenty covered  We can do a picc Monday there's no rush.       J. Hamida MD  Office [phone] option 2 to desk staff

## 2022-08-26 NOTE — ANESTHESIA PROCEDURE NOTES
Adductor canal Procedure Note    Pre-Procedure   Staff -        Anesthesiologist:  Charli Lang MD       Performed By: anesthesiologist       Location: pre-op       Procedure Start/Stop Times: 8/26/2022 11:06 AM and 8/26/2022 11:07 AM       Pre-Anesthestic Checklist: patient identified, IV checked, site marked, risks and benefits discussed, informed consent, monitors and equipment checked, pre-op evaluation, at physician/surgeon's request and post-op pain management  Timeout:       Correct Patient: Yes        Correct Procedure: Yes        Correct Site: Yes        Correct Position: Yes        Correct Laterality: Yes        Site Marked: Yes  Procedure Documentation  Procedure: Adductor canal       Diagnosis: POSTOP PAIN CONTROL PER SURGEON REQUEST       Laterality: right       Patient Position: supine       Patient Prep/Sterile Barriers: sterile gloves, mask       Skin prep: Chloraprep       Needle Type: short bevel       Needle Gauge: 20.        Needle Length (Inches): 4        Ultrasound guided       1. Ultrasound was used to identify targeted nerve, plexus, vascular marker, or fascial plane and place a needle adjacent to it in real-time.       2. Ultrasound was used to visualize the spread of anesthetic in close proximity to the above referenced structure.       3. A permanent image is entered into the patient's record.       4. The visualized anatomic structures appeared normal.       5. There were no apparent abnormal pathologic findings.    Assessment/Narrative         The placement was negative for: blood aspirated and painful injection       Paresthesias: No.       Bolus given via needle. no blood aspirated via catheter.        Secured via.        Insertion/Infusion Method: Single Shot       Complications: none    Medication(s) Administered   Bupivacaine 0.5% PF (Infiltration) - Infiltration   8 mL - 8/26/2022 11:06:00 AM  Medication Administration Time: 8/26/2022 11:06 AM

## 2022-08-26 NOTE — ANESTHESIA POSTPROCEDURE EVALUATION
Patient: Cm Lundberg    Procedure: Procedure(s):  *  WASHOUT AND PARTIAL CLOSURE OF RIGHT BELOW THE KNEE AMPUTATION,  AND WOUND VAC APPLICATION       Anesthesia Type:  General    Note:  Disposition: Inpatient   Postop Pain Control: Uneventful            Sign Out: Well controlled pain   PONV: No   Neuro/Psych: Uneventful            Sign Out: Acceptable/Baseline neuro status   Airway/Respiratory: Uneventful            Sign Out: Acceptable/Baseline resp. status   CV/Hemodynamics: Uneventful            Sign Out: Acceptable CV status; No obvious hypovolemia; No obvious fluid overload   Other NRE: NONE   DID A NON-ROUTINE EVENT OCCUR? No           Last vitals:  Vitals Value Taken Time   /59 08/26/22 1253   Temp 36.8  C (98.3  F) 08/26/22 1253   Pulse 77 08/26/22 1253   Resp 16 08/26/22 1253   SpO2 100 % 08/26/22 1253       Electronically Signed By: Charli Lang MD  August 26, 2022  1:23 PM

## 2022-08-26 NOTE — PLAN OF CARE
Problem: Fall Injury Risk  Goal: Absence of Fall and Fall-Related Injury  Outcome: Ongoing, Progressing  Intervention: Identify and Manage Contributors  Recent Flowsheet Documentation  Taken 8/25/2022 1600 by Milka Clemente RN  Medication Review/Management: medications reviewed  Intervention: Promote Injury-Free Environment  Recent Flowsheet Documentation  Taken 8/25/2022 1600 by Milka Clemente RN  Safety Promotion/Fall Prevention: bed alarm on   Bed alarm on and call light within reached.  Problem: Infection  Goal: Absence of Infection Signs and Symptoms  Outcome: Ongoing, Progressing  Intervention: Prevent or Manage Infection  Recent Flowsheet Documentation  Taken 8/25/2022 1600 by Milka Clemente RN  Isolation Precautions: contact precautions maintained   Afebrile. On iv abx. Kept on contact precaution.  Problem: Impaired Wound Healing  Goal: Optimal Wound Healing  Outcome: Ongoing, Progressing  Intervention: Promote Wound Healing  Recent Flowsheet Documentation  Taken 8/25/2022 1917 by Milka Clemente RN  Pain Management Interventions: medication (see MAR)  Taken 8/25/2022 1600 by Milka Clemente RN  Activity Management: activity adjusted per tolerance   Dressing changed per order.  Problem: Pain Chronic (Persistent)  Goal: Acceptable Pain Control and Functional Ability  Outcome: Ongoing, Progressing  Intervention: Develop Pain Management Plan  Recent Flowsheet Documentation  Taken 8/25/2022 1917 by Milka Clemente RN  Pain Management Interventions: medication (see MAR)  Intervention: Manage Persistent Pain  Recent Flowsheet Documentation  Taken 8/25/2022 1600 by Milka Clemente RN  Medication Review/Management: medications reviewed     Problem: Diabetes Comorbidity  Goal: Blood Glucose Level Within Targeted Range  Outcome: Ongoing, Progressing  Informed MD regarding hypoglycemia episode and subsequently made order.  BG=68. Recheck=78 post intervention. HS blood glucose=102. HS snack given.

## 2022-08-26 NOTE — PLAN OF CARE
Problem: Infection  Goal: Absence of Infection Signs and Symptoms  Outcome: Ongoing, Progressing  Intervention: Prevent or Manage Infection  Recent Flowsheet Documentation  Taken 8/26/2022 0320 by Marc Brown RN  Isolation Precautions: contact precautions maintained     Problem: Impaired Wound Healing  Goal: Optimal Wound Healing  Outcome: Ongoing, Progressing  Intervention: Promote Wound Healing  Recent Flowsheet Documentation  Taken 8/26/2022 0320 by Marc Brown RN  Pain Management Interventions: medication (see MAR)     Problem: Pain Chronic (Persistent)  Goal: Acceptable Pain Control and Functional Ability  Outcome: Ongoing, Progressing  Intervention: Develop Pain Management Plan  Recent Flowsheet Documentation  Taken 8/26/2022 0320 by Marc Brown RN  Pain Management Interventions: medication (see MAR)  Intervention: Manage Persistent Pain  Recent Flowsheet Documentation  Taken 8/26/2022 0320 by Marc Brown RN  Medication Review/Management: medications reviewed     Problem: Diabetes Comorbidity  Goal: Blood Glucose Level Within Targeted Range  Outcome: Ongoing, Progressing   Goal Outcome Evaluation:      Pt reported residual leg pain 9/10 and managed with prn oxycodone with relief. Pt slept mostly through the night.

## 2022-08-26 NOTE — ANESTHESIA CARE TRANSFER NOTE
Patient: Cm Lundberg    Procedure: Procedure(s):  *  WASHOUT AND PARTIAL CLOSURE OF RIGHT BELOW THE KNEE AMPUTATION,  AND WOUND VAC APPLICATION       Diagnosis: Wound infection [T14.8XXA, L08.9]  Diagnosis Additional Information: No value filed.    Anesthesia Type:   General     Note:    Oropharynx: oropharynx clear of all foreign objects  Level of Consciousness: drowsy  Oxygen Supplementation: room air    Independent Airway: airway patency satisfactory and stable  Dentition: dentition unchanged  Vital Signs Stable: post-procedure vital signs reviewed and stable  Report to RN Given: handoff report given  Patient transferred to: Medical/Surgical Unit    Handoff Report: Identifed the Patient, Identified the Reponsible Provider, Reviewed the pertinent medical history, Discussed the surgical course, Reviewed Intra-OP anesthesia mangement and issues during anesthesia, Set expectations for post-procedure period and Allowed opportunity for questions and acknowledgement of understanding      Vitals:  Vitals Value Taken Time   /61 08/26/22 1234   Temp     Pulse 74 08/26/22 1234   Resp 14 08/26/22 1234   SpO2 98 % 08/26/22 1234       Electronically Signed By: SHABNAM Alexander CRNA  August 26, 2022  12:35 PM

## 2022-08-26 NOTE — PROGRESS NOTES
Pt back to room from surgery, wound vac placed in OR.   Next wound vac dressing change on Monday by WOC team

## 2022-08-26 NOTE — ANESTHESIA PREPROCEDURE EVALUATION
Anesthesia Pre-Procedure Evaluation    Patient: Cm Lundberg   MRN: [id number] : 1970        Procedure : Procedure(s):  Irrigation and debridement, right leg          Past Medical History:   Diagnosis Date     Anemia      Anemia, unspecified type 2016     Anxiety      Anxiety state 2021    Formatting of this note might be different from the original. Created by Conversion  Replacement Utility updated for latest IMO load     Arthritis      Atrial fibrillation and flutter (H)      Cerebral artery occlusion with cerebral infarction (H)      Cerebral infarction (H)      Chest pain, unspecified type 2016     Chronic atrial fibrillation (H)      Congestive heart failure (H)      Coronary artery disease     CABG with multiple stents     Coronary artery disease involving native coronary artery without angina pectoris, unspecified whether native or transplanted heart 2016     Coronary atherosclerosis 2021    Formatting of this note might be different from the original. Created by Conversion  Replacement Utility updated for latest IMO load     Depression      Diabetic ulcer of right midfoot associated with type 2 diabetes mellitus, limited to breakdown of skin (H) 2017     Equinovarus acquired deformity, right 2021     Equinovarus deformity, acquired, right      Essential hypertension 2016     GERD (gastroesophageal reflux disease)      Heart attack (H)      History of blood transfusion      Hyperlipidemia      Hyperlipidemia, unspecified hyperlipidemia type 2016     Hypertension      Insomnia, unspecified type 2016     Ischemic cardiomyopathy      Late effects of cerebrovascular accident 2016     Major depressive disorder with single episode, remission status unspecified 2016     Microalbuminuria 2021    Formatting of this note might be different from the original. Created by Conversion     Migraine      Mild intellectual disabilities  08/19/2016     Myalgia      Nausea 08/19/2016     Osteomyelitis (H)      PONV (postoperative nausea and vomiting)     mild nausea     Problems with learning 07/29/2021    Formatting of this note might be different from the original. Created by Conversion [facility] Annotation: Feb 11 2010  1:41PM - Leonid Cabral: CANNOT READ OR  WRITE     PVD (peripheral vascular disease) (H)      Stented coronary artery      Type 2 diabetes mellitus (H) 07/29/2021    Formatting of this note might be different from the original. Created by Conversion     Type II or unspecified type diabetes mellitus with ophthalmic manifestations, not stated as uncontrolled(250.50) (H) 07/29/2021    Formatting of this note might be different from the original. Created by Conversion     Weakness 08/19/2016      Past Surgical History:   Procedure Laterality Date     AMPUTATE LEG BELOW KNEE Right 8/5/2022    Procedure: AMPUTATION, BELOW KNEE, right leg;  Surgeon: Kim Montes MD;  Location: [facility] OR     INCISION AND DRAINAGE LOWER EXTREMITY, COMBINED Right 12/17/2021    Procedure: INCISION AND DRAINAGE, right foot;  Surgeon: Alex Kim DPM;  Location: [facility] OR     INCISION AND DRAINAGE LOWER EXTREMITY, COMBINED Right 1/27/2022    Procedure: INCISION AND DRAINAGE, right foot with application of theraskin;  Surgeon: Alex Kim DPM;  Location: [facility] OR     INCISION AND DRAINAGE LOWER EXTREMITY, COMBINED Right 4/4/2022    Procedure: INCISION AND DRAINAGE, right foot with application of theraskin;  Surgeon: Alex Kim DPM;  Location: [facility] OR     IR LOWER EXTREMITY ANGIOGRAM RIGHT  6/29/2022     IR MISCELLANEOUS PROCEDURE  2/13/2004     IRRIGATION AND DEBRIDEMENT LOWER EXTREMITY, COMBINED Right 8/24/2022    Procedure: RIGHT IRRIGATION AND DEBRIDEMENT, LOWER EXTREMITY, REVISION OF BELOW KNEE AMPUTATION;  Surgeon: Kim Montes MD;  Location: [facility] OR     [facility] CABG, VEIN, SINGLE       Description: CABG (CABG);  Recorded: 2009;      Allergies   Allergen Reactions     Aspirin Other (See Comments)     Reacts with migraine medicine     Blood-Group Specific Substance      Anti-Fya present.  Expect delays in blood for transfusion.  Draw 2 lavender and 1 red for all type and screen orders.     Isosorbide Headache     Codeine Rash     Morphine Rash      Social History     Tobacco Use     Smoking status: Former Smoker     Quit date: 2015     Years since quittin.5     Smokeless tobacco: Never Used   Substance Use Topics     Alcohol use: Not Currently      Wt Readings from Last 1 Encounters:   22 68.6 kg (151 lb 3.2 oz)        Anesthesia Evaluation   Pt has had prior anesthetic. Type: General.    History of anesthetic complications  - PONV.      ROS/MED HX  ENT/Pulmonary:     (+) tobacco use, Past use,     Neurologic: Comment: Intellectual disability     (+) CVA, with deficits, - Right sided weakness.     Cardiovascular: Comment:  2021 TTE   1.  Normal left ventricular chamber size.Mildly decreased left ventricular ejection fraction.    Calculated left ventricular ejection fraction (modified Barragan technique) is 45 %.    2.  Abnormal septal motion.  Basal inferior hypokinesis.    3.  Grade 1 left ventricular diastolic dysfunction consistent with abnormal myocardial   relaxation and normal left ventricular filling    pressure.    4.  Normal right ventricular size and systolic function.    5.  No significant valvular heart disease.    6.  When compared to the previous echocardiographic images of 3/21/2019, there has been no   significant change.    (+) Dyslipidemia hypertension-Peripheral Vascular Disease-CAD -past MI --CHF Last EF: 45-50% dysrhythmias, a-fib,     METS/Exercise Tolerance:     Hematologic:       Musculoskeletal:       GI/Hepatic:     (+) GERD, Asymptomatic on medication,     Renal/Genitourinary:     (+) renal disease, type: ARF, Pt does not require dialysis,     Endo:  Comment: Hx of retinal bleeds    (+) type II DM,     Psychiatric/Substance Use:     (+) psychiatric history anxiety and depression     Infectious Disease:     (+) Recent Fever,     Malignancy:       Other:  - neg other ROS          Physical Exam    Airway  airway exam normal      Mallampati: II   TM distance: > 3 FB   Neck ROM: full   Mouth opening: > 3 cm    Respiratory Devices and Support         Dental     Comment: Edentulous     (+) lower dentures and upper dentures      Cardiovascular   cardiovascular exam normal       Rhythm and rate: regular and normal     Pulmonary   pulmonary exam normal        breath sounds clear to auscultation           OUTSIDE LABS:  CBC:   Lab Results   Component Value Date    WBC 4.6 08/26/2022    WBC 5.3 08/25/2022    HGB 8.2 (L) 08/26/2022    HGB 8.0 (L) 08/25/2022    HCT 27.3 (L) 08/26/2022    HCT 26.3 (L) 08/25/2022     08/26/2022     08/25/2022     BMP:   Lab Results   Component Value Date     08/26/2022     08/25/2022    POTASSIUM 4.4 08/26/2022    POTASSIUM 4.3 08/25/2022    CHLORIDE 110 (H) 08/26/2022    CHLORIDE 108 (H) 08/25/2022    CO2 25 08/26/2022    CO2 25 08/25/2022    BUN 16 08/26/2022    BUN 20 08/25/2022    CR 1.00 08/26/2022    CR 1.09 08/25/2022    GLC 96 08/26/2022     (H) 08/26/2022     COAGS:   Lab Results   Component Value Date    INR 1.22 (H) 08/25/2022     POC: No results found for: BGM, HCG, HCGS  HEPATIC:   Lab Results   Component Value Date    ALBUMIN 2.8 (L) 08/24/2022    PROTTOTAL 6.8 08/24/2022    ALT 11 08/24/2022    AST 13 08/24/2022    ALKPHOS 61 08/24/2022    BILITOTAL 0.3 08/24/2022     OTHER:   Lab Results   Component Value Date    LACT 0.8 08/24/2022    A1C 5.9 (H) 08/26/2022    TO 8.7 08/26/2022    PHOS 2.9 08/07/2022    MAG 1.5 (L) 08/26/2022    CRP 18.9 (H) 08/24/2022       Anesthesia Plan    ASA Status:  3   NPO Status:  NPO Appropriate    Anesthesia Type: General.     - Airway: Mask Only   Induction:  Intravenous, Propofol.   Maintenance: TIVA.        Consents    Anesthesia Plan(s) and associated risks, benefits, and realistic alternatives discussed. Questions answered and patient/representative(s) expressed understanding.     - Discussed: Risks, Benefits and Alternatives for BOTH SEDATION and the PROCEDURE were discussed     - Discussed with:  Patient, Legal Guardian (consent obtained via telephone conversation with sister Coulter)         Postoperative Care    Pain management: IV analgesics, Oral pain medications, Multi-modal analgesia, Peripheral nerve block (Single Shot).   PONV prophylaxis: Ondansetron (or other 5HT-3), Dexamethasone or Solumedrol, Droperidol or Haldol     Comments:    Other Comments: Anesthetic risks, benefits, and options reviewed. Patient agrees to proceed.                  Charli Lang MD

## 2022-08-26 NOTE — PROGRESS NOTES
Care Management Follow Up    Length of Stay (days): 3    Expected Discharge Date: 08/29/2022     Concerns to be Addressed:  Post procedure care and monitoring, infection-monitoring cultures       Patient plan of care discussed at interdisciplinary rounds: Yes    Anticipated Discharge Disposition:  TCU     Anticipated Discharge Services:  TCU  Anticipated Discharge DME:  Wound vac       Additional Information:  Patient admitted for infection of right BKA stump.  Currently on IV antibiotics, monitoring cultures. OR today for washout and partial closure, wound vac placement.    Social history:  Patient admitted from [facility] where he was admitted after a BKA on his 8/5/2022. Patient is a resident of [facility] ([facility]) but the nurse there notes that patient has an apartment there but he has been hospitalized/at  since December 17th of 2021. She had just assessed him for a return and completed his care plan for anticipated return but he was hospitalized instead. She notes that he needs assistance with medication administration, meals, and is assist of one with: bathing, dressing, grooming and transfers. He also received assist of one with toileting (dependent on his mobility on the day). They also provide housekeeping and laundry services. He can return to the assisted living once his wounds can be managed by a home care nurse three times a week or less. They cannot provide IV medication administration. She suspects that he will need to return to transitional care after hospital stay but would like to be kept up-to-date with the plan. Her number is [phone].     Patient to return to [facility] [place] TCU. Updated admissions today 8/26 on need for wound vac. Will need [facility] transportation at discharge.        Cathryn Anand RN

## 2022-08-27 LAB
BASOPHILS # BLD AUTO: 0.1 10E3/UL (ref 0–0.2)
BASOPHILS NFR BLD AUTO: 1 %
CREAT SERPL-MCNC: 0.99 MG/DL (ref 0.7–1.3)
EOSINOPHIL # BLD AUTO: 0.3 10E3/UL (ref 0–0.7)
EOSINOPHIL NFR BLD AUTO: 5 %
ERYTHROCYTE [DISTWIDTH] IN BLOOD BY AUTOMATED COUNT: 14.7 % (ref 10–15)
GFR SERPL CREATININE-BSD FRML MDRD: >90 ML/MIN/1.73M2
GLUCOSE BLDC GLUCOMTR-MCNC: 105 MG/DL (ref 70–99)
GLUCOSE BLDC GLUCOMTR-MCNC: 132 MG/DL (ref 70–99)
GLUCOSE BLDC GLUCOMTR-MCNC: 76 MG/DL (ref 70–99)
GLUCOSE BLDC GLUCOMTR-MCNC: 86 MG/DL (ref 70–99)
HCT VFR BLD AUTO: 29.1 % (ref 40–53)
HGB BLD-MCNC: 8.8 G/DL (ref 13.3–17.7)
IMM GRANULOCYTES # BLD: 0 10E3/UL
IMM GRANULOCYTES NFR BLD: 0 %
INR PPP: 1.12 (ref 0.85–1.15)
LYMPHOCYTES # BLD AUTO: 1 10E3/UL (ref 0.8–5.3)
LYMPHOCYTES NFR BLD AUTO: 18 %
MCH RBC QN AUTO: 26 PG (ref 26.5–33)
MCHC RBC AUTO-ENTMCNC: 30.2 G/DL (ref 31.5–36.5)
MCV RBC AUTO: 86 FL (ref 78–100)
MONOCYTES # BLD AUTO: 0.5 10E3/UL (ref 0–1.3)
MONOCYTES NFR BLD AUTO: 9 %
NEUTROPHILS # BLD AUTO: 3.6 10E3/UL (ref 1.6–8.3)
NEUTROPHILS NFR BLD AUTO: 67 %
NRBC # BLD AUTO: 0 10E3/UL
NRBC BLD AUTO-RTO: 0 /100
PLATELET # BLD AUTO: 296 10E3/UL (ref 150–450)
POTASSIUM BLD-SCNC: 4.3 MMOL/L (ref 3.5–5)
RBC # BLD AUTO: 3.39 10E6/UL (ref 4.4–5.9)
WBC # BLD AUTO: 5.3 10E3/UL (ref 4–11)

## 2022-08-27 PROCEDURE — 36415 COLL VENOUS BLD VENIPUNCTURE: CPT | Performed by: INTERNAL MEDICINE

## 2022-08-27 PROCEDURE — 85004 AUTOMATED DIFF WBC COUNT: CPT | Performed by: PHYSICIAN ASSISTANT

## 2022-08-27 PROCEDURE — 258N000003 HC RX IP 258 OP 636: Performed by: INTERNAL MEDICINE

## 2022-08-27 PROCEDURE — 87040 BLOOD CULTURE FOR BACTERIA: CPT | Performed by: INTERNAL MEDICINE

## 2022-08-27 PROCEDURE — 84132 ASSAY OF SERUM POTASSIUM: CPT | Performed by: PHYSICIAN ASSISTANT

## 2022-08-27 PROCEDURE — 99232 SBSQ HOSP IP/OBS MODERATE 35: CPT | Performed by: INTERNAL MEDICINE

## 2022-08-27 PROCEDURE — 250N000013 HC RX MED GY IP 250 OP 250 PS 637: Performed by: INTERNAL MEDICINE

## 2022-08-27 PROCEDURE — 85610 PROTHROMBIN TIME: CPT | Performed by: PHYSICIAN ASSISTANT

## 2022-08-27 PROCEDURE — 250N000013 HC RX MED GY IP 250 OP 250 PS 637: Performed by: PHYSICIAN ASSISTANT

## 2022-08-27 PROCEDURE — 82565 ASSAY OF CREATININE: CPT | Performed by: PHYSICIAN ASSISTANT

## 2022-08-27 PROCEDURE — 250N000011 HC RX IP 250 OP 636: Performed by: INTERNAL MEDICINE

## 2022-08-27 PROCEDURE — 120N000001 HC R&B MED SURG/OB

## 2022-08-27 RX ORDER — FERROUS SULFATE 325(65) MG
325 TABLET ORAL
Status: DISCONTINUED | OUTPATIENT
Start: 2022-08-30 | End: 2022-08-30 | Stop reason: HOSPADM

## 2022-08-27 RX ADMIN — PIPERACILLIN AND TAZOBACTAM 3.38 G: 3; .375 INJECTION, POWDER, LYOPHILIZED, FOR SOLUTION INTRAVENOUS at 06:49

## 2022-08-27 RX ADMIN — VANCOMYCIN HYDROCHLORIDE 1500 MG: 5 INJECTION, POWDER, LYOPHILIZED, FOR SOLUTION INTRAVENOUS at 13:43

## 2022-08-27 RX ADMIN — ACETAMINOPHEN 975 MG: 325 TABLET ORAL at 13:36

## 2022-08-27 RX ADMIN — PANTOPRAZOLE SODIUM 40 MG: 40 TABLET, DELAYED RELEASE ORAL at 08:52

## 2022-08-27 RX ADMIN — METOPROLOL SUCCINATE 75 MG: 25 TABLET, EXTENDED RELEASE ORAL at 08:53

## 2022-08-27 RX ADMIN — OXYCODONE HYDROCHLORIDE 5 MG: 5 TABLET ORAL at 06:56

## 2022-08-27 RX ADMIN — FENOFIBRATE 160 MG: 160 TABLET, FILM COATED ORAL at 08:53

## 2022-08-27 RX ADMIN — Medication 800 MG: at 08:53

## 2022-08-27 RX ADMIN — PIPERACILLIN AND TAZOBACTAM 3.38 G: 3; .375 INJECTION, POWDER, LYOPHILIZED, FOR SOLUTION INTRAVENOUS at 21:30

## 2022-08-27 RX ADMIN — Medication 800 MG: at 20:58

## 2022-08-27 RX ADMIN — PIPERACILLIN AND TAZOBACTAM 3.38 G: 3; .375 INJECTION, POWDER, LYOPHILIZED, FOR SOLUTION INTRAVENOUS at 13:43

## 2022-08-27 RX ADMIN — OXYCODONE HYDROCHLORIDE 5 MG: 5 TABLET ORAL at 13:37

## 2022-08-27 RX ADMIN — HYDROMORPHONE HYDROCHLORIDE 0.5 MG: 1 INJECTION, SOLUTION INTRAMUSCULAR; INTRAVENOUS; SUBCUTANEOUS at 10:52

## 2022-08-27 RX ADMIN — GABAPENTIN 100 MG: 100 CAPSULE ORAL at 17:21

## 2022-08-27 RX ADMIN — FERROUS SULFATE TAB 325 MG (65 MG ELEMENTAL FE) 325 MG: 325 (65 FE) TAB at 08:52

## 2022-08-27 RX ADMIN — Medication 500 MG: at 08:52

## 2022-08-27 RX ADMIN — ATORVASTATIN CALCIUM 80 MG: 40 TABLET, FILM COATED ORAL at 08:53

## 2022-08-27 RX ADMIN — TRAZODONE HYDROCHLORIDE 50 MG: 50 TABLET ORAL at 21:00

## 2022-08-27 RX ADMIN — GABAPENTIN 100 MG: 100 CAPSULE ORAL at 08:53

## 2022-08-27 RX ADMIN — ACETAMINOPHEN 975 MG: 325 TABLET ORAL at 20:49

## 2022-08-27 RX ADMIN — CLOPIDOGREL BISULFATE 75 MG: 75 TABLET ORAL at 08:53

## 2022-08-27 RX ADMIN — METFORMIN HYDROCHLORIDE 500 MG: 500 TABLET ORAL at 17:21

## 2022-08-27 RX ADMIN — LOSARTAN POTASSIUM 25 MG: 25 TABLET, FILM COATED ORAL at 21:00

## 2022-08-27 RX ADMIN — OXYCODONE HYDROCHLORIDE 5 MG: 5 TABLET ORAL at 21:00

## 2022-08-27 RX ADMIN — ARIPIPRAZOLE 2 MG: 2 TABLET ORAL at 08:53

## 2022-08-27 RX ADMIN — DULOXETINE HYDROCHLORIDE 120 MG: 60 CAPSULE, DELAYED RELEASE PELLETS ORAL at 08:52

## 2022-08-27 RX ADMIN — METFORMIN HYDROCHLORIDE 1000 MG: 500 TABLET ORAL at 08:54

## 2022-08-27 RX ADMIN — GABAPENTIN 600 MG: 300 CAPSULE ORAL at 21:00

## 2022-08-27 RX ADMIN — IRON SUCROSE 300 MG: 20 INJECTION, SOLUTION INTRAVENOUS at 18:35

## 2022-08-27 RX ADMIN — MIRTAZAPINE 7.5 MG: 7.5 TABLET, FILM COATED ORAL at 21:00

## 2022-08-27 RX ADMIN — GLIPIZIDE 2.5 MG: 5 TABLET ORAL at 08:53

## 2022-08-27 ASSESSMENT — ACTIVITIES OF DAILY LIVING (ADL)
ADLS_ACUITY_SCORE: 46
ADLS_ACUITY_SCORE: 46
ADLS_ACUITY_SCORE: 50
ADLS_ACUITY_SCORE: 46
ADLS_ACUITY_SCORE: 50
ADLS_ACUITY_SCORE: 46

## 2022-08-27 NOTE — PROGRESS NOTES
[facility]    Medicine Progress Note - Hospitalist Service    Date of Admission:  8/23/2022    Assessment & Plan          Cm Lundberg is a 52 year old male admitted on 8/23/2022.     Assessment:  #Infected right BKA stump.  CT right leg showed evolving abscess.  Patient not septic.  Blood cultures obtained.  On vancomycin and Zosyn.  S/p I&D 8/24, culture sent.  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 Valderrama.      #MRSA bacteremia, likely due to right BKA stump abscess.  Positive BC on 8/23, 8/25.  Surveillance BC obtained 8/26, 8/27.  We will continue daily BCs to negative for 48 hours.    #Acute renal failure, creat peaked at 1.47 on 8/24, was 1.04 on admission.  Likely due to sepsis and dehydration, given rising BUN.  With IVF renal function normalizing.    #Chronic normocytic anemia, hemoglobin baseline 8-9.  History of iron deficiency.  Iron checked low at 16 on 8/25/2022.    Replacing with Venofer 2 doses on 8/27 and 8/28, then p.o. replacement.    #Acute anemia, hemoglobin 6.4 on 8/25.  Reported intraoperative EBL 10 mL.  No postop bleeding.  S/p 1 unit PRBCs 8/25.HgB in upper 8's.    #CVD, history of stroke.  On Plavix, statin.    #Atrial fibrillation.  Was on Xarelto, held preoperatively.  Resume when okay per surgery.    #Coronary artery disease, status post CABG and stents.  On Plavix, beta-blockers, fibrate.    #Non-insulin-dependent diabetes mellitus.  Well-controlled with Glucophage and glipizide.  Initially held due to RANDAL.  Resumed now.  A1c 5.9 on 8/26.    #Mood disorder:general anxiety and depression, stable on Abilify, Cymbalta, Ativan, Remeron, trazodone.    Essential hypertension.  BP controlled on losartan and metoprolol.  Peripheral neuropathy, due to diabetes.  Resident of [facility] prior to recent past hospitalization TCU.    Plan:  Continue Vancomycin/Zosyn.  Monitor for signs of severe sepsis.  Follow-up and surveillance  blood cultures.  Follow-up on ID and vascular surgery recommendations.  Discontinue IVF.  Pain management with p.o. oxycodone, scheduled Tylenol, as needed IV Dilaudid  Resumed oral antidiabetic meds, since patient is n.p.o.  SSI NovoLog.  Wound treatment plan WOCN/vascular surgery.  Wound VAC applied today.  Continue PTA scheduled Tylenol, Abilify, Lipitor, Questran, Plavix, Cymbalta, fenofibrate, Neurontin, Protonix, mental health medications.  Resume Xarelto when okay per surgery.  PT/OT        Diet: Room Service  Advance Diet as Tolerated: Regular Diet Adult; Moderate Consistent Carb (60 g CHO per Meal) Diet    DVT Prophylaxis: VTE Prophylaxis contraindicated due to surgery today  Murray Catheter: Not present  Central Lines: None  Cardiac Monitoring: None  Code Status: Full Code      Disposition Plan      Expected Discharge Date: 08/29/2022        Discharge Comments: 8/26  R BKA tomorrow with wound vac 8/26 +BC        The patient's care was discussed with the Bedside Nurse, Patient.    Gisel Santoyo MD  Hospitalist Service  [facility]  Securely message with the Vocera Web Console (learn more here)  Text page via [employer] Paging/Directory     Clinically Significant Risk Factors Present on Admission                  ______________________________________________________________________    Interval History   Admitted on 8/20 history for BKA infected wound with davey pus.  S/p I&D on 8/24.  BC from 8/23 growing MRSA.  Positive BC on 8/25.  Patient remains hemodynamically stable, afebrile.  S/p 1 unit PRBCs on 8/25 for hemoglobin of 6.4.  Hemoglobin 8 today.  Reported intraoperative EBL 10 cc.  Surveillance BC obtained 8/26 and 8/27.  ID following.  Abs reviewed.  Renal function normalized.  Incisional pain controlled.  No abdominal pain, nausea, vomiting, cough.  8/26 s/p washout and partial closure of right BKA and wound VAC application with plan of wound VAC change on Monday.    Data  reviewed today: I reviewed all medications, new labs and imaging results over the last 24 hours. I personally reviewed    Physical Exam   Vital Signs: Temp: 99.2  F (37.3  C) Temp src: Oral BP: (!) 144/75 Pulse: 72   Resp: 16 SpO2: 90 % O2 Device: None (Room air)    Weight: 158 lbs 12.8 oz  General: Alert and oriented x 3. Not in obvious distress.  HEENT: NC, AT. Neck- supple, No JVP elevation, lymphadenopathy or thyromegaly. Trachea-central.  Chest: Clear to auscultation bilaterally.  Heart: S1S2 regular. No M/R/G.  Abdomen: Soft. NT, ND. No organomegaly. Bowel sounds- active.  Back: No spine tenderness. No CVA tenderness.  Extremities: Right BKA dressing was not removed.   Neuro: Cranial nerves 1-12 grossly normal. No focal neurological deficit    Data   Recent Labs   Lab 08/27/22  1210 08/27/22  0816 08/27/22  0739 08/26/22  2036 08/26/22  0827 08/26/22  0815 08/25/22  2117 08/25/22  1828 08/25/22  0652 08/25/22  0605 08/24/22  0825 08/24/22  0611   WBC  --   --  5.3  --   --  4.6  --  5.3  --  6.0   < > 6.5   HGB  --   --  8.8*  --   --  8.2*  --  8.0*  --  6.4*   < > 8.9*   MCV  --   --  86  --   --  86  --  86  --  84   < > 83   PLT  --   --  296  --   --  251  --  291  --  249   < > 323   INR  --   --  1.12  --   --   --   --   --   --  1.22*  --  1.25*   NA  --   --   --   --   --  139  --   --   --  137  --  136   POTASSIUM  --   --  4.3  --   --  4.4  --   --   --  4.3   < > 4.7   CHLORIDE  --   --   --   --   --  110*  --   --   --  108*  --  103   CO2  --   --   --   --   --  25  --   --   --  25  --  25   BUN  --   --   --   --   --  16  --   --   --  20  --  26*   CR  --   --  0.99  --   --  1.00  --   --   --  1.09   < > 1.47*   ANIONGAP  --   --   --   --   --  4*  --   --   --  4*  --  8   TO  --   --   --   --   --  8.7  --   --   --  8.6  --  10.0   GLC 86 132*  --  134*   < > 102   < >  --    < > 155*   < > 142*   ALBUMIN  --   --   --   --   --   --   --   --   --   --   --  2.8*   PROTTOTAL   "--   --   --   --   --   --   --   --   --   --   --  6.8   BILITOTAL  --   --   --   --   --   --   --   --   --   --   --  0.3   ALKPHOS  --   --   --   --   --   --   --   --   --   --   --  61   ALT  --   --   --   --   --   --   --   --   --   --   --  11   AST  --   --   --   --   --   --   --   --   --   --   --  13    < > = values in this interval not displayed.     Recent Results (from the past 24 hour(s))   POC US Guidance Needle Placement    Narrative    Ultrasound was performed as guidance to an anesthesia procedure.  Click   \"PACS images\" hyperlink below to view any stored images.  For specific   procedure details, view procedure note authored by anesthesia.     Medications       acetaminophen  975 mg Oral Q8H     ARIPiprazole  2 mg Oral Daily     atorvastatin  80 mg Oral Daily     cholestyramine  1 packet Oral Every Other Day     clopidogrel  75 mg Oral Daily     DULoxetine  120 mg Oral Daily     fenofibrate  160 mg Oral Daily     ferrous sulfate  325 mg Oral Daily with breakfast     gabapentin  100 mg Oral BID AC     gabapentin  600 mg Oral At Bedtime     glipiZIDE  2.5 mg Oral QAM AC     insulin aspart  1-3 Units Subcutaneous TID AC     insulin aspart  1-3 Units Subcutaneous At Bedtime     losartan  25 mg Oral At Bedtime     magnesium oxide  800 mg Oral BID     metFORMIN  1,000 mg Oral Daily with breakfast     metFORMIN  500 mg Oral Daily with supper     metoprolol succinate ER  75 mg Oral Daily     mirtazapine  7.5 mg Oral At Bedtime     pantoprazole  40 mg Oral Daily     piperacillin-tazobactam  3.375 g Intravenous Q8H     polyethylene glycol  17 g Oral Daily     senna-docusate  1 tablet Oral BID     sodium chloride (PF)  3 mL Intracatheter Q8H     sodium chloride (PF)  3 mL Intracatheter Q8H     sodium chloride (PF)  3 mL Intracatheter Q8H     traZODone  50 mg Oral At Bedtime     vancomycin  1,500 mg Intravenous Q24H     vitamin C  500 mg Oral Daily     Gisel Santoyo [facility] Medicine " Service   [phone]   Pager [phone]   [email or web address]

## 2022-08-27 NOTE — PLAN OF CARE
Problem: Pain Chronic (Persistent) (Comorbidity Management)  Goal: Acceptable Pain Control and Functional Ability  Intervention: Manage Persistent Pain  Recent Flowsheet Documentation  Taken 8/27/2022 0050 by Diallo Lee RN  Medication Review/Management: medications reviewed  Taken 8/26/2022 2000 by Diallo Lee RN  Medication Review/Management: medications reviewed     Problem: Pain Chronic (Persistent)  Goal: Acceptable Pain Control and Functional Ability  Intervention: Manage Persistent Pain  Recent Flowsheet Documentation  Taken 8/27/2022 0050 by Diallo Lee RN  Medication Review/Management: medications reviewed  Taken 8/26/2022 2000 by Diallo Lee RN  Medication Review/Management: medications reviewed     Problem: Impaired Wound Healing  Goal: Optimal Wound Healing  Outcome: Ongoing, Progressing     Patient slept very well overnight. Reports 6/10 pain that he says is controlled with scheduled tylenol overnight tonight. Wound vac is in place over surgical site on RLE and is holding negative pressure at 125 ml Hg. Urinates with external male catheter. NWB on RLE per orders.    Diallo Lee, CESARIO

## 2022-08-27 NOTE — PROGRESS NOTES
Patient is seen at the bedside.  Overall is doing well.  Patient is postop day 1 after additional irrigation debridement and partial closure of below the knee amputation.  Wound VAC was also applied.  Patient is current on IV antibiotics.  Continue the same course.  Follow-up ID recommendations for MRSA bacteremia we will change the wound VAC on Monday.

## 2022-08-27 NOTE — PROGRESS NOTES
"[facility] [place] [facility] Progress Note    SUBJECTIVE: Stable leg is clean and a small vac is on it, the tissue is a bit boggy but not fluctuant    REVIEW OF SYSTEMS:  Negative unless as listed above.  Social history, Family history, Medications: reviewed.    OBJECTIVE:  BP (!) 144/75 (BP Location: Right arm)   Pulse 72   Temp 99.2  F (37.3  C) (Oral)   Resp 16   Ht 1.702 m (5' 7\")   Wt 72 kg (158 lb 12.8 oz)   SpO2 90%   BMI 24.87 kg/m                PHYSICAL EXAM:  Alert, awake  Vitals tabulated above, reviewed  Neck supple without lymphadenopathy  Sclera normal color, not injected  CARDIOVASCULAR regular rate and rhythm, no murmur  Lungs CLEAR TO AUSCULTATION   Abdomen soft, NT/ND, absent HEPATOSPLENOMEGALY  Skin normal  Joints normal  Neurologic exam non focal  See vascular for wound exam    Antibiotics:vanco and zosyn    Pertinent labs:  Lab Results   Component Value Date    WBC 4.6 08/26/2022    WBC 5.0 08/25/2016     Lab Results   Component Value Date    RBC 3.18 08/26/2022    RBC 3.49 08/25/2016     Lab Results   Component Value Date    HGB 8.2 08/26/2022    HGB 10.1 08/25/2016     Lab Results   Component Value Date    HCT 27.3 08/26/2022    HCT 31.4 08/25/2016     No components found for: MCT  Lab Results   Component Value Date    MCV 86 08/26/2022    MCV 90 08/25/2016     Lab Results   Component Value Date    MCH 25.8 08/26/2022    MCH 28.7 08/25/2016     Lab Results   Component Value Date    MCHC 30.0 08/26/2022    MCHC 31.8 08/25/2016     Lab Results   Component Value Date    RDW 14.6 08/26/2022    RDW 13.6 08/25/2016     Lab Results   Component Value Date     08/26/2022     08/25/2016       Last Comprehensive Metabolic Panel:  Sodium   Date Value Ref Range Status   08/26/2022 139 136 - 145 mmol/L Final   08/25/2016 139 mmol/L Final     Potassium   Date Value Ref Range Status   08/27/2022 4.3 3.5 - 5.0 mmol/L Final   08/25/2016 4.3 mmol/L Final     Chloride   Date Value Ref Range " Status   08/26/2022 110 (H) 98 - 107 mmol/L Final   08/25/2016 107 mmol/L Final     Carbon Dioxide (CO2)   Date Value Ref Range Status   08/26/2022 25 22 - 31 mmol/L Final     Anion Gap   Date Value Ref Range Status   08/26/2022 4 (L) 5 - 18 mmol/L Final   08/25/2016 8 mmol/L Final     Glucose   Date Value Ref Range Status   08/26/2022 102 70 - 125 mg/dL Final   08/25/2016 84 70 - 99 mg/dL Final     GLUCOSE BY METER POCT   Date Value Ref Range Status   08/27/2022 132 (H) 70 - 99 mg/dL Final     Urea Nitrogen   Date Value Ref Range Status   08/26/2022 16 8 - 22 mg/dL Final   08/25/2016 21 mg/dL Final     Creatinine   Date Value Ref Range Status   08/27/2022 0.99 0.70 - 1.30 mg/dL Final   08/25/2016 0.85 mg/dL Final     GFR Estimate   Date Value Ref Range Status   08/27/2022 >90 >60 mL/min/1.73m2 Final     Comment:     Effective December 21, 2021 eGFRcr in adults is calculated using the 2021 CKD-EPI creatinine equation which includes age and gender (Stephen et al., NEJM, DOI: 10.1056/AXPKxy9784699)   04/29/2019 >60 >60 mL/min/1.73m2 Final     Calcium   Date Value Ref Range Status   08/26/2022 8.7 8.5 - 10.5 mg/dL Final   08/25/2016 9.9 mg/dL Final       Liver Function Studies -   Recent Labs   Lab Test 08/24/22  0611   PROTTOTAL 6.8   ALBUMIN 2.8*   BILITOTAL 0.3   ALKPHOS 61   AST 13   ALT 11       No results found for: SED    CRP   Date Value Ref Range Status   08/24/2022 18.9 (H) 0.0 - <0.8 mg/dL Final               MICROBIOLOGY DATA:  One of two BC aug 23 MRSA  Knee swab GPC    One of two BC aug 25 GPC chains  BC 26th pending  IMAGING/RADIOLOGY:    TTE reviewed      ASSESSMENT:  SAB source very likely amp site  I suspect aug 25th strep in blood could be contam--not isolated or identified this early in the day...  Post wound debride, closure doing good    RECOMMENDATION:  He's plenty covered  We can do a picc Monday there's no rush.       ANT Mei MD  Office [phone] option 2 to desk staff

## 2022-08-28 PROBLEM — E83.42 HYPOMAGNESEMIA: Status: ACTIVE | Noted: 2022-08-28

## 2022-08-28 LAB
BACTERIA BLD CULT: NO GROWTH
CREAT SERPL-MCNC: 0.97 MG/DL (ref 0.7–1.3)
GFR SERPL CREATININE-BSD FRML MDRD: >90 ML/MIN/1.73M2
GLUCOSE BLDC GLUCOMTR-MCNC: 102 MG/DL (ref 70–99)
GLUCOSE BLDC GLUCOMTR-MCNC: 104 MG/DL (ref 70–99)
GLUCOSE BLDC GLUCOMTR-MCNC: 68 MG/DL (ref 70–99)
GLUCOSE BLDC GLUCOMTR-MCNC: 77 MG/DL (ref 70–99)
GLUCOSE BLDC GLUCOMTR-MCNC: 89 MG/DL (ref 70–99)
GLUCOSE BLDC GLUCOMTR-MCNC: 98 MG/DL (ref 70–99)
HOLD SPECIMEN: NORMAL
HOLD SPECIMEN: NORMAL
MAGNESIUM SERPL-MCNC: 1.5 MG/DL (ref 1.8–2.6)
MAGNESIUM SERPL-MCNC: 2.4 MG/DL (ref 1.8–2.6)
POTASSIUM BLD-SCNC: 4.3 MMOL/L (ref 3.5–5)
VANCOMYCIN SERPL-MCNC: 18.7 MG/L

## 2022-08-28 PROCEDURE — 250N000013 HC RX MED GY IP 250 OP 250 PS 637: Performed by: INTERNAL MEDICINE

## 2022-08-28 PROCEDURE — 36415 COLL VENOUS BLD VENIPUNCTURE: CPT | Performed by: PHYSICIAN ASSISTANT

## 2022-08-28 PROCEDURE — 84132 ASSAY OF SERUM POTASSIUM: CPT | Performed by: INTERNAL MEDICINE

## 2022-08-28 PROCEDURE — 258N000003 HC RX IP 258 OP 636: Performed by: INTERNAL MEDICINE

## 2022-08-28 PROCEDURE — 120N000001 HC R&B MED SURG/OB

## 2022-08-28 PROCEDURE — 250N000013 HC RX MED GY IP 250 OP 250 PS 637: Performed by: PHYSICIAN ASSISTANT

## 2022-08-28 PROCEDURE — 99232 SBSQ HOSP IP/OBS MODERATE 35: CPT | Performed by: INTERNAL MEDICINE

## 2022-08-28 PROCEDURE — 250N000011 HC RX IP 250 OP 636: Performed by: INTERNAL MEDICINE

## 2022-08-28 PROCEDURE — 82565 ASSAY OF CREATININE: CPT | Performed by: PHYSICIAN ASSISTANT

## 2022-08-28 PROCEDURE — 87040 BLOOD CULTURE FOR BACTERIA: CPT | Performed by: INTERNAL MEDICINE

## 2022-08-28 PROCEDURE — 80202 ASSAY OF VANCOMYCIN: CPT | Performed by: INTERNAL MEDICINE

## 2022-08-28 PROCEDURE — 83735 ASSAY OF MAGNESIUM: CPT | Performed by: INTERNAL MEDICINE

## 2022-08-28 PROCEDURE — 36415 COLL VENOUS BLD VENIPUNCTURE: CPT | Performed by: INTERNAL MEDICINE

## 2022-08-28 RX ORDER — LIDOCAINE 40 MG/G
CREAM TOPICAL
Status: DISCONTINUED | OUTPATIENT
Start: 2022-08-28 | End: 2022-08-30 | Stop reason: HOSPADM

## 2022-08-28 RX ORDER — MAGNESIUM SULFATE 4 G/50ML
4 INJECTION INTRAVENOUS ONCE
Status: COMPLETED | OUTPATIENT
Start: 2022-08-28 | End: 2022-08-28

## 2022-08-28 RX ADMIN — OXYCODONE HYDROCHLORIDE 5 MG: 5 TABLET ORAL at 09:53

## 2022-08-28 RX ADMIN — OXYCODONE HYDROCHLORIDE 5 MG: 5 TABLET ORAL at 18:53

## 2022-08-28 RX ADMIN — CLOPIDOGREL BISULFATE 75 MG: 75 TABLET ORAL at 08:29

## 2022-08-28 RX ADMIN — Medication 800 MG: at 21:39

## 2022-08-28 RX ADMIN — GABAPENTIN 100 MG: 100 CAPSULE ORAL at 08:29

## 2022-08-28 RX ADMIN — ACETAMINOPHEN 975 MG: 325 TABLET ORAL at 05:45

## 2022-08-28 RX ADMIN — VANCOMYCIN HYDROCHLORIDE 1500 MG: 5 INJECTION, POWDER, LYOPHILIZED, FOR SOLUTION INTRAVENOUS at 14:35

## 2022-08-28 RX ADMIN — TRAZODONE HYDROCHLORIDE 50 MG: 50 TABLET ORAL at 21:39

## 2022-08-28 RX ADMIN — FENOFIBRATE 160 MG: 160 TABLET, FILM COATED ORAL at 08:25

## 2022-08-28 RX ADMIN — MIRTAZAPINE 7.5 MG: 7.5 TABLET, FILM COATED ORAL at 21:49

## 2022-08-28 RX ADMIN — ACETAMINOPHEN 975 MG: 325 TABLET ORAL at 21:38

## 2022-08-28 RX ADMIN — GABAPENTIN 600 MG: 300 CAPSULE ORAL at 21:38

## 2022-08-28 RX ADMIN — METOPROLOL SUCCINATE 75 MG: 25 TABLET, EXTENDED RELEASE ORAL at 08:28

## 2022-08-28 RX ADMIN — GABAPENTIN 100 MG: 100 CAPSULE ORAL at 18:08

## 2022-08-28 RX ADMIN — MAGNESIUM SULFATE HEPTAHYDRATE 4 G: 80 INJECTION, SOLUTION INTRAVENOUS at 08:21

## 2022-08-28 RX ADMIN — IRON SUCROSE 300 MG: 20 INJECTION, SOLUTION INTRAVENOUS at 10:18

## 2022-08-28 RX ADMIN — DULOXETINE HYDROCHLORIDE 120 MG: 60 CAPSULE, DELAYED RELEASE PELLETS ORAL at 08:28

## 2022-08-28 RX ADMIN — PANTOPRAZOLE SODIUM 40 MG: 40 TABLET, DELAYED RELEASE ORAL at 08:29

## 2022-08-28 RX ADMIN — METFORMIN HYDROCHLORIDE 1000 MG: 500 TABLET ORAL at 08:25

## 2022-08-28 RX ADMIN — ARIPIPRAZOLE 2 MG: 2 TABLET ORAL at 08:26

## 2022-08-28 RX ADMIN — SENNOSIDES AND DOCUSATE SODIUM 1 TABLET: 8.6; 5 TABLET ORAL at 08:28

## 2022-08-28 RX ADMIN — Medication 500 MG: at 08:28

## 2022-08-28 RX ADMIN — ACETAMINOPHEN 975 MG: 325 TABLET ORAL at 14:07

## 2022-08-28 RX ADMIN — PIPERACILLIN AND TAZOBACTAM 3.38 G: 3; .375 INJECTION, POWDER, LYOPHILIZED, FOR SOLUTION INTRAVENOUS at 14:06

## 2022-08-28 RX ADMIN — LOSARTAN POTASSIUM 25 MG: 25 TABLET, FILM COATED ORAL at 21:39

## 2022-08-28 RX ADMIN — POLYETHYLENE GLYCOL 3350 17 G: 17 POWDER, FOR SOLUTION ORAL at 08:26

## 2022-08-28 RX ADMIN — OXYCODONE HYDROCHLORIDE 5 MG: 5 TABLET ORAL at 21:39

## 2022-08-28 RX ADMIN — PIPERACILLIN AND TAZOBACTAM 3.38 G: 3; .375 INJECTION, POWDER, LYOPHILIZED, FOR SOLUTION INTRAVENOUS at 05:47

## 2022-08-28 RX ADMIN — CHOLESTYRAMINE POWDER FOR SUSPENSION 4 G: 4 POWDER, FOR SUSPENSION ORAL at 10:26

## 2022-08-28 RX ADMIN — ATORVASTATIN CALCIUM 80 MG: 40 TABLET, FILM COATED ORAL at 08:45

## 2022-08-28 RX ADMIN — GLIPIZIDE 2.5 MG: 5 TABLET ORAL at 08:25

## 2022-08-28 RX ADMIN — METFORMIN HYDROCHLORIDE 500 MG: 500 TABLET ORAL at 18:08

## 2022-08-28 RX ADMIN — PIPERACILLIN AND TAZOBACTAM 3.38 G: 3; .375 INJECTION, POWDER, LYOPHILIZED, FOR SOLUTION INTRAVENOUS at 21:42

## 2022-08-28 RX ADMIN — OXYCODONE HYDROCHLORIDE 5 MG: 5 TABLET ORAL at 05:45

## 2022-08-28 RX ADMIN — Medication 800 MG: at 08:29

## 2022-08-28 ASSESSMENT — ACTIVITIES OF DAILY LIVING (ADL)
ADLS_ACUITY_SCORE: 46

## 2022-08-28 NOTE — PLAN OF CARE
"Magnesium 1.5. Scheduled oral magnesium given in addition to 4g IV per protocol.    Wound vac intact. Patient requesting pain medication approximately every 4 hours as available. States pain is \"nearly a nine\" when requesting medication, but otherwise denies pain.      "

## 2022-08-28 NOTE — PROGRESS NOTES
Pain: Patient requesting pain medication appropriately for pain in right BKA. Pain alternately in incision site or phantom pain in right foot.    Mobility: Patient pivot transferred to chair for much of the afternoon without incident.    Blood glucose controlled with oral diabetics.

## 2022-08-28 NOTE — PROGRESS NOTES
Care Management Follow Up    Length of Stay (days): 5    Expected Discharge Date: 08/30/2022        Concerns to be Addressed:  Post procedure care and monitoring, infection-monitoring cultures       Patient plan of care discussed at interdisciplinary rounds: Yes     Anticipated Discharge Disposition:  TCU     Anticipated Discharge Services:  TCU  Anticipated Discharge DME:  Wound vac        Additional Information:  Patient admitted for infection of right BKA stump. Currently on IV antibiotics, monitoring cultures. OR  8/26 for washout and partial closure, wound vac placed. Pain management, will need to have pain managed on oral pain medications with no more use of IV prior to TCU discharge.     Social history:  [facility] after a BKA on his 8/5/2022. Patient is a resident of [facility] ([facility]) but the nurse there notes that patient has an apartment there but he has been hospitalized/at [facility] since December 17th of 2021. She had just assessed him for a return and completed his care plan for anticipated return but he was hospitalized instead. She notes that he needs assistance with medication administration, meals, and is assist of one with: bathing, dressing, grooming and transfers. He also received assist of one with toileting (dependent on his mobility on the day). They also provide housekeeping and laundry services. He can return to the assisted living once his wounds can be managed by a home care nurse three times a week or less. They cannot provide IV medication administration. She suspects that he will need to return to transitional care after hospital stay but would like to be kept up-to-date with the plan. Her number is [phone].      Patient to return to [facility]. Updated admissions  8/26 on need for wound vac. Will need [facility] transportation at discharge.        Cathryn Anand RN

## 2022-08-28 NOTE — PHARMACY-VANCOMYCIN DOSING SERVICE
"Pharmacy Vancomycin Note  Date of Service 2022  Patient's  1970   52 year old, male    Indication: MRSA  Day of Therapy: 6  Current vancomycin regimen:  1500 mg IV q24h  Current vancomycin monitoring method: AUC  Current vancomycin therapeutic monitoring goal: 400-600 mg*h/L    InsightRX Prediction of Current Vancomycin Regimen  Loading dose: N/A  Regimen: 1500 mg IV every 24 hours.  Start time: 08:26 on 2022  Exposure target: AUC24 (range)400-600 mg/L.hr   AUC24,ss: 497 mg/L.hr  Probability of AUC24 > 400: 94 %  Ctrough,ss: 13.2 mg/L  Probability of Ctrough,ss > 20: 3 %  Probability of nephrotoxicity (Lodise JEANETTE ): 8 %      Current estimated CrCl = Estimated Creatinine Clearance: 85.2 mL/min (based on SCr of 0.97 mg/dL).    Creatinine for last 3 days  2022:  8:15 AM Creatinine 1.00 mg/dL  2022:  7:39 AM Creatinine 0.99 mg/dL  2022:  6:22 AM Creatinine 0.97 mg/dL    Recent Vancomycin Levels (past 3 days)  2022:  6:22 AM Vancomycin 18.7 mg/L    Vancomycin IV Administrations (past 72 hours)                   vancomycin (VANCOCIN) 1,500 mg in sodium chloride 0.9 % 250 mL intermittent infusion (mg) 1,500 mg New Bag 22 1343     1,500 mg New Bag 22 1409     1,500 mg New Bag 22 1340                Nephrotoxins and other renal medications (From now, onward)    Start     Dose/Rate Route Frequency Ordered Stop    22 1400  vancomycin (VANCOCIN) 1,500 mg in sodium chloride 0.9 % 250 mL intermittent infusion         1,500 mg  over 90 Minutes Intravenous EVERY 24 HOURS 22 0853      22 0454  piperacillin-tazobactam (ZOSYN) 3.375 g vial to attach to  mL bag        Note to Pharmacy: Extended infusion dosing to start 6 hours after initial infusion.   \"Followed by\" Linked Group Details    3.375 g  over 240 Minutes Intravenous EVERY 8 HOURS 22 9805               Contrast Orders - past 72 hours (72h ago, onward)    Start     Dose/Rate Route " Frequency Stop    08/25/22 1430  perflutren lipid microsphere (DEFINITY) injection SUSP 2 mL         2 mL Intravenous ONCE 08/25/22 1427          Interpretation of levels and current regimen:  Vancomycin level is reflective of -600    Has serum creatinine changed greater than 50% in last 72 hours: No      Renal Function: Stable    InsightRX Prediction of Planned New Vancomycin Regimen  Loading dose: N/A  Regimen: 1500 mg IV every 24 hours.  Start time: 08:26 on 08/28/2022  Exposure target: AUC24 (range)400-600 mg/L.hr   AUC24,ss: 497 mg/L.hr  Probability of AUC24 > 400: 94 %  Ctrough,ss: 13.2 mg/L  Probability of Ctrough,ss > 20: 3 %  Probability of nephrotoxicity (Lodise JEANETTE 2009): 8 %      Plan:  1. Continue Current Dose  2. Vancomycin monitoring method: AUC  3. Vancomycin therapeutic monitoring goal: 400-600 mg*h/L  4. Pharmacy will check vancomycin levels as appropriate in 3-5 Days.  5. Serum creatinine levels will be ordered daily for the first week of therapy and at least twice weekly for subsequent weeks.    LONG VILCHIS RPH

## 2022-08-28 NOTE — PROGRESS NOTES
[facility]    Medicine Progress Note - Hospitalist Service    Date of Admission:  8/23/2022    Assessment & Plan          Cm Lundberg is a 52 year old male admitted on 8/23/2022.     Assessment:  #Infected right BKA stump.  CT right leg showed evolving abscess.  Patient not septic.  Blood cultures obtained.  On vancomycin and Zosyn.  S/p I&D 8/24, culture sent.  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 Valderrama.      #MRSA bacteremia, likely due to right BKA stump abscess.  Positive BC on 8/23, 8/25.  Surveillance BC obtained 8/26, 8/27, 8/28.    Plan for PICC on 8/29.    #Acute renal failure, creat peaked at 1.47 on 8/24, was 1.04 on admission.  Likely due to sepsis and dehydration, given rising BUN.  With IVF renal function normalizing.    #Chronic normocytic anemia, hemoglobin baseline 8-9.  History of iron deficiency.  Iron checked low at 16 on 8/25/2022.    Replacing with Venofer 2 doses on 8/27 and 8/28, then p.o. replacement.    #Acute anemia, hemoglobin 6.4 on 8/25.  Reported intraoperative EBL 10 mL.  No postop bleeding.  S/p 1 unit PRBCs 8/25.HgB in upper 8's.    #CVD, history of stroke.  On Plavix, statin.    #Atrial fibrillation.  Was on Xarelto, held preoperatively.  Resume when okay per surgery.    #Coronary artery disease, status post CABG and stents.  On Plavix, beta-blockers, fibrate.    #Non-insulin-dependent diabetes mellitus.  Well-controlled with Glucophage and glipizide.  Initially held due to RANDAL.  Resumed now.  A1c 5.9 on 8/26.    #Mood disorder:general anxiety and depression, stable on Abilify, Cymbalta, Ativan, Remeron, trazodone.    Essential hypertension.  BP controlled on losartan and metoprolol.  Peripheral neuropathy, due to diabetes.  Resident of [facility] prior to recent past hospitalization TCU.    Plan:  Continue Vancomycin/Zosyn.  Follow-up and surveillance blood cultures.  Follow-up on ID and vascular surgery  recommendations.  Discontinued IVF.  PICC placement on Monday.  Pain management with p.o. oxycodone, scheduled Tylenol, as needed IV Dilaudid  Resumed oral antidiabetic meds, since patient is n.p.o.  SSI NovoLog.  Wound treatment plan WOCN/vascular surgery.  Wound VAC applied today.  Continue PTA scheduled Tylenol, Abilify, Lipitor, Questran, Plavix, Cymbalta, fenofibrate, Neurontin, Protonix, mental health medications.  Resume Xarelto when okay per surgery.  PT/OT        Diet: Room Service  Advance Diet as Tolerated: Regular Diet Adult; Moderate Consistent Carb (60 g CHO per Meal) Diet    DVT Prophylaxis: VTE Prophylaxis contraindicated due to surgery today  Murray Catheter: Not present  Central Lines: None  Cardiac Monitoring: None  Code Status: Full Code      Disposition Plan      Expected Discharge Date: 08/29/2022        Discharge Comments: 8/29 PiCC to be placed  8/26  R BKA tomorrow with wound vac 8/26 +BC        The patient's care was discussed with the Bedside Nurse, Patient.    Gisel Santoyo MD  Hospitalist Service  [facility]  Securely message with the Vocera Web Console (learn more here)  Text page via [employer] Paging/Directory     Clinically Significant Risk Factors Present on Admission                   ______________________________________________________________________    Interval History   Admitted on 8/20 history for BKA infected wound with davey pus.  S/p I&D on 8/24.  BC from 8/23 growing MRSA.  Positive BC on 8/25.  Patient remains hemodynamically stable, afebrile.  S/p 1 unit PRBCs on 8/25 for hemoglobin of 6.4.  Hemoglobin 8 today.  Reported intraoperative EBL 10 cc.  Surveillance BC obtained 8/26 and 8/27.  ID following.  Abs reviewed.  Renal function normalized.  Incisional pain controlled.  No abdominal pain, nausea, vomiting, cough.  8/26 s/p washout and partial closure of right BKA and wound VAC application with plan of wound VAC change and PICC on  Monday.    Data reviewed today: I reviewed all medications, new labs and imaging results over the last 24 hours. I personally reviewed    Physical Exam   Vital Signs: Temp: 97.8  F (36.6  C) Temp src: Oral BP: 132/65 Pulse: 76   Resp: 18 SpO2: 95 % O2 Device: None (Room air)    Weight: 153 lbs 3.2 oz  General: Alert and oriented x 3. Not in obvious distress.  HEENT: NC, AT. Neck- supple, No JVP elevation, lymphadenopathy or thyromegaly. Trachea-central.  Chest: Clear to auscultation bilaterally.  Heart: S1S2 regular. No M/R/G.  Abdomen: Soft. NT, ND. No organomegaly. Bowel sounds- active.  Back: No spine tenderness. No CVA tenderness.  Extremities: Right BKA dressing was not removed.   Neuro: Cranial nerves 1-12 grossly normal. No focal neurological deficit    Data   Recent Labs   Lab 08/28/22  1225 08/28/22  0808 08/28/22  0622 08/27/22  2058 08/27/22  0816 08/27/22  0739 08/26/22  0827 08/26/22  0815 08/25/22  2117 08/25/22  1828 08/25/22  0652 08/25/22  0605 08/24/22  0825 08/24/22  0611   WBC  --   --   --   --   --  5.3  --  4.6  --  5.3  --  6.0   < > 6.5   HGB  --   --   --   --   --  8.8*  --  8.2*  --  8.0*  --  6.4*   < > 8.9*   MCV  --   --   --   --   --  86  --  86  --  86  --  84   < > 83   PLT  --   --   --   --   --  296  --  251  --  291  --  249   < > 323   INR  --   --   --   --   --  1.12  --   --   --   --   --  1.22*  --  1.25*   NA  --   --   --   --   --   --   --  139  --   --   --  137  --  136   POTASSIUM  --   --  4.3  --   --  4.3  --  4.4  --   --   --  4.3   < > 4.7   CHLORIDE  --   --   --   --   --   --   --  110*  --   --   --  108*  --  103   CO2  --   --   --   --   --   --   --  25  --   --   --  25  --  25   BUN  --   --   --   --   --   --   --  16  --   --   --  20  --  26*   CR  --   --  0.97  --   --  0.99  --  1.00  --   --   --  1.09   < > 1.47*   ANIONGAP  --   --   --   --   --   --   --  4*  --   --   --  4*  --  8   TO  --   --   --   --   --   --   --  8.7  --   --    "--  8.6  --  10.0   GLC 98 104*  --  105*   < >  --    < > 102   < >  --    < > 155*   < > 142*   ALBUMIN  --   --   --   --   --   --   --   --   --   --   --   --   --  2.8*   PROTTOTAL  --   --   --   --   --   --   --   --   --   --   --   --   --  6.8   BILITOTAL  --   --   --   --   --   --   --   --   --   --   --   --   --  0.3   ALKPHOS  --   --   --   --   --   --   --   --   --   --   --   --   --  61   ALT  --   --   --   --   --   --   --   --   --   --   --   --   --  11   AST  --   --   --   --   --   --   --   --   --   --   --   --   --  13    < > = values in this interval not displayed.     Recent Results (from the past 24 hour(s))   POC US Guidance Needle Placement    Narrative    Ultrasound was performed as guidance to an anesthesia procedure.  Click   \"PACS images\" hyperlink below to view any stored images.  For specific   procedure details, view procedure note authored by anesthesia.     Medications       acetaminophen  975 mg Oral Q8H     ARIPiprazole  2 mg Oral Daily     atorvastatin  80 mg Oral Daily     cholestyramine  1 packet Oral Every Other Day     clopidogrel  75 mg Oral Daily     DULoxetine  120 mg Oral Daily     fenofibrate  160 mg Oral Daily     [START ON 8/30/2022] ferrous sulfate  325 mg Oral Daily with breakfast     gabapentin  100 mg Oral BID AC     gabapentin  600 mg Oral At Bedtime     glipiZIDE  2.5 mg Oral QAM AC     insulin aspart  1-3 Units Subcutaneous TID AC     insulin aspart  1-3 Units Subcutaneous At Bedtime     losartan  25 mg Oral At Bedtime     magnesium oxide  800 mg Oral BID     metFORMIN  1,000 mg Oral Daily with breakfast     metFORMIN  500 mg Oral Daily with supper     metoprolol succinate ER  75 mg Oral Daily     mirtazapine  7.5 mg Oral At Bedtime     pantoprazole  40 mg Oral Daily     piperacillin-tazobactam  3.375 g Intravenous Q8H     polyethylene glycol  17 g Oral Daily     senna-docusate  1 tablet Oral BID     sodium chloride (PF)  3 mL Intracatheter " Q8H     sodium chloride (PF)  3 mL Intracatheter Q8H     traZODone  50 mg Oral At Bedtime     vancomycin  1,500 mg Intravenous Q24H     vitamin C  500 mg Oral Daily     Gisel Santoyo MD   [facility] Medicine Service   [phone]   Pager [phone]   [email or web address]

## 2022-08-29 LAB
ANION GAP SERPL CALCULATED.3IONS-SCNC: 6 MMOL/L (ref 5–18)
BACTERIA BLD CULT: NO GROWTH
BACTERIA BLD CULT: NO GROWTH
BUN SERPL-MCNC: 12 MG/DL (ref 8–22)
CALCIUM SERPL-MCNC: 9.1 MG/DL (ref 8.5–10.5)
CHLORIDE BLD-SCNC: 109 MMOL/L (ref 98–107)
CO2 SERPL-SCNC: 25 MMOL/L (ref 22–31)
CREAT SERPL-MCNC: 1.04 MG/DL (ref 0.7–1.3)
ERYTHROCYTE [DISTWIDTH] IN BLOOD BY AUTOMATED COUNT: 14.8 % (ref 10–15)
GFR SERPL CREATININE-BSD FRML MDRD: 85 ML/MIN/1.73M2
GLUCOSE BLD-MCNC: 94 MG/DL (ref 70–125)
GLUCOSE BLDC GLUCOMTR-MCNC: 111 MG/DL (ref 70–99)
GLUCOSE BLDC GLUCOMTR-MCNC: 147 MG/DL (ref 70–99)
GLUCOSE BLDC GLUCOMTR-MCNC: 70 MG/DL (ref 70–99)
GLUCOSE BLDC GLUCOMTR-MCNC: 98 MG/DL (ref 70–99)
HCT VFR BLD AUTO: 28.2 % (ref 40–53)
HGB BLD-MCNC: 8.6 G/DL (ref 13.3–17.7)
HOLD SPECIMEN: NORMAL
INR PPP: 1.13 (ref 0.85–1.15)
MAGNESIUM SERPL-MCNC: 1.7 MG/DL (ref 1.8–2.6)
MCH RBC QN AUTO: 25.9 PG (ref 26.5–33)
MCHC RBC AUTO-ENTMCNC: 30.5 G/DL (ref 31.5–36.5)
MCV RBC AUTO: 85 FL (ref 78–100)
PLATELET # BLD AUTO: 264 10E3/UL (ref 150–450)
POTASSIUM BLD-SCNC: 4.3 MMOL/L (ref 3.5–5)
RBC # BLD AUTO: 3.32 10E6/UL (ref 4.4–5.9)
SODIUM SERPL-SCNC: 140 MMOL/L (ref 136–145)
WBC # BLD AUTO: 4 10E3/UL (ref 4–11)

## 2022-08-29 PROCEDURE — 80048 BASIC METABOLIC PNL TOTAL CA: CPT | Performed by: INTERNAL MEDICINE

## 2022-08-29 PROCEDURE — 272N000451 HC KIT SHRLOCK 5FR POWER PICC DOUBLE LUMEN

## 2022-08-29 PROCEDURE — 258N000003 HC RX IP 258 OP 636: Performed by: INTERNAL MEDICINE

## 2022-08-29 PROCEDURE — 120N000001 HC R&B MED SURG/OB

## 2022-08-29 PROCEDURE — 250N000009 HC RX 250: Performed by: INTERNAL MEDICINE

## 2022-08-29 PROCEDURE — 36569 INSJ PICC 5 YR+ W/O IMAGING: CPT

## 2022-08-29 PROCEDURE — 36415 COLL VENOUS BLD VENIPUNCTURE: CPT | Performed by: INTERNAL MEDICINE

## 2022-08-29 PROCEDURE — 99232 SBSQ HOSP IP/OBS MODERATE 35: CPT | Performed by: INTERNAL MEDICINE

## 2022-08-29 PROCEDURE — 250N000013 HC RX MED GY IP 250 OP 250 PS 637: Performed by: PHYSICIAN ASSISTANT

## 2022-08-29 PROCEDURE — 85610 PROTHROMBIN TIME: CPT | Performed by: INTERNAL MEDICINE

## 2022-08-29 PROCEDURE — 250N000011 HC RX IP 250 OP 636: Performed by: INTERNAL MEDICINE

## 2022-08-29 PROCEDURE — 97605 NEG PRS WND THER DME<=50SQCM: CPT

## 2022-08-29 PROCEDURE — 272N000450 HC KIT 4FR POWER PICC SINGLE LUMEN

## 2022-08-29 PROCEDURE — 85014 HEMATOCRIT: CPT | Performed by: INTERNAL MEDICINE

## 2022-08-29 PROCEDURE — 99233 SBSQ HOSP IP/OBS HIGH 50: CPT | Performed by: INTERNAL MEDICINE

## 2022-08-29 PROCEDURE — 83735 ASSAY OF MAGNESIUM: CPT | Performed by: INTERNAL MEDICINE

## 2022-08-29 PROCEDURE — 250N000013 HC RX MED GY IP 250 OP 250 PS 637: Performed by: INTERNAL MEDICINE

## 2022-08-29 PROCEDURE — F08G5YZ WOUND MANAGEMENT TREATMENT OF INTEGUMENTARY SYSTEM - LOWER BACK / LOWER EXTREMITY USING OTHER EQUIPMENT: ICD-10-PCS | Performed by: SURGERY

## 2022-08-29 RX ORDER — OXYCODONE HYDROCHLORIDE 5 MG/1
5-10 TABLET ORAL EVERY 4 HOURS PRN
Status: DISCONTINUED | OUTPATIENT
Start: 2022-08-29 | End: 2022-08-29

## 2022-08-29 RX ORDER — HYDROCODONE BITARTRATE AND ACETAMINOPHEN 5; 325 MG/1; MG/1
1-2 TABLET ORAL EVERY 6 HOURS PRN
Status: DISCONTINUED | OUTPATIENT
Start: 2022-08-29 | End: 2022-08-30 | Stop reason: HOSPADM

## 2022-08-29 RX ORDER — FENOFIBRATE 160 MG/1
160 TABLET ORAL DAILY
Qty: 30 TABLET | Refills: 0 | Status: SHIPPED | OUTPATIENT
Start: 2022-08-30 | End: 2022-09-08

## 2022-08-29 RX ADMIN — LIDOCAINE HYDROCHLORIDE 4 ML: 10 INJECTION, SOLUTION EPIDURAL; INFILTRATION; INTRACAUDAL; PERINEURAL at 06:43

## 2022-08-29 RX ADMIN — CLOPIDOGREL BISULFATE 75 MG: 75 TABLET ORAL at 09:59

## 2022-08-29 RX ADMIN — PIPERACILLIN AND TAZOBACTAM 3.38 G: 3; .375 INJECTION, POWDER, LYOPHILIZED, FOR SOLUTION INTRAVENOUS at 06:21

## 2022-08-29 RX ADMIN — VANCOMYCIN HYDROCHLORIDE 1500 MG: 5 INJECTION, POWDER, LYOPHILIZED, FOR SOLUTION INTRAVENOUS at 13:58

## 2022-08-29 RX ADMIN — GABAPENTIN 600 MG: 300 CAPSULE ORAL at 22:04

## 2022-08-29 RX ADMIN — Medication 800 MG: at 09:57

## 2022-08-29 RX ADMIN — HYDROCODONE BITARTRATE AND ACETAMINOPHEN 2 TABLET: 5; 325 TABLET ORAL at 14:48

## 2022-08-29 RX ADMIN — MIRTAZAPINE 7.5 MG: 7.5 TABLET, FILM COATED ORAL at 22:04

## 2022-08-29 RX ADMIN — SENNOSIDES AND DOCUSATE SODIUM 1 TABLET: 8.6; 5 TABLET ORAL at 09:58

## 2022-08-29 RX ADMIN — FENOFIBRATE 160 MG: 160 TABLET, FILM COATED ORAL at 09:54

## 2022-08-29 RX ADMIN — TRAZODONE HYDROCHLORIDE 50 MG: 50 TABLET ORAL at 22:04

## 2022-08-29 RX ADMIN — Medication 800 MG: at 20:16

## 2022-08-29 RX ADMIN — ACETAMINOPHEN 975 MG: 325 TABLET ORAL at 06:21

## 2022-08-29 RX ADMIN — OXYCODONE HYDROCHLORIDE 5 MG: 5 TABLET ORAL at 06:21

## 2022-08-29 RX ADMIN — ARIPIPRAZOLE 2 MG: 2 TABLET ORAL at 09:55

## 2022-08-29 RX ADMIN — METFORMIN HYDROCHLORIDE 500 MG: 500 TABLET ORAL at 18:23

## 2022-08-29 RX ADMIN — GLIPIZIDE 2.5 MG: 5 TABLET ORAL at 09:57

## 2022-08-29 RX ADMIN — METOPROLOL SUCCINATE 75 MG: 25 TABLET, EXTENDED RELEASE ORAL at 09:58

## 2022-08-29 RX ADMIN — OXYCODONE HYDROCHLORIDE 5 MG: 5 TABLET ORAL at 10:16

## 2022-08-29 RX ADMIN — LOSARTAN POTASSIUM 25 MG: 25 TABLET, FILM COATED ORAL at 22:04

## 2022-08-29 RX ADMIN — POLYETHYLENE GLYCOL 3350 17 G: 17 POWDER, FOR SOLUTION ORAL at 09:52

## 2022-08-29 RX ADMIN — PIPERACILLIN AND TAZOBACTAM 3.38 G: 3; .375 INJECTION, POWDER, LYOPHILIZED, FOR SOLUTION INTRAVENOUS at 13:58

## 2022-08-29 RX ADMIN — Medication 500 MG: at 09:56

## 2022-08-29 RX ADMIN — GABAPENTIN 100 MG: 100 CAPSULE ORAL at 16:27

## 2022-08-29 RX ADMIN — GABAPENTIN 100 MG: 100 CAPSULE ORAL at 10:04

## 2022-08-29 RX ADMIN — ATORVASTATIN CALCIUM 80 MG: 40 TABLET, FILM COATED ORAL at 09:56

## 2022-08-29 RX ADMIN — DULOXETINE HYDROCHLORIDE 120 MG: 60 CAPSULE, DELAYED RELEASE PELLETS ORAL at 09:53

## 2022-08-29 RX ADMIN — PIPERACILLIN AND TAZOBACTAM 3.38 G: 3; .375 INJECTION, POWDER, LYOPHILIZED, FOR SOLUTION INTRAVENOUS at 22:04

## 2022-08-29 RX ADMIN — METFORMIN HYDROCHLORIDE 1000 MG: 500 TABLET ORAL at 09:54

## 2022-08-29 RX ADMIN — ACETAMINOPHEN 650 MG: 325 TABLET ORAL at 10:16

## 2022-08-29 RX ADMIN — HYDROMORPHONE HYDROCHLORIDE 0.5 MG: 1 INJECTION, SOLUTION INTRAMUSCULAR; INTRAVENOUS; SUBCUTANEOUS at 12:03

## 2022-08-29 RX ADMIN — PANTOPRAZOLE SODIUM 40 MG: 40 TABLET, DELAYED RELEASE ORAL at 09:59

## 2022-08-29 ASSESSMENT — ACTIVITIES OF DAILY LIVING (ADL)
ADLS_ACUITY_SCORE: 46

## 2022-08-29 NOTE — PLAN OF CARE
Problem: Diabetes Comorbidity  Goal: Blood Glucose Level Within Targeted Range  Outcome: Ongoing, Progressing     Problem: Impaired Wound Healing  Goal: Optimal Wound Healing  Outcome: Ongoing, Progressing     Problem: Surgical Site Infection  Goal: Absence of Infection Signs and Symptoms  Intervention: Prevent or Manage Infection  Recent Flowsheet Documentation  Taken 8/28/2022 2031 by Diallo Lee, RN  Isolation Precautions: contact precautions maintained     Patient slept for majority of shift. Gave PRN oxycodone with scheduled Acetaminophen x2 overnight. Patient reports high levels of pain when asked, but does not show non-verbal signs of pain. Low blood glucose of 68 found at bedtime, Apple juice was given and patient's blood glucose was rechecked at 77 and then 102. Glucose was 98 this AM. Wound vac in place and holding negative pressure at 125 mm Hg.    Diallo Lee, RN

## 2022-08-29 NOTE — PROCEDURES
"PICC Line Insertion Procedure Note  Pt. Name: Matias Lundberg  MRN: [id number]         Procedure: Insertion of a  dual Lumen  5 fr  Bard SOLO (valved) Power PICC, Lot number [id number]    Indications: antibiotics    Contraindications : n/a    Procedure Details   Patient identified with 2 identifiers and \"Time Out\" conducted.  .     Central line insertion bundle followed: hand hygeine performed prior to procedure, site cleansed with cholraprep, hat, mask, sterile gloves,sterile gown worn, patient draped with maximum barrier head to toe drape, sterile field maintained.    The vein was assessed and found to be compressible and of adequate size. 4 ml 1% Lidocaine administered sq to the insertion site. A 5 Fr PICC was inserted into the basilic vein of the right arm with ultrasound guidance. 1 attempt(s) required to access vein.   Catheter threaded without difficulty. Good blood return noted.    Modified Seldinger Technique used for insertion.    The 8 sharps that are included in the PICC insertion kit were accounted for and disposed of in the sharps container prior to breakdown of the sterile field.    Catheter secured with Statlock, biopatch and Tegaderm dressing applied.    Findings:  Total catheter length  36 cm, with 0 cm exposed. Mid upper arm circumference is 27 cm. Catheter was flushed with 30 cc NS. Patient  tolerated procedure well.    Tip placement verified by 3CG Tip confirmation system . Tip placement in the SVC.    CLABSI prevention brochure left at bedside.    Patient's primary RN notified PICC is ready for use.    Comments:        DOV Cruz, RN  [facility]  " NAUSEA/VOMITING

## 2022-08-29 NOTE — PROGRESS NOTES
"VASCULAR SURGERY PROGRESS NOTE    Subjective:  Patient was seen and evaluated at the bedside today for surgical follow up. Notes his pain is slowly improving. Continues on IV antibiotics, underwent PICC placement this AM. Plans to return to TCU at discharge. No other concerns.     Objective:  Intake/Output Summary (Last 24 hours) at 8/29/2022 0824  Last data filed at 8/29/2022 0719  Gross per 24 hour   Intake 360 ml   Output 2650 ml   Net -2290 ml     PHYSICAL EXAM:  /64 (BP Location: Right arm)   Pulse 75   Temp 98.3  F (36.8  C) (Oral)   Resp 16   Ht 1.702 m (5' 7\")   Wt 69.5 kg (153 lb 3.2 oz)   SpO2 97%   BMI 23.99 kg/m    General: patient is alert and oriented, no acute distress  Psych: pleasant affect, answers questions appropriately  Skin: color appropriate for race, warm, dry  Respiratory: normal respiratory effort   Extremities: Wound VAC intact to right BKA      ASSESSMENT:  51 YO male with past medical history significant for hypertension, hyperlipidemia, coronary artery diease s/p CABG, atrial fibrillation on xarelto, neuropathy, history of CVA, and charcot ankle right foot. He underwent below knee amputation earlier this month which unfortunately became infected with CT showing an evolving abscess. Blood cultures obtained, MRSA bacteremia.     POD #5 irrigation and debridement of below knee amputation     POD #3 washout and partial closure of right below knee amputation with wound VAC application      PLAN:  Continue antibiotics per ID recommendations, PICC placed this morning  Wound VAC change to BKA with WOC today, change MWF  OK to resume home Xarelto and discharge back to TCU from vascular standpoint  Will follow up in clinic in 2 weeks for wound check    Lizzeth Figueroa PA-C  VASCULAR SURGERY                   "

## 2022-08-29 NOTE — PROGRESS NOTES
"[facility] Progress Note    SUBJECTIVE: Stable , picc today.   REVIEW OF SYSTEMS:  Negative unless as listed above.  Social history, Family history, Medications: reviewed.    OBJECTIVE:  /64 (BP Location: Right arm)   Pulse 75   Temp 98.3  F (36.8  C) (Oral)   Resp 16   Ht 1.702 m (5' 7\")   Wt 69.5 kg (153 lb 3.2 oz)   SpO2 97%   BMI 23.99 kg/m                PHYSICAL EXAM:  Alert, awake  Vitals tabulated above, reviewed  Neck supple without lymphadenopathy  Sclera normal color, not injected  CARDIOVASCULAR regular rate and rhythm, no murmur  Lungs CLEAR TO AUSCULTATION   Abdomen soft, NT/ND, absent HEPATOSPLENOMEGALY  Skin normal  Joints normal  Neurologic exam non focal  See vascular for wound exam    Antibiotics:vanco and zosyn    Pertinent labs:  Lab Results   Component Value Date    WBC 4.6 08/26/2022    WBC 5.0 08/25/2016     Lab Results   Component Value Date    RBC 3.18 08/26/2022    RBC 3.49 08/25/2016     Lab Results   Component Value Date    HGB 8.2 08/26/2022    HGB 10.1 08/25/2016     Lab Results   Component Value Date    HCT 27.3 08/26/2022    HCT 31.4 08/25/2016     No components found for: MCT  Lab Results   Component Value Date    MCV 86 08/26/2022    MCV 90 08/25/2016     Lab Results   Component Value Date    MCH 25.8 08/26/2022    MCH 28.7 08/25/2016     Lab Results   Component Value Date    MCHC 30.0 08/26/2022    MCHC 31.8 08/25/2016     Lab Results   Component Value Date    RDW 14.6 08/26/2022    RDW 13.6 08/25/2016     Lab Results   Component Value Date     08/26/2022     08/25/2016       Last Comprehensive Metabolic Panel:  Sodium   Date Value Ref Range Status   08/29/2022 140 136 - 145 mmol/L Final   08/25/2016 139 mmol/L Final     Potassium   Date Value Ref Range Status   08/29/2022 4.3 3.5 - 5.0 mmol/L Final   08/25/2016 4.3 mmol/L Final     Chloride   Date Value Ref Range Status   08/29/2022 109 (H) 98 - 107 mmol/L Final   08/25/2016 107 mmol/L " Final     Carbon Dioxide (CO2)   Date Value Ref Range Status   08/29/2022 25 22 - 31 mmol/L Final     Anion Gap   Date Value Ref Range Status   08/29/2022 6 5 - 18 mmol/L Final   08/25/2016 8 mmol/L Final     Glucose   Date Value Ref Range Status   08/29/2022 94 70 - 125 mg/dL Final   08/25/2016 84 70 - 99 mg/dL Final     GLUCOSE BY METER POCT   Date Value Ref Range Status   08/29/2022 98 70 - 99 mg/dL Final     Urea Nitrogen   Date Value Ref Range Status   08/29/2022 12 8 - 22 mg/dL Final   08/25/2016 21 mg/dL Final     Creatinine   Date Value Ref Range Status   08/29/2022 1.04 0.70 - 1.30 mg/dL Final   08/25/2016 0.85 mg/dL Final     GFR Estimate   Date Value Ref Range Status   08/29/2022 85 >60 mL/min/1.73m2 Final     Comment:     Effective December 21, 2021 eGFRcr in adults is calculated using the 2021 CKD-EPI creatinine equation which includes age and gender (Stephen et al., NEJ, DOI: 10.1056/XTICxg0667440)   04/29/2019 >60 >60 mL/min/1.73m2 Final     Calcium   Date Value Ref Range Status   08/29/2022 9.1 8.5 - 10.5 mg/dL Final   08/25/2016 9.9 mg/dL Final       Liver Function Studies -   Recent Labs   Lab Test 08/24/22  0611   PROTTOTAL 6.8   ALBUMIN 2.8*   BILITOTAL 0.3   ALKPHOS 61   AST 13   ALT 11       No results found for: SED    CRP   Date Value Ref Range Status   08/24/2022 18.9 (H) 0.0 - <0.8 mg/dL Final               MICROBIOLOGY DATA:  One of two BC aug 23 MRSA  Knee swab MRSA    One of two BC aug 25 GPC chains (salivarius)  BC 26th pending  IMAGING/RADIOLOGY:    TTE reviewed      ASSESSMENT:  SAB source very likely amp site  aug 25th strep in blood is likely oral contam--strep salivarius  Post wound debride, closure doing good    RECOMMENDATION:  picc today  28 days IV vanco post clearance of blood, abx to sept 23rd.   D/W Natanael  See Discharge orders      ANT Mei MD  Office [phone] option 2 to desk staff

## 2022-08-29 NOTE — PLAN OF CARE
Problem: Plan of Care - These are the overarching goals to be used throughout the patient stay.    Goal: Plan of Care Review/Shift Note  Description: The Plan of Care Review/Shift note should be completed every shift.  The Outcome Evaluation is a brief statement about your assessment that the patient is improving, declining, or no change.  This information will be displayed automatically on your shift note.  Outcome: Ongoing, Progressing  Flowsheets (Taken 8/29/2022 1316)  Plan of Care Reviewed With: patient  Goal: Absence of Hospital-Acquired Illness or Injury  Intervention: Identify and Manage Fall Risk  Recent Flowsheet Documentation  Taken 8/29/2022 0921 by Barbi Gross RN  Safety Promotion/Fall Prevention:   activity supervised   bed alarm on   nonskid shoes/slippers when out of bed   patient and family education  Intervention: Prevent Skin Injury  Recent Flowsheet Documentation  Taken 8/29/2022 0921 by Barbi Gross RN  Body Position:   log-rolled   lower extremity elevated   weight shifting  Intervention: Prevent and Manage VTE (Venous Thromboembolism) Risk  Recent Flowsheet Documentation  Taken 8/29/2022 0921 by Barbi Gross RN  Activity Management:   activity adjusted per tolerance   activity encouraged  Goal: Optimal Comfort and Wellbeing  Intervention: Monitor Pain and Promote Comfort  Recent Flowsheet Documentation  Taken 8/29/2022 1203 by Barbi Gross RN  Pain Management Interventions: medication (see MAR)  Taken 8/29/2022 1016 by Barbi Gross RN  Pain Management Interventions: medication (see MAR)     Problem: Pain Chronic (Persistent)  Goal: Acceptable Pain Control and Functional Ability  Outcome: Ongoing, Progressing  Intervention: Develop Pain Management Plan  Recent Flowsheet Documentation  Taken 8/29/2022 1203 by Barbi Gross RN  Pain Management Interventions: medication (see MAR)  Taken 8/29/2022 1016 by Barbi Gross RN  Pain Management Interventions:  medication (see MAR)  Intervention: Manage Persistent Pain  Recent Flowsheet Documentation  Taken 8/29/2022 0921 by Barbi Gross RN  Medication Review/Management: medications reviewed     Problem: Impaired Wound Healing  Goal: Optimal Wound Healing  Intervention: Promote Wound Healing  Recent Flowsheet Documentation  Taken 8/29/2022 1203 by Barbi Gross RN  Pain Management Interventions: medication (see MAR)  Taken 8/29/2022 1016 by Barbi Gross RN  Pain Management Interventions: medication (see MAR)  Taken 8/29/2022 0921 by Barbi Gross RN  Activity Management:   activity adjusted per tolerance   activity encouraged   Goal Outcome Evaluation:    Plan of Care Reviewed With: patient     Patient reporting pain not controlled with oxycodone.  Dilaudid given x1 patient reported helped pain level.  MD changed to norco 1-2 tablets but cannot have at this time as tylenol was given.  Discussed with patient to trial  with pain management later in the shift.    WOC rn changing wound vac today.  Site CDI.    Patient bedrest with commode only.  Did not get up.    PICC in place.  Will discharge to facility with IV antibiotics.

## 2022-08-29 NOTE — CONSULTS
[facility]  WO Nurse Inpatient Assessment     Consulted for: RIGHT LEG BKA wound vac change     Patient History (according to provider note(s):      Brief Clinical Hx:  This is a 51-year-old diabetic gentleman with a Charcot foot who at this point has a limb with advanced wounds not likely to be salvaged for long-term and even less likely to be useful as a limb for walking.  In this context patient has chosen for below-knee amputation.  Presents today for this operation.  Despite having [place] the foot the wound actually quite clean without purulence.  Planned in this context for single-stage operation recognizing at least a 10% risk of wound infection.    Areas Assessed:      Areas visualized during today's visit: Focused: right BKA    Negative pressure wound therapy applied to:  RIGHT BKA                                                                                       Medial aspect     Last photo: 8/29 today   Wound due to: amputation revision   Wound history/plan of care:      Surgical date:    8/23/2022 - 8/26/2022    Date Negative Pressure Wound Therapy initiated: 8/26    Interventions in place: elevation    Is patient s nutritional status compromised? no   a. If yes, what interventions are in place? N/A    Reason for initiating vac therapy? Presence of co-morbidities, High risk of infections and Need for accelerated granulation tissue    Which?of?the?following?co-morbidities?apply? Diabetes  a. If diabetic is patient on a diabetic management program? N/A     Is osteomyelitis present in wound?  Not aware  a.  If yes what treatments are in place? IV antibiotic vancomycin    Wound base: mostly yellow     Palpation of the wound bed: wnl     Drainage: small      Description of drainage: bloody      Measurements (length x width x depth, in cm) 1.5 cm x 50 cm     Tunneling: not found      Undermining not found   Periwound skin: with one yellow not healthy area       Color: normal and  consistent with surrounding tissue       Temperature: normal    Odor: none   Pain: no grimacing, did not complained   Pain intervention prior to dressing change: N/A  Treatment goal: Heal   STATUS: initial assessment   Supplies ordered: gathered    Number of foam pieces removed from a wound (excluding foam for bridge) : one black  Verified this matched the number of foam pieces applied last dressing change: N/A   Number of foam pieces packed into wound (excluding foam for bridge) : one black          Treatment Plan:     - 125 mm hg     Orders: Reviewed    RECOMMEND PRIMARY TEAM ORDER: None, at this time  Education provided: plan of care  Discussed plan of care with: Patient  WOC nurse follow-up plan: Monday/WednesdayFriday  Notify WOC if wound(s) deteriorate.  Nursing to notify the Provider(s) and re-consult the WOC Nurse if new skin concern.    DATA:     Current support surface: Standard  Foam mattress  BMI: Body mass index is 23.99 kg/m .   Active diet order: Orders Placed This Encounter      Advance Diet as Tolerated: Regular Diet Adult; Moderate Consistent Carb (60 g CHO per Meal) Diet     Output: I/O last 3 completed shifts:  In: 480 [P.O.:480]  Out: 3050 [Urine:3050]     Labs: Recent Labs   Lab 08/29/22  0613 08/27/22  0739 08/26/22  0815 08/24/22  0833 08/24/22  0611   ALBUMIN  --   --   --   --  2.8*   HGB 8.6*   < > 8.2*   < > 8.9*   INR 1.13   < >  --    < > 1.25*   WBC 4.0   < > 4.6   < > 6.5   A1C  --   --  5.9*  --   --    CRP  --   --   --   --  18.9*    < > = values in this interval not displayed.     Pressure injury risk assessment:   Sensory Perception: 3-->slightly limited  Moisture: 3-->occasionally moist  Activity: 2-->chairfast  Mobility: 3-->slightly limited  Nutrition: 3-->adequate  Friction and Shear: 2-->potential problem  En Score: 16    Corina Dodd RN, CWON

## 2022-08-29 NOTE — DISCHARGE INSTRUCTIONS
"Negative pressure wound therapy plan:  Wound location: ***   Change Days: {[facility] Wound vac frequency:147212} by [facility] RN    Supplies (including all accessories) used: {SMALL MEDIUM LARGE:317113} {NPWT supplies:559709}  Cleanse with {wound cleanser:479700} prior to replacing VAC    Suction setting: {[facility] wound vac pump settings:374313}   Methods used: {NPWT methods:713586}     WOUND CARE  RIGHT BKA    Staff RN to assess integrity of dressing and ensure suction is set at appropriate level every shift.   Date canister. Chart canister output every shift. Change cannister weekly and PRN if full/occluded     Remove foam dressing and replace with BID normal saline moist gauze dressing if:   -a dressing failure which cannot be repaired within 2 hours   -patient is discharging to home without a home pump   -patient is discharging to a facility outside the local area   -if a dressing is a \"Silver Foam\", remove before Radiation Therapy or MRI        The hospital VAC pump is not to be discharged with the patient.?Ensure to disconnect patient from machine prior to discharge. Then,    - If a home KCI VAC pump has been delivered, connect home cannister to dressing tubing then connect cannister to home pump and turn on machine    - If transferring to a nearby facility with a KCI vac, can disconnect and clamp tubing then cover end with glove so can be reconnected within 2 hours     "

## 2022-08-29 NOTE — PROGRESS NOTES
Care Management Follow Up    Length of Stay (days): 6    Expected Discharge Date: 08/30/2022     Concerns to be Addressed: discharge planning      Patient plan of care discussed at interdisciplinary rounds: Yes    Anticipated Discharge Disposition: [facility] (TCU)      Anticipated Discharge Services: none   Anticipated Discharge DME: per treatment team     Patient/family educated on Medicare website which has current facility and service quality ratings: yes   Education Provided on the Discharge Plan: yes   Patient/Family in Agreement with the Plan: yes     Additional Information:  3:38 PM  SW called and left  with [facility] requesting a call back to [phone]. Pt will return to [facility] when medically ready for discharge. CM to follow.    SHELDON Perry

## 2022-08-30 VITALS
SYSTOLIC BLOOD PRESSURE: 123 MMHG | OXYGEN SATURATION: 94 % | WEIGHT: 143.3 LBS | RESPIRATION RATE: 20 BRPM | DIASTOLIC BLOOD PRESSURE: 66 MMHG | HEART RATE: 82 BPM | BODY MASS INDEX: 22.49 KG/M2 | HEIGHT: 67 IN | TEMPERATURE: 97.9 F

## 2022-08-30 LAB
BACTERIA BLD CULT: NO GROWTH
GLUCOSE BLDC GLUCOMTR-MCNC: 124 MG/DL (ref 70–99)
GLUCOSE BLDC GLUCOMTR-MCNC: 83 MG/DL (ref 70–99)
MAGNESIUM SERPL-MCNC: 1.5 MG/DL (ref 1.8–2.6)
PLATELET # BLD AUTO: 280 10E3/UL (ref 150–450)
POTASSIUM BLD-SCNC: 4.4 MMOL/L (ref 3.5–5)
SARS-COV-2 RNA RESP QL NAA+PROBE: NEGATIVE

## 2022-08-30 PROCEDURE — 258N000003 HC RX IP 258 OP 636: Performed by: INTERNAL MEDICINE

## 2022-08-30 PROCEDURE — 250N000013 HC RX MED GY IP 250 OP 250 PS 637: Performed by: PHYSICIAN ASSISTANT

## 2022-08-30 PROCEDURE — 85049 AUTOMATED PLATELET COUNT: CPT | Performed by: PHYSICIAN ASSISTANT

## 2022-08-30 PROCEDURE — 250N000011 HC RX IP 250 OP 636: Performed by: INTERNAL MEDICINE

## 2022-08-30 PROCEDURE — 83735 ASSAY OF MAGNESIUM: CPT | Performed by: INTERNAL MEDICINE

## 2022-08-30 PROCEDURE — 84132 ASSAY OF SERUM POTASSIUM: CPT | Performed by: INTERNAL MEDICINE

## 2022-08-30 PROCEDURE — U0005 INFEC AGEN DETEC AMPLI PROBE: HCPCS | Performed by: INTERNAL MEDICINE

## 2022-08-30 PROCEDURE — 250N000013 HC RX MED GY IP 250 OP 250 PS 637: Performed by: INTERNAL MEDICINE

## 2022-08-30 PROCEDURE — 99232 SBSQ HOSP IP/OBS MODERATE 35: CPT | Performed by: INTERNAL MEDICINE

## 2022-08-30 RX ORDER — MAGNESIUM SULFATE 4 G/50ML
4 INJECTION INTRAVENOUS ONCE
Status: COMPLETED | OUTPATIENT
Start: 2022-08-30 | End: 2022-08-30

## 2022-08-30 RX ORDER — HYDROCODONE BITARTRATE AND ACETAMINOPHEN 5; 325 MG/1; MG/1
1-2 TABLET ORAL EVERY 6 HOURS PRN
Qty: 12 TABLET | Refills: 0 | Status: SHIPPED | OUTPATIENT
Start: 2022-08-30

## 2022-08-30 RX ADMIN — MAGNESIUM SULFATE HEPTAHYDRATE 4 G: 80 INJECTION, SOLUTION INTRAVENOUS at 08:10

## 2022-08-30 RX ADMIN — POLYETHYLENE GLYCOL 3350 17 G: 17 POWDER, FOR SOLUTION ORAL at 08:14

## 2022-08-30 RX ADMIN — HYDROCODONE BITARTRATE AND ACETAMINOPHEN 2 TABLET: 5; 325 TABLET ORAL at 01:07

## 2022-08-30 RX ADMIN — METOPROLOL SUCCINATE 75 MG: 25 TABLET, EXTENDED RELEASE ORAL at 08:12

## 2022-08-30 RX ADMIN — HYDROCODONE BITARTRATE AND ACETAMINOPHEN 2 TABLET: 5; 325 TABLET ORAL at 08:12

## 2022-08-30 RX ADMIN — HYDROCODONE BITARTRATE AND ACETAMINOPHEN 2 TABLET: 5; 325 TABLET ORAL at 12:58

## 2022-08-30 RX ADMIN — VANCOMYCIN HYDROCHLORIDE 1500 MG: 5 INJECTION, POWDER, LYOPHILIZED, FOR SOLUTION INTRAVENOUS at 12:59

## 2022-08-30 RX ADMIN — ATORVASTATIN CALCIUM 80 MG: 40 TABLET, FILM COATED ORAL at 08:11

## 2022-08-30 RX ADMIN — Medication 800 MG: at 08:11

## 2022-08-30 RX ADMIN — SENNOSIDES AND DOCUSATE SODIUM 1 TABLET: 8.6; 5 TABLET ORAL at 08:13

## 2022-08-30 RX ADMIN — GLIPIZIDE 2.5 MG: 5 TABLET ORAL at 08:14

## 2022-08-30 RX ADMIN — RIVAROXABAN 20 MG: 10 TABLET, FILM COATED ORAL at 08:16

## 2022-08-30 RX ADMIN — FENOFIBRATE 160 MG: 160 TABLET, FILM COATED ORAL at 08:17

## 2022-08-30 RX ADMIN — CLOPIDOGREL BISULFATE 75 MG: 75 TABLET ORAL at 08:13

## 2022-08-30 RX ADMIN — PANTOPRAZOLE SODIUM 40 MG: 40 TABLET, DELAYED RELEASE ORAL at 08:12

## 2022-08-30 RX ADMIN — METFORMIN HYDROCHLORIDE 1000 MG: 500 TABLET ORAL at 08:17

## 2022-08-30 RX ADMIN — ARIPIPRAZOLE 2 MG: 2 TABLET ORAL at 08:14

## 2022-08-30 RX ADMIN — Medication 500 MG: at 08:15

## 2022-08-30 RX ADMIN — GABAPENTIN 100 MG: 100 CAPSULE ORAL at 08:14

## 2022-08-30 RX ADMIN — DULOXETINE HYDROCHLORIDE 120 MG: 60 CAPSULE, DELAYED RELEASE PELLETS ORAL at 08:15

## 2022-08-30 RX ADMIN — FERROUS SULFATE TAB 325 MG (65 MG ELEMENTAL FE) 325 MG: 325 (65 FE) TAB at 08:16

## 2022-08-30 RX ADMIN — CHOLESTYRAMINE POWDER FOR SUSPENSION 4 G: 4 POWDER, FOR SUSPENSION ORAL at 08:18

## 2022-08-30 RX ADMIN — PIPERACILLIN AND TAZOBACTAM 3.38 G: 3; .375 INJECTION, POWDER, LYOPHILIZED, FOR SOLUTION INTRAVENOUS at 05:37

## 2022-08-30 ASSESSMENT — ACTIVITIES OF DAILY LIVING (ADL)
ADLS_ACUITY_SCORE: 46

## 2022-08-30 NOTE — PROGRESS NOTES
[facility]    PROGRESS NOTE - Hospitalist Service    Assessment and Plan    Principal Problem:    Cellulitis and abscess of leg  Active Problems:    Type 2 diabetes mellitus with hyperglycemia (H)    Mild intellectual disabilities    Essential hypertension    Anemia    Wound infection    MRSA bacteremia    Hypomagnesemia    Cm Lundberg is a 52 year old male with h/o CAD, DM Type II, mild intellectual disabilities, essential hypertension, anemia, GERD, weakness, coronary arthrosclerosis, diabetic ulcer of right midfoot, and s/p amputate leg knee (right), who presents for evaluation of a wound check.    Infected right BKA stump.   - CT right leg showed evolving abscess.  Patient not septic.   -  Blood cultures obtained.    - On vancomycin and Zosyn.  S/p I&D 8/24, culture sent.  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 Valderrama.       #MRSA bacteremia, likely due to right BKA stump abscess.  Positive BC on 8/23, 8/25.  Surveillance BC obtained 8/26, 8/27, 8/28.    PICC on 8/29  Plan for 28 days of IV vancomycin post clearance of blood, till September 23.  - ID orders written     #Acute renal failure, creat peaked at 1.47 on 8/24, was 1.04 on admission.  Likely due to sepsis and dehydration, given rising BUN.  With IVF renal function normalized.     #Chronic normocytic anemia, hemoglobin baseline 8-9.  History of iron deficiency.  Iron checked low at 16 on 8/25/2022.    Replacing with Venofer 2 doses on 8/27 and 8/28, then p.o. replacement.     #Acute anemia, hemoglobin 6.4 on 8/25.  Reported intraoperative EBL 10 mL.  No postop bleeding.  S/p 1 unit PRBCs 8/25.HgB in upper 8's.     #CVD, history of stroke.  On Plavix, statin.     #Atrial fibrillation.  Was on Xarelto, held preoperatively, resumed      #Coronary artery disease, status post CABG and stents.  On Plavix, beta-blockers, fibrate.     #Non-insulin-dependent diabetes mellitus.  Well-controlled with Glucophage and  glipizide.  Initially held due to RANDAL.  Resumed now.  - stop ss     #Mood disorder:general anxiety and depression, stable on Abilify, Cymbalta, Ativan, Remeron, trazodone.     Essential hypertension.  BP controlled on losartan and metoprolol.  Peripheral neuropathy, due to diabetes.     COVID-19 PCR Results    COVID-19 PCR Results 2/1/22 2/4/22 2/8/22 2/11/22 2/15/22 3/31/22 6/26/22 8/4/22 8/9/22 8/23/22 8/23/22             6052 2999   COVID-19 Virus by PCR (External Result)              SARS CoV2 PCR  Negative  Negative Negative Negative Negative Negative Negative Negative Negative   COVID-19 Virus PCR - Result NOT DETECTED  NOT DETECTED              Comments are available for some flowsheets but are not being displayed.         COVID-19 Antibody Results, Testing for Immunity    COVID-19 Antibody Results, Testing for Immunity   No data to display.              DIET: Orders Placed This Encounter      Advance Diet as Tolerated: Regular Diet Adult; Moderate Consistent Carb (60 g CHO per Meal) Diet      Diet  Code Status: Full Code    Subjective:  discharge to TCU today orders already in place and discharge summary written    PHYSICAL EXAM  Temp:  [97.9  F (36.6  C)] 97.9  F (36.6  C)  Pulse:  [75-82] 82  Resp:  [18-20] 20  BP: (123-133)/(59-66) 123/66  SpO2:  [94 %-98 %] 94 %  Wt Readings from Last 1 Encounters:   08/30/22 65 kg (143 lb 4.8 oz)       Intake/Output Summary (Last 24 hours) at 8/30/2022 1003  Last data filed at 8/30/2022 0952  Gross per 24 hour   Intake 1164 ml   Output 2450 ml   Net -1286 ml      Body mass index is 22.44 kg/m .      PERTINENT LABS/IMAGING:  Results for orders placed or performed during the hospital encounter of 08/23/22   CT Knee Right w Contrast    Impression    IMPRESSION:  1.  Irregular intermediate density fluid collection adjacent to the fibular stump extending from the distal end of the distal stump to the skin could represent abscess or subacute, potentially infected  hematoma.  2.  Possible additional but less well-defined fluid collection adjacent to the tibial stump.  3.  Subcutaneous soft tissue stranding could represent cellulitis.  4.  No CT evidence of osteomyelitis. MRI would be more sensitive.     Echocardiogram Complete   Result Value Ref Range    LVEF  40-45%        Recent Labs   Lab 08/30/22  0821 08/30/22  0533 08/29/22  2111 08/29/22  1720 08/29/22  0747 08/29/22  0613 08/28/22  0808 08/28/22  0622 08/27/22  0816 08/27/22  0739 08/26/22  0827 08/26/22  0815 08/25/22  0652 08/25/22  0605 08/24/22  0825 08/24/22  0611   WBC  --   --   --   --   --  4.0  --   --   --  5.3  --  4.6   < > 6.0   < > 6.5   HGB  --   --   --   --   --  8.6*  --   --   --  8.8*  --  8.2*   < > 6.4*   < > 8.9*   MCV  --   --   --   --   --  85  --   --   --  86  --  86   < > 84   < > 83   PLT  --  280  --   --   --  264  --   --   --  296  --  251   < > 249   < > 323   INR  --   --   --   --   --  1.13  --   --   --  1.12  --   --   --  1.22*  --  1.25*   NA  --   --   --   --   --  140  --   --   --   --   --  139  --  137  --  136   POTASSIUM  --  4.4  --   --   --  4.3  --  4.3  --  4.3  --  4.4  --  4.3   < > 4.7   CHLORIDE  --   --   --   --   --  109*  --   --   --   --   --  110*  --  108*  --  103   CO2  --   --   --   --   --  25  --   --   --   --   --  25  --  25  --  25   BUN  --   --   --   --   --  12  --   --   --   --   --  16  --  20  --  26*   CR  --   --   --   --   --  1.04  --  0.97  --  0.99  --  1.00  --  1.09   < > 1.47*   ANIONGAP  --   --   --   --   --  6  --   --   --   --   --  4*  --  4*  --  8   TO  --   --   --   --   --  9.1  --   --   --   --   --  8.7  --  8.6  --  10.0   *  --  147* 70   < > 94   < >  --    < >  --    < > 102   < > 155*   < > 142*   ALBUMIN  --   --   --   --   --   --   --   --   --   --   --   --   --   --   --  2.8*   PROTTOTAL  --   --   --   --   --   --   --   --   --   --   --   --   --   --   --  6.8   BILITOTAL  --   --    --   --   --   --   --   --   --   --   --   --   --   --   --  0.3   ALKPHOS  --   --   --   --   --   --   --   --   --   --   --   --   --   --   --  61   ALT  --   --   --   --   --   --   --   --   --   --   --   --   --   --   --  11   AST  --   --   --   --   --   --   --   --   --   --   --   --   --   --   --  13    < > = values in this interval not displayed.     Recent Labs   Lab Test 08/26/22 0815   CHOL 82   HDL 22*   LDL 35   TRIG 126     Recent Labs   Lab Test 08/26/22 0815 06/23/16  0500 04/06/16  0510   LDL 35 61 48     Recent Labs   Lab Test 08/30/22 0821 08/30/22  0533 08/29/22  0747 08/29/22  0613   NA  --   --   --  140   POTASSIUM  --  4.4  --  4.3   CHLORIDE  --   --   --  109*   CO2  --   --   --  25   *  --    < > 94   BUN  --   --   --  12   CR  --   --   --  1.04   GFRESTIMATED  --   --   --  85   TO  --   --   --  9.1    < > = values in this interval not displayed.     Recent Labs   Lab Test 08/26/22 0815 08/23/22  1300 05/16/22  0520   A1C 5.9* 6.3* 5.8*     Recent Labs   Lab Test 08/29/22 0613 08/27/22  0739 08/26/22  0815   HGB 8.6* 8.8* 8.2*     No results for input(s): TROPONINI in the last 31175 hours.  No results for input(s): BNP, NTBNPI, NTBNP in the last 05259 hours.  No results for input(s): TSH in the last 43461 hours.  Recent Labs   Lab Test 08/29/22 0613 08/27/22  0739 08/25/22  0605   INR 1.13 1.12 1.22*       Nuzhat Rayo MD  [facility] Medicine Service  [phone]

## 2022-08-30 NOTE — PLAN OF CARE
Problem: Plan of Care - These are the overarching goals to be used throughout the patient stay.    Goal: Plan of Care Review/Shift Note  Description: The Plan of Care Review/Shift note should be completed every shift.  The Outcome Evaluation is a brief statement about your assessment that the patient is improving, declining, or no change.  This information will be displayed automatically on your shift note.  Outcome: Ongoing, Progressing  Flowsheets (Taken 8/30/2022 1425)  Plan of Care Reviewed With: patient  Goal: Absence of Hospital-Acquired Illness or Injury  Intervention: Identify and Manage Fall Risk  Recent Flowsheet Documentation  Taken 8/30/2022 1300 by Barbi Gross RN  Safety Promotion/Fall Prevention:   activity supervised   bed alarm on   nonskid shoes/slippers when out of bed   patient and family education  Taken 8/30/2022 0900 by Barbi Gross RN  Safety Promotion/Fall Prevention:   activity supervised   bed alarm on   nonskid shoes/slippers when out of bed   patient and family education  Intervention: Prevent Skin Injury  Recent Flowsheet Documentation  Taken 8/30/2022 1300 by Barbi Gross RN  Body Position:   log-rolled   lower extremity elevated   weight shifting  Taken 8/30/2022 0900 by Barbi Gross RN  Body Position:   log-rolled   lower extremity elevated   weight shifting  Intervention: Prevent and Manage VTE (Venous Thromboembolism) Risk  Recent Flowsheet Documentation  Taken 8/30/2022 1300 by Barbi Gross RN  Activity Management:   activity adjusted per tolerance   activity encouraged  Taken 8/30/2022 0900 by Barbi Gross RN  Activity Management:   activity adjusted per tolerance   activity encouraged  Goal: Optimal Comfort and Wellbeing  Intervention: Monitor Pain and Promote Comfort  Recent Flowsheet Documentation  Taken 8/30/2022 1300 by Barbi Gross RN  Pain Management Interventions: medication (see MAR)  Taken 8/30/2022 1100 by Barbi Gross RN  Pain  Management Interventions: declines  Taken 8/30/2022 0812 by Barbi Gross RN  Pain Management Interventions: medication (see MAR)     Problem: Fall Injury Risk  Goal: Absence of Fall and Fall-Related Injury  Intervention: Identify and Manage Contributors  Recent Flowsheet Documentation  Taken 8/30/2022 1300 by Barbi Gross RN  Medication Review/Management: medications reviewed  Taken 8/30/2022 0900 by Barbi Gross RN  Medication Review/Management: medications reviewed  Intervention: Promote Injury-Free Environment  Recent Flowsheet Documentation  Taken 8/30/2022 1300 by Barbi Gross RN  Safety Promotion/Fall Prevention:   activity supervised   bed alarm on   nonskid shoes/slippers when out of bed   patient and family education  Taken 8/30/2022 0900 by Barbi Gross RN  Safety Promotion/Fall Prevention:   activity supervised   bed alarm on   nonskid shoes/slippers when out of bed   patient and family education     Problem: Infection  Goal: Absence of Infection Signs and Symptoms  Outcome: Ongoing, Progressing  Intervention: Prevent or Manage Infection  Recent Flowsheet Documentation  Taken 8/30/2022 1300 by Barbi Gross RN  Isolation Precautions: contact precautions maintained  Taken 8/30/2022 0900 by Barbi Gross RN  Isolation Precautions: contact precautions maintained   Goal Outcome Evaluation:    Plan of Care Reviewed With: patient     Patient discharging back to care center.  [facility] stated to disconnect wound vac and cap to send patient.  Patient will be attached to pump at the care center when he arrives.      Patient reporting pain controlled well with norco.  Patient medicated at 1 pm to help with pain control with transportation.     Patient verbalized no concerns at this time.  Discharge per wheelchair transport at 1530.  PICC line will stay in place as patient will be getting infusions at care center.

## 2022-08-30 NOTE — PROGRESS NOTES
"VASCULAR SURGERY PROGRESS NOTE    Subjective:  Patient without any acute events overnight.  PICC line placed yesterday to continue on antibiotics.  Wound VAC changed, wound appears healthy.  Pain controlled no complaints today.    Objective:  Intake/Output Summary (Last 24 hours) at 8/30/2022 0750  Last data filed at 8/30/2022 0600  Gross per 24 hour   Intake 924 ml   Output 2850 ml   Net -1926 ml     PHYSICAL EXAM:  /66 (BP Location: Left arm)   Pulse 82   Temp 97.9  F (36.6  C) (Oral)   Resp 20   Ht 1.702 m (5' 7\")   Wt 65 kg (143 lb 4.8 oz)   SpO2 94%   BMI 22.44 kg/m    General: The patient is alert and oriented. Appropriate. No acute distress  Psych: pleasant affect, answers questions appropriately  Skin: Color appropriate for race, warm, dry.  Respiratory: The patient does not require supplemental oxygen. Breathing unlabored  GI:  Abdomen soft, nontender to light palpation.  Extremities: RLE with wound vac and sutures in place, no erythema, skin appears healthy and viable.       ASSESSMENT:  53 YO male with past medical history significant for hypertension, hyperlipidemia, coronary artery diease s/p CABG, atrial fibrillation on xarelto, neuropathy, history of CVA, and charcot ankle right foot. He underwent below knee amputation earlier this month which unfortunately became infected with CT showing an evolving abscess. Blood cultures obtained, MRSA bacteremia.     POD #6 irrigation and debridement of below knee amputation      POD #4 washout and partial closure of right below knee amputation with wound VAC application      PLAN:  Continue antibiotics per ID recommendations, PICC placed yesterday for continued vanc  Wound VAC change to BKA with WOC today, change MWF  OK to resume home Xarelto and discharge back to TCU from vascular standpoint  Will follow up in clinic in 2 weeks for wound check      Kimi Jaimes, DO  VASCULAR SURGERY                 "

## 2022-08-30 NOTE — PLAN OF CARE
Problem: Infection  Goal: Absence of Infection Signs and Symptoms  Outcome: Ongoing, Progressing  Intervention: Prevent or Manage Infection  Recent Flowsheet Documentation  Taken 8/29/2022 1628 by Kathy Nguyen RN  Isolation Precautions: contact precautions maintained     Problem: Surgical Site Infection  Goal: Absence of Infection Signs and Symptoms  Outcome: Ongoing, Progressing  Intervention: Prevent or Manage Infection  Recent Flowsheet Documentation  Taken 8/29/2022 1628 by Kathy Nguyen RN  Isolation Precautions: contact precautions maintained     Problem: Diabetes Comorbidity  Goal: Blood Glucose Level Within Targeted Range  Outcome: Ongoing, Progressing     Problem: Pain Chronic (Persistent) (Comorbidity Management)  Goal: Acceptable Pain Control and Functional Ability  Outcome: Ongoing, Progressing  Intervention: Manage Persistent Pain  Recent Flowsheet Documentation  Taken 8/29/2022 1628 by Kathy Nguyen RN  Medication Review/Management: medications reviewed   Goal Outcome Evaluation:        Pt AxO x4, VSS on room air, denies pain, pt bedfast amputated right leg on wound vac CDI, IV antibiotics tolerated, bedfast I/O adequate, external catheter suctioning, PICC line CDI, Mg 1.7/K 4.3 recheck AM.

## 2022-08-30 NOTE — PROGRESS NOTES
Care Management Discharge Note    Discharge Date: 08/30/2022       Discharge Disposition: Transitional Care (returning to [facility] (TCU))    Discharge Services: None    Discharge DME: None    Discharge Transportation: [facility] wheelchair transport at 3:30 PM    Private pay costs discussed: transportation costs    Education Provided on the Discharge Plan: yes   Persons Notified of Discharge Plans: patient, guardian  Patient/Family in Agreement with the Plan: yes    Handoff Referral Completed: Yes    Additional Information:  7:59 AM  NEELAM called and left  with [facility] [phone] requesting a call back to discuss Pt discharging and returning to facility today.     9:37 AM  NEELAM received a phone call from Lauren at [facility] who stated that the facility can accept Pt any time this afternoon. Lauren states Pt will need a negative Covid test prior to returning to facility. NEELAM updated Hospitalist. NEELAM called Pt's mother Jewels [phone] and confirmed Pt will need transport to facility.     NEELAM contacted Pt's bedside nurse to confirm Pt would be able to have wheelchair transport. RN asked what the plan would be for Pt's wound vac. NEELAM contacted RNCM who called [facility]. [facility] said hospital could cap the wound for transport and facility would have their own wound vac upon arrival. [facility] wheelchair transport scheduled for 3:30 PM. NEELAM updated care team and Lauren at [facility] with discharge timing. NEELAM called and left  with [facility] [phone] requesting a call back to provide an update on Pt's discharge. Orders sent. No PAS needed as Pt is returning to TCU.      Per 8/25 RNCM note:  Social history:  [facility] after a BKA on his 8/5/2022. Patient is a resident of [facility] ([facility]) but the nurse there notes that patient has an apartment there but he has been hospitalized/at SNF since December 17th of 2021. She had just  assessed him for a return and completed his care plan for anticipated return but he was hospitalized instead. She notes that he needs assistance with medication administration, meals, and is assist of one with: bathing, dressing, grooming and transfers. He also received assist of one with toileting (dependent on his mobility on the day). They also provide housekeeping and laundry services. He can return to the assisted living once his wounds can be managed by a home care nurse three times a week or less. They cannot provide IV medication administration. She suspects that he will need to return to transitional care after hospital stay but would like to be kept up-to-date with the plan. Her number is [phone].     SHELDON Perry

## 2022-08-30 NOTE — PLAN OF CARE
Problem: Plan of Care - These are the overarching goals to be used throughout the patient stay.    Goal: Optimal Comfort and Wellbeing  Outcome: Ongoing, Progressing     Problem: Plan of Care - These are the overarching goals to be used throughout the patient stay.    Goal: Readiness for Transition of Care  Outcome: Ongoing, Progressing     Problem: Pain Chronic (Persistent)  Goal: Acceptable Pain Control and Functional Ability  Outcome: Ongoing, Progressing     Patient is A&Ox 4. He has no c/o numbness and tingling in any extremity. He is an assist of 1 for all cares and transfers. He is continent of bowel and has an external catheter in place. LBM 8/29/22 (per patient). Patient refused NOC shift vitals. Patient had c/o 9/10 RBKA pain and received PRN Norco x 1. Norco effective for pain. Heart is WDL. LS are clear bilaterally. Pt. denies all shortness of breath, chest pain, dizziness, and nausea. BS active in A4Q. Skin intact except for RBKA incision which has a wound vac on, pressure at -125 and dressing is CDI. Positive left pedal and bilateral radial pulses. Right PICC  red lumen was flushed, capped, and saline locked. Purple lumen has IV Zosyn running at this time. Patient is lying in bed with call light in reach. Slept about 4 hours tonight. Bed alarm on. Staff will continue to monitor.

## 2022-08-31 ENCOUNTER — PATIENT OUTREACH (OUTPATIENT)
Dept: CARE COORDINATION | Facility: CLINIC | Age: 52
End: 2022-08-31

## 2022-08-31 ENCOUNTER — TELEPHONE (OUTPATIENT)
Dept: GERIATRICS | Facility: CLINIC | Age: 52
End: 2022-08-31

## 2022-08-31 LAB
BACTERIA BLD CULT: NO GROWTH
BACTERIA BLD CULT: NO GROWTH
BACTERIA SPEC CULT: NORMAL

## 2022-08-31 NOTE — ADDENDUM NOTE
Addendum  created 08/31/22 1451 by Alexandro Coburn MD    Attestation recorded in Intraprocedure, Intraprocedure Attestations filed

## 2022-08-31 NOTE — TELEPHONE ENCOUNTER
[facility] Triage Nurse Telephone Encounter    Provider: Heidi Xie MD  Facility: [facility] Type:  TCU    Caller: Ngoc   Call Back Number: [phone]    Allergies:    Allergies   Allergen Reactions     Aspirin Other (See Comments)     Reacts with migraine medicine     Blood-Group Specific Substance      Anti-Fya present.  Expect delays in blood for transfusion.  Draw 2 lavender and 1 red for all type and screen orders.     Isosorbide Headache     Codeine Rash     Morphine Rash        Reason for call: Pt re-admitted to the TCU today from the hospital without PT/OT orders and nursing is requesting.     [facility] STANDING ORDER GIVEN:  Initiate PT/OT as needed      Provider giving Order:  Heidi Xie MD    Verbal Order given to: Ngoc Sanchez RN

## 2022-08-31 NOTE — PROGRESS NOTES
[facility]    Background: Transitional Care Management program auto-identified and prompting a chart review by [facility] team.    Assessment: Upon chart review, CCR Team member will cancel/close this episode of Transitional Care Management program due to reason below:    Patient is not established within [facility] and CCR team member noted patient discharged to TCU/ARU/LTACH.    Plan: Transitional Care Management episode closed per reason above.      FEDERICA Randolph  [phone]  [facility]    *Connected Care Resource Team does NOT follow patient ongoing. Referrals are identified based on internal discharge reports and the outreach is to ensure patient has an understanding of their discharge instructions.

## 2022-09-01 ENCOUNTER — TRANSITIONAL CARE UNIT VISIT (OUTPATIENT)
Dept: GERIATRICS | Facility: CLINIC | Age: 52
End: 2022-09-01
Payer: MEDICARE

## 2022-09-01 DIAGNOSIS — N17.9 AKI (ACUTE KIDNEY INJURY) (H): ICD-10-CM

## 2022-09-01 DIAGNOSIS — I10 ESSENTIAL HYPERTENSION: ICD-10-CM

## 2022-09-01 DIAGNOSIS — T14.8XXA WOUND INFECTION: Primary | ICD-10-CM

## 2022-09-01 DIAGNOSIS — D62 ABLA (ACUTE BLOOD LOSS ANEMIA): ICD-10-CM

## 2022-09-01 DIAGNOSIS — E11.69 TYPE 2 DIABETES MELLITUS WITH OTHER SPECIFIED COMPLICATION, WITHOUT LONG-TERM CURRENT USE OF INSULIN (H): ICD-10-CM

## 2022-09-01 DIAGNOSIS — L08.9 WOUND INFECTION: Primary | ICD-10-CM

## 2022-09-01 DIAGNOSIS — I48.0 PAROXYSMAL ATRIAL FIBRILLATION (H): ICD-10-CM

## 2022-09-01 LAB
BACTERIA BLD CULT: ABNORMAL
BACTERIA BLD CULT: ABNORMAL
BACTERIA BLD CULT: NO GROWTH
BACTERIA BLD CULT: NO GROWTH

## 2022-09-01 PROCEDURE — 99305 1ST NF CARE MODERATE MDM 35: CPT | Performed by: FAMILY MEDICINE

## 2022-09-01 NOTE — LETTER
9/1/2022        RE: Cm Lundberg  [address]  [place] [address]          [facility] GERIATRICS  [place] Medical Record Number:  [id number]  Place of Service where encounter took place: [facility] () [[id number]]   CODE STATUS:   CPR/Full code     Chief Complaint/Reason for Visit:  Chief Complaint   Patient presents with     Hospital F/U     Re-admit to TCU for R BKA (8/5/2022) infection.        HPI:    Cm Lundberg is a 52 year old male with hx of CAD, s/p stents and CABG, HTN, DM, prior CVA, afib/flutter on xarelto, who has been residing in TCU at [facility] since Dec 2021 surgery for chronic long standing right foot ulceration. He underwent incision and drainage of the right foot, excisional debridement of the ulceration of the right foot into the level of the muscle, under MAC with popliteal block anesthesia on 12/17/2021. He completed abx per ID recommendations. Remained NWB with wound vac, had 1/27/2022 incision and drainage with application of theraskin. On 4/4/2022 again incision and drainage with theraskin. Continued following with podiatry, referred to wound and vascular due to non healing diabetic ulcer, right foot. S/p angioplasty RLE and hyperbaric oxygen therapy. MRI with indications of osteomyelitis. 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    He was sent to the hospital from TCU on 8/23/2022 due to swelling, and drainage from surgical wound, concern for infection. His hospital discharge summary is partially excerpted below.     [facility]  Medicine Progress Note - Hospitalist Service  (Note: This note is listed as the discharge summary)  Date of Admission:  8/23/2022    Assessment & Plan  Cm Lundberg is a 52 year old male admitted on 8/23/2022.      Assessment:  #Infected right BKA stump.  CT right leg showed evolving abscess.  Patient not septic.  Blood cultures obtained.  On vancomycin and  Zosyn.  S/p I&D 8/24, culture sent.  Kaylie's ankle, s/p recent right BKA on 8/5/2022 by Dr. Valderrama.       #MRSA bacteremia, likely due to right BKA stump abscess.  Positive BC on 8/23, 8/25.  Surveillance BC obtained 8/26, 8/27, 8/28.    PICC on 8/29.  Plan for 28 days of IV vancomycin post clearance of blood, till September 23.     #Acute renal failure, creat peaked at 1.47 on 8/24, was 1.04 on admission.  Likely due to sepsis and dehydration, given rising BUN.  With IVF renal function normalizing.     #Chronic normocytic anemia, hemoglobin baseline 8-9.  History of iron deficiency.  Iron checked low at 16 on 8/25/2022.    Replacing with Venofer 2 doses on 8/27 and 8/28, then p.o. replacement.     #Acute anemia, hemoglobin 6.4 on 8/25.  Reported intraoperative EBL 10 mL.  No postop bleeding.  S/p 1 unit PRBCs 8/25.HgB in upper 8's.     #CVD, history of stroke.  On Plavix, statin.     #Atrial fibrillation.  Was on Xarelto, held preoperatively.  Resume when okay per surgery.     #Coronary artery disease, status post CABG and stents.  On Plavix, beta-blockers, fibrate.     #Non-insulin-dependent diabetes mellitus.  Well-controlled with Glucophage and glipizide.  Initially held due to RANDAL.  Resumed now.  A1c 5.9 on 8/26.  A1C 5.9 but Hgb 8.2 at the time so less accurate.      #Mood disorder:general anxiety and depression, stable on Abilify, Cymbalta, Ativan, Remeron, trazodone.     Essential hypertension.  BP controlled on losartan and metoprolol.  Peripheral neuropathy, due to diabetes.  Resident of [facility] prior to recent past hospitalization TCU.     Overall stabilized and discharged back to TCU on 8/30/2022 for PT, OT, nursing cares, medical management and monitoring.     Today:  He is on IV Vancomycin, pharmacy managing dose, labs done per protocol. Tolerating, has PICC RUE. Wound vac on stump wound. Will need follow up with Dr. Montes. Pain is adequately managed, he has needed prn Norco,  previously on oxycodone, switched per hospital. No complaints today, trying to keep a good attitude. Has been here in this TCU essentially since De 2021 other than his hospital stays. Knows staff well and family visits. His appetite is good. No abdominal pain, nausea, vomiting, diarrhea or constipation. No urinary sx. Medical problems, aflutter, CAD, DM, HTN, managed with his usual medications. Diabetes on metformin glipizide. Sleeping well. No new vision or hearing concerns. He follows with ophthalmology. Resided at [facility] prior to this, unable to return until no longer has wound vac and cares can be provided by [facility] staff.        REVIEW OF SYSTEMS:  All others negative other than those noted in HPI.      PAST MEDICAL HISTORY:  Past Medical History:   Diagnosis Date     Anemia      Anemia, unspecified type 08/19/2016     Anxiety      Anxiety state 07/29/2021    Formatting of this note might be different from the original. Created by Conversion  Replacement Utility updated for latest IMO load     Arthritis      Atrial fibrillation and flutter (H)      Cerebral artery occlusion with cerebral infarction (H)      Cerebral infarction (H)      Chest pain, unspecified type 08/19/2016     Chronic atrial fibrillation (H)      Congestive heart failure (H)      Coronary artery disease     CABG with multiple stents     Coronary artery disease involving native coronary artery without angina pectoris, unspecified whether native or transplanted heart 08/19/2016     Coronary atherosclerosis 07/29/2021    Formatting of this note might be different from the original. Created by Conversion  Replacement Utility updated for latest IMO load     Depression      Diabetic ulcer of right midfoot associated with type 2 diabetes mellitus, limited to breakdown of skin (H) 09/28/2017     Equinovarus acquired deformity, right 04/14/2021     Equinovarus deformity, acquired, right      Essential hypertension 08/19/2016     GERD (gastroesophageal  reflux disease)      Heart attack (H)      History of blood transfusion      Hyperlipidemia      Hyperlipidemia, unspecified hyperlipidemia type 08/19/2016     Hypertension      Insomnia, unspecified type 08/19/2016     Ischemic cardiomyopathy      Late effects of cerebrovascular accident 08/19/2016     Major depressive disorder with single episode, remission status unspecified 08/19/2016     Microalbuminuria 07/29/2021    Formatting of this note might be different from the original. 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 Created by Conversion [facility] Annotation: Feb 11 2010  1:41PM - Leonid Cabral: CANNOT READ OR  WRITE     PVD (peripheral vascular disease) (H)      Stented coronary artery      Type 2 diabetes mellitus (H) 07/29/2021    Formatting of this note might be different from the original. Created by Conversion     Type II or unspecified type diabetes mellitus with ophthalmic manifestations, not stated as uncontrolled(250.50) (H) 07/29/2021    Formatting of this note might be different from the original. Created by Conversion     Weakness 08/19/2016       PAST SURGICAL HISTORY:  Past Surgical History:   Procedure Laterality Date     AMPUTATE LEG BELOW KNEE Right 8/5/2022    Procedure: AMPUTATION, BELOW KNEE, right leg;  Surgeon: Kim Montes MD;  Location: [facility] Main OR     INCISION AND DRAINAGE LOWER EXTREMITY, COMBINED Right 12/17/2021    Procedure: INCISION AND DRAINAGE, right foot;  Surgeon: Alex Kim DPM;  Location: [facility] Main OR     INCISION AND DRAINAGE LOWER EXTREMITY, COMBINED Right 1/27/2022    Procedure: INCISION AND DRAINAGE, right foot with application of theraskin;  Surgeon: Alex Kim DPM;   Location: [facility] OR     INCISION AND DRAINAGE LOWER EXTREMITY, COMBINED Right 2022    Procedure: INCISION AND DRAINAGE, right foot with application of theraskin;  Surgeon: Alex Kim DPM;  Location: [facility] OR     IR LOWER EXTREMITY ANGIOGRAM RIGHT  2022     IR MISCELLANEOUS PROCEDURE  2004     IRRIGATION AND DEBRIDEMENT LOWER EXTREMITY, COMBINED Right 2022    Procedure: RIGHT IRRIGATION AND DEBRIDEMENT, LOWER EXTREMITY, REVISION OF BELOW KNEE AMPUTATION;  Surgeon: Kim Montes MD;  Location: [facility] OR     IRRIGATION AND DEBRIDEMENT LOWER EXTREMITY, COMBINED Right 2022    Procedure: WASHOUT AND PARTIAL CLOSURE OF RIGHT BELOW THE KNEE AMPUTATION,  AND WOUND VAC APPLICATION;  Surgeon: Kim Montes MD;  Location: [facility] OR     PICC DOUBLE LUMEN PLACEMENT  2022          ZZC CABG, VEIN, SINGLE      Description: CABG (CABG);  Recorded: 2009;       FAMILY HISTORY:  Family History   Adopted: Yes       SOCIAL HISTORY:  Social History     Socioeconomic History     Marital status:      Spouse name: Not on file     Number of children: Not on file     Years of education: Not on file     Highest education level: Not on file   Occupational History     Not on file   Tobacco Use     Smoking status: Former     Types: Cigarettes     Quit date: 2015     Years since quittin.7     Smokeless tobacco: Never   Vaping Use     Vaping Use: Never used   Substance and Sexual Activity     Alcohol use: Not Currently     Drug use: Not Currently     Sexual activity: Not on file   Other Topics Concern     Parent/sibling w/ CABG, MI or angioplasty before 65F 55M? Not Asked   Social History Narrative     Not on file     Social Determinants of Health     Financial Resource Strain: Not on file   Food Insecurity: Not on file   Transportation Needs: Not on file   Physical Activity: Not on file   Stress: Not on file   Social Connections: Not on file   Intimate  Partner Violence: Not on file   Housing Stability: Not on file       MEDICATIONS:  Current Outpatient Medications   Medication Sig Dispense Refill     acetaminophen (TYLENOL) 325 MG tablet Take 650 mg by mouth every 6 hours as needed for fever or pain       acetaminophen (TYLENOL) 325 MG tablet Take 650 mg by mouth 2 times daily       ARIPiprazole (ABILIFY) 2 MG tablet 2 mg daily        ASPERCREME ORIGINAL 10 % external cream Apply topically 2 times daily as needed for other (back pain)       atorvastatin (LIPITOR) 80 MG tablet Take 80 mg by mouth daily        bisacodyl (DULCOLAX) 10 MG suppository Place 10 mg rectally daily as needed for constipation       cholestyramine (QUESTRAN) 4 g packet Take 1 packet by mouth every other day        clopidogrel (PLAVIX) 75 MG tablet Take 1 tablet (75 mg) by mouth daily Start taking medication the day after the procedure. 90 tablet 1     DULoxetine (CYMBALTA) 60 MG capsule Take 120 mg by mouth daily       erenumab-aooe (AIMOVIG, 140 MG DOSE,) 70 MG/ML injection Inject 140 mg Subcutaneous every 30 days       ferrous sulfate (FEROSUL) 325 (65 Fe) MG tablet Take 325 mg by mouth daily (with breakfast)        gabapentin (NEURONTIN) 100 MG capsule Take 100 mg by mouth 2 times daily (before meals)       gabapentin (NEURONTIN) 300 MG capsule Take 600 mg by mouth At Bedtime       glipiZIDE (GLUCOTROL) 5 MG tablet Take 2.5 mg by mouth daily before breakfast       HYDROcodone-acetaminophen (NORCO) 5-325 MG tablet Take 1-2 tablets by mouth every 6 hours as needed for moderate to severe pain (Patient taking differently: Take 1 tablet by mouth every 6 hours as needed for moderate to severe pain) 12 tablet 0     loperamide (IMODIUM) 2 MG capsule Take 2 mg by mouth 4 times daily as needed for diarrhea       losartan (COZAAR) 25 MG tablet Take 25 mg by mouth At Bedtime        magnesium chloride 535 (64 Mg) MG TBEC CR tablet Take 1,070 mg by mouth 2 times daily       metFORMIN (GLUCOPHAGE) 500  "MG tablet Take 500 mg by mouth daily (with dinner)       metFORMIN (GLUCOPHAGE) 500 MG tablet Take 1,000 mg by mouth daily (with breakfast)       metoprolol succinate ER (TOPROL-XL) 25 MG 24 hr tablet Take 75 mg by mouth daily        mineral oil-hydrophilic petrolatum (AQUAPHOR) external ointment Apply topically 3 times daily as needed for irritation or dry skin Apply to forehead and other areas as needed       mirtazapine (REMERON) 7.5 MG tablet Take 7.5 mg by mouth At Bedtime        Nitroglycerin (NITROSTAT SL) Place 0.4 mg under the tongue every 5 minutes as needed for chest pain        pantoprazole (PROTONIX) 40 MG EC tablet Take 40 mg by mouth daily before breakfast       polyethylene glycol (MIRALAX) 17 GM/Dose powder Take 1 packet by mouth daily as needed for constipation        senna-docusate (SENOKOT-S/PERICOLACE) 8.6-50 MG tablet Take 1 tablet by mouth 2 times daily       senna-docusate (SENOKOT-S/PERICOLACE) 8.6-50 MG tablet Take 1 tablet by mouth 2 times daily as needed for constipation In addition to scheduled doses       sodium chloride, PF, 0.9% PF flush 10 mLs by Intracatheter route 3 times daily       traZODone (DESYREL) 50 MG tablet Take 50 mg by mouth At Bedtime        vitamin C (ASCORBIC ACID) 500 MG tablet Take 500 mg by mouth daily        XARELTO ANTICOAGULANT 20 MG TABS tablet Take 20 mg by mouth daily (with breakfast)          ALLERGIES:  Allergies   Allergen Reactions     Aspirin Other (See Comments)     Reacts with migraine medicine     Blood-Group Specific Substance      Anti-Fya present.  Expect delays in blood for transfusion.  Draw 2 lavender and 1 red for all type and screen orders.     Isosorbide Headache     Codeine Rash     Morphine Rash       PHYSICAL EXAM:  General: Patient is alert male, no distress.   Vitals: /60   Pulse 70   Temp 98.7  F (37.1  C)   Resp 18   Ht 1.702 m (5' 7\")   Wt 67.3 kg (148 lb 6.4 oz)   SpO2 97%   BMI 23.24 kg/m    HEENT: Head is NCAT. Eyes " show no injection or icterus. Nares negative. Oropharynx well hydrated.  Neck: Supple. No tenderness or adenopathy. No JVD.  Lungs: Clear bilaterally. No wheezes.  Cardiovascular: Regular rate and rhythm, normal S1, S2.  Back: No spinal or CVA tenderness.  Abdomen: Soft, no tenderness on exam. Bowel sounds present. No guarding rebound or rigidity.  : Deferred.  Extremities: Mild swelling at R BKA stump, wound vac. PICC RUE.  Musculoskeletal: No LLE swelling.   Skin: Wound vac R.  Psych: Mood appears good.      LABS/DIAGNOSTIC DATA:  Component      Latest Ref Rng & Units 8/23/2022 8/24/2022 8/24/2022 8/24/2022            6:11 AM  8:33 AM  7:13 PM   RBC Count      4.40 - 5.90 10e6/uL 3.89 (L) 3.48 (L) 3.40 (L)    Hemoglobin      13.3 - 17.7 g/dL 9.9 (L) 8.9 (L) 8.7 (L)    Hematocrit      40.0 - 53.0 % 32.4 (L) 29.0 (L) 28.3 (L)    MCV      78 - 100 fL 83 83 83    MCH      26.5 - 33.0 pg 25.4 (L) 25.6 (L) 25.6 (L)    MCHC      31.5 - 36.5 g/dL 30.6 (L) 30.7 (L) 30.7 (L)    RDW      10.0 - 15.0 % 14.5 14.7 14.8    Platelet Count      150 - 450 10e3/uL 433 323 315 255     Component      Latest Ref Rng & Units 8/25/2022 8/25/2022 8/26/2022 8/27/2022           6:05 AM  6:28 PM     RBC Count      4.40 - 5.90 10e6/uL 2.49 (L) 3.07 (L) 3.18 (L) 3.39 (L)   Hemoglobin      13.3 - 17.7 g/dL 6.4 (LL) 8.0 (L) 8.2 (L) 8.8 (L)   Hematocrit      40.0 - 53.0 % 20.9 (L) 26.3 (L) 27.3 (L) 29.1 (L)   MCV      78 - 100 fL 84 86 86 86   MCH      26.5 - 33.0 pg 25.7 (L) 26.1 (L) 25.8 (L) 26.0 (L)   MCHC      31.5 - 36.5 g/dL 30.6 (L) 30.4 (L) 30.0 (L) 30.2 (L)   RDW      10.0 - 15.0 % 14.6 14.7 14.6 14.7   Platelet Count      150 - 450 10e3/uL 249 291 251 296     Component      Latest Ref Rng & Units 8/29/2022 8/30/2022              RBC Count      4.40 - 5.90 10e6/uL 3.32 (L)    Hemoglobin      13.3 - 17.7 g/dL 8.6 (L)    Hematocrit      40.0 - 53.0 % 28.2 (L)    MCV      78 - 100 fL 85    MCH      26.5 - 33.0 pg 25.9 (L)    MCHC       31.5 - 36.5 g/dL 30.5 (L)    RDW      10.0 - 15.0 % 14.8    Platelet Count      150 - 450 10e3/uL 264 280     Component      Latest Ref Rng & Units 8/23/2022 8/24/2022 8/24/2022 8/25/2022            6:11 AM  8:33 AM    Creatinine      0.67 - 1.17 mg/dL 1.04 1.47 (H) 1.50 (H) 1.09   Sodium      136 - 145 mmol/L 134 (L) 136  137   Potassium      3.4 - 5.3 mmol/L       Urea Nitrogen      6.0 - 20.0 mg/dL       Chloride      98 - 107 mmol/L       Carbon Dioxide (CO2)      22 - 29 mmol/L       Anion Gap      7 - 15 mmol/L 14 8  4 (L)   Glucose      70 - 99 mg/dL       GFR Estimate      >60 mL/min/1.73m2 85 56 (L) 55 (L) 81   Calcium      8.6 - 10.0 mg/dL 10.2 10.0  8.6     Component      Latest Ref Rng & Units 8/26/2022 8/27/2022 8/28/2022 8/29/2022                Creatinine      0.67 - 1.17 mg/dL 1.00 0.99 0.97 1.04   Sodium      136 - 145 mmol/L 139   140   Potassium      3.4 - 5.3 mmol/L       Urea Nitrogen      6.0 - 20.0 mg/dL       Chloride      98 - 107 mmol/L       Carbon Dioxide (CO2)      22 - 29 mmol/L       Anion Gap      7 - 15 mmol/L 4 (L)   6   Glucose      70 - 99 mg/dL       GFR Estimate      >60 mL/min/1.73m2 89 >90 >90 85   Calcium      8.6 - 10.0 mg/dL 8.7   9.1         ASSESSMENT/PLAN:  1. Wound infection, MRSA. From R BKA on 8/5/2022, sent to hospital from TCU on 8/23/2022 due to concerns of infection. BC 8/23/2022 positive for MRSA. He underwent irrigation and debridement on 8/24/2022 and then washout with partial closure and application of wound vac on 8/26/2022. On IV Vancomycin, pharmacy managing. Follow up with Dr. Montes and follow up with ID.   2. RANDAL. Creatinine at 1.47 highest, improved with IVFs. Labs will be monitored.   3. Anemia. Hgb dropped to 6.4, he did receive transfusion. He is on iron. Received Venofer in the hospital.   4. HTN. On metoprolol and losartan. Bps satisfactory.   5. Afib. On xarelto for anticoagulation. Adequate rate control.   6. Diabetes. On metformin and glipizide.  Accuchecks followed.   7. CAD. Hx of MIs. Hx of stenting and prior CABG. On statin, beta blocker, xarelto.  8. Mood disorder. Mixed depression and anxiety. On duloxetine, Abilify. Also takes Mirtazapine.   9. Hx of stroke. Continue statin, xarelto.  10. Mild intellectual disabilities. He resides in an [facility], but has been in the hospital or this TCU since Dec 2021 related to above issues.    11. Code status is full code.           Electronically signed by: Heidi Xie MD           Sincerely,        Heidi Xie MD

## 2022-09-02 VITALS
TEMPERATURE: 98.7 F | RESPIRATION RATE: 18 BRPM | HEART RATE: 70 BPM | OXYGEN SATURATION: 97 % | SYSTOLIC BLOOD PRESSURE: 103 MMHG | HEIGHT: 67 IN | BODY MASS INDEX: 23.29 KG/M2 | WEIGHT: 148.4 LBS | DIASTOLIC BLOOD PRESSURE: 60 MMHG

## 2022-09-02 LAB
BACTERIA BLD CULT: NO GROWTH
BACTERIA BLD CULT: NO GROWTH

## 2022-09-06 ENCOUNTER — TRANSITIONAL CARE UNIT VISIT (OUTPATIENT)
Dept: GERIATRICS | Facility: CLINIC | Age: 52
End: 2022-09-06

## 2022-09-06 ENCOUNTER — LAB REQUISITION (OUTPATIENT)
Dept: LAB | Facility: CLINIC | Age: 52
End: 2022-09-06
Payer: MEDICARE

## 2022-09-06 DIAGNOSIS — I48.0 PAROXYSMAL ATRIAL FIBRILLATION (H): ICD-10-CM

## 2022-09-06 DIAGNOSIS — L03.119 CELLULITIS OF UNSPECIFIED PART OF LIMB: ICD-10-CM

## 2022-09-06 DIAGNOSIS — L08.9 WOUND INFECTION: Primary | ICD-10-CM

## 2022-09-06 DIAGNOSIS — E11.69 TYPE 2 DIABETES MELLITUS WITH OTHER SPECIFIED COMPLICATION, WITHOUT LONG-TERM CURRENT USE OF INSULIN (H): ICD-10-CM

## 2022-09-06 DIAGNOSIS — T14.8XXA WOUND INFECTION: Primary | ICD-10-CM

## 2022-09-06 DIAGNOSIS — I10 ESSENTIAL HYPERTENSION: ICD-10-CM

## 2022-09-06 DIAGNOSIS — D62 ABLA (ACUTE BLOOD LOSS ANEMIA): ICD-10-CM

## 2022-09-06 DIAGNOSIS — L02.419 CUTANEOUS ABSCESS OF LIMB, UNSPECIFIED: ICD-10-CM

## 2022-09-06 LAB
BASOPHILS # BLD AUTO: 0.1 10E3/UL (ref 0–0.2)
BASOPHILS NFR BLD AUTO: 2 %
EOSINOPHIL # BLD AUTO: 0.3 10E3/UL (ref 0–0.7)
EOSINOPHIL NFR BLD AUTO: 6 %
ERYTHROCYTE [DISTWIDTH] IN BLOOD BY AUTOMATED COUNT: 16.7 % (ref 10–15)
ERYTHROCYTE [SEDIMENTATION RATE] IN BLOOD BY WESTERGREN METHOD: 19 MM/HR (ref 0–20)
HCT VFR BLD AUTO: 31 % (ref 40–53)
HGB BLD-MCNC: 9.2 G/DL (ref 13.3–17.7)
IMM GRANULOCYTES # BLD: 0 10E3/UL
IMM GRANULOCYTES NFR BLD: 0 %
LYMPHOCYTES # BLD AUTO: 1.5 10E3/UL (ref 0.8–5.3)
LYMPHOCYTES NFR BLD AUTO: 34 %
MCH RBC QN AUTO: 26.1 PG (ref 26.5–33)
MCHC RBC AUTO-ENTMCNC: 29.7 G/DL (ref 31.5–36.5)
MCV RBC AUTO: 88 FL (ref 78–100)
MONOCYTES # BLD AUTO: 0.4 10E3/UL (ref 0–1.3)
MONOCYTES NFR BLD AUTO: 9 %
NEUTROPHILS # BLD AUTO: 2.2 10E3/UL (ref 1.6–8.3)
NEUTROPHILS NFR BLD AUTO: 49 %
NRBC # BLD AUTO: 0 10E3/UL
NRBC BLD AUTO-RTO: 0 /100
PLATELET # BLD AUTO: 232 10E3/UL (ref 150–450)
RBC # BLD AUTO: 3.52 10E6/UL (ref 4.4–5.9)
VANCOMYCIN SERPL-MCNC: 16.1 UG/ML
WBC # BLD AUTO: 4.5 10E3/UL (ref 4–11)

## 2022-09-06 PROCEDURE — 80202 ASSAY OF VANCOMYCIN: CPT | Performed by: INTERNAL MEDICINE

## 2022-09-06 PROCEDURE — 85025 COMPLETE CBC W/AUTO DIFF WBC: CPT | Mod: ORL | Performed by: INTERNAL MEDICINE

## 2022-09-06 PROCEDURE — 82248 BILIRUBIN DIRECT: CPT | Mod: ORL | Performed by: INTERNAL MEDICINE

## 2022-09-06 PROCEDURE — 86140 C-REACTIVE PROTEIN: CPT | Mod: ORL | Performed by: INTERNAL MEDICINE

## 2022-09-06 PROCEDURE — 36415 COLL VENOUS BLD VENIPUNCTURE: CPT | Mod: ORL | Performed by: INTERNAL MEDICINE

## 2022-09-06 PROCEDURE — P9604 ONE-WAY ALLOW PRORATED TRIP: HCPCS | Performed by: INTERNAL MEDICINE

## 2022-09-06 PROCEDURE — 99309 SBSQ NF CARE MODERATE MDM 30: CPT | Performed by: FAMILY MEDICINE

## 2022-09-06 PROCEDURE — 83735 ASSAY OF MAGNESIUM: CPT | Mod: ORL | Performed by: INTERNAL MEDICINE

## 2022-09-06 PROCEDURE — 85652 RBC SED RATE AUTOMATED: CPT | Mod: ORL | Performed by: INTERNAL MEDICINE

## 2022-09-06 NOTE — LETTER
9/6/2022        RE: Cm Lundberg  [address]  [facility] [address]          [facility] GERIATRICS  [place] Medical Record Number:  [id number]  Place of Service where encounter took place: [facility] ([facility]) [[id number]]   CODE STATUS:   CPR/Full code     Chief Complaint/Reason for Visit:  Chief Complaint   Patient presents with     RECHECK     TCU 9/6/2022. 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        HPI:    Cm Lundberg is a 52 year old male with hx of CAD, s/p stents and CABG, HTN, DM, prior CVA, afib/flutter on xarelto, who has been residing in TCU at [facility] since Dec 2021 surgery for chronic long standing right foot ulceration. He underwent incision and drainage of the right foot, excisional debridement of the ulceration of the right foot into the level of the muscle, under MAC with popliteal block anesthesia on 12/17/2021. He completed abx per ID recommendations. Remained NWB with wound vac, had 1/27/2022 incision and drainage with application of theraskin. On 4/4/2022 again incision and drainage with theraskin. Continued following with podiatry, referred to wound and vascular due to non healing diabetic ulcer, right foot. S/p angioplasty RLE and hyperbaric oxygen therapy. MRI with indications of osteomyelitis. 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    He was sent to the hospital from TCU on 8/23/2022 due to swelling, and drainage from surgical wound, concern for infection. His hospital discharge summary is partially excerpted below.     [facility]  Medicine Progress Note - Hospitalist Service  (Note: This note is listed as the discharge summary)  Date of Admission:  8/23/2022    Assessment & Plan  Cm Lundberg is a 52 year old male admitted on 8/23/2022.      Assessment:  #Infected right BKA stump.  CT right leg showed evolving abscess.  Patient not septic.  Blood cultures obtained.  On vancomycin and  Zosyn.  S/p I&D 8/24, culture sent.  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 Valderrama.       #MRSA bacteremia, likely due to right BKA stump abscess.  Positive BC on 8/23, 8/25.  Surveillance BC obtained 8/26, 8/27, 8/28.    PICC on 8/29.  Plan for 28 days of IV vancomycin post clearance of blood, till September 23.     #Acute renal failure, creat peaked at 1.47 on 8/24, was 1.04 on admission.  Likely due to sepsis and dehydration, given rising BUN.  With IVF renal function normalizing.     #Chronic normocytic anemia, hemoglobin baseline 8-9.  History of iron deficiency.  Iron checked low at 16 on 8/25/2022.    Replacing with Venofer 2 doses on 8/27 and 8/28, then p.o. replacement.     #Acute anemia, hemoglobin 6.4 on 8/25.  Reported intraoperative EBL 10 mL.  No postop bleeding.  S/p 1 unit PRBCs 8/25.HgB in upper 8's.     #CVD, history of stroke.  On Plavix, statin.     #Atrial fibrillation.  Was on Xarelto, held preoperatively.  Resume when okay per surgery.     #Coronary artery disease, status post CABG and stents.  On Plavix, beta-blockers, fibrate.     #Non-insulin-dependent diabetes mellitus.  Well-controlled with Glucophage and glipizide.  Initially held due to RANDAL.  Resumed now.  A1c 5.9 on 8/26.  A1C 5.9 but Hgb 8.2 at the time so less accurate.      #Mood disorder:general anxiety and depression, stable on Abilify, Cymbalta, Ativan, Remeron, trazodone.     Essential hypertension.  BP controlled on losartan and metoprolol.  Peripheral neuropathy, due to diabetes.  Resident of [facility] prior to recent past hospitalization TCU.     Overall stabilized and discharged back to TCU on 8/30/2022 for PT, OT, nursing cares, medical management and monitoring.       Today:  He is on IV Vancomycin, pharmacy managing, labs done per protocol, last day is 9/25/2022. He will see ID on 9/22/2022 and has follow up appt with Dr. Montes on 9/8/2022. Tolerating abx through PICC RUE. Wound vac on  stump wound. Pain control is improved. His appetite is good. No abdominal pain, nausea, vomiting, diarrhea or constipation. No urinary sx. Medical problems, aflutter, CAD, DM, HTN, managed with his usual medications. Diabetes on metformin and glipizide. Sleeping well.        PAST MEDICAL HISTORY:  Past Medical History:   Diagnosis Date     Anemia      Anemia, unspecified type 08/19/2016     Anxiety      Anxiety state 07/29/2021    Formatting of this note might be different from the original. Created by Conversion  Replacement Utility updated for latest IMO load     Arthritis      Atrial fibrillation and flutter (H)      Cerebral artery occlusion with cerebral infarction (H)      Cerebral infarction (H)      Chest pain, unspecified type 08/19/2016     Chronic atrial fibrillation (H)      Congestive heart failure (H)      Coronary artery disease     CABG with multiple stents     Coronary artery disease involving native coronary artery without angina pectoris, unspecified whether native or transplanted heart 08/19/2016     Coronary atherosclerosis 07/29/2021    Formatting of this note might be different from the original. Created by Conversion  Replacement Utility updated for latest IMO load     Depression      Diabetic ulcer of right midfoot associated with type 2 diabetes mellitus, limited to breakdown of skin (H) 09/28/2017     Equinovarus acquired deformity, right 04/14/2021     Equinovarus deformity, acquired, right      Essential hypertension 08/19/2016     GERD (gastroesophageal reflux disease)      Heart attack (H)      History of blood transfusion      Hyperlipidemia      Hyperlipidemia, unspecified hyperlipidemia type 08/19/2016     Hypertension      Insomnia, unspecified type 08/19/2016     Ischemic cardiomyopathy      Late effects of cerebrovascular accident 08/19/2016     Major depressive disorder with single episode, remission status unspecified 08/19/2016     Microalbuminuria 07/29/2021    Formatting of  this note might be different from the original. 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 Created by Conversion [facility] Annotation: Feb 11 2010  1:41PM - Leonid Cabral: CANNOT READ OR  WRITE     PVD (peripheral vascular disease) (H)      Stented coronary artery      Type 2 diabetes mellitus (H) 07/29/2021    Formatting of this note might be different from the original. Created by Conversion     Type II or unspecified type diabetes mellitus with ophthalmic manifestations, not stated as uncontrolled(250.50) (H) 07/29/2021    Formatting of this note might be different from the original. Created by Conversion     [facility] 08/19/2016       MEDICATIONS:  Current Outpatient Medications   Medication Sig Dispense Refill     acetaminophen (TYLENOL) 325 MG tablet Take 650 mg by mouth every 6 hours as needed for fever or pain       acetaminophen (TYLENOL) 325 MG tablet Take 650 mg by mouth 2 times daily       ARIPiprazole (ABILIFY) 2 MG tablet 2 mg daily        ASPERCREME ORIGINAL 10 % external cream Apply topically 2 times daily as needed for other (back pain)       atorvastatin (LIPITOR) 80 MG tablet Take 80 mg by mouth daily        bisacodyl (DULCOLAX) 10 MG suppository Place 10 mg rectally daily as needed for constipation       cholestyramine (QUESTRAN) 4 g packet Take 1 packet by mouth every other day        clopidogrel (PLAVIX) 75 MG tablet Take 1 tablet (75 mg) by mouth daily Start taking medication the day after the procedure. 90 tablet 1     DULoxetine (CYMBALTA) 60 MG capsule Take 120 mg by mouth daily       erenumab-aooe (AIMOVIG, 140 MG DOSE,) 70 MG/ML injection Inject 140 mg Subcutaneous every 30 days       ferrous sulfate (FEROSUL) 325 (65 Fe) MG tablet  Take 325 mg by mouth daily (with breakfast)        gabapentin (NEURONTIN) 100 MG capsule Take 100 mg by mouth 2 times daily (before meals)       gabapentin (NEURONTIN) 300 MG capsule Take 600 mg by mouth At Bedtime       glipiZIDE (GLUCOTROL) 5 MG tablet Take 2.5 mg by mouth daily before breakfast       HYDROcodone-acetaminophen (NORCO) 5-325 MG tablet Take 1-2 tablets by mouth every 6 hours as needed for moderate to severe pain (Patient taking differently: Take 1 tablet by mouth every 6 hours as needed for moderate to severe pain) 12 tablet 0     loperamide (IMODIUM) 2 MG capsule Take 2 mg by mouth 4 times daily as needed for diarrhea       losartan (COZAAR) 25 MG tablet Take 25 mg by mouth At Bedtime        magnesium chloride 535 (64 Mg) MG TBEC CR tablet Take 1,070 mg by mouth 2 times daily       metFORMIN (GLUCOPHAGE) 500 MG tablet Take 500 mg by mouth daily (with dinner)       metFORMIN (GLUCOPHAGE) 500 MG tablet Take 1,000 mg by mouth daily (with breakfast)       metoprolol succinate ER (TOPROL-XL) 25 MG 24 hr tablet Take 75 mg by mouth daily        mineral oil-hydrophilic petrolatum (AQUAPHOR) external ointment Apply topically 3 times daily as needed for irritation or dry skin Apply to forehead and other areas as needed       mirtazapine (REMERON) 7.5 MG tablet Take 7.5 mg by mouth At Bedtime        Nitroglycerin (NITROSTAT SL) Place 0.4 mg under the tongue every 5 minutes as needed for chest pain        pantoprazole (PROTONIX) 40 MG EC tablet Take 40 mg by mouth daily before breakfast       polyethylene glycol (MIRALAX) 17 GM/Dose powder Take 1 packet by mouth daily as needed for constipation        senna-docusate (SENOKOT-S/PERICOLACE) 8.6-50 MG tablet Take 1 tablet by mouth 2 times daily       senna-docusate (SENOKOT-S/PERICOLACE) 8.6-50 MG tablet Take 1 tablet by mouth 2 times daily as needed for constipation In addition to scheduled doses       sodium chloride, PF, 0.9% PF flush 10 mLs by Intracatheter  "route 3 times daily       traZODone (DESYREL) 50 MG tablet Take 50 mg by mouth At Bedtime        vitamin C (ASCORBIC ACID) 500 MG tablet Take 500 mg by mouth daily        XARELTO ANTICOAGULANT 20 MG TABS tablet Take 20 mg by mouth daily (with breakfast)          PHYSICAL EXAM:  General: Patient is alert male, no distress.   Vitals: /71   Pulse 76   Temp 98.9  F (37.2  C)   Resp 18   Ht 1.702 m (5' 7\")   Wt 67.3 kg (148 lb 6.4 oz)   SpO2 100%   BMI 23.24 kg/m    HEENT: Head is NCAT. Eyes show no injection or icterus. Nares negative. Oropharynx moist.  Neck: No JVD.  Lungs: Non labored respirations.   Abdomen: Soft  : Deferred.  Extremities: Mild swelling at R BKA stump, wound vac. PICC RUE.  Musculoskeletal: No LLE swelling.   Skin: Wound vac R.  Psych: Mood is good.       LABS/DIAGNOSTIC DATA:  Component      Latest Ref Rng & Units 8/23/2022 8/24/2022 8/24/2022 8/24/2022            6:11 AM  8:33 AM  7:13 PM   RBC Count      4.40 - 5.90 10e6/uL 3.89 (L) 3.48 (L) 3.40 (L)    Hemoglobin      13.3 - 17.7 g/dL 9.9 (L) 8.9 (L) 8.7 (L)    Hematocrit      40.0 - 53.0 % 32.4 (L) 29.0 (L) 28.3 (L)    MCV      78 - 100 fL 83 83 83    MCH      26.5 - 33.0 pg 25.4 (L) 25.6 (L) 25.6 (L)    MCHC      31.5 - 36.5 g/dL 30.6 (L) 30.7 (L) 30.7 (L)    RDW      10.0 - 15.0 % 14.5 14.7 14.8    Platelet Count      150 - 450 10e3/uL 433 323 315 255     Component      Latest Ref Rng & Units 8/25/2022 8/25/2022 8/26/2022 8/27/2022           6:05 AM  6:28 PM     RBC Count      4.40 - 5.90 10e6/uL 2.49 (L) 3.07 (L) 3.18 (L) 3.39 (L)   Hemoglobin      13.3 - 17.7 g/dL 6.4 (LL) 8.0 (L) 8.2 (L) 8.8 (L)   Hematocrit      40.0 - 53.0 % 20.9 (L) 26.3 (L) 27.3 (L) 29.1 (L)   MCV      78 - 100 fL 84 86 86 86   MCH      26.5 - 33.0 pg 25.7 (L) 26.1 (L) 25.8 (L) 26.0 (L)   MCHC      31.5 - 36.5 g/dL 30.6 (L) 30.4 (L) 30.0 (L) 30.2 (L)   RDW      10.0 - 15.0 % 14.6 14.7 14.6 14.7   Platelet Count      150 - 450 10e3/uL 249 291 251 296 "     Component      Latest Ref Rng & Units 8/29/2022 8/30/2022              RBC Count      4.40 - 5.90 10e6/uL 3.32 (L)    Hemoglobin      13.3 - 17.7 g/dL 8.6 (L)    Hematocrit      40.0 - 53.0 % 28.2 (L)    MCV      78 - 100 fL 85    MCH      26.5 - 33.0 pg 25.9 (L)    MCHC      31.5 - 36.5 g/dL 30.5 (L)    RDW      10.0 - 15.0 % 14.8    Platelet Count      150 - 450 10e3/uL 264 280     Component      Latest Ref Rng & Units 8/23/2022 8/24/2022 8/24/2022 8/25/2022            6:11 AM  8:33 AM    Creatinine      0.67 - 1.17 mg/dL 1.04 1.47 (H) 1.50 (H) 1.09   Sodium      136 - 145 mmol/L 134 (L) 136  137   Potassium      3.4 - 5.3 mmol/L       Urea Nitrogen      6.0 - 20.0 mg/dL       Chloride      98 - 107 mmol/L       Carbon Dioxide (CO2)      22 - 29 mmol/L       Anion Gap      7 - 15 mmol/L 14 8  4 (L)   Glucose      70 - 99 mg/dL       GFR Estimate      >60 mL/min/1.73m2 85 56 (L) 55 (L) 81   Calcium      8.6 - 10.0 mg/dL 10.2 10.0  8.6     Component      Latest Ref Rng & Units 8/26/2022 8/27/2022 8/28/2022 8/29/2022                Creatinine      0.67 - 1.17 mg/dL 1.00 0.99 0.97 1.04   Sodium      136 - 145 mmol/L 139   140   Potassium      3.4 - 5.3 mmol/L       Urea Nitrogen      6.0 - 20.0 mg/dL       Chloride      98 - 107 mmol/L       Carbon Dioxide (CO2)      22 - 29 mmol/L       Anion Gap      7 - 15 mmol/L 4 (L)   6   Glucose      70 - 99 mg/dL       GFR Estimate      >60 mL/min/1.73m2 89 >90 >90 85   Calcium      8.6 - 10.0 mg/dL 8.7   9.1         ASSESSMENT/PLAN:  1. Wound infection, MRSA. From R BKA on 8/5/2022. BC 8/23/2022 positive for MRSA. S/p irrigation and debridement on 8/24/2022 and then washout with partial closure and application of wound vac on 8/26/2022. On IV Vancomycin, pharmacy managing. Follow up with Dr. Montes 9/8/2022 and follow up with ID 9/22/2022.   2. Anemia. Hgb dropped to 6.4, he did receive transfusion. Received Venofer in the hospital. Continue iron.   3. HTN. On metoprolol  and losartan. Bps acceptable.   4. Afib. On xarelto for anticoagulation. Adequate rate control.   5. Diabetes. On metformin and glipizide. Accuchecks followed.    Stable conditions:   6. CAD. Hx of MIs. Hx of stenting and prior CABG. On statin, beta blocker, xarelto.  7. Mood disorder. Mixed depression and anxiety. On duloxetine, Abilify. Also takes Mirtazapine.   8. Hx of stroke. Continue statin, xarelto.  9. Mild intellectual disabilities. He resides in an [facility], but has been in the hospital or this TCU since Dec 2021 related to above issues.            Electronically signed by: Heidi Xie MD           Sincerely,        Heidi Xie MD

## 2022-09-07 VITALS
BODY MASS INDEX: 23.29 KG/M2 | TEMPERATURE: 98.9 F | OXYGEN SATURATION: 100 % | SYSTOLIC BLOOD PRESSURE: 122 MMHG | HEART RATE: 76 BPM | HEIGHT: 67 IN | RESPIRATION RATE: 18 BRPM | DIASTOLIC BLOOD PRESSURE: 71 MMHG | WEIGHT: 148.4 LBS

## 2022-09-07 LAB
ALBUMIN SERPL BCG-MCNC: 3.9 G/DL (ref 3.5–5.2)
ALP SERPL-CCNC: 76 U/L (ref 40–129)
ALT SERPL W P-5'-P-CCNC: 13 U/L (ref 10–50)
ANION GAP SERPL CALCULATED.3IONS-SCNC: 17 MMOL/L (ref 7–15)
AST SERPL W P-5'-P-CCNC: 22 U/L (ref 10–50)
BILIRUB DIRECT SERPL-MCNC: <0.2 MG/DL (ref 0–0.3)
BILIRUB SERPL-MCNC: 0.2 MG/DL
BUN SERPL-MCNC: 19.8 MG/DL (ref 6–20)
CALCIUM SERPL-MCNC: 10.1 MG/DL (ref 8.6–10)
CHLORIDE SERPL-SCNC: 105 MMOL/L (ref 98–107)
CREAT SERPL-MCNC: 1.13 MG/DL (ref 0.67–1.17)
CRP SERPL-MCNC: <3 MG/L
DEPRECATED HCO3 PLAS-SCNC: 20 MMOL/L (ref 22–29)
GFR SERPL CREATININE-BSD FRML MDRD: 77 ML/MIN/1.73M2
GLUCOSE SERPL-MCNC: 127 MG/DL (ref 70–99)
MAGNESIUM SERPL-MCNC: 1.6 MG/DL (ref 1.7–2.3)
POTASSIUM SERPL-SCNC: 4.4 MMOL/L (ref 3.4–5.3)
PROT SERPL-MCNC: 6.3 G/DL (ref 6.4–8.3)
SODIUM SERPL-SCNC: 142 MMOL/L (ref 136–145)

## 2022-09-08 ENCOUNTER — LAB REQUISITION (OUTPATIENT)
Dept: LAB | Facility: CLINIC | Age: 52
End: 2022-09-08
Payer: MEDICARE

## 2022-09-08 ENCOUNTER — TRANSITIONAL CARE UNIT VISIT (OUTPATIENT)
Dept: GERIATRICS | Facility: CLINIC | Age: 52
End: 2022-09-08
Payer: MEDICARE

## 2022-09-08 ENCOUNTER — OFFICE VISIT (OUTPATIENT)
Dept: VASCULAR SURGERY | Facility: CLINIC | Age: 52
End: 2022-09-08
Attending: SURGERY
Payer: MEDICARE

## 2022-09-08 VITALS
TEMPERATURE: 97.5 F | SYSTOLIC BLOOD PRESSURE: 112 MMHG | DIASTOLIC BLOOD PRESSURE: 70 MMHG | HEART RATE: 76 BPM | RESPIRATION RATE: 18 BRPM

## 2022-09-08 DIAGNOSIS — L02.419 CUTANEOUS ABSCESS OF LIMB, UNSPECIFIED: ICD-10-CM

## 2022-09-08 DIAGNOSIS — Z89.511 STATUS POST BELOW-KNEE AMPUTATION OF RIGHT LOWER EXTREMITY (H): Primary | ICD-10-CM

## 2022-09-08 DIAGNOSIS — I10 ESSENTIAL HYPERTENSION: ICD-10-CM

## 2022-09-08 DIAGNOSIS — D62 ABLA (ACUTE BLOOD LOSS ANEMIA): ICD-10-CM

## 2022-09-08 DIAGNOSIS — L08.9 WOUND INFECTION: Primary | ICD-10-CM

## 2022-09-08 DIAGNOSIS — D64.9 ANEMIA, UNSPECIFIED: ICD-10-CM

## 2022-09-08 DIAGNOSIS — L03.119 CELLULITIS OF UNSPECIFIED PART OF LIMB: ICD-10-CM

## 2022-09-08 DIAGNOSIS — I48.0 PAROXYSMAL ATRIAL FIBRILLATION (H): ICD-10-CM

## 2022-09-08 DIAGNOSIS — E11.69 TYPE 2 DIABETES MELLITUS WITH OTHER SPECIFIED COMPLICATION, WITHOUT LONG-TERM CURRENT USE OF INSULIN (H): ICD-10-CM

## 2022-09-08 DIAGNOSIS — T14.8XXA WOUND INFECTION: Primary | ICD-10-CM

## 2022-09-08 PROCEDURE — 99024 POSTOP FOLLOW-UP VISIT: CPT | Performed by: SURGERY

## 2022-09-08 PROCEDURE — 99309 SBSQ NF CARE MODERATE MDM 30: CPT | Performed by: FAMILY MEDICINE

## 2022-09-08 PROCEDURE — G0463 HOSPITAL OUTPT CLINIC VISIT: HCPCS

## 2022-09-08 RX ORDER — FENOFIBRATE 200 MG/1
CAPSULE ORAL
COMMUNITY
Start: 2022-08-30 | End: 2022-09-28

## 2022-09-08 RX ORDER — ACETAMINOPHEN 325 MG/1
650 TABLET ORAL 2 TIMES DAILY
COMMUNITY
Start: 2021-12-22

## 2022-09-08 ASSESSMENT — PAIN SCALES - GENERAL: PAINLEVEL: NO PAIN (0)

## 2022-09-08 NOTE — PATIENT INSTRUCTIONS
WOUND NEEDS TO BE PACKED ALL THE WAY TO THE DEPTH OF THE WOUND (ABOUT 2.5CM)    CONTINUE WOUND VAC    ORDER Hgb for next visit on 9/12- have pt bring the results.

## 2022-09-08 NOTE — LETTER
9/8/2022        RE: Cm Lundberg  [address]  [place] [facility] [place] [address]          [facility] GERIATRICS  [place] Medical Record Number:  [id number]  Place of Service where encounter took place: [facility] ([facility]) [[id number]]   CODE STATUS:   CPR/Full code     Chief Complaint/Reason for Visit:  Chief Complaint   Patient presents with     RECHECK     TCU 9/8/2022. 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        TCU HPI:    Cm Lundberg is a 52 year old male with hx of CAD, s/p stents and CABG, HTN, DM, prior CVA, afib/flutter on xarelto, who has been residing in TCU at [facility] since Dec 2021 surgery for chronic long standing right foot ulceration. He underwent incision and drainage of the right foot, excisional debridement of the ulceration of the right foot into the level of the muscle, under MAC with popliteal block anesthesia on 12/17/2021. He completed abx per ID recommendations. Remained NWB with wound vac, had 1/27/2022 incision and drainage with application of theraskin. On 4/4/2022 again incision and drainage with theraskin. Continued following with podiatry, referred to wound and vascular due to non healing diabetic ulcer, right foot. S/p angioplasty RLE and hyperbaric oxygen therapy. MRI with indications of osteomyelitis. 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    He was sent to the hospital from TCU on 8/23/2022 due to swelling, and drainage from surgical wound, concern for infection. His hospital discharge summary is partially excerpted below.     [facility]  Medicine Progress Note - Hospitalist Service  (Note: This note is listed as the discharge summary)  Date of Admission:  8/23/2022    Assessment & Plan  Cm Lundberg is a 52 year old male admitted on 8/23/2022.      Assessment:  #Infected right BKA stump.  CT right leg showed evolving abscess.  Patient not septic.  Blood cultures obtained.  On vancomycin  and Zosyn.  S/p I&D 8/24, culture sent.  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 Valderrama.       #MRSA bacteremia, likely due to right BKA stump abscess.  Positive BC on 8/23, 8/25.  Surveillance BC obtained 8/26, 8/27, 8/28.    PICC on 8/29.  Plan for 28 days of IV vancomycin post clearance of blood, till September 23.     #Acute renal failure, creat peaked at 1.47 on 8/24, was 1.04 on admission.  Likely due to sepsis and dehydration, given rising BUN.  With IVF renal function normalizing.     #Chronic normocytic anemia, hemoglobin baseline 8-9.  History of iron deficiency.  Iron checked low at 16 on 8/25/2022.    Replacing with Venofer 2 doses on 8/27 and 8/28, then p.o. replacement.     #Acute anemia, hemoglobin 6.4 on 8/25.  Reported intraoperative EBL 10 mL.  No postop bleeding.  S/p 1 unit PRBCs 8/25.HgB in upper 8's.     #CVD, history of stroke.  On Plavix, statin.     #Atrial fibrillation.  Was on Xarelto, held preoperatively.  Resume when okay per surgery.     #Coronary artery disease, status post CABG and stents.  On Plavix, beta-blockers, fibrate.     #Non-insulin-dependent diabetes mellitus.  Well-controlled with Glucophage and glipizide.  Initially held due to RANADL.  Resumed now.  A1c 5.9 on 8/26.  A1C 5.9 but Hgb 8.2 at the time so less accurate.      #Mood disorder:general anxiety and depression, stable on Abilify, Cymbalta, Ativan, Remeron, trazodone.     Essential hypertension.  BP controlled on losartan and metoprolol.  Peripheral neuropathy, due to diabetes.  Resident of [facility] prior to recent past hospitalization TCU.     Overall stabilized and discharged back to TCU on 8/30/2022 for PT, OT, nursing cares, medical management and monitoring.       Today:  He has been tolerating IV Vancomycin, PICC RUE. Last day is 9/25/2022 with follow up with ID on 9/22/2022. He saw Dr. Montes today 9/8/2022 and will follow up again on 9/12/2022. Per note:    Stump incision line  nicely opposed except for area where VAC was placed.  This area appears to be prematurely closing and was reopened to a depth of 2-1/2 cm.  At the base of this there clearly is bone palpable.  No overt signs of infection but some fibrinous exudate in the wound.     Impression and plan: Overall doing very well.  Need to make sure that the VAC sponges placed deep enough so that it can properly drain the cavity and prevent recurrence of an abscess.  We will relay this to the transitional care team.  I will reassess at this coming Monday to make sure that things are going well.  If this cannot be well done we might have to switch to daily packings at the transitional care but I would like to avoid this for comfort reasons.    Pain control is improved. His appetite is good. No abdominal pain, nausea, vomiting, diarrhea or constipation. No urinary sx. Medical problems, aflutter/afib, CAD, DM, HTN, managed with his usual medications. Diabetes on metformin and glipizide. Sleeping well.        PAST MEDICAL HISTORY:  Past Medical History:   Diagnosis Date     Anemia      Anemia, unspecified type 08/19/2016     Anxiety      Anxiety state 07/29/2021    Formatting of this note might be different from the original. Created by Conversion  Replacement Utility updated for latest IMO load     Arthritis      Atrial fibrillation and flutter (H)      Cerebral artery occlusion with cerebral infarction (H)      Cerebral infarction (H)      Chest pain, unspecified type 08/19/2016     Chronic atrial fibrillation (H)      Congestive heart failure (H)      Coronary artery disease     CABG with multiple stents     Coronary artery disease involving native coronary artery without angina pectoris, unspecified whether native or transplanted heart 08/19/2016     Coronary atherosclerosis 07/29/2021    Formatting of this note might be different from the original. Created by Conversion  Replacement Utility updated for latest IMO load     Depression       Diabetic ulcer of right midfoot associated with type 2 diabetes mellitus, limited to breakdown of skin (H) 09/28/2017     Equinovarus acquired deformity, right 04/14/2021     Equinovarus deformity, acquired, right      Essential hypertension 08/19/2016     GERD (gastroesophageal reflux disease)      Heart attack (H)      History of blood transfusion      Hyperlipidemia      Hyperlipidemia, unspecified hyperlipidemia type 08/19/2016     Hypertension      Insomnia, unspecified type 08/19/2016     Ischemic cardiomyopathy      Late effects of cerebrovascular accident 08/19/2016     Major depressive disorder with single episode, remission status unspecified 08/19/2016     Microalbuminuria 07/29/2021    Formatting of this note might be different from the original. 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 Created by Conversion [facility] Annotation: Feb 11 2010  1:41PM - Leonid Cabral: CANNOT READ OR  WRITE     PVD (peripheral vascular disease) (H)      Stented coronary artery      Type 2 diabetes mellitus (H) 07/29/2021    Formatting of this note might be different from the original. Created by Conversion     Type II or unspecified type diabetes mellitus with ophthalmic manifestations, not stated as uncontrolled(250.50) (H) 07/29/2021    Formatting of this note might be different from the original. Created by Conversion     Weakness 08/19/2016       MEDICATIONS:  Current Outpatient Medications   Medication Sig Dispense Refill     acetaminophen (TYLENOL) 325 MG tablet Take 650 mg by mouth every 6 hours as needed for fever or pain       acetaminophen (TYLENOL) 325 MG tablet Take 650 mg by mouth 2 times daily       ARIPiprazole (ABILIFY) 2 MG tablet 2 mg daily         ASPERCREME ORIGINAL 10 % external cream Apply topically 2 times daily as needed for other (back pain)       atorvastatin (LIPITOR) 80 MG tablet Take 80 mg by mouth daily        bisacodyl (DULCOLAX) 10 MG suppository Place 10 mg rectally daily as needed for constipation       cholestyramine (QUESTRAN) 4 g packet Take 1 packet by mouth every other day        clopidogrel (PLAVIX) 75 MG tablet Take 1 tablet (75 mg) by mouth daily Start taking medication the day after the procedure. 90 tablet 1     DULoxetine (CYMBALTA) 60 MG capsule Take 120 mg by mouth daily       erenumab-aooe (AIMOVIG, 140 MG DOSE,) 70 MG/ML injection Inject 140 mg Subcutaneous every 30 days       ferrous sulfate (FEROSUL) 325 (65 Fe) MG tablet Take 325 mg by mouth daily (with breakfast)        gabapentin (NEURONTIN) 100 MG capsule Take 100 mg by mouth 2 times daily (before meals)       gabapentin (NEURONTIN) 300 MG capsule Take 600 mg by mouth At Bedtime       glipiZIDE (GLUCOTROL) 5 MG tablet Take 2.5 mg by mouth daily before breakfast       HYDROcodone-acetaminophen (NORCO) 5-325 MG tablet Take 1-2 tablets by mouth every 6 hours as needed for moderate to severe pain (Patient taking differently: Take 1 tablet by mouth every 6 hours as needed for moderate to severe pain) 12 tablet 0     loperamide (IMODIUM) 2 MG capsule Take 2 mg by mouth 4 times daily as needed for diarrhea       losartan (COZAAR) 25 MG tablet Take 25 mg by mouth At Bedtime        magnesium chloride 535 (64 Mg) MG TBEC CR tablet Take 1,070 mg by mouth 2 times daily       metFORMIN (GLUCOPHAGE) 500 MG tablet Take 500 mg by mouth daily (with dinner)       metFORMIN (GLUCOPHAGE) 500 MG tablet Take 1,000 mg by mouth daily (with breakfast)       metoprolol succinate ER (TOPROL-XL) 25 MG 24 hr tablet Take 75 mg by mouth daily        mineral oil-hydrophilic petrolatum (AQUAPHOR) external ointment Apply topically 3 times daily as needed for irritation or dry skin Apply to forehead and  "other areas as needed       mirtazapine (REMERON) 7.5 MG tablet Take 7.5 mg by mouth At Bedtime        Nitroglycerin (NITROSTAT SL) Place 0.4 mg under the tongue every 5 minutes as needed for chest pain        pantoprazole (PROTONIX) 40 MG EC tablet Take 40 mg by mouth daily before breakfast       polyethylene glycol (MIRALAX) 17 GM/Dose powder Take 1 packet by mouth daily as needed for constipation        senna-docusate (SENOKOT-S/PERICOLACE) 8.6-50 MG tablet Take 1 tablet by mouth 2 times daily       senna-docusate (SENOKOT-S/PERICOLACE) 8.6-50 MG tablet Take 1 tablet by mouth 2 times daily as needed for constipation In addition to scheduled doses       sodium chloride, PF, 0.9% PF flush 10 mLs by Intracatheter route 3 times daily       traZODone (DESYREL) 50 MG tablet Take 50 mg by mouth At Bedtime        vitamin C (ASCORBIC ACID) 500 MG tablet Take 500 mg by mouth daily        XARELTO ANTICOAGULANT 20 MG TABS tablet Take 20 mg by mouth daily (with breakfast)          PHYSICAL EXAM:  General: Patient is alert male, no distress.   Vitals: /77   Pulse 73   Temp 98.7  F (37.1  C)   Resp 18   Ht 1.702 m (5' 7\")   Wt 68.5 kg (151 lb)   SpO2 97%   BMI 23.65 kg/m    HEENT: Head is NCAT. Eyes show no injection or icterus. Nares negative. Oropharynx well hydrated.  Neck: No JVD.  Lungs: Non labored respirations.   Abdomen: Soft  : Deferred.  Extremities: Wound vac RLE at BKA stump. PICC RUE.  Musculoskeletal: No LLE swelling.   Skin: Wound vac R.  Psych: Mood is good.       LABS/DIAGNOSTIC DATA:  Component      Latest Ref Rng & Units 8/23/2022 8/24/2022 8/24/2022 8/24/2022            6:11 AM  8:33 AM  7:13 PM   RBC Count      4.40 - 5.90 10e6/uL 3.89 (L) 3.48 (L) 3.40 (L)    Hemoglobin      13.3 - 17.7 g/dL 9.9 (L) 8.9 (L) 8.7 (L)    Hematocrit      40.0 - 53.0 % 32.4 (L) 29.0 (L) 28.3 (L)    MCV      78 - 100 fL 83 83 83    MCH      26.5 - 33.0 pg 25.4 (L) 25.6 (L) 25.6 (L)    MCHC      31.5 - 36.5 g/dL " 30.6 (L) 30.7 (L) 30.7 (L)    RDW      10.0 - 15.0 % 14.5 14.7 14.8    Platelet Count      150 - 450 10e3/uL 433 323 315 255     Component      Latest Ref Rng & Units 8/25/2022 8/25/2022 8/26/2022 8/27/2022           6:05 AM  6:28 PM     RBC Count      4.40 - 5.90 10e6/uL 2.49 (L) 3.07 (L) 3.18 (L) 3.39 (L)   Hemoglobin      13.3 - 17.7 g/dL 6.4 (LL) 8.0 (L) 8.2 (L) 8.8 (L)   Hematocrit      40.0 - 53.0 % 20.9 (L) 26.3 (L) 27.3 (L) 29.1 (L)   MCV      78 - 100 fL 84 86 86 86   MCH      26.5 - 33.0 pg 25.7 (L) 26.1 (L) 25.8 (L) 26.0 (L)   MCHC      31.5 - 36.5 g/dL 30.6 (L) 30.4 (L) 30.0 (L) 30.2 (L)   RDW      10.0 - 15.0 % 14.6 14.7 14.6 14.7   Platelet Count      150 - 450 10e3/uL 249 291 251 296     Component      Latest Ref Rng & Units 8/29/2022 8/30/2022              RBC Count      4.40 - 5.90 10e6/uL 3.32 (L)    Hemoglobin      13.3 - 17.7 g/dL 8.6 (L)    Hematocrit      40.0 - 53.0 % 28.2 (L)    MCV      78 - 100 fL 85    MCH      26.5 - 33.0 pg 25.9 (L)    MCHC      31.5 - 36.5 g/dL 30.5 (L)    RDW      10.0 - 15.0 % 14.8    Platelet Count      150 - 450 10e3/uL 264 280     Component      Latest Ref Rng & Units 8/23/2022 8/24/2022 8/24/2022 8/25/2022            6:11 AM  8:33 AM    Creatinine      0.67 - 1.17 mg/dL 1.04 1.47 (H) 1.50 (H) 1.09   Sodium      136 - 145 mmol/L 134 (L) 136  137   Potassium      3.4 - 5.3 mmol/L       Urea Nitrogen      6.0 - 20.0 mg/dL       Chloride      98 - 107 mmol/L       Carbon Dioxide (CO2)      22 - 29 mmol/L       Anion Gap      7 - 15 mmol/L 14 8  4 (L)   Glucose      70 - 99 mg/dL       GFR Estimate      >60 mL/min/1.73m2 85 56 (L) 55 (L) 81   Calcium      8.6 - 10.0 mg/dL 10.2 10.0  8.6     Component      Latest Ref Rng & Units 8/26/2022 8/27/2022 8/28/2022 8/29/2022                Creatinine      0.67 - 1.17 mg/dL 1.00 0.99 0.97 1.04   Sodium      136 - 145 mmol/L 139   140   Potassium      3.4 - 5.3 mmol/L       Urea Nitrogen      6.0 - 20.0 mg/dL       Chloride       98 - 107 mmol/L       Carbon Dioxide (CO2)      22 - 29 mmol/L       Anion Gap      7 - 15 mmol/L 4 (L)   6   Glucose      70 - 99 mg/dL       GFR Estimate      >60 mL/min/1.73m2 89 >90 >90 85   Calcium      8.6 - 10.0 mg/dL 8.7   9.1         ASSESSMENT/PLAN:  1. Wound infection, MRSA. From R BKA on 8/5/2022. BC 8/23/2022 positive for MRSA. S/p irrigation and debridement on 8/24/2022 and then washout with partial closure and application of wound vac on 8/26/2022 per Dr. Montes. Had follow up today with recheck planned for 9/12/2022. On IV Vancomycin, pharmacy managing. Follow up with ID on 9/22/2022.   2. Anemia. Hgb dropped to 6.4, he did receive transfusion. Received Venofer in the hospital. Continue iron.   3. HTN. On metoprolol and losartan. Continue to monitor Bps.  4. Afib/Aflutter. On xarelto for anticoagulation. Adequate rate control.   5. Diabetes. On metformin and glipizide. Accuchecks followed. No changes needed.     Stable conditions:   6. CAD. Hx of MIs. Hx of stenting and prior CABG. On statin, beta blocker, xarelto. No chest pain.  7. Mood disorder. Mixed depression and anxiety. On duloxetine, Abilify. Also takes Mirtazapine.   8. Hx of stroke. Continue statin, xarelto.  9. Mild intellectual disabilities. He resides in an [facility], but has been in the hospital or this TCU since Dec 2021 related to above issues.            Electronically signed by: Heidi Xie MD           Sincerely,        Heidi Xie MD

## 2022-09-08 NOTE — PROGRESS NOTES
Patient to underwent right leg below-knee amputation by me on August 5 for Charcot foot that despite revascularization was not likely to heal to be a useful limb.  Operation performed is a single-stage below-knee amputation despite some mild but stable ongoing infection of the limb.  Patient returned with an abscess of the stump and required exploration and drainage by me on August 24.  He subsequently underwent revision of the stump and partial closure on August 26 with placement of a VAC.  He now returns from transitional care for reevaluation.  Has been doing very well and is in good spirits.  Still on IV antibiotics with infectious disease following.    /70   Pulse 76   Temp 97.5  F (36.4  C)   Resp 18     Stump incision line nicely opposed except for area where VAC was placed.  This area appears to be prematurely closing and was reopened to a depth of 2-1/2 cm.  At the base of this there clearly is bone palpable.  No overt signs of infection but some fibrinous exudate in the wound.    Impression and plan: Overall doing very well.  Need to make sure that the VAC sponges placed deep enough so that it can properly drain the cavity and prevent recurrence of an abscess.  We will relay this to the transitional care team.  I will reassess at this coming Monday to make sure that things are going well.  If this cannot be well done we might have to switch to daily packings at the transitional care but I would like to avoid this for comfort reasons.

## 2022-09-08 NOTE — PROGRESS NOTES
[facility]        Patient is here for a 4 week follow up  to discuss s/p right BKA.  Denies pain.  Using wound vac.    Pt is currently taking Statin, Plavix and Xarelto.    /70   Pulse 76   Temp 97.5  F (36.4  C)   Resp 18     The provider has been notified that the patient has no concerns.     Questions patient would like addressed today are: N/A.    Refills are needed: No    Has homecare services and agency name:  Polly, at [facility]       Lizy Vergara RN

## 2022-09-09 VITALS
DIASTOLIC BLOOD PRESSURE: 77 MMHG | SYSTOLIC BLOOD PRESSURE: 128 MMHG | OXYGEN SATURATION: 97 % | HEART RATE: 73 BPM | WEIGHT: 151 LBS | TEMPERATURE: 98.7 F | BODY MASS INDEX: 23.7 KG/M2 | RESPIRATION RATE: 18 BRPM | HEIGHT: 67 IN

## 2022-09-09 LAB — HGB BLD-MCNC: 9 G/DL (ref 13.3–17.7)

## 2022-09-09 PROCEDURE — P9604 ONE-WAY ALLOW PRORATED TRIP: HCPCS | Mod: ORL | Performed by: SURGERY

## 2022-09-09 PROCEDURE — 85018 HEMOGLOBIN: CPT | Mod: ORL | Performed by: SURGERY

## 2022-09-09 PROCEDURE — 36415 COLL VENOUS BLD VENIPUNCTURE: CPT | Mod: ORL | Performed by: SURGERY

## 2022-09-12 ENCOUNTER — OFFICE VISIT (OUTPATIENT)
Dept: VASCULAR SURGERY | Facility: CLINIC | Age: 52
End: 2022-09-12
Attending: SURGERY
Payer: MEDICARE

## 2022-09-12 ENCOUNTER — LAB REQUISITION (OUTPATIENT)
Dept: LAB | Facility: CLINIC | Age: 52
End: 2022-09-12
Payer: MEDICARE

## 2022-09-12 VITALS — HEART RATE: 72 BPM | DIASTOLIC BLOOD PRESSURE: 68 MMHG | SYSTOLIC BLOOD PRESSURE: 118 MMHG

## 2022-09-12 DIAGNOSIS — L03.119 CELLULITIS OF UNSPECIFIED PART OF LIMB: ICD-10-CM

## 2022-09-12 DIAGNOSIS — Z89.511 STATUS POST BELOW-KNEE AMPUTATION OF RIGHT LOWER EXTREMITY (H): Primary | ICD-10-CM

## 2022-09-12 LAB
ANION GAP SERPL CALCULATED.3IONS-SCNC: 13 MMOL/L (ref 7–15)
BASOPHILS # BLD AUTO: 0.1 10E3/UL (ref 0–0.2)
BASOPHILS NFR BLD AUTO: 2 %
BILIRUB SERPL-MCNC: 0.3 MG/DL
BUN SERPL-MCNC: 18.6 MG/DL (ref 6–20)
CALCIUM SERPL-MCNC: 10.2 MG/DL (ref 8.6–10)
CHLORIDE SERPL-SCNC: 100 MMOL/L (ref 98–107)
CREAT SERPL-MCNC: 0.89 MG/DL (ref 0.67–1.17)
CRP SERPL-MCNC: 8.71 MG/L
DEPRECATED HCO3 PLAS-SCNC: 24 MMOL/L (ref 22–29)
EOSINOPHIL # BLD AUTO: 0.2 10E3/UL (ref 0–0.7)
EOSINOPHIL NFR BLD AUTO: 5 %
ERYTHROCYTE [DISTWIDTH] IN BLOOD BY AUTOMATED COUNT: 16.4 % (ref 10–15)
GFR SERPL CREATININE-BSD FRML MDRD: >90 ML/MIN/1.73M2
GLUCOSE SERPL-MCNC: 109 MG/DL (ref 70–99)
HCT VFR BLD AUTO: 33.5 % (ref 40–53)
HGB BLD-MCNC: 10.3 G/DL (ref 13.3–17.7)
IMM GRANULOCYTES # BLD: 0 10E3/UL
IMM GRANULOCYTES NFR BLD: 0 %
LYMPHOCYTES # BLD AUTO: 1.2 10E3/UL (ref 0.8–5.3)
LYMPHOCYTES NFR BLD AUTO: 25 %
MAGNESIUM SERPL-MCNC: 1.6 MG/DL (ref 1.7–2.3)
MCH RBC QN AUTO: 26.8 PG (ref 26.5–33)
MCHC RBC AUTO-ENTMCNC: 30.7 G/DL (ref 31.5–36.5)
MCV RBC AUTO: 87 FL (ref 78–100)
MONOCYTES # BLD AUTO: 0.3 10E3/UL (ref 0–1.3)
MONOCYTES NFR BLD AUTO: 7 %
NEUTROPHILS # BLD AUTO: 2.9 10E3/UL (ref 1.6–8.3)
NEUTROPHILS NFR BLD AUTO: 61 %
NRBC # BLD AUTO: 0 10E3/UL
NRBC BLD AUTO-RTO: 0 /100
PLATELET # BLD AUTO: 185 10E3/UL (ref 150–450)
POTASSIUM SERPL-SCNC: 4.4 MMOL/L (ref 3.4–5.3)
RBC # BLD AUTO: 3.85 10E6/UL (ref 4.4–5.9)
SODIUM SERPL-SCNC: 137 MMOL/L (ref 136–145)
WBC # BLD AUTO: 4.7 10E3/UL (ref 4–11)

## 2022-09-12 PROCEDURE — G0463 HOSPITAL OUTPT CLINIC VISIT: HCPCS

## 2022-09-12 PROCEDURE — 36415 COLL VENOUS BLD VENIPUNCTURE: CPT | Mod: ORL | Performed by: INTERNAL MEDICINE

## 2022-09-12 PROCEDURE — 85025 COMPLETE CBC W/AUTO DIFF WBC: CPT | Mod: ORL | Performed by: INTERNAL MEDICINE

## 2022-09-12 PROCEDURE — P9604 ONE-WAY ALLOW PRORATED TRIP: HCPCS | Mod: ORL | Performed by: INTERNAL MEDICINE

## 2022-09-12 PROCEDURE — 80048 BASIC METABOLIC PNL TOTAL CA: CPT | Mod: ORL | Performed by: INTERNAL MEDICINE

## 2022-09-12 PROCEDURE — 83735 ASSAY OF MAGNESIUM: CPT | Mod: ORL | Performed by: INTERNAL MEDICINE

## 2022-09-12 PROCEDURE — 86140 C-REACTIVE PROTEIN: CPT | Mod: ORL | Performed by: INTERNAL MEDICINE

## 2022-09-12 PROCEDURE — 99024 POSTOP FOLLOW-UP VISIT: CPT | Performed by: SURGERY

## 2022-09-12 PROCEDURE — 82247 BILIRUBIN TOTAL: CPT | Mod: ORL | Performed by: INTERNAL MEDICINE

## 2022-09-12 ASSESSMENT — PAIN SCALES - GENERAL: PAINLEVEL: EXTREME PAIN (8)

## 2022-09-12 NOTE — PROGRESS NOTES
Patient seen in follow-up for his below-knee amputation stump infection.  When we last saw him in clinic last week the VAC was just at the skin surface and so we opened things up a little deeper and packed things with recommendations to get the VAC into red depth.  When he comes to clinic today the VAC is still on the surface.  It is certainly possible that they are packing it deep but it is working its way out.  We note that they are not using a proper KCI VAC.    Patient is otherwise in good spirits and otherwise things appear to be going reasonably well.    /68   Pulse 72     Wound with fibrinous exudate lining it.  Bone still palpable at the depth of the wound.    Impression and plan: Will continue to try to use a VAC as this is the best chance of healing the wound and getting granulation to develop over the bone.  Will try to have his transitional care switch to a formal KCI VAC.  We will see him back on Wednesday to see how that is going in Emily, our wound care NP's clinic.  I will plan on seeing him in about a month to see how things are going.

## 2022-09-12 NOTE — NURSING NOTE
Writer called and spoke with CESARIO Stiles at [facility].     Requested KCI wound vac be ordered if possible  Need to continue wound vac and pack the black sponge to the depth of the wound  Have WOC nurse change when possible

## 2022-09-12 NOTE — PATIENT INSTRUCTIONS
- Continue wound vac, have WOC nurse do dressing change if possible  - Order KCI wound vac if possible  - Apply foam to all the way to the depth of the wound  - Pt will follow with wound care provider Emily Lynn, until wound is healed

## 2022-09-13 ENCOUNTER — TRANSITIONAL CARE UNIT VISIT (OUTPATIENT)
Dept: GERIATRICS | Facility: CLINIC | Age: 52
End: 2022-09-13

## 2022-09-13 VITALS
HEART RATE: 68 BPM | RESPIRATION RATE: 18 BRPM | BODY MASS INDEX: 23.7 KG/M2 | OXYGEN SATURATION: 96 % | WEIGHT: 151 LBS | TEMPERATURE: 98.3 F | HEIGHT: 67 IN | DIASTOLIC BLOOD PRESSURE: 69 MMHG | SYSTOLIC BLOOD PRESSURE: 115 MMHG

## 2022-09-13 DIAGNOSIS — D62 ABLA (ACUTE BLOOD LOSS ANEMIA): ICD-10-CM

## 2022-09-13 DIAGNOSIS — I10 ESSENTIAL HYPERTENSION: ICD-10-CM

## 2022-09-13 DIAGNOSIS — T14.8XXA WOUND INFECTION: ICD-10-CM

## 2022-09-13 DIAGNOSIS — L08.9 WOUND INFECTION: ICD-10-CM

## 2022-09-13 DIAGNOSIS — I48.0 PAROXYSMAL ATRIAL FIBRILLATION (H): ICD-10-CM

## 2022-09-13 DIAGNOSIS — Z89.511 STATUS POST BELOW-KNEE AMPUTATION OF RIGHT LOWER EXTREMITY (H): Primary | ICD-10-CM

## 2022-09-13 LAB
ERYTHROCYTE [SEDIMENTATION RATE] IN BLOOD BY WESTERGREN METHOD: 15 MM/HR (ref 0–20)
VANCOMYCIN SERPL-MCNC: 14.2 UG/ML

## 2022-09-13 PROCEDURE — 80202 ASSAY OF VANCOMYCIN: CPT | Mod: ORL | Performed by: INTERNAL MEDICINE

## 2022-09-13 PROCEDURE — 36415 COLL VENOUS BLD VENIPUNCTURE: CPT | Mod: ORL | Performed by: INTERNAL MEDICINE

## 2022-09-13 PROCEDURE — 99309 SBSQ NF CARE MODERATE MDM 30: CPT | Performed by: FAMILY MEDICINE

## 2022-09-13 PROCEDURE — 85652 RBC SED RATE AUTOMATED: CPT | Mod: ORL | Performed by: INTERNAL MEDICINE

## 2022-09-13 PROCEDURE — P9604 ONE-WAY ALLOW PRORATED TRIP: HCPCS | Mod: ORL | Performed by: INTERNAL MEDICINE

## 2022-09-13 NOTE — LETTER
9/13/2022        RE: Cm Lundberg  [address]  [place] [address]          [facility] GERIATRICS  [place] Medical Record Number:  [id number]  Place of Service where encounter took place: [facility] ([facility]) [[id number]]   CODE STATUS:   CPR/Full code     Chief Complaint/Reason for Visit:  Chief Complaint   Patient presents with     RECHECK     TCU 9/13/2022. 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       TCU HPI:    Cm Lundberg is a 52 year old male with hx of CAD, s/p stents and CABG, HTN, DM, prior CVA, afib/flutter on xarelto, who has been residing in TCU at [facility] since Dec 2021 surgery for chronic long standing right foot ulceration. He underwent incision and drainage of the right foot, excisional debridement of the ulceration of the right foot into the level of the muscle, under MAC with popliteal block anesthesia on 12/17/2021. He completed abx per ID recommendations. Remained NWB with wound vac, had 1/27/2022 incision and drainage with application of theraskin. On 4/4/2022 again incision and drainage with theraskin. Continued following with podiatry, referred to wound and vascular due to non healing diabetic ulcer, right foot. S/p angioplasty RLE and hyperbaric oxygen therapy. MRI with indications of osteomyelitis. 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    He was sent to the hospital from TCU on 8/23/2022 due to swelling, and drainage from surgical wound, concern for infection. His hospital discharge summary is partially excerpted below.     [facility]  Medicine Progress Note - Hospitalist Service  (Note: This note is listed as the discharge summary)  Date of Admission:  8/23/2022    Assessment & Plan  Cm Lundberg is a 52 year old male admitted on 8/23/2022.      Assessment:  #Infected right BKA stump.  CT right leg showed evolving abscess.  Patient not septic.  Blood cultures obtained.  On  vancomycin and Zosyn.  S/p I&D 8/24, culture sent.  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 Valderrama.       #MRSA bacteremia, likely due to right BKA stump abscess.  Positive BC on 8/23, 8/25.  Surveillance BC obtained 8/26, 8/27, 8/28.    PICC on 8/29.  Plan for 28 days of IV vancomycin post clearance of blood, till September 23.     #Acute renal failure, creat peaked at 1.47 on 8/24, was 1.04 on admission.  Likely due to sepsis and dehydration, given rising BUN.  With IVF renal function normalizing.     #Chronic normocytic anemia, hemoglobin baseline 8-9.  History of iron deficiency.  Iron checked low at 16 on 8/25/2022.    Replacing with Venofer 2 doses on 8/27 and 8/28, then p.o. replacement.     #Acute anemia, hemoglobin 6.4 on 8/25.  Reported intraoperative EBL 10 mL.  No postop bleeding.  S/p 1 unit PRBCs 8/25.HgB in upper 8's.     #CVD, history of stroke.  On Plavix, statin.     #Atrial fibrillation.  Was on Xarelto, held preoperatively.  Resume when okay per surgery.     #Coronary artery disease, status post CABG and stents.  On Plavix, beta-blockers, fibrate.     #Non-insulin-dependent diabetes mellitus.  Well-controlled with Glucophage and glipizide.  Initially held due to RANDAL.  Resumed now.  A1c 5.9 on 8/26.  A1C 5.9 but Hgb 8.2 at the time so less accurate.      #Mood disorder:general anxiety and depression, stable on Abilify, Cymbalta, Ativan, Remeron, trazodone.     Essential hypertension.  BP controlled on losartan and metoprolol.  Peripheral neuropathy, due to diabetes.  Resident of [facility] prior to recent past hospitalization TCU.     Overall stabilized and discharged back to TCU on 8/30/2022 for PT, OT, nursing cares, medical management and monitoring.       Today:  He has been tolerating IV Vancomycin, PICC RUE. Last day is 9/25/2022 with follow up with ID on 9/22/2022. He saw Dr. Montes on 9/8/2022 with concern about the appearance of the wound and healing so he  had follow up yesterday 9/12/2022. Referred to wound NP for next visit. He is in good spirits. Tolerating abx, tolerating vac changes. Pain is controlled, has prn Norco. Appetite is good. No bowel or bladder concerns. No respiratory issues. Labs being sent to ID. Medical problems, aflutter/afib, CAD, DM, HTN, managed with his usual medications. Diabetes on metformin and glipizide. Sleeping well.        PAST MEDICAL HISTORY:  Past Medical History:   Diagnosis Date     Anemia      Anemia, unspecified type 08/19/2016     Anxiety      Anxiety state 07/29/2021    Formatting of this note might be different from the original. Created by Conversion  Replacement Utility updated for latest IMO load     Arthritis      Atrial fibrillation and flutter (H)      Cerebral artery occlusion with cerebral infarction (H)      Cerebral infarction (H)      Chest pain, unspecified type 08/19/2016     Chronic atrial fibrillation (H)      Congestive heart failure (H)      Coronary artery disease     CABG with multiple stents     Coronary artery disease involving native coronary artery without angina pectoris, unspecified whether native or transplanted heart 08/19/2016     Coronary atherosclerosis 07/29/2021    Formatting of this note might be different from the original. Created by Conversion  Replacement Utility updated for latest IMO load     Depression      Diabetic ulcer of right midfoot associated with type 2 diabetes mellitus, limited to breakdown of skin (H) 09/28/2017     Equinovarus acquired deformity, right 04/14/2021     Equinovarus deformity, acquired, right      Essential hypertension 08/19/2016     GERD (gastroesophageal reflux disease)      Heart attack (H)      History of blood transfusion      Hyperlipidemia      Hyperlipidemia, unspecified hyperlipidemia type 08/19/2016     Hypertension      Insomnia, unspecified type 08/19/2016     Ischemic cardiomyopathy      Late effects of cerebrovascular accident 08/19/2016     Major  depressive disorder with single episode, remission status unspecified 08/19/2016     Microalbuminuria 07/29/2021    Formatting of this note might be different from the original. 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 Created by Conversion [facility] Annotation: Feb 11 2010  1:41PM - Leonid Cabral: CANNOT READ OR  WRITE     PVD (peripheral vascular disease) (H)      Stented coronary artery      Type 2 diabetes mellitus (H) 07/29/2021    Formatting of this note might be different from the original. Created by Conversion     Type II or unspecified type diabetes mellitus with ophthalmic manifestations, not stated as uncontrolled(250.50) (H) 07/29/2021    Formatting of this note might be different from the original. Created by Conversion     Weakness 08/19/2016       MEDICATIONS:  Current Outpatient Medications   Medication Sig Dispense Refill     acetaminophen (TYLENOL) 325 MG tablet Take 650 mg by mouth every 6 hours as needed for fever or pain       acetaminophen (TYLENOL) 325 MG tablet Take 650 mg by mouth 2 times daily       ARIPiprazole (ABILIFY) 2 MG tablet 2 mg daily        ASPERCREME ORIGINAL 10 % external cream Apply topically 2 times daily as needed for other (back pain)       atorvastatin (LIPITOR) 80 MG tablet Take 80 mg by mouth daily        bisacodyl (DULCOLAX) 10 MG suppository Place 10 mg rectally daily as needed for constipation       cholestyramine (QUESTRAN) 4 g packet Take 1 packet by mouth every other day        clopidogrel (PLAVIX) 75 MG tablet Take 1 tablet (75 mg) by mouth daily Start taking medication the day after the procedure. 90 tablet 1     DULoxetine (CYMBALTA) 60 MG capsule Take 120 mg by mouth daily       erenumab-aooe  (AIMOVIG, 140 MG DOSE,) 70 MG/ML injection Inject 140 mg Subcutaneous every 30 days       ferrous sulfate (FEROSUL) 325 (65 Fe) MG tablet Take 325 mg by mouth daily (with breakfast)        gabapentin (NEURONTIN) 100 MG capsule Take 100 mg by mouth 2 times daily (before meals)       gabapentin (NEURONTIN) 300 MG capsule Take 600 mg by mouth At Bedtime       glipiZIDE (GLUCOTROL) 5 MG tablet Take 2.5 mg by mouth daily before breakfast       HYDROcodone-acetaminophen (NORCO) 5-325 MG tablet Take 1-2 tablets by mouth every 6 hours as needed for moderate to severe pain (Patient taking differently: Take 1 tablet by mouth every 6 hours as needed for moderate to severe pain) 12 tablet 0     loperamide (IMODIUM) 2 MG capsule Take 2 mg by mouth 4 times daily as needed for diarrhea       losartan (COZAAR) 25 MG tablet Take 25 mg by mouth At Bedtime        magnesium chloride 535 (64 Mg) MG TBEC CR tablet Take 1,070 mg by mouth 2 times daily       metFORMIN (GLUCOPHAGE) 500 MG tablet Take 500 mg by mouth daily (with dinner)       metFORMIN (GLUCOPHAGE) 500 MG tablet Take 1,000 mg by mouth daily (with breakfast)       metoprolol succinate ER (TOPROL-XL) 25 MG 24 hr tablet Take 75 mg by mouth daily        mineral oil-hydrophilic petrolatum (AQUAPHOR) external ointment Apply topically 3 times daily as needed for irritation or dry skin Apply to forehead and other areas as needed       mirtazapine (REMERON) 7.5 MG tablet Take 7.5 mg by mouth At Bedtime        Nitroglycerin (NITROSTAT SL) Place 0.4 mg under the tongue every 5 minutes as needed for chest pain        pantoprazole (PROTONIX) 40 MG EC tablet Take 40 mg by mouth daily before breakfast       polyethylene glycol (MIRALAX) 17 GM/Dose powder Take 1 packet by mouth daily as needed for constipation        senna-docusate (SENOKOT-S/PERICOLACE) 8.6-50 MG tablet Take 1 tablet by mouth 2 times daily       senna-docusate (SENOKOT-S/PERICOLACE) 8.6-50 MG tablet Take 1 tablet by mouth  "2 times daily as needed for constipation In addition to scheduled doses       sodium chloride, PF, 0.9% PF flush 10 mLs by Intracatheter route 3 times daily       traZODone (DESYREL) 50 MG tablet Take 50 mg by mouth At Bedtime        vitamin C (ASCORBIC ACID) 500 MG tablet Take 500 mg by mouth daily        XARELTO ANTICOAGULANT 20 MG TABS tablet Take 20 mg by mouth daily (with breakfast)          PHYSICAL EXAM:  General: Patient is alert male, no distress.   Vitals: /69   Pulse 68   Temp 98.3  F (36.8  C)   Resp 18   Ht 1.702 m (5' 7\")   Wt 68.5 kg (151 lb)   SpO2 96%   BMI 23.65 kg/m    HEENT: Head is NCAT. Eyes show no injection or icterus. Nares negative. Oropharynx moist.  Neck: No JVD.  Lungs: Non labored respirations.   Abdomen: Soft.   : Deferred.  Extremities: Wound vac RLE at BKA stump. RUE PICC.  Musculoskeletal: No swelling of LLE.   Skin: Vac RLE.   Psych: Mood is good.       LABS/DIAGNOSTIC DATA:  Component      Latest Ref Rng & Units 8/23/2022 8/24/2022 8/24/2022 8/24/2022            6:11 AM  8:33 AM  7:13 PM   RBC Count      4.40 - 5.90 10e6/uL 3.89 (L) 3.48 (L) 3.40 (L)    Hemoglobin      13.3 - 17.7 g/dL 9.9 (L) 8.9 (L) 8.7 (L)    Hematocrit      40.0 - 53.0 % 32.4 (L) 29.0 (L) 28.3 (L)    MCV      78 - 100 fL 83 83 83    MCH      26.5 - 33.0 pg 25.4 (L) 25.6 (L) 25.6 (L)    MCHC      31.5 - 36.5 g/dL 30.6 (L) 30.7 (L) 30.7 (L)    RDW      10.0 - 15.0 % 14.5 14.7 14.8    Platelet Count      150 - 450 10e3/uL 433 323 315 255     Component      Latest Ref Rng & Units 8/25/2022 8/25/2022 8/26/2022 8/27/2022           6:05 AM  6:28 PM     RBC Count      4.40 - 5.90 10e6/uL 2.49 (L) 3.07 (L) 3.18 (L) 3.39 (L)   Hemoglobin      13.3 - 17.7 g/dL 6.4 (LL) 8.0 (L) 8.2 (L) 8.8 (L)   Hematocrit      40.0 - 53.0 % 20.9 (L) 26.3 (L) 27.3 (L) 29.1 (L)   MCV      78 - 100 fL 84 86 86 86   MCH      26.5 - 33.0 pg 25.7 (L) 26.1 (L) 25.8 (L) 26.0 (L)   MCHC      31.5 - 36.5 g/dL 30.6 (L) 30.4 (L) 30.0 " (L) 30.2 (L)   RDW      10.0 - 15.0 % 14.6 14.7 14.6 14.7   Platelet Count      150 - 450 10e3/uL 249 291 251 296       Component      Latest Ref Rng & Units 9/12/2022             WBC      4.0 - 11.0 10e3/uL 4.7   RBC Count      4.40 - 5.90 10e6/uL 3.85 (L)   Hemoglobin      13.3 - 17.7 g/dL 10.3 (L)   Hematocrit      40.0 - 53.0 % 33.5 (L)   MCV      78 - 100 fL 87   MCH      26.5 - 33.0 pg 26.8   MCHC      31.5 - 36.5 g/dL 30.7 (L)   RDW      10.0 - 15.0 % 16.4 (H)   Platelet Count      150 - 450 10e3/uL 185       Component      Latest Ref Rng & Units 8/23/2022 8/24/2022 8/24/2022 8/25/2022            6:11 AM  8:33 AM    Creatinine      0.67 - 1.17 mg/dL 1.04 1.47 (H) 1.50 (H) 1.09   Sodium      136 - 145 mmol/L 134 (L) 136  137   Potassium      3.4 - 5.3 mmol/L       Urea Nitrogen      6.0 - 20.0 mg/dL       Chloride      98 - 107 mmol/L       Carbon Dioxide (CO2)      22 - 29 mmol/L       Anion Gap      7 - 15 mmol/L 14 8  4 (L)   Glucose      70 - 99 mg/dL       GFR Estimate      >60 mL/min/1.73m2 85 56 (L) 55 (L) 81   Calcium      8.6 - 10.0 mg/dL 10.2 10.0  8.6     Component      Latest Ref Rng & Units 8/26/2022 8/27/2022 8/28/2022 8/29/2022                Creatinine      0.67 - 1.17 mg/dL 1.00 0.99 0.97 1.04   Sodium      136 - 145 mmol/L 139   140   Potassium      3.4 - 5.3 mmol/L       Urea Nitrogen      6.0 - 20.0 mg/dL       Chloride      98 - 107 mmol/L       Carbon Dioxide (CO2)      22 - 29 mmol/L       Anion Gap      7 - 15 mmol/L 4 (L)   6   Glucose      70 - 99 mg/dL       GFR Estimate      >60 mL/min/1.73m2 89 >90 >90 85   Calcium      8.6 - 10.0 mg/dL 8.7   9.1         ASSESSMENT/PLAN:  1. R BKA (8/5/2022) wound infection. MRSA on BC.  S/p irrigation and debridement on 8/24/2022 and then washout with partial closure and application of wound vac on 8/26/2022 per Dr. Montes. Had follow up 9/8/2022 and 9/12/2022, follow up next with wound NP.  On IV Vancomycin, pharmacy managing. Follow up with ID  on 9/22/2022.   2. Anemia. Hgb dropped to 6.4, he did receive transfusion. Received Venofer in the hospital. Last hgb 10.3 on 9/12/2022. Continue iron.  3. HTN. On metoprolol and losartan. Continue to monitor Bps.  4. Afib/Aflutter. On xarelto for anticoagulation. Adequate rate control.   5. Diabetes. On metformin and glipizide, continue. Accuchecks followed.     Stable conditions:   6. CAD. Hx of MIs. Hx of stenting and prior CABG. On statin, beta blocker, xarelto. No chest pain.  7. Mood disorder. Mixed depression and anxiety. On duloxetine, Abilify. Also takes Mirtazapine.   8. Hx of stroke. Continue statin, xarelto.  9. Mild intellectual disabilities. He resides in an [facility], but has been in the hospital or this TCU since Dec 2021 related to above issues.            Electronically signed by: Heidi Xie MD           Sincerely,        Heidi Xie MD

## 2022-09-15 ENCOUNTER — TRANSITIONAL CARE UNIT VISIT (OUTPATIENT)
Dept: GERIATRICS | Facility: CLINIC | Age: 52
End: 2022-09-15
Payer: MEDICARE

## 2022-09-15 ENCOUNTER — LAB REQUISITION (OUTPATIENT)
Dept: LAB | Facility: CLINIC | Age: 52
End: 2022-09-15
Payer: MEDICARE

## 2022-09-15 VITALS
TEMPERATURE: 98.5 F | RESPIRATION RATE: 20 BRPM | HEART RATE: 75 BPM | BODY MASS INDEX: 23.7 KG/M2 | SYSTOLIC BLOOD PRESSURE: 121 MMHG | HEIGHT: 67 IN | WEIGHT: 151 LBS | DIASTOLIC BLOOD PRESSURE: 80 MMHG | OXYGEN SATURATION: 99 %

## 2022-09-15 DIAGNOSIS — L08.9 WOUND INFECTION: ICD-10-CM

## 2022-09-15 DIAGNOSIS — L03.119 CELLULITIS OF UNSPECIFIED PART OF LIMB: ICD-10-CM

## 2022-09-15 DIAGNOSIS — T14.8XXA WOUND INFECTION: ICD-10-CM

## 2022-09-15 DIAGNOSIS — I10 ESSENTIAL HYPERTENSION: ICD-10-CM

## 2022-09-15 DIAGNOSIS — I48.0 PAROXYSMAL ATRIAL FIBRILLATION (H): ICD-10-CM

## 2022-09-15 DIAGNOSIS — D62 ABLA (ACUTE BLOOD LOSS ANEMIA): ICD-10-CM

## 2022-09-15 DIAGNOSIS — Z89.511 STATUS POST BELOW-KNEE AMPUTATION OF RIGHT LOWER EXTREMITY (H): Primary | ICD-10-CM

## 2022-09-15 PROCEDURE — 99309 SBSQ NF CARE MODERATE MDM 30: CPT | Performed by: FAMILY MEDICINE

## 2022-09-15 NOTE — LETTER
9/15/2022        RE: Cm Lundberg  [address]  [place] [address]          [facility] GERIATRICS  [facility] Record Number:  [id number]  Place of Service where encounter took place: [facility] ([facility]) [[id number]]   CODE STATUS:   CPR/Full code     Chief Complaint/Reason for Visit:  Chief Complaint   Patient presents with     RECHECK     TCU 9/15/2022. 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       TCU HPI:    Cm Lundberg is a 52 year old male with hx of CAD, s/p stents and CABG, HTN, DM, prior CVA, afib/flutter on xarelto, who has been residing in TCU at [facility] since Dec 2021 surgery for chronic long standing right foot ulceration. He underwent incision and drainage of the right foot, excisional debridement of the ulceration of the right foot into the level of the muscle, under MAC with popliteal block anesthesia on 12/17/2021. He completed abx per ID recommendations. Remained NWB with wound vac, had 1/27/2022 incision and drainage with application of theraskin. On 4/4/2022 again incision and drainage with theraskin. Continued following with podiatry, referred to wound and vascular due to non healing diabetic ulcer, right foot. S/p angioplasty RLE and hyperbaric oxygen therapy. MRI with indications of osteomyelitis. 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    He was sent to the hospital from TCU on 8/23/2022 due to swelling, and drainage from surgical wound, concern for infection. His hospital discharge summary is partially excerpted below.     [facility]  Medicine Progress Note - Hospitalist Service  (Note: This note is listed as the discharge summary)  Date of Admission:  8/23/2022    Assessment & Plan  Cm Lundberg is a 52 year old male admitted on 8/23/2022.      Assessment:  #Infected right BKA stump.  CT right leg showed evolving abscess.  Patient not septic.  Blood cultures obtained.  On  vancomycin and Zosyn.  S/p I&D 8/24, culture sent.  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 Valderrama.       #MRSA bacteremia, likely due to right BKA stump abscess.  Positive BC on 8/23, 8/25.  Surveillance BC obtained 8/26, 8/27, 8/28.    PICC on 8/29.  Plan for 28 days of IV vancomycin post clearance of blood, till September 23.     #Acute renal failure, creat peaked at 1.47 on 8/24, was 1.04 on admission.  Likely due to sepsis and dehydration, given rising BUN.  With IVF renal function normalizing.     #Chronic normocytic anemia, hemoglobin baseline 8-9.  History of iron deficiency.  Iron checked low at 16 on 8/25/2022.    Replacing with Venofer 2 doses on 8/27 and 8/28, then p.o. replacement.     #Acute anemia, hemoglobin 6.4 on 8/25.  Reported intraoperative EBL 10 mL.  No postop bleeding.  S/p 1 unit PRBCs 8/25.HgB in upper 8's.     #CVD, history of stroke.  On Plavix, statin.     #Atrial fibrillation.  Was on Xarelto, held preoperatively.  Resume when okay per surgery.     #Coronary artery disease, status post CABG and stents.  On Plavix, beta-blockers, fibrate.     #Non-insulin-dependent diabetes mellitus.  Well-controlled with Glucophage and glipizide.  Initially held due to RANDAL.  Resumed now.  A1c 5.9 on 8/26.  A1C 5.9 but Hgb 8.2 at the time so less accurate.      #Mood disorder:general anxiety and depression, stable on Abilify, Cymbalta, Ativan, Remeron, trazodone.     Essential hypertension.  BP controlled on losartan and metoprolol.  Peripheral neuropathy, due to diabetes.  Resident of [facility] prior to recent past hospitalization TCU.     Overall stabilized and discharged back to TCU on 8/30/2022 for PT, OT, nursing cares, medical management and monitoring.       Today:  He has been stable, tolerating IV vanco, weekly labs, managed by pharmacy, labs to ID, appt on 9/22/2022. Sees wound NP on 9/22/2022 after prior visits with Dr. Montes. Continues NWB RLE with wound  vac. Appetite is good. No fever. No abdominal pain or constipation. No chest pain or shortness of breath. Likes to watch videos on Ipad or TV. Sleeping well. No new concerns per nursing.  Medical problems, aflutter/afib, CAD, DM, HTN, managed with his usual medications. Diabetes on metformin and glipizide.        PAST MEDICAL HISTORY:  Past Medical History:   Diagnosis Date     Anemia      Anemia, unspecified type 08/19/2016     Anxiety      Anxiety state 07/29/2021    Formatting of this note might be different from the original. Created by Conversion  Replacement Utility updated for latest IMO load     Arthritis      Atrial fibrillation and flutter (H)      Cerebral artery occlusion with cerebral infarction (H)      Cerebral infarction (H)      Chest pain, unspecified type 08/19/2016     Chronic atrial fibrillation (H)      Congestive heart failure (H)      Coronary artery disease     CABG with multiple stents     Coronary artery disease involving native coronary artery without angina pectoris, unspecified whether native or transplanted heart 08/19/2016     Coronary atherosclerosis 07/29/2021    Formatting of this note might be different from the original. Created by Conversion  Replacement Utility updated for latest IMO load     Depression      Diabetic ulcer of right midfoot associated with type 2 diabetes mellitus, limited to breakdown of skin (H) 09/28/2017     Equinovarus acquired deformity, right 04/14/2021     Equinovarus deformity, acquired, right      Essential hypertension 08/19/2016     GERD (gastroesophageal reflux disease)      Heart attack (H)      History of blood transfusion      Hyperlipidemia      Hyperlipidemia, unspecified hyperlipidemia type 08/19/2016     Hypertension      Insomnia, unspecified type 08/19/2016     Ischemic cardiomyopathy      Late effects of cerebrovascular accident 08/19/2016     Major depressive disorder with single episode, remission status unspecified 08/19/2016      Microalbuminuria 07/29/2021    Formatting of this note might be different from the original. 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 Created by Conversion [facility] Annotation: Feb 11 2010  1:41PM - Leonid Cabral: CANNOT READ OR  WRITE     PVD (peripheral vascular disease) (H)      Stented coronary artery      Type 2 diabetes mellitus (H) 07/29/2021    Formatting of this note might be different from the original. Created by Conversion     Type II or unspecified type diabetes mellitus with ophthalmic manifestations, not stated as uncontrolled(250.50) (H) 07/29/2021    Formatting of this note might be different from the original. Created by Conversion     [facility] 08/19/2016       MEDICATIONS:  Current Outpatient Medications   Medication Sig Dispense Refill     acetaminophen (TYLENOL) 325 MG tablet Take 650 mg by mouth every 6 hours as needed for fever or pain       acetaminophen (TYLENOL) 325 MG tablet Take 650 mg by mouth 2 times daily       ARIPiprazole (ABILIFY) 2 MG tablet 2 mg daily        ASPERCREME ORIGINAL 10 % external cream Apply topically 2 times daily as needed for other (back pain)       atorvastatin (LIPITOR) 80 MG tablet Take 80 mg by mouth daily        bisacodyl (DULCOLAX) 10 MG suppository Place 10 mg rectally daily as needed for constipation       cholestyramine (QUESTRAN) 4 g packet Take 1 packet by mouth every other day        clopidogrel (PLAVIX) 75 MG tablet Take 1 tablet (75 mg) by mouth daily Start taking medication the day after the procedure. 90 tablet 1     DULoxetine (CYMBALTA) 60 MG capsule Take 120 mg by mouth daily       erenumab-aooe (AIMOVIG, 140 MG DOSE,) 70 MG/ML injection Inject 140 mg Subcutaneous every 30 days        ferrous sulfate (FEROSUL) 325 (65 Fe) MG tablet Take 325 mg by mouth daily (with breakfast)        gabapentin (NEURONTIN) 100 MG capsule Take 100 mg by mouth 2 times daily (before meals)       gabapentin (NEURONTIN) 300 MG capsule Take 600 mg by mouth At Bedtime       glipiZIDE (GLUCOTROL) 5 MG tablet Take 2.5 mg by mouth daily before breakfast       HYDROcodone-acetaminophen (NORCO) 5-325 MG tablet Take 1-2 tablets by mouth every 6 hours as needed for moderate to severe pain (Patient taking differently: Take 1 tablet by mouth every 6 hours as needed for moderate to severe pain) 12 tablet 0     loperamide (IMODIUM) 2 MG capsule Take 2 mg by mouth 4 times daily as needed for diarrhea       losartan (COZAAR) 25 MG tablet Take 25 mg by mouth At Bedtime        magnesium chloride 535 (64 Mg) MG TBEC CR tablet Take 1,070 mg by mouth 2 times daily       metFORMIN (GLUCOPHAGE) 500 MG tablet Take 500 mg by mouth daily (with dinner)       metFORMIN (GLUCOPHAGE) 500 MG tablet Take 1,000 mg by mouth daily (with breakfast)       metoprolol succinate ER (TOPROL-XL) 25 MG 24 hr tablet Take 75 mg by mouth daily        mineral oil-hydrophilic petrolatum (AQUAPHOR) external ointment Apply topically 3 times daily as needed for irritation or dry skin Apply to forehead and other areas as needed       mirtazapine (REMERON) 7.5 MG tablet Take 7.5 mg by mouth At Bedtime        Nitroglycerin (NITROSTAT SL) Place 0.4 mg under the tongue every 5 minutes as needed for chest pain        pantoprazole (PROTONIX) 40 MG EC tablet Take 40 mg by mouth daily before breakfast       polyethylene glycol (MIRALAX) 17 GM/Dose powder Take 1 packet by mouth daily as needed for constipation        senna-docusate (SENOKOT-S/PERICOLACE) 8.6-50 MG tablet Take 1 tablet by mouth 2 times daily       senna-docusate (SENOKOT-S/PERICOLACE) 8.6-50 MG tablet Take 1 tablet by mouth 2 times daily as needed for constipation In addition to scheduled doses       sodium  "chloride, PF, 0.9% PF flush 10 mLs by Intracatheter route 3 times daily       traZODone (DESYREL) 50 MG tablet Take 50 mg by mouth At Bedtime        vitamin C (ASCORBIC ACID) 500 MG tablet Take 500 mg by mouth daily        XARELTO ANTICOAGULANT 20 MG TABS tablet Take 20 mg by mouth daily (with breakfast)          PHYSICAL EXAM:  General: Patient is alert male, no distress.   Vitals: /80   Pulse 75   Temp 98.5  F (36.9  C)   Resp 20   Ht 1.702 m (5' 7\")   Wt 68.5 kg (151 lb)   SpO2 99%   BMI 23.65 kg/m    HEENT: Head is NCAT. Eyes show no injection or icterus. Nares negative. Oropharynx well hydrated.  Neck: No JVD.  Lungs: Non labored respirations.   Abdomen: Soft.   : Deferred.  Extremities: Wound vac RLE. RUE PICC.  Musculoskeletal: No LLE swelling.    Skin: Wound vac on R BKA stump.  Psych: Mood is good.       LABS/DIAGNOSTIC DATA:  Component      Latest Ref Rng & Units 8/23/2022 8/24/2022 8/24/2022 8/24/2022            6:11 AM  8:33 AM  7:13 PM   RBC Count      4.40 - 5.90 10e6/uL 3.89 (L) 3.48 (L) 3.40 (L)    Hemoglobin      13.3 - 17.7 g/dL 9.9 (L) 8.9 (L) 8.7 (L)    Hematocrit      40.0 - 53.0 % 32.4 (L) 29.0 (L) 28.3 (L)    MCV      78 - 100 fL 83 83 83    MCH      26.5 - 33.0 pg 25.4 (L) 25.6 (L) 25.6 (L)    MCHC      31.5 - 36.5 g/dL 30.6 (L) 30.7 (L) 30.7 (L)    RDW      10.0 - 15.0 % 14.5 14.7 14.8    Platelet Count      150 - 450 10e3/uL 433 323 315 255     Component      Latest Ref Rng & Units 8/25/2022 8/25/2022 8/26/2022 8/27/2022           6:05 AM  6:28 PM     RBC Count      4.40 - 5.90 10e6/uL 2.49 (L) 3.07 (L) 3.18 (L) 3.39 (L)   Hemoglobin      13.3 - 17.7 g/dL 6.4 (LL) 8.0 (L) 8.2 (L) 8.8 (L)   Hematocrit      40.0 - 53.0 % 20.9 (L) 26.3 (L) 27.3 (L) 29.1 (L)   MCV      78 - 100 fL 84 86 86 86   MCH      26.5 - 33.0 pg 25.7 (L) 26.1 (L) 25.8 (L) 26.0 (L)   MCHC      31.5 - 36.5 g/dL 30.6 (L) 30.4 (L) 30.0 (L) 30.2 (L)   RDW      10.0 - 15.0 % 14.6 14.7 14.6 14.7   Platelet Count   "    150 - 450 10e3/uL 249 291 251 296       Component      Latest Ref Rng & Units 9/12/2022             WBC      4.0 - 11.0 10e3/uL 4.7   RBC Count      4.40 - 5.90 10e6/uL 3.85 (L)   Hemoglobin      13.3 - 17.7 g/dL 10.3 (L)   Hematocrit      40.0 - 53.0 % 33.5 (L)   MCV      78 - 100 fL 87   MCH      26.5 - 33.0 pg 26.8   MCHC      31.5 - 36.5 g/dL 30.7 (L)   RDW      10.0 - 15.0 % 16.4 (H)   Platelet Count      150 - 450 10e3/uL 185       Component      Latest Ref Rng & Units 8/23/2022 8/24/2022 8/24/2022 8/25/2022            6:11 AM  8:33 AM    Creatinine      0.67 - 1.17 mg/dL 1.04 1.47 (H) 1.50 (H) 1.09   Sodium      136 - 145 mmol/L 134 (L) 136  137   Potassium      3.4 - 5.3 mmol/L       Urea Nitrogen      6.0 - 20.0 mg/dL       Chloride      98 - 107 mmol/L       Carbon Dioxide (CO2)      22 - 29 mmol/L       Anion Gap      7 - 15 mmol/L 14 8  4 (L)   Glucose      70 - 99 mg/dL       GFR Estimate      >60 mL/min/1.73m2 85 56 (L) 55 (L) 81   Calcium      8.6 - 10.0 mg/dL 10.2 10.0  8.6     Component      Latest Ref Rng & Units 8/26/2022 8/27/2022 8/28/2022 8/29/2022                Creatinine      0.67 - 1.17 mg/dL 1.00 0.99 0.97 1.04   Sodium      136 - 145 mmol/L 139   140   Potassium      3.4 - 5.3 mmol/L       Urea Nitrogen      6.0 - 20.0 mg/dL       Chloride      98 - 107 mmol/L       Carbon Dioxide (CO2)      22 - 29 mmol/L       Anion Gap      7 - 15 mmol/L 4 (L)   6   Glucose      70 - 99 mg/dL       GFR Estimate      >60 mL/min/1.73m2 89 >90 >90 85   Calcium      8.6 - 10.0 mg/dL 8.7   9.1         ASSESSMENT/PLAN: No changes or new orders today.   1. R BKA (8/5/2022) with subsequent MRSA wound infection. S/p irrigation and debridement on 8/24/2022 and then washout with partial closure and application of wound vac on 8/26/2022 per Dr. Montes. Had follow up 9/8/2022 and 9/12/2022, follow up next with wound NP on 9/22/2022.  On IV Vancomycin, pharmacy managing. Follow up with ID on 9/22/2022.   2.  Anemia. Hgb dropped to 6.4, he did receive transfusion. Received Venofer in the hospital. Last hgb 10.3 on 9/12/2022. Continue iron.  3. HTN. On metoprolol and losartan. Continue to monitor Bps.  4. Afib/Aflutter. On xarelto for anticoagulation. Adequate rate control.   5. Diabetes. On metformin and glipizide, continue. Accuchecks followed.      Stable conditions:   6. CAD. Hx of MIs. Hx of stenting and prior CABG. On statin, beta blocker, xarelto. No chest pain.  7. Mood disorder. Mixed depression and anxiety. On duloxetine, Abilify. Also takes Mirtazapine.   8. Hx of stroke. Continue statin, xarelto.  9. Mild intellectual disabilities. He resides in an shelter, but has been in the hospital or this TCU since Dec 2021 related to above issues.          Electronically signed by: Heidi Xie MD           Sincerely,        Heidi Xie MD

## 2022-09-19 ENCOUNTER — TELEPHONE (OUTPATIENT)
Dept: INFECTIOUS DISEASES | Facility: CLINIC | Age: 52
End: 2022-09-19

## 2022-09-19 ENCOUNTER — LAB REQUISITION (OUTPATIENT)
Dept: LAB | Facility: CLINIC | Age: 52
End: 2022-09-19
Payer: MEDICARE

## 2022-09-19 ENCOUNTER — MEDICAL CORRESPONDENCE (OUTPATIENT)
Dept: HEALTH INFORMATION MANAGEMENT | Facility: CLINIC | Age: 52
End: 2022-09-19

## 2022-09-19 ENCOUNTER — TRANSITIONAL CARE UNIT VISIT (OUTPATIENT)
Dept: GERIATRICS | Facility: CLINIC | Age: 52
End: 2022-09-19

## 2022-09-19 VITALS
RESPIRATION RATE: 16 BRPM | HEIGHT: 67 IN | WEIGHT: 151 LBS | BODY MASS INDEX: 23.7 KG/M2 | DIASTOLIC BLOOD PRESSURE: 79 MMHG | TEMPERATURE: 98.3 F | SYSTOLIC BLOOD PRESSURE: 142 MMHG | HEART RATE: 72 BPM | OXYGEN SATURATION: 98 %

## 2022-09-19 DIAGNOSIS — R79.9 ABNORMAL FINDING OF BLOOD CHEMISTRY, UNSPECIFIED: ICD-10-CM

## 2022-09-19 DIAGNOSIS — D70.2 OTHER DRUG-INDUCED NEUTROPENIA (H): ICD-10-CM

## 2022-09-19 DIAGNOSIS — E83.42 HYPOMAGNESEMIA: Primary | ICD-10-CM

## 2022-09-19 LAB
ANION GAP SERPL CALCULATED.3IONS-SCNC: 9 MMOL/L (ref 7–15)
BASOPHILS # BLD MANUAL: 0 10E3/UL (ref 0–0.2)
BASOPHILS NFR BLD MANUAL: 3 %
BILIRUB SERPL-MCNC: 0.2 MG/DL
BUN SERPL-MCNC: 14.4 MG/DL (ref 6–20)
CALCIUM SERPL-MCNC: 9.7 MG/DL (ref 8.6–10)
CHLORIDE SERPL-SCNC: 105 MMOL/L (ref 98–107)
CREAT SERPL-MCNC: 0.77 MG/DL (ref 0.67–1.17)
CRP SERPL-MCNC: 5.15 MG/L
DEPRECATED HCO3 PLAS-SCNC: 24 MMOL/L (ref 22–29)
EOSINOPHIL # BLD MANUAL: 0.3 10E3/UL (ref 0–0.7)
EOSINOPHIL NFR BLD MANUAL: 21 %
ERYTHROCYTE [DISTWIDTH] IN BLOOD BY AUTOMATED COUNT: 15.6 % (ref 10–15)
ERYTHROCYTE [SEDIMENTATION RATE] IN BLOOD BY WESTERGREN METHOD: 15 MM/HR (ref 0–20)
GFR SERPL CREATININE-BSD FRML MDRD: >90 ML/MIN/1.73M2
GLUCOSE SERPL-MCNC: 114 MG/DL (ref 70–99)
HCT VFR BLD AUTO: 33.7 % (ref 40–53)
HGB BLD-MCNC: 10.4 G/DL (ref 13.3–17.7)
LYMPHOCYTES # BLD MANUAL: 0.8 10E3/UL (ref 0.8–5.3)
LYMPHOCYTES NFR BLD MANUAL: 47 %
MAGNESIUM SERPL-MCNC: 1.5 MG/DL (ref 1.7–2.3)
MCH RBC QN AUTO: 26.5 PG (ref 26.5–33)
MCHC RBC AUTO-ENTMCNC: 30.9 G/DL (ref 31.5–36.5)
MCV RBC AUTO: 86 FL (ref 78–100)
MONOCYTES # BLD MANUAL: 0.2 10E3/UL (ref 0–1.3)
MONOCYTES NFR BLD MANUAL: 11 %
MYELOCYTES # BLD MANUAL: 0 10E3/UL
MYELOCYTES NFR BLD MANUAL: 1 %
NEUTROPHILS # BLD MANUAL: 0.3 10E3/UL (ref 1.6–8.3)
NEUTROPHILS NFR BLD MANUAL: 17 %
PLAT MORPH BLD: ABNORMAL
PLATELET # BLD AUTO: 185 10E3/UL (ref 150–450)
POTASSIUM SERPL-SCNC: 4.3 MMOL/L (ref 3.4–5.3)
RBC # BLD AUTO: 3.92 10E6/UL (ref 4.4–5.9)
RBC MORPH BLD: ABNORMAL
SODIUM SERPL-SCNC: 138 MMOL/L (ref 136–145)
VANCOMYCIN SERPL-MCNC: 11.6 UG/ML
VARIANT LYMPHS BLD QL SMEAR: PRESENT
WBC # BLD AUTO: 1.6 10E3/UL (ref 4–11)

## 2022-09-19 PROCEDURE — P9604 ONE-WAY ALLOW PRORATED TRIP: HCPCS | Mod: ORL | Performed by: INTERNAL MEDICINE

## 2022-09-19 PROCEDURE — 82247 BILIRUBIN TOTAL: CPT | Mod: ORL | Performed by: INTERNAL MEDICINE

## 2022-09-19 PROCEDURE — 80048 BASIC METABOLIC PNL TOTAL CA: CPT | Mod: ORL | Performed by: INTERNAL MEDICINE

## 2022-09-19 PROCEDURE — 99309 SBSQ NF CARE MODERATE MDM 30: CPT | Performed by: NURSE PRACTITIONER

## 2022-09-19 PROCEDURE — 86140 C-REACTIVE PROTEIN: CPT | Mod: ORL | Performed by: INTERNAL MEDICINE

## 2022-09-19 PROCEDURE — 85652 RBC SED RATE AUTOMATED: CPT | Mod: ORL | Performed by: INTERNAL MEDICINE

## 2022-09-19 PROCEDURE — 83735 ASSAY OF MAGNESIUM: CPT | Mod: ORL | Performed by: INTERNAL MEDICINE

## 2022-09-19 PROCEDURE — 36415 COLL VENOUS BLD VENIPUNCTURE: CPT | Mod: ORL | Performed by: INTERNAL MEDICINE

## 2022-09-19 PROCEDURE — 85007 BL SMEAR W/DIFF WBC COUNT: CPT | Mod: ORL | Performed by: INTERNAL MEDICINE

## 2022-09-19 PROCEDURE — 85027 COMPLETE CBC AUTOMATED: CPT | Mod: ORL | Performed by: INTERNAL MEDICINE

## 2022-09-19 PROCEDURE — 80202 ASSAY OF VANCOMYCIN: CPT | Mod: ORL | Performed by: INTERNAL MEDICINE

## 2022-09-19 NOTE — LETTER
"    9/19/2022        RE: Cm Lundberg  [address]  [place] [id number]        [facility] GERIATRICS    Chief Complaint   Patient presents with     RECHECK     HPI:  Cm Lundberg is a 52 year old  (1970), who is being seen today for an episodic care visit at: [facility] ([facility]) [[id number]]. Today's concern is: Hypomagnesemia, lead concerns.  Patient living in this facility since December 2021, he had right BKA August 2020, readmitted late August for infected stump.  Return to TCU on vancomycin and followed by Dr. Bernardo, infectious disease.  Labs today reveal critical absolute neutrophil count of 0.3, white blood count dipped to 1.6, previously 4.7.  Dr. Nash was notified and ordered a repeat or tomorrow.  Magnesium at 1.5, currently on Mag-Ox 400 mg 4 times daily.  Will change to Slow-Mag.  Patient seen sitting up on his side of his bed in room.  He is reporting that he is doing well, tells me he is tired of being in his facility but he otherwise has no concerns.  Wound VAC in place.    Allergies, and PMH/PSH reviewed in [facility] ([facility]) today.  REVIEW OF SYSTEMS:  4 point ROS including Respiratory, CV, GI and , other than that noted in the HPI,  is negative    Objective:   BP (!) 142/79   Pulse 72   Temp 98.3  F (36.8  C)   Resp 16   Ht 1.702 m (5' 7\")   Wt 68.5 kg (151 lb)   SpO2 98%   BMI 23.65 kg/m    Physical Exam   General appearance: alert, appears stated age and cooperative.   Head: Normocephalic, without obvious abnormality, atraumatic, Eyes: sclera anicteric.  Lungs: respirations unlabored.  Extremities: Right BKA, wound VAC in place.  Skin: Skin color, texture, turgor normal. No rashes or lesions  Neurologic: oriented. No focal deficits.   Psych: interacts well with caregivers, exhibits logical thought processes and connections, pleasant    Assessment/Plan:  (E83.42) Hypomagnesemia  (primary encounter diagnosis)  Comment: Magnesium 1.5.  Plan: "   -Discontinue mag oxide  -Start magnesium controlled-release to 4 mg per tab, give 2 tabs daily.  -Recheck magnesium 1 week.    (D70.2) Other drug-induced neutropenia (H)  Comment: By Dr. Bernardo who is notified and gave order for repeat CBC with differential tomorrow.      Post Medication Reconciliation Status: Unable to reconcile discharge medications      Electronically signed by: Janel Altamirano, RADHA             Sincerely,        Janel Altamirano, CNP

## 2022-09-19 NOTE — PROGRESS NOTES
[facility] GERIATRICS    [facility] Medical Record Number:  [id number]  Place of Service where encounter took place: [facility] ([facility]) [[id number]]   CODE STATUS:   CPR/Full code      Chief Complaint/Reason for Visit:  Chief Complaint   Patient presents with     Hospital F/U     Re-admit to TCU after R BKA.        HPI:    Cm Lundberg is a 51 year old male with hx of CAD, s/p stents and CABG, HTN, DM, prior CVA, afib/flutter on xarelto, who has been residing in TCU at [facility] since Dec 2021 surgery due to elective procedure for chronic long standing right foot ulceration. He underwent incision and drainage of the right foot, excisional debridement of the ulceration of the right foot into the level of the muscle, under MAC with popliteal block anesthesia on 12/17/2021. He completed abx per ID recommendations. Since that time has been NWB with wound vac, had 1/27/2022 incision and drainage with application of theraskin. On 4/4/2022 again incision and drainage with theraskin.     Continued following with podiatry, wound is non healing, referred to wound and vascular due to non healing diabetic ulcer, right foot. S/p angioplasty RLE and hyperbaric oxygen therapy. MRI with indications of osteomyelitis. Following with podiatry and with vascular, evaluation and recommendation for R BKA, which he had on 8/5/2022. His hospital discharge summary is partially excerpted below.     Vascular Surgery Discharge Summary  DATE OF ADMISSION:   8/5/2022      PRE/POSTOPERATIVE DIAGNOSES:    Charcot ankle, right     PROCEDURES PERFORMED:  Amputation, below knee, right leg     INTRAOPERATIVE FINDINGS:   Healthy tissues with good bleeding at level of amputation.  Some degree of edema but no other signs of infection     POSTOPERATIVE COMPLICATIONS:   None      DATE OF DISCHARGE:    8/10/2022     HOSPITAL COURSE:   Cm Lundberg is a 51 year old male who on 8/5/2022 underwent the  above-named procedures.  He tolerated the procedure well and postoperatively was transferred to the general post-surgical unit.  The remainder of his course was essentially uncomplicated.  Prior to discharge, his pain was controlled well with tylenol and oxycodone. He was able to perform ADLs, was evaluated by physical/occupational therapy, and had full return of bowel and bladder function.  On 8/10, he was discharged to skilled nursing in stable condition.     DISCHARGE INSTRUCTIONS:  Activity: Advance as tolerated with PT/OT     Diet: Moderate Consistent Carb Diet     Wound care: Daily dressing change to BKA, monitor for signs/symptoms of infection    Returned to TCU on 8/10/2022.       Today:  He has been experiencing a lot of pain, uncontrolled with current hospital order including prn tylenol and oxycodone 5 mg every 6 hours as needed. After discussion with nursing and with patient, Rx changed to 5-10 mg every 4 hours as needed, continue tylenol. Has a bandage on stump wound, stump protector. Some mild swelling in leg as expected. No bleeding or purulent drainage. No fever. He has otherwise been feeling okay. In pretty good sprits given the totality of his circumstances. He is anticoagulated with xarelto, takes meds for HTN, DM, CAD, has hx of prior stroke. Typically resides in [facility]. He has pretty good appetite. No nausea, vomiting, diarrhea or constipation. No urinary concerns. Has diabetes, on metformin and glipizide. Takes Aimovig once monthly for headaches, no acute concerns. Takes Abilify and duloxetine for depression, mood disorder. No new vision or hearing concerns.      REVIEW OF SYSTEMS:  All others negative other than those noted in HPI.      PAST MEDICAL HISTORY:  Past Medical History:   Diagnosis Date     Anemia      Anemia, unspecified type 08/19/2016     Anxiety      Anxiety state 07/29/2021    Formatting of this note might be different from the original. Created by Conversion  Replacement Utility  updated for latest IMO load     Arthritis      Atrial fibrillation and flutter (H)      Cerebral artery occlusion with cerebral infarction (H)      Cerebral infarction (H)      Chest pain, unspecified type 08/19/2016     Chronic atrial fibrillation (H)      Congestive heart failure (H)      Coronary artery disease     CABG with multiple stents     Coronary artery disease involving native coronary artery without angina pectoris, unspecified whether native or transplanted heart 08/19/2016     Coronary atherosclerosis 07/29/2021    Formatting of this note might be different from the original. Created by Conversion  Replacement Utility updated for latest IMO load     Depression      Diabetic ulcer of right midfoot associated with type 2 diabetes mellitus, limited to breakdown of skin (H) 09/28/2017     Equinovarus acquired deformity, right 04/14/2021     Equinovarus deformity, acquired, right      Essential hypertension 08/19/2016     GERD (gastroesophageal reflux disease)      Heart attack (H)      History of blood transfusion      Hyperlipidemia      Hyperlipidemia, unspecified hyperlipidemia type 08/19/2016     Hypertension      Insomnia, unspecified type 08/19/2016     Ischemic cardiomyopathy      Late effects of cerebrovascular accident 08/19/2016     Major depressive disorder with single episode, remission status unspecified 08/19/2016     Microalbuminuria 07/29/2021    Formatting of this note might be different from the original. 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 Created by Conversion [facility] Annotation: Feb 11 2010  1:41PM - Leonid Cabral: CANNOT READ OR  WRITE     PVD (peripheral vascular disease) (H)      Stented coronary artery      Type 2  diabetes mellitus (H) 2021    Formatting of this note might be different from the original. Created by Conversion     Type II or unspecified type diabetes mellitus with ophthalmic manifestations, not stated as uncontrolled(250.50) (H) 2021    Formatting of this note might be different from the original. Created by Conversion     Weakness 2016       PAST SURGICAL HISTORY:  Social History     Socioeconomic History     Marital status:      Spouse name: Not on file     Number of children: Not on file     Years of education: Not on file     Highest education level: Not on file   Occupational History     Not on file   Tobacco Use     Smoking status: Former Smoker     Quit date: 2015     Years since quittin.6     Smokeless tobacco: Never Used   Vaping Use     Vaping Use: Never used   Substance and Sexual Activity     Alcohol use: Not Currently     Drug use: Not Currently     Sexual activity: Not on file   Other Topics Concern     Parent/sibling w/ CABG, MI or angioplasty before 65F 55M? Not Asked   Social History Narrative     Not on file     Social Determinants of Health     Financial Resource Strain: Not on file   Food Insecurity: Not on file   Transportation Needs: Not on file   Physical Activity: Not on file   Stress: Not on file   Social Connections: Not on file   Intimate Partner Violence: Not on file   Housing Stability: Not on file       FAMILY HISTORY:  Family History   Adopted: Yes       SOCIAL HISTORY:  Social History     Socioeconomic History     Marital status:      Spouse name: Not on file     Number of children: Not on file     Years of education: Not on file     Highest education level: Not on file   Occupational History     Not on file   Tobacco Use     Smoking status: Former Smoker     Quit date: 2015     Years since quittin.6     Smokeless tobacco: Never Used   Vaping Use     Vaping Use: Never used   Substance and Sexual Activity     Alcohol use: Not  Currently     Drug use: Not Currently     Sexual activity: Not on file   Other Topics Concern     Parent/sibling w/ CABG, MI or angioplasty before 65F 55M? Not Asked   Social History Narrative     Not on file     Social Determinants of Health     Financial Resource Strain: Not on file   Food Insecurity: Not on file   Transportation Needs: Not on file   Physical Activity: Not on file   Stress: Not on file   Social Connections: Not on file   Intimate Partner Violence: Not on file   Housing Stability: Not on file       MEDICATIONS:  Current Outpatient Medications   Medication Sig Dispense Refill     pantoprazole (PROTONIX) 40 MG EC tablet Take 40 mg by mouth daily       acetaminophen (TYLENOL) 325 MG tablet Take 650 mg by mouth every 4 hours as needed for mild pain        ARIPiprazole (ABILIFY) 2 MG tablet 2 mg daily        ASPERCREME ORIGINAL 10 % external cream APPLY TO LOW BACK TWICE DAILY FOR LOW BACK PAIN       atorvastatin (LIPITOR) 80 MG tablet Take 80 mg by mouth daily        cholestyramine (QUESTRAN) 4 g packet Take 1 packet by mouth every other day        clopidogrel (PLAVIX) 75 MG tablet Take 1 tablet (75 mg) by mouth daily Start taking medication the day after the procedure. 90 tablet 1     DULoxetine (CYMBALTA) 60 MG capsule Take 120 mg by mouth daily takle 120mg       erenumab-aooe (AIMOVIG) 140 MG/ML injection Inject 1 mL (140 mg) Subcutaneous every 30 days 1 mL 0     fenofibrate micronized (LOFIBRA) 200 MG capsule Take 200 mg by mouth every morning (before breakfast)        ferrous sulfate (FEROSUL) 325 (65 Fe) MG tablet Take 325 mg by mouth daily (with breakfast)        gabapentin (NEURONTIN) 100 MG capsule TAKE 1 CAP BY MOUTH TWICE A DAY BEFORE MEALS       gabapentin (NEURONTIN) 300 MG capsule Take 600 mg by mouth At Bedtime       glipiZIDE (GLUCOTROL) 5 MG tablet TAKE 1/2 TABLET (2.5MG) BY MOUTH DAILY DX DIABETES       loperamide (IMODIUM) 2 MG capsule Take 4 mg by mouth as needed for diarrhea         "losartan (COZAAR) 25 MG tablet Take 25 mg by mouth At Bedtime        magnesium oxide 400 MG CAPS Take 400 mg by mouth 4 times daily       Menthol, Topical Analgesic, (BIOFREEZE ROLL-ON) 4 % GEL Apply 1 Application topically 2 times daily Apply to lower back for pain       metFORMIN (GLUCOPHAGE) 500 MG tablet TAKE 2 TABS (1000MG) BY MOUTH EVERY MORNING;TAKE 1 TAB BY MOUTH EVERY EVENING       metoprolol succinate ER (TOPROL-XL) 25 MG 24 hr tablet Take 75 mg by mouth daily        mirtazapine (REMERON) 7.5 MG tablet Take 7.5 mg by mouth At Bedtime        Neomycin-Bacitracin-Polymyxin (TRIPLE ANTIBIOTIC) 3.5-400-5000 OINT ointment Externally apply 1 applicator topically 3 times daily as needed       Nitroglycerin (NITROSTAT SL) Place 0.4 mg under the tongue every 5 minutes as needed for chest pain        oxyCODONE (ROXICODONE) 5 MG tablet TAKE 1 TAB BY MOUTH EVERY 6HOURS AS NEEDED FOR PAIN 28 tablet 0     polyethylene glycol (MIRALAX) 17 GM/Dose powder Take 1 packet by mouth daily as needed for constipation        senna-docusate (SENOKOT-S/PERICOLACE) 8.6-50 MG tablet Take 1 tablet by mouth 2 times daily as needed for constipation        traZODone (DESYREL) 50 MG tablet Take 50 mg by mouth At Bedtime        vitamin C (ASCORBIC ACID) 500 MG tablet Take 500 mg by mouth daily        XARELTO ANTICOAGULANT 20 MG TABS tablet TAKE 1 TAB BY MOUTH ONCE DAILY       ALLERGIES:  Allergies   Allergen Reactions     Aspirin Other (See Comments)     Reacts with migraine medicine     Blood-Group Specific Substance      Anti-Fya present.  Expect delays in blood for transfusion.  Draw 2 lavender and 1 red for all type and screen orders.     Isosorbide Headache     Codeine Rash     Morphine Rash       PHYSICAL EXAM:  General: Patient is alert male, no distress.   Vitals: /69   Pulse 82   Temp 98.7  F (37.1  C)   Resp 18   Ht 1.702 m (5' 7\")   Wt 71.7 kg (158 lb)   SpO2 96%   BMI 24.75 kg/m    HEENT: Head is NCAT. Eyes show no " injection or icterus. Nares negative. Oropharynx well hydrated.  Neck: Supple. No tenderness or adenopathy. No JVD.  Lungs: Clear bilaterally. No wheezes.  Cardiovascular: Regular rate and rhythm, normal S1, S2.  Back: No spinal or CVA tenderness.  Abdomen: Soft, no tenderness on exam. Bowel sounds present. No guarding rebound or rigidity.  : Deferred.  Extremities: Some edema distally on right leg amputation site.   Musculoskeletal:   Skin: Bandage right BKA.   Psych: Mood appears good.      LABS/DIAGNOSTIC DATA:  Component      Latest Ref Rng & Units 8/5/2022 8/7/2022              Creatinine      0.67 - 1.17 mg/dL 1.11 1.13   Sodium      136 - 145 mmol/L  134 (L)   Potassium      3.4 - 5.3 mmol/L     Urea Nitrogen      6.0 - 20.0 mg/dL     Chloride      98 - 107 mmol/L     Carbon Dioxide (CO2)      22 - 29 mmol/L     Anion Gap      7 - 15 mmol/L  7   Glucose      70 - 99 mg/dL     GFR Estimate      >60 mL/min/1.73m2 79 78   Calcium      8.6 - 10.0 mg/dL  8.8     Component      Latest Ref Rng & Units 8/5/2022 8/6/2022 8/6/2022 8/7/2022            6:16 AM 10:50 AM  6:20 AM   WBC      4.0 - 11.0 10e3/uL 6.3 5.9 7.0 8.1   RBC Count      4.40 - 5.90 10e6/uL 4.09 (L) 3.82 (L) 3.74 (L) 4.00 (L)   Hemoglobin      13.3 - 17.7 g/dL 10.6 (L) 10.1 (L) 9.9 (L) 10.3 (L)   Hematocrit      40.0 - 53.0 % 33.7 (L) 31.9 (L) 31.2 (L) 33.2 (L)   MCV      78 - 100 fL 82 84 83 83   MCH      26.5 - 33.0 pg 25.9 (L) 26.4 (L) 26.5 25.8 (L)   MCHC      31.5 - 36.5 g/dL 31.5 31.7 31.7 31.0 (L)   RDW      10.0 - 15.0 % 14.6 14.6 14.6 14.8   Platelet Count      150 - 450 10e3/uL 160 137 (L) 135 (L) 148 (L)     Component      Latest Ref Rng & Units 8/7/2022 8/8/2022          11:00 AM    WBC      4.0 - 11.0 10e3/uL 7.9    RBC Count      4.40 - 5.90 10e6/uL 3.92 (L)    Hemoglobin      13.3 - 17.7 g/dL 10.3 (L)    Hematocrit      40.0 - 53.0 % 32.4 (L)    MCV      78 - 100 fL 83    MCH      26.5 - 33.0 pg 26.3 (L)    MCHC      31.5 - 36.5 g/dL  31.8    RDW      10.0 - 15.0 % 14.8    Platelet Count      150 - 450 10e3/uL 148 (L) 144 (L)         ASSESSMENT/PLAN:  1. S/p R BKA. Long standing nonhealing right foot diabetic ulcer and Charcot ankle. He is S/p procedures 12/27/2021, 1/27/2022 and 4/4/2022, s/p angioplasty RLE and hyperbaric oxygen treatment. MRI with indications of osteomyelitis. Ultimately R BKA on 8/5/2022. NWB. Bandage with wound cares, stump protector. Follow up with Dr. Montes on 9/8/2022.  2. Pain management. His pain is uncontrolled at this time. Changed oxycodone Rx to allow 5-10 mg every 4 hours. Continue scheduled tylenol.   3. HTN. He is on metoprolol and losartan. Monitor Bps in TCU.  4. Afib. On xarelto for anticoagulation. Adequate rate control.   5. Diabetes. On metformin and glipizide.  6. CAD. Hx of MIs. Hx of stenting and prior CABG. On statin, beta blocker, xarelto.  7. Anemia. Acute on chronic with ABLA from surgery. Last hgb 10.3 on 8/7/2022. Labs reviewed.   8. Mood disorder. Mixed depression and anxiety. On duloxetine, Abilify. Also takes Mirtazapine.   9. Hx of stroke. Continue statin, xarelto.  10. Mild intellectual disabilities. He resides in an [facility], but has been in the hospital or this TCU since Dec 2021 related to above issues.    11. Code status is full code.           Electronically signed by: Heidi Xie MD

## 2022-09-19 NOTE — PROGRESS NOTES
[facility] GERIATRICS    [place] Medical Record Number:  [id number]  Place of Service where encounter took place: [facility] ([facility]) [[id number]]   CODE STATUS:   CPR/Full code      Chief Complaint/Reason for Visit:  Chief Complaint   Patient presents with     RECHECK     TCU 8/15/2022. 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        TCU HPI:    Cm Lundberg is a 51 year old male with hx of CAD, s/p stents and CABG, HTN, DM, prior CVA, afib/flutter on xarelto, who has been residing in TCU at [facility] since Dec 2021 surgery due to elective procedure for chronic long standing right foot ulceration. He underwent incision and drainage of the right foot, excisional debridement of the ulceration of the right foot into the level of the muscle, under MAC with popliteal block anesthesia on 12/17/2021. He completed abx per ID recommendations. Since that time has been NWB with wound vac, had 1/27/2022 incision and drainage with application of theraskin. On 4/4/2022 again incision and drainage with theraskin.     Continued following with podiatry, wound is non healing, referred to wound and vascular due to non healing diabetic ulcer, right foot. S/p angioplasty RLE and hyperbaric oxygen therapy. MRI with indications of osteomyelitis. Following with podiatry and with vascular, evaluation and recommendation for R BKA, which he had on 8/5/2022. His hospital discharge summary is partially excerpted below.     Vascular Surgery Discharge Summary  DATE OF ADMISSION:   8/5/2022      PRE/POSTOPERATIVE DIAGNOSES:    Charcot ankle, right     PROCEDURES PERFORMED:  Amputation, below knee, right leg     INTRAOPERATIVE FINDINGS:   Healthy tissues with good bleeding at level of amputation.  Some degree of edema but no other signs of infection     POSTOPERATIVE COMPLICATIONS:   None      DATE OF DISCHARGE:    8/10/2022     HOSPITAL COURSE:   Cm Lundberg  is a 51 year old male who on 8/5/2022 underwent the above-named procedures.  He tolerated the procedure well and postoperatively was transferred to the general post-surgical unit.  The remainder of his course was essentially uncomplicated.  Prior to discharge, his pain was controlled well with tylenol and oxycodone. He was able to perform ADLs, was evaluated by physical/occupational therapy, and had full return of bowel and bladder function.  On 8/10, he was discharged to skilled nursing in stable condition.     DISCHARGE INSTRUCTIONS:  Activity: Advance as tolerated with PT/OT     Diet: Moderate Consistent Carb Diet     Wound care: Daily dressing change to BKA, monitor for signs/symptoms of infection    Returned to TCU on 8/10/2022.       Today:  Last visit, pain meds increased due to uncontrolled pain. Better today. Has limb/stump protector and bandage to wound from R BKA. No bleeding or drainage. Has staples in place. No fever. He is anticoagulated with xarelto, takes meds for HTN, DM, CAD, has hx of prior stroke. He has good appetite. No nausea, vomiting, diarrhea or constipation. No urinary concerns. Has diabetes, on metformin and glipizide. Takes Aimovig once monthly for headaches, no acute concerns. Takes Abilify and duloxetine for depression, mood disorder.       PAST MEDICAL HISTORY:  Past Medical History:   Diagnosis Date     Anemia      Anemia, unspecified type 08/19/2016     Anxiety      Anxiety state 07/29/2021    Formatting of this note might be different from the original. Created by Conversion  Replacement Utility updated for latest IMO load     Arthritis      Atrial fibrillation and flutter (H)      Cerebral artery occlusion with cerebral infarction (H)      Cerebral infarction (H)      Chest pain, unspecified type 08/19/2016     Chronic atrial fibrillation (H)      Congestive heart failure (H)      Coronary artery disease     CABG with multiple stents     Coronary artery disease involving native  coronary artery without angina pectoris, unspecified whether native or transplanted heart 08/19/2016     Coronary atherosclerosis 07/29/2021    Formatting of this note might be different from the original. Created by Conversion  Replacement Utility updated for latest IMO load     Depression      Diabetic ulcer of right midfoot associated with type 2 diabetes mellitus, limited to breakdown of skin (H) 09/28/2017     Equinovarus acquired deformity, right 04/14/2021     Equinovarus deformity, acquired, right      Essential hypertension 08/19/2016     GERD (gastroesophageal reflux disease)      Heart attack (H)      History of blood transfusion      Hyperlipidemia      Hyperlipidemia, unspecified hyperlipidemia type 08/19/2016     Hypertension      Insomnia, unspecified type 08/19/2016     Ischemic cardiomyopathy      Late effects of cerebrovascular accident 08/19/2016     Major depressive disorder with single episode, remission status unspecified 08/19/2016     Microalbuminuria 07/29/2021    Formatting of this note might be different from the original. 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 Created by Conversion [facility] Annotation: Feb 11 2010  1:41PM - Leonid Cabral: CANNOT READ OR  WRITE     PVD (peripheral vascular disease) (H)      Stented coronary artery      Type 2 diabetes mellitus (H) 07/29/2021    Formatting of this note might be different from the original. Created by Conversion     Type II or unspecified type diabetes mellitus with ophthalmic manifestations, not stated as uncontrolled(250.50) (H) 07/29/2021    Formatting of this note might be different from the original. Created by Conversion     Weakness 08/19/2016       MEDICATIONS:  Current  Outpatient Medications   Medication Sig Dispense Refill     pantoprazole (PROTONIX) 40 MG EC tablet Take 40 mg by mouth daily       acetaminophen (TYLENOL) 325 MG tablet Take 650 mg by mouth every 4 hours as needed for mild pain        ARIPiprazole (ABILIFY) 2 MG tablet 2 mg daily        ASPERCREME ORIGINAL 10 % external cream APPLY TO LOW BACK TWICE DAILY FOR LOW BACK PAIN       atorvastatin (LIPITOR) 80 MG tablet Take 80 mg by mouth daily        cholestyramine (QUESTRAN) 4 g packet Take 1 packet by mouth every other day        clopidogrel (PLAVIX) 75 MG tablet Take 1 tablet (75 mg) by mouth daily Start taking medication the day after the procedure. 90 tablet 1     DULoxetine (CYMBALTA) 60 MG capsule Take 120 mg by mouth daily takle 120mg       erenumab-aooe (AIMOVIG) 140 MG/ML injection Inject 1 mL (140 mg) Subcutaneous every 30 days 1 mL 0     fenofibrate micronized (LOFIBRA) 200 MG capsule Take 200 mg by mouth every morning (before breakfast)        ferrous sulfate (FEROSUL) 325 (65 Fe) MG tablet Take 325 mg by mouth daily (with breakfast)        gabapentin (NEURONTIN) 100 MG capsule TAKE 1 CAP BY MOUTH TWICE A DAY BEFORE MEALS       gabapentin (NEURONTIN) 300 MG capsule Take 600 mg by mouth At Bedtime       glipiZIDE (GLUCOTROL) 5 MG tablet TAKE 1/2 TABLET (2.5MG) BY MOUTH DAILY DX DIABETES       loperamide (IMODIUM) 2 MG capsule Take 4 mg by mouth as needed for diarrhea        losartan (COZAAR) 25 MG tablet Take 25 mg by mouth At Bedtime        magnesium oxide 400 MG CAPS Take 400 mg by mouth 4 times daily       Menthol, Topical Analgesic, (BIOFREEZE ROLL-ON) 4 % GEL Apply 1 Application topically 2 times daily Apply to lower back for pain       metFORMIN (GLUCOPHAGE) 500 MG tablet TAKE 2 TABS (1000MG) BY MOUTH EVERY MORNING;TAKE 1 TAB BY MOUTH EVERY EVENING       metoprolol succinate ER (TOPROL-XL) 25 MG 24 hr tablet Take 75 mg by mouth daily        mirtazapine (REMERON) 7.5 MG tablet Take 7.5 mg by mouth At  "Bedtime        Neomycin-Bacitracin-Polymyxin (TRIPLE ANTIBIOTIC) 3.5-400-5000 OINT ointment Externally apply 1 applicator topically 3 times daily as needed       Nitroglycerin (NITROSTAT SL) Place 0.4 mg under the tongue every 5 minutes as needed for chest pain        oxyCODONE (ROXICODONE) 5 MG tablet TAKE 1 TAB BY MOUTH EVERY 6HOURS AS NEEDED FOR PAIN 28 tablet 0     polyethylene glycol (MIRALAX) 17 GM/Dose powder Take 1 packet by mouth daily as needed for constipation        senna-docusate (SENOKOT-S/PERICOLACE) 8.6-50 MG tablet Take 1 tablet by mouth 2 times daily as needed for constipation        traZODone (DESYREL) 50 MG tablet Take 50 mg by mouth At Bedtime        vitamin C (ASCORBIC ACID) 500 MG tablet Take 500 mg by mouth daily        XARELTO ANTICOAGULANT 20 MG TABS tablet TAKE 1 TAB BY MOUTH ONCE DAILY         PHYSICAL EXAM:  General: Patient is alert male, no distress.   Vitals: /76   Pulse 69   Temp 98.7  F (37.1  C)   Resp 18   Ht 1.702 m (5' 7\")   Wt 70.9 kg (156 lb 3.2 oz)   SpO2 98%   BMI 24.46 kg/m    HEENT: Head is NCAT. Eyes show no injection or icterus. Nares negative. Oropharynx moist.  Neck: No JVD.  Lungs: Non labored respirations.  Abdomen: Soft  : Deferred.  Extremities: Mild edema on right.  Musculoskeletal: Limb protector right.   Skin: Bandage right BKA.   Psych: Mood appears good.      LABS/DIAGNOSTIC DATA:  Component      Latest Ref Rng & Units 8/5/2022 8/7/2022              Creatinine      0.67 - 1.17 mg/dL 1.11 1.13   Sodium      136 - 145 mmol/L  134 (L)   Potassium      3.4 - 5.3 mmol/L     Urea Nitrogen      6.0 - 20.0 mg/dL     Chloride      98 - 107 mmol/L     Carbon Dioxide (CO2)      22 - 29 mmol/L     Anion Gap      7 - 15 mmol/L  7   Glucose      70 - 99 mg/dL     GFR Estimate      >60 mL/min/1.73m2 79 78   Calcium      8.6 - 10.0 mg/dL  8.8     Component      Latest Ref Rng & Units 8/5/2022 8/6/2022 8/6/2022 8/7/2022            6:16 AM 10:50 AM  6:20 AM "   WBC      4.0 - 11.0 10e3/uL 6.3 5.9 7.0 8.1   RBC Count      4.40 - 5.90 10e6/uL 4.09 (L) 3.82 (L) 3.74 (L) 4.00 (L)   Hemoglobin      13.3 - 17.7 g/dL 10.6 (L) 10.1 (L) 9.9 (L) 10.3 (L)   Hematocrit      40.0 - 53.0 % 33.7 (L) 31.9 (L) 31.2 (L) 33.2 (L)   MCV      78 - 100 fL 82 84 83 83   MCH      26.5 - 33.0 pg 25.9 (L) 26.4 (L) 26.5 25.8 (L)   MCHC      31.5 - 36.5 g/dL 31.5 31.7 31.7 31.0 (L)   RDW      10.0 - 15.0 % 14.6 14.6 14.6 14.8   Platelet Count      150 - 450 10e3/uL 160 137 (L) 135 (L) 148 (L)     Component      Latest Ref Rng & Units 8/7/2022 8/8/2022          11:00 AM    WBC      4.0 - 11.0 10e3/uL 7.9    RBC Count      4.40 - 5.90 10e6/uL 3.92 (L)    Hemoglobin      13.3 - 17.7 g/dL 10.3 (L)    Hematocrit      40.0 - 53.0 % 32.4 (L)    MCV      78 - 100 fL 83    MCH      26.5 - 33.0 pg 26.3 (L)    MCHC      31.5 - 36.5 g/dL 31.8    RDW      10.0 - 15.0 % 14.8    Platelet Count      150 - 450 10e3/uL 148 (L) 144 (L)         ASSESSMENT/PLAN:  1. S/p R BKA. Long standing nonhealing right foot diabetic ulcer and Charcot ankle. He is S/p procedures 12/27/2021, 1/27/2022 and 4/4/2022, s/p angioplasty RLE and hyperbaric oxygen treatment. MRI with indications of osteomyelitis. Ultimately R BKA on 8/5/2022. NWB. Bandage with wound cares, stump protector. Follow up with Dr. Montse on 9/8/2022.  2. Pain management. Meds adjusted last visit. Much improved control. Continue as current for now, discussed tapering of opioids.   3. HTN. He is on metoprolol and losartan. Monitor Bps in TCU.  4. Afib. On xarelto for anticoagulation. Adequate rate control.   5. Diabetes. On metformin and glipizide.  6. CAD. Hx of MIs. Hx of stenting and prior CABG. On statin, beta blocker, xarelto. No chest pain, shortness of breath or hypoxia.   7. Anemia. Acute on chronic with ABLA from surgery. Last hgb 10.3 on 8/7/2022.    8. Mood disorder. Mixed depression and anxiety. On duloxetine, Abilify. Also takes Mirtazapine.   9. Hx of  stroke. Continue statin, xarelto.          Electronically signed by: Heidi Xie MD

## 2022-09-19 NOTE — PROGRESS NOTES
"[facility] GERIATRICS    Chief Complaint   Patient presents with     RECHECK     HPI:  Cm Lundberg is a 52 year old  (1970), who is being seen today for an episodic care visit at: [facility] ([facility]) [[id number]]. Today's concern is: Hypomagnesemia, lead concerns.  Patient living in this facility since December 2021, he had right BKA August 2020, readmitted late August for infected stump.  Return to TCU on vancomycin and followed by Dr. Bernardo, infectious disease.  Labs today reveal critical absolute neutrophil count of 0.3, white blood count dipped to 1.6, previously 4.7.  Dr. Nash was notified and ordered a repeat or tomorrow.  Magnesium at 1.5, currently on Mag-Ox 400 mg 4 times daily.  Will change to Slow-Mag.  Patient seen sitting up on his side of his bed in room.  He is reporting that he is doing well, tells me he is tired of being in his facility but he otherwise has no concerns.  Wound VAC in place.    Allergies, and PMH/PSH reviewed in [facility] today.  REVIEW OF SYSTEMS:  4 point ROS including Respiratory, CV, GI and , other than that noted in the HPI,  is negative    Objective:   BP (!) 142/79   Pulse 72   Temp 98.3  F (36.8  C)   Resp 16   Ht 1.702 m (5' 7\")   Wt 68.5 kg (151 lb)   SpO2 98%   BMI 23.65 kg/m    Physical Exam   General appearance: alert, appears stated age and cooperative.   Head: Normocephalic, without obvious abnormality, atraumatic, Eyes: sclera anicteric.  Lungs: respirations unlabored.  Extremities: Right BKA, wound VAC in place.  Skin: Skin color, texture, turgor normal. No rashes or lesions  Neurologic: oriented. No focal deficits.   Psych: interacts well with caregivers, exhibits logical thought processes and connections, pleasant    Assessment/Plan:  (E83.42) Hypomagnesemia  (primary encounter diagnosis)  Comment: Magnesium 1.5.  Plan:   -Discontinue mag oxide  -Start magnesium controlled-release to 4 mg per tab, give 2 tabs daily.  -Recheck " magnesium 1 week.    (D70.2) Other drug-induced neutropenia (H)  Comment: By Dr. Bernardo who is notified and gave order for repeat CBC with differential tomorrow.      Post Medication Reconciliation Status: Unable to reconcile discharge medications      Electronically signed by: Janel Altamirano, CNP

## 2022-09-20 ENCOUNTER — MEDICAL CORRESPONDENCE (OUTPATIENT)
Dept: HEALTH INFORMATION MANAGEMENT | Facility: CLINIC | Age: 52
End: 2022-09-20

## 2022-09-20 ENCOUNTER — TRANSFERRED RECORDS (OUTPATIENT)
Dept: HEALTH INFORMATION MANAGEMENT | Facility: CLINIC | Age: 52
End: 2022-09-20

## 2022-09-20 ENCOUNTER — LAB REQUISITION (OUTPATIENT)
Dept: LAB | Facility: CLINIC | Age: 52
End: 2022-09-20
Payer: MEDICARE

## 2022-09-20 DIAGNOSIS — L02.419 CUTANEOUS ABSCESS OF LIMB, UNSPECIFIED: ICD-10-CM

## 2022-09-20 DIAGNOSIS — L03.119 CELLULITIS OF UNSPECIFIED PART OF LIMB: ICD-10-CM

## 2022-09-20 LAB
BASOPHILS # BLD AUTO: 0.1 10E3/UL (ref 0–0.2)
BASOPHILS NFR BLD AUTO: 3 %
EOSINOPHIL # BLD AUTO: 0.2 10E3/UL (ref 0–0.7)
EOSINOPHIL NFR BLD AUTO: 8 %
ERYTHROCYTE [DISTWIDTH] IN BLOOD BY AUTOMATED COUNT: 15.7 % (ref 10–15)
HCT VFR BLD AUTO: 32.2 % (ref 40–53)
HGB BLD-MCNC: 9.9 G/DL (ref 13.3–17.7)
IMM GRANULOCYTES # BLD: 0 10E3/UL
IMM GRANULOCYTES NFR BLD: 0 %
LYMPHOCYTES # BLD AUTO: 0.9 10E3/UL (ref 0.8–5.3)
LYMPHOCYTES NFR BLD AUTO: 45 %
MCH RBC QN AUTO: 26.3 PG (ref 26.5–33)
MCHC RBC AUTO-ENTMCNC: 30.7 G/DL (ref 31.5–36.5)
MCV RBC AUTO: 85 FL (ref 78–100)
MONOCYTES # BLD AUTO: 0.6 10E3/UL (ref 0–1.3)
MONOCYTES NFR BLD AUTO: 27 %
NEUTROPHILS # BLD AUTO: 0.4 10E3/UL (ref 1.6–8.3)
NEUTROPHILS NFR BLD AUTO: 17 %
NRBC # BLD AUTO: 0 10E3/UL
NRBC BLD AUTO-RTO: 0 /100
PLAT MORPH BLD: NORMAL
PLATELET # BLD AUTO: 170 10E3/UL (ref 150–450)
RBC # BLD AUTO: 3.77 10E6/UL (ref 4.4–5.9)
RBC MORPH BLD: NORMAL
WBC # BLD AUTO: 2.1 10E3/UL (ref 4–11)

## 2022-09-20 PROCEDURE — 36415 COLL VENOUS BLD VENIPUNCTURE: CPT | Mod: ORL | Performed by: INTERNAL MEDICINE

## 2022-09-20 PROCEDURE — P9604 ONE-WAY ALLOW PRORATED TRIP: HCPCS | Mod: ORL | Performed by: INTERNAL MEDICINE

## 2022-09-20 PROCEDURE — 85025 COMPLETE CBC W/AUTO DIFF WBC: CPT | Mod: ORL | Performed by: INTERNAL MEDICINE

## 2022-09-21 ENCOUNTER — LAB REQUISITION (OUTPATIENT)
Dept: LAB | Facility: CLINIC | Age: 52
End: 2022-09-21
Payer: MEDICARE

## 2022-09-21 DIAGNOSIS — E83.42 HYPOMAGNESEMIA: ICD-10-CM

## 2022-09-21 DIAGNOSIS — L02.419 CUTANEOUS ABSCESS OF LIMB, UNSPECIFIED: ICD-10-CM

## 2022-09-21 DIAGNOSIS — L03.119 CELLULITIS OF UNSPECIFIED PART OF LIMB: ICD-10-CM

## 2022-09-22 ENCOUNTER — OFFICE VISIT (OUTPATIENT)
Dept: VASCULAR SURGERY | Facility: CLINIC | Age: 52
End: 2022-09-22
Attending: REGISTERED NURSE
Payer: MEDICARE

## 2022-09-22 ENCOUNTER — LAB REQUISITION (OUTPATIENT)
Dept: LAB | Facility: CLINIC | Age: 52
End: 2022-09-22
Payer: MEDICARE

## 2022-09-22 ENCOUNTER — OFFICE VISIT (OUTPATIENT)
Dept: INFECTIOUS DISEASES | Facility: CLINIC | Age: 52
End: 2022-09-22

## 2022-09-22 VITALS — RESPIRATION RATE: 20 BRPM | DIASTOLIC BLOOD PRESSURE: 72 MMHG | HEART RATE: 80 BPM | SYSTOLIC BLOOD PRESSURE: 120 MMHG

## 2022-09-22 VITALS — HEART RATE: 80 BPM | SYSTOLIC BLOOD PRESSURE: 120 MMHG | RESPIRATION RATE: 80 BRPM | DIASTOLIC BLOOD PRESSURE: 72 MMHG

## 2022-09-22 DIAGNOSIS — T81.89XA NON-HEALING SURGICAL WOUND, INITIAL ENCOUNTER: Primary | ICD-10-CM

## 2022-09-22 DIAGNOSIS — L97.413 DIABETIC ULCER OF RIGHT MIDFOOT ASSOCIATED WITH TYPE 2 DIABETES MELLITUS, WITH NECROSIS OF MUSCLE (H): Primary | ICD-10-CM

## 2022-09-22 DIAGNOSIS — Z89.511 STATUS POST BELOW-KNEE AMPUTATION OF RIGHT LOWER EXTREMITY (H): ICD-10-CM

## 2022-09-22 DIAGNOSIS — R78.81 BACTEREMIA: ICD-10-CM

## 2022-09-22 DIAGNOSIS — E11.621 DIABETIC ULCER OF RIGHT MIDFOOT ASSOCIATED WITH TYPE 2 DIABETES MELLITUS, WITH NECROSIS OF MUSCLE (H): Primary | ICD-10-CM

## 2022-09-22 DIAGNOSIS — B95.62 METHICILLIN RESISTANT STAPHYLOCOCCUS AUREUS INFECTION AS THE CAUSE OF DISEASES CLASSIFIED ELSEWHERE: ICD-10-CM

## 2022-09-22 LAB — VANCOMYCIN SERPL-MCNC: 17.4 UG/ML

## 2022-09-22 PROCEDURE — 11042 DBRDMT SUBQ TIS 1ST 20SQCM/<: CPT | Performed by: REGISTERED NURSE

## 2022-09-22 PROCEDURE — P9604 ONE-WAY ALLOW PRORATED TRIP: HCPCS | Mod: ORL | Performed by: INTERNAL MEDICINE

## 2022-09-22 PROCEDURE — 99215 OFFICE O/P EST HI 40 MIN: CPT | Performed by: INTERNAL MEDICINE

## 2022-09-22 PROCEDURE — 80202 ASSAY OF VANCOMYCIN: CPT | Mod: ORL | Performed by: INTERNAL MEDICINE

## 2022-09-22 PROCEDURE — 36415 COLL VENOUS BLD VENIPUNCTURE: CPT | Mod: ORL | Performed by: INTERNAL MEDICINE

## 2022-09-22 ASSESSMENT — PAIN SCALES - GENERAL: PAINLEVEL: MODERATE PAIN (4)

## 2022-09-22 NOTE — PATIENT INSTRUCTIONS
- Wound Vac Instructions    1. 3x weekly and as needed cleanse the area with NS    2. Pat dry    3. Apply Cavilon no sting barrier wipe to the skin surrounding the wound to protect from drainage/maceration    4. Apply drape around incision. Cut strip of drape and apply to skin if bridging is needed; plan this area in advance; should not be over bony prominence     5. Cut the foam to fit the area of the incision    6. Cut narrow strip of foam if bridging    7. Cover foam with drape to obtain air tight seal    8. Cut opening the size of a quarter for where the suction pad will be applied    9. Apply Suction pad    10. 125mmHG suction continuous    [employer] can be reached at [phone], 24 hours a day 7 days a week     - If you do not have a back up plan in place:     If the negative pressure wound therapy malfunctions or unable to maintain seal: dressing must be removed and reapplied within 2 hours of the incident. If unable to reapply negative pressure wound dressing, place Normal Saline moistened gauze in wound bed and cover with appropriate dressing to keep wound bed moist.  Change wet-to-dry dressing two times a day until healthcare staff can re-implement negative pressure therapy. Change canister at least weekly.  [employer] [facility] can be reached at [phone], 24 hours a day 7 days a week    Information on Vacuum-Assisted Closure of a Wound  Vacuum-assisted closure (VAC) of a wound is a type of treatment to help wounds heal. It s also known as negative pressure wound therapy. During the treatment, a device lowers air pressure on the wound. This can help the wound heal more quickly.  Understanding the wound VAC system  A wound VAC system has several parts. A foam or gauze dressing is put directly on the wound. The dressing is changed every 24 to 72 hours. An adhesive film covers and seals the dressing and wound. A drainage tube leads from under the adhesive film and connects to a portable  vacuum pump. This pump removes air pressure over the wound. It may do this constantly. Or it may do it in cycles. During the treatment, you ll need to carry the portable pump everywhere you go.  Why wound VAC is used  You might need this therapy for a recent traumatic wound. Or you may need it for a chronic wound. This is a wound that does not heal the way it should over time. This can happen with wounds in people who have diabetes. You may need a wound VAC if you ve had a recent skin graft. And you may need a wound VAC for a large wound. Large wounds can take a longer time to heal.  A wound vacuum system may help your wound heal more quickly by:  Draining extra fluid from the wound  Reducing swelling  Reducing bacteria in the wound  Keeping your wound moist and warm  Helping draw together wound edges  Increasing blood flow to your wound  Decreasing inflammation  Wound VAC offers some other advantages over other types of wound care. It may decrease your overall discomfort. The dressings usually need to be changed less often. And they may be easier to keep in position.  Risks of wound VAC  Wound VAC has some rare risks, such as:  Bleeding (which may be severe)  Wound infection  An abnormal connection between the intestinal tract and the skin (enteric fistula)  Proper training in dressing changes can help reduce the risk for these complications. Also, your doctor will carefully evaluate you to make sure you are a good candidate for the therapy. Certain problems can increase your risk for complications. These include:  Exposed organs or blood vessels  High risk of bleeding from another medical problem  Wound infection  Nearby bone infection  Dead wound tissue  Cancer tissue  Fragile skin, such as from aging or longtime use of topical steroids  Allergy to adhesive  Very poor blood flow to your wound  Wounds close to joints that may reopen because of movement  Your doctor will discuss the risks that apply to you. Make  sure to talk with him or her about all of your questions and concerns.  Getting ready for wound VAC  You likely won t need to do much to get ready for wound VAC. In some cases, you may need to wait a while before having this therapy. For example, your doctor may first need to treat an infection in your wound. Dead or damaged tissue may also need to be removed from your wound.  You or a caregiver may need training on how to use the wound VAC device. This is done if you will be able to have your wound vacuum therapy at home. In other cases, you may need to have your wound vacuum therapy in a health care facility.  On the day of your procedure  A health care provider will cover your wound with foam or gauze wound dressing. An adhesive film will be put over the dressing and wound. This seals the wound. The foam connects to a drainage tube, which leads to a vacuum pump. This pump is portable. When the pump is turned on, it draws fluid through the foam and out the drainage tubing. The pump may run constantly, or it may cycle off and on. Your exact setup will depend on the specific type of wound vacuum system that you use.  Managing your wound  You may need the dressing changed about once a day. You may need it changed more or less often, depending on your wound. You or your caregiver may be trained to do this at home. Or it may be done by a visiting health care provider. Your doctor may prescribe a pain medicine. This is to prevent or reduce pain during the dressing change.  You will likely need to use the wound VAC system for several weeks or months. During this time, you ll carry the portable pump everywhere you go.  Nutrition for wound healing  During this time, make sure you follow a healthy diet. This is needed so the wound can heal and to prevent infection. Your doctor can tell you more about what to include in your diet during this time.  follow up with your doctor if you have a medical condition that led to your  wound, such as diabetes. He or she can help you prevent future wounds.  Follow-up care  Your doctor will carefully keep track of your healing. Make sure to keep all follow-up appointments.  When to call your health care provider  Call your health care provider right away if you have any of these:  Fever of 100.4 F (38.0 C) or higher  Increased redness, swelling, or warmth around wound  Increased pain  Bright red blood or blood clots in tubing or the collection chamber of the vacuum

## 2022-09-22 NOTE — PROGRESS NOTES
[facility] Consult Note    Date of Service: September 22, 2022     Requesting Provider: Dr Kim Montes    Chief Complaint: Post op R BKA surgical wound    History of Present Illness: Cm Lundberg is being seen at [facility] today regarding nonhealing surgical incision of R BKA. Pt was being seen by Dr. Kim for a R foot ulceration that probed to bone. Pt opted for a BKA. Dr. Montes preformed BKA 8/5/22. Incision became infected and Dr. Montes had to perform an I&D 8/24/22. He subsequently underwent revision of the stump and partial closure on August 26 with placement of a VAC. Pt is also being followed by Infectious Disease and receiving IV antibiotics for MRSA. They arrive to the clinic today with mother. Was currently/previously using wound vac. Reports pain of 3/10, was 9/10 this morning; currently using Tylenol or Hydrocodone for pain. Has used tubular compression as compression in the past, is currently using tubular compression for compression. Denies any fevers, chills, or generalized ill feeling. Denies history of cancer. Sleeps in a bed/recliner with legs elevated. Denies history of DVT, Joint Replacement, Cellulitis and Vein Procedures. I personally reviewed outside imaging, lab work, and progress noted through Care Everywhere and outside records. Last hgba1c 6.3% 8/23/22. Former smoker quit 1/2015.      Review of Systems:   Constitutional:  Negative   EENTM: positive for glasses;  negative [place]  GI:  negative for nausea/vomiting;   negative for constipation   negative diarrhea;   negative for fecal incontinence  negative weight loss  :   negative dysuria,  positive incontinence  MS: patient is not ambulatory;  does use assistive devices  Cardiac: negative history of heart attack, open heart surgery  Respiratory:  negative SOB  Endocrine:  positive  diabetes  Psych: negative  depression/anxiety    Past Medical History:   Past Medical History:   Diagnosis  Date     Anemia      Anemia, unspecified type 08/19/2016     Anxiety      Anxiety state 07/29/2021    Formatting of this note might be different from the original. Created by Conversion  Replacement Utility updated for latest IMO load     Arthritis      Atrial fibrillation and flutter (H)      Cerebral artery occlusion with cerebral infarction (H)      Cerebral infarction (H)      Chest pain, unspecified type 08/19/2016     Chronic atrial fibrillation (H)      Congestive heart failure (H)      Coronary artery disease     CABG with multiple stents     Coronary artery disease involving native coronary artery without angina pectoris, unspecified whether native or transplanted heart 08/19/2016     Coronary atherosclerosis 07/29/2021    Formatting of this note might be different from the original. Created by Conversion  Replacement Utility updated for latest IMO load     Depression      Diabetic ulcer of right midfoot associated with type 2 diabetes mellitus, limited to breakdown of skin (H) 09/28/2017     Equinovarus acquired deformity, right 04/14/2021     Equinovarus deformity, acquired, right      Essential hypertension 08/19/2016     GERD (gastroesophageal reflux disease)      Heart attack (H)      History of blood transfusion      Hyperlipidemia      Hyperlipidemia, unspecified hyperlipidemia type 08/19/2016     Hypertension      Insomnia, unspecified type 08/19/2016     Ischemic cardiomyopathy      Late effects of cerebrovascular accident 08/19/2016     Major depressive disorder with single episode, remission status unspecified 08/19/2016     Microalbuminuria 07/29/2021    Formatting of this note might be different from the original. Created by Conversion     Migraine      Mild intellectual disabilities 08/19/2016     Myalgia      Nausea 08/19/2016     Osteomyelitis (H)      PONV (postoperative nausea and vomiting)     mild nausea     Problems with learning 07/29/2021    Formatting of this note might be different  from the original. Created by Conversion [facility] Annotation: Feb 11 2010  1:41PM - Leonid Cabral: CANNOT READ OR  WRITE     PVD (peripheral vascular disease) (H)      Stented coronary artery      Type 2 diabetes mellitus (H) 07/29/2021    Formatting of this note might be different from the original. Created by Conversion     Type II or unspecified type diabetes mellitus with ophthalmic manifestations, not stated as uncontrolled(250.50) (H) 07/29/2021    Formatting of this note might be different from the original. Created by Conversion     [facility] 08/19/2016        Surgical History:   Past Surgical History:   Procedure Laterality Date     AMPUTATE LEG BELOW KNEE Right 8/5/2022    Procedure: AMPUTATION, BELOW KNEE, right leg;  Surgeon: Kim Montes MD;  Location: [facility] OR     INCISION AND DRAINAGE LOWER EXTREMITY, COMBINED Right 12/17/2021    Procedure: INCISION AND DRAINAGE, right foot;  Surgeon: Alex Kim DPM;  Location: [facility] OR     INCISION AND DRAINAGE LOWER EXTREMITY, COMBINED Right 1/27/2022    Procedure: INCISION AND DRAINAGE, right foot with application of theraskin;  Surgeon: Alex Kim DPM;  Location: [facility] OR     INCISION AND DRAINAGE LOWER EXTREMITY, COMBINED Right 4/4/2022    Procedure: INCISION AND DRAINAGE, right foot with application of theraskin;  Surgeon: Alex Kim DPM;  Location: [facility] OR     IR LOWER EXTREMITY ANGIOGRAM RIGHT  6/29/2022     IR MISCELLANEOUS PROCEDURE  2/13/2004     IRRIGATION AND DEBRIDEMENT LOWER EXTREMITY, COMBINED Right 8/24/2022    Procedure: RIGHT IRRIGATION AND DEBRIDEMENT, LOWER EXTREMITY, REVISION OF BELOW KNEE AMPUTATION;  Surgeon: Kim Montes MD;  Location: [facility] OR     IRRIGATION AND DEBRIDEMENT LOWER EXTREMITY, COMBINED Right 8/26/2022    Procedure: WASHOUT AND PARTIAL CLOSURE OF RIGHT BELOW THE KNEE AMPUTATION,  AND WOUND VAC APPLICATION;  Surgeon: Kim Montes MD;  Location:   [facility] OR     PICC DOUBLE LUMEN PLACEMENT  8/29/2022          [facility] CABG, VEIN, SINGLE      Description: CABG (CABG);  Recorded: 04/22/2009;        Medications:   Current Outpatient Medications   Medication Sig     acetaminophen (TYLENOL) 325 MG tablet TAKE 2 TABS (650MG) BY MOUTH EVERY 4 HOURS AS NEEDED FOR MILD PAIN     acetaminophen (TYLENOL) 500 MG tablet Take 1,000 mg by mouth 3 times daily     ARIPiprazole (ABILIFY) 2 MG tablet 2 mg daily      ASPERCREME ORIGINAL 10 % external cream Apply topically 2 times daily as needed for other (back pain)     atorvastatin (LIPITOR) 80 MG tablet Take 80 mg by mouth daily      bisacodyl (DULCOLAX) 10 MG suppository Place 10 mg rectally daily as needed for constipation     cholestyramine (QUESTRAN) 4 g packet Take 1 packet by mouth every other day      clopidogrel (PLAVIX) 75 MG tablet Take 1 tablet (75 mg) by mouth daily Start taking medication the day after the procedure.     DULoxetine (CYMBALTA) 60 MG capsule Take 120 mg by mouth daily     fenofibrate micronized (LOFIBRA) 200 MG capsule TAKE 1 CAP BY MOUTH ONCE DAILY BEFORE BREAKFAST     ferrous sulfate (FEROSUL) 325 (65 Fe) MG tablet Take 325 mg by mouth daily (with breakfast)      gabapentin (NEURONTIN) 100 MG capsule Take 100 mg by mouth 2 times daily (before meals)     gabapentin (NEURONTIN) 300 MG capsule Take 600 mg by mouth At Bedtime     glipiZIDE (GLUCOTROL) 5 MG tablet Take 2.5 mg by mouth daily before breakfast     HYDROcodone-acetaminophen (NORCO) 5-325 MG tablet Take 1-2 tablets by mouth every 6 hours as needed for moderate to severe pain     loperamide (IMODIUM) 2 MG capsule Take 2 mg by mouth 4 times daily as needed for diarrhea     LORazepam (ATIVAN) 0.5 MG tablet Take 0.5 mg by mouth daily as needed for anxiety     losartan (COZAAR) 25 MG tablet Take 25 mg by mouth At Bedtime      magnesium oxide 400 MG CAPS Take 400 mg by mouth 4 times daily     metFORMIN (GLUCOPHAGE) 500 MG tablet Take 500 mg by  mouth daily (with dinner)     metFORMIN (GLUCOPHAGE) 500 MG tablet Take 1,000 mg by mouth daily (with breakfast)     metoprolol succinate ER (TOPROL-XL) 25 MG 24 hr tablet Take 75 mg by mouth daily      mineral oil-hydrophilic petrolatum (AQUAPHOR) external ointment Apply topically 3 times daily as needed for irritation or dry skin Apply to forehead and other areas as needed     mirtazapine (REMERON) 7.5 MG tablet Take 7.5 mg by mouth At Bedtime      Nitroglycerin (NITROSTAT SL) Place 0.4 mg under the tongue every 5 minutes as needed for chest pain      pantoprazole (PROTONIX) 40 MG EC tablet Take 40 mg by mouth daily     polyethylene glycol (MIRALAX) 17 GM/Dose powder Take 1 packet by mouth daily as needed for constipation      senna-docusate (SENOKOT-S/PERICOLACE) 8.6-50 MG tablet Take 1 tablet by mouth 2 times daily     senna-docusate (SENOKOT-S/PERICOLACE) 8.6-50 MG tablet Take 1 tablet by mouth 2 times daily as needed for constipation In addition to scheduled doses     traZODone (DESYREL) 50 MG tablet Take 50 mg by mouth At Bedtime      vancomycin 1,500 mg Inject 1,500 mg into the vein every 24 hours for 25 days     vitamin C (ASCORBIC ACID) 500 MG tablet Take 500 mg by mouth daily      XARELTO ANTICOAGULANT 20 MG TABS tablet Take 20 mg by mouth daily     No current facility-administered medications for this visit.       Allergies:   Allergies   Allergen Reactions     Aspirin Other (See Comments)     Reacts with migraine medicine     Blood-Group Specific Substance      Anti-Fya present.  Expect delays in blood for transfusion.  Draw 2 lavender and 1 red for all type and screen orders.     Isosorbide Headache     Codeine Rash     Morphine Rash       Family history:   Family History   Adopted: Yes        Social History:   Social History     Socioeconomic History     Marital status:      Spouse name: Not on file     Number of children: Not on file     Years of education: Not on file     Highest education  level: Not on file   Occupational History     Not on file   Tobacco Use     Smoking status: Former Smoker     Quit date: 2015     Years since quittin.6     Smokeless tobacco: Never Used   Vaping Use     Vaping Use: Never used   Substance and Sexual Activity     Alcohol use: Not Currently     Drug use: Not Currently     Sexual activity: Not on file   Other Topics Concern     Parent/sibling w/ CABG, MI or angioplasty before 65F 55M? Not Asked   Social History Narrative     Not on file     Social Determinants of Health     Financial Resource Strain: Not on file   Food Insecurity: Not on file   Transportation Needs: Not on file   Physical Activity: Not on file   Stress: Not on file   Social Connections: Not on file   Intimate Partner Violence: Not on file   Housing Stability: Not on file        Physical Exam  Vitals: /72   Pulse 80   Resp 20   Weight is 0 lbs 0 oz          There is no height or weight on file to calculate BMI.  General: This is a 52 year old male who appears their reported age, not in acute distress  Head: normocephalic, Atraumatic; wearing glasses; non-icteric sclera; no exudate; mild hearing loss   Respiratory: Clear throughout all lung fields; unlabored breathing; no cough   Cardiac: Regular, Rate and Rhythm, no murmurs appreciated   Skin: Uniformly warm and dry  Psych: Alert and oriented x4; calm and cooperative throughout exam  Abdomen: Normal bowel sounds. Soft, symmetric, no guarding or rigidity, nontender with palpation.  No organomegaly or masses palpated.   Extremities: LLE strength testing revealed 4/4 to BLEs.  Peripheral Vascular: R BKA +1 popliteal pulse     Circumferential volume measures:        No flowsheet data found.    Ulceration(s)/Wound(s):   Wound #1 Location: R stump  Size: 1L x 5W x 2.2depth.  No sinus tract present, Wound base: bone  No undermining present. Wound is full thickness. There is moderate drainage. Periwound: no denudement, + mild erythema,  induration, maceration or warmth.   VASC Wound Right stump (Active)   Pre Size Length 1 09/22/22 1300   Pre Size Width 5 09/22/22 1300   Pre Size Depth 1 09/22/22 1300   Pre Total Sq cm 5 09/22/22 1300   Post Size Length 1 09/22/22 1300   Post Size Width 5 09/22/22 1300   Post Size Depth 2.2 09/22/22 1300   Post Total Sq cm 5 09/22/22 1300       Laboratory studies:     I personally reviewed the following lab results today and those on care everywhere, if indicated     CRP Inflammation   Date Value Ref Range Status   09/19/2022 5.15 (H) <5.00 mg/L Final     CRP   Date Value Ref Range Status   08/24/2022 18.9 (H) 0.0 - <0.8 mg/dL Final      Erythrocyte Sedimentation Rate   Date Value Ref Range Status   09/19/2022 15 0 - 20 mm/hr Final      Last Renal Panel:  Sodium   Date Value Ref Range Status   09/19/2022 138 136 - 145 mmol/L Final   08/25/2016 139 mmol/L Final     Potassium   Date Value Ref Range Status   09/19/2022 4.3 3.4 - 5.3 mmol/L Final   08/30/2022 4.4 3.5 - 5.0 mmol/L Final   08/25/2016 4.3 mmol/L Final     Chloride   Date Value Ref Range Status   09/19/2022 105 98 - 107 mmol/L Final   08/29/2022 109 (H) 98 - 107 mmol/L Final   08/25/2016 107 mmol/L Final     Carbon Dioxide (CO2)   Date Value Ref Range Status   09/19/2022 24 22 - 29 mmol/L Final   08/29/2022 25 22 - 31 mmol/L Final     Anion Gap   Date Value Ref Range Status   09/19/2022 9 7 - 15 mmol/L Final   08/29/2022 6 5 - 18 mmol/L Final   08/25/2016 8 mmol/L Final     Glucose   Date Value Ref Range Status   09/19/2022 114 (H) 70 - 99 mg/dL Final   08/29/2022 94 70 - 125 mg/dL Final   08/25/2016 84 70 - 99 mg/dL Final     GLUCOSE BY METER POCT   Date Value Ref Range Status   08/30/2022 83 70 - 99 mg/dL Final     Urea Nitrogen   Date Value Ref Range Status   09/19/2022 14.4 6.0 - 20.0 mg/dL Final   08/29/2022 12 8 - 22 mg/dL Final   08/25/2016 21 mg/dL Final     Creatinine   Date Value Ref Range Status   09/19/2022 0.77 0.67 - 1.17 mg/dL Final    08/25/2016 0.85 mg/dL Final     GFR Estimate   Date Value Ref Range Status   09/19/2022 >90 >60 mL/min/1.73m2 Final     Comment:     Effective December 21, 2021 eGFRcr in adults is calculated using the 2021 CKD-EPI creatinine equation which includes age and gender (Stephen perry al., [place], DOI: 10.1056/BXKPoy3979890)   04/29/2019 >60 >60 mL/min/1.73m2 Final     Calcium   Date Value Ref Range Status   09/19/2022 9.7 8.6 - 10.0 mg/dL Final   08/25/2016 9.9 mg/dL Final     Phosphorus   Date Value Ref Range Status   08/07/2022 2.9 2.5 - 4.5 mg/dL Final     Albumin   Date Value Ref Range Status   09/06/2022 3.9 3.5 - 5.2 g/dL Final   08/24/2022 2.8 (L) 3.5 - 5.0 g/dL Final      Lab Results   Component Value Date    WBC 2.1 09/20/2022    WBC 5.0 08/25/2016     Lab Results   Component Value Date    RBC 3.77 09/20/2022    RBC 3.49 08/25/2016     Lab Results   Component Value Date    HGB 9.9 09/20/2022    HGB 10.1 08/25/2016     Lab Results   Component Value Date    HCT 32.2 09/20/2022    HCT 31.4 08/25/2016     No components found for: MCT  Lab Results   Component Value Date    MCV 85 09/20/2022    MCV 90 08/25/2016     Lab Results   Component Value Date    MCH 26.3 09/20/2022    MCH 28.7 08/25/2016     Lab Results   Component Value Date    MCHC 30.7 09/20/2022    MCHC 31.8 08/25/2016     Lab Results   Component Value Date    RDW 15.7 09/20/2022    RDW 13.6 08/25/2016     Lab Results   Component Value Date     09/20/2022     08/25/2016      Lab Results   Component Value Date    A1C 5.9 08/26/2022    A1C 6.3 08/23/2022    A1C 5.8 05/16/2022    A1C 5.1 12/21/2021    A1C 5.9 11/18/2019    A1C 5.3 08/25/2016      No results found for: TSH   No results found for: VITDT       Impression:    Encounter Diagnoses   Name Primary?     Non-healing surgical wound, initial encounter Yes     Status post below-knee amputation of right lower extremity (H)        Assessment/Plan:  1. Debridement: After discussion of risk factors  and verbal consent was obtained 2% Lidocaine HCL jelly was applied, under clean conditions, the R stump ulceration(s) were debrided using currette. Devitalized and nonviable tissue, along with any fibrin and slough, was removed to improve granulation tissue formation, stimulate wound healing, decrease overall bacteria load, disrupt biofilm formation and decrease edge senescence.  Total excisional debridement was 5 sq cm into the subcutaneous tissue with a depth of 2.2 cm.   Ulcers were improved afterwards and .  Measures were as noted on the flow sheet.    2. Treatment: wound treatment will include irrigation and dressings to promote autolytic debridement which will include: Cleanse with normal saline, wet to dry dressing applied in clinic. Facility to apply wound vac at 125mmhg continuous suction to be changed three times a week. Bone still present in wound, no change in depth. Wound starting to dehisce on the left lateral margin of the wound. Sutures and staples removed. Will plan to vac the whole area. Will see pt in joint visit with Dr. Montes in 2 weeks. Dr. Mei from Infectious Disease plans to touch base with Dr. Montes during this time. Pt is anxious to get back home.    If for some reason the patient is not able to get your dressing(s) changed as outlined above (due to illness, lack of supplies, lack of help) please do the following: remove old, soiled dressings; wash the wounds with saline; pat dry; apply ABD pad or other absorbant pad and secure with rolled gauze; avoid tape directly on your skin; Patient instructed to call the clinic as soon as possible to let us know what the current issues are in receiving wound care.    3. Edema. Stable, continue tubular compression    If a 2 layer or 4 layer compression wrap is being used; these are safe to have on for ONLY 7 days. If for some reason the patient is not able to get the wrap(s) changed (due to illness; lack of supplies, lack of help, lack of  transportation) please do the following: unwrap the old 2 or 4 layer compression wrap; avoid using scissors as you could cut your skin and cause wounds; use tubular compression when available. Call to reschedule your home care or clinic visit appointment as soon as possible.    4. Offloading: Do not use stump protector at this time    5. Nutrition: Diabetic Diet, focus on protein, 1-2 protein shakes per day.    Patient to return to clinic in 2 week(s) for re-evaluation. They were instructed to call the clinic sooner with any further questions or concerns. Answered all questions.          SHABNAM Brooks CNP   [facility]  [phone]      This note was electronically signed by SHABNAM Brooks CNP

## 2022-09-22 NOTE — PROGRESS NOTES
CLINIC FOLLOW UP NOTE:    Original Reason for consultation, site patient seen:  [place]    Interval history:  I went and saw this pt across the mckeon during a wound check.  He continues to not close the stump wound, but no gross pus or cellulitis.  It probes to bone as prior.  His mom is with him, he's in TCU but would be in [facility] once done with IV abx.  IV was to be done, tomorrow per hospital plans.            Current antibiotics and duration plan:  Vanco for wound infection and bacteremia due to MRSA    Reviewed PAST MEDICAL HISTORY,  family and social history      Examination:  Alert, awake  Vitals tabulated above, reviewed  Neck supple  Sclera OK  CARDIOVASCULAR regular rate and rhythm, no mumur  Lungs CLEAR TO AUSCULTATION   Abdomen soft, NT/ND, absent HEPATOSPLENOMEGALY  Skin normal  Joints normal  Neurologic exam non focal  Wound:  N/A                                      Pictures go from newest top, to oldest bottom    Lab Data/New Imaging/Medication levels/Microbiologic data:  Component      Latest Ref Rng & Units 9/6/2022 9/12/2022 9/19/2022   CRP Inflammation      <5.00 mg/L <3.00 8.71 (H) 5.15 (H)      Latest Reference Range & Units 09/19/22 05:40 09/20/22 05:15   WBC 4.0 - 11.0 10e3/uL 1.6 (L) 2.1 (L)   Hemoglobin 13.3 - 17.7 g/dL 10.4 (L) 9.9 (L)   Hematocrit 40.0 - 53.0 % 33.7 (L) 32.2 (L)   Platelet Count 150 - 450 10e3/uL 185 170   RBC Count 4.40 - 5.90 10e6/uL 3.92 (L) 3.77 (L)   MCV 78 - 100 fL 86 85   MCH 26.5 - 33.0 pg 26.5 26.3 (L)   MCHC 31.5 - 36.5 g/dL 30.9 (L) 30.7 (L)   RDW 10.0 - 15.0 % 15.6 (H) 15.7 (H)   % Neutrophils % 17 17   % Lymphocytes % 47 45   % Monocytes % 11 27   % Eosinophils % 21 8   % Basophils % 3 3   % Myelocytes % 1    Absolute Basophils 0.0 - 0.2 10e3/uL  0.1   Absolute Basophils 0.0 - 0.2 10e3/uL 0.0    Absolute Neutrophil 1.6 - 8.3 10e3/uL 0.3 (LL)    Absolute Lymphocytes 0.8 - 5.3 10e3/uL 0.8    Absolute Monocytes 0.0 - 1.3 10e3/uL 0.2    Absolute Eosinophils 0.0 - 0.7  10e3/uL 0.3    Absolute Eosinophils 0.0 - 0.7 10e3/uL  0.2   Absolute Immature Granulocytes <=0.4 10e3/uL  0.0   Absolute Lymphocytes 0.8 - 5.3 10e3/uL  0.9   Absolute Monocytes 0.0 - 1.3 10e3/uL  0.6   % Immature Granulocytes %  0   Absolute Neutrophils 1.6 - 8.3 10e3/uL  0.4 (LL)   Absolute Myelocytes <=0.0 10e3/uL 0.0    Absolute NRBCs 10e3/uL  0.0   NRBCs per 100 WBC <1 /100  0   RBC Morphology  Confirmed RBC Indices Confirmed RBC Indices   Platelet Morphology Automated Count Confirmed. Platelet morphology is normal.  Automated Count Confirmed. Platelet morphology is normal. Automated Count Confirmed. Platelet morphology is normal.   Reactive Lymphs None Seen  Present !    Sed Rate 0 - 20 mm/hr 15    IMPRESSION:  Slowly vs non healing stump wound, hopefully it is the former  MRSA bacteremia and wound infection, strep was in blood probably from his oral health    Lowish wbc, ANC issues see above.  Probably d/t vanco    PLAN:  Strep is taken care of  I would advise 3 more wks at TCU and close Follow-up with Simon to see if things are going to heal  Could switch to like 3 or 4 MONTHS of oral doxy or sulfa in mid October if we are making progress, otherwise he will need to go up a couple more inches if we keep BKA, or an AKA if we can't....  Should try to discuss with Simon when he sees Matias next.    Thank you for allowing me to continue care of this patient.    Sincerely:      ANT Mei MD  [facility]  [facility]   [phone]

## 2022-09-23 ENCOUNTER — LAB REQUISITION (OUTPATIENT)
Dept: LAB | Facility: CLINIC | Age: 52
End: 2022-09-23
Payer: MEDICARE

## 2022-09-23 DIAGNOSIS — R78.81 BACTEREMIA: ICD-10-CM

## 2022-09-23 DIAGNOSIS — B95.62 METHICILLIN RESISTANT STAPHYLOCOCCUS AUREUS INFECTION AS THE CAUSE OF DISEASES CLASSIFIED ELSEWHERE: ICD-10-CM

## 2022-09-24 NOTE — PROGRESS NOTES
[facility] GERIATRICS    [place] Medical Record Number:  [id number]  Place of Service where encounter took place: [facility] ([facility]) [[id number]]   CODE STATUS:   CPR/Full code      Chief Complaint/Reason for Visit:  Chief Complaint   Patient presents with     RECHECK     TCU 8/18/2022. 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       TCU HPI:    Cm Lundberg is a 51 year old male with hx of CAD, s/p stents and CABG, HTN, DM, prior CVA, afib/flutter on xarelto, who has been residing in TCU at [facility] since Dec 2021 surgery due to elective procedure for chronic long standing right foot ulceration. He underwent incision and drainage of the right foot, excisional debridement of the ulceration of the right foot into the level of the muscle, under MAC with popliteal block anesthesia on 12/17/2021. He completed abx per ID recommendations. Since that time has been NWB with wound vac, had 1/27/2022 incision and drainage with application of theraskin. On 4/4/2022 again incision and drainage with theraskin.     Continued following with podiatry, wound is non healing, referred to wound and vascular due to non healing diabetic ulcer, right foot. S/p angioplasty RLE and hyperbaric oxygen therapy. MRI with indications of osteomyelitis. Following with podiatry and with vascular, evaluation and recommendation for R BKA, which he had on 8/5/2022. His hospital discharge summary is partially excerpted below.     Vascular Surgery Discharge Summary  DATE OF ADMISSION:   8/5/2022      PRE/POSTOPERATIVE DIAGNOSES:    Charcot ankle, right     PROCEDURES PERFORMED:  Amputation, below knee, right leg     INTRAOPERATIVE FINDINGS:   Healthy tissues with good bleeding at level of amputation.  Some degree of edema but no other signs of infection     POSTOPERATIVE COMPLICATIONS:   None      DATE OF DISCHARGE:    8/10/2022     HOSPITAL COURSE:   Cm Lundberg  is a 51 year old male who on 8/5/2022 underwent the above-named procedures.  He tolerated the procedure well and postoperatively was transferred to the general post-surgical unit.  The remainder of his course was essentially uncomplicated.  Prior to discharge, his pain was controlled well with tylenol and oxycodone. He was able to perform ADLs, was evaluated by physical/occupational therapy, and had full return of bowel and bladder function.  On 8/10, he was discharged to skilled nursing in stable condition.     DISCHARGE INSTRUCTIONS:  Activity: Advance as tolerated with PT/OT     Diet: Moderate Consistent Carb Diet     Wound care: Daily dressing change to BKA, monitor for signs/symptoms of infection    Returned to TCU on 8/10/2022.       Today:  He is doing better with pain management. States she hasn't taken any narcotic pain meds in a few days, does take tylenol. Has stump protector. Sees Dr. Montes on 9/8/2022. Wants to go home but still has staples in, dressing changes daily and his [facility] will not be able to take him back until dressing changes are 3 x weekly or less. No fever. He is anticoagulated with xarelto, takes meds for HTN, DM, CAD, has hx of prior stroke. He has good appetite. No nausea, vomiting, diarrhea or constipation. No urinary concerns. Has diabetes, on metformin and glipizide. Takes Abilify and duloxetine for depression, mood disorder.       PAST MEDICAL HISTORY:  Past Medical History:   Diagnosis Date     Anemia      Anemia, unspecified type 08/19/2016     Anxiety      Anxiety state 07/29/2021    Formatting of this note might be different from the original. Created by Conversion  Replacement Utility updated for latest IMO load     Arthritis      Atrial fibrillation and flutter (H)      Cerebral artery occlusion with cerebral infarction (H)      Cerebral infarction (H)      Chest pain, unspecified type 08/19/2016     Chronic atrial fibrillation (H)      Congestive heart failure (H)      Coronary  artery disease     CABG with multiple stents     Coronary artery disease involving native coronary artery without angina pectoris, unspecified whether native or transplanted heart 08/19/2016     Coronary atherosclerosis 07/29/2021    Formatting of this note might be different from the original. Created by Conversion  Replacement Utility updated for latest IMO load     Depression      Diabetic ulcer of right midfoot associated with type 2 diabetes mellitus, limited to breakdown of skin (H) 09/28/2017     Equinovarus acquired deformity, right 04/14/2021     Equinovarus deformity, acquired, right      Essential hypertension 08/19/2016     GERD (gastroesophageal reflux disease)      Heart attack (H)      History of blood transfusion      Hyperlipidemia      Hyperlipidemia, unspecified hyperlipidemia type 08/19/2016     Hypertension      Insomnia, unspecified type 08/19/2016     Ischemic cardiomyopathy      Late effects of cerebrovascular accident 08/19/2016     Major depressive disorder with single episode, remission status unspecified 08/19/2016     Microalbuminuria 07/29/2021    Formatting of this note might be different from the original. 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 Created by Conversion [facility] Annotation: Feb 11 2010  1:41PM - Leonid Cabral: CANNOT READ OR  WRITE     PVD (peripheral vascular disease) (H)      Stented coronary artery      Type 2 diabetes mellitus (H) 07/29/2021    Formatting of this note might be different from the original. Created by Conversion     Type II or unspecified type diabetes mellitus with ophthalmic manifestations, not stated as uncontrolled(250.50) (H) 07/29/2021    Formatting of this note might be different from  the original. Created by Conversion     Weakness 08/19/2016       MEDICATIONS:  Current Outpatient Medications   Medication Sig Dispense Refill     pantoprazole (PROTONIX) 40 MG EC tablet Take 40 mg by mouth daily       acetaminophen (TYLENOL) 325 MG tablet Take 650 mg by mouth every 4 hours as needed for mild pain        ARIPiprazole (ABILIFY) 2 MG tablet 2 mg daily        ASPERCREME ORIGINAL 10 % external cream APPLY TO LOW BACK TWICE DAILY FOR LOW BACK PAIN       atorvastatin (LIPITOR) 80 MG tablet Take 80 mg by mouth daily        cholestyramine (QUESTRAN) 4 g packet Take 1 packet by mouth every other day        clopidogrel (PLAVIX) 75 MG tablet Take 1 tablet (75 mg) by mouth daily Start taking medication the day after the procedure. 90 tablet 1     DULoxetine (CYMBALTA) 60 MG capsule Take 120 mg by mouth daily takle 120mg       erenumab-aooe (AIMOVIG) 140 MG/ML injection Inject 1 mL (140 mg) Subcutaneous every 30 days 1 mL 0     fenofibrate micronized (LOFIBRA) 200 MG capsule Take 200 mg by mouth every morning (before breakfast)        ferrous sulfate (FEROSUL) 325 (65 Fe) MG tablet Take 325 mg by mouth daily (with breakfast)        gabapentin (NEURONTIN) 100 MG capsule TAKE 1 CAP BY MOUTH TWICE A DAY BEFORE MEALS       gabapentin (NEURONTIN) 300 MG capsule Take 600 mg by mouth At Bedtime       glipiZIDE (GLUCOTROL) 5 MG tablet TAKE 1/2 TABLET (2.5MG) BY MOUTH DAILY DX DIABETES       loperamide (IMODIUM) 2 MG capsule Take 4 mg by mouth as needed for diarrhea        losartan (COZAAR) 25 MG tablet Take 25 mg by mouth At Bedtime        magnesium oxide 400 MG CAPS Take 400 mg by mouth 4 times daily       Menthol, Topical Analgesic, (BIOFREEZE ROLL-ON) 4 % GEL Apply 1 Application topically 2 times daily Apply to lower back for pain       metFORMIN (GLUCOPHAGE) 500 MG tablet TAKE 2 TABS (1000MG) BY MOUTH EVERY MORNING;TAKE 1 TAB BY MOUTH EVERY EVENING       metoprolol succinate ER (TOPROL-XL) 25 MG 24 hr tablet Take  "75 mg by mouth daily        mirtazapine (REMERON) 7.5 MG tablet Take 7.5 mg by mouth At Bedtime        Neomycin-Bacitracin-Polymyxin (TRIPLE ANTIBIOTIC) 3.5-400-5000 OINT ointment Externally apply 1 applicator topically 3 times daily as needed       Nitroglycerin (NITROSTAT SL) Place 0.4 mg under the tongue every 5 minutes as needed for chest pain        oxyCODONE (ROXICODONE) 5 MG tablet TAKE 1 TAB BY MOUTH EVERY 6HOURS AS NEEDED FOR PAIN 28 tablet 0     polyethylene glycol (MIRALAX) 17 GM/Dose powder Take 1 packet by mouth daily as needed for constipation        senna-docusate (SENOKOT-S/PERICOLACE) 8.6-50 MG tablet Take 1 tablet by mouth 2 times daily as needed for constipation        traZODone (DESYREL) 50 MG tablet Take 50 mg by mouth At Bedtime        vitamin C (ASCORBIC ACID) 500 MG tablet Take 500 mg by mouth daily        XARELTO ANTICOAGULANT 20 MG TABS tablet TAKE 1 TAB BY MOUTH ONCE DAILY         PHYSICAL EXAM:  General: Patient is alert male, no distress.   Vitals: /73   Pulse 73   Temp 98.7  F (37.1  C)   Resp 18   Ht 1.702 m (5' 7\")   Wt 70.9 kg (156 lb 3.2 oz)   SpO2 97%   BMI 24.46 kg/m    HEENT: Head is NCAT. Eyes show no injection or icterus. Nares negative. Oropharynx moist.  Neck: No JVD.  Lungs: Non labored respirations.   Abdomen: Soft.   : Deferred.  Extremities: Mild swelling right BKA.  Musculoskeletal: Limb protector right.   Skin: Bandage right BKA.   Psych: Mood appears good.      LABS/DIAGNOSTIC DATA:  Component      Latest Ref Rng & Units 8/5/2022 8/7/2022              Creatinine      0.67 - 1.17 mg/dL 1.11 1.13   Sodium      136 - 145 mmol/L  134 (L)   Potassium      3.4 - 5.3 mmol/L     Urea Nitrogen      6.0 - 20.0 mg/dL     Chloride      98 - 107 mmol/L     Carbon Dioxide (CO2)      22 - 29 mmol/L     Anion Gap      7 - 15 mmol/L  7   Glucose      70 - 99 mg/dL     GFR Estimate      >60 mL/min/1.73m2 79 78   Calcium      8.6 - 10.0 mg/dL  8.8     Component      Latest " Ref Rng & Units 8/5/2022 8/6/2022 8/6/2022 8/7/2022            6:16 AM 10:50 AM  6:20 AM   WBC      4.0 - 11.0 10e3/uL 6.3 5.9 7.0 8.1   RBC Count      4.40 - 5.90 10e6/uL 4.09 (L) 3.82 (L) 3.74 (L) 4.00 (L)   Hemoglobin      13.3 - 17.7 g/dL 10.6 (L) 10.1 (L) 9.9 (L) 10.3 (L)   Hematocrit      40.0 - 53.0 % 33.7 (L) 31.9 (L) 31.2 (L) 33.2 (L)   MCV      78 - 100 fL 82 84 83 83   MCH      26.5 - 33.0 pg 25.9 (L) 26.4 (L) 26.5 25.8 (L)   MCHC      31.5 - 36.5 g/dL 31.5 31.7 31.7 31.0 (L)   RDW      10.0 - 15.0 % 14.6 14.6 14.6 14.8   Platelet Count      150 - 450 10e3/uL 160 137 (L) 135 (L) 148 (L)     Component      Latest Ref Rng & Units 8/7/2022 8/8/2022          11:00 AM    WBC      4.0 - 11.0 10e3/uL 7.9    RBC Count      4.40 - 5.90 10e6/uL 3.92 (L)    Hemoglobin      13.3 - 17.7 g/dL 10.3 (L)    Hematocrit      40.0 - 53.0 % 32.4 (L)    MCV      78 - 100 fL 83    MCH      26.5 - 33.0 pg 26.3 (L)    MCHC      31.5 - 36.5 g/dL 31.8    RDW      10.0 - 15.0 % 14.8    Platelet Count      150 - 450 10e3/uL 148 (L) 144 (L)         ASSESSMENT/PLAN:  1. S/p R BKA. Surgery on 8/5/2022 for long standing nonhealing right foot diabetic ulcer and Charcot ankle. He is S/p prior procedures on 12/27/2021, 1/27/2022 and 4/4/2022, s/p angioplasty RLE and hyperbaric oxygen treatment. MRI with indications of osteomyelitis. Ultimately R BKA on 8/5/2022. NWB. Bandage with wound cares, stump protector. Follow up with Dr. Montes on 9/8/2022.  2. Pain management. Much improved control, taper pain meds as able.    3. HTN. He is on metoprolol and losartan. Continue to monitor Bps in TCU.  4. Afib. On xarelto for anticoagulation. Adequate rate control.   5. Diabetes. On metformin and glipizide.  6. CAD. Hx of MIs. Hx of stenting and prior CABG. On statin, beta blocker, xarelto. Stable.   7. Mood disorder. Mixed depression and anxiety. On duloxetine, Abilify. Also takes Mirtazapine.          Electronically signed by: Heidi Xie MD

## 2022-09-26 LAB
ALBUMIN SERPL BCG-MCNC: 3.8 G/DL (ref 3.5–5.2)
ALP SERPL-CCNC: 95 U/L (ref 40–129)
ALT SERPL W P-5'-P-CCNC: 14 U/L (ref 10–50)
ANION GAP SERPL CALCULATED.3IONS-SCNC: 8 MMOL/L (ref 7–15)
AST SERPL W P-5'-P-CCNC: 26 U/L (ref 10–50)
BASOPHILS # BLD MANUAL: 0.1 10E3/UL (ref 0–0.2)
BASOPHILS NFR BLD MANUAL: 6 %
BILIRUB DIRECT SERPL-MCNC: <0.2 MG/DL (ref 0–0.3)
BILIRUB SERPL-MCNC: 0.3 MG/DL
BUN SERPL-MCNC: 15.2 MG/DL (ref 6–20)
CALCIUM SERPL-MCNC: 9.4 MG/DL (ref 8.6–10)
CHLORIDE SERPL-SCNC: 101 MMOL/L (ref 98–107)
CREAT SERPL-MCNC: 0.74 MG/DL (ref 0.67–1.17)
CRP SERPL-MCNC: 46.8 MG/L
DEPRECATED HCO3 PLAS-SCNC: 26 MMOL/L (ref 22–29)
EOSINOPHIL # BLD MANUAL: 0.2 10E3/UL (ref 0–0.7)
EOSINOPHIL NFR BLD MANUAL: 11 %
ERYTHROCYTE [DISTWIDTH] IN BLOOD BY AUTOMATED COUNT: 14.9 % (ref 10–15)
ERYTHROCYTE [SEDIMENTATION RATE] IN BLOOD BY WESTERGREN METHOD: 32 MM/HR (ref 0–20)
GFR SERPL CREATININE-BSD FRML MDRD: >90 ML/MIN/1.73M2
GLUCOSE SERPL-MCNC: 108 MG/DL (ref 70–99)
HCT VFR BLD AUTO: 33.8 % (ref 40–53)
HGB BLD-MCNC: 10.2 G/DL (ref 13.3–17.7)
LYMPHOCYTES # BLD MANUAL: 0.8 10E3/UL (ref 0.8–5.3)
LYMPHOCYTES NFR BLD MANUAL: 47 %
MAGNESIUM SERPL-MCNC: 1.2 MG/DL (ref 1.7–2.3)
MCH RBC QN AUTO: 26 PG (ref 26.5–33)
MCHC RBC AUTO-ENTMCNC: 30.2 G/DL (ref 31.5–36.5)
MCV RBC AUTO: 86 FL (ref 78–100)
MONOCYTES # BLD MANUAL: 0.4 10E3/UL (ref 0–1.3)
MONOCYTES NFR BLD MANUAL: 26 %
NEUTROPHILS # BLD MANUAL: 0.2 10E3/UL (ref 1.6–8.3)
NEUTROPHILS NFR BLD MANUAL: 10 %
PLAT MORPH BLD: ABNORMAL
PLATELET # BLD AUTO: 187 10E3/UL (ref 150–450)
POTASSIUM SERPL-SCNC: 4.2 MMOL/L (ref 3.4–5.3)
PROT SERPL-MCNC: 6.5 G/DL (ref 6.4–8.3)
RBC # BLD AUTO: 3.92 10E6/UL (ref 4.4–5.9)
RBC MORPH BLD: ABNORMAL
SODIUM SERPL-SCNC: 135 MMOL/L (ref 136–145)
VANCOMYCIN SERPL-MCNC: 18.6 UG/ML
WBC # BLD AUTO: 1.6 10E3/UL (ref 4–11)

## 2022-09-26 PROCEDURE — P9604 ONE-WAY ALLOW PRORATED TRIP: HCPCS | Mod: ORL | Performed by: INTERNAL MEDICINE

## 2022-09-26 PROCEDURE — 85027 COMPLETE CBC AUTOMATED: CPT | Mod: ORL | Performed by: INTERNAL MEDICINE

## 2022-09-26 PROCEDURE — 80053 COMPREHEN METABOLIC PANEL: CPT | Performed by: INTERNAL MEDICINE

## 2022-09-26 PROCEDURE — 83735 ASSAY OF MAGNESIUM: CPT | Mod: ORL | Performed by: INTERNAL MEDICINE

## 2022-09-26 PROCEDURE — 85007 BL SMEAR W/DIFF WBC COUNT: CPT | Mod: ORL | Performed by: INTERNAL MEDICINE

## 2022-09-26 PROCEDURE — 36415 COLL VENOUS BLD VENIPUNCTURE: CPT | Mod: ORL | Performed by: INTERNAL MEDICINE

## 2022-09-26 PROCEDURE — 86140 C-REACTIVE PROTEIN: CPT | Performed by: INTERNAL MEDICINE

## 2022-09-26 PROCEDURE — 82248 BILIRUBIN DIRECT: CPT | Mod: ORL | Performed by: INTERNAL MEDICINE

## 2022-09-26 PROCEDURE — 85652 RBC SED RATE AUTOMATED: CPT | Mod: ORL | Performed by: INTERNAL MEDICINE

## 2022-09-26 PROCEDURE — 80202 ASSAY OF VANCOMYCIN: CPT | Mod: ORL | Performed by: INTERNAL MEDICINE

## 2022-09-26 NOTE — PROGRESS NOTES
[facility] GERIATRICS    [place] Medical Record Number:  [id number]  Place of Service where encounter took place: [facility] ([facility]) [[id number]]   CODE STATUS:   CPR/Full code      Chief Complaint/Reason for Visit:  Chief Complaint   Patient presents with     RECHECK     TCU 8/22/2022. 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       TCU HPI:    Cm Lundberg is a 52 year old male with hx of CAD, s/p stents and CABG, HTN, DM, prior CVA, afib/flutter on xarelto, who has been residing in TCU at [facility] since Dec 2021 surgery due to elective procedure for chronic long standing right foot ulceration. He underwent incision and drainage of the right foot, excisional debridement of the ulceration of the right foot into the level of the muscle, under MAC with popliteal block anesthesia on 12/17/2021. He completed abx per ID recommendations. Since that time has been NWB with wound vac, had 1/27/2022 incision and drainage with application of theraskin. On 4/4/2022 again incision and drainage with theraskin.     Continued following with podiatry, wound is non healing, referred to wound and vascular due to non healing diabetic ulcer, right foot. S/p angioplasty RLE and hyperbaric oxygen therapy. MRI with indications of osteomyelitis. Following with podiatry and with vascular, evaluation and recommendation for R BKA, which he had on 8/5/2022. His hospital discharge summary is partially excerpted below.     Vascular Surgery Discharge Summary  DATE OF ADMISSION:   8/5/2022      PRE/POSTOPERATIVE DIAGNOSES:    Charcot ankle, right     PROCEDURES PERFORMED:  Amputation, below knee, right leg     INTRAOPERATIVE FINDINGS:   Healthy tissues with good bleeding at level of amputation.  Some degree of edema but no other signs of infection     POSTOPERATIVE COMPLICATIONS:   None      DATE OF DISCHARGE:    8/10/2022     HOSPITAL COURSE:   Cm Lundberg  is a 51 year old male who on 8/5/2022 underwent the above-named procedures.  He tolerated the procedure well and postoperatively was transferred to the general post-surgical unit.  The remainder of his course was essentially uncomplicated.  Prior to discharge, his pain was controlled well with tylenol and oxycodone. He was able to perform ADLs, was evaluated by physical/occupational therapy, and had full return of bowel and bladder function.  On 8/10, he was discharged to skilled nursing in stable condition.     DISCHARGE INSTRUCTIONS:  Activity: Advance as tolerated with PT/OT     Diet: Moderate Consistent Carb Diet     Wound care: Daily dressing change to BKA, monitor for signs/symptoms of infection    Returned to TCU on 8/10/2022.       Today:  Pain under better control. States he still has some pain but after discussion, will be able to reduce oxycodone back down to 5 mg (instead of 5 or 10 mg) every 4 hours as needed. Further weaning based on clinical response. Has tylenol as well. Sees Dr. Montes on 9/8/2022. Staples in place, daily dressing changes. No fever. He is anticoagulated with xarelto, takes meds for HTN, DM, CAD, has hx of prior stroke. He has good appetite. No nausea, vomiting, diarrhea or constipation. No urinary concerns. Has diabetes, on metformin and glipizide. Takes Abilify and duloxetine for depression, mood disorder.       PAST MEDICAL HISTORY:  Past Medical History:   Diagnosis Date     Anemia      Anemia, unspecified type 08/19/2016     Anxiety      Anxiety state 07/29/2021    Formatting of this note might be different from the original. Created by Conversion  Replacement Utility updated for latest IMO load     Arthritis      Atrial fibrillation and flutter (H)      Cerebral artery occlusion with cerebral infarction (H)      Cerebral infarction (H)      Chest pain, unspecified type 08/19/2016     Chronic atrial fibrillation (H)      Congestive heart failure (H)      Coronary artery disease      CABG with multiple stents     Coronary artery disease involving native coronary artery without angina pectoris, unspecified whether native or transplanted heart 08/19/2016     Coronary atherosclerosis 07/29/2021    Formatting of this note might be different from the original. Created by Conversion  Replacement Utility updated for latest IMO load     Depression      Diabetic ulcer of right midfoot associated with type 2 diabetes mellitus, limited to breakdown of skin (H) 09/28/2017     Equinovarus acquired deformity, right 04/14/2021     Equinovarus deformity, acquired, right      Essential hypertension 08/19/2016     GERD (gastroesophageal reflux disease)      Heart attack (H)      History of blood transfusion      Hyperlipidemia      Hyperlipidemia, unspecified hyperlipidemia type 08/19/2016     Hypertension      Insomnia, unspecified type 08/19/2016     Ischemic cardiomyopathy      Late effects of cerebrovascular accident 08/19/2016     Major depressive disorder with single episode, remission status unspecified 08/19/2016     Microalbuminuria 07/29/2021    Formatting of this note might be different from the original. 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 Created by Conversion [facility] Annotation: Feb 11 2010  1:41PM - Leonid Cabral: CANNOT READ OR  WRITE     PVD (peripheral vascular disease) (H)      Stented coronary artery      Type 2 diabetes mellitus (H) 07/29/2021    Formatting of this note might be different from the original. Created by Conversion     Type II or unspecified type diabetes mellitus with ophthalmic manifestations, not stated as uncontrolled(250.50) (H) 07/29/2021    Formatting of this note might be different from the original.  Created by Conversion     Weakness 08/19/2016       MEDICATIONS:  Current Outpatient Medications   Medication Sig Dispense Refill     pantoprazole (PROTONIX) 40 MG EC tablet Take 40 mg by mouth daily       acetaminophen (TYLENOL) 325 MG tablet Take 650 mg by mouth every 4 hours as needed for mild pain        ARIPiprazole (ABILIFY) 2 MG tablet 2 mg daily        ASPERCREME ORIGINAL 10 % external cream APPLY TO LOW BACK TWICE DAILY FOR LOW BACK PAIN       atorvastatin (LIPITOR) 80 MG tablet Take 80 mg by mouth daily        cholestyramine (QUESTRAN) 4 g packet Take 1 packet by mouth every other day        clopidogrel (PLAVIX) 75 MG tablet Take 1 tablet (75 mg) by mouth daily Start taking medication the day after the procedure. 90 tablet 1     DULoxetine (CYMBALTA) 60 MG capsule Take 120 mg by mouth daily takle 120mg       erenumab-aooe (AIMOVIG) 140 MG/ML injection Inject 1 mL (140 mg) Subcutaneous every 30 days 1 mL 0     fenofibrate micronized (LOFIBRA) 200 MG capsule Take 200 mg by mouth every morning (before breakfast)        ferrous sulfate (FEROSUL) 325 (65 Fe) MG tablet Take 325 mg by mouth daily (with breakfast)        gabapentin (NEURONTIN) 100 MG capsule TAKE 1 CAP BY MOUTH TWICE A DAY BEFORE MEALS       gabapentin (NEURONTIN) 300 MG capsule Take 600 mg by mouth At Bedtime       glipiZIDE (GLUCOTROL) 5 MG tablet TAKE 1/2 TABLET (2.5MG) BY MOUTH DAILY DX DIABETES       loperamide (IMODIUM) 2 MG capsule Take 4 mg by mouth as needed for diarrhea        losartan (COZAAR) 25 MG tablet Take 25 mg by mouth At Bedtime        magnesium oxide 400 MG CAPS Take 400 mg by mouth 4 times daily       Menthol, Topical Analgesic, (BIOFREEZE ROLL-ON) 4 % GEL Apply 1 Application topically 2 times daily Apply to lower back for pain       metFORMIN (GLUCOPHAGE) 500 MG tablet TAKE 2 TABS (1000MG) BY MOUTH EVERY MORNING;TAKE 1 TAB BY MOUTH EVERY EVENING       metoprolol succinate ER (TOPROL-XL) 25 MG 24 hr tablet Take 75 mg by mouth  "daily        mirtazapine (REMERON) 7.5 MG tablet Take 7.5 mg by mouth At Bedtime        Neomycin-Bacitracin-Polymyxin (TRIPLE ANTIBIOTIC) 3.5-400-5000 OINT ointment Externally apply 1 applicator topically 3 times daily as needed       Nitroglycerin (NITROSTAT SL) Place 0.4 mg under the tongue every 5 minutes as needed for chest pain        oxyCODONE (ROXICODONE) 5 MG tablet TAKE 1 TAB BY MOUTH EVERY 6HOURS AS NEEDED FOR PAIN 28 tablet 0     polyethylene glycol (MIRALAX) 17 GM/Dose powder Take 1 packet by mouth daily as needed for constipation        senna-docusate (SENOKOT-S/PERICOLACE) 8.6-50 MG tablet Take 1 tablet by mouth 2 times daily as needed for constipation        traZODone (DESYREL) 50 MG tablet Take 50 mg by mouth At Bedtime        vitamin C (ASCORBIC ACID) 500 MG tablet Take 500 mg by mouth daily        XARELTO ANTICOAGULANT 20 MG TABS tablet TAKE 1 TAB BY MOUTH ONCE DAILY         PHYSICAL EXAM:  General: Patient is alert male, no distress.   Vitals: /70   Pulse 68   Temp 98  F (36.7  C)   Resp 18   Ht 1.702 m (5' 7\")   Wt 71.4 kg (157 lb 6.4 oz)   SpO2 100%   BMI 24.65 kg/m    HEENT: Head is NCAT. Eyes show no injection or icterus. Nares negative. Oropharynx well hydrated.  Neck: No JVD.  Lungs: Non labored respirations.   Abdomen: Soft.   : Deferred.  Extremities: Mild swelling right BKA.  Musculoskeletal: Limb protector right.   Skin: Bandage right BKA.   Psych: Mood appears good.      LABS/DIAGNOSTIC DATA:  Component      Latest Ref Rng & Units 8/5/2022 8/7/2022              Creatinine      0.67 - 1.17 mg/dL 1.11 1.13   Sodium      136 - 145 mmol/L  134 (L)   Potassium      3.4 - 5.3 mmol/L     Urea Nitrogen      6.0 - 20.0 mg/dL     Chloride      98 - 107 mmol/L     Carbon Dioxide (CO2)      22 - 29 mmol/L     Anion Gap      7 - 15 mmol/L  7   Glucose      70 - 99 mg/dL     GFR Estimate      >60 mL/min/1.73m2 79 78   Calcium      8.6 - 10.0 mg/dL  8.8     Component      Latest Ref Rng " & Units 8/5/2022 8/6/2022 8/6/2022 8/7/2022            6:16 AM 10:50 AM  6:20 AM   WBC      4.0 - 11.0 10e3/uL 6.3 5.9 7.0 8.1   RBC Count      4.40 - 5.90 10e6/uL 4.09 (L) 3.82 (L) 3.74 (L) 4.00 (L)   Hemoglobin      13.3 - 17.7 g/dL 10.6 (L) 10.1 (L) 9.9 (L) 10.3 (L)   Hematocrit      40.0 - 53.0 % 33.7 (L) 31.9 (L) 31.2 (L) 33.2 (L)   MCV      78 - 100 fL 82 84 83 83   MCH      26.5 - 33.0 pg 25.9 (L) 26.4 (L) 26.5 25.8 (L)   MCHC      31.5 - 36.5 g/dL 31.5 31.7 31.7 31.0 (L)   RDW      10.0 - 15.0 % 14.6 14.6 14.6 14.8   Platelet Count      150 - 450 10e3/uL 160 137 (L) 135 (L) 148 (L)     Component      Latest Ref Rng & Units 8/7/2022 8/8/2022          11:00 AM    WBC      4.0 - 11.0 10e3/uL 7.9    RBC Count      4.40 - 5.90 10e6/uL 3.92 (L)    Hemoglobin      13.3 - 17.7 g/dL 10.3 (L)    Hematocrit      40.0 - 53.0 % 32.4 (L)    MCV      78 - 100 fL 83    MCH      26.5 - 33.0 pg 26.3 (L)    MCHC      31.5 - 36.5 g/dL 31.8    RDW      10.0 - 15.0 % 14.8    Platelet Count      150 - 450 10e3/uL 148 (L) 144 (L)         ASSESSMENT/PLAN:  1. S/p R BKA. Surgery on 8/5/2022 for long standing nonhealing right foot diabetic ulcer and Charcot ankle. He is S/p prior procedures on 12/27/2021, 1/27/2022 and 4/4/2022, s/p angioplasty RLE and hyperbaric oxygen treatment. MRI with indications of osteomyelitis. Ultimately R BKA on 8/5/2022. NWB. Bandage with wound cares, stump protector. Follow up with Dr. Montes on 9/8/2022.  2. Pain management. Much improved control, tapering pain meds down.   3. HTN. He is on metoprolol and losartan. Continue to monitor Bps in TCU. Satisfactory.   4. Afib. On xarelto for anticoagulation. Adequate rate control.   5. Diabetes. On metformin and glipizide.   6. CAD. Hx of MIs. Hx of stenting and prior CABG. Stable on statin, beta blocker, xarelto. No chest pain.   7. Mood disorder. Mixed depression and anxiety. On duloxetine, Abilify. Also takes Mirtazapine.          Electronically signed by:  Heidi Xie MD

## 2022-09-27 ENCOUNTER — MEDICAL CORRESPONDENCE (OUTPATIENT)
Dept: HEALTH INFORMATION MANAGEMENT | Facility: CLINIC | Age: 52
End: 2022-09-27

## 2022-09-27 ENCOUNTER — HOSPITAL ENCOUNTER (INPATIENT)
Facility: HOSPITAL | Age: 52
LOS: 2 days | Discharge: NURSING, REHAB OR LONG-TERM CARE | DRG: 872 | End: 2022-09-30
Attending: EMERGENCY MEDICINE | Admitting: INTERNAL MEDICINE
Payer: MEDICARE

## 2022-09-27 ENCOUNTER — TRANSITIONAL CARE UNIT VISIT (OUTPATIENT)
Dept: GERIATRICS | Facility: CLINIC | Age: 52
End: 2022-09-27
Payer: MEDICARE

## 2022-09-27 VITALS
OXYGEN SATURATION: 97 % | DIASTOLIC BLOOD PRESSURE: 69 MMHG | WEIGHT: 150 LBS | TEMPERATURE: 98.5 F | BODY MASS INDEX: 23.54 KG/M2 | SYSTOLIC BLOOD PRESSURE: 126 MMHG | RESPIRATION RATE: 18 BRPM | HEART RATE: 76 BPM | HEIGHT: 67 IN

## 2022-09-27 DIAGNOSIS — D72.819 LEUKOPENIA, UNSPECIFIED TYPE: ICD-10-CM

## 2022-09-27 DIAGNOSIS — D70.9 NEUTROPENIC FEVER (H): ICD-10-CM

## 2022-09-27 DIAGNOSIS — T14.8XXA WOUND INFECTION: ICD-10-CM

## 2022-09-27 DIAGNOSIS — E11.9 TYPE 2 DIABETES MELLITUS (H): Primary | ICD-10-CM

## 2022-09-27 DIAGNOSIS — D62 ABLA (ACUTE BLOOD LOSS ANEMIA): ICD-10-CM

## 2022-09-27 DIAGNOSIS — R50.81 NEUTROPENIC FEVER (H): ICD-10-CM

## 2022-09-27 DIAGNOSIS — I10 ESSENTIAL HYPERTENSION: ICD-10-CM

## 2022-09-27 DIAGNOSIS — L08.9 WOUND INFECTION: ICD-10-CM

## 2022-09-27 DIAGNOSIS — Z89.511 STATUS POST BELOW-KNEE AMPUTATION OF RIGHT LOWER EXTREMITY (H): Primary | ICD-10-CM

## 2022-09-27 DIAGNOSIS — L03.115 CELLULITIS OF RIGHT LOWER EXTREMITY: ICD-10-CM

## 2022-09-27 LAB
ALBUMIN SERPL BCG-MCNC: 3.4 G/DL (ref 3.5–5.2)
ALP SERPL-CCNC: 100 U/L (ref 40–129)
ALT SERPL W P-5'-P-CCNC: 19 U/L (ref 10–50)
ANION GAP SERPL CALCULATED.3IONS-SCNC: 12 MMOL/L (ref 7–15)
AST SERPL W P-5'-P-CCNC: 20 U/L (ref 10–50)
BILIRUB SERPL-MCNC: 0.4 MG/DL
BUN SERPL-MCNC: 13.7 MG/DL (ref 6–20)
CALCIUM SERPL-MCNC: 7.4 MG/DL (ref 8.6–10)
CHLORIDE SERPL-SCNC: 100 MMOL/L (ref 98–107)
CREAT SERPL-MCNC: 0.75 MG/DL (ref 0.67–1.17)
DEPRECATED HCO3 PLAS-SCNC: 21 MMOL/L (ref 22–29)
FLUAV RNA SPEC QL NAA+PROBE: NEGATIVE
FLUBV RNA RESP QL NAA+PROBE: NEGATIVE
GFR SERPL CREATININE-BSD FRML MDRD: >90 ML/MIN/1.73M2
GLUCOSE SERPL-MCNC: 172 MG/DL (ref 70–99)
LACTATE SERPL-SCNC: 2 MMOL/L (ref 0.7–2)
LIPASE SERPL-CCNC: 44 U/L (ref 13–60)
POTASSIUM SERPL-SCNC: 4.1 MMOL/L (ref 3.4–5.3)
PROT SERPL-MCNC: 6 G/DL (ref 6.4–8.3)
RSV RNA SPEC NAA+PROBE: NEGATIVE
SARS-COV-2 RNA RESP QL NAA+PROBE: NEGATIVE
SODIUM SERPL-SCNC: 133 MMOL/L (ref 136–145)

## 2022-09-27 PROCEDURE — 99285 EMERGENCY DEPT VISIT HI MDM: CPT | Mod: 25

## 2022-09-27 PROCEDURE — 96365 THER/PROPH/DIAG IV INF INIT: CPT | Mod: 59

## 2022-09-27 PROCEDURE — 87040 BLOOD CULTURE FOR BACTERIA: CPT | Performed by: EMERGENCY MEDICINE

## 2022-09-27 PROCEDURE — 85007 BL SMEAR W/DIFF WBC COUNT: CPT | Performed by: EMERGENCY MEDICINE

## 2022-09-27 PROCEDURE — 83605 ASSAY OF LACTIC ACID: CPT | Performed by: EMERGENCY MEDICINE

## 2022-09-27 PROCEDURE — 93005 ELECTROCARDIOGRAM TRACING: CPT | Performed by: EMERGENCY MEDICINE

## 2022-09-27 PROCEDURE — 87637 SARSCOV2&INF A&B&RSV AMP PRB: CPT | Performed by: EMERGENCY MEDICINE

## 2022-09-27 PROCEDURE — C9803 HOPD COVID-19 SPEC COLLECT: HCPCS

## 2022-09-27 PROCEDURE — 250N000011 HC RX IP 250 OP 636: Performed by: EMERGENCY MEDICINE

## 2022-09-27 PROCEDURE — 96375 TX/PRO/DX INJ NEW DRUG ADDON: CPT

## 2022-09-27 PROCEDURE — 80053 COMPREHEN METABOLIC PANEL: CPT | Performed by: EMERGENCY MEDICINE

## 2022-09-27 PROCEDURE — 82550 ASSAY OF CK (CPK): CPT | Performed by: INTERNAL MEDICINE

## 2022-09-27 PROCEDURE — 250N000013 HC RX MED GY IP 250 OP 250 PS 637: Performed by: EMERGENCY MEDICINE

## 2022-09-27 PROCEDURE — 84145 PROCALCITONIN (PCT): CPT | Performed by: INTERNAL MEDICINE

## 2022-09-27 PROCEDURE — 83690 ASSAY OF LIPASE: CPT | Performed by: EMERGENCY MEDICINE

## 2022-09-27 PROCEDURE — 85027 COMPLETE CBC AUTOMATED: CPT | Performed by: EMERGENCY MEDICINE

## 2022-09-27 PROCEDURE — 258N000003 HC RX IP 258 OP 636: Performed by: EMERGENCY MEDICINE

## 2022-09-27 PROCEDURE — 36415 COLL VENOUS BLD VENIPUNCTURE: CPT | Performed by: EMERGENCY MEDICINE

## 2022-09-27 PROCEDURE — 99309 SBSQ NF CARE MODERATE MDM 30: CPT | Performed by: FAMILY MEDICINE

## 2022-09-27 RX ORDER — PIPERACILLIN SODIUM, TAZOBACTAM SODIUM 3; .375 G/15ML; G/15ML
3.38 INJECTION, POWDER, LYOPHILIZED, FOR SOLUTION INTRAVENOUS ONCE
Status: COMPLETED | OUTPATIENT
Start: 2022-09-27 | End: 2022-09-27

## 2022-09-27 RX ORDER — ONDANSETRON 2 MG/ML
4 INJECTION INTRAMUSCULAR; INTRAVENOUS ONCE
Status: COMPLETED | OUTPATIENT
Start: 2022-09-27 | End: 2022-09-27

## 2022-09-27 RX ORDER — VANCOMYCIN HYDROCHLORIDE 1 G/200ML
1000 INJECTION, SOLUTION INTRAVENOUS EVERY 12 HOURS
Status: DISCONTINUED | OUTPATIENT
Start: 2022-09-27 | End: 2022-09-28

## 2022-09-27 RX ORDER — PIPERACILLIN SODIUM, TAZOBACTAM SODIUM 3; .375 G/15ML; G/15ML
3.38 INJECTION, POWDER, LYOPHILIZED, FOR SOLUTION INTRAVENOUS ONCE
Status: DISCONTINUED | OUTPATIENT
Start: 2022-09-27 | End: 2022-09-27

## 2022-09-27 RX ORDER — ACETAMINOPHEN 325 MG/1
650 TABLET ORAL ONCE
Status: COMPLETED | OUTPATIENT
Start: 2022-09-27 | End: 2022-09-27

## 2022-09-27 RX ADMIN — SODIUM CHLORIDE 500 ML: 9 INJECTION, SOLUTION INTRAVENOUS at 21:34

## 2022-09-27 RX ADMIN — ONDANSETRON 4 MG: 2 INJECTION INTRAMUSCULAR; INTRAVENOUS at 21:50

## 2022-09-27 RX ADMIN — PIPERACILLIN AND TAZOBACTAM 3.38 G: 3; .375 INJECTION, POWDER, LYOPHILIZED, FOR SOLUTION INTRAVENOUS at 22:08

## 2022-09-27 RX ADMIN — VANCOMYCIN HYDROCHLORIDE 1000 MG: 1 INJECTION, SOLUTION INTRAVENOUS at 22:53

## 2022-09-27 RX ADMIN — ACETAMINOPHEN 650 MG: 325 TABLET ORAL at 22:05

## 2022-09-27 ASSESSMENT — ENCOUNTER SYMPTOMS
FEVER: 1
NAUSEA: 1
CHILLS: 1
COLOR CHANGE: 1
ABDOMINAL PAIN: 1

## 2022-09-27 ASSESSMENT — ACTIVITIES OF DAILY LIVING (ADL)
ADLS_ACUITY_SCORE: 35
ADLS_ACUITY_SCORE: 35

## 2022-09-27 NOTE — LETTER
9/27/2022        RE: Cm Lundbegr  [address]  [place] [address]          [facility] GERIATRICS  [place] Medical Record Number:  [id number]  Place of Service where encounter took place: [facility] ([facility]) [[id number]]   CODE STATUS:   CPR/Full code     Chief Complaint/Reason for Visit:  Chief Complaint   Patient presents with     RECHECK     TCU 9/27/2022. 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       TCU HPI:    Cm Lundberg is a 52 year old male with hx of CAD, s/p stents and CABG, HTN, DM, prior CVA, afib/flutter on xarelto, who has been residing in TCU at [facility] since Dec 2021 surgery for chronic long standing right foot ulceration. He underwent incision and drainage of the right foot, excisional debridement of the ulceration of the right foot into the level of the muscle, under MAC with popliteal block anesthesia on 12/17/2021. He completed abx per ID recommendations. Remained NWB with wound vac, had 1/27/2022 incision and drainage with application of theraskin. On 4/4/2022 again incision and drainage with theraskin. Continued following with podiatry, referred to wound and vascular due to non healing diabetic ulcer, right foot. S/p angioplasty RLE and hyperbaric oxygen therapy. MRI with indications of osteomyelitis. 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    He was sent to the hospital from TCU on 8/23/2022 due to swelling, and drainage from surgical wound, concern for infection. His hospital discharge summary is partially excerpted below.     [facility]  Medicine Progress Note - Hospitalist Service  (Note: This note is listed as the discharge summary)  Date of Admission:  8/23/2022    Assessment & Plan  Cm Lundberg is a 52 year old male admitted on 8/23/2022.      Assessment:  #Infected right BKA stump.  CT right leg showed evolving abscess.  Patient not septic.  Blood cultures obtained.  On  vancomycin and Zosyn.  S/p I&D 8/24, culture sent.  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 Valderrama.       #MRSA bacteremia, likely due to right BKA stump abscess.  Positive BC on 8/23, 8/25.  Surveillance BC obtained 8/26, 8/27, 8/28.    PICC on 8/29.  Plan for 28 days of IV vancomycin post clearance of blood, till September 23.     #Acute renal failure, creat peaked at 1.47 on 8/24, was 1.04 on admission.  Likely due to sepsis and dehydration, given rising BUN.  With IVF renal function normalizing.     #Chronic normocytic anemia, hemoglobin baseline 8-9.  History of iron deficiency.  Iron checked low at 16 on 8/25/2022.    Replacing with Venofer 2 doses on 8/27 and 8/28, then p.o. replacement.     #Acute anemia, hemoglobin 6.4 on 8/25.  Reported intraoperative EBL 10 mL.  No postop bleeding.  S/p 1 unit PRBCs 8/25.HgB in upper 8's.     #CVD, history of stroke.  On Plavix, statin.     #Atrial fibrillation.  Was on Xarelto, held preoperatively.  Resume when okay per surgery.     #Coronary artery disease, status post CABG and stents.  On Plavix, beta-blockers, fibrate.     #Non-insulin-dependent diabetes mellitus.  Well-controlled with Glucophage and glipizide.  Initially held due to RANDAL.  Resumed now.  A1c 5.9 on 8/26.  A1C 5.9 but Hgb 8.2 at the time so less accurate.      #Mood disorder:general anxiety and depression, stable on Abilify, Cymbalta, Ativan, Remeron, trazodone.     Essential hypertension.  BP controlled on losartan and metoprolol.  Peripheral neuropathy, due to diabetes.  Resident of [facility] prior to recent past hospitalization TCU.     Overall stabilized and discharged back to TCU on 8/30/2022 for PT, OT, nursing cares, medical management and monitoring.       Today:  Having somewhat more pain today in wound, has wound vac, on IV Vanco, labs to ID weekly. Low Wbc on labs 9/19/2022, rechecked a little better but still low on 9/20/2022. Dr. Mei aware. Previously had  been normal. Has anemia, stable. Following with wound NP, last visit 9/22/2022, wound debrided, cont vac. Also saw ID on 9/22/2022, abx extended 3 more weeks. Continues NWB RLE with wound vac. Appetite is good. No fever. No abdominal pain or constipation. No chest pain or shortness of breath. Likes to watch videos on Ipad or TV. Sleeping well. Medical problems, aflutter/afib, CAD, DM, HTN, managed with his usual medications. Diabetes on metformin and glipizide.        PAST MEDICAL HISTORY:  Past Medical History:   Diagnosis Date     Anemia      Anemia, unspecified type 08/19/2016     Anxiety      Anxiety state 07/29/2021    Formatting of this note might be different from the original. Created by Conversion  Replacement Utility updated for latest IMO load     Arthritis      Atrial fibrillation and flutter (H)      Cerebral artery occlusion with cerebral infarction (H)      Cerebral infarction (H)      Chest pain, unspecified type 08/19/2016     Chronic atrial fibrillation (H)      Congestive heart failure (H)      Coronary artery disease     CABG with multiple stents     Coronary artery disease involving native coronary artery without angina pectoris, unspecified whether native or transplanted heart 08/19/2016     Coronary atherosclerosis 07/29/2021    Formatting of this note might be different from the original. Created by Conversion  Replacement Utility updated for latest IMO load     Depression      Diabetic ulcer of right midfoot associated with type 2 diabetes mellitus, limited to breakdown of skin (H) 09/28/2017     Equinovarus acquired deformity, right 04/14/2021     Equinovarus deformity, acquired, right      Essential hypertension 08/19/2016     GERD (gastroesophageal reflux disease)      Heart attack (H)      History of blood transfusion      Hyperlipidemia      Hyperlipidemia, unspecified hyperlipidemia type 08/19/2016     Hypertension      Insomnia, unspecified type 08/19/2016     Ischemic cardiomyopathy       Late effects of cerebrovascular accident 08/19/2016     Major depressive disorder with single episode, remission status unspecified 08/19/2016     Microalbuminuria 07/29/2021    Formatting of this note might be different from the original. 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 Created by Conversion [facility] Annotation: Feb 11 2010  1:41PM - Leonid Cabral: CANNOT READ OR  WRITE     PVD (peripheral vascular disease) (H)      Stented coronary artery      Type 2 diabetes mellitus (H) 07/29/2021    Formatting of this note might be different from the original. Created by Conversion     Type II or unspecified type diabetes mellitus with ophthalmic manifestations, not stated as uncontrolled(250.50) (H) 07/29/2021    Formatting of this note might be different from the original. Created by Conversion     Weakness 08/19/2016       MEDICATIONS:  Current Outpatient Medications   Medication Sig Dispense Refill     acetaminophen (TYLENOL) 325 MG tablet Take 650 mg by mouth every 6 hours as needed for fever or pain       acetaminophen (TYLENOL) 325 MG tablet Take 650 mg by mouth 2 times daily       ARIPiprazole (ABILIFY) 2 MG tablet 2 mg daily        ASPERCREME ORIGINAL 10 % external cream Apply topically 2 times daily as needed for other (back pain)       atorvastatin (LIPITOR) 80 MG tablet Take 80 mg by mouth daily        bisacodyl (DULCOLAX) 10 MG suppository Place 10 mg rectally daily as needed for constipation       cholestyramine (QUESTRAN) 4 g packet Take 1 packet by mouth every other day        clopidogrel (PLAVIX) 75 MG tablet Take 1 tablet (75 mg) by mouth daily Start taking medication the day after the procedure. 90 tablet 1     DULoxetine (CYMBALTA) 60  MG capsule Take 120 mg by mouth daily       erenumab-aooe (AIMOVIG, 140 MG DOSE,) 70 MG/ML injection Inject 140 mg Subcutaneous every 30 days       ferrous sulfate (FEROSUL) 325 (65 Fe) MG tablet Take 325 mg by mouth daily (with breakfast)        gabapentin (NEURONTIN) 100 MG capsule Take 100 mg by mouth 2 times daily (before meals)       gabapentin (NEURONTIN) 300 MG capsule Take 600 mg by mouth At Bedtime       glipiZIDE (GLUCOTROL) 5 MG tablet Take 2.5 mg by mouth daily before breakfast       HYDROcodone-acetaminophen (NORCO) 5-325 MG tablet Take 1-2 tablets by mouth every 6 hours as needed for moderate to severe pain (Patient taking differently: Take 1 tablet by mouth every 6 hours as needed for moderate to severe pain) 12 tablet 0     loperamide (IMODIUM) 2 MG capsule Take 2 mg by mouth 4 times daily as needed for diarrhea       losartan (COZAAR) 25 MG tablet Take 25 mg by mouth At Bedtime        magnesium chloride 535 (64 Mg) MG TBEC CR tablet Take 1,070 mg by mouth 2 times daily       metFORMIN (GLUCOPHAGE) 500 MG tablet Take 500 mg by mouth daily (with dinner)       metFORMIN (GLUCOPHAGE) 500 MG tablet Take 1,000 mg by mouth daily (with breakfast)       metoprolol succinate ER (TOPROL-XL) 25 MG 24 hr tablet Take 75 mg by mouth daily        mineral oil-hydrophilic petrolatum (AQUAPHOR) external ointment Apply topically 3 times daily as needed for irritation or dry skin Apply to forehead and other areas as needed       mirtazapine (REMERON) 7.5 MG tablet Take 7.5 mg by mouth At Bedtime        Nitroglycerin (NITROSTAT SL) Place 0.4 mg under the tongue every 5 minutes as needed for chest pain        pantoprazole (PROTONIX) 40 MG EC tablet Take 40 mg by mouth daily before breakfast       polyethylene glycol (MIRALAX) 17 GM/Dose powder Take 1 packet by mouth daily as needed for constipation        senna-docusate (SENOKOT-S/PERICOLACE) 8.6-50 MG tablet Take 1 tablet by mouth 2 times daily       senna-docusate  "(SENOKOT-S/PERICOLACE) 8.6-50 MG tablet Take 1 tablet by mouth 2 times daily as needed for constipation In addition to scheduled doses       sodium chloride, PF, 0.9% PF flush 10 mLs by Intracatheter route 3 times daily       traZODone (DESYREL) 50 MG tablet Take 50 mg by mouth At Bedtime        vitamin C (ASCORBIC ACID) 500 MG tablet Take 500 mg by mouth daily        XARELTO ANTICOAGULANT 20 MG TABS tablet Take 20 mg by mouth daily (with breakfast)          PHYSICAL EXAM:  General: Patient is alert male, no distress.   Vitals: /69   Pulse 76   Temp 98.5  F (36.9  C)   Resp 18   Ht 1.702 m (5' 7\")   Wt 68 kg (150 lb)   SpO2 97%   BMI 23.49 kg/m    HEENT: Head is NCAT. Eyes show no injection or icterus. Nares negative. Oropharynx moist.  Neck: No JVD.  Lungs: Non labored respirations.   Abdomen: Soft.   : Deferred.  Extremities: Wound vac RLE. RUE PICC.  Musculoskeletal: No LLE swelling.    Skin: Wound vac on R BKA stump.  Psych: Mood is good.       LABS/DIAGNOSTIC DATA:  Component      Latest Ref Rng & Units 8/23/2022 8/24/2022 8/24/2022 8/24/2022            6:11 AM  8:33 AM  7:13 PM   RBC Count      4.40 - 5.90 10e6/uL 3.89 (L) 3.48 (L) 3.40 (L)    Hemoglobin      13.3 - 17.7 g/dL 9.9 (L) 8.9 (L) 8.7 (L)    Hematocrit      40.0 - 53.0 % 32.4 (L) 29.0 (L) 28.3 (L)    MCV      78 - 100 fL 83 83 83    MCH      26.5 - 33.0 pg 25.4 (L) 25.6 (L) 25.6 (L)    MCHC      31.5 - 36.5 g/dL 30.6 (L) 30.7 (L) 30.7 (L)    RDW      10.0 - 15.0 % 14.5 14.7 14.8    Platelet Count      150 - 450 10e3/uL 433 323 315 255     Component      Latest Ref Rng & Units 8/25/2022 8/25/2022 8/26/2022 8/27/2022           6:05 AM  6:28 PM     RBC Count      4.40 - 5.90 10e6/uL 2.49 (L) 3.07 (L) 3.18 (L) 3.39 (L)   Hemoglobin      13.3 - 17.7 g/dL 6.4 (LL) 8.0 (L) 8.2 (L) 8.8 (L)   Hematocrit      40.0 - 53.0 % 20.9 (L) 26.3 (L) 27.3 (L) 29.1 (L)   MCV      78 - 100 fL 84 86 86 86   MCH      26.5 - 33.0 pg 25.7 (L) 26.1 (L) 25.8 (L) " 26.0 (L)   MCHC      31.5 - 36.5 g/dL 30.6 (L) 30.4 (L) 30.0 (L) 30.2 (L)   RDW      10.0 - 15.0 % 14.6 14.7 14.6 14.7   Platelet Count      150 - 450 10e3/uL 249 291 251 296       Component      Latest Ref Rng & Units 9/12/2022             WBC      4.0 - 11.0 10e3/uL 4.7   RBC Count      4.40 - 5.90 10e6/uL 3.85 (L)   Hemoglobin      13.3 - 17.7 g/dL 10.3 (L)   Hematocrit      40.0 - 53.0 % 33.5 (L)   MCV      78 - 100 fL 87   MCH      26.5 - 33.0 pg 26.8   MCHC      31.5 - 36.5 g/dL 30.7 (L)   RDW      10.0 - 15.0 % 16.4 (H)   Platelet Count      150 - 450 10e3/uL 185       Component      Latest Ref Rng & Units 8/23/2022 8/24/2022 8/24/2022 8/25/2022            6:11 AM  8:33 AM    Creatinine      0.67 - 1.17 mg/dL 1.04 1.47 (H) 1.50 (H) 1.09   Sodium      136 - 145 mmol/L 134 (L) 136  137   Potassium      3.4 - 5.3 mmol/L       Urea Nitrogen      6.0 - 20.0 mg/dL       Chloride      98 - 107 mmol/L       Carbon Dioxide (CO2)      22 - 29 mmol/L       Anion Gap      7 - 15 mmol/L 14 8  4 (L)   Glucose      70 - 99 mg/dL       GFR Estimate      >60 mL/min/1.73m2 85 56 (L) 55 (L) 81   Calcium      8.6 - 10.0 mg/dL 10.2 10.0  8.6     Component      Latest Ref Rng & Units 8/26/2022 8/27/2022 8/28/2022 8/29/2022                Creatinine      0.67 - 1.17 mg/dL 1.00 0.99 0.97 1.04   Sodium      136 - 145 mmol/L 139   140   Potassium      3.4 - 5.3 mmol/L       Urea Nitrogen      6.0 - 20.0 mg/dL       Chloride      98 - 107 mmol/L       Carbon Dioxide (CO2)      22 - 29 mmol/L       Anion Gap      7 - 15 mmol/L 4 (L)   6   Glucose      70 - 99 mg/dL       GFR Estimate      >60 mL/min/1.73m2 89 >90 >90 85   Calcium      8.6 - 10.0 mg/dL 8.7   9.1         ASSESSMENT/PLAN: No changes or new orders today.   1. R BKA (8/5/2022) with subsequent MRSA wound infection. S/p irrigation and debridement on 8/24/2022 and then washout with partial closure and application of wound vac on 8/26/2022 per Dr. Montes. Had follow up 9/8/2022  and 9/12/2022, saw wound NP on 9/22/2022, wound debrided. On IV Vancomycin, pharmacy managing. Follow up with ID on 9/22/2022, abx extended 3 weeks.   2. Anemia. Hgb dropped to 6.4, he did receive transfusion. Received Venofer in the hospital. Continue iron.  3. Low wbc. Labs are trended. ID aware.   4. HTN. On metoprolol and losartan. Continue to monitor Bps.  5. Afib/Aflutter. On xarelto for anticoagulation. Adequate rate control.   6. Diabetes. On metformin and glipizide, continue. Accuchecks followed.      Stable conditions:   6. CAD. Hx of MIs. Hx of stenting and prior CABG. On statin, beta blocker, xarelto. No chest pain.  7. Mood disorder. Mixed depression and anxiety. On duloxetine, Abilify. Also takes Mirtazapine.   8. Hx of stroke. Continue statin, xarelto.  9. Mild intellectual disabilities. He resides in an [facility], but has been in the hospital or this TCU since Dec 2021 related to above issues.          Electronically signed by: Heidi Xie MD           Sincerely,        Heidi Xie MD

## 2022-09-28 ENCOUNTER — LAB REQUISITION (OUTPATIENT)
Dept: LAB | Facility: CLINIC | Age: 52
End: 2022-09-28
Payer: MEDICARE

## 2022-09-28 ENCOUNTER — APPOINTMENT (OUTPATIENT)
Dept: CT IMAGING | Facility: HOSPITAL | Age: 52
DRG: 872 | End: 2022-09-28
Attending: EMERGENCY MEDICINE
Payer: MEDICARE

## 2022-09-28 ENCOUNTER — APPOINTMENT (OUTPATIENT)
Dept: RADIOLOGY | Facility: HOSPITAL | Age: 52
DRG: 872 | End: 2022-09-28
Attending: EMERGENCY MEDICINE
Payer: MEDICARE

## 2022-09-28 DIAGNOSIS — B95.62 METHICILLIN RESISTANT STAPHYLOCOCCUS AUREUS INFECTION AS THE CAUSE OF DISEASES CLASSIFIED ELSEWHERE: ICD-10-CM

## 2022-09-28 DIAGNOSIS — R78.81 BACTEREMIA: ICD-10-CM

## 2022-09-28 PROBLEM — D70.9 NEUTROPENIC FEVER (H): Status: ACTIVE | Noted: 2022-09-28

## 2022-09-28 PROBLEM — R50.81 NEUTROPENIC FEVER (H): Status: ACTIVE | Noted: 2022-09-28

## 2022-09-28 PROBLEM — L03.115 CELLULITIS OF RIGHT LOWER EXTREMITY: Status: ACTIVE | Noted: 2022-09-28

## 2022-09-28 LAB
ALBUMIN UR-MCNC: 50 MG/DL
APPEARANCE UR: CLEAR
BASOPHILS # BLD MANUAL: 0 10E3/UL (ref 0–0.2)
BASOPHILS NFR BLD MANUAL: 2 %
BILIRUB UR QL STRIP: NEGATIVE
CK SERPL-CCNC: 41 U/L (ref 39–308)
COLOR UR AUTO: ABNORMAL
EOSINOPHIL # BLD MANUAL: 0 10E3/UL (ref 0–0.7)
EOSINOPHIL NFR BLD MANUAL: 2 %
ERYTHROCYTE [DISTWIDTH] IN BLOOD BY AUTOMATED COUNT: 14.6 % (ref 10–15)
FRAGMENTS BLD QL SMEAR: SLIGHT
GLUCOSE BLDC GLUCOMTR-MCNC: 139 MG/DL (ref 70–99)
GLUCOSE BLDC GLUCOMTR-MCNC: 152 MG/DL (ref 70–99)
GLUCOSE BLDC GLUCOMTR-MCNC: 156 MG/DL (ref 70–99)
GLUCOSE BLDC GLUCOMTR-MCNC: 203 MG/DL (ref 70–99)
GLUCOSE BLDC GLUCOMTR-MCNC: 226 MG/DL (ref 70–99)
GLUCOSE BLDC GLUCOMTR-MCNC: 232 MG/DL (ref 70–99)
GLUCOSE UR STRIP-MCNC: 30 MG/DL
HCT VFR BLD AUTO: 28.6 % (ref 40–53)
HGB BLD-MCNC: 9.2 G/DL (ref 13.3–17.7)
HGB UR QL STRIP: NEGATIVE
KETONES UR STRIP-MCNC: ABNORMAL MG/DL
LEUKOCYTE ESTERASE UR QL STRIP: NEGATIVE
LYMPHOCYTES # BLD MANUAL: 0.3 10E3/UL (ref 0.8–5.3)
LYMPHOCYTES NFR BLD MANUAL: 28 %
MCH RBC QN AUTO: 26.7 PG (ref 26.5–33)
MCHC RBC AUTO-ENTMCNC: 32.2 G/DL (ref 31.5–36.5)
MCV RBC AUTO: 83 FL (ref 78–100)
MONOCYTES # BLD MANUAL: 0.2 10E3/UL (ref 0–1.3)
MONOCYTES NFR BLD MANUAL: 24 %
NEUTROPHILS # BLD MANUAL: 0.4 10E3/UL (ref 1.6–8.3)
NEUTROPHILS NFR BLD MANUAL: 44 %
NITRATE UR QL: NEGATIVE
NRBC # BLD AUTO: 0 10E3/UL
NRBC BLD AUTO-RTO: 0 /100
PH UR STRIP: 5.5 [PH] (ref 5–7)
PLAT MORPH BLD: ABNORMAL
PLATELET # BLD AUTO: 151 10E3/UL (ref 150–450)
PROCALCITONIN SERPL IA-MCNC: 0.31 NG/ML
RBC # BLD AUTO: 3.44 10E6/UL (ref 4.4–5.9)
RBC MORPH BLD: ABNORMAL
RBC URINE: 3 /HPF
SP GR UR STRIP: 1.03 (ref 1–1.03)
UROBILINOGEN UR STRIP-MCNC: <2 MG/DL
WBC # BLD AUTO: 0.9 10E3/UL (ref 4–11)
WBC URINE: 1 /HPF

## 2022-09-28 PROCEDURE — 99221 1ST HOSP IP/OBS SF/LOW 40: CPT | Mod: 24 | Performed by: PHYSICIAN ASSISTANT

## 2022-09-28 PROCEDURE — 99233 SBSQ HOSP IP/OBS HIGH 50: CPT | Performed by: FAMILY MEDICINE

## 2022-09-28 PROCEDURE — 258N000003 HC RX IP 258 OP 636: Performed by: INTERNAL MEDICINE

## 2022-09-28 PROCEDURE — 36415 COLL VENOUS BLD VENIPUNCTURE: CPT | Performed by: INTERNAL MEDICINE

## 2022-09-28 PROCEDURE — 250N000012 HC RX MED GY IP 250 OP 636 PS 637: Performed by: INTERNAL MEDICINE

## 2022-09-28 PROCEDURE — 71045 X-RAY EXAM CHEST 1 VIEW: CPT

## 2022-09-28 PROCEDURE — 250N000011 HC RX IP 250 OP 636: Performed by: EMERGENCY MEDICINE

## 2022-09-28 PROCEDURE — 87070 CULTURE OTHR SPECIMN AEROBIC: CPT | Performed by: INTERNAL MEDICINE

## 2022-09-28 PROCEDURE — 73560 X-RAY EXAM OF KNEE 1 OR 2: CPT | Mod: RT

## 2022-09-28 PROCEDURE — 87040 BLOOD CULTURE FOR BACTERIA: CPT | Performed by: INTERNAL MEDICINE

## 2022-09-28 PROCEDURE — 96376 TX/PRO/DX INJ SAME DRUG ADON: CPT

## 2022-09-28 PROCEDURE — 73701 CT LOWER EXTREMITY W/DYE: CPT | Mod: RT,MG

## 2022-09-28 PROCEDURE — 96375 TX/PRO/DX INJ NEW DRUG ADDON: CPT

## 2022-09-28 PROCEDURE — 250N000011 HC RX IP 250 OP 636: Performed by: INTERNAL MEDICINE

## 2022-09-28 PROCEDURE — 99223 1ST HOSP IP/OBS HIGH 75: CPT | Mod: AI | Performed by: INTERNAL MEDICINE

## 2022-09-28 PROCEDURE — 87077 CULTURE AEROBIC IDENTIFY: CPT | Performed by: INTERNAL MEDICINE

## 2022-09-28 PROCEDURE — 96366 THER/PROPH/DIAG IV INF ADDON: CPT

## 2022-09-28 PROCEDURE — 99223 1ST HOSP IP/OBS HIGH 75: CPT | Performed by: INTERNAL MEDICINE

## 2022-09-28 PROCEDURE — 74176 CT ABD & PELVIS W/O CONTRAST: CPT | Mod: MA

## 2022-09-28 PROCEDURE — 81001 URINALYSIS AUTO W/SCOPE: CPT | Performed by: EMERGENCY MEDICINE

## 2022-09-28 PROCEDURE — 250N000013 HC RX MED GY IP 250 OP 250 PS 637: Performed by: FAMILY MEDICINE

## 2022-09-28 PROCEDURE — G0463 HOSPITAL OUTPT CLINIC VISIT: HCPCS

## 2022-09-28 PROCEDURE — 120N000001 HC R&B MED SURG/OB

## 2022-09-28 PROCEDURE — 250N000013 HC RX MED GY IP 250 OP 250 PS 637: Performed by: INTERNAL MEDICINE

## 2022-09-28 PROCEDURE — 250N000013 HC RX MED GY IP 250 OP 250 PS 637: Performed by: EMERGENCY MEDICINE

## 2022-09-28 RX ORDER — LORAZEPAM 0.5 MG/1
0.5 TABLET ORAL DAILY PRN
Status: DISCONTINUED | OUTPATIENT
Start: 2022-09-28 | End: 2022-09-28

## 2022-09-28 RX ORDER — NITROGLYCERIN 0.4 MG/1
0.4 TABLET SUBLINGUAL EVERY 5 MIN PRN
Status: DISCONTINUED | OUTPATIENT
Start: 2022-09-28 | End: 2022-09-30 | Stop reason: HOSPADM

## 2022-09-28 RX ORDER — METOPROLOL SUCCINATE 25 MG/1
75 TABLET, EXTENDED RELEASE ORAL DAILY
Status: DISCONTINUED | OUTPATIENT
Start: 2022-09-28 | End: 2022-09-30 | Stop reason: HOSPADM

## 2022-09-28 RX ORDER — AMOXICILLIN 250 MG
1 CAPSULE ORAL 2 TIMES DAILY PRN
Status: DISCONTINUED | OUTPATIENT
Start: 2022-09-28 | End: 2022-09-30 | Stop reason: HOSPADM

## 2022-09-28 RX ORDER — IOPAMIDOL 755 MG/ML
100 INJECTION, SOLUTION INTRAVASCULAR ONCE
Status: COMPLETED | OUTPATIENT
Start: 2022-09-28 | End: 2022-09-28

## 2022-09-28 RX ORDER — MIRTAZAPINE 7.5 MG/1
7.5 TABLET, FILM COATED ORAL AT BEDTIME
Status: DISCONTINUED | OUTPATIENT
Start: 2022-09-28 | End: 2022-09-30 | Stop reason: HOSPADM

## 2022-09-28 RX ORDER — FENOFIBRATE 200 MG/1
200 CAPSULE ORAL
Status: DISCONTINUED | OUTPATIENT
Start: 2022-09-28 | End: 2022-09-28 | Stop reason: CLARIF

## 2022-09-28 RX ORDER — GABAPENTIN 300 MG/1
600 CAPSULE ORAL AT BEDTIME
Status: DISCONTINUED | OUTPATIENT
Start: 2022-09-28 | End: 2022-09-30 | Stop reason: HOSPADM

## 2022-09-28 RX ORDER — ACETAMINOPHEN 650 MG/1
650 SUPPOSITORY RECTAL EVERY 6 HOURS PRN
Status: DISCONTINUED | OUTPATIENT
Start: 2022-09-28 | End: 2022-09-30 | Stop reason: HOSPADM

## 2022-09-28 RX ORDER — PIPERACILLIN SODIUM, TAZOBACTAM SODIUM 3; .375 G/15ML; G/15ML
3.38 INJECTION, POWDER, LYOPHILIZED, FOR SOLUTION INTRAVENOUS EVERY 8 HOURS
Status: DISCONTINUED | OUTPATIENT
Start: 2022-09-28 | End: 2022-09-30

## 2022-09-28 RX ORDER — AMOXICILLIN 250 MG
1 CAPSULE ORAL 2 TIMES DAILY
Status: DISCONTINUED | OUTPATIENT
Start: 2022-09-28 | End: 2022-09-30 | Stop reason: HOSPADM

## 2022-09-28 RX ORDER — ACETAMINOPHEN 325 MG/1
650 TABLET ORAL EVERY 6 HOURS PRN
COMMUNITY

## 2022-09-28 RX ORDER — DEXTROSE MONOHYDRATE 25 G/50ML
25-50 INJECTION, SOLUTION INTRAVENOUS
Status: DISCONTINUED | OUTPATIENT
Start: 2022-09-28 | End: 2022-09-30 | Stop reason: HOSPADM

## 2022-09-28 RX ORDER — FERROUS SULFATE 325(65) MG
325 TABLET ORAL
Status: DISCONTINUED | OUTPATIENT
Start: 2022-09-28 | End: 2022-09-30 | Stop reason: HOSPADM

## 2022-09-28 RX ORDER — DULOXETIN HYDROCHLORIDE 60 MG/1
120 CAPSULE, DELAYED RELEASE ORAL DAILY
Status: DISCONTINUED | OUTPATIENT
Start: 2022-09-28 | End: 2022-09-30 | Stop reason: HOSPADM

## 2022-09-28 RX ORDER — ONDANSETRON 2 MG/ML
4 INJECTION INTRAMUSCULAR; INTRAVENOUS EVERY 6 HOURS PRN
Status: DISCONTINUED | OUTPATIENT
Start: 2022-09-28 | End: 2022-09-30 | Stop reason: HOSPADM

## 2022-09-28 RX ORDER — ARIPIPRAZOLE 2 MG/1
2 TABLET ORAL DAILY
Status: DISCONTINUED | OUTPATIENT
Start: 2022-09-28 | End: 2022-09-30 | Stop reason: HOSPADM

## 2022-09-28 RX ORDER — ONDANSETRON 4 MG/1
4 TABLET, ORALLY DISINTEGRATING ORAL EVERY 6 HOURS PRN
Status: DISCONTINUED | OUTPATIENT
Start: 2022-09-28 | End: 2022-09-30 | Stop reason: HOSPADM

## 2022-09-28 RX ORDER — PANTOPRAZOLE SODIUM 40 MG/1
40 TABLET, DELAYED RELEASE ORAL DAILY
Status: DISCONTINUED | OUTPATIENT
Start: 2022-09-28 | End: 2022-09-30 | Stop reason: HOSPADM

## 2022-09-28 RX ORDER — MAGNESIUM OXIDE 400 MG/1
400 TABLET ORAL 4 TIMES DAILY
Status: DISCONTINUED | OUTPATIENT
Start: 2022-09-28 | End: 2022-09-28

## 2022-09-28 RX ORDER — TRAZODONE HYDROCHLORIDE 50 MG/1
50 TABLET, FILM COATED ORAL AT BEDTIME
Status: DISCONTINUED | OUTPATIENT
Start: 2022-09-28 | End: 2022-09-30 | Stop reason: HOSPADM

## 2022-09-28 RX ORDER — NICOTINE POLACRILEX 4 MG
15-30 LOZENGE BUCCAL
Status: DISCONTINUED | OUTPATIENT
Start: 2022-09-28 | End: 2022-09-30 | Stop reason: HOSPADM

## 2022-09-28 RX ORDER — ACETAMINOPHEN 325 MG/1
975 TABLET ORAL ONCE
Status: COMPLETED | OUTPATIENT
Start: 2022-09-28 | End: 2022-09-28

## 2022-09-28 RX ORDER — HYDROCODONE BITARTRATE AND ACETAMINOPHEN 5; 325 MG/1; MG/1
1-2 TABLET ORAL EVERY 6 HOURS PRN
Status: DISCONTINUED | OUTPATIENT
Start: 2022-09-28 | End: 2022-09-28

## 2022-09-28 RX ORDER — LOSARTAN POTASSIUM 25 MG/1
25 TABLET ORAL AT BEDTIME
Status: DISCONTINUED | OUTPATIENT
Start: 2022-09-28 | End: 2022-09-30 | Stop reason: HOSPADM

## 2022-09-28 RX ORDER — HYDROCODONE BITARTRATE AND ACETAMINOPHEN 5; 325 MG/1; MG/1
1 TABLET ORAL EVERY 6 HOURS PRN
Status: DISCONTINUED | OUTPATIENT
Start: 2022-09-28 | End: 2022-09-30 | Stop reason: HOSPADM

## 2022-09-28 RX ORDER — FENOFIBRATE 160 MG/1
160 TABLET ORAL DAILY
Status: DISCONTINUED | OUTPATIENT
Start: 2022-09-28 | End: 2022-09-28

## 2022-09-28 RX ORDER — LIDOCAINE 40 MG/G
CREAM TOPICAL
Status: DISCONTINUED | OUTPATIENT
Start: 2022-09-28 | End: 2022-09-30 | Stop reason: HOSPADM

## 2022-09-28 RX ORDER — CHOLESTYRAMINE 4 G/9G
1 POWDER, FOR SUSPENSION ORAL EVERY OTHER DAY
Status: DISCONTINUED | OUTPATIENT
Start: 2022-09-28 | End: 2022-09-30 | Stop reason: HOSPADM

## 2022-09-28 RX ORDER — CLOPIDOGREL BISULFATE 75 MG/1
75 TABLET ORAL DAILY
Status: DISCONTINUED | OUTPATIENT
Start: 2022-09-28 | End: 2022-09-30 | Stop reason: HOSPADM

## 2022-09-28 RX ORDER — ACETAMINOPHEN 325 MG/1
650 TABLET ORAL EVERY 6 HOURS PRN
Status: DISCONTINUED | OUTPATIENT
Start: 2022-09-28 | End: 2022-09-30 | Stop reason: HOSPADM

## 2022-09-28 RX ORDER — ATORVASTATIN CALCIUM 40 MG/1
80 TABLET, FILM COATED ORAL EVERY EVENING
Status: DISCONTINUED | OUTPATIENT
Start: 2022-09-28 | End: 2022-09-30 | Stop reason: HOSPADM

## 2022-09-28 RX ORDER — POLYETHYLENE GLYCOL 3350 17 G/17G
17 POWDER, FOR SOLUTION ORAL DAILY PRN
Status: DISCONTINUED | OUTPATIENT
Start: 2022-09-28 | End: 2022-09-30 | Stop reason: HOSPADM

## 2022-09-28 RX ORDER — ACETAMINOPHEN 325 MG/1
650 TABLET ORAL EVERY 4 HOURS PRN
Status: DISCONTINUED | OUTPATIENT
Start: 2022-09-28 | End: 2022-09-28

## 2022-09-28 RX ORDER — ACETAMINOPHEN 325 MG/1
650 TABLET ORAL 2 TIMES DAILY
Status: DISCONTINUED | OUTPATIENT
Start: 2022-09-28 | End: 2022-09-30 | Stop reason: HOSPADM

## 2022-09-28 RX ORDER — MULTIVIT WITH MINERALS/LUTEIN
500 TABLET ORAL DAILY
Status: DISCONTINUED | OUTPATIENT
Start: 2022-09-28 | End: 2022-09-30 | Stop reason: HOSPADM

## 2022-09-28 RX ORDER — BISACODYL 10 MG
10 SUPPOSITORY, RECTAL RECTAL DAILY PRN
Status: DISCONTINUED | OUTPATIENT
Start: 2022-09-28 | End: 2022-09-30 | Stop reason: HOSPADM

## 2022-09-28 RX ORDER — GLIPIZIDE 5 MG/1
2.5 TABLET ORAL
COMMUNITY

## 2022-09-28 RX ORDER — GABAPENTIN 100 MG/1
100 CAPSULE ORAL
Status: DISCONTINUED | OUTPATIENT
Start: 2022-09-28 | End: 2022-09-30 | Stop reason: HOSPADM

## 2022-09-28 RX ADMIN — PANTOPRAZOLE SODIUM 40 MG: 40 TABLET, DELAYED RELEASE ORAL at 08:00

## 2022-09-28 RX ADMIN — IOPAMIDOL 100 ML: 755 INJECTION, SOLUTION INTRAVENOUS at 02:20

## 2022-09-28 RX ADMIN — ONDANSETRON 4 MG: 2 INJECTION INTRAMUSCULAR; INTRAVENOUS at 20:01

## 2022-09-28 RX ADMIN — GABAPENTIN 600 MG: 300 CAPSULE ORAL at 22:01

## 2022-09-28 RX ADMIN — FERROUS SULFATE TAB 325 MG (65 MG ELEMENTAL FE) 325 MG: 325 (65 FE) TAB at 08:00

## 2022-09-28 RX ADMIN — ACETAMINOPHEN 650 MG: 325 TABLET ORAL at 09:06

## 2022-09-28 RX ADMIN — PIPERACILLIN AND TAZOBACTAM 3.38 G: 3; .375 INJECTION, POWDER, LYOPHILIZED, FOR SOLUTION INTRAVENOUS at 05:18

## 2022-09-28 RX ADMIN — VANCOMYCIN HYDROCHLORIDE 1000 MG: 1 INJECTION, SOLUTION INTRAVENOUS at 09:45

## 2022-09-28 RX ADMIN — CHOLESTYRAMINE 4 G: 4 POWDER, FOR SUSPENSION ORAL at 14:15

## 2022-09-28 RX ADMIN — PIPERACILLIN AND TAZOBACTAM 3.38 G: 3; .375 INJECTION, POWDER, LYOPHILIZED, FOR SOLUTION INTRAVENOUS at 19:52

## 2022-09-28 RX ADMIN — GABAPENTIN 100 MG: 100 CAPSULE ORAL at 18:28

## 2022-09-28 RX ADMIN — ATORVASTATIN CALCIUM 80 MG: 40 TABLET, FILM COATED ORAL at 19:52

## 2022-09-28 RX ADMIN — GABAPENTIN 100 MG: 100 CAPSULE ORAL at 07:59

## 2022-09-28 RX ADMIN — DULOXETINE HYDROCHLORIDE 120 MG: 60 CAPSULE, DELAYED RELEASE PELLETS ORAL at 08:00

## 2022-09-28 RX ADMIN — ARIPIPRAZOLE 2 MG: 2 TABLET ORAL at 09:51

## 2022-09-28 RX ADMIN — MAGNESIUM 64 MG (MAGNESIUM CHLORIDE) TABLET,DELAYED RELEASE 1070 MG: at 19:52

## 2022-09-28 RX ADMIN — TRAZODONE HYDROCHLORIDE 50 MG: 50 TABLET ORAL at 22:01

## 2022-09-28 RX ADMIN — HYDROCODONE BITARTRATE AND ACETAMINOPHEN 1 TABLET: 5; 325 TABLET ORAL at 14:14

## 2022-09-28 RX ADMIN — MIRTAZAPINE 7.5 MG: 7.5 TABLET, FILM COATED ORAL at 22:01

## 2022-09-28 RX ADMIN — ACETAMINOPHEN 650 MG: 325 TABLET, FILM COATED ORAL at 19:52

## 2022-09-28 RX ADMIN — GLIPIZIDE 2.5 MG: 5 TABLET ORAL at 08:01

## 2022-09-28 RX ADMIN — PIPERACILLIN AND TAZOBACTAM 3.38 G: 3; .375 INJECTION, POWDER, LYOPHILIZED, FOR SOLUTION INTRAVENOUS at 14:52

## 2022-09-28 RX ADMIN — FENOFIBRATE 160 MG: 160 TABLET, FILM COATED ORAL at 08:01

## 2022-09-28 RX ADMIN — ACETAMINOPHEN 975 MG: 325 TABLET, FILM COATED ORAL at 02:55

## 2022-09-28 RX ADMIN — INSULIN ASPART 2 UNITS: 100 INJECTION, SOLUTION INTRAVENOUS; SUBCUTANEOUS at 18:41

## 2022-09-28 RX ADMIN — MAGNESIUM OXIDE TAB 400 MG (241.3 MG ELEMENTAL MG) 400 MG: 400 (241.3 MG) TAB at 07:59

## 2022-09-28 RX ADMIN — DAPTOMYCIN 400 MG: 500 INJECTION, POWDER, LYOPHILIZED, FOR SOLUTION INTRAVENOUS at 14:10

## 2022-09-28 RX ADMIN — CLOPIDOGREL BISULFATE 75 MG: 75 TABLET ORAL at 14:52

## 2022-09-28 RX ADMIN — METOPROLOL SUCCINATE ER TABLETS 75 MG: 50 TABLET, FILM COATED, EXTENDED RELEASE ORAL at 07:58

## 2022-09-28 RX ADMIN — INSULIN ASPART 2 UNITS: 100 INJECTION, SOLUTION INTRAVENOUS; SUBCUTANEOUS at 14:14

## 2022-09-28 ASSESSMENT — ACTIVITIES OF DAILY LIVING (ADL)
ADLS_ACUITY_SCORE: 35
ADLS_ACUITY_SCORE: 37
ADLS_ACUITY_SCORE: 37
ADLS_ACUITY_SCORE: 35
ADLS_ACUITY_SCORE: 35
ADLS_ACUITY_SCORE: 37
ADLS_ACUITY_SCORE: 35
ADLS_ACUITY_SCORE: 35
ADLS_ACUITY_SCORE: 37
ADLS_ACUITY_SCORE: 35

## 2022-09-28 NOTE — PHARMACY-ADMISSION MEDICATION HISTORY
Pharmacy Note - Admission Medication History    Pertinent Provider Information:  PTA medications were reordered before Pharmacy completed medication list update.   ______________________________________________________________________    Prior To Admission (PTA) med list completed and updated in EMR.       PTA Med List   Medication Sig Note Last Dose     acetaminophen (TYLENOL) 325 MG tablet Take 650 mg by mouth every 4 hours as needed for fever or pain  9/27/2022 at 1818     acetaminophen (TYLENOL) 325 MG tablet Take 650 mg by mouth 2 times daily  9/27/2022 at am     ARIPiprazole (ABILIFY) 2 MG tablet 2 mg daily   9/27/2022 at am     ASPERCREME ORIGINAL 10 % external cream Apply topically 2 times daily as needed for other (back pain)  Unknown at prn     atorvastatin (LIPITOR) 80 MG tablet Take 80 mg by mouth daily   9/27/2022 at am     bisacodyl (DULCOLAX) 10 MG suppository Place 10 mg rectally daily as needed for constipation  Unknown at prn     cholestyramine (QUESTRAN) 4 g packet Take 1 packet by mouth every other day   9/27/2022 at am     clopidogrel (PLAVIX) 75 MG tablet Take 1 tablet (75 mg) by mouth daily Start taking medication the day after the procedure.  9/27/2022 at am     DULoxetine (CYMBALTA) 60 MG capsule Take 120 mg by mouth daily  9/27/2022 at am     erenumab-aooe (AIMOVIG, 140 MG DOSE,) 70 MG/ML injection Inject 140 mg Subcutaneous every 30 days  9/9/2022     ferrous sulfate (FEROSUL) 325 (65 Fe) MG tablet Take 325 mg by mouth daily (with breakfast)   9/27/2022 at am     gabapentin (NEURONTIN) 100 MG capsule Take 100 mg by mouth 2 times daily (before meals)  9/27/2022 at 1630     gabapentin (NEURONTIN) 300 MG capsule Take 600 mg by mouth At Bedtime  9/26/2022 at hs     glipiZIDE (GLUCOTROL) 5 MG tablet Take 2.5 mg by mouth daily before breakfast  9/27/2022 at am     HYDROcodone-acetaminophen (NORCO) 5-325 MG tablet Take 1-2 tablets by mouth every 6 hours as needed for moderate to severe pain  (Patient taking differently: Take 1 tablet by mouth every 6 hours as needed for moderate to severe pain)  9/27/2022 at 0045     loperamide (IMODIUM) 2 MG capsule Take 2 mg by mouth 4 times daily as needed for diarrhea  Unknown at prn     losartan (COZAAR) 25 MG tablet Take 25 mg by mouth At Bedtime   9/26/2022 at hs     magnesium chloride 535 (64 Mg) MG TBEC CR tablet Take 1,070 mg by mouth 2 times daily  9/27/2022 at am     metFORMIN (GLUCOPHAGE) 500 MG tablet Take 500 mg by mouth daily (with dinner)  9/27/2022 at pm     metFORMIN (GLUCOPHAGE) 500 MG tablet Take 1,000 mg by mouth daily (with breakfast)  9/27/2022 at am     metoprolol succinate ER (TOPROL-XL) 25 MG 24 hr tablet Take 75 mg by mouth daily   9/27/2022 at am     mineral oil-hydrophilic petrolatum (AQUAPHOR) external ointment Apply topically 3 times daily as needed for irritation or dry skin Apply to forehead and other areas as needed  Unknown at prn     mirtazapine (REMERON) 7.5 MG tablet Take 7.5 mg by mouth At Bedtime   9/26/2022 at hs     Nitroglycerin (NITROSTAT SL) Place 0.4 mg under the tongue every 5 minutes as needed for chest pain   Unknown at prn     pantoprazole (PROTONIX) 40 MG EC tablet Take 40 mg by mouth daily before breakfast  9/27/2022 at am     polyethylene glycol (MIRALAX) 17 GM/Dose powder Take 1 packet by mouth daily as needed for constipation   Past Month at 9/12/22     senna-docusate (SENOKOT-S/PERICOLACE) 8.6-50 MG tablet Take 1 tablet by mouth 2 times daily  9/27/2022 at am     senna-docusate (SENOKOT-S/PERICOLACE) 8.6-50 MG tablet Take 1 tablet by mouth 2 times daily as needed for constipation In addition to scheduled doses  Unknown at prn     sodium chloride, PF, 0.9% PF flush 10 mLs by Intracatheter route 3 times daily 9/28/2022: 07, 14, 22 9/27/2022 at 1400     traZODone (DESYREL) 50 MG tablet Take 50 mg by mouth At Bedtime   9/26/2022 at      VANCOMYCIN HCL IV Inject 1,000 mg into the vein every 12 hours  9/27/2022 at  0700     vitamin C (ASCORBIC ACID) 500 MG tablet Take 500 mg by mouth daily   9/27/2022 at am     XARELTO ANTICOAGULANT 20 MG TABS tablet Take 20 mg by mouth daily (with breakfast)  9/27/2022 at am       Information source(s): Facility ([facility]/) medication list/MAR  Method of interview communication: N/A    Summary of Changes to PTA Med List  New: Aimovig, vancomycin, NS flushes  Discontinued: fenofibrate, lorazepam  Changed: magnesium oxide to chloride, scheduled APAP dose, Norco dose    In the past week, patient estimated taking medication this percent of the time:  greater than 90%.    Allergies were reviewed, assessed, and updated per records.      Patient did not bring any medications to the hospital and can't retrieve from home. No multi-dose medications are available for use during hospital stay.     The information provided in this note is only as accurate as the sources available at the time of the update(s).    Thank you,  Imani Watson [facility]  9/28/2022 9:17 AM

## 2022-09-28 NOTE — ED NOTES
RN contacted Nutrition to place patient's breakfast order. RN had to leave message with patient's order as line was busy. RN will inform patient.

## 2022-09-28 NOTE — CONSULTS
"[facility]  WOC Nurse Inpatient Assessment     Consulted for: RIGHT BKA    Patient History (according to provider note(s):      Per Vascular provider on 9/28/22 ( vascular notes available after ED WOC consult) :     \" IMPRESSION: 51 YO male who presented to the ED last evening with fever and nausea. Was noted to have new redness at BKA wound site, concern for infection. Has been started on broad spectrum antibiotics. CT done shows soft tissue edema with no drainable abscess.  Wound bed looks mostly healthy today with only minimal surrounding redness and no other signs of infection.  RECOMMENDATION: Do not suspect that BKA wound is source of infection at this time. Would recommend continued wound care and further workup for other infectious etiologies. Wound care consulted to re-apply VAC dressing.   HPI:  Cm Lundberg is a 52 year old male who was seen today in consultation for hypertension, hyperlipidemia, coronary artery diease s/p CABG, atrial fibrillation on xarelto, neuropathy, history of CVA, and charcot ankle right foot. He underwent below knee amputation which unfortunately became infected with CT showing an evolving abscess. He then underwent irrigation and debridement of the stump followed by repeat washout and partial closure with wound VAC application.      Yesterday, Mr. Lundberg was sent to the ED from his facility due to a fever of 101 degrees and nausea. Some redness to the BKA was noted with concern for infection. Today, Mr. Lundberg notes some pain to his BKA but is mostly bothered by abdominal pain/nausea. He says his breakfast is not agreeing with him. Denies any other concerns today. \"       Areas Assessed:      Areas visualized during today's visit: Focused: right leg     Wound location:  RIGHT LEG         Last photo: today 9/28 in ED  Wound due to: Surgical Wound  Wound history/plan of care: pt was hospitalized in august   Wound base: red granular with yellow " slough at the entrance to the wound     Palpation of the wound bed: normal      Drainage: small     Description of drainage: serosanguinous     Measurements (length x width x depth, in cm):      1.4 long  x 4 wide x 2 cm deep ( 6 cm wide if measured all open tissue including superficial area)     Tunneling: wound as a tunnel/ deep wound     Undermining: N/A  Periwound skin: Intact      Color: pink      Temperature: normal   Odor: none  Pain: tender  Treatment goal: Heal   STATUS: initial assessment  Supplies ordered: at bedside       Treatment Plan:     Right leg wound(s):  Change with Vashe once per day before wound vac placement ( after possible sharp debridement of the yellow tissue in the wound)    Orders: Written    RECOMMEND PRIMARY TEAM ORDER: recomended PA consult for sharp debridement   Education provided: Infection prevention   Discussed plan of care with: Patient, Nurse and Physician  WOC nurse follow-up plan: wound vac placement at the nearest day  Notify WOC if wound(s) deteriorate.  Nursing to notify the Provider(s) and re-consult the WOC Nurse if new skin concern.    DATA:     Current support surface: hui rajan  BMI: Body mass index is 26.56 kg/m .   Active diet order: Orders Placed This Encounter      Combination Diet Regular Diet Adult; Moderate Consistent Carb (60 g CHO per Meal) Diet     Output: I/O last 3 completed shifts:  In: 3 [I.V.:3]  Out: -      Labs: Recent Labs   Lab 09/27/22 2156 09/26/22  0540   ALBUMIN 3.4* 3.8   HGB 9.2* 10.2*   WBC 0.9* 1.6*   CRP  --  46.80*     Pressure injury risk assessment:   Sensory Perception: 3-->slightly limited  Moisture: 4-->rarely moist  Activity: 1-->bedfast  Mobility: 2-->very limited  Nutrition: 3-->adequate  Friction and Shear: 3-->no apparent problem  En Score: 16    Corina Dodd RN, CWON

## 2022-09-28 NOTE — CONSULTS
VASCULAR SURGERY INPATIENT CONSULTATION / Initial In-Patient visit    VASCULAR SURGEON: Kim Montes MD    LOCATION: [facility]     Cm Lundberg  Medical Record #: [id number]  YOB: 1970  Age: 52 year old     Date of Service: 9/28/2022    PRIMARY CARE PROVIDER: Leonid Caruso    Reason for consultation: Right BKA stump infection     IMPRESSION: 51 YO male who presented to the ED last evening with fever and nausea. Was noted to have new redness at BKA wound site, concern for infection. Has been started on broad spectrum antibiotics. CT done shows soft tissue edema with no drainable abscess.  Wound bed looks mostly healthy today with only minimal surrounding redness and no other signs of infection.    RECOMMENDATION: Do not suspect that BKA wound is source of infection at this time. Would recommend continued wound care and further workup for other infectious etiologies. Wound care consulted to re-apply VAC dressing.     HPI:  Cm Lundberg is a 52 year old male who was seen today in consultation for hypertension, hyperlipidemia, coronary artery diease s/p CABG, atrial fibrillation on xarelto, neuropathy, history of CVA, and charcot ankle right foot. He underwent below knee amputation which unfortunately became infected with CT showing an evolving abscess. He then underwent irrigation and debridement of the stump followed by repeat washout and partial closure with wound VAC application.     Yesterday, Mr. Lundberg was sent to the ED from his facility due to a fever of 101 degrees and nausea. Some redness to the BKA was noted with concern for infection. Today, Mr. Lundberg notes some pain to his BKA but is mostly bothered by abdominal pain/nausea. He says his breakfast is not agreeing with him. Denies any other concerns today.     PHH:    Past Medical History:   Diagnosis Date     Anemia      Anemia, unspecified type 08/19/2016     Anxiety      Anxiety state 07/29/2021     Formatting of this note might be different from the original. Created by Conversion  Replacement Utility updated for latest IMO load     Arthritis      Atrial fibrillation and flutter (H)      Cerebral artery occlusion with cerebral infarction (H)      Cerebral infarction (H)      Chest pain, unspecified type 08/19/2016     Chronic atrial fibrillation (H)      Congestive heart failure (H)      Coronary artery disease     CABG with multiple stents     Coronary artery disease involving native coronary artery without angina pectoris, unspecified whether native or transplanted heart 08/19/2016     Coronary atherosclerosis 07/29/2021    Formatting of this note might be different from the original. Created by Conversion  Replacement Utility updated for latest IMO load     Depression      Diabetic ulcer of right midfoot associated with type 2 diabetes mellitus, limited to breakdown of skin (H) 09/28/2017     Equinovarus acquired deformity, right 04/14/2021     Equinovarus deformity, acquired, right      Essential hypertension 08/19/2016     GERD (gastroesophageal reflux disease)      Heart attack (H)      History of blood transfusion      Hyperlipidemia      Hyperlipidemia, unspecified hyperlipidemia type 08/19/2016     Hypertension      Insomnia, unspecified type 08/19/2016     Ischemic cardiomyopathy      Late effects of cerebrovascular accident 08/19/2016     Major depressive disorder with single episode, remission status unspecified 08/19/2016     Microalbuminuria 07/29/2021    Formatting of this note might be different from the original. 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 Created by Conversion [facility] Annotation: Feb 11 2010  1:41PM - Leonid Cabral:  CANNOT READ OR  WRITE     PVD (peripheral vascular disease) (H)      Stented coronary artery      Type 2 diabetes mellitus (H) 07/29/2021    Formatting of this note might be different from the original. Created by Conversion     Type II or unspecified type diabetes mellitus with ophthalmic manifestations, not stated as uncontrolled(250.50) (H) 07/29/2021    Formatting of this note might be different from the original. Created by Conversion     Weakness 08/19/2016       Past Surgical History:   Procedure Laterality Date     AMPUTATE LEG BELOW KNEE Right 8/5/2022    Procedure: AMPUTATION, BELOW KNEE, right leg;  Surgeon: Kim Montes MD;  Location: [facility] [place] OR     INCISION AND DRAINAGE LOWER EXTREMITY, COMBINED Right 12/17/2021    Procedure: INCISION AND DRAINAGE, right foot;  Surgeon: Alex Kim DPM;  Location: [facility] OR     INCISION AND DRAINAGE LOWER EXTREMITY, COMBINED Right 1/27/2022    Procedure: INCISION AND DRAINAGE, right foot with application of theraskin;  Surgeon: Alex Kim DPM;  Location: [facility] [place] OR     INCISION AND DRAINAGE LOWER EXTREMITY, COMBINED Right 4/4/2022    Procedure: INCISION AND DRAINAGE, right foot with application of theraskin;  Surgeon: Alex Kim DPM;  Location: [facility] [place] OR     IR LOWER EXTREMITY ANGIOGRAM RIGHT  6/29/2022     IR MISCELLANEOUS PROCEDURE  2/13/2004     IRRIGATION AND DEBRIDEMENT LOWER EXTREMITY, COMBINED Right 8/24/2022    Procedure: RIGHT IRRIGATION AND DEBRIDEMENT, LOWER EXTREMITY, REVISION OF BELOW KNEE AMPUTATION;  Surgeon: Kim Montes MD;  Location: [facility] [place] OR     IRRIGATION AND DEBRIDEMENT LOWER EXTREMITY, COMBINED Right 8/26/2022    Procedure: WASHOUT AND PARTIAL CLOSURE OF RIGHT BELOW THE KNEE AMPUTATION,  AND WOUND VAC APPLICATION;  Surgeon: Kim Montes MD;  Location: [facility] [place] OR     PICC DOUBLE LUMEN PLACEMENT  8/29/2022          ZZC CABG, VEIN, SINGLE      Description: CABG (CABG);   Recorded: 04/22/2009;      ALLERGIES:     Allergies   Allergen Reactions     Aspirin Other (See Comments)     Reacts with migraine medicine     Blood-Group Specific Substance      Anti-Fya present.  Expect delays in blood for transfusion.  Draw 2 lavender and 1 red for all type and screen orders.     Isosorbide Headache     Codeine Rash     Morphine Rash      MEDS:    Current Facility-Administered Medications:      acetaminophen (TYLENOL) tablet 650 mg, 650 mg, Oral, Q6H PRN **OR** acetaminophen (TYLENOL) Suppository 650 mg, 650 mg, Rectal, Q6H PRN, Samson Brown B, MBBS     acetaminophen (TYLENOL) tablet 650 mg, 650 mg, Oral, Q4H PRN, Samson Brown B, MBBS, 650 mg at 09/28/22 0906     ARIPiprazole (ABILIFY) tablet 2 mg, 2 mg, Oral, Daily, Samson Brown B, MBBS     atorvastatin (LIPITOR) tablet 80 mg, 80 mg, Oral, QPM, Samson Brown B, MBBS     bisacodyl (DULCOLAX) suppository 10 mg, 10 mg, Rectal, Daily PRN, Samson Brown B, MBBS     cholestyramine (QUESTRAN) Packet 4 g, 1 packet, Oral, Every Other Day, Samson Brown, MBBS     glucose gel 15-30 g, 15-30 g, Oral, Q15 Min PRN **OR** dextrose 50 % injection 25-50 mL, 25-50 mL, Intravenous, Q15 Min PRN **OR** glucagon injection 1 mg, 1 mg, Subcutaneous, Q15 Min PRN, Samson Brown B, MBBS     DULoxetine (CYMBALTA) DR capsule 120 mg, 120 mg, Oral, Daily, Samson Brown B, MBBS, 120 mg at 09/28/22 0800     fenofibrate (TRIGLIDE/LOFIBRA) tablet 160 mg, 160 mg, Oral, Daily, Samson Brown B, MBBS, 160 mg at 09/28/22 0801     ferrous sulfate (FEROSUL) tablet 325 mg, 325 mg, Oral, Daily with breakfast, Samson Brown, MBBS, 325 mg at 09/28/22 0800     gabapentin (NEURONTIN) capsule 100 mg, 100 mg, Oral, BID AC, Samson Brown, MBBS, 100 mg at 09/28/22 0759     gabapentin (NEURONTIN) capsule 600 mg, 600 mg, Oral, At Bedtime, Samson Brown, NATALIE     glipiZIDE (GLUCOTROL) half-tab 2.5 mg, 2.5 mg, Oral, QAM AC, Samson Brown B, MBBS, 2.5 mg at  09/28/22 0801     HYDROcodone-acetaminophen (NORCO) 5-325 MG per tablet 1-2 tablet, 1-2 tablet, Oral, Q6H PRN, Willaragi, Samson B, MBBS     HYDROmorphone (DILAUDID) half-tab 1 mg, 1 mg, Oral, Q4H PRN, Bairagi, Samson B, MBBS     insulin aspart (NovoLOG) injection (RAPID ACTING), 1-7 Units, Subcutaneous, TID AC, Bairagi, Samson B, MBBS     insulin aspart (NovoLOG) injection (RAPID ACTING), 1-5 Units, Subcutaneous, At Bedtime, Leticiai, Samson B, MBBS     lidocaine (LMX4) cream, , Topical, Q1H PRN, Leticiai, Samson B, MBBS     lidocaine 1 % 0.1-1 mL, 0.1-1 mL, Other, Q1H PRN, Bairagi, Samson B, MBBS     LORazepam (ATIVAN) tablet 0.5 mg, 0.5 mg, Oral, Daily PRN, Leticiai, Samson B, MBBS     losartan (COZAAR) tablet 25 mg, 25 mg, Oral, At Bedtime, Bairagi, Samson B, MBBS     magnesium oxide (MAG-OX) tablet 400 mg, 400 mg, Oral, 4x Daily, Leticiai, Samson B, MBBS, 400 mg at 09/28/22 0759     melatonin tablet 1 mg, 1 mg, Oral, At Bedtime PRN, Leticiai, Samson B, MBBS     metoprolol succinate ER (TOPROL XL) 24 hr tablet 75 mg, 75 mg, Oral, Daily, Leticiai, Samson B, MBBS, 75 mg at 09/28/22 0758     mirtazapine (REMERON) tablet TABS 7.5 mg, 7.5 mg, Oral, At Bedtime, Leticiai, Samson B, MBBS     nitroGLYcerin (NITROSTAT) sublingual tablet 0.4 mg, 0.4 mg, Sublingual, Q5 Min PRN, Bairagi, Samson B, MBBS     ondansetron (ZOFRAN ODT) ODT tab 4 mg, 4 mg, Oral, Q6H PRN **OR** ondansetron (ZOFRAN) injection 4 mg, 4 mg, Intravenous, Q6H PRN, Samson Brown MBBS     pantoprazole (PROTONIX) EC tablet 40 mg, 40 mg, Oral, Daily, Samson Brown MBBS, 40 mg at 09/28/22 0800     piperacillin-tazobactam (ZOSYN) 3.375 g vial to attach to  mL bag, 3.375 g, Intravenous, Q8H, Samson Brown MBBS, 3.375 g at 09/28/22 0518     polyethylene glycol (MIRALAX) powder 17 g, 17 g, Oral, Daily PRN, Samson Brown MBBS     senna-docusate (SENOKOT-S/PERICOLACE) 8.6-50 MG per tablet 1 tablet, 1 tablet, Oral, BID PRN, Samson Brown,  MBBS     senna-docusate (SENOKOT-S/PERICOLACE) 8.6-50 MG per tablet 1 tablet, 1 tablet, Oral, BID, Samson Brown, MBBS     sodium chloride (PF) 0.9% PF flush 3 mL, 3 mL, Intracatheter, Q8H, Samson Brown B, MBBS, 3 mL at 09/28/22 0256     sodium chloride (PF) 0.9% PF flush 3 mL, 3 mL, Intracatheter, q1 min prn, Samson Brown B, MBBS     traZODone (DESYREL) tablet 50 mg, 50 mg, Oral, At Bedtime, Samson Brown B, MBBS     vancomycin (VANCOCIN) 1000 mg in dextrose 5% 200 mL PREMIX, 1,000 mg, Intravenous, Q12H, Glynn Sahu MD, Stopped at 09/28/22 0018     vitamin C (ASCORBIC ACID) tablet 500 mg, 500 mg, Oral, Daily, Samson Brown, MBBS    Current Outpatient Medications:      acetaminophen (TYLENOL) 325 MG tablet, TAKE 2 TABS (650MG) BY MOUTH EVERY 4 HOURS AS NEEDED FOR MILD PAIN, Disp: , Rfl:      ARIPiprazole (ABILIFY) 2 MG tablet, 2 mg daily , Disp: , Rfl:      ASPERCREME ORIGINAL 10 % external cream, Apply topically 2 times daily as needed for other (back pain), Disp: , Rfl:      atorvastatin (LIPITOR) 80 MG tablet, Take 80 mg by mouth daily , Disp: , Rfl:      bisacodyl (DULCOLAX) 10 MG suppository, Place 10 mg rectally daily as needed for constipation, Disp: , Rfl:      cholestyramine (QUESTRAN) 4 g packet, Take 1 packet by mouth every other day , Disp: , Rfl:      clopidogrel (PLAVIX) 75 MG tablet, Take 1 tablet (75 mg) by mouth daily Start taking medication the day after the procedure., Disp: 90 tablet, Rfl: 1     DULoxetine (CYMBALTA) 60 MG capsule, Take 120 mg by mouth daily, Disp: , Rfl:      ferrous sulfate (FEROSUL) 325 (65 Fe) MG tablet, Take 325 mg by mouth daily (with breakfast) , Disp: , Rfl:      gabapentin (NEURONTIN) 100 MG capsule, Take 100 mg by mouth 2 times daily (before meals), Disp: , Rfl:      gabapentin (NEURONTIN) 300 MG capsule, Take 600 mg by mouth At Bedtime, Disp: , Rfl:      HYDROcodone-acetaminophen (NORCO) 5-325 MG tablet, Take 1-2 tablets by mouth every 6 hours as  needed for moderate to severe pain, Disp: 12 tablet, Rfl: 0     loperamide (IMODIUM) 2 MG capsule, Take 2 mg by mouth 4 times daily as needed for diarrhea, Disp: , Rfl:      losartan (COZAAR) 25 MG tablet, Take 25 mg by mouth At Bedtime , Disp: , Rfl:      metFORMIN (GLUCOPHAGE) 500 MG tablet, Take 500 mg by mouth daily (with dinner), Disp: , Rfl:      metFORMIN (GLUCOPHAGE) 500 MG tablet, Take 1,000 mg by mouth daily (with breakfast), Disp: , Rfl:      metoprolol succinate ER (TOPROL-XL) 25 MG 24 hr tablet, Take 75 mg by mouth daily , Disp: , Rfl:      mineral oil-hydrophilic petrolatum (AQUAPHOR) external ointment, Apply topically 3 times daily as needed for irritation or dry skin Apply to forehead and other areas as needed, Disp: , Rfl:      mirtazapine (REMERON) 7.5 MG tablet, Take 7.5 mg by mouth At Bedtime , Disp: , Rfl:      Nitroglycerin (NITROSTAT SL), Place 0.4 mg under the tongue every 5 minutes as needed for chest pain , Disp: , Rfl:      pantoprazole (PROTONIX) 40 MG EC tablet, Take 40 mg by mouth daily, Disp: , Rfl:      polyethylene glycol (MIRALAX) 17 GM/Dose powder, Take 1 packet by mouth daily as needed for constipation , Disp: , Rfl:      senna-docusate (SENOKOT-S/PERICOLACE) 8.6-50 MG tablet, Take 1 tablet by mouth 2 times daily, Disp: , Rfl:      senna-docusate (SENOKOT-S/PERICOLACE) 8.6-50 MG tablet, Take 1 tablet by mouth 2 times daily as needed for constipation In addition to scheduled doses, Disp: , Rfl:      traZODone (DESYREL) 50 MG tablet, Take 50 mg by mouth At Bedtime , Disp: , Rfl:      vitamin C (ASCORBIC ACID) 500 MG tablet, Take 500 mg by mouth daily , Disp: , Rfl:      XARELTO ANTICOAGULANT 20 MG TABS tablet, Take 20 mg by mouth daily, Disp: , Rfl:      SOCIAL HABITS:    Tobacco Use      Smoking status: Former Smoker        Quit date: 2015        Years since quittin.6      Smokeless tobacco: Never Used     Social History    Substance and Sexual Activity      Alcohol use:  "Not Currently       History   Drug Use Unknown      FAMILY HISTORY:    Family History   Adopted: Yes     REVIEW OF SYSTEMS:    A 12 point ROS was reviewed and except for what is listed in the HPI above, all others are negative    PE:    Vital signs:  Temp: 99.7  F (37.6  C) Temp src: Oral BP: 133/68 Pulse: 103   Resp: 16 SpO2: 96 % O2 Device: None (Room air)   Height: 160 cm (5' 3\") Weight: 68 kg (149 lb 14.6 oz)  Estimated body mass index is 26.56 kg/m  as calculated from the following:    Height as of this encounter: 1.6 m (5' 3\").    Weight as of this encounter: 68 kg (149 lb 14.6 oz).    Wt Readings from Last 1 Encounters:   09/27/22 68 kg (149 lb 14.6 oz)     Body mass index is 26.56 kg/m .    EXAM:  GENERAL: well-developed 52 year old male who appears his stated age  CHEST/LUNG: normal respiratory effort   MUSCULOSKELETAL: grossly normal and both lower extremities are intact.  HEME/LYMPH: No lymphedema  NEUROLOGIC: focally intact, alert and oriented x 3   PSYCH: appropriate affect  INTEGUMENT: No open lesions or ulcers  VASCULAR: BKA wound with mild surrounding erythema, wound appears healthy with minimal yellow slough in wound bed. No tunneling or tracking, does not probe deep to bone. No davey purulence or malodor. Image uploaded to chart    DIAGNOSTIC STUDIES:      Images:    XR Chest 1 View    Result Date: 9/28/2022  INDICATION: Fever    IMPRESSION:   Since previous chest radiograph, there is a new right PICC which is in good position within the distal SVC. There has been slight enlargement of the cardiac silhouette on today's radiograph which is likely secondary to AP technique rather than PA technique. Otherwise the exam is stable with again seen postoperative changes to include a sternotomy and coronary artery stent. No lung infiltrates are seen to explain patient's fever clinically.    CT Knee Right w Contrast    Result Date: 9/28/2022  INDICATION: Fever. evaluate BKA stump for abscess, osteomyelitis, " other signs of infection     IMPRESSION:   1.  Status post BKA. Trace amount of fluid extends from medial wound to the undersurface of the tibial stump. Slight cortical ill-definition of the tibial stump. Consider MRI to exclude osteomyelitis.   2.  Soft tissue edema. Improvement in previously seen soft tissue fluid collections. No drainable abscess.   3.  Fracture tip of the residual fibula similar.     XR Knee Right 1/2 Views    Result Date: 9/28/2022  INDICATION: Include right BKA amputation stump, fever, possible postop infection     IMPRESSION:   Prior below the knee amputation with several fracture fragments at the distal tip of the residual fibula, unchanged. Otherwise no evidence for complications. No radiographic findings for osteomyelitis. Adjacent drain or catheter along the medial soft tissues, otherwise unremarkable.     Abd/pelvis CT no contrast - Stone Protocol    Result Date: 9/28/2022  INDICATION: Diffuse abdominal pain.    IMPRESSION:   1.  Mild obstipation.   2.  2 x 1 mm nonobstructive stone lower pole of the right kidney.   3.  Advanced coronary artery calcification with postoperative changes of a sternotomy.    LABS:      Sodium   Date Value Ref Range Status   09/27/2022 133 (L) 136 - 145 mmol/L Final   09/26/2022 135 (L) 136 - 145 mmol/L Final   09/19/2022 138 136 - 145 mmol/L Final   08/25/2016 139 mmol/L Final     Urea Nitrogen   Date Value Ref Range Status   09/27/2022 13.7 6.0 - 20.0 mg/dL Final   09/26/2022 15.2 6.0 - 20.0 mg/dL Final   09/19/2022 14.4 6.0 - 20.0 mg/dL Final   08/29/2022 12 8 - 22 mg/dL Final   08/26/2022 16 8 - 22 mg/dL Final   08/25/2022 20 8 - 22 mg/dL Final   08/25/2016 21 mg/dL Final     Hemoglobin   Date Value Ref Range Status   09/27/2022 9.2 (L) 13.3 - 17.7 g/dL Final   09/26/2022 10.2 (L) 13.3 - 17.7 g/dL Final   09/20/2022 9.9 (L) 13.3 - 17.7 g/dL Final   08/25/2016 10.1 (A) 13.3 - 17.7 g/dL Final     Platelet Count   Date Value Ref Range Status   09/27/2022 151  150 - 450 10e3/uL Final   09/26/2022 187 150 - 450 10e3/uL Final   09/20/2022 170 150 - 450 10e3/uL Final   08/25/2016 189 150 - 450 10^9/L Final     INR   Date Value Ref Range Status   08/29/2022 1.13 0.85 - 1.15 Final   08/27/2022 1.12 0.85 - 1.15 Final   08/25/2022 1.22 (H) 0.85 - 1.15 Final     Total time spent 20 minutes face to face with patient with more than 50% time spent in counseling and coordination of care.    Lizzeth Figueroa PA-C  [facility] Vascular Surgery

## 2022-09-28 NOTE — PLAN OF CARE
Goal Outcome Evaluation:    Problem: Pain Acute  Goal: Acceptable Pain Control and Functional Ability  Outcome: Ongoing, Progressing  Intervention: Prevent or Manage Pain  Recent Flowsheet Documentation  Taken 9/28/2022 1500 by Shivam Zepeda RN  Medication Review/Management: medications reviewed   Took over pt cares from morning Rn, pt alert and follows commands.  Prn percocet was given after wound cares for pain 9/10 and he stated relief 3/10 after treating. Picc line was removed and tip cultured, results in process. Vss.

## 2022-09-28 NOTE — CONSULTS
Consultation - Infectious Disease  [facility]  Cm Lundberg,  1970, MRN [id number]    Admitting Dx: Neutropenic fever (H) [D70.9, R50.81]    PCP: Leonid Caruso, [phone]       ASSESSMENT   52-year-old man with a history of diabetes, hypertension, coronary artery disease who is admitted with fevers.    1. Fever.  Status post right BKA almost 2 months ago.  Recently hospitalized for MRSA bacteremia, currently on vancomycin.  No fevers since admission.  Complains of abdominal pain.  CT scan of the abdomen showing obstipation, no other signs of infection.  Stump evaluated by vascular surgery, who did not feel that this is infected.  2. Neutropenia.  New onset.  Possibly secondary to vancomycin.  Now with fevers  3. MRSA bacteremia.  Followed by Dr. Mei.  Was to complete 4 weeks of IV vancomycin on , but this was extended after clinic visit and concern for nonhealing wound.  4. Right BKA.  Nonhealing wound.  CT scan on admission showing fluid collection going down to stump.  Seen by vascular surgery in the ER, who do not feel that the stump is infected.    Active Problems:    Neutropenic fever (H)    Cellulitis of right lower extremity       PLAN   -Discontinue vancomycin due to concerns for neutropenia with this  -Start empiric daptomycin  -Baseline CK level  -Empiric gram-negative coverage with Zosyn for neutropenia  -Follow-up on blood cultures  -Remove PICC line  -Defer to vascular regarding further work-up of nonhealing stump wound.  -Neutropenic precautions.  Isolation room recommended.      Thank you for this consult. Will follow.    Denton Acevedo MD  [facility]  Direct messaging: [employer] Paging  On-Call ID provider: [phone], option: [phone]      ===========================================      Chief Complaint   <principal problem not specified>       HPI     We have been requested by Samson Brown [employer] to evaluate Cm Lundberg for the  above.    History obtained by patient    Cm Lundberg is a 52 year old man with a history of diabetes, hypertension, coronary artery disease who was admitted from his skilled nursing facility with neutropenic fevers.  Patient was hospitalized last month with MRSA bacteremia and possible right BKA stump infection.  He was discharged on IV vancomycin with a plan for 4 weeks of treatment.  Last week, he was seen in wound care, and Dr. Mei saw him in wound care clinic.  At that time, the plan was to continue IV antibiotics for about 3 more weeks until the wound improved, followed by suppressive oral antibiotics.    The patient was admitted from a skilled nursing facility with fever.  He complains of generalized abdominal pain and nausea this morning.  No vomiting or diarrhea.  He says he is tolerating his antibiotics.  Denies any PICC line problems.  He is having some pain from his BKA this morning.          Review of Systems   Ten systems reviewed and negative except for what is noted in the HPI         Medical History  Past Medical History:   Diagnosis Date     Anemia      Anemia, unspecified type 08/19/2016     Anxiety      Anxiety state 07/29/2021    Formatting of this note might be different from the original. Created by Conversion  Replacement Utility updated for latest IMO load     Arthritis      Atrial fibrillation and flutter (H)      Cerebral artery occlusion with cerebral infarction (H)      Cerebral infarction (H)      Chest pain, unspecified type 08/19/2016     Chronic atrial fibrillation (H)      Congestive heart failure (H)      Coronary artery disease     CABG with multiple stents     Coronary artery disease involving native coronary artery without angina pectoris, unspecified whether native or transplanted heart 08/19/2016     Coronary atherosclerosis 07/29/2021    Formatting of this note might be different from the original. Created by Conversion  Replacement Utility updated for latest IMO load      Depression      Diabetic ulcer of right midfoot associated with type 2 diabetes mellitus, limited to breakdown of skin (H) 09/28/2017     Equinovarus acquired deformity, right 04/14/2021     Equinovarus deformity, acquired, right      Essential hypertension 08/19/2016     GERD (gastroesophageal reflux disease)      Heart attack (H)      History of blood transfusion      Hyperlipidemia      Hyperlipidemia, unspecified hyperlipidemia type 08/19/2016     Hypertension      Insomnia, unspecified type 08/19/2016     Ischemic cardiomyopathy      Late effects of cerebrovascular accident 08/19/2016     Major depressive disorder with single episode, remission status unspecified 08/19/2016     Microalbuminuria 07/29/2021    Formatting of this note might be different from the original. 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 Created by Conversion [facility] Annotation: Feb 11 2010  1:41PM - Leonid Cabral: CANNOT READ OR  WRITE     PVD (peripheral vascular disease) (H)      Stented coronary artery      Type 2 diabetes mellitus (H) 07/29/2021    Formatting of this note might be different from the original. Created by Conversion     Type II or unspecified type diabetes mellitus with ophthalmic manifestations, not stated as uncontrolled(250.50) (H) 07/29/2021    Formatting of this note might be different from the original. Created by Conversion     Weakness 08/19/2016    Surgical History  He  has a past surgical history that includes IR Miscellaneous Procedure (2/13/2004); CABG, VEIN, SINGLE; Incision and drainage lower extremity, combined (Right, 12/17/2021); Incision and drainage lower extremity, combined (Right, 1/27/2022); Incision and drainage lower extremity,  "combined (Right, 4/4/2022); IR Lower Extremity Angiogram Right (6/29/2022); Amputate leg below knee (Right, 8/5/2022); Irrigation and debridement lower extremity, combined (Right, 8/24/2022); PICC/Midline Placement (8/29/2022); and Irrigation and debridement lower extremity, combined (Right, 8/26/2022).     Social History  Reviewed, and he  reports that he quit smoking about 7 years ago. He has never used smokeless tobacco. He reports previous alcohol use. He reports previous drug use.  Social History     Social History Narrative     Not on file     Family History  family history is not on file. He was adopted.  family history reviewed and is not pertinent to the presenting problem.            Allergies     Allergies   Allergen Reactions     Aspirin Other (See Comments)     Reacts with migraine medicine     Blood-Group Specific Substance      Anti-Fya present.  Expect delays in blood for transfusion.  Draw 2 lavender and 1 red for all type and screen orders.     Isosorbide Headache     Codeine Rash     Morphine Rash         Antibiotics   Vancomycin, prior to admission  Zosyn 9/28-        Physical Exam     Temp:  [98.5  F (36.9  C)-99.7  F (37.6  C)] 99.7  F (37.6  C)  Pulse:  [] 103  Resp:  [16-23] 16  BP: (117-141)/(63-79) 117/63  SpO2:  [92 %-97 %] 96 %    /63   Pulse 103   Temp 99.7  F (37.6  C) (Oral)   Resp 16   Ht 1.6 m (5' 3\")   Wt 68 kg (149 lb 14.6 oz)   SpO2 96%   BMI 26.56 kg/m      GENERAL:  well-developed, well-nourished, lying in bed in no acute distress.   HENT:  Head is normocephalic, atraumatic. Oropharynx is moist without exudates or ulcers.  EYES:  Eyes have anicteric sclerae without conjunctival injection or stigmata of endocarditis.   NECK:  Supple.  LUNGS:  Clear to auscultation.  CARDIOVASCULAR:  Regular rate and rhythm with no murmurs, gallops or rubs.  ABDOMEN:  Normal bowel sounds, soft, nontender. No appreciable hepatosplenomegaly  EXT: Extremities warm and without " edema. S/p right BKA. See vascular note from today for exam. Wound recent wrapped at my visit  SKIN:  No acute rashes. Right arm PICC line is in place without any surrounding erythema. No stigmata of endocarditis.  NEUROLOGIC:  Grossly nonfocal.      Cultures   9/27 blood culture: No growth to date  9/28 blood culture: Pending      Laboratory results     Recent Labs   Lab 09/27/22 2156 09/26/22  0540   WBC 0.9* 1.6*   HGB 9.2* 10.2*    187       Recent Labs   Lab 09/27/22 2156 09/26/22  0540   * 135*   CO2 21* 26   BUN 13.7 15.2   ALBUMIN 3.4* 3.8   ALKPHOS 100 95   ALT 19 14   AST 20 26       Recent Labs   Lab 09/26/22  0540   CRP 46.80*           Imaging   Radiology results reviewed    XR Chest 1 View    Result Date: 9/28/2022  EXAM: XR CHEST 1 VIEW LOCATION: [facility] DATE/TIME: 9/28/2022 1:00 AM INDICATION: Fever. COMPARISON: 05/25/2022.     IMPRESSION: Since previous chest radiograph, there is a new right PICC which is in good position within the distal SVC. There has been slight enlargement of the cardiac silhouette on today's radiograph which is likely secondary to AP technique rather than PA technique. Otherwise the exam is stable with again seen postoperative changes to include a sternotomy and coronary artery stent. No lung infiltrates are seen to explain patient's fever clinically.    CT Knee Right w Contrast    Result Date: 9/28/2022  EXAM: CT KNEE RIGHT W CONTRAST LOCATION: [facility] DATE/TIME: 9/28/2022 2:19 AM INDICATION: Fever. evaluate BKA stump for abscess, osteomyelitis, other signs of infection COMPARISON: 8/23/2022 and 9/28/2022 TECHNIQUE: IV contrast. Axial, sagittal and coronal thin-section reconstruction. Dose reduction techniques were used. CONTRAST: ISOVUE 370 100ML FINDINGS: BONES: -Status post BKA. Fracture distal tip of the residual fibula unchanged. Mild callus formation. Osseous demineralization. Mild mineralization  along the tibial stump. Slight cortical ill-definition. Skin defect with presumed wound VAC medial to the tibial stump. Small tract of fluid extends from the wound to the tibial stump. Chondrocalcinosis. Degenerative change. SOFT TISSUES: -Soft tissue edema. Improvement in previously seen fluid collections. As described there is a tiny tract of fluid extending from medial skin defect to the distal aspect of the tibial stump coronal image 38. Vascular calcification. Trace joint effusion.     IMPRESSION: 1.  Status post BKA. Trace amount of fluid extends from medial wound to the undersurface of the tibial stump. Slight cortical ill-definition of the tibial stump. Consider MRI to exclude osteomyelitis. 2.  Soft tissue edema. Improvement in previously seen soft tissue fluid collections. No drainable abscess. 3.  Fracture tip of the residual fibula similar.     XR Knee Right 1/2 Views    Result Date: 9/28/2022  EXAM: XR KNEE RIGHT 1/2 VIEWS LOCATION: [facility] DATE/TIME: 9/28/2022 1:00 AM INDICATION: Include right BKA amputation stump, fever, possible postop infection COMPARISON: CT right knee exam 8/23/2022     IMPRESSION: Prior below the knee amputation with several fracture fragments at the distal tip of the residual fibula, unchanged. Otherwise no evidence for complications. No radiographic findings for osteomyelitis. Adjacent drain or catheter along the medial soft tissues, otherwise unremarkable.     Abd/pelvis CT no contrast - Stone Protocol    Result Date: 9/28/2022  EXAM: CT ABDOMEN PELVIS W/O CONTRAST LOCATION: [facility] DATE/TIME: 9/28/2022 12:49 AM INDICATION: Diffuse abdominal pain. COMPARISON: None. TECHNIQUE: CT scan of the abdomen and pelvis was performed without IV contrast. Multiplanar reformats were obtained. Dose reduction techniques were used. CONTRAST: None. FINDINGS: LOWER CHEST: Advanced coronary artery calcifications. Postoperative changes  sternotomy. HEPATOBILIARY: Normal. PANCREAS: Normal. SPLEEN: Normal-variant accessory splenule. ADRENAL GLANDS: Normal. KIDNEYS/BLADDER: Approximately 2 mm x 1 mm nonobstructive stone lower pole of the right kidney. The kidneys, ureters, and bladder are otherwise normal. BOWEL: Mild obstipation. LYMPH NODES: Normal. VASCULATURE: Mild-to-moderate scattered vascular calcifications seen with no aneurysm. PELVIC ORGANS: Normal. MUSCULOSKELETAL: Moderate degenerative changes lower lumbar spine. No acute findings.     IMPRESSION: 1.  Mild obstipation. 2.  2 x 1 mm nonobstructive stone lower pole of the right kidney. 3.  Advanced coronary artery calcification with postoperative changes of a sternotomy.      Data reviewed today: I reviewed all medications, new labs and imaging results over the last 24 hours. I personally reviewed the CT stump image(s) showing fluid collection.  The patient's care was discussed with the Bedside Nurse and Patient.

## 2022-09-28 NOTE — ED PROVIDER NOTES
EMERGENCY DEPARTMENT ENCOUNTER      NAME: Cm Lundberg  AGE: 52 year old male  YOB: 1970  MRN: [id number]  EVALUATION DATE & TIME: 2022  8:37 PM    PCP: Leonid Caruso    ED PROVIDER: Glynn Sahu M.D.      Chief Complaint   Patient presents with     Fever     Nausea         FINAL IMPRESSION:        ED COURSE & MEDICAL DECISION MAKIN:57 PM I met with the patient to gather history and to perform my initial exam. We discussed plans for the ED course, including diagnostic testing and treatment. PPE worn: cloth mask, glasses, gloves.  Patient admitted to  for right BKA for recurrent diabetic foot ulcers and right foot and ankle osteomyelitis.  Patient admitted 823 for infection and cellulitis and abscess and a repeat incision and drainage x2.  Patient apparently grew out MRSA and is currently on vancomycin.  Sent from [facility] for fever to 101 degrees, questionable low white blood cells and sent in for possible infection.  500 cc of saline per medics.  Further work-up and evaluation pending.  Examination of the right BKA amputation stump with his brother notes new redness along the incision line which was not noted on September 10.  Brother has a picture.  12:41 AM.  EKG was sinus tachycardia.  Chest x-ray, knee x-ray, and belly CT pending.  Urinalysis pending.  Blood culture sent.  Lactate 2.0.  Flu and COVID testing negative chemistries are negative other than sugar 172.  Calcium 7.4.  Liver function test and lipase normal.  White count low 0.9.  The rest the CBC is pending.  Patient received double antibiotics as well as IV fluids.  Tentatively the patient will be signed out to the night ED physician to follow-up on pending studies and admission to the hospital.  Patient will probably need to be admitted to the hospital as earlier pending sepsis.  No source for his infection identified other than possible stump infection with new redness along the  seam.      Pertinent Labs & Imaging studies reviewed. (See chart for details)    52 year old male presents to the Emergency Department for evaluation of possible infection.    At the conclusion of the encounter I discussed the results of all of the tests and the disposition. The questions were answered. The patient or family acknowledged understanding and was agreeable with the care plan.     MEDICATIONS GIVEN IN THE EMERGENCY:  Medications - No data to display    NEW PRESCRIPTIONS STARTED AT TODAY'S ER VISIT  New Prescriptions    No medications on file          =================================================================    HPI    Patient information was obtained from: Patient and brother    Use of : N/A     Cm Lundberg is a 52 year old male with a pertinent history of type 2 diabetes mellitus, hypertension, and osteomyelitis who presents to this ED by EMS for evaluation of fever and nausea.    Patient recently had a below knee amputation of his right leg in mid August due to diabetic ulcer of right lower extremity. Since has had a wound Vac on the area. Today TCU staff had concerns for sepsis as patient had a temperature of 101 there and a low white blood count. There is noted new redness near the wound. Patient is in a TCU for past stroke. Patient mentions having abdominal pain, chills, and nausea today with this.     He does not identify any waxing or waning symptoms otherwise, exacerbating or alleviating features,associated symptoms except as mentioned. He denies any pain related complaints.    REVIEW OF SYSTEMS   Review of Systems   Constitutional: Positive for chills and fever.   Gastrointestinal: Positive for abdominal pain and nausea.   Skin: Positive for color change.   All other systems reviewed and are negative.       PAST MEDICAL HISTORY:  Past Medical History:   Diagnosis Date     Anemia      Anemia, unspecified type 08/19/2016     Anxiety      Anxiety state 07/29/2021     Formatting of this note might be different from the original. Created by Conversion  Replacement Utility updated for latest IMO load     Arthritis      Atrial fibrillation and flutter (H)      Cerebral artery occlusion with cerebral infarction (H)      Cerebral infarction (H)      Chest pain, unspecified type 08/19/2016     Chronic atrial fibrillation (H)      Congestive heart failure (H)      Coronary artery disease     CABG with multiple stents     Coronary artery disease involving native coronary artery without angina pectoris, unspecified whether native or transplanted heart 08/19/2016     Coronary atherosclerosis 07/29/2021    Formatting of this note might be different from the original. Created by Conversion  Replacement Utility updated for latest IMO load     Depression      Diabetic ulcer of right midfoot associated with type 2 diabetes mellitus, limited to breakdown of skin (H) 09/28/2017     Equinovarus acquired deformity, right 04/14/2021     Equinovarus deformity, acquired, right      Essential hypertension 08/19/2016     GERD (gastroesophageal reflux disease)      Heart attack (H)      History of blood transfusion      Hyperlipidemia      Hyperlipidemia, unspecified hyperlipidemia type 08/19/2016     Hypertension      Insomnia, unspecified type 08/19/2016     Ischemic cardiomyopathy      Late effects of cerebrovascular accident 08/19/2016     Major depressive disorder with single episode, remission status unspecified 08/19/2016     Microalbuminuria 07/29/2021    Formatting of this note might be different from the original. 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 Created by Conversion [facility] Annotation: Feb 11 2010  1:41PM - Leonid Cabral:  CANNOT READ OR  WRITE     PVD (peripheral vascular disease) (H)      Stented coronary artery      Type 2 diabetes mellitus (H) 07/29/2021    Formatting of this note might be different from the original. Created by Conversion     Type II or unspecified type diabetes mellitus with ophthalmic manifestations, not stated as uncontrolled(250.50) (H) 07/29/2021    Formatting of this note might be different from the original. Created by Conversion     Weakness 08/19/2016       PAST SURGICAL HISTORY:  Past Surgical History:   Procedure Laterality Date     AMPUTATE LEG BELOW KNEE Right 8/5/2022    Procedure: AMPUTATION, BELOW KNEE, right leg;  Surgeon: Kim Montes MD;  Location[facility]     INCISION AND DRAINAGE LOWER EXTREMITY, COMBINED Right 12/17/2021    Procedure: INCISION AND DRAINAGE, right foot;  Surgeon: Alex Kim DPM;  Location: [facility] OR     INCISION AND DRAINAGE LOWER EXTREMITY, COMBINED Right 1/27/2022    Procedure: INCISION AND DRAINAGE, right foot with application of theraskin;  Surgeon: Alex Kim DPM;  Location: [facility] OR     INCISION AND DRAINAGE LOWER EXTREMITY, COMBINED Right 4/4/2022    Procedure: INCISION AND DRAINAGE, right foot with application of theraskin;  Surgeon: Alex Kim DPM;  Location: [facility] OR     IR LOWER EXTREMITY ANGIOGRAM RIGHT  6/29/2022     IR MISCELLANEOUS PROCEDURE  2/13/2004     IRRIGATION AND DEBRIDEMENT LOWER EXTREMITY, COMBINED Right 8/24/2022    Procedure: RIGHT IRRIGATION AND DEBRIDEMENT, LOWER EXTREMITY, REVISION OF BELOW KNEE AMPUTATION;  Surgeon: Kim Montes MD;  Location: [facility] OR     IRRIGATION AND DEBRIDEMENT LOWER EXTREMITY, COMBINED Right 8/26/2022    Procedure: WASHOUT AND PARTIAL CLOSURE OF RIGHT BELOW THE KNEE AMPUTATION,  AND WOUND VAC APPLICATION;  Surgeon: Kim Montes MD;  Location: [facility] OR     PICC DOUBLE LUMEN PLACEMENT  8/29/2022          ZZC CABG, VEIN, SINGLE       Description: CABG (CABG);  Recorded: 04/22/2009;           CURRENT MEDICATIONS:    acetaminophen (TYLENOL) 325 MG tablet  acetaminophen (TYLENOL) 500 MG tablet  ARIPiprazole (ABILIFY) 2 MG tablet  ASPERCREME ORIGINAL 10 % external cream  atorvastatin (LIPITOR) 80 MG tablet  bisacodyl (DULCOLAX) 10 MG suppository  cholestyramine (QUESTRAN) 4 g packet  clopidogrel (PLAVIX) 75 MG tablet  DULoxetine (CYMBALTA) 60 MG capsule  fenofibrate micronized (LOFIBRA) 200 MG capsule  ferrous sulfate (FEROSUL) 325 (65 Fe) MG tablet  gabapentin (NEURONTIN) 100 MG capsule  gabapentin (NEURONTIN) 300 MG capsule  glipiZIDE (GLUCOTROL) 5 MG tablet  HYDROcodone-acetaminophen (NORCO) 5-325 MG tablet  loperamide (IMODIUM) 2 MG capsule  LORazepam (ATIVAN) 0.5 MG tablet  losartan (COZAAR) 25 MG tablet  magnesium oxide 400 MG CAPS  metFORMIN (GLUCOPHAGE) 500 MG tablet  metFORMIN (GLUCOPHAGE) 500 MG tablet  metoprolol succinate ER (TOPROL-XL) 25 MG 24 hr tablet  mineral oil-hydrophilic petrolatum (AQUAPHOR) external ointment  mirtazapine (REMERON) 7.5 MG tablet  Nitroglycerin (NITROSTAT SL)  pantoprazole (PROTONIX) 40 MG EC tablet  polyethylene glycol (MIRALAX) 17 GM/Dose powder  senna-docusate (SENOKOT-S/PERICOLACE) 8.6-50 MG tablet  senna-docusate (SENOKOT-S/PERICOLACE) 8.6-50 MG tablet  traZODone (DESYREL) 50 MG tablet  vitamin C (ASCORBIC ACID) 500 MG tablet  XARELTO ANTICOAGULANT 20 MG TABS tablet        ALLERGIES:  Allergies   Allergen Reactions     Aspirin Other (See Comments)     Reacts with migraine medicine     Blood-Group Specific Substance      Anti-Fya present.  Expect delays in blood for transfusion.  Draw 2 lavender and 1 red for all type and screen orders.     Isosorbide Headache     Codeine Rash     Morphine Rash       FAMILY HISTORY:  Family History   Adopted: Yes       SOCIAL HISTORY:   Social History     Socioeconomic History     Marital status:      Spouse name: None     Number of children: None     Years of  "education: None     Highest education level: None   Tobacco Use     Smoking status: Former Smoker     Quit date: 2015     Years since quittin.6     Smokeless tobacco: Never Used   Vaping Use     Vaping Use: Never used   Substance and Sexual Activity     Alcohol use: Not Currently     Drug use: Not Currently   Former smoker.  No current tobacco, drugs or alcohol.    VITALS:  BP (!) 140/74   Pulse 107   Temp 99  F (37.2  C) (Oral)   Resp 18   Ht 1.6 m (5' 3\")   Wt 68 kg (149 lb 14.6 oz)   SpO2 95%   BMI 26.56 kg/m      PHYSICAL EXAM    Vital Signs:  BP (!) 140/74   Pulse 107   Temp 99  F (37.2  C) (Oral)   Resp 18   Ht 1.6 m (5' 3\")   Wt 68 kg (149 lb 14.6 oz)   SpO2 95%   BMI 26.56 kg/m    General:  On entering the room he is in no apparent distress.    Neck:  Neck supple with full range of motion and nontender.    Back:  Back and spine are nontender.  No costovertebral angle tenderness.    HEENT:  Oropharynx clear with moist mucous membranes.  HEENT unremarkable.    Pulmonary:  Chest clear to auscultation without rhonchi rales or wheezing.    Cardiovascular:  Cardiac regular rate and rhythm without murmurs rubs or gallops.    Abdomen:  Abdomen soft and mildly diffusely tender..  There is no rebound or guarding.    Muskuloskeletal:  he moves all 4 without any difficulty and has normal neurovascular exams.  Extremities without clubbing, cyanosis, or edema.  Legs and calves are nontender.  Right BKA amputation stump with some new redness along the incision line.  A wound VAC is present.  Neuro:  he is alert and oriented ×2 and moves all extremities symmetrically.    Psych:  Normal affect.    Skin:  Unremarkable and warm and dry.       LAB:  All pertinent labs reviewed and interpreted.  Labs Ordered and Resulted from Time of ED Arrival to Time of ED Departure   COMPREHENSIVE METABOLIC PANEL - Abnormal       Result Value    Sodium 133 (*)     Potassium 4.1      Chloride 100      Carbon Dioxide " (CO2) 21 (*)     Anion Gap 12      Urea Nitrogen 13.7      Creatinine 0.75      Calcium 7.4 (*)     Glucose 172 (*)     Alkaline Phosphatase 100      AST 20      ALT 19      Protein Total 6.0 (*)     Albumin 3.4 (*)     Bilirubin Total 0.4      GFR Estimate >90     LACTIC ACID WHOLE BLOOD - Normal    Lactic Acid 2.0     INFLUENZA A/B & SARS-COV2 PCR MULTIPLEX - Normal    Influenza A PCR Negative      Influenza B PCR Negative      RSV PCR Negative      SARS CoV2 PCR Negative     LIPASE - Normal    Lipase 44     ROUTINE UA WITH MICROSCOPIC REFLEX TO CULTURE   CBC WITH PLATELETS AND DIFFERENTIAL   BLOOD CULTURE       RADIOLOGY:  Reviewed all pertinent imaging. Please see official radiology report.  No orders to display              EKG:    Sinus tachycardia 108.  Vertical axis.  Probable old anterior wall MI.  Very similar to previous EKG of December 2004.    I have independently reviewed and interpreted the EKG(s) documented above.    PROCEDURES:         I, Thanh Delcid, am serving as a scribe to document services personally performed by Dr. Sahu based on my observation and the provider's statements to me. I, Glynn Sahu MD attest that Thanh Delcid is acting in a scribe capacity, has observed my performance of the services and has documented them in accordance with my direction.    Glynn Sahu M.D.  Emergency Medicine  [facility] EMERGENCY DEPARTMENT  [facility] [facility] [id number] [phone]  Dept: [phone]     Glynn Sahu MD  09/28/22 0042

## 2022-09-28 NOTE — ED NOTES
Bed: JNED-12  Expected date: 9/27/22  Expected time: 8:26 PM  Means of arrival:   Comments:  Joey 53 yo M referral

## 2022-09-28 NOTE — ED NOTES
Surgeon came to see pt. Looking at pts stump and checked incision site. He states that the wound looks good and he doesn't believe there is any infection at the site.   Nurse redressed site with new bandage per surgeon request

## 2022-09-28 NOTE — ED NOTES
Per hospitalist pt is going to be placed on neutropenic precautions. He requests that the pt get a private room asap. Charge nurse alerted to this and states she will work finding a room.

## 2022-09-28 NOTE — DISCHARGE INSTRUCTIONS
WOUND CARE    Wound location: right BKA  Change Days: MWF  Supplies: black foam  Cleanse with: vashe pat dry.  Suction: Continuous at -125 mmHg pressure.     Back up plan: If VAC malfunctions or unable to maintain seal: VAC dressing must be removed and reapplied within 2 hours of the incident. If floor staff is not able to reapply, place NS moistened gauze in wound bed and cover with appropriate dressing to keep wound bed moist.   Change wet-to-dry dressing BID and notify [facility] staff for reimplementation of VAC therapy when available.  Date canister. Chart canister output every shift.    The hospital VAC pump is not to be discharged with the patient.  Please do one of the following prior to discharge:  If a home VAC pump has been delivered, please apply the home pump to the dressing prior to patient discharge.    If the patient is discharging to LTAC or a TCU/SNF and there is a VAC pump waiting for the patient, close clamp and then disconnect the tubing for transfer, place tubing inside a glove.   If the patient is discharging to home or other facility and there is no VAC pump available for immediate placement, remove the VAC dressing and apply a wet-to-moist gauze dressing for transfer.

## 2022-09-28 NOTE — PHARMACY-VANCOMYCIN DOSING SERVICE
"Pharmacy Vancomycin Initial Note  Date of Service 2022  Patient's  1970  52 year old, male    Indication: Bone and Joint Infection    Current estimated CrCl = Estimated Creatinine Clearance: 109.8 mL/min (based on SCr of 0.74 mg/dL).    Creatinine for last 3 days  2022:  5:40 AM Creatinine 0.74 mg/dL    Recent Vancomycin Level(s) for last 3 days  2022:  5:40 AM Vancomycin 18.6 ug/mL      Vancomycin IV Administrations (past 72 hours)      No vancomycin orders with administrations in past 72 hours.                Nephrotoxins and other renal medications (From now, onward)    Start     Dose/Rate Route Frequency Ordered Stop    22  piperacillin-tazobactam (ZOSYN) 3.375 g vial to attach to  mL bag        Note to Pharmacy: For [facility], [facility] and [facility]: For Zosyn-naive patients, use the \"Zosyn initial dose + extended infusion\" order panel.    3.375 g  over 30 Minutes Intravenous ONCE 22  vancomycin (VANCOCIN) 1000 mg in dextrose 5% 200 mL PREMIX         1,000 mg  200 mL/hr over 1 Hours Intravenous EVERY 12 HOURS 22            Contrast Orders - past 72 hours (72h ago, onward)    None          [employer] Prediction of Planned Initial Vancomycin Regimen    Loading dose: N/A  Regimen: 1000 mg IV every 12 hours.  Start time: 22:25 on 2022  Exposure target: AUC24 (range)400-600 mg/L.hr   AUC24,ss: 468 mg/L.hr  Probability of AUC24 > 400: 73 %  Ctrough,ss: 14.0 mg/L  Probability of Ctrough,ss > 20: 14 %  Probability of nephrotoxicity (Lodise JEANETTE ): 9 %          Plan:  1. CONTINUE vancomycin  1000 mg IV q12h from prior to admission  2. Vancomycin monitoring method: AUC  3. Vancomycin therapeutic monitoring goal: 400-600 mg*h/L  4. Pharmacy will check vancomycin levels as appropriate in 1-3 Days.    5. Serum creatinine levels will be ordered daily for the first week of therapy and at least twice weekly for subsequent weeks.      Minna SWAN" Domenic, H

## 2022-09-28 NOTE — PROGRESS NOTES
[facility]    Medicine Progress Note - Hospitalist Service    Date of Admission:  9/27/2022    Assessment & Plan        52 year old male with PMH significant for DM-2, diabetic foot ulcer, s/p right BKA, infected BKA stump needing I&D in 08/2022, MRSA bacteremia on IV vancomycin, hypertension, CAD, S/P CABG, stents, afib, CVA, diabetic neuropathy who was sent to out ED for concern regarding sepsis        Sepsis  Sepsis , resolving: Diagnosis based on HR > 90 bpm and WBC < 4 due to infection, looking for source  Neutropenic fever  -- Patient with recent right BKA stump infection with MRSA bacteremia,discharged on IV vanc (supposed to be until 9/23/22, but patient reports he is still on that).   ---Still on wound vac.   ----Noted to have new skin redness around the stump, fever of 101F with HR of 105 at TCU  ---Source not clear.    ----Vascular surgery consulted, does not feel that wound is infected  --. Blood cultures taken in ED. Will also take cultures from the existing piccline  -- Started empiric vanc/zosyn.  ID changed to daptomycin--some concerns for vancomycin causing  --- Removing PICC  --- CT of the stump shows trace fluid, no abscess, question osteomyelitis.   --- Neutropenic precaution.   -- ID and vascular following     H/O CVA  -- Cont with statin. resume plavix     Atrial fibrillation  - Cont with Metoprolol  -- Resume Xarelto --vascular does not feel wound infected  CAD  -- S/P CABG and stents  -- Cont with cardiac meds.   --- Resume Plavix as no occasions for surgical procedures at this time      DM-II with diabetic neuropathy  -- Hold metformin. Cont glipizide.   ---continue sliding scale insulin  ---accuchecks ok     Hypertension  -- Cont with home meds with hold parameters     Anxiety disorder:  -- Cont with home meds         Diet: Combination Diet Regular Diet Adult; Moderate Consistent Carb (60 g CHO per Meal) Diet    DVT Prophylaxis: DOAC  Murray Catheter: Not  "present  Central Lines: PRESENT     Cardiac Monitoring: None  Code Status: Full Code      Disposition Plan      Expected Discharge Date: 09/30/2022                The patient's care was discussed with the Patient and Patient's Family.    Brit Colindres MD  Hospitalist Service  [facility]  Securely message with the Vocera Web Console (learn more here)  Text page via [employer] Paging/Directory         Clinically Significant Risk Factors Present on Admission               # Coagulation Defect: home medication list includes an anticoagulant medication  # Platelet Defect: home medication list includes an antiplatelet medication  # Hypertension: home medication list includes antihypertensive(s)    # Overweight: Estimated body mass index is 26.56 kg/m  as calculated from the following:    Height as of this encounter: 1.6 m (5' 3\").    Weight as of this encounter: 68 kg (149 lb 14.6 oz).        ______________________________________________________________________    Interval History   ---patient seen and chart reviewed.  At this point he is complaining to me about feeling \"sick,\" and feeling \"like crap.\"  When I try to get him to describe this he cannot.  Specifically asked if he was having any pain and he said no.  Denies chest pain or shortness of breath.  Endorses some cough.    Data reviewed today: I reviewed all medications, new labs and imaging results over the last 24 hours.    Physical Exam   Vital Signs: Temp: 99.7  F (37.6  C) Temp src: Oral BP: 126/63 Pulse: 96   Resp: 16 SpO2: 93 % O2 Device: None (Room air)    Weight: 149 lbs 14.6 oz  General Appearance: Pleasant, edentulous.  Clear distress  Respiratory: Clear to auscultation bilaterally  Cardiovascular: Regular rate and rhythm without murmurs rubs or gallop  GI: Soft and nontender without hepatosplenomegaly  Skin: Right knee with AKA and wound dressed.  Other: Neurologically somewhat slow to answer questions but no obvious focal deficits " appreciated.    Data   Recent Labs   Lab 09/28/22  0745 09/28/22  0646 09/28/22  0329 09/27/22  2156 09/26/22  0540   WBC  --   --   --  0.9* 1.6*   HGB  --   --   --  9.2* 10.2*   MCV  --   --   --  83 86   PLT  --   --   --  151 187   NA  --   --   --  133* 135*   POTASSIUM  --   --   --  4.1 4.2   CHLORIDE  --   --   --  100 101   CO2  --   --   --  21* 26   BUN  --   --   --  13.7 15.2   CR  --   --   --  0.75 0.74   ANIONGAP  --   --   --  12 8   TO  --   --   --  7.4* 9.4   * 156* 139* 172* 108*   ALBUMIN  --   --   --  3.4* 3.8   PROTTOTAL  --   --   --  6.0* 6.5   BILITOTAL  --   --   --  0.4 0.3   ALKPHOS  --   --   --  100 95   ALT  --   --   --  19 14   AST  --   --   --  20 26   LIPASE  --   --   --  44  --      Recent Results (from the past 24 hour(s))   Abd/pelvis CT no contrast - Stone Protocol    Narrative    EXAM: CT ABDOMEN PELVIS W/O CONTRAST  LOCATION: [facility]  DATE/TIME: 9/28/2022 12:49 AM    INDICATION: Diffuse abdominal pain.  COMPARISON: None.  TECHNIQUE: CT scan of the abdomen and pelvis was performed without IV contrast. Multiplanar reformats were obtained. Dose reduction techniques were used.  CONTRAST: None.    FINDINGS:   LOWER CHEST: Advanced coronary artery calcifications. Postoperative changes sternotomy.    HEPATOBILIARY: Normal.    PANCREAS: Normal.    SPLEEN: Normal-variant accessory splenule.    ADRENAL GLANDS: Normal.    KIDNEYS/BLADDER: Approximately 2 mm x 1 mm nonobstructive stone lower pole of the right kidney. The kidneys, ureters, and bladder are otherwise normal.    BOWEL: Mild obstipation.    LYMPH NODES: Normal.    VASCULATURE: Mild-to-moderate scattered vascular calcifications seen with no aneurysm.    PELVIC ORGANS: Normal.    MUSCULOSKELETAL: Moderate degenerative changes lower lumbar spine. No acute findings.      Impression    IMPRESSION:   1.  Mild obstipation.    2.  2 x 1 mm nonobstructive stone lower pole of the right  kidney.    3.  Advanced coronary artery calcification with postoperative changes of a sternotomy.   XR Knee Right 1/2 Views    Narrative    EXAM: XR KNEE RIGHT 1/2 VIEWS  LOCATION: [facility]  DATE/TIME: 9/28/2022 1:00 AM    INDICATION: Include right BKA amputation stump, fever, possible postop infection  COMPARISON: CT right knee exam 8/23/2022      Impression    IMPRESSION: Prior below the knee amputation with several fracture fragments at the distal tip of the residual fibula, unchanged. Otherwise no evidence for complications. No radiographic findings for osteomyelitis. Adjacent drain or catheter along the   medial soft tissues, otherwise unremarkable.    XR Chest 1 View    Narrative    EXAM: XR CHEST 1 VIEW  LOCATION: [facility]  DATE/TIME: 9/28/2022 1:00 AM    INDICATION: Fever.  COMPARISON: 05/25/2022.      Impression    IMPRESSION: Since previous chest radiograph, there is a new right PICC which is in good position within the distal SVC. There has been slight enlargement of the cardiac silhouette on today's radiograph which is likely secondary to AP technique rather   than PA technique. Otherwise the exam is stable with again seen postoperative changes to include a sternotomy and coronary artery stent. No lung infiltrates are seen to explain patient's fever clinically.   CT Knee Right w Contrast    Narrative    EXAM: CT KNEE RIGHT W CONTRAST  LOCATION: [facility]  DATE/TIME: 9/28/2022 2:19 AM    INDICATION: Fever. evaluate BKA stump for abscess, osteomyelitis, other signs of infection  COMPARISON: 8/23/2022 and 9/28/2022  TECHNIQUE: IV contrast. Axial, sagittal and coronal thin-section reconstruction. Dose reduction techniques were used.   CONTRAST: ISOVUE 370 100ML    FINDINGS:     BONES:  -Status post BKA. Fracture distal tip of the residual fibula unchanged. Mild callus formation. Osseous demineralization. Mild mineralization along  the tibial stump. Slight cortical ill-definition. Skin defect with presumed wound VAC medial to the tibial   stump. Small tract of fluid extends from the wound to the tibial stump. Chondrocalcinosis. Degenerative change.    SOFT TISSUES:  -Soft tissue edema. Improvement in previously seen fluid collections. As described there is a tiny tract of fluid extending from medial skin defect to the distal aspect of the tibial stump coronal image 38. Vascular calcification. Trace joint effusion.      Impression    IMPRESSION:  1.  Status post BKA. Trace amount of fluid extends from medial wound to the undersurface of the tibial stump. Slight cortical ill-definition of the tibial stump. Consider MRI to exclude osteomyelitis.  2.  Soft tissue edema. Improvement in previously seen soft tissue fluid collections. No drainable abscess.  3.  Fracture tip of the residual fibula similar.

## 2022-09-28 NOTE — ED NOTES
EMERGENCY DEPARTMENT SIGN OUT NOTE        ED COURSE AND MEDICAL DECISION MAKING  Patient was signed out to me by Dr Glynn Sahu at 1:14 AM. Waiting on XR, CXR, UA. Will admit.  1:20 AM I updated patient.  1:30 AM I spoke to the hospitalist Dr Brown regarding patient.  1:44 AM patient does have a wound VAC in place, the pump itself was left at [facility].  I discussed this with the hospitalist who recommends a CT of the right lower extremity to evaluate for abscess or other signs of infection.  This was ordered.    In brief, Cm Lundberg is a 52 year old male who initially presented with fever and nausea.    Patient had a below knee amputation of right leg in mid August due to diabetic ulcer of right lower extremity, with a prior wound Vac on the area. Today TCU staff had concerns for sepsis as patient had a temperature of 101 there and a low white blood count. There is noted new redness near the wound. Patient is in a TCU for past stroke. Patient mentions having abdominal pain, chills, and nausea today with this.      At time of sign out, disposition was pending. Patient ultimately admitted for neutropenic fever and management of cellulitis at site of wound.     FINAL IMPRESSION    No diagnosis found.    ED MEDS  Medications   vancomycin (VANCOCIN) 1000 mg in dextrose 5% 200 mL PREMIX (0 mg Intravenous Stopped 9/28/22 0018)   0.9% sodium chloride BOLUS (0 mLs Intravenous Stopped 9/27/22 2155)   piperacillin-tazobactam (ZOSYN) 3.375 g vial to attach to  mL bag (3.375 g Intravenous Given 9/27/22 2208)   ondansetron (ZOFRAN) injection 4 mg (4 mg Intravenous Given 9/27/22 2150)   acetaminophen (TYLENOL) tablet 650 mg (650 mg Oral Given 9/27/22 2205)       LAB  Labs Ordered and Resulted from Time of ED Arrival to Time of ED Departure   COMPREHENSIVE METABOLIC PANEL - Abnormal       Result Value    Sodium 133 (*)     Potassium 4.1      Chloride 100      Carbon Dioxide (CO2) 21 (*)     Anion Gap 12       Urea Nitrogen 13.7      Creatinine 0.75      Calcium 7.4 (*)     Glucose 172 (*)     Alkaline Phosphatase 100      AST 20      ALT 19      Protein Total 6.0 (*)     Albumin 3.4 (*)     Bilirubin Total 0.4      GFR Estimate >90     CBC WITH PLATELETS AND DIFFERENTIAL - Abnormal    WBC Count 0.9 (*)     RBC Count 3.44 (*)     Hemoglobin 9.2 (*)     Hematocrit 28.6 (*)     MCV 83      MCH 26.7      MCHC 32.2      RDW 14.6      Platelet Count 151      NRBCs per 100 WBC 0      Absolute NRBCs 0.0     DIFFERENTIAL - Abnormal    % Neutrophils 44      % Lymphocytes 28      % Monocytes 24      % Eosinophils 2      % Basophils 2      Absolute Neutrophils 0.4 (*)     Absolute Lymphocytes 0.3 (*)     Absolute Monocytes 0.2      Absolute Eosinophils 0.0      Absolute Basophils 0.0      RBC Morphology Confirmed RBC Indices      Platelet Assessment        Value: Automated Count Confirmed. Platelet morphology is normal.    RBC Fragments Slight (*)    LACTIC ACID WHOLE BLOOD - Normal    Lactic Acid 2.0     INFLUENZA A/B & SARS-COV2 PCR MULTIPLEX - Normal    Influenza A PCR Negative      Influenza B PCR Negative      RSV PCR Negative      SARS CoV2 PCR Negative     LIPASE - Normal    Lipase 44     ROUTINE UA WITH MICROSCOPIC REFLEX TO CULTURE   BLOOD CULTURE       RADIOLOGY    XR Chest 1 View   Final Result   IMPRESSION: Since previous chest radiograph, there is a new right PICC which is in good position within the distal SVC. There has been slight enlargement of the cardiac silhouette on today's radiograph which is likely secondary to AP technique rather    than PA technique. Otherwise the exam is stable with again seen postoperative changes to include a sternotomy and coronary artery stent. No lung infiltrates are seen to explain patient's fever clinically.      Abd/pelvis CT no contrast - Stone Protocol   Final Result   IMPRESSION:    1.  Mild obstipation.      2.  2 x 1 mm nonobstructive stone lower pole of the right kidney.       3.  Advanced coronary artery calcification with postoperative changes of a sternotomy.      XR Knee Right 1/2 Views    (Results Pending)       DISCHARGE MEDS  New Prescriptions    No medications on file       Elisabeth Hassan MD  [facility] EMERGENCY DEPARTMENT  [address] [place] [id number] [phone]      Elisabeth Hassan MD  09/28/22 0132       Elisabeth Hassan MD  09/28/22 0144

## 2022-09-28 NOTE — ED TRIAGE NOTES
Patient arrives via EMS from a TCU with a chief complaint of fever, nausea, chills. Patient had an amputation approximately one month ago for a diabetic ulcer on his right lower extremity/foot. Patient had an infection after the amputation and was started on IV antibiotics - vancomycin. Per EMS TCU facility staff reported that patient's VS around 1600 today were Temperature 101 & Heart Rate 105     TCU facility also fritz blood and noted a low WBC count - concerned for sepsis.     En route to the ER, EMS provided patient with 500 mL NS bolus.     Upon arrival to ER, patient is tachycardic, pale, and nauseated. Temperature is 99

## 2022-09-28 NOTE — ED NOTES
Patient getting ready to order breakfast - RN reviewed patient's med list and contacted DR. Brown in regard to getting patient's home medications ordered so patient can have his morning medications.

## 2022-09-28 NOTE — ED NOTES
Expected Patient Referral to ED  8:16 PM    Referring Clinic/Provider:  [facility]    Reason for referral/Clinical facts:  Chronic wound on vanco, tonight fever, CRP elevated from prior, Tylenol this evening    Recommendations provided:  Send to ED for further evaluation    Caller was informed that this institution does possess the capabilities and/or resources to provide for patient and should be transferred to our facility.    Discussed that if direct admit is sought and any hurdles are encountered, this ED would be happy to see the patient and evaluate.    Informed caller that recommendations provided are recommendations based only on the facts provided and that they responsible to accept or reject the advice, or to seek a formal in person consultation as needed and that this ED will see/treat patient should they arrive.      Enrique Chen MD  [facility] EMERGENCY DEPARTMENT  [address] [place] [id number]  [phone]       Enrique Chen MD  09/27/22 2017       Enrique Chen MD  09/27/22 2018

## 2022-09-29 ENCOUNTER — APPOINTMENT (OUTPATIENT)
Dept: RADIOLOGY | Facility: HOSPITAL | Age: 52
DRG: 872 | End: 2022-09-29
Payer: MEDICARE

## 2022-09-29 ENCOUNTER — APPOINTMENT (OUTPATIENT)
Dept: CT IMAGING | Facility: HOSPITAL | Age: 52
DRG: 872 | End: 2022-09-29
Payer: MEDICARE

## 2022-09-29 ENCOUNTER — APPOINTMENT (OUTPATIENT)
Dept: CARDIOLOGY | Facility: HOSPITAL | Age: 52
DRG: 872 | End: 2022-09-29
Payer: MEDICARE

## 2022-09-29 LAB
ANION GAP SERPL CALCULATED.3IONS-SCNC: 13 MMOL/L (ref 7–15)
BUN SERPL-MCNC: 20.2 MG/DL (ref 6–20)
CALCIUM SERPL-MCNC: 8.5 MG/DL (ref 8.6–10)
CHLORIDE SERPL-SCNC: 99 MMOL/L (ref 98–107)
CK SERPL-CCNC: 109 U/L (ref 39–308)
CREAT SERPL-MCNC: 1.27 MG/DL (ref 0.67–1.17)
D DIMER PPP FEU-MCNC: 1.67 UG/ML FEU (ref 0–0.5)
DEPRECATED HCO3 PLAS-SCNC: 23 MMOL/L (ref 22–29)
ERYTHROCYTE [DISTWIDTH] IN BLOOD BY AUTOMATED COUNT: 15.4 % (ref 10–15)
GFR SERPL CREATININE-BSD FRML MDRD: 67 ML/MIN/1.73M2
GLUCOSE BLDC GLUCOMTR-MCNC: 164 MG/DL (ref 70–99)
GLUCOSE BLDC GLUCOMTR-MCNC: 183 MG/DL (ref 70–99)
GLUCOSE BLDC GLUCOMTR-MCNC: 184 MG/DL (ref 70–99)
GLUCOSE BLDC GLUCOMTR-MCNC: 198 MG/DL (ref 70–99)
GLUCOSE SERPL-MCNC: 188 MG/DL (ref 70–99)
HCT VFR BLD AUTO: 32 % (ref 40–53)
HGB BLD-MCNC: 10.1 G/DL (ref 13.3–17.7)
LACTATE SERPL-SCNC: 2.6 MMOL/L (ref 0.7–2)
LVEF ECHO: NORMAL
MCH RBC QN AUTO: 26.4 PG (ref 26.5–33)
MCHC RBC AUTO-ENTMCNC: 31.6 G/DL (ref 31.5–36.5)
MCV RBC AUTO: 84 FL (ref 78–100)
PLATELET # BLD AUTO: 171 10E3/UL (ref 150–450)
POTASSIUM SERPL-SCNC: 4.1 MMOL/L (ref 3.4–5.3)
RBC # BLD AUTO: 3.82 10E6/UL (ref 4.4–5.9)
SODIUM SERPL-SCNC: 135 MMOL/L (ref 136–145)
TROPONIN T SERPL HS-MCNC: 396 NG/L
TROPONIN T SERPL HS-MCNC: 396 NG/L
TROPONIN T SERPL HS-MCNC: 413 NG/L
WBC # BLD AUTO: 5.5 10E3/UL (ref 4–11)

## 2022-09-29 PROCEDURE — 85027 COMPLETE CBC AUTOMATED: CPT | Performed by: INTERNAL MEDICINE

## 2022-09-29 PROCEDURE — 120N000001 HC R&B MED SURG/OB

## 2022-09-29 PROCEDURE — 255N000002 HC RX 255 OP 636: Performed by: INTERNAL MEDICINE

## 2022-09-29 PROCEDURE — 80048 BASIC METABOLIC PNL TOTAL CA: CPT | Performed by: INTERNAL MEDICINE

## 2022-09-29 PROCEDURE — 93005 ELECTROCARDIOGRAM TRACING: CPT

## 2022-09-29 PROCEDURE — 84484 ASSAY OF TROPONIN QUANT: CPT

## 2022-09-29 PROCEDURE — 258N000003 HC RX IP 258 OP 636

## 2022-09-29 PROCEDURE — 250N000013 HC RX MED GY IP 250 OP 250 PS 637: Performed by: FAMILY MEDICINE

## 2022-09-29 PROCEDURE — 99232 SBSQ HOSP IP/OBS MODERATE 35: CPT | Performed by: INTERNAL MEDICINE

## 2022-09-29 PROCEDURE — 999N000208 ECHOCARDIOGRAM COMPLETE

## 2022-09-29 PROCEDURE — 85379 FIBRIN DEGRADATION QUANT: CPT

## 2022-09-29 PROCEDURE — 36415 COLL VENOUS BLD VENIPUNCTURE: CPT | Performed by: INTERNAL MEDICINE

## 2022-09-29 PROCEDURE — G1010 CDSM STANSON: HCPCS

## 2022-09-29 PROCEDURE — 250N000011 HC RX IP 250 OP 636: Performed by: INTERNAL MEDICINE

## 2022-09-29 PROCEDURE — 99222 1ST HOSP IP/OBS MODERATE 55: CPT | Mod: 25 | Performed by: INTERNAL MEDICINE

## 2022-09-29 PROCEDURE — 71045 X-RAY EXAM CHEST 1 VIEW: CPT

## 2022-09-29 PROCEDURE — 250N000011 HC RX IP 250 OP 636: Performed by: HOSPITALIST

## 2022-09-29 PROCEDURE — 93306 TTE W/DOPPLER COMPLETE: CPT | Mod: 26 | Performed by: INTERNAL MEDICINE

## 2022-09-29 PROCEDURE — 97605 NEG PRS WND THER DME<=50SQCM: CPT

## 2022-09-29 PROCEDURE — 250N000013 HC RX MED GY IP 250 OP 250 PS 637: Performed by: INTERNAL MEDICINE

## 2022-09-29 PROCEDURE — 83605 ASSAY OF LACTIC ACID: CPT

## 2022-09-29 PROCEDURE — 84484 ASSAY OF TROPONIN QUANT: CPT | Performed by: INTERNAL MEDICINE

## 2022-09-29 PROCEDURE — 96376 TX/PRO/DX INJ SAME DRUG ADON: CPT

## 2022-09-29 PROCEDURE — 258N000003 HC RX IP 258 OP 636: Performed by: INTERNAL MEDICINE

## 2022-09-29 PROCEDURE — 82550 ASSAY OF CK (CPK): CPT

## 2022-09-29 RX ORDER — FUROSEMIDE 10 MG/ML
20 INJECTION INTRAMUSCULAR; INTRAVENOUS ONCE
Status: COMPLETED | OUTPATIENT
Start: 2022-09-29 | End: 2022-09-29

## 2022-09-29 RX ORDER — IOPAMIDOL 755 MG/ML
100 INJECTION, SOLUTION INTRAVASCULAR ONCE
Status: COMPLETED | OUTPATIENT
Start: 2022-09-29 | End: 2022-09-29

## 2022-09-29 RX ORDER — IOPAMIDOL 755 MG/ML
100 INJECTION, SOLUTION INTRAVASCULAR ONCE
Status: DISCONTINUED | OUTPATIENT
Start: 2022-09-29 | End: 2022-09-30 | Stop reason: HOSPADM

## 2022-09-29 RX ADMIN — RIVAROXABAN 20 MG: 10 TABLET, FILM COATED ORAL at 08:23

## 2022-09-29 RX ADMIN — IOPAMIDOL 100 ML: 755 INJECTION, SOLUTION INTRAVENOUS at 04:57

## 2022-09-29 RX ADMIN — INSULIN ASPART 2 UNITS: 100 INJECTION, SOLUTION INTRAVENOUS; SUBCUTANEOUS at 13:31

## 2022-09-29 RX ADMIN — GLIPIZIDE 2.5 MG: 5 TABLET ORAL at 08:23

## 2022-09-29 RX ADMIN — GABAPENTIN 100 MG: 100 CAPSULE ORAL at 17:01

## 2022-09-29 RX ADMIN — PIPERACILLIN AND TAZOBACTAM 3.38 G: 3; .375 INJECTION, POWDER, LYOPHILIZED, FOR SOLUTION INTRAVENOUS at 03:40

## 2022-09-29 RX ADMIN — PIPERACILLIN AND TAZOBACTAM 3.38 G: 3; .375 INJECTION, POWDER, LYOPHILIZED, FOR SOLUTION INTRAVENOUS at 19:45

## 2022-09-29 RX ADMIN — PIPERACILLIN AND TAZOBACTAM 3.38 G: 3; .375 INJECTION, POWDER, LYOPHILIZED, FOR SOLUTION INTRAVENOUS at 13:07

## 2022-09-29 RX ADMIN — GABAPENTIN 100 MG: 100 CAPSULE ORAL at 08:22

## 2022-09-29 RX ADMIN — HYDROCODONE BITARTRATE AND ACETAMINOPHEN 1 TABLET: 5; 325 TABLET ORAL at 14:55

## 2022-09-29 RX ADMIN — ACETAMINOPHEN 650 MG: 325 TABLET, FILM COATED ORAL at 08:23

## 2022-09-29 RX ADMIN — MIRTAZAPINE 7.5 MG: 7.5 TABLET, FILM COATED ORAL at 21:35

## 2022-09-29 RX ADMIN — DULOXETINE HYDROCHLORIDE 120 MG: 60 CAPSULE, DELAYED RELEASE PELLETS ORAL at 08:23

## 2022-09-29 RX ADMIN — ATORVASTATIN CALCIUM 80 MG: 40 TABLET, FILM COATED ORAL at 19:39

## 2022-09-29 RX ADMIN — TRAZODONE HYDROCHLORIDE 50 MG: 50 TABLET ORAL at 21:35

## 2022-09-29 RX ADMIN — HYDROMORPHONE HYDROCHLORIDE 1 MG: 2 TABLET ORAL at 03:30

## 2022-09-29 RX ADMIN — PERFLUTREN 2 ML: 6.52 INJECTION, SUSPENSION INTRAVENOUS at 09:36

## 2022-09-29 RX ADMIN — MAGNESIUM 64 MG (MAGNESIUM CHLORIDE) TABLET,DELAYED RELEASE 1070 MG: at 08:23

## 2022-09-29 RX ADMIN — METOPROLOL SUCCINATE ER TABLETS 75 MG: 50 TABLET, FILM COATED, EXTENDED RELEASE ORAL at 08:22

## 2022-09-29 RX ADMIN — INSULIN ASPART 1 UNITS: 100 INJECTION, SOLUTION INTRAVENOUS; SUBCUTANEOUS at 17:07

## 2022-09-29 RX ADMIN — SODIUM CHLORIDE 500 ML: 9 INJECTION, SOLUTION INTRAVENOUS at 04:01

## 2022-09-29 RX ADMIN — LOSARTAN POTASSIUM 25 MG: 25 TABLET, FILM COATED ORAL at 21:35

## 2022-09-29 RX ADMIN — Medication 500 MG: at 10:00

## 2022-09-29 RX ADMIN — ACETAMINOPHEN 650 MG: 325 TABLET, FILM COATED ORAL at 02:38

## 2022-09-29 RX ADMIN — MAGNESIUM 64 MG (MAGNESIUM CHLORIDE) TABLET,DELAYED RELEASE 1070 MG: at 19:39

## 2022-09-29 RX ADMIN — FERROUS SULFATE TAB 325 MG (65 MG ELEMENTAL FE) 325 MG: 325 (65 FE) TAB at 08:22

## 2022-09-29 RX ADMIN — INSULIN ASPART 1 UNITS: 100 INJECTION, SOLUTION INTRAVENOUS; SUBCUTANEOUS at 08:31

## 2022-09-29 RX ADMIN — FUROSEMIDE 20 MG: 10 INJECTION, SOLUTION INTRAMUSCULAR; INTRAVENOUS at 06:39

## 2022-09-29 RX ADMIN — DAPTOMYCIN 400 MG: 500 INJECTION, POWDER, LYOPHILIZED, FOR SOLUTION INTRAVENOUS at 13:06

## 2022-09-29 RX ADMIN — PANTOPRAZOLE SODIUM 40 MG: 40 TABLET, DELAYED RELEASE ORAL at 08:22

## 2022-09-29 RX ADMIN — CLOPIDOGREL BISULFATE 75 MG: 75 TABLET ORAL at 08:22

## 2022-09-29 RX ADMIN — GABAPENTIN 600 MG: 300 CAPSULE ORAL at 21:35

## 2022-09-29 RX ADMIN — ACETAMINOPHEN 650 MG: 325 TABLET, FILM COATED ORAL at 19:39

## 2022-09-29 RX ADMIN — ARIPIPRAZOLE 2 MG: 2 TABLET ORAL at 08:23

## 2022-09-29 ASSESSMENT — ACTIVITIES OF DAILY LIVING (ADL)
TRANSFERRING: 2-->COMPLETELY DEPENDENT
ADLS_ACUITY_SCORE: 37
ADLS_ACUITY_SCORE: 44
DRESS: 1-->ASSISTANCE (EQUIPMENT/PERSON) NEEDED (NOT DEVELOPMENTALLY APPROPRIATE)
DIFFICULTY_EATING/SWALLOWING: NO
TOILETING: 2-->COMPLETELY DEPENDENT
CONCENTRATING,_REMEMBERING_OR_MAKING_DECISIONS_DIFFICULTY: NO
ADLS_ACUITY_SCORE: 44
DOING_ERRANDS_INDEPENDENTLY_DIFFICULTY: YES
WALKING_OR_CLIMBING_STAIRS: AMBULATION DIFFICULTY, DEPENDENT
TOILETING_ISSUES: YES
WEAR_GLASSES_OR_BLIND: YES
TOILETING: 1-->ASSISTANCE (EQUIPMENT/PERSON) NEEDED (NOT DEVELOPMENTALLY APPROPRIATE)
TOILETING_ASSISTANCE: TOILETING DIFFICULTY, ASSISTANCE 1 PERSON
ADLS_ACUITY_SCORE: 37
DRESSING/BATHING: BATHING DIFFICULTY, ASSISTANCE 1 PERSON
ADLS_ACUITY_SCORE: 44
WALKING_OR_CLIMBING_STAIRS_DIFFICULTY: YES
CHANGE_IN_FUNCTIONAL_STATUS_SINCE_ONSET_OF_CURRENT_ILLNESS/INJURY: YES
ADLS_ACUITY_SCORE: 44
DRESS: 2-->COMPLETELY DEPENDENT
ADLS_ACUITY_SCORE: 37
VISION_MANAGEMENT: GLASSES
ADLS_ACUITY_SCORE: 37
FALL_HISTORY_WITHIN_LAST_SIX_MONTHS: NO
ADLS_ACUITY_SCORE: 37
DRESSING/BATHING_DIFFICULTY: YES
TRANSFERRING: 1-->ASSISTANCE (EQUIPMENT/PERSON) NEEDED (NOT DEVELOPMENTALLY APPROPRIATE)
BATHING: 2-->COMPLETELY DEPENDENT (NOT DEVELOPMENTALLY APPROPRIATE)
ADLS_ACUITY_SCORE: 37
ADLS_ACUITY_SCORE: 37
ADLS_ACUITY_SCORE: 44

## 2022-09-29 NOTE — PROGRESS NOTES
Infectious Diseases Progress Note  [facility]    Date of visit: 09/29/2022       ASSESSMENT   52-year-old man with a history of diabetes, hypertension, coronary artery disease who is admitted with fevers.    1. Fever.  Status post right BKA almost 2 months ago.  Recently hospitalized for MRSA bacteremia, currently on vancomycin.  No fevers since admission.  Complains of abdominal pain.  CT scan of the abdomen showing obstipation, no other signs of infection.  Stump evaluated by vascular surgery, who do not feel that this is infected.  2. Neutropenia.  New onset.  Possibly secondary to vancomycin.  Now with fevers. Vancomycin stopped. Wbc improved today  3. MRSA bacteremia.  Followed by Dr. Mei.  Was to complete 4 weeks of IV vancomycin on 9/23, but this was extended after clinic visit and concern for nonhealing wound.  4. Right BKA.  Nonhealing wound.  CT scan on admission showing fluid collection going down to stump.  Seen by vascular surgery in the ER, who do not feel that the stump is infected. Case reviewed with Dr. Montes today    Active Problems:    Neutropenic fever (H)    Cellulitis of right lower extremity       PLAN   -Discontinue daptomycin - patient has completed course for mrsa bacteremia  -Continue empiric gram-negative coverage with Zosyn for neutropenia  -Follow-up on blood cultures  -if no further fevers and no positive cultures, may stop antibiotics tomorrow  -patient will need f/up with Dr. Mei in ID clinic    Denton Acevedo MD  [facility]  Direct messaging: [employer] Paging  On-Call ID provider: [phone], option: 9      ===========================================      SUBJECTIVE / INTERVAL HISTORY:     No events. Feels better. Wound vac changed today. PICC removed yesterday. Denies itching. No fevers overnight    Spoke with patient's mother who was on the phone during my visit.    Review of Systems     No fevers, no diarrhea    Antibiotics   Daptomycin  "9/28-  Zosyn 9/28-    Previous:  Vancomycin until 9/28      Physical Exam     Temp:  [97.6  F (36.4  C)-99.3  F (37.4  C)] 97.6  F (36.4  C)  Pulse:  [] 90  Resp:  [18-20] 20  BP: ()/(56-69) 115/57  SpO2:  [90 %-98 %] 90 %    /57 (BP Location: Left arm)   Pulse 90   Temp 97.6  F (36.4  C) (Oral)   Resp 20   Ht 1.6 m (5' 3\")   Wt 68 kg (149 lb 14.6 oz)   SpO2 90%   BMI 26.56 kg/m      GENERAL:  well-developed, well-nourished, lying in bed in no acute distress.   HENT:  Head is normocephalic, atraumatic.   EYES:  Eyes have anicteric sclerae without conjunctival injection   LUNGS:  Clear to auscultation.  CARDIOVASCULAR:  Regular rate and rhythm with no murmurs, gallops or rubs.  ABDOMEN:  Normal bowel sounds, soft, nontender.   EXT: Extremities warm and without edema. S/p right BKA. Wound vac in place  SKIN:  Small area of erythema in left antecubital fossa, no other rashes   NEUROLOGIC:  Grossly nonfocal.      Cultures   9/27 blood culture: No growth to date  9/28 blood culture: no growth to date       Pertinent Labs:     Recent Labs   Lab 09/29/22 0346 09/27/22 2156 09/26/22  0540   WBC 5.5 0.9* 1.6*   HGB 10.1* 9.2* 10.2*    151 187       Recent Labs   Lab 09/29/22  0346 09/27/22  2156 09/26/22  0540   * 133* 135*   CO2 23 21* 26   BUN 20.2* 13.7 15.2   ALBUMIN  --  3.4* 3.8   ALKPHOS  --  100 95   ALT  --  19 14   AST  --  20 26       Recent Labs   Lab 09/26/22  0540   CRP 46.80*           Imaging:     XR Chest 1 View    Result Date: 9/28/2022  EXAM: XR CHEST 1 VIEW LOCATION: [facility] DATE/TIME: 9/28/2022 1:00 AM INDICATION: Fever. COMPARISON: 05/25/2022.     IMPRESSION: Since previous chest radiograph, there is a new right PICC which is in good position within the distal SVC. There has been slight enlargement of the cardiac silhouette on today's radiograph which is likely secondary to AP technique rather than PA technique. Otherwise the exam is " stable with again seen postoperative changes to include a sternotomy and coronary artery stent. No lung infiltrates are seen to explain patient's fever clinically.    CT Knee Right w Contrast    Result Date: 2022  EXAM: CT KNEE RIGHT W CONTRAST LOCATION: [facility] DATE/TIME: 2022 2:19 AM INDICATION: Fever. evaluate BKA stump for abscess, osteomyelitis, other signs of infection COMPARISON: 2022 and 2022 TECHNIQUE: IV contrast. Axial, sagittal and coronal thin-section reconstruction. Dose reduction techniques were used. CONTRAST: ISOVUE 370 100ML FINDINGS: BONES: -Status post BKA. Fracture distal tip of the residual fibula unchanged. Mild callus formation. Osseous demineralization. Mild mineralization along the tibial stump. Slight cortical ill-definition. Skin defect with presumed wound VAC medial to the tibial stump. Small tract of fluid extends from the wound to the tibial stump. Chondrocalcinosis. Degenerative change. SOFT TISSUES: -Soft tissue edema. Improvement in previously seen fluid collections. As described there is a tiny tract of fluid extending from medial skin defect to the distal aspect of the tibial stump coronal image 38. Vascular calcification. Trace joint effusion.     IMPRESSION: 1.  Status post BKA. Trace amount of fluid extends from medial wound to the undersurface of the tibial stump. Slight cortical ill-definition of the tibial stump. Consider MRI to exclude osteomyelitis. 2.  Soft tissue edema. Improvement in previously seen soft tissue fluid collections. No drainable abscess. 3.  Fracture tip of the residual fibula similar.     Echocardiogram Complete    Result Date: 2022  [id number]^[address] [address]  Name: CRISTOPHER CASTRO MRN: [id number] : 1970 Study Date: 2022 08:28 AM Age: 52 yrs Gender: Male Patient Location: [facility] Reason For Study: CAD  Ordering Physician: JAKE DELEON Performed By: NICHOLAS  BSA: 1.7 m2 Height: 63 in Weight: 149 lb HR: 95 BP: 109/58 mmHg ______________________________________________________________________________ Procedure Complete Portable Echo Adult. Definity (NDC #81623-418) given intravenously. 2 ml lot # [id number]. ______________________________________________________________________________ Interpretation Summary  The left ventricle is mildly dilated. Left ventricular function is decreased. The ejection fraction is 40-45% (mildly reduced). Right ventrical function, chamber size, wall motion, and thickness are normal. The left atrium is mildly dilated. IVC diameter >2.1 cm collapsing <50% with sniff suggests a high RA pressure estimated at 15 mmHg or greater. The right ventricular systolic pressure is approximated at 48mmHg plus the right atrial pressure. ______________________________________________________________________________ Left Ventricle The left ventricle is mildly dilated. Left ventricular function is decreased. The ejection fraction is 40-45% (mildly reduced). There is normal left ventricular wall thickness. Left ventricular diastolic function is abnormal. There is mild to moderate apical wall hypokinesis. There is mild to moderate inferolateral wall hypokinesis.  Right Ventricle Right ventrical function, chamber size, wall motion, and thickness are normal.  Atria The left atrium is mildly dilated. Right atrial size is normal. There is no color Doppler evidence of an atrial shunt.  Mitral Valve Mitral valve leaflets appear normal. There is mild (1+) mitral regurgitation.  Tricuspid Valve Tricuspid valve leaflets appear normal. There is mild (1+) tricuspid regurgitation. The right ventricular systolic pressure is approximated at 48mmHg plus the right atrial pressure.  Aortic Valve Aortic valve leaflets appear normal. There is no evidence of aortic stenosis or clinically significant aortic regurgitation.  Pulmonic  Valve The pulmonic valve is not well seen, but is grossly normal.  Vessels Normal size ascending aorta. IVC diameter >2.1 cm collapsing <50% with sniff suggests a high RA pressure estimated at 15 mmHg or greater.  Pericardium There is no pericardial effusion.  ______________________________________________________________________________ MMode/2D Measurements & Calculations IVSd: 0.79 cm LVIDd: 5.4 cm LVIDs: 4.4 cm LVPWd: 1.2 cm FS: 17.4 %  LV mass(C)d: 204.0 grams LV mass(C)dI: 119.5 grams/m2 Ao root diam: 3.1 cm LA dimension: 4.4 cm asc Aorta Diam: 3.2 cm LA/Ao: 1.4 LVOT diam: 2.4 cm LVOT area: 4.5 cm2 LA Volume (BP): 72.5 ml LA Volume Index (BP): 42.4 ml/m2  LA Volume Indexed (AL/bp): 45.0 ml/m2 RWT: 0.45  Doppler Measurements & Calculations MV E max zan: 99.4 cm/sec MV A max zan: 79.5 cm/sec MV E/A: 1.2 MV dec slope: 724.1 cm/sec2 MV dec time: 0.14 sec Ao V2 max: 110.3 cm/sec Ao max P.0 mmHg Ao V2 mean: 78.0 cm/sec Ao mean P.6 mmHg Ao V2 VTI: 17.8 cm DEVI(I,D): 4.1 cm2 DEVI(V,D): 3.7 cm2 LV V1 max PG: 3.3 mmHg LV V1 max: 90.7 cm/sec LV V1 VTI: 16.1 cm SV(LVOT): 72.9 ml SI(LVOT): 42.7 ml/m2 PA V2 max: 90.3 cm/sec PA max PG: 3.3 mmHg PA acc time: 0.09 sec TR max zan: 289.6 cm/sec TR max P.5 mmHg AV Zan Ratio (DI): 0.82 DEVI Index (cm2/m2): 2.4  E/E' avg: 15.3 Lateral E/e': 10.3 Medial E/e': 20.3  ______________________________________________________________________________ Report approved by: Adryan Rodriguez 2022 11:00 AM       XR Chest Port 1 View    Result Date: 2022  EXAM: XR CHEST PORT 1 VIEW LOCATION: [facility] DATE/TIME: 2022 3:53 AM INDICATION: New oxygen needs COMPARISON: 2022     IMPRESSION: Cardiac enlargement. Median sternotomy. Interval removal right PICC line. Pulmonary vascular congestion with diffuse interstitial infiltrates.    XR Knee Right 1/2 Views    Result Date: 2022  EXAM: XR KNEE RIGHT 1/2 VIEWS LOCATION: [facility]  [facility] DATE/TIME: 9/28/2022 1:00 AM INDICATION: Include right BKA amputation stump, fever, possible postop infection COMPARISON: CT right knee exam 8/23/2022     IMPRESSION: Prior below the knee amputation with several fracture fragments at the distal tip of the residual fibula, unchanged. Otherwise no evidence for complications. No radiographic findings for osteomyelitis. Adjacent drain or catheter along the medial soft tissues, otherwise unremarkable.     Abd/pelvis CT no contrast - Stone Protocol    Result Date: 9/28/2022  EXAM: CT ABDOMEN PELVIS W/O CONTRAST LOCATION: [facility] DATE/TIME: 9/28/2022 12:49 AM INDICATION: Diffuse abdominal pain. COMPARISON: None. TECHNIQUE: CT scan of the abdomen and pelvis was performed without IV contrast. Multiplanar reformats were obtained. Dose reduction techniques were used. CONTRAST: None. FINDINGS: LOWER CHEST: Advanced coronary artery calcifications. Postoperative changes sternotomy. HEPATOBILIARY: Normal. PANCREAS: Normal. SPLEEN: Normal-variant accessory splenule. ADRENAL GLANDS: Normal. KIDNEYS/BLADDER: Approximately 2 mm x 1 mm nonobstructive stone lower pole of the right kidney. The kidneys, ureters, and bladder are otherwise normal. BOWEL: Mild obstipation. LYMPH NODES: Normal. VASCULATURE: Mild-to-moderate scattered vascular calcifications seen with no aneurysm. PELVIC ORGANS: Normal. MUSCULOSKELETAL: Moderate degenerative changes lower lumbar spine. No acute findings.     IMPRESSION: 1.  Mild obstipation. 2.  2 x 1 mm nonobstructive stone lower pole of the right kidney. 3.  Advanced coronary artery calcification with postoperative changes of a sternotomy.    CT Chest Pulmonary Embolism w Contrast    Result Date: 9/29/2022  EXAM: CT CHEST PULMONARY EMBOLISM W CONTRAST LOCATION: [facility] DATE/TIME: 9/29/2022 4:56 AM INDICATION: Hypoxia, increasing oxygen needs. Elevated D dimer. New infiltrates on Chest  Xray. COMPARISON: 09/29/2022 TECHNIQUE: CT chest pulmonary angiogram during arterial phase injection of IV contrast. Multiplanar reformats and MIP reconstructions were performed. Dose reduction techniques were used. CONTRAST: ISOVUE 370 100ML FINDINGS: ANGIOGRAM CHEST: Pulmonary arteries are normal caliber and negative for pulmonary emboli. Thoracic aorta is not well opacified and is  indeterminate for dissection. No CT evidence of right heart strain. LUNGS AND PLEURA: Interstitial septal thickening. Right upper lobe and bibasilar infiltrates. Small pleural effusions. MEDIASTINUM/AXILLAE: No adenopathy or significant pericardial effusion. CORONARY ARTERY CALCIFICATION: Previous intervention (stents or CABG). UPPER ABDOMEN: Presumed vicarious excretion of contrast in the gallbladder. Underlying sludge or cholelithiasis not excluded. MUSCULOSKELETAL: Median sternotomy. Degenerative change osseous structures.     IMPRESSION: 1.  Interstitial and airspace infiltrates. Small pleural effusions. Findings could be due to CHF. Superimposed pneumonia not excluded. 2.  No pulmonary embolism.        Data reviewed today: I reviewed all medications, new labs and imaging results over the last 24 hours. I personally reviewed no images or EKG's today.  The patient's care was discussed with the Patient, Patient's Family and [facility] RN and Dr. Montes.

## 2022-09-29 NOTE — CONSULTS
HEART CARE CONSULTATON NOTE        Assessment/Recommendations   Assessment:  1.  Fevers and rigors: Currently undergoing infectious work-up recent MRSA bacteremia, new onset neutropenia  2.  Status post right BKA  3.  History of CVA  4.  Cognitive delay: Mother is POA  5.  History of atrial fibrillation: Paroxysmal currently in sinus tachycardia  6.  Coronary artery disease with history of CABG and PCI: Bypass in his early 40s.  Last angiogram done in  showed occluded SVG to RCA and occluded SVG to diagonal with patent LIMA to LAD and severe multivessel disease medical management recommended  7.  Troponin elevation: Likely type II demand ischemia.  Complaining of discomfort in the chest with tenderness and worse with a deep breath.  Will obtain echocardiogram for further evaluation.  No significant changes on EKG  Plan:  1.  Appreciate infectious disease recommendations  2.  Echocardiogram and will compare to prior  3.  Continue current cardiac medications    Primary cardiologist Dr. Cisneros     History of Present Illness/Subjective    HPI: Cristopher Castro is a 52 year old male with history of severe coronary artery disease status post CABG at age 39 with known occluded graft to RCA and diagonal patent LIMA to LAD on angiogram  medical management recommended, mild to moderate ischemic cardiomyopathy, CVA, cognitive delay, history of right BKA with MRSA infection recently now admitted with fevers and rigors.  Source of infection still not clear.  Infectious disease following.  Neutropenic which is new for him.  He reports that he is constant chest discomfort worse with a deep breath.  No increased edema.  With his rigors and fever he felt short of breath.           Myocardial Perfusion Report                 Rest/Stress 1 Day Single Isotope Gated SPECT imaging with Regadenoson(Lexiscan)   stress        CRISTOPHER CASTRO    Excellian ID: [id number] Age: 50 : 1970 Nuclear Tech: KMV    Exam Date:  "07/20/2021 09:09 Gender: M RN/Ex. Physiologist: ARM/ESCOBAR    Accession #: [id number] Height: 66.9 in BSA: 1.81 m  Monitoring Provider: HENRY DENT    Weight: 154 lbs BMI: 24.2 kg/m  Ordering Provider: OLESYA KNOX    Location: Inpatient    Indication:      FINAL CONCLUSIONS    1-Abnormal pharmacologic myocardial perfusion imaging study.    2-Stress rest perfusion images show a small area of moderately reduced uptake in the mid to   basal anterior septum which appears to be    fixed.  This is likely indicative of an area of prior infarction in this region.    3-There is another small area of mildly reduced uptake in the apical inferior wall which   appears to be fixed on rest and stress images.    This is also indicative of an area of present infarction in this region.    4-Increased left ventricular size with reduced systolic function and a calculated LVEF of 36%.    5-Mild global hypokinesis with severely hypokinetic septum.    6-Stress ECG is negative for myocardial ischemia.        Physical Examination  Review of Systems   VITALS: /69   Pulse 92   Temp 98.5  F (36.9  C) (Oral)   Resp 20   Ht 1.6 m (5' 3\")   Wt 68 kg (149 lb 14.6 oz)   SpO2 94%   BMI 26.56 kg/m    BMI: Body mass index is 26.56 kg/m .  Wt Readings from Last 3 Encounters:   09/27/22 68 kg (149 lb 14.6 oz)   09/27/22 68 kg (150 lb)   09/19/22 68.5 kg (151 lb)     No intake or output data in the 24 hours ending 09/29/22 1211  General Appearance:   no distress, normal body habitus   ENT/Mouth: membranes moist, no oral lesions or bleeding gums.      EYES:  no scleral icterus, normal conjunctivae   Neck: no carotid bruits or thyromegaly   Chest/Lungs:   lungs are clear to auscultation   Cardiovascular:   Regular. Normal first and second heart sounds with no murmurs  no edema bilaterally, status post R BKA    Abdomen:  no organomegaly, masses, bruits, or tenderness; bowel sounds are present   Extremities: no cyanosis or clubbing "   Skin: no xanthelasma, warm.    Neurologic: normal  bilateral, no tremors     Psychiatric: alert and oriented x3, calm     Review Of Systems  Skin: negative  Eyes: negative  Ears/Nose/Throat: negative  Respiratory: No shortness of breath, dyspnea on exertion, cough, or hemoptysis  Cardiovascular: negative  Gastrointestinal: negative  Genitourinary: negative  Musculoskeletal: negative  Neurologic: negative  Psychiatric: negative  Hematologic/Lymphatic/Immunologic: negative  Endocrine: negative          Lab Results    Chemistry/lipid CBC Cardiac Enzymes/BNP/TSH/INR   Recent Labs   Lab Test 08/26/22  0815   CHOL 82   HDL 22*   LDL 35   TRIG 126     Recent Labs   Lab Test 08/26/22  0815 06/23/16  0500 04/06/16  0510   LDL 35 61 48     Recent Labs   Lab Test 09/29/22  0830 09/29/22  0346   NA  --  135*   POTASSIUM  --  4.1   CHLORIDE  --  99   CO2  --  23   * 188*   BUN  --  20.2*   CR  --  1.27*   GFRESTIMATED  --  67   TO  --  8.5*     Recent Labs   Lab Test 09/29/22  0346 09/27/22  2156 09/26/22  0540   CR 1.27* 0.75 0.74     Recent Labs   Lab Test 08/26/22  0815 08/23/22  1300 05/16/22  0520   A1C 5.9* 6.3* 5.8*          Recent Labs   Lab Test 09/29/22  0346   WBC 5.5   HGB 10.1*   HCT 32.0*   MCV 84        Recent Labs   Lab Test 09/29/22  0346 09/27/22  2156 09/26/22  0540   HGB 10.1* 9.2* 10.2*    No results for input(s): TROPONINI in the last 05414 hours.  No results for input(s): BNP, NTBNPI, NTBNP in the last 59122 hours.  No results for input(s): TSH in the last 67751 hours.  Recent Labs   Lab Test 08/29/22  0613 08/27/22  0739 08/25/22  0605   INR 1.13 1.12 1.22*        Medical History  Surgical History Family History Social History   Past Medical History:   Diagnosis Date     Anemia      Anemia, unspecified type 08/19/2016     Anxiety      Anxiety state 07/29/2021    Formatting of this note might be different from the original. Created by Conversion  Replacement Utility updated for latest  IMO load     Arthritis      Atrial fibrillation and flutter (H)      Cerebral artery occlusion with cerebral infarction (H)      Cerebral infarction (H)      Chest pain, unspecified type 08/19/2016     Chronic atrial fibrillation (H)      Congestive heart failure (H)      Coronary artery disease     CABG with multiple stents     Coronary artery disease involving native coronary artery without angina pectoris, unspecified whether native or transplanted heart 08/19/2016     Coronary atherosclerosis 07/29/2021    Formatting of this note might be different from the original. Created by Conversion  Replacement Utility updated for latest IMO load     Depression      Diabetic ulcer of right midfoot associated with type 2 diabetes mellitus, limited to breakdown of skin (H) 09/28/2017     Equinovarus acquired deformity, right 04/14/2021     Equinovarus deformity, acquired, right      Essential hypertension 08/19/2016     GERD (gastroesophageal reflux disease)      Heart attack (H)      History of blood transfusion      Hyperlipidemia      Hyperlipidemia, unspecified hyperlipidemia type 08/19/2016     Hypertension      Insomnia, unspecified type 08/19/2016     Ischemic cardiomyopathy      Late effects of cerebrovascular accident 08/19/2016     Major depressive disorder with single episode, remission status unspecified 08/19/2016     Microalbuminuria 07/29/2021    Formatting of this note might be different from the original. 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 Created by Conversion [facility] Annotation: Feb 11 2010  1:41PM - Leonid Cabral: CANNOT READ OR  WRITE     PVD (peripheral vascular disease) (H)      Stented coronary artery      Type 2 diabetes mellitus  (H) 07/29/2021    Formatting of this note might be different from the original. Created by Conversion     Type II or unspecified type diabetes mellitus with ophthalmic manifestations, not stated as uncontrolled(250.50) (H) 07/29/2021    Formatting of this note might be different from the original. Created by Conversion     Weakness 08/19/2016     Past Surgical History:   Procedure Laterality Date     AMPUTATE LEG BELOW KNEE Right 8/5/2022    Procedure: AMPUTATION, BELOW KNEE, right leg;  Surgeon: Kim Montes MD;  Location: [facility] OR     INCISION AND DRAINAGE LOWER EXTREMITY, COMBINED Right 12/17/2021    Procedure: INCISION AND DRAINAGE, right foot;  Surgeon: Alex Kim DPM;  Location: [facility] OR     INCISION AND DRAINAGE LOWER EXTREMITY, COMBINED Right 1/27/2022    Procedure: INCISION AND DRAINAGE, right foot with application of theraskin;  Surgeon: Alex Kim DPM;  Location: [facility] OR     INCISION AND DRAINAGE LOWER EXTREMITY, COMBINED Right 4/4/2022    Procedure: INCISION AND DRAINAGE, right foot with application of theraskin;  Surgeon: Alex Kim DPM;  Location: [facility] [place] OR     IR LOWER EXTREMITY ANGIOGRAM RIGHT  6/29/2022     IR MISCELLANEOUS PROCEDURE  2/13/2004     IRRIGATION AND DEBRIDEMENT LOWER EXTREMITY, COMBINED Right 8/24/2022    Procedure: RIGHT IRRIGATION AND DEBRIDEMENT, LOWER EXTREMITY, REVISION OF BELOW KNEE AMPUTATION;  Surgeon: Kim Montes MD;  Location: [facility] OR     IRRIGATION AND DEBRIDEMENT LOWER EXTREMITY, COMBINED Right 8/26/2022    Procedure: WASHOUT AND PARTIAL CLOSURE OF RIGHT BELOW THE KNEE AMPUTATION,  AND WOUND VAC APPLICATION;  Surgeon: Kim Montes MD;  Location: [facility] OR     PICC DOUBLE LUMEN PLACEMENT  8/29/2022          ZZC CABG, VEIN, SINGLE      Description: CABG (CABG);  Recorded: 04/22/2009;     Family History   Adopted: Yes        Social History     Socioeconomic History     Marital status:       Spouse name: Not on file     Number of children: Not on file     Years of education: Not on file     Highest education level: Not on file   Occupational History     Not on file   Tobacco Use     Smoking status: Former Smoker     Quit date: 2015     Years since quittin.6     Smokeless tobacco: Never Used   Vaping Use     Vaping Use: Never used   Substance and Sexual Activity     Alcohol use: Not Currently     Drug use: Not Currently     Sexual activity: Not on file   Other Topics Concern     Parent/sibling w/ CABG, MI or angioplasty before 65F 55M? Not Asked   Social History Narrative     Not on file     Social Determinants of Health     Financial Resource Strain: Not on file   Food Insecurity: Not on file   Transportation Needs: Not on file   Physical Activity: Not on file   Stress: Not on file   Social Connections: Not on file   Intimate Partner Violence: Not on file   Housing Stability: Not on file         Medications  Allergies   Current Outpatient Medications   Medication Sig Dispense Refill     acetaminophen (TYLENOL) 325 MG tablet Take 650 mg by mouth every 6 hours as needed for fever or pain       acetaminophen (TYLENOL) 325 MG tablet Take 650 mg by mouth 2 times daily       ARIPiprazole (ABILIFY) 2 MG tablet 2 mg daily        ASPERCREME ORIGINAL 10 % external cream Apply topically 2 times daily as needed for other (back pain)       atorvastatin (LIPITOR) 80 MG tablet Take 80 mg by mouth daily        bisacodyl (DULCOLAX) 10 MG suppository Place 10 mg rectally daily as needed for constipation       cholestyramine (QUESTRAN) 4 g packet Take 1 packet by mouth every other day        clopidogrel (PLAVIX) 75 MG tablet Take 1 tablet (75 mg) by mouth daily Start taking medication the day after the procedure. 90 tablet 1     DULoxetine (CYMBALTA) 60 MG capsule Take 120 mg by mouth daily       erenumab-aooe (AIMOVIG, 140 MG DOSE,) 70 MG/ML injection Inject 140 mg Subcutaneous every 30 days        ferrous sulfate (FEROSUL) 325 (65 Fe) MG tablet Take 325 mg by mouth daily (with breakfast)        gabapentin (NEURONTIN) 100 MG capsule Take 100 mg by mouth 2 times daily (before meals)       gabapentin (NEURONTIN) 300 MG capsule Take 600 mg by mouth At Bedtime       glipiZIDE (GLUCOTROL) 5 MG tablet Take 2.5 mg by mouth daily before breakfast       HYDROcodone-acetaminophen (NORCO) 5-325 MG tablet Take 1-2 tablets by mouth every 6 hours as needed for moderate to severe pain (Patient taking differently: Take 1 tablet by mouth every 6 hours as needed for moderate to severe pain) 12 tablet 0     loperamide (IMODIUM) 2 MG capsule Take 2 mg by mouth 4 times daily as needed for diarrhea       losartan (COZAAR) 25 MG tablet Take 25 mg by mouth At Bedtime        magnesium chloride 535 (64 Mg) MG TBEC CR tablet Take 1,070 mg by mouth 2 times daily       metFORMIN (GLUCOPHAGE) 500 MG tablet Take 500 mg by mouth daily (with dinner)       metFORMIN (GLUCOPHAGE) 500 MG tablet Take 1,000 mg by mouth daily (with breakfast)       metoprolol succinate ER (TOPROL-XL) 25 MG 24 hr tablet Take 75 mg by mouth daily        mineral oil-hydrophilic petrolatum (AQUAPHOR) external ointment Apply topically 3 times daily as needed for irritation or dry skin Apply to forehead and other areas as needed       mirtazapine (REMERON) 7.5 MG tablet Take 7.5 mg by mouth At Bedtime        Nitroglycerin (NITROSTAT SL) Place 0.4 mg under the tongue every 5 minutes as needed for chest pain        pantoprazole (PROTONIX) 40 MG EC tablet Take 40 mg by mouth daily before breakfast       polyethylene glycol (MIRALAX) 17 GM/Dose powder Take 1 packet by mouth daily as needed for constipation        senna-docusate (SENOKOT-S/PERICOLACE) 8.6-50 MG tablet Take 1 tablet by mouth 2 times daily       senna-docusate (SENOKOT-S/PERICOLACE) 8.6-50 MG tablet Take 1 tablet by mouth 2 times daily as needed for constipation In addition to scheduled doses       sodium  chloride, PF, 0.9% PF flush 10 mLs by Intracatheter route 3 times daily       traZODone (DESYREL) 50 MG tablet Take 50 mg by mouth At Bedtime        VANCOMYCIN HCL IV Inject 1,000 mg into the vein every 12 hours       vitamin C (ASCORBIC ACID) 500 MG tablet Take 500 mg by mouth daily        XARELTO ANTICOAGULANT 20 MG TABS tablet Take 20 mg by mouth daily (with breakfast)          Allergies   Allergen Reactions     Aspirin Other (See Comments)     Reacts with migraine medicine     Blood-Group Specific Substance      Anti-Fya present.  Expect delays in blood for transfusion.  Draw 2 lavender and 1 red for all type and screen orders.     Isosorbide Headache     Codeine Rash     Morphine Rash         Cherry Rea MD

## 2022-09-29 NOTE — CONSULTS
"[facility]  WOC Nurse Inpatient Assessment     Follow up for: RIGHT BKA    Patient History (according to provider note(s):      Per Vascular provider on 9/28/22 ( vascular notes available after ED WOC consult) :     \" IMPRESSION: 51 YO male who presented to the ED last evening with fever and nausea. Was noted to have new redness at BKA wound site, concern for infection. Has been started on broad spectrum antibiotics. CT done shows soft tissue edema with no drainable abscess.  Wound bed looks mostly healthy today with only minimal surrounding redness and no other signs of infection.  RECOMMENDATION: Do not suspect that BKA wound is source of infection at this time. Would recommend continued wound care and further workup for other infectious etiologies. Wound care consulted to re-apply VAC dressing.     HPI:  Cm Lundberg is a 52 year old male who was seen today in consultation for hypertension, hyperlipidemia, coronary artery diease s/p CABG, atrial fibrillation on xarelto, neuropathy, history of CVA, and charcot ankle right foot. He underwent below knee amputation which unfortunately became infected with CT showing an evolving abscess. He then underwent irrigation and debridement of the stump followed by repeat washout and partial closure with wound VAC application.      Yesterday, Mr. Lundberg was sent to the ED from his facility due to a fever of 101 degrees and nausea. Some redness to the BKA was noted with concern for infection. Today, Mr. Lundberg notes some pain to his BKA but is mostly bothered by abdominal pain/nausea. He says his breakfast is not agreeing with him. Denies any other concerns today. \"       Areas Assessed:      Areas visualized during today's visit: Focused: right leg     Negative pressure wound therapy applied to:    RIGHT LEG          9/29/22 9/28/22        Wound due to: Surgical Wound  Wound history/plan of care: pt was hospitalized in August "     Wound history/plan of care:      Surgical date: 8/23 - 8/26    Date Negative Pressure Wound Therapy initiated: reinitiated 9/29/22    Intervention in place - elevation      Wound base: red granular with yellow slough at the entrance to the wound     Palpation of the wound bed: normal        Palpation of the wound bed: normal      Drainage: small     Description of drainage: serosanguinous     Measurements (length x width x depth, in cm):     On 9/28 -  1.4 long  x 4 wide x 2 cm deep ( 6 cm wide if measured all open tissue including superficial area)     Drainage: small     Description of drainage: serosanguinous     Measurements (length x width x depth, in cm):     On 9/28 -  1.4 long  x 4 wide x 2 cm deep ( 6 cm wide if measured all open tissue including superficial area)    Tunneling: wound as a tunnel / deep wound     Undermining: tunnel/underminig present  Giovana wound tissue - not macerated       Color: pink pale      Temperature: normal   Odor: none  Pain: tender  Treatment goal: Heal      Is patient s nutritional status compromised? no   a. If yes, what interventions are in place? N/A    Reason for initiating vac therapy? Presence of co-morbidities, High risk of infections, Need for accelerated granulation tissue and Prior history of delayed wound healing    Which?of?the?following?co-morbidities?apply? Diabetes and PAD  a. If diabetic is patient on a diabetic management program? Yes     Is osteomyelitis present in wound? See chart  a.  If yes what treatments are in place? IV antibiotic per I        Pain: tender during manipulations  Pain intervention prior to dressing change: med during wound vac change  Treatment goal: Heal   STATUS: evolving   Supplies ordered:  Ordered vac supplies     Number of foam pieces removed from a wound (excluding foam for bridge) : n/a  Verified this matched the number of foam pieces applied last dressing change: N/A   Number of foam pieces packed into wound (excluding foam for  "bridge) : one black      Negative pressure wound therapy plan:  Change Days: Monday by [facility] RN    Supplies (including all accessories) used: small black, skin protector, [employer] ring  Irrigated and Cleansed with Vashe prior to replacing VAC    Suction setting: -125   Methods used: Window paned all periwound skin with vac drape prior to applying sponge and Cut foam in half to reduce profile       Staff RN to assess integrity of dressing and ensure suction is set at appropriate level every shift.   Date canister. Chart canister output every shift. Change cannister weekly and PRN if full/occluded     Remove foam dressing and replace with BID normal saline moist gauze dressing if:   -a dressing failure which cannot be repaired within 2 hours   -patient is discharging to home without a home pump   -patient is discharging to a facility outside the local area   -if a dressing is a \"Silver Foam\", remove before Radiation Therapy or MRI        The hospital VAC pump is not to be discharged with the patient.?Ensure to disconnect patient from machine prior to discharge. Then,    - If a home KCI VAC pump has been delivered, connect home cannister to dressing tubing then connect cannister to home pump and turn on machine    - If transferring to a nearby facility with a KCI vac, can disconnect and clamp tubing then cover end with glove so can be reconnected within 2 hours        STATUS: evolving  Supplies ordered: at bedside       Treatment Plan:     Right leg wound(s):  Change with Vashe once per day before wound vac placement ( after possible sharp debridement of the yellow tissue in the wound)    Orders: Written    RECOMMEND PRIMARY TEAM ORDER:   Education provided: eleavation  Discussed plan of care with: Patient, Nurse and Physician  [facility] nurse follow-up plan: change this coming Monday, W, F  Notify [facility] if wound(s) deteriorate.  Nursing to notify the Provider(s) and re-consult the [facility] Nurse if new skin concern.    DATA: "     Current support surface: ed satinder  BMI: Body mass index is 26.56 kg/m .   Active diet order: Orders Placed This Encounter      Combination Diet Regular Diet Adult; Moderate Consistent Carb (60 g CHO per Meal) Diet     Output: No intake/output data recorded.     Labs:   Recent Labs   Lab 09/29/22  0346 09/27/22  2156 09/26/22  0540   ALBUMIN  --  3.4* 3.8   HGB 10.1* 9.2* 10.2*   WBC 5.5 0.9* 1.6*   CRP  --   --  46.80*     Pressure injury risk assessment:   Sensory Perception: 3-->slightly limited  Moisture: 3-->occasionally moist  Activity: 1-->bedfast  Mobility: 2-->very limited  Nutrition: 3-->adequate  Friction and Shear: 3-->no apparent problem  En Score: 15    Corina Dodd RN, CWON

## 2022-09-29 NOTE — SIGNIFICANT EVENT
"Significant Event Note    Time of event: 3:47 AM September 29, 2022    Description of event:  Notified of tachycardia and increased oxygen needs.    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 fib.  He is admitted for sepsis and neutropenic fever.  Sepsis possibly secondary to BKA infection, however not entirely clear.  Currently on empiric daptomycin and Zosyn.  Infectious disease following.    Patient woke up during the night with hypoxemia.  His nasal cannula was off his face at that time.  Was previously on 2 L nasal cannula, had to be increased to 6 L nasal cannula.  He notes working little harder to breathe.  Also with chills and rigors.  He did note some chest pain in his bilateral chest walls.    EKG with sinus tachycardia, no evidence of ischemia.  Recent echocardiogram on 8/25 with LVEF of 40 to 45%    Labs notable for  -  D-dimer 1.67  -Lactic acid 2.6  -WBC improved to 5.5 (0.9)  -BUN of 20.2, creatinine 1.27    /62   Pulse 103   Temp 98.1  F (36.7  C)   Resp 20   Ht 1.6 m (5' 3\")   Wt 68 kg (149 lb 14.6 oz)   SpO2 96%   BMI 26.56 kg/m        Exam   General -appears uncomfortable.  Shaking in bed.  Alert and oriented to person place and time.  HEENT - Pupils equal and round. Mucus membranes slightly tacky  Lungs/chest-tachypneic.  Comfortable respiratory effort.  Good air movement bilaterally. Mild bibasilar crackles. No wheezing.  Tenderness to the anterior chest wall bilaterally.  Heart - Tachycardic. Regular rhythm  Extremities -warm and well perfused.  Status post right BKA.  Dressing in place.      Assessment plan:  Tachypnea/hypoxemia  Increasing oxygen needs with softer blood pressures, tachycardia and elevated D-dimer.  Concern for possible PE.  Is on Xarelto at home, however has been off for the past couple of days.  Could " also consider pulmonary congestion vs pneumonia as etiology.  Is on broad-spectrum antibiotics, so may need to consider a fungal source.  CT chest will help to better evaluate.  He is feeling better now which is reassuring.    Chest pain  Was reproducible with palpation, resolved with Tylenol.  Could be associate with tachypnea.  With elevated dimer, will rule out PE with CTA chest.  With heart history, will also draw a troponin.    RANDAL  Possibly little hypovolemic with poor oral intake over the past couple of days.  Pulmonary congestion was however noted on chest x-ray.  We will hold off on fluid resuscitation for right now.  We will keep a close eye on kidney function following contrast load.    Lactic acidosis  Slightly elevated at 2.6, up from 2.0 two days ago.  Sepsis evaluation and treatment is ongoing.  Currently on broad-spectrum antibiotics.  Blood cultures without growth to date.    Plan  -Continue daptomycin and zosyn  -CTA chest  -Hold off on fluid resuscitation with pulmonary congestion  -Can consider trial of diuretics.  -Follow-up troponin  -Continue Tylenol as needed        6:07 AM  CTA chest negative for PE.  Evidence of CHF exacerbation.  Will order small dose IV Lasix.  Elevated creatinine possibly the setting of CHF exacerbation vs vancomycin.  He is currently resting comfortably in bed.  - 20mg IV Lasix    Discussed with: Dr Gonzalez      6:56 AM  Troponin came back elevated at >300.  Patient remains asymptomatic, resting comfortably in bed. Have been able to titrate down O2.  Suspect component of demand ischemia in the setting of sepsis.  Touched base with cardiology.  Will get delta troponins and order an echocardiogram.  We will also check a CK.  Cardiology consult placed.    Discussed with Dr Yadira Coleman MD PGY2  [facility]   House Officer

## 2022-09-29 NOTE — PLAN OF CARE
Goal Outcome Evaluation:        Problem: Pain Acute  Goal: Acceptable Pain Control and Functional Ability  Outcome: Ongoing, Progressing  Intervention: Develop Pain Management Plan  Recent Flowsheet Documentation  Taken 9/29/2022 0330 by Peggy Roy RN  Pain Management Interventions: medication (see MAR)  Taken 9/29/2022 0238 by Peggy Roy RN  Pain Management Interventions: (MD paged) medication (see MAR)  Taken 9/28/2022 2009 by Peggy Roy RN  Pain Management Interventions: medication (see MAR)  Intervention: Prevent or Manage Pain  Recent Flowsheet Documentation  Taken 9/29/2022 0000 by Peggy Roy RN  Medication Review/Management: medications reviewed  Taken 9/28/2022 2017 by Peggy Roy RN  Medication Review/Management: medications reviewed       Pt with episode last night of hypoxia, shaking and 9/10 throbbing chest pain. EKG obtained and Dr Coleman contacted to evaluate pt. See MD note. After PRN tylenol and Dilaudid pt denies pain. Pt fell back asleep and returned to NSR, VSS. Now on 4L NC. Trop came back elevated at 396 and DR Coleman as well as Dr Gonzalez notified.     Peggy Roy RN

## 2022-09-29 NOTE — PLAN OF CARE
Problem: Pain Acute  Goal: Acceptable Pain Control and Functional Ability  Outcome: Ongoing, Progressing  Intervention: Develop Pain Management Plan  Recent Flowsheet Documentation  Taken 9/29/2022 1455 by Kathryn Zuleta RN  Pain Management Interventions: medication (see MAR)  Taken 9/29/2022 1215 by Kathryn Zuleta RN  Pain Management Interventions:   repositioned   emotional support  Intervention: Prevent or Manage Pain  Recent Flowsheet Documentation  Taken 9/29/2022 1250 by Kathryn Zuleta RN  Medication Review/Management: medications reviewed   Pain in left BKA stump now 10/10 after wound vac placed.  Repositioned and medication given.      Problem: Infection  Goal: Absence of Infection Signs and Symptoms  Outcome: Ongoing, Progressing  Intervention: Prevent or Manage Infection  Recent Flowsheet Documentation  Taken 9/29/2022 1250 by Kathryn Zuleta RN  Isolation Precautions: contact precautions maintained   VSS.  Maintaining MRSA contact precautions. IV antibiotics infusing currently.

## 2022-09-30 VITALS
RESPIRATION RATE: 18 BRPM | TEMPERATURE: 98.4 F | WEIGHT: 149.91 LBS | HEART RATE: 83 BPM | BODY MASS INDEX: 26.56 KG/M2 | SYSTOLIC BLOOD PRESSURE: 104 MMHG | OXYGEN SATURATION: 97 % | HEIGHT: 63 IN | DIASTOLIC BLOOD PRESSURE: 56 MMHG

## 2022-09-30 LAB
ALBUMIN SERPL BCG-MCNC: 2.8 G/DL (ref 3.5–5.2)
ALP SERPL-CCNC: 72 U/L (ref 40–129)
ALT SERPL W P-5'-P-CCNC: 19 U/L (ref 10–50)
ANION GAP SERPL CALCULATED.3IONS-SCNC: 9 MMOL/L (ref 7–15)
AST SERPL W P-5'-P-CCNC: 30 U/L (ref 10–50)
ATRIAL RATE - MUSE: 110 BPM
BACTERIA SPEC CULT: ABNORMAL
BASOPHILS # BLD AUTO: 0 10E3/UL (ref 0–0.2)
BASOPHILS NFR BLD AUTO: 1 %
BILIRUB SERPL-MCNC: 0.4 MG/DL
BUN SERPL-MCNC: 24.7 MG/DL (ref 6–20)
CALCIUM SERPL-MCNC: 8.6 MG/DL (ref 8.6–10)
CHLORIDE SERPL-SCNC: 103 MMOL/L (ref 98–107)
CREAT SERPL-MCNC: 1.16 MG/DL (ref 0.67–1.17)
CREAT SERPL-MCNC: 1.2 MG/DL (ref 0.67–1.17)
DEPRECATED HCO3 PLAS-SCNC: 24 MMOL/L (ref 22–29)
DIASTOLIC BLOOD PRESSURE - MUSE: NORMAL MMHG
EOSINOPHIL # BLD AUTO: 0.2 10E3/UL (ref 0–0.7)
EOSINOPHIL NFR BLD AUTO: 4 %
ERYTHROCYTE [DISTWIDTH] IN BLOOD BY AUTOMATED COUNT: 15.5 % (ref 10–15)
GFR SERPL CREATININE-BSD FRML MDRD: 72 ML/MIN/1.73M2
GFR SERPL CREATININE-BSD FRML MDRD: 75 ML/MIN/1.73M2
GLUCOSE BLDC GLUCOMTR-MCNC: 104 MG/DL (ref 70–99)
GLUCOSE BLDC GLUCOMTR-MCNC: 139 MG/DL (ref 70–99)
GLUCOSE SERPL-MCNC: 153 MG/DL (ref 70–99)
HCT VFR BLD AUTO: 26.7 % (ref 40–53)
HGB BLD-MCNC: 8.3 G/DL (ref 13.3–17.7)
IMM GRANULOCYTES # BLD: 0 10E3/UL
IMM GRANULOCYTES NFR BLD: 0 %
INTERPRETATION ECG - MUSE: NORMAL
LYMPHOCYTES # BLD AUTO: 0.7 10E3/UL (ref 0.8–5.3)
LYMPHOCYTES NFR BLD AUTO: 17 %
MCH RBC QN AUTO: 26.2 PG (ref 26.5–33)
MCHC RBC AUTO-ENTMCNC: 31.1 G/DL (ref 31.5–36.5)
MCV RBC AUTO: 84 FL (ref 78–100)
MONOCYTES # BLD AUTO: 0.4 10E3/UL (ref 0–1.3)
MONOCYTES NFR BLD AUTO: 10 %
NEUTROPHILS # BLD AUTO: 2.9 10E3/UL (ref 1.6–8.3)
NEUTROPHILS NFR BLD AUTO: 68 %
NRBC # BLD AUTO: 0 10E3/UL
NRBC BLD AUTO-RTO: 0 /100
P AXIS - MUSE: 44 DEGREES
PLATELET # BLD AUTO: 159 10E3/UL (ref 150–450)
POTASSIUM SERPL-SCNC: 3.9 MMOL/L (ref 3.4–5.3)
PR INTERVAL - MUSE: 162 MS
PROT SERPL-MCNC: 5.5 G/DL (ref 6.4–8.3)
QRS DURATION - MUSE: 100 MS
QT - MUSE: 326 MS
QTC - MUSE: 441 MS
R AXIS - MUSE: 105 DEGREES
RBC # BLD AUTO: 3.17 10E6/UL (ref 4.4–5.9)
SODIUM SERPL-SCNC: 136 MMOL/L (ref 136–145)
SYSTOLIC BLOOD PRESSURE - MUSE: NORMAL MMHG
T AXIS - MUSE: 6 DEGREES
VENTRICULAR RATE- MUSE: 110 BPM
WBC # BLD AUTO: 4.2 10E3/UL (ref 4–11)

## 2022-09-30 PROCEDURE — 85025 COMPLETE CBC W/AUTO DIFF WBC: CPT | Performed by: INTERNAL MEDICINE

## 2022-09-30 PROCEDURE — 99239 HOSP IP/OBS DSCHRG MGMT >30: CPT | Performed by: INTERNAL MEDICINE

## 2022-09-30 PROCEDURE — 250N000013 HC RX MED GY IP 250 OP 250 PS 637: Performed by: FAMILY MEDICINE

## 2022-09-30 PROCEDURE — 99232 SBSQ HOSP IP/OBS MODERATE 35: CPT | Performed by: INTERNAL MEDICINE

## 2022-09-30 PROCEDURE — 250N000011 HC RX IP 250 OP 636: Performed by: INTERNAL MEDICINE

## 2022-09-30 PROCEDURE — 80053 COMPREHEN METABOLIC PANEL: CPT | Performed by: INTERNAL MEDICINE

## 2022-09-30 PROCEDURE — F08G5YZ WOUND MANAGEMENT TREATMENT OF INTEGUMENTARY SYSTEM - LOWER BACK / LOWER EXTREMITY USING OTHER EQUIPMENT: ICD-10-PCS | Performed by: INTERNAL MEDICINE

## 2022-09-30 PROCEDURE — 99233 SBSQ HOSP IP/OBS HIGH 50: CPT | Performed by: INTERNAL MEDICINE

## 2022-09-30 PROCEDURE — 36415 COLL VENOUS BLD VENIPUNCTURE: CPT | Performed by: INTERNAL MEDICINE

## 2022-09-30 PROCEDURE — 90682 RIV4 VACC RECOMBINANT DNA IM: CPT | Performed by: INTERNAL MEDICINE

## 2022-09-30 PROCEDURE — 250N000013 HC RX MED GY IP 250 OP 250 PS 637: Performed by: INTERNAL MEDICINE

## 2022-09-30 PROCEDURE — G0008 ADMIN INFLUENZA VIRUS VAC: HCPCS | Performed by: INTERNAL MEDICINE

## 2022-09-30 RX ADMIN — PIPERACILLIN AND TAZOBACTAM 3.38 G: 3; .375 INJECTION, POWDER, LYOPHILIZED, FOR SOLUTION INTRAVENOUS at 04:18

## 2022-09-30 RX ADMIN — CLOPIDOGREL BISULFATE 75 MG: 75 TABLET ORAL at 08:06

## 2022-09-30 RX ADMIN — MAGNESIUM 64 MG (MAGNESIUM CHLORIDE) TABLET,DELAYED RELEASE 1070 MG: at 08:05

## 2022-09-30 RX ADMIN — DULOXETINE HYDROCHLORIDE 120 MG: 60 CAPSULE, DELAYED RELEASE PELLETS ORAL at 08:05

## 2022-09-30 RX ADMIN — Medication 500 MG: at 08:05

## 2022-09-30 RX ADMIN — FERROUS SULFATE TAB 325 MG (65 MG ELEMENTAL FE) 325 MG: 325 (65 FE) TAB at 08:06

## 2022-09-30 RX ADMIN — ARIPIPRAZOLE 2 MG: 2 TABLET ORAL at 08:06

## 2022-09-30 RX ADMIN — GABAPENTIN 100 MG: 100 CAPSULE ORAL at 08:06

## 2022-09-30 RX ADMIN — CHOLESTYRAMINE 4 G: 4 POWDER, FOR SUSPENSION ORAL at 12:48

## 2022-09-30 RX ADMIN — INFLUENZA A VIRUS A/WISCONSIN/588/2019 (H1N1) RECOMBINANT HEMAGGLUTININ ANTIGEN, INFLUENZA A VIRUS A/DARWIN/6/2021 (H3N2) RECOMBINANT HEMAGGLUTININ ANTIGEN, INFLUENZA B VIRUS B/AUSTRIA/1359417/2021 RECOMBINANT HEMAGGLUTININ ANTIGEN, AND INFLUENZA B VIRUS B/PHUKET/3073/2013 RECOMBINANT HEMAGGLUTININ ANTIGEN 0.5 ML: 45; 45; 45; 45 INJECTION INTRAMUSCULAR at 09:11

## 2022-09-30 RX ADMIN — HYDROCODONE BITARTRATE AND ACETAMINOPHEN 1 TABLET: 5; 325 TABLET ORAL at 04:16

## 2022-09-30 RX ADMIN — ACETAMINOPHEN 650 MG: 325 TABLET, FILM COATED ORAL at 08:05

## 2022-09-30 RX ADMIN — GLIPIZIDE 2.5 MG: 5 TABLET ORAL at 08:05

## 2022-09-30 RX ADMIN — PANTOPRAZOLE SODIUM 40 MG: 40 TABLET, DELAYED RELEASE ORAL at 08:06

## 2022-09-30 RX ADMIN — RIVAROXABAN 20 MG: 10 TABLET, FILM COATED ORAL at 08:06

## 2022-09-30 ASSESSMENT — ACTIVITIES OF DAILY LIVING (ADL)
ADLS_ACUITY_SCORE: 50
ADLS_ACUITY_SCORE: 44
ADLS_ACUITY_SCORE: 50
ADLS_ACUITY_SCORE: 50

## 2022-09-30 NOTE — PLAN OF CARE
Problem: Plan of Care - These are the overarching goals to be used throughout the patient stay.    Goal: Plan of Care Review/Shift Note  Outcome: Ongoing, Progressing  Problem: Diabetes Comorbidity  Goal: Blood Glucose Level Within Targeted Range  Outcome: Ongoing, Progressing     Problem: Infection  Goal: Absence of Infection Signs and Symptoms  Outcome: Ongoing, Progressing   Goal Outcome Evaluation:      [id number].... Pt is a/o x4, denied pain, on IV ABX , right stump Wound VAC is patent, dressing is intact. BG= 183 and 164 mg/dL...Temp: 99  F (37.2  C)  BP: 110/64 Pulse: 89   Resp: 18 SpO2: 92 % O2 Device: None (Room air). Will continue to monitor.

## 2022-09-30 NOTE — PROGRESS NOTES
Matias meets discharge criteria back to [facility].  Dukey call to [facility]GS provided and Spoke with Facility Nurse, Shannan whom knows Mr. Lundberg well.  Writer reviewed hospital stay, and plan for discharge.  Patient will not be discharging back on IV Vancomycin.      Wound Vac orders reviewed.  Writer disconnected from Hospital wound vac device prior to discharge.  A vac is present at Facility for use.  Writer closed clamp and disconnected the tubing for transfer; placing tubing inside a glove prior to discharge today.    PIV, and external male catheter removed prior to leaving.  Patient declined phone call to Emergency contact today to alert of discharge.    Matias left via w/c transfer with [facility] at 5:05PM    Herlinda Barnett RN

## 2022-09-30 NOTE — PROGRESS NOTES
"  HEART CARE CONSULTATON NOTE        Assessment/Recommendations   Assessment:  1.  Fevers and rigors: Currently undergoing infectious work-up recent MRSA bacteremia, new onset neutropenia  2.  Status post right BKA  3.  History of CVA  4.  Cognitive delay: Mother is POA  5.  History of atrial fibrillation: Paroxysmal currently in sinus tachycardia  6.  Coronary artery disease with history of CABG and PCI: Bypass in his early 40s.  Last angiogram done in 2014 showed occluded SVG to RCA and occluded SVG to diagonal with patent LIMA to LAD and severe multivessel disease medical management recommended  7.  Troponin elevation: Likely type II demand ischemia.  No significant changes on EKG or significant changes noted on repeat echocardiogram.  No anginal complaints  Plan:  1.  Appreciate infectious disease recommendations  2.  Continue current cardiac medications  Recommend following up with cardiologist on discharge in 6 to 8 weeks.  No further cardiac recommendations at this time  Primary cardiologist Dr. Cisneros     History of Present Illness/Subjective    No complaints of chest pain or further fevers.  Echocardiogram no significant change compared to prior       Physical Examination  Review of Systems   VITALS: /56 (BP Location: Left arm)   Pulse 83   Temp 98.4  F (36.9  C) (Oral)   Resp 18   Ht 1.6 m (5' 3\")   Wt 68 kg (149 lb 14.6 oz)   SpO2 97%   BMI 26.56 kg/m    BMI: Body mass index is 26.56 kg/m .  Wt Readings from Last 3 Encounters:   09/27/22 68 kg (149 lb 14.6 oz)   09/27/22 68 kg (150 lb)   09/19/22 68.5 kg (151 lb)       Intake/Output Summary (Last 24 hours) at 9/30/2022 1441  Last data filed at 9/30/2022 1431  Gross per 24 hour   Intake 1340 ml   Output 50 ml   Net 1290 ml     General Appearance:   no distress, normal body habitus   ENT/Mouth: membranes moist, no oral lesions or bleeding gums.      EYES:  no scleral icterus, normal conjunctivae       Chest/Lungs:   lungs are clear to " auscultation   Cardiovascular:   Regular. Normal first and second heart sounds with no murmurs       Extremities: R BKA   Skin: no xanthelasma, warm.    Neurologic: normal  bilateral, no tremors     Psychiatric: alert and oriented x3, calm     Review Of Systems  Skin: negative  Eyes: negative  Ears/Nose/Throat: negative  Respiratory: No shortness of breath, dyspnea on exertion, cough, or hemoptysis  Cardiovascular: negative  Gastrointestinal: negative  Genitourinary: negative  Musculoskeletal: negative  Neurologic: negative  Psychiatric: negative  Hematologic/Lymphatic/Immunologic: negative  Endocrine: negative          Lab Results    Chemistry/lipid CBC Cardiac Enzymes/BNP/TSH/INR   Recent Labs   Lab Test 08/26/22  0815   CHOL 82   HDL 22*   LDL 35   TRIG 126     Recent Labs   Lab Test 08/26/22  0815 06/23/16  0500 04/06/16  0510   LDL 35 61 48     Recent Labs   Lab Test 09/30/22  1224 09/30/22  0801 09/30/22  0633   NA  --   --  136   POTASSIUM  --   --  3.9   CHLORIDE  --   --  103   CO2  --   --  24   *   < > 153*   BUN  --   --  24.7*   CR  --   --  1.16  1.20*   GFRESTIMATED  --   --  75  72   TO  --   --  8.6    < > = values in this interval not displayed.     Recent Labs   Lab Test 09/30/22  0633 09/29/22  0346 09/27/22  2156   CR 1.16  1.20* 1.27* 0.75     Recent Labs   Lab Test 08/26/22  0815 08/23/22  1300 05/16/22  0520   A1C 5.9* 6.3* 5.8*          Recent Labs   Lab Test 09/30/22  0633   WBC 4.2   HGB 8.3*   HCT 26.7*   MCV 84        Recent Labs   Lab Test 09/30/22  0633 09/29/22  0346 09/27/22  2156   HGB 8.3* 10.1* 9.2*    No results for input(s): TROPONINI in the last 22173 hours.  No results for input(s): BNP, NTBNPI, NTBNP in the last 16862 hours.  No results for input(s): TSH in the last 55650 hours.  Recent Labs   Lab Test 08/29/22  0613 08/27/22  0739 08/25/22  0605   INR 1.13 1.12 1.22*        Medical History  Surgical History Family History Social History   Past Medical  History:   Diagnosis Date     Anemia      Anemia, unspecified type 08/19/2016     Anxiety      Anxiety state 07/29/2021    Formatting of this note might be different from the original. Created by Conversion  Replacement Utility updated for latest IMO load     Arthritis      Atrial fibrillation and flutter (H)      Cerebral artery occlusion with cerebral infarction (H)      Cerebral infarction (H)      Chest pain, unspecified type 08/19/2016     Chronic atrial fibrillation (H)      Congestive heart failure (H)      Coronary artery disease     CABG with multiple stents     Coronary artery disease involving native coronary artery without angina pectoris, unspecified whether native or transplanted heart 08/19/2016     Coronary atherosclerosis 07/29/2021    Formatting of this note might be different from the original. Created by Conversion  Replacement Utility updated for latest IMO load     Depression      Diabetic ulcer of right midfoot associated with type 2 diabetes mellitus, limited to breakdown of skin (H) 09/28/2017     Equinovarus acquired deformity, right 04/14/2021     Equinovarus deformity, acquired, right      Essential hypertension 08/19/2016     GERD (gastroesophageal reflux disease)      Heart attack (H)      History of blood transfusion      Hyperlipidemia      Hyperlipidemia, unspecified hyperlipidemia type 08/19/2016     Hypertension      Insomnia, unspecified type 08/19/2016     Ischemic cardiomyopathy      Late effects of cerebrovascular accident 08/19/2016     Major depressive disorder with single episode, remission status unspecified 08/19/2016     Microalbuminuria 07/29/2021    Formatting of this note might be different from the original. Created by Conversion     Migraine      Mild intellectual disabilities 08/19/2016     Myalgia      Nausea 08/19/2016     Osteomyelitis (H)      PONV (postoperative nausea and vomiting)     mild nausea     Problems with learning 07/29/2021    Formatting of this  note might be different from the original. Created by Conversion [facility] Annotation: Feb 11 2010  1:41PM - Leonid Cabral: CANNOT READ OR  WRITE     PVD (peripheral vascular disease) (H)      Stented coronary artery      Type 2 diabetes mellitus (H) 07/29/2021    Formatting of this note might be different from the original. Created by Conversion     Type II or unspecified type diabetes mellitus with ophthalmic manifestations, not stated as uncontrolled(250.50) (H) 07/29/2021    Formatting of this note might be different from the original. Created by Conversion     [facility] 08/19/2016     Past Surgical History:   Procedure Laterality Date     AMPUTATE LEG BELOW KNEE Right 8/5/2022    Procedure: AMPUTATION, BELOW KNEE, right leg;  Surgeon: Kim Montes MD;  Location: [facility]     INCISION AND DRAINAGE LOWER EXTREMITY, COMBINED Right 12/17/2021    Procedure: INCISION AND DRAINAGE, right foot;  Surgeon: Alex Kim DPM;  Location: [facility] OR     INCISION AND DRAINAGE LOWER EXTREMITY, COMBINED Right 1/27/2022    Procedure: INCISION AND DRAINAGE, right foot with application of theraskin;  Surgeon: Alex Kim DPM;  Location: [facility] OR     INCISION AND DRAINAGE LOWER EXTREMITY, COMBINED Right 4/4/2022    Procedure: INCISION AND DRAINAGE, right foot with application of theraskin;  Surgeon: Alex Kim DPM;  Location: [facility] OR     IR LOWER EXTREMITY ANGIOGRAM RIGHT  6/29/2022     IR MISCELLANEOUS PROCEDURE  2/13/2004     IRRIGATION AND DEBRIDEMENT LOWER EXTREMITY, COMBINED Right 8/24/2022    Procedure: RIGHT IRRIGATION AND DEBRIDEMENT, LOWER EXTREMITY, REVISION OF BELOW KNEE AMPUTATION;  Surgeon: Kim Montes MD;  Location: [facility] OR     IRRIGATION AND DEBRIDEMENT LOWER EXTREMITY, COMBINED Right 8/26/2022    Procedure: WASHOUT AND PARTIAL CLOSURE OF RIGHT BELOW THE KNEE AMPUTATION,  AND WOUND VAC APPLICATION;  Surgeon: Kim Montes MD;  Location:   [facility] OR     PICC DOUBLE LUMEN PLACEMENT  2022          ZZC CABG, VEIN, SINGLE      Description: CABG (CABG);  Recorded: 2009;     Family History   Adopted: Yes        Social History     Socioeconomic History     Marital status:      Spouse name: Not on file     Number of children: Not on file     Years of education: Not on file     Highest education level: Not on file   Occupational History     Not on file   Tobacco Use     Smoking status: Former Smoker     Quit date: 2015     Years since quittin.6     Smokeless tobacco: Never Used   Vaping Use     Vaping Use: Never used   Substance and Sexual Activity     Alcohol use: Not Currently     Drug use: Not Currently     Sexual activity: Not on file   Other Topics Concern     Parent/sibling w/ CABG, MI or angioplasty before 65F 55M? Not Asked   Social History Narrative     Not on file     Social Determinants of Health     Financial Resource Strain: Not on file   Food Insecurity: Not on file   Transportation Needs: Not on file   Physical Activity: Not on file   Stress: Not on file   Social Connections: Not on file   Intimate Partner Violence: Not on file   Housing Stability: Not on file         Medications  Allergies   No current outpatient medications on file.        Allergies   Allergen Reactions     Aspirin Other (See Comments)     Reacts with migraine medicine     Blood-Group Specific Substance      Anti-Fya present.  Expect delays in blood for transfusion.  Draw 2 lavender and 1 red for all type and screen orders.     Isosorbide Headache     Codeine Rash     Morphine Rash         Cherry Rea MD

## 2022-09-30 NOTE — CONSULTS
Care Management Initial Consult    General Information  Assessment completed with:  ,         Primary Care Provider verified and updated as needed:     Readmission within the last 30 days:           Advance Care Planning:            Communication Assessment  Patient's communication style: spoken language (English or Bilingual)    Hearing Difficulty or Deaf: no   Wear Glasses or Blind:     Cognitive  Cognitive/Neuro/Behavioral: speech  Level of Consciousness: alert  Arousal Level: opens eyes spontaneously  Orientation: oriented x 4  Mood/Behavior: calm, cooperative     Speech: slow    Living Environment:   People in home:       Current living Arrangements:        Able to return to prior arrangements:         Family/Social Support:  Care provided by:    Provides care for:                  Description of Support System:           Current Resources:   Patient receiving home care services:       Community Resources:    Equipment currently used at home: grab bar, toilet, hospital bed, grab bar, tub/shower, wheelchair, manual  Supplies currently used at home:      Employment/Financial:  Employment Status:          Financial Concerns:             Lifestyle & Psychosocial Needs:  Social Determinants of Health     Tobacco Use: Medium Risk     Smoking Tobacco Use: Former Smoker     Smokeless Tobacco Use: Never Used   Alcohol Use: Not on file   Financial Resource Strain: Not on file   Food Insecurity: Not on file   Transportation Needs: Not on file   Physical Activity: Not on file   Stress: Not on file   Social Connections: Not on file   Intimate Partner Violence: Not on file   Depression: Not on file   Housing Stability: Not on file       Functional Status:  Prior to admission patient needed assistance: Right BKJACQUELINE, CAD. Patient lives in Assisted Living                    Additional Information:  Patient's mother, Jewels Lundberg is legal guardian. Patient lives in [facility] He came here from [facility] and expects to return  to TCU. He is on a bedhold. He will need medical transport.    SHELDON Orozco

## 2022-09-30 NOTE — DISCHARGE SUMMARY
[facility]  Hospitalist Discharge Summary      Date of Admission:  9/27/2022  Date of Discharge:  9/30/2022  Discharging Provider: Efrain Salcedo DO  Discharge Service: Hospitalist Service    Discharge Diagnoses   Sepsis  Neutropenic fever  Hx CVA  Atrial fibrillation  CAD  T2DM with diabetic neuropathy  Hypertension  Anxiety  Heart failure with borderline ejection fraction, unspecified chronicity    Follow-ups Needed After Discharge   Follow-up Appointments     Follow Up and recommended labs and tests      Please follow up with primary care provider at patient's earliest   convenience.             Unresulted Labs Ordered in the Past 30 Days of this Admission     Date and Time Order Name Status Description    9/28/2022 12:00 PM Foreign Body Aerobic Bacterial Culture Routine Preliminary     9/28/2022  1:56 AM Blood Culture Line, venous Preliminary     9/27/2022  9:07 PM Blood Culture Peripheral Blood Preliminary       These results will be followed up by primary care provider.    Discharge Disposition   Discharged to TCU  Condition at discharge: Stable    Hospital Course     52 year old male with PMH significant for DM-2, diabetic foot ulcer, s/p right BKA, infected BKA stump needing I&D in 08/2022, MRSA bacteremia on IV vancomycin, hypertension, CAD, S/P CABG, stents, afib, CVA, diabetic neuropathy who was sent to out ED for concern regarding sepsis      Sepsis  Neutropenic fever  -- Patient with recent right BKA stump infection with MRSA bacteremia,discharged on IV vanc (supposed to be until 9/23/22, but patient reports he is still on that). Still on wound vac. Noted to have new skin redness around the stump, fever of 101F with HR of 105 at TCU  -- Patient doesnt look overtly septic. Blood cultures taken in ED. Will also take cultures from the existing piccline  -- Started empiric vanc/zosyn. Will get CT of the stump to rule out any abscess or osteomyelitis. Consult vascular surgery team  --  If B/C is positive for MRSA, consider RICHARDSON to rule out IE. TTE during last admission was negative for vegetations  -- WBC of 0.9 and ANC of 0.4 noted. Neutropenic precaution.   -- Consult ID     H/O CVA  -- Cont with statin. Hold plavix     Atrial fibrillation  - Cont with Metoprolol  -- Hold Xarelto in case he needs surgical procedure     CAD  -- S/P CABG and stents  -- Cont with cardiac meds. Hold plavix as he might need surgical procedure     DM-II with diabetic neuropathy  -- Hold metformin. Cont glipizide. Add sliding scale insulin     Hypertension  -- Cont with home meds with hold parameters     Anxiety disorder:  -- Cont with home meds    Consultations This Hospital Stay   PHARMACY TO DOSE VANCO  INFECTIOUS DISEASES IP CONSULT  VASCULAR SURGERY IP CONSULT  PHARMACY TO DOSE VANCO  VASCULAR SURGERY IP CONSULT  WOUND OSTOMY CONTINENCE NURSE  IP CONSULT  CARDIOLOGY IP CONSULT  CARE MANAGEMENT / SOCIAL WORK IP CONSULT  PHYSICAL THERAPY ADULT IP CONSULT  OCCUPATIONAL THERAPY ADULT IP CONSULT    Code Status   Full Code    Time Spent on this Encounter   I, Efrain Salcedo DO, personally saw the patient today and spent greater than 30 minutes discharging this patient.       Efrain Salcedo DO  [address] [address]  Phone: [phone]  Fax: [phone]  ______________________________________________________________________         Primary Care Physician   BRENDAN SANDOVAL    Discharge Orders      Medication Therapy Management Referral      General info for SNF    Length of Stay Estimate: Long Term Care  Condition at Discharge: Stable  Level of care:skilled   Rehabilitation Potential: Fair  Admission H&P remains valid and up-to-date: Yes  Recent Chemotherapy: N/A   Use Nursing Home Standing Orders: Yes     Mantoux instructions    Give two-step Mantoux (PPD) Per Facility Policy     Follow Up and recommended labs and tests    Please follow up with primary care provider  at patient's earliest convenience.     Reason for your hospital stay    Patient was admitted to the hospital for evaluation for right BKA stump infection.     Activity - Up with nursing assistance     Physical Therapy Adult Consult    Evaluate and treat as clinically indicated.    Reason:  Right BKA     Occupational Therapy Adult Consult    Evaluate and treat as clinically indicated.    Reason:  Right BKA     Contact Isolation     Diet    Follow this diet upon discharge: Orders Placed This Encounter      Combination Diet Regular Diet Adult; Moderate Consistent Carb (60 g CHO per Meal) Diet       Significant Results and Procedures   Results for orders placed or performed during the hospital encounter of 09/27/22   XR Knee Right 1/2 Views    Narrative    EXAM: XR KNEE RIGHT 1/2 VIEWS  LOCATION: [facility]  DATE/TIME: 9/28/2022 1:00 AM    INDICATION: Include right BKA amputation stump, fever, possible postop infection  COMPARISON: CT right knee exam 8/23/2022      Impression    IMPRESSION: Prior below the knee amputation with several fracture fragments at the distal tip of the residual fibula, unchanged. Otherwise no evidence for complications. No radiographic findings for osteomyelitis. Adjacent drain or catheter along the   medial soft tissues, otherwise unremarkable.    Abd/pelvis CT no contrast - Stone Protocol    Narrative    EXAM: CT ABDOMEN PELVIS W/O CONTRAST  LOCATION: [facility]  DATE/TIME: 9/28/2022 12:49 AM    INDICATION: Diffuse abdominal pain.  COMPARISON: None.  TECHNIQUE: CT scan of the abdomen and pelvis was performed without IV contrast. Multiplanar reformats were obtained. Dose reduction techniques were used.  CONTRAST: None.    FINDINGS:   LOWER CHEST: Advanced coronary artery calcifications. Postoperative changes sternotomy.    HEPATOBILIARY: Normal.    PANCREAS: Normal.    SPLEEN: Normal-variant accessory splenule.    ADRENAL GLANDS:  Normal.    KIDNEYS/BLADDER: Approximately 2 mm x 1 mm nonobstructive stone lower pole of the right kidney. The kidneys, ureters, and bladder are otherwise normal.    BOWEL: Mild obstipation.    LYMPH NODES: Normal.    VASCULATURE: Mild-to-moderate scattered vascular calcifications seen with no aneurysm.    PELVIC ORGANS: Normal.    MUSCULOSKELETAL: Moderate degenerative changes lower lumbar spine. No acute findings.      Impression    IMPRESSION:   1.  Mild obstipation.    2.  2 x 1 mm nonobstructive stone lower pole of the right kidney.    3.  Advanced coronary artery calcification with postoperative changes of a sternotomy.   XR Chest 1 View    Narrative    EXAM: XR CHEST 1 VIEW  LOCATION: [facility]  DATE/TIME: 9/28/2022 1:00 AM    INDICATION: Fever.  COMPARISON: 05/25/2022.      Impression    IMPRESSION: Since previous chest radiograph, there is a new right PICC which is in good position within the distal SVC. There has been slight enlargement of the cardiac silhouette on today's radiograph which is likely secondary to AP technique rather   than PA technique. Otherwise the exam is stable with again seen postoperative changes to include a sternotomy and coronary artery stent. No lung infiltrates are seen to explain patient's fever clinically.   CT Knee Right w Contrast    Narrative    EXAM: CT KNEE RIGHT W CONTRAST  LOCATION: [facility]  DATE/TIME: 9/28/2022 2:19 AM    INDICATION: Fever. evaluate BKA stump for abscess, osteomyelitis, other signs of infection  COMPARISON: 8/23/2022 and 9/28/2022  TECHNIQUE: IV contrast. Axial, sagittal and coronal thin-section reconstruction. Dose reduction techniques were used.   CONTRAST: ISOVUE 370 100ML    FINDINGS:     BONES:  -Status post BKA. Fracture distal tip of the residual fibula unchanged. Mild callus formation. Osseous demineralization. Mild mineralization along the tibial stump. Slight cortical ill-definition. Skin  defect with presumed wound VAC medial to the tibial   stump. Small tract of fluid extends from the wound to the tibial stump. Chondrocalcinosis. Degenerative change.    SOFT TISSUES:  -Soft tissue edema. Improvement in previously seen fluid collections. As described there is a tiny tract of fluid extending from medial skin defect to the distal aspect of the tibial stump coronal image 38. Vascular calcification. Trace joint effusion.      Impression    IMPRESSION:  1.  Status post BKA. Trace amount of fluid extends from medial wound to the undersurface of the tibial stump. Slight cortical ill-definition of the tibial stump. Consider MRI to exclude osteomyelitis.  2.  Soft tissue edema. Improvement in previously seen soft tissue fluid collections. No drainable abscess.  3.  Fracture tip of the residual fibula similar.     XR Chest Port 1 View    Narrative    EXAM: XR CHEST PORT 1 VIEW  LOCATION: [facility]  DATE/TIME: 9/29/2022 3:53 AM    INDICATION: New oxygen needs  COMPARISON: 09/28/2022      Impression    IMPRESSION: Cardiac enlargement. Median sternotomy. Interval removal right PICC line. Pulmonary vascular congestion with diffuse interstitial infiltrates.   CT Chest Pulmonary Embolism w Contrast    Narrative    EXAM: CT CHEST PULMONARY EMBOLISM W CONTRAST  LOCATION: [facility]  DATE/TIME: 9/29/2022 4:56 AM    INDICATION: Hypoxia, increasing oxygen needs. Elevated D dimer. New infiltrates on Chest Xray.  COMPARISON: 09/29/2022  TECHNIQUE: CT chest pulmonary angiogram during arterial phase injection of IV contrast. Multiplanar reformats and MIP reconstructions were performed. Dose reduction techniques were used.   CONTRAST: ISOVUE 370 100ML    FINDINGS:  ANGIOGRAM CHEST: Pulmonary arteries are normal caliber and negative for pulmonary emboli. Thoracic aorta is not well opacified and is  indeterminate for dissection. No CT evidence of right heart strain.    LUNGS  AND PLEURA: Interstitial septal thickening. Right upper lobe and bibasilar infiltrates. Small pleural effusions.    MEDIASTINUM/AXILLAE: No adenopathy or significant pericardial effusion.    CORONARY ARTERY CALCIFICATION: Previous intervention (stents or CABG).    UPPER ABDOMEN: Presumed vicarious excretion of contrast in the gallbladder. Underlying sludge or cholelithiasis not excluded.    MUSCULOSKELETAL: Median sternotomy. Degenerative change osseous structures.      Impression    IMPRESSION:  1.  Interstitial and airspace infiltrates. Small pleural effusions. Findings could be due to CHF. Superimposed pneumonia not excluded.  2.  No pulmonary embolism.   Echocardiogram Complete     Value    LVEF  40-45% (mildly reduced)    [facility]    [id number]  [id number]  [id number]  [id number] [address]     Name: CRISTOPHER CASTRO  MRN: [id number]  : 1970  Study Date: 2022 08:28 AM  Age: 52 yrs  Gender: Male  Patient Location: [facility]  Reason For Study: CAD  Ordering Physician: JAKE DELEON  Performed By: NICHOLAS     BSA: 1.7 m2  Height: 63 in  Weight: 149 lb  HR: 95  BP: 109/58 mmHg  ______________________________________________________________________________  Procedure  Complete Portable Echo Adult. Definity (NDC #65888-175) given intravenously. 2  ml  lot # [id number].  ______________________________________________________________________________  Interpretation Summary     The left ventricle is mildly dilated.  Left ventricular function is decreased. The ejection fraction is 40-45%  (mildly reduced).  Right ventrical function, chamber size, wall motion, and thickness are normal.  The left atrium is mildly dilated.  IVC diameter >2.1 cm collapsing <50% with sniff suggests a high RA pressure  estimated at 15 mmHg or greater.  The right ventricular systolic pressure is approximated at 48mmHg plus the  right atrial  pressure.  ______________________________________________________________________________  Left Ventricle  The left ventricle is mildly dilated. Left ventricular function is decreased.  The ejection fraction is 40-45% (mildly reduced). There is normal left  ventricular wall thickness. Left ventricular diastolic function is abnormal.  There is mild to moderate apical wall hypokinesis. There is mild to moderate  inferolateral wall hypokinesis.     Right Ventricle  Right ventrical function, chamber size, wall motion, and thickness are normal.     Atria  The left atrium is mildly dilated. Right atrial size is normal. There is no  color Doppler evidence of an atrial shunt.     Mitral Valve  Mitral valve leaflets appear normal. There is mild (1+) mitral regurgitation.     Tricuspid Valve  Tricuspid valve leaflets appear normal. There is mild (1+) tricuspid  regurgitation. The right ventricular systolic pressure is approximated at  48mmHg plus the right atrial pressure.     Aortic Valve  Aortic valve leaflets appear normal. There is no evidence of aortic stenosis  or clinically significant aortic regurgitation.     Pulmonic Valve  The pulmonic valve is not well seen, but is grossly normal.     Vessels  Normal size ascending aorta. IVC diameter >2.1 cm collapsing <50% with sniff  suggests a high RA pressure estimated at 15 mmHg or greater.     Pericardium  There is no pericardial effusion.     ______________________________________________________________________________  MMode/2D Measurements & Calculations  IVSd: 0.79 cm  LVIDd: 5.4 cm  LVIDs: 4.4 cm  LVPWd: 1.2 cm  FS: 17.4 %     LV mass(C)d: 204.0 grams  LV mass(C)dI: 119.5 grams/m2  Ao root diam: 3.1 cm  LA dimension: 4.4 cm  asc Aorta Diam: 3.2 cm  LA/Ao: 1.4  LVOT diam: 2.4 cm  LVOT area: 4.5 cm2  LA Volume (BP): 72.5 ml  LA Volume Index (BP): 42.4 ml/m2     LA Volume Indexed (AL/bp): 45.0 ml/m2  RWT: 0.45     Doppler Measurements & Calculations  MV E max safia: 99.4  cm/sec  MV A max zan: 79.5 cm/sec  MV E/A: 1.2  MV dec slope: 724.1 cm/sec2  MV dec time: 0.14 sec  Ao V2 max: 110.3 cm/sec  Ao max P.0 mmHg  Ao V2 mean: 78.0 cm/sec  Ao mean P.6 mmHg  Ao V2 VTI: 17.8 cm  DEVI(I,D): 4.1 cm2  DEVI(V,D): 3.7 cm2  LV V1 max PG: 3.3 mmHg  LV V1 max: 90.7 cm/sec  LV V1 VTI: 16.1 cm  SV(LVOT): 72.9 ml  SI(LVOT): 42.7 ml/m2  PA V2 max: 90.3 cm/sec  PA max PG: 3.3 mmHg  PA acc time: 0.09 sec  TR max zan: 289.6 cm/sec  TR max P.5 mmHg  AV Zan Ratio (DI): 0.82  DEVI Index (cm2/m2): 2.4     E/E' avg: 15.3  Lateral E/e': 10.3  Medial E/e': 20.3     ______________________________________________________________________________  Report approved by: Adryan Rodriguez 2022 11:00 AM               Discharge Medications   Current Discharge Medication List      CONTINUE these medications which have NOT CHANGED    Details   !! acetaminophen (TYLENOL) 325 MG tablet Take 650 mg by mouth every 6 hours as needed for fever or pain      !! acetaminophen (TYLENOL) 325 MG tablet Take 650 mg by mouth 2 times daily      ARIPiprazole (ABILIFY) 2 MG tablet 2 mg daily       ASPERCREME ORIGINAL 10 % external cream Apply topically 2 times daily as needed for other (back pain)      atorvastatin (LIPITOR) 80 MG tablet Take 80 mg by mouth daily       bisacodyl (DULCOLAX) 10 MG suppository Place 10 mg rectally daily as needed for constipation      cholestyramine (QUESTRAN) 4 g packet Take 1 packet by mouth every other day       clopidogrel (PLAVIX) 75 MG tablet Take 1 tablet (75 mg) by mouth daily Start taking medication the day after the procedure.  Qty: 90 tablet, Refills: 1    Associated Diagnoses: PAD (peripheral artery disease) (H)      DULoxetine (CYMBALTA) 60 MG capsule Take 120 mg by mouth daily      erenumab-aooe (AIMOVIG, 140 MG DOSE,) 70 MG/ML injection Inject 140 mg Subcutaneous every 30 days      ferrous sulfate (FEROSUL) 325 (65 Fe) MG tablet Take 325 mg by mouth daily (with breakfast)        !! gabapentin (NEURONTIN) 100 MG capsule Take 100 mg by mouth 2 times daily (before meals)      !! gabapentin (NEURONTIN) 300 MG capsule Take 600 mg by mouth At Bedtime      glipiZIDE (GLUCOTROL) 5 MG tablet Take 2.5 mg by mouth daily before breakfast      HYDROcodone-acetaminophen (NORCO) 5-325 MG tablet Take 1-2 tablets by mouth every 6 hours as needed for moderate to severe pain  Qty: 12 tablet, Refills: 0    Associated Diagnoses: Osteomyelitis of ankle and foot (H)      loperamide (IMODIUM) 2 MG capsule Take 2 mg by mouth 4 times daily as needed for diarrhea      losartan (COZAAR) 25 MG tablet Take 25 mg by mouth At Bedtime       magnesium chloride 535 (64 Mg) MG TBEC CR tablet Take 1,070 mg by mouth 2 times daily      !! metFORMIN (GLUCOPHAGE) 500 MG tablet Take 500 mg by mouth daily (with dinner)      !! metFORMIN (GLUCOPHAGE) 500 MG tablet Take 1,000 mg by mouth daily (with breakfast)      metoprolol succinate ER (TOPROL-XL) 25 MG 24 hr tablet Take 75 mg by mouth daily       mineral oil-hydrophilic petrolatum (AQUAPHOR) external ointment Apply topically 3 times daily as needed for irritation or dry skin Apply to forehead and other areas as needed      mirtazapine (REMERON) 7.5 MG tablet Take 7.5 mg by mouth At Bedtime       Nitroglycerin (NITROSTAT SL) Place 0.4 mg under the tongue every 5 minutes as needed for chest pain       pantoprazole (PROTONIX) 40 MG EC tablet Take 40 mg by mouth daily before breakfast      polyethylene glycol (MIRALAX) 17 GM/Dose powder Take 1 packet by mouth daily as needed for constipation       !! senna-docusate (SENOKOT-S/PERICOLACE) 8.6-50 MG tablet Take 1 tablet by mouth 2 times daily      !! senna-docusate (SENOKOT-S/PERICOLACE) 8.6-50 MG tablet Take 1 tablet by mouth 2 times daily as needed for constipation In addition to scheduled doses      sodium chloride, PF, 0.9% PF flush 10 mLs by Intracatheter route 3 times daily      traZODone (DESYREL) 50 MG tablet Take 50 mg by  mouth At Bedtime       VANCOMYCIN HCL IV Inject 1,000 mg into the vein every 12 hours      vitamin C (ASCORBIC ACID) 500 MG tablet Take 500 mg by mouth daily       XARELTO ANTICOAGULANT 20 MG TABS tablet Take 20 mg by mouth daily (with breakfast)       !! - Potential duplicate medications found. Please discuss with provider.        Allergies   Allergies   Allergen Reactions     Aspirin Other (See Comments)     Reacts with migraine medicine     Blood-Group Specific Substance      Anti-Fya present.  Expect delays in blood for transfusion.  Draw 2 lavender and 1 red for all type and screen orders.     Isosorbide Headache     Codeine Rash     Morphine Rash

## 2022-09-30 NOTE — PLAN OF CARE
Goal Outcome Evaluation:      Problem: Plan of Care - These are the overarching goals to be used throughout the patient stay.    Goal: Readiness for Transition of Care  Outcome: Ongoing, Progressing     Patient received flu shot today, well tolerated. Calm and pleasant. Feels ready to discharge, ID stopped IV antibiotics.

## 2022-09-30 NOTE — PROGRESS NOTES
Infectious Diseases Progress Note  [facility]    Date of visit: 09/30/2022       ASSESSMENT   52-year-old man with a history of diabetes, hypertension, coronary artery disease who is admitted with fevers.    1. Fever.  Status post right BKA almost 2 months ago.  Recently hospitalized for MRSA bacteremia, currently on vancomycin.  No fevers since admission.  Complains of abdominal pain.  CT scan of the abdomen showing obstipation, no other signs of infection.  Stump evaluated by vascular surgery, who do not feel that this is infected.  2. Neutropenia.  New onset.  Possibly secondary to vancomycin.  Now with fevers. Vancomycin stopped. Wbc improved since stopping vancomycin.  3. MRSA bacteremia.  Followed by Dr. Mei.  Was to complete 4 weeks of IV vancomycin on 9/23, but this was extended after clinic visit and concern for nonhealing wound.  4. Right BKA.  Nonhealing wound.  CT scan on admission showing fluid collection going down to stump.  Seen by vascular surgery in the ER, who do not feel that the stump is infected. Case reviewed with Dr. Montes.     Active Problems:    Neutropenic fever (H)    Cellulitis of right lower extremity       PLAN   -Discontinue daptomycin - patient has completed course for mrsa bacteremia  -Discontinue Zosyn   -Okay to discharge from ID perspective.   -patient will need f/up with Dr. Mei in ID clinic    I will sign-off at this time. Please do not hesitate to call if there are any further questions or if there is a change in the patient's clinical status.      Denton Acevedo MD  [facility]  Direct messaging: [email or web address] Paging  On-Call ID provider: [phone], option: [phone]      ===========================================      SUBJECTIVE / INTERVAL HISTORY:     No events. Feels very good. No complaints. No fevers overnight. Wound vac is in place.    Review of Systems     No fevers, no diarrhea    Antibiotics     Zosyn 9/28-    Previous:  Daptomycin  "9/28-9/29  Vancomycin until 9/28      Physical Exam     Temp:  [97.6  F (36.4  C)-99  F (37.2  C)] 98.4  F (36.9  C)  Pulse:  [82-92] 83  Resp:  [18-20] 18  BP: ()/(56-70) 104/56  SpO2:  [90 %-98 %] 97 %    /56 (BP Location: Left arm)   Pulse 83   Temp 98.4  F (36.9  C) (Oral)   Resp 18   Ht 1.6 m (5' 3\")   Wt 68 kg (149 lb 14.6 oz)   SpO2 97%   BMI 26.56 kg/m      GENERAL:  well-developed, well-nourished, lying in bed in no acute distress.   HENT:  Head is normocephalic, atraumatic.   EYES:  Eyes have anicteric sclerae without conjunctival injection   LUNGS:  Clear to auscultation.  CARDIOVASCULAR:  Regular rate and rhythm with no murmurs, gallops or rubs.  ABDOMEN:  Normal bowel sounds, soft, nontender.   EXT: Extremities warm and without edema. S/p right BKA. Wound vac in place  SKIN:  no rashes   NEUROLOGIC:  Grossly nonfocal.      Cultures   9/27 blood culture: No growth to date  9/28 blood culture: no growth to date       Pertinent Labs:     Recent Labs   Lab 09/30/22  0633 09/29/22  0346 09/27/22  2156   WBC 4.2 5.5 0.9*   HGB 8.3* 10.1* 9.2*    171 151       Recent Labs   Lab 09/30/22  0633 09/29/22  0346 09/27/22  2156 09/26/22  0540    135* 133* 135*   CO2 24 23 21* 26   BUN 24.7* 20.2* 13.7 15.2   ALBUMIN 2.8*  --  3.4* 3.8   ALKPHOS 72  --  100 95   ALT 19  --  19 14   AST 30  --  20 26       Recent Labs   Lab 09/26/22  0540   CRP 46.80*           Imaging:     XR Chest 1 View    Result Date: 9/28/2022  EXAM: XR CHEST 1 VIEW LOCATION: [facility] DATE/TIME: 9/28/2022 1:00 AM INDICATION: Fever. COMPARISON: 05/25/2022.     IMPRESSION: Since previous chest radiograph, there is a new right PICC which is in good position within the distal SVC. There has been slight enlargement of the cardiac silhouette on today's radiograph which is likely secondary to AP technique rather than PA technique. Otherwise the exam is stable with again seen postoperative " changes to include a sternotomy and coronary artery stent. No lung infiltrates are seen to explain patient's fever clinically.    CT Knee Right w Contrast    Result Date: 2022  EXAM: CT KNEE RIGHT W CONTRAST LOCATION: [facility] DATE/TIME: 2022 2:19 AM INDICATION: Fever. evaluate BKA stump for abscess, osteomyelitis, other signs of infection COMPARISON: 2022 and 2022 TECHNIQUE: IV contrast. Axial, sagittal and coronal thin-section reconstruction. Dose reduction techniques were used. CONTRAST: ISOVUE 370 100ML FINDINGS: BONES: -Status post BKA. Fracture distal tip of the residual fibula unchanged. Mild callus formation. Osseous demineralization. Mild mineralization along the tibial stump. Slight cortical ill-definition. Skin defect with presumed wound VAC medial to the tibial stump. Small tract of fluid extends from the wound to the tibial stump. Chondrocalcinosis. Degenerative change. SOFT TISSUES: -Soft tissue edema. Improvement in previously seen fluid collections. As described there is a tiny tract of fluid extending from medial skin defect to the distal aspect of the tibial stump coronal image 38. Vascular calcification. Trace joint effusion.     IMPRESSION: 1.  Status post BKA. Trace amount of fluid extends from medial wound to the undersurface of the tibial stump. Slight cortical ill-definition of the tibial stump. Consider MRI to exclude osteomyelitis. 2.  Soft tissue edema. Improvement in previously seen soft tissue fluid collections. No drainable abscess. 3.  Fracture tip of the residual fibula similar.     Echocardiogram Complete    Result Date: 2022  [id number]^[address] [address]  Name: CRISTOPHER CASTRO MRN: [id number] : 1970 Study Date: 2022 08:28 AM Age: 52 yrs Gender: Male Patient Location: [facility] Reason For Study: CAD Ordering Physician: PANCHO  JAKE Performed By: [facility]  BSA: 1.7 m2 Height: 63 in Weight: 149 lb HR: 95 BP: 109/58 mmHg ______________________________________________________________________________ Procedure Complete Portable Echo Adult. Definity (NDC #43739-830) given intravenously. 2 ml lot # [id number]. ______________________________________________________________________________ Interpretation Summary  The left ventricle is mildly dilated. Left ventricular function is decreased. The ejection fraction is 40-45% (mildly reduced). Right ventrical function, chamber size, wall motion, and thickness are normal. The left atrium is mildly dilated. IVC diameter >2.1 cm collapsing <50% with sniff suggests a high RA pressure estimated at 15 mmHg or greater. The right ventricular systolic pressure is approximated at 48mmHg plus the right atrial pressure. ______________________________________________________________________________ Left Ventricle The left ventricle is mildly dilated. Left ventricular function is decreased. The ejection fraction is 40-45% (mildly reduced). There is normal left ventricular wall thickness. Left ventricular diastolic function is abnormal. There is mild to moderate apical wall hypokinesis. There is mild to moderate inferolateral wall hypokinesis.  Right Ventricle Right ventrical function, chamber size, wall motion, and thickness are normal.  Atria The left atrium is mildly dilated. Right atrial size is normal. There is no color Doppler evidence of an atrial shunt.  Mitral Valve Mitral valve leaflets appear normal. There is mild (1+) mitral regurgitation.  Tricuspid Valve Tricuspid valve leaflets appear normal. There is mild (1+) tricuspid regurgitation. The right ventricular systolic pressure is approximated at 48mmHg plus the right atrial pressure.  Aortic Valve Aortic valve leaflets appear normal. There is no evidence of aortic stenosis or clinically significant aortic regurgitation.  Pulmonic Valve The pulmonic valve is  not well seen, but is grossly normal.  Vessels Normal size ascending aorta. IVC diameter >2.1 cm collapsing <50% with sniff suggests a high RA pressure estimated at 15 mmHg or greater.  Pericardium There is no pericardial effusion.  ______________________________________________________________________________ MMode/2D Measurements & Calculations IVSd: 0.79 cm LVIDd: 5.4 cm LVIDs: 4.4 cm LVPWd: 1.2 cm FS: 17.4 %  LV mass(C)d: 204.0 grams LV mass(C)dI: 119.5 grams/m2 Ao root diam: 3.1 cm LA dimension: 4.4 cm asc Aorta Diam: 3.2 cm LA/Ao: 1.4 LVOT diam: 2.4 cm LVOT area: 4.5 cm2 LA Volume (BP): 72.5 ml LA Volume Index (BP): 42.4 ml/m2  LA Volume Indexed (AL/bp): 45.0 ml/m2 RWT: 0.45  Doppler Measurements & Calculations MV E max zan: 99.4 cm/sec MV A max zan: 79.5 cm/sec MV E/A: 1.2 MV dec slope: 724.1 cm/sec2 MV dec time: 0.14 sec Ao V2 max: 110.3 cm/sec Ao max P.0 mmHg Ao V2 mean: 78.0 cm/sec Ao mean P.6 mmHg Ao V2 VTI: 17.8 cm DEVI(I,D): 4.1 cm2 DEVI(V,D): 3.7 cm2 LV V1 max PG: 3.3 mmHg LV V1 max: 90.7 cm/sec LV V1 VTI: 16.1 cm SV(LVOT): 72.9 ml SI(LVOT): 42.7 ml/m2 PA V2 max: 90.3 cm/sec PA max PG: 3.3 mmHg PA acc time: 0.09 sec TR max zan: 289.6 cm/sec TR max P.5 mmHg AV Zan Ratio (DI): 0.82 DEVI Index (cm2/m2): 2.4  E/E' avg: 15.3 Lateral E/e': 10.3 Medial E/e': 20.3  ______________________________________________________________________________ Report approved by: Adryan Rodriguez 2022 11:00 AM       XR Chest Port 1 View    Result Date: 2022  EXAM: XR CHEST PORT 1 VIEW LOCATION: [facility] DATE/TIME: 2022 3:53 AM INDICATION: New oxygen needs COMPARISON: 2022     IMPRESSION: Cardiac enlargement. Median sternotomy. Interval removal right PICC line. Pulmonary vascular congestion with diffuse interstitial infiltrates.    XR Knee Right 1/2 Views    Result Date: 2022  EXAM: XR KNEE RIGHT 1/2 VIEWS LOCATION: [facility] DATE/TIME:  9/28/2022 1:00 AM INDICATION: Include right BKA amputation stump, fever, possible postop infection COMPARISON: CT right knee exam 8/23/2022     IMPRESSION: Prior below the knee amputation with several fracture fragments at the distal tip of the residual fibula, unchanged. Otherwise no evidence for complications. No radiographic findings for osteomyelitis. Adjacent drain or catheter along the medial soft tissues, otherwise unremarkable.     Abd/pelvis CT no contrast - Stone Protocol    Result Date: 9/28/2022  EXAM: CT ABDOMEN PELVIS W/O CONTRAST LOCATION: [facility] DATE/TIME: 9/28/2022 12:49 AM INDICATION: Diffuse abdominal pain. COMPARISON: None. TECHNIQUE: CT scan of the abdomen and pelvis was performed without IV contrast. Multiplanar reformats were obtained. Dose reduction techniques were used. CONTRAST: None. FINDINGS: LOWER CHEST: Advanced coronary artery calcifications. Postoperative changes sternotomy. HEPATOBILIARY: Normal. PANCREAS: Normal. SPLEEN: Normal-variant accessory splenule. ADRENAL GLANDS: Normal. KIDNEYS/BLADDER: Approximately 2 mm x 1 mm nonobstructive stone lower pole of the right kidney. The kidneys, ureters, and bladder are otherwise normal. BOWEL: Mild obstipation. LYMPH NODES: Normal. VASCULATURE: Mild-to-moderate scattered vascular calcifications seen with no aneurysm. PELVIC ORGANS: Normal. MUSCULOSKELETAL: Moderate degenerative changes lower lumbar spine. No acute findings.     IMPRESSION: 1.  Mild obstipation. 2.  2 x 1 mm nonobstructive stone lower pole of the right kidney. 3.  Advanced coronary artery calcification with postoperative changes of a sternotomy.    CT Chest Pulmonary Embolism w Contrast    Result Date: 9/29/2022  EXAM: CT CHEST PULMONARY EMBOLISM W CONTRAST LOCATION: [facility] DATE/TIME: 9/29/2022 4:56 AM INDICATION: Hypoxia, increasing oxygen needs. Elevated D dimer. New infiltrates on Chest Xray. COMPARISON: 09/29/2022  TECHNIQUE: CT chest pulmonary angiogram during arterial phase injection of IV contrast. Multiplanar reformats and MIP reconstructions were performed. Dose reduction techniques were used. CONTRAST: ISOVUE 370 100ML FINDINGS: ANGIOGRAM CHEST: Pulmonary arteries are normal caliber and negative for pulmonary emboli. Thoracic aorta is not well opacified and is  indeterminate for dissection. No CT evidence of right heart strain. LUNGS AND PLEURA: Interstitial septal thickening. Right upper lobe and bibasilar infiltrates. Small pleural effusions. MEDIASTINUM/AXILLAE: No adenopathy or significant pericardial effusion. CORONARY ARTERY CALCIFICATION: Previous intervention (stents or CABG). UPPER ABDOMEN: Presumed vicarious excretion of contrast in the gallbladder. Underlying sludge or cholelithiasis not excluded. MUSCULOSKELETAL: Median sternotomy. Degenerative change osseous structures.     IMPRESSION: 1.  Interstitial and airspace infiltrates. Small pleural effusions. Findings could be due to CHF. Superimposed pneumonia not excluded. 2.  No pulmonary embolism.        Data reviewed today: I reviewed all medications, new labs and imaging results over the last 24 hours. I personally reviewed no images or EKG's today.  The patient's care was discussed with the Patient.

## 2022-09-30 NOTE — PLAN OF CARE
Problem: Pain Acute  Goal: Acceptable Pain Control and Functional Ability  Outcome: Ongoing, Progressing  Intervention: Prevent or Manage Pain  Recent Flowsheet Documentation  Taken 9/30/2022 0046 by Margaret Bravo, CESARIO  Medication Review/Management: medications reviewed     Problem: Gas Exchange Impaired  Goal: Optimal Gas Exchange  Intervention: Optimize Oxygenation and Ventilation  Recent Flowsheet Documentation  Taken 9/30/2022 0046 by Margaret Bravo, RN  Head of Bed (HOB) Positioning: HOB at 30-45 degrees     Goal Outcome Evaluation: Pt slept through the night. Rate pain 9/10. Pain was manage with norco. On 2.5L O2 NC, stating at 93%. External catheter in place and patent. ABX given per protocol.

## 2022-09-30 NOTE — PROGRESS NOTES
"Patient who I performed a right below-knee amputation for and then a revision for infection on August 26.  Admitted with neutropenia and fevers.  Stump looks good and did not have any fluid on either exploration or CT scanning.  At this point has had more than 4 weeks of antibiotics for history of MRSA.  Vancomycin is likely the cause of his neutropenia and so this has been stopped.  Currently not on antibiotics.  Today patient feels quite well and is doing well.  VAC in place over the stump wound opening.  No longer formally neutropenic.    /56 (BP Location: Left arm)   Pulse 83   Temp 98.4  F (36.9  C) (Oral)   Resp 18   Ht 5' 3\" (1.6 m)   Wt 149 lb 14.6 oz (68 kg)   SpO2 97%   BMI 26.56 kg/m      VAC dressing not taken down.    Impression and plan: Doing well.  Continue with infectious disease and medicine plan.  No role for revision of the stump at this point.  No clear indication for antibiotics.  "

## 2022-10-02 LAB — BACTERIA BLD CULT: NO GROWTH

## 2022-10-03 LAB — BACTERIA BLD CULT: NO GROWTH

## 2022-10-04 ENCOUNTER — TRANSITIONAL CARE UNIT VISIT (OUTPATIENT)
Dept: GERIATRICS | Facility: CLINIC | Age: 52
End: 2022-10-04
Payer: MEDICARE

## 2022-10-04 VITALS
SYSTOLIC BLOOD PRESSURE: 154 MMHG | RESPIRATION RATE: 18 BRPM | HEIGHT: 63 IN | WEIGHT: 149 LBS | OXYGEN SATURATION: 98 % | TEMPERATURE: 98.7 F | DIASTOLIC BLOOD PRESSURE: 96 MMHG | HEART RATE: 83 BPM | BODY MASS INDEX: 26.4 KG/M2

## 2022-10-04 DIAGNOSIS — E11.69 TYPE 2 DIABETES MELLITUS WITH OTHER SPECIFIED COMPLICATION, WITHOUT LONG-TERM CURRENT USE OF INSULIN (H): ICD-10-CM

## 2022-10-04 DIAGNOSIS — R50.81 NEUTROPENIC FEVER (H): Primary | ICD-10-CM

## 2022-10-04 DIAGNOSIS — T14.8XXA WOUND INFECTION: ICD-10-CM

## 2022-10-04 DIAGNOSIS — I48.0 PAROXYSMAL ATRIAL FIBRILLATION (H): ICD-10-CM

## 2022-10-04 DIAGNOSIS — I10 ESSENTIAL HYPERTENSION: ICD-10-CM

## 2022-10-04 DIAGNOSIS — D70.9 NEUTROPENIC FEVER (H): Primary | ICD-10-CM

## 2022-10-04 DIAGNOSIS — L08.9 WOUND INFECTION: ICD-10-CM

## 2022-10-04 PROCEDURE — 99305 1ST NF CARE MODERATE MDM 35: CPT | Performed by: FAMILY MEDICINE

## 2022-10-04 NOTE — LETTER
10/4/2022        RE: Cm Lundberg  [address]  [place] [facility] [id number]          [facility] GERIATR[facility]  [facility] Medical Record Number:  [id number]  Place of Service where encounter took place: [facility] ([facility]) [[id number]]   CODE STATUS:   CPR/Full code     Chief Complaint/Reason for Visit:  Chief Complaint   Patient presents with     Hospital F/U     Re-admit to TCU following hospitalization for neutropenic fever.        HPI:    Cm Lundberg is a 52 year old male with hx of CAD, s/p stents and CABG, HTN, DM, prior CVA, afib/flutter on xarelto, who has been residing in TCU at [facility] since Dec 2021 surgery for chronic long standing right foot ulceration. He underwent incision and drainage of the right foot, excisional debridement of the ulceration of the right foot into the level of the muscle, under MAC with popliteal block anesthesia on 12/17/2021. He completed abx per ID recommendations. Remained NWB with wound vac, had 1/27/2022 incision and drainage with application of theraskin. On 4/4/2022 again incision and drainage with theraskin. Continued following with podiatry, referred to wound and vascular due to non healing diabetic ulcer, right foot. S/p angioplasty RLE and hyperbaric oxygen therapy. MRI with indications of osteomyelitis. 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 Hospitalized 8/23/2022-8/30/2022 due to stump infection, placed on IV Vanco due to MRSA, has continued with NWB and wound vac. Sent in to the hospital from TCU on 9/27/2022 due to fever.    He was worked up, treated in the hospital. Seen by ID. Unfortunately, what is listed as the discharge summary from this hospital stay does not provide the details of his stay, it is a note representing the admission details. It is however, excerpted for information below.      [facility]  Hospitalist Discharge Summary    Date of Admission:  9/27/2022  Date of  Discharge:  9/30/2022  Discharging Provider: Efrain Salcedo, DO    Discharge Diagnoses  Sepsis  Neutropenic fever  Hx CVA  Atrial fibrillation  CAD  T2DM with diabetic neuropathy  Hypertension  Anxiety  Heart failure with borderline ejection fraction, unspecified chronicity    Hospital Course  52 year old male with PMH significant for DM-2, diabetic foot ulcer, s/p right BKA, infected BKA stump needing I&D in 08/2022, MRSA bacteremia on IV vancomycin, hypertension, CAD, S/P CABG, stents, afib, CVA, diabetic neuropathy who was sent to out ED for concern regarding sepsis      Sepsis  Neutropenic fever  -- Patient with recent right BKA stump infection with MRSA bacteremia,discharged on IV vanc (supposed to be until 9/23/22, but patient reports he is still on that). Still on wound vac. Noted to have new skin redness around the stump, fever of 101F with HR of 105 at TCU  -- Patient doesnt look overtly septic. Blood cultures taken in ED. Will also take cultures from the existing piccline  -- Started empiric vanc/zosyn. Will get CT of the stump to rule out any abscess or osteomyelitis. Consult vascular surgery team  -- If B/C is positive for MRSA, consider RICHARDSON to rule out IE. TTE during last admission was negative for vegetations  -- WBC of 0.9 and ANC of 0.4 noted. Neutropenic precaution.   -- Consult ID     H/O CVA  -- Cont with statin. Hold plavix     Atrial fibrillation  - Cont with Metoprolol  -- Hold Xarelto in case he needs surgical procedure     CAD  -- S/P CABG and stents  -- Cont with cardiac meds. Hold plavix as he might need surgical procedure     DM-II with diabetic neuropathy  -- Hold metformin. Cont glipizide. Add sliding scale insulin     Hypertension  -- Cont with home meds with hold parameters     Anxiety disorder:  -- Cont with home meds      Today:  Per chart review, he had abx in the hospital, followed by ID, was not felt to need any further abx at hospital discharge. He remains NWB with wound vac. Has  follow up appt with Dr. Montes on 10/10/2022. Discharge summary as noted is inaccurate as far as meds. No changes were made to his usual meds, he is on xarelto for afib/flutter and metformin and glipizide for DM. Unclear if he needs further follow up with ID. Reports pain in stump mostly with vac changes, otherwise more manageable. His appetite is good. No abdominal pain, nausea, vomiting, diarrhea or constipation. No urinary sx. Medical problems, aflutter, CAD, DM, HTN, managed with his usual medications. Diabetes on metformin and glipizide. Sleeping well. No new hearing concerns. He follows with ophthalmology, had visit today for injections in both eyes. Resided at [facility] prior to this, unable to return until no longer has wound vac and cares can be provided by [facility] staff.        REVIEW OF SYSTEMS:  All others negative other than those noted in HPI.      PAST MEDICAL HISTORY:  Past Medical History:   Diagnosis Date     Anemia      Anemia, unspecified type 08/19/2016     Anxiety      Anxiety state 07/29/2021    Formatting of this note might be different from the original. Created by Conversion  Replacement Utility updated for latest IMO load     Arthritis      Atrial fibrillation and flutter (H)      Cerebral artery occlusion with cerebral infarction (H)      Cerebral infarction (H)      Chest pain, unspecified type 08/19/2016     Chronic atrial fibrillation (H)      Congestive heart failure (H)      Coronary artery disease     CABG with multiple stents     Coronary artery disease involving native coronary artery without angina pectoris, unspecified whether native or transplanted heart 08/19/2016     Coronary atherosclerosis 07/29/2021    Formatting of this note might be different from the original. Created by Conversion  Replacement Utility updated for latest IMO load     Depression      Diabetic ulcer of right midfoot associated with type 2 diabetes mellitus, limited to breakdown of skin (H) 09/28/2017      Equinovarus acquired deformity, right 04/14/2021     Equinovarus deformity, acquired, right      Essential hypertension 08/19/2016     GERD (gastroesophageal reflux disease)      Heart attack (H)      History of blood transfusion      Hyperlipidemia      Hyperlipidemia, unspecified hyperlipidemia type 08/19/2016     Hypertension      Insomnia, unspecified type 08/19/2016     Ischemic cardiomyopathy      Late effects of cerebrovascular accident 08/19/2016     Major depressive disorder with single episode, remission status unspecified 08/19/2016     Microalbuminuria 07/29/2021    Formatting of this note might be different from the original. 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 Created by Conversion [facility] Annotation: Feb 11 2010  1:41PM - Leonid Cabral: CANNOT READ OR  WRITE     PVD (peripheral vascular disease) (H)      Stented coronary artery      Type 2 diabetes mellitus (H) 07/29/2021    Formatting of this note might be different from the original. Created by Conversion     Type II or unspecified type diabetes mellitus with ophthalmic manifestations, not stated as uncontrolled(250.50) (H) 07/29/2021    Formatting of this note might be different from the original. Created by Conversion     Weakness 08/19/2016       PAST SURGICAL HISTORY:  Past Surgical History:   Procedure Laterality Date     AMPUTATE LEG BELOW KNEE Right 8/5/2022    Procedure: AMPUTATION, BELOW KNEE, right leg;  Surgeon: Kim Montes MD;  Location: [facility] Main OR     INCISION AND DRAINAGE LOWER EXTREMITY, COMBINED Right 12/17/2021    Procedure: INCISION AND DRAINAGE, right foot;  Surgeon: Alex Kim DPM;  Location: [facility] Main OR     INCISION AND DRAINAGE LOWER  EXTREMITY, COMBINED Right 2022    Procedure: INCISION AND DRAINAGE, right foot with application of theraskin;  Surgeon: Alex Kim DPM;  Location: [facility] OR     INCISION AND DRAINAGE LOWER EXTREMITY, COMBINED Right 2022    Procedure: INCISION AND DRAINAGE, right foot with application of theraskin;  Surgeon: Alex Kim DPM;  Location: [facility] OR     IR LOWER EXTREMITY ANGIOGRAM RIGHT  2022     IR MISCELLANEOUS PROCEDURE  2004     IRRIGATION AND DEBRIDEMENT LOWER EXTREMITY, COMBINED Right 2022    Procedure: RIGHT IRRIGATION AND DEBRIDEMENT, LOWER EXTREMITY, REVISION OF BELOW KNEE AMPUTATION;  Surgeon: Kim Montes MD;  Location: [facility] OR     IRRIGATION AND DEBRIDEMENT LOWER EXTREMITY, COMBINED Right 2022    Procedure: WASHOUT AND PARTIAL CLOSURE OF RIGHT BELOW THE KNEE AMPUTATION,  AND WOUND VAC APPLICATION;  Surgeon: Kim Montes MD;  Location: [facility] OR     PICC DOUBLE LUMEN PLACEMENT  2022          ZZC CABG, VEIN, SINGLE      Description: CABG (CABG);  Recorded: 2009;       FAMILY HISTORY:  Family History   Adopted: Yes       SOCIAL HISTORY:  Social History     Socioeconomic History     Marital status:      Spouse name: Not on file     Number of children: Not on file     Years of education: Not on file     Highest education level: Not on file   Occupational History     Not on file   Tobacco Use     Smoking status: Former     Types: Cigarettes     Quit date: 2015     Years since quittin.7     Smokeless tobacco: Never   Vaping Use     Vaping Use: Never used   Substance and Sexual Activity     Alcohol use: Not Currently     Drug use: Not Currently     Sexual activity: Not on file   Other Topics Concern     Parent/sibling w/ CABG, MI or angioplasty before 65F 55M? Not Asked   Social History Narrative     Not on file     Social Determinants of Health     Financial Resource Strain: Not on file   Food Insecurity:  Not on file   Transportation Needs: Not on file   Physical Activity: Not on file   Stress: Not on file   Social Connections: Not on file   Intimate Partner Violence: Not on file   Housing Stability: Not on file       MEDICATIONS:  Current Outpatient Medications   Medication Sig Dispense Refill     acetaminophen (TYLENOL) 325 MG tablet Take 650 mg by mouth every 6 hours as needed for fever or pain       acetaminophen (TYLENOL) 325 MG tablet Take 650 mg by mouth 2 times daily       ARIPiprazole (ABILIFY) 2 MG tablet 2 mg daily        ASPERCREME ORIGINAL 10 % external cream Apply topically 2 times daily as needed for other (back pain)       atorvastatin (LIPITOR) 80 MG tablet Take 80 mg by mouth daily        bisacodyl (DULCOLAX) 10 MG suppository Place 10 mg rectally daily as needed for constipation       cholestyramine (QUESTRAN) 4 g packet Take 1 packet by mouth every other day        clopidogrel (PLAVIX) 75 MG tablet Take 1 tablet (75 mg) by mouth daily Start taking medication the day after the procedure. 90 tablet 1     DULoxetine (CYMBALTA) 60 MG capsule Take 120 mg by mouth daily       erenumab-aooe (AIMOVIG, 140 MG DOSE,) 70 MG/ML injection Inject 140 mg Subcutaneous every 30 days       ferrous sulfate (FEROSUL) 325 (65 Fe) MG tablet Take 325 mg by mouth daily (with breakfast)        gabapentin (NEURONTIN) 100 MG capsule Take 100 mg by mouth 2 times daily (before meals)       gabapentin (NEURONTIN) 300 MG capsule Take 600 mg by mouth At Bedtime       glipiZIDE (GLUCOTROL) 5 MG tablet Take 2.5 mg by mouth daily before breakfast       HYDROcodone-acetaminophen (NORCO) 5-325 MG tablet Take 1-2 tablets by mouth every 6 hours as needed for moderate to severe pain (Patient taking differently: Take 1 tablet by mouth every 6 hours as needed for moderate to severe pain) 12 tablet 0     loperamide (IMODIUM) 2 MG capsule Take 2 mg by mouth 4 times daily as needed for diarrhea       losartan (COZAAR) 25 MG tablet Take 25 mg  by mouth At Bedtime        magnesium chloride 535 (64 Mg) MG TBEC CR tablet Take 1,070 mg by mouth 2 times daily       metFORMIN (GLUCOPHAGE) 500 MG tablet Take 500 mg by mouth daily (with dinner)       metFORMIN (GLUCOPHAGE) 500 MG tablet Take 1,000 mg by mouth daily (with breakfast)       metoprolol succinate ER (TOPROL-XL) 25 MG 24 hr tablet Take 75 mg by mouth daily        mineral oil-hydrophilic petrolatum (AQUAPHOR) external ointment Apply topically 3 times daily as needed for irritation or dry skin Apply to forehead and other areas as needed       mirtazapine (REMERON) 7.5 MG tablet Take 7.5 mg by mouth At Bedtime        Nitroglycerin (NITROSTAT SL) Place 0.4 mg under the tongue every 5 minutes as needed for chest pain        pantoprazole (PROTONIX) 40 MG EC tablet Take 40 mg by mouth daily before breakfast       polyethylene glycol (MIRALAX) 17 GM/Dose powder Take 1 packet by mouth daily as needed for constipation        senna-docusate (SENOKOT-S/PERICOLACE) 8.6-50 MG tablet Take 1 tablet by mouth 2 times daily       senna-docusate (SENOKOT-S/PERICOLACE) 8.6-50 MG tablet Take 1 tablet by mouth 2 times daily as needed for constipation In addition to scheduled doses       sodium chloride (PF) 0.9% PF flush 10 mLs by Intracatheter route 3 times daily       traZODone (DESYREL) 50 MG tablet Take 50 mg by mouth At Bedtime        vitamin C (ASCORBIC ACID) 500 MG tablet Take 500 mg by mouth daily        XARELTO ANTICOAGULANT 20 MG TABS tablet Take 20 mg by mouth daily (with breakfast)         ALLERGIES:  Allergies   Allergen Reactions     Aspirin Other (See Comments)     Reacts with migraine medicine     Blood-Group Specific Substance      Anti-Fya present.  Expect delays in blood for transfusion.  Draw 2 lavender and 1 red for all type and screen orders.     Isosorbide Headache     Codeine Rash     Morphine Rash       PHYSICAL EXAM:  General: Patient is alert male, no distress.   Vitals: BP (!) 154/96   Pulse 83   " Temp 98.7  F (37.1  C)   Resp 18   Ht 1.6 m (5' 3\")   Wt 67.6 kg (149 lb)   SpO2 98%   BMI 26.39 kg/m    HEENT: Head is NCAT. Eyes show no injection or icterus. Nares negative. Oropharynx well hydrated.  Neck: Supple. No tenderness or adenopathy. No JVD.  Lungs: Clear bilaterally. No wheezes.  Cardiovascular: Regular rate and rhythm, normal S1, S2.  Back: No spinal or CVA tenderness.  Abdomen: Soft, no tenderness on exam. Bowel sounds present. No guarding rebound or rigidity.  : Deferred.  Extremities: R BKA with wound vac.   Musculoskeletal: Mild swelling at R stump.   Skin: Wound vac R BKA site.   Psych: Mood appears good, he is in good spirits.      LABS/DIAGNOSTIC DATA:  Component      Latest Ref Rng & Units 9/27/2022 9/29/2022 9/30/2022               WBC      4.0 - 11.0 10e3/uL 0.9 (LL) 5.5 4.2   RBC Count      4.40 - 5.90 10e6/uL 3.44 (L) 3.82 (L) 3.17 (L)   Hemoglobin      13.3 - 17.7 g/dL 9.2 (L) 10.1 (L) 8.3 (L)   Hematocrit      40.0 - 53.0 % 28.6 (L) 32.0 (L) 26.7 (L)   MCV      78 - 100 fL 83 84 84   MCH      26.5 - 33.0 pg 26.7 26.4 (L) 26.2 (L)   MCHC      31.5 - 36.5 g/dL 32.2 31.6 31.1 (L)   RDW      10.0 - 15.0 % 14.6 15.4 (H) 15.5 (H)   Platelet Count      150 - 450 10e3/uL 151 171 159     Component      Latest Ref Rng & Units 9/27/2022 9/29/2022 9/30/2022 9/30/2022             6:33 AM  6:33 AM   Sodium      136 - 145 mmol/L 133 (L) 135 (L)  136   Potassium      3.4 - 5.3 mmol/L 4.1 4.1  3.9   Chloride      98 - 107 mmol/L 100 99  103   Carbon Dioxide (CO2)      22 - 29 mmol/L 21 (L) 23  24   Anion Gap      7 - 15 mmol/L 12 13  9   Urea Nitrogen      6.0 - 20.0 mg/dL 13.7 20.2 (H)  24.7 (H)   Creatinine      0.67 - 1.17 mg/dL 0.75 1.27 (H) 1.20 (H) 1.16   Calcium      8.6 - 10.0 mg/dL 7.4 (L) 8.5 (L)  8.6   Glucose      70 - 99 mg/dL 172 (H) 188 (H)  153 (H)   Alkaline Phosphatase      40 - 129 U/L 100   72   AST      10 - 50 U/L 20   30   ALT      10 - 50 U/L 19   19   Protein Total      " 6.4 - 8.3 g/dL 6.0 (L)   5.5 (L)   Albumin      3.5 - 5.2 g/dL 3.4 (L)   2.8 (L)   Bilirubin Total      <=1.2 mg/dL 0.4   0.4   GFR Estimate      >60 mL/min/1.73m2 >90 67 72 75         ASSESSMENT/PLAN:  1. Neutropenic fever. Hospitalized 9/27/2022-9/30/2022. Had been on IV Vancomycin due to R BKA stump infection with MRSA. This admit, treated with Zosyn for stump cellulitis, no further abx at hospital discharge. Followed by ID.   2. Wound infection, MRSA. From R BKA on 8/5/2022. Continues with wound vac, NWB. Will have follow up appt with Dr. Montes on 10/10/2022.   3. HTN. On metoprolol and losartan. BP up today, typically more within range. Will continue to monitor in TCU, adjust meds If needed.   4. Afib. He is on xarelto for anticoagulation. Adequate rate control.   5. Diabetes. On metformin and glipizide. Accuchecks followed.   6. RANDAL. Creatinine at 1.27, improved with hospital management.   7. Anemia. Last hgb at 8.3, may be in part dilutional. He is on iron. Last hospital stay in August, had hgb drop to 6.4, received venofer and transfusion.   8. CAD. Hx of MIs. Hx of stenting and prior CABG. On statin, beta blocker, xarelto.  9. Mood disorder. Mixed depression and anxiety. On duloxetine, Abilify. Also takes Mirtazapine.   10. Hx of stroke. Continue statin, xarelto.  11. Mild intellectual disabilities. He resides in an [facility], but has been in the hospital or this TCU since Dec 2021 related to above issues.    12. Code status is full code.             Electronically signed by: Heidi Xie MD           Sincerely,        Heidi Xie MD

## 2022-10-06 ENCOUNTER — TRANSITIONAL CARE UNIT VISIT (OUTPATIENT)
Dept: GERIATRICS | Facility: CLINIC | Age: 52
End: 2022-10-06
Payer: MEDICARE

## 2022-10-06 VITALS
BODY MASS INDEX: 28 KG/M2 | TEMPERATURE: 98.7 F | OXYGEN SATURATION: 95 % | SYSTOLIC BLOOD PRESSURE: 127 MMHG | WEIGHT: 158 LBS | HEIGHT: 63 IN | HEART RATE: 83 BPM | RESPIRATION RATE: 18 BRPM | DIASTOLIC BLOOD PRESSURE: 84 MMHG

## 2022-10-06 DIAGNOSIS — I48.0 PAROXYSMAL ATRIAL FIBRILLATION (H): ICD-10-CM

## 2022-10-06 DIAGNOSIS — R50.81 NEUTROPENIC FEVER (H): Primary | ICD-10-CM

## 2022-10-06 DIAGNOSIS — T14.8XXA WOUND INFECTION: ICD-10-CM

## 2022-10-06 DIAGNOSIS — D70.9 NEUTROPENIC FEVER (H): Primary | ICD-10-CM

## 2022-10-06 DIAGNOSIS — I10 ESSENTIAL HYPERTENSION: ICD-10-CM

## 2022-10-06 DIAGNOSIS — E11.69 TYPE 2 DIABETES MELLITUS WITH OTHER SPECIFIED COMPLICATION, WITHOUT LONG-TERM CURRENT USE OF INSULIN (H): ICD-10-CM

## 2022-10-06 DIAGNOSIS — L08.9 WOUND INFECTION: ICD-10-CM

## 2022-10-06 PROCEDURE — 99310 SBSQ NF CARE HIGH MDM 45: CPT | Performed by: FAMILY MEDICINE

## 2022-10-06 NOTE — LETTER
10/6/2022        RE: Cm Lundberg  [address]  [place] [address]          [facility] GERIATRICS  [place] Medical Record Number:  [id number]  Place of Service where encounter took place: [facility] ([facility]) [[id number]]   CODE STATUS:   CPR/Full code     Chief Complaint/Reason for Visit:  Chief Complaint   Patient presents with     RECHECK     TCU 10/6/2022. Recent hospitalization for neutropenic fever. 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 Wound vac.        HPI:    Cm Lundberg is a 52 year old male with hx of CAD, s/p stents and CABG, HTN, DM, prior CVA, afib/flutter on xarelto, who has been residing in TCU at [facility] since Dec 2021 surgery for chronic long standing right foot ulceration. He underwent incision and drainage of the right foot, excisional debridement of the ulceration of the right foot into the level of the muscle, under MAC with popliteal block anesthesia on 12/17/2021. He completed abx per ID recommendations. Remained NWB with wound vac, had 1/27/2022 incision and drainage with application of theraskin. On 4/4/2022 again incision and drainage with theraskin. Continued following with podiatry, referred to wound and vascular due to non healing diabetic ulcer, right foot. S/p angioplasty RLE and hyperbaric oxygen therapy. MRI with indications of osteomyelitis. 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 Hospitalized 8/23/2022-8/30/2022 due to stump infection, placed on IV Vanco due to MRSA, has continued with NWB and wound vac. Sent in to the hospital from TCU on 9/27/2022 due to fever.    He was worked up, treated in the hospital. Seen by ID. Unfortunately, what is listed as the discharge summary from this hospital stay does not provide the details of his stay, it is a note representing the admission details. It is however, excerpted for information below.      [facility]  Hospitalist Discharge  Summary    Date of Admission:  9/27/2022  Date of Discharge:  9/30/2022  Discharging Provider: Efrain Salcedo,     Discharge Diagnoses  Sepsis  Neutropenic fever  Hx CVA  Atrial fibrillation  CAD  T2DM with diabetic neuropathy  Hypertension  Anxiety  Heart failure with borderline ejection fraction, unspecified chronicity    Hospital Course  52 year old male with PMH significant for DM-2, diabetic foot ulcer, s/p right BKA, infected BKA stump needing I&D in 08/2022, MRSA bacteremia on IV vancomycin, hypertension, CAD, S/P CABG, stents, afib, CVA, diabetic neuropathy who was sent to out ED for concern regarding sepsis      Sepsis  Neutropenic fever  -- Patient with recent right BKA stump infection with MRSA bacteremia,discharged on IV vanc (supposed to be until 9/23/22, but patient reports he is still on that). Still on wound vac. Noted to have new skin redness around the stump, fever of 101F with HR of 105 at TCU  -- Patient doesnt look overtly septic. Blood cultures taken in ED. Will also take cultures from the existing piccline  -- Started empiric vanc/zosyn. Will get CT of the stump to rule out any abscess or osteomyelitis. Consult vascular surgery team  -- If B/C is positive for MRSA, consider RICHARDSON to rule out IE. TTE during last admission was negative for vegetations  -- WBC of 0.9 and ANC of 0.4 noted. Neutropenic precaution.   -- Consult ID     H/O CVA  -- Cont with statin. Hold plavix     Atrial fibrillation  - Cont with Metoprolol  -- Hold Xarelto in case he needs surgical procedure     CAD  -- S/P CABG and stents  -- Cont with cardiac meds. Hold plavix as he might need surgical procedure     DM-II with diabetic neuropathy  -- Hold metformin. Cont glipizide. Add sliding scale insulin     Hypertension  -- Cont with home meds with hold parameters     Anxiety disorder:  -- Cont with home meds      Today:  He has been tolerating wound vac changes, usually takes some pain meds prior otherwise pain is under  reasonable control. He is no longer on abx, had been hospitalized as noted for neutropenic fever, possibly related to the IV Vanco he was on for R BKA infection. Following with wound and vascular clinic and surgeon Dr. Montes, sees Dr. Mei from ID, had seen ID in the hospital as well. He is in good spirits, has been dealing with this issue since Dec 2021 though really for much longer than that, just that he was in the hospital for a procedure in Dec 2021 and has been in this TCU or the hospital ever since, cannot return to his [facility] due to needing too much cares.     He is on xarelto for afib/flutter and metformin and glipizide for DM. Appetite is good. No abdominal pain, nausea, vomiting, diarrhea or constipation. No urinary sx. Medical problems, aflutter, CAD, DM, HTN, managed with his usual medications. Diabetes on metformin and glipizide. Sleeping okay.       PAST MEDICAL HISTORY:  Past Medical History:   Diagnosis Date     Anemia      Anemia, unspecified type 08/19/2016     Anxiety      Anxiety state 07/29/2021    Formatting of this note might be different from the original. Created by Conversion  Replacement Utility updated for latest IMO load     Arthritis      Atrial fibrillation and flutter (H)      Cerebral artery occlusion with cerebral infarction (H)      Cerebral infarction (H)      Chest pain, unspecified type 08/19/2016     Chronic atrial fibrillation (H)      Congestive heart failure (H)      Coronary artery disease     CABG with multiple stents     Coronary artery disease involving native coronary artery without angina pectoris, unspecified whether native or transplanted heart 08/19/2016     Coronary atherosclerosis 07/29/2021    Formatting of this note might be different from the original. Created by Conversion  Replacement Utility updated for latest IMO load     Depression      Diabetic ulcer of right midfoot associated with type 2 diabetes mellitus, limited to breakdown of skin (H) 09/28/2017      Equinovarus acquired deformity, right 04/14/2021     Equinovarus deformity, acquired, right      Essential hypertension 08/19/2016     GERD (gastroesophageal reflux disease)      Heart attack (H)      History of blood transfusion      Hyperlipidemia      Hyperlipidemia, unspecified hyperlipidemia type 08/19/2016     Hypertension      Insomnia, unspecified type 08/19/2016     Ischemic cardiomyopathy      Late effects of cerebrovascular accident 08/19/2016     Major depressive disorder with single episode, remission status unspecified 08/19/2016     Microalbuminuria 07/29/2021    Formatting of this note might be different from the original. 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 Created by Conversion [facility] Annotation: Feb 11 2010  1:41PM - Leonid Cabral: CANNOT READ OR  WRITE     PVD (peripheral vascular disease) (H)      Stented coronary artery      Type 2 diabetes mellitus (H) 07/29/2021    Formatting of this note might be different from the original. Created by Conversion     Type II or unspecified type diabetes mellitus with ophthalmic manifestations, not stated as uncontrolled(250.50) (H) 07/29/2021    Formatting of this note might be different from the original. Created by Conversion     Weakness 08/19/2016       MEDICATIONS:  Current Outpatient Medications   Medication Sig Dispense Refill     acetaminophen (TYLENOL) 325 MG tablet Take 650 mg by mouth every 6 hours as needed for fever or pain       acetaminophen (TYLENOL) 325 MG tablet Take 650 mg by mouth 2 times daily       ARIPiprazole (ABILIFY) 2 MG tablet 2 mg daily        ASPERCREME ORIGINAL 10 % external cream Apply topically 2 times daily as needed for other (back pain)       atorvastatin  (LIPITOR) 80 MG tablet Take 80 mg by mouth daily        bisacodyl (DULCOLAX) 10 MG suppository Place 10 mg rectally daily as needed for constipation       cholestyramine (QUESTRAN) 4 g packet Take 1 packet by mouth every other day        clopidogrel (PLAVIX) 75 MG tablet Take 1 tablet (75 mg) by mouth daily Start taking medication the day after the procedure. 90 tablet 1     DULoxetine (CYMBALTA) 60 MG capsule Take 120 mg by mouth daily       erenumab-aooe (AIMOVIG, 140 MG DOSE,) 70 MG/ML injection Inject 140 mg Subcutaneous every 30 days       ferrous sulfate (FEROSUL) 325 (65 Fe) MG tablet Take 325 mg by mouth daily (with breakfast)        gabapentin (NEURONTIN) 100 MG capsule Take 100 mg by mouth 2 times daily (before meals)       gabapentin (NEURONTIN) 300 MG capsule Take 600 mg by mouth At Bedtime       glipiZIDE (GLUCOTROL) 5 MG tablet Take 2.5 mg by mouth daily before breakfast       HYDROcodone-acetaminophen (NORCO) 5-325 MG tablet Take 1-2 tablets by mouth every 6 hours as needed for moderate to severe pain (Patient taking differently: Take 1 tablet by mouth every 6 hours as needed for moderate to severe pain) 12 tablet 0     loperamide (IMODIUM) 2 MG capsule Take 2 mg by mouth 4 times daily as needed for diarrhea       losartan (COZAAR) 25 MG tablet Take 25 mg by mouth At Bedtime        magnesium chloride 535 (64 Mg) MG TBEC CR tablet Take 1,070 mg by mouth 2 times daily       metFORMIN (GLUCOPHAGE) 500 MG tablet Take 500 mg by mouth daily (with dinner)       metFORMIN (GLUCOPHAGE) 500 MG tablet Take 1,000 mg by mouth daily (with breakfast)       metoprolol succinate ER (TOPROL-XL) 25 MG 24 hr tablet Take 75 mg by mouth daily        mineral oil-hydrophilic petrolatum (AQUAPHOR) external ointment Apply topically 3 times daily as needed for irritation or dry skin Apply to forehead and other areas as needed       mirtazapine (REMERON) 7.5 MG tablet Take 7.5 mg by mouth At Bedtime        Nitroglycerin  "(NITROSTAT SL) Place 0.4 mg under the tongue every 5 minutes as needed for chest pain        pantoprazole (PROTONIX) 40 MG EC tablet Take 40 mg by mouth daily before breakfast       polyethylene glycol (MIRALAX) 17 GM/Dose powder Take 1 packet by mouth daily as needed for constipation        senna-docusate (SENOKOT-S/PERICOLACE) 8.6-50 MG tablet Take 1 tablet by mouth 2 times daily       senna-docusate (SENOKOT-S/PERICOLACE) 8.6-50 MG tablet Take 1 tablet by mouth 2 times daily as needed for constipation In addition to scheduled doses       sodium chloride (PF) 0.9% PF flush 10 mLs by Intracatheter route 3 times daily       traZODone (DESYREL) 50 MG tablet Take 50 mg by mouth At Bedtime        vitamin C (ASCORBIC ACID) 500 MG tablet Take 500 mg by mouth daily        XARELTO ANTICOAGULANT 20 MG TABS tablet Take 20 mg by mouth daily (with breakfast)         PHYSICAL EXAM:  General: Patient is alert male, no distress.   Vitals: /84   Pulse 83   Temp 98.7  F (37.1  C)   Resp 18   Ht 1.6 m (5' 3\")   Wt 71.7 kg (158 lb)   SpO2 95%   BMI 27.99 kg/m    HEENT: Head is NCAT. Eyes show no injection or icterus. Nares negative. Oropharynx moist.  Neck: No JVD.  Lungs: Non labored respirations.   Abdomen: Soft.  : Deferred.  Extremities: R BKA, wound vac.    Musculoskeletal: Mild swelling at R stump.   Skin: Wound vac, right BKA site.   Psych: Mood is stable.       LABS/DIAGNOSTIC DATA:  Component      Latest Ref Rng & Units 9/27/2022 9/29/2022 9/30/2022               WBC      4.0 - 11.0 10e3/uL 0.9 (LL) 5.5 4.2   RBC Count      4.40 - 5.90 10e6/uL 3.44 (L) 3.82 (L) 3.17 (L)   Hemoglobin      13.3 - 17.7 g/dL 9.2 (L) 10.1 (L) 8.3 (L)   Hematocrit      40.0 - 53.0 % 28.6 (L) 32.0 (L) 26.7 (L)   MCV      78 - 100 fL 83 84 84   MCH      26.5 - 33.0 pg 26.7 26.4 (L) 26.2 (L)   MCHC      31.5 - 36.5 g/dL 32.2 31.6 31.1 (L)   RDW      10.0 - 15.0 % 14.6 15.4 (H) 15.5 (H)   Platelet Count      150 - 450 10e3/uL 151 171 " 159     Component      Latest Ref Rng & Units 9/27/2022 9/29/2022 9/30/2022 9/30/2022             6:33 AM  6:33 AM   Sodium      136 - 145 mmol/L 133 (L) 135 (L)  136   Potassium      3.4 - 5.3 mmol/L 4.1 4.1  3.9   Chloride      98 - 107 mmol/L 100 99  103   Carbon Dioxide (CO2)      22 - 29 mmol/L 21 (L) 23  24   Anion Gap      7 - 15 mmol/L 12 13  9   Urea Nitrogen      6.0 - 20.0 mg/dL 13.7 20.2 (H)  24.7 (H)   Creatinine      0.67 - 1.17 mg/dL 0.75 1.27 (H) 1.20 (H) 1.16   Calcium      8.6 - 10.0 mg/dL 7.4 (L) 8.5 (L)  8.6   Glucose      70 - 99 mg/dL 172 (H) 188 (H)  153 (H)   Alkaline Phosphatase      40 - 129 U/L 100   72   AST      10 - 50 U/L 20   30   ALT      10 - 50 U/L 19   19   Protein Total      6.4 - 8.3 g/dL 6.0 (L)   5.5 (L)   Albumin      3.5 - 5.2 g/dL 3.4 (L)   2.8 (L)   Bilirubin Total      <=1.2 mg/dL 0.4   0.4   GFR Estimate      >60 mL/min/1.73m2 >90 67 72 75         ASSESSMENT/PLAN:  1. Neutropenic fever. Hospitalized 9/27/2022-9/30/2022. Had been on IV Vancomycin due to R BKA stump infection with MRSA. Treated with Zosyn for stump cellulitis in the hospital. No further abx. Follow up with ID and with wound and vascular clinic, appt on 10/10/2022. WBC has since normalized.   2. Wound infection, MRSA. From R BKA on 8/5/2022. NWB with wound vac. Follow up on 10/10/2022.   3. HTN. On metoprolol and losartan. Continue to monitor Bps in TCU, no med changes today, BP good.  4. Afib. He is on xarelto for anticoagulation. Adequate rate control.   5. Diabetes. On metformin and glipizide. Accuchecks followed.   6. Anemia. Last hgb at 8.3, may be in part dilutional. He is on iron. Recheck in TCU.    Stable:  CAD. Hx of MIs. Hx of stenting and prior CABG. On statin, beta blocker, xarelto.  Mood disorder. Mixed depression and anxiety. On duloxetine, Abilify. Also takes Mirtazapine.   Hx of stroke. Continue statin, xarelto.  Mild intellectual disabilities. He resides in an [facility], but has been in the  [facility] or this TCU since Dec 2021 related to above issues.        Electronically signed by: Heidi Xie MD           Sincerely,        Heidi Xie MD

## 2022-10-09 NOTE — PROGRESS NOTES
[facility] GERIATRICS  [facility] Record Number:  [id number]  Place of Service where encounter took place: [facility] ([facility]) [[id number]]   CODE STATUS:   CPR/Full code     Chief Complaint/Reason for Visit:  Chief Complaint   Patient presents with     Hospital F/U     Re-admit to TCU for R BKA (8/5/2022) infection.        HPI:    Cm Lundberg is a 52 year old male with hx of CAD, s/p stents and CABG, HTN, DM, prior CVA, afib/flutter on xarelto, who has been residing in TCU at [facility] since Dec 2021 surgery for chronic long standing right foot ulceration. He underwent incision and drainage of the right foot, excisional debridement of the ulceration of the right foot into the level of the muscle, under MAC with popliteal block anesthesia on 12/17/2021. He completed abx per ID recommendations. Remained NWB with wound vac, had 1/27/2022 incision and drainage with application of theraskin. On 4/4/2022 again incision and drainage with theraskin. Continued following with podiatry, referred to wound and vascular due to non healing diabetic ulcer, right foot. S/p angioplasty RLE and hyperbaric oxygen therapy. MRI with indications of osteomyelitis. 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    He was sent to the hospital from TCU on 8/23/2022 due to swelling, and drainage from surgical wound, concern for infection. His hospital discharge summary is partially excerpted below.     [facility]  Medicine Progress Note - Hospitalist Service  (Note: This note is listed as the discharge summary)  Date of Admission:  8/23/2022    Assessment & Plan  Cm Lundberg is a 52 year old male admitted on 8/23/2022.      Assessment:  #Infected right BKA stump.  CT right leg showed evolving abscess.  Patient not septic.  Blood cultures obtained.  On vancomycin and Zosyn.  S/p I&D 8/24, culture sent.  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 Valderrama.        #MRSA bacteremia, likely due to right BKA stump abscess.  Positive BC on 8/23, 8/25.  Surveillance BC obtained 8/26, 8/27, 8/28.    PICC on 8/29.  Plan for 28 days of IV vancomycin post clearance of blood, till September 23.     #Acute renal failure, creat peaked at 1.47 on 8/24, was 1.04 on admission.  Likely due to sepsis and dehydration, given rising BUN.  With IVF renal function normalizing.     #Chronic normocytic anemia, hemoglobin baseline 8-9.  History of iron deficiency.  Iron checked low at 16 on 8/25/2022.    Replacing with Venofer 2 doses on 8/27 and 8/28, then p.o. replacement.     #Acute anemia, hemoglobin 6.4 on 8/25.  Reported intraoperative EBL 10 mL.  No postop bleeding.  S/p 1 unit PRBCs 8/25.HgB in upper 8's.     #CVD, history of stroke.  On Plavix, statin.     #Atrial fibrillation.  Was on Xarelto, held preoperatively.  Resume when okay per surgery.     #Coronary artery disease, status post CABG and stents.  On Plavix, beta-blockers, fibrate.     #Non-insulin-dependent diabetes mellitus.  Well-controlled with Glucophage and glipizide.  Initially held due to RANDAL.  Resumed now.  A1c 5.9 on 8/26.  A1C 5.9 but Hgb 8.2 at the time so less accurate.      #Mood disorder:general anxiety and depression, stable on Abilify, Cymbalta, Ativan, Remeron, trazodone.     Essential hypertension.  BP controlled on losartan and metoprolol.  Peripheral neuropathy, due to diabetes.  Resident of [facility] prior to recent past hospitalization TCU.     Overall stabilized and discharged back to TCU on 8/30/2022 for PT, OT, nursing cares, medical management and monitoring.     Today:  He is on IV Vancomycin, pharmacy managing dose, labs done per protocol. Tolerating, has PICC RUE. Wound vac on stump wound. Will need follow up with Dr. Montes. Pain is adequately managed, he has needed prn Norco, previously on oxycodone, switched per hospital. No complaints today, trying to keep a good attitude. Has  been here in this TCU essentially since De 2021 other than his hospital stays. Knows staff well and family visits. His appetite is good. No abdominal pain, nausea, vomiting, diarrhea or constipation. No urinary sx. Medical problems, aflutter, CAD, DM, HTN, managed with his usual medications. Diabetes on metformin glipizide. Sleeping well. No new vision or hearing concerns. He follows with ophthalmology. Resided at [facility] prior to this, unable to return until no longer has wound vac and cares can be provided by [facility] staff.        REVIEW OF SYSTEMS:  All others negative other than those noted in HPI.      PAST MEDICAL HISTORY:  Past Medical History:   Diagnosis Date     Anemia      Anemia, unspecified type 08/19/2016     Anxiety      Anxiety state 07/29/2021    Formatting of this note might be different from the original. Created by Conversion  Replacement Utility updated for latest IMO load     Arthritis      Atrial fibrillation and flutter (H)      Cerebral artery occlusion with cerebral infarction (H)      Cerebral infarction (H)      Chest pain, unspecified type 08/19/2016     Chronic atrial fibrillation (H)      Congestive heart failure (H)      Coronary artery disease     CABG with multiple stents     Coronary artery disease involving native coronary artery without angina pectoris, unspecified whether native or transplanted heart 08/19/2016     Coronary atherosclerosis 07/29/2021    Formatting of this note might be different from the original. Created by Conversion  Replacement Utility updated for latest IMO load     Depression      Diabetic ulcer of right midfoot associated with type 2 diabetes mellitus, limited to breakdown of skin (H) 09/28/2017     Equinovarus acquired deformity, right 04/14/2021     Equinovarus deformity, acquired, right      Essential hypertension 08/19/2016     GERD (gastroesophageal reflux disease)      Heart attack (H)      History of blood transfusion      Hyperlipidemia       Hyperlipidemia, unspecified hyperlipidemia type 08/19/2016     Hypertension      Insomnia, unspecified type 08/19/2016     Ischemic cardiomyopathy      Late effects of cerebrovascular accident 08/19/2016     Major depressive disorder with single episode, remission status unspecified 08/19/2016     Microalbuminuria 07/29/2021    Formatting of this note might be different from the original. 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 Created by Conversion [facility] Annotation: Feb 11 2010  1:41PM - Leonid Cabral: CANNOT READ OR  WRITE     PVD (peripheral vascular disease) (H)      Stented coronary artery      Type 2 diabetes mellitus (H) 07/29/2021    Formatting of this note might be different from the original. Created by Conversion     Type II or unspecified type diabetes mellitus with ophthalmic manifestations, not stated as uncontrolled(250.50) (H) 07/29/2021    Formatting of this note might be different from the original. Created by Conversion     Weakness 08/19/2016       PAST SURGICAL HISTORY:  Past Surgical History:   Procedure Laterality Date     AMPUTATE LEG BELOW KNEE Right 8/5/2022    Procedure: AMPUTATION, BELOW KNEE, right leg;  Surgeon: Kim Montes MD;  Location: [facility] OR     INCISION AND DRAINAGE LOWER EXTREMITY, COMBINED Right 12/17/2021    Procedure: INCISION AND DRAINAGE, right foot;  Surgeon: Alex Kim DPM;  Location: [facility]     INCISION AND DRAINAGE LOWER EXTREMITY, COMBINED Right 1/27/2022    Procedure: INCISION AND DRAINAGE, right foot with application of theraskin;  Surgeon: Alex Kim DPM;  Location: [facility] OR     INCISION AND DRAINAGE LOWER EXTREMITY, COMBINED Right 4/4/2022     Procedure: INCISION AND DRAINAGE, right foot with application of theraskin;  Surgeon: Alex Kim DPM;  Location: [facility] OR     IR LOWER EXTREMITY ANGIOGRAM RIGHT  2022     IR MISCELLANEOUS PROCEDURE  2004     IRRIGATION AND DEBRIDEMENT LOWER EXTREMITY, COMBINED Right 2022    Procedure: RIGHT IRRIGATION AND DEBRIDEMENT, LOWER EXTREMITY, REVISION OF BELOW KNEE AMPUTATION;  Surgeon: Kim Montes MD;  Location: [facility] OR     IRRIGATION AND DEBRIDEMENT LOWER EXTREMITY, COMBINED Right 2022    Procedure: WASHOUT AND PARTIAL CLOSURE OF RIGHT BELOW THE KNEE AMPUTATION,  AND WOUND VAC APPLICATION;  Surgeon: Kim Montes MD;  Location: [facility] OR     PICC DOUBLE LUMEN PLACEMENT  2022          ZZC CABG, VEIN, SINGLE      Description: CABG (CABG);  Recorded: 2009;       FAMILY HISTORY:  Family History   Adopted: Yes       SOCIAL HISTORY:  Social History     Socioeconomic History     Marital status:      Spouse name: Not on file     Number of children: Not on file     Years of education: Not on file     Highest education level: Not on file   Occupational History     Not on file   Tobacco Use     Smoking status: Former     Types: Cigarettes     Quit date: 2015     Years since quittin.7     Smokeless tobacco: Never   Vaping Use     Vaping Use: Never used   Substance and Sexual Activity     Alcohol use: Not Currently     Drug use: Not Currently     Sexual activity: Not on file   Other Topics Concern     Parent/sibling w/ CABG, MI or angioplasty before 65F 55M? Not Asked   Social History Narrative     Not on file     Social Determinants of Health     Financial Resource Strain: Not on file   Food Insecurity: Not on file   Transportation Needs: Not on file   Physical Activity: Not on file   Stress: Not on file   Social Connections: Not on file   Intimate Partner Violence: Not on file   Housing Stability: Not on file       MEDICATIONS:  Current Outpatient  Medications   Medication Sig Dispense Refill     acetaminophen (TYLENOL) 325 MG tablet Take 650 mg by mouth every 6 hours as needed for fever or pain       acetaminophen (TYLENOL) 325 MG tablet Take 650 mg by mouth 2 times daily       ARIPiprazole (ABILIFY) 2 MG tablet 2 mg daily        ASPERCREME ORIGINAL 10 % external cream Apply topically 2 times daily as needed for other (back pain)       atorvastatin (LIPITOR) 80 MG tablet Take 80 mg by mouth daily        bisacodyl (DULCOLAX) 10 MG suppository Place 10 mg rectally daily as needed for constipation       cholestyramine (QUESTRAN) 4 g packet Take 1 packet by mouth every other day        clopidogrel (PLAVIX) 75 MG tablet Take 1 tablet (75 mg) by mouth daily Start taking medication the day after the procedure. 90 tablet 1     DULoxetine (CYMBALTA) 60 MG capsule Take 120 mg by mouth daily       erenumab-aooe (AIMOVIG, 140 MG DOSE,) 70 MG/ML injection Inject 140 mg Subcutaneous every 30 days       ferrous sulfate (FEROSUL) 325 (65 Fe) MG tablet Take 325 mg by mouth daily (with breakfast)        gabapentin (NEURONTIN) 100 MG capsule Take 100 mg by mouth 2 times daily (before meals)       gabapentin (NEURONTIN) 300 MG capsule Take 600 mg by mouth At Bedtime       glipiZIDE (GLUCOTROL) 5 MG tablet Take 2.5 mg by mouth daily before breakfast       HYDROcodone-acetaminophen (NORCO) 5-325 MG tablet Take 1-2 tablets by mouth every 6 hours as needed for moderate to severe pain (Patient taking differently: Take 1 tablet by mouth every 6 hours as needed for moderate to severe pain) 12 tablet 0     loperamide (IMODIUM) 2 MG capsule Take 2 mg by mouth 4 times daily as needed for diarrhea       losartan (COZAAR) 25 MG tablet Take 25 mg by mouth At Bedtime        magnesium chloride 535 (64 Mg) MG TBEC CR tablet Take 1,070 mg by mouth 2 times daily       metFORMIN (GLUCOPHAGE) 500 MG tablet Take 500 mg by mouth daily (with dinner)       metFORMIN (GLUCOPHAGE) 500 MG tablet Take  "1,000 mg by mouth daily (with breakfast)       metoprolol succinate ER (TOPROL-XL) 25 MG 24 hr tablet Take 75 mg by mouth daily        mineral oil-hydrophilic petrolatum (AQUAPHOR) external ointment Apply topically 3 times daily as needed for irritation or dry skin Apply to forehead and other areas as needed       mirtazapine (REMERON) 7.5 MG tablet Take 7.5 mg by mouth At Bedtime        Nitroglycerin (NITROSTAT SL) Place 0.4 mg under the tongue every 5 minutes as needed for chest pain        pantoprazole (PROTONIX) 40 MG EC tablet Take 40 mg by mouth daily before breakfast       polyethylene glycol (MIRALAX) 17 GM/Dose powder Take 1 packet by mouth daily as needed for constipation        senna-docusate (SENOKOT-S/PERICOLACE) 8.6-50 MG tablet Take 1 tablet by mouth 2 times daily       senna-docusate (SENOKOT-S/PERICOLACE) 8.6-50 MG tablet Take 1 tablet by mouth 2 times daily as needed for constipation In addition to scheduled doses       sodium chloride, PF, 0.9% PF flush 10 mLs by Intracatheter route 3 times daily       traZODone (DESYREL) 50 MG tablet Take 50 mg by mouth At Bedtime        vitamin C (ASCORBIC ACID) 500 MG tablet Take 500 mg by mouth daily        XARELTO ANTICOAGULANT 20 MG TABS tablet Take 20 mg by mouth daily (with breakfast)          ALLERGIES:  Allergies   Allergen Reactions     Aspirin Other (See Comments)     Reacts with migraine medicine     Blood-Group Specific Substance      Anti-Fya present.  Expect delays in blood for transfusion.  Draw 2 lavender and 1 red for all type and screen orders.     Isosorbide Headache     Codeine Rash     Morphine Rash       PHYSICAL EXAM:  General: Patient is alert male, no distress.   Vitals: /60   Pulse 70   Temp 98.7  F (37.1  C)   Resp 18   Ht 1.702 m (5' 7\")   Wt 67.3 kg (148 lb 6.4 oz)   SpO2 97%   BMI 23.24 kg/m    HEENT: Head is NCAT. Eyes show no injection or icterus. Nares negative. Oropharynx well hydrated.  Neck: Supple. No tenderness " or adenopathy. No JVD.  Lungs: Clear bilaterally. No wheezes.  Cardiovascular: Regular rate and rhythm, normal S1, S2.  Back: No spinal or CVA tenderness.  Abdomen: Soft, no tenderness on exam. Bowel sounds present. No guarding rebound or rigidity.  : Deferred.  Extremities: Mild swelling at R BKA stump, wound vac. PICC RUE.  Musculoskeletal: No LLE swelling.   Skin: Wound vac R.  Psych: Mood appears good.      LABS/DIAGNOSTIC DATA:  Component      Latest Ref Rng & Units 8/23/2022 8/24/2022 8/24/2022 8/24/2022            6:11 AM  8:33 AM  7:13 PM   RBC Count      4.40 - 5.90 10e6/uL 3.89 (L) 3.48 (L) 3.40 (L)    Hemoglobin      13.3 - 17.7 g/dL 9.9 (L) 8.9 (L) 8.7 (L)    Hematocrit      40.0 - 53.0 % 32.4 (L) 29.0 (L) 28.3 (L)    MCV      78 - 100 fL 83 83 83    MCH      26.5 - 33.0 pg 25.4 (L) 25.6 (L) 25.6 (L)    MCHC      31.5 - 36.5 g/dL 30.6 (L) 30.7 (L) 30.7 (L)    RDW      10.0 - 15.0 % 14.5 14.7 14.8    Platelet Count      150 - 450 10e3/uL 433 323 315 255     Component      Latest Ref Rng & Units 8/25/2022 8/25/2022 8/26/2022 8/27/2022           6:05 AM  6:28 PM     RBC Count      4.40 - 5.90 10e6/uL 2.49 (L) 3.07 (L) 3.18 (L) 3.39 (L)   Hemoglobin      13.3 - 17.7 g/dL 6.4 (LL) 8.0 (L) 8.2 (L) 8.8 (L)   Hematocrit      40.0 - 53.0 % 20.9 (L) 26.3 (L) 27.3 (L) 29.1 (L)   MCV      78 - 100 fL 84 86 86 86   MCH      26.5 - 33.0 pg 25.7 (L) 26.1 (L) 25.8 (L) 26.0 (L)   MCHC      31.5 - 36.5 g/dL 30.6 (L) 30.4 (L) 30.0 (L) 30.2 (L)   RDW      10.0 - 15.0 % 14.6 14.7 14.6 14.7   Platelet Count      150 - 450 10e3/uL 249 291 251 296     Component      Latest Ref Rng & Units 8/29/2022 8/30/2022              RBC Count      4.40 - 5.90 10e6/uL 3.32 (L)    Hemoglobin      13.3 - 17.7 g/dL 8.6 (L)    Hematocrit      40.0 - 53.0 % 28.2 (L)    MCV      78 - 100 fL 85    MCH      26.5 - 33.0 pg 25.9 (L)    MCHC      31.5 - 36.5 g/dL 30.5 (L)    RDW      10.0 - 15.0 % 14.8    Platelet Count      150 - 450 10e3/uL 264  280     Component      Latest Ref Rng & Units 8/23/2022 8/24/2022 8/24/2022 8/25/2022            6:11 AM  8:33 AM    Creatinine      0.67 - 1.17 mg/dL 1.04 1.47 (H) 1.50 (H) 1.09   Sodium      136 - 145 mmol/L 134 (L) 136  137   Potassium      3.4 - 5.3 mmol/L       Urea Nitrogen      6.0 - 20.0 mg/dL       Chloride      98 - 107 mmol/L       Carbon Dioxide (CO2)      22 - 29 mmol/L       Anion Gap      7 - 15 mmol/L 14 8  4 (L)   Glucose      70 - 99 mg/dL       GFR Estimate      >60 mL/min/1.73m2 85 56 (L) 55 (L) 81   Calcium      8.6 - 10.0 mg/dL 10.2 10.0  8.6     Component      Latest Ref Rng & Units 8/26/2022 8/27/2022 8/28/2022 8/29/2022                Creatinine      0.67 - 1.17 mg/dL 1.00 0.99 0.97 1.04   Sodium      136 - 145 mmol/L 139   140   Potassium      3.4 - 5.3 mmol/L       Urea Nitrogen      6.0 - 20.0 mg/dL       Chloride      98 - 107 mmol/L       Carbon Dioxide (CO2)      22 - 29 mmol/L       Anion Gap      7 - 15 mmol/L 4 (L)   6   Glucose      70 - 99 mg/dL       GFR Estimate      >60 mL/min/1.73m2 89 >90 >90 85   Calcium      8.6 - 10.0 mg/dL 8.7   9.1         ASSESSMENT/PLAN:  1. Wound infection, MRSA. From [facility] on 8/5/2022, sent to hospital from TCU on 8/23/2022 due to concerns of infection. BC 8/23/2022 positive for MRSA. He underwent irrigation and debridement on 8/24/2022 and then washout with partial closure and application of wound vac on 8/26/2022. On IV Vancomycin, pharmacy managing. Follow up with Dr. Montes and follow up with ID.   2. RANDAL. Creatinine at 1.47 highest, improved with IVFs. Labs will be monitored.   3. Anemia. Hgb dropped to 6.4, he did receive transfusion. He is on iron. Received Venofer in the hospital.   4. HTN. On metoprolol and losartan. Bps satisfactory.   5. Afib. On xarelto for anticoagulation. Adequate rate control.   6. Diabetes. On metformin and glipizide. Accuchecks followed.   7. CAD. Hx of MIs. Hx of stenting and prior CABG. On statin, beta blocker,  xarelto.  8. Mood disorder. Mixed depression and anxiety. On duloxetine, Abilify. Also takes Mirtazapine.   9. Hx of stroke. Continue statin, xarelto.  10. Mild intellectual disabilities. He resides in an [facility], but has been in the hospital or this TCU since Dec 2021 related to above issues.    11. Code status is full code.           Electronically signed by: Heidi Xie MD

## 2022-10-10 ENCOUNTER — OFFICE VISIT (OUTPATIENT)
Dept: VASCULAR SURGERY | Facility: CLINIC | Age: 52
End: 2022-10-10
Attending: REGISTERED NURSE
Payer: MEDICARE

## 2022-10-10 ENCOUNTER — LAB REQUISITION (OUTPATIENT)
Dept: LAB | Facility: CLINIC | Age: 52
End: 2022-10-10
Payer: MEDICARE

## 2022-10-10 ENCOUNTER — TELEPHONE (OUTPATIENT)
Dept: VASCULAR SURGERY | Facility: CLINIC | Age: 52
End: 2022-10-10

## 2022-10-10 ENCOUNTER — TRANSITIONAL CARE UNIT VISIT (OUTPATIENT)
Dept: GERIATRICS | Facility: CLINIC | Age: 52
End: 2022-10-10
Payer: MEDICARE

## 2022-10-10 VITALS
HEART RATE: 78 BPM | HEIGHT: 63 IN | DIASTOLIC BLOOD PRESSURE: 79 MMHG | RESPIRATION RATE: 18 BRPM | TEMPERATURE: 98.2 F | BODY MASS INDEX: 28 KG/M2 | SYSTOLIC BLOOD PRESSURE: 129 MMHG | WEIGHT: 158 LBS | OXYGEN SATURATION: 8 %

## 2022-10-10 VITALS
SYSTOLIC BLOOD PRESSURE: 118 MMHG | DIASTOLIC BLOOD PRESSURE: 72 MMHG | HEART RATE: 76 BPM | RESPIRATION RATE: 16 BRPM | TEMPERATURE: 98.5 F

## 2022-10-10 DIAGNOSIS — R50.81 NEUTROPENIC FEVER (H): Primary | ICD-10-CM

## 2022-10-10 DIAGNOSIS — L21.0 DANDRUFF, ADULT: ICD-10-CM

## 2022-10-10 DIAGNOSIS — D70.9 NEUTROPENIA, UNSPECIFIED (H): ICD-10-CM

## 2022-10-10 DIAGNOSIS — I10 ESSENTIAL HYPERTENSION: ICD-10-CM

## 2022-10-10 DIAGNOSIS — M86.9 OSTEOMYELITIS OF ANKLE AND FOOT (H): ICD-10-CM

## 2022-10-10 DIAGNOSIS — E11.9 TYPE 2 DIABETES MELLITUS WITHOUT COMPLICATIONS (H): ICD-10-CM

## 2022-10-10 DIAGNOSIS — D70.9 NEUTROPENIC FEVER (H): Primary | ICD-10-CM

## 2022-10-10 DIAGNOSIS — T81.89XD NON-HEALING SURGICAL WOUND, SUBSEQUENT ENCOUNTER: ICD-10-CM

## 2022-10-10 DIAGNOSIS — D64.9 ANEMIA, UNSPECIFIED: ICD-10-CM

## 2022-10-10 DIAGNOSIS — I25.10 CORONARY ARTERY DISEASE INVOLVING NATIVE CORONARY ARTERY OF NATIVE HEART WITHOUT ANGINA PECTORIS: ICD-10-CM

## 2022-10-10 DIAGNOSIS — Z89.511 STATUS POST BELOW-KNEE AMPUTATION OF RIGHT LOWER EXTREMITY (H): Primary | ICD-10-CM

## 2022-10-10 PROCEDURE — 11042 DBRDMT SUBQ TIS 1ST 20SQCM/<: CPT | Performed by: REGISTERED NURSE

## 2022-10-10 PROCEDURE — 99310 SBSQ NF CARE HIGH MDM 45: CPT | Performed by: NURSE PRACTITIONER

## 2022-10-10 ASSESSMENT — PAIN SCALES - GENERAL: PAINLEVEL: MODERATE PAIN (4)

## 2022-10-10 NOTE — LETTER
"    10/10/2022        RE: Cm Lundberg  [address]  [place] [address]        [facility] GERIATRICS    Chief Complaint   Patient presents with     RECHECK     HPI:  Cm Lundberg is a 52 year old  (1970), who is being seen today for an episodic care visit at: [facility] ([facility]) [[id number]]. Today's concern is: Dry scalp, f/u from recent hospitization, MRSA bacteremia.     Matias has a history of CAD (s/p CABG), a fib, DM2, foot ulcer, R BKA with infected stump and I&D on 8.22, MRSA bacteremia on IV vancomycin who was hospitalized from 9/27 to 9/30 with neutropenic fever, fever to 101, tachycardic. Vancomycin stopped in hospital after r/o osteomyelitis and wbc did improve. No further fevers. He returned to tcu with wound vac in place. WBC's at hospital discharge 4.2.   He is also c/o of dry, itching scalp with flaky skin. Will trial selsun blue in facility.     Allergies, and PMH/PSH reviewed in [facility] today.  REVIEW OF SYSTEMS:  4 point ROS including Respiratory, CV, GI and , other than that noted in the HPI,  is negative    Objective:   /79   Pulse 78   Temp 98.2  F (36.8  C)   Resp 18   Ht 1.6 m (5' 3\")   Wt 71.7 kg (158 lb)   SpO2 (!) 8%   BMI 27.99 kg/m    Physical Exam   General appearance: alert, appears stated age and cooperative.   Head: Normocephalic, without obvious abnormality, atraumatic, Eyes: sclera anicteric.  Lungs: respirations unlabored, no wheezing.   Cardiovascular: S1, S2. Regular rate and rhythm.   ABDOMEN: Globular and soft, non tender.    Extremities: RBKA.   Skin: Dry, flaking scalp. RBKA wound covered.   Neurologic:oriented. No focal deficits.   Psych: interacts well with caregivers, exhibits logical thought processes and connections, pleasant      Most Recent 3 CBC's:  Recent Labs   Lab Test 10/11/22  0512 09/30/22  0633 09/29/22  0346   WBC 4.0 4.2 5.5   HGB 10.5* 8.3* 10.1*   MCV 84 84 84    159 171     Most " Recent 3 BMP's:  Recent Labs   Lab Test 10/11/22  0512 09/30/22  1224 09/30/22  0801 09/30/22  0633 09/29/22  0830 09/29/22  0346     --   --  136  --  135*   POTASSIUM 4.6  --   --  3.9  --  4.1   CHLORIDE 105  --   --  103  --  99   CO2 23  --   --  24  --  23   BUN 20.3*  --   --  24.7*  --  20.2*   CR 0.88  --   --  1.16  1.20*  --  1.27*   ANIONGAP 10  --   --  9  --  13   TO 9.6  --   --  8.6  --  8.5*   * 104* 139* 153*   < > 188*    < > = values in this interval not displayed.       Assessment/Plan:  (D70.9,  R50.81) Neutropenic fever (H)  (primary encounter diagnosis)  Comment: S/s to vancomycin.   Plan: CBC and BMP on Wednesday.     (L21.0) Dandruff, adult  Comment: itching, dry scalp.   Plan: Start selsun blue, topical application to scalp, 2 x weekly .    (I25.10) Coronary artery disease involving native coronary artery of native heart without angina pectoris  Plan: continues on plavix, atorvastatin.     (I10) Essential hypertension  Comment: blood pressures variable, generally <140/90.   Plan: Losartan, metoprolol.       Post Medication Reconciliation Status: Discharge medications reconciled, continue medications without change        Electronically signed by: Janel Altamirano CNP             Sincerely,        Janel Altamirano, CNP

## 2022-10-10 NOTE — TELEPHONE ENCOUNTER
Patients sister is calling stating she was suppose to be on the phone during the OV visit today, however she wasn't called.  She is asking for a call back to discuss what happened during this apt, most importantly how long does he need the wound vac?  Sherri is stating he is going to loose his housing and can't move into an assistant living until the vac is removed.

## 2022-10-10 NOTE — PROGRESS NOTES
"[facility] GERIATRICS    Chief Complaint   Patient presents with     RECHECK     HPI:  Cm Lundberg is a 52 year old  (1970), who is being seen today for an episodic care visit at: [facility] ([facility]) [[id number]]. Today's concern is: Dry scalp, f/u from recent hospitization, MRSA bacteremia.     Matias has a history of CAD (s/p CABG), a fib, DM2, foot ulcer, R BKA with infected stump and I&D on 8.22, MRSA bacteremia on IV vancomycin who was hospitalized from 9/27 to 9/30 with neutropenic fever, fever to 101, tachycardic. Vancomycin stopped in hospital after r/o osteomyelitis and wbc did improve. No further fevers. He returned to tcu with wound vac in place. WBC's at hospital discharge 4.2.   He is also c/o of dry, itching scalp with flaky skin. Will trial selsun blue in facility.     Allergies, and PMH/PSH reviewed in EPIC today.  REVIEW OF SYSTEMS:  4 point ROS including Respiratory, CV, GI and , other than that noted in the HPI,  is negative    Objective:   /79   Pulse 78   Temp 98.2  F (36.8  C)   Resp 18   Ht 1.6 m (5' 3\")   Wt 71.7 kg (158 lb)   SpO2 (!) 8%   BMI 27.99 kg/m    Physical Exam   General appearance: alert, appears stated age and cooperative.   Head: Normocephalic, without obvious abnormality, atraumatic, Eyes: sclera anicteric.  Lungs: respirations unlabored, no wheezing.   Cardiovascular: S1, S2. Regular rate and rhythm.   ABDOMEN: Globular and soft, non tender.    Extremities: RBKA.   Skin: Dry, flaking scalp. RBKA wound covered.   Neurologic:oriented. No focal deficits.   Psych: interacts well with caregivers, exhibits logical thought processes and connections, pleasant      Most Recent 3 CBC's:  Recent Labs   Lab Test 10/11/22  0512 09/30/22  0633 09/29/22  0346   WBC 4.0 4.2 5.5   HGB 10.5* 8.3* 10.1*   MCV 84 84 84    159 171     Most Recent 3 BMP's:  Recent Labs   Lab Test 10/11/22  0512 09/30/22  1224 09/30/22  0801 09/30/22  0633 " 09/29/22  0830 09/29/22  0346     --   --  136  --  135*   POTASSIUM 4.6  --   --  3.9  --  4.1   CHLORIDE 105  --   --  103  --  99   CO2 23  --   --  24  --  23   BUN 20.3*  --   --  24.7*  --  20.2*   CR 0.88  --   --  1.16  1.20*  --  1.27*   ANIONGAP 10  --   --  9  --  13   TO 9.6  --   --  8.6  --  8.5*   * 104* 139* 153*   < > 188*    < > = values in this interval not displayed.       Assessment/Plan:  (D70.9,  R50.81) Neutropenic fever (H)  (primary encounter diagnosis)  Comment: S/s to vancomycin.   Plan: CBC and BMP on Wednesday.     (L21.0) Dandruff, adult  Comment: itching, dry scalp.   Plan: Start selsun blue, topical application to scalp, 2 x weekly .    (I25.10) Coronary artery disease involving native coronary artery of native heart without angina pectoris  Plan: continues on plavix, atorvastatin.     (I10) Essential hypertension  Comment: blood pressures variable, generally <140/90.   Plan: Losartan, metoprolol.       Post Medication Reconciliation Status: Discharge medications reconciled, continue medications without change        Electronically signed by: Janel Altamirano, CNP

## 2022-10-10 NOTE — PROGRESS NOTES
Follow up Vascular Visit       Date of Service:10/10/22      Chief Complaint: R BKA surgical wound      Pt returns to [facility] with regards to their R BKA surgical wound.  They arrive today alone. They are currently using wound vac to the wounds. This is being done by TCU. They are using tubular compression for compression. They are feeling well today. Denies fevers, chills. No shortness of breath. Pt was admitted to the hospital 9/27-9/30 for neutropenic fever.     Allergies:   Allergies   Allergen Reactions     Aspirin Other (See Comments)     Reacts with migraine medicine     Blood-Group Specific Substance      Anti-Fya present.  Expect delays in blood for transfusion.  Draw 2 lavender and 1 red for all type and screen orders.     Isosorbide Headache     Codeine Rash     Morphine Rash       Medications:   Current Outpatient Medications:      acetaminophen (TYLENOL) 325 MG tablet, Take 650 mg by mouth every 6 hours as needed for fever or pain, Disp: , Rfl:      acetaminophen (TYLENOL) 325 MG tablet, Take 650 mg by mouth 2 times daily, Disp: , Rfl:      ARIPiprazole (ABILIFY) 2 MG tablet, 2 mg daily , Disp: , Rfl:      ASPERCREME ORIGINAL 10 % external cream, Apply topically 2 times daily as needed for other (back pain), Disp: , Rfl:      atorvastatin (LIPITOR) 80 MG tablet, Take 80 mg by mouth daily , Disp: , Rfl:      bisacodyl (DULCOLAX) 10 MG suppository, Place 10 mg rectally daily as needed for constipation, Disp: , Rfl:      cholestyramine (QUESTRAN) 4 g packet, Take 1 packet by mouth every other day , Disp: , Rfl:      clopidogrel (PLAVIX) 75 MG tablet, Take 1 tablet (75 mg) by mouth daily Start taking medication the day after the procedure., Disp: 90 tablet, Rfl: 1     DULoxetine (CYMBALTA) 60 MG capsule, Take 120 mg by mouth daily, Disp: , Rfl:      erenumab-aooe (AIMOVIG, 140 MG DOSE,) 70 MG/ML injection, Inject 140 mg Subcutaneous every 30 days, Disp: , Rfl:      ferrous  sulfate (FEROSUL) 325 (65 Fe) MG tablet, Take 325 mg by mouth daily (with breakfast) , Disp: , Rfl:      gabapentin (NEURONTIN) 100 MG capsule, Take 100 mg by mouth 2 times daily (before meals), Disp: , Rfl:      gabapentin (NEURONTIN) 300 MG capsule, Take 600 mg by mouth At Bedtime, Disp: , Rfl:      glipiZIDE (GLUCOTROL) 5 MG tablet, Take 2.5 mg by mouth daily before breakfast, Disp: , Rfl:      HYDROcodone-acetaminophen (NORCO) 5-325 MG tablet, Take 1-2 tablets by mouth every 6 hours as needed for moderate to severe pain (Patient taking differently: Take 1 tablet by mouth every 6 hours as needed for moderate to severe pain), Disp: 12 tablet, Rfl: 0     loperamide (IMODIUM) 2 MG capsule, Take 2 mg by mouth 4 times daily as needed for diarrhea, Disp: , Rfl:      losartan (COZAAR) 25 MG tablet, Take 25 mg by mouth At Bedtime , Disp: , Rfl:      magnesium chloride 535 (64 Mg) MG TBEC CR tablet, Take 1,070 mg by mouth 2 times daily, Disp: , Rfl:      metFORMIN (GLUCOPHAGE) 500 MG tablet, Take 500 mg by mouth daily (with dinner), Disp: , Rfl:      metFORMIN (GLUCOPHAGE) 500 MG tablet, Take 1,000 mg by mouth daily (with breakfast), Disp: , Rfl:      metoprolol succinate ER (TOPROL-XL) 25 MG 24 hr tablet, Take 75 mg by mouth daily , Disp: , Rfl:      mineral oil-hydrophilic petrolatum (AQUAPHOR) external ointment, Apply topically 3 times daily as needed for irritation or dry skin Apply to forehead and other areas as needed, Disp: , Rfl:      mirtazapine (REMERON) 7.5 MG tablet, Take 7.5 mg by mouth At Bedtime , Disp: , Rfl:      Nitroglycerin (NITROSTAT SL), Place 0.4 mg under the tongue every 5 minutes as needed for chest pain , Disp: , Rfl:      pantoprazole (PROTONIX) 40 MG EC tablet, Take 40 mg by mouth daily before breakfast, Disp: , Rfl:      polyethylene glycol (MIRALAX) 17 GM/Dose powder, Take 1 packet by mouth daily as needed for constipation , Disp: , Rfl:      senna-docusate (SENOKOT-S/PERICOLACE) 8.6-50 MG  tablet, Take 1 tablet by mouth 2 times daily, Disp: , Rfl:      senna-docusate (SENOKOT-S/PERICOLACE) 8.6-50 MG tablet, Take 1 tablet by mouth 2 times daily as needed for constipation In addition to scheduled doses, Disp: , Rfl:      sodium chloride (PF) 0.9% PF flush, 10 mLs by Intracatheter route 3 times daily, Disp: , Rfl:      traZODone (DESYREL) 50 MG tablet, Take 50 mg by mouth At Bedtime , Disp: , Rfl:      vitamin C (ASCORBIC ACID) 500 MG tablet, Take 500 mg by mouth daily , Disp: , Rfl:      XARELTO ANTICOAGULANT 20 MG TABS tablet, Take 20 mg by mouth daily (with breakfast), Disp: , Rfl:     History:   Past Medical History:   Diagnosis Date     Anemia      Anemia, unspecified type 08/19/2016     Anxiety      Anxiety state 07/29/2021    Formatting of this note might be different from the original. Created by Conversion  Replacement Utility updated for latest IMO load     Arthritis      Atrial fibrillation and flutter (H)      Cerebral artery occlusion with cerebral infarction (H)      Cerebral infarction (H)      Chest pain, unspecified type 08/19/2016     Chronic atrial fibrillation (H)      Congestive heart failure (H)      Coronary artery disease     CABG with multiple stents     Coronary artery disease involving native coronary artery without angina pectoris, unspecified whether native or transplanted heart 08/19/2016     Coronary atherosclerosis 07/29/2021    Formatting of this note might be different from the original. Created by Conversion  Replacement Utility updated for latest IMO load     Depression      Diabetic ulcer of right midfoot associated with type 2 diabetes mellitus, limited to breakdown of skin (H) 09/28/2017     Equinovarus acquired deformity, right 04/14/2021     Equinovarus deformity, acquired, right      Essential hypertension 08/19/2016     GERD (gastroesophageal reflux disease)      Heart attack (H)      History of blood transfusion      Hyperlipidemia      Hyperlipidemia,  unspecified hyperlipidemia type 08/19/2016     Hypertension      Insomnia, unspecified type 08/19/2016     Ischemic cardiomyopathy      Late effects of cerebrovascular accident 08/19/2016     Major depressive disorder with single episode, remission status unspecified 08/19/2016     Microalbuminuria 07/29/2021    Formatting of this note might be different from the original. 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 Created by Conversion [facility] Annotation: Feb 11 2010  1:41PM - Leonid Cabral: CANNOT READ OR  WRITE     PVD (peripheral vascular disease) (H)      Stented coronary artery      Type 2 diabetes mellitus (H) 07/29/2021    Formatting of this note might be different from the original. Created by Conversion     Type II or unspecified type diabetes mellitus with ophthalmic manifestations, not stated as uncontrolled(250.50) (H) 07/29/2021    Formatting of this note might be different from the original. Created by Conversion     Weakness 08/19/2016       Physical Exam:    /72   Pulse 76   Temp 98.5  F (36.9  C)   Resp 16     General:  Patient presents to clinic in no apparent distress.  Head: normocephalic atraumatic  Psychiatric:  Alert and oriented x3.   Respiratory: unlabored breathing; no cough  Integumentary:  Skin is uniformly warm, dry and pink.    Wound #1 Location: R Stump  Size: 1L x 5W x 0.8depth.  No sinus tract present, Wound base: 75% slough, 25% viable tissue  No undermining present. Wound is full thickness. There is moderate drainage. Periwound: no denudement, erythema, induration, maceration or warmth.      PICC Double Lumen 08/29/22 Right Basilic (Active)   Number of days: 42       Negative Pressure Wound Therapy Foot  Anterior;Right (Active)   Number of days: 294       VASC Wound right lateral foot (Active)   Number of days: 544       VASC Wound left hallux  (Active)   Number of days: 467       VASC Wound Right stump (Active)   Pre Size Length 1 10/10/22 1200   Pre Size Width 5 10/10/22 1200   Pre Size Depth 0.5 10/10/22 1200   Pre Total Sq cm 5 10/10/22 1200   Post Size Length 1 10/10/22 1200   Post Size Width 5 10/10/22 1200   Post Size Depth 0.8 10/10/22 1200   Post Total Sq cm 5 10/10/22 1200   Number of days: 32       Incision/Surgical Site 12/17/21 Right Foot (Active)   Number of days: 297       Incision/Surgical Site 06/29/22 Anterior;Left Greater Trochanter (Active)   Number of days: 103       Incision/Surgical Site 08/24/22 Right Leg (Active)   Number of days: 47            Circumferential volume measures:      No flowsheet data found.    Labs:    I personally reviewed the following lab results today and those on care everywhere    CRP Inflammation   Date Value Ref Range Status   09/26/2022 46.80 (H) <5.00 mg/L Final     CRP   Date Value Ref Range Status   08/24/2022 18.9 (H) 0.0 - <0.8 mg/dL Final      Erythrocyte Sedimentation Rate   Date Value Ref Range Status   09/26/2022 32 (H) 0 - 20 mm/hr Final      Last Renal Panel:  Sodium   Date Value Ref Range Status   09/30/2022 136 136 - 145 mmol/L Final   08/25/2016 139 mmol/L Final     Potassium   Date Value Ref Range Status   09/30/2022 3.9 3.4 - 5.3 mmol/L Final   08/30/2022 4.4 3.5 - 5.0 mmol/L Final   08/25/2016 4.3 mmol/L Final     Chloride   Date Value Ref Range Status   09/30/2022 103 98 - 107 mmol/L Final   08/29/2022 109 (H) 98 - 107 mmol/L Final   08/25/2016 107 mmol/L Final     Carbon Dioxide (CO2)   Date Value Ref Range Status   09/30/2022 24 22 - 29 mmol/L Final   08/29/2022 25 22 - 31 mmol/L Final     Anion Gap   Date Value Ref Range Status   09/30/2022 9 7 - 15 mmol/L Final   08/29/2022 6 5 - 18 mmol/L Final   08/25/2016 8 mmol/L Final     Glucose   Date  Value Ref Range Status   08/29/2022 94 70 - 125 mg/dL Final   08/25/2016 84 70 - 99 mg/dL Final     GLUCOSE BY METER POCT   Date Value Ref Range Status   09/30/2022 104 (H) 70 - 99 mg/dL Final     Urea Nitrogen   Date Value Ref Range Status   09/30/2022 24.7 (H) 6.0 - 20.0 mg/dL Final   08/29/2022 12 8 - 22 mg/dL Final   08/25/2016 21 mg/dL Final     Creatinine   Date Value Ref Range Status   09/30/2022 1.20 (H) 0.67 - 1.17 mg/dL Final   09/30/2022 1.16 0.67 - 1.17 mg/dL Final   08/25/2016 0.85 mg/dL Final     GFR Estimate   Date Value Ref Range Status   09/30/2022 72 >60 mL/min/1.73m2 Final     Comment:     Effective December 21, 2021 eGFRcr in adults is calculated using the 2021 CKD-EPI creatinine equation which includes age and gender (Stephen et al., NEJ, DOI: 10.1056/OGILay7702049)   09/30/2022 75 >60 mL/min/1.73m2 Final     Comment:     Effective December 21, 2021 eGFRcr in adults is calculated using the 2021 CKD-EPI creatinine equation which includes age and gender (Stephen et al., [facility], DOI: 10.1056/GLWAmv4599395)  Effective December 21, 2021 eGFRcr in adults is calculated using the 2021 CKD-EPI creatinine equation which includes age and gender (Stephen et al., [facility], DOI: 10.1056/ZHNJhn3657552)   04/29/2019 >60 >60 mL/min/1.73m2 Final     Calcium   Date Value Ref Range Status   09/30/2022 8.6 8.6 - 10.0 mg/dL Final   08/25/2016 9.9 mg/dL Final     Phosphorus   Date Value Ref Range Status   08/07/2022 2.9 2.5 - 4.5 mg/dL Final     Albumin   Date Value Ref Range Status   09/30/2022 2.8 (L) 3.5 - 5.2 g/dL Final   08/24/2022 2.8 (L) 3.5 - 5.0 g/dL Final      Lab Results   Component Value Date    WBC 4.2 09/30/2022    WBC 5.0 08/25/2016     Lab Results   Component Value Date    RBC 3.17 09/30/2022    RBC 3.49 08/25/2016     Lab Results   Component Value Date    HGB 8.3 09/30/2022    HGB 10.1 08/25/2016     Lab Results   Component Value Date    HCT 26.7 09/30/2022    HCT 31.4 08/25/2016     No components found for:  MCT  Lab Results   Component Value Date    MCV 84 09/30/2022    MCV 90 08/25/2016     Lab Results   Component Value Date    MCH 26.2 09/30/2022    MCH 28.7 08/25/2016     Lab Results   Component Value Date    MCHC 31.1 09/30/2022    MCHC 31.8 08/25/2016     Lab Results   Component Value Date    RDW 15.5 09/30/2022    RDW 13.6 08/25/2016     Lab Results   Component Value Date     09/30/2022     08/25/2016      Lab Results   Component Value Date    A1C 5.9 08/26/2022    A1C 6.3 08/23/2022    A1C 5.8 05/16/2022    A1C 5.1 12/21/2021    A1C 5.9 11/18/2019    A1C 5.3 08/25/2016      No results found for: TSH   No results found for: VITDT                Impression:  Encounter Diagnoses   Name Primary?     Status post below-knee amputation of right lower extremity (H) Yes     Non-healing surgical wound, subsequent encounter         10/10/22 R stump           Are any of these wounds new today: No; Location: na    Assessment/Plan:          1. Debridement: After discussion of risk factors and verbal consent was obtained 2% Lidocaine HCL jelly was applied, under clean conditions, the R BKA ulceration(s) were debrided using currette. Devitalized and nonviable tissue, along with any fibrin and slough, was removed to improve granulation tissue formation, stimulate wound healing, decrease overall bacteria load, disrupt biofilm formation and decrease edge senescence.  Total excisional debridement was 5 sq cm into the subcutaneous tissue with a depth of 0.8 cm.   Ulcers were improved afterwards and .  Measures were as noted on the flow sheet.       2.  Wound treatment: wound treatment will include irrigation and dressings to promote autolytic debridement which will include: Cleanse with normal saline, wet to dry dressing applied in clinic. Facility to apply wound vac at 125mmhg continuous suction to be changed three times a week. If for some reason the patient is not able to get their dressing(s) changed as  outlined above (due to illness, lack of supplies, lack of help) please do the following: remove old, soiled dressings; wash the wounds with saline; pat dry; apply ABD pad or other absorbant pad and secure with rolled gauze; avoid tape directly on your skin; patient instructed to call the clinic as soon as possible to let us know what the current issues are in receiving wound care. Improved Dr. Montes seen pt while in the hospital. He reports wound is progressing nicely and that he didn't think a revision would be needed at this time. Pt's depth continues to decrease. Depth decreased from 2.2cm to 0.8cm this visit.  ADDENDUM:  Sister Sherri, called wanting to know if the wound vac can be transitioned into a dressing, in order for pt to move back into his apartment. The assisted living apartment does not accept wound vacs. Bone is now covered will plan to transition him into a topical dressing next visit.            3. Edema: Stable, continue tubular compression. The compression wraps were applied today in clinic.     If a 2 layer or 4 layer compression wrap is being used; these are safe to have on for ONLY 7 days. If for some reason the patient is not able to get the wrap(s) changed (due to illness; lack of supplies, lack of help, lack of transportation) please do the following: unwrap the old 2 or 4 layer compression wrap; avoid using scissors as you could cut your skin and cause wounds; use tubular compression when available. Call to reschedule your home care or clinic visit appointment as soon as possible.  Improved            4. Nutrition: Diabetic Diet; focus on protein; continue 1-2 shakes per day.           5. Offloading: Do not use stump protector at this time.     Patient will follow up with me in 2 weeks for reevaluation. They were instructed to call the clinic sooner with any signs or symptoms of infection or any further questions/concerns. Answered all questions.      20 minutes spent on the date of the  encounter doing chart review, history and exam, documentation and further activities per the note    Emily Lynn MS, APRN, AGNP-C, CWCN  [facility]   [phone]        This note was electronically signed by SHABNAM Brooks CNP

## 2022-10-10 NOTE — PROGRESS NOTES
[facility] GERIATRICS  [facility] Record Number:  [id number]  Place of Service where encounter took place: [facility] ([facility]) [[id number]]   CODE STATUS:   CPR/Full code     Chief Complaint/Reason for Visit:  Chief Complaint   Patient presents with     RECHECK     TCU 9/13/2022. 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       TCU HPI:    Cm Lundberg is a 52 year old male with hx of CAD, s/p stents and CABG, HTN, DM, prior CVA, afib/flutter on xarelto, who has been residing in TCU at [facility] since Dec 2021 surgery for chronic long standing right foot ulceration. He underwent incision and drainage of the right foot, excisional debridement of the ulceration of the right foot into the level of the muscle, under MAC with popliteal block anesthesia on 12/17/2021. He completed abx per ID recommendations. Remained NWB with wound vac, had 1/27/2022 incision and drainage with application of theraskin. On 4/4/2022 again incision and drainage with theraskin. Continued following with podiatry, referred to wound and vascular due to non healing diabetic ulcer, right foot. S/p angioplasty RLE and hyperbaric oxygen therapy. MRI with indications of osteomyelitis. 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    He was sent to the hospital from TCU on 8/23/2022 due to swelling, and drainage from surgical wound, concern for infection. His hospital discharge summary is partially excerpted below.     [facility]  Medicine Progress Note - Hospitalist Service  (Note: This note is listed as the discharge summary)  Date of Admission:  8/23/2022    Assessment & Plan  Cm Lundberg is a 52 year old male admitted on 8/23/2022.      Assessment:  #Infected right BKA stump.  CT right leg showed evolving abscess.  Patient not septic.  Blood cultures obtained.  On vancomycin and Zosyn.  S/p I&D 8/24, culture sent.  Charcot's ankle, s/p recent right BKA on 8/5/2022 by   [employer].       #MRSA bacteremia, likely due to right BKA stump abscess.  Positive BC on 8/23, 8/25.  Surveillance BC obtained 8/26, 8/27, 8/28.    PICC on 8/29.  Plan for 28 days of IV vancomycin post clearance of blood, till September 23.     #Acute renal failure, creat peaked at 1.47 on 8/24, was 1.04 on admission.  Likely due to sepsis and dehydration, given rising BUN.  With IVF renal function normalizing.     #Chronic normocytic anemia, hemoglobin baseline 8-9.  History of iron deficiency.  Iron checked low at 16 on 8/25/2022.    Replacing with Venofer 2 doses on 8/27 and 8/28, then p.o. replacement.     #Acute anemia, hemoglobin 6.4 on 8/25.  Reported intraoperative EBL 10 mL.  No postop bleeding.  S/p 1 unit PRBCs 8/25.HgB in upper 8's.     #CVD, history of stroke.  On Plavix, statin.     #Atrial fibrillation.  Was on Xarelto, held preoperatively.  Resume when okay per surgery.     #Coronary artery disease, status post CABG and stents.  On Plavix, beta-blockers, fibrate.     #Non-insulin-dependent diabetes mellitus.  Well-controlled with Glucophage and glipizide.  Initially held due to RANDAL.  Resumed now.  A1c 5.9 on 8/26.  A1C 5.9 but Hgb 8.2 at the time so less accurate.      #Mood disorder:general anxiety and depression, stable on Abilify, Cymbalta, Ativan, Remeron, trazodone.     Essential hypertension.  BP controlled on losartan and metoprolol.  Peripheral neuropathy, due to diabetes.  Resident of [facility] prior to recent past hospitalization TCU.     Overall stabilized and discharged back to TCU on 8/30/2022 for PT, OT, nursing cares, medical management and monitoring.       Today:  He has been tolerating IV Vancomycin, PICC RUE. Last day is 9/25/2022 with follow up with ID on 9/22/2022. He saw Dr. Montes on 9/8/2022 with concern about the appearance of the wound and healing so he had follow up yesterday 9/12/2022. Referred to wound NP for next visit. He is in good spirits. Tolerating  abx, tolerating vac changes. Pain is controlled, has prn Norco. Appetite is good. No bowel or bladder concerns. No respiratory issues. Labs being sent to ID. Medical problems, aflutter/afib, CAD, DM, HTN, managed with his usual medications. Diabetes on metformin and glipizide. Sleeping well.        PAST MEDICAL HISTORY:  Past Medical History:   Diagnosis Date     Anemia      Anemia, unspecified type 08/19/2016     Anxiety      Anxiety state 07/29/2021    Formatting of this note might be different from the original. Created by Conversion  Replacement Utility updated for latest IMO load     Arthritis      Atrial fibrillation and flutter (H)      Cerebral artery occlusion with cerebral infarction (H)      Cerebral infarction (H)      Chest pain, unspecified type 08/19/2016     Chronic atrial fibrillation (H)      Congestive heart failure (H)      Coronary artery disease     CABG with multiple stents     Coronary artery disease involving native coronary artery without angina pectoris, unspecified whether native or transplanted heart 08/19/2016     Coronary atherosclerosis 07/29/2021    Formatting of this note might be different from the original. Created by Conversion  Replacement Utility updated for latest IMO load     Depression      Diabetic ulcer of right midfoot associated with type 2 diabetes mellitus, limited to breakdown of skin (H) 09/28/2017     Equinovarus acquired deformity, right 04/14/2021     Equinovarus deformity, acquired, right      Essential hypertension 08/19/2016     GERD (gastroesophageal reflux disease)      Heart attack (H)      History of blood transfusion      Hyperlipidemia      Hyperlipidemia, unspecified hyperlipidemia type 08/19/2016     Hypertension      Insomnia, unspecified type 08/19/2016     Ischemic cardiomyopathy      Late effects of cerebrovascular accident 08/19/2016     Major depressive disorder with single episode, remission status unspecified 08/19/2016     Microalbuminuria  07/29/2021    Formatting of this note might be different from the original. 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 Created by Conversion [facility] Annotation: Feb 11 2010  1:41PM - Leonid Cabral: CANNOT READ OR  WRITE     PVD (peripheral vascular disease) (H)      Stented coronary artery      Type 2 diabetes mellitus (H) 07/29/2021    Formatting of this note might be different from the original. Created by Conversion     Type II or unspecified type diabetes mellitus with ophthalmic manifestations, not stated as uncontrolled(250.50) (H) 07/29/2021    Formatting of this note might be different from the original. Created by Conversion     [facility] 08/19/2016       MEDICATIONS:  Current Outpatient Medications   Medication Sig Dispense Refill     acetaminophen (TYLENOL) 325 MG tablet Take 650 mg by mouth every 6 hours as needed for fever or pain       acetaminophen (TYLENOL) 325 MG tablet Take 650 mg by mouth 2 times daily       ARIPiprazole (ABILIFY) 2 MG tablet 2 mg daily        ASPERCREME ORIGINAL 10 % external cream Apply topically 2 times daily as needed for other (back pain)       atorvastatin (LIPITOR) 80 MG tablet Take 80 mg by mouth daily        bisacodyl (DULCOLAX) 10 MG suppository Place 10 mg rectally daily as needed for constipation       cholestyramine (QUESTRAN) 4 g packet Take 1 packet by mouth every other day        clopidogrel (PLAVIX) 75 MG tablet Take 1 tablet (75 mg) by mouth daily Start taking medication the day after the procedure. 90 tablet 1     DULoxetine (CYMBALTA) 60 MG capsule Take 120 mg by mouth daily       erenumab-aooe (AIMOVIG, 140 MG DOSE,) 70 MG/ML injection Inject 140 mg Subcutaneous every 30 days       ferrous sulfate  (FEROSUL) 325 (65 Fe) MG tablet Take 325 mg by mouth daily (with breakfast)        gabapentin (NEURONTIN) 100 MG capsule Take 100 mg by mouth 2 times daily (before meals)       gabapentin (NEURONTIN) 300 MG capsule Take 600 mg by mouth At Bedtime       glipiZIDE (GLUCOTROL) 5 MG tablet Take 2.5 mg by mouth daily before breakfast       HYDROcodone-acetaminophen (NORCO) 5-325 MG tablet Take 1-2 tablets by mouth every 6 hours as needed for moderate to severe pain (Patient taking differently: Take 1 tablet by mouth every 6 hours as needed for moderate to severe pain) 12 tablet 0     loperamide (IMODIUM) 2 MG capsule Take 2 mg by mouth 4 times daily as needed for diarrhea       losartan (COZAAR) 25 MG tablet Take 25 mg by mouth At Bedtime        magnesium chloride 535 (64 Mg) MG TBEC CR tablet Take 1,070 mg by mouth 2 times daily       metFORMIN (GLUCOPHAGE) 500 MG tablet Take 500 mg by mouth daily (with dinner)       metFORMIN (GLUCOPHAGE) 500 MG tablet Take 1,000 mg by mouth daily (with breakfast)       metoprolol succinate ER (TOPROL-XL) 25 MG 24 hr tablet Take 75 mg by mouth daily        mineral oil-hydrophilic petrolatum (AQUAPHOR) external ointment Apply topically 3 times daily as needed for irritation or dry skin Apply to forehead and other areas as needed       mirtazapine (REMERON) 7.5 MG tablet Take 7.5 mg by mouth At Bedtime        Nitroglycerin (NITROSTAT SL) Place 0.4 mg under the tongue every 5 minutes as needed for chest pain        pantoprazole (PROTONIX) 40 MG EC tablet Take 40 mg by mouth daily before breakfast       polyethylene glycol (MIRALAX) 17 GM/Dose powder Take 1 packet by mouth daily as needed for constipation        senna-docusate (SENOKOT-S/PERICOLACE) 8.6-50 MG tablet Take 1 tablet by mouth 2 times daily       senna-docusate (SENOKOT-S/PERICOLACE) 8.6-50 MG tablet Take 1 tablet by mouth 2 times daily as needed for constipation In addition to scheduled doses       sodium chloride, PF, 0.9% PF  "flush 10 mLs by Intracatheter route 3 times daily       traZODone (DESYREL) 50 MG tablet Take 50 mg by mouth At Bedtime        vitamin C (ASCORBIC ACID) 500 MG tablet Take 500 mg by mouth daily        XARELTO ANTICOAGULANT 20 MG TABS tablet Take 20 mg by mouth daily (with breakfast)          PHYSICAL EXAM:  General: Patient is alert male, no distress.   Vitals: /69   Pulse 68   Temp 98.3  F (36.8  C)   Resp 18   Ht 1.702 m (5' 7\")   Wt 68.5 kg (151 lb)   SpO2 96%   BMI 23.65 kg/m    HEENT: Head is NCAT. Eyes show no injection or icterus. Nares negative. Oropharynx moist.  Neck: No JVD.  Lungs: Non labored respirations.   Abdomen: Soft.   : Deferred.  Extremities: Wound vac RLE at BKA stump. RUE PICC.  Musculoskeletal: No swelling of LLE.   Skin: Vac RLE.   Psych: Mood is good.       LABS/DIAGNOSTIC DATA:  Component      Latest Ref Rng & Units 8/23/2022 8/24/2022 8/24/2022 8/24/2022            6:11 AM  8:33 AM  7:13 PM   RBC Count      4.40 - 5.90 10e6/uL 3.89 (L) 3.48 (L) 3.40 (L)    Hemoglobin      13.3 - 17.7 g/dL 9.9 (L) 8.9 (L) 8.7 (L)    Hematocrit      40.0 - 53.0 % 32.4 (L) 29.0 (L) 28.3 (L)    MCV      78 - 100 fL 83 83 83    MCH      26.5 - 33.0 pg 25.4 (L) 25.6 (L) 25.6 (L)    MCHC      31.5 - 36.5 g/dL 30.6 (L) 30.7 (L) 30.7 (L)    RDW      10.0 - 15.0 % 14.5 14.7 14.8    Platelet Count      150 - 450 10e3/uL 433 323 315 255     Component      Latest Ref Rng & Units 8/25/2022 8/25/2022 8/26/2022 8/27/2022           6:05 AM  6:28 PM     RBC Count      4.40 - 5.90 10e6/uL 2.49 (L) 3.07 (L) 3.18 (L) 3.39 (L)   Hemoglobin      13.3 - 17.7 g/dL 6.4 (LL) 8.0 (L) 8.2 (L) 8.8 (L)   Hematocrit      40.0 - 53.0 % 20.9 (L) 26.3 (L) 27.3 (L) 29.1 (L)   MCV      78 - 100 fL 84 86 86 86   MCH      26.5 - 33.0 pg 25.7 (L) 26.1 (L) 25.8 (L) 26.0 (L)   MCHC      31.5 - 36.5 g/dL 30.6 (L) 30.4 (L) 30.0 (L) 30.2 (L)   RDW      10.0 - 15.0 % 14.6 14.7 14.6 14.7   Platelet Count      150 - 450 10e3/uL 249 291 " 251 296       Component      Latest Ref Rng & Units 9/12/2022             WBC      4.0 - 11.0 10e3/uL 4.7   RBC Count      4.40 - 5.90 10e6/uL 3.85 (L)   Hemoglobin      13.3 - 17.7 g/dL 10.3 (L)   Hematocrit      40.0 - 53.0 % 33.5 (L)   MCV      78 - 100 fL 87   MCH      26.5 - 33.0 pg 26.8   MCHC      31.5 - 36.5 g/dL 30.7 (L)   RDW      10.0 - 15.0 % 16.4 (H)   Platelet Count      150 - 450 10e3/uL 185       Component      Latest Ref Rng & Units 8/23/2022 8/24/2022 8/24/2022 8/25/2022            6:11 AM  8:33 AM    Creatinine      0.67 - 1.17 mg/dL 1.04 1.47 (H) 1.50 (H) 1.09   Sodium      136 - 145 mmol/L 134 (L) 136  137   Potassium      3.4 - 5.3 mmol/L       Urea Nitrogen      6.0 - 20.0 mg/dL       Chloride      98 - 107 mmol/L       Carbon Dioxide (CO2)      22 - 29 mmol/L       Anion Gap      7 - 15 mmol/L 14 8  4 (L)   Glucose      70 - 99 mg/dL       GFR Estimate      >60 mL/min/1.73m2 85 56 (L) 55 (L) 81   Calcium      8.6 - 10.0 mg/dL 10.2 10.0  8.6     Component      Latest Ref Rng & Units 8/26/2022 8/27/2022 8/28/2022 8/29/2022                Creatinine      0.67 - 1.17 mg/dL 1.00 0.99 0.97 1.04   Sodium      136 - 145 mmol/L 139   140   Potassium      3.4 - 5.3 mmol/L       Urea Nitrogen      6.0 - 20.0 mg/dL       Chloride      98 - 107 mmol/L       Carbon Dioxide (CO2)      22 - 29 mmol/L       Anion Gap      7 - 15 mmol/L 4 (L)   6   Glucose      70 - 99 mg/dL       GFR Estimate      >60 mL/min/1.73m2 89 >90 >90 85   Calcium      8.6 - 10.0 mg/dL 8.7   9.1         ASSESSMENT/PLAN:  1. R BKA (8/5/2022) wound infection. MRSA on BC.  S/p irrigation and debridement on 8/24/2022 and then washout with partial closure and application of wound vac on 8/26/2022 per Dr. Montes. Had follow up 9/8/2022 and 9/12/2022, follow up next with wound NP.  On IV Vancomycin, pharmacy managing. Follow up with ID on 9/22/2022.   2. Anemia. Hgb dropped to 6.4, he did receive transfusion. Received Venofer in the  hospital. Last hgb 10.3 on 9/12/2022. Continue iron.  3. HTN. On metoprolol and losartan. Continue to monitor Bps.  4. Afib/Aflutter. On xarelto for anticoagulation. Adequate rate control.   5. Diabetes. On metformin and glipizide, continue. Accuchecks followed.     Stable conditions:   6. CAD. Hx of MIs. Hx of stenting and prior CABG. On statin, beta blocker, xarelto. No chest pain.  7. Mood disorder. Mixed depression and anxiety. On duloxetine, Abilify. Also takes Mirtazapine.   8. Hx of stroke. Continue statin, xarelto.  9. Mild intellectual disabilities. He resides in an [facility], but has been in the hospital or this TCU since Dec 2021 related to above issues.            Electronically signed by: Heidi Xie MD

## 2022-10-10 NOTE — PROGRESS NOTES
[facility] GERIATRICS  [place] Medical Record Number:  [id number]  Place of Service where encounter took place: [facility] ([facility]) [[id number]]   CODE STATUS:   CPR/Full code     Chief Complaint/Reason for Visit:  Chief Complaint   Patient presents with     RECHECK     TCU 9/15/2022. 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       TCU HPI:    Cm Lundberg is a 52 year old male with hx of CAD, s/p stents and CABG, HTN, DM, prior CVA, afib/flutter on xarelto, who has been residing in TCU at [facility] since Dec 2021 surgery for chronic long standing right foot ulceration. He underwent incision and drainage of the right foot, excisional debridement of the ulceration of the right foot into the level of the muscle, under MAC with popliteal block anesthesia on 12/17/2021. He completed abx per ID recommendations. Remained NWB with wound vac, had 1/27/2022 incision and drainage with application of theraskin. On 4/4/2022 again incision and drainage with theraskin. Continued following with podiatry, referred to wound and vascular due to non healing diabetic ulcer, right foot. S/p angioplasty RLE and hyperbaric oxygen therapy. MRI with indications of osteomyelitis. 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    He was sent to the hospital from TCU on 8/23/2022 due to swelling, and drainage from surgical wound, concern for infection. His hospital discharge summary is partially excerpted below.     [facility]  Medicine Progress Note - Hospitalist Service  (Note: This note is listed as the discharge summary)  Date of Admission:  8/23/2022    Assessment & Plan  Cm Lundberg is a 52 year old male admitted on 8/23/2022.      Assessment:  #Infected right BKA stump.  CT right leg showed evolving abscess.  Patient not septic.  Blood cultures obtained.  On vancomycin and Zosyn.  S/p I&D 8/24, culture sent.  Charcot's ankle, s/p recent right BKA on 8/5/2022 by   [employer].       #MRSA bacteremia, likely due to right BKA stump abscess.  Positive BC on 8/23, 8/25.  Surveillance BC obtained 8/26, 8/27, 8/28.    PICC on 8/29.  Plan for 28 days of IV vancomycin post clearance of blood, till September 23.     #Acute renal failure, creat peaked at 1.47 on 8/24, was 1.04 on admission.  Likely due to sepsis and dehydration, given rising BUN.  With IVF renal function normalizing.     #Chronic normocytic anemia, hemoglobin baseline 8-9.  History of iron deficiency.  Iron checked low at 16 on 8/25/2022.    Replacing with Venofer 2 doses on 8/27 and 8/28, then p.o. replacement.     #Acute anemia, hemoglobin 6.4 on 8/25.  Reported intraoperative EBL 10 mL.  No postop bleeding.  S/p 1 unit PRBCs 8/25.HgB in upper 8's.     #CVD, history of stroke.  On Plavix, statin.     #Atrial fibrillation.  Was on Xarelto, held preoperatively.  Resume when okay per surgery.     #Coronary artery disease, status post CABG and stents.  On Plavix, beta-blockers, fibrate.     #Non-insulin-dependent diabetes mellitus.  Well-controlled with Glucophage and glipizide.  Initially held due to RANDAL.  Resumed now.  A1c 5.9 on 8/26.  A1C 5.9 but Hgb 8.2 at the time so less accurate.      #Mood disorder:general anxiety and depression, stable on Abilify, Cymbalta, Ativan, Remeron, trazodone.     Essential hypertension.  BP controlled on losartan and metoprolol.  Peripheral neuropathy, due to diabetes.  Resident of [facility] prior to recent past hospitalization TCU.     Overall stabilized and discharged back to TCU on 8/30/2022 for PT, OT, nursing cares, medical management and monitoring.       Today:  He has been stable, tolerating IV vanco, weekly labs, managed by pharmacy, labs to ID, appt on 9/22/2022. Sees wound NP on 9/22/2022 after prior visits with Dr. Montes. Continues NWB RLE with wound vac. Appetite is good. No fever. No abdominal pain or constipation. No chest pain or shortness of breath. Likes  to watch videos on Ipad or TV. Sleeping well. No new concerns per nursing.  Medical problems, aflutter/afib, CAD, DM, HTN, managed with his usual medications. Diabetes on metformin and glipizide.        PAST MEDICAL HISTORY:  Past Medical History:   Diagnosis Date     Anemia      Anemia, unspecified type 08/19/2016     Anxiety      Anxiety state 07/29/2021    Formatting of this note might be different from the original. Created by Conversion  Replacement Utility updated for latest IMO load     Arthritis      Atrial fibrillation and flutter (H)      Cerebral artery occlusion with cerebral infarction (H)      Cerebral infarction (H)      Chest pain, unspecified type 08/19/2016     Chronic atrial fibrillation (H)      Congestive heart failure (H)      Coronary artery disease     CABG with multiple stents     Coronary artery disease involving native coronary artery without angina pectoris, unspecified whether native or transplanted heart 08/19/2016     Coronary atherosclerosis 07/29/2021    Formatting of this note might be different from the original. Created by Conversion  Replacement Utility updated for latest IMO load     Depression      Diabetic ulcer of right midfoot associated with type 2 diabetes mellitus, limited to breakdown of skin (H) 09/28/2017     Equinovarus acquired deformity, right 04/14/2021     Equinovarus deformity, acquired, right      Essential hypertension 08/19/2016     GERD (gastroesophageal reflux disease)      Heart attack (H)      History of blood transfusion      Hyperlipidemia      Hyperlipidemia, unspecified hyperlipidemia type 08/19/2016     Hypertension      Insomnia, unspecified type 08/19/2016     Ischemic cardiomyopathy      Late effects of cerebrovascular accident 08/19/2016     Major depressive disorder with single episode, remission status unspecified 08/19/2016     Microalbuminuria 07/29/2021    Formatting of this note might be different from the original. Created by Conversion      Migraine      Mild intellectual disabilities 08/19/2016     Myalgia      Nausea 08/19/2016     Osteomyelitis (H)      PONV (postoperative nausea and vomiting)     mild nausea     Problems with learning 07/29/2021    Formatting of this note might be different from the original. Created by Conversion [facility] Annotation: Feb 11 2010  1:41PM - Leonid Cabral: CANNOT READ OR  WRITE     PVD (peripheral vascular disease) (H)      Stented coronary artery      Type 2 diabetes mellitus (H) 07/29/2021    Formatting of this note might be different from the original. Created by Conversion     Type II or unspecified type diabetes mellitus with ophthalmic manifestations, not stated as uncontrolled(250.50) (H) 07/29/2021    Formatting of this note might be different from the original. Created by Conversion     [facility] 08/19/2016       MEDICATIONS:  Current Outpatient Medications   Medication Sig Dispense Refill     acetaminophen (TYLENOL) 325 MG tablet Take 650 mg by mouth every 6 hours as needed for fever or pain       acetaminophen (TYLENOL) 325 MG tablet Take 650 mg by mouth 2 times daily       ARIPiprazole (ABILIFY) 2 MG tablet 2 mg daily        ASPERCREME ORIGINAL 10 % external cream Apply topically 2 times daily as needed for other (back pain)       atorvastatin (LIPITOR) 80 MG tablet Take 80 mg by mouth daily        bisacodyl (DULCOLAX) 10 MG suppository Place 10 mg rectally daily as needed for constipation       cholestyramine (QUESTRAN) 4 g packet Take 1 packet by mouth every other day        clopidogrel (PLAVIX) 75 MG tablet Take 1 tablet (75 mg) by mouth daily Start taking medication the day after the procedure. 90 tablet 1     DULoxetine (CYMBALTA) 60 MG capsule Take 120 mg by mouth daily       erenumab-aooe (AIMOVIG, 140 MG DOSE,) 70 MG/ML injection Inject 140 mg Subcutaneous every 30 days       ferrous sulfate (FEROSUL) 325 (65 Fe) MG tablet Take 325 mg by mouth daily (with breakfast)        gabapentin (NEURONTIN)  100 MG capsule Take 100 mg by mouth 2 times daily (before meals)       gabapentin (NEURONTIN) 300 MG capsule Take 600 mg by mouth At Bedtime       glipiZIDE (GLUCOTROL) 5 MG tablet Take 2.5 mg by mouth daily before breakfast       HYDROcodone-acetaminophen (NORCO) 5-325 MG tablet Take 1-2 tablets by mouth every 6 hours as needed for moderate to severe pain (Patient taking differently: Take 1 tablet by mouth every 6 hours as needed for moderate to severe pain) 12 tablet 0     loperamide (IMODIUM) 2 MG capsule Take 2 mg by mouth 4 times daily as needed for diarrhea       losartan (COZAAR) 25 MG tablet Take 25 mg by mouth At Bedtime        magnesium chloride 535 (64 Mg) MG TBEC CR tablet Take 1,070 mg by mouth 2 times daily       metFORMIN (GLUCOPHAGE) 500 MG tablet Take 500 mg by mouth daily (with dinner)       metFORMIN (GLUCOPHAGE) 500 MG tablet Take 1,000 mg by mouth daily (with breakfast)       metoprolol succinate ER (TOPROL-XL) 25 MG 24 hr tablet Take 75 mg by mouth daily        mineral oil-hydrophilic petrolatum (AQUAPHOR) external ointment Apply topically 3 times daily as needed for irritation or dry skin Apply to forehead and other areas as needed       mirtazapine (REMERON) 7.5 MG tablet Take 7.5 mg by mouth At Bedtime        Nitroglycerin (NITROSTAT SL) Place 0.4 mg under the tongue every 5 minutes as needed for chest pain        pantoprazole (PROTONIX) 40 MG EC tablet Take 40 mg by mouth daily before breakfast       polyethylene glycol (MIRALAX) 17 GM/Dose powder Take 1 packet by mouth daily as needed for constipation        senna-docusate (SENOKOT-S/PERICOLACE) 8.6-50 MG tablet Take 1 tablet by mouth 2 times daily       senna-docusate (SENOKOT-S/PERICOLACE) 8.6-50 MG tablet Take 1 tablet by mouth 2 times daily as needed for constipation In addition to scheduled doses       sodium chloride, PF, 0.9% PF flush 10 mLs by Intracatheter route 3 times daily       traZODone (DESYREL) 50 MG tablet Take 50 mg by  "mouth At Bedtime        vitamin C (ASCORBIC ACID) 500 MG tablet Take 500 mg by mouth daily        XARELTO ANTICOAGULANT 20 MG TABS tablet Take 20 mg by mouth daily (with breakfast)          PHYSICAL EXAM:  General: Patient is alert male, no distress.   Vitals: /80   Pulse 75   Temp 98.5  F (36.9  C)   Resp 20   Ht 1.702 m (5' 7\")   Wt 68.5 kg (151 lb)   SpO2 99%   BMI 23.65 kg/m    HEENT: Head is NCAT. Eyes show no injection or icterus. Nares negative. Oropharynx well hydrated.  Neck: No JVD.  Lungs: Non labored respirations.   Abdomen: Soft.   : Deferred.  Extremities: Wound vac RLE. RUE PICC.  Musculoskeletal: No LLE swelling.    Skin: Wound vac on R BKA stump.  Psych: Mood is good.       LABS/DIAGNOSTIC DATA:  Component      Latest Ref Rng & Units 8/23/2022 8/24/2022 8/24/2022 8/24/2022            6:11 AM  8:33 AM  7:13 PM   RBC Count      4.40 - 5.90 10e6/uL 3.89 (L) 3.48 (L) 3.40 (L)    Hemoglobin      13.3 - 17.7 g/dL 9.9 (L) 8.9 (L) 8.7 (L)    Hematocrit      40.0 - 53.0 % 32.4 (L) 29.0 (L) 28.3 (L)    MCV      78 - 100 fL 83 83 83    MCH      26.5 - 33.0 pg 25.4 (L) 25.6 (L) 25.6 (L)    MCHC      31.5 - 36.5 g/dL 30.6 (L) 30.7 (L) 30.7 (L)    RDW      10.0 - 15.0 % 14.5 14.7 14.8    Platelet Count      150 - 450 10e3/uL 433 323 315 255     Component      Latest Ref Rng & Units 8/25/2022 8/25/2022 8/26/2022 8/27/2022           6:05 AM  6:28 PM     RBC Count      4.40 - 5.90 10e6/uL 2.49 (L) 3.07 (L) 3.18 (L) 3.39 (L)   Hemoglobin      13.3 - 17.7 g/dL 6.4 (LL) 8.0 (L) 8.2 (L) 8.8 (L)   Hematocrit      40.0 - 53.0 % 20.9 (L) 26.3 (L) 27.3 (L) 29.1 (L)   MCV      78 - 100 fL 84 86 86 86   MCH      26.5 - 33.0 pg 25.7 (L) 26.1 (L) 25.8 (L) 26.0 (L)   MCHC      31.5 - 36.5 g/dL 30.6 (L) 30.4 (L) 30.0 (L) 30.2 (L)   RDW      10.0 - 15.0 % 14.6 14.7 14.6 14.7   Platelet Count      150 - 450 10e3/uL 249 291 251 296       Component      Latest Ref Rng & Units 9/12/2022             WBC      4.0 - 11.0 " 10e3/uL 4.7   RBC Count      4.40 - 5.90 10e6/uL 3.85 (L)   Hemoglobin      13.3 - 17.7 g/dL 10.3 (L)   Hematocrit      40.0 - 53.0 % 33.5 (L)   MCV      78 - 100 fL 87   MCH      26.5 - 33.0 pg 26.8   MCHC      31.5 - 36.5 g/dL 30.7 (L)   RDW      10.0 - 15.0 % 16.4 (H)   Platelet Count      150 - 450 10e3/uL 185       Component      Latest Ref Rng & Units 8/23/2022 8/24/2022 8/24/2022 8/25/2022            6:11 AM  8:33 AM    Creatinine      0.67 - 1.17 mg/dL 1.04 1.47 (H) 1.50 (H) 1.09   Sodium      136 - 145 mmol/L 134 (L) 136  137   Potassium      3.4 - 5.3 mmol/L       Urea Nitrogen      6.0 - 20.0 mg/dL       Chloride      98 - 107 mmol/L       Carbon Dioxide (CO2)      22 - 29 mmol/L       Anion Gap      7 - 15 mmol/L 14 8  4 (L)   Glucose      70 - 99 mg/dL       GFR Estimate      >60 mL/min/1.73m2 85 56 (L) 55 (L) 81   Calcium      8.6 - 10.0 mg/dL 10.2 10.0  8.6     Component      Latest Ref Rng & Units 8/26/2022 8/27/2022 8/28/2022 8/29/2022                Creatinine      0.67 - 1.17 mg/dL 1.00 0.99 0.97 1.04   Sodium      136 - 145 mmol/L 139   140   Potassium      3.4 - 5.3 mmol/L       Urea Nitrogen      6.0 - 20.0 mg/dL       Chloride      98 - 107 mmol/L       Carbon Dioxide (CO2)      22 - 29 mmol/L       Anion Gap      7 - 15 mmol/L 4 (L)   6   Glucose      70 - 99 mg/dL       GFR Estimate      >60 mL/min/1.73m2 89 >90 >90 85   Calcium      8.6 - 10.0 mg/dL 8.7   9.1         ASSESSMENT/PLAN: No changes or new orders today.   1. R BKA (8/5/2022) with subsequent MRSA wound infection. S/p irrigation and debridement on 8/24/2022 and then washout with partial closure and application of wound vac on 8/26/2022 per Dr. Montes. Had follow up 9/8/2022 and 9/12/2022, follow up next with wound NP on 9/22/2022.  On IV Vancomycin, pharmacy managing. Follow up with ID on 9/22/2022.   2. Anemia. Hgb dropped to 6.4, he did receive transfusion. Received Venofer in the hospital. Last hgb 10.3 on 9/12/2022. Continue  iron.  3. HTN. On metoprolol and losartan. Continue to monitor Bps.  4. Afib/Aflutter. On xarelto for anticoagulation. Adequate rate control.   5. Diabetes. On metformin and glipizide, continue. Accuchecks followed.      Stable conditions:   6. CAD. Hx of MIs. Hx of stenting and prior CABG. On statin, beta blocker, xarelto. No chest pain.  7. Mood disorder. Mixed depression and anxiety. On duloxetine, Abilify. Also takes Mirtazapine.   8. Hx of stroke. Continue statin, xarelto.  9. Mild intellectual disabilities. He resides in an senior care, but has been in the hospital or this TCU since Dec 2021 related to above issues.          Electronically signed by: Heidi Xie MD

## 2022-10-10 NOTE — PROGRESS NOTES
[facility] GERIATRICS  [facility] Medical Record Number:  [id number]  Place of Service where encounter took place: [facility] ([facility]) [[id number]]   CODE STATUS:   CPR/Full code     Chief Complaint/Reason for Visit:  Chief Complaint   Patient presents with     RECHECK     TCU 9/6/2022. 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        HPI:    Cm Lundberg is a 52 year old male with hx of CAD, s/p stents and CABG, HTN, DM, prior CVA, afib/flutter on xarelto, who has been residing in TCU at [facility] since Dec 2021 surgery for chronic long standing right foot ulceration. He underwent incision and drainage of the right foot, excisional debridement of the ulceration of the right foot into the level of the muscle, under MAC with popliteal block anesthesia on 12/17/2021. He completed abx per ID recommendations. Remained NWB with wound vac, had 1/27/2022 incision and drainage with application of theraskin. On 4/4/2022 again incision and drainage with theraskin. Continued following with podiatry, referred to wound and vascular due to non healing diabetic ulcer, right foot. S/p angioplasty RLE and hyperbaric oxygen therapy. MRI with indications of osteomyelitis. 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    He was sent to the hospital from TCU on 8/23/2022 due to swelling, and drainage from surgical wound, concern for infection. His hospital discharge summary is partially excerpted below.     [facility]  Medicine Progress Note - Hospitalist Service  (Note: This note is listed as the discharge summary)  Date of Admission:  8/23/2022    Assessment & Plan  Cm Lundberg is a 52 year old male admitted on 8/23/2022.      Assessment:  #Infected right BKA stump.  CT right leg showed evolving abscess.  Patient not septic.  Blood cultures obtained.  On vancomycin and Zosyn.  S/p I&D 8/24, culture sent.  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 Valderrama.        #MRSA bacteremia, likely due to right BKA stump abscess.  Positive BC on 8/23, 8/25.  Surveillance BC obtained 8/26, 8/27, 8/28.    PICC on 8/29.  Plan for 28 days of IV vancomycin post clearance of blood, till September 23.     #Acute renal failure, creat peaked at 1.47 on 8/24, was 1.04 on admission.  Likely due to sepsis and dehydration, given rising BUN.  With IVF renal function normalizing.     #Chronic normocytic anemia, hemoglobin baseline 8-9.  History of iron deficiency.  Iron checked low at 16 on 8/25/2022.    Replacing with Venofer 2 doses on 8/27 and 8/28, then p.o. replacement.     #Acute anemia, hemoglobin 6.4 on 8/25.  Reported intraoperative EBL 10 mL.  No postop bleeding.  S/p 1 unit PRBCs 8/25.HgB in upper 8's.     #CVD, history of stroke.  On Plavix, statin.     #Atrial fibrillation.  Was on Xarelto, held preoperatively.  Resume when okay per surgery.     #Coronary artery disease, status post CABG and stents.  On Plavix, beta-blockers, fibrate.     #Non-insulin-dependent diabetes mellitus.  Well-controlled with Glucophage and glipizide.  Initially held due to RANDAL.  Resumed now.  A1c 5.9 on 8/26.  A1C 5.9 but Hgb 8.2 at the time so less accurate.      #Mood disorder:general anxiety and depression, stable on Abilify, Cymbalta, Ativan, Remeron, trazodone.     Essential hypertension.  BP controlled on losartan and metoprolol.  Peripheral neuropathy, due to diabetes.  Resident of [facility] prior to recent past hospitalization TCU.     Overall stabilized and discharged back to TCU on 8/30/2022 for PT, OT, nursing cares, medical management and monitoring.       Today:  He is on IV Vancomycin, pharmacy managing, labs done per protocol, last day is 9/25/2022. He will see ID on 9/22/2022 and has follow up appt with Dr. Montes on 9/8/2022. Tolerating abx through PICC RUE. Wound vac on stump wound. Pain control is improved. His appetite is good. No abdominal pain, nausea, vomiting,  diarrhea or constipation. No urinary sx. Medical problems, aflutter, CAD, DM, HTN, managed with his usual medications. Diabetes on metformin and glipizide. Sleeping well.        PAST MEDICAL HISTORY:  Past Medical History:   Diagnosis Date     Anemia      Anemia, unspecified type 08/19/2016     Anxiety      Anxiety state 07/29/2021    Formatting of this note might be different from the original. Created by Conversion  Replacement Utility updated for latest IMO load     Arthritis      Atrial fibrillation and flutter (H)      Cerebral artery occlusion with cerebral infarction (H)      Cerebral infarction (H)      Chest pain, unspecified type 08/19/2016     Chronic atrial fibrillation (H)      Congestive heart failure (H)      Coronary artery disease     CABG with multiple stents     Coronary artery disease involving native coronary artery without angina pectoris, unspecified whether native or transplanted heart 08/19/2016     Coronary atherosclerosis 07/29/2021    Formatting of this note might be different from the original. Created by Conversion  Replacement Utility updated for latest IMO load     Depression      Diabetic ulcer of right midfoot associated with type 2 diabetes mellitus, limited to breakdown of skin (H) 09/28/2017     Equinovarus acquired deformity, right 04/14/2021     Equinovarus deformity, acquired, right      Essential hypertension 08/19/2016     GERD (gastroesophageal reflux disease)      Heart attack (H)      History of blood transfusion      Hyperlipidemia      Hyperlipidemia, unspecified hyperlipidemia type 08/19/2016     Hypertension      Insomnia, unspecified type 08/19/2016     Ischemic cardiomyopathy      Late effects of cerebrovascular accident 08/19/2016     Major depressive disorder with single episode, remission status unspecified 08/19/2016     Microalbuminuria 07/29/2021    Formatting of this note might be different from the original. Created by Conversion     Migraine      Mild  intellectual disabilities 08/19/2016     Myalgia      Nausea 08/19/2016     Osteomyelitis (H)      PONV (postoperative nausea and vomiting)     mild nausea     Problems with learning 07/29/2021    Formatting of this note might be different from the original. Created by Conversion [facility] Annotation: Feb 11 2010  1:41PM - Leonid Cabral: CANNOT READ OR  WRITE     PVD (peripheral vascular disease) (H)      Stented coronary artery      Type 2 diabetes mellitus (H) 07/29/2021    Formatting of this note might be different from the original. Created by Conversion     Type II or unspecified type diabetes mellitus with ophthalmic manifestations, not stated as uncontrolled(250.50) (H) 07/29/2021    Formatting of this note might be different from the original. Created by Conversion     [facility] 08/19/2016       MEDICATIONS:  Current Outpatient Medications   Medication Sig Dispense Refill     acetaminophen (TYLENOL) 325 MG tablet Take 650 mg by mouth every 6 hours as needed for fever or pain       acetaminophen (TYLENOL) 325 MG tablet Take 650 mg by mouth 2 times daily       ARIPiprazole (ABILIFY) 2 MG tablet 2 mg daily        ASPERCREME ORIGINAL 10 % external cream Apply topically 2 times daily as needed for other (back pain)       atorvastatin (LIPITOR) 80 MG tablet Take 80 mg by mouth daily        bisacodyl (DULCOLAX) 10 MG suppository Place 10 mg rectally daily as needed for constipation       cholestyramine (QUESTRAN) 4 g packet Take 1 packet by mouth every other day        clopidogrel (PLAVIX) 75 MG tablet Take 1 tablet (75 mg) by mouth daily Start taking medication the day after the procedure. 90 tablet 1     DULoxetine (CYMBALTA) 60 MG capsule Take 120 mg by mouth daily       erenumab-aooe (AIMOVIG, 140 MG DOSE,) 70 MG/ML injection Inject 140 mg Subcutaneous every 30 days       ferrous sulfate (FEROSUL) 325 (65 Fe) MG tablet Take 325 mg by mouth daily (with breakfast)        gabapentin (NEURONTIN) 100 MG capsule Take  100 mg by mouth 2 times daily (before meals)       gabapentin (NEURONTIN) 300 MG capsule Take 600 mg by mouth At Bedtime       glipiZIDE (GLUCOTROL) 5 MG tablet Take 2.5 mg by mouth daily before breakfast       HYDROcodone-acetaminophen (NORCO) 5-325 MG tablet Take 1-2 tablets by mouth every 6 hours as needed for moderate to severe pain (Patient taking differently: Take 1 tablet by mouth every 6 hours as needed for moderate to severe pain) 12 tablet 0     loperamide (IMODIUM) 2 MG capsule Take 2 mg by mouth 4 times daily as needed for diarrhea       losartan (COZAAR) 25 MG tablet Take 25 mg by mouth At Bedtime        magnesium chloride 535 (64 Mg) MG TBEC CR tablet Take 1,070 mg by mouth 2 times daily       metFORMIN (GLUCOPHAGE) 500 MG tablet Take 500 mg by mouth daily (with dinner)       metFORMIN (GLUCOPHAGE) 500 MG tablet Take 1,000 mg by mouth daily (with breakfast)       metoprolol succinate ER (TOPROL-XL) 25 MG 24 hr tablet Take 75 mg by mouth daily        mineral oil-hydrophilic petrolatum (AQUAPHOR) external ointment Apply topically 3 times daily as needed for irritation or dry skin Apply to forehead and other areas as needed       mirtazapine (REMERON) 7.5 MG tablet Take 7.5 mg by mouth At Bedtime        Nitroglycerin (NITROSTAT SL) Place 0.4 mg under the tongue every 5 minutes as needed for chest pain        pantoprazole (PROTONIX) 40 MG EC tablet Take 40 mg by mouth daily before breakfast       polyethylene glycol (MIRALAX) 17 GM/Dose powder Take 1 packet by mouth daily as needed for constipation        senna-docusate (SENOKOT-S/PERICOLACE) 8.6-50 MG tablet Take 1 tablet by mouth 2 times daily       senna-docusate (SENOKOT-S/PERICOLACE) 8.6-50 MG tablet Take 1 tablet by mouth 2 times daily as needed for constipation In addition to scheduled doses       sodium chloride, PF, 0.9% PF flush 10 mLs by Intracatheter route 3 times daily       traZODone (DESYREL) 50 MG tablet Take 50 mg by mouth At Bedtime     "    vitamin C (ASCORBIC ACID) 500 MG tablet Take 500 mg by mouth daily        XARELTO ANTICOAGULANT 20 MG TABS tablet Take 20 mg by mouth daily (with breakfast)          PHYSICAL EXAM:  General: Patient is alert male, no distress.   Vitals: /71   Pulse 76   Temp 98.9  F (37.2  C)   Resp 18   Ht 1.702 m (5' 7\")   Wt 67.3 kg (148 lb 6.4 oz)   SpO2 100%   BMI 23.24 kg/m    HEENT: Head is NCAT. Eyes show no injection or icterus. Nares negative. Oropharynx moist.  Neck: No JVD.  Lungs: Non labored respirations.   Abdomen: Soft  : Deferred.  Extremities: Mild swelling at R BKA stump, wound vac. PICC RUE.  Musculoskeletal: No LLE swelling.   Skin: Wound vac R.  Psych: Mood is good.       LABS/DIAGNOSTIC DATA:  Component      Latest Ref Rng & Units 8/23/2022 8/24/2022 8/24/2022 8/24/2022            6:11 AM  8:33 AM  7:13 PM   RBC Count      4.40 - 5.90 10e6/uL 3.89 (L) 3.48 (L) 3.40 (L)    Hemoglobin      13.3 - 17.7 g/dL 9.9 (L) 8.9 (L) 8.7 (L)    Hematocrit      40.0 - 53.0 % 32.4 (L) 29.0 (L) 28.3 (L)    MCV      78 - 100 fL 83 83 83    MCH      26.5 - 33.0 pg 25.4 (L) 25.6 (L) 25.6 (L)    MCHC      31.5 - 36.5 g/dL 30.6 (L) 30.7 (L) 30.7 (L)    RDW      10.0 - 15.0 % 14.5 14.7 14.8    Platelet Count      150 - 450 10e3/uL 433 323 315 255     Component      Latest Ref Rng & Units 8/25/2022 8/25/2022 8/26/2022 8/27/2022           6:05 AM  6:28 PM     RBC Count      4.40 - 5.90 10e6/uL 2.49 (L) 3.07 (L) 3.18 (L) 3.39 (L)   Hemoglobin      13.3 - 17.7 g/dL 6.4 (LL) 8.0 (L) 8.2 (L) 8.8 (L)   Hematocrit      40.0 - 53.0 % 20.9 (L) 26.3 (L) 27.3 (L) 29.1 (L)   MCV      78 - 100 fL 84 86 86 86   MCH      26.5 - 33.0 pg 25.7 (L) 26.1 (L) 25.8 (L) 26.0 (L)   MCHC      31.5 - 36.5 g/dL 30.6 (L) 30.4 (L) 30.0 (L) 30.2 (L)   RDW      10.0 - 15.0 % 14.6 14.7 14.6 14.7   Platelet Count      150 - 450 10e3/uL 249 291 251 296     Component      Latest Ref Rng & Units 8/29/2022 8/30/2022              RBC Count      4.40 - " 5.90 10e6/uL 3.32 (L)    Hemoglobin      13.3 - 17.7 g/dL 8.6 (L)    Hematocrit      40.0 - 53.0 % 28.2 (L)    MCV      78 - 100 fL 85    MCH      26.5 - 33.0 pg 25.9 (L)    MCHC      31.5 - 36.5 g/dL 30.5 (L)    RDW      10.0 - 15.0 % 14.8    Platelet Count      150 - 450 10e3/uL 264 280     Component      Latest Ref Rng & Units 8/23/2022 8/24/2022 8/24/2022 8/25/2022            6:11 AM  8:33 AM    Creatinine      0.67 - 1.17 mg/dL 1.04 1.47 (H) 1.50 (H) 1.09   Sodium      136 - 145 mmol/L 134 (L) 136  137   Potassium      3.4 - 5.3 mmol/L       Urea Nitrogen      6.0 - 20.0 mg/dL       Chloride      98 - 107 mmol/L       Carbon Dioxide (CO2)      22 - 29 mmol/L       Anion Gap      7 - 15 mmol/L 14 8  4 (L)   Glucose      70 - 99 mg/dL       GFR Estimate      >60 mL/min/1.73m2 85 56 (L) 55 (L) 81   Calcium      8.6 - 10.0 mg/dL 10.2 10.0  8.6     Component      Latest Ref Rng & Units 8/26/2022 8/27/2022 8/28/2022 8/29/2022                Creatinine      0.67 - 1.17 mg/dL 1.00 0.99 0.97 1.04   Sodium      136 - 145 mmol/L 139   140   Potassium      3.4 - 5.3 mmol/L       Urea Nitrogen      6.0 - 20.0 mg/dL       Chloride      98 - 107 mmol/L       Carbon Dioxide (CO2)      22 - 29 mmol/L       Anion Gap      7 - 15 mmol/L 4 (L)   6   Glucose      70 - 99 mg/dL       GFR Estimate      >60 mL/min/1.73m2 89 >90 >90 85   Calcium      8.6 - 10.0 mg/dL 8.7   9.1         ASSESSMENT/PLAN:  1. Wound infection, MRSA. From R BKA on 8/5/2022. BC 8/23/2022 positive for MRSA. S/p irrigation and debridement on 8/24/2022 and then washout with partial closure and application of wound vac on 8/26/2022. On IV Vancomycin, pharmacy managing. Follow up with Dr. Montes 9/8/2022 and follow up with ID 9/22/2022.   2. Anemia. Hgb dropped to 6.4, he did receive transfusion. Received Venofer in the hospital. Continue iron.   3. HTN. On metoprolol and losartan. Bps acceptable.   4. Afib. On xarelto for anticoagulation. Adequate rate control.    5. Diabetes. On metformin and glipizide. Accuchecks followed.    Stable conditions:   6. CAD. Hx of MIs. Hx of stenting and prior CABG. On statin, beta blocker, xarelto.  7. Mood disorder. Mixed depression and anxiety. On duloxetine, Abilify. Also takes Mirtazapine.   8. Hx of stroke. Continue statin, xarelto.  9. Mild intellectual disabilities. He resides in an [facility], but has been in the hospital or this TCU since Dec 2021 related to above issues.            Electronically signed by: Heidi Xie MD

## 2022-10-10 NOTE — PROGRESS NOTES
[facility]  [facility] Record Number:  [id number]  Place of Service where encounter took place: [facility] ([facility]) [[id number]]   CODE STATUS:   CPR/Full code     Chief Complaint/Reason for Visit:  Chief Complaint   Patient presents with     RECHECK     TCU 9/8/2022. 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        TCU HPI:    Cm Lundberg is a 52 year old male with hx of CAD, s/p stents and CABG, HTN, DM, prior CVA, afib/flutter on xarelto, who has been residing in TCU at [facility] since Dec 2021 surgery for chronic long standing right foot ulceration. He underwent incision and drainage of the right foot, excisional debridement of the ulceration of the right foot into the level of the muscle, under MAC with popliteal block anesthesia on 12/17/2021. He completed abx per ID recommendations. Remained NWB with wound vac, had 1/27/2022 incision and drainage with application of theraskin. On 4/4/2022 again incision and drainage with theraskin. Continued following with podiatry, referred to wound and vascular due to non healing diabetic ulcer, right foot. S/p angioplasty RLE and hyperbaric oxygen therapy. MRI with indications of osteomyelitis. 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    He was sent to the hospital from TCU on 8/23/2022 due to swelling, and drainage from surgical wound, concern for infection. His hospital discharge summary is partially excerpted below.     [facility]  Medicine Progress Note - Hospitalist Service  (Note: This note is listed as the discharge summary)  Date of Admission:  8/23/2022    Assessment & Plan  Cm Lundberg is a 52 year old male admitted on 8/23/2022.      Assessment:  #Infected right BKA stump.  CT right leg showed evolving abscess.  Patient not septic.  Blood cultures obtained.  On vancomycin and Zosyn.  S/p I&D 8/24, culture sent.  Charcot's ankle, s/p recent right BKA on 8/5/2022 by   [employer].       #MRSA bacteremia, likely due to right BKA stump abscess.  Positive BC on 8/23, 8/25.  Surveillance BC obtained 8/26, 8/27, 8/28.    PICC on 8/29.  Plan for 28 days of IV vancomycin post clearance of blood, till September 23.     #Acute renal failure, creat peaked at 1.47 on 8/24, was 1.04 on admission.  Likely due to sepsis and dehydration, given rising BUN.  With IVF renal function normalizing.     #Chronic normocytic anemia, hemoglobin baseline 8-9.  History of iron deficiency.  Iron checked low at 16 on 8/25/2022.    Replacing with Venofer 2 doses on 8/27 and 8/28, then p.o. replacement.     #Acute anemia, hemoglobin 6.4 on 8/25.  Reported intraoperative EBL 10 mL.  No postop bleeding.  S/p 1 unit PRBCs 8/25.HgB in upper 8's.     #CVD, history of stroke.  On Plavix, statin.     #Atrial fibrillation.  Was on Xarelto, held preoperatively.  Resume when okay per surgery.     #Coronary artery disease, status post CABG and stents.  On Plavix, beta-blockers, fibrate.     #Non-insulin-dependent diabetes mellitus.  Well-controlled with Glucophage and glipizide.  Initially held due to RANDAL.  Resumed now.  A1c 5.9 on 8/26.  A1C 5.9 but Hgb 8.2 at the time so less accurate.      #Mood disorder:general anxiety and depression, stable on Abilify, Cymbalta, Ativan, Remeron, trazodone.     Essential hypertension.  BP controlled on losartan and metoprolol.  Peripheral neuropathy, due to diabetes.  Resident of [facility] prior to recent past hospitalization TCU.     Overall stabilized and discharged back to TCU on 8/30/2022 for PT, OT, nursing cares, medical management and monitoring.       Today:  He has been tolerating IV Vancomycin, PICC RUE. Last day is 9/25/2022 with follow up with ID on 9/22/2022. He saw Dr. Montes today 9/8/2022 and will follow up again on 9/12/2022. Per note:    Stump incision line nicely opposed except for area where VAC was placed.  This area appears to be prematurely closing  and was reopened to a depth of 2-1/2 cm.  At the base of this there clearly is bone palpable.  No overt signs of infection but some fibrinous exudate in the wound.     Impression and plan: Overall doing very well.  Need to make sure that the VAC sponges placed deep enough so that it can properly drain the cavity and prevent recurrence of an abscess.  We will relay this to the transitional care team.  I will reassess at this coming Monday to make sure that things are going well.  If this cannot be well done we might have to switch to daily packings at the transitional care but I would like to avoid this for comfort reasons.    Pain control is improved. His appetite is good. No abdominal pain, nausea, vomiting, diarrhea or constipation. No urinary sx. Medical problems, aflutter/afib, CAD, DM, HTN, managed with his usual medications. Diabetes on metformin and glipizide. Sleeping well.        PAST MEDICAL HISTORY:  Past Medical History:   Diagnosis Date     Anemia      Anemia, unspecified type 08/19/2016     Anxiety      Anxiety state 07/29/2021    Formatting of this note might be different from the original. Created by Conversion  Replacement Utility updated for latest IMO load     Arthritis      Atrial fibrillation and flutter (H)      Cerebral artery occlusion with cerebral infarction (H)      Cerebral infarction (H)      Chest pain, unspecified type 08/19/2016     Chronic atrial fibrillation (H)      Congestive heart failure (H)      Coronary artery disease     CABG with multiple stents     Coronary artery disease involving native coronary artery without angina pectoris, unspecified whether native or transplanted heart 08/19/2016     Coronary atherosclerosis 07/29/2021    Formatting of this note might be different from the original. Created by Conversion  Replacement Utility updated for latest IMO load     Depression      Diabetic ulcer of right midfoot associated with type 2 diabetes mellitus, limited to breakdown  of skin (H) 09/28/2017     Equinovarus acquired deformity, right 04/14/2021     Equinovarus deformity, acquired, right      Essential hypertension 08/19/2016     GERD (gastroesophageal reflux disease)      Heart attack (H)      History of blood transfusion      Hyperlipidemia      Hyperlipidemia, unspecified hyperlipidemia type 08/19/2016     Hypertension      Insomnia, unspecified type 08/19/2016     Ischemic cardiomyopathy      Late effects of cerebrovascular accident 08/19/2016     Major depressive disorder with single episode, remission status unspecified 08/19/2016     Microalbuminuria 07/29/2021    Formatting of this note might be different from the original. 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 Created by Conversion [facility] Annotation: Feb 11 2010  1:41PM - Leonid Cabral: CANNOT READ OR  WRITE     PVD (peripheral vascular disease) (H)      Stented coronary artery      Type 2 diabetes mellitus (H) 07/29/2021    Formatting of this note might be different from the original. Created by Conversion     Type II or unspecified type diabetes mellitus with ophthalmic manifestations, not stated as uncontrolled(250.50) (H) 07/29/2021    Formatting of this note might be different from the original. Created by Conversion     Weakness 08/19/2016       MEDICATIONS:  Current Outpatient Medications   Medication Sig Dispense Refill     acetaminophen (TYLENOL) 325 MG tablet Take 650 mg by mouth every 6 hours as needed for fever or pain       acetaminophen (TYLENOL) 325 MG tablet Take 650 mg by mouth 2 times daily       ARIPiprazole (ABILIFY) 2 MG tablet 2 mg daily        ASPERCREME ORIGINAL 10 % external cream Apply topically 2 times daily as needed for other  (back pain)       atorvastatin (LIPITOR) 80 MG tablet Take 80 mg by mouth daily        bisacodyl (DULCOLAX) 10 MG suppository Place 10 mg rectally daily as needed for constipation       cholestyramine (QUESTRAN) 4 g packet Take 1 packet by mouth every other day        clopidogrel (PLAVIX) 75 MG tablet Take 1 tablet (75 mg) by mouth daily Start taking medication the day after the procedure. 90 tablet 1     DULoxetine (CYMBALTA) 60 MG capsule Take 120 mg by mouth daily       erenumab-aooe (AIMOVIG, 140 MG DOSE,) 70 MG/ML injection Inject 140 mg Subcutaneous every 30 days       ferrous sulfate (FEROSUL) 325 (65 Fe) MG tablet Take 325 mg by mouth daily (with breakfast)        gabapentin (NEURONTIN) 100 MG capsule Take 100 mg by mouth 2 times daily (before meals)       gabapentin (NEURONTIN) 300 MG capsule Take 600 mg by mouth At Bedtime       glipiZIDE (GLUCOTROL) 5 MG tablet Take 2.5 mg by mouth daily before breakfast       HYDROcodone-acetaminophen (NORCO) 5-325 MG tablet Take 1-2 tablets by mouth every 6 hours as needed for moderate to severe pain (Patient taking differently: Take 1 tablet by mouth every 6 hours as needed for moderate to severe pain) 12 tablet 0     loperamide (IMODIUM) 2 MG capsule Take 2 mg by mouth 4 times daily as needed for diarrhea       losartan (COZAAR) 25 MG tablet Take 25 mg by mouth At Bedtime        magnesium chloride 535 (64 Mg) MG TBEC CR tablet Take 1,070 mg by mouth 2 times daily       metFORMIN (GLUCOPHAGE) 500 MG tablet Take 500 mg by mouth daily (with dinner)       metFORMIN (GLUCOPHAGE) 500 MG tablet Take 1,000 mg by mouth daily (with breakfast)       metoprolol succinate ER (TOPROL-XL) 25 MG 24 hr tablet Take 75 mg by mouth daily        mineral oil-hydrophilic petrolatum (AQUAPHOR) external ointment Apply topically 3 times daily as needed for irritation or dry skin Apply to forehead and other areas as needed       mirtazapine (REMERON) 7.5 MG tablet Take 7.5 mg by mouth At  "Bedtime        Nitroglycerin (NITROSTAT SL) Place 0.4 mg under the tongue every 5 minutes as needed for chest pain        pantoprazole (PROTONIX) 40 MG EC tablet Take 40 mg by mouth daily before breakfast       polyethylene glycol (MIRALAX) 17 GM/Dose powder Take 1 packet by mouth daily as needed for constipation        senna-docusate (SENOKOT-S/PERICOLACE) 8.6-50 MG tablet Take 1 tablet by mouth 2 times daily       senna-docusate (SENOKOT-S/PERICOLACE) 8.6-50 MG tablet Take 1 tablet by mouth 2 times daily as needed for constipation In addition to scheduled doses       sodium chloride, PF, 0.9% PF flush 10 mLs by Intracatheter route 3 times daily       traZODone (DESYREL) 50 MG tablet Take 50 mg by mouth At Bedtime        vitamin C (ASCORBIC ACID) 500 MG tablet Take 500 mg by mouth daily        XARELTO ANTICOAGULANT 20 MG TABS tablet Take 20 mg by mouth daily (with breakfast)          PHYSICAL EXAM:  General: Patient is alert male, no distress.   Vitals: /77   Pulse 73   Temp 98.7  F (37.1  C)   Resp 18   Ht 1.702 m (5' 7\")   Wt 68.5 kg (151 lb)   SpO2 97%   BMI 23.65 kg/m    HEENT: Head is NCAT. Eyes show no injection or icterus. Nares negative. Oropharynx well hydrated.  Neck: No JVD.  Lungs: Non labored respirations.   Abdomen: Soft  : Deferred.  Extremities: Wound vac RLE at BKA stump. PICC RUE.  Musculoskeletal: No LLE swelling.   Skin: Wound vac R.  Psych: Mood is good.       LABS/DIAGNOSTIC DATA:  Component      Latest Ref Rng & Units 8/23/2022 8/24/2022 8/24/2022 8/24/2022            6:11 AM  8:33 AM  7:13 PM   RBC Count      4.40 - 5.90 10e6/uL 3.89 (L) 3.48 (L) 3.40 (L)    Hemoglobin      13.3 - 17.7 g/dL 9.9 (L) 8.9 (L) 8.7 (L)    Hematocrit      40.0 - 53.0 % 32.4 (L) 29.0 (L) 28.3 (L)    MCV      78 - 100 fL 83 83 83    MCH      26.5 - 33.0 pg 25.4 (L) 25.6 (L) 25.6 (L)    MCHC      31.5 - 36.5 g/dL 30.6 (L) 30.7 (L) 30.7 (L)    RDW      10.0 - 15.0 % 14.5 14.7 14.8    Platelet Count      " 150 - 450 10e3/uL 433 323 315 255     Component      Latest Ref Rng & Units 8/25/2022 8/25/2022 8/26/2022 8/27/2022           6:05 AM  6:28 PM     RBC Count      4.40 - 5.90 10e6/uL 2.49 (L) 3.07 (L) 3.18 (L) 3.39 (L)   Hemoglobin      13.3 - 17.7 g/dL 6.4 (LL) 8.0 (L) 8.2 (L) 8.8 (L)   Hematocrit      40.0 - 53.0 % 20.9 (L) 26.3 (L) 27.3 (L) 29.1 (L)   MCV      78 - 100 fL 84 86 86 86   MCH      26.5 - 33.0 pg 25.7 (L) 26.1 (L) 25.8 (L) 26.0 (L)   MCHC      31.5 - 36.5 g/dL 30.6 (L) 30.4 (L) 30.0 (L) 30.2 (L)   RDW      10.0 - 15.0 % 14.6 14.7 14.6 14.7   Platelet Count      150 - 450 10e3/uL 249 291 251 296     Component      Latest Ref Rng & Units 8/29/2022 8/30/2022              RBC Count      4.40 - 5.90 10e6/uL 3.32 (L)    Hemoglobin      13.3 - 17.7 g/dL 8.6 (L)    Hematocrit      40.0 - 53.0 % 28.2 (L)    MCV      78 - 100 fL 85    MCH      26.5 - 33.0 pg 25.9 (L)    MCHC      31.5 - 36.5 g/dL 30.5 (L)    RDW      10.0 - 15.0 % 14.8    Platelet Count      150 - 450 10e3/uL 264 280     Component      Latest Ref Rng & Units 8/23/2022 8/24/2022 8/24/2022 8/25/2022            6:11 AM  8:33 AM    Creatinine      0.67 - 1.17 mg/dL 1.04 1.47 (H) 1.50 (H) 1.09   Sodium      136 - 145 mmol/L 134 (L) 136  137   Potassium      3.4 - 5.3 mmol/L       Urea Nitrogen      6.0 - 20.0 mg/dL       Chloride      98 - 107 mmol/L       Carbon Dioxide (CO2)      22 - 29 mmol/L       Anion Gap      7 - 15 mmol/L 14 8  4 (L)   Glucose      70 - 99 mg/dL       GFR Estimate      >60 mL/min/1.73m2 85 56 (L) 55 (L) 81   Calcium      8.6 - 10.0 mg/dL 10.2 10.0  8.6     Component      Latest Ref Rng & Units 8/26/2022 8/27/2022 8/28/2022 8/29/2022                Creatinine      0.67 - 1.17 mg/dL 1.00 0.99 0.97 1.04   Sodium      136 - 145 mmol/L 139   140   Potassium      3.4 - 5.3 mmol/L       Urea Nitrogen      6.0 - 20.0 mg/dL       Chloride      98 - 107 mmol/L       Carbon Dioxide (CO2)      22 - 29 mmol/L       Anion Gap      7 - 15  mmol/L 4 (L)   6   Glucose      70 - 99 mg/dL       GFR Estimate      >60 mL/min/1.73m2 89 >90 >90 85   Calcium      8.6 - 10.0 mg/dL 8.7   9.1         ASSESSMENT/PLAN:  1. Wound infection, MRSA. From R BKA on 8/5/2022. BC 8/23/2022 positive for MRSA. S/p irrigation and debridement on 8/24/2022 and then washout with partial closure and application of wound vac on 8/26/2022 per Dr. Montes. Had follow up today with recheck planned for 9/12/2022. On IV Vancomycin, pharmacy managing. Follow up with ID on 9/22/2022.   2. Anemia. Hgb dropped to 6.4, he did receive transfusion. Received Venofer in the hospital. Continue iron.   3. HTN. On metoprolol and losartan. Continue to monitor Bps.  4. Afib/Aflutter. On xarelto for anticoagulation. Adequate rate control.   5. Diabetes. On metformin and glipizide. Accuchecks followed. No changes needed.     Stable conditions:   6. CAD. Hx of MIs. Hx of stenting and prior CABG. On statin, beta blocker, xarelto. No chest pain.  7. Mood disorder. Mixed depression and anxiety. On duloxetine, Abilify. Also takes Mirtazapine.   8. Hx of stroke. Continue statin, xarelto.  9. Mild intellectual disabilities. He resides in an [facility], but has been in the hospital or this TCU since Dec 2021 related to above issues.            Electronically signed by: Heidi Xie MD

## 2022-10-10 NOTE — PATIENT INSTRUCTIONS
- Wound Vac Instructions    1. 3x weekly and as needed cleanse the area with NS    2. Pat dry    3. Apply Cavilon no sting barrier wipe to the skin surrounding the wound to protect from drainage/maceration    4. Apply drape around incision. Cut strip of drape and apply to skin if bridging is needed; plan this area in advance; should not be over bony prominence     5. Cut the foam to fit the area of the incision    6. Cut narrow strip of foam if bridging    7. Cover foam with drape to obtain air tight seal    8. Cut opening the size of a quarter for where the suction pad will be applied    9. Apply Suction pad    10. 125mmHG suction continuous    [employer] [facility] can be reached at [phone], 24 hours a day 7 days a week     - If you do not have a back up plan in place:     If the negative pressure wound therapy malfunctions or unable to maintain seal: dressing must be removed and reapplied within 2 hours of the incident. If unable to reapply negative pressure wound dressing, place Normal Saline moistened gauze in wound bed and cover with appropriate dressing to keep wound bed moist.  Change wet-to-dry dressing two times a day until healthcare staff can re-implement negative pressure therapy. Change canister at least weekly.  [employer] [facility] can be reached at [phone], 24 hours a day 7 days a week    Information on Vacuum-Assisted Closure of a Wound  Vacuum-assisted closure (VAC) of a wound is a type of treatment to help wounds heal. It s also known as negative pressure wound therapy. During the treatment, a device lowers air pressure on the wound. This can help the wound heal more quickly.  Understanding the wound VAC system  A wound VAC system has several parts. A foam or gauze dressing is put directly on the wound. The dressing is changed every 24 to 72 hours. An adhesive film covers and seals the dressing and wound. A drainage tube leads from under the adhesive film and connects to a portable  vacuum pump. This pump removes air pressure over the wound. It may do this constantly. Or it may do it in cycles. During the treatment, you ll need to carry the portable pump everywhere you go.  Why wound VAC is used  You might need this therapy for a recent traumatic wound. Or you may need it for a chronic wound. This is a wound that does not heal the way it should over time. This can happen with wounds in people who have diabetes. You may need a wound VAC if you ve had a recent skin graft. And you may need a wound VAC for a large wound. Large wounds can take a longer time to heal.  A wound vacuum system may help your wound heal more quickly by:  Draining extra fluid from the wound  Reducing swelling  Reducing bacteria in the wound  Keeping your wound moist and warm  Helping draw together wound edges  Increasing blood flow to your wound  Decreasing inflammation  Wound VAC offers some other advantages over other types of wound care. It may decrease your overall discomfort. The dressings usually need to be changed less often. And they may be easier to keep in position.  Risks of wound VAC  Wound VAC has some rare risks, such as:  Bleeding (which may be severe)  Wound infection  An abnormal connection between the intestinal tract and the skin (enteric fistula)  Proper training in dressing changes can help reduce the risk for these complications. Also, your doctor will carefully evaluate you to make sure you are a good candidate for the therapy. Certain problems can increase your risk for complications. These include:  Exposed organs or blood vessels  High risk of bleeding from another medical problem  Wound infection  Nearby bone infection  Dead wound tissue  Cancer tissue  Fragile skin, such as from aging or longtime use of topical steroids  Allergy to adhesive  Very poor blood flow to your wound  Wounds close to joints that may reopen because of movement  Your doctor will discuss the risks that apply to you. Make  sure to talk with him or her about all of your questions and concerns.  Getting ready for wound VAC  You likely won t need to do much to get ready for wound VAC. In some cases, you may need to wait a while before having this therapy. For example, your doctor may first need to treat an infection in your wound. Dead or damaged tissue may also need to be removed from your wound.  You or a caregiver may need training on how to use the wound VAC device. This is done if you will be able to have your wound vacuum therapy at home. In other cases, you may need to have your wound vacuum therapy in a health care facility.  On the day of your procedure  A health care provider will cover your wound with foam or gauze wound dressing. An adhesive film will be put over the dressing and wound. This seals the wound. The foam connects to a drainage tube, which leads to a vacuum pump. This pump is portable. When the pump is turned on, it draws fluid through the foam and out the drainage tubing. The pump may run constantly, or it may cycle off and on. Your exact setup will depend on the specific type of wound vacuum system that you use.  Managing your wound  You may need the dressing changed about once a day. You may need it changed more or less often, depending on your wound. You or your caregiver may be trained to do this at home. Or it may be done by a visiting health care provider. Your doctor may prescribe a pain medicine. This is to prevent or reduce pain during the dressing change.  You will likely need to use the wound VAC system for several weeks or months. During this time, you ll carry the portable pump everywhere you go.  Nutrition for wound healing  During this time, make sure you follow a healthy diet. This is needed so the wound can heal and to prevent infection. Your doctor can tell you more about what to include in your diet during this time.  follow up with your doctor if you have a medical condition that led to your  wound, such as diabetes. He or she can help you prevent future wounds.  Follow-up care  Your doctor will carefully keep track of your healing. Make sure to keep all follow-up appointments.  When to call your health care provider  Call your health care provider right away if you have any of these:  Fever of 100.4 F (38.0 C) or higher  Increased redness, swelling, or warmth around wound  Increased pain  Bright red blood or blood clots in tubing or the collection chamber of the vacuum

## 2022-10-10 NOTE — PROGRESS NOTES
[facility] GERIATRICS  [place] Medical Record Number:  [id number]  Place of Service where encounter took place: [facility] ([facility]) [[id number]]   CODE STATUS:   CPR/Full code     Chief Complaint/Reason for Visit:  Chief Complaint   Patient presents with     RECHECK     TCU 9/27/2022. 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       TCU HPI:    Cm Lundberg is a 52 year old male with hx of CAD, s/p stents and CABG, HTN, DM, prior CVA, afib/flutter on xarelto, who has been residing in TCU at [facility] since Dec 2021 surgery for chronic long standing right foot ulceration. He underwent incision and drainage of the right foot, excisional debridement of the ulceration of the right foot into the level of the muscle, under MAC with popliteal block anesthesia on 12/17/2021. He completed abx per ID recommendations. Remained NWB with wound vac, had 1/27/2022 incision and drainage with application of theraskin. On 4/4/2022 again incision and drainage with theraskin. Continued following with podiatry, referred to wound and vascular due to non healing diabetic ulcer, right foot. S/p angioplasty RLE and hyperbaric oxygen therapy. MRI with indications of osteomyelitis. 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    He was sent to the hospital from TCU on 8/23/2022 due to swelling, and drainage from surgical wound, concern for infection. His hospital discharge summary is partially excerpted below.     [facility]  Medicine Progress Note - Hospitalist Service  (Note: This note is listed as the discharge summary)  Date of Admission:  8/23/2022    Assessment & Plan  Cm Lundberg is a 52 year old male admitted on 8/23/2022.      Assessment:  #Infected right BKA stump.  CT right leg showed evolving abscess.  Patient not septic.  Blood cultures obtained.  On vancomycin and Zosyn.  S/p I&D 8/24, culture sent.  Charcot's ankle, s/p recent right BKA on 8/5/2022 by   [employer].       #MRSA bacteremia, likely due to right BKA stump abscess.  Positive BC on 8/23, 8/25.  Surveillance BC obtained 8/26, 8/27, 8/28.    PICC on 8/29.  Plan for 28 days of IV vancomycin post clearance of blood, till September 23.     #Acute renal failure, creat peaked at 1.47 on 8/24, was 1.04 on admission.  Likely due to sepsis and dehydration, given rising BUN.  With IVF renal function normalizing.     #Chronic normocytic anemia, hemoglobin baseline 8-9.  History of iron deficiency.  Iron checked low at 16 on 8/25/2022.    Replacing with Venofer 2 doses on 8/27 and 8/28, then p.o. replacement.     #Acute anemia, hemoglobin 6.4 on 8/25.  Reported intraoperative EBL 10 mL.  No postop bleeding.  S/p 1 unit PRBCs 8/25.HgB in upper 8's.     #CVD, history of stroke.  On Plavix, statin.     #Atrial fibrillation.  Was on Xarelto, held preoperatively.  Resume when okay per surgery.     #Coronary artery disease, status post CABG and stents.  On Plavix, beta-blockers, fibrate.     #Non-insulin-dependent diabetes mellitus.  Well-controlled with Glucophage and glipizide.  Initially held due to RANDAL.  Resumed now.  A1c 5.9 on 8/26.  A1C 5.9 but Hgb 8.2 at the time so less accurate.      #Mood disorder:general anxiety and depression, stable on Abilify, Cymbalta, Ativan, Remeron, trazodone.     Essential hypertension.  BP controlled on losartan and metoprolol.  Peripheral neuropathy, due to diabetes.  Resident of [facility] prior to recent past hospitalization TCU.     Overall stabilized and discharged back to TCU on 8/30/2022 for PT, OT, nursing cares, medical management and monitoring.       Today:  Having somewhat more pain today in wound, has wound vac, on IV Vanco, labs to ID weekly. Low Wbc on labs 9/19/2022, rechecked a little better but still low on 9/20/2022. Dr. Mei aware. Previously had been normal. Has anemia, stable. Following with wound NP, last visit 9/22/2022, wound debrided, cont vac.  Also saw ID on 9/22/2022, abx extended 3 more weeks. Continues NWB RLE with wound vac. Appetite is good. No fever. No abdominal pain or constipation. No chest pain or shortness of breath. Likes to watch videos on Ipad or TV. Sleeping well. Medical problems, aflutter/afib, CAD, DM, HTN, managed with his usual medications. Diabetes on metformin and glipizide.        PAST MEDICAL HISTORY:  Past Medical History:   Diagnosis Date     Anemia      Anemia, unspecified type 08/19/2016     Anxiety      Anxiety state 07/29/2021    Formatting of this note might be different from the original. Created by Conversion  Replacement Utility updated for latest IMO load     Arthritis      Atrial fibrillation and flutter (H)      Cerebral artery occlusion with cerebral infarction (H)      Cerebral infarction (H)      Chest pain, unspecified type 08/19/2016     Chronic atrial fibrillation (H)      Congestive heart failure (H)      Coronary artery disease     CABG with multiple stents     Coronary artery disease involving native coronary artery without angina pectoris, unspecified whether native or transplanted heart 08/19/2016     Coronary atherosclerosis 07/29/2021    Formatting of this note might be different from the original. Created by Conversion  Replacement Utility updated for latest IMO load     Depression      Diabetic ulcer of right midfoot associated with type 2 diabetes mellitus, limited to breakdown of skin (H) 09/28/2017     Equinovarus acquired deformity, right 04/14/2021     Equinovarus deformity, acquired, right      Essential hypertension 08/19/2016     GERD (gastroesophageal reflux disease)      Heart attack (H)      History of blood transfusion      Hyperlipidemia      Hyperlipidemia, unspecified hyperlipidemia type 08/19/2016     Hypertension      Insomnia, unspecified type 08/19/2016     Ischemic cardiomyopathy      Late effects of cerebrovascular accident 08/19/2016     Major depressive disorder with single  episode, remission status unspecified 08/19/2016     Microalbuminuria 07/29/2021    Formatting of this note might be different from the original. 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 Created by Conversion [facility] Annotation: Feb 11 2010  1:41PM - Leonid Cabral: CANNOT READ OR  WRITE     PVD (peripheral vascular disease) (H)      Stented coronary artery      Type 2 diabetes mellitus (H) 07/29/2021    Formatting of this note might be different from the original. Created by Conversion     Type II or unspecified type diabetes mellitus with ophthalmic manifestations, not stated as uncontrolled(250.50) (H) 07/29/2021    Formatting of this note might be different from the original. Created by Conversion     Weakness 08/19/2016       MEDICATIONS:  Current Outpatient Medications   Medication Sig Dispense Refill     acetaminophen (TYLENOL) 325 MG tablet Take 650 mg by mouth every 6 hours as needed for fever or pain       acetaminophen (TYLENOL) 325 MG tablet Take 650 mg by mouth 2 times daily       ARIPiprazole (ABILIFY) 2 MG tablet 2 mg daily        ASPERCREME ORIGINAL 10 % external cream Apply topically 2 times daily as needed for other (back pain)       atorvastatin (LIPITOR) 80 MG tablet Take 80 mg by mouth daily        bisacodyl (DULCOLAX) 10 MG suppository Place 10 mg rectally daily as needed for constipation       cholestyramine (QUESTRAN) 4 g packet Take 1 packet by mouth every other day        clopidogrel (PLAVIX) 75 MG tablet Take 1 tablet (75 mg) by mouth daily Start taking medication the day after the procedure. 90 tablet 1     DULoxetine (CYMBALTA) 60 MG capsule Take 120 mg by mouth daily       erenumab-aooe (AIMOVIG, 140 MG DOSE,) 70 MG/ML  injection Inject 140 mg Subcutaneous every 30 days       ferrous sulfate (FEROSUL) 325 (65 Fe) MG tablet Take 325 mg by mouth daily (with breakfast)        gabapentin (NEURONTIN) 100 MG capsule Take 100 mg by mouth 2 times daily (before meals)       gabapentin (NEURONTIN) 300 MG capsule Take 600 mg by mouth At Bedtime       glipiZIDE (GLUCOTROL) 5 MG tablet Take 2.5 mg by mouth daily before breakfast       HYDROcodone-acetaminophen (NORCO) 5-325 MG tablet Take 1-2 tablets by mouth every 6 hours as needed for moderate to severe pain (Patient taking differently: Take 1 tablet by mouth every 6 hours as needed for moderate to severe pain) 12 tablet 0     loperamide (IMODIUM) 2 MG capsule Take 2 mg by mouth 4 times daily as needed for diarrhea       losartan (COZAAR) 25 MG tablet Take 25 mg by mouth At Bedtime        magnesium chloride 535 (64 Mg) MG TBEC CR tablet Take 1,070 mg by mouth 2 times daily       metFORMIN (GLUCOPHAGE) 500 MG tablet Take 500 mg by mouth daily (with dinner)       metFORMIN (GLUCOPHAGE) 500 MG tablet Take 1,000 mg by mouth daily (with breakfast)       metoprolol succinate ER (TOPROL-XL) 25 MG 24 hr tablet Take 75 mg by mouth daily        mineral oil-hydrophilic petrolatum (AQUAPHOR) external ointment Apply topically 3 times daily as needed for irritation or dry skin Apply to forehead and other areas as needed       mirtazapine (REMERON) 7.5 MG tablet Take 7.5 mg by mouth At Bedtime        Nitroglycerin (NITROSTAT SL) Place 0.4 mg under the tongue every 5 minutes as needed for chest pain        pantoprazole (PROTONIX) 40 MG EC tablet Take 40 mg by mouth daily before breakfast       polyethylene glycol (MIRALAX) 17 GM/Dose powder Take 1 packet by mouth daily as needed for constipation        senna-docusate (SENOKOT-S/PERICOLACE) 8.6-50 MG tablet Take 1 tablet by mouth 2 times daily       senna-docusate (SENOKOT-S/PERICOLACE) 8.6-50 MG tablet Take 1 tablet by mouth 2 times daily as needed for  "constipation In addition to scheduled doses       sodium chloride, PF, 0.9% PF flush 10 mLs by Intracatheter route 3 times daily       traZODone (DESYREL) 50 MG tablet Take 50 mg by mouth At Bedtime        vitamin C (ASCORBIC ACID) 500 MG tablet Take 500 mg by mouth daily        XARELTO ANTICOAGULANT 20 MG TABS tablet Take 20 mg by mouth daily (with breakfast)          PHYSICAL EXAM:  General: Patient is alert male, no distress.   Vitals: /69   Pulse 76   Temp 98.5  F (36.9  C)   Resp 18   Ht 1.702 m (5' 7\")   Wt 68 kg (150 lb)   SpO2 97%   BMI 23.49 kg/m    HEENT: Head is NCAT. Eyes show no injection or icterus. Nares negative. Oropharynx moist.  Neck: No JVD.  Lungs: Non labored respirations.   Abdomen: Soft.   : Deferred.  Extremities: Wound vac RLE. RUE PICC.  Musculoskeletal: No LLE swelling.    Skin: Wound vac on R BKA stump.  Psych: Mood is good.       LABS/DIAGNOSTIC DATA:  Component      Latest Ref Rng & Units 8/23/2022 8/24/2022 8/24/2022 8/24/2022            6:11 AM  8:33 AM  7:13 PM   RBC Count      4.40 - 5.90 10e6/uL 3.89 (L) 3.48 (L) 3.40 (L)    Hemoglobin      13.3 - 17.7 g/dL 9.9 (L) 8.9 (L) 8.7 (L)    Hematocrit      40.0 - 53.0 % 32.4 (L) 29.0 (L) 28.3 (L)    MCV      78 - 100 fL 83 83 83    MCH      26.5 - 33.0 pg 25.4 (L) 25.6 (L) 25.6 (L)    MCHC      31.5 - 36.5 g/dL 30.6 (L) 30.7 (L) 30.7 (L)    RDW      10.0 - 15.0 % 14.5 14.7 14.8    Platelet Count      150 - 450 10e3/uL 433 323 315 255     Component      Latest Ref Rng & Units 8/25/2022 8/25/2022 8/26/2022 8/27/2022           6:05 AM  6:28 PM     RBC Count      4.40 - 5.90 10e6/uL 2.49 (L) 3.07 (L) 3.18 (L) 3.39 (L)   Hemoglobin      13.3 - 17.7 g/dL 6.4 (LL) 8.0 (L) 8.2 (L) 8.8 (L)   Hematocrit      40.0 - 53.0 % 20.9 (L) 26.3 (L) 27.3 (L) 29.1 (L)   MCV      78 - 100 fL 84 86 86 86   MCH      26.5 - 33.0 pg 25.7 (L) 26.1 (L) 25.8 (L) 26.0 (L)   MCHC      31.5 - 36.5 g/dL 30.6 (L) 30.4 (L) 30.0 (L) 30.2 (L)   RDW      10.0 " - 15.0 % 14.6 14.7 14.6 14.7   Platelet Count      150 - 450 10e3/uL 249 291 251 296       Component      Latest Ref Rng & Units 9/12/2022             WBC      4.0 - 11.0 10e3/uL 4.7   RBC Count      4.40 - 5.90 10e6/uL 3.85 (L)   Hemoglobin      13.3 - 17.7 g/dL 10.3 (L)   Hematocrit      40.0 - 53.0 % 33.5 (L)   MCV      78 - 100 fL 87   MCH      26.5 - 33.0 pg 26.8   MCHC      31.5 - 36.5 g/dL 30.7 (L)   RDW      10.0 - 15.0 % 16.4 (H)   Platelet Count      150 - 450 10e3/uL 185       Component      Latest Ref Rng & Units 8/23/2022 8/24/2022 8/24/2022 8/25/2022            6:11 AM  8:33 AM    Creatinine      0.67 - 1.17 mg/dL 1.04 1.47 (H) 1.50 (H) 1.09   Sodium      136 - 145 mmol/L 134 (L) 136  137   Potassium      3.4 - 5.3 mmol/L       Urea Nitrogen      6.0 - 20.0 mg/dL       Chloride      98 - 107 mmol/L       Carbon Dioxide (CO2)      22 - 29 mmol/L       Anion Gap      7 - 15 mmol/L 14 8  4 (L)   Glucose      70 - 99 mg/dL       GFR Estimate      >60 mL/min/1.73m2 85 56 (L) 55 (L) 81   Calcium      8.6 - 10.0 mg/dL 10.2 10.0  8.6     Component      Latest Ref Rng & Units 8/26/2022 8/27/2022 8/28/2022 8/29/2022                Creatinine      0.67 - 1.17 mg/dL 1.00 0.99 0.97 1.04   Sodium      136 - 145 mmol/L 139   140   Potassium      3.4 - 5.3 mmol/L       Urea Nitrogen      6.0 - 20.0 mg/dL       Chloride      98 - 107 mmol/L       Carbon Dioxide (CO2)      22 - 29 mmol/L       Anion Gap      7 - 15 mmol/L 4 (L)   6   Glucose      70 - 99 mg/dL       GFR Estimate      >60 mL/min/1.73m2 89 >90 >90 85   Calcium      8.6 - 10.0 mg/dL 8.7   9.1         ASSESSMENT/PLAN: No changes or new orders today.   1. R BKA (8/5/2022) with subsequent MRSA wound infection. S/p irrigation and debridement on 8/24/2022 and then washout with partial closure and application of wound vac on 8/26/2022 per Dr. Montes. Had follow up 9/8/2022 and 9/12/2022, saw wound NP on 9/22/2022, wound debrided. On IV Vancomycin, pharmacy  managing. Follow up with ID on 9/22/2022, abx extended 3 weeks.   2. Anemia. Hgb dropped to 6.4, he did receive transfusion. Received Venofer in the hospital. Continue iron.  3. Low wbc. Labs are trended. ID aware.   4. HTN. On metoprolol and losartan. Continue to monitor Bps.  5. Afib/Aflutter. On xarelto for anticoagulation. Adequate rate control.   6. Diabetes. On metformin and glipizide, continue. Accuchecks followed.      Stable conditions:   6. CAD. Hx of MIs. Hx of stenting and prior CABG. On statin, beta blocker, xarelto. No chest pain.  7. Mood disorder. Mixed depression and anxiety. On duloxetine, Abilify. Also takes Mirtazapine.   8. Hx of stroke. Continue statin, xarelto.  9. Mild intellectual disabilities. He resides in an [facility], but has been in the hospital or this TCU since Dec 2021 related to above issues.          Electronically signed by: Heidi Xie MD

## 2022-10-11 ENCOUNTER — TRANSITIONAL CARE UNIT VISIT (OUTPATIENT)
Dept: GERIATRICS | Facility: CLINIC | Age: 52
End: 2022-10-11
Payer: MEDICARE

## 2022-10-11 DIAGNOSIS — I10 ESSENTIAL HYPERTENSION: ICD-10-CM

## 2022-10-11 DIAGNOSIS — E11.69 TYPE 2 DIABETES MELLITUS WITH OTHER SPECIFIED COMPLICATION, WITHOUT LONG-TERM CURRENT USE OF INSULIN (H): ICD-10-CM

## 2022-10-11 DIAGNOSIS — D62 ABLA (ACUTE BLOOD LOSS ANEMIA): ICD-10-CM

## 2022-10-11 DIAGNOSIS — I48.0 PAROXYSMAL ATRIAL FIBRILLATION (H): ICD-10-CM

## 2022-10-11 DIAGNOSIS — L08.9 WOUND INFECTION: Primary | ICD-10-CM

## 2022-10-11 DIAGNOSIS — T14.8XXA WOUND INFECTION: Primary | ICD-10-CM

## 2022-10-11 LAB
ALBUMIN SERPL BCG-MCNC: 3.7 G/DL (ref 3.5–5.2)
ALP SERPL-CCNC: 121 U/L (ref 40–129)
ALT SERPL W P-5'-P-CCNC: 16 U/L (ref 10–50)
ANION GAP SERPL CALCULATED.3IONS-SCNC: 10 MMOL/L (ref 7–15)
AST SERPL W P-5'-P-CCNC: 23 U/L (ref 10–50)
BASOPHILS # BLD AUTO: 0.1 10E3/UL (ref 0–0.2)
BASOPHILS NFR BLD AUTO: 2 %
BILIRUB SERPL-MCNC: 0.2 MG/DL
BUN SERPL-MCNC: 20.3 MG/DL (ref 6–20)
CALCIUM SERPL-MCNC: 9.6 MG/DL (ref 8.6–10)
CHLORIDE SERPL-SCNC: 105 MMOL/L (ref 98–107)
CREAT SERPL-MCNC: 0.88 MG/DL (ref 0.67–1.17)
DEPRECATED HCO3 PLAS-SCNC: 23 MMOL/L (ref 22–29)
EOSINOPHIL # BLD AUTO: 0.1 10E3/UL (ref 0–0.7)
EOSINOPHIL NFR BLD AUTO: 3 %
ERYTHROCYTE [DISTWIDTH] IN BLOOD BY AUTOMATED COUNT: 15.3 % (ref 10–15)
GFR SERPL CREATININE-BSD FRML MDRD: >90 ML/MIN/1.73M2
GLUCOSE SERPL-MCNC: 129 MG/DL (ref 70–99)
HCT VFR BLD AUTO: 34.4 % (ref 40–53)
HGB BLD-MCNC: 10.5 G/DL (ref 13.3–17.7)
IMM GRANULOCYTES # BLD: 0 10E3/UL
IMM GRANULOCYTES NFR BLD: 0 %
LYMPHOCYTES # BLD AUTO: 1.5 10E3/UL (ref 0.8–5.3)
LYMPHOCYTES NFR BLD AUTO: 37 %
MCH RBC QN AUTO: 25.7 PG (ref 26.5–33)
MCHC RBC AUTO-ENTMCNC: 30.5 G/DL (ref 31.5–36.5)
MCV RBC AUTO: 84 FL (ref 78–100)
MONOCYTES # BLD AUTO: 0.3 10E3/UL (ref 0–1.3)
MONOCYTES NFR BLD AUTO: 9 %
NEUTROPHILS # BLD AUTO: 2 10E3/UL (ref 1.6–8.3)
NEUTROPHILS NFR BLD AUTO: 49 %
NRBC # BLD AUTO: 0 10E3/UL
NRBC BLD AUTO-RTO: 0 /100
PLATELET # BLD AUTO: 243 10E3/UL (ref 150–450)
POTASSIUM SERPL-SCNC: 4.6 MMOL/L (ref 3.4–5.3)
PROT SERPL-MCNC: 6.2 G/DL (ref 6.4–8.3)
RBC # BLD AUTO: 4.09 10E6/UL (ref 4.4–5.9)
SODIUM SERPL-SCNC: 138 MMOL/L (ref 136–145)
WBC # BLD AUTO: 4 10E3/UL (ref 4–11)

## 2022-10-11 PROCEDURE — P9604 ONE-WAY ALLOW PRORATED TRIP: HCPCS | Performed by: INTERNAL MEDICINE

## 2022-10-11 PROCEDURE — 36415 COLL VENOUS BLD VENIPUNCTURE: CPT | Performed by: INTERNAL MEDICINE

## 2022-10-11 PROCEDURE — 99310 SBSQ NF CARE HIGH MDM 45: CPT | Performed by: FAMILY MEDICINE

## 2022-10-11 PROCEDURE — 85025 COMPLETE CBC W/AUTO DIFF WBC: CPT | Performed by: INTERNAL MEDICINE

## 2022-10-11 PROCEDURE — 80053 COMPREHEN METABOLIC PANEL: CPT | Performed by: INTERNAL MEDICINE

## 2022-10-11 NOTE — NURSING NOTE
"[facility] GERIATRICS         Chief Complaint   Patient presents with     RECHECK      HPI:  Cm Lundberg is a 52 year old  (1970), who is being seen today for an episodic care visit at: [facility] ([facility]) [[id number]].     PMH includes DM2, diabetic foot ulcer, s/p right BKA, infectioned BKA stump needing I&D in 8.2022, MRSA bacteremia on IV vanco, HTN, CAD, s/p CABG, stents, CVA.    He was hospitalized from 9/27/22-9/30/22 for sepsis and neutropenic fever. WBC 0.9 and ANC 0.4. WBC improved after vanco was stopped. 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 CT scan on admission showing fluid collection going down to stump.  Seen by vascular surgery in the ER, who do not feel that the stump is infected.      Patient is seen today sitting in his bed. He states that he is feeling well. He has c/o dry scalp and associated mild pruritis. He has no pain at the moment. His pain is well controlled with PRN norco. He has baseline palpitations a couple of times a week and baseline headache 3-5 times a day. He denies dizziness, chest pain, SOB, coughing, dyspnea, and bowel or bladder concerns.           Allergies, and PMH/PSH reviewed in EPIC today.  REVIEW OF SYSTEMS:  10 point ROS of systems including Constitutional, Eyes, Respiratory, Cardiovascular, Gastroenterology, Genitourinary, Integumentary, Musculoskeletal, Psychiatric were all negative except for pertinent positives noted in my HPI.     Objective:   /79   Pulse 78   Temp 98.2  F (36.8  C)   Resp 18   Ht 1.6 m (5' 3\")   Wt 71.7 kg (158 lb)   SpO2 (!) 8%   BMI 27.99 kg/m    GENERAL APPEARANCE:  Alert, in no distress, cooperative  ENT:  Mouth and posterior oropharynx normal, moist mucous membranes, normal hearing acuity  RESP:  respiratory effort and palpation of chest normal, lungs clear to auscultation   CV:  Palpation and auscultation of heart done , regular rate and rhythm, no murmur, rub, or gallop, no edema  ABDOMEN:  normal " bowel sounds, soft, nontender, no hepatosplenomegaly or other masses  M/S:   Gait and station abnormal s/p right BKA  Digits and nails abnormal baseline  SKIN:  Dry flaky scalp. Inspection of skin and subcutaneous tissue baseline, Palpation of skin and subcutaneous tissue baseline  NEURO:   Cranial nerves 2-12 are normal tested and grossly at patient's baseline  PSYCH:  oriented X 3     Recent labs in [facility] reviewed by me today.      Assessment/Plan:  (D70.9,  R50.81) Neutropenic fever (H)  (primary encounter diagnosis)  Comment: Acute. Neutropenia possibly related to previous use of vanco. WBC 0.9 on 9/27, 4.2 on 9/30  Plan: Check CBC.   F/u with infectious disease.    (L21.0) Dandruff, adult  Comment: Acute.   Plan: selsun blue shampoo two times a week     (I25.10) Coronary artery disease involving native coronary artery of native heart without angina pectoris  (I10) Essential hypertension  Comment: BP stable. Chest pain and headache baseline.  Plan: Continue metoprolol, losartan and Lipitor. Monitor BP and changes in palpitations and headaches.        Orders:  Check BMP dx CAD on 10/11/22  Check CBC dx neutropenia on 10/11/22    Electronically signed by: Mery Little RN, NP student

## 2022-10-11 NOTE — LETTER
10/11/2022        RE: Cm Lundberg  [address]  [place] [address]          [facility] GERIATRICS  [place] [facility] Record Number:  [id number]  Place of Service where encounter took place: [facility] ([facility]) [[id number]]   CODE STATUS:   CPR/Full code     Chief Complaint/Reason for Visit:  Chief Complaint   Patient presents with     RECHECK     10/11/2022. 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 Wound vac. Recent hospitalization for neutropenic fever, resolved.         HPI:    Cm Lundberg is a 52 year old male with hx of CAD, s/p stents and CABG, HTN, DM, prior CVA, afib/flutter on xarelto, who has been residing in TCU at [facility] since Dec 2021 surgery for chronic long standing right foot ulceration. He underwent incision and drainage of the right foot, excisional debridement of the ulceration of the right foot into the level of the muscle, under MAC with popliteal block anesthesia on 12/17/2021. He completed abx per ID recommendations. Remained NWB with wound vac, had 1/27/2022 incision and drainage with application of theraskin. On 4/4/2022 again incision and drainage with theraskin. Continued following with podiatry, referred to wound and vascular due to non healing diabetic ulcer, right foot. S/p angioplasty RLE and hyperbaric oxygen therapy. MRI with indications of osteomyelitis. 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 Hospitalized 8/23/2022-8/30/2022 due to stump infection, placed on IV Vanco due to MRSA, has continued with NWB and wound vac. Sent in to the hospital from TCU on 9/27/2022 due to fever.    He was worked up, treated in the hospital. Seen by ID. Unfortunately, what is listed as the discharge summary from this hospital stay does not provide the details of his stay, it is a note representing the admission details. It is however, excerpted for information below.      [facility]  [facility]charge Summary    Date of Admission:  9/27/2022  Date of Discharge:  9/30/2022  Discharging Provider: Efrain Salcedo,     Discharge Diagnoses  Sepsis  Neutropenic fever  Hx CVA  Atrial fibrillation  CAD  T2DM with diabetic neuropathy  Hypertension  Anxiety  Heart failure with borderline ejection fraction, unspecified chronicity    Hospital Course  52 year old male with PMH significant for DM-2, diabetic foot ulcer, s/p right BKA, infected BKA stump needing I&D in 08/2022, MRSA bacteremia on IV vancomycin, hypertension, CAD, S/P CABG, stents, afib, CVA, diabetic neuropathy who was sent to out ED for concern regarding sepsis      Sepsis  Neutropenic fever  -- Patient with recent right BKA stump infection with MRSA bacteremia,discharged on IV vanc (supposed to be until 9/23/22, but patient reports he is still on that). Still on wound vac. Noted to have new skin redness around the stump, fever of 101F with HR of 105 at TCU  -- Patient doesnt look overtly septic. Blood cultures taken in ED. Will also take cultures from the existing piccline  -- Started empiric vanc/zosyn. Will get CT of the stump to rule out any abscess or osteomyelitis. Consult vascular surgery team  -- If B/C is positive for MRSA, consider RICHARDSON to rule out IE. TTE during last admission was negative for vegetations  -- WBC of 0.9 and ANC of 0.4 noted. Neutropenic precaution.   -- Consult ID     H/O CVA  -- Cont with statin. Hold plavix     Atrial fibrillation  - Cont with Metoprolol  -- Hold Xarelto in case he needs surgical procedure     CAD  -- S/P CABG and stents  -- Cont with cardiac meds. Hold plavix as he might need surgical procedure     DM-II with diabetic neuropathy  -- Hold metformin. Cont glipizide. Add sliding scale insulin     Hypertension  -- Cont with home meds with hold parameters     Anxiety disorder:  -- Cont with home meds      Today:  He is in good spirits today, wound vac changes are about 3 x weekly, tolerates, pain is less. He had  follow up with wound and vascular yesterday, continues with wound vac and NWB, RTC in 2 weeks. He is not on abx. Wound apparently is improving with less depth. Was debrided yesterday as well. No fever. He is otherwise doing okay. No new concerns and no complaints on visit today. He is on xarelto for afib/flutter and metformin and glipizide for DM. Appetite is good. No abdominal pain, nausea, vomiting, diarrhea or constipation. No urinary sx. Medical problems, aflutter, CAD, DM, HTN, managed with his usual medications. Diabetes on metformin and glipizide. Sleeping okay.       PAST MEDICAL HISTORY:  Past Medical History:   Diagnosis Date     Anemia      Anemia, unspecified type 08/19/2016     Anxiety      Anxiety state 07/29/2021    Formatting of this note might be different from the original. Created by Conversion  Replacement Utility updated for latest IMO load     Arthritis      Atrial fibrillation and flutter (H)      Cerebral artery occlusion with cerebral infarction (H)      Cerebral infarction (H)      Chest pain, unspecified type 08/19/2016     Chronic atrial fibrillation (H)      Congestive heart failure (H)      Coronary artery disease     CABG with multiple stents     Coronary artery disease involving native coronary artery without angina pectoris, unspecified whether native or transplanted heart 08/19/2016     Coronary atherosclerosis 07/29/2021    Formatting of this note might be different from the original. Created by Conversion  Replacement Utility updated for latest IMO load     Depression      Diabetic ulcer of right midfoot associated with type 2 diabetes mellitus, limited to breakdown of skin (H) 09/28/2017     Equinovarus acquired deformity, right 04/14/2021     Equinovarus deformity, acquired, right      Essential hypertension 08/19/2016     GERD (gastroesophageal reflux disease)      Heart attack (H)      History of blood transfusion      Hyperlipidemia      Hyperlipidemia, unspecified  hyperlipidemia type 08/19/2016     Hypertension      Insomnia, unspecified type 08/19/2016     Ischemic cardiomyopathy      Late effects of cerebrovascular accident 08/19/2016     Major depressive disorder with single episode, remission status unspecified 08/19/2016     Microalbuminuria 07/29/2021    Formatting of this note might be different from the original. 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 Created by Conversion [facility] Annotation: Feb 11 2010  1:41PM - Leonid Cabral: CANNOT READ OR  WRITE     PVD (peripheral vascular disease) (H)      Stented coronary artery      Type 2 diabetes mellitus (H) 07/29/2021    Formatting of this note might be different from the original. Created by Conversion     Type II or unspecified type diabetes mellitus with ophthalmic manifestations, not stated as uncontrolled(250.50) (H) 07/29/2021    Formatting of this note might be different from the original. Created by Conversion     Weakness 08/19/2016       MEDICATIONS:  Current Outpatient Medications   Medication Sig Dispense Refill     acetaminophen (TYLENOL) 325 MG tablet Take 650 mg by mouth every 6 hours as needed for fever or pain       acetaminophen (TYLENOL) 325 MG tablet Take 650 mg by mouth 2 times daily       ARIPiprazole (ABILIFY) 2 MG tablet 2 mg daily        ASPERCREME ORIGINAL 10 % external cream Apply topically 2 times daily as needed for other (back pain)       atorvastatin (LIPITOR) 80 MG tablet Take 80 mg by mouth daily        bisacodyl (DULCOLAX) 10 MG suppository Place 10 mg rectally daily as needed for constipation       cholestyramine (QUESTRAN) 4 g packet Take 1 packet by mouth every other day        clopidogrel (PLAVIX) 75 MG tablet Take 1  tablet (75 mg) by mouth daily Start taking medication the day after the procedure. 90 tablet 1     DULoxetine (CYMBALTA) 60 MG capsule Take 120 mg by mouth daily       erenumab-aooe (AIMOVIG, 140 MG DOSE,) 70 MG/ML injection Inject 140 mg Subcutaneous every 30 days       ferrous sulfate (FEROSUL) 325 (65 Fe) MG tablet Take 325 mg by mouth daily (with breakfast)        gabapentin (NEURONTIN) 100 MG capsule Take 100 mg by mouth 2 times daily (before meals)       gabapentin (NEURONTIN) 300 MG capsule Take 600 mg by mouth At Bedtime       glipiZIDE (GLUCOTROL) 5 MG tablet Take 2.5 mg by mouth daily before breakfast       HYDROcodone-acetaminophen (NORCO) 5-325 MG tablet Take 1-2 tablets by mouth every 6 hours as needed for moderate to severe pain (Patient taking differently: Take 1 tablet by mouth every 6 hours as needed for moderate to severe pain) 12 tablet 0     loperamide (IMODIUM) 2 MG capsule Take 2 mg by mouth 4 times daily as needed for diarrhea       losartan (COZAAR) 25 MG tablet Take 25 mg by mouth At Bedtime        magnesium chloride 535 (64 Mg) MG TBEC CR tablet Take 1,070 mg by mouth 2 times daily       metFORMIN (GLUCOPHAGE) 500 MG tablet Take 500 mg by mouth daily (with dinner)       metFORMIN (GLUCOPHAGE) 500 MG tablet Take 1,000 mg by mouth daily (with breakfast)       metoprolol succinate ER (TOPROL-XL) 25 MG 24 hr tablet Take 75 mg by mouth daily        mineral oil-hydrophilic petrolatum (AQUAPHOR) external ointment Apply topically 3 times daily as needed for irritation or dry skin Apply to forehead and other areas as needed       mirtazapine (REMERON) 7.5 MG tablet Take 7.5 mg by mouth At Bedtime        Nitroglycerin (NITROSTAT SL) Place 0.4 mg under the tongue every 5 minutes as needed for chest pain        pantoprazole (PROTONIX) 40 MG EC tablet Take 40 mg by mouth daily before breakfast       polyethylene glycol (MIRALAX) 17 GM/Dose powder Take 1 packet by mouth daily as needed for constipation    "     senna-docusate (SENOKOT-S/PERICOLACE) 8.6-50 MG tablet Take 1 tablet by mouth 2 times daily       senna-docusate (SENOKOT-S/PERICOLACE) 8.6-50 MG tablet Take 1 tablet by mouth 2 times daily as needed for constipation In addition to scheduled doses       sodium chloride (PF) 0.9% PF flush 10 mLs by Intracatheter route 3 times daily       traZODone (DESYREL) 50 MG tablet Take 50 mg by mouth At Bedtime        vitamin C (ASCORBIC ACID) 500 MG tablet Take 500 mg by mouth daily        XARELTO ANTICOAGULANT 20 MG TABS tablet Take 20 mg by mouth daily (with breakfast)         PHYSICAL EXAM:  General: Patient is alert male, no distress.   Vitals: /75   Pulse 98   Temp 98.7  F (37.1  C)   Resp 18   Ht 1.6 m (5' 3\")   Wt 67.7 kg (149 lb 3.2 oz)   SpO2 96%   BMI 26.43 kg/m    HEENT: Head is NCAT. Eyes show no injection or icterus. Nares negative. Oropharynx well hydrated.  Neck: No JVD.  Lungs: Non labored respirations.   Abdomen: Soft.  : Deferred.  Extremities: R BKA, wound vac.    Musculoskeletal: Mild swelling right stump.    Skin: R BKA with wound vac.    Psych: Mood is stable.       LABS/DIAGNOSTIC DATA:  Component      Latest Ref Rng & Units 9/27/2022 9/29/2022 9/30/2022               WBC      4.0 - 11.0 10e3/uL 0.9 (LL) 5.5 4.2   RBC Count      4.40 - 5.90 10e6/uL 3.44 (L) 3.82 (L) 3.17 (L)   Hemoglobin      13.3 - 17.7 g/dL 9.2 (L) 10.1 (L) 8.3 (L)   Hematocrit      40.0 - 53.0 % 28.6 (L) 32.0 (L) 26.7 (L)   MCV      78 - 100 fL 83 84 84   MCH      26.5 - 33.0 pg 26.7 26.4 (L) 26.2 (L)   MCHC      31.5 - 36.5 g/dL 32.2 31.6 31.1 (L)   RDW      10.0 - 15.0 % 14.6 15.4 (H) 15.5 (H)   Platelet Count      150 - 450 10e3/uL 151 171 159     Component      Latest Ref Rng & Units 9/27/2022 9/29/2022 9/30/2022 9/30/2022             6:33 AM  6:33 AM   Sodium      136 - 145 mmol/L 133 (L) 135 (L)  136   Potassium      3.4 - 5.3 mmol/L 4.1 4.1  3.9   Chloride      98 - 107 mmol/L 100 99  103   Carbon Dioxide " (CO2)      22 - 29 mmol/L 21 (L) 23  24   Anion Gap      7 - 15 mmol/L 12 13  9   Urea Nitrogen      6.0 - 20.0 mg/dL 13.7 20.2 (H)  24.7 (H)   Creatinine      0.67 - 1.17 mg/dL 0.75 1.27 (H) 1.20 (H) 1.16   Calcium      8.6 - 10.0 mg/dL 7.4 (L) 8.5 (L)  8.6   Glucose      70 - 99 mg/dL 172 (H) 188 (H)  153 (H)   Alkaline Phosphatase      40 - 129 U/L 100   72   AST      10 - 50 U/L 20   30   ALT      10 - 50 U/L 19   19   Protein Total      6.4 - 8.3 g/dL 6.0 (L)   5.5 (L)   Albumin      3.5 - 5.2 g/dL 3.4 (L)   2.8 (L)   Bilirubin Total      <=1.2 mg/dL 0.4   0.4   GFR Estimate      >60 mL/min/1.73m2 >90 67 72 75         ASSESSMENT/PLAN:  1. Wound infection, MRSA. From R BKA on 8/5/2022. He had follow up with wound and vascular yesterday 10/10/2022, continues with wound vac and NWB, RTC in 2 weeks. He is not on abx. Wound is improving with less depth. Was debrided yesterday as well.  2. HTN. On metoprolol and losartan. Bps satisfactory, no changes needed.   3. Afib. He is on xarelto for anticoagulation. Adequate rate control.   4. Diabetes. On metformin and glipizide. Accuchecks followed.   5. Anemia. Last hgb at 8.3, may be in part dilutional. He is on iron. Recheck in TCU.  6. Neutropenic fever. Hospitalized 9/27/2022-9/30/2022. Had been on IV Vancomycin due to R BKA stump infection with MRSA. Treated with Zosyn for stump cellulitis in the hospital. No further abx. Follow up with ID and with wound and vascular clinic, appt on 10/10/2022. WBC has since normalized.     Stable issues:  CAD. Hx of MIs. Hx of stenting and prior CABG. On statin, beta blocker, xarelto.  Mood disorder. Mixed depression and anxiety. On duloxetine, Abilify. Also takes Mirtazapine.   Hx of stroke. Continue statin, xarelto.  Mild intellectual disabilities. He resides in an detention, but has been in the hospital or this TCU since Dec 2021 related to above issues.        Electronically signed by: Heidi Xie MD            Sincerely,        Heidi Xie MD

## 2022-10-13 VITALS
BODY MASS INDEX: 26.44 KG/M2 | RESPIRATION RATE: 18 BRPM | TEMPERATURE: 98.7 F | HEART RATE: 98 BPM | OXYGEN SATURATION: 96 % | WEIGHT: 149.2 LBS | DIASTOLIC BLOOD PRESSURE: 75 MMHG | SYSTOLIC BLOOD PRESSURE: 123 MMHG | HEIGHT: 63 IN

## 2022-10-24 ENCOUNTER — OFFICE VISIT (OUTPATIENT)
Dept: VASCULAR SURGERY | Facility: CLINIC | Age: 52
End: 2022-10-24
Attending: REGISTERED NURSE
Payer: MEDICARE

## 2022-10-24 VITALS
HEART RATE: 88 BPM | SYSTOLIC BLOOD PRESSURE: 140 MMHG | RESPIRATION RATE: 20 BRPM | TEMPERATURE: 98.6 F | DIASTOLIC BLOOD PRESSURE: 90 MMHG

## 2022-10-24 DIAGNOSIS — T81.89XD NON-HEALING SURGICAL WOUND, SUBSEQUENT ENCOUNTER: ICD-10-CM

## 2022-10-24 DIAGNOSIS — Z89.511 STATUS POST BELOW-KNEE AMPUTATION OF RIGHT LOWER EXTREMITY (H): Primary | ICD-10-CM

## 2022-10-24 PROCEDURE — 11042 DBRDMT SUBQ TIS 1ST 20SQCM/<: CPT | Performed by: REGISTERED NURSE

## 2022-10-24 RX ORDER — SELENIUM SULFIDE 10 MG/ML
1 SHAMPOO TOPICAL
COMMUNITY
Start: 2022-10-10

## 2022-10-24 ASSESSMENT — PAIN SCALES - GENERAL: PAINLEVEL: NO PAIN (0)

## 2022-10-24 NOTE — PROGRESS NOTES
[facility] GERIATRICS  [facility] Medical Record Number:  [id number]  Place of Service where encounter took place: [facility] ([facility]) [[id number]]   CODE STATUS:   CPR/Full code     Chief Complaint/Reason for Visit:  Chief Complaint   Patient presents with     Hospital F/U     Re-admit to TCU following hospitalization for neutropenic fever.        HPI:    Cm Lundberg is a 52 year old male with hx of CAD, s/p stents and CABG, HTN, DM, prior CVA, afib/flutter on xarelto, who has been residing in TCU at [facility] since Dec 2021 surgery for chronic long standing right foot ulceration. He underwent incision and drainage of the right foot, excisional debridement of the ulceration of the right foot into the level of the muscle, under MAC with popliteal block anesthesia on 12/17/2021. He completed abx per ID recommendations. Remained NWB with wound vac, had 1/27/2022 incision and drainage with application of theraskin. On 4/4/2022 again incision and drainage with theraskin. Continued following with podiatry, referred to wound and vascular due to non healing diabetic ulcer, right foot. S/p angioplasty RLE and hyperbaric oxygen therapy. MRI with indications of osteomyelitis. 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 Hospitalized 8/23/2022-8/30/2022 due to stump infection, placed on IV Vanco due to MRSA, has continued with NWB and wound vac. Sent in to the hospital from TCU on 9/27/2022 due to fever.    He was worked up, treated in the hospital. Seen by ID. Unfortunately, what is listed as the discharge summary from this hospital stay does not provide the details of his stay, it is a note representing the admission details. It is however, excerpted for information below.      [facility]  Hospitalist Discharge Summary    Date of Admission:  9/27/2022  Date of Discharge:  9/30/2022  Discharging Provider: Efrain Salcedo DO    Discharge Diagnoses  Sepsis  Neutropenic  fever  Hx CVA  Atrial fibrillation  CAD  T2DM with diabetic neuropathy  Hypertension  Anxiety  Heart failure with borderline ejection fraction, unspecified chronicity    Hospital Course  52 year old male with PMH significant for DM-2, diabetic foot ulcer, s/p right BKA, infected BKA stump needing I&D in 08/2022, MRSA bacteremia on IV vancomycin, hypertension, CAD, S/P CABG, stents, afib, CVA, diabetic neuropathy who was sent to out ED for concern regarding sepsis      Sepsis  Neutropenic fever  -- Patient with recent right BKA stump infection with MRSA bacteremia,discharged on IV vanc (supposed to be until 9/23/22, but patient reports he is still on that). Still on wound vac. Noted to have new skin redness around the stump, fever of 101F with HR of 105 at TCU  -- Patient doesnt look overtly septic. Blood cultures taken in ED. Will also take cultures from the existing piccline  -- Started empiric vanc/zosyn. Will get CT of the stump to rule out any abscess or osteomyelitis. Consult vascular surgery team  -- If B/C is positive for MRSA, consider RICHARDSON to rule out IE. TTE during last admission was negative for vegetations  -- WBC of 0.9 and ANC of 0.4 noted. Neutropenic precaution.   -- Consult ID     H/O CVA  -- Cont with statin. Hold plavix     Atrial fibrillation  - Cont with Metoprolol  -- Hold Xarelto in case he needs surgical procedure     CAD  -- S/P CABG and stents  -- Cont with cardiac meds. Hold plavix as he might need surgical procedure     DM-II with diabetic neuropathy  -- Hold metformin. Cont glipizide. Add sliding scale insulin     Hypertension  -- Cont with home meds with hold parameters     Anxiety disorder:  -- Cont with home meds      Today:  Per chart review, he had abx in the hospital, followed by ID, was not felt to need any further abx at hospital discharge. He remains NWB with wound vac. Has follow up appt with Dr. Montes on 10/10/2022. Discharge summary as noted is inaccurate as far as meds. No  changes were made to his usual meds, he is on xarelto for afib/flutter and metformin and glipizide for DM. Unclear if he needs further follow up with ID. Reports pain in stump mostly with vac changes, otherwise more manageable. His appetite is good. No abdominal pain, nausea, vomiting, diarrhea or constipation. No urinary sx. Medical problems, aflutter, CAD, DM, HTN, managed with his usual medications. Diabetes on metformin and glipizide. Sleeping well. No new hearing concerns. He follows with ophthalmology, had visit today for injections in both eyes. Resided at [facility] prior to this, unable to return until no longer has wound vac and cares can be provided by [facility] staff.        REVIEW OF SYSTEMS:  All others negative other than those noted in HPI.      PAST MEDICAL HISTORY:  Past Medical History:   Diagnosis Date     Anemia      Anemia, unspecified type 08/19/2016     Anxiety      Anxiety state 07/29/2021    Formatting of this note might be different from the original. Created by Conversion  Replacement Utility updated for latest IMO load     Arthritis      Atrial fibrillation and flutter (H)      Cerebral artery occlusion with cerebral infarction (H)      Cerebral infarction (H)      Chest pain, unspecified type 08/19/2016     Chronic atrial fibrillation (H)      Congestive heart failure (H)      Coronary artery disease     CABG with multiple stents     Coronary artery disease involving native coronary artery without angina pectoris, unspecified whether native or transplanted heart 08/19/2016     Coronary atherosclerosis 07/29/2021    Formatting of this note might be different from the original. Created by Conversion  Replacement Utility updated for latest IMO load     Depression      Diabetic ulcer of right midfoot associated with type 2 diabetes mellitus, limited to breakdown of skin (H) 09/28/2017     Equinovarus acquired deformity, right 04/14/2021     Equinovarus deformity, acquired, right      Essential  hypertension 08/19/2016     GERD (gastroesophageal reflux disease)      Heart attack (H)      History of blood transfusion      Hyperlipidemia      Hyperlipidemia, unspecified hyperlipidemia type 08/19/2016     Hypertension      Insomnia, unspecified type 08/19/2016     Ischemic cardiomyopathy      Late effects of cerebrovascular accident 08/19/2016     Major depressive disorder with single episode, remission status unspecified 08/19/2016     Microalbuminuria 07/29/2021    Formatting of this note might be different from the original. 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 Created by Conversion [facility] Annotation: Feb 11 2010  1:41PM - Leonid Cabral: CANNOT READ OR  WRITE     PVD (peripheral vascular disease) (H)      Stented coronary artery      Type 2 diabetes mellitus (H) 07/29/2021    Formatting of this note might be different from the original. Created by Conversion     Type II or unspecified type diabetes mellitus with ophthalmic manifestations, not stated as uncontrolled(250.50) (H) 07/29/2021    Formatting of this note might be different from the original. Created by Conversion     Weakness 08/19/2016       PAST SURGICAL HISTORY:  Past Surgical History:   Procedure Laterality Date     AMPUTATE LEG BELOW KNEE Right 8/5/2022    Procedure: AMPUTATION, BELOW KNEE, right leg;  Surgeon: Kim Montes MD;  Location: [facility] Main OR     INCISION AND DRAINAGE LOWER EXTREMITY, COMBINED Right 12/17/2021    Procedure: INCISION AND DRAINAGE, right foot;  Surgeon: Alex Kim DPM;  Location: [facility] Main OR     INCISION AND DRAINAGE LOWER EXTREMITY, COMBINED Right 1/27/2022    Procedure: INCISION AND DRAINAGE, right foot with application of  theraskin;  Surgeon: Alex Kim DPM;  Location: [facility] OR     INCISION AND DRAINAGE LOWER EXTREMITY, COMBINED Right 2022    Procedure: INCISION AND DRAINAGE, right foot with application of theraskin;  Surgeon: Alex Kim DPM;  Location: [facility] OR     IR LOWER EXTREMITY ANGIOGRAM RIGHT  2022     IR MISCELLANEOUS PROCEDURE  2004     IRRIGATION AND DEBRIDEMENT LOWER EXTREMITY, COMBINED Right 2022    Procedure: RIGHT IRRIGATION AND DEBRIDEMENT, LOWER EXTREMITY, REVISION OF BELOW KNEE AMPUTATION;  Surgeon: Kim Montes MD;  Location: [facility] OR     IRRIGATION AND DEBRIDEMENT LOWER EXTREMITY, COMBINED Right 2022    Procedure: WASHOUT AND PARTIAL CLOSURE OF RIGHT BELOW THE KNEE AMPUTATION,  AND WOUND VAC APPLICATION;  Surgeon: Kim Montes MD;  Location: [facility] OR     PICC DOUBLE LUMEN PLACEMENT  2022          ZZC CABG, VEIN, SINGLE      Description: CABG (CABG);  Recorded: 2009;       FAMILY HISTORY:  Family History   Adopted: Yes       SOCIAL HISTORY:  Social History     Socioeconomic History     Marital status:      Spouse name: Not on file     Number of children: Not on file     Years of education: Not on file     Highest education level: Not on file   Occupational History     Not on file   Tobacco Use     Smoking status: Former     Types: Cigarettes     Quit date: 2015     Years since quittin.7     Smokeless tobacco: Never   Vaping Use     Vaping Use: Never used   Substance and Sexual Activity     Alcohol use: Not Currently     Drug use: Not Currently     Sexual activity: Not on file   Other Topics Concern     Parent/sibling w/ CABG, MI or angioplasty before 65F 55M? Not Asked   Social History Narrative     Not on file     Social Determinants of Health     Financial Resource Strain: Not on file   Food Insecurity: Not on file   Transportation Needs: Not on file   Physical Activity: Not on file   Stress: Not on file    Social Connections: Not on file   Intimate Partner Violence: Not on file   Housing Stability: Not on file       MEDICATIONS:  Current Outpatient Medications   Medication Sig Dispense Refill     acetaminophen (TYLENOL) 325 MG tablet Take 650 mg by mouth every 6 hours as needed for fever or pain       acetaminophen (TYLENOL) 325 MG tablet Take 650 mg by mouth 2 times daily       ARIPiprazole (ABILIFY) 2 MG tablet 2 mg daily        ASPERCREME ORIGINAL 10 % external cream Apply topically 2 times daily as needed for other (back pain)       atorvastatin (LIPITOR) 80 MG tablet Take 80 mg by mouth daily        bisacodyl (DULCOLAX) 10 MG suppository Place 10 mg rectally daily as needed for constipation       cholestyramine (QUESTRAN) 4 g packet Take 1 packet by mouth every other day        clopidogrel (PLAVIX) 75 MG tablet Take 1 tablet (75 mg) by mouth daily Start taking medication the day after the procedure. 90 tablet 1     DULoxetine (CYMBALTA) 60 MG capsule Take 120 mg by mouth daily       erenumab-aooe (AIMOVIG, 140 MG DOSE,) 70 MG/ML injection Inject 140 mg Subcutaneous every 30 days       ferrous sulfate (FEROSUL) 325 (65 Fe) MG tablet Take 325 mg by mouth daily (with breakfast)        gabapentin (NEURONTIN) 100 MG capsule Take 100 mg by mouth 2 times daily (before meals)       gabapentin (NEURONTIN) 300 MG capsule Take 600 mg by mouth At Bedtime       glipiZIDE (GLUCOTROL) 5 MG tablet Take 2.5 mg by mouth daily before breakfast       HYDROcodone-acetaminophen (NORCO) 5-325 MG tablet Take 1-2 tablets by mouth every 6 hours as needed for moderate to severe pain (Patient taking differently: Take 1 tablet by mouth every 6 hours as needed for moderate to severe pain) 12 tablet 0     loperamide (IMODIUM) 2 MG capsule Take 2 mg by mouth 4 times daily as needed for diarrhea       losartan (COZAAR) 25 MG tablet Take 25 mg by mouth At Bedtime        magnesium chloride 535 (64 Mg) MG TBEC CR tablet Take 1,070 mg by mouth 2  "times daily       metFORMIN (GLUCOPHAGE) 500 MG tablet Take 500 mg by mouth daily (with dinner)       metFORMIN (GLUCOPHAGE) 500 MG tablet Take 1,000 mg by mouth daily (with breakfast)       metoprolol succinate ER (TOPROL-XL) 25 MG 24 hr tablet Take 75 mg by mouth daily        mineral oil-hydrophilic petrolatum (AQUAPHOR) external ointment Apply topically 3 times daily as needed for irritation or dry skin Apply to forehead and other areas as needed       mirtazapine (REMERON) 7.5 MG tablet Take 7.5 mg by mouth At Bedtime        Nitroglycerin (NITROSTAT SL) Place 0.4 mg under the tongue every 5 minutes as needed for chest pain        pantoprazole (PROTONIX) 40 MG EC tablet Take 40 mg by mouth daily before breakfast       polyethylene glycol (MIRALAX) 17 GM/Dose powder Take 1 packet by mouth daily as needed for constipation        senna-docusate (SENOKOT-S/PERICOLACE) 8.6-50 MG tablet Take 1 tablet by mouth 2 times daily       senna-docusate (SENOKOT-S/PERICOLACE) 8.6-50 MG tablet Take 1 tablet by mouth 2 times daily as needed for constipation In addition to scheduled doses       sodium chloride (PF) 0.9% PF flush 10 mLs by Intracatheter route 3 times daily       traZODone (DESYREL) 50 MG tablet Take 50 mg by mouth At Bedtime        vitamin C (ASCORBIC ACID) 500 MG tablet Take 500 mg by mouth daily        XARELTO ANTICOAGULANT 20 MG TABS tablet Take 20 mg by mouth daily (with breakfast)         ALLERGIES:  Allergies   Allergen Reactions     Aspirin Other (See Comments)     Reacts with migraine medicine     Blood-Group Specific Substance      Anti-Fya present.  Expect delays in blood for transfusion.  Draw 2 lavender and 1 red for all type and screen orders.     Isosorbide Headache     Codeine Rash     Morphine Rash       PHYSICAL EXAM:  General: Patient is alert male, no distress.   Vitals: BP (!) 154/96   Pulse 83   Temp 98.7  F (37.1  C)   Resp 18   Ht 1.6 m (5' 3\")   Wt 67.6 kg (149 lb)   SpO2 98%   BMI " 26.39 kg/m    HEENT: Head is NCAT. Eyes show no injection or icterus. Nares negative. Oropharynx well hydrated.  Neck: Supple. No tenderness or adenopathy. No JVD.  Lungs: Clear bilaterally. No wheezes.  Cardiovascular: Regular rate and rhythm, normal S1, S2.  Back: No spinal or CVA tenderness.  Abdomen: Soft, no tenderness on exam. Bowel sounds present. No guarding rebound or rigidity.  : Deferred.  Extremities: R BKA with wound vac.   Musculoskeletal: Mild swelling at R stump.   Skin: Wound vac R BKA site.   Psych: Mood appears good, he is in good spirits.      LABS/DIAGNOSTIC DATA:  Component      Latest Ref Rng & Units 9/27/2022 9/29/2022 9/30/2022               WBC      4.0 - 11.0 10e3/uL 0.9 (LL) 5.5 4.2   RBC Count      4.40 - 5.90 10e6/uL 3.44 (L) 3.82 (L) 3.17 (L)   Hemoglobin      13.3 - 17.7 g/dL 9.2 (L) 10.1 (L) 8.3 (L)   Hematocrit      40.0 - 53.0 % 28.6 (L) 32.0 (L) 26.7 (L)   MCV      78 - 100 fL 83 84 84   MCH      26.5 - 33.0 pg 26.7 26.4 (L) 26.2 (L)   MCHC      31.5 - 36.5 g/dL 32.2 31.6 31.1 (L)   RDW      10.0 - 15.0 % 14.6 15.4 (H) 15.5 (H)   Platelet Count      150 - 450 10e3/uL 151 171 159     Component      Latest Ref Rng & Units 9/27/2022 9/29/2022 9/30/2022 9/30/2022             6:33 AM  6:33 AM   Sodium      136 - 145 mmol/L 133 (L) 135 (L)  136   Potassium      3.4 - 5.3 mmol/L 4.1 4.1  3.9   Chloride      98 - 107 mmol/L 100 99  103   Carbon Dioxide (CO2)      22 - 29 mmol/L 21 (L) 23  24   Anion Gap      7 - 15 mmol/L 12 13  9   Urea Nitrogen      6.0 - 20.0 mg/dL 13.7 20.2 (H)  24.7 (H)   Creatinine      0.67 - 1.17 mg/dL 0.75 1.27 (H) 1.20 (H) 1.16   Calcium      8.6 - 10.0 mg/dL 7.4 (L) 8.5 (L)  8.6   Glucose      70 - 99 mg/dL 172 (H) 188 (H)  153 (H)   Alkaline Phosphatase      40 - 129 U/L 100   72   AST      10 - 50 U/L 20   30   ALT      10 - 50 U/L 19   19   Protein Total      6.4 - 8.3 g/dL 6.0 (L)   5.5 (L)   Albumin      3.5 - 5.2 g/dL 3.4 (L)   2.8 (L)   Bilirubin  Total      <=1.2 mg/dL 0.4   0.4   GFR Estimate      >60 mL/min/1.73m2 >90 67 72 75         ASSESSMENT/PLAN:  1. Neutropenic fever. Hospitalized 9/27/2022-9/30/2022. Had been on IV Vancomycin due to R BKA stump infection with MRSA. This admit, treated with Zosyn for stump cellulitis, no further abx at hospital discharge. Followed by ID.   2. Wound infection, MRSA. From R BKA on 8/5/2022. Continues with wound vac, NWB. Will have follow up appt with Dr. Montes on 10/10/2022.   3. HTN. On metoprolol and losartan. BP up today, typically more within range. Will continue to monitor in TCU, adjust meds If needed.   4. Afib. He is on xarelto for anticoagulation. Adequate rate control.   5. Diabetes. On metformin and glipizide. Accuchecks followed.   6. RANDAL. Creatinine at 1.27, improved with hospital management.   7. Anemia. Last hgb at 8.3, may be in part dilutional. He is on iron. Last hospital stay in August, had hgb drop to 6.4, received venofer and transfusion.   8. CAD. Hx of MIs. Hx of stenting and prior CABG. On statin, beta blocker, xarelto.  9. Mood disorder. Mixed depression and anxiety. On duloxetine, Abilify. Also takes Mirtazapine.   10. Hx of stroke. Continue statin, xarelto.  11. Mild intellectual disabilities. He resides in an [place], but has been in the hospital or this TCU since Dec 2021 related to above issues.    12. Code status is full code.             Electronically signed by: Heidi Xie MD

## 2022-10-24 NOTE — PATIENT INSTRUCTIONS
- Wound Vac Instructions    1. 3x weekly and as needed cleanse the area with NS    2. Pat dry    3. Apply Cavilon no sting barrier wipe to the skin surrounding the wound to protect from drainage/maceration    4. Apply drape around incision. Cut strip of drape and apply to skin if bridging is needed; plan this area in advance; should not be over bony prominence     5. Cut the foam to fit the area of the incision    6. Cut narrow strip of foam if bridging    7. Cover foam with drape to obtain air tight seal    8. Cut opening the size of a quarter for where the suction pad will be applied    9. Apply Suction pad    10. 125mmHG suction continuous    [employer] [facility] can be reached at [phone], 24 hours a day 7 days a week     - If you do not have a back up plan in place:     If the negative pressure wound therapy malfunctions or unable to maintain seal: dressing must be removed and reapplied within 2 hours of the incident. If unable to reapply negative pressure wound dressing, place Normal Saline moistened gauze in wound bed and cover with appropriate dressing to keep wound bed moist.  Change wet-to-dry dressing two times a day until healthcare staff can re-implement negative pressure therapy. Change canister at least weekly.  [employer] [facility] can be reached at [phone], 24 hours a day 7 days a week    Information on Vacuum-Assisted Closure of a Wound  Vacuum-assisted closure (VAC) of a wound is a type of treatment to help wounds heal. It s also known as negative pressure wound therapy. During the treatment, a device lowers air pressure on the wound. This can help the wound heal more quickly.  Understanding the wound VAC system  A wound VAC system has several parts. A foam or gauze dressing is put directly on the wound. The dressing is changed every 24 to 72 hours. An adhesive film covers and seals the dressing and wound. A drainage tube leads from under the adhesive film and connects to a portable  vacuum pump. This pump removes air pressure over the wound. It may do this constantly. Or it may do it in cycles. During the treatment, you ll need to carry the portable pump everywhere you go.  Why wound VAC is used  You might need this therapy for a recent traumatic wound. Or you may need it for a chronic wound. This is a wound that does not heal the way it should over time. This can happen with wounds in people who have diabetes. You may need a wound VAC if you ve had a recent skin graft. And you may need a wound VAC for a large wound. Large wounds can take a longer time to heal.  A wound vacuum system may help your wound heal more quickly by:  Draining extra fluid from the wound  Reducing swelling  Reducing bacteria in the wound  Keeping your wound moist and warm  Helping draw together wound edges  Increasing blood flow to your wound  Decreasing inflammation  Wound VAC offers some other advantages over other types of wound care. It may decrease your overall discomfort. The dressings usually need to be changed less often. And they may be easier to keep in position.  Risks of wound VAC  Wound VAC has some rare risks, such as:  Bleeding (which may be severe)  Wound infection  An abnormal connection between the intestinal tract and the skin (enteric fistula)  Proper training in dressing changes can help reduce the risk for these complications. Also, your doctor will carefully evaluate you to make sure you are a good candidate for the therapy. Certain problems can increase your risk for complications. These include:  Exposed organs or blood vessels  High risk of bleeding from another medical problem  Wound infection  Nearby bone infection  Dead wound tissue  Cancer tissue  Fragile skin, such as from aging or longtime use of topical steroids  Allergy to adhesive  Very poor blood flow to your wound  Wounds close to joints that may reopen because of movement  Your doctor will discuss the risks that apply to you. Make  sure to talk with him or her about all of your questions and concerns.  Getting ready for wound VAC  You likely won t need to do much to get ready for wound VAC. In some cases, you may need to wait a while before having this therapy. For example, your doctor may first need to treat an infection in your wound. Dead or damaged tissue may also need to be removed from your wound.  You or a caregiver may need training on how to use the wound VAC device. This is done if you will be able to have your wound vacuum therapy at home. In other cases, you may need to have your wound vacuum therapy in a health care facility.  On the day of your procedure  A health care provider will cover your wound with foam or gauze wound dressing. An adhesive film will be put over the dressing and wound. This seals the wound. The foam connects to a drainage tube, which leads to a vacuum pump. This pump is portable. When the pump is turned on, it draws fluid through the foam and out the drainage tubing. The pump may run constantly, or it may cycle off and on. Your exact setup will depend on the specific type of wound vacuum system that you use.  Managing your wound  You may need the dressing changed about once a day. You may need it changed more or less often, depending on your wound. You or your caregiver may be trained to do this at home. Or it may be done by a visiting health care provider. Your doctor may prescribe a pain medicine. This is to prevent or reduce pain during the dressing change.  You will likely need to use the wound VAC system for several weeks or months. During this time, you ll carry the portable pump everywhere you go.  Nutrition for wound healing  During this time, make sure you follow a healthy diet. This is needed so the wound can heal and to prevent infection. Your doctor can tell you more about what to include in your diet during this time.  follow up with your doctor if you have a medical condition that led to your  wound, such as diabetes. He or she can help you prevent future wounds.  Follow-up care  Your doctor will carefully keep track of your healing. Make sure to keep all follow-up appointments.  When to call your health care provider  Call your health care provider right away if you have any of these:  Fever of 100.4 F (38.0 C) or higher  Increased redness, swelling, or warmth around wound  Increased pain  Bright red blood or blood clots in tubing or the collection chamber of the vacuum

## 2022-10-24 NOTE — PROGRESS NOTES
Follow up Vascular Visit       Date of Service:10/24/22      Chief Complaint: R stump wound      Pt returns to [facility] with regards to their R stump wound.  They arrive today alone with sister Sherri on the phone. They are currently using wound vac to the wounds. This is being done by TCU. They are using tubular compression for compression. They are feeling well today. Denies fevers, chills. No shortness of breath.     Allergies:   Allergies   Allergen Reactions     Aspirin Other (See Comments)     Reacts with migraine medicine     Blood-Group Specific Substance      Anti-Fya present.  Expect delays in blood for transfusion.  Draw 2 lavender and 1 red for all type and screen orders.     Isosorbide Headache     Codeine Rash     Morphine Rash       Medications:   Current Outpatient Medications:      acetaminophen (TYLENOL) 325 MG tablet, Take 650 mg by mouth every 6 hours as needed for fever or pain, Disp: , Rfl:      acetaminophen (TYLENOL) 325 MG tablet, Take 650 mg by mouth 2 times daily, Disp: , Rfl:      ARIPiprazole (ABILIFY) 2 MG tablet, 2 mg daily , Disp: , Rfl:      ASPERCREME ORIGINAL 10 % external cream, Apply topically 2 times daily as needed for other (back pain), Disp: , Rfl:      atorvastatin (LIPITOR) 80 MG tablet, Take 80 mg by mouth daily , Disp: , Rfl:      bisacodyl (DULCOLAX) 10 MG suppository, Place 10 mg rectally daily as needed for constipation, Disp: , Rfl:      cholestyramine (QUESTRAN) 4 g packet, Take 1 packet by mouth every other day , Disp: , Rfl:      clopidogrel (PLAVIX) 75 MG tablet, Take 1 tablet (75 mg) by mouth daily Start taking medication the day after the procedure., Disp: 90 tablet, Rfl: 1     DULoxetine (CYMBALTA) 60 MG capsule, Take 120 mg by mouth daily, Disp: , Rfl:      erenumab-aooe (AIMOVIG, 140 MG DOSE,) 70 MG/ML injection, Inject 140 mg Subcutaneous every 30 days, Disp: , Rfl:      ferrous sulfate (FEROSUL) 325 (65 Fe) MG tablet, Take 325 mg  by mouth daily (with breakfast) , Disp: , Rfl:      gabapentin (NEURONTIN) 100 MG capsule, Take 100 mg by mouth 2 times daily (before meals), Disp: , Rfl:      gabapentin (NEURONTIN) 300 MG capsule, Take 600 mg by mouth At Bedtime, Disp: , Rfl:      glipiZIDE (GLUCOTROL) 5 MG tablet, Take 2.5 mg by mouth daily before breakfast, Disp: , Rfl:      HYDROcodone-acetaminophen (NORCO) 5-325 MG tablet, Take 1-2 tablets by mouth every 6 hours as needed for moderate to severe pain (Patient taking differently: Take 1 tablet by mouth every 6 hours as needed for moderate to severe pain), Disp: 12 tablet, Rfl: 0     loperamide (IMODIUM) 2 MG capsule, Take 2 mg by mouth 4 times daily as needed for diarrhea, Disp: , Rfl:      losartan (COZAAR) 25 MG tablet, Take 25 mg by mouth At Bedtime , Disp: , Rfl:      magnesium chloride 535 (64 Mg) MG TBEC CR tablet, Take 1,070 mg by mouth 2 times daily, Disp: , Rfl:      metFORMIN (GLUCOPHAGE) 500 MG tablet, Take 500 mg by mouth daily (with dinner), Disp: , Rfl:      metFORMIN (GLUCOPHAGE) 500 MG tablet, Take 1,000 mg by mouth daily (with breakfast), Disp: , Rfl:      metoprolol succinate ER (TOPROL-XL) 25 MG 24 hr tablet, Take 75 mg by mouth daily , Disp: , Rfl:      mineral oil-hydrophilic petrolatum (AQUAPHOR) external ointment, Apply topically 3 times daily as needed for irritation or dry skin Apply to forehead and other areas as needed, Disp: , Rfl:      mirtazapine (REMERON) 7.5 MG tablet, Take 7.5 mg by mouth At Bedtime , Disp: , Rfl:      Nitroglycerin (NITROSTAT SL), Place 0.4 mg under the tongue every 5 minutes as needed for chest pain , Disp: , Rfl:      pantoprazole (PROTONIX) 40 MG EC tablet, Take 40 mg by mouth daily before breakfast, Disp: , Rfl:      polyethylene glycol (MIRALAX) 17 GM/Dose powder, Take 1 packet by mouth daily as needed for constipation , Disp: , Rfl:      senna-docusate (SENOKOT-S/PERICOLACE) 8.6-50 MG tablet, Take 1 tablet by mouth 2 times daily, Disp: ,  Rfl:      senna-docusate (SENOKOT-S/PERICOLACE) 8.6-50 MG tablet, Take 1 tablet by mouth 2 times daily as needed for constipation In addition to scheduled doses, Disp: , Rfl:      sodium chloride (PF) 0.9% PF flush, 10 mLs by Intracatheter route 3 times daily, Disp: , Rfl:      traZODone (DESYREL) 50 MG tablet, Take 50 mg by mouth At Bedtime , Disp: , Rfl:      vitamin C (ASCORBIC ACID) 500 MG tablet, Take 500 mg by mouth daily , Disp: , Rfl:      XARELTO ANTICOAGULANT 20 MG TABS tablet, Take 20 mg by mouth daily (with breakfast), Disp: , Rfl:     History:   Past Medical History:   Diagnosis Date     Anemia      Anemia, unspecified type 08/19/2016     Anxiety      Anxiety state 07/29/2021    Formatting of this note might be different from the original. Created by Conversion  Replacement Utility updated for latest IMO load     Arthritis      Atrial fibrillation and flutter (H)      Cerebral artery occlusion with cerebral infarction (H)      Cerebral infarction (H)      Chest pain, unspecified type 08/19/2016     Chronic atrial fibrillation (H)      Congestive heart failure (H)      Coronary artery disease     CABG with multiple stents     Coronary artery disease involving native coronary artery without angina pectoris, unspecified whether native or transplanted heart 08/19/2016     Coronary atherosclerosis 07/29/2021    Formatting of this note might be different from the original. Created by Conversion  Replacement Utility updated for latest IMO load     Depression      Diabetic ulcer of right midfoot associated with type 2 diabetes mellitus, limited to breakdown of skin (H) 09/28/2017     Equinovarus acquired deformity, right 04/14/2021     Equinovarus deformity, acquired, right      Essential hypertension 08/19/2016     GERD (gastroesophageal reflux disease)      Heart attack (H)      History of blood transfusion      Hyperlipidemia      Hyperlipidemia, unspecified hyperlipidemia type 08/19/2016     Hypertension       Insomnia, unspecified type 08/19/2016     Ischemic cardiomyopathy      Late effects of cerebrovascular accident 08/19/2016     Major depressive disorder with single episode, remission status unspecified 08/19/2016     Microalbuminuria 07/29/2021    Formatting of this note might be different from the original. 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 Created by Conversion [facility] Annotation: Feb 11 2010  1:41PM - Leonid Cabral: CANNOT READ OR  WRITE     PVD (peripheral vascular disease) (H)      Stented coronary artery      Type 2 diabetes mellitus (H) 07/29/2021    Formatting of this note might be different from the original. Created by Conversion     Type II or unspecified type diabetes mellitus with ophthalmic manifestations, not stated as uncontrolled(250.50) (H) 07/29/2021    Formatting of this note might be different from the original. Created by Conversion     Weakness 08/19/2016       Physical Exam:    BP (!) 140/90   Pulse 88   Temp 98.6  F (37  C)   Resp 20     General:  Patient presents to clinic in no apparent distress.  Head: normocephalic atraumatic  Psychiatric:  Alert and oriented x3.   Respiratory: unlabored breathing; no cough  Integumentary:  Skin is uniformly warm, dry and pink.    Wound #1 Location: R stump  Size: 0.8L x 3W x 0.5depth.  No sinus tract present, Wound base: slough   No undermining present. Wound is full thickness. There is moderate drainage. Periwound: no denudement, erythema, induration, maceration or warmth.      PICC Double Lumen 08/29/22 Right Basilic (Active)   Number of days: 56       Negative Pressure Wound Therapy Foot Anterior;Right (Active)   Number of days: 308       VASC Wound right lateral foot  (Active)   Number of days: 558       VASC Wound left hallux  (Active)   Number of days: 481       VASC Wound Right stump (Active)   Pre Size Length 1 10/24/22 1300   Pre Size Width 2.6 10/24/22 1300   Pre Size Depth 0.6 10/24/22 1300   Pre Total Sq cm 2.6 10/24/22 1300   Post Size Length 0.8 10/24/22 1300   Post Size Width 3 10/24/22 1300   Post Size Depth 0.5 10/24/22 1300   Post Total Sq cm 2.4 10/24/22 1300   Number of days: 46       Incision/Surgical Site 12/17/21 Right Foot (Active)   Number of days: 311       Incision/Surgical Site 06/29/22 Anterior;Left Greater Trochanter (Active)   Number of days: 117       Incision/Surgical Site 08/24/22 Right Leg (Active)   Number of days: 61            Circumferential volume measures:      No flowsheet data found.    Labs:    I personally reviewed the following lab results today and those on care everywhere    CRP Inflammation   Date Value Ref Range Status   09/26/2022 46.80 (H) <5.00 mg/L Final     CRP   Date Value Ref Range Status   08/24/2022 18.9 (H) 0.0 - <0.8 mg/dL Final      Erythrocyte Sedimentation Rate   Date Value Ref Range Status   09/26/2022 32 (H) 0 - 20 mm/hr Final      Last Renal Panel:  Sodium   Date Value Ref Range Status   10/11/2022 138 136 - 145 mmol/L Final   08/25/2016 139 mmol/L Final     Potassium   Date Value Ref Range Status   10/11/2022 4.6 3.4 - 5.3 mmol/L Final   08/30/2022 4.4 3.5 - 5.0 mmol/L Final   08/25/2016 4.3 mmol/L Final     Chloride   Date Value Ref Range Status   10/11/2022 105 98 - 107 mmol/L Final   08/29/2022 109 (H) 98 - 107 mmol/L Final   08/25/2016 107 mmol/L Final     Carbon Dioxide (CO2)   Date Value Ref Range Status   10/11/2022 23 22 - 29 mmol/L Final   08/29/2022 25 22 - 31 mmol/L Final     Anion Gap   Date Value Ref Range Status   10/11/2022 10 7 - 15 mmol/L Final   08/29/2022 6 5 - 18 mmol/L Final   08/25/2016 8 mmol/L Final     Glucose   Date Value Ref Range Status   10/11/2022 129 (H) 70 - 99 mg/dL Final   08/29/2022  94 70 - 125 mg/dL Final   08/25/2016 84 70 - 99 mg/dL Final     GLUCOSE BY METER POCT   Date Value Ref Range Status   09/30/2022 104 (H) 70 - 99 mg/dL Final     Urea Nitrogen   Date Value Ref Range Status   10/11/2022 20.3 (H) 6.0 - 20.0 mg/dL Final   08/29/2022 12 8 - 22 mg/dL Final   08/25/2016 21 mg/dL Final     Creatinine   Date Value Ref Range Status   10/11/2022 0.88 0.67 - 1.17 mg/dL Final   08/25/2016 0.85 mg/dL Final     GFR Estimate   Date Value Ref Range Status   10/11/2022 >90 >60 mL/min/1.73m2 Final     Comment:     Effective December 21, 2021 eGFRcr in adults is calculated using the 2021 CKD-EPI creatinine equation which includes age and gender (Stephen et al., [place], DOI: 10.1056/KNLMmo9226377)   04/29/2019 >60 >60 mL/min/1.73m2 Final     Calcium   Date Value Ref Range Status   10/11/2022 9.6 8.6 - 10.0 mg/dL Final   08/25/2016 9.9 mg/dL Final     Phosphorus   Date Value Ref Range Status   08/07/2022 2.9 2.5 - 4.5 mg/dL Final     Albumin   Date Value Ref Range Status   10/11/2022 3.7 3.5 - 5.2 g/dL Final   08/24/2022 2.8 (L) 3.5 - 5.0 g/dL Final      Lab Results   Component Value Date    WBC 4.0 10/11/2022    WBC 5.0 08/25/2016     Lab Results   Component Value Date    RBC 4.09 10/11/2022    RBC 3.49 08/25/2016     Lab Results   Component Value Date    HGB 10.5 10/11/2022    HGB 10.1 08/25/2016     Lab Results   Component Value Date    HCT 34.4 10/11/2022    HCT 31.4 08/25/2016     No components found for: MCT  Lab Results   Component Value Date    MCV 84 10/11/2022    MCV 90 08/25/2016     Lab Results   Component Value Date    MCH 25.7 10/11/2022    MCH 28.7 08/25/2016     Lab Results   Component Value Date    MCHC 30.5 10/11/2022    MCHC 31.8 08/25/2016     Lab Results   Component Value Date    RDW 15.3 10/11/2022    RDW 13.6 08/25/2016     Lab Results   Component Value Date     10/11/2022     08/25/2016      Lab Results   Component Value Date    A1C 5.9 08/26/2022    A1C 6.3 08/23/2022     A1C 5.8 05/16/2022    A1C 5.1 12/21/2021    A1C 5.9 11/18/2019    A1C 5.3 08/25/2016      No results found for: TSH   No results found for: VITDT                Impression:  Encounter Diagnoses   Name Primary?     Status post below-knee amputation of right lower extremity (H) Yes     Non-healing surgical wound, subsequent encounter             10/24/22 R Stump           Are any of these wounds new today: No; Location: na    Assessment/Plan:          1. Debridement: After discussion of risk factors and verbal consent was obtained 2% Lidocaine HCL jelly was applied, under clean conditions, the R stump ulceration(s) were debrided using currette. Devitalized and nonviable tissue, along with any fibrin and slough, was removed to improve granulation tissue formation, stimulate wound healing, decrease overall bacteria load, disrupt biofilm formation and decrease edge senescence.  Total excisional debridement was 2.4 sq cm into the subcutaneous tissue with a depth of 0.5 cm.   Ulcers were improved afterwards and .  Measures were as noted on the flow sheet.       2.  Wound treatment: wound treatment will include irrigation and dressings to promote autolytic debridement which will include: Cleanse with normal saline, wet to dry dressing applied in clinic. Facility to apply wound vac at 125mmhg continuous suction to be changed three times a week. If for some reason the patient is not able to get their dressing(s) changed as outlined above (due to illness, lack of supplies, lack of help) please do the following: remove old, soiled dressings; wash the wounds with saline; pat dry; apply ABD pad or other absorbant pad and secure with rolled gauze; avoid tape directly on your skin; patient instructed to call the clinic as soon as possible to let us know what the current issues are in receiving wound care. Improved Bone is present in a corner of the wound. Consulted with Dr. Montes in visit, he feels stump does not need to  be revised at this time. Will continue wound vac at this time and reevaluate in a week.           3. Edema: Continue tubular compression. The compression wraps were applied today in clinic.     If a 2 layer or 4 layer compression wrap is being used; these are safe to have on for ONLY 7 days. If for some reason the patient is not able to get the wrap(s) changed (due to illness; lack of supplies, lack of help, lack of transportation) please do the following: unwrap the old 2 or 4 layer compression wrap; avoid using scissors as you could cut your skin and cause wounds; use tubular compression when available. Call to reschedule your home care or clinic visit appointment as soon as possible.  Stable            4. Nutrition: Focus on protein           5. Offloading: Do not use stump protector at this time     Patient will follow up with me in 2 weeks for reevaluation. They were instructed to call the clinic sooner with any signs or symptoms of infection or any further questions/concerns. Answered all questions.      20 minutes spent on the date of the encounter doing chart review, history and exam, documentation and further activities per the note    Emily Lynn MS, APRN, AGNP-C, CWCN  [facility]   [phone]        This note was electronically signed by SHABNAM Brooks CNP

## 2022-10-24 NOTE — PROGRESS NOTES
[facility] GERIATRICS  [place] Medical Record Number:  [id number]  Place of Service where encounter took place: [facility] ([facility]) [[id number]]   CODE STATUS:   CPR/Full code     Chief Complaint/Reason for Visit:  Chief Complaint   Patient presents with     RECHECK     10/11/2022. 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 Wound vac. Recent hospitalization for neutropenic fever, resolved.         HPI:    Cm Lundberg is a 52 year old male with hx of CAD, s/p stents and CABG, HTN, DM, prior CVA, afib/flutter on xarelto, who has been residing in TCU at [facility] since Dec 2021 surgery for chronic long standing right foot ulceration. He underwent incision and drainage of the right foot, excisional debridement of the ulceration of the right foot into the level of the muscle, under MAC with popliteal block anesthesia on 12/17/2021. He completed abx per ID recommendations. Remained NWB with wound vac, had 1/27/2022 incision and drainage with application of theraskin. On 4/4/2022 again incision and drainage with theraskin. Continued following with podiatry, referred to wound and vascular due to non healing diabetic ulcer, right foot. S/p angioplasty RLE and hyperbaric oxygen therapy. MRI with indications of osteomyelitis. 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 Hospitalized 8/23/2022-8/30/2022 due to stump infection, placed on IV Vanco due to MRSA, has continued with NWB and wound vac. Sent in to the hospital from TCU on 9/27/2022 due to fever.    He was worked up, treated in the hospital. Seen by ID. Unfortunately, what is listed as the discharge summary from this hospital stay does not provide the details of his stay, it is a note representing the admission details. It is however, excerpted for information below.      [facility]  Hospitalist Discharge Summary    Date of Admission:  9/27/2022  Date of Discharge:  9/30/2022  Discharging Provider:  Efrain Salcedo, DO    Discharge Diagnoses  Sepsis  Neutropenic fever  Hx CVA  Atrial fibrillation  CAD  T2DM with diabetic neuropathy  Hypertension  Anxiety  Heart failure with borderline ejection fraction, unspecified chronicity    Hospital Course  52 year old male with PMH significant for DM-2, diabetic foot ulcer, s/p right BKA, infected BKA stump needing I&D in 08/2022, MRSA bacteremia on IV vancomycin, hypertension, CAD, S/P CABG, stents, afib, CVA, diabetic neuropathy who was sent to out ED for concern regarding sepsis      Sepsis  Neutropenic fever  -- Patient with recent right BKA stump infection with MRSA bacteremia,discharged on IV vanc (supposed to be until 9/23/22, but patient reports he is still on that). Still on wound vac. Noted to have new skin redness around the stump, fever of 101F with HR of 105 at TCU  -- Patient doesnt look overtly septic. Blood cultures taken in ED. Will also take cultures from the existing piccline  -- Started empiric vanc/zosyn. Will get CT of the stump to rule out any abscess or osteomyelitis. Consult vascular surgery team  -- If B/C is positive for MRSA, consider RICHARDSON to rule out IE. TTE during last admission was negative for vegetations  -- WBC of 0.9 and ANC of 0.4 noted. Neutropenic precaution.   -- Consult ID     H/O CVA  -- Cont with statin. Hold plavix     Atrial fibrillation  - Cont with Metoprolol  -- Hold Xarelto in case he needs surgical procedure     CAD  -- S/P CABG and stents  -- Cont with cardiac meds. Hold plavix as he might need surgical procedure     DM-II with diabetic neuropathy  -- Hold metformin. Cont glipizide. Add sliding scale insulin     Hypertension  -- Cont with home meds with hold parameters     Anxiety disorder:  -- Cont with home meds      Today:  He is in good spirits today, wound vac changes are about 3 x weekly, tolerates, pain is less. He had follow up with wound and vascular yesterday, continues with wound vac and NWB, RTC in 2 weeks. He is not  on abx. Wound apparently is improving with less depth. Was debrided yesterday as well. No fever. He is otherwise doing okay. No new concerns and no complaints on visit today. He is on xarelto for afib/flutter and metformin and glipizide for DM. Appetite is good. No abdominal pain, nausea, vomiting, diarrhea or constipation. No urinary sx. Medical problems, aflutter, CAD, DM, HTN, managed with his usual medications. Diabetes on metformin and glipizide. Sleeping okay.       PAST MEDICAL HISTORY:  Past Medical History:   Diagnosis Date     Anemia      Anemia, unspecified type 08/19/2016     Anxiety      Anxiety state 07/29/2021    Formatting of this note might be different from the original. Created by Conversion  Replacement Utility updated for latest IMO load     Arthritis      Atrial fibrillation and flutter (H)      Cerebral artery occlusion with cerebral infarction (H)      Cerebral infarction (H)      Chest pain, unspecified type 08/19/2016     Chronic atrial fibrillation (H)      Congestive heart failure (H)      Coronary artery disease     CABG with multiple stents     Coronary artery disease involving native coronary artery without angina pectoris, unspecified whether native or transplanted heart 08/19/2016     Coronary atherosclerosis 07/29/2021    Formatting of this note might be different from the original. Created by Conversion  Replacement Utility updated for latest IMO load     Depression      Diabetic ulcer of right midfoot associated with type 2 diabetes mellitus, limited to breakdown of skin (H) 09/28/2017     Equinovarus acquired deformity, right 04/14/2021     Equinovarus deformity, acquired, right      Essential hypertension 08/19/2016     GERD (gastroesophageal reflux disease)      Heart attack (H)      History of blood transfusion      Hyperlipidemia      Hyperlipidemia, unspecified hyperlipidemia type 08/19/2016     Hypertension      Insomnia, unspecified type 08/19/2016     Ischemic  cardiomyopathy      Late effects of cerebrovascular accident 08/19/2016     Major depressive disorder with single episode, remission status unspecified 08/19/2016     Microalbuminuria 07/29/2021    Formatting of this note might be different from the original. 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 Created by Conversion [facility] Annotation: Feb 11 2010  1:41PM - Leonid Cabral: CANNOT READ OR  WRITE     PVD (peripheral vascular disease) (H)      Stented coronary artery      Type 2 diabetes mellitus (H) 07/29/2021    Formatting of this note might be different from the original. Created by Conversion     Type II or unspecified type diabetes mellitus with ophthalmic manifestations, not stated as uncontrolled(250.50) (H) 07/29/2021    Formatting of this note might be different from the original. Created by Conversion     Weakness 08/19/2016       MEDICATIONS:  Current Outpatient Medications   Medication Sig Dispense Refill     acetaminophen (TYLENOL) 325 MG tablet Take 650 mg by mouth every 6 hours as needed for fever or pain       acetaminophen (TYLENOL) 325 MG tablet Take 650 mg by mouth 2 times daily       ARIPiprazole (ABILIFY) 2 MG tablet 2 mg daily        ASPERCREME ORIGINAL 10 % external cream Apply topically 2 times daily as needed for other (back pain)       atorvastatin (LIPITOR) 80 MG tablet Take 80 mg by mouth daily        bisacodyl (DULCOLAX) 10 MG suppository Place 10 mg rectally daily as needed for constipation       cholestyramine (QUESTRAN) 4 g packet Take 1 packet by mouth every other day        clopidogrel (PLAVIX) 75 MG tablet Take 1 tablet (75 mg) by mouth daily Start taking medication the day after the procedure. 90 tablet 1      DULoxetine (CYMBALTA) 60 MG capsule Take 120 mg by mouth daily       erenumab-aooe (AIMOVIG, 140 MG DOSE,) 70 MG/ML injection Inject 140 mg Subcutaneous every 30 days       ferrous sulfate (FEROSUL) 325 (65 Fe) MG tablet Take 325 mg by mouth daily (with breakfast)        gabapentin (NEURONTIN) 100 MG capsule Take 100 mg by mouth 2 times daily (before meals)       gabapentin (NEURONTIN) 300 MG capsule Take 600 mg by mouth At Bedtime       glipiZIDE (GLUCOTROL) 5 MG tablet Take 2.5 mg by mouth daily before breakfast       HYDROcodone-acetaminophen (NORCO) 5-325 MG tablet Take 1-2 tablets by mouth every 6 hours as needed for moderate to severe pain (Patient taking differently: Take 1 tablet by mouth every 6 hours as needed for moderate to severe pain) 12 tablet 0     loperamide (IMODIUM) 2 MG capsule Take 2 mg by mouth 4 times daily as needed for diarrhea       losartan (COZAAR) 25 MG tablet Take 25 mg by mouth At Bedtime        magnesium chloride 535 (64 Mg) MG TBEC CR tablet Take 1,070 mg by mouth 2 times daily       metFORMIN (GLUCOPHAGE) 500 MG tablet Take 500 mg by mouth daily (with dinner)       metFORMIN (GLUCOPHAGE) 500 MG tablet Take 1,000 mg by mouth daily (with breakfast)       metoprolol succinate ER (TOPROL-XL) 25 MG 24 hr tablet Take 75 mg by mouth daily        mineral oil-hydrophilic petrolatum (AQUAPHOR) external ointment Apply topically 3 times daily as needed for irritation or dry skin Apply to forehead and other areas as needed       mirtazapine (REMERON) 7.5 MG tablet Take 7.5 mg by mouth At Bedtime        Nitroglycerin (NITROSTAT SL) Place 0.4 mg under the tongue every 5 minutes as needed for chest pain        pantoprazole (PROTONIX) 40 MG EC tablet Take 40 mg by mouth daily before breakfast       polyethylene glycol (MIRALAX) 17 GM/Dose powder Take 1 packet by mouth daily as needed for constipation        senna-docusate (SENOKOT-S/PERICOLACE) 8.6-50 MG tablet Take 1 tablet by mouth 2 times daily    "    senna-docusate (SENOKOT-S/PERICOLACE) 8.6-50 MG tablet Take 1 tablet by mouth 2 times daily as needed for constipation In addition to scheduled doses       sodium chloride (PF) 0.9% PF flush 10 mLs by Intracatheter route 3 times daily       traZODone (DESYREL) 50 MG tablet Take 50 mg by mouth At Bedtime        vitamin C (ASCORBIC ACID) 500 MG tablet Take 500 mg by mouth daily        XARELTO ANTICOAGULANT 20 MG TABS tablet Take 20 mg by mouth daily (with breakfast)         PHYSICAL EXAM:  General: Patient is alert male, no distress.   Vitals: /75   Pulse 98   Temp 98.7  F (37.1  C)   Resp 18   Ht 1.6 m (5' 3\")   Wt 67.7 kg (149 lb 3.2 oz)   SpO2 96%   BMI 26.43 kg/m    HEENT: Head is NCAT. Eyes show no injection or icterus. Nares negative. Oropharynx well hydrated.  Neck: No JVD.  Lungs: Non labored respirations.   Abdomen: Soft.  : Deferred.  Extremities: R BKA, wound vac.    Musculoskeletal: Mild swelling right stump.    Skin: R BKA with wound vac.    Psych: Mood is stable.       LABS/DIAGNOSTIC DATA:  Component      Latest Ref Rng & Units 9/27/2022 9/29/2022 9/30/2022               WBC      4.0 - 11.0 10e3/uL 0.9 (LL) 5.5 4.2   RBC Count      4.40 - 5.90 10e6/uL 3.44 (L) 3.82 (L) 3.17 (L)   Hemoglobin      13.3 - 17.7 g/dL 9.2 (L) 10.1 (L) 8.3 (L)   Hematocrit      40.0 - 53.0 % 28.6 (L) 32.0 (L) 26.7 (L)   MCV      78 - 100 fL 83 84 84   MCH      26.5 - 33.0 pg 26.7 26.4 (L) 26.2 (L)   MCHC      31.5 - 36.5 g/dL 32.2 31.6 31.1 (L)   RDW      10.0 - 15.0 % 14.6 15.4 (H) 15.5 (H)   Platelet Count      150 - 450 10e3/uL 151 171 159     Component      Latest Ref Rng & Units 9/27/2022 9/29/2022 9/30/2022 9/30/2022             6:33 AM  6:33 AM   Sodium      136 - 145 mmol/L 133 (L) 135 (L)  136   Potassium      3.4 - 5.3 mmol/L 4.1 4.1  3.9   Chloride      98 - 107 mmol/L 100 99  103   Carbon Dioxide (CO2)      22 - 29 mmol/L 21 (L) 23  24   Anion Gap      7 - 15 mmol/L 12 13  9   Urea Nitrogen      " 6.0 - 20.0 mg/dL 13.7 20.2 (H)  24.7 (H)   Creatinine      0.67 - 1.17 mg/dL 0.75 1.27 (H) 1.20 (H) 1.16   Calcium      8.6 - 10.0 mg/dL 7.4 (L) 8.5 (L)  8.6   Glucose      70 - 99 mg/dL 172 (H) 188 (H)  153 (H)   Alkaline Phosphatase      40 - 129 U/L 100   72   AST      10 - 50 U/L 20   30   ALT      10 - 50 U/L 19   19   Protein Total      6.4 - 8.3 g/dL 6.0 (L)   5.5 (L)   Albumin      3.5 - 5.2 g/dL 3.4 (L)   2.8 (L)   Bilirubin Total      <=1.2 mg/dL 0.4   0.4   GFR Estimate      >60 mL/min/1.73m2 >90 67 72 75         ASSESSMENT/PLAN:  1. Wound infection, MRSA. From R BKA on 8/5/2022. He had follow up with wound and vascular yesterday 10/10/2022, continues with wound vac and NWB, RTC in 2 weeks. He is not on abx. Wound is improving with less depth. Was debrided yesterday as well.  2. HTN. On metoprolol and losartan. Bps satisfactory, no changes needed.   3. Afib. He is on xarelto for anticoagulation. Adequate rate control.   4. Diabetes. On metformin and glipizide. Accuchecks followed.   5. Anemia. Last hgb at 8.3, may be in part dilutional. He is on iron. Recheck in TCU.  6. Neutropenic fever. Hospitalized 9/27/2022-9/30/2022. Had been on IV Vancomycin due to R BKA stump infection with MRSA. Treated with Zosyn for stump cellulitis in the hospital. No further abx. Follow up with ID and with wound and vascular clinic, appt on 10/10/2022. WBC has since normalized.     Stable issues:  CAD. Hx of MIs. Hx of stenting and prior CABG. On statin, beta blocker, xarelto.  Mood disorder. Mixed depression and anxiety. On duloxetine, Abilify. Also takes Mirtazapine.   Hx of stroke. Continue statin, xarelto.  Mild intellectual disabilities. He resides in an half-way, but has been in the hospital or this TCU since Dec 2021 related to above issues.        Electronically signed by: Heidi Xie MD

## 2022-10-24 NOTE — PROGRESS NOTES
[facility] GERIATRICS  [place] Medical Record Number:  [id number]  Place of Service where encounter took place: [facility] ([facility]) [[id number]]   CODE STATUS:   CPR/Full code     Chief Complaint/Reason for Visit:  Chief Complaint   Patient presents with     RECHECK     TCU 10/6/2022. Recent hospitalization for neutropenic fever. 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 Wound vac.        HPI:    Cm Lundberg is a 52 year old male with hx of CAD, s/p stents and CABG, HTN, DM, prior CVA, afib/flutter on xarelto, who has been residing in TCU at [facility] since Dec 2021 surgery for chronic long standing right foot ulceration. He underwent incision and drainage of the right foot, excisional debridement of the ulceration of the right foot into the level of the muscle, under MAC with popliteal block anesthesia on 12/17/2021. He completed abx per ID recommendations. Remained NWB with wound vac, had 1/27/2022 incision and drainage with application of theraskin. On 4/4/2022 again incision and drainage with theraskin. Continued following with podiatry, referred to wound and vascular due to non healing diabetic ulcer, right foot. S/p angioplasty RLE and hyperbaric oxygen therapy. MRI with indications of osteomyelitis. 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 Hospitalized 8/23/2022-8/30/2022 due to stump infection, placed on IV Vanco due to MRSA, has continued with NWB and wound vac. Sent in to the hospital from TCU on 9/27/2022 due to fever.    He was worked up, treated in the hospital. Seen by ID. Unfortunately, what is listed as the discharge summary from this hospital stay does not provide the details of his stay, it is a note representing the admission details. It is however, excerpted for information below.      [facility]  Hospitalist Discharge Summary    Date of Admission:  9/27/2022  Date of Discharge:  9/30/2022  Discharging Provider: Efrain Salcedo,  DO    Discharge Diagnoses  Sepsis  Neutropenic fever  Hx CVA  Atrial fibrillation  CAD  T2DM with diabetic neuropathy  Hypertension  Anxiety  Heart failure with borderline ejection fraction, unspecified chronicity    Hospital Course  52 year old male with PMH significant for DM-2, diabetic foot ulcer, s/p right BKA, infected BKA stump needing I&D in 08/2022, MRSA bacteremia on IV vancomycin, hypertension, CAD, S/P CABG, stents, afib, CVA, diabetic neuropathy who was sent to out ED for concern regarding sepsis      Sepsis  Neutropenic fever  -- Patient with recent right BKA stump infection with MRSA bacteremia,discharged on IV vanc (supposed to be until 9/23/22, but patient reports he is still on that). Still on wound vac. Noted to have new skin redness around the stump, fever of 101F with HR of 105 at TCU  -- Patient doesnt look overtly septic. Blood cultures taken in ED. Will also take cultures from the existing piccline  -- Started empiric vanc/zosyn. Will get CT of the stump to rule out any abscess or osteomyelitis. Consult vascular surgery team  -- If B/C is positive for MRSA, consider RICHARDSON to rule out IE. TTE during last admission was negative for vegetations  -- WBC of 0.9 and ANC of 0.4 noted. Neutropenic precaution.   -- Consult ID     H/O CVA  -- Cont with statin. Hold plavix     Atrial fibrillation  - Cont with Metoprolol  -- Hold Xarelto in case he needs surgical procedure     CAD  -- S/P CABG and stents  -- Cont with cardiac meds. Hold plavix as he might need surgical procedure     DM-II with diabetic neuropathy  -- Hold metformin. Cont glipizide. Add sliding scale insulin     Hypertension  -- Cont with home meds with hold parameters     Anxiety disorder:  -- Cont with home meds      Today:  He has been tolerating wound vac changes, usually takes some pain meds prior otherwise pain is under reasonable control. He is no longer on abx, had been hospitalized as noted for neutropenic fever, possibly related  to the IV Vanco he was on for R BKA infection. Following with wound and vascular clinic and surgeon Dr. Montes, sees Dr. Mei from ID, had seen ID in the hospital as well. He is in good spirits, has been dealing with this issue since Dec 2021 though really for much longer than that, just that he was in the hospital for a procedure in Dec 2021 and has been in this TCU or the hospital ever since, cannot return to his [facility] due to needing too much cares.     He is on xarelto for afib/flutter and metformin and glipizide for DM. Appetite is good. No abdominal pain, nausea, vomiting, diarrhea or constipation. No urinary sx. Medical problems, aflutter, CAD, DM, HTN, managed with his usual medications. Diabetes on metformin and glipizide. Sleeping okay.       PAST MEDICAL HISTORY:  Past Medical History:   Diagnosis Date     Anemia      Anemia, unspecified type 08/19/2016     Anxiety      Anxiety state 07/29/2021    Formatting of this note might be different from the original. Created by Conversion  Replacement Utility updated for latest IMO load     Arthritis      Atrial fibrillation and flutter (H)      Cerebral artery occlusion with cerebral infarction (H)      Cerebral infarction (H)      Chest pain, unspecified type 08/19/2016     Chronic atrial fibrillation (H)      Congestive heart failure (H)      Coronary artery disease     CABG with multiple stents     Coronary artery disease involving native coronary artery without angina pectoris, unspecified whether native or transplanted heart 08/19/2016     Coronary atherosclerosis 07/29/2021    Formatting of this note might be different from the original. Created by Conversion  Replacement Utility updated for latest IMO load     Depression      Diabetic ulcer of right midfoot associated with type 2 diabetes mellitus, limited to breakdown of skin (H) 09/28/2017     Equinovarus acquired deformity, right 04/14/2021     Equinovarus deformity, acquired, right      Essential  hypertension 08/19/2016     GERD (gastroesophageal reflux disease)      Heart attack (H)      History of blood transfusion      Hyperlipidemia      Hyperlipidemia, unspecified hyperlipidemia type 08/19/2016     Hypertension      Insomnia, unspecified type 08/19/2016     Ischemic cardiomyopathy      Late effects of cerebrovascular accident 08/19/2016     Major depressive disorder with single episode, remission status unspecified 08/19/2016     Microalbuminuria 07/29/2021    Formatting of this note might be different from the original. 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 Created by Conversion [facility] Annotation: Feb 11 2010  1:41PM - Leonid Cabral: CANNOT READ OR  WRITE     PVD (peripheral vascular disease) (H)      Stented coronary artery      Type 2 diabetes mellitus (H) 07/29/2021    Formatting of this note might be different from the original. Created by Conversion     Type II or unspecified type diabetes mellitus with ophthalmic manifestations, not stated as uncontrolled(250.50) (H) 07/29/2021    Formatting of this note might be different from the original. Created by Conversion     Weakness 08/19/2016       MEDICATIONS:  Current Outpatient Medications   Medication Sig Dispense Refill     acetaminophen (TYLENOL) 325 MG tablet Take 650 mg by mouth every 6 hours as needed for fever or pain       acetaminophen (TYLENOL) 325 MG tablet Take 650 mg by mouth 2 times daily       ARIPiprazole (ABILIFY) 2 MG tablet 2 mg daily        ASPERCREME ORIGINAL 10 % external cream Apply topically 2 times daily as needed for other (back pain)       atorvastatin (LIPITOR) 80 MG tablet Take 80 mg by mouth daily        bisacodyl (DULCOLAX) 10 MG suppository Place 10 mg  rectally daily as needed for constipation       cholestyramine (QUESTRAN) 4 g packet Take 1 packet by mouth every other day        clopidogrel (PLAVIX) 75 MG tablet Take 1 tablet (75 mg) by mouth daily Start taking medication the day after the procedure. 90 tablet 1     DULoxetine (CYMBALTA) 60 MG capsule Take 120 mg by mouth daily       erenumab-aooe (AIMOVIG, 140 MG DOSE,) 70 MG/ML injection Inject 140 mg Subcutaneous every 30 days       ferrous sulfate (FEROSUL) 325 (65 Fe) MG tablet Take 325 mg by mouth daily (with breakfast)        gabapentin (NEURONTIN) 100 MG capsule Take 100 mg by mouth 2 times daily (before meals)       gabapentin (NEURONTIN) 300 MG capsule Take 600 mg by mouth At Bedtime       glipiZIDE (GLUCOTROL) 5 MG tablet Take 2.5 mg by mouth daily before breakfast       HYDROcodone-acetaminophen (NORCO) 5-325 MG tablet Take 1-2 tablets by mouth every 6 hours as needed for moderate to severe pain (Patient taking differently: Take 1 tablet by mouth every 6 hours as needed for moderate to severe pain) 12 tablet 0     loperamide (IMODIUM) 2 MG capsule Take 2 mg by mouth 4 times daily as needed for diarrhea       losartan (COZAAR) 25 MG tablet Take 25 mg by mouth At Bedtime        magnesium chloride 535 (64 Mg) MG TBEC CR tablet Take 1,070 mg by mouth 2 times daily       metFORMIN (GLUCOPHAGE) 500 MG tablet Take 500 mg by mouth daily (with dinner)       metFORMIN (GLUCOPHAGE) 500 MG tablet Take 1,000 mg by mouth daily (with breakfast)       metoprolol succinate ER (TOPROL-XL) 25 MG 24 hr tablet Take 75 mg by mouth daily        mineral oil-hydrophilic petrolatum (AQUAPHOR) external ointment Apply topically 3 times daily as needed for irritation or dry skin Apply to forehead and other areas as needed       mirtazapine (REMERON) 7.5 MG tablet Take 7.5 mg by mouth At Bedtime        Nitroglycerin (NITROSTAT SL) Place 0.4 mg under the tongue every 5 minutes as needed for chest pain        pantoprazole  "(PROTONIX) 40 MG EC tablet Take 40 mg by mouth daily before breakfast       polyethylene glycol (MIRALAX) 17 GM/Dose powder Take 1 packet by mouth daily as needed for constipation        senna-docusate (SENOKOT-S/PERICOLACE) 8.6-50 MG tablet Take 1 tablet by mouth 2 times daily       senna-docusate (SENOKOT-S/PERICOLACE) 8.6-50 MG tablet Take 1 tablet by mouth 2 times daily as needed for constipation In addition to scheduled doses       sodium chloride (PF) 0.9% PF flush 10 mLs by Intracatheter route 3 times daily       traZODone (DESYREL) 50 MG tablet Take 50 mg by mouth At Bedtime        vitamin C (ASCORBIC ACID) 500 MG tablet Take 500 mg by mouth daily        XARELTO ANTICOAGULANT 20 MG TABS tablet Take 20 mg by mouth daily (with breakfast)         PHYSICAL EXAM:  General: Patient is alert male, no distress.   Vitals: /84   Pulse 83   Temp 98.7  F (37.1  C)   Resp 18   Ht 1.6 m (5' 3\")   Wt 71.7 kg (158 lb)   SpO2 95%   BMI 27.99 kg/m    HEENT: Head is NCAT. Eyes show no injection or icterus. Nares negative. Oropharynx moist.  Neck: No JVD.  Lungs: Non labored respirations.   Abdomen: Soft.  : Deferred.  Extremities: R BKA, wound vac.    Musculoskeletal: Mild swelling at R stump.   Skin: Wound vac, right BKA site.   Psych: Mood is stable.       LABS/DIAGNOSTIC DATA:  Component      Latest Ref Rng & Units 9/27/2022 9/29/2022 9/30/2022               WBC      4.0 - 11.0 10e3/uL 0.9 (LL) 5.5 4.2   RBC Count      4.40 - 5.90 10e6/uL 3.44 (L) 3.82 (L) 3.17 (L)   Hemoglobin      13.3 - 17.7 g/dL 9.2 (L) 10.1 (L) 8.3 (L)   Hematocrit      40.0 - 53.0 % 28.6 (L) 32.0 (L) 26.7 (L)   MCV      78 - 100 fL 83 84 84   MCH      26.5 - 33.0 pg 26.7 26.4 (L) 26.2 (L)   MCHC      31.5 - 36.5 g/dL 32.2 31.6 31.1 (L)   RDW      10.0 - 15.0 % 14.6 15.4 (H) 15.5 (H)   Platelet Count      150 - 450 10e3/uL 151 171 159     Component      Latest Ref Rng & Units 9/27/2022 9/29/2022 9/30/2022 9/30/2022             6:33 AM  " 6:33 AM   Sodium      136 - 145 mmol/L 133 (L) 135 (L)  136   Potassium      3.4 - 5.3 mmol/L 4.1 4.1  3.9   Chloride      98 - 107 mmol/L 100 99  103   Carbon Dioxide (CO2)      22 - 29 mmol/L 21 (L) 23  24   Anion Gap      7 - 15 mmol/L 12 13  9   Urea Nitrogen      6.0 - 20.0 mg/dL 13.7 20.2 (H)  24.7 (H)   Creatinine      0.67 - 1.17 mg/dL 0.75 1.27 (H) 1.20 (H) 1.16   Calcium      8.6 - 10.0 mg/dL 7.4 (L) 8.5 (L)  8.6   Glucose      70 - 99 mg/dL 172 (H) 188 (H)  153 (H)   Alkaline Phosphatase      40 - 129 U/L 100   72   AST      10 - 50 U/L 20   30   ALT      10 - 50 U/L 19   19   Protein Total      6.4 - 8.3 g/dL 6.0 (L)   5.5 (L)   Albumin      3.5 - 5.2 g/dL 3.4 (L)   2.8 (L)   Bilirubin Total      <=1.2 mg/dL 0.4   0.4   GFR Estimate      >60 mL/min/1.73m2 >90 67 72 75         ASSESSMENT/PLAN:  1. Neutropenic fever. Hospitalized 9/27/2022-9/30/2022. Had been on IV Vancomycin due to R BKA stump infection with MRSA. Treated with Zosyn for stump cellulitis in the hospital. No further abx. Follow up with ID and with wound and vascular clinic, appt on 10/10/2022. WBC has since normalized.   2. Wound infection, MRSA. From R BKA on 8/5/2022. NWB with wound vac. Follow up on 10/10/2022.   3. HTN. On metoprolol and losartan. Continue to monitor Bps in TCU, no med changes today, BP good.  4. Afib. He is on xarelto for anticoagulation. Adequate rate control.   5. Diabetes. On metformin and glipizide. Accuchecks followed.   6. Anemia. Last hgb at 8.3, may be in part dilutional. He is on iron. Recheck in TCU.    Stable:  CAD. Hx of MIs. Hx of stenting and prior CABG. On statin, beta blocker, xarelto.  Mood disorder. Mixed depression and anxiety. On duloxetine, Abilify. Also takes Mirtazapine.   Hx of stroke. Continue statin, xarelto.  Mild intellectual disabilities. He resides in an longterm, but has been in the hospital or this TCU since Dec 2021 related to above issues.        Electronically signed by: Heidi Xie,  MD

## 2022-10-31 ENCOUNTER — OFFICE VISIT (OUTPATIENT)
Dept: VASCULAR SURGERY | Facility: CLINIC | Age: 52
End: 2022-10-31
Attending: REGISTERED NURSE
Payer: MEDICARE

## 2022-10-31 VITALS — DIASTOLIC BLOOD PRESSURE: 86 MMHG | SYSTOLIC BLOOD PRESSURE: 142 MMHG | HEART RATE: 90 BPM | TEMPERATURE: 98.8 F

## 2022-10-31 DIAGNOSIS — T81.89XD NON-HEALING SURGICAL WOUND, SUBSEQUENT ENCOUNTER: Primary | ICD-10-CM

## 2022-10-31 PROCEDURE — 99214 OFFICE O/P EST MOD 30 MIN: CPT | Performed by: FAMILY MEDICINE

## 2022-10-31 PROCEDURE — G0463 HOSPITAL OUTPT CLINIC VISIT: HCPCS

## 2022-10-31 ASSESSMENT — PAIN SCALES - GENERAL: PAINLEVEL: NO PAIN (0)

## 2022-10-31 NOTE — PATIENT INSTRUCTIONS
Make sure to fill the areas of depth with adaptic and leave tail out for easy removal  Apply drape around all the wounds prior to applying the sponge    - Wound Vac Instructions    1. 3x weekly and as needed cleanse the area with NS    2. Pat dry    3. Apply Cavilon no sting barrier wipe to the skin surrounding the wound to protect from drainage/maceration    4. Apply drape around incision. Cut strip of drape and apply to skin if bridging is needed; plan this area in advance; should not be over bony prominence     5. Cut the foam to fit the area of the incision    6. Cut narrow strip of foam if bridging    7. Cover foam with drape to obtain air tight seal    8. Cut opening the size of a quarter for where the suction pad will be applied    9. Apply Suction pad    10. 125mmHG suction continuous    [employer] can be reached at [phone], 24 hours a day 7 days a week     - If you do not have a back up plan in place:     If the negative pressure wound therapy malfunctions or unable to maintain seal: dressing must be removed and reapplied within 2 hours of the incident. If unable to reapply negative pressure wound dressing, place Normal Saline moistened gauze in wound bed and cover with appropriate dressing to keep wound bed moist.  Change wet-to-dry dressing two times a day until healthcare staff can re-implement negative pressure therapy. Change canister at least weekly.  [employer] [facility] can be reached at [phone], 24 hours a day 7 days a week    Information on Vacuum-Assisted Closure of a Wound  Vacuum-assisted closure (VAC) of a wound is a type of treatment to help wounds heal. It s also known as negative pressure wound therapy. During the treatment, a device lowers air pressure on the wound. This can help the wound heal more quickly.  Understanding the wound VAC system  A wound VAC system has several parts. A foam or gauze dressing is put directly on the wound. The dressing is changed every 24  to 72 hours. An adhesive film covers and seals the dressing and wound. A drainage tube leads from under the adhesive film and connects to a portable vacuum pump. This pump removes air pressure over the wound. It may do this constantly. Or it may do it in cycles. During the treatment, you ll need to carry the portable pump everywhere you go.  Why wound VAC is used  You might need this therapy for a recent traumatic wound. Or you may need it for a chronic wound. This is a wound that does not heal the way it should over time. This can happen with wounds in people who have diabetes. You may need a wound VAC if you ve had a recent skin graft. And you may need a wound VAC for a large wound. Large wounds can take a longer time to heal.  A wound vacuum system may help your wound heal more quickly by:  Draining extra fluid from the wound  Reducing swelling  Reducing bacteria in the wound  Keeping your wound moist and warm  Helping draw together wound edges  Increasing blood flow to your wound  Decreasing inflammation  Wound VAC offers some other advantages over other types of wound care. It may decrease your overall discomfort. The dressings usually need to be changed less often. And they may be easier to keep in position.  Risks of wound VAC  Wound VAC has some rare risks, such as:  Bleeding (which may be severe)  Wound infection  An abnormal connection between the intestinal tract and the skin (enteric fistula)  Proper training in dressing changes can help reduce the risk for these complications. Also, your doctor will carefully evaluate you to make sure you are a good candidate for the therapy. Certain problems can increase your risk for complications. These include:  Exposed organs or blood vessels  High risk of bleeding from another medical problem  Wound infection  Nearby bone infection  Dead wound tissue  Cancer tissue  Fragile skin, such as from aging or longtime use of topical steroids  Allergy to adhesive  Very  poor blood flow to your wound  Wounds close to joints that may reopen because of movement  Your doctor will discuss the risks that apply to you. Make sure to talk with him or her about all of your questions and concerns.  Getting ready for wound VAC  You likely won t need to do much to get ready for wound VAC. In some cases, you may need to wait a while before having this therapy. For example, your doctor may first need to treat an infection in your wound. Dead or damaged tissue may also need to be removed from your wound.  You or a caregiver may need training on how to use the wound VAC device. This is done if you will be able to have your wound vacuum therapy at home. In other cases, you may need to have your wound vacuum therapy in a health care facility.  On the day of your procedure  A health care provider will cover your wound with foam or gauze wound dressing. An adhesive film will be put over the dressing and wound. This seals the wound. The foam connects to a drainage tube, which leads to a vacuum pump. This pump is portable. When the pump is turned on, it draws fluid through the foam and out the drainage tubing. The pump may run constantly, or it may cycle off and on. Your exact setup will depend on the specific type of wound vacuum system that you use.  Managing your wound  You may need the dressing changed about once a day. You may need it changed more or less often, depending on your wound. You or your caregiver may be trained to do this at home. Or it may be done by a visiting health care provider. Your doctor may prescribe a pain medicine. This is to prevent or reduce pain during the dressing change.  You will likely need to use the wound VAC system for several weeks or months. During this time, you ll carry the portable pump everywhere you go.  Nutrition for wound healing  During this time, make sure you follow a healthy diet. This is needed so the wound can heal and to prevent infection. Your doctor  can tell you more about what to include in your diet during this time.  follow up with your doctor if you have a medical condition that led to your wound, such as diabetes. He or she can help you prevent future wounds.  Follow-up care  Your doctor will carefully keep track of your healing. Make sure to keep all follow-up appointments.  When to call your health care provider  Call your health care provider right away if you have any of these:  Fever of 100.4 F (38.0 C) or higher  Increased redness, swelling, or warmth around wound  Increased pain  Bright red blood or blood clots in tubing or the collection chamber of the vacuum

## 2022-10-31 NOTE — PROGRESS NOTES
Wound Clinic Note          Visit date: 10/31/2022       Cheif Complaint:     Cm Lundberg is a 52 year old male had concerns including right stump wound.        HISTORY OF PRESENT ILLNESS:    Cm Lundberg reports the ulcer has been present since August 2022.  The wound began after a recent surgery and the incision did not heal normally.   This is a patient followed by Emily the nurse practitioner here in the wound clinic.  Emily is unavailable today and I am seeing the patient in her absence.  The patient's sister, Sherri was also on the phone during the visit.  They both provided portions of the history.      He is continue to have the right below the knee amputation surgical wound managed with a wound VAC change 3 times a week by the nurses at the [facility] that he is at.  He notes that just within the last few days he had an episode where there was increased bleeding in the wound VAC and a new wound was noted on the lateral aspect of the incision.  Since then there has not been increased bleeding noted in the wound VAC tubing.      The pateint denies fevers or chills.  They report the pain from the wound has been 0/10 and has remained about the same recently.      Today the patient reports maintaining a high protein diet and taking protein supplements regularly.        The patient denies a history of smoking or chronic steroid use.  and The patient confirms having diabetes and reports the blood sugars are well controlled.         The patient has not had any symptoms of infection relating to the wound recently and is not currently on antibiotics.       Problem List:   Past Medical History:   Diagnosis Date     Anemia      Anemia, unspecified type 08/19/2016     Anxiety      Anxiety state 07/29/2021    Formatting of this note might be different from the original. Created by Conversion  Replacement Utility updated for latest IMO load     Arthritis      Atrial fibrillation and flutter  (H)      Cerebral artery occlusion with cerebral infarction (H)      Cerebral infarction (H)      Chest pain, unspecified type 08/19/2016     Chronic atrial fibrillation (H)      Congestive heart failure (H)      Coronary artery disease     CABG with multiple stents     Coronary artery disease involving native coronary artery without angina pectoris, unspecified whether native or transplanted heart 08/19/2016     Coronary atherosclerosis 07/29/2021    Formatting of this note might be different from the original. Created by Conversion  Replacement Utility updated for latest IMO load     Depression      Diabetic ulcer of right midfoot associated with type 2 diabetes mellitus, limited to breakdown of skin (H) 09/28/2017     Equinovarus acquired deformity, right 04/14/2021     Equinovarus deformity, acquired, right      Essential hypertension 08/19/2016     GERD (gastroesophageal reflux disease)      Heart attack (H)      History of blood transfusion      Hyperlipidemia      Hyperlipidemia, unspecified hyperlipidemia type 08/19/2016     Hypertension      Insomnia, unspecified type 08/19/2016     Ischemic cardiomyopathy      Late effects of cerebrovascular accident 08/19/2016     Major depressive disorder with single episode, remission status unspecified 08/19/2016     Microalbuminuria 07/29/2021    Formatting of this note might be different from the original. 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 Created by Conversion [facility] Annotation: Feb 11 2010  1:41PM - Leonid Cabral: CANNOT READ OR  WRITE     PVD (peripheral vascular disease) (H)      Stented coronary artery      Type 2 diabetes mellitus (H) 07/29/2021    Formatting of this note might be different  from the original. Created by Conversion     Type II or unspecified type diabetes mellitus with ophthalmic manifestations, not stated as uncontrolled(250.50) (H) 07/29/2021    Formatting of this note might be different from the original. Created by Conversion     Weakness 08/19/2016              Family Hx: family history is not on file. He was adopted.       Surgical Hx:   Past Surgical History:   Procedure Laterality Date     AMPUTATE LEG BELOW KNEE Right 8/5/2022    Procedure: AMPUTATION, BELOW KNEE, right leg;  Surgeon: Kim Montes MD;  Location: [facility] OR     INCISION AND DRAINAGE LOWER EXTREMITY, COMBINED Right 12/17/2021    Procedure: INCISION AND DRAINAGE, right foot;  Surgeon: Alex Kim DPM;  Location: [facility] OR     INCISION AND DRAINAGE LOWER EXTREMITY, COMBINED Right 1/27/2022    Procedure: INCISION AND DRAINAGE, right foot with application of theraskin;  Surgeon: Alex Kim DPM;  Location: [facility] OR     INCISION AND DRAINAGE LOWER EXTREMITY, COMBINED Right 4/4/2022    Procedure: INCISION AND DRAINAGE, right foot with application of theraskin;  Surgeon: Alex Kim DPM;  Location: [facility] OR     IR LOWER EXTREMITY ANGIOGRAM RIGHT  6/29/2022     IR MISCELLANEOUS PROCEDURE  2/13/2004     IRRIGATION AND DEBRIDEMENT LOWER EXTREMITY, COMBINED Right 8/24/2022    Procedure: RIGHT IRRIGATION AND DEBRIDEMENT, LOWER EXTREMITY, REVISION OF BELOW KNEE AMPUTATION;  Surgeon: Kim Montes MD;  Location: [facility] OR     IRRIGATION AND DEBRIDEMENT LOWER EXTREMITY, COMBINED Right 8/26/2022    Procedure: WASHOUT AND PARTIAL CLOSURE OF RIGHT BELOW THE KNEE AMPUTATION,  AND WOUND VAC APPLICATION;  Surgeon: Kim Montes MD;  Location: [facility] OR     PICC DOUBLE LUMEN PLACEMENT  8/29/2022          ZZC CABG, VEIN, SINGLE      Description: CABG (CABG);  Recorded: 04/22/2009;          Allergies:    Allergies   Allergen Reactions     Aspirin Other (See  Comments)     Reacts with migraine medicine     Blood-Group Specific Substance      Anti-Fya present.  Expect delays in blood for transfusion.  Draw 2 lavender and 1 red for all type and screen orders.     Isosorbide Headache     Codeine Rash     Morphine Rash              Medication History:    Current Outpatient Medications   Medication Sig     acetaminophen (TYLENOL) 325 MG tablet Take 650 mg by mouth every 6 hours as needed for fever or pain     acetaminophen (TYLENOL) 325 MG tablet Take 650 mg by mouth 2 times daily     ANTI-DANDRUFF 1 % SHAM lotion Apply 1 Application topically twice a week     ARIPiprazole (ABILIFY) 2 MG tablet 2 mg daily      ASPERCREME ORIGINAL 10 % external cream Apply topically 2 times daily as needed for other (back pain)     atorvastatin (LIPITOR) 80 MG tablet Take 80 mg by mouth daily      bisacodyl (DULCOLAX) 10 MG suppository Place 10 mg rectally daily as needed for constipation     cholestyramine (QUESTRAN) 4 g packet Take 1 packet by mouth every other day      clopidogrel (PLAVIX) 75 MG tablet Take 1 tablet (75 mg) by mouth daily Start taking medication the day after the procedure.     DULoxetine (CYMBALTA) 60 MG capsule Take 120 mg by mouth daily     erenumab-aooe (AIMOVIG, 140 MG DOSE,) 70 MG/ML injection Inject 140 mg Subcutaneous every 30 days     ferrous sulfate (FEROSUL) 325 (65 Fe) MG tablet Take 325 mg by mouth daily (with breakfast)      gabapentin (NEURONTIN) 100 MG capsule Take 100 mg by mouth 2 times daily (before meals)     gabapentin (NEURONTIN) 300 MG capsule Take 600 mg by mouth At Bedtime     glipiZIDE (GLUCOTROL) 5 MG tablet Take 2.5 mg by mouth daily before breakfast     HYDROcodone-acetaminophen (NORCO) 5-325 MG tablet Take 1-2 tablets by mouth every 6 hours as needed for moderate to severe pain (Patient taking differently: Take 1 tablet by mouth every 6 hours as needed for moderate to severe pain)     loperamide (IMODIUM) 2 MG capsule Take 2 mg by mouth 4 times  daily as needed for diarrhea     losartan (COZAAR) 25 MG tablet Take 25 mg by mouth At Bedtime      magnesium chloride 535 (64 Mg) MG TBEC CR tablet Take 1,070 mg by mouth 2 times daily     metFORMIN (GLUCOPHAGE) 500 MG tablet Take 500 mg by mouth daily (with dinner)     metFORMIN (GLUCOPHAGE) 500 MG tablet Take 1,000 mg by mouth daily (with breakfast)     metoprolol succinate ER (TOPROL-XL) 25 MG 24 hr tablet Take 75 mg by mouth daily      mineral oil-hydrophilic petrolatum (AQUAPHOR) external ointment Apply topically 3 times daily as needed for irritation or dry skin Apply to forehead and other areas as needed     mirtazapine (REMERON) 7.5 MG tablet Take 7.5 mg by mouth At Bedtime      Nitroglycerin (NITROSTAT SL) Place 0.4 mg under the tongue every 5 minutes as needed for chest pain      pantoprazole (PROTONIX) 40 MG EC tablet Take 40 mg by mouth daily before breakfast     polyethylene glycol (MIRALAX) 17 GM/Dose powder Take 1 packet by mouth daily as needed for constipation      senna-docusate (SENOKOT-S/PERICOLACE) 8.6-50 MG tablet Take 1 tablet by mouth 2 times daily as needed for constipation In addition to scheduled doses     traZODone (DESYREL) 50 MG tablet Take 50 mg by mouth At Bedtime      vitamin C (ASCORBIC ACID) 500 MG tablet Take 500 mg by mouth daily      XARELTO ANTICOAGULANT 20 MG TABS tablet Take 20 mg by mouth daily (with breakfast)     senna-docusate (SENOKOT-S/PERICOLACE) 8.6-50 MG tablet Take 1 tablet by mouth 2 times daily     sodium chloride (PF) 0.9% PF flush 10 mLs by Intracatheter route 3 times daily (Patient not taking: Reported on 10/24/2022)     No current facility-administered medications for this visit.         Tobacco History:  reports that he quit smoking about 7 years ago. His smoking use included cigarettes. He has never used smokeless tobacco.       REVIEW OF SYMPTOMS:   The review of systems was negative except as noted in the HPI.           PHYSICAL EXAMINATION:     BP (!)  142/86   Pulse 90   Temp 98.8  F (37.1  C)            GENERAL: The patient overall appears well and is no acute distress.   HEAD: normocephalic   EYES: Sclera and conjunctiva clear   NECK: no obvious masses   LUNGS: breathing is unlabored.   EXTREMITIES: No clubbing, cyanosis or edema   SKIN: No rashes or other abnormalities except as noted under the Wound section below.   NEUROLOGICAL: normal motor and sensory function       WOUND/ulcer: The wound appears healthy with no sign of infection.   Wound bed: granulation tissue  Periwound: healthy intact skin  The wound that the patient had previously at the medial aspect of the below the knee amputation surgical incision is very superficial and seems to be healing well with no exposed bone at the base of the wound.  There is a new wound on the lateral aspect of the below the knee amputation incision which has a 2 cm depth but there is no exposed bone at the base of this wound.  There is no active bleeding here.      Also see below for wound details:     Circumferential volume measures:        No flowsheet data found.    Ulceration(s)/Wound(s):   Please see the media tab under the chart review for pictures of the wounds.    VASC Wound Right stump (Active)   Pre Size Length 1 10/31/22 1300   Pre Size Width 2.5 10/31/22 1300   Pre Size Depth 0.3 10/31/22 1300   Pre Total Sq cm 2.5 10/31/22 1300       VASC Wound R anterior stump (Active)   Pre Size Length 1 10/31/22 1300   Pre Size Width 1.7 10/31/22 1300   Pre Size Depth 2.4 10/31/22 1300   Pre Total Sq cm 1.7 10/31/22 1300   Undermined approx 0.5cm at 11-2 and 4-5 o'clock 10/31/22 1300   Description secondary wound laterally that likely communicates 10/31/22 1300           Recent Labs   Lab Test 08/26/22  0815 08/23/22  1300 05/16/22  0520   A1C 5.9* 6.3* 5.8*          Recent Labs   Lab Test 10/11/22  0512 09/30/22  0633 09/27/22  2156   ALBUMIN 3.7 2.8* 3.4*              No debridement performed today.                   ASSESSMENT:   This is a 52 year old male with a right below the knee amputation surgical wound          PLAN:   We will continue to manage both wound areas with a wound VAC change 3 times a week by the nurses at his skilled nursing facility.  The patient expressed an interest in having the wound VAC discontinued so he can go back to his assisted living facility.  I have suggested that we continue with the wound VAC for another week and have him come back to the wound clinic to be evaluated by me again so she can decide about discontinuing the wound VAC.  There are no signs today of any acute issue or problem other than the new wound developing.  I have encouraged the patient to continue to maintain a high protein diet and to continue to take protein supplements to speed wound healing.   The patient will return to the wound clinic in one week to see Emily.        30 minutes spent on the date of the encounter doing chart review, history and exam, documentation and further activities per the note      Robin Farnsworth MD  10/31/2022   1:36 PM   [facility]  [phone]

## 2022-11-01 ENCOUNTER — TRANSITIONAL CARE UNIT VISIT (OUTPATIENT)
Dept: GERIATRICS | Facility: CLINIC | Age: 52
End: 2022-11-01
Payer: MEDICARE

## 2022-11-01 VITALS
TEMPERATURE: 98.5 F | RESPIRATION RATE: 18 BRPM | OXYGEN SATURATION: 98 % | SYSTOLIC BLOOD PRESSURE: 137 MMHG | BODY MASS INDEX: 24.01 KG/M2 | HEART RATE: 74 BPM | HEIGHT: 67 IN | DIASTOLIC BLOOD PRESSURE: 79 MMHG | WEIGHT: 153 LBS

## 2022-11-01 DIAGNOSIS — Z89.511 STATUS POST BELOW-KNEE AMPUTATION OF RIGHT LOWER EXTREMITY (H): Primary | ICD-10-CM

## 2022-11-01 DIAGNOSIS — D62 ABLA (ACUTE BLOOD LOSS ANEMIA): ICD-10-CM

## 2022-11-01 DIAGNOSIS — I48.0 PAROXYSMAL ATRIAL FIBRILLATION (H): ICD-10-CM

## 2022-11-01 DIAGNOSIS — I10 ESSENTIAL HYPERTENSION: ICD-10-CM

## 2022-11-01 DIAGNOSIS — E11.69 TYPE 2 DIABETES MELLITUS WITH OTHER SPECIFIED COMPLICATION, WITHOUT LONG-TERM CURRENT USE OF INSULIN (H): ICD-10-CM

## 2022-11-01 PROCEDURE — 99309 SBSQ NF CARE MODERATE MDM 30: CPT | Performed by: FAMILY MEDICINE

## 2022-11-01 NOTE — LETTER
11/1/2022        RE: Cm Lundberg  [address]  [place] [address]          [facility]  [facility] Record Number:  [id number]  Place of Service where encounter took place: [facility] ([facility]) [[id number]]   CODE STATUS:   CPR/Full code     Chief Complaint/Reason for Visit:  Chief Complaint   Patient presents with     RECHECK     TCU 11/1/2022. 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 Wound vac. Recent hospitalization for neutropenic fever, resolved. Not currently on abx.       HPI:    Cm Lundberg is a 52 year old male with hx of CAD, s/p stents and CABG, HTN, DM, prior CVA, afib/flutter on xarelto, who has been residing in TCU at [facility] since Dec 2021 surgery for chronic long standing right foot ulceration. He underwent incision and drainage of the right foot, excisional debridement of the ulceration of the right foot into the level of the muscle, under MAC with popliteal block anesthesia on 12/17/2021. He completed abx per ID recommendations. Remained NWB with wound vac, had 1/27/2022 incision and drainage with application of theraskin. On 4/4/2022 again incision and drainage with theraskin. Continued following with podiatry, referred to wound and vascular due to non healing diabetic ulcer, right foot. S/p angioplasty RLE and hyperbaric oxygen therapy. MRI with indications of osteomyelitis. 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 Hospitalized 8/23/2022-8/30/2022 due to stump infection, placed on IV Vanco due to MRSA, has continued with NWB and wound vac. Sent in to the hospital from TCU on 9/27/2022 due to fever.    He was worked up, treated in the hospital. Seen by ID. Unfortunately, what is listed as the discharge summary from this hospital stay does not provide the details of his stay, it is a note representing the admission details. It is however, excerpted for information below.      [facility]  Hospital  Hospitalist Discharge Summary    Date of Admission:  9/27/2022  Date of Discharge:  9/30/2022  Discharging Provider: Efrain Salcedo DO    Discharge Diagnoses  Sepsis  Neutropenic fever  Hx CVA  Atrial fibrillation  CAD  T2DM with diabetic neuropathy  Hypertension  Anxiety  Heart failure with borderline ejection fraction, unspecified chronicity    Hospital Course  52 year old male with PMH significant for DM-2, diabetic foot ulcer, s/p right BKA, infected BKA stump needing I&D in 08/2022, MRSA bacteremia on IV vancomycin, hypertension, CAD, S/P CABG, stents, afib, CVA, diabetic neuropathy who was sent to out ED for concern regarding sepsis      Sepsis  Neutropenic fever  -- Patient with recent right BKA stump infection with MRSA bacteremia,discharged on IV vanc (supposed to be until 9/23/22, but patient reports he is still on that). Still on wound vac. Noted to have new skin redness around the stump, fever of 101F with HR of 105 at TCU  -- Patient doesnt look overtly septic. Blood cultures taken in ED. Will also take cultures from the existing piccline  -- Started empiric vanc/zosyn. Will get CT of the stump to rule out any abscess or osteomyelitis. Consult vascular surgery team  -- If B/C is positive for MRSA, consider RICHARDSON to rule out IE. TTE during last admission was negative for vegetations  -- WBC of 0.9 and ANC of 0.4 noted. Neutropenic precaution.   -- Consult ID     H/O CVA  -- Cont with statin. Hold plavix     Atrial fibrillation  - Cont with Metoprolol  -- Hold Xarelto in case he needs surgical procedure     CAD  -- S/P CABG and stents  -- Cont with cardiac meds. Hold plavix as he might need surgical procedure     DM-II with diabetic neuropathy  -- Hold metformin. Cont glipizide. Add sliding scale insulin     Hypertension  -- Cont with home meds with hold parameters     Anxiety disorder:  -- Cont with home meds      Today:  He had appt yesterday 10/31/2022 due to new open area lateral aspect of  surgical incision, whereas the medical aspect had been the one of concern and treated with vac. Now the vac will cover all wounds. Continues NWB, next follow up in one week. Not currently on abx. Reports pain is controlled, uncomfortable with vac changes though he receives pain meds ahead of vac change. No fever. He is otherwise doing okay. No med changes to his usual meds. He is on xarelto for afib/flutter and metformin and glipizide for DM. Appetite is good. No abdominal pain, nausea, vomiting, diarrhea or constipation. No urinary sx. Sleeping okay.       PAST MEDICAL HISTORY:  Past Medical History:   Diagnosis Date     Anemia      Anemia, unspecified type 08/19/2016     Anxiety      Anxiety state 07/29/2021    Formatting of this note might be different from the original. Created by Conversion  Replacement Utility updated for latest IMO load     Arthritis      Atrial fibrillation and flutter (H)      Cerebral artery occlusion with cerebral infarction (H)      Cerebral infarction (H)      Chest pain, unspecified type 08/19/2016     Chronic atrial fibrillation (H)      Congestive heart failure (H)      Coronary artery disease     CABG with multiple stents     Coronary artery disease involving native coronary artery without angina pectoris, unspecified whether native or transplanted heart 08/19/2016     Coronary atherosclerosis 07/29/2021    Formatting of this note might be different from the original. Created by Conversion  Replacement Utility updated for latest IMO load     Depression      Diabetic ulcer of right midfoot associated with type 2 diabetes mellitus, limited to breakdown of skin (H) 09/28/2017     Equinovarus acquired deformity, right 04/14/2021     Equinovarus deformity, acquired, right      Essential hypertension 08/19/2016     GERD (gastroesophageal reflux disease)      Heart attack (H)      History of blood transfusion      Hyperlipidemia      Hyperlipidemia, unspecified hyperlipidemia type  08/19/2016     Hypertension      Insomnia, unspecified type 08/19/2016     Ischemic cardiomyopathy      Late effects of cerebrovascular accident 08/19/2016     Major depressive disorder with single episode, remission status unspecified 08/19/2016     Microalbuminuria 07/29/2021    Formatting of this note might be different from the original. 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 Created by Conversion [facility] Annotation: Feb 11 2010  1:41PM - Leonid Cabral: CANNOT READ OR  WRITE     PVD (peripheral vascular disease) (H)      Stented coronary artery      Type 2 diabetes mellitus (H) 07/29/2021    Formatting of this note might be different from the original. Created by Conversion     Type II or unspecified type diabetes mellitus with ophthalmic manifestations, not stated as uncontrolled(250.50) (H) 07/29/2021    Formatting of this note might be different from the original. Created by Conversion     Weakness 08/19/2016       MEDICATIONS:  Current Outpatient Medications   Medication Sig Dispense Refill     acetaminophen (TYLENOL) 325 MG tablet Take 650 mg by mouth every 6 hours as needed for fever or pain       acetaminophen (TYLENOL) 325 MG tablet Take 650 mg by mouth 2 times daily       ANTI-DANDRUFF 1 % SHAM lotion Apply 1 Application topically twice a week       ARIPiprazole (ABILIFY) 2 MG tablet 2 mg daily        ASPERCREME ORIGINAL 10 % external cream Apply topically 2 times daily as needed for other (back pain)       atorvastatin (LIPITOR) 80 MG tablet Take 80 mg by mouth daily        bisacodyl (DULCOLAX) 10 MG suppository Place 10 mg rectally daily as needed for constipation       cholestyramine (QUESTRAN) 4 g packet Take 1 packet by mouth every  other day        clopidogrel (PLAVIX) 75 MG tablet Take 1 tablet (75 mg) by mouth daily Start taking medication the day after the procedure. 90 tablet 1     DULoxetine (CYMBALTA) 60 MG capsule Take 120 mg by mouth daily       erenumab-aooe (AIMOVIG, 140 MG DOSE,) 70 MG/ML injection Inject 140 mg Subcutaneous every 30 days       ferrous sulfate (FEROSUL) 325 (65 Fe) MG tablet Take 325 mg by mouth daily (with breakfast)        gabapentin (NEURONTIN) 100 MG capsule Take 100 mg by mouth 2 times daily (before meals)       gabapentin (NEURONTIN) 300 MG capsule Take 600 mg by mouth At Bedtime       glipiZIDE (GLUCOTROL) 5 MG tablet Take 2.5 mg by mouth daily before breakfast       HYDROcodone-acetaminophen (NORCO) 5-325 MG tablet Take 1-2 tablets by mouth every 6 hours as needed for moderate to severe pain (Patient taking differently: Take 1 tablet by mouth every 4 hours as needed for moderate to severe pain) 12 tablet 0     loperamide (IMODIUM) 2 MG capsule Take 2 mg by mouth 4 times daily as needed for diarrhea       losartan (COZAAR) 25 MG tablet Take 25 mg by mouth At Bedtime        magnesium chloride 535 (64 Mg) MG TBEC CR tablet Take 1,070 mg by mouth 2 times daily       metFORMIN (GLUCOPHAGE) 500 MG tablet Take 500 mg by mouth daily (with dinner)       metFORMIN (GLUCOPHAGE) 500 MG tablet Take 1,000 mg by mouth daily (with breakfast)       metoprolol succinate ER (TOPROL-XL) 25 MG 24 hr tablet Take 75 mg by mouth daily        mineral oil-hydrophilic petrolatum (AQUAPHOR) external ointment Apply topically 3 times daily as needed for irritation or dry skin Apply to forehead and other areas as needed       mirtazapine (REMERON) 7.5 MG tablet Take 7.5 mg by mouth At Bedtime        Nitroglycerin (NITROSTAT SL) Place 0.4 mg under the tongue every 5 minutes as needed for chest pain        pantoprazole (PROTONIX) 40 MG EC tablet Take 40 mg by mouth daily before breakfast       polyethylene glycol (MIRALAX) 17 GM/Dose powder  "Take 1 packet by mouth daily as needed for constipation        senna-docusate (SENOKOT-S/PERICOLACE) 8.6-50 MG tablet Take 1 tablet by mouth 2 times daily       senna-docusate (SENOKOT-S/PERICOLACE) 8.6-50 MG tablet Take 1 tablet by mouth 2 times daily as needed for constipation In addition to scheduled doses       traZODone (DESYREL) 50 MG tablet Take 50 mg by mouth At Bedtime        vitamin C (ASCORBIC ACID) 500 MG tablet Take 500 mg by mouth daily        XARELTO ANTICOAGULANT 20 MG TABS tablet Take 20 mg by mouth daily (with breakfast)         PHYSICAL EXAM:  General: Patient is alert male, no distress.   Vitals: /79   Pulse 74   Temp 98.5  F (36.9  C)   Resp 18   Ht 1.702 m (5' 7\")   Wt 69.4 kg (153 lb)   SpO2 98%   BMI 23.96 kg/m    HEENT: Head is NCAT. Eyes show no injection or icterus. Nares negative. Oropharynx moist.  Neck: No JVD.  Lungs: Non labored respirations.   Abdomen: Soft.  : Deferred.  Extremities: R BKA.  Musculoskeletal: Mild swelling right stump.    Skin: Wound vac at right stump covering wounds.   Psych: Mood is good.        LABS/DIAGNOSTIC DATA:  Component      Latest Ref Rng & Units 9/27/2022 9/29/2022 9/30/2022 9/30/2022             6:33 AM  6:33 AM   Sodium      136 - 145 mmol/L 133 (L) 135 (L)  136   Potassium      3.4 - 5.3 mmol/L 4.1 4.1  3.9   Chloride      98 - 107 mmol/L 100 99  103   Carbon Dioxide (CO2)      22 - 29 mmol/L 21 (L) 23  24   Anion Gap      7 - 15 mmol/L 12 13  9   Urea Nitrogen      6.0 - 20.0 mg/dL 13.7 20.2 (H)  24.7 (H)   Creatinine      0.67 - 1.17 mg/dL 0.75 1.27 (H) 1.20 (H) 1.16   Calcium      8.6 - 10.0 mg/dL 7.4 (L) 8.5 (L)  8.6   Glucose      70 - 99 mg/dL 172 (H) 188 (H)  153 (H)   Alkaline Phosphatase      40 - 129 U/L 100   72   AST      10 - 50 U/L 20   30   ALT      10 - 50 U/L 19   19   Protein Total      6.4 - 8.3 g/dL 6.0 (L)   5.5 (L)   Albumin      3.5 - 5.2 g/dL 3.4 (L)   2.8 (L)   Bilirubin Total      <=1.2 mg/dL 0.4   0.4   GFR " Estimate      >60 mL/min/1.73m2 >90 67 72 75     Component      Latest Ref Rng & Units 9/27/2022 9/29/2022 9/30/2022 10/11/2022                WBC      4.0 - 11.0 10e3/uL 0.9 (LL) 5.5 4.2 4.0   RBC Count      4.40 - 5.90 10e6/uL 3.44 (L) 3.82 (L) 3.17 (L) 4.09 (L)   Hemoglobin      13.3 - 17.7 g/dL 9.2 (L) 10.1 (L) 8.3 (L) 10.5 (L)   Hematocrit      40.0 - 53.0 % 28.6 (L) 32.0 (L) 26.7 (L) 34.4 (L)   MCV      78 - 100 fL 83 84 84 84   MCH      26.5 - 33.0 pg 26.7 26.4 (L) 26.2 (L) 25.7 (L)   MCHC      31.5 - 36.5 g/dL 32.2 31.6 31.1 (L) 30.5 (L)   RDW      10.0 - 15.0 % 14.6 15.4 (H) 15.5 (H) 15.3 (H)   Platelet Count      150 - 450 10e3/uL 151 171 159 243       ASSESSMENT/PLAN:  1. R BKA 8/5/2022. Subsequent MRSA infection, wound vac. Neutropenic fever necessitated discontinuation of IV abx. NWB. New wound lateral aspect, office visit yesterday with vascular, cont vac covering all open areas, recheck one week.   2. HTN. On metoprolol and losartan. Bps satisfactory, no changes needed.   3. Afib. Continue xarelto. No bleeding concerns.   4. Diabetes. On metformin and glipizide. Following accuchecks. No changes today.   5. Anemia. Last hgb at 10.5. He is on iron.   6. Neutropenic fever. Hospitalized 9/27/2022-9/30/2022. Had been on IV Vancomycin due to R BKA stump infection with MRSA. Treated with Zosyn for stump cellulitis in the hospital. No further abx.  WBC has since normalized.     Stable issues:  CAD. Hx of MIs. Hx of stenting and prior CABG. On statin, beta blocker, xarelto.  Mood disorder. Mixed depression and anxiety. On duloxetine, Abilify. Also takes Mirtazapine.   Hx of stroke. Continue statin, xarelto.  Mild intellectual disabilities. He resides in an retirement, but has been in the hospital or this TCU since Dec 2021 related to above issues.        Electronically signed by: Heidi Xie MD           Sincerely,        Heidi Xie MD

## 2022-11-07 ENCOUNTER — OFFICE VISIT (OUTPATIENT)
Dept: VASCULAR SURGERY | Facility: CLINIC | Age: 52
End: 2022-11-07
Attending: REGISTERED NURSE
Payer: MEDICARE

## 2022-11-07 VITALS
DIASTOLIC BLOOD PRESSURE: 80 MMHG | HEART RATE: 84 BPM | TEMPERATURE: 98.2 F | SYSTOLIC BLOOD PRESSURE: 130 MMHG | RESPIRATION RATE: 20 BRPM

## 2022-11-07 DIAGNOSIS — T81.89XD NON-HEALING SURGICAL WOUND, SUBSEQUENT ENCOUNTER: Primary | ICD-10-CM

## 2022-11-07 DIAGNOSIS — Z89.511 STATUS POST BELOW-KNEE AMPUTATION OF RIGHT LOWER EXTREMITY (H): ICD-10-CM

## 2022-11-07 PROCEDURE — 97597 DBRDMT OPN WND 1ST 20 CM/<: CPT | Performed by: REGISTERED NURSE

## 2022-11-07 ASSESSMENT — PAIN SCALES - GENERAL: PAINLEVEL: NO PAIN (0)

## 2022-11-07 NOTE — PROGRESS NOTES
Follow up Vascular Visit       Date of Service:11/07/22      Chief Complaint: R stump      Pt returns to [facility] with regards to their R stump wound.  They arrive today alone. They are currently using wound vac to the wounds. This is being done by TCU . They are using tubular compression for compression. They are feeling well today. Denies fevers, chills. No shortness of breath.     Allergies:   Allergies   Allergen Reactions     Aspirin Other (See Comments)     Reacts with migraine medicine     Blood-Group Specific Substance      Anti-Fya present.  Expect delays in blood for transfusion.  Draw 2 lavender and 1 red for all type and screen orders.     Isosorbide Headache     Codeine Rash     Morphine Rash       Medications:   Current Outpatient Medications:      acetaminophen (TYLENOL) 325 MG tablet, Take 650 mg by mouth every 6 hours as needed for fever or pain, Disp: , Rfl:      acetaminophen (TYLENOL) 325 MG tablet, Take 650 mg by mouth 2 times daily, Disp: , Rfl:      ANTI-DANDRUFF 1 % SHAM lotion, Apply 1 Application topically twice a week, Disp: , Rfl:      ARIPiprazole (ABILIFY) 2 MG tablet, 2 mg daily , Disp: , Rfl:      ASPERCREME ORIGINAL 10 % external cream, Apply topically 2 times daily as needed for other (back pain), Disp: , Rfl:      atorvastatin (LIPITOR) 80 MG tablet, Take 80 mg by mouth daily , Disp: , Rfl:      bisacodyl (DULCOLAX) 10 MG suppository, Place 10 mg rectally daily as needed for constipation, Disp: , Rfl:      cholestyramine (QUESTRAN) 4 g packet, Take 1 packet by mouth every other day , Disp: , Rfl:      clopidogrel (PLAVIX) 75 MG tablet, Take 1 tablet (75 mg) by mouth daily Start taking medication the day after the procedure., Disp: 90 tablet, Rfl: 1     DULoxetine (CYMBALTA) 60 MG capsule, Take 120 mg by mouth daily, Disp: , Rfl:      erenumab-aooe (AIMOVIG, 140 MG DOSE,) 70 MG/ML injection, Inject 140 mg Subcutaneous every 30 days, Disp: , Rfl:       ferrous sulfate (FEROSUL) 325 (65 Fe) MG tablet, Take 325 mg by mouth daily (with breakfast) , Disp: , Rfl:      gabapentin (NEURONTIN) 100 MG capsule, Take 100 mg by mouth 2 times daily (before meals), Disp: , Rfl:      gabapentin (NEURONTIN) 300 MG capsule, Take 600 mg by mouth At Bedtime, Disp: , Rfl:      glipiZIDE (GLUCOTROL) 5 MG tablet, Take 2.5 mg by mouth daily before breakfast, Disp: , Rfl:      HYDROcodone-acetaminophen (NORCO) 5-325 MG tablet, Take 1-2 tablets by mouth every 6 hours as needed for moderate to severe pain (Patient taking differently: Take 1 tablet by mouth every 6 hours as needed for moderate to severe pain), Disp: 12 tablet, Rfl: 0     loperamide (IMODIUM) 2 MG capsule, Take 2 mg by mouth 4 times daily as needed for diarrhea, Disp: , Rfl:      losartan (COZAAR) 25 MG tablet, Take 25 mg by mouth At Bedtime , Disp: , Rfl:      magnesium chloride 535 (64 Mg) MG TBEC CR tablet, Take 1,070 mg by mouth 2 times daily, Disp: , Rfl:      metFORMIN (GLUCOPHAGE) 500 MG tablet, Take 500 mg by mouth daily (with dinner), Disp: , Rfl:      metFORMIN (GLUCOPHAGE) 500 MG tablet, Take 1,000 mg by mouth daily (with breakfast), Disp: , Rfl:      metoprolol succinate ER (TOPROL-XL) 25 MG 24 hr tablet, Take 75 mg by mouth daily , Disp: , Rfl:      mineral oil-hydrophilic petrolatum (AQUAPHOR) external ointment, Apply topically 3 times daily as needed for irritation or dry skin Apply to forehead and other areas as needed, Disp: , Rfl:      mirtazapine (REMERON) 7.5 MG tablet, Take 7.5 mg by mouth At Bedtime , Disp: , Rfl:      Nitroglycerin (NITROSTAT SL), Place 0.4 mg under the tongue every 5 minutes as needed for chest pain , Disp: , Rfl:      pantoprazole (PROTONIX) 40 MG EC tablet, Take 40 mg by mouth daily before breakfast, Disp: , Rfl:      polyethylene glycol (MIRALAX) 17 GM/Dose powder, Take 1 packet by mouth daily as needed for constipation , Disp: , Rfl:      senna-docusate (SENOKOT-S/PERICOLACE)  8.6-50 MG tablet, Take 1 tablet by mouth 2 times daily, Disp: , Rfl:      senna-docusate (SENOKOT-S/PERICOLACE) 8.6-50 MG tablet, Take 1 tablet by mouth 2 times daily as needed for constipation In addition to scheduled doses, Disp: , Rfl:      sodium chloride (PF) 0.9% PF flush, 10 mLs by Intracatheter route 3 times daily, Disp: , Rfl:      traZODone (DESYREL) 50 MG tablet, Take 50 mg by mouth At Bedtime , Disp: , Rfl:      vitamin C (ASCORBIC ACID) 500 MG tablet, Take 500 mg by mouth daily , Disp: , Rfl:      XARELTO ANTICOAGULANT 20 MG TABS tablet, Take 20 mg by mouth daily (with breakfast), Disp: , Rfl:     History:   Past Medical History:   Diagnosis Date     Anemia      Anemia, unspecified type 08/19/2016     Anxiety      Anxiety state 07/29/2021    Formatting of this note might be different from the original. Created by Conversion  Replacement Utility updated for latest IMO load     Arthritis      Atrial fibrillation and flutter (H)      Cerebral artery occlusion with cerebral infarction (H)      Cerebral infarction (H)      Chest pain, unspecified type 08/19/2016     Chronic atrial fibrillation (H)      Congestive heart failure (H)      Coronary artery disease     CABG with multiple stents     Coronary artery disease involving native coronary artery without angina pectoris, unspecified whether native or transplanted heart 08/19/2016     Coronary atherosclerosis 07/29/2021    Formatting of this note might be different from the original. Created by Conversion  Replacement Utility updated for latest IMO load     Depression      Diabetic ulcer of right midfoot associated with type 2 diabetes mellitus, limited to breakdown of skin (H) 09/28/2017     Equinovarus acquired deformity, right 04/14/2021     Equinovarus deformity, acquired, right      Essential hypertension 08/19/2016     GERD (gastroesophageal reflux disease)      Heart attack (H)      History of blood transfusion      Hyperlipidemia       Hyperlipidemia, unspecified hyperlipidemia type 08/19/2016     Hypertension      Insomnia, unspecified type 08/19/2016     Ischemic cardiomyopathy      Late effects of cerebrovascular accident 08/19/2016     Major depressive disorder with single episode, remission status unspecified 08/19/2016     Microalbuminuria 07/29/2021    Formatting of this note might be different from the original. 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 Created by Conversion [facility] Annotation: Feb 11 2010  1:41PM - Leonid Cabral: CANNOT READ OR  WRITE     PVD (peripheral vascular disease) (H)      Stented coronary artery      Type 2 diabetes mellitus (H) 07/29/2021    Formatting of this note might be different from the original. Created by Conversion     Type II or unspecified type diabetes mellitus with ophthalmic manifestations, not stated as uncontrolled(250.50) (H) 07/29/2021    Formatting of this note might be different from the original. Created by Conversion     Weakness 08/19/2016       Physical Exam:    /80   Pulse 84   Temp 98.2  F (36.8  C)   Resp 20     General:  Patient presents to clinic in no apparent distress.  Head: normocephalic atraumatic  Psychiatric:  Alert and oriented x3.   Respiratory: unlabored breathing; no cough  Integumentary:  Skin is uniformly warm, dry and pink.    Wound #1 Location: R anterior stump  Size: 0.6L x 1.6W x 4depth.  No sinus tract present, Wound base: viable tissue  No undermining present. Wound is full thickness. There is moderate drainage. Periwound: no denudement, erythema, induration, maceration or warmth.      Wound #2 Location: R stump  Size: 0.7L x 2.4W x 0.3depth.  No sinus tract present, Wound base: pale  slough/granulation tissue  No undermining present. Wound is full thickness. There is moderate drainage. Periwound: no denudement, erythema, induration, maceration or warmth.    PICC Double Lumen 08/29/22 Right Basilic (Active)   Number of days: 70       Negative Pressure Wound Therapy Foot Anterior;Right (Active)   Number of days: 322       VASC Wound right lateral foot (Active)   Number of days: 572       VASC Wound left hallux  (Active)   Number of days: 495       VASC Wound Right stump (Active)   Pre Size Length 0.7 11/07/22 1300   Pre Size Width 2.4 11/07/22 1300   Pre Size Depth 0.2 11/07/22 1300   Pre Total Sq cm 1.68 11/07/22 1300   Post Size Length 0.7 11/07/22 1300   Post Size Width 2.4 11/07/22 1300   Post Size Depth 0.3 11/07/22 1300   Post Total Sq cm 1.68 11/07/22 1300   Number of days: 60       VASC Wound R anterior stump (Active)   Pre Size Length 0.6 11/07/22 1300   Pre Size Width 1.6 11/07/22 1300   Pre Size Depth 0.2 11/07/22 1300   Pre Total Sq cm 0.96 11/07/22 1300   Post Size Length 0.6 11/07/22 1300   Post Size Width 1.6 11/07/22 1300   Post Size Depth 4 11/07/22 1300   Post Total Sq cm 0.96 11/07/22 1300   Undermined approx 0.5cm at 11-2 and 4-5 o'clock 10/31/22 1300   Description secondary wound laterally that likely communicates 10/31/22 1300   Number of days: 7       Incision/Surgical Site 12/17/21 Right Foot (Active)   Number of days: 325       Incision/Surgical Site 06/29/22 Anterior;Left Greater Trochanter (Active)   Number of days: 131       Incision/Surgical Site 08/24/22 Right Leg (Active)   Number of days: 75            Circumferential volume measures:      No flowsheet data found.    Labs:    I personally reviewed the following lab results today and those on care everywhere    CRP Inflammation   Date Value Ref Range Status   09/26/2022 46.80 (H) <5.00 mg/L Final     CRP   Date Value Ref Range Status   08/24/2022 18.9 (H) 0.0 - <0.8 mg/dL Final      Erythrocyte Sedimentation Rate    Date Value Ref Range Status   09/26/2022 32 (H) 0 - 20 mm/hr Final      Last Renal Panel:  Sodium   Date Value Ref Range Status   10/11/2022 138 136 - 145 mmol/L Final   08/25/2016 139 mmol/L Final     Potassium   Date Value Ref Range Status   10/11/2022 4.6 3.4 - 5.3 mmol/L Final   08/30/2022 4.4 3.5 - 5.0 mmol/L Final   08/25/2016 4.3 mmol/L Final     Chloride   Date Value Ref Range Status   10/11/2022 105 98 - 107 mmol/L Final   08/29/2022 109 (H) 98 - 107 mmol/L Final   08/25/2016 107 mmol/L Final     Carbon Dioxide (CO2)   Date Value Ref Range Status   10/11/2022 23 22 - 29 mmol/L Final   08/29/2022 25 22 - 31 mmol/L Final     Anion Gap   Date Value Ref Range Status   10/11/2022 10 7 - 15 mmol/L Final   08/29/2022 6 5 - 18 mmol/L Final   08/25/2016 8 mmol/L Final     Glucose   Date Value Ref Range Status   10/11/2022 129 (H) 70 - 99 mg/dL Final   08/29/2022 94 70 - 125 mg/dL Final   08/25/2016 84 70 - 99 mg/dL Final     GLUCOSE BY METER POCT   Date Value Ref Range Status   09/30/2022 104 (H) 70 - 99 mg/dL Final     Urea Nitrogen   Date Value Ref Range Status   10/11/2022 20.3 (H) 6.0 - 20.0 mg/dL Final   08/29/2022 12 8 - 22 mg/dL Final   08/25/2016 21 mg/dL Final     Creatinine   Date Value Ref Range Status   10/11/2022 0.88 0.67 - 1.17 mg/dL Final   08/25/2016 0.85 mg/dL Final     GFR Estimate   Date Value Ref Range Status   10/11/2022 >90 >60 mL/min/1.73m2 Final     Comment:     Effective December 21, 2021 eGFRcr in adults is calculated using the 2021 CKD-EPI creatinine equation which includes age and gender (Stephen perry al., NEJM, DOI: 10.1056/CYANdc8036784)   04/29/2019 >60 >60 mL/min/1.73m2 Final     Calcium   Date Value Ref Range Status   10/11/2022 9.6 8.6 - 10.0 mg/dL Final   08/25/2016 9.9 mg/dL Final     Phosphorus   Date Value Ref Range Status   08/07/2022 2.9 2.5 - 4.5 mg/dL Final     Albumin   Date Value Ref Range Status   10/11/2022 3.7 3.5 - 5.2 g/dL Final   08/24/2022 2.8 (L) 3.5 - 5.0 g/dL  Final      Lab Results   Component Value Date    WBC 4.0 10/11/2022    WBC 5.0 08/25/2016     Lab Results   Component Value Date    RBC 4.09 10/11/2022    RBC 3.49 08/25/2016     Lab Results   Component Value Date    HGB 10.5 10/11/2022    HGB 10.1 08/25/2016     Lab Results   Component Value Date    HCT 34.4 10/11/2022    HCT 31.4 08/25/2016     No components found for: MCT  Lab Results   Component Value Date    MCV 84 10/11/2022    MCV 90 08/25/2016     Lab Results   Component Value Date    MCH 25.7 10/11/2022    MCH 28.7 08/25/2016     Lab Results   Component Value Date    MCHC 30.5 10/11/2022    MCHC 31.8 08/25/2016     Lab Results   Component Value Date    RDW 15.3 10/11/2022    RDW 13.6 08/25/2016     Lab Results   Component Value Date     10/11/2022     08/25/2016      Lab Results   Component Value Date    A1C 5.9 08/26/2022    A1C 6.3 08/23/2022    A1C 5.8 05/16/2022    A1C 5.1 12/21/2021    A1C 5.9 11/18/2019    A1C 5.3 08/25/2016      No results found for: TSH   No results found for: VITDT                Impression:  Encounter Diagnoses   Name Primary?     Non-healing surgical wound, subsequent encounter Yes     Status post below-knee amputation of right lower extremity (H)             11/7/22 R Stump            Are any of these wounds new today: No; Location: na    Assessment/Plan:          1. Debridement: After discussion of risk factors and verbal consent was obtained 2% Lidocaine HCL jelly was applied, under clean conditions, the R stump ulceration(s) were debrided using currette. Devitalized and nonviable tissue, along with any fibrin and slough, was removed to improve granulation tissue formation, stimulate wound healing, decrease overall bacteria load, disrupt biofilm formation and decrease edge senescence.  Total excisional debridement was 2.64 sq cm from the epidermis/dermis area with a depth of 4 cm.   Ulcers were improved afterwards and .  Measures were as noted on the flow  "sheet.       2.  Wound treatment: wound treatment will include irrigation and dressings to promote autolytic debridement which will include: Cleanse with dilute hibiclens 30cc in 500cc NS. Apply 1/4\" packing strip to R anterior stump, medihoney to R stump cover with abd pad, gauze roll to be changed three times a week.  If for some reason the patient is not able to get their dressing(s) changed as outlined above (due to illness, lack of supplies, lack of help) please do the following: remove old, soiled dressings; wash the wounds with saline; pat dry; apply ABD pad or other absorbant pad and secure with rolled gauze; avoid tape directly on your skin; patient instructed to call the clinic as soon as possible to let us know what the current issues are in receiving wound care. Stable Pt adamant about going back to his assisted living, but can not go with wound vac. Family wants to try topical dressings but new wound is measuring 4 cm. Will hold vac for 2 weeks to see if wound continues to fill in without it. If able to begin filling in pt and family would like to proceed with topical dressings, so pt can move back to his facility. Talked with Macho, brother, via phone during visit. Macho to find out if someone else can change the wound vac, like home health, or staff in clinic would they accept him with the wound vac.            3. Edema: Continue tubular compression. The compression wraps were applied today in clinic.     If a 2 layer or 4 layer compression wrap is being used; these are safe to have on for ONLY 7 days. If for some reason the patient is not able to get the wrap(s) changed (due to illness; lack of supplies, lack of help, lack of transportation) please do the following: unwrap the old 2 or 4 layer compression wrap; avoid using scissors as you could cut your skin and cause wounds; use tubular compression when available. Call to reschedule your home care or clinic visit appointment as soon as possible.  Stable "            4. Nutrition: Focus on protein           5. Offloading: Do not use stump protector at this time     Patient will follow up with me in 2 weeks for reevaluation. They were instructed to call the clinic sooner with any signs or symptoms of infection or any further questions/concerns. Answered all questions.      20 minutes spent on the date of the encounter doing chart review, history and exam, documentation and further activities per the note    Emily Lynn MS, APRN, AGNP-C, CWCN  [facility]   [phone]        This note was electronically signed by SHABNAM Brooks CNP

## 2022-11-07 NOTE — PATIENT INSTRUCTIONS
"Wound care supplies were not ordered or needed today.    Hold the wound vac for 2 weeks     Wound Care Instructions    3 TIMES PER WEEK and as needed, Cleanse your right stump wound(s) with Dilute hibiclens 30cc in 500cc NS.    Pat Dry with non-sterile gauze    Apply Lotion to the intact skin surrounding your wound and other dry skin locations. Some good lotions include: Remedy Skin Repair Cream, Sarna, Vanicream or Cetaphil    Primary Dressing: Apply 1/4\" packing strip to anterior stump wound (depth 4cm left corner) and medihoney into/onto both the wounds    Secondary dressing: Cover with abd pad    Secure with non-sterile roll gauze (4\" x 75\" roll) and tape (1\" roll tape) as needed; avoid adhesive directly on the skin    Compression: tubular compression    It is not ok to get your wound wet in the bath or shower    It is recommended that you do not get your ulcer wet when showering.  Listed below are several ways of keeping it dry when you shower.     1. Wrap it with Press and Seal plastic wrap.  It can be found in the stores where the plastic wraps or tin foil is kept.               2.  Some people take a bath and hang their leg/foot out of the tub.                        3  Put your leg in a plastic bag and tape it on.           4. You can purchase a shower cover for casts at some pharmacies and through the Internet.            5. Take a Bed Bath or wash up at the sink          If for some reason you are not able to get your dressing(s) changed as outlined above (due to illness, lack of supplies, lack of help) please do the following: remove old, soiled dressings; wash the wounds with saline; pat dry; apply ABD pad or other absorbant pad and secure with rolled gauze; avoid tape directly on your skin; Call the clinic as soon as possible to let us know what the current issues are in receiving wound care [phone].      SEEK MEDICAL CARE IF:  You have an increase in swelling, pain, or redness around the " wound.  You have an increase in the amount of pus coming from the wound.  There is a bad smell coming from the wound.  The wound appears to be worsening/enlarging  You have a fever greater than 101.5 F      It is ok to continue current wound care treatment/products for the next 2-3 days until new wound care supplies are ordered and arrive. If longer than this please contact our office at [phone].    If you have a 2 layer or 4 layer compression wrap on these are safe to have on for ONLY 7 days. If for some reason you are not able to get the wrap(s) changed (due to illness; lack of supplies, lack of help, lack of transportation) please do the following: unwrap the old 2 or 4 layer compression wrap; avoid using scissors as you could cut your skin and cause wounds; use tubular compression when available. Call to reschedule your home care or clinic visit appointment as soon as possible.    Please NOTE: if you are 15 minutes late to your clinic appointment you will have to be rescheduled. Please call our clinic as soon as possible if you know you will not be able to get to your appointment at [phone].    If you fail to show up to 3 scheduled clinic appointments you will be dismissed from our clinic.              We want to hear from you!  In the next few weeks, you should receive a call or email to complete a survey about your visit at [facility]. Please help us improve your appointment experience by letting us know how we did today. We strive to make your experience good and value any ways in which we could do better.      We value your input and suggestions.    Thank you for choosing the [facility]!

## 2022-11-10 ENCOUNTER — TRANSITIONAL CARE UNIT VISIT (OUTPATIENT)
Dept: GERIATRICS | Facility: CLINIC | Age: 52
End: 2022-11-10
Payer: MEDICARE

## 2022-11-10 VITALS
SYSTOLIC BLOOD PRESSURE: 125 MMHG | BODY MASS INDEX: 24.01 KG/M2 | WEIGHT: 153 LBS | HEART RATE: 76 BPM | RESPIRATION RATE: 16 BRPM | TEMPERATURE: 98.5 F | DIASTOLIC BLOOD PRESSURE: 74 MMHG | OXYGEN SATURATION: 98 % | HEIGHT: 67 IN

## 2022-11-10 DIAGNOSIS — I48.0 PAROXYSMAL ATRIAL FIBRILLATION (H): ICD-10-CM

## 2022-11-10 DIAGNOSIS — Z89.511 STATUS POST BELOW-KNEE AMPUTATION OF RIGHT LOWER EXTREMITY (H): Primary | ICD-10-CM

## 2022-11-10 DIAGNOSIS — I10 ESSENTIAL HYPERTENSION: ICD-10-CM

## 2022-11-10 DIAGNOSIS — E11.69 TYPE 2 DIABETES MELLITUS WITH OTHER SPECIFIED COMPLICATION, WITHOUT LONG-TERM CURRENT USE OF INSULIN (H): ICD-10-CM

## 2022-11-10 PROCEDURE — 99309 SBSQ NF CARE MODERATE MDM 30: CPT | Performed by: FAMILY MEDICINE

## 2022-11-10 NOTE — LETTER
11/10/2022        RE: Cm Lundberg  [address]  [facility] [address]          [facility]  [facility] Record Number:  [id number]  Place of Service where encounter took place: [facility] ([facility]) [[id number]]   CODE STATUS:   CPR/Full code     Chief Complaint/Reason for Visit:  Chief Complaint   Patient presents with     RECHECK     TCU 11/10/2022. 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 Wound vac. Recent hospitalization for neutropenic fever, resolved. Not currently on abx.       HPI:    Cm Lundberg is a 52 year old male with hx of CAD, s/p stents and CABG, HTN, DM, prior CVA, afib/flutter on xarelto, who has been residing in TCU at [facility] since Dec 2021 surgery for chronic long standing right foot ulceration. He underwent incision and drainage of the right foot, excisional debridement of the ulceration of the right foot into the level of the muscle, under MAC with popliteal block anesthesia on 12/17/2021. He completed abx per ID recommendations. Remained NWB with wound vac, had 1/27/2022 incision and drainage with application of theraskin. On 4/4/2022 again incision and drainage with theraskin. Continued following with podiatry, referred to wound and vascular due to non healing diabetic ulcer, right foot. S/p angioplasty RLE and hyperbaric oxygen therapy. MRI with indications of osteomyelitis. 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 Hospitalized 8/23/2022-8/30/2022 due to stump infection, placed on IV Vanco due to MRSA, has continued with NWB and wound vac. Sent in to the hospital from TCU on 9/27/2022 due to fever.    He was worked up, treated in the hospital. Seen by ID. Unfortunately, what is listed as the discharge summary from this hospital stay does not provide the details of his stay, it is a note representing the admission details. It is however, excerpted for information below.      [facility]  Hospital  Hospitalist Discharge Summary    Date of Admission:  9/27/2022  Date of Discharge:  9/30/2022  Discharging Provider: Efrain Salcedo DO    Discharge Diagnoses  Sepsis  Neutropenic fever  Hx CVA  Atrial fibrillation  CAD  T2DM with diabetic neuropathy  Hypertension  Anxiety  Heart failure with borderline ejection fraction, unspecified chronicity    Hospital Course  52 year old male with PMH significant for DM-2, diabetic foot ulcer, s/p right BKA, infected BKA stump needing I&D in 08/2022, MRSA bacteremia on IV vancomycin, hypertension, CAD, S/P CABG, stents, afib, CVA, diabetic neuropathy who was sent to out ED for concern regarding sepsis      Sepsis  Neutropenic fever  -- Patient with recent right BKA stump infection with MRSA bacteremia,discharged on IV vanc (supposed to be until 9/23/22, but patient reports he is still on that). Still on wound vac. Noted to have new skin redness around the stump, fever of 101F with HR of 105 at TCU  -- Patient doesnt look overtly septic. Blood cultures taken in ED. Will also take cultures from the existing piccline  -- Started empiric vanc/zosyn. Will get CT of the stump to rule out any abscess or osteomyelitis. Consult vascular surgery team  -- If B/C is positive for MRSA, consider RICHARDSON to rule out IE. TTE during last admission was negative for vegetations  -- WBC of 0.9 and ANC of 0.4 noted. Neutropenic precaution.   -- Consult ID     H/O CVA  -- Cont with statin. Hold plavix     Atrial fibrillation  - Cont with Metoprolol  -- Hold Xarelto in case he needs surgical procedure     CAD  -- S/P CABG and stents  -- Cont with cardiac meds. Hold plavix as he might need surgical procedure     DM-II with diabetic neuropathy  -- Hold metformin. Cont glipizide. Add sliding scale insulin     Hypertension  -- Cont with home meds with hold parameters     Anxiety disorder:  -- Cont with home meds      Today:  He had appt on 10/31/2022 due to new open area lateral aspect of surgical  incision, whereas the medical aspect had been the one of concern and treated with vac. Vac then used over all wounds. Follow up on 11/7/2022 per NP Emily Lynn, trial off vac for 2 weeks with new orders for Hibiclens to clean and use topical medihoney 3 x weekly. RTC 11/21/2022. Continue NWB and other cares. Not on abx. Reports pain in foot is less without the vac and changes. However, has had some headaches, hx of chronic headaches and just received monthly Aimovig yesterday. No fever. He is on xarelto for afib/flutter and metformin and glipizide for DM. Appetite is good. No abdominal pain, nausea, vomiting, diarrhea or constipation. No urinary sx. Sleeping okay.       PAST MEDICAL HISTORY:  Past Medical History:   Diagnosis Date     Anemia      Anemia, unspecified type 08/19/2016     Anxiety      Anxiety state 07/29/2021    Formatting of this note might be different from the original. Created by Conversion  Replacement Utility updated for latest IMO load     Arthritis      Atrial fibrillation and flutter (H)      Cerebral artery occlusion with cerebral infarction (H)      Cerebral infarction (H)      Chest pain, unspecified type 08/19/2016     Chronic atrial fibrillation (H)      Congestive heart failure (H)      Coronary artery disease     CABG with multiple stents     Coronary artery disease involving native coronary artery without angina pectoris, unspecified whether native or transplanted heart 08/19/2016     Coronary atherosclerosis 07/29/2021    Formatting of this note might be different from the original. Created by Conversion  Replacement Utility updated for latest IMO load     Depression      Diabetic ulcer of right midfoot associated with type 2 diabetes mellitus, limited to breakdown of skin (H) 09/28/2017     Equinovarus acquired deformity, right 04/14/2021     Equinovarus deformity, acquired, right      Essential hypertension 08/19/2016     GERD (gastroesophageal reflux disease)      Heart attack  (H)      History of blood transfusion      Hyperlipidemia      Hyperlipidemia, unspecified hyperlipidemia type 08/19/2016     Hypertension      Insomnia, unspecified type 08/19/2016     Ischemic cardiomyopathy      Late effects of cerebrovascular accident 08/19/2016     Major depressive disorder with single episode, remission status unspecified 08/19/2016     Microalbuminuria 07/29/2021    Formatting of this note might be different from the original. 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 Created by Conversion [facility] Annotation: Feb 11 2010  1:41PM - Leonid Cabral: CANNOT READ OR  WRITE     PVD (peripheral vascular disease) (H)      Stented coronary artery      Type 2 diabetes mellitus (H) 07/29/2021    Formatting of this note might be different from the original. Created by Conversion     Type II or unspecified type diabetes mellitus with ophthalmic manifestations, not stated as uncontrolled(250.50) (H) 07/29/2021    Formatting of this note might be different from the original. Created by Conversion     Weakness 08/19/2016       MEDICATIONS:  Current Outpatient Medications   Medication Sig Dispense Refill     acetaminophen (TYLENOL) 325 MG tablet Take 650 mg by mouth every 6 hours as needed for fever or pain       acetaminophen (TYLENOL) 325 MG tablet Take 650 mg by mouth 2 times daily       ANTI-DANDRUFF 1 % SHAM lotion Apply 1 Application topically twice a week       ARIPiprazole (ABILIFY) 2 MG tablet 2 mg daily        ASPERCREME ORIGINAL 10 % external cream Apply topically 2 times daily as needed for other (back pain)       atorvastatin (LIPITOR) 80 MG tablet Take 80 mg by mouth daily        bisacodyl (DULCOLAX) 10 MG suppository Place 10 mg  rectally daily as needed for constipation       cholestyramine (QUESTRAN) 4 g packet Take 1 packet by mouth every other day        clopidogrel (PLAVIX) 75 MG tablet Take 1 tablet (75 mg) by mouth daily Start taking medication the day after the procedure. 90 tablet 1     DULoxetine (CYMBALTA) 60 MG capsule Take 120 mg by mouth daily       erenumab-aooe (AIMOVIG, 140 MG DOSE,) 70 MG/ML injection Inject 140 mg Subcutaneous every 30 days       ferrous sulfate (FEROSUL) 325 (65 Fe) MG tablet Take 325 mg by mouth daily (with breakfast)        gabapentin (NEURONTIN) 100 MG capsule Take 100 mg by mouth 2 times daily (before meals)       gabapentin (NEURONTIN) 300 MG capsule Take 600 mg by mouth At Bedtime       glipiZIDE (GLUCOTROL) 5 MG tablet Take 2.5 mg by mouth daily before breakfast       HYDROcodone-acetaminophen (NORCO) 5-325 MG tablet Take 1-2 tablets by mouth every 6 hours as needed for moderate to severe pain (Patient taking differently: Take 1 tablet by mouth every 4 hours as needed for moderate to severe pain) 12 tablet 0     loperamide (IMODIUM) 2 MG capsule Take 2 mg by mouth 4 times daily as needed for diarrhea       losartan (COZAAR) 25 MG tablet Take 25 mg by mouth At Bedtime        magnesium chloride 535 (64 Mg) MG TBEC CR tablet Take 1,070 mg by mouth 2 times daily       metFORMIN (GLUCOPHAGE) 500 MG tablet Take 500 mg by mouth daily (with dinner)       metFORMIN (GLUCOPHAGE) 500 MG tablet Take 1,000 mg by mouth daily (with breakfast)       metoprolol succinate ER (TOPROL-XL) 25 MG 24 hr tablet Take 75 mg by mouth daily        mineral oil-hydrophilic petrolatum (AQUAPHOR) external ointment Apply topically 3 times daily as needed for irritation or dry skin Apply to forehead and other areas as needed       mirtazapine (REMERON) 7.5 MG tablet Take 7.5 mg by mouth At Bedtime        Nitroglycerin (NITROSTAT SL) Place 0.4 mg under the tongue every 5 minutes as needed for chest pain        pantoprazole  "(PROTONIX) 40 MG EC tablet Take 40 mg by mouth daily before breakfast       polyethylene glycol (MIRALAX) 17 GM/Dose powder Take 1 packet by mouth daily as needed for constipation        senna-docusate (SENOKOT-S/PERICOLACE) 8.6-50 MG tablet Take 1 tablet by mouth 2 times daily       senna-docusate (SENOKOT-S/PERICOLACE) 8.6-50 MG tablet Take 1 tablet by mouth 2 times daily as needed for constipation In addition to scheduled doses       traZODone (DESYREL) 50 MG tablet Take 50 mg by mouth At Bedtime        vitamin C (ASCORBIC ACID) 500 MG tablet Take 500 mg by mouth daily        XARELTO ANTICOAGULANT 20 MG TABS tablet Take 20 mg by mouth daily (with breakfast)         PHYSICAL EXAM:  General: Patient is alert male, no distress.   Vitals: /74   Pulse 76   Temp 98.5  F (36.9  C)   Resp 16   Ht 1.702 m (5' 7\")   Wt 69.4 kg (153 lb)   SpO2 98%   BMI 23.96 kg/m    HEENT: Head is NCAT. Eyes show no injection or icterus. Nares negative. Oropharynx hydrated.  Neck: No JVD.  Lungs: Non labored respirations.   : Deferred.  Extremities: R BKA.  Musculoskeletal: Mild swelling right stump.    Skin: Bandage right stump.   Psych: Mood is good.        LABS/DIAGNOSTIC DATA:  Component      Latest Ref Rng & Units 9/27/2022 9/29/2022 9/30/2022 9/30/2022             6:33 AM  6:33 AM   Sodium      136 - 145 mmol/L 133 (L) 135 (L)  136   Potassium      3.4 - 5.3 mmol/L 4.1 4.1  3.9   Chloride      98 - 107 mmol/L 100 99  103   Carbon Dioxide (CO2)      22 - 29 mmol/L 21 (L) 23  24   Anion Gap      7 - 15 mmol/L 12 13  9   Urea Nitrogen      6.0 - 20.0 mg/dL 13.7 20.2 (H)  24.7 (H)   Creatinine      0.67 - 1.17 mg/dL 0.75 1.27 (H) 1.20 (H) 1.16   Calcium      8.6 - 10.0 mg/dL 7.4 (L) 8.5 (L)  8.6   Glucose      70 - 99 mg/dL 172 (H) 188 (H)  153 (H)   Alkaline Phosphatase      40 - 129 U/L 100   72   AST      10 - 50 U/L 20   30   ALT      10 - 50 U/L 19   19   Protein Total      6.4 - 8.3 g/dL 6.0 (L)   5.5 (L)   Albumin   "    3.5 - 5.2 g/dL 3.4 (L)   2.8 (L)   Bilirubin Total      <=1.2 mg/dL 0.4   0.4   GFR Estimate      >60 mL/min/1.73m2 >90 67 72 75     Component      Latest Ref Rng & Units 9/27/2022 9/29/2022 9/30/2022 10/11/2022                WBC      4.0 - 11.0 10e3/uL 0.9 (LL) 5.5 4.2 4.0   RBC Count      4.40 - 5.90 10e6/uL 3.44 (L) 3.82 (L) 3.17 (L) 4.09 (L)   Hemoglobin      13.3 - 17.7 g/dL 9.2 (L) 10.1 (L) 8.3 (L) 10.5 (L)   Hematocrit      40.0 - 53.0 % 28.6 (L) 32.0 (L) 26.7 (L) 34.4 (L)   MCV      78 - 100 fL 83 84 84 84   MCH      26.5 - 33.0 pg 26.7 26.4 (L) 26.2 (L) 25.7 (L)   MCHC      31.5 - 36.5 g/dL 32.2 31.6 31.1 (L) 30.5 (L)   RDW      10.0 - 15.0 % 14.6 15.4 (H) 15.5 (H) 15.3 (H)   Platelet Count      150 - 450 10e3/uL 151 171 159 243       ASSESSMENT/PLAN:  1. R BKA 8/5/2022. Subsequent MRSA infection, wound vac. Neutropenic fever necessitated discontinuation of IV abx. He had appt on 10/31/2022 due to new open area lateral aspect of surgical incision, whereas the medical aspect had been the one of concern and treated with vac. Vac then used over all wounds. Follow up on 11/7/2022 per NP Emily Lynn, trial off vac for 2 weeks with new orders for Hibiclens to clean and use topical medihoney 3 x weekly. RTC 11/21/2022. Continue NWB and other cares. Not on abx.  2. HTN. On metoprolol and losartan. Bps acceptable, continue to monitor per protocol.   3. Afib. He is on xarelto. Continue.   4. Diabetes. On metformin and glipizide. Following accuchecks.   5. Anemia. Last hgb at 10.5. He is on iron.       Stable issues:  CAD. Hx of MIs. Hx of stenting and prior CABG. On statin, beta blocker, xarelto.  Mood disorder. Mixed depression and anxiety. On duloxetine, Abilify. Also takes Mirtazapine.   Hx of stroke. Continue statin, xarelto.  Mild intellectual disabilities. He resides in an detention, but has been in the hospital or this TCU since Dec 2021 related to above issues.        Electronically signed by: Heidi QUINONES  MD Rojas           Sincerely,        Heidi Xie MD

## 2022-11-14 NOTE — PROGRESS NOTES
[facility] GERIATRICS  [facility] Record Number:  [id number]  Place of Service where encounter took place: [facility] ([facility]) [[id number]]   CODE STATUS:   CPR/Full code     Chief Complaint/Reason for Visit:  Chief Complaint   Patient presents with     RECHECK     TCU 11/1/2022. 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 Wound vac. Recent hospitalization for neutropenic fever, resolved. Not currently on abx.       HPI:    Cm Lundberg is a 52 year old male with hx of CAD, s/p stents and CABG, HTN, DM, prior CVA, afib/flutter on xarelto, who has been residing in TCU at [facility] since Dec 2021 surgery for chronic long standing right foot ulceration. He underwent incision and drainage of the right foot, excisional debridement of the ulceration of the right foot into the level of the muscle, under MAC with popliteal block anesthesia on 12/17/2021. He completed abx per ID recommendations. Remained NWB with wound vac, had 1/27/2022 incision and drainage with application of theraskin. On 4/4/2022 again incision and drainage with theraskin. Continued following with podiatry, referred to wound and vascular due to non healing diabetic ulcer, right foot. S/p angioplasty RLE and hyperbaric oxygen therapy. MRI with indications of osteomyelitis. 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 Hospitalized 8/23/2022-8/30/2022 due to stump infection, placed on IV Vanco due to MRSA, has continued with NWB and wound vac. Sent in to the hospital from TCU on 9/27/2022 due to fever.    He was worked up, treated in the hospital. Seen by ID. Unfortunately, what is listed as the discharge summary from this hospital stay does not provide the details of his stay, it is a note representing the admission details. It is however, excerpted for information below.      [facility]  Hospitalist Discharge Summary    Date of Admission:  9/27/2022  Date of Discharge:   9/30/2022  Discharging Provider: Efrain Salcedo, DO    Discharge Diagnoses  Sepsis  Neutropenic fever  Hx CVA  Atrial fibrillation  CAD  T2DM with diabetic neuropathy  Hypertension  Anxiety  Heart failure with borderline ejection fraction, unspecified chronicity    Hospital Course  52 year old male with PMH significant for DM-2, diabetic foot ulcer, s/p right BKA, infected BKA stump needing I&D in 08/2022, MRSA bacteremia on IV vancomycin, hypertension, CAD, S/P CABG, stents, afib, CVA, diabetic neuropathy who was sent to out ED for concern regarding sepsis      Sepsis  Neutropenic fever  -- Patient with recent right BKA stump infection with MRSA bacteremia,discharged on IV vanc (supposed to be until 9/23/22, but patient reports he is still on that). Still on wound vac. Noted to have new skin redness around the stump, fever of 101F with HR of 105 at TCU  -- Patient doesnt look overtly septic. Blood cultures taken in ED. Will also take cultures from the existing piccline  -- Started empiric vanc/zosyn. Will get CT of the stump to rule out any abscess or osteomyelitis. Consult vascular surgery team  -- If B/C is positive for MRSA, consider RICHARDSON to rule out IE. TTE during last admission was negative for vegetations  -- WBC of 0.9 and ANC of 0.4 noted. Neutropenic precaution.   -- Consult ID     H/O CVA  -- Cont with statin. Hold plavix     Atrial fibrillation  - Cont with Metoprolol  -- Hold Xarelto in case he needs surgical procedure     CAD  -- S/P CABG and stents  -- Cont with cardiac meds. Hold plavix as he might need surgical procedure     DM-II with diabetic neuropathy  -- Hold metformin. Cont glipizide. Add sliding scale insulin     Hypertension  -- Cont with home meds with hold parameters     Anxiety disorder:  -- Cont with home meds      Today:  He had appt yesterday 10/31/2022 due to new open area lateral aspect of surgical incision, whereas the medical aspect had been the one of concern and treated with vac. Now  the vac will cover all wounds. Continues NWB, next follow up in one week. Not currently on abx. Reports pain is controlled, uncomfortable with vac changes though he receives pain meds ahead of vac change. No fever. He is otherwise doing okay. No med changes to his usual meds. He is on xarelto for afib/flutter and metformin and glipizide for DM. Appetite is good. No abdominal pain, nausea, vomiting, diarrhea or constipation. No urinary sx. Sleeping okay.       PAST MEDICAL HISTORY:  Past Medical History:   Diagnosis Date     Anemia      Anemia, unspecified type 08/19/2016     Anxiety      Anxiety state 07/29/2021    Formatting of this note might be different from the original. Created by Conversion  Replacement Utility updated for latest IMO load     Arthritis      Atrial fibrillation and flutter (H)      Cerebral artery occlusion with cerebral infarction (H)      Cerebral infarction (H)      Chest pain, unspecified type 08/19/2016     Chronic atrial fibrillation (H)      Congestive heart failure (H)      Coronary artery disease     CABG with multiple stents     Coronary artery disease involving native coronary artery without angina pectoris, unspecified whether native or transplanted heart 08/19/2016     Coronary atherosclerosis 07/29/2021    Formatting of this note might be different from the original. Created by Conversion  Replacement Utility updated for latest IMO load     Depression      Diabetic ulcer of right midfoot associated with type 2 diabetes mellitus, limited to breakdown of skin (H) 09/28/2017     Equinovarus acquired deformity, right 04/14/2021     Equinovarus deformity, acquired, right      Essential hypertension 08/19/2016     GERD (gastroesophageal reflux disease)      Heart attack (H)      History of blood transfusion      Hyperlipidemia      Hyperlipidemia, unspecified hyperlipidemia type 08/19/2016     Hypertension      Insomnia, unspecified type 08/19/2016     Ischemic cardiomyopathy      Late  effects of cerebrovascular accident 08/19/2016     Major depressive disorder with single episode, remission status unspecified 08/19/2016     Microalbuminuria 07/29/2021    Formatting of this note might be different from the original. 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 Created by Conversion [facility] Annotation: Feb 11 2010  1:41PM - Leonid Cabral: CANNOT READ OR  WRITE     PVD (peripheral vascular disease) (H)      Stented coronary artery      Type 2 diabetes mellitus (H) 07/29/2021    Formatting of this note might be different from the original. Created by Conversion     Type II or unspecified type diabetes mellitus with ophthalmic manifestations, not stated as uncontrolled(250.50) (H) 07/29/2021    Formatting of this note might be different from the original. Created by Conversion     Weakness 08/19/2016       MEDICATIONS:  Current Outpatient Medications   Medication Sig Dispense Refill     acetaminophen (TYLENOL) 325 MG tablet Take 650 mg by mouth every 6 hours as needed for fever or pain       acetaminophen (TYLENOL) 325 MG tablet Take 650 mg by mouth 2 times daily       ANTI-DANDRUFF 1 % SHAM lotion Apply 1 Application topically twice a week       ARIPiprazole (ABILIFY) 2 MG tablet 2 mg daily        ASPERCREME ORIGINAL 10 % external cream Apply topically 2 times daily as needed for other (back pain)       atorvastatin (LIPITOR) 80 MG tablet Take 80 mg by mouth daily        bisacodyl (DULCOLAX) 10 MG suppository Place 10 mg rectally daily as needed for constipation       cholestyramine (QUESTRAN) 4 g packet Take 1 packet by mouth every other day        clopidogrel (PLAVIX) 75 MG tablet Take 1 tablet (75 mg) by mouth daily Start taking medication  the day after the procedure. 90 tablet 1     DULoxetine (CYMBALTA) 60 MG capsule Take 120 mg by mouth daily       erenumab-aooe (AIMOVIG, 140 MG DOSE,) 70 MG/ML injection Inject 140 mg Subcutaneous every 30 days       ferrous sulfate (FEROSUL) 325 (65 Fe) MG tablet Take 325 mg by mouth daily (with breakfast)        gabapentin (NEURONTIN) 100 MG capsule Take 100 mg by mouth 2 times daily (before meals)       gabapentin (NEURONTIN) 300 MG capsule Take 600 mg by mouth At Bedtime       glipiZIDE (GLUCOTROL) 5 MG tablet Take 2.5 mg by mouth daily before breakfast       HYDROcodone-acetaminophen (NORCO) 5-325 MG tablet Take 1-2 tablets by mouth every 6 hours as needed for moderate to severe pain (Patient taking differently: Take 1 tablet by mouth every 4 hours as needed for moderate to severe pain) 12 tablet 0     loperamide (IMODIUM) 2 MG capsule Take 2 mg by mouth 4 times daily as needed for diarrhea       losartan (COZAAR) 25 MG tablet Take 25 mg by mouth At Bedtime        magnesium chloride 535 (64 Mg) MG TBEC CR tablet Take 1,070 mg by mouth 2 times daily       metFORMIN (GLUCOPHAGE) 500 MG tablet Take 500 mg by mouth daily (with dinner)       metFORMIN (GLUCOPHAGE) 500 MG tablet Take 1,000 mg by mouth daily (with breakfast)       metoprolol succinate ER (TOPROL-XL) 25 MG 24 hr tablet Take 75 mg by mouth daily        mineral oil-hydrophilic petrolatum (AQUAPHOR) external ointment Apply topically 3 times daily as needed for irritation or dry skin Apply to forehead and other areas as needed       mirtazapine (REMERON) 7.5 MG tablet Take 7.5 mg by mouth At Bedtime        Nitroglycerin (NITROSTAT SL) Place 0.4 mg under the tongue every 5 minutes as needed for chest pain        pantoprazole (PROTONIX) 40 MG EC tablet Take 40 mg by mouth daily before breakfast       polyethylene glycol (MIRALAX) 17 GM/Dose powder Take 1 packet by mouth daily as needed for constipation        senna-docusate (SENOKOT-S/PERICOLACE) 8.6-50 MG  "tablet Take 1 tablet by mouth 2 times daily       senna-docusate (SENOKOT-S/PERICOLACE) 8.6-50 MG tablet Take 1 tablet by mouth 2 times daily as needed for constipation In addition to scheduled doses       traZODone (DESYREL) 50 MG tablet Take 50 mg by mouth At Bedtime        vitamin C (ASCORBIC ACID) 500 MG tablet Take 500 mg by mouth daily        XARELTO ANTICOAGULANT 20 MG TABS tablet Take 20 mg by mouth daily (with breakfast)         PHYSICAL EXAM:  General: Patient is alert male, no distress.   Vitals: /79   Pulse 74   Temp 98.5  F (36.9  C)   Resp 18   Ht 1.702 m (5' 7\")   Wt 69.4 kg (153 lb)   SpO2 98%   BMI 23.96 kg/m    HEENT: Head is NCAT. Eyes show no injection or icterus. Nares negative. Oropharynx moist.  Neck: No JVD.  Lungs: Non labored respirations.   Abdomen: Soft.  : Deferred.  Extremities: R BKA.  Musculoskeletal: Mild swelling right stump.    Skin: Wound vac at right stump covering wounds.   Psych: Mood is good.        LABS/DIAGNOSTIC DATA:  Component      Latest Ref Rng & Units 9/27/2022 9/29/2022 9/30/2022 9/30/2022             6:33 AM  6:33 AM   Sodium      136 - 145 mmol/L 133 (L) 135 (L)  136   Potassium      3.4 - 5.3 mmol/L 4.1 4.1  3.9   Chloride      98 - 107 mmol/L 100 99  103   Carbon Dioxide (CO2)      22 - 29 mmol/L 21 (L) 23  24   Anion Gap      7 - 15 mmol/L 12 13  9   Urea Nitrogen      6.0 - 20.0 mg/dL 13.7 20.2 (H)  24.7 (H)   Creatinine      0.67 - 1.17 mg/dL 0.75 1.27 (H) 1.20 (H) 1.16   Calcium      8.6 - 10.0 mg/dL 7.4 (L) 8.5 (L)  8.6   Glucose      70 - 99 mg/dL 172 (H) 188 (H)  153 (H)   Alkaline Phosphatase      40 - 129 U/L 100   72   AST      10 - 50 U/L 20   30   ALT      10 - 50 U/L 19   19   Protein Total      6.4 - 8.3 g/dL 6.0 (L)   5.5 (L)   Albumin      3.5 - 5.2 g/dL 3.4 (L)   2.8 (L)   Bilirubin Total      <=1.2 mg/dL 0.4   0.4   GFR Estimate      >60 mL/min/1.73m2 >90 67 72 75     Component      Latest Ref Rng & Units 9/27/2022 9/29/2022 " 9/30/2022 10/11/2022                WBC      4.0 - 11.0 10e3/uL 0.9 (LL) 5.5 4.2 4.0   RBC Count      4.40 - 5.90 10e6/uL 3.44 (L) 3.82 (L) 3.17 (L) 4.09 (L)   Hemoglobin      13.3 - 17.7 g/dL 9.2 (L) 10.1 (L) 8.3 (L) 10.5 (L)   Hematocrit      40.0 - 53.0 % 28.6 (L) 32.0 (L) 26.7 (L) 34.4 (L)   MCV      78 - 100 fL 83 84 84 84   MCH      26.5 - 33.0 pg 26.7 26.4 (L) 26.2 (L) 25.7 (L)   MCHC      31.5 - 36.5 g/dL 32.2 31.6 31.1 (L) 30.5 (L)   RDW      10.0 - 15.0 % 14.6 15.4 (H) 15.5 (H) 15.3 (H)   Platelet Count      150 - 450 10e3/uL 151 171 159 243       ASSESSMENT/PLAN:  1. R BKA 8/5/2022. Subsequent MRSA infection, wound vac. Neutropenic fever necessitated discontinuation of IV abx. NWB. New wound lateral aspect, office visit yesterday with vascular, cont vac covering all open areas, recheck one week.   2. HTN. On metoprolol and losartan. Bps satisfactory, no changes needed.   3. Afib. Continue xarelto. No bleeding concerns.   4. Diabetes. On metformin and glipizide. Following accuchecks. No changes today.   5. Anemia. Last hgb at 10.5. He is on iron.   6. Neutropenic fever. Hospitalized 9/27/2022-9/30/2022. Had been on IV Vancomycin due to R BKA stump infection with MRSA. Treated with Zosyn for stump cellulitis in the hospital. No further abx.  WBC has since normalized.     Stable issues:  CAD. Hx of MIs. Hx of stenting and prior CABG. On statin, beta blocker, xarelto.  Mood disorder. Mixed depression and anxiety. On duloxetine, Abilify. Also takes Mirtazapine.   Hx of stroke. Continue statin, xarelto.  Mild intellectual disabilities. He resides in an [facility], but has been in the hospital or this TCU since Dec 2021 related to above issues.        Electronically signed by: Heidi Xie MD

## 2022-11-15 NOTE — PROGRESS NOTES
[facility]  [facility] Record Number:  [id number]  Place of Service where encounter took place: [facility] ([facility]) [[id number]]   CODE STATUS:   CPR/Full code     Chief Complaint/Reason for Visit:  Chief Complaint   Patient presents with     RECHECK     TCU 11/10/2022. 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 Wound vac. Recent hospitalization for neutropenic fever, resolved. Not currently on abx.       HPI:    Cm Lundberg is a 52 year old male with hx of CAD, s/p stents and CABG, HTN, DM, prior CVA, afib/flutter on xarelto, who has been residing in TCU at [facility] since Dec 2021 surgery for chronic long standing right foot ulceration. He underwent incision and drainage of the right foot, excisional debridement of the ulceration of the right foot into the level of the muscle, under MAC with popliteal block anesthesia on 12/17/2021. He completed abx per ID recommendations. Remained NWB with wound vac, had 1/27/2022 incision and drainage with application of theraskin. On 4/4/2022 again incision and drainage with theraskin. Continued following with podiatry, referred to wound and vascular due to non healing diabetic ulcer, right foot. S/p angioplasty RLE and hyperbaric oxygen therapy. MRI with indications of osteomyelitis. 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 Hospitalized 8/23/2022-8/30/2022 due to stump infection, placed on IV Vanco due to MRSA, has continued with NWB and wound vac. Sent in to the hospital from TCU on 9/27/2022 due to fever.    He was worked up, treated in the hospital. Seen by ID. Unfortunately, what is listed as the discharge summary from this hospital stay does not provide the details of his stay, it is a note representing the admission details. It is however, excerpted for information below.      [facility]  Hospitalist Discharge Summary    Date of Admission:  9/27/2022  Date of Discharge:   9/30/2022  Discharging Provider: Efrain Salcedo, DO    Discharge Diagnoses  Sepsis  Neutropenic fever  Hx CVA  Atrial fibrillation  CAD  T2DM with diabetic neuropathy  Hypertension  Anxiety  Heart failure with borderline ejection fraction, unspecified chronicity    Hospital Course  52 year old male with PMH significant for DM-2, diabetic foot ulcer, s/p right BKA, infected BKA stump needing I&D in 08/2022, MRSA bacteremia on IV vancomycin, hypertension, CAD, S/P CABG, stents, afib, CVA, diabetic neuropathy who was sent to out ED for concern regarding sepsis      Sepsis  Neutropenic fever  -- Patient with recent right BKA stump infection with MRSA bacteremia,discharged on IV vanc (supposed to be until 9/23/22, but patient reports he is still on that). Still on wound vac. Noted to have new skin redness around the stump, fever of 101F with HR of 105 at TCU  -- Patient doesnt look overtly septic. Blood cultures taken in ED. Will also take cultures from the existing piccline  -- Started empiric vanc/zosyn. Will get CT of the stump to rule out any abscess or osteomyelitis. Consult vascular surgery team  -- If B/C is positive for MRSA, consider RICHARDSON to rule out IE. TTE during last admission was negative for vegetations  -- WBC of 0.9 and ANC of 0.4 noted. Neutropenic precaution.   -- Consult ID     H/O CVA  -- Cont with statin. Hold plavix     Atrial fibrillation  - Cont with Metoprolol  -- Hold Xarelto in case he needs surgical procedure     CAD  -- S/P CABG and stents  -- Cont with cardiac meds. Hold plavix as he might need surgical procedure     DM-II with diabetic neuropathy  -- Hold metformin. Cont glipizide. Add sliding scale insulin     Hypertension  -- Cont with home meds with hold parameters     Anxiety disorder:  -- Cont with home meds      Today:  He had appt on 10/31/2022 due to new open area lateral aspect of surgical incision, whereas the medical aspect had been the one of concern and treated with vac. Vac then  used over all wounds. Follow up on 11/7/2022 per NP Emily Lynn, trial off vac for 2 weeks with new orders for Hibiclens to clean and use topical medihoney 3 x weekly. RTC 11/21/2022. Continue NWB and other cares. Not on abx. Reports pain in foot is less without the vac and changes. However, has had some headaches, hx of chronic headaches and just received monthly Aimovig yesterday. No fever. He is on xarelto for afib/flutter and metformin and glipizide for DM. Appetite is good. No abdominal pain, nausea, vomiting, diarrhea or constipation. No urinary sx. Sleeping okay.       PAST MEDICAL HISTORY:  Past Medical History:   Diagnosis Date     Anemia      Anemia, unspecified type 08/19/2016     Anxiety      Anxiety state 07/29/2021    Formatting of this note might be different from the original. Created by Conversion  Replacement Utility updated for latest IMO load     Arthritis      Atrial fibrillation and flutter (H)      Cerebral artery occlusion with cerebral infarction (H)      Cerebral infarction (H)      Chest pain, unspecified type 08/19/2016     Chronic atrial fibrillation (H)      Congestive heart failure (H)      Coronary artery disease     CABG with multiple stents     Coronary artery disease involving native coronary artery without angina pectoris, unspecified whether native or transplanted heart 08/19/2016     Coronary atherosclerosis 07/29/2021    Formatting of this note might be different from the original. Created by Conversion  Replacement Utility updated for latest IMO load     Depression      Diabetic ulcer of right midfoot associated with type 2 diabetes mellitus, limited to breakdown of skin (H) 09/28/2017     Equinovarus acquired deformity, right 04/14/2021     Equinovarus deformity, acquired, right      Essential hypertension 08/19/2016     GERD (gastroesophageal reflux disease)      Heart attack (H)      History of blood transfusion      Hyperlipidemia      Hyperlipidemia, unspecified  hyperlipidemia type 08/19/2016     Hypertension      Insomnia, unspecified type 08/19/2016     Ischemic cardiomyopathy      Late effects of cerebrovascular accident 08/19/2016     Major depressive disorder with single episode, remission status unspecified 08/19/2016     Microalbuminuria 07/29/2021    Formatting of this note might be different from the original. 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 Created by Conversion [facility] Annotation: Feb 11 2010  1:41PM - Leonid Cabral: CANNOT READ OR  WRITE     PVD (peripheral vascular disease) (H)      Stented coronary artery      Type 2 diabetes mellitus (H) 07/29/2021    Formatting of this note might be different from the original. Created by Conversion     Type II or unspecified type diabetes mellitus with ophthalmic manifestations, not stated as uncontrolled(250.50) (H) 07/29/2021    Formatting of this note might be different from the original. Created by Conversion     Weakness 08/19/2016       MEDICATIONS:  Current Outpatient Medications   Medication Sig Dispense Refill     acetaminophen (TYLENOL) 325 MG tablet Take 650 mg by mouth every 6 hours as needed for fever or pain       acetaminophen (TYLENOL) 325 MG tablet Take 650 mg by mouth 2 times daily       ANTI-DANDRUFF 1 % SHAM lotion Apply 1 Application topically twice a week       ARIPiprazole (ABILIFY) 2 MG tablet 2 mg daily        ASPERCREME ORIGINAL 10 % external cream Apply topically 2 times daily as needed for other (back pain)       atorvastatin (LIPITOR) 80 MG tablet Take 80 mg by mouth daily        bisacodyl (DULCOLAX) 10 MG suppository Place 10 mg rectally daily as needed for constipation       cholestyramine (QUESTRAN) 4 g packet Take 1  packet by mouth every other day        clopidogrel (PLAVIX) 75 MG tablet Take 1 tablet (75 mg) by mouth daily Start taking medication the day after the procedure. 90 tablet 1     DULoxetine (CYMBALTA) 60 MG capsule Take 120 mg by mouth daily       erenumab-aooe (AIMOVIG, 140 MG DOSE,) 70 MG/ML injection Inject 140 mg Subcutaneous every 30 days       ferrous sulfate (FEROSUL) 325 (65 Fe) MG tablet Take 325 mg by mouth daily (with breakfast)        gabapentin (NEURONTIN) 100 MG capsule Take 100 mg by mouth 2 times daily (before meals)       gabapentin (NEURONTIN) 300 MG capsule Take 600 mg by mouth At Bedtime       glipiZIDE (GLUCOTROL) 5 MG tablet Take 2.5 mg by mouth daily before breakfast       HYDROcodone-acetaminophen (NORCO) 5-325 MG tablet Take 1-2 tablets by mouth every 6 hours as needed for moderate to severe pain (Patient taking differently: Take 1 tablet by mouth every 4 hours as needed for moderate to severe pain) 12 tablet 0     loperamide (IMODIUM) 2 MG capsule Take 2 mg by mouth 4 times daily as needed for diarrhea       losartan (COZAAR) 25 MG tablet Take 25 mg by mouth At Bedtime        magnesium chloride 535 (64 Mg) MG TBEC CR tablet Take 1,070 mg by mouth 2 times daily       metFORMIN (GLUCOPHAGE) 500 MG tablet Take 500 mg by mouth daily (with dinner)       metFORMIN (GLUCOPHAGE) 500 MG tablet Take 1,000 mg by mouth daily (with breakfast)       metoprolol succinate ER (TOPROL-XL) 25 MG 24 hr tablet Take 75 mg by mouth daily        mineral oil-hydrophilic petrolatum (AQUAPHOR) external ointment Apply topically 3 times daily as needed for irritation or dry skin Apply to forehead and other areas as needed       mirtazapine (REMERON) 7.5 MG tablet Take 7.5 mg by mouth At Bedtime        Nitroglycerin (NITROSTAT SL) Place 0.4 mg under the tongue every 5 minutes as needed for chest pain        pantoprazole (PROTONIX) 40 MG EC tablet Take 40 mg by mouth daily before breakfast       polyethylene glycol  "(MIRALAX) 17 GM/Dose powder Take 1 packet by mouth daily as needed for constipation        senna-docusate (SENOKOT-S/PERICOLACE) 8.6-50 MG tablet Take 1 tablet by mouth 2 times daily       senna-docusate (SENOKOT-S/PERICOLACE) 8.6-50 MG tablet Take 1 tablet by mouth 2 times daily as needed for constipation In addition to scheduled doses       traZODone (DESYREL) 50 MG tablet Take 50 mg by mouth At Bedtime        vitamin C (ASCORBIC ACID) 500 MG tablet Take 500 mg by mouth daily        XARELTO ANTICOAGULANT 20 MG TABS tablet Take 20 mg by mouth daily (with breakfast)         PHYSICAL EXAM:  General: Patient is alert male, no distress.   Vitals: /74   Pulse 76   Temp 98.5  F (36.9  C)   Resp 16   Ht 1.702 m (5' 7\")   Wt 69.4 kg (153 lb)   SpO2 98%   BMI 23.96 kg/m    HEENT: Head is NCAT. Eyes show no injection or icterus. Nares negative. Oropharynx hydrated.  Neck: No JVD.  Lungs: Non labored respirations.   : Deferred.  Extremities: R BKA.  Musculoskeletal: Mild swelling right stump.    Skin: Bandage right stump.   Psych: Mood is good.        LABS/DIAGNOSTIC DATA:  Component      Latest Ref Rng & Units 9/27/2022 9/29/2022 9/30/2022 9/30/2022             6:33 AM  6:33 AM   Sodium      136 - 145 mmol/L 133 (L) 135 (L)  136   Potassium      3.4 - 5.3 mmol/L 4.1 4.1  3.9   Chloride      98 - 107 mmol/L 100 99  103   Carbon Dioxide (CO2)      22 - 29 mmol/L 21 (L) 23  24   Anion Gap      7 - 15 mmol/L 12 13  9   Urea Nitrogen      6.0 - 20.0 mg/dL 13.7 20.2 (H)  24.7 (H)   Creatinine      0.67 - 1.17 mg/dL 0.75 1.27 (H) 1.20 (H) 1.16   Calcium      8.6 - 10.0 mg/dL 7.4 (L) 8.5 (L)  8.6   Glucose      70 - 99 mg/dL 172 (H) 188 (H)  153 (H)   Alkaline Phosphatase      40 - 129 U/L 100   72   AST      10 - 50 U/L 20   30   ALT      10 - 50 U/L 19   19   Protein Total      6.4 - 8.3 g/dL 6.0 (L)   5.5 (L)   Albumin      3.5 - 5.2 g/dL 3.4 (L)   2.8 (L)   Bilirubin Total      <=1.2 mg/dL 0.4   0.4   GFR " Estimate      >60 mL/min/1.73m2 >90 67 72 75     Component      Latest Ref Rng & Units 9/27/2022 9/29/2022 9/30/2022 10/11/2022                WBC      4.0 - 11.0 10e3/uL 0.9 (LL) 5.5 4.2 4.0   RBC Count      4.40 - 5.90 10e6/uL 3.44 (L) 3.82 (L) 3.17 (L) 4.09 (L)   Hemoglobin      13.3 - 17.7 g/dL 9.2 (L) 10.1 (L) 8.3 (L) 10.5 (L)   Hematocrit      40.0 - 53.0 % 28.6 (L) 32.0 (L) 26.7 (L) 34.4 (L)   MCV      78 - 100 fL 83 84 84 84   MCH      26.5 - 33.0 pg 26.7 26.4 (L) 26.2 (L) 25.7 (L)   MCHC      31.5 - 36.5 g/dL 32.2 31.6 31.1 (L) 30.5 (L)   RDW      10.0 - 15.0 % 14.6 15.4 (H) 15.5 (H) 15.3 (H)   Platelet Count      150 - 450 10e3/uL 151 171 159 243       ASSESSMENT/PLAN:  1. R BKA 8/5/2022. Subsequent MRSA infection, wound vac. Neutropenic fever necessitated discontinuation of IV abx. He had appt on 10/31/2022 due to new open area lateral aspect of surgical incision, whereas the medical aspect had been the one of concern and treated with vac. Vac then used over all wounds. Follow up on 11/7/2022 per ARCENIO Lynn, trial off vac for 2 weeks with new orders for Hibiclens to clean and use topical medihoney 3 x weekly. RTC 11/21/2022. Continue NWB and other cares. Not on abx.  2. HTN. On metoprolol and losartan. Bps acceptable, continue to monitor per protocol.   3. Afib. He is on xarelto. Continue.   4. Diabetes. On metformin and glipizide. Following accuchecks.   5. Anemia. Last hgb at 10.5. He is on iron.       Stable issues:  CAD. Hx of MIs. Hx of stenting and prior CABG. On statin, beta blocker, xarelto.  Mood disorder. Mixed depression and anxiety. On duloxetine, Abilify. Also takes Mirtazapine.   Hx of stroke. Continue statin, xarelto.  Mild intellectual disabilities. He resides in an [facility], but has been in the hospital or this TCU since Dec 2021 related to above issues.        Electronically signed by: Heidi Xie MD

## 2022-11-21 ENCOUNTER — OFFICE VISIT (OUTPATIENT)
Dept: VASCULAR SURGERY | Facility: CLINIC | Age: 52
End: 2022-11-21
Attending: REGISTERED NURSE
Payer: MEDICARE

## 2022-11-21 VITALS — HEART RATE: 90 BPM | TEMPERATURE: 98.5 F | DIASTOLIC BLOOD PRESSURE: 68 MMHG | SYSTOLIC BLOOD PRESSURE: 142 MMHG

## 2022-11-21 DIAGNOSIS — T81.89XD NON-HEALING SURGICAL WOUND, SUBSEQUENT ENCOUNTER: ICD-10-CM

## 2022-11-21 DIAGNOSIS — Z89.511 STATUS POST BELOW-KNEE AMPUTATION OF RIGHT LOWER EXTREMITY (H): Primary | ICD-10-CM

## 2022-11-21 PROCEDURE — 97597 DBRDMT OPN WND 1ST 20 CM/<: CPT | Performed by: REGISTERED NURSE

## 2022-11-21 NOTE — PROGRESS NOTES
Follow up Vascular Visit       Date of Service:11/21/22      Chief Complaint: R stump      Pt returns to [facility] with regards to their R stump.  They arrive today with sister. They are currently using medihoney, abd pad, gauze roll to the wounds. This is being done by facility. They are using tubular compression for compression. They are feeling well today. Denies fevers, chills. No shortness of breath.     Allergies:   Allergies   Allergen Reactions     Aspirin Other (See Comments)     Reacts with migraine medicine     Blood-Group Specific Substance      Anti-Fya present.  Expect delays in blood for transfusion.  Draw 2 lavender and 1 red for all type and screen orders.     Isosorbide Headache     Codeine Rash     Morphine Rash       Medications:   Current Outpatient Medications:      acetaminophen (TYLENOL) 325 MG tablet, Take 650 mg by mouth every 6 hours as needed for fever or pain, Disp: , Rfl:      acetaminophen (TYLENOL) 325 MG tablet, Take 650 mg by mouth 2 times daily, Disp: , Rfl:      ANTI-DANDRUFF 1 % SHAM lotion, Apply 1 Application topically twice a week, Disp: , Rfl:      ARIPiprazole (ABILIFY) 2 MG tablet, 2 mg daily , Disp: , Rfl:      ASPERCREME ORIGINAL 10 % external cream, Apply topically 2 times daily as needed for other (back pain), Disp: , Rfl:      atorvastatin (LIPITOR) 80 MG tablet, Take 80 mg by mouth daily , Disp: , Rfl:      bisacodyl (DULCOLAX) 10 MG suppository, Place 10 mg rectally daily as needed for constipation, Disp: , Rfl:      cholestyramine (QUESTRAN) 4 g packet, Take 1 packet by mouth every other day , Disp: , Rfl:      clopidogrel (PLAVIX) 75 MG tablet, Take 1 tablet (75 mg) by mouth daily Start taking medication the day after the procedure., Disp: 90 tablet, Rfl: 1     DULoxetine (CYMBALTA) 60 MG capsule, Take 120 mg by mouth daily, Disp: , Rfl:      erenumab-aooe (AIMOVIG, 140 MG DOSE,) 70 MG/ML injection, Inject 140 mg Subcutaneous every 30  days, Disp: , Rfl:      ferrous sulfate (FEROSUL) 325 (65 Fe) MG tablet, Take 325 mg by mouth daily (with breakfast) , Disp: , Rfl:      gabapentin (NEURONTIN) 100 MG capsule, Take 100 mg by mouth 2 times daily (before meals), Disp: , Rfl:      gabapentin (NEURONTIN) 300 MG capsule, Take 600 mg by mouth At Bedtime, Disp: , Rfl:      glipiZIDE (GLUCOTROL) 5 MG tablet, Take 2.5 mg by mouth daily before breakfast, Disp: , Rfl:      HYDROcodone-acetaminophen (NORCO) 5-325 MG tablet, Take 1-2 tablets by mouth every 6 hours as needed for moderate to severe pain (Patient taking differently: Take 1 tablet by mouth every 4 hours as needed for moderate to severe pain), Disp: 12 tablet, Rfl: 0     loperamide (IMODIUM) 2 MG capsule, Take 2 mg by mouth 4 times daily as needed for diarrhea, Disp: , Rfl:      losartan (COZAAR) 25 MG tablet, Take 25 mg by mouth At Bedtime , Disp: , Rfl:      magnesium chloride 535 (64 Mg) MG TBEC CR tablet, Take 1,070 mg by mouth 2 times daily, Disp: , Rfl:      metFORMIN (GLUCOPHAGE) 500 MG tablet, Take 500 mg by mouth daily (with dinner), Disp: , Rfl:      metFORMIN (GLUCOPHAGE) 500 MG tablet, Take 1,000 mg by mouth daily (with breakfast), Disp: , Rfl:      metoprolol succinate ER (TOPROL-XL) 25 MG 24 hr tablet, Take 75 mg by mouth daily , Disp: , Rfl:      mineral oil-hydrophilic petrolatum (AQUAPHOR) external ointment, Apply topically 3 times daily as needed for irritation or dry skin Apply to forehead and other areas as needed, Disp: , Rfl:      mirtazapine (REMERON) 7.5 MG tablet, Take 7.5 mg by mouth At Bedtime , Disp: , Rfl:      Nitroglycerin (NITROSTAT SL), Place 0.4 mg under the tongue every 5 minutes as needed for chest pain , Disp: , Rfl:      pantoprazole (PROTONIX) 40 MG EC tablet, Take 40 mg by mouth daily before breakfast, Disp: , Rfl:      polyethylene glycol (MIRALAX) 17 GM/Dose powder, Take 1 packet by mouth daily as needed for constipation , Disp: , Rfl:      senna-docusate  (SENOKOT-S/PERICOLACE) 8.6-50 MG tablet, Take 1 tablet by mouth 2 times daily, Disp: , Rfl:      senna-docusate (SENOKOT-S/PERICOLACE) 8.6-50 MG tablet, Take 1 tablet by mouth 2 times daily as needed for constipation In addition to scheduled doses, Disp: , Rfl:      traZODone (DESYREL) 50 MG tablet, Take 50 mg by mouth At Bedtime , Disp: , Rfl:      vitamin C (ASCORBIC ACID) 500 MG tablet, Take 500 mg by mouth daily , Disp: , Rfl:      XARELTO ANTICOAGULANT 20 MG TABS tablet, Take 20 mg by mouth daily (with breakfast), Disp: , Rfl:     History:   Past Medical History:   Diagnosis Date     Anemia      Anemia, unspecified type 08/19/2016     Anxiety      Anxiety state 07/29/2021    Formatting of this note might be different from the original. Created by Conversion  Replacement Utility updated for latest IMO load     Arthritis      Atrial fibrillation and flutter (H)      Cerebral artery occlusion with cerebral infarction (H)      Cerebral infarction (H)      Chest pain, unspecified type 08/19/2016     Chronic atrial fibrillation (H)      Congestive heart failure (H)      Coronary artery disease     CABG with multiple stents     Coronary artery disease involving native coronary artery without angina pectoris, unspecified whether native or transplanted heart 08/19/2016     Coronary atherosclerosis 07/29/2021    Formatting of this note might be different from the original. Created by Conversion  Replacement Utility updated for latest IMO load     Depression      Diabetic ulcer of right midfoot associated with type 2 diabetes mellitus, limited to breakdown of skin (H) 09/28/2017     Equinovarus acquired deformity, right 04/14/2021     Equinovarus deformity, acquired, right      Essential hypertension 08/19/2016     GERD (gastroesophageal reflux disease)      Heart attack (H)      History of blood transfusion      Hyperlipidemia      Hyperlipidemia, unspecified hyperlipidemia type 08/19/2016     Hypertension      Insomnia,  unspecified type 08/19/2016     Ischemic cardiomyopathy      Late effects of cerebrovascular accident 08/19/2016     Major depressive disorder with single episode, remission status unspecified 08/19/2016     Microalbuminuria 07/29/2021    Formatting of this note might be different from the original. 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 Created by Conversion [facility] Annotation: Feb 11 2010  1:41PM - Leonid Cabral: CANNOT READ OR  WRITE     PVD (peripheral vascular disease) (H)      Stented coronary artery      Type 2 diabetes mellitus (H) 07/29/2021    Formatting of this note might be different from the original. Created by Conversion     Type II or unspecified type diabetes mellitus with ophthalmic manifestations, not stated as uncontrolled(250.50) (H) 07/29/2021    Formatting of this note might be different from the original. Created by Conversion     Weakness 08/19/2016       Physical Exam:    BP (!) 142/68 (BP Location: Left arm, Patient Position: Sitting)   Pulse 90   Temp 98.5  F (36.9  C) (Oral)     General:  Patient presents to clinic in no apparent distress.  Head: normocephalic atraumatic  Psychiatric:  Alert and oriented x3.   Respiratory: unlabored breathing; no cough  Integumentary:  Skin is uniformly warm, dry and pink.    Wound #1 Location: R anterior stump  Size: 0.3L x 0.4W x 0.3depth.  No sinus tract present, Wound base: granulation tissue  No undermining present. Wound is full thickness. There is small drainage. Periwound: no denudement, erythema, induration, maceration or warmth.      Wound #1 Location: R stump  Size: 0.3L x 0.6W x 0.6depth.  No sinus tract present, Wound base: fibrotic tissue  No undermining present. Wound is  full thickness. There is small drainage. Periwound: no denudement, erythema, induration, maceration or warmth.      PICC Double Lumen 08/29/22 Right Basilic (Active)   Number of days: 84       Negative Pressure Wound Therapy Foot Anterior;Right (Active)   Number of days: 336       VASC Wound right lateral foot (Active)   Number of days: 586       VASC Wound left hallux  (Active)   Number of days: 509       VASC Wound Right stump (Active)   Pre Size Length 0.7 11/07/22 1300   Pre Size Width 2.4 11/07/22 1300   Pre Size Depth 0.2 11/07/22 1300   Pre Total Sq cm 1.68 11/07/22 1300   Post Size Length 0.3 11/21/22 1400   Post Size Width 0.6 11/21/22 1400   Post Size Depth 0.6 11/21/22 1400   Post Total Sq cm 0.18 11/21/22 1400   Number of days: 74       VASC Wound R anterior stump (Active)   Pre Size Length 0.6 11/07/22 1300   Pre Size Width 1.6 11/07/22 1300   Pre Size Depth 0.2 11/07/22 1300   Pre Total Sq cm 0.96 11/07/22 1300   Post Size Length 0.3 11/21/22 1400   Post Size Width 0.4 11/21/22 1400   Post Size Depth 0.3 11/21/22 1400   Post Total Sq cm 0.12 11/21/22 1400   Undermined approx 0.5cm at 11-2 and 4-5 o'clock 10/31/22 1300   Description secondary wound laterally that likely communicates 10/31/22 1300   Number of days: 21       Incision/Surgical Site 12/17/21 Right Foot (Active)   Number of days: 339       Incision/Surgical Site 06/29/22 Anterior;Left Greater Trochanter (Active)   Number of days: 145       Incision/Surgical Site 08/24/22 Right Leg (Active)   Number of days: 89            Circumferential volume measures:      No flowsheet data found.    Labs:    I personally reviewed the following lab results today and those on care everywhere    CRP Inflammation   Date Value Ref Range Status   09/26/2022 46.80 (H) <5.00 mg/L Final     CRP   Date Value Ref Range Status   08/24/2022 18.9 (H) 0.0 - <0.8 mg/dL Final      Erythrocyte Sedimentation Rate   Date Value Ref Range Status   09/26/2022 32 (H) 0 - 20  mm/hr Final      Last Renal Panel:  Sodium   Date Value Ref Range Status   10/11/2022 138 136 - 145 mmol/L Final   08/25/2016 139 mmol/L Final     Potassium   Date Value Ref Range Status   10/11/2022 4.6 3.4 - 5.3 mmol/L Final   08/30/2022 4.4 3.5 - 5.0 mmol/L Final   08/25/2016 4.3 mmol/L Final     Chloride   Date Value Ref Range Status   10/11/2022 105 98 - 107 mmol/L Final   08/29/2022 109 (H) 98 - 107 mmol/L Final   08/25/2016 107 mmol/L Final     Carbon Dioxide (CO2)   Date Value Ref Range Status   10/11/2022 23 22 - 29 mmol/L Final   08/29/2022 25 22 - 31 mmol/L Final     Anion Gap   Date Value Ref Range Status   10/11/2022 10 7 - 15 mmol/L Final   08/29/2022 6 5 - 18 mmol/L Final   08/25/2016 8 mmol/L Final     Glucose   Date Value Ref Range Status   10/11/2022 129 (H) 70 - 99 mg/dL Final   08/29/2022 94 70 - 125 mg/dL Final   08/25/2016 84 70 - 99 mg/dL Final     GLUCOSE BY METER POCT   Date Value Ref Range Status   09/30/2022 104 (H) 70 - 99 mg/dL Final     Urea Nitrogen   Date Value Ref Range Status   10/11/2022 20.3 (H) 6.0 - 20.0 mg/dL Final   08/29/2022 12 8 - 22 mg/dL Final   08/25/2016 21 mg/dL Final     Creatinine   Date Value Ref Range Status   10/11/2022 0.88 0.67 - 1.17 mg/dL Final   08/25/2016 0.85 mg/dL Final     GFR Estimate   Date Value Ref Range Status   10/11/2022 >90 >60 mL/min/1.73m2 Final     Comment:     Effective December 21, 2021 eGFRcr in adults is calculated using the 2021 CKD-EPI creatinine equation which includes age and gender (Stephen perry al., NEJM, DOI: 10.1056/MMTOyb5799372)   04/29/2019 >60 >60 mL/min/1.73m2 Final     Calcium   Date Value Ref Range Status   10/11/2022 9.6 8.6 - 10.0 mg/dL Final   08/25/2016 9.9 mg/dL Final     Phosphorus   Date Value Ref Range Status   08/07/2022 2.9 2.5 - 4.5 mg/dL Final     Albumin   Date Value Ref Range Status   10/11/2022 3.7 3.5 - 5.2 g/dL Final   08/24/2022 2.8 (L) 3.5 - 5.0 g/dL Final      Lab Results   Component Value Date    WBC 4.0  10/11/2022    WBC 5.0 08/25/2016     Lab Results   Component Value Date    RBC 4.09 10/11/2022    RBC 3.49 08/25/2016     Lab Results   Component Value Date    HGB 10.5 10/11/2022    HGB 10.1 08/25/2016     Lab Results   Component Value Date    HCT 34.4 10/11/2022    HCT 31.4 08/25/2016     No components found for: MCT  Lab Results   Component Value Date    MCV 84 10/11/2022    MCV 90 08/25/2016     Lab Results   Component Value Date    MCH 25.7 10/11/2022    MCH 28.7 08/25/2016     Lab Results   Component Value Date    MCHC 30.5 10/11/2022    MCHC 31.8 08/25/2016     Lab Results   Component Value Date    RDW 15.3 10/11/2022    RDW 13.6 08/25/2016     Lab Results   Component Value Date     10/11/2022     08/25/2016      Lab Results   Component Value Date    A1C 5.9 08/26/2022    A1C 6.3 08/23/2022    A1C 5.8 05/16/2022    A1C 5.1 12/21/2021    A1C 5.9 11/18/2019    A1C 5.3 08/25/2016      No results found for: TSH   No results found for: VITDT                Impression:  Encounter Diagnoses   Name Primary?     Status post below-knee amputation of right lower extremity (H) Yes     Non-healing surgical wound, subsequent encounter         11/21/22 R stump           Are any of these wounds new today: No; Location: na    Assessment/Plan:          1. Debridement: After discussion of risk factors and verbal consent was obtained 2% Lidocaine HCL jelly was applied, under clean conditions, the R stump ulceration(s) were debrided using currette. Devitalized and nonviable tissue, along with any fibrin and slough, was removed to improve granulation tissue formation, stimulate wound healing, decrease overall bacteria load, disrupt biofilm formation and decrease edge senescence.  Total excisional debridement was 0.3 sq cm from the epidermis/dermis area with a depth of 0.6 cm.   Ulcers were improved afterwards and .  Measures were as noted on the flow sheet.       2.  Wound treatment: wound treatment will  include irrigation and dressings to promote autolytic debridement which will include: Cleanse with dilute hibiclens 30cc in 500cc NS. Apply endoform to wound bed, cover with gauze, gauze roll to be changed twice weekly. If for some reason the patient is not able to get their dressing(s) changed as outlined above (due to illness, lack of supplies, lack of help) please do the following: remove old, soiled dressings; wash the wounds with saline; pat dry; apply ABD pad or other absorbant pad and secure with rolled gauze; avoid tape directly on your skin; patient instructed to call the clinic as soon as possible to let us know what the current issues are in receiving wound care. Improved Pt to move back to his facility. Home health nursing ordered to continue wound care at his facility. Once healed will plan to order PT and OT to help out with weakness.           3. Edema: Continue tubular compression. The compression wraps were applied today in clinic.     If a 2 layer or 4 layer compression wrap is being used; these are safe to have on for ONLY 7 days. If for some reason the patient is not able to get the wrap(s) changed (due to illness; lack of supplies, lack of help, lack of transportation) please do the following: unwrap the old 2 or 4 layer compression wrap; avoid using scissors as you could cut your skin and cause wounds; use tubular compression when available. Call to reschedule your home care or clinic visit appointment as soon as possible.  Stable            4. Nutrition: Focus on protein           5. Offloading: Keep pressure off wound. Do not use stump protector at this time.     Patient will follow up with me in 2 weeks for reevaluation. They were instructed to call the clinic sooner with any signs or symptoms of infection or any further questions/concerns. Answered all questions.      20 minutes spent on the date of the encounter doing chart review, history and exam, documentation and further activities per  the note    Emily Lynn MS, APRN, AGNP-C, CWCN  [facility]   [phone]        This note was electronically signed by SHABNAM Brooks CNP

## 2022-11-21 NOTE — PATIENT INSTRUCTIONS
"Wound care supplies were not ordered or needed today.        Wound Care Instructions    2 TIMES PER WEEK and as needed, Cleanse your right stump wound(s) with Dilute hibiclens 30cc in 500cc NS.    Pat Dry with non-sterile gauze    Apply Lotion to the intact skin surrounding your wound and other dry skin locations. Some good lotions include: Remedy Skin Repair Cream, Sarna, Vanicream or Cetaphil    Primary Dressing: Apply endoform into/onto the wounds    Secondary dressing: Cover with gauze    Secure with non-sterile roll gauze (4\" x 75\" roll) and tape (1\" roll tape) as needed; avoid adhesive directly on the skin    - Dressing Application of  Endoform    1. Endoform should be cut to the size of the wound.  It should touch the edges of the ulcer. It is okay if it overlap the edges a small amount.  Then, moisten the Endoform with saline.(If it is easier for you, you can moisten it before laying it in the wound)    2. Cover the wound with Endoform, followed by dry gauze, and secure with roll gauze if needed.     3. Endoform is naturally used by the body over time so you may not find it in the wound when you change your dressing.  If you do find some, gently cleanse the wound with saline. Do not remove the remaining Endoform, which may appear as an off-white to jo gel, just add Endoform on top.  Usually, more Endoform will need to be added every 5-7 days.     4. Change your top dressing every 1-2 days or as needed depending on drainage.    -Endoform is a collagen dressing created from ovine (sheep) fore-stomach tissue. The collagen extracellular matrix transforms into a soft conforming sheet, which is naturally incorporated into the wound over time.  Collagen dressings are used to stimulate wound healing.   ,       Compression: tubular compression    It is not ok to get your wound wet in the bath or shower    It is recommended that you do not get your ulcer wet when showering.  Listed below are several ways of " keeping it dry when you shower.     1. Wrap it with Press and Seal plastic wrap.  It can be found in the stores where the plastic wraps or tin foil is kept.               2.  Some people take a bath and hang their leg/foot out of the tub.                        3  Put your leg in a plastic bag and tape it on.           4. You can purchase a shower cover for casts at some pharmacies and through the Internet.            5. Take a Bed Bath or wash up at the sink            If for some reason you are not able to get your dressing(s) changed as outlined above (due to illness, lack of supplies, lack of help) please do the following: remove old, soiled dressings; wash the wounds with saline; pat dry; apply ABD pad or other absorbant pad and secure with rolled gauze; avoid tape directly on your skin; Call the clinic as soon as possible to let us know what the current issues are in receiving wound care [phone].      SEEK MEDICAL CARE IF:  You have an increase in swelling, pain, or redness around the wound.  You have an increase in the amount of pus coming from the wound.  There is a bad smell coming from the wound.  The wound appears to be worsening/enlarging  You have a fever greater than 101.5 F      It is ok to continue current wound care treatment/products for the next 2-3 days until new wound care supplies are ordered and arrive. If longer than this please contact our office at [phone].    If you have a 2 layer or 4 layer compression wrap on these are safe to have on for ONLY 7 days. If for some reason you are not able to get the wrap(s) changed (due to illness; lack of supplies, lack of help, lack of transportation) please do the following: unwrap the old 2 or 4 layer compression wrap; avoid using scissors as you could cut your skin and cause wounds; use tubular compression when available. Call to reschedule your home care or clinic visit appointment as soon as possible.    Please NOTE: if you are 15 minutes  late to your clinic appointment you will have to be rescheduled. Please call our clinic as soon as possible if you know you will not be able to get to your appointment at [phone].    If you fail to show up to 3 scheduled clinic appointments you will be dismissed from our clinic.              We want to hear from you!  In the next few weeks, you should receive a call or email to complete a survey about your visit at [facility]. Please help us improve your appointment experience by letting us know how we did today. We strive to make your experience good and value any ways in which we could do better.      We value your input and suggestions.    Thank you for choosing the [facility]!

## 2022-11-22 ENCOUNTER — TELEPHONE (OUTPATIENT)
Dept: VASCULAR SURGERY | Facility: CLINIC | Age: 52
End: 2022-11-22

## 2022-11-22 ENCOUNTER — TRANSITIONAL CARE UNIT VISIT (OUTPATIENT)
Dept: GERIATRICS | Facility: CLINIC | Age: 52
End: 2022-11-22
Payer: MEDICARE

## 2022-11-22 DIAGNOSIS — E11.69 TYPE 2 DIABETES MELLITUS WITH OTHER SPECIFIED COMPLICATION, WITHOUT LONG-TERM CURRENT USE OF INSULIN (H): ICD-10-CM

## 2022-11-22 DIAGNOSIS — Z89.511 STATUS POST BELOW-KNEE AMPUTATION OF RIGHT LOWER EXTREMITY (H): Primary | ICD-10-CM

## 2022-11-22 DIAGNOSIS — I48.0 PAROXYSMAL ATRIAL FIBRILLATION (H): ICD-10-CM

## 2022-11-22 DIAGNOSIS — I10 ESSENTIAL HYPERTENSION: ICD-10-CM

## 2022-11-22 PROCEDURE — 99309 SBSQ NF CARE MODERATE MDM 30: CPT | Performed by: FAMILY MEDICINE

## 2022-11-22 NOTE — LETTER
11/22/2022        RE: Cm Lundberg  [address]  [place] [address]          [facility]  [place] Medical Record Number:  [id number]  Place of Service where encounter took place: [facility] ([facility]) [[id number]]   CODE STATUS:   CPR/Full code     Chief Complaint/Reason for Visit:  Chief Complaint   Patient presents with     RECHECK     TCU 11/22/2022. R BKA 8/5/2022, subsequent infection.       HPI:    Cm Lundberg is a 52 year old male with hx of CAD, s/p stents and CABG, HTN, DM, prior CVA, afib/flutter on xarelto, who has been residing in TCU at [facility] since Dec 2021 surgery for chronic long standing right foot ulceration. He underwent incision and drainage of the right foot, excisional debridement of the ulceration of the right foot into the level of the muscle, under MAC with popliteal block anesthesia on 12/17/2021. He completed abx per ID recommendations. Remained NWB with wound vac, had 1/27/2022 incision and drainage with application of theraskin. On 4/4/2022 again incision and drainage with theraskin. Continued following with podiatry, referred to wound and vascular due to non healing diabetic ulcer, right foot. S/p angioplasty RLE and hyperbaric oxygen therapy. MRI with indications of osteomyelitis. 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 Hospitalized 8/23/2022-8/30/2022 due to stump infection, placed on IV Vanco due to MRSA, has continued with NWB and wound vac. Sent in to the hospital from TCU on 9/27/2022 due to fever felt to be neutropenic fever. Abx discontinued. Returned to TCU on 9/30/2022.      Today:  Has remained NWB, no use of stump protector due to wound. No current abx. Has been following with podiatry then wound and vascular NP. Visit 10/31/2022 due to new open area lateral aspect of surgical incision, whereas the medical aspect had been the one of concern and treated with vac. Vac then used over all wounds.  Follow up on 11/7/2022 per NP Emily Lynn, trial off vac for 2 weeks with new orders for Hibiclens to clean and use topical medihoney 3 x weekly. Latest visit on 11/21/2022 yesterday with no need to restart wound vac. Orders for Endoform 3 x weekly and RTC 12/5/2022. Continue NWB. Reports pain in foot is less without the vac and changes. No fever. He is on xarelto for afib/flutter and metformin and glipizide for DM. Appetite is good. No abdominal pain, nausea, vomiting, diarrhea or constipation. No urinary sx. Overall doing well and with no longer needing the wound vac, he is excited to pursue discharge back to his [facility].        PAST MEDICAL HISTORY:  Past Medical History:   Diagnosis Date     Anemia      Anemia, unspecified type 08/19/2016     Anxiety      Anxiety state 07/29/2021    Formatting of this note might be different from the original. Created by Conversion  Replacement Utility updated for latest IMO load     Arthritis      Atrial fibrillation and flutter (H)      Cerebral artery occlusion with cerebral infarction (H)      Cerebral infarction (H)      Chest pain, unspecified type 08/19/2016     Chronic atrial fibrillation (H)      Congestive heart failure (H)      Coronary artery disease     CABG with multiple stents     Coronary artery disease involving native coronary artery without angina pectoris, unspecified whether native or transplanted heart 08/19/2016     Coronary atherosclerosis 07/29/2021    Formatting of this note might be different from the original. Created by Conversion  Replacement Utility updated for latest IMO load     Depression      Diabetic ulcer of right midfoot associated with type 2 diabetes mellitus, limited to breakdown of skin (H) 09/28/2017     Equinovarus acquired deformity, right 04/14/2021     Equinovarus deformity, acquired, right      Essential hypertension 08/19/2016     GERD (gastroesophageal reflux disease)      Heart attack (H)      History of blood transfusion       Hyperlipidemia      Hyperlipidemia, unspecified hyperlipidemia type 08/19/2016     Hypertension      Insomnia, unspecified type 08/19/2016     Ischemic cardiomyopathy      Late effects of cerebrovascular accident 08/19/2016     Major depressive disorder with single episode, remission status unspecified 08/19/2016     Microalbuminuria 07/29/2021    Formatting of this note might be different from the original. 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 Created by Conversion [facility] Annotation: Feb 11 2010  1:41PM - Leonid Cabral: CANNOT READ OR  WRITE     PVD (peripheral vascular disease) (H)      Stented coronary artery      Type 2 diabetes mellitus (H) 07/29/2021    Formatting of this note might be different from the original. Created by Conversion     Type II or unspecified type diabetes mellitus with ophthalmic manifestations, not stated as uncontrolled(250.50) (H) 07/29/2021    Formatting of this note might be different from the original. Created by Conversion     Weakness 08/19/2016       MEDICATIONS:  Current Outpatient Medications   Medication Sig Dispense Refill     acetaminophen (TYLENOL) 325 MG tablet Take 650 mg by mouth every 6 hours as needed for fever or pain       acetaminophen (TYLENOL) 325 MG tablet Take 650 mg by mouth 2 times daily       ANTI-DANDRUFF 1 % SHAM lotion Apply 1 Application topically twice a week       ARIPiprazole (ABILIFY) 2 MG tablet 2 mg daily        ASPERCREME ORIGINAL 10 % external cream Apply topically 2 times daily as needed for other (back pain)       atorvastatin (LIPITOR) 80 MG tablet Take 80 mg by mouth daily        bisacodyl (DULCOLAX) 10 MG suppository Place 10 mg rectally daily as needed for constipation        cholestyramine (QUESTRAN) 4 g packet Take 1 packet by mouth every other day        clopidogrel (PLAVIX) 75 MG tablet Take 1 tablet (75 mg) by mouth daily Start taking medication the day after the procedure. 90 tablet 1     DULoxetine (CYMBALTA) 60 MG capsule Take 120 mg by mouth daily       erenumab-aooe (AIMOVIG, 140 MG DOSE,) 70 MG/ML injection Inject 140 mg Subcutaneous every 30 days       ferrous sulfate (FEROSUL) 325 (65 Fe) MG tablet Take 325 mg by mouth daily (with breakfast)        gabapentin (NEURONTIN) 100 MG capsule Take 100 mg by mouth 2 times daily (before meals)       gabapentin (NEURONTIN) 300 MG capsule Take 600 mg by mouth At Bedtime       glipiZIDE (GLUCOTROL) 5 MG tablet Take 2.5 mg by mouth daily before breakfast       HYDROcodone-acetaminophen (NORCO) 5-325 MG tablet Take 1-2 tablets by mouth every 6 hours as needed for moderate to severe pain (Patient taking differently: Take 1 tablet by mouth every 4 hours as needed for moderate to severe pain) 12 tablet 0     loperamide (IMODIUM) 2 MG capsule Take 2 mg by mouth 4 times daily as needed for diarrhea       losartan (COZAAR) 25 MG tablet Take 25 mg by mouth At Bedtime        magnesium chloride 535 (64 Mg) MG TBEC CR tablet Take 1,070 mg by mouth 2 times daily       metFORMIN (GLUCOPHAGE) 500 MG tablet Take 500 mg by mouth daily (with dinner)       metFORMIN (GLUCOPHAGE) 500 MG tablet Take 1,000 mg by mouth daily (with breakfast)       metoprolol succinate ER (TOPROL-XL) 25 MG 24 hr tablet Take 75 mg by mouth daily        mineral oil-hydrophilic petrolatum (AQUAPHOR) external ointment Apply topically 3 times daily as needed for irritation or dry skin Apply to forehead and other areas as needed       mirtazapine (REMERON) 7.5 MG tablet Take 7.5 mg by mouth At Bedtime        Nitroglycerin (NITROSTAT SL) Place 0.4 mg under the tongue every 5 minutes as needed for chest pain        pantoprazole (PROTONIX) 40 MG EC tablet Take 40 mg by mouth daily  before breakfast       polyethylene glycol (MIRALAX) 17 GM/Dose powder Take 1 packet by mouth daily as needed for constipation        senna-docusate (SENOKOT-S/PERICOLACE) 8.6-50 MG tablet Take 1 tablet by mouth 2 times daily       senna-docusate (SENOKOT-S/PERICOLACE) 8.6-50 MG tablet Take 1 tablet by mouth 2 times daily as needed for constipation In addition to scheduled doses       traZODone (DESYREL) 50 MG tablet Take 50 mg by mouth At Bedtime        vitamin C (ASCORBIC ACID) 500 MG tablet Take 500 mg by mouth daily        XARELTO ANTICOAGULANT 20 MG TABS tablet Take 20 mg by mouth daily (with breakfast)         PHYSICAL EXAM:  General: Patient is alert male, no distress.   HEENT: Head is NCAT. Eyes show no injection or icterus. Nares negative. Oropharynx moist.  Neck: No JVD.  Lungs: Non labored respirations.    : Deferred.  Extremities: R BKA.  Musculoskeletal: R stump with healing wounds along surgical incision, improved from prior.  Skin: Right stump wounds.   Psych: Mood is good.        LABS/DIAGNOSTIC DATA:  Component      Latest Ref Rng & Units 9/27/2022 9/29/2022 9/30/2022 9/30/2022             6:33 AM  6:33 AM   Sodium      136 - 145 mmol/L 133 (L) 135 (L)  136   Potassium      3.4 - 5.3 mmol/L 4.1 4.1  3.9   Chloride      98 - 107 mmol/L 100 99  103   Carbon Dioxide (CO2)      22 - 29 mmol/L 21 (L) 23  24   Anion Gap      7 - 15 mmol/L 12 13  9   Urea Nitrogen      6.0 - 20.0 mg/dL 13.7 20.2 (H)  24.7 (H)   Creatinine      0.67 - 1.17 mg/dL 0.75 1.27 (H) 1.20 (H) 1.16   Calcium      8.6 - 10.0 mg/dL 7.4 (L) 8.5 (L)  8.6   Glucose      70 - 99 mg/dL 172 (H) 188 (H)  153 (H)   Alkaline Phosphatase      40 - 129 U/L 100   72   AST      10 - 50 U/L 20   30   ALT      10 - 50 U/L 19   19   Protein Total      6.4 - 8.3 g/dL 6.0 (L)   5.5 (L)   Albumin      3.5 - 5.2 g/dL 3.4 (L)   2.8 (L)   Bilirubin Total      <=1.2 mg/dL 0.4   0.4   GFR Estimate      >60 mL/min/1.73m2 >90 67 72 75     Component       Latest Ref Rng & Units 9/27/2022 9/29/2022 9/30/2022 10/11/2022                WBC      4.0 - 11.0 10e3/uL 0.9 (LL) 5.5 4.2 4.0   RBC Count      4.40 - 5.90 10e6/uL 3.44 (L) 3.82 (L) 3.17 (L) 4.09 (L)   Hemoglobin      13.3 - 17.7 g/dL 9.2 (L) 10.1 (L) 8.3 (L) 10.5 (L)   Hematocrit      40.0 - 53.0 % 28.6 (L) 32.0 (L) 26.7 (L) 34.4 (L)   MCV      78 - 100 fL 83 84 84 84   MCH      26.5 - 33.0 pg 26.7 26.4 (L) 26.2 (L) 25.7 (L)   MCHC      31.5 - 36.5 g/dL 32.2 31.6 31.1 (L) 30.5 (L)   RDW      10.0 - 15.0 % 14.6 15.4 (H) 15.5 (H) 15.3 (H)   Platelet Count      150 - 450 10e3/uL 151 171 159 243       ASSESSMENT/PLAN:  1. R BKA 8/5/2022. Subsequent MRSA infection, wound vac. Neutropenic fever necessitated discontinuation of IV abx. He had appt on 10/31/2022 due to new open area lateral aspect of surgical incision, whereas the medical aspect had been the one of concern and treated with vac. Vac then used over all wounds. Follow up on 11/7/2022 per ARCENIO Lynn, trial off vac for 2 weeks with new orders for Hibiclens to clean and use topical medihoney 3 x weekly. RTC 11/21/2022 yesterday, no need to restart vac, now using Endoform 3 x weekly. Cont NWB. Likely pursue discharge back to [facility] when able, use of the wound vac had been a barrier to discharge.   2. HTN. On metoprolol and losartan. Overall Bps satisfactory.   3. Afib. On xarelto. Continue.   4. Diabetes. On metformin and glipizide. Accuchecks are followed. No med changes.   5. Anemia. Last hgb at 10.5 on 10/11/2022. Continue on iron.         Electronically signed by: Heidi Xie MD           Sincerely,        Heidi Xie MD

## 2022-11-22 NOTE — TELEPHONE ENCOUNTER
Lauren Durán is calling wondering if there is an alternative for endoform? She states they are unable to get this from there .  She can be reached at [phone]

## 2022-11-22 NOTE — TELEPHONE ENCOUNTER
Left message for Lauren- Prism or Promogram are the alternatives per Emily. Told to call back if can't get either product.

## 2022-11-28 NOTE — PROGRESS NOTES
[facility] GERIATRICS  [place] Medical Record Number:  [id number]  Place of Service where encounter took place: [facility] ([facility]) [[id number]]   CODE STATUS:   CPR/Full code     Chief Complaint/Reason for Visit:  Chief Complaint   Patient presents with     RECHECK     TCU 11/22/2022. R BKA 8/5/2022, subsequent infection.       HPI:    Cm Lundberg is a 52 year old male with hx of CAD, s/p stents and CABG, HTN, DM, prior CVA, afib/flutter on xarelto, who has been residing in TCU at [facility] since Dec 2021 surgery for chronic long standing right foot ulceration. He underwent incision and drainage of the right foot, excisional debridement of the ulceration of the right foot into the level of the muscle, under MAC with popliteal block anesthesia on 12/17/2021. He completed abx per ID recommendations. Remained NWB with wound vac, had 1/27/2022 incision and drainage with application of theraskin. On 4/4/2022 again incision and drainage with theraskin. Continued following with podiatry, referred to wound and vascular due to non healing diabetic ulcer, right foot. S/p angioplasty RLE and hyperbaric oxygen therapy. MRI with indications of osteomyelitis. 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 Hospitalized 8/23/2022-8/30/2022 due to stump infection, placed on IV Vanco due to MRSA, has continued with NWB and wound vac. Sent in to the hospital from TCU on 9/27/2022 due to fever felt to be neutropenic fever. Abx discontinued. Returned to TCU on 9/30/2022.      Today:  Has remained NWB, no use of stump protector due to wound. No current abx. Has been following with podiatry then wound and vascular NP. Visit 10/31/2022 due to new open area lateral aspect of surgical incision, whereas the medical aspect had been the one of concern and treated with vac. Vac then used over all wounds. Follow up on 11/7/2022 per NP Emily Lynn, trial off vac for 2 weeks with new orders for Hibiclens to  clean and use topical medihoney 3 x weekly. Latest visit on 11/21/2022 yesterday with no need to restart wound vac. Orders for Endoform 3 x weekly and RTC 12/5/2022. Continue NWB. Reports pain in foot is less without the vac and changes. No fever. He is on xarelto for afib/flutter and metformin and glipizide for DM. Appetite is good. No abdominal pain, nausea, vomiting, diarrhea or constipation. No urinary sx. Overall doing well and with no longer needing the wound vac, he is excited to [facility].        PAST MEDICAL HISTORY:  Past Medical History:   Diagnosis Date     Anemia      Anemia, unspecified type 08/19/2016     Anxiety      Anxiety state 07/29/2021    Formatting of this note might be different from the original. Created by Conversion  Replacement Utility updated for latest IMO load     Arthritis      Atrial fibrillation and flutter (H)      Cerebral artery occlusion with cerebral infarction (H)      Cerebral infarction (H)      Chest pain, unspecified type 08/19/2016     Chronic atrial fibrillation (H)      Congestive heart failure (H)      Coronary artery disease     CABG with multiple stents     Coronary artery disease involving native coronary artery without angina pectoris, unspecified whether native or transplanted heart 08/19/2016     Coronary atherosclerosis 07/29/2021    Formatting of this note might be different from the original. Created by Conversion  Replacement Utility updated for latest IMO load     Depression      Diabetic ulcer of right midfoot associated with type 2 diabetes mellitus, limited to breakdown of skin (H) 09/28/2017     Equinovarus acquired deformity, right 04/14/2021     Equinovarus deformity, acquired, right      Essential hypertension 08/19/2016     GERD (gastroesophageal reflux disease)      Heart attack (H)      History of blood transfusion      Hyperlipidemia      Hyperlipidemia, unspecified hyperlipidemia type 08/19/2016     Hypertension       Insomnia, unspecified type 08/19/2016     Ischemic cardiomyopathy      Late effects of cerebrovascular accident 08/19/2016     Major depressive disorder with single episode, remission status unspecified 08/19/2016     Microalbuminuria 07/29/2021    Formatting of this note might be different from the original. 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 Created by Conversion [facility] Annotation: Feb 11 2010  1:41PM - Leonid Cabral: CANNOT READ OR  WRITE     PVD (peripheral vascular disease) (H)      Stented coronary artery      Type 2 diabetes mellitus (H) 07/29/2021    Formatting of this note might be different from the original. Created by Conversion     Type II or unspecified type diabetes mellitus with ophthalmic manifestations, not stated as uncontrolled(250.50) (H) 07/29/2021    Formatting of this note might be different from the original. Created by Conversion     Weakness 08/19/2016       MEDICATIONS:  Current Outpatient Medications   Medication Sig Dispense Refill     acetaminophen (TYLENOL) 325 MG tablet Take 650 mg by mouth every 6 hours as needed for fever or pain       acetaminophen (TYLENOL) 325 MG tablet Take 650 mg by mouth 2 times daily       ANTI-DANDRUFF 1 % SHAM lotion Apply 1 Application topically twice a week       ARIPiprazole (ABILIFY) 2 MG tablet 2 mg daily        ASPERCREME ORIGINAL 10 % external cream Apply topically 2 times daily as needed for other (back pain)       atorvastatin (LIPITOR) 80 MG tablet Take 80 mg by mouth daily        bisacodyl (DULCOLAX) 10 MG suppository Place 10 mg rectally daily as needed for constipation       cholestyramine (QUESTRAN) 4 g packet Take 1 packet by mouth every other day        clopidogrel  (PLAVIX) 75 MG tablet Take 1 tablet (75 mg) by mouth daily Start taking medication the day after the procedure. 90 tablet 1     DULoxetine (CYMBALTA) 60 MG capsule Take 120 mg by mouth daily       erenumab-aooe (AIMOVIG, 140 MG DOSE,) 70 MG/ML injection Inject 140 mg Subcutaneous every 30 days       ferrous sulfate (FEROSUL) 325 (65 Fe) MG tablet Take 325 mg by mouth daily (with breakfast)        gabapentin (NEURONTIN) 100 MG capsule Take 100 mg by mouth 2 times daily (before meals)       gabapentin (NEURONTIN) 300 MG capsule Take 600 mg by mouth At Bedtime       glipiZIDE (GLUCOTROL) 5 MG tablet Take 2.5 mg by mouth daily before breakfast       HYDROcodone-acetaminophen (NORCO) 5-325 MG tablet Take 1-2 tablets by mouth every 6 hours as needed for moderate to severe pain (Patient taking differently: Take 1 tablet by mouth every 4 hours as needed for moderate to severe pain) 12 tablet 0     loperamide (IMODIUM) 2 MG capsule Take 2 mg by mouth 4 times daily as needed for diarrhea       losartan (COZAAR) 25 MG tablet Take 25 mg by mouth At Bedtime        magnesium chloride 535 (64 Mg) MG TBEC CR tablet Take 1,070 mg by mouth 2 times daily       metFORMIN (GLUCOPHAGE) 500 MG tablet Take 500 mg by mouth daily (with dinner)       metFORMIN (GLUCOPHAGE) 500 MG tablet Take 1,000 mg by mouth daily (with breakfast)       metoprolol succinate ER (TOPROL-XL) 25 MG 24 hr tablet Take 75 mg by mouth daily        mineral oil-hydrophilic petrolatum (AQUAPHOR) external ointment Apply topically 3 times daily as needed for irritation or dry skin Apply to forehead and other areas as needed       mirtazapine (REMERON) 7.5 MG tablet Take 7.5 mg by mouth At Bedtime        Nitroglycerin (NITROSTAT SL) Place 0.4 mg under the tongue every 5 minutes as needed for chest pain        pantoprazole (PROTONIX) 40 MG EC tablet Take 40 mg by mouth daily before breakfast       polyethylene glycol (MIRALAX) 17 GM/Dose powder Take 1 packet by mouth daily  as needed for constipation        senna-docusate (SENOKOT-S/PERICOLACE) 8.6-50 MG tablet Take 1 tablet by mouth 2 times daily       senna-docusate (SENOKOT-S/PERICOLACE) 8.6-50 MG tablet Take 1 tablet by mouth 2 times daily as needed for constipation In addition to scheduled doses       traZODone (DESYREL) 50 MG tablet Take 50 mg by mouth At Bedtime        vitamin C (ASCORBIC ACID) 500 MG tablet Take 500 mg by mouth daily        XARELTO ANTICOAGULANT 20 MG TABS tablet Take 20 mg by mouth daily (with breakfast)         PHYSICAL EXAM:  General: Patient is alert male, no distress.   HEENT: Head is NCAT. Eyes show no injection or icterus. Nares negative. Oropharynx moist.  Neck: No JVD.  Lungs: Non labored respirations.    : Deferred.  Extremities: R BKA.  Musculoskeletal: R stump with healing wounds along surgical incision, improved from prior.  Skin: Right stump wounds.   Psych: Mood is good.        LABS/DIAGNOSTIC DATA:  Component      Latest Ref Rng & Units 9/27/2022 9/29/2022 9/30/2022 9/30/2022             6:33 AM  6:33 AM   Sodium      136 - 145 mmol/L 133 (L) 135 (L)  136   Potassium      3.4 - 5.3 mmol/L 4.1 4.1  3.9   Chloride      98 - 107 mmol/L 100 99  103   Carbon Dioxide (CO2)      22 - 29 mmol/L 21 (L) 23  24   Anion Gap      7 - 15 mmol/L 12 13  9   Urea Nitrogen      6.0 - 20.0 mg/dL 13.7 20.2 (H)  24.7 (H)   Creatinine      0.67 - 1.17 mg/dL 0.75 1.27 (H) 1.20 (H) 1.16   Calcium      8.6 - 10.0 mg/dL 7.4 (L) 8.5 (L)  8.6   Glucose      70 - 99 mg/dL 172 (H) 188 (H)  153 (H)   Alkaline Phosphatase      40 - 129 U/L 100   72   AST      10 - 50 U/L 20   30   ALT      10 - 50 U/L 19   19   Protein Total      6.4 - 8.3 g/dL 6.0 (L)   5.5 (L)   Albumin      3.5 - 5.2 g/dL 3.4 (L)   2.8 (L)   Bilirubin Total      <=1.2 mg/dL 0.4   0.4   GFR Estimate      >60 mL/min/1.73m2 >90 67 72 75     Component      Latest Ref Rng & Units 9/27/2022 9/29/2022 9/30/2022 10/11/2022                WBC      4.0 - 11.0  10e3/uL 0.9 (LL) 5.5 4.2 4.0   RBC Count      4.40 - 5.90 10e6/uL 3.44 (L) 3.82 (L) 3.17 (L) 4.09 (L)   Hemoglobin      13.3 - 17.7 g/dL 9.2 (L) 10.1 (L) 8.3 (L) 10.5 (L)   Hematocrit      40.0 - 53.0 % 28.6 (L) 32.0 (L) 26.7 (L) 34.4 (L)   MCV      78 - 100 fL 83 84 84 84   MCH      26.5 - 33.0 pg 26.7 26.4 (L) 26.2 (L) 25.7 (L)   MCHC      31.5 - 36.5 g/dL 32.2 31.6 31.1 (L) 30.5 (L)   RDW      10.0 - 15.0 % 14.6 15.4 (H) 15.5 (H) 15.3 (H)   Platelet Count      150 - 450 10e3/uL 151 171 159 243       ASSESSMENT/PLAN:  1. R BKA 8/5/2022. Subsequent MRSA infection, wound vac. Neutropenic fever necessitated discontinuation of IV abx. He had appt on 10/31/2022 due to new open area lateral aspect of surgical incision, whereas the medical aspect had been the one of concern and treated with vac. Vac then used over all wounds. Follow up on 11/7/2022 per NP Emily Lynn, trial off vac for 2 weeks with new orders for Hibiclens to clean and use topical medihoney 3 x weekly. RTC 11/21/2022 yesterday, no need to restart vac, now using Endoform 3 x weekly. Cont NWB. Likely pursue discharge back to [facility] when able, use of the wound vac had been a barrier to discharge.   2. HTN. On metoprolol and losartan. Overall Bps satisfactory.   3. Afib. On xarelto. Continue.   4. Diabetes. On metformin and glipizide. Accuchecks are followed. No med changes.   5. Anemia. Last hgb at 10.5 on 10/11/2022. Continue on iron.         Electronically signed by: Heidi Xie MD

## 2022-11-29 ENCOUNTER — DISCHARGE SUMMARY NURSING HOME (OUTPATIENT)
Dept: GERIATRICS | Facility: CLINIC | Age: 52
End: 2022-11-29
Payer: MEDICARE

## 2022-11-29 VITALS
OXYGEN SATURATION: 90 % | DIASTOLIC BLOOD PRESSURE: 82 MMHG | BODY MASS INDEX: 24.01 KG/M2 | HEART RATE: 81 BPM | TEMPERATURE: 98.5 F | RESPIRATION RATE: 18 BRPM | WEIGHT: 153 LBS | HEIGHT: 67 IN | SYSTOLIC BLOOD PRESSURE: 148 MMHG

## 2022-11-29 DIAGNOSIS — E11.69 TYPE 2 DIABETES MELLITUS WITH OTHER SPECIFIED COMPLICATION, WITHOUT LONG-TERM CURRENT USE OF INSULIN (H): ICD-10-CM

## 2022-11-29 DIAGNOSIS — F70 MILD INTELLECTUAL DISABILITIES: ICD-10-CM

## 2022-11-29 DIAGNOSIS — L03.115 CELLULITIS OF RIGHT LOWER EXTREMITY: Primary | ICD-10-CM

## 2022-11-29 DIAGNOSIS — M86.9 OSTEOMYELITIS OF ANKLE AND FOOT (H): ICD-10-CM

## 2022-11-29 PROCEDURE — 99316 NF DSCHRG MGMT 30 MIN+: CPT | Performed by: NURSE PRACTITIONER

## 2022-11-29 NOTE — LETTER
11/29/2022        RE: Cm Lundberg  [address]  [place] [address]        [facility] GERIATRICS DISCHARGE SUMMARY  PATIENT'S NAME: Cm Lundberg  YOB: 1970  MEDICAL RECORD NUMBER:  [id number]  Place of Service where encounter took place:  [facility] ([facility]) [[id number]]    PRIMARY CARE PROVIDER AND CLINIC RESPONSIBLE AFTER TRANSFER:   BRENDAN SANDOVAL MD, [address] / [address] [facility] [address]    Assisted Living: tbd     Transferring providers: Janel Altamirano CNP, Heidi Xie MD  Recent Hospitalization/ED:  [facility] stay 9/27/22 to 9/30/22. Again [facility] 8/23/22 - 8/29/22. 8/5/22 - 8/10/22  Date of SNF Admission: September 30, 2022   Date of [facility] (anticipated) Discharge: 12/6/22  Discharged to: previous assisted living  Cognitive Scores: n/a  Physical Function: Wheelchair dependent  DME: No new DME needed    CODE STATUS/ADVANCE DIRECTIVES DISCUSSION:  Prior   ALLERGIES: Aspirin, Blood-group specific substance, Isosorbide, Codeine, and Morphine    NURSING FACILITY COURSE       Summary of nursing facility stay:   1. Cellulitis of right lower extremity    2. Osteomyelitis of ankle and foot (H)    3. Mild intellectual disabilities    4. Type 2 diabetes mellitus with other specified complication, without long-term current use of insulin (H)      Matias has a history of CAD status post stents and CABG, hypertension, type 2 diabetes and CVA, [facility].  He had chronic longstanding foot ulceration and underwent incision and drainage of the right foot.  He has been nonweightbearing and he continues to have a wound VAC in place.  He has been seen podiatry throughout the past year and has been living in the TCU due to inability to perform ADLs independently while nonweightbearing on the right.  He was hospitalized 8/23-8/30 due to stump infection  and again 9/27-9/30 where he completed vancomycin via PICC line.    Most recently hospitalized from 9/27/22-9/30/22 for sepsis and neutropenic fever. WBC 0.9 and ANC 0.4. WBC improved after vanco was stopped. 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 CT scan on admission showing fluid collection going down to stump.  Seen by vascular surgery in the ER, who do not feel that the stump is infected.    He plans to return to assisted living with and nursing, PT and OT.  He does have a new wheelchair that he will be using as well.  He is very much looking forward to his discharge.       Current Outpatient Medications   Medication Sig Dispense Refill     acetaminophen (TYLENOL) 325 MG tablet Take 650 mg by mouth every 6 hours as needed for fever or pain       acetaminophen (TYLENOL) 325 MG tablet Take 650 mg by mouth 2 times daily       ANTI-DANDRUFF 1 % SHAM lotion Apply 1 Application topically twice a week       ARIPiprazole (ABILIFY) 2 MG tablet 2 mg daily        ASPERCREME ORIGINAL 10 % external cream Apply topically 2 times daily as needed for other (back pain)       atorvastatin (LIPITOR) 80 MG tablet Take 80 mg by mouth daily        bisacodyl (DULCOLAX) 10 MG suppository Place 10 mg rectally daily as needed for constipation       cholestyramine (QUESTRAN) 4 g packet Take 1 packet by mouth every other day        clopidogrel (PLAVIX) 75 MG tablet Take 1 tablet (75 mg) by mouth daily Start taking medication the day after the procedure. 90 tablet 1     DULoxetine (CYMBALTA) 60 MG capsule Take 120 mg by mouth daily       erenumab-aooe (AIMOVIG, 140 MG DOSE,) 70 MG/ML injection Inject 140 mg Subcutaneous every 30 days       ferrous sulfate (FEROSUL) 325 (65 Fe) MG tablet Take 325 mg by mouth daily (with breakfast)        gabapentin (NEURONTIN) 100 MG capsule Take 100 mg by mouth 2 times daily (before meals)       gabapentin (NEURONTIN) 300 MG capsule Take 600 mg by mouth At Bedtime       glipiZIDE (GLUCOTROL) 5 MG tablet Take 2.5  mg by mouth daily before breakfast       HYDROcodone-acetaminophen (NORCO) 5-325 MG tablet Take 1-2 tablets by mouth every 6 hours as needed for moderate to severe pain (Patient taking differently: Take 1 tablet by mouth every 4 hours as needed for moderate to severe pain) 12 tablet 0     loperamide (IMODIUM) 2 MG capsule Take 2 mg by mouth 4 times daily as needed for diarrhea       losartan (COZAAR) 25 MG tablet Take 25 mg by mouth At Bedtime        magnesium chloride 535 (64 Mg) MG TBEC CR tablet Take 1,070 mg by mouth 2 times daily       metFORMIN (GLUCOPHAGE) 500 MG tablet Take 500 mg by mouth daily (with dinner)       metFORMIN (GLUCOPHAGE) 500 MG tablet Take 1,000 mg by mouth daily (with breakfast)       metoprolol succinate ER (TOPROL-XL) 25 MG 24 hr tablet Take 75 mg by mouth daily        mineral oil-hydrophilic petrolatum (AQUAPHOR) external ointment Apply topically 3 times daily as needed for irritation or dry skin Apply to forehead and other areas as needed       mirtazapine (REMERON) 7.5 MG tablet Take 7.5 mg by mouth At Bedtime        Nitroglycerin (NITROSTAT SL) Place 0.4 mg under the tongue every 5 minutes as needed for chest pain        pantoprazole (PROTONIX) 40 MG EC tablet Take 40 mg by mouth daily before breakfast       polyethylene glycol (MIRALAX) 17 GM/Dose powder Take 1 packet by mouth daily as needed for constipation        senna-docusate (SENOKOT-S/PERICOLACE) 8.6-50 MG tablet Take 1 tablet by mouth 2 times daily       senna-docusate (SENOKOT-S/PERICOLACE) 8.6-50 MG tablet Take 1 tablet by mouth 2 times daily as needed for constipation In addition to scheduled doses       traZODone (DESYREL) 50 MG tablet Take 50 mg by mouth At Bedtime        vitamin C (ASCORBIC ACID) 500 MG tablet Take 500 mg by mouth daily        XARELTO ANTICOAGULANT 20 MG TABS tablet Take 20 mg by mouth daily (with breakfast)          Controlled medications:   may discharge with remaining tablets.      Past Medical  History:   Past Medical History:   Diagnosis Date     Anemia      Anemia, unspecified type 08/19/2016     Anxiety      Anxiety state 07/29/2021    Formatting of this note might be different from the original. Created by Conversion  Replacement Utility updated for latest IMO load     Arthritis      Atrial fibrillation and flutter (H)      Cerebral artery occlusion with cerebral infarction (H)      Cerebral infarction (H)      Chest pain, unspecified type 08/19/2016     Chronic atrial fibrillation (H)      Congestive heart failure (H)      Coronary artery disease     CABG with multiple stents     Coronary artery disease involving native coronary artery without angina pectoris, unspecified whether native or transplanted heart 08/19/2016     Coronary atherosclerosis 07/29/2021    Formatting of this note might be different from the original. Created by Conversion  Replacement Utility updated for latest IMO load     Depression      Diabetic ulcer of right midfoot associated with type 2 diabetes mellitus, limited to breakdown of skin (H) 09/28/2017     Equinovarus acquired deformity, right 04/14/2021     Equinovarus deformity, acquired, right      Essential hypertension 08/19/2016     GERD (gastroesophageal reflux disease)      Heart attack (H)      History of blood transfusion      Hyperlipidemia      Hyperlipidemia, unspecified hyperlipidemia type 08/19/2016     Hypertension      Insomnia, unspecified type 08/19/2016     Ischemic cardiomyopathy      Late effects of cerebrovascular accident 08/19/2016     Major depressive disorder with single episode, remission status unspecified 08/19/2016     Microalbuminuria 07/29/2021    Formatting of this note might be different from the original. Created by Conversion     Migraine      Mild intellectual disabilities 08/19/2016     Myalgia      Nausea 08/19/2016     Osteomyelitis (H)      PONV (postoperative nausea and vomiting)     mild nausea     Problems with learning 07/29/2021  "   Formatting of this note might be different from the original. Created by Conversion [facility] Annotation: Feb 11 2010  1:41PM - Leonid Cabral: CANNOT READ OR  WRITE     PVD (peripheral vascular disease) (H)      Stented coronary artery      Type 2 diabetes mellitus (H) 07/29/2021    Formatting of this note might be different from the original. Created by Conversion     Type II or unspecified type diabetes mellitus with ophthalmic manifestations, not stated as uncontrolled(250.50) (H) 07/29/2021    Formatting of this note might be different from the original. Created by Conversion     Weakness 08/19/2016     Physical Exam:   Vitals: BP (!) 148/82   Pulse 81   Temp 98.5  F (36.9  C)   Resp 18   Ht 1.702 m (5' 7\")   Wt 69.4 kg (153 lb)   SpO2 90%   BMI 23.96 kg/m    BMI: Body mass index is 23.96 kg/m .  Physical Exam   General appearance: alert, appears stated age and cooperative.   Head: Normocephalic, without obvious abnormality, atraumatic, Eyes: sclera anicteric.  Lungs: respirations without effort, LSCTA; no wheezing or rales.   Cardiovascular: S1, S2. Regular rate and rhythm.   ABDOMEN: Globular and soft, non tender.    Extremities: extremities normal, atraumatic, no cyanosis or edema  Skin: Skin color, texture, turgor normal. No rashes or lesions  Neurologic:oriented. No focal deficits.   Psych: interacts well with caregivers, exhibits logical thought processes and connections, pleasant    SNF labs:   Most Recent 3 CBC's:  Recent Labs   Lab Test 10/11/22  0512 09/30/22  0633 09/29/22  0346   WBC 4.0 4.2 5.5   HGB 10.5* 8.3* 10.1*   MCV 84 84 84    159 171     Most Recent 3 BMP's:  Recent Labs   Lab Test 10/11/22  0512 09/30/22  1224 09/30/22  0801 09/30/22  0633 09/29/22  0830 09/29/22  0346     --   --  136  --  135*   POTASSIUM 4.6  --   --  3.9  --  4.1   CHLORIDE 105  --   --  103  --  99   CO2 23  --   --  24  --  23   BUN 20.3*  --   --  24.7*  --  20.2*   CR 0.88  --   --  1.16  " 1.20*  --  1.27*   ANIONGAP 10  --   --  9  --  13   TO 9.6  --   --  8.6  --  8.5*   * 104* 139* 153*   < > 188*    < > = values in this interval not displayed.       DISCHARGE PLAN:    Follow up labs: per pcp    Medical Follow Up:      Follow up with primary care provider in 1-2 weeks    [facility] scheduled appointments:  Next 5 appointments (look out 90 days)    Dec 05, 2022  1:20 PM  (Arrive by 1:10 PM)  Return Visit with SHABNAM Brooks CNP  [facility] [place] ([facility] ) [address] [address] [phone]           Discharge Services: Home Care:  Occupational Therapy, Physical Therapy and Registered Nurse    TOTAL DISCHARGE TIME:   Greater than 30 minutes  Electronically signed by:  Janel Altamirano CNP     Documentation of Face to Face and Certification for Home Health Services    I certify that patient: Matias is under my care and that I, or a nurse practitioner or physician's assistant working with me, had a face-to-face encounter that meets the physician face-to-face encounter requirements with this patient on: 11/29/22    This encounter with the patient was in whole, or in part, for the following medical condition, which is the primary reason for home health care: RLE UYEN    I certify that, based on my findings, the following services are medically necessary home health services: Nursing, Occupational Therapy and Physical Therapy.    My clinical findings support the need for the above services because: RN required for medication management, PT and OT required for continued functional improvment in home setting .     Further, I certify that my clinical findings support that this patient is homebound (i.e. absences from home require considerable and taxing effort and are for medical reasons or [employer] services or infrequently or of short duration when for other reasons) because: Requires  assistance of another person or specialized equipment to access medical services because patient: Requires supervision of another for safe transfer...    Based on the above findings. I certify that this patient is confined to the home and needs intermittent skilled nursing care, physical therapy and/or speech therapy.  The patient is under my care, and I have initiated the establishment of the plan of care.  This patient will be followed by a physician who will periodically review the plan of care.                      Sincerely,        Janel Altamirano, CNP

## 2022-11-29 NOTE — PROGRESS NOTES
[facility] GERIATRICS DISCHARGE SUMMARY  PATIENT'S NAME: Cm Lundberg  YOB: 1970  MEDICAL RECORD NUMBER:  [id number]  Place of Service where encounter took place:  [facility] ([facility]) [[id number]]    PRIMARY CARE PROVIDER AND CLINIC RESPONSIBLE AFTER TRANSFER:   BRENDAN SANDOVAL MD, [address] / [facility] [id number]    Assisted Living: tbd     Transferring providers: Janel Altamirano CNP, Heidi Xie MD  Recent Hospitalization/ED:  [facility] stay 9/27/22 to 9/30/22. Again [facility] 8/23/22 - 8/29/22. 8/5/22 - 8/10/22  Date of SNF Admission: September 30, 2022   Date of [facility] (anticipated) Discharge: 12/6/22  Discharged to: previous assisted living  Cognitive Scores: n/a  Physical Function: Wheelchair dependent  DME: No new DME needed    CODE STATUS/ADVANCE DIRECTIVES DISCUSSION:  Prior   ALLERGIES: Aspirin, Blood-group specific substance, Isosorbide, Codeine, and Morphine    NURSING FACILITY COURSE       Summary of nursing facility stay:   1. Cellulitis of right lower extremity    2. Osteomyelitis of ankle and foot (H)    3. Mild intellectual disabilities    4. Type 2 diabetes mellitus with other specified complication, without long-term current use of insulin (H)      Matias has a history of CAD status post stents and CABG, hypertension, type 2 diabetes and CVA, A. fib on Xarelto who arrived to [facility] in December 2021.  He had chronic longstanding foot ulceration and underwent incision and drainage of the right foot.  He has been nonweightbearing and he continues to have a wound VAC in place.  He has been seen podiatry throughout the past year and has been living in the TCU due to inability to perform ADLs independently while nonweightbearing on the right.  He was hospitalized 8/23-8/30 due to stump infection and again 9/27-9/30 where he completed vancomycin via PICC line.    Most recently hospitalized from  9/27/22-9/30/22 for sepsis and neutropenic fever. WBC 0.9 and ANC 0.4. WBC improved after vanco was stopped. 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 CT scan on admission showing fluid collection going down to stump.  Seen by vascular surgery in the ER, who do not feel that the stump is infected.    He plans to return to assisted living with and nursing, PT and OT.  He does have a new wheelchair that he will be using as well.  He is very much looking forward to his discharge.       Current Outpatient Medications   Medication Sig Dispense Refill     acetaminophen (TYLENOL) 325 MG tablet Take 650 mg by mouth every 6 hours as needed for fever or pain       acetaminophen (TYLENOL) 325 MG tablet Take 650 mg by mouth 2 times daily       ANTI-DANDRUFF 1 % SHAM lotion Apply 1 Application topically twice a week       ARIPiprazole (ABILIFY) 2 MG tablet 2 mg daily        ASPERCREME ORIGINAL 10 % external cream Apply topically 2 times daily as needed for other (back pain)       atorvastatin (LIPITOR) 80 MG tablet Take 80 mg by mouth daily        bisacodyl (DULCOLAX) 10 MG suppository Place 10 mg rectally daily as needed for constipation       cholestyramine (QUESTRAN) 4 g packet Take 1 packet by mouth every other day        clopidogrel (PLAVIX) 75 MG tablet Take 1 tablet (75 mg) by mouth daily Start taking medication the day after the procedure. 90 tablet 1     DULoxetine (CYMBALTA) 60 MG capsule Take 120 mg by mouth daily       erenumab-aooe (AIMOVIG, 140 MG DOSE,) 70 MG/ML injection Inject 140 mg Subcutaneous every 30 days       ferrous sulfate (FEROSUL) 325 (65 Fe) MG tablet Take 325 mg by mouth daily (with breakfast)        gabapentin (NEURONTIN) 100 MG capsule Take 100 mg by mouth 2 times daily (before meals)       gabapentin (NEURONTIN) 300 MG capsule Take 600 mg by mouth At Bedtime       glipiZIDE (GLUCOTROL) 5 MG tablet Take 2.5 mg by mouth daily before breakfast       HYDROcodone-acetaminophen (NORCO) 5-325 MG tablet Take 1-2  tablets by mouth every 6 hours as needed for moderate to severe pain (Patient taking differently: Take 1 tablet by mouth every 4 hours as needed for moderate to severe pain) 12 tablet 0     loperamide (IMODIUM) 2 MG capsule Take 2 mg by mouth 4 times daily as needed for diarrhea       losartan (COZAAR) 25 MG tablet Take 25 mg by mouth At Bedtime        magnesium chloride 535 (64 Mg) MG TBEC CR tablet Take 1,070 mg by mouth 2 times daily       metFORMIN (GLUCOPHAGE) 500 MG tablet Take 500 mg by mouth daily (with dinner)       metFORMIN (GLUCOPHAGE) 500 MG tablet Take 1,000 mg by mouth daily (with breakfast)       metoprolol succinate ER (TOPROL-XL) 25 MG 24 hr tablet Take 75 mg by mouth daily        mineral oil-hydrophilic petrolatum (AQUAPHOR) external ointment Apply topically 3 times daily as needed for irritation or dry skin Apply to forehead and other areas as needed       mirtazapine (REMERON) 7.5 MG tablet Take 7.5 mg by mouth At Bedtime        Nitroglycerin (NITROSTAT SL) Place 0.4 mg under the tongue every 5 minutes as needed for chest pain        pantoprazole (PROTONIX) 40 MG EC tablet Take 40 mg by mouth daily before breakfast       polyethylene glycol (MIRALAX) 17 GM/Dose powder Take 1 packet by mouth daily as needed for constipation        senna-docusate (SENOKOT-S/PERICOLACE) 8.6-50 MG tablet Take 1 tablet by mouth 2 times daily       senna-docusate (SENOKOT-S/PERICOLACE) 8.6-50 MG tablet Take 1 tablet by mouth 2 times daily as needed for constipation In addition to scheduled doses       traZODone (DESYREL) 50 MG tablet Take 50 mg by mouth At Bedtime        vitamin C (ASCORBIC ACID) 500 MG tablet Take 500 mg by mouth daily        XARELTO ANTICOAGULANT 20 MG TABS tablet Take 20 mg by mouth daily (with breakfast)          Controlled medications:   may discharge with remaining tablets.      Past Medical History:   Past Medical History:   Diagnosis Date     Anemia      Anemia, unspecified type 08/19/2016      Anxiety      Anxiety state 07/29/2021    Formatting of this note might be different from the original. Created by Conversion  Replacement Utility updated for latest IMO load     Arthritis      Atrial fibrillation and flutter (H)      Cerebral artery occlusion with cerebral infarction (H)      Cerebral infarction (H)      Chest pain, unspecified type 08/19/2016     Chronic atrial fibrillation (H)      Congestive heart failure (H)      Coronary artery disease     CABG with multiple stents     Coronary artery disease involving native coronary artery without angina pectoris, unspecified whether native or transplanted heart 08/19/2016     Coronary atherosclerosis 07/29/2021    Formatting of this note might be different from the original. Created by Conversion  Replacement Utility updated for latest IMO load     Depression      Diabetic ulcer of right midfoot associated with type 2 diabetes mellitus, limited to breakdown of skin (H) 09/28/2017     Equinovarus acquired deformity, right 04/14/2021     Equinovarus deformity, acquired, right      Essential hypertension 08/19/2016     GERD (gastroesophageal reflux disease)      Heart attack (H)      History of blood transfusion      Hyperlipidemia      Hyperlipidemia, unspecified hyperlipidemia type 08/19/2016     Hypertension      Insomnia, unspecified type 08/19/2016     Ischemic cardiomyopathy      Late effects of cerebrovascular accident 08/19/2016     Major depressive disorder with single episode, remission status unspecified 08/19/2016     Microalbuminuria 07/29/2021    Formatting of this note might be different from the original. 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 Created by [facility]  "Annotation: Feb 11 2010  1:41PM - Leonid Cabral: CANNOT READ OR  WRITE     PVD (peripheral vascular disease) (H)      Stented coronary artery      Type 2 diabetes mellitus (H) 07/29/2021    Formatting of this note might be different from the original. Created by Conversion     Type II or unspecified type diabetes mellitus with ophthalmic manifestations, not stated as uncontrolled(250.50) (H) 07/29/2021    Formatting of this note might be different from the original. Created by Conversion     Weakness 08/19/2016     Physical Exam:   Vitals: BP (!) 148/82   Pulse 81   Temp 98.5  F (36.9  C)   Resp 18   Ht 1.702 m (5' 7\")   Wt 69.4 kg (153 lb)   SpO2 90%   BMI 23.96 kg/m    BMI: Body mass index is 23.96 kg/m .  Physical Exam   General appearance: alert, appears stated age and cooperative.   Head: Normocephalic, without obvious abnormality, atraumatic, Eyes: sclera anicteric.  Lungs: respirations without effort, LSCTA; no wheezing or rales.   Cardiovascular: S1, S2. Regular rate and rhythm.   ABDOMEN: Globular and soft, non tender.    Extremities: extremities normal, atraumatic, no cyanosis or edema  Skin: Skin color, texture, turgor normal. No rashes or lesions  Neurologic:oriented. No focal deficits.   Psych: interacts well with caregivers, exhibits logical thought processes and connections, pleasant    SNF labs:   Most Recent 3 CBC's:  Recent Labs   Lab Test 10/11/22  0512 09/30/22  0633 09/29/22  0346   WBC 4.0 4.2 5.5   HGB 10.5* 8.3* 10.1*   MCV 84 84 84    159 171     Most Recent 3 BMP's:  Recent Labs   Lab Test 10/11/22  0512 09/30/22  1224 09/30/22  0801 09/30/22  0633 09/29/22  0830 09/29/22  0346     --   --  136  --  135*   POTASSIUM 4.6  --   --  3.9  --  4.1   CHLORIDE 105  --   --  103  --  99   CO2 23  --   --  24  --  23   BUN 20.3*  --   --  24.7*  --  20.2*   CR 0.88  --   --  1.16  1.20*  --  1.27*   ANIONGAP 10  --   --  9  --  13   TO 9.6  --   --  8.6  --  8.5*   * 104* " 139* 153*   < > 188*    < > = values in this interval not displayed.       DISCHARGE PLAN:    Follow up labs: per pcp    Medical Follow Up:      Follow up with primary care provider in 1-2 weeks    [facility] scheduled appointments:  Next 5 appointments (look out 90 days)    Dec 05, 2022  1:20 PM  (Arrive by 1:10 PM)  Return Visit with SHABNAM Brooks CNP  [facility] ([facility] ) [address] [place] [id number]  [phone]           Discharge Services: Home Care:  Occupational Therapy, Physical Therapy and Registered Nurse    TOTAL DISCHARGE TIME:   Greater than 30 minutes  Electronically signed by:  Janel Altamirano CNP     Documentation of Face to Face and Certification for Home Health Services    I certify that patient: Matias is under my care and that I, or a nurse practitioner or physician's assistant working with me, had a face-to-face encounter that meets the physician face-to-face encounter requirements with this patient on: 11/29/22    This encounter with the patient was in whole, or in part, for the following medical condition, which is the primary reason for home health care: RLE BKA    I certify that, based on my findings, the following services are medically necessary home health services: Nursing, Occupational Therapy and Physical Therapy.    My clinical findings support the need for the above services because: RN required for medication management, PT and OT required for continued functional improvment in home setting .     Further, I certify that my clinical findings support that this patient is homebound (i.e. absences from home require considerable and taxing effort and are for medical reasons or [employer] services or infrequently or of short duration when for other reasons) because: Requires assistance of another person or specialized equipment to access medical services because patient: Requires  supervision of another for safe transfer...    Based on the above findings. I certify that this patient is confined to the home and needs intermittent skilled nursing care, physical therapy and/or speech therapy.  The patient is under my care, and I have initiated the establishment of the plan of care.  This patient will be followed by a physician who will periodically review the plan of care.

## 2022-12-02 ENCOUNTER — TELEPHONE (OUTPATIENT)
Dept: VASCULAR SURGERY | Facility: CLINIC | Age: 52
End: 2022-12-02

## 2022-12-02 NOTE — TELEPHONE ENCOUNTER
Patient's sister calling regarding the message left to cancel the appointment with Emily Lynn on Monday.  She is concerned because this appointment is needed and detrimental to his staying or leaving the TCU.  Right now he is slated to be discharged to home on Tuesday 12/6/2022 and this appointment will determine if that is possible or not.  (depending on the need for the wound vac)  She states he has medical transport set up and that makes it difficult to change the date time.  She would like a call back to discuss the options for this patient.  She is afraid that if they discharge him from the TCU because he is not seen that he will not be able to get back to the TCU if he fails with his wound care at home.  They need our recommendations to make that determination.    Sherri can be reached at: [phone]

## 2022-12-02 NOTE — TELEPHONE ENCOUNTER
Spoke to patient's sister.  She is concerned that patient will need his wound vac back and will not be able to be readmitted to TCU.  Wound vac has been discontinued for about 3 weeks.  Wound pictures show progress in healing.  Called [facility] to check on wound status, no answer.  Will try back later and call patient's sister Sherri back with update.

## 2022-12-02 NOTE — TELEPHONE ENCOUNTER
"Attempted to connect with TCU nurse again, phone rang with no answer.  Called main number back and was transferred to Roselyn .  Was able to leave voicemail.  Asked her to call back or have someone call back with update on wound status (any increase with measurements or any issues).  Awaiting call back.     Did try the direct number for nursing per the social workers voicemail and spoke with a nurse from the wound team.  She states the wounds are completely closed over and the area \"looks great.\"  Called and updated patient's sister Sherri.    She was inquiring about a referral to see about patient getting a prosthetic but will call back after they decide where they would like to do that at.   "

## 2022-12-05 NOTE — TELEPHONE ENCOUNTER
Spoke with Mother, Jewels, and she will call us back to schedule a follow up with Dr. Montse.

## 2022-12-05 NOTE — TELEPHONE ENCOUNTER
Nurse from [facility] calling to state they were unaware of the appointment today with Emily Lynn being cancelled.  I told her it looked like our nurse talked to staff at [facility] and left messages last week on Friday 12/2/2022.  She states she wasn't aware of any of that.  She is wondering what the follow up plan is for this patient since he is discharging from the TCU today.  She disconnected before I could confirm her name and the call back number, though.

## 2022-12-07 ENCOUNTER — TELEPHONE (OUTPATIENT)
Dept: VASCULAR SURGERY | Facility: CLINIC | Age: 52
End: 2022-12-07

## 2022-12-07 NOTE — TELEPHONE ENCOUNTER
Caller: Pat at [facility]    Provider: RADHA Lynn    Detailed reason for call: Pat called to get updated wound care orders. She was informed his appointment on 12/05/22 was cancelled and patient did not reschedule because wounds are healed?    She is scheduled to see him this afternoon and will follow up on his wound but feel free to call her if you can shed any light on status of wound.      Best phone number to contact: [phone]    Best time to contact: any    Ok to leave a detailed message: Yes

## 2022-12-07 NOTE — TELEPHONE ENCOUNTER
Left message for Pat that there is a note from Monday that states the wound is healed and patient needs a follow up appointment with Dr. Montes. Note states that our clinic spoke with patient's mother and she said they would call back to schedule. Nothing scheduled today.  Asked Pat to call back with any updates after she sees patient today.

## 2022-12-22 NOTE — TELEPHONE ENCOUNTER
Called pts mom, she said to call daughter Sherri. Left a message to schedule Monday- 140 spot held.

## 2022-12-26 ENCOUNTER — OFFICE VISIT (OUTPATIENT)
Dept: VASCULAR SURGERY | Facility: CLINIC | Age: 52
End: 2022-12-26
Attending: SURGERY
Payer: MEDICARE

## 2022-12-26 VITALS
TEMPERATURE: 99.4 F | RESPIRATION RATE: 16 BRPM | DIASTOLIC BLOOD PRESSURE: 72 MMHG | SYSTOLIC BLOOD PRESSURE: 134 MMHG | HEART RATE: 80 BPM

## 2022-12-26 DIAGNOSIS — Z89.511 STATUS POST BELOW-KNEE AMPUTATION OF RIGHT LOWER EXTREMITY (H): Primary | ICD-10-CM

## 2022-12-26 PROBLEM — I25.5 ISCHEMIC CARDIOMYOPATHY: Status: ACTIVE | Noted: 2021-08-04

## 2022-12-26 PROBLEM — E11.3553 STABLE PROLIFERATIVE DIABETIC RETINOPATHY OF BOTH EYES ASSOCIATED WITH TYPE 2 DIABETES MELLITUS (H): Status: ACTIVE | Noted: 2021-09-22

## 2022-12-26 PROBLEM — E53.8 B12 DEFICIENCY: Status: ACTIVE | Noted: 2019-10-16

## 2022-12-26 PROBLEM — F43.20 ADJUSTMENT REACTION: Status: ACTIVE | Noted: 2019-10-16

## 2022-12-26 PROCEDURE — G0463 HOSPITAL OUTPT CLINIC VISIT: HCPCS | Performed by: SURGERY

## 2022-12-26 PROCEDURE — 99213 OFFICE O/P EST LOW 20 MIN: CPT | Performed by: SURGERY

## 2022-12-26 RX ORDER — MAGNESIUM HYDROXIDE 1200 MG/15ML
LIQUID ORAL
COMMUNITY
Start: 2022-11-07

## 2022-12-26 RX ORDER — MIRTAZAPINE 7.5 MG/1
TABLET, FILM COATED ORAL
COMMUNITY
Start: 2021-09-22

## 2022-12-26 NOTE — PATIENT INSTRUCTIONS
Jovanny Pabon,    Thank you for entrusting your care with us today. After your visit today with MD Kim Montes this is the plan that was discussed at your appointment.    [facility] referral faxed, they will contact you to schedule      I am including additional information on these things and our contact information if you have any questions or concerns.   Please do not hesitate to reach out to us if you felt we did not answer your questions or you are unsure of the treatment plan after your visit today. Our number is [phone].Thank you for trusting us with your care.         Again thank you for your time.     Lauren Abdi RN

## 2022-12-26 NOTE — PROGRESS NOTES
VASCULAR SURGERY OUTPATIENT CONSULT OR VISIT   VASCULAR SURGEON: Kim Montes MD    LOCATION:  [facility]    Cm Lundberg   Medical Record #:  [id number]  YOB: 1970  Age:  52 year old     Date of Service: 12/26/2022    PRIMARY CARE PROVIDER: Leonid Caruso      Reason for consultation: Evaluation of left leg below-knee amputation stump    IMPRESSION: Doing very well with complete healing of his stump.  Does get some phantom limb pain but no wound pain.  Patient is at an assisted living and apparently told this will be able to come out to fit him for a prosthetic and help him progress to walking again but not [facility] for the patient's sister.    RECOMMENDATION: We will refer the patient for prosthetic consultation.  Choice of prosthetic company is certainly up to the patient and his family.  We will see the patient on an as-needed basis.    HPI:  Cm Lundberg is a 52 year old male who was seen today for follow-up of his below-knee amputation which was done in August with a complicated by wound breakdown requiring aggressive local wound care.  Finally healed up.  Does get some phantom limb pain but this appears to be manageable.  No other concerns.  Patient is in a facility where apparently till just will come out to help fit him but where [facility] does not go.    PHH:    Past Medical History:   Diagnosis Date     Anemia      Anemia, unspecified type 08/19/2016     Anxiety      Anxiety state 07/29/2021    Formatting of this note might be different from the original. Created by Conversion  Replacement Utility updated for latest IMO load     Arthritis      Atrial fibrillation and flutter (H)      Cerebral artery occlusion with cerebral infarction (H)      Cerebral infarction (H)      Chest pain, unspecified type 08/19/2016     Chronic atrial fibrillation (H)      Congestive heart failure (H)      Coronary artery disease     CABG with multiple stents     Coronary  artery disease involving native coronary artery without angina pectoris, unspecified whether native or transplanted heart 08/19/2016     Coronary atherosclerosis 07/29/2021    Formatting of this note might be different from the original. Created by Conversion  Replacement Utility updated for latest IMO load     Depression      Diabetic ulcer of right midfoot associated with type 2 diabetes mellitus, limited to breakdown of skin (H) 09/28/2017     Equinovarus acquired deformity, right 04/14/2021     Equinovarus deformity, acquired, right      Essential hypertension 08/19/2016     GERD (gastroesophageal reflux disease)      Heart attack (H)      History of blood transfusion      Hyperlipidemia      Hyperlipidemia, unspecified hyperlipidemia type 08/19/2016     Hypertension      Insomnia, unspecified type 08/19/2016     Ischemic cardiomyopathy      Late effects of cerebrovascular accident 08/19/2016     Major depressive disorder with single episode, remission status unspecified 08/19/2016     Microalbuminuria 07/29/2021    Formatting of this note might be different from the original. 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 Created by Conversion [facility] Annotation: Feb 11 2010  1:41PM - Leonid Cabral: CANNOT READ OR  WRITE     PVD (peripheral vascular disease) (H)      Stented coronary artery      Type 2 diabetes mellitus (H) 07/29/2021    Formatting of this note might be different from the original. Created by Conversion     Type II or unspecified type diabetes mellitus with ophthalmic manifestations, not stated as uncontrolled(250.50) (H) 07/29/2021    Formatting of this note might be different from the original. Created by Conversion     Weakness  08/19/2016        Past Surgical History:   Procedure Laterality Date     AMPUTATE LEG BELOW KNEE Right 8/5/2022    Procedure: AMPUTATION, BELOW KNEE, right leg;  Surgeon: Kim Montes MD;  Location: [facility] OR     INCISION AND DRAINAGE LOWER EXTREMITY, COMBINED Right 12/17/2021    Procedure: INCISION AND DRAINAGE, right foot;  Surgeon: Alex Kim DPM;  Location: [facility] OR     INCISION AND DRAINAGE LOWER EXTREMITY, COMBINED Right 1/27/2022    Procedure: INCISION AND DRAINAGE, right foot with application of theraskin;  Surgeon: Alex Kim DPM;  Location: [facility] OR     INCISION AND DRAINAGE LOWER EXTREMITY, COMBINED Right 4/4/2022    Procedure: INCISION AND DRAINAGE, right foot with application of theraskin;  Surgeon: Alex Kim DPM;  Location: [facility] OR     IR LOWER EXTREMITY ANGIOGRAM RIGHT  6/29/2022     IR MISCELLANEOUS PROCEDURE  2/13/2004     IRRIGATION AND DEBRIDEMENT LOWER EXTREMITY, COMBINED Right 8/24/2022    Procedure: RIGHT IRRIGATION AND DEBRIDEMENT, LOWER EXTREMITY, REVISION OF BELOW KNEE AMPUTATION;  Surgeon: Kim Montes MD;  Location: [facility] OR     IRRIGATION AND DEBRIDEMENT LOWER EXTREMITY, COMBINED Right 8/26/2022    Procedure: WASHOUT AND PARTIAL CLOSURE OF RIGHT BELOW THE KNEE AMPUTATION,  AND WOUND VAC APPLICATION;  Surgeon: Kim Montes MD;  Location: [facility] OR     PICC DOUBLE LUMEN PLACEMENT  8/29/2022          C CABG, VEIN, SINGLE      Description: CABG (CABG);  Recorded: 04/22/2009;       ALLERGIES:  Aspirin, Blood-group specific substance, Isosorbide, Codeine, and Morphine    MEDS:    Current Outpatient Medications:      acetaminophen (TYLENOL) 325 MG tablet, Take 650 mg by mouth every 6 hours as needed for fever or pain, Disp: , Rfl:      acetaminophen (TYLENOL) 325 MG tablet, Take 650 mg by mouth 2 times daily, Disp: , Rfl:      ANTI-DANDRUFF 1 % SHAM lotion, Apply 1 Application topically twice a week, Disp:  , Rfl:      ARIPiprazole (ABILIFY) 2 MG tablet, 2 mg daily , Disp: , Rfl:      ASPERCREME ORIGINAL 10 % external cream, Apply topically 2 times daily as needed for other (back pain), Disp: , Rfl:      atorvastatin (LIPITOR) 80 MG tablet, Take 80 mg by mouth daily , Disp: , Rfl:      bisacodyl (DULCOLAX) 10 MG suppository, Place 10 mg rectally daily as needed for constipation, Disp: , Rfl:      cholestyramine (QUESTRAN) 4 g packet, Take 1 packet by mouth every other day , Disp: , Rfl:      clopidogrel (PLAVIX) 75 MG tablet, Take 1 tablet (75 mg) by mouth daily Start taking medication the day after the procedure., Disp: 90 tablet, Rfl: 1     DULoxetine (CYMBALTA) 60 MG capsule, Take 120 mg by mouth daily, Disp: , Rfl:      erenumab-aooe (AIMOVIG, 140 MG DOSE,) 70 MG/ML injection, Inject 140 mg Subcutaneous every 30 days, Disp: , Rfl:      ferrous sulfate (FEROSUL) 325 (65 Fe) MG tablet, Take 325 mg by mouth daily (with breakfast) , Disp: , Rfl:      gabapentin (NEURONTIN) 100 MG capsule, Take 100 mg by mouth 2 times daily (before meals), Disp: , Rfl:      gabapentin (NEURONTIN) 300 MG capsule, Take 600 mg by mouth At Bedtime, Disp: , Rfl:      glipiZIDE (GLUCOTROL) 5 MG tablet, Take 2.5 mg by mouth daily before breakfast, Disp: , Rfl:      HYDROcodone-acetaminophen (NORCO) 5-325 MG tablet, Take 1-2 tablets by mouth every 6 hours as needed for moderate to severe pain (Patient taking differently: Take 1 tablet by mouth every 4 hours as needed for moderate to severe pain), Disp: 12 tablet, Rfl: 0     loperamide (IMODIUM) 2 MG capsule, Take 2 mg by mouth 4 times daily as needed for diarrhea, Disp: , Rfl:      losartan (COZAAR) 25 MG tablet, Take 25 mg by mouth At Bedtime , Disp: , Rfl:      magnesium chloride 535 (64 Mg) MG TBEC CR tablet, Take 1,070 mg by mouth 2 times daily, Disp: , Rfl:      metFORMIN (GLUCOPHAGE) 500 MG tablet, Take 500 mg by mouth daily (with dinner), Disp: , Rfl:      metoprolol succinate ER  (TOPROL-XL) 25 MG 24 hr tablet, Take 75 mg by mouth daily , Disp: , Rfl:      mineral oil-hydrophilic petrolatum (AQUAPHOR) external ointment, Apply topically 3 times daily as needed for irritation or dry skin Apply to forehead and other areas as needed, Disp: , Rfl:      mirtazapine (REMERON) 7.5 MG tablet, mirtazapine 7.5 mg tablet, Disp: , Rfl:      Nitroglycerin (NITROSTAT SL), Place 0.4 mg under the tongue every 5 minutes as needed for chest pain , Disp: , Rfl:      pantoprazole (PROTONIX) 40 MG EC tablet, Take 40 mg by mouth daily before breakfast, Disp: , Rfl:      polyethylene glycol (MIRALAX) 17 GM/Dose powder, Take 1 packet by mouth daily as needed for constipation , Disp: , Rfl:      senna-docusate (SENOKOT-S/PERICOLACE) 8.6-50 MG tablet, Take 1 tablet by mouth 2 times daily, Disp: , Rfl:      sodium chloride 0.9%, bottle, 0.9 % irrigation, DILUTE 30ML OF HIBICLENS IN 500ML OF SALINE AND APPLY AS DIRECTED FOR DRESSING CHANGE THREE TIMES WEEKLY, Disp: , Rfl:      traZODone (DESYREL) 50 MG tablet, Take 50 mg by mouth At Bedtime , Disp: , Rfl:      vitamin C (ASCORBIC ACID) 500 MG tablet, Take 500 mg by mouth daily , Disp: , Rfl:      XARELTO ANTICOAGULANT 20 MG TABS tablet, Take 20 mg by mouth daily (with breakfast), Disp: , Rfl:     SOCIAL HABITS:    History   Smoking Status     Former     Types: Cigarettes     Quit date: 1/28/2015   Smokeless Tobacco     Never     Social History    Substance and Sexual Activity      Alcohol use: Not Currently      History   Drug Use Unknown       FAMILY HISTORY:    Family History   Adopted: Yes       REVIEW OF SYSTEMS:    A 12 point ROS was reviewed and except for what is listed in the HPI above, all others are negative    PE:  /72   Pulse 80   Temp 99.4  F (37.4  C)   Resp 16   Wt Readings from Last 1 Encounters:   11/29/22 153 lb (69.4 kg)     There is no height or weight on file to calculate BMI.    EXAM:  GENERAL: This is a well-developed 52 year old male who  appears his stated age  EYES: Grossly normal.  MOUTH: Buccal mucosa normal   MUSCULOSKELETAL: Status post below-knee amputation of the left leg  HEME/LYMPH: No lymphedema  NEUROLOGIC: Focally intact, Alert and oriented x 3.   PSYCH: appropriate affect  INTEGUMENT: No open lesions or ulcers.  Stump is healed.        DIAGNOSTIC STUDIES:     Images:  No results found.    LABS:      Sodium   Date Value Ref Range Status   10/11/2022 138 136 - 145 mmol/L Final   09/30/2022 136 136 - 145 mmol/L Final   09/29/2022 135 (L) 136 - 145 mmol/L Final   08/25/2016 139 mmol/L Final     Urea Nitrogen   Date Value Ref Range Status   10/11/2022 20.3 (H) 6.0 - 20.0 mg/dL Final   09/30/2022 24.7 (H) 6.0 - 20.0 mg/dL Final   09/29/2022 20.2 (H) 6.0 - 20.0 mg/dL Final   08/29/2022 12 8 - 22 mg/dL Final   08/26/2022 16 8 - 22 mg/dL Final   08/25/2022 20 8 - 22 mg/dL Final   08/25/2016 21 mg/dL Final     Hemoglobin   Date Value Ref Range Status   10/11/2022 10.5 (L) 13.3 - 17.7 g/dL Final   09/30/2022 8.3 (L) 13.3 - 17.7 g/dL Final   09/29/2022 10.1 (L) 13.3 - 17.7 g/dL Final   08/25/2016 10.1 (A) 13.3 - 17.7 g/dL Final     Platelet Count   Date Value Ref Range Status   10/11/2022 243 150 - 450 10e3/uL Final   09/30/2022 159 150 - 450 10e3/uL Final   09/29/2022 171 150 - 450 10e3/uL Final   08/25/2016 189 150 - 450 10^9/L Final     INR   Date Value Ref Range Status   08/29/2022 1.13 0.85 - 1.15 Final   08/27/2022 1.12 0.85 - 1.15 Final   08/25/2022 1.22 (H) 0.85 - 1.15 Final         Kim Montes MD, MD  VASCULAR SURGERY

## 2022-12-29 NOTE — TELEPHONE ENCOUNTER
Pat is calling from  [facility], he would like an order to use tubi .  He is some at home however there currently isn't orders to use this, can we give the ok to use this on the stump to provide protection and warmth.  Pat can reached at  [phone] or fax order to [phone]

## 2022-12-29 NOTE — TELEPHONE ENCOUNTER
Left voicemail for Pat.  Verbal ok to use tubigrip on stump as long as wounds remain healed.  Asked her to call back with any questions.

## 2023-04-02 ENCOUNTER — HEALTH MAINTENANCE LETTER (OUTPATIENT)
Age: 53
End: 2023-04-02

## 2023-07-10 ENCOUNTER — HOSPITAL ENCOUNTER (EMERGENCY)
Facility: HOSPITAL | Age: 53
Discharge: NURSING, REHAB OR LONG-TERM CARE | End: 2023-07-10
Attending: EMERGENCY MEDICINE | Admitting: EMERGENCY MEDICINE
Payer: MEDICARE

## 2023-07-10 ENCOUNTER — APPOINTMENT (OUTPATIENT)
Dept: RADIOLOGY | Facility: HOSPITAL | Age: 53
End: 2023-07-10
Attending: EMERGENCY MEDICINE
Payer: MEDICARE

## 2023-07-10 ENCOUNTER — APPOINTMENT (OUTPATIENT)
Dept: CT IMAGING | Facility: HOSPITAL | Age: 53
End: 2023-07-10
Attending: EMERGENCY MEDICINE
Payer: MEDICARE

## 2023-07-10 VITALS
DIASTOLIC BLOOD PRESSURE: 81 MMHG | HEART RATE: 86 BPM | BODY MASS INDEX: 27.29 KG/M2 | HEIGHT: 63 IN | TEMPERATURE: 98 F | OXYGEN SATURATION: 96 % | WEIGHT: 154 LBS | SYSTOLIC BLOOD PRESSURE: 137 MMHG
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
## 2023-07-10 DIAGNOSIS — M54.50 ACUTE MIDLINE LOW BACK PAIN WITHOUT SCIATICA: ICD-10-CM

## 2023-07-10 DIAGNOSIS — S09.90XA CLOSED HEAD INJURY, INITIAL ENCOUNTER: ICD-10-CM

## 2023-07-10 DIAGNOSIS — M25.511 ACUTE PAIN OF RIGHT SHOULDER: ICD-10-CM

## 2023-07-10 DIAGNOSIS — S80.212A ABRASION OF LEFT KNEE, INITIAL ENCOUNTER: ICD-10-CM

## 2023-07-10 LAB
HOLD SPECIMEN: NORMAL

## 2023-07-10 PROCEDURE — 72131 CT LUMBAR SPINE W/O DYE: CPT | Mod: MF

## 2023-07-10 PROCEDURE — 73030 X-RAY EXAM OF SHOULDER: CPT | Mod: RT

## 2023-07-10 PROCEDURE — G1010 CDSM STANSON: HCPCS

## 2023-07-10 PROCEDURE — 99285 EMERGENCY DEPT VISIT HI MDM: CPT | Mod: 25

## 2023-07-10 RX ORDER — LIDOCAINE 40 MG/G
CREAM TOPICAL
Status: DISCONTINUED | OUTPATIENT
Start: 2023-07-10 | End: 2023-07-10

## 2023-07-10 ASSESSMENT — ACTIVITIES OF DAILY LIVING (ADL)
ADLS_ACUITY_SCORE: 35
ADLS_ACUITY_SCORE: 35

## 2023-07-10 NOTE — ED NOTES
Bed: JNED-19  Expected date: 7/10/23  Expected time: 5:54 AM  Means of arrival: Ambulance  Comments:  [facility]  51 yo M fall/head inj

## 2023-07-10 NOTE — ED PROVIDER NOTES
EMERGENCY DEPARTMENT ENCOUNTER      NAME: Cm Lundberg  AGE: 52 year old male  YOB: 1970  MRN: [id number]  EVALUATION DATE & TIME: 7/10/2023  6:07 AM    PCP: Leonid Caruso    ED PROVIDER: Effie Winters MD      Chief Complaint   Patient presents with     Fall         FINAL IMPRESSION:  1. Fall, initial encounter    2. Closed head injury, initial encounter    3. Acute pain of right shoulder    4. Abrasion of left knee, initial encounter    5. Acute midline low back pain without sciatica          ED COURSE & MEDICAL DECISION MAKING:    Pertinent Labs & Imaging studies reviewed. (See chart for details)  52 year old male with history of HTN, HLD, DM, PVD with previous right BKA, CAD and CHF, previous CVA with right upper extremity weakness, A-fib anticoagulated on Xarelto who presents to the Emergency Department for evaluation of injuries after a fall.  Patient awake alert oriented and is able to vividly describe a mechanical fall while transferring from bed to wheelchair.  He did not hit his head and seems how is anticoagulated warrants neuroimaging to rule out skull fracture, ICH.  Does have some new midline lumbar spine tenderness concerning for contusion versus compression fracture.  Some pain with range of motion to the right shoulder.  Suspect rotator cuff injury.  Clinically no signs of dislocation and less likely fracture.  Small abrasion to the left knee.    Patient met by myself upon ED arrival, placed on monitor.  CT head unremarkable.  CT lumbar spine unremarkable for acute changes.  X-ray of the right shoulder unremarkable.  Patient is wheelchair-bound but does self transfer.  We will give a transfer test, with hopeful discharge.     Patient able to transfer without difficulty.  Will discharge back to care facility.    6:14 AM I introduced myself to the patient, obtained patient history, performed a physical exam, and discussed plan for ED workup including potential diagnostic  laboratory/imaging studies and interventions.  7:40 AM Rechecked and updated the patient.        Medical Decision Making    History:    Supplemental history from: EMS    External Record(s) reviewed: 12/26/2022 vascular surgery clinic note, pharmacy fill records    Work Up:    Chart documentation includes differential considered and any EKGs or imaging independently interpreted by provider, see MDM.    In additional to work up documented, I considered the following work up: See MDM    External consultation:    Discussion of management with another provider: na    Complicating factors:    Care impacted by chronic illness: Anticoagulated State, Diabetes, Hyperlipidemia, Hypertension, Mental Health and Other: CAD    Care affected by social determinants of health: Access to care, referred to ED from care facility    Disposition considerations: Discharge. I recommended the patient continue their current prescription strength medication(s): xarelto. See documentation for any additional details.    At the conclusion of the encounter I discussed the results of all of the tests and the disposition. The questions were answered. The patient or family acknowledged understanding and was agreeable with the care plan.      MEDICATIONS GIVEN IN THE EMERGENCY:  Medications   sodium chloride (PF) 0.9% PF flush 3 mL (3 mLs Intracatheter Not Given 7/10/23 0734)       NEW PRESCRIPTIONS STARTED AT TODAY'S ER VISIT  New Prescriptions    No medications on file          =================================================================    HPI    Patient information was obtained from: Patient     Use of Intrepreter: N/A    Cm Lundberg is a 52 year old male with pertinent medical history of HTN, hyperlipidemia, type 2 diabetes, and CAD who presents via EMS for an evaluation of a fall.     Per chart review the patient was admitted to [facility] 9/27/2022-9/30/2022 for right BKA stump infection. He was discharges with no additional  medications and follow up with primary care provider.     The patient is coming from a senior living facility. The patient reports that the fell when trying to transfer from his bed to his wheelchair. They did not lose consciousness. The patient endorses pain to their right shoulder, left leg and back. He is anticoagulated on Xarelto    Denies any other complaints at this time      PAST MEDICAL HISTORY:  Past Medical History:   Diagnosis Date     Anemia      Anemia, unspecified type 08/19/2016     Anxiety      Anxiety state 07/29/2021    Formatting of this note might be different from the original. Created by Conversion  Replacement Utility updated for latest IMO load     Arthritis      Atrial fibrillation and flutter (H)      Cerebral artery occlusion with cerebral infarction (H)      Cerebral infarction (H)      Chest pain, unspecified type 08/19/2016     Chronic atrial fibrillation (H)      Congestive heart failure (H)      Coronary artery disease     CABG with multiple stents     Coronary artery disease involving native coronary artery without angina pectoris, unspecified whether native or transplanted heart 08/19/2016     Coronary atherosclerosis 07/29/2021    Formatting of this note might be different from the original. Created by Conversion  Replacement Utility updated for latest IMO load     Depression      Diabetic ulcer of right midfoot associated with type 2 diabetes mellitus, limited to breakdown of skin (H) 09/28/2017     Equinovarus acquired deformity, right 04/14/2021     Equinovarus deformity, acquired, right      Essential hypertension 08/19/2016     GERD (gastroesophageal reflux disease)      Heart attack (H)      History of blood transfusion      Hyperlipidemia      Hyperlipidemia, unspecified hyperlipidemia type 08/19/2016     Hypertension      Insomnia, unspecified type 08/19/2016     Ischemic cardiomyopathy      Late effects of cerebrovascular accident 08/19/2016     Major depressive disorder  with single episode, remission status unspecified 08/19/2016     Microalbuminuria 07/29/2021    Formatting of this note might be different from the original. 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 Created by Conversion [facility] Annotation: Feb 11 2010  1:41PM - Leonid Cabral: CANNOT READ OR  WRITE     PVD (peripheral vascular disease) (H)      Stented coronary artery      Type 2 diabetes mellitus (H) 07/29/2021    Formatting of this note might be different from the original. Created by Conversion     Type II or unspecified type diabetes mellitus with ophthalmic manifestations, not stated as uncontrolled(250.50) (H) 07/29/2021    Formatting of this note might be different from the original. Created by Conversion     Weakness 08/19/2016       PAST SURGICAL HISTORY:  Past Surgical History:   Procedure Laterality Date     AMPUTATE LEG BELOW KNEE Right 8/5/2022    Procedure: AMPUTATION, BELOW KNEE, right leg;  Surgeon: Kim Montes MD;  Location: [facility] [place]     INCISION AND DRAINAGE LOWER EXTREMITY, COMBINED Right 12/17/2021    Procedure: INCISION AND DRAINAGE, right foot;  Surgeon: Alex Kim DPM;  Location: [facility] OR     INCISION AND DRAINAGE LOWER EXTREMITY, COMBINED Right 1/27/2022    Procedure: INCISION AND DRAINAGE, right foot with application of theraskin;  Surgeon: Alex Kim DPM;  Location: [facility] [place]     INCISION AND DRAINAGE LOWER EXTREMITY, COMBINED Right 4/4/2022    Procedure: INCISION AND DRAINAGE, right foot with application of theraskin;  Surgeon: Alex Kim DPM;  Location: [facility] [place]     IR LOWER EXTREMITY ANGIOGRAM RIGHT  6/29/2022     IR MISCELLANEOUS PROCEDURE  2/13/2004      IRRIGATION AND DEBRIDEMENT LOWER EXTREMITY, COMBINED Right 2022    Procedure: RIGHT IRRIGATION AND DEBRIDEMENT, LOWER EXTREMITY, REVISION OF BELOW KNEE AMPUTATION;  Surgeon: Kim Montes MD;  Location: [facility] [place]     IRRIGATION AND DEBRIDEMENT LOWER EXTREMITY, COMBINED Right 2022    Procedure: WASHOUT AND PARTIAL CLOSURE OF RIGHT BELOW THE KNEE AMPUTATION,  AND WOUND VAC APPLICATION;  Surgeon: Kim Montes MD;  Location: [facility] [place] OR     PICC DOUBLE LUMEN PLACEMENT  2022          ZZC CABG, VEIN, SINGLE      Description: CABG (CABG);  Recorded: 2009;           CURRENT MEDICATIONS:    Prior to Admission Medications   Prescriptions Last Dose Informant Patient Reported? Taking?   ANTI-DANDRUFF 1 % SHAM lotion   Yes No   Sig: Apply 1 Application topically twice a week   ARIPiprazole (ABILIFY) 2 MG tablet   Yes No   Si mg daily    ASPERCREME ORIGINAL 10 % external cream   Yes No   Sig: Apply topically 2 times daily as needed for other (back pain)   DULoxetine (CYMBALTA) 60 MG capsule   Yes No   Sig: Take 120 mg by mouth daily   HYDROcodone-acetaminophen (NORCO) 5-325 MG tablet   No No   Sig: Take 1-2 tablets by mouth every 6 hours as needed for moderate to severe pain   Patient taking differently: Take 1 tablet by mouth every 4 hours as needed for moderate to severe pain   Nitroglycerin (NITROSTAT SL)   Yes No   Sig: Place 0.4 mg under the tongue every 5 minutes as needed for chest pain    XARELTO ANTICOAGULANT 20 MG TABS tablet   Yes No   Sig: Take 20 mg by mouth daily (with breakfast)   acetaminophen (TYLENOL) 325 MG tablet   Yes No   Sig: Take 650 mg by mouth 2 times daily   acetaminophen (TYLENOL) 325 MG tablet   Yes No   Sig: Take 650 mg by mouth every 6 hours as needed for fever or pain   atorvastatin (LIPITOR) 80 MG tablet   Yes No   Sig: Take 80 mg by mouth daily    bisacodyl (DULCOLAX) 10 MG suppository   Yes No   Sig: Place 10 mg rectally daily as needed for constipation    cholestyramine (QUESTRAN) 4 g packet   Yes No   Sig: Take 1 packet by mouth every other day    clopidogrel (PLAVIX) 75 MG tablet   No No   Sig: Take 1 tablet (75 mg) by mouth daily Start taking medication the day after the procedure.   erenumab-aooe (AIMOVIG, 140 MG DOSE,) 70 MG/ML injection   Yes No   Sig: Inject 140 mg Subcutaneous every 30 days   ferrous sulfate (FEROSUL) 325 (65 Fe) MG tablet   Yes No   Sig: Take 325 mg by mouth daily (with breakfast)    gabapentin (NEURONTIN) 100 MG capsule   Yes No   Sig: Take 100 mg by mouth 2 times daily (before meals)   gabapentin (NEURONTIN) 300 MG capsule   Yes No   Sig: Take 600 mg by mouth At Bedtime   glipiZIDE (GLUCOTROL) 5 MG tablet   Yes No   Sig: Take 2.5 mg by mouth daily before breakfast   loperamide (IMODIUM) 2 MG capsule   Yes No   Sig: Take 2 mg by mouth 4 times daily as needed for diarrhea   losartan (COZAAR) 25 MG tablet   Yes No   Sig: Take 25 mg by mouth At Bedtime    magnesium chloride 535 (64 Mg) MG TBEC CR tablet   Yes No   Sig: Take 1,070 mg by mouth 2 times daily   metFORMIN (GLUCOPHAGE) 500 MG tablet   Yes No   Sig: Take 500 mg by mouth daily (with dinner)   metoprolol succinate ER (TOPROL-XL) 25 MG 24 hr tablet   Yes No   Sig: Take 75 mg by mouth daily    mineral oil-hydrophilic petrolatum (AQUAPHOR) external ointment   Yes No   Sig: Apply topically 3 times daily as needed for irritation or dry skin Apply to forehead and other areas as needed   mirtazapine (REMERON) 7.5 MG tablet   Yes No   Sig: mirtazapine 7.5 mg tablet   pantoprazole (PROTONIX) 40 MG EC tablet   Yes No   Sig: Take 40 mg by mouth daily before breakfast   polyethylene glycol (MIRALAX) 17 GM/Dose powder   Yes No   Sig: Take 1 packet by mouth daily as needed for constipation    senna-docusate (SENOKOT-S/PERICOLACE) 8.6-50 MG tablet   Yes No   Sig: Take 1 tablet by mouth 2 times daily   sodium chloride 0.9%, bottle, 0.9 % irrigation   Yes No   Sig: DILUTE 30ML OF HIBICLENS IN 500ML  "OF SALINE AND APPLY AS DIRECTED FOR DRESSING CHANGE THREE TIMES WEEKLY   traZODone (DESYREL) 50 MG tablet   Yes No   Sig: Take 50 mg by mouth At Bedtime    vitamin C (ASCORBIC ACID) 500 MG tablet   Yes No   Sig: Take 500 mg by mouth daily       Facility-Administered Medications: None       ALLERGIES:  Allergies   Allergen Reactions     Aspirin Other (See Comments)     Reacts with migraine medicine     Blood-Group Specific Substance      Anti-Fya present.  Expect delays in blood for transfusion.  Draw 2 lavender and 1 red for all type and screen orders.     Isosorbide Headache     Codeine Rash     Morphine Rash       FAMILY HISTORY:  Family History   Adopted: Yes       SOCIAL HISTORY:  Social History     Tobacco Use     Smoking status: Former     Types: Cigarettes     Quit date: 2015     Years since quittin.4     Smokeless tobacco: Never   Vaping Use     Vaping Use: Never used   Substance Use Topics     Alcohol use: Not Currently     Drug use: Not Currently        VITALS:  Patient Vitals for the past 24 hrs:   BP Temp Temp src Pulse SpO2 Height Weight   07/10/23 0715 139/78 -- -- 88 96 % -- --   07/10/23 0623 -- -- -- -- -- 1.6 m (5' 3\") 69.9 kg (154 lb)   07/10/23 0615 (!) 153/79 98  F (36.7  C) Oral 88 95 % -- --       PHYSICAL EXAM    Primary Survey:  A: intact  B: no respiratory distress, clear to asculatation bilateraly  C: regular rate, rhythm.    D: Catarina Coma Scale    Eyes Verbal Motor  6 Obeys commands    5 Normal Localizes to pain    4 Opens spontaneously Confused or disoriented Withdrawal to pain  3 Opens to voice Inappropriate words Abnormal flexion to pain  2 Opens to pain Incomprehensible Abnormal extension to pain  1 Does not open eyes No sound No movement    Score 4 5 6    Total = 15  E: No obvious external injuries    Secondary Survey:  General Appearance: Adult male appearing older than stated age in no acute distress  Head:  Normocephalic, atraumatic  Eyes:  PERRL, conjunctiva/corneas " clear, No obvious facial deformity.  No tenderness to palpation.  No epistaxis.   Neck:  No midline cervical spine tenderness.  No paraspinal tenderness.  Supple, symmetrical, trachea midline, no stridor or dysphonia, no soft tissue swelling or tenderness  Chest:  No tenderness or deformity, no crepitus  Cardio:  Regular rate and rhythm, 2+ pulses symmetric in all extremities  Pulm:  Respirations unlabored, Clear to auscultation bilaterally  Back:  Diffuse midline lumbar spine tenderness.  No midline thoracic spine tenderness.  No step-offs or crepitus  Abdomen:  Soft, non-tender, non distended, no rebound or guarding  Extremities:  Pain with movement of right shoulder, no deformity.  Contractures to right upper extremity with baseline weakness per patient.  Abrasion to left knee, but no pain with range of motion.  Right BKA.  Dried stool on left hand.  Otherwise no obvious deformity to the extremities nor pain with range of motion  Skin:  Skin color, texture, turgor normal, no rashes or lesions  Neuro:  Awake, alert, responsive to voice, follows commands, normal speech,  GCS 15     RADIOLOGY/LAB:  Reviewed all pertinent imaging. Please see official radiology report.  All pertinent labs reviewed and interpreted.  Results for orders placed or performed during the hospital encounter of 07/10/23   Head CT w/o contrast    Impression    IMPRESSION:  1.  No acute intracranial hemorrhage or calvarial fracture.  2.  Moderate volume loss and presumed chronic small vessel ischemic changes.  3.  Several chronic appearing lacunar infarcts as above.  4.  Evidence of chronic sinusitis involving the left maxillary sinus. Correlate for superimposed acute sinusitis.   Lumbar spine CT w/o contrast    Impression    IMPRESSION:  1.  No acute lumbar spine fracture.    2.  Chronic compression deformity of the L5 vertebral body with an unchanged moderate height loss. Mild retropulsion along the posterior superior L5 vertebral body is  unchanged.    3.  Diffuse osteopenia with multilevel degenerative change as above. No definite high-grade spinal canal or neural foraminal stenosis.    4.  Transitional lumbosacral anatomy with a partially lumbarized transitional S1 segment.   XR Shoulder Right G/E 3 Views    Impression    IMPRESSION: Negative for fracture or dislocation. The glenohumeral joint is intact. Degenerative changes at the acromioclavicular joints similar to previous. Remainder unremarkable.             The creation of this record is based on the scribe s observations of the work being performed by Effie Winters MD and the provider s statements to them. It was created on her behalf by Michelle Lincoln, a trained medical scribe. This document has been checked and approved by the attending provider.    Effie Winters MD  Emergency Medicine  [facility] EMERGENCY DEPARTMENT  [address] [place] [id number]  [phone]  Dept: [phone]       Effie Winters MD  07/10/23 0800

## 2023-07-10 NOTE — ED NOTES
Patient was able to stand and self transfer independently into the wheelchair, verbalizes feeling comfortable going home. MD notified.

## 2023-07-10 NOTE — ED NOTES
Patient resting comfortably watching tv waiting for ride. Does have a phone in his room as his cell phone doesn't have service within the hospital.

## 2023-07-10 NOTE — ED TRIAGE NOTES
Pt arrives to ED via [facility] from [facility]. Pt reports that the unwitnessed fall occurred when transferring from his bed to his wheelchair. Pt reports that he hit the back of his head and complains of pain in his R shoulder, upper/lower back, and back of head. Takes blood thinners daily and has a history of DM2.  via EMS.

## 2023-07-10 NOTE — ED NOTES
Patient left via wheelchair ems transport. Patient thanked this writer and was happy to be headed back to his facility.

## 2023-07-10 NOTE — ED NOTES
Called dispatch for wheel chair transport back to facility. ETA 1-2 hours. Patient notified and agrees with plan.

## 2023-11-11 ENCOUNTER — HEALTH MAINTENANCE LETTER (OUTPATIENT)
Age: 53
End: 2023-11-11

## 2024-01-05 ENCOUNTER — APPOINTMENT (OUTPATIENT)
Dept: CT IMAGING | Facility: HOSPITAL | Age: 54
End: 2024-01-05
Attending: EMERGENCY MEDICINE
Payer: MEDICARE

## 2024-01-05 ENCOUNTER — HOSPITAL ENCOUNTER (EMERGENCY)
Facility: HOSPITAL | Age: 54
Discharge: HOME | End: 2024-01-05
Attending: EMERGENCY MEDICINE | Admitting: EMERGENCY MEDICINE
Payer: MEDICARE

## 2024-01-05 VITALS
OXYGEN SATURATION: 98 % | RESPIRATION RATE: 20 BRPM | TEMPERATURE: 98.2 F | DIASTOLIC BLOOD PRESSURE: 83 MMHG | HEART RATE: 90 BPM | SYSTOLIC BLOOD PRESSURE: 143 MMHG

## 2024-01-05 DIAGNOSIS — S09.90XA INJURY OF HEAD, INITIAL ENCOUNTER: ICD-10-CM

## 2024-01-05 PROCEDURE — 70450 CT HEAD/BRAIN W/O DYE: CPT | Mod: MA

## 2024-01-05 PROCEDURE — 250N000011 HC RX IP 250 OP 636: Performed by: EMERGENCY MEDICINE

## 2024-01-05 PROCEDURE — 96374 THER/PROPH/DIAG INJ IV PUSH: CPT

## 2024-01-05 PROCEDURE — 72125 CT NECK SPINE W/O DYE: CPT | Mod: MA

## 2024-01-05 PROCEDURE — 99285 EMERGENCY DEPT VISIT HI MDM: CPT | Mod: 25

## 2024-01-05 RX ORDER — KETOROLAC TROMETHAMINE 15 MG/ML
15 INJECTION, SOLUTION INTRAMUSCULAR; INTRAVENOUS ONCE
Status: COMPLETED | OUTPATIENT
Start: 2024-01-05 | End: 2024-01-05

## 2024-01-05 RX ORDER — LIDOCAINE 40 MG/G
CREAM TOPICAL
Status: DISCONTINUED | OUTPATIENT
Start: 2024-01-05 | End: 2024-01-05

## 2024-01-05 RX ADMIN — KETOROLAC TROMETHAMINE 15 MG: 15 INJECTION, SOLUTION INTRAMUSCULAR; INTRAVENOUS at 11:17

## 2024-01-05 ASSESSMENT — ENCOUNTER SYMPTOMS
ABDOMINAL PAIN: 0
HEADACHES: 1
NECK PAIN: 0
DIZZINESS: 0
VOMITING: 0
NAUSEA: 0

## 2024-01-05 ASSESSMENT — ACTIVITIES OF DAILY LIVING (ADL): ADLS_ACUITY_SCORE: 35

## 2024-01-05 NOTE — DISCHARGE INSTRUCTIONS
The CT scan of your head and neck both appear reassuring.  Continue using Tylenol or other home pain medications as needed for any minor headaches.  Follow-up with your primary care provider for reevaluation or return back to ED sooner for any new or concerning symptoms.

## 2024-01-05 NOTE — ED PROVIDER NOTES
EMERGENCY DEPARTMENT ENCOUNTER      NAME: Cm Lundberg  AGE: 53 year old male  YOB: 1970  MRN: [id number]  EVALUATION DATE & TIME: 2024  9:39 AM    PCP: Leonid Caruso    ED PROVIDER: Khadar Hoover DO      Chief Complaint   Patient presents with    Fall         FINAL IMPRESSION:  1. Injury of head, initial encounter          ED COURSE & MEDICAL DECISION MAKIN-year-old male who is on Plavix was sent into the ED for evaluation after spending a fall with resulting head injury a few days ago.  Patient reports a mild headache but he denies any other trauma or injury.  He denies any dizziness or associated nausea or vomiting.  Here in the ED the patient was hemodynamically stable upon arrival.  He did not appear to be in any obvious distress or discomfort at the time of his initial evaluation.  The patient's physical exam was unremarkable.  He did not have any signs of trauma or injury.      CT scan of the head and cervical spine were both nondiagnostic.    The patient was reevaluated and informed of the reassuring imaging results.  He was given a dose of Toradol for his headache.  The patient felt comfortable returning home.  He was instructed to continue his over-the-counter Tylenol as needed for any further headaches.  The patient was instructed to follow-up with his primary care provider for reevaluation or to return back to ED sooner for any new or concerning symptoms.    Pertinent Labs & Imaging studies reviewed. (See chart for details)  9:52 AM I met with the patient to gather history and to perform my initial exam. We discussed plans for the ED course, including diagnostic testing and treatment.       At the conclusion of the encounter I discussed the results of all of the tests and the disposition. The questions were answered. The patient or family acknowledged understanding and was agreeable with the care plan.     Medical Decision Making    History:  Supplemental history from:  Documented in chart  External Record(s) reviewed: Documented in chart    Work Up:  Chart documentation includes differential considered and any EKGs or imaging independently interpreted by provider, where specified.  In additional to work up documented, I considered the following work up: Documented in chart, if applicable.    External consultation:  Discussion of management with another provider: Documented in chart, if applicable    Complicating factors:  Care impacted by chronic illness: Anticoagulated State, Diabetes, Heart Disease, Hyperlipidemia, and Hypertension  Care affected by social determinants of health: N/A    Disposition considerations: Discharge. No recommendations on prescription strength medication(s). See documentation for any additional details.        0 minutes of critical care time   PPE worn: n95 mask, goggles    MEDICATIONS GIVEN IN THE EMERGENCY:  Medications   ketorolac (TORADOL) injection 15 mg (15 mg Intravenous $Given 1/5/24 1117)         NEW PRESCRIPTIONS STARTED AT TODAY'S ER VISIT  Discharge Medication List as of 1/5/2024 12:05 PM             =================================================================    HPI    Patient information was obtained from: patient     Use of : N/A         Cm Lundberg is a 53 year old male with a pertinent history of CAD, T2D, hypertension, GERD, hyperlipidemia, anticoagulated state who presents to this ED by EMS for evaluation of fall. Patient reports he got his foot stuck under the wheelchair a few days ago and fell, hitting his head. Denies LOC. Currently endorses head pain, and right lower extremity pain. Patient has not taken any medications for his headache. He was dizzy after the initial incident but not currently. The patient saw his PCP today who advised him to come to the ED. Denies nausea, vomiting, neck pain, abdominal pain, chest pain. He is on Plavix.       REVIEW OF SYSTEMS   Review of Systems   Cardiovascular:  Negative  for chest pain.   Gastrointestinal:  Negative for abdominal pain, nausea and vomiting.   Musculoskeletal:  Negative for neck pain.        Positive for right lower extremity pain   Neurological:  Positive for headaches. Negative for dizziness.        Negative for LOC.         PAST MEDICAL HISTORY:  Past Medical History:   Diagnosis Date    Anemia     Anemia, unspecified type 08/19/2016    Anxiety     Anxiety state 07/29/2021    Formatting of this note might be different from the original. Created by Conversion  Replacement Utility updated for latest IMO load    Arthritis     Atrial fibrillation and flutter (H)     Cerebral artery occlusion with cerebral infarction (H)     Cerebral infarction (H)     Chest pain, unspecified type 08/19/2016    Chronic atrial fibrillation (H)     Congestive heart failure (H)     Coronary artery disease     CABG with multiple stents    Coronary artery disease involving native coronary artery without angina pectoris, unspecified whether native or transplanted heart 08/19/2016    Coronary atherosclerosis 07/29/2021    Formatting of this note might be different from the original. Created by Conversion  Replacement Utility updated for latest IMO load    Depression     Diabetic ulcer of right midfoot associated with type 2 diabetes mellitus, limited to breakdown of skin (H) 09/28/2017    Equinovarus acquired deformity, right 04/14/2021    Equinovarus deformity, acquired, right     Essential hypertension 08/19/2016    GERD (gastroesophageal reflux disease)     Heart attack (H)     History of blood transfusion     Hyperlipidemia     Hyperlipidemia, unspecified hyperlipidemia type 08/19/2016    Hypertension     Insomnia, unspecified type 08/19/2016    Ischemic cardiomyopathy     Late effects of cerebrovascular accident 08/19/2016    Major depressive disorder with single episode, remission status unspecified 08/19/2016    Microalbuminuria 07/29/2021    Formatting of this note might be different  from the original. Created by Conversion    Migraine     Mild intellectual disabilities 08/19/2016    Myalgia     Nausea 08/19/2016    Osteomyelitis (H)     PONV (postoperative nausea and vomiting)     mild nausea    Problems with learning 07/29/2021    Formatting of this note might be different from the original. Created by Conversion [facility] Annotation: Feb 11 2010  1:41PM - Leonid Cabral: CANNOT READ OR  WRITE    PVD (peripheral vascular disease) (H)     Stented coronary artery     Type 2 diabetes mellitus (H) 07/29/2021    Formatting of this note might be different from the original. Created by Conversion    Type II or unspecified type diabetes mellitus with ophthalmic manifestations, not stated as uncontrolled(250.50) (H) 07/29/2021    Formatting of this note might be different from the original. Created by Conversion    Weakness 08/19/2016       PAST SURGICAL HISTORY:  Past Surgical History:   Procedure Laterality Date    AMPUTATE LEG BELOW KNEE Right 8/5/2022    Procedure: AMPUTATION, BELOW KNEE, right leg;  Surgeon: Kim Montes MD;  Location: [facility] OR    INCISION AND DRAINAGE LOWER EXTREMITY, COMBINED Right 12/17/2021    Procedure: INCISION AND DRAINAGE, right foot;  Surgeon: Alex Kim DPM;  Location: [facility] OR    INCISION AND DRAINAGE LOWER EXTREMITY, COMBINED Right 1/27/2022    Procedure: INCISION AND DRAINAGE, right foot with application of theraskin;  Surgeon: Alex Kim DPM;  Location: [facility] OR    INCISION AND DRAINAGE LOWER EXTREMITY, COMBINED Right 4/4/2022    Procedure: INCISION AND DRAINAGE, right foot with application of theraskin;  Surgeon: Alex Kim DPM;  Location: [facility] OR    IR LOWER EXTREMITY ANGIOGRAM RIGHT  6/29/2022    IR MISCELLANEOUS PROCEDURE  2/13/2004    IRRIGATION AND DEBRIDEMENT LOWER EXTREMITY, COMBINED Right 8/24/2022    Procedure: RIGHT IRRIGATION AND DEBRIDEMENT, LOWER EXTREMITY, REVISION OF BELOW KNEE  AMPUTATION;  Surgeon: Kim Montes MD;  Location: [facility] OR    IRRIGATION AND DEBRIDEMENT LOWER EXTREMITY, COMBINED Right 8/26/2022    Procedure: WASHOUT AND PARTIAL CLOSURE OF RIGHT BELOW THE KNEE AMPUTATION,  AND WOUND VAC APPLICATION;  Surgeon: Kim Montes MD;  Location: [facility] OR    PICC DOUBLE LUMEN PLACEMENT  8/29/2022         ZZC CABG, VEIN, SINGLE      Description: CABG (CABG);  Recorded: 04/22/2009;           CURRENT MEDICATIONS:    acetaminophen (TYLENOL) 325 MG tablet  acetaminophen (TYLENOL) 325 MG tablet  ANTI-DANDRUFF 1 % SHAM lotion  ARIPiprazole (ABILIFY) 2 MG tablet  ASPERCREME ORIGINAL 10 % external cream  atorvastatin (LIPITOR) 80 MG tablet  bisacodyl (DULCOLAX) 10 MG suppository  cholestyramine (QUESTRAN) 4 g packet  clopidogrel (PLAVIX) 75 MG tablet  DULoxetine (CYMBALTA) 60 MG capsule  erenumab-aooe (AIMOVIG, 140 MG DOSE,) 70 MG/ML injection  ferrous sulfate (FEROSUL) 325 (65 Fe) MG tablet  gabapentin (NEURONTIN) 100 MG capsule  gabapentin (NEURONTIN) 300 MG capsule  glipiZIDE (GLUCOTROL) 5 MG tablet  HYDROcodone-acetaminophen (NORCO) 5-325 MG tablet  loperamide (IMODIUM) 2 MG capsule  losartan (COZAAR) 25 MG tablet  magnesium chloride 535 (64 Mg) MG TBEC CR tablet  metFORMIN (GLUCOPHAGE) 500 MG tablet  metoprolol succinate ER (TOPROL-XL) 25 MG 24 hr tablet  mineral oil-hydrophilic petrolatum (AQUAPHOR) external ointment  mirtazapine (REMERON) 7.5 MG tablet  Nitroglycerin (NITROSTAT SL)  pantoprazole (PROTONIX) 40 MG EC tablet  polyethylene glycol (MIRALAX) 17 GM/Dose powder  senna-docusate (SENOKOT-S/PERICOLACE) 8.6-50 MG tablet  sodium chloride 0.9%, bottle, 0.9 % irrigation  traZODone (DESYREL) 50 MG tablet  vitamin C (ASCORBIC ACID) 500 MG tablet  XARELTO ANTICOAGULANT 20 MG TABS tablet        ALLERGIES:  Allergies   Allergen Reactions    Aspirin Other (See Comments)     Reacts with migraine medicine    Blood-Group Specific Substance      Anti-Fya present.  Expect delays in  blood for transfusion.  Draw 2 lavender and 1 red for all type and screen orders.    Isosorbide Headache    Codeine Rash    Morphine Rash       FAMILY HISTORY:  Family History   Adopted: Yes       SOCIAL HISTORY:   Social History     Socioeconomic History    Marital status:    Tobacco Use    Smoking status: Former     Types: Cigarettes     Quit date: 2015     Years since quittin.9    Smokeless tobacco: Never   Vaping Use    Vaping Use: Never used   Substance and Sexual Activity    Alcohol use: Not Currently    Drug use: Not Currently       VITALS:  BP (!) 143/83   Pulse 90   Temp 98.2  F (36.8  C)   Resp 20   SpO2 98%     PHYSICAL EXAM    General Presentation: No apparent distress.  Pulmonary: Spontaneous respiration. No respiratory distress. Clear equal breath sounds. Airway patent. Speech is normal. No stridor. Grossly stable chest wall. No crepitus. No chest wall tenderness to palpation noted.  Circulatory: Regular rate and rhythm. No murmurs, rubs, or gallops. Normal capillary refill.   Abdominal: Abdomen soft, non-tender and non-distended. No peritoneal signs. No flank tenderness. Normal bowel sounds. Pelvis stable/non-tender.   Neurologic: Alert and awake. Cranial nerves II-XII grossly intact.  No gross motor deficit. No gross sensory deficit. Normal upper extremity and lower extremity strength. [place] Coma Score 15.  Spine: No bony tenderness to palpation in the cervical spine, thoracic spine, lumbar spine, or sacrum.     Upper extremities:  Full range of motion. No tenderness to palpation.  Pulses 2/4 bilateral upper extremities . No wounds, abrasions, lacerations, or contusions noted.   Lower extremities:  Below knee amputation on right side, no tenderness to palpation on right leg. Full range of motion. Pulses 2/4 bilateral lower extremities . No wounds, abrasions, lacerations, or contusions noted.   Skin: Head/scalp non tender. No wounds, abrasions, lacerations, or contusions of  head/scalp, chest, back, abdomen, flank or pelvis.          LAB:  All pertinent labs reviewed and interpreted.  Results for orders placed or performed during the hospital encounter of 01/05/24   Head CT w/o contrast    Impression    IMPRESSION:  1.  No intracranial hemorrhage, mass lesions, hydrocephalus or CT evidence for an acute infarct.  2.  Mild diffuse cerebral parenchymal volume loss. Presumed chronic hypertensive/microvascular ischemic white matter changes.  3.  Chronic lacunae as detailed above.  4.  Subtotal chronic mucosal thickening of the left maxillary sinus.    CT Cervical Spine w/o Contrast    Impression    IMPRESSION:  1.  Mild reversal of the normal cervical lordosis, nonspecific. It can be seen in muscle spasm.  2.  No fracture or posttraumatic subluxation.         RADIOLOGY:  Reviewed all pertinent imaging. Please see official radiology report.  CT Cervical Spine w/o Contrast   Final Result   IMPRESSION:   1.  Mild reversal of the normal cervical lordosis, nonspecific. It can be seen in muscle spasm.   2.  No fracture or posttraumatic subluxation.         Head CT w/o contrast   Final Result   IMPRESSION:   1.  No intracranial hemorrhage, mass lesions, hydrocephalus or CT evidence for an acute infarct.   2.  Mild diffuse cerebral parenchymal volume loss. Presumed chronic hypertensive/microvascular ischemic white matter changes.   3.  Chronic lacunae as detailed above.   4.  Subtotal chronic mucosal thickening of the left maxillary sinus.           [facility] Documentation:   CMS Diagnoses:               I, Luis Enrique Castillo  , am serving as a scribe to document services personally performed by Khadar Hoover DO based on my observation and the provider's statements to me. I, Khadar Hoover, attest that Luis Enrique Castillo  is acting in a scribe capacity, has observed my performance of the services and has documented them in accordance with my direction.    Khadar Hoover DO  Emergency Medicine  [facility]  [facility] EMERGENCY DEPARTMENT  [address] [place] [id number]  [phone]       Khadar Hoover DO  01/05/24 1508

## 2024-01-05 NOTE — ED NOTES
[facility]. Mother JewlesRAINER brandonJACQUELINE notified of pt's discharge. Report called to Tyesha, [facility].

## 2024-01-05 NOTE — ED NOTES
Bed: JNEDH-I  Expected date: 1/5/24  Expected time: 9:20 AM  Means of arrival:   Comments:  Fall/ on thinners/allina

## 2024-01-26 ENCOUNTER — HOSPITAL ENCOUNTER (EMERGENCY)
Facility: HOSPITAL | Age: 54
Discharge: HOME | End: 2024-01-26
Attending: EMERGENCY MEDICINE | Admitting: EMERGENCY MEDICINE
Payer: MEDICARE

## 2024-01-26 ENCOUNTER — APPOINTMENT (OUTPATIENT)
Dept: RADIOLOGY | Facility: HOSPITAL | Age: 54
End: 2024-01-26
Attending: EMERGENCY MEDICINE
Payer: MEDICARE

## 2024-01-26 VITALS
HEART RATE: 80 BPM | RESPIRATION RATE: 18 BRPM | HEIGHT: 63 IN | BODY MASS INDEX: 26.58 KG/M2 | DIASTOLIC BLOOD PRESSURE: 73 MMHG | SYSTOLIC BLOOD PRESSURE: 128 MMHG | OXYGEN SATURATION: 95 % | WEIGHT: 150 LBS | TEMPERATURE: 97.9 F

## 2024-01-26 DIAGNOSIS — R07.9 CHEST PAIN, UNSPECIFIED TYPE: ICD-10-CM

## 2024-01-26 LAB
ANION GAP SERPL CALCULATED.3IONS-SCNC: 9 MMOL/L (ref 7–15)
BASOPHILS # BLD AUTO: 0.1 10E3/UL (ref 0–0.2)
BASOPHILS NFR BLD AUTO: 2 %
BUN SERPL-MCNC: 24.1 MG/DL (ref 6–20)
CALCIUM SERPL-MCNC: 9.4 MG/DL (ref 8.6–10)
CHLORIDE SERPL-SCNC: 103 MMOL/L (ref 98–107)
CREAT SERPL-MCNC: 0.98 MG/DL (ref 0.67–1.17)
DEPRECATED HCO3 PLAS-SCNC: 27 MMOL/L (ref 22–29)
EGFRCR SERPLBLD CKD-EPI 2021: >90 ML/MIN/1.73M2
EOSINOPHIL # BLD AUTO: 0.3 10E3/UL (ref 0–0.7)
EOSINOPHIL NFR BLD AUTO: 5 %
ERYTHROCYTE [DISTWIDTH] IN BLOOD BY AUTOMATED COUNT: 13 % (ref 10–15)
GLUCOSE SERPL-MCNC: 207 MG/DL (ref 70–99)
HCT VFR BLD AUTO: 39.2 % (ref 40–53)
HGB BLD-MCNC: 12.4 G/DL (ref 13.3–17.7)
IMM GRANULOCYTES # BLD: 0 10E3/UL
IMM GRANULOCYTES NFR BLD: 0 %
LYMPHOCYTES # BLD AUTO: 1.3 10E3/UL (ref 0.8–5.3)
LYMPHOCYTES NFR BLD AUTO: 23 %
MCH RBC QN AUTO: 27.2 PG (ref 26.5–33)
MCHC RBC AUTO-ENTMCNC: 31.6 G/DL (ref 31.5–36.5)
MCV RBC AUTO: 86 FL (ref 78–100)
MONOCYTES # BLD AUTO: 0.4 10E3/UL (ref 0–1.3)
MONOCYTES NFR BLD AUTO: 6 %
NEUTROPHILS # BLD AUTO: 3.6 10E3/UL (ref 1.6–8.3)
NEUTROPHILS NFR BLD AUTO: 64 %
NRBC # BLD AUTO: 0 10E3/UL
NRBC BLD AUTO-RTO: 0 /100
PLATELET # BLD AUTO: 148 10E3/UL (ref 150–450)
POTASSIUM SERPL-SCNC: 4.6 MMOL/L (ref 3.4–5.3)
RBC # BLD AUTO: 4.56 10E6/UL (ref 4.4–5.9)
SODIUM SERPL-SCNC: 139 MMOL/L (ref 135–145)
TROPONIN T SERPL HS-MCNC: 18 NG/L
TROPONIN T SERPL HS-MCNC: 19 NG/L
WBC # BLD AUTO: 5.6 10E3/UL (ref 4–11)

## 2024-01-26 PROCEDURE — 96374 THER/PROPH/DIAG INJ IV PUSH: CPT

## 2024-01-26 PROCEDURE — 71046 X-RAY EXAM CHEST 2 VIEWS: CPT

## 2024-01-26 PROCEDURE — 93005 ELECTROCARDIOGRAM TRACING: CPT | Performed by: EMERGENCY MEDICINE

## 2024-01-26 PROCEDURE — 99285 EMERGENCY DEPT VISIT HI MDM: CPT | Mod: 25

## 2024-01-26 PROCEDURE — 84484 ASSAY OF TROPONIN QUANT: CPT | Performed by: EMERGENCY MEDICINE

## 2024-01-26 PROCEDURE — 36415 COLL VENOUS BLD VENIPUNCTURE: CPT | Performed by: EMERGENCY MEDICINE

## 2024-01-26 PROCEDURE — 85025 COMPLETE CBC W/AUTO DIFF WBC: CPT | Performed by: EMERGENCY MEDICINE

## 2024-01-26 PROCEDURE — 80048 BASIC METABOLIC PNL TOTAL CA: CPT | Performed by: EMERGENCY MEDICINE

## 2024-01-26 PROCEDURE — 250N000011 HC RX IP 250 OP 636: Performed by: EMERGENCY MEDICINE

## 2024-01-26 RX ORDER — NITROGLYCERIN 0.4 MG/1
TABLET SUBLINGUAL
Qty: 30 TABLET | Refills: 0 | Status: SHIPPED | OUTPATIENT
Start: 2024-01-26

## 2024-01-26 RX ORDER — FENTANYL CITRATE 50 UG/ML
50 INJECTION, SOLUTION INTRAMUSCULAR; INTRAVENOUS ONCE
Status: COMPLETED | OUTPATIENT
Start: 2024-01-26 | End: 2024-01-26

## 2024-01-26 RX ADMIN — FENTANYL CITRATE 50 MCG: 50 INJECTION, SOLUTION INTRAMUSCULAR; INTRAVENOUS at 10:10

## 2024-01-26 ASSESSMENT — ACTIVITIES OF DAILY LIVING (ADL)
ADLS_ACUITY_SCORE: 35
ADLS_ACUITY_SCORE: 35

## 2024-01-26 NOTE — ED PROVIDER NOTES
EMERGENCY DEPARTMENT ENCOUNTER      NAME: Cm Lundberg  AGE: 53 year old male  YOB: 1970  MRN: [id number]  EVALUATION DATE & TIME: 1/26/2024  9:30 AM    PCP: Esperanza Tovar    ED PROVIDER: Effie Winters MD    Chief Complaint   Patient presents with    Chest Pain         FINAL IMPRESSION:  1. Chest pain, unspecified type          ED COURSE & MEDICAL DECISION MAKING:    Pertinent Labs & Imaging studies reviewed. (See chart for details)  53 year old male with history of HTN, HLD, CAD with previous CABG and subsequent PCI, DM, previous CVA with resultant right-sided hemiplegia and right BKA, atrial flutter and ischemic cardiomyopathy who presents to the Emergency Department for evaluation of chest pain today radiating down the right arm.  Differential includes ACS, atypical chest pain.  Patient does not have any associated shortness of breath, pleuritic component and my concern for pulmonary embolism is low.  No ripping tearing sensation or radiation to the back with hypertension to suspect dissection.  Pain is slightly reproducible with palpation on examination, question musculoskeletal etiology.    Patient placed on monitor, IV established and obtained.  Twelve-lead EKG shows sinus rhythm.  Shows sinus rhythm with anterior septal infarct and J-point elevation in the anterior leads more prominent compared to his previous.  Patient had received aspirin, nitroglycerin x 3 at his care facility and nitroglycerin x 3 with EMS and really has not had any change in his pain.  Was given 50 mcg of fentanyl given morphine allergy.  CBC, BMP notable for blood glucose of 207 and known diabetic.  Troponin normal for gender at 18.  Patient has had greater than 6 hours of pain, but out of an abundance of caution we did obtain a delta troponin given his cardiac history and this is not changing or evolving.  I do think pain seems atypical given reassuring ED workup above and I do think patient is safe for discharge  back to his care facility.  Mother called, and noted that with the nitro x 3 at his care facility patient used up his nitroglycerin and is requesting refill.  It does not appear as though he has seen cardiology in some time, several years, so was referred back to rapid access cardiology clinic and warning signs return to ED discussed.      ED Course as of 01/26/24 1258   Fri Jan 26, 2024   1014 Troponin T, High Sensitivity: 18  Gender normal   1027 Chest XR,  PA & LAT  Chest x-ray independently interpreted by myself without cardiomegaly infiltrate or effusion   1249 Mother called stating that patient's nitroglycerin is out and they would like a refill       Medical Decision Making    History:  Supplemental history from: Documented in chart and EMS  External Record(s) reviewed: Documented in chart and Outpatient Record: 7/19/21 visit [facility], 9/29/22 echocardiogram    Work Up:  Chart documentation includes differential considered and any EKGs or imaging independently interpreted by provider, see MDM  In additional to work up documented, I considered the following work up: see MDM    External consultation:  Discussion of management with another provider: N/A    Complicating factors:  Care impacted by chronic illness: Anticoagulated State, Cerebrovascular Disease, Diabetes, Heart Disease, Hyperlipidemia, Hypertension, and Mental Health  Care affected by social determinants of health: Access to care referred to ED by care facility    Disposition considerations: Discharge. I prescribed additional prescription strength medication(s) as charted. I considered admission, but ultimately discharged patient after reassuring ED workup.        At the conclusion of the encounter I discussed the results of all of the tests and the disposition. The questions were answered. The patient or family acknowledged understanding and was agreeable with the care plan.    MEDICATIONS GIVEN IN THE EMERGENCY:  Medications   fentaNYL (PF)  (SUBLIMAZE) injection 50 mcg (50 mcg Intravenous $Given 1/26/24 1010)       NEW PRESCRIPTIONS STARTED AT TODAY'S ER VISIT  New Prescriptions    NITROGLYCERIN (NITROSTAT) 0.4 MG SUBLINGUAL TABLET    For chest pain place 1 tablet under the tongue every 5 minutes for 3 doses. If symptoms persist 5 minutes after 1st dose call 911.          =================================================================    HPI    Patient information was obtained from: Patient    Use of Intrepreter: N/A        Cm Lundberg is a 53 year old male with pertinent medical history of hypertension, hyperlipidemia, type 2 diabetes mellitus, CAD, ischemic cardiomyopathy, CVA, and depression who presents via EMS for evaluation of chest pain.    Per chart review: Patient's last Cardiology visit was on 7/19/2021 (3 years ago) at [facility] with very early onset CAD, MI at age 33, CABG at age 39, and multiple subsequent PCI's. He had a stroke in 2016 and has since been hemapheretic on the right. Echocardiogram on 9/30/22 (2 years ago) with mildly reduced EF of 40-45%.    Patient reports constant left chest pain radiating into his right arm since 0300 today, which he says feels like an MI. He endorses lightheadedness, as well as nausea after eating breakfast. Patient denies any dizziness, shortness of breath, or diaphoresis. He says his assisted living facility gave him aspirin today and 3 doses of sublingual nitroglycerin but with no improvement. He reports less pain after being given 3 doses of nitroglycerin en route by EMS. Patient rates his current pain 6/10. He mentions a history of 16 stents.         PAST MEDICAL HISTORY:  Past Medical History:   Diagnosis Date    Anemia     Anemia, unspecified type 08/19/2016    Anxiety     Anxiety state 07/29/2021    Formatting of this note might be different from the original. Created by Conversion  Replacement Utility updated for latest IMO load    Arthritis     Atrial fibrillation and flutter  (H)     Cerebral artery occlusion with cerebral infarction (H)     Cerebral infarction (H)     Chest pain, unspecified type 08/19/2016    Chronic atrial fibrillation (H)     Congestive heart failure (H)     Coronary artery disease     CABG with multiple stents    Coronary artery disease involving native coronary artery without angina pectoris, unspecified whether native or transplanted heart 08/19/2016    Coronary atherosclerosis 07/29/2021    Formatting of this note might be different from the original. Created by Conversion  Replacement Utility updated for latest IMO load    Depression     Diabetic ulcer of right midfoot associated with type 2 diabetes mellitus, limited to breakdown of skin (H) 09/28/2017    Equinovarus acquired deformity, right 04/14/2021    Equinovarus deformity, acquired, right     Essential hypertension 08/19/2016    GERD (gastroesophageal reflux disease)     Heart attack (H)     History of blood transfusion     Hyperlipidemia     Hyperlipidemia, unspecified hyperlipidemia type 08/19/2016    Hypertension     Insomnia, unspecified type 08/19/2016    Ischemic cardiomyopathy     Late effects of cerebrovascular accident 08/19/2016    Major depressive disorder with single episode, remission status unspecified 08/19/2016    Microalbuminuria 07/29/2021    Formatting of this note might be different from the original. Created by Conversion    Migraine     Mild intellectual disabilities 08/19/2016    Myalgia     Nausea 08/19/2016    Osteomyelitis (H)     PONV (postoperative nausea and vomiting)     mild nausea    Problems with learning 07/29/2021    Formatting of this note might be different from the original. Created by Conversion [facility] Annotation: Feb 11 2010  1:41PM - Leonid Cabral: CANNOT READ OR  WRITE    PVD (peripheral vascular disease) (H)     Stented coronary artery     Type 2 diabetes mellitus (H) 07/29/2021    Formatting of this note might be different from the original. Created by  Conversion    Type II or unspecified type diabetes mellitus with ophthalmic manifestations, not stated as uncontrolled(250.50) (H) 07/29/2021    Formatting of this note might be different from the original. Created by Conversion    Weakness 08/19/2016       PAST SURGICAL HISTORY:  Past Surgical History:   Procedure Laterality Date    AMPUTATE LEG BELOW KNEE Right 8/5/2022    Procedure: AMPUTATION, BELOW KNEE, right leg;  Surgeon: Kim Montes MD;  Location: [facility] OR    INCISION AND DRAINAGE LOWER EXTREMITY, COMBINED Right 12/17/2021    Procedure: INCISION AND DRAINAGE, right foot;  Surgeon: Alex Kim DPM;  Location: [facility] OR    INCISION AND DRAINAGE LOWER EXTREMITY, COMBINED Right 1/27/2022    Procedure: INCISION AND DRAINAGE, right foot with application of theraskin;  Surgeon: Alex Kim DPM;  Location: [facility] OR    INCISION AND DRAINAGE LOWER EXTREMITY, COMBINED Right 4/4/2022    Procedure: INCISION AND DRAINAGE, right foot with application of theraskin;  Surgeon: Alex Kim DPM;  Location: [facility] OR    IR LOWER EXTREMITY ANGIOGRAM RIGHT  6/29/2022    IR MISCELLANEOUS PROCEDURE  2/13/2004    IRRIGATION AND DEBRIDEMENT LOWER EXTREMITY, COMBINED Right 8/24/2022    Procedure: RIGHT IRRIGATION AND DEBRIDEMENT, LOWER EXTREMITY, REVISION OF BELOW KNEE AMPUTATION;  Surgeon: Kim Montes MD;  Location: [facility] OR    IRRIGATION AND DEBRIDEMENT LOWER EXTREMITY, COMBINED Right 8/26/2022    Procedure: WASHOUT AND PARTIAL CLOSURE OF RIGHT BELOW THE KNEE AMPUTATION,  AND WOUND VAC APPLICATION;  Surgeon: Kim Montes MD;  Location: [facility] OR    PICC DOUBLE LUMEN PLACEMENT  8/29/2022         ZZC CABG, VEIN, SINGLE      Description: CABG (CABG);  Recorded: 04/22/2009;       CURRENT MEDICATIONS:    Prior to Admission Medications   Prescriptions Last Dose Informant Patient Reported? Taking?   ANTI-DANDRUFF 1 % SHAM lotion   Yes No   Sig: Apply 1  Application topically twice a week   ARIPiprazole (ABILIFY) 2 MG tablet   Yes No   Si mg daily    ASPERCREME ORIGINAL 10 % external cream   Yes No   Sig: Apply topically 2 times daily as needed for other (back pain)   DULoxetine (CYMBALTA) 60 MG capsule   Yes No   Sig: Take 120 mg by mouth daily   HYDROcodone-acetaminophen (NORCO) 5-325 MG tablet   No No   Sig: Take 1-2 tablets by mouth every 6 hours as needed for moderate to severe pain   Patient taking differently: Take 1 tablet by mouth every 4 hours as needed for moderate to severe pain   Nitroglycerin (NITROSTAT SL)   Yes No   Sig: Place 0.4 mg under the tongue every 5 minutes as needed for chest pain    XARELTO ANTICOAGULANT 20 MG TABS tablet   Yes No   Sig: Take 20 mg by mouth daily (with breakfast)   acetaminophen (TYLENOL) 325 MG tablet   Yes No   Sig: Take 650 mg by mouth 2 times daily   acetaminophen (TYLENOL) 325 MG tablet   Yes No   Sig: Take 650 mg by mouth every 6 hours as needed for fever or pain   atorvastatin (LIPITOR) 80 MG tablet   Yes No   Sig: Take 80 mg by mouth daily    bisacodyl (DULCOLAX) 10 MG suppository   Yes No   Sig: Place 10 mg rectally daily as needed for constipation   cholestyramine (QUESTRAN) 4 g packet   Yes No   Sig: Take 1 packet by mouth every other day    clopidogrel (PLAVIX) 75 MG tablet   No No   Sig: Take 1 tablet (75 mg) by mouth daily Start taking medication the day after the procedure.   erenumab-aooe (AIMOVIG, 140 MG DOSE,) 70 MG/ML injection   Yes No   Sig: Inject 140 mg Subcutaneous every 30 days   ferrous sulfate (FEROSUL) 325 (65 Fe) MG tablet   Yes No   Sig: Take 325 mg by mouth daily (with breakfast)    gabapentin (NEURONTIN) 100 MG capsule   Yes No   Sig: Take 100 mg by mouth 2 times daily (before meals)   gabapentin (NEURONTIN) 300 MG capsule   Yes No   Sig: Take 600 mg by mouth At Bedtime   glipiZIDE (GLUCOTROL) 5 MG tablet   Yes No   Sig: Take 2.5 mg by mouth daily before breakfast   loperamide (IMODIUM)  2 MG capsule   Yes No   Sig: Take 2 mg by mouth 4 times daily as needed for diarrhea   losartan (COZAAR) 25 MG tablet   Yes No   Sig: Take 25 mg by mouth At Bedtime    magnesium chloride 535 (64 Mg) MG TBEC CR tablet   Yes No   Sig: Take 1,070 mg by mouth 2 times daily   metFORMIN (GLUCOPHAGE) 500 MG tablet   Yes No   Sig: Take 500 mg by mouth daily (with dinner)   metoprolol succinate ER (TOPROL-XL) 25 MG 24 hr tablet   Yes No   Sig: Take 75 mg by mouth daily    mineral oil-hydrophilic petrolatum (AQUAPHOR) external ointment   Yes No   Sig: Apply topically 3 times daily as needed for irritation or dry skin Apply to forehead and other areas as needed   mirtazapine (REMERON) 7.5 MG tablet   Yes No   Sig: mirtazapine 7.5 mg tablet   pantoprazole (PROTONIX) 40 MG EC tablet   Yes No   Sig: Take 40 mg by mouth daily before breakfast   polyethylene glycol (MIRALAX) 17 GM/Dose powder   Yes No   Sig: Take 1 packet by mouth daily as needed for constipation    senna-docusate (SENOKOT-S/PERICOLACE) 8.6-50 MG tablet   Yes No   Sig: Take 1 tablet by mouth 2 times daily   sodium chloride 0.9%, bottle, 0.9 % irrigation   Yes No   Sig: DILUTE 30ML OF HIBICLENS IN 500ML OF SALINE AND APPLY AS DIRECTED FOR DRESSING CHANGE THREE TIMES WEEKLY   traZODone (DESYREL) 50 MG tablet   Yes No   Sig: Take 50 mg by mouth At Bedtime    vitamin C (ASCORBIC ACID) 500 MG tablet   Yes No   Sig: Take 500 mg by mouth daily       Facility-Administered Medications: None       ALLERGIES:  Allergies   Allergen Reactions    Aspirin Other (See Comments)     Reacts with migraine medicine    Blood-Group Specific Substance      Anti-Fya present.  Expect delays in blood for transfusion.  Draw 2 lavender and 1 red for all type and screen orders.    Isosorbide Headache    Codeine Rash    Morphine Rash       FAMILY HISTORY:  Family History   Adopted: Yes       SOCIAL HISTORY:  Social History     Tobacco Use    Smoking status: Former     Types: Cigarettes     Quit  "date: 2015     Years since quittin.0    Smokeless tobacco: Never   Vaping Use    Vaping Use: Never used   Substance Use Topics    Alcohol use: Not Currently    Drug use: Not Currently        VITALS:  Patient Vitals for the past 24 hrs:   BP Temp Temp src Pulse Resp SpO2 Height Weight   24 1230 128/73 -- -- 80 18 95 % -- --   24 1015 128/68 -- -- 84 13 -- -- --   24 0957 138/73 -- -- 85 16 95 % -- --   24 0942 131/66 -- -- 91 21 94 % -- --   24 0931 128/73 97.9  F (36.6  C) Oral 95 22 93 % 1.6 m (5' 3\") 68 kg (150 lb)       PHYSICAL EXAM    General Appearance: Well-appearing, well-nourished, no acute distress, looks older than stated age  Head:  Normocephalic  ENT:  Lips, mucosa, and tongue normal; membranes are moist without pallor, edentulous  Neck:  Supple  Chest: Substernal chest pain slightly reproducible with palpation  Cardio:  Regular rate and rhythm, no murmur/gallop/rub, 2+ pulses symmetric in all extremities  Pulm:  No respiratory distress, clear to auscultation bilaterally  Abdomen:  Soft, non-tender  Extremities: Right BKA.  No peripheral edema  Neuro:  Alert and oriented ×3     RADIOLOGY/LABS:  Reviewed all pertinent imaging. Please see official radiology report. All pertinent labs reviewed and interpreted.    Results for orders placed or performed during the hospital encounter of 24   Chest XR,  PA & LAT    Impression    IMPRESSION: Heart size and pulmonary vascularity normal. Prior median sternotomy and CABG. The lungs are clear. Old distal right clavicle fracture.   Basic metabolic panel   Result Value Ref Range    Sodium 139 135 - 145 mmol/L    Potassium 4.6 3.4 - 5.3 mmol/L    Chloride 103 98 - 107 mmol/L    Carbon Dioxide (CO2) 27 22 - 29 mmol/L    Anion Gap 9 7 - 15 mmol/L    Urea Nitrogen 24.1 (H) 6.0 - 20.0 mg/dL    Creatinine 0.98 0.67 - 1.17 mg/dL    GFR Estimate >90 >60 mL/min/1.73m2    Calcium 9.4 8.6 - 10.0 mg/dL    Glucose 207 (H) 70 - 99 mg/dL "   Result Value Ref Range    Troponin T, High Sensitivity 18 <=22 ng/L   CBC with platelets and differential   Result Value Ref Range    WBC Count 5.6 4.0 - 11.0 10e3/uL    RBC Count 4.56 4.40 - 5.90 10e6/uL    Hemoglobin 12.4 (L) 13.3 - 17.7 g/dL    Hematocrit 39.2 (L) 40.0 - 53.0 %    MCV 86 78 - 100 fL    MCH 27.2 26.5 - 33.0 pg    MCHC 31.6 31.5 - 36.5 g/dL    RDW 13.0 10.0 - 15.0 %    Platelet Count 148 (L) 150 - 450 10e3/uL    % Neutrophils 64 %    % Lymphocytes 23 %    % Monocytes 6 %    % Eosinophils 5 %    % Basophils 2 %    % Immature Granulocytes 0 %    NRBCs per 100 WBC 0 <1 /100    Absolute Neutrophils 3.6 1.6 - 8.3 10e3/uL    Absolute Lymphocytes 1.3 0.8 - 5.3 10e3/uL    Absolute Monocytes 0.4 0.0 - 1.3 10e3/uL    Absolute Eosinophils 0.3 0.0 - 0.7 10e3/uL    Absolute Basophils 0.1 0.0 - 0.2 10e3/uL    Absolute Immature Granulocytes 0.0 <=0.4 10e3/uL    Absolute NRBCs 0.0 10e3/uL   Result Value Ref Range    Troponin T, High Sensitivity 19 <=22 ng/L       EKG:  Performed at: 09:34    Impression:   Rightward axis  Anteroseptal infarct (cited on or before 12/22/03)  J point elevation in anterior leads  Abnormal ECG    Rate: 93  Rhythm: Sinus  Axis: 90  MI Interval: 190  QRS Interval: 104  QTc Interval: 435  ST Changes: J-point elevation more prominent in Anterior leads  Comparison: 9/29/22  Nonspecific T wave abnormality has replaced inverted T waves in Lateral leads  I have independently reviewed and interpreted the EKG(s) documented above.      The creation of this record is based on the scribe s observations of the work being performed by Effie Winters MD and the provider s statements to them. It was created on her behalf by Fe Heredia, a trained medical scribe. This document has been checked and approved by the attending provider.    Effie Winters MD  Emergency Medicine  [facility] EMERGENCY DEPARTMENT  [address] [place] [address]  [id number]  [phone]  Dept: [phone]      Effie Winters MD  01/26/24 8997

## 2024-01-26 NOTE — DISCHARGE INSTRUCTIONS
Your EKG, blood work and chest x-ray today were normal.  There is nothing that would suggest any stress to the heart or heart attack today.  Would recommend follow-up in rapid access cardiology clinic, call to schedule follow-up appointment.  If there is any delay in follow-up your primary can certainly bridge you to this follow-up.  Return to the ER for the warning signs discussed.

## 2024-01-26 NOTE — ED TRIAGE NOTES
Pt presents with [facility] from group home with complaints of left anterior chest pain that radiates to RIGHT arm. Pt states he took his own Nitroglycerin X3 with no relief. EMS gave and relief obtained. Pt has right above knee amputation but does not have prosthetic with as he has a sore on his stump and has not been wearing it for a few days.     Triage Assessment (Adult)       Row Name 01/26/24 0933          Triage Assessment    Airway WDL WDL        Respiratory WDL    Respiratory WDL WDL        Skin Circulation/Temperature WDL    Skin Circulation/Temperature WDL WDL        Cardiac WDL    Cardiac WDL X;chest pain        Chest Pain Assessment    Chest Pain Location anterior chest, left     Chest Pain Radiation arm  right     Precipitating Factors nothing     Chest Pain Intervention cardiac monitor placed;12-lead ECG obtained        Peripheral/Neurovascular WDL    Peripheral Neurovascular WDL WDL        Cognitive/Neuro/Behavioral WDL    Cognitive/Neuro/Behavioral WDL WDL

## 2024-01-26 NOTE — ED NOTES
Bed: JNED-B  Expected date: 1/26/24  Expected time:   Means of arrival: Ambulance  Comments:  Mariam, 53M CP

## 2024-02-01 ENCOUNTER — DOCUMENTATION ONLY (OUTPATIENT)
Dept: OTHER | Facility: CLINIC | Age: 54
End: 2024-02-01
Payer: MEDICARE

## 2024-02-03 LAB
ATRIAL RATE - MUSE: 93 BPM
DIASTOLIC BLOOD PRESSURE - MUSE: 73 MMHG
INTERPRETATION ECG - MUSE: NORMAL
P AXIS - MUSE: 15 DEGREES
PR INTERVAL - MUSE: 190 MS
QRS DURATION - MUSE: 104 MS
QT - MUSE: 350 MS
QTC - MUSE: 435 MS
R AXIS - MUSE: 90 DEGREES
SYSTOLIC BLOOD PRESSURE - MUSE: 128 MMHG
T AXIS - MUSE: 13 DEGREES
VENTRICULAR RATE- MUSE: 93 BPM

## 2024-02-06 ENCOUNTER — MEDICAL CORRESPONDENCE (OUTPATIENT)
Dept: SCHEDULING | Facility: CLINIC | Age: 54
End: 2024-02-06
Payer: MEDICARE

## 2024-02-07 ENCOUNTER — TRANSCRIBE ORDERS (OUTPATIENT)
Dept: OTHER | Age: 54
End: 2024-02-07

## 2024-02-07 DIAGNOSIS — R13.10 DYSPHAGIA, UNSPECIFIED: Primary | ICD-10-CM

## 2024-02-26 ENCOUNTER — HOSPITAL ENCOUNTER (OUTPATIENT)
Dept: RADIOLOGY | Facility: HOSPITAL | Age: 54
Discharge: HOME | End: 2024-02-26
Attending: FAMILY MEDICINE
Payer: MEDICARE

## 2024-02-26 ENCOUNTER — HOSPITAL ENCOUNTER (OUTPATIENT)
Dept: SPEECH THERAPY | Facility: HOSPITAL | Age: 54
Setting detail: THERAPIES SERIES
Discharge: HOME | End: 2024-02-26
Attending: FAMILY MEDICINE
Payer: MEDICARE

## 2024-02-26 DIAGNOSIS — R13.10 DYSPHAGIA, UNSPECIFIED: ICD-10-CM

## 2024-02-26 PROCEDURE — 74220 X-RAY XM ESOPHAGUS 1CNTRST: CPT

## 2024-02-26 PROCEDURE — 74230 X-RAY XM SWLNG FUNCJ C+: CPT

## 2024-02-26 PROCEDURE — 92611 MOTION FLUOROSCOPY/SWALLOW: CPT | Mod: GN,KX | Performed by: SPEECH-LANGUAGE PATHOLOGIST

## 2024-02-26 NOTE — ADDENDUM NOTE
Encounter addended by: Sole Calvillo, SLP on: 2/26/2024 1:02 PM   Actions taken: Clinical Note Signed

## 2024-02-26 NOTE — PROGRESS NOTES
"SPEECH LANGUAGE PATHOLOGY EVALUATION    See electronic medical record for Abuse and Falls Screening details.    Subjective     Patient accompanied today by his brother.    Presenting condition or subjective complaint:  Per patient's mother (message left for SLP) patient has choked a couple of times. She reported patient eats a regular diet with staff cutting up food. She reported no coughing or choking with thin liquids that she has heard about. Patient today reported coughing/choking at times with foods, liquids and pills. He confirmed food is cut into approximately 1/2 inch size pieces (when modeled this size food piece). He reported reflux symptoms described as stomach pain around lunch time. He reported he eats dinner around 4:30 and usually does not eat again before bedtime around 8:30/9:00. He reported he neve uses a straw.    Date of onset:   2/26/2024    Relevant medical history:  Per cardiology office visit note 2/2/2024: \"Cm Lundberg is a 53 y.o. male with a history of very severe early onset coronary artery disease, with an acute MI at the age of 33, bypass surgery at the age of 39, and multiple subsequent PCI's. Somewhere around 2014 things stabilize somewhat, and although he has had admissions for chest discomfort he has not required further revascularization since then. In addition, he suffered a stroke in 2016 which was ultimately felt to be secondary to paroxysmal atrial flutter which was difficult to diagnose. He is now anticoagulated. He remains hemiparetic on the right side. He has hyperlipidemia and hypertension. He has type 2 diabetes and ischemic cardiomyopathy with prior ejection fraction around 45% back in 2019. He has mild intellectual disability although has been high functioning in the past, previously working until his stroke and since then he has been disabled and living in assisted living.\" PMH also includes GERD.     Dates & types of surgery:  Past Surgical History:  . " "Laterality Date  ANGIOPLASTY  multiple coronary stents  CABG 4-6-09  LIMA TO LAD SC TO DIAG SV TO PDA per dr guerrero's op note  cardiac stents  Hx Bilateral Eye Exam Under Anesthesia/ Left Retinal Detachment Repair/Vitrectomy / Scleral Buckle/ Membrane Peel/ Laser/ Silicone Oil / Avastin Injection Left 04/22/2019  Dr. Haines / Dr. Mendez  PARS PLANA VITRECTOMY Left 04/08/2019  membrane peel, air, laser, laser right eye  VITRECTOMY Left 12/09/2019  Dr. Haines/Fredy oil removal, laser      Prior diagnostic imaging/testing results:   Head CT 1/5/2024: \"No intracranial hemorrhage, mass lesions, hydrocephalus or CT evidence for an acute infarct. \"  CT cervical spine 1/5/2024: \"Mild reversal of the normal cervical lordosis, nonspecific. It can be seen in muscle spasm.\"  CXR 1/26/2024: \" Heart size and pulmonary vascularity normal. Prior median sternotomy and CABG. The lungs are clear. Old distal right clavicle fracture.\"     Living Environment  Social support:  Patient resides at [facility].     Patient goals for therapy: To better understand reason for coughing/choking with oral intake.     Pain assessment: None reported     Objective     SWALLOW EVALUTION  Dysphagia history: Video swallow study completed as inpatient at [facility] 3/21/2016 with no aspiration or laryngeal penetration. Patient had small amount of vallecular residue which cleared with liquid swallows. Noted reduced hyolaryngeal excursion and reduced epiglottic inversion. Recommended diet was equivalent of minced/moist textures and thin liquids at time of discharge.    Current Diet/Method of Nutritional Intake: Described as regular diet/thin liquids with staff cutting foods into small pieces (patient seemed to indicate approximately 1/2 inch size pieces).    Patient is edentulous.    VIDEOFLUOROSCOPIC SWALLOW STUDY  Radiologist: Dr. Hu Quinonez  Views Taken: left lateral   Physical location of procedure: [facility] " suites  Patient sitting upright on chair/stool     VFSS textures trialed:   VFSS Eval: Thin Liquids  Mode of Presentation: cup, spoon, self-fed, fed by clinician   Order of Presentation: 1 (spoon), 2, 5, 6 (cup)  Preparatory Phase: poor bolus control  Oral Phase: premature pharyngeal entry  Bolus Location When Swallow Initiated: posterior laryngeal surface of epiglottis, pyriforms  Pharyngeal Phase: impaired epiglottic movement, impaired pharyngoesophageal segment opening (trace-min BOT, vallecular, pyriform sinus residue). No remaining residue in laryngeal vestibule after one instance of possible minimal shallow flash laryngeal penetration.  Rosenbeck's Penetration Aspiration Scale: 2 - contrast enters airway, remains above the vocal cords, no residue remains (penetration)  Response to Aspiration:  no aspiration occurred  Strategies and Compensations: not applicable  Diagnostic Statement: No aspiration.    VFSS Eval: Purees  Mode of Presentation: spoon, fed by clinician   Order of Presentation: 3  Preparatory Phase: WFL  Oral Phase: WFL  Bolus Location When Swallow Initiated: valleculae  Pharyngeal Phase: impaired epiglottic movement, impaired pharyngoesophageal segment opening. Trace BOT, vallecular, pyriform sinus residue.  Rosenbeck s Penetration Aspiration Scale: 1 - no aspiration, contrast does not enter airway  Response to Aspiration:  no aspiration occurred  Strategies and Compensations: not applicable  Diagnostic Statement: No aspiration. No significant pharyngeal residue.    VFSS Eval: Minced & Moist  Mode of Presentation: spoon, fed by clinician   Order of Presentation: 4  Preparatory Phase:  mildly extended mastication  Oral Phase: impaired AP movement, mild  Bolus Location When Swallow Initiated: valleculae  Pharyngeal Phase: impaired epiglottic movement, impaired pharyngoesophageal segment opening, residue in valleculae, residue in pyriform sinus, (mild vallecular, minimal pyriform sinus, trace BOT  residue)  Rosenbeck s Penetration Aspiration Scale: 1 - no aspiration, contrast does not enter airway  Response to Aspiration:  no aspiration occurred  Strategies and Compensations: liquid wash  Diagnostic Statement: No aspiration. Mild pharyngeal residue cleared with subsequent liquid swallows.     Patient also swallowed barium tablet with water at end of study when seated upright in chair. No aspiration or penetration occurred and barium tablet passed through pharynx into esophagus without difficulty.    ESOPHAGEAL PHASE OF SWALLOW  please refer to radiologist's report for details  Esophagram completed after today's study.    Note cricopharyngeal prominence which did not impede bolus flow through UES.    SWALLOW ASSESSMENT CLINICAL IMPRESSIONS AND RATIONALE  Diet Consistency Recommendations: thin liquids (level 0), minced & moist (level 5)    Recommended Feeding/Eating Techniques: small bolus size, slow rate of intake, alternate food and liquid intake, maintain upright sitting position for eating, maintain upright posture during/after eating for 30 minutes, minimize distractions during oral intake, continue to avoid straws per patient preference.  Maintain reflux precautions (remain upright 30-60 minutes after oral intake, do not lay down 2-3 hours after meals, elevate head of bed 4-6 inches).  Medication Administration Recommendations: patient may try pills whole with sips of water or crushed with applesauce (if pills are crushable)    Minced/moist texture description:  Soft and moist, but with no liquid leaking/dripping from the food. Biting is not required. Minimal chewing required. Lumps of 4mm in size. Lumps can be mashed with the tongue. Food can be easily mashed with just a little pressure from a fork. Should be able to scoop food onto a fork, with no liquid dripping and no crumbles falling off the fork.      Assessment & Plan   CLINICAL IMPRESSIONS   Treatment Diagnosis:  Mild oropharyngeal  dysphagia  Impression/Assessment: Mild oropharyngeal dysphagia under fluoroscopy today characterized by mildly extended mastication of minced/moist solid texture (in setting of edentulous state), mildly delayed AP bolus transit, delayed swallow initiation, reduced epiglottic inversion and reduced UES function. These deficits resulted in possible minimal shallow flash laryngeal penetration of thin liquid (x1). No other instances of laryngeal penetration occurred. No aspiration occurred during today's study. Note mild pharyngeal residue with minced/moist texture which cleared with sips of thin liquid. Note cricopharyngeal prominence which did not impede bolus flow through UES. Esophagram completed after video swallow study today; please refer to radiologist report for details. See above for diet recommendations and swallow strategies.    PLAN OF CARE  Evaluation only.    Education Assessment: Provided written handouts with diet modification and swallow strategy recommendations to patient and caregiver (brother).       Risks and benefits of evaluation/treatment have been explained.   Patient/Family/caregiver agrees with Plan of Care.     Spoke with RN at [facility] to provide diet and swallow strategy recommendations.    Evaluation Time: 20 minutes       Signing Clinician: Sole Calvillo, SLP    [facility]                                                                                   OUTPATIENT SPEECH LANGUAGE PATHOLOGY    Sole Calvillo, SLP                         Referring Provider:  Esperanza Tovar    Initial Assessment  See Epic Evaluation-

## 2024-04-02 NOTE — TELEPHONE ENCOUNTER
Patient coming to vascular clinic on Monday, will have team set up appointment to see Dr. Kim to discuss surgery.      No No

## 2024-06-08 ENCOUNTER — HEALTH MAINTENANCE LETTER (OUTPATIENT)
Age: 54
End: 2024-06-08

## 2024-12-28 ENCOUNTER — HEALTH MAINTENANCE LETTER (OUTPATIENT)
Age: 54
End: 2024-12-28

## 2025-05-21 NOTE — TELEPHONE ENCOUNTER
Problem: Adult Inpatient Plan of Care  Goal: Plan of Care Review  Outcome: Progressing  Flowsheets (Taken 5/21/2025 1030)  Plan of Care Reviewed With: patient  Goal: Patient-Specific Goal (Individualized)  Outcome: Progressing  Flowsheets (Taken 5/21/2025 1030)  Individualized Care Needs: Blankets provided. walker within reach.  Anxieties, Fears or Concerns: pt is anxious stating especially when someone new taking care of him  Patient/Family-Specific Goals (Include Timeframe): Pt to tolerate Gamunex well today without adverse reactions  Goal: Optimal Comfort and Wellbeing  Outcome: Progressing  Intervention: Provide Person-Centered Care  Flowsheets (Taken 5/21/2025 1030)  Trust Relationship/Rapport:   care explained   choices provided   emotional support provided   empathic listening provided   questions answered   questions encouraged   reassurance provided   thoughts/feelings acknowledged      TCU calling to inform that the pt's WBC and ANC have dropped, ANC critical at 0.3. I discussed with Dr Mei, he advises that the pt have a repeated CBC with diff tomorrow and contact the clinic or MD when resulted. I called LM on Sol's VM informing of the above and to call with questions.

## 2025-06-15 ENCOUNTER — HEALTH MAINTENANCE LETTER (OUTPATIENT)
Age: 55
End: 2025-06-15

## 2025-06-19 ENCOUNTER — HOSPITAL ENCOUNTER (EMERGENCY)
Facility: HOSPITAL | Age: 55
Discharge: HOME | End: 2025-06-19
Attending: EMERGENCY MEDICINE
Payer: MEDICARE

## 2025-06-19 ENCOUNTER — APPOINTMENT (OUTPATIENT)
Dept: RADIOLOGY | Facility: HOSPITAL | Age: 55
End: 2025-06-19
Attending: EMERGENCY MEDICINE
Payer: MEDICARE

## 2025-06-19 VITALS
WEIGHT: 149 LBS | HEART RATE: 70 BPM | SYSTOLIC BLOOD PRESSURE: 123 MMHG | BODY MASS INDEX: 26.4 KG/M2 | DIASTOLIC BLOOD PRESSURE: 65 MMHG | HEIGHT: 63 IN | RESPIRATION RATE: 19 BRPM | TEMPERATURE: 98.8 F | OXYGEN SATURATION: 97 %

## 2025-06-19 DIAGNOSIS — R07.9 CHEST PAIN, UNSPECIFIED TYPE: ICD-10-CM

## 2025-06-19 LAB
ALBUMIN SERPL BCG-MCNC: 3.9 G/DL (ref 3.5–5.2)
ALP SERPL-CCNC: 97 U/L (ref 40–150)
ALT SERPL W P-5'-P-CCNC: 17 U/L (ref 0–70)
ANION GAP SERPL CALCULATED.3IONS-SCNC: 9 MMOL/L (ref 7–15)
AST SERPL W P-5'-P-CCNC: 19 U/L (ref 0–45)
BASOPHILS # BLD AUTO: 0.1 10E3/UL (ref 0–0.2)
BASOPHILS NFR BLD AUTO: 1 %
BILIRUB SERPL-MCNC: 0.3 MG/DL
BUN SERPL-MCNC: 18.5 MG/DL (ref 6–20)
CALCIUM SERPL-MCNC: 9.8 MG/DL (ref 8.8–10.4)
CHLORIDE SERPL-SCNC: 103 MMOL/L (ref 98–107)
CREAT SERPL-MCNC: 1.04 MG/DL (ref 0.67–1.17)
EGFRCR SERPLBLD CKD-EPI 2021: 84 ML/MIN/1.73M2
EOSINOPHIL # BLD AUTO: 0.2 10E3/UL (ref 0–0.7)
EOSINOPHIL NFR BLD AUTO: 3 %
ERYTHROCYTE [DISTWIDTH] IN BLOOD BY AUTOMATED COUNT: 13 % (ref 10–15)
GLUCOSE SERPL-MCNC: 206 MG/DL (ref 70–99)
HCO3 SERPL-SCNC: 26 MMOL/L (ref 22–29)
HCT VFR BLD AUTO: 35.4 % (ref 40–53)
HGB BLD-MCNC: 11.6 G/DL (ref 13.3–17.7)
IMM GRANULOCYTES # BLD: 0 10E3/UL
IMM GRANULOCYTES NFR BLD: 0 %
LIPASE SERPL-CCNC: 91 U/L (ref 13–60)
LYMPHOCYTES # BLD AUTO: 1.2 10E3/UL (ref 0.8–5.3)
LYMPHOCYTES NFR BLD AUTO: 26 %
MCH RBC QN AUTO: 28.5 PG (ref 26.5–33)
MCHC RBC AUTO-ENTMCNC: 32.8 G/DL (ref 31.5–36.5)
MCV RBC AUTO: 87 FL (ref 78–100)
MONOCYTES # BLD AUTO: 0.4 10E3/UL (ref 0–1.3)
MONOCYTES NFR BLD AUTO: 8 %
NEUTROPHILS # BLD AUTO: 3 10E3/UL (ref 1.6–8.3)
NEUTROPHILS NFR BLD AUTO: 61 %
NRBC # BLD AUTO: 0 10E3/UL
NRBC BLD AUTO-RTO: 0 /100
NT-PROBNP SERPL-MCNC: 162 PG/ML (ref 0–177)
PLATELET # BLD AUTO: 178 10E3/UL (ref 150–450)
POTASSIUM SERPL-SCNC: 4.4 MMOL/L (ref 3.4–5.3)
PROT SERPL-MCNC: 6.2 G/DL (ref 6.4–8.3)
RBC # BLD AUTO: 4.07 10E6/UL (ref 4.4–5.9)
SODIUM SERPL-SCNC: 138 MMOL/L (ref 135–145)
TROPONIN T SERPL HS-MCNC: 17 NG/L
TROPONIN T SERPL HS-MCNC: 19 NG/L
WBC # BLD AUTO: 4.9 10E3/UL (ref 4–11)

## 2025-06-19 PROCEDURE — 80053 COMPREHEN METABOLIC PANEL: CPT | Performed by: EMERGENCY MEDICINE

## 2025-06-19 PROCEDURE — 83690 ASSAY OF LIPASE: CPT | Performed by: EMERGENCY MEDICINE

## 2025-06-19 PROCEDURE — 250N000011 HC RX IP 250 OP 636: Mod: JZ | Performed by: EMERGENCY MEDICINE

## 2025-06-19 PROCEDURE — 96375 TX/PRO/DX INJ NEW DRUG ADDON: CPT

## 2025-06-19 PROCEDURE — 85004 AUTOMATED DIFF WBC COUNT: CPT | Performed by: EMERGENCY MEDICINE

## 2025-06-19 PROCEDURE — 93005 ELECTROCARDIOGRAM TRACING: CPT | Performed by: EMERGENCY MEDICINE

## 2025-06-19 PROCEDURE — 71046 X-RAY EXAM CHEST 2 VIEWS: CPT

## 2025-06-19 PROCEDURE — 99285 EMERGENCY DEPT VISIT HI MDM: CPT | Mod: 25

## 2025-06-19 PROCEDURE — 96374 THER/PROPH/DIAG INJ IV PUSH: CPT

## 2025-06-19 PROCEDURE — 83880 ASSAY OF NATRIURETIC PEPTIDE: CPT | Performed by: EMERGENCY MEDICINE

## 2025-06-19 PROCEDURE — 36415 COLL VENOUS BLD VENIPUNCTURE: CPT | Performed by: EMERGENCY MEDICINE

## 2025-06-19 PROCEDURE — 84484 ASSAY OF TROPONIN QUANT: CPT | Performed by: EMERGENCY MEDICINE

## 2025-06-19 RX ORDER — ONDANSETRON 2 MG/ML
4 INJECTION INTRAMUSCULAR; INTRAVENOUS EVERY 30 MIN PRN
Status: DISCONTINUED | OUTPATIENT
Start: 2025-06-19 | End: 2025-06-19 | Stop reason: HOSPADM

## 2025-06-19 RX ORDER — KETOCONAZOLE 20 MG/ML
SHAMPOO, SUSPENSION TOPICAL
COMMUNITY
Start: 2025-05-08

## 2025-06-19 RX ORDER — HYDROMORPHONE HYDROCHLORIDE 1 MG/ML
0.5 INJECTION, SOLUTION INTRAMUSCULAR; INTRAVENOUS; SUBCUTANEOUS EVERY 30 MIN PRN
Refills: 0 | Status: DISCONTINUED | OUTPATIENT
Start: 2025-06-19 | End: 2025-06-19 | Stop reason: HOSPADM

## 2025-06-19 RX ORDER — SITAGLIPTIN 100 MG/1
1 TABLET, FILM COATED ORAL EVERY MORNING
COMMUNITY
Start: 2023-12-04

## 2025-06-19 RX ORDER — METOPROLOL TARTRATE 25 MG/1
1.5 TABLET, FILM COATED ORAL 2 TIMES DAILY
COMMUNITY
Start: 2025-06-07

## 2025-06-19 RX ORDER — DULOXETIN HYDROCHLORIDE 30 MG/1
30 CAPSULE, DELAYED RELEASE ORAL EVERY MORNING
COMMUNITY

## 2025-06-19 RX ORDER — CLOPIDOGREL BISULFATE 75 MG/1
75 TABLET ORAL EVERY MORNING
COMMUNITY

## 2025-06-19 RX ORDER — SERTRALINE HYDROCHLORIDE 25 MG/1
25 TABLET, FILM COATED ORAL EVERY MORNING
COMMUNITY
Start: 2025-06-07

## 2025-06-19 RX ORDER — FAMOTIDINE 20 MG/1
20 TABLET, FILM COATED ORAL EVERY MORNING
COMMUNITY

## 2025-06-19 RX ORDER — LANOLIN ALCOHOL/MO/W.PET/CERES
400 CREAM (GRAM) TOPICAL 2 TIMES DAILY
COMMUNITY
Start: 2025-04-26

## 2025-06-19 RX ORDER — FENOFIBRATE 200 MG/1
200 CAPSULE ORAL EVERY MORNING
COMMUNITY
Start: 2025-06-07

## 2025-06-19 RX ORDER — RIMEGEPANT SULFATE 75 MG/75MG
75 TABLET, ORALLY DISINTEGRATING ORAL DAILY PRN
COMMUNITY
Start: 2025-04-17

## 2025-06-19 RX ORDER — NYSTATIN 100000 [USP'U]/G
POWDER TOPICAL 2 TIMES DAILY
COMMUNITY
Start: 2025-06-13

## 2025-06-19 RX ORDER — ONDANSETRON 4 MG/1
4 TABLET, FILM COATED ORAL EVERY 8 HOURS PRN
COMMUNITY
Start: 2025-04-21

## 2025-06-19 RX ADMIN — ONDANSETRON 4 MG: 2 INJECTION, SOLUTION INTRAMUSCULAR; INTRAVENOUS at 12:32

## 2025-06-19 RX ADMIN — HYDROMORPHONE HYDROCHLORIDE 0.5 MG: 1 INJECTION, SOLUTION INTRAMUSCULAR; INTRAVENOUS; SUBCUTANEOUS at 12:33

## 2025-06-19 ASSESSMENT — ACTIVITIES OF DAILY LIVING (ADL)
ADLS_ACUITY_SCORE: 55

## 2025-06-19 ASSESSMENT — ENCOUNTER SYMPTOMS
COUGH: 0
SHORTNESS OF BREATH: 0
CHILLS: 0
FEVER: 0
ABDOMINAL PAIN: 0
VOMITING: 1
DIARRHEA: 0

## 2025-06-19 NOTE — MEDICATION SCRIBE - ADMISSION MEDICATION HISTORY
Medication Scribe Admission Medication History    Admission medication history is complete. The information provided in this note is only as accurate as the sources available at the time of the update.    Information Source(s): Facility ([facility]/) medication list/MAR via N/A    Pertinent Information: Patient's medications are being managed by: [facility] @ [phone]. Received MAR.    [place]: Could not verify if patient was still taking. Left on PTA list.     Changes made to PTA medication list:  Added: Pepcid, Lofibra, Januvia, Ketoconazole, Mag Ox, Lopressor, Nurtec, Nystatin powder, Zofran, Zoloft,   Deleted: Anti dandruff, Aspercreme, Dulcolax, Questran, Norco, Mag Chloride, Toprol, Remeron, Protonix, Duplicate Nitroglycerin, Senna-D, Sodium Irrigation, Trazodone   Changed:  Cymbalta from 120 to 30   Iron from every day to Three times weekly  Gabapentin 100 BID to 200 BID  Gabapentin 600 at bedtime to 900 at bedtime  Glipizide from 2.5 every day to 5 mg every day   Metformin from 500 every day to 1000 BID  Vitamin C from every day to three times weekly      Allergies reviewed with patient and updates made in EHR: yes    Medication History Completed By: Enrique Mcclendon 6/19/2025 2:23 PM    PTA Med List   Medication Sig Note Last Dose/Taking    acetaminophen (TYLENOL) 325 MG tablet Take 650 mg by mouth every 6 hours as needed for fever or pain  Taking As Needed    acetaminophen (TYLENOL) 325 MG tablet Take 650 mg by mouth 2 times daily  6/19/2025 Morning    ARIPiprazole (ABILIFY) 2 MG tablet Take 2 mg by mouth every morning.  6/19/2025 Morning    atorvastatin (LIPITOR) 80 MG tablet Take 80 mg by mouth every morning.  6/19/2025 Morning    clopidogrel (PLAVIX) 75 MG tablet Take 75 mg by mouth every morning.  6/19/2025 Morning    DULoxetine (CYMBALTA) 30 MG capsule Take 30 mg by mouth every morning.  6/19/2025 Morning    erenumab-aooe (AIMOVIG, 140 MG DOSE,) 70 MG/ML injection Inject 140 mg  Subcutaneous every 30 days 6/19/2025: Unable to ascertain if patient is still taking. Not on Facility MAR.  Taking    famotidine (PEPCID) 20 MG tablet Take 20 mg by mouth every morning.  6/19/2025 Morning    fenofibrate micronized (LOFIBRA) 200 MG capsule Take 200 mg by mouth every morning.  6/19/2025 Morning    ferrous sulfate (FEROSUL) 325 (65 Fe) MG tablet Take 325 mg by mouth three times a week. Dose on Monday, Wednesday, and Friday mornings.  6/18/2025 Morning    gabapentin (NEURONTIN) 100 MG capsule Take 200 mg by mouth 2 times daily. 0700 and 1630. In addition, patient takes 3 x 300 mg (dose = 900) capsules daily at bedtime.  6/19/2025 Morning    gabapentin (NEURONTIN) 300 MG capsule Take 900 mg by mouth at bedtime. In addition, patient takes 2 x 100 mg (dose = 200) capsules daily at 0700 and 1630  6/18/2025 Bedtime    glipiZIDE (GLUCOTROL) 5 MG tablet Take 5 mg by mouth daily at 2 pm.  6/18/2025 at  2:00 PM    JANUVIA 100 MG tablet Take 1 tablet by mouth every morning.  6/19/2025 Morning    ketoconazole (NIZORAL) 2 % external shampoo Apply topically twice a week. Monday and Wednesday 6/18/2025 Morning    loperamide (IMODIUM) 2 MG capsule Take 2 mg by mouth 4 times daily as needed for diarrhea  Taking As Needed    losartan (COZAAR) 25 MG tablet Take 25 mg by mouth At Bedtime   6/18/2025 Bedtime    MAGNESIUM OXIDE 400 (240 Mg) MG tablet Take 400 mg by mouth 2 times daily.  6/19/2025 Morning    metFORMIN (GLUCOPHAGE) 1000 MG tablet Take 1,000 mg by mouth 2 times daily (with meals).  6/19/2025 Morning    metoprolol tartrate (LOPRESSOR) 25 MG tablet Take 1.5 tablets by mouth 2 times daily.  6/19/2025 Morning    mineral oil-hydrophilic petrolatum (AQUAPHOR) external ointment Apply topically 3 times daily as needed for irritation or dry skin Apply to forehead and other areas as needed  Taking As Needed    nitroGLYcerin (NITROSTAT) 0.4 MG sublingual tablet For chest pain place 1 tablet under the tongue every 5  minutes for 3 doses. If symptoms persist 5 minutes after 1st dose call 911.  Taking    NURTEC 75 MG ODT tablet Place 75 mg under the tongue daily as needed.  Taking As Needed    nystatin (MYCOSTATIN) 200067 UNIT/GM external powder Apply topically 2 times daily.  6/19/2025 Morning    ondansetron (ZOFRAN) 4 MG tablet Take 4 mg by mouth every 8 hours as needed.  Taking As Needed    polyethylene glycol (MIRALAX) 17 GM/Dose powder Take 1 packet by mouth daily as needed for constipation   Taking As Needed    sertraline (ZOLOFT) 25 MG tablet Take 25 mg by mouth every morning.  6/19/2025 Morning    vitamin C (ASCORBIC ACID) 500 MG tablet Take 500 mg by mouth three times a week. On Monday, Wednesday, and Friday mornings with iron administration  6/18/2025 Morning    XARELTO ANTICOAGULANT 20 MG TABS tablet Take 20 mg by mouth daily (with breakfast)  6/19/2025 Morning

## 2025-06-19 NOTE — ED PROVIDER NOTES
EMERGENCY DEPARTMENT ENCOUNTER       ED Course & Medical Decision Making     11:19 AM I met the patient and performed my initial evaluation. We discussed the plan of care.    I agreed I saw and examined the patient.  IV was established and he was placed on the monitor.  He is already had aspirin as well as nitroglycerin x 4.    Diagnostics ordered.  I did independently interpret EKG done today at 11:27 AM.  Normal sinus rhythm with a rate of 80 bpm.  Intervals and axis are normal.  There is isolated 1 mm of ST elevation, concave upwards in V4 with some upsloping ST elevation in V2 V3, however this is similar to the EKG from January 26, 2024 without significant reciprocal changes.  Unclear significance.    He does state this feels different than his typical anginal symptoms and based on the previous EKG I have decided not to activate based on this EKG alone but I will get additional tests such as troponin studies    PE was considered but he is already anticoagulated on Xarelto    Serial troponins are 17 and 19 respectively and after some morphine here his symptoms resolved and never returned.  He was monitored and had no recurrence of symptoms.  This is reassuring but he does have significant risk factors and history and therefore I do feel follow-up with cardiology via rapid access would be most indicated.  If his symptoms return before then he would come back here for a recheck otherwise he will follow-up as indicated above    Sepsis: No sign of sepsis    Medical Decision Making  Obtained supplemental history: EMS  Reviewed external records: ED visit 1/26/2024  Care impacted by chronic illness: History of coronary artery disease  Care significantly affected by social determinants of health:See MDM  Did you consider but not order tests?:See MDM  Did you interpret images independently?:  Any ordered images or EKGs were reviewed and independently interpreted  Consultation discussion with other provider:See MDM  Admit  v. Discharge: See MDM      MIPS: Not Applicable       Prior to making a final disposition on this patient the results of patient's tests and other diagnostic studies were discussed with the patient. All questions were answered. Patient expressed understanding of the plan and was amenable to it.    Medications   ondansetron (ZOFRAN) injection 4 mg (4 mg Intravenous Not Given 6/19/25 1233)   HYDROmorphone (PF) (DILAUDID) injection 0.5 mg (0.5 mg Intravenous $Given 6/19/25 1233)       Final Impression     1. Chest pain, unspecified type            Chief Complaint     Chief Complaint   Patient presents with    Chest Pain            HPI       Cm Lundberg is a 54 year old male with a medical history of late effects of CVA, CAD, ablation in [place], cerebral artery occlusion with cerebral infarction, congestive heart failure, peripheral vascular disease, stented, coronary artery, myocardial infarction, diabetes mellitus II, hypertension, gastroesophageal reflux disease, hyperlipidemia, MRSA, and ischemic cardiomyopathy  who presents for chest pain.    Per EMS.  Patient comes in assisted living facility and complaints of chest pain onset yesterday that is continued to today.  Pain rated 5/10, has never gotten better or worse.  Patient was given 3 nitroglycerin at the facility prior to EMS arrival, at EMS arrival patient was given another nitroglycerin.  Patient's blood pressure dropped to 90 systolic therefore medication administration was stopped.  Patient reported to EMS that patient has a history of 28 myocardial infarction's and several strokes.    Pain is rated 6/10 now.  He is unsure about any changes in medications.  His current chest pain is not similar to previous heart experience.  Endorses emesis a week ago, but did not think anything about this.  Denies shortness of breath, cough, chills, fever, abdominal pain, or diarrhea.    Per chart review [facility]  04/07/2025 CAD native heart with angina pectoris.  Patient with prior bypass and multiple subsequent PCI's, stable for about 10 years.  Patient with a few episodes of atypical chest discomfort leading to some evaluations during that time however no need for further revascularization.  Paroxysmal atrial flutter is a probable diagnosis for which patient is on anticoagulation therapy.  Mild ischemic cardiomyopathy, ejection fraction 40%.  To continue Xarelto and Plavix without aspirin for presumed ongoing atrial flutter and complex coronary disease.  Prescribed nitroglycerin and losartan.    IBlu am serving as a scribe to document services personally performed by Barber Golden M.D. based on my observation and the provider's statements to me. IBarber M.D attest that Blu Cruz is acting in a scribe capacity, has observed my performance of the services and has documented them in accordance with my direction.    Past Medical History     Past Medical History:   Diagnosis Date    Anemia     Anemia, unspecified type 08/19/2016    Anxiety     Anxiety state 07/29/2021    Arthritis     Atrial fibrillation and flutter (H)     Cerebral artery occlusion with cerebral infarction (H)     Cerebral infarction (H)     Chest pain, unspecified type 08/19/2016    Chronic atrial fibrillation (H)     Congestive heart failure (H)     Coronary artery disease     Coronary artery disease involving native coronary artery without angina pectoris, unspecified whether native or transplanted heart 08/19/2016    Coronary atherosclerosis 07/29/2021    Depression     Diabetic ulcer of right midfoot associated with type 2 diabetes mellitus, limited to breakdown of skin (H) 09/28/2017    Equinovarus acquired deformity, right 04/14/2021    Equinovarus deformity, acquired, right     Essential hypertension 08/19/2016    GERD (gastroesophageal reflux disease)     Heart attack (H)     History of blood transfusion     Hyperlipidemia      Hyperlipidemia, unspecified hyperlipidemia type 08/19/2016    Hypertension     Insomnia, unspecified type 08/19/2016    Ischemic cardiomyopathy     Late effects of cerebrovascular accident 08/19/2016    Major depressive disorder with single episode, remission status unspecified 08/19/2016    Microalbuminuria 07/29/2021    Migraine     Mild intellectual disabilities 08/19/2016    Myalgia     Nausea 08/19/2016    Osteomyelitis (H)     PONV (postoperative nausea and vomiting)     Problems with learning 07/29/2021    PVD (peripheral vascular disease)     Stented coronary artery     Type 2 diabetes mellitus (H) 07/29/2021    Type II or unspecified type diabetes mellitus with ophthalmic manifestations, not stated as uncontrolled(250.50) (H) 07/29/2021    Weakness 08/19/2016     Past Surgical History:   Procedure Laterality Date    AMPUTATE LEG BELOW KNEE Right 8/5/2022    Procedure: AMPUTATION, BELOW KNEE, right leg;  Surgeon: Kim Montes MD;  Location: [facility]    INCISION AND DRAINAGE LOWER EXTREMITY, COMBINED Right 12/17/2021    Procedure: INCISION AND DRAINAGE, right foot;  Surgeon: Alex Kim DPM;  Location: [facility] OR    INCISION AND DRAINAGE LOWER EXTREMITY, COMBINED Right 1/27/2022    Procedure: INCISION AND DRAINAGE, right foot with application of theraskin;  Surgeon: Alex Kim DPM;  Location: [facility] OR    INCISION AND DRAINAGE LOWER EXTREMITY, COMBINED Right 4/4/2022    Procedure: INCISION AND DRAINAGE, right foot with application of theraskin;  Surgeon: Alex Kim DPM;  Location: [facility] OR    IR LOWER EXTREMITY ANGIOGRAM RIGHT  6/29/2022    IR MISCELLANEOUS PROCEDURE  2/13/2004    IRRIGATION AND DEBRIDEMENT LOWER EXTREMITY, COMBINED Right 8/24/2022    Procedure: RIGHT IRRIGATION AND DEBRIDEMENT, LOWER EXTREMITY, REVISION OF BELOW KNEE AMPUTATION;  Surgeon: Kim Montes MD;  Location: [facility] OR    IRRIGATION AND DEBRIDEMENT LOWER  EXTREMITY, COMBINED Right 8/26/2022    Procedure: WASHOUT AND PARTIAL CLOSURE OF RIGHT BELOW THE KNEE AMPUTATION,  AND WOUND VAC APPLICATION;  Surgeon: Kim Montes MD;  [place] [facility] [place]    PICC DOUBLE LUMEN PLACEMENT  8/29/2022         ZZC CABG, VEIN, SINGLE      Description: CABG (CABG);  Recorded: 04/22/2009;     Family History   Adopted: Yes      Social History     Tobacco Use    Smoking status: Former     Current packs/day: 0.00     Types: Cigarettes     Quit date: 1/28/2015     Years since quitting: 10.3    Smokeless tobacco: Never   Vaping Use    Vaping status: Never Used   Substance Use Topics    Alcohol use: Not Currently    Drug use: Not Currently       Relevant past medical, surgical, family and social history as documented above, has been reviewed and discussed with patient. No changes or additions, unless otherwise noted in the HPI.    Current Medications     acetaminophen (TYLENOL) 325 MG tablet  acetaminophen (TYLENOL) 325 MG tablet  ARIPiprazole (ABILIFY) 2 MG tablet  atorvastatin (LIPITOR) 80 MG tablet  clopidogrel (PLAVIX) 75 MG tablet  DULoxetine (CYMBALTA) 30 MG capsule  erenumab-aooe (AIMOVIG, 140 MG DOSE,) 70 MG/ML injection  famotidine (PEPCID) 20 MG tablet  fenofibrate micronized (LOFIBRA) 200 MG capsule  ferrous sulfate (FEROSUL) 325 (65 Fe) MG tablet  gabapentin (NEURONTIN) 100 MG capsule  gabapentin (NEURONTIN) 300 MG capsule  glipiZIDE (GLUCOTROL) 5 MG tablet  JANUVIA 100 MG tablet  ketoconazole (NIZORAL) 2 % external shampoo  loperamide (IMODIUM) 2 MG capsule  losartan (COZAAR) 25 MG tablet  MAGNESIUM OXIDE 400 (240 Mg) MG tablet  metFORMIN (GLUCOPHAGE) 1000 MG tablet  metoprolol tartrate (LOPRESSOR) 25 MG tablet  mineral oil-hydrophilic petrolatum (AQUAPHOR) external ointment  nitroGLYcerin (NITROSTAT) 0.4 MG sublingual tablet  NURTEC 75 MG ODT tablet  nystatin (MYCOSTATIN) 768133 UNIT/GM external powder  ondansetron (ZOFRAN) 4 MG tablet  polyethylene glycol (MIRALAX) 17  "GM/Dose powder  sertraline (ZOLOFT) 25 MG tablet  vitamin C (ASCORBIC ACID) 500 MG tablet  XARELTO ANTICOAGULANT 20 MG TABS tablet        Allergies     Allergies   Allergen Reactions    Aspirin Other (See Comments)     Reacts with migraine medicine    Blood-Group Specific Substance      Anti-Fya present.  Expect delays in blood for transfusion.  Draw 2 lavender and 1 red for all type and screen orders.    Isosorbide Headache    Codeine Rash    Morphine Rash       Review of Systems     Review of Systems   Constitutional:  Negative for chills and fever.   Respiratory:  Negative for cough and shortness of breath.    Gastrointestinal:  Positive for vomiting. Negative for abdominal pain and diarrhea.        Remainder of systems reviewed, unless noted in HPI all others negative.    Physical Exam     /55   Pulse 75   Temp 98.8  F (37.1  C) (Oral)   Resp 23   Ht 1.6 m (5' 3\")   Wt 67.6 kg (149 lb)   SpO2 96%   BMI 26.39 kg/m      Physical Exam  Vitals and nursing note reviewed.   Constitutional:       General: He is not in acute distress.  HENT:      Head: Normocephalic.      Nose: Nose normal.   Eyes:      General: No scleral icterus.  Cardiovascular:      Rate and Rhythm: Normal rate.   Pulmonary:      Effort: Pulmonary effort is normal.   Musculoskeletal:      Cervical back: Neck supple.      Comments: Right BKA noted   Skin:     Findings: No rash.   Neurological:      Mental Status: He is alert. Mental status is at baseline.   Psychiatric:         Mood and Affect: Mood normal.             Labs & Imaging         Labs Ordered and Resulted from Time of ED Arrival to Time of ED Departure   COMPREHENSIVE METABOLIC PANEL - Abnormal       Result Value    Sodium 138      Potassium 4.4      Carbon Dioxide (CO2) 26      Anion Gap 9      Urea Nitrogen 18.5      Creatinine 1.04      GFR Estimate 84      Calcium 9.8      Chloride 103      Glucose 206 (*)     Alkaline Phosphatase 97      AST 19      ALT 17      Protein " Total 6.2 (*)     Albumin 3.9      Bilirubin Total 0.3     LIPASE - Abnormal    Lipase 91 (*)    CBC WITH PLATELETS AND DIFFERENTIAL - Abnormal    WBC Count 4.9      RBC Count 4.07 (*)     Hemoglobin 11.6 (*)     Hematocrit 35.4 (*)     MCV 87      MCH 28.5      MCHC 32.8      RDW 13.0      Platelet Count 178      % Neutrophils 61      % Lymphocytes 26      % Monocytes 8      % Eosinophils 3      % Basophils 1      % Immature Granulocytes 0      NRBCs per 100 WBC 0      Absolute Neutrophils 3.0      Absolute Lymphocytes 1.2      Absolute Monocytes 0.4      Absolute Eosinophils 0.2      Absolute Basophils 0.1      Absolute Immature Granulocytes 0.0      Absolute NRBCs 0.0     TROPONIN T, HIGH SENSITIVITY - Normal    Troponin T, High Sensitivity 17     NT-PROBNP - Normal    NT-proBNP 162     TROPONIN T, HIGH SENSITIVITY - Normal    Troponin T, High Sensitivity 19           Results for orders placed or performed during the hospital encounter of 06/19/25   Chest XR,  PA & LAT    Impression    IMPRESSION: Poststernotomy changes. Lungs are clear. Heart is enlarged but unchanged. Coronary artery stents noted. No signs of pneumonia or failure. Slight widening of the right AC joint suggestive of a ligamentous injury.   Comprehensive metabolic panel   Result Value Ref Range    Sodium 138 135 - 145 mmol/L    Potassium 4.4 3.4 - 5.3 mmol/L    Carbon Dioxide (CO2) 26 22 - 29 mmol/L    Anion Gap 9 7 - 15 mmol/L    Urea Nitrogen 18.5 6.0 - 20.0 mg/dL    Creatinine 1.04 0.67 - 1.17 mg/dL    GFR Estimate 84 >60 mL/min/1.73m2    Calcium 9.8 8.8 - 10.4 mg/dL    Chloride 103 98 - 107 mmol/L    Glucose 206 (H) 70 - 99 mg/dL    Alkaline Phosphatase 97 40 - 150 U/L    AST 19 0 - 45 U/L    ALT 17 0 - 70 U/L    Protein Total 6.2 (L) 6.4 - 8.3 g/dL    Albumin 3.9 3.5 - 5.2 g/dL    Bilirubin Total 0.3 <=1.2 mg/dL   Result Value Ref Range    Lipase 91 (H) 13 - 60 U/L   Result Value Ref Range    Troponin T, High Sensitivity 17 <=22 ng/L    NT-proBNP   Result Value Ref Range    NT-proBNP 162 0 - 177 pg/mL   CBC with platelets and differential   Result Value Ref Range    WBC Count 4.9 4.0 - 11.0 10e3/uL    RBC Count 4.07 (L) 4.40 - 5.90 10e6/uL    Hemoglobin 11.6 (L) 13.3 - 17.7 g/dL    Hematocrit 35.4 (L) 40.0 - 53.0 %    MCV 87 78 - 100 fL    MCH 28.5 26.5 - 33.0 pg    MCHC 32.8 31.5 - 36.5 g/dL    RDW 13.0 10.0 - 15.0 %    Platelet Count 178 150 - 450 10e3/uL    % Neutrophils 61 %    % Lymphocytes 26 %    % Monocytes 8 %    % Eosinophils 3 %    % Basophils 1 %    % Immature Granulocytes 0 %    NRBCs per 100 WBC 0 <1 /100    Absolute Neutrophils 3.0 1.6 - 8.3 10e3/uL    Absolute Lymphocytes 1.2 0.8 - 5.3 10e3/uL    Absolute Monocytes 0.4 0.0 - 1.3 10e3/uL    Absolute Eosinophils 0.2 0.0 - 0.7 10e3/uL    Absolute Basophils 0.1 0.0 - 0.2 10e3/uL    Absolute Immature Granulocytes 0.0 <=0.4 10e3/uL    Absolute NRBCs 0.0 10e3/uL   Result Value Ref Range    Troponin T, High Sensitivity 19 <=22 ng/L       Barber Golden MD  Emergency Medicine  [facility] EMERGENCY DEPARTMENT  [address] [place] [id number] [phone]  6/19/2025         Barber Golden MD  06/19/25 0229

## 2025-06-19 NOTE — ED NOTES
[facility]  Expected date: 6/19/25  Expected time: 11:10 AM  Means of arrival: Ambulance  Comments:  [facility]  54M  Chest pain since yesterday  Ntg asa given, VSS

## 2025-06-19 NOTE — ED TRIAGE NOTES
Pt here due to chest pain. 6/10 started last night. Has hx of stroke, on thinners. Previous deficits on right side. Pt alert and oriented. Able to pivot transfer, AKA R side. . Pt received x 4 nitroglycerin (3 at facility) 1 w/EMS. 324 ASA. Pain unchanged. Emesis 1 week ago. No sob. No other complaints at this time. Pt uses w/c for ambulation.      Triage Assessment (Adult)       Row Name 06/19/25 1130          Triage Assessment    Airway WDL WDL        Respiratory WDL    Respiratory WDL WDL        Skin Circulation/Temperature WDL    Skin Circulation/Temperature WDL WDL        Cardiac WDL    Cardiac WDL X        Peripheral/Neurovascular WDL    Peripheral Neurovascular WDL WDL        Cognitive/Neuro/Behavioral WDL    Cognitive/Neuro/Behavioral WDL WDL  WNL for pt

## 2025-06-21 LAB
ATRIAL RATE - MUSE: 80 BPM
DIASTOLIC BLOOD PRESSURE - MUSE: 69 MMHG
INTERPRETATION ECG - MUSE: NORMAL
P AXIS - MUSE: 19 DEGREES
PR INTERVAL - MUSE: 182 MS
QRS DURATION - MUSE: 108 MS
QT - MUSE: 386 MS
QTC - MUSE: 445 MS
R AXIS - MUSE: 81 DEGREES
SYSTOLIC BLOOD PRESSURE - MUSE: 123 MMHG
T AXIS - MUSE: -5 DEGREES
VENTRICULAR RATE- MUSE: 80 BPM

## 2025-07-06 ENCOUNTER — HEALTH MAINTENANCE LETTER (OUTPATIENT)
Age: 55
End: 2025-07-06

## (undated) DEVICE — BNDG KLING 4" 2236

## (undated) DEVICE — GLOVE BIOGEL PI ORTHOPRO SZ 7.5 47675

## (undated) DEVICE — PREP CHLORAPREP 26ML TINTED HI-LITE ORANGE 930815

## (undated) DEVICE — SUTURE VICRYL+ 2-0 27IN CT-1 UND VCP259H

## (undated) DEVICE — GOWN IMPERVIOUS BREATHABLE SMART LG 89015

## (undated) DEVICE — SONICVAC & TUBE SET MISONIX NEXUS 120-31-13X2

## (undated) DEVICE — DRSG ABD TNDRSRB WET PRUF 8IN X 10IN STRL  9194A

## (undated) DEVICE — CUFF TOURN 18IN STRL DISP

## (undated) DEVICE — SOLUTION IRRIG 2B7127 .9NS 3000ML BAG

## (undated) DEVICE — PREP POVIDONE-IODINE 7.5% SCRUB 4OZ BOTTLE MDS093945

## (undated) DEVICE — DRSG KERLIX 4 1/2"X4YDS ROLL 6715

## (undated) DEVICE — DRSG GAUZE 4X4" 3033

## (undated) DEVICE — CUSTOM PACK GEN MAJOR SBA5BGMHEA

## (undated) DEVICE — Device

## (undated) DEVICE — PACKING IODOFORM STRIP 1/4" 7831

## (undated) DEVICE — GLOVE BIOGEL PI INDICATOR 8.0 LF 41680

## (undated) DEVICE — BANDAGE ELASTIC VELCRO 4IN REB3014

## (undated) DEVICE — PLATE GROUNDING ADULT W/CORD 9165L

## (undated) DEVICE — BANDAGE ELASTIC VELCRO 6IN REB3016

## (undated) DEVICE — SUTURE SILK 3-0 TIES 30IN SA84H

## (undated) DEVICE — BLADE SAGITTAL WIDE (SO-618) 2108-118

## (undated) DEVICE — CUSTOM PACK LOWER EXTREMITY SOP5BLEHEA

## (undated) DEVICE — BONE CLEANING TIP INTERPULSE  0210-010-000

## (undated) DEVICE — SU ETHILON 2-0 FS 18" 664G

## (undated) DEVICE — ESU PENCIL SMOKE EVAC W/ROCKER SWITCH 0703-047-000

## (undated) DEVICE — SOL NACL 0.9% IRRIG 1000ML BOTTLE 2F7124

## (undated) DEVICE — SUCTION MANIFOLD NEPTUNE 2 SYS 1 PORT 702-025-000

## (undated) DEVICE — SOL WATER IRRIG 1000ML BOTTLE 2F7114

## (undated) DEVICE — BLADE KNIFE SURG 15 371115

## (undated) DEVICE — SYR 10ML LL W/O NDL 302995

## (undated) DEVICE — BANDAGE ELASTIC 6X550 LF DBL 593-96LF

## (undated) DEVICE — DRSG GAUZE 4X4" TRAY 6939

## (undated) DEVICE — STPL SKIN 35W 6.9MM  PXW35

## (undated) DEVICE — SUCTION IRR SYSTEM W/O TIP INTERPULSE HANDPIECE 0210-100-000

## (undated) DEVICE — DRSG GAUZE 4X4" TRAY

## (undated) DEVICE — DRAPE ISOLATION BAG 1003

## (undated) DEVICE — NEEDLE HYPO 22X1-1/2 SAFETY 305900

## (undated) DEVICE — SPONGE LAP 18X18" X8435

## (undated) DEVICE — CUSTOM PACK TOTAL KNEE SOP5BTKHEC

## (undated) DEVICE — CUFF TOURN 30IN STRL DISP 5921030235

## (undated) DEVICE — TRAY PREP DRY SKIN SCRUB 067

## (undated) DEVICE — GOWN LG DISP 9515

## (undated) DEVICE — BANDAGE STRETCH GAUZE 4IN 2247

## (undated) DEVICE — SU SILK 3-0 SH CR 8X18" C013D

## (undated) DEVICE — DRESSING XEROFORM PETROLATUM 5X9 33605

## (undated) DEVICE — DRAPE U SPLIT 74X120" 29440

## (undated) DEVICE — PACKING IODOFORM STRIP 1/2" 7832

## (undated) DEVICE — COLLECTION KIT E SWAB REG 220245

## (undated) DEVICE — DRSG GAUZE 4X4" 8044

## (undated) DEVICE — DRSG ADAPTIC 3X3" 6112

## (undated) DEVICE — PREP POVIDONE IODINE SOLUTION 10% 4OZ BOTTLE 29906-004

## (undated) DEVICE — HOLDER LIMB VELCRO OR 0814-1533

## (undated) DEVICE — GLOVE BIOGEL INDICATOR 7.5 LF 41675

## (undated) DEVICE — BANDAGE ESMARK 4 X 3 YARDS STL 23578-143

## (undated) DEVICE — HEMOSTASIS BONE OSTENE 2.5G SYNTHETIC 1503832

## (undated) DEVICE — DRSG KERLIX 2 1/4"X3YDS ROLL 6720

## (undated) RX ORDER — CLOPIDOGREL BISULFATE 75 MG/1
TABLET ORAL
Status: DISPENSED
Start: 2022-06-29

## (undated) RX ORDER — PROPOFOL 10 MG/ML
INJECTION, EMULSION INTRAVENOUS
Status: DISPENSED
Start: 2022-08-26

## (undated) RX ORDER — PROPOFOL 10 MG/ML
INJECTION, EMULSION INTRAVENOUS
Status: DISPENSED
Start: 2022-04-04

## (undated) RX ORDER — PROPOFOL 10 MG/ML
INJECTION, EMULSION INTRAVENOUS
Status: DISPENSED
Start: 2022-08-24

## (undated) RX ORDER — CEFAZOLIN SODIUM 1 G/3ML
INJECTION, POWDER, FOR SOLUTION INTRAMUSCULAR; INTRAVENOUS
Status: DISPENSED
Start: 2022-04-04

## (undated) RX ORDER — HEPARIN SODIUM 1000 [USP'U]/ML
INJECTION, SOLUTION INTRAVENOUS; SUBCUTANEOUS
Status: DISPENSED
Start: 2022-06-29

## (undated) RX ORDER — DEXAMETHASONE SODIUM PHOSPHATE 10 MG/ML
INJECTION INTRAMUSCULAR; INTRAVENOUS
Status: DISPENSED
Start: 2022-08-24

## (undated) RX ORDER — FENTANYL CITRATE 50 UG/ML
INJECTION, SOLUTION INTRAMUSCULAR; INTRAVENOUS
Status: DISPENSED
Start: 2022-06-29

## (undated) RX ORDER — FENTANYL CITRATE 50 UG/ML
INJECTION, SOLUTION INTRAMUSCULAR; INTRAVENOUS
Status: DISPENSED
Start: 2022-08-05

## (undated) RX ORDER — ONDANSETRON 2 MG/ML
INJECTION INTRAMUSCULAR; INTRAVENOUS
Status: DISPENSED
Start: 2022-08-26

## (undated) RX ORDER — LIDOCAINE HYDROCHLORIDE 10 MG/ML
INJECTION, SOLUTION INFILTRATION; PERINEURAL
Status: DISPENSED
Start: 2022-06-29

## (undated) RX ORDER — ONDANSETRON 2 MG/ML
INJECTION INTRAMUSCULAR; INTRAVENOUS
Status: DISPENSED
Start: 2022-08-24

## (undated) RX ORDER — CEFAZOLIN SODIUM 1 G/3ML
INJECTION, POWDER, FOR SOLUTION INTRAMUSCULAR; INTRAVENOUS
Status: DISPENSED
Start: 2022-08-24

## (undated) RX ORDER — FENTANYL CITRATE-0.9 % NACL/PF 10 MCG/ML
PLASTIC BAG, INJECTION (ML) INTRAVENOUS
Status: DISPENSED
Start: 2022-08-24

## (undated) RX ORDER — ONDANSETRON 2 MG/ML
INJECTION INTRAMUSCULAR; INTRAVENOUS
Status: DISPENSED
Start: 2022-04-04

## (undated) RX ORDER — DEXAMETHASONE SODIUM PHOSPHATE 10 MG/ML
INJECTION INTRAMUSCULAR; INTRAVENOUS
Status: DISPENSED
Start: 2022-04-04